# Patient Record
Sex: MALE | Race: WHITE | URBAN - METROPOLITAN AREA
[De-identification: names, ages, dates, MRNs, and addresses within clinical notes are randomized per-mention and may not be internally consistent; named-entity substitution may affect disease eponyms.]

---

## 2018-03-19 ENCOUNTER — DOCTOR'S OFFICE (OUTPATIENT)
Dept: URBAN - METROPOLITAN AREA CLINIC 136 | Facility: CLINIC | Age: 38
Setting detail: OPHTHALMOLOGY
End: 2018-03-19
Payer: MEDICAID

## 2018-03-19 DIAGNOSIS — H25.13: ICD-10-CM

## 2018-03-19 DIAGNOSIS — H53.19: ICD-10-CM

## 2018-03-19 DIAGNOSIS — H43.393: ICD-10-CM

## 2018-03-19 DIAGNOSIS — E11.9: ICD-10-CM

## 2018-03-19 PROCEDURE — 92004 COMPRE OPH EXAM NEW PT 1/>: CPT | Performed by: OPHTHALMOLOGY

## 2018-03-19 ASSESSMENT — CONFRONTATIONAL VISUAL FIELD TEST (CVF)
OS_FINDINGS: FULL
OD_FINDINGS: FULL

## 2018-03-21 ASSESSMENT — REFRACTION_CURRENTRX
OS_OVR_VA: 20/
OS_CYLINDER: SPHERE
OD_OVR_VA: 20/
OD_OVR_VA: 20/
OS_OVR_VA: 20/
OS_SPHERE: -2.75
OD_CYLINDER: SPHERE
OD_OVR_VA: 20/
OD_SPHERE: -2.75
OS_OVR_VA: 20/

## 2018-03-21 ASSESSMENT — REFRACTION_MANIFEST
OD_VA2: 20/
OD_VA1: 20/
OS_VA3: 20/
OD_VA2: 20/
OS_VA1: 20/
OD_VA1: 20/
OD_VA1: 20/
OD_VA2: 20/
OU_VA: 20/
OD_VA3: 20/
OD_VA3: 20/
OS_VA3: 20/
OU_VA: 20/
OS_VA1: 20/
OD_VA3: 20/
OS_VA2: 20/
OU_VA: 20/
OS_VA2: 20/
OS_VA2: 20/
OS_VA1: 20/
OS_VA3: 20/

## 2018-03-21 ASSESSMENT — VISUAL ACUITY
OD_BCVA: 20/20-1
OS_BCVA: 20/20-

## 2018-04-11 LAB
ABSOL LYMPHOCYTES (HISTORICAL): 2.2 K/UL (ref 0.5–4)
ALBUMIN SERPL BCP-MCNC: 4 G/DL (ref 3–5.2)
ALP SERPL-CCNC: 84 U/L (ref 43–122)
ALT SERPL W P-5'-P-CCNC: 29 U/L (ref 9–52)
ANION GAP SERPL CALCULATED.3IONS-SCNC: 10 MMOL/L (ref 5–14)
AST SERPL W P-5'-P-CCNC: 18 U/L (ref 17–59)
BASOPHILS # BLD AUTO: 0.1 K/UL (ref 0–0.1)
BASOPHILS # BLD AUTO: 1 % (ref 0–1)
BILIRUB SERPL-MCNC: 0.2 MG/DL
BUN SERPL-MCNC: 30 MG/DL (ref 5–25)
CALCIUM SERPL-MCNC: 9.5 MG/DL (ref 8.4–10.2)
CHLORIDE SERPL-SCNC: 96 MEQ/L (ref 97–108)
CHOLEST SERPL-MCNC: 195 MG/DL
CHOLEST/HDLC SERPL: 3.8 {RATIO}
CO2 SERPL-SCNC: 30 MMOL/L (ref 22–30)
CREATINE, SERUM (HISTORICAL): 1.02 MG/DL (ref 0.7–1.5)
DEPRECATED RDW RBC AUTO: 13.4 %
EGFR (HISTORICAL): >60 ML/MIN/1.73 M2
EOSINOPHIL # BLD AUTO: 0.5 K/UL (ref 0–0.4)
EOSINOPHIL NFR BLD AUTO: 5 % (ref 0–6)
EST. AVERAGE GLUCOSE BLD GHB EST-MCNC: 275 MG/DL
GLUCOSE FASTING (HISTORICAL): 142 MG/DL (ref 70–99)
HBA1C MFR BLD HPLC: 11.2 %
HCT VFR BLD AUTO: 40 % (ref 41–53)
HDLC SERPL-MCNC: 51 MG/DL
HGB BLD-MCNC: 13.5 G/DL (ref 13.5–17.5)
LDL/HDL RATIO (HISTORICAL): 2.3
LDLC SERPL CALC-MCNC: 115 MG/DL
LYMPHOCYTES NFR BLD AUTO: 24 % (ref 25–45)
MCH RBC QN AUTO: 28.8 PG (ref 26–34)
MCHC RBC AUTO-ENTMCNC: 33.7 % (ref 31–36)
MCV RBC AUTO: 85 FL (ref 80–100)
MONOCYTES # BLD AUTO: 0.7 K/UL (ref 0.2–0.9)
MONOCYTES NFR BLD AUTO: 8 % (ref 1–10)
NEUTROPHILS ABS COUNT (HISTORICAL): 5.5 K/UL (ref 1.8–7.8)
NEUTS SEG NFR BLD AUTO: 62 % (ref 45–65)
PLATELET # BLD AUTO: 280 K/MCL (ref 150–450)
POTASSIUM SERPL-SCNC: 4.8 MEQ/L (ref 3.6–5)
PROLACTIN (HISTORICAL): 24.9 NG/ML
RBC # BLD AUTO: 4.69 M/MCL (ref 4.5–5.9)
SODIUM SERPL-SCNC: 136 MEQ/L (ref 137–147)
TOTAL PROTEIN (HISTORICAL): 7 G/DL (ref 5.9–8.4)
TRIGL SERPL-MCNC: 145 MG/DL
TSH SERPL DL<=0.05 MIU/L-ACNC: 25.3 UIU/ML (ref 0.47–4.68)
VLDLC SERPL CALC-MCNC: 29 MG/DL (ref 0–40)
WBC # BLD AUTO: 8.9 K/MCL (ref 4.5–11)

## 2018-08-15 ENCOUNTER — DOCTOR'S OFFICE (OUTPATIENT)
Dept: URBAN - METROPOLITAN AREA CLINIC 136 | Facility: CLINIC | Age: 38
Setting detail: OPHTHALMOLOGY
End: 2018-08-15
Payer: MEDICAID

## 2018-08-15 DIAGNOSIS — H25.13: ICD-10-CM

## 2018-08-15 DIAGNOSIS — H43.393: ICD-10-CM

## 2018-08-15 DIAGNOSIS — H53.19: ICD-10-CM

## 2018-08-15 DIAGNOSIS — E11.9: ICD-10-CM

## 2018-08-15 PROCEDURE — 92012 INTRM OPH EXAM EST PATIENT: CPT | Performed by: OPHTHALMOLOGY

## 2018-08-15 ASSESSMENT — REFRACTION_CURRENTRX
OD_SPHERE: -2.75
OD_OVR_VA: 20/
OD_CYLINDER: SPHERE
OD_OVR_VA: 20/
OS_CYLINDER: SPHERE
OS_SPHERE: -2.75
OS_OVR_VA: 20/
OD_OVR_VA: 20/

## 2018-08-15 ASSESSMENT — REFRACTION_MANIFEST
OS_VA1: 20/
OU_VA: 20/
OS_VA2: 20/
OD_VA1: 20/
OS_VA3: 20/
OD_VA1: 20/
OS_VA3: 20/
OS_VA1: 20/
OD_VA3: 20/
OD_VA2: 20/
OS_VA2: 20/
OU_VA: 20/
OD_VA2: 20/
OS_VA1: 20/
OS_VA2: 20/
OU_VA: 20/
OD_VA2: 20/
OS_VA3: 20/
OD_VA3: 20/
OD_VA3: 20/
OD_VA1: 20/

## 2018-08-15 ASSESSMENT — VISUAL ACUITY
OD_BCVA: 20/20-1
OS_BCVA: 20/20-

## 2018-08-15 ASSESSMENT — CONFRONTATIONAL VISUAL FIELD TEST (CVF)
OS_FINDINGS: FULL
OD_FINDINGS: FULL

## 2018-08-15 ASSESSMENT — SUPERFICIAL PUNCTATE KERATITIS (SPK)
OS_SPK: 2+
OD_SPK: 2+

## 2018-08-31 ENCOUNTER — APPOINTMENT (EMERGENCY)
Dept: CT IMAGING | Facility: HOSPITAL | Age: 38
End: 2018-08-31
Payer: MEDICARE

## 2018-08-31 ENCOUNTER — HOSPITAL ENCOUNTER (EMERGENCY)
Facility: HOSPITAL | Age: 38
Discharge: HOME | End: 2018-08-31
Attending: INTERNAL MEDICINE | Admitting: INTERNAL MEDICINE
Payer: MEDICARE

## 2018-08-31 ENCOUNTER — APPOINTMENT (EMERGENCY)
Dept: RADIOLOGY | Facility: HOSPITAL | Age: 38
End: 2018-08-31
Payer: MEDICARE

## 2018-08-31 VITALS
DIASTOLIC BLOOD PRESSURE: 73 MMHG | SYSTOLIC BLOOD PRESSURE: 124 MMHG | WEIGHT: 300 LBS | TEMPERATURE: 99.2 F | RESPIRATION RATE: 16 BRPM | BODY MASS INDEX: 55.21 KG/M2 | OXYGEN SATURATION: 95 % | HEIGHT: 62 IN | HEART RATE: 80 BPM

## 2018-08-31 DIAGNOSIS — I10 HYPERTENSION: ICD-10-CM

## 2018-08-31 DIAGNOSIS — J20.9 ACUTE BRONCHITIS: Primary | ICD-10-CM

## 2018-08-31 LAB
ALBUMIN SERPL BCP-MCNC: 3.6 G/DL (ref 3.5–5.7)
ALP SERPL-CCNC: 67 U/L (ref 40–150)
ALT SERPL W P-5'-P-CCNC: 8 U/L (ref 7–52)
ANION GAP SERPL CALCULATED.3IONS-SCNC: 7 MMOL/L (ref 4–13)
AST SERPL W P-5'-P-CCNC: 9 U/L (ref 13–39)
BASOPHILS # BLD AUTO: 0.1 THOUSANDS/ΜL (ref 0–0.1)
BASOPHILS NFR BLD AUTO: 1 % (ref 0–2)
BETA-HYDROXYBUTYRATE: 0.31 MMOL/L (ref 0.02–0.27)
BILIRUB SERPL-MCNC: 0.4 MG/DL (ref 0.2–1)
BILIRUB UR QL STRIP: NEGATIVE
BNP SERPL-MCNC: 48 PG/ML (ref 1–100)
BUN SERPL-MCNC: 10 MG/DL (ref 7–25)
CALCIUM SERPL-MCNC: 9.3 MG/DL (ref 8.6–10.5)
CHLORIDE SERPL-SCNC: 99 MMOL/L (ref 98–107)
CLARITY UR: CLEAR
CO2 SERPL-SCNC: 29 MMOL/L (ref 21–31)
COLOR UR: YELLOW
CREAT SERPL-MCNC: 0.92 MG/DL (ref 0.7–1.3)
EOSINOPHIL # BLD AUTO: 0.3 THOUSAND/ΜL (ref 0–0.61)
EOSINOPHIL NFR BLD AUTO: 3 % (ref 0–5)
ERYTHROCYTE [DISTWIDTH] IN BLOOD BY AUTOMATED COUNT: 13.3 % (ref 11.5–14.5)
GFR SERPL CREATININE-BSD FRML MDRD: 104 ML/MIN/1.73SQ M
GLUCOSE SERPL-MCNC: 245 MG/DL (ref 65–99)
GLUCOSE UR STRIP-MCNC: ABNORMAL MG/DL
HCT VFR BLD AUTO: 39.3 % (ref 36.5–49.3)
HGB BLD-MCNC: 13.1 G/DL (ref 14–18)
HGB UR QL STRIP.AUTO: NEGATIVE
KETONES UR STRIP-MCNC: NEGATIVE MG/DL
LACTATE SERPL-SCNC: 0.6 MMOL/L (ref 0.5–2)
LEUKOCYTE ESTERASE UR QL STRIP: NEGATIVE
LYMPHOCYTES # BLD AUTO: 1.8 THOUSANDS/ΜL (ref 0.6–4.47)
LYMPHOCYTES NFR BLD AUTO: 20 % (ref 21–51)
MCH RBC QN AUTO: 28.2 PG (ref 26–34)
MCHC RBC AUTO-ENTMCNC: 33.3 G/DL (ref 31–37)
MCV RBC AUTO: 85 FL (ref 81–99)
MONOCYTES # BLD AUTO: 0.5 THOUSAND/ΜL (ref 0.17–1.22)
MONOCYTES NFR BLD AUTO: 5 % (ref 2–12)
NEUTROPHILS # BLD AUTO: 6.5 THOUSANDS/ΜL (ref 1.4–6.5)
NEUTS SEG NFR BLD AUTO: 71 % (ref 42–75)
NITRITE UR QL STRIP: NEGATIVE
NRBC BLD AUTO-RTO: 0 /100 WBCS
PH UR STRIP.AUTO: 6 [PH] (ref 5–8)
PLATELET # BLD AUTO: 305 THOUSANDS/UL (ref 149–390)
PMV BLD AUTO: 7.7 FL (ref 8.6–11.7)
POTASSIUM SERPL-SCNC: 3.7 MMOL/L (ref 3.5–5.5)
PROT SERPL-MCNC: 6.9 G/DL (ref 6.4–8.9)
PROT UR STRIP-MCNC: NEGATIVE MG/DL
RBC # BLD AUTO: 4.64 MILLION/UL (ref 4.3–5.9)
SODIUM SERPL-SCNC: 135 MMOL/L (ref 134–143)
SP GR UR STRIP.AUTO: <=1.005 (ref 1–1.03)
UROBILINOGEN UR QL STRIP.AUTO: 0.2 E.U./DL
WBC # BLD AUTO: 9.1 THOUSAND/UL (ref 4.8–10.8)

## 2018-08-31 PROCEDURE — 70450 CT HEAD/BRAIN W/O DYE: CPT

## 2018-08-31 PROCEDURE — 83880 ASSAY OF NATRIURETIC PEPTIDE: CPT | Performed by: INTERNAL MEDICINE

## 2018-08-31 PROCEDURE — 83605 ASSAY OF LACTIC ACID: CPT | Performed by: INTERNAL MEDICINE

## 2018-08-31 PROCEDURE — 80053 COMPREHEN METABOLIC PANEL: CPT | Performed by: INTERNAL MEDICINE

## 2018-08-31 PROCEDURE — 87040 BLOOD CULTURE FOR BACTERIA: CPT | Performed by: INTERNAL MEDICINE

## 2018-08-31 PROCEDURE — 82010 KETONE BODYS QUAN: CPT | Performed by: INTERNAL MEDICINE

## 2018-08-31 PROCEDURE — 93005 ELECTROCARDIOGRAM TRACING: CPT

## 2018-08-31 PROCEDURE — 71046 X-RAY EXAM CHEST 2 VIEWS: CPT

## 2018-08-31 PROCEDURE — 85025 COMPLETE CBC W/AUTO DIFF WBC: CPT | Performed by: INTERNAL MEDICINE

## 2018-08-31 PROCEDURE — 36415 COLL VENOUS BLD VENIPUNCTURE: CPT | Performed by: INTERNAL MEDICINE

## 2018-08-31 PROCEDURE — 96374 THER/PROPH/DIAG INJ IV PUSH: CPT

## 2018-08-31 PROCEDURE — 81003 URINALYSIS AUTO W/O SCOPE: CPT | Performed by: INTERNAL MEDICINE

## 2018-08-31 PROCEDURE — 99285 EMERGENCY DEPT VISIT HI MDM: CPT

## 2018-08-31 PROCEDURE — 96361 HYDRATE IV INFUSION ADD-ON: CPT

## 2018-08-31 RX ORDER — INSULIN GLARGINE 100 [IU]/ML
30 INJECTION, SOLUTION SUBCUTANEOUS
COMMUNITY
End: 2018-09-19 | Stop reason: SDUPTHER

## 2018-08-31 RX ORDER — ONDANSETRON 2 MG/ML
4 INJECTION INTRAMUSCULAR; INTRAVENOUS ONCE
Status: COMPLETED | OUTPATIENT
Start: 2018-08-31 | End: 2018-08-31

## 2018-08-31 RX ORDER — LEVOTHYROXINE SODIUM 0.05 MG/1
50 TABLET ORAL DAILY
COMMUNITY
End: 2018-09-19 | Stop reason: SDUPTHER

## 2018-08-31 RX ORDER — PANTOPRAZOLE SODIUM 40 MG/1
40 TABLET, DELAYED RELEASE ORAL DAILY
COMMUNITY
End: 2018-09-19 | Stop reason: SDUPTHER

## 2018-08-31 RX ORDER — AZITHROMYCIN 250 MG/1
TABLET, FILM COATED ORAL
Qty: 4 TABLET | Refills: 0 | Status: SHIPPED | OUTPATIENT
Start: 2018-08-31 | End: 2018-09-04

## 2018-08-31 RX ORDER — FLUVOXAMINE MALEATE 100 MG
200 TABLET ORAL
COMMUNITY
End: 2018-09-19 | Stop reason: SDUPTHER

## 2018-08-31 RX ORDER — ZIPRASIDONE HYDROCHLORIDE 40 MG/1
40 CAPSULE ORAL 2 TIMES DAILY WITH MEALS
COMMUNITY
End: 2018-09-19 | Stop reason: SDUPTHER

## 2018-08-31 RX ORDER — LISINOPRIL 10 MG/1
10 TABLET ORAL DAILY
COMMUNITY
End: 2018-09-07 | Stop reason: SDUPTHER

## 2018-08-31 RX ORDER — AZITHROMYCIN 250 MG/1
500 TABLET, FILM COATED ORAL ONCE
Status: COMPLETED | OUTPATIENT
Start: 2018-08-31 | End: 2018-08-31

## 2018-08-31 RX ORDER — LISINOPRIL 10 MG/1
10 TABLET ORAL DAILY
Qty: 20 TABLET | Refills: 0 | Status: SHIPPED | OUTPATIENT
Start: 2018-08-31 | End: 2018-09-19 | Stop reason: SDUPTHER

## 2018-08-31 RX ADMIN — AZITHROMYCIN MONOHYDRATE 500 MG: 250 TABLET ORAL at 22:22

## 2018-08-31 RX ADMIN — SODIUM CHLORIDE 1000 ML: 0.9 INJECTION, SOLUTION INTRAVENOUS at 20:38

## 2018-08-31 RX ADMIN — ONDANSETRON 4 MG: 2 INJECTION INTRAMUSCULAR; INTRAVENOUS at 20:38

## 2018-08-31 NOTE — ED PROVIDER NOTES
History  Chief Complaint   Patient presents with    Headache     x1 week  "pressure in the back of my head" "trouble concentrating"  "worse when i wake up"     Shortness of Breath     at rest and exertional     Weakness - Generalized     49-year-old white male with underlying diabetes presents not feeling well for the last several days  He has had a progressive occipital headache radiating to both temples, associated nausea and vomiting, some visual difficulties, and feeling confused at times  He has been DKA before and feels this may be a possibility  He also complains of shortness of breath  He says he developed a cold about a week ago and since then has had a cough and this progressive headache  Denies sore throat, abdominal pain, but has had some incontinence and nausea with vomiting  Does cough but nonproductive  Prior to Admission Medications   Prescriptions Last Dose Informant Patient Reported? Taking?    fluvoxaMINE (LUVOX) 100 mg tablet 8/31/2018 at Unknown time  Yes Yes   Sig: Take 200 mg by mouth daily at bedtime   insulin aspart (NovoLOG) 100 units/mL injection 8/31/2018 at Unknown time  Yes Yes   Sig: Inject 10 Units under the skin 3 (three) times a day before meals   insulin glargine (LANTUS) 100 units/mL subcutaneous injection 8/31/2018 at Unknown time  Yes Yes   Sig: Inject 30 Units under the skin daily at bedtime   levothyroxine 50 mcg tablet 8/31/2018 at Unknown time  Yes Yes   Sig: Take 50 mcg by mouth daily   lisinopril (ZESTRIL) 10 mg tablet 8/31/2018 at Unknown time  Yes Yes   Sig: Take 10 mg by mouth daily   pantoprazole (PROTONIX) 40 mg tablet 8/31/2018 at Unknown time  Yes Yes   Sig: Take 40 mg by mouth daily   ziprasidone (GEODON) 40 mg capsule 8/31/2018 at Unknown time  Yes Yes   Sig: Take 40 mg by mouth 2 (two) times a day with meals      Facility-Administered Medications: None       Past Medical History:   Diagnosis Date    Diabetes mellitus ([address] )     Disease of thyroid gland     GERD (gastroesophageal reflux disease)     Hypertension     Obsessive compulsive disorder     Schizoaffective [facility] ([facility] )        History reviewed  No pertinent surgical history  History reviewed  No pertinent family history  I have reviewed and agree with the history as documented  Social History   Substance Use Topics    Smoking status: Never Smoker    Smokeless tobacco: Never Used    Alcohol use No        Review of Systems   Constitutional: Positive for chills  Negative for fever  HENT: Positive for congestion, rhinorrhea and sinus pain  Negative for sore throat  Eyes: Positive for photophobia  Respiratory: Positive for cough and shortness of breath  Cardiovascular: Negative for chest pain and leg swelling  Gastrointestinal: Negative for abdominal pain, diarrhea, nausea and vomiting  Endocrine: Positive for polyuria  Genitourinary: Positive for frequency and urgency  Negative for dysuria  Some incontinence   Musculoskeletal: Negative for back pain and myalgias  Skin: Negative for rash  Neurological: Positive for dizziness  Negative for headaches  Psychiatric/Behavioral: Positive for confusion  Feels intermittently confused   All other systems reviewed and are negative  Physical Exam  Physical Exam   Constitutional: He is oriented to person, place, and time  HENT:   Nose: Nose normal    Mouth/Throat: Oropharynx is clear and moist  No oropharyngeal exudate  Eyes: Conjunctivae and EOM are normal  Pupils are equal, round, and reactive to light  No scleral icterus  Neck: Normal range of motion  Neck supple  No JVD present  No tracheal deviation present  Cardiovascular: Normal rate, regular rhythm and normal heart sounds  Gallop: rbidly obese  No murmur heard  Pulmonary/Chest: Effort normal  No respiratory distress  He has no wheezes  He has no rales  Few rhonchi bilaterally   Abdominal: Soft   Bowel sounds are normal  There is no tenderness  There is no guarding  Musculoskeletal: Normal range of motion  He exhibits no edema or tenderness  Neurological: He is alert and oriented to person, place, and time  No cranial nerve deficit or sensory deficit  He exhibits normal muscle tone  5/5 motor, nl sens   Skin: Skin is warm and dry  There is pallor  Psychiatric: He has a normal mood and affect  His behavior is normal    Nursing note and vitals reviewed        Vital Signs  ED Triage Vitals [08/31/18 1912]   Temperature Pulse Respirations Blood Pressure SpO2   99 2 °F (37 3 °C) 86 20 159/90 99 %      Temp Source Heart Rate Source Patient Position - Orthostatic VS BP Location FiO2 (%)   Temporal -- Sitting Left arm --      Pain Score       5           Vitals:    08/31/18 1912   BP: 159/90   Pulse: 86   Patient Position - Orthostatic VS: Sitting       Visual Acuity      ED Medications  Medications - No data to display    Diagnostic Studies  Results Reviewed     None                 No orders to display              Procedures  ECG 12 Lead Documentation  Date/Time: 8/31/2018 8:45 PM  Performed by: Miya Vasquez  Authorized by: Miya Vasquez     Indications / Diagnosis:  Confusion and cough in a diabetic  ECG reviewed by me, the ED Provider: yes    Patient location:  ED  Previous ECG:     Previous ECG:  Unavailable    Comparison to cardiac monitor: No    Interpretation:     Interpretation: normal    Rate:     ECG rate:  80    ECG rate assessment: normal    Rhythm:     Rhythm: sinus rhythm    Ectopy:     Ectopy: none    QRS:     QRS axis:  Left    QRS intervals:  Normal  Conduction:     Conduction: normal    ST segments:     ST segments:  Normal  T waves:     T waves: normal             Phone Contacts  ED Phone Contact    ED Course                               [facility]    Disposition  Final diagnoses:   None     ED Disposition     None      Follow-up Information    None         Patient's Medications   Discharge Prescriptions No medications on file     No discharge procedures on file      ED Provider  Electronically Signed by           Raul Young DO  08/31/18 9183

## 2018-09-01 LAB
ATRIAL RATE: 80 BPM
P AXIS: 51 DEGREES
PR INTERVAL: 178 MS
QRS AXIS: -8 DEGREES
QRSD INTERVAL: 102 MS
QT INTERVAL: 382 MS
QTC INTERVAL: 440 MS
T WAVE AXIS: 20 DEGREES
VENTRICULAR RATE: 80 BPM

## 2018-09-01 NOTE — DISCHARGE INSTRUCTIONS
Acute Bronchitis   WHAT YOU SHOULD KNOW:   Acute bronchitis is swelling and irritation in the air passages of your lungs  This irritation may cause you to cough or have other breathing problems  Acute bronchitis often starts because of another viral illness, such as a cold or the flu  The illness spreads from your nose and throat to your windpipe and airways  Bronchitis is often called a chest cold  Acute bronchitis lasts about 2 weeks and is usually not a serious illness  AFTER YOU LEAVE:   Medicines:   · Ibuprofen or acetaminophen:  These medicines help lower a fever  They are available without a doctor's order  Ask your healthcare provider which medicine is right for you  Ask how much to take and how often to take it  Follow directions  These medicines can cause stomach bleeding if not taken correctly  Ibuprofen can cause kidney damage  Do not take ibuprofen if you have kidney disease, an ulcer, or allergies to aspirin  Acetaminophen can cause liver damage  Do not drink alcohol if you take acetaminophen  · Cough medicine: This medicine helps loosen mucus in your lungs and make it easier to cough up  This can help you breathe easier  · Inhalers: You may need one or more inhalers to help you breathe easier and cough less  An inhaler gives your medicine in a mist form so that you can breathe it into your lungs  Ask your healthcare provider to show you how to use your inhaler correctly  · Steroid medicine:  Steroid medicine helps open your air passages so you can breathe easier  · Take your medicine as directed  Call your healthcare provider if you think your medicine is not helping or if you have side effects  Tell him if you are allergic to any medicine  Keep a list of the medicines, vitamins, and herbs you take  Include the amounts, and when and why you take them  Bring the list or the pill bottles to follow-up visits  Carry your medicine list with you in case of an emergency    How to use an inhaler:   · Shake the inhaler well to make sure you get the correct amount of medicine per puff  Remove the cover from your inhaler's mouthpiece  If you are using a spacer, connect your inhaler to the flat end of the spacer  · Exhale as much air from your lungs as you can  Put the mouthpiece in your mouth past your front teeth and rest it on the top of your tongue  Do not block the mouthpiece opening with your tongue  · Breathe in through your mouth at a slow and steady rate  As you do this, press the inhaler to release the puff of medicine  Finish breathing in slowly and deeply as you inhale the medicine  When your lungs are full, hold your breath for 10 seconds  Then breathe out slowly through puckered lips or through your nose  · If you need to take more puffs, wait at least 1 minute between each puff  · Rinse your mouth with water after you use the inhaler  This may keep you from getting a mouth infection or irritation  · Follow the instructions that come with your inhaler to clean it  You should clean your inhaler at least once a week  Ways to care for yourself:   · Avoid alcohol:  Alcohol dulls your urge to cough and sneeze  When you have bronchitis, you need to be able to cough and sneeze to clear your air passages  Alcohol also causes your body to lose fluid  This can make the mucus in your lungs thicker and harder to cough up  · Avoid irritants in the air:  Do not smoke or allow others to smoke around you  Avoid chemicals, fumes, and dust  Wear a face mask if you must work around dust or fumes  Stay inside on days when air pollution levels are high  If you have allergies, stay inside when pollen counts are high  Avoid aerosol products  This includes spray-on deodorant, bug spray, and hair spray  · Drink more liquids:  Most people should drink at least 8 eight-ounce cups of water a day  You may need to drink more liquids when you have acute bronchitis   Liquids help keep your air passages moist and help you cough up mucus  · Get more rest:  You may feel like resting more  Slowly start to do more each day  Rest when you feel it is needed  · Eat healthy foods:  Eat a variety healthy foods every day  Your diet should include fruits, vegetables, breads, and protein (such as chicken, fish, and beans)  Dairy products (such as milk, cheese, and ice cream) can sometimes increase the amount of mucus your body makes  Ask if you should decrease your intake of dairy products  · Use a humidifier:  Use a cool mist humidifier to increase air moisture in your home  This may make it easier for you to breathe and help decrease your cough  Decrease your risk of acute bronchitis:   · Get the vaccinations you need:  Ask your healthcare provider if you should get vaccinated against the flu or pneumonia  · Avoid things that may irritate your lungs:  Stay inside or cover your mouth and nose with a scarf when you are outside during cold weather  You should also stay inside on days when air pollution levels are high  If you have allergies, stay inside when pollen counts are high  Avoid using aerosol products in your home  This includes spray-on deodorant, bug spray, and hair spray  · Avoid the spread of germs:        [facility] your hands often with soap and water  Carry germ-killing gel with you  You can use the gel to clean your hands when there is no soap and water available  ¨ Do not touch your eyes, nose, or mouth unless you have washed your hands first     ¨ Always cover your mouth when you cough  Cough into a tissue or your shirtsleeve so you do not spread germs from your hands  ¨ Try to avoid people who have a cold or the flu  If you are sick, stay away from others as much as possible  Follow up with your healthcare provider as directed:  Write down questions you have so you will remember to ask them during your follow-up visits    Contact your healthcare provider if:   · You have a fever     · Your skin becomes itchy or you have a rash after you take your medicine  · Your breathing problems do not go away or get worse  · Your cough does not get better with treatment  · You cough up blood  · You have questions or concerns about your condition or care  Seek care immediately or call 911 if:   · You faint  · Your lips or fingernails turn blue  · You feel like you are not getting enough air when you breathe  · You have swelling of your lips, tongue, or throat that makes it hard to breathe or swallow  © 2014 [address] is for End User's use only and may not be sold, redistributed or otherwise used for commercial purposes  All illustrations and images included in CareNotes® are the copyrighted property of [employer]  or Ayden Simpson  The above information is an  only  It is not intended as medical advice for individual conditions or treatments  Talk to your doctor, nurse or pharmacist before following any medical regimen to see if it is safe and effective for you

## 2018-09-05 LAB — BACTERIA BLD CULT: NORMAL

## 2018-09-07 ENCOUNTER — HOSPITAL ENCOUNTER (EMERGENCY)
Facility: HOSPITAL | Age: 38
Discharge: HOME | End: 2018-09-07
Attending: EMERGENCY MEDICINE | Admitting: EMERGENCY MEDICINE
Payer: MEDICARE

## 2018-09-07 ENCOUNTER — HOSPITAL ENCOUNTER (EMERGENCY)
Facility: HOSPITAL | Age: 38
Discharge: HOME | End: 2018-09-08
Attending: FAMILY MEDICINE | Admitting: FAMILY MEDICINE
Payer: MEDICARE

## 2018-09-07 VITALS
TEMPERATURE: 98.6 F | DIASTOLIC BLOOD PRESSURE: 75 MMHG | SYSTOLIC BLOOD PRESSURE: 154 MMHG | BODY MASS INDEX: 55.21 KG/M2 | HEART RATE: 85 BPM | RESPIRATION RATE: 20 BRPM | WEIGHT: 300 LBS | OXYGEN SATURATION: 96 % | HEIGHT: 62 IN

## 2018-09-07 DIAGNOSIS — R51.9 HEADACHE: Primary | ICD-10-CM

## 2018-09-07 PROCEDURE — 99283 EMERGENCY DEPT VISIT LOW MDM: CPT

## 2018-09-07 PROCEDURE — 96372 THER/PROPH/DIAG INJ SC/IM: CPT

## 2018-09-07 PROCEDURE — 93005 ELECTROCARDIOGRAM TRACING: CPT

## 2018-09-07 RX ORDER — KETOROLAC TROMETHAMINE 30 MG/ML
60 INJECTION, SOLUTION INTRAMUSCULAR; INTRAVENOUS ONCE
Status: COMPLETED | OUTPATIENT
Start: 2018-09-07 | End: 2018-09-07

## 2018-09-07 RX ORDER — DIPHENHYDRAMINE HYDROCHLORIDE 50 MG/ML
25 INJECTION INTRAMUSCULAR; INTRAVENOUS ONCE
Status: COMPLETED | OUTPATIENT
Start: 2018-09-07 | End: 2018-09-07

## 2018-09-07 RX ORDER — AMLODIPINE BESYLATE 2.5 MG/1
5 TABLET ORAL DAILY
Qty: 30 TABLET | Refills: 1 | Status: SHIPPED | OUTPATIENT
Start: 2018-09-07 | End: 2018-09-19 | Stop reason: SDUPTHER

## 2018-09-07 RX ORDER — PROMETHAZINE HYDROCHLORIDE 25 MG/ML
12.5 INJECTION, SOLUTION INTRAMUSCULAR; INTRAVENOUS ONCE
Status: COMPLETED | OUTPATIENT
Start: 2018-09-07 | End: 2018-09-07

## 2018-09-07 RX ORDER — BUTALBITAL, ACETAMINOPHEN AND CAFFEINE 50; 325; 40 MG/1; MG/1; MG/1
2 TABLET ORAL ONCE
Status: COMPLETED | OUTPATIENT
Start: 2018-09-07 | End: 2018-09-07

## 2018-09-07 RX ORDER — IBUPROFEN 600 MG/1
600 TABLET ORAL EVERY 6 HOURS PRN
Qty: 30 TABLET | Refills: 0 | Status: SHIPPED | OUTPATIENT
Start: 2018-09-07 | End: 2018-09-19 | Stop reason: SDUPTHER

## 2018-09-07 RX ORDER — BUTALBITAL, ACETAMINOPHEN AND CAFFEINE 50; 325; 40 MG/1; MG/1; MG/1
2 TABLET ORAL EVERY 4 HOURS PRN
Qty: 20 TABLET | Refills: 0 | Status: SHIPPED | OUTPATIENT
Start: 2018-09-07 | End: 2018-11-09 | Stop reason: ALTCHOICE

## 2018-09-07 RX ADMIN — KETOROLAC TROMETHAMINE 60 MG: 30 INJECTION, SOLUTION INTRAMUSCULAR; INTRAVENOUS at 04:28

## 2018-09-07 RX ADMIN — BUTALBITAL, ACETAMINOPHEN, AND CAFFEINE 2 TABLET: 50; 325; 40 TABLET ORAL at 03:20

## 2018-09-07 RX ADMIN — DEXAMETHASONE SODIUM PHOSPHATE 10 MG: 10 INJECTION INTRAMUSCULAR; INTRAVENOUS at 22:49

## 2018-09-07 RX ADMIN — DIPHENHYDRAMINE HYDROCHLORIDE 25 MG: 50 INJECTION INTRAMUSCULAR; INTRAVENOUS at 22:52

## 2018-09-07 RX ADMIN — KETOROLAC TROMETHAMINE 60 MG: 30 INJECTION, SOLUTION INTRAMUSCULAR at 22:50

## 2018-09-07 RX ADMIN — PROMETHAZINE HYDROCHLORIDE 12.5 MG: 25 INJECTION INTRAMUSCULAR; INTRAVENOUS at 22:53

## 2018-09-07 NOTE — ED PROVIDER NOTES
History  Chief Complaint   Patient presents with    Headache     Had a headache today for hours and "won't go away"  Has had it "off and on for over a week when I was seen here and treated for pneumonia and discharged"     34YEAR-OLD MALE WITH A HISTORY OF DIABETES AND HYPERTENSION PRESENTS TO THE EMERGENCY DEPARTMENT WITH PREDOMINANTLY FRONTAL HEADACHE THAT HE DESCRIBES AS A PRESSURE-LIKE THAT HAS BEEN ON AND OFF FOR THE PAST WEEK BUT HAS OVER THE PAST SEVERAL HOURS WORSENED  PATIENT DESCRIBES THE PAIN AS PRESSURE SENSATION WITH RADIATION TO THE ACCEPTED THIS  THIS IS TYPICAL OF THE HEADACHES THAT THE PATIENT GETS IN [place] AS THE TYPE OF PAIN IN THE PATTERN OF PAIN  HOWEVER THE INTENSITY OF PAIN IN WITH TODAY'S HEADACHE IS GREATER THAN PRIOR HEADACHES  THERE IS NO ASSOCIATED NAUSEA VOMITING OR DIARRHEA  THERE IS NO OCULAR TEARING WHERE THERE IS PHOTOPHOBIA  THERE IS NO NASAL DISCHARGE  PATIENT TYPICALLY TAKES ADVIL FOR THE HEADACHES WHICH RESOLVE IT  HOWEVER WITH THE ADVIL DOSING THE PATIENT'S UNDERTAKEN IN THE PAST,  THE HEADACHE HAS BEEN PERSISTENT  Prior to Admission Medications   Prescriptions Last Dose Informant Patient Reported? Taking?    fluvoxaMINE (LUVOX) 100 mg tablet   Yes No   Sig: Take 200 mg by mouth daily at bedtime   insulin aspart (NovoLOG) 100 units/mL injection   Yes No   Sig: Inject 10 Units under the skin 3 (three) times a day before meals   insulin glargine (LANTUS) 100 units/mL subcutaneous injection   Yes No   Sig: Inject 30 Units under the skin daily at bedtime   levothyroxine 50 mcg tablet   Yes No   Sig: Take 50 mcg by mouth daily   lisinopril (ZESTRIL) 10 mg tablet   No No   Sig: Take 1 tablet (10 mg total) by mouth daily   pantoprazole (PROTONIX) 40 mg tablet   Yes No   Sig: Take 40 mg by mouth daily   ziprasidone (GEODON) 40 mg capsule   Yes No   Sig: Take 40 mg by mouth 2 (two) times a day with meals      Facility-Administered Medications: None       Past Medical History:   Diagnosis Date    Diabetes mellitus ([facility] )     Disease of thyroid gland     GERD (gastroesophageal reflux disease)     Hypertension     Obsessive compulsive disorder     Schizoaffective disorder ([facility] )        History reviewed  No pertinent surgical history  History reviewed  No pertinent family history  I have reviewed and agree with the history as documented  Social History   Substance Use Topics    Smoking status: Never Smoker    Smokeless tobacco: Never Used    Alcohol use No        Review of Systems   Constitutional: Negative for chills and fever  HENT: Negative for ear pain, rhinorrhea and sore throat  Eyes: Negative for pain, redness and visual disturbance  Respiratory: Negative for cough and shortness of breath  Cardiovascular: Negative for chest pain and leg swelling  Gastrointestinal: Negative for abdominal pain, diarrhea, nausea and vomiting  Genitourinary: Negative for dysuria, flank pain, frequency and urgency  Musculoskeletal: Negative for back pain, myalgias and neck pain  Skin: Negative for rash  Neurological: Positive for headaches  Negative for dizziness, weakness and light-headedness  Hematological: Negative  Psychiatric/Behavioral: Negative for agitation, confusion and suicidal ideas  The patient is not nervous/anxious  All other systems reviewed and are negative  Physical Exam  Physical Exam   Constitutional: He is oriented to person, place, and time  THIS IS AN OBESE WHITE MALE  HENT:   Nose: Nose normal    Mouth/Throat: Oropharynx is clear and moist  No oropharyngeal exudate  Eyes: Conjunctivae and EOM are normal  Pupils are equal, round, and reactive to light  No scleral icterus  Neck: Normal range of motion  Neck supple  No JVD present  No tracheal deviation present  Cardiovascular: Normal rate, regular rhythm and normal heart sounds  No murmur heard    Pulmonary/Chest: Effort normal and breath sounds normal  No respiratory distress  He has no wheezes  He has no rales  Abdominal: Soft  Bowel sounds are normal  There is no tenderness  There is no guarding  Musculoskeletal: Normal range of motion  He exhibits no edema or tenderness  Neurological: He is alert and oriented to person, place, and time  No cranial nerve deficit or sensory deficit  He exhibits normal muscle tone  5/5 motor, nl sens PATIENT HAS SYMMETRICAL MOTOR TONE MOTOR STRENGTH IN UPPER OR LOWER EXTREMITIES FINGER TO NOSE IS NEGATIVE THE PUPILS ARE EQUAL AND REACTIVE TO LIGHT ACCOMMODATION WITHOUT NYSTAGMUS THERE ARE NO GAIT DISTURBANCES  Skin: Skin is warm and dry  Psychiatric: He has a normal mood and affect  His behavior is normal    Nursing note and vitals reviewed  Vital Signs  ED Triage Vitals [09/07/18 0249]   Temperature Pulse Respirations Blood Pressure SpO2   98 6 °F (37 °C) 100 18 (!) 171/100 97 %      Temp Source Heart Rate Source Patient Position - Orthostatic VS BP Location FiO2 (%)   Temporal Monitor Sitting Left arm --      Pain Score       8           Vitals:    09/07/18 0249   BP: (!) 171/100   Pulse: 100   Patient Position - Orthostatic VS: Sitting       Visual Acuity      ED Medications  Medications - No data to display    Diagnostic Studies  Results Reviewed     None                 No orders to display              Procedures  Procedures       Phone Contacts  ED Phone Contact    ED Course                               MDM  Number of Diagnoses or Management Options  Headache:   Diagnosis management comments: AT 4:05AM, THE PATIENT REPORTED SOME INCREASING PAIN IN THE OCCIPITUS  PATIENT DESCRIBES THIS AS A PRESSURE SENSATION ALBEIT NOT SIGNIFICANTLY BEYOND WHERE THE PATIENT NORMALLY REPORTS BASELINE PAIN FOR HIS HEADACHES  THE PATIENT HAS BEEN PRONE TO HEADACHES AND TODAY'S HEADACHE SEEMS SOMEWHAT SIMILAR TO WHAT HE HAS BEEN EXPERIENCING OVER THE PAST    RE-EVALUATION THERE IS NO EVIDENCE OF ANY NEUROLOGIC COMPROMISE TO SUGGEST ANY FURTHER INTERVENTION OTHER THAN ADDITIONAL ANALGESIA THAT MAY BE HELPFUL     AT 5:00AM, THE PATIENT'S HEADACHE WAS ESSENTIALLY RESOLVED  [facility]Care Time    Disposition  Final diagnoses:   None     ED Disposition     None      Follow-up Information    None         Patient's Medications   Discharge Prescriptions    No medications on file     No discharge procedures on file      ED Provider  Electronically Signed by           Kim Altman MD  09/07/18 3506       Kim Altman MD  09/07/18 1659       Kim Altman MD  09/07/18 6708

## 2018-09-07 NOTE — DISCHARGE INSTRUCTIONS
Acute Headache, Ambulatory Care   GENERAL INFORMATION:   An acute headache  is pain or discomfort that starts suddenly and gets worse quickly  The cause of an acute headache may not be known  It may be triggered by stress, fatigue, hormones, food, or trauma  Common related symptoms include the following:   · Fever    · Sinus pressure    · Loss of memory    · Nausea or vomiting    · Problems with your vision, such as watery or red eyes, loss of vision, or pain in bright light    · Stiff neck    · Tenderness of the head and neck area    · Trouble staying awake, or being less alert than usual     · Weakness or less energy  Seek immediate care for the following symptoms:   · Severe pain    · A headache that occurs after a blow to the head, a fall, or other trauma     · Confusion or forgetfulness    · Numbness on one side of your face or body  Treatment for an acute headache  may include medicine to decrease pain  You may also need biofeedback or cognitive behavioral therapy  Ask your healthcare provider about these and other treatments for an acute headache  Manage my symptoms:   · Apply heat  on your head for 20 to 30 minutes every 2 hours for as many days as directed  Heat helps decrease pain and muscle spasms  You may alternate heat and ice  · Apply ice  on your head for 15 to 20 minutes every hour or as directed  Use an ice pack, or put crushed ice in a plastic bag  Cover it with a towel  Ice helps decrease pain  · Relax your muscles  Lie down in a comfortable position and close your eyes  Relax your muscles slowly  Start at your toes and work your way up your body  · Keep a record of your headaches  Write down when your headaches start and stop  Include your symptoms and what you were doing when the headache began  Record what you ate or drank for 24 hours before the headache started  Describe the pain and where it hurts  Keep track of what you did to treat your headache and whether it worked    Follow up with your healthcare provider as directed:  Bring your headache record with you when you see your healthcare provider  Write down your questions so you remember to ask them during your visits  CARE AGREEMENT:   You have the right to help plan your care  Learn about your health condition and how it may be treated  Discuss treatment options with your caregivers to decide what care you want to receive  You always have the right to refuse treatment  The above information is an  only  It is not intended as medical advice for individual conditions or treatments  Talk to your doctor, nurse or pharmacist before following any medical regimen to see if it is safe and effective for you  © 2014 [address] is for End User's use only and may not be sold, redistributed or otherwise used for commercial purposes  All illustrations and images included in CareNotes® are the copyrighted property of A [employer]  or Ayden Simpson

## 2018-09-08 VITALS
WEIGHT: 300 LBS | HEIGHT: 62 IN | DIASTOLIC BLOOD PRESSURE: 84 MMHG | BODY MASS INDEX: 55.21 KG/M2 | HEART RATE: 79 BPM | TEMPERATURE: 98.4 F | SYSTOLIC BLOOD PRESSURE: 138 MMHG | OXYGEN SATURATION: 99 % | RESPIRATION RATE: 18 BRPM

## 2018-09-08 PROCEDURE — 99284 EMERGENCY DEPT VISIT MOD MDM: CPT

## 2018-09-08 NOTE — ED PROVIDER NOTES
History  Chief Complaint   Patient presents with    Headache     patient states he was here yesterday for a headache, had a shot and sent home and felt better for about 4 hours then started with a headache again this am, "in my temples and down the back of my head"  States it is "worse when I change position"  History provided by:  Patient   used: No    Headache   Pain location:  Generalized  Quality:  Dull  Radiates to:  Does not radiate  Severity currently:  7/10  Severity at highest:  7/10  Onset quality:  Gradual  Duration:  3 hours  Timing:  Constant  Progression:  Waxing and waning  Chronicity:  Chronic  Similar to prior headaches: yes    Context: bright light    Context: not activity, not caffeine, not coughing, not defecating, not eating, not stress, not exposure to cold air, not intercourse, not loud noise and not straining    Relieved by:  None tried  Worsened by:  Light  Ineffective treatments:  None tried  Associated symptoms: nausea    Associated symptoms: no abdominal pain, no back pain, no blurred vision, no congestion, no cough, no diarrhea, no dizziness, no drainage, no fever, no myalgias, no neck pain, no neck stiffness, no numbness, no paresthesias, no sore throat, no swollen glands, no syncope, no tingling, no URI and no vomiting        Prior to Admission Medications   Prescriptions Last Dose Informant Patient Reported? Taking?    butalbital-acetaminophen-caffeine (FIORICET,ESGIC) -40 mg per tablet   No No   Sig: Take 2 tablets by mouth every 4 (four) hours as needed for headaches for up to 20 doses   fluvoxaMINE (LUVOX) 100 mg tablet   Yes No   Sig: Take 200 mg by mouth daily at bedtime   insulin aspart (NovoLOG) 100 units/mL injection   Yes No   Sig: Inject 10 Units under the skin 3 (three) times a day before meals   insulin glargine (LANTUS) 100 units/mL subcutaneous injection   Yes No   Sig: Inject 30 Units under the skin daily at bedtime   levothyroxine 50 mcg tablet   Yes No   Sig: Take 50 mcg by mouth daily   lisinopril (ZESTRIL) 10 mg tablet   No No   Sig: Take 1 tablet (10 mg total) by mouth daily   pantoprazole (PROTONIX) 40 mg tablet   Yes No   Sig: Take 40 mg by mouth daily   ziprasidone (GEODON) 40 mg capsule   Yes No   Sig: Take 40 mg by mouth 2 (two) times a day with meals      Facility-Administered Medications: None       Past Medical History:   Diagnosis Date    Diabetes mellitus ([place] [address] )     Disease of thyroid gland     GERD (gastroesophageal reflux disease)     Hypertension     Obsessive compulsive disorder     Schizoaffective disorder ([place] [address] )        History reviewed  No pertinent surgical history  History reviewed  No pertinent family history  I have reviewed and agree with the history as documented  Social History   Substance Use Topics    Smoking status: Never Smoker    Smokeless tobacco: Never Used    Alcohol use No        Review of Systems   Constitutional: Negative for chills and fever  HENT: Negative for congestion, postnasal drip, rhinorrhea and sore throat  Eyes: Negative for blurred vision and visual disturbance  Respiratory: Negative for cough and shortness of breath  Cardiovascular: Negative for chest pain, leg swelling and syncope  Gastrointestinal: Positive for nausea  Negative for abdominal pain, diarrhea and vomiting  Genitourinary: Negative for dysuria  Musculoskeletal: Negative for back pain, myalgias, neck pain and neck stiffness  Skin: Negative for rash  Neurological: Positive for headaches  Negative for dizziness, numbness and paresthesias  Psychiatric/Behavioral: Negative for confusion  All other systems reviewed and are negative  Physical Exam  Physical Exam   Constitutional: He is oriented to person, place, and time  He appears well-developed and well-nourished  HENT:   Nose: Nose normal    Mouth/Throat: Oropharynx is clear and moist  No oropharyngeal exudate     Eyes: Conjunctivae and EOM are normal  Pupils are equal, round, and reactive to light  No scleral icterus  Neck: Normal range of motion  Neck supple  No JVD present  No tracheal deviation present  Cardiovascular: Normal rate, regular rhythm and normal heart sounds  No murmur heard  Pulmonary/Chest: Effort normal and breath sounds normal  No respiratory distress  He has no wheezes  He has no rales  Abdominal: Soft  Bowel sounds are normal  There is no tenderness  There is no guarding  Musculoskeletal: Normal range of motion  He exhibits no edema or tenderness  Neurological: He is alert and oriented to person, place, and time  No cranial nerve deficit or sensory deficit  He exhibits normal muscle tone  5/5 motor, nl sens   Skin: Skin is warm and dry  Psychiatric: He has a normal mood and affect  His behavior is normal    Nursing note and vitals reviewed  Vital Signs  ED Triage Vitals [09/07/18 2222]   Temperature Pulse Respirations Blood Pressure SpO2   98 4 °F (36 9 °C) 79 18 (!) 185/100 99 %      Temp Source Heart Rate Source Patient Position - Orthostatic VS BP Location FiO2 (%)   Temporal Monitor Sitting Left arm --      Pain Score       7           Vitals:    09/07/18 2222   BP: (!) 185/100   Pulse: 79   Patient Position - Orthostatic VS: Sitting       Visual Acuity      ED Medications  Medications   ketorolac (TORADOL) injection 60 mg (60 mg Intramuscular Given 9/7/18 2250)   diphenhydrAMINE (BENADRYL) injection 25 mg (25 mg Intramuscular Given 9/7/18 2252)   dexamethasone (DECADRON) injection 10 mg (10 mg Intramuscular Given 9/7/18 2249)   promethazine (PHENERGAN) injection 12 5 mg (12 5 mg Intramuscular Given 9/7/18 2253)       Diagnostic Studies  Results Reviewed     None                 No orders to display              Procedures  Procedures       Phone Contacts  ED Phone Contact    ED Course     Pain has improved  1203:  Headache is resolved  Blood pressure has improved to 1 30/80  Will discharge home    EKG normal sinus rhythm  MDM  CritCare Time    Disposition  Final diagnoses:   Headache     Time reflects when diagnosis was documented in both MDM as applicable and the Disposition within this note     Time User Action Codes Description Comment    9/7/2018 10:24 PM Apolinar Rodriguez Headache       ED Disposition     ED Disposition Condition Comment    Discharge  Satinder Form discharge to home/self care  Condition at discharge: Stable        Follow-up Information     Follow up With Specialties Details Why Stephie Bingham MD Family Medicine In 2 days If symptoms worsen [address] [facility] [address]  [address] [place] [address]  [phone]            Patient's Medications   Discharge Prescriptions    AMLODIPINE (NORVASC) 2 5 MG TABLET    Take 2 tablets (5 mg total) by mouth daily       Start Date: 9/7/2018  End Date: --       Order Dose: 5 mg       Quantity: 30 tablet    Refills: 1    IBUPROFEN (MOTRIN) 600 MG TABLET    Take 1 tablet (600 mg total) by mouth every 6 (six) hours as needed for mild pain       Start Date: 9/7/2018  End Date: --       Order Dose: 600 mg       Quantity: 30 tablet    Refills: 0     No discharge procedures on file      ED Provider  Electronically Signed by           Leonard Gilbert MD  09/07/18 0348       Leonard Gilbert MD  09/08/18 9907

## 2018-09-08 NOTE — ED NOTES
Patient c/o chest pressure  MD aware of same  At bedside and examined patient  NSR on monitor Hr 60-70's  Will obtain EKG  Patient states headache completely subsided       Ariella Simmons RN  09/08/18 8206

## 2018-09-08 NOTE — ED NOTES
Family at bedside  EKG obtained and viewed by ER doctor  Aware patient reading 170/109 with automatic bp cuff of left arm, however I checked the bp manually on same arm and result was 130/84  Will continue to monitor       Ariella Simmons RN  09/08/18 1929

## 2018-09-08 NOTE — ED NOTES
Patient states chest pressure almost completely gone  Headache gone  Patient will be discharged per Md River Rodriguez, RN  09/08/18 5031

## 2018-09-11 LAB
ATRIAL RATE: 74 BPM
P AXIS: 34 DEGREES
PR INTERVAL: 184 MS
QRS AXIS: 0 DEGREES
QRSD INTERVAL: 98 MS
QT INTERVAL: 372 MS
QTC INTERVAL: 412 MS
T WAVE AXIS: 33 DEGREES
VENTRICULAR RATE: 74 BPM

## 2018-09-19 ENCOUNTER — OFFICE VISIT (OUTPATIENT)
Dept: FAMILY MEDICINE CLINIC | Facility: CLINIC | Age: 38
End: 2018-09-19

## 2018-09-19 VITALS
TEMPERATURE: 97.6 F | WEIGHT: 299.8 LBS | BODY MASS INDEX: 55.17 KG/M2 | DIASTOLIC BLOOD PRESSURE: 88 MMHG | HEART RATE: 105 BPM | SYSTOLIC BLOOD PRESSURE: 128 MMHG | OXYGEN SATURATION: 98 % | HEIGHT: 62 IN | RESPIRATION RATE: 16 BRPM

## 2018-09-19 DIAGNOSIS — R51.9 NONINTRACTABLE HEADACHE, UNSPECIFIED CHRONICITY PATTERN, UNSPECIFIED HEADACHE TYPE: ICD-10-CM

## 2018-09-19 DIAGNOSIS — E11.9 TYPE 2 DIABETES MELLITUS WITHOUT COMPLICATION, UNSPECIFIED WHETHER LONG TERM INSULIN USE (HCC): ICD-10-CM

## 2018-09-19 DIAGNOSIS — K21.9 GASTROESOPHAGEAL REFLUX DISEASE WITHOUT ESOPHAGITIS: ICD-10-CM

## 2018-09-19 DIAGNOSIS — Z91.14 NON COMPLIANCE W MEDICATION REGIMEN: ICD-10-CM

## 2018-09-19 DIAGNOSIS — E03.9 HYPOTHYROIDISM, UNSPECIFIED TYPE: ICD-10-CM

## 2018-09-19 DIAGNOSIS — I10 HYPERTENSION, UNSPECIFIED TYPE: Primary | ICD-10-CM

## 2018-09-19 DIAGNOSIS — F25.9 SCHIZO AFFECTIVE SCHIZOPHRENIA (HCC): ICD-10-CM

## 2018-09-19 PROCEDURE — 99215 OFFICE O/P EST HI 40 MIN: CPT | Performed by: FAMILY MEDICINE

## 2018-09-19 PROCEDURE — 3008F BODY MASS INDEX DOCD: CPT | Performed by: FAMILY MEDICINE

## 2018-09-19 RX ORDER — INSULIN GLARGINE 100 [IU]/ML
30 INJECTION, SOLUTION SUBCUTANEOUS
Qty: 10 ML | Refills: 0 | Status: SHIPPED | OUTPATIENT
Start: 2018-09-19 | End: 2018-10-26

## 2018-09-19 RX ORDER — AMLODIPINE BESYLATE 2.5 MG/1
5 TABLET ORAL DAILY
Qty: 30 TABLET | Refills: 0 | Status: SHIPPED | OUTPATIENT
Start: 2018-09-19 | End: 2018-09-21 | Stop reason: SDUPTHER

## 2018-09-19 RX ORDER — PANTOPRAZOLE SODIUM 40 MG/1
40 TABLET, DELAYED RELEASE ORAL DAILY
Qty: 90 TABLET | Refills: 1 | Status: ON HOLD | OUTPATIENT
Start: 2018-09-19 | End: 2019-01-15

## 2018-09-19 RX ORDER — IBUPROFEN 600 MG/1
600 TABLET ORAL EVERY 6 HOURS PRN
Qty: 30 TABLET | Refills: 0 | Status: SHIPPED | OUTPATIENT
Start: 2018-09-19 | End: 2018-11-09 | Stop reason: ALTCHOICE

## 2018-09-19 RX ORDER — LISINOPRIL 10 MG/1
10 TABLET ORAL DAILY
Qty: 90 TABLET | Refills: 1 | Status: ON HOLD | OUTPATIENT
Start: 2018-09-19 | End: 2018-11-01

## 2018-09-19 RX ORDER — ZIPRASIDONE HYDROCHLORIDE 40 MG/1
40 CAPSULE ORAL 2 TIMES DAILY WITH MEALS
Qty: 60 CAPSULE | Refills: 1 | Status: SHIPPED | OUTPATIENT
Start: 2018-09-19 | End: 2018-10-17 | Stop reason: SDUPTHER

## 2018-09-19 RX ORDER — BUTALBITAL, ACETAMINOPHEN AND CAFFEINE 50; 325; 40 MG/1; MG/1; MG/1
2 TABLET ORAL EVERY 4 HOURS PRN
Qty: 20 TABLET | Refills: 0 | Status: CANCELLED | OUTPATIENT
Start: 2018-09-19

## 2018-09-19 RX ORDER — FLUVOXAMINE MALEATE 100 MG
200 TABLET ORAL
Qty: 60 TABLET | Refills: 0 | Status: ON HOLD | OUTPATIENT
Start: 2018-09-19 | End: 2019-01-15

## 2018-09-19 RX ORDER — LEVOTHYROXINE SODIUM 0.05 MG/1
50 TABLET ORAL DAILY
Qty: 90 TABLET | Refills: 0 | Status: SHIPPED | OUTPATIENT
Start: 2018-09-19 | End: 2018-10-17

## 2018-09-19 NOTE — PROGRESS NOTES
Assessment/Plan:    No problem-specific Assessment & Plan notes found for this encounter  Diagnoses and all orders for this visit:    Hypertension, unspecified type  Comments:  Initially noncomplaint with his meds  High BP is causing the headaches  Discussed with the patient to check his BP daily, and take meds every day  Orders:  -     amLODIPine (NORVASC) 2 5 mg tablet; Take 2 tablets (5 mg total) by mouth daily  -     lisinopril (ZESTRIL) 10 mg tablet; Take 1 tablet (10 mg total) by mouth daily  -     CBC and differential; Future  -     Comprehensive metabolic panel; Future  -     CBC and differential  -     Comprehensive metabolic panel    Nonintractable headache, unspecified chronicity pattern, unspecified headache type  Comments:  Headache due to the high blood pressure  Ordered ibuprofen for pain  will not refill Fioriciet as its not migraine  Orders:  -     ibuprofen (MOTRIN) 600 mg tablet; Take 1 tablet (600 mg total) by mouth every 6 (six) hours as needed for mild pain    Type 2 diabetes mellitus without complication, unspecified whether long term insulin use (HCC)  Comments:  Uncontrolled, noncompliant with medications  Discussed consequences of high blood sugar  Continue on Novolog and Lantus  Orders:  -     insulin aspart (NovoLOG) 100 units/mL injection; Inject 10 Units under the skin 3 (three) times a day before meals  -     insulin glargine (LANTUS) 100 units/mL subcutaneous injection; Inject 30 Units under the skin daily at bedtime  -     Hemoglobin A1C; Future  -     Lipid Panel with Direct LDL reflex; Future  -     Microalbumin / creatinine urine ratio  -     Hemoglobin A1C  -     Lipid Panel with Direct LDL reflex    Hypothyroidism, unspecified type  Comments:  Noncompliant with meds  Ordered TSH and T4  Orders:  -     levothyroxine 50 mcg tablet; Take 1 tablet (50 mcg total) by mouth daily  -     TSH, 3rd generation; Future  -     T4, free;  Future  -     TSH, 3rd generation  -     T4, free    Gastroesophageal reflux disease without esophagitis  Comments:  Asymptomatic  Continue taking Pantoprazole  Orders:  -     pantoprazole (PROTONIX) 40 mg tablet; Take 1 tablet (40 mg total) by mouth daily    Schizo affective schizophrenia ([facility] [address] )  Comments:  Noncompliant with meds  Ordered refill for Geodon and Luvox  Orders:  -     ziprasidone (GEODON) 40 mg capsule; Take 1 capsule (40 mg total) by mouth 2 (two) times a day with meals  -     fluvoxaMINE (LUVOX) 100 mg tablet; Take 2 tablets (200 mg total) by mouth daily at bedtime    Non compliance w medication regimen  Comments:  Patient is not taking medication on the regular basis  Also is discharge from the psychiatrist   We will only give 30 day supply of antipsychotic     Other orders  -     Cancel: butalbital-acetaminophen-caffeine (FIORICET,ESGIC) -40 mg per tablet; Take 2 tablets by mouth every 4 (four) hours as needed for headaches for up to 20 doses          Subjective:      Patient ID: Satinder Mcgarry is a 34 y o  male  HPI  Kenn Arias is a 35 yo male with PMH significant for HTN, DM, hypothyroidism, and schizophrenia, presenting with headaches x 1 month that usually starts in the back of the head and spreads to the rest of the head  He had recently been to the ER for really high blood pressure  His headache today is not so bad  He has been taking Advil and Fioricet for the headache  He has also been recording high blood sugars when at home, going up to 300s at times  He reports that he hasn't been consistent with taking his medications at home  He stopped taking his psych meds when they he ran out  He denies blurry vision  But he does report feeling sluggish and foggy  He denies tremors and shakiness  The following portions of the patient's history were reviewed and updated as appropriate: current medications, past family history, past medical history, past social history and problem list       Review of Systems   Constitutional: Negative  HENT: Negative  Eyes: Negative  Respiratory: Negative  Cardiovascular: Negative  Gastrointestinal: Negative  Genitourinary: Negative  Musculoskeletal: Negative  Neurological: Positive for headaches  Objective:      /88 (BP Location: Left arm, Patient Position: Sitting, Cuff Size: Large)   Pulse 105   Temp 97 6 °F (36 4 °C) (Tympanic)   Resp 16   Ht 5' 2" (1 575 m)   Wt 136 kg (299 lb 12 8 oz)   SpO2 98%   BMI 54 83 kg/m²          Physical Exam   Constitutional: He appears well-developed and well-nourished  HENT:   Head: Normocephalic and atraumatic  Right Ear: External ear normal    Left Ear: External ear normal    Cardiovascular: Normal rate and regular rhythm  Pulmonary/Chest: Effort normal and breath sounds normal    Neurological: He is alert  Skin: Skin is warm and dry

## 2018-09-21 ENCOUNTER — TELEPHONE (OUTPATIENT)
Dept: FAMILY MEDICINE CLINIC | Facility: CLINIC | Age: 38
End: 2018-09-21

## 2018-09-21 DIAGNOSIS — I10 HYPERTENSION, UNSPECIFIED TYPE: ICD-10-CM

## 2018-09-21 RX ORDER — AMLODIPINE BESYLATE 5 MG/1
5 TABLET ORAL DAILY
Qty: 30 TABLET | Refills: 5 | Status: SHIPPED | OUTPATIENT
Start: 2018-09-21 | End: 2018-11-01 | Stop reason: HOSPADM

## 2018-09-21 NOTE — TELEPHONE ENCOUNTER
PC from pts father, states Brians  Generic Norvasc was written as 2 5 mg BID & insurance will not approve this  It needs to written as 5mg qd  Pt only has 1 pill left  Please call to [employer], Georgette  Also, advise Dad at [phone] when this is taken care of

## 2018-09-27 ENCOUNTER — TELEPHONE (OUTPATIENT)
Dept: FAMILY MEDICINE CLINIC | Facility: CLINIC | Age: 38
End: 2018-09-27

## 2018-09-27 DIAGNOSIS — E11.9 TYPE 2 DIABETES MELLITUS WITHOUT COMPLICATION, UNSPECIFIED WHETHER LONG TERM INSULIN USE (HCC): Primary | ICD-10-CM

## 2018-09-27 NOTE — TELEPHONE ENCOUNTER
PC from patients father, Zachary Pederson  He wants someone to call him asap re:Novolog & Lantus  He states Patricia is almost out of meds  He thought this was supposed to be taken care of already

## 2018-09-27 NOTE — TELEPHONE ENCOUNTER
Called [employer] they will cover toujero instead of lantus and admelog instead od novolog which was approved by Dr Nelia Garcia and sent to [facility] pharmacy

## 2018-10-01 NOTE — TELEPHONE ENCOUNTER
We received a letter from insurance that they did cover lantus but not novolog  I called [facility] and they did fill the lantus but the novolog was still coming back denied   Pt did get new insurance so we will run the nahum and donald

## 2018-10-02 RX ORDER — PEN NEEDLE, DIABETIC 30 GX5/16"
NEEDLE, DISPOSABLE MISCELLANEOUS 4 TIMES DAILY
Qty: 200 EACH | Refills: 3 | Status: SHIPPED | OUTPATIENT
Start: 2018-10-02 | End: 2021-01-12 | Stop reason: SDUPTHER

## 2018-10-11 ENCOUNTER — HOSPITAL ENCOUNTER (EMERGENCY)
Facility: HOSPITAL | Age: 38
Discharge: HOME | End: 2018-10-11
Attending: EMERGENCY MEDICINE
Payer: COMMERCIAL

## 2018-10-11 VITALS
HEART RATE: 107 BPM | WEIGHT: 300 LBS | RESPIRATION RATE: 16 BRPM | DIASTOLIC BLOOD PRESSURE: 75 MMHG | OXYGEN SATURATION: 96 % | TEMPERATURE: 98.8 F | BODY MASS INDEX: 55.21 KG/M2 | SYSTOLIC BLOOD PRESSURE: 140 MMHG | HEIGHT: 62 IN

## 2018-10-11 DIAGNOSIS — R51.9 HEADACHE: Primary | ICD-10-CM

## 2018-10-11 DIAGNOSIS — G43.909 MIGRAINE: ICD-10-CM

## 2018-10-11 LAB
ALBUMIN SERPL BCP-MCNC: 3.8 G/DL (ref 3.5–5.7)
ALP SERPL-CCNC: 59 U/L (ref 40–150)
ALT SERPL W P-5'-P-CCNC: 7 U/L (ref 7–52)
ANION GAP SERPL CALCULATED.3IONS-SCNC: 9 MMOL/L (ref 4–13)
AST SERPL W P-5'-P-CCNC: 11 U/L (ref 13–39)
BASOPHILS # BLD AUTO: 0 THOUSANDS/ΜL (ref 0–0.1)
BASOPHILS NFR BLD AUTO: 1 % (ref 0–2)
BILIRUB SERPL-MCNC: 0.3 MG/DL (ref 0.2–1)
BUN SERPL-MCNC: 19 MG/DL (ref 7–25)
CALCIUM SERPL-MCNC: 9.1 MG/DL (ref 8.6–10.5)
CHLORIDE SERPL-SCNC: 102 MMOL/L (ref 98–107)
CO2 SERPL-SCNC: 25 MMOL/L (ref 21–31)
CREAT SERPL-MCNC: 1.11 MG/DL (ref 0.7–1.3)
EOSINOPHIL # BLD AUTO: 0.2 THOUSAND/ΜL (ref 0–0.61)
EOSINOPHIL NFR BLD AUTO: 2 % (ref 0–5)
ERYTHROCYTE [DISTWIDTH] IN BLOOD BY AUTOMATED COUNT: 13.7 % (ref 11.5–14.5)
GFR SERPL CREATININE-BSD FRML MDRD: 83 ML/MIN/1.73SQ M
GLUCOSE SERPL-MCNC: 258 MG/DL (ref 65–99)
HCT VFR BLD AUTO: 38.8 % (ref 36.5–49.3)
HGB BLD-MCNC: 13.3 G/DL (ref 14–18)
LYMPHOCYTES # BLD AUTO: 1.3 THOUSANDS/ΜL (ref 0.6–4.47)
LYMPHOCYTES NFR BLD AUTO: 17 % (ref 21–51)
MCH RBC QN AUTO: 29.5 PG (ref 26–34)
MCHC RBC AUTO-ENTMCNC: 34.3 G/DL (ref 31–37)
MCV RBC AUTO: 86 FL (ref 81–99)
MONOCYTES # BLD AUTO: 0.6 THOUSAND/ΜL (ref 0.17–1.22)
MONOCYTES NFR BLD AUTO: 8 % (ref 2–12)
NEUTROPHILS # BLD AUTO: 5.7 THOUSANDS/ΜL (ref 1.4–6.5)
NEUTS SEG NFR BLD AUTO: 73 % (ref 42–75)
NRBC BLD AUTO-RTO: 0 /100 WBCS
PLATELET # BLD AUTO: 305 THOUSANDS/UL (ref 149–390)
PMV BLD AUTO: 7.9 FL (ref 8.6–11.7)
POTASSIUM SERPL-SCNC: 3.8 MMOL/L (ref 3.5–5.5)
PROT SERPL-MCNC: 7 G/DL (ref 6.4–8.9)
RBC # BLD AUTO: 4.51 MILLION/UL (ref 4.3–5.9)
SODIUM SERPL-SCNC: 136 MMOL/L (ref 134–143)
WBC # BLD AUTO: 7.8 THOUSAND/UL (ref 4.8–10.8)

## 2018-10-11 PROCEDURE — 99283 EMERGENCY DEPT VISIT LOW MDM: CPT

## 2018-10-11 PROCEDURE — 85025 COMPLETE CBC W/AUTO DIFF WBC: CPT | Performed by: EMERGENCY MEDICINE

## 2018-10-11 PROCEDURE — 36415 COLL VENOUS BLD VENIPUNCTURE: CPT | Performed by: EMERGENCY MEDICINE

## 2018-10-11 PROCEDURE — 96361 HYDRATE IV INFUSION ADD-ON: CPT

## 2018-10-11 PROCEDURE — 96375 TX/PRO/DX INJ NEW DRUG ADDON: CPT

## 2018-10-11 PROCEDURE — 96374 THER/PROPH/DIAG INJ IV PUSH: CPT

## 2018-10-11 PROCEDURE — 80053 COMPREHEN METABOLIC PANEL: CPT | Performed by: EMERGENCY MEDICINE

## 2018-10-11 RX ORDER — DIPHENHYDRAMINE HYDROCHLORIDE 50 MG/ML
50 INJECTION INTRAMUSCULAR; INTRAVENOUS ONCE
Status: DISCONTINUED | OUTPATIENT
Start: 2018-10-11 | End: 2018-10-11

## 2018-10-11 RX ORDER — METOCLOPRAMIDE HYDROCHLORIDE 5 MG/ML
10 INJECTION INTRAMUSCULAR; INTRAVENOUS ONCE
Status: DISCONTINUED | OUTPATIENT
Start: 2018-10-11 | End: 2018-10-11

## 2018-10-11 RX ORDER — KETOROLAC TROMETHAMINE 30 MG/ML
30 INJECTION, SOLUTION INTRAMUSCULAR; INTRAVENOUS ONCE
Status: DISCONTINUED | OUTPATIENT
Start: 2018-10-11 | End: 2018-10-11

## 2018-10-11 RX ORDER — DIPHENHYDRAMINE HYDROCHLORIDE 50 MG/ML
50 INJECTION INTRAMUSCULAR; INTRAVENOUS ONCE
Status: COMPLETED | OUTPATIENT
Start: 2018-10-11 | End: 2018-10-11

## 2018-10-11 RX ORDER — METOCLOPRAMIDE HYDROCHLORIDE 5 MG/ML
10 INJECTION INTRAMUSCULAR; INTRAVENOUS ONCE
Status: COMPLETED | OUTPATIENT
Start: 2018-10-11 | End: 2018-10-11

## 2018-10-11 RX ORDER — KETOROLAC TROMETHAMINE 30 MG/ML
60 INJECTION, SOLUTION INTRAMUSCULAR; INTRAVENOUS ONCE
Status: DISCONTINUED | OUTPATIENT
Start: 2018-10-11 | End: 2018-10-11

## 2018-10-11 RX ORDER — KETOROLAC TROMETHAMINE 30 MG/ML
30 INJECTION, SOLUTION INTRAMUSCULAR; INTRAVENOUS ONCE
Status: COMPLETED | OUTPATIENT
Start: 2018-10-11 | End: 2018-10-11

## 2018-10-11 RX ADMIN — KETOROLAC TROMETHAMINE 30 MG: 30 INJECTION, SOLUTION INTRAMUSCULAR at 21:16

## 2018-10-11 RX ADMIN — DIPHENHYDRAMINE HYDROCHLORIDE 50 MG: 50 INJECTION INTRAMUSCULAR; INTRAVENOUS at 21:17

## 2018-10-11 RX ADMIN — SODIUM CHLORIDE 1000 ML: 0.9 INJECTION, SOLUTION INTRAVENOUS at 21:17

## 2018-10-11 RX ADMIN — METOCLOPRAMIDE 10 MG: 5 INJECTION, SOLUTION INTRAMUSCULAR; INTRAVENOUS at 21:16

## 2018-10-12 ENCOUNTER — TELEPHONE (OUTPATIENT)
Dept: NEUROLOGY | Facility: CLINIC | Age: 38
End: 2018-10-12

## 2018-10-12 ENCOUNTER — TRANSCRIBE ORDERS (OUTPATIENT)
Dept: ADMINISTRATIVE | Facility: HOSPITAL | Age: 38
End: 2018-10-12

## 2018-10-12 DIAGNOSIS — F25.9 SCHIZO AFFECTIVE SCHIZOPHRENIA (HCC): ICD-10-CM

## 2018-10-12 NOTE — DISCHARGE INSTRUCTIONS
Acute Headache   WHAT YOU NEED TO KNOW:   An acute headache is pain or discomfort that starts suddenly and gets worse quickly  You may have an acute headache only when you feel stress or eat certain foods  Other acute headache pain can happen every day, and sometimes several times a day  DISCHARGE INSTRUCTIONS:   Return to the emergency department if:   · You have severe pain  · You have numbness or weakness on one side of your face or body  · You have a headache that occurs after a blow to the head, a fall, or other trauma  · You have a headache, are forgetful or confused, or have trouble speaking  · You have a headache, stiff neck, and a fever  Contact your healthcare provider if:   · You have a constant headache and are vomiting  · You have a headache each day that does not get better, even after treatment  · You have changes in your headaches, or new symptoms that occur when you have a headache  · You have questions or concerns about your condition or care  Medicines: You may need any of the following:  · Prescription pain medicine  may be given  The medicine your healthcare provider recommends will depend on the kind of headaches you have  You will need to take prescription headache medicines as directed to prevent a problem called rebound headache  These headaches happen with regular use of pain relievers for headache disorders  · NSAIDs , such as ibuprofen, help decrease swelling, pain, and fever  This medicine is available with or without a doctor's order  NSAIDs can cause stomach bleeding or kidney problems in certain people  If you take blood thinner medicine, always ask your healthcare provider if NSAIDs are safe for you  Always read the medicine label and follow directions  · Acetaminophen  decreases pain and fever  It is available without a doctor's order  Ask how much to take and how often to take it  Follow directions   Read the labels of all other medicines you are using to see if they also contain acetaminophen, or ask your doctor or pharmacist  Acetaminophen can cause liver damage if not taken correctly  Do not use more than 3 grams (3,000 milligrams) total of acetaminophen in one day  · Antidepressants  may be given for some kinds of headaches  · Take your medicine as directed  Contact your healthcare provider if you think your medicine is not helping or if you have side effects  Tell him or her if you are allergic to any medicine  Keep a list of the medicines, vitamins, and herbs you take  Include the amounts, and when and why you take them  Bring the list or the pill bottles to follow-up visits  Carry your medicine list with you in case of an emergency  Manage your symptoms:   · Apply heat or ice  on the headache area  Use a heat or ice pack  For an ice pack, you can also put crushed ice in a plastic bag  Cover the pack or bag with a towel before you apply it to your skin  Ice and heat both help decrease pain, and heat also helps decrease muscle spasms  Apply heat for 20 to 30 minutes every 2 hours  Apply ice for 15 to 20 minutes every hour  Apply heat or ice for as long and for as many days as directed  You may alternate heat and ice  · Relax your muscles  Lie down in a comfortable position and close your eyes  Relax your muscles slowly  Start at your toes and work your way up your body  · Keep a record of your headaches  Write down when your headaches start and stop  Include your symptoms and what you were doing when the headache began  Record what you ate or drank for 24 hours before the headache started  Describe the pain and where it hurts  Keep track of what you did to treat your headache and if it worked  Prevent an acute headache:   · Avoid anything that triggers an acute headache  Examples include exposure to chemicals, going to high altitude, or not getting enough sleep  Create a regular sleep routine   Go to sleep at the same time and wake up at the same time each day  Do not use electronic devices before bedtime  These may trigger a headache or prevent you from sleeping well  · Do not smoke  Nicotine and other chemicals in cigarettes and cigars can trigger an acute headache or make it worse  Ask your healthcare provider for information if you currently smoke and need help to quit  E-cigarettes or smokeless tobacco still contain nicotine  Talk to your healthcare provider before you use these products  · Limit alcohol as directed  Alcohol can trigger an acute headache or make it worse  If you have cluster headaches, do not drink alcohol during an episode  For other types of headaches, ask your healthcare provider if it is safe for you to drink alcohol  Ask how much is safe for you to drink, and how often  · Exercise as directed  Exercise can reduce tension and help with headache pain  Aim for 30 minutes of physical activity on most days of the week  Your healthcare provider can help you create an exercise plan  · Eat a variety of healthy foods  Healthy foods include fruits, vegetables, low-fat dairy products, lean meats, fish, whole grains, and cooked beans  Your healthcare provider or dietitian can help you create meals plans if you need to avoid foods that trigger headaches  Follow up with your healthcare provider as directed:  Bring your headache record with you when you see your healthcare provider  Write down your questions so you remember to ask them during your visits  © 2017 [employer] Information is for End User's use only and may not be sold, redistributed or otherwise used for commercial purposes  All illustrations and images included in CareNotes® are the copyrighted property of A D [employer]  or Ayden Simpson  The above information is an  only  It is not intended as medical advice for individual conditions or treatments   Talk to your doctor, nurse or pharmacist before following any medical regimen to see if it is safe and effective for you

## 2018-10-12 NOTE — ED PROVIDER NOTES
History  Chief Complaint   Patient presents with    Headache     Patient with a history of frequent headaches and schizoaffective disorder presents the emergency department complaining of headache which started earlier today as well as numbness and tingling and feeling like Jell-O in the left side of his body the patient has had prior similar migraines and reports having neurologic features occasionally with his migraines and was described as being due to vasospasms to him  Patient denies any difficulty with speech there is no focal motor or sensory deficits present on clinical examination  History provided by:  Patient  Headache   Pain location:  Generalized  Quality:  Sharp  Radiates to:  Does not radiate  Onset quality:  Sudden  Duration:  1 day  Timing:  Constant  Progression:  Worsening  Chronicity:  Recurrent  Similar to prior headaches: yes    Associated symptoms: dizziness, numbness and weakness    Associated symptoms: no abdominal pain, no congestion, no cough, no diarrhea, no ear pain, no fatigue, no fever, no myalgias, no nausea, no sore throat and no vomiting        Prior to Admission Medications   Prescriptions Last Dose Informant Patient Reported? Taking?    Insulin Glargine (TOUJEO SOLOSTAR) 300 units/mL CONCETRATED U-300 injection pen   No No   Sig: Inject 30 Units under the skin daily   Insulin Pen Needle (PEN NEEDLES 3/16") 31G X 5 MM MISC   No Yes   Sig: by Does not apply route 4 (four) times a day   amLODIPine (NORVASC) 5 mg tablet   No Yes   Sig: Take 1 tablet (5 mg total) by mouth daily   butalbital-acetaminophen-caffeine (FIORICET,ESGIC) -40 mg per tablet   No No   Sig: Take 2 tablets by mouth every 4 (four) hours as needed for headaches for up to 20 doses   fluvoxaMINE (LUVOX) 100 mg tablet   No Yes   Sig: Take 2 tablets (200 mg total) by mouth daily at bedtime   ibuprofen (MOTRIN) 600 mg tablet   No Yes   Sig: Take 1 tablet (600 mg total) by mouth every 6 (six) hours as needed for mild pain   insulin aspart (NovoLOG) 100 units/mL injection   No Yes   Sig: Inject 10 Units under the skin 3 (three) times a day before meals   insulin glargine (LANTUS) 100 units/mL subcutaneous injection   No Yes   Sig: Inject 30 Units under the skin daily at bedtime   insulin lispro (ADMELOG SOLOSTAR) 100 units/mL injection pen   No No   Sig: Inject 10 Units under the skin 3 (three) times a day with meals   levothyroxine 50 mcg tablet   No Yes   Sig: Take 1 tablet (50 mcg total) by mouth daily   lisinopril (ZESTRIL) 10 mg tablet   No Yes   Sig: Take 1 tablet (10 mg total) by mouth daily   pantoprazole (PROTONIX) 40 mg tablet   No Yes   Sig: Take 1 tablet (40 mg total) by mouth daily   ziprasidone (GEODON) 40 mg capsule   No Yes   Sig: Take 1 capsule (40 mg total) by mouth 2 (two) times a day with meals      Facility-Administered Medications: None       Past Medical History:   Diagnosis Date    Diabetes mellitus ([place] [address] )     Disease of thyroid gland     GERD (gastroesophageal reflux disease)     Hypertension     Obsessive compulsive disorder     Schizoaffective disorder ([place] [address] )        History reviewed  No pertinent surgical history  Family History   Problem Relation Age of Onset   Maria C Bermudez COPD Mother     Hypertension Mother     Rheum arthritis Family     Heart disease Family      I have reviewed and agree with the history as documented  Social History   Substance Use Topics    Smoking status: Never Smoker    Smokeless tobacco: Never Used    Alcohol use No        Review of Systems   Constitutional: Negative for activity change, appetite change, chills, fatigue and fever  HENT: Negative for congestion, ear pain, rhinorrhea and sore throat  Eyes: Negative for discharge, redness and visual disturbance  Respiratory: Negative for cough, chest tightness, shortness of breath and wheezing  Cardiovascular: Negative for chest pain and palpitations     Gastrointestinal: Negative for abdominal pain, constipation, diarrhea, nausea and vomiting  Endocrine: Negative for polydipsia and polyuria  Genitourinary: Negative for difficulty urinating, dysuria, frequency, hematuria and urgency  Musculoskeletal: Negative for arthralgias and myalgias  Skin: Negative for color change, pallor and rash  Neurological: Positive for dizziness, weakness, light-headedness, numbness and headaches  Hematological: Negative for adenopathy  Does not bruise/bleed easily  All other systems reviewed and are negative  Physical Exam  Physical Exam   Constitutional: He is oriented to person, place, and time  He appears well-developed and well-nourished  HENT:   Head: Normocephalic and atraumatic  Right Ear: External ear normal    Left Ear: External ear normal    Nose: Nose normal    Mouth/Throat: Oropharynx is clear and moist    Eyes: Pupils are equal, round, and reactive to light  Conjunctivae and EOM are normal    Neck: Normal range of motion  Neck supple  Cardiovascular: Normal rate, regular rhythm, normal heart sounds and intact distal pulses  Pulmonary/Chest: Effort normal and breath sounds normal  No respiratory distress  He has no wheezes  He has no rales  He exhibits no tenderness  Abdominal: Soft  Bowel sounds are normal  He exhibits no distension  There is no tenderness  There is no guarding  Musculoskeletal: Normal range of motion  Neurological: He is alert and oriented to person, place, and time  No cranial nerve deficit or sensory deficit  Skin: Skin is warm and dry  Psychiatric: He has a normal mood and affect  Nursing note and vitals reviewed        Vital Signs  ED Triage Vitals [10/11/18 2054]   Temperature Pulse Respirations Blood Pressure SpO2   98 8 °F (37 1 °C) 103 18 147/81 96 %      Temp src Heart Rate Source Patient Position - Orthostatic VS BP Location FiO2 (%)   -- -- -- -- --      Pain Score       3           Vitals:    10/11/18 2054   BP: 147/81   Pulse: 103       Visual Acuity      ED Medications  Medications   sodium chloride 0 9 % bolus 1,000 mL (1,000 mL Intravenous New Bag 10/11/18 2117)   ketorolac (TORADOL) injection 30 mg (30 mg Intravenous Given 10/11/18 2116)   diphenhydrAMINE (BENADRYL) injection 50 mg (50 mg Intravenous Given 10/11/18 2117)   metoclopramide (REGLAN) injection 10 mg (10 mg Intravenous Given 10/11/18 2116)       Diagnostic Studies  Results Reviewed     Procedure Component Value Units Date/Time    Comprehensive metabolic panel [[id number]]  (Abnormal) Collected:  10/11/18 2115    Lab Status:  Final result Specimen:  Blood from Hand, Right Updated:  10/11/18 2156     Sodium 136 mmol/L      Potassium 3 8 mmol/L      Chloride 102 mmol/L      CO2 25 mmol/L      ANION GAP 9 mmol/L      BUN 19 mg/dL      Creatinine 1 11 mg/dL      Glucose 258 (H) mg/dL      Calcium 9 1 mg/dL      AST 11 (L) U/L      ALT 7 U/L      Alkaline Phosphatase 59 U/L      Total Protein 7 0 g/dL      Albumin 3 8 g/dL      Total Bilirubin 0 30 mg/dL      eGFR 83 ml/min/1 73sq m     Narrative:         National Kidney Disease Education Program recommendations are as follows:  GFR calculation is accurate only with a steady state creatinine  Chronic Kidney disease less than 60 ml/min/1 73 sq  meters  Kidney failure less than 15 ml/min/1 73 sq  meters      CBC and differential [[id number]]  (Abnormal) Collected:  10/11/18 2115    Lab Status:  Final result Specimen:  Blood from Hand, Right Updated:  10/11/18 2125     WBC 7 80 Thousand/uL      RBC 4 51 Million/uL      Hemoglobin 13 3 (L) g/dL      Hematocrit 38 8 %      MCV 86 fL      MCH 29 5 pg      MCHC 34 3 g/dL      RDW 13 7 %      MPV 7 9 (L) fL      Platelets 556 Thousands/uL      nRBC 0 /100 WBCs      Neutrophils Relative 73 %      Lymphocytes Relative 17 (L) %      Monocytes Relative 8 %      Eosinophils Relative 2 %      Basophils Relative 1 %      Neutrophils Absolute 5 70 Thousands/µL      Lymphocytes Absolute 1 30 Thousands/µL Monocytes Absolute 0 60 Thousand/µL      Eosinophils Absolute 0 20 Thousand/µL      Basophils Absolute 0 00 Thousands/µL                  No orders to display              Procedures  Procedures       Phone Contacts  ED Phone Contact    ED Course                               MDM  Number of Diagnoses or Management Options  Headache: new and requires workup  Migraine: new and requires workup  Diagnosis management comments: Patient has had recent brain CT about a month ago which was unremarkable he has a history of similar prior headaches in the past following IV fluids meds and headache cocktail and rest in the ED his symptoms are entirely resolved he has any normal nonfocal neurologic examination in the ED and feels well and wishes to return home at this point advised rest fluid supportive care follow up with primary care physician and also provided referral to Neurology for further evaluation of suspected migraine headaches with neurologic features as well as ongoing and recurrent headaches patient and family understand recommendations and agree and will follow up as advised return precautions and anticipatory guidance discussed           Amount and/or Complexity of Data Reviewed  Clinical lab tests: ordered and reviewed  Decide to obtain previous medical records or to obtain history from someone other than the patient: yes  Review and summarize past medical records: yes    Risk of Complications, Morbidity, and/or Mortality  Presenting problems: moderate  Management options: moderate    Patient Progress  Patient progress: stable    CritCare Time    Disposition  Final diagnoses:   Headache   Migraine     Time reflects when diagnosis was documented in both MDM as applicable and the Disposition within this note     Time User Action Codes Description Comment    10/11/2018 10:06 PM Maia Cease Add [R51] Headache     10/11/2018 10:06 PM Maia Cease Add [G43 909] Migraine       ED Disposition     ED Disposition Condition Comment    Discharge  Kelly Cisneros discharge to home/self care  Condition at discharge: Stable        Follow-up Information     Follow up With Specialties Details Why Contact Info Additional Information    Bard Parviz MD Family Medicine Schedule an appointment as soon as possible for a visit in 3 days  IDALIA Cope [address] Neu[facility] Neurology Schedule an appointment as soon as possible for a visit in 2 days  [address] [facility]  [address] [facility] Neurology [facility], [address] [place], [place], [address]          Patient's Medications   Discharge Prescriptions    No medications on file     No discharge procedures on file      ED Provider  Electronically Signed by           Kassidy Asencio DO  10/11/18 3398

## 2018-10-13 ENCOUNTER — TRANSCRIBE ORDERS (OUTPATIENT)
Dept: ADMINISTRATIVE | Facility: HOSPITAL | Age: 38
End: 2018-10-13

## 2018-10-13 ENCOUNTER — APPOINTMENT (OUTPATIENT)
Dept: LAB | Facility: IMAGING CENTER | Age: 38
End: 2018-10-13
Payer: COMMERCIAL

## 2018-10-13 DIAGNOSIS — I51.9 MYXEDEMA HEART DISEASE: ICD-10-CM

## 2018-10-13 DIAGNOSIS — I51.9 MYXEDEMA HEART DISEASE: Primary | ICD-10-CM

## 2018-10-13 DIAGNOSIS — I10 ESSENTIAL HYPERTENSION, MALIGNANT: ICD-10-CM

## 2018-10-13 DIAGNOSIS — E03.9 MYXEDEMA HEART DISEASE: Primary | ICD-10-CM

## 2018-10-13 DIAGNOSIS — E03.9 MYXEDEMA HEART DISEASE: ICD-10-CM

## 2018-10-13 DIAGNOSIS — E11.9 TYPE 2 DIABETES MELLITUS WITHOUT COMPLICATION, UNSPECIFIED WHETHER LONG TERM INSULIN USE (HCC): ICD-10-CM

## 2018-10-13 LAB
BASOPHILS # BLD AUTO: 0.04 THOUSANDS/ΜL (ref 0–0.1)
BASOPHILS NFR BLD AUTO: 1 % (ref 0–1)
EOSINOPHIL # BLD AUTO: 0.31 THOUSAND/ΜL (ref 0–0.61)
EOSINOPHIL NFR BLD AUTO: 4 % (ref 0–6)
ERYTHROCYTE [DISTWIDTH] IN BLOOD BY AUTOMATED COUNT: 13.6 % (ref 11.6–15.1)
HCT VFR BLD AUTO: 39.3 % (ref 36.5–49.3)
HGB BLD-MCNC: 13.2 G/DL (ref 12–17)
IMM GRANULOCYTES # BLD AUTO: 0.04 THOUSAND/UL (ref 0–0.2)
IMM GRANULOCYTES NFR BLD AUTO: 1 % (ref 0–2)
LYMPHOCYTES # BLD AUTO: 1.58 THOUSANDS/ΜL (ref 0.6–4.47)
LYMPHOCYTES NFR BLD AUTO: 22 % (ref 14–44)
MCH RBC QN AUTO: 29.3 PG (ref 26.8–34.3)
MCHC RBC AUTO-ENTMCNC: 33.6 G/DL (ref 31.4–37.4)
MCV RBC AUTO: 87 FL (ref 82–98)
MONOCYTES # BLD AUTO: 0.59 THOUSAND/ΜL (ref 0.17–1.22)
MONOCYTES NFR BLD AUTO: 8 % (ref 4–12)
NEUTROPHILS # BLD AUTO: 4.76 THOUSANDS/ΜL (ref 1.85–7.62)
NEUTS SEG NFR BLD AUTO: 64 % (ref 43–75)
NRBC BLD AUTO-RTO: 0 /100 WBCS
PLATELET # BLD AUTO: 293 THOUSANDS/UL (ref 149–390)
PMV BLD AUTO: 10.3 FL (ref 8.9–12.7)
RBC # BLD AUTO: 4.51 MILLION/UL (ref 3.88–5.62)
WBC # BLD AUTO: 7.32 THOUSAND/UL (ref 4.31–10.16)

## 2018-10-13 PROCEDURE — 82570 ASSAY OF URINE CREATININE: CPT

## 2018-10-13 PROCEDURE — 80061 LIPID PANEL: CPT

## 2018-10-13 PROCEDURE — 82043 UR ALBUMIN QUANTITATIVE: CPT

## 2018-10-13 PROCEDURE — 83036 HEMOGLOBIN GLYCOSYLATED A1C: CPT

## 2018-10-13 PROCEDURE — 84443 ASSAY THYROID STIM HORMONE: CPT

## 2018-10-13 PROCEDURE — 84439 ASSAY OF FREE THYROXINE: CPT

## 2018-10-13 PROCEDURE — 80053 COMPREHEN METABOLIC PANEL: CPT

## 2018-10-13 PROCEDURE — 85025 COMPLETE CBC W/AUTO DIFF WBC: CPT

## 2018-10-13 PROCEDURE — 36415 COLL VENOUS BLD VENIPUNCTURE: CPT

## 2018-10-14 LAB
ALBUMIN SERPL BCP-MCNC: 3.5 G/DL (ref 3.5–5)
ALP SERPL-CCNC: 78 U/L (ref 46–116)
ALT SERPL W P-5'-P-CCNC: 17 U/L (ref 12–78)
ANION GAP SERPL CALCULATED.3IONS-SCNC: 8 MMOL/L (ref 4–13)
AST SERPL W P-5'-P-CCNC: 11 U/L (ref 5–45)
BILIRUB SERPL-MCNC: 0.34 MG/DL (ref 0.2–1)
BUN SERPL-MCNC: 22 MG/DL (ref 5–25)
CALCIUM SERPL-MCNC: 9 MG/DL (ref 8.3–10.1)
CHLORIDE SERPL-SCNC: 100 MMOL/L (ref 100–108)
CHOLEST SERPL-MCNC: 151 MG/DL (ref 50–200)
CO2 SERPL-SCNC: 26 MMOL/L (ref 21–32)
CREAT SERPL-MCNC: 1.05 MG/DL (ref 0.6–1.3)
CREAT UR-MCNC: 52.3 MG/DL
EST. AVERAGE GLUCOSE BLD GHB EST-MCNC: 203 MG/DL
GFR SERPL CREATININE-BSD FRML MDRD: 88 ML/MIN/1.73SQ M
GLUCOSE P FAST SERPL-MCNC: 229 MG/DL (ref 65–99)
HBA1C MFR BLD: 8.7 % (ref 4.2–6.3)
HDLC SERPL-MCNC: 43 MG/DL (ref 40–60)
LDLC SERPL CALC-MCNC: 86 MG/DL (ref 0–100)
MICROALBUMIN UR-MCNC: 46.3 MG/L (ref 0–20)
MICROALBUMIN/CREAT 24H UR: 89 MG/G CREATININE (ref 0–30)
POTASSIUM SERPL-SCNC: 4.3 MMOL/L (ref 3.5–5.3)
PROT SERPL-MCNC: 7.5 G/DL (ref 6.4–8.2)
SODIUM SERPL-SCNC: 134 MMOL/L (ref 136–145)
T4 FREE SERPL-MCNC: 0.96 NG/DL (ref 0.76–1.46)
TRIGL SERPL-MCNC: 111 MG/DL
TSH SERPL DL<=0.05 MIU/L-ACNC: 13.8 UIU/ML (ref 0.36–3.74)

## 2018-10-15 ENCOUNTER — TELEPHONE (OUTPATIENT)
Dept: FAMILY MEDICINE CLINIC | Facility: CLINIC | Age: 38
End: 2018-10-15

## 2018-10-15 RX ORDER — FLUVOXAMINE MALEATE 100 MG
TABLET ORAL
Qty: 60 TABLET | Refills: 0 | OUTPATIENT
Start: 2018-10-15

## 2018-10-15 NOTE — TELEPHONE ENCOUNTER
----- Message from Dasia Cassidy MD sent at 10/15/2018  8:56 AM EDT -----  TSH is high, increase the dose to 75mcg, recheck in 6 weeks  Cholesterol is fine    Also increase the lantus , I am not sure how much he was on

## 2018-10-15 NOTE — TELEPHONE ENCOUNTER
----- Message from Love Weber MD sent at 10/15/2018  8:56 AM EDT -----  TSH is high, increase the dose to 75mcg, recheck in 6 weeks  Cholesterol is fine    Also increase the lantus , I am not sure how much he was on

## 2018-10-15 NOTE — TELEPHONE ENCOUNTER
----- Message from Bard Parviz MD sent at 10/15/2018  8:56 AM EDT -----  TSH is high, increase the dose to 75mcg, recheck in 6 weeks  Cholesterol is fine    Also increase the lantus , I am not sure how much he was on

## 2018-10-17 ENCOUNTER — OFFICE VISIT (OUTPATIENT)
Dept: FAMILY MEDICINE CLINIC | Facility: CLINIC | Age: 38
End: 2018-10-17
Payer: COMMERCIAL

## 2018-10-17 VITALS
SYSTOLIC BLOOD PRESSURE: 140 MMHG | TEMPERATURE: 98.3 F | OXYGEN SATURATION: 97 % | BODY MASS INDEX: 55.76 KG/M2 | RESPIRATION RATE: 16 BRPM | DIASTOLIC BLOOD PRESSURE: 90 MMHG | HEART RATE: 92 BPM | HEIGHT: 62 IN | WEIGHT: 303 LBS

## 2018-10-17 DIAGNOSIS — G43.019 INTRACTABLE MIGRAINE WITHOUT AURA AND WITHOUT STATUS MIGRAINOSUS: ICD-10-CM

## 2018-10-17 DIAGNOSIS — F25.9 SCHIZO AFFECTIVE SCHIZOPHRENIA (HCC): ICD-10-CM

## 2018-10-17 DIAGNOSIS — E03.9 HYPOTHYROIDISM, UNSPECIFIED TYPE: ICD-10-CM

## 2018-10-17 DIAGNOSIS — E11.9 TYPE 2 DIABETES MELLITUS WITHOUT COMPLICATION, UNSPECIFIED WHETHER LONG TERM INSULIN USE (HCC): Primary | ICD-10-CM

## 2018-10-17 DIAGNOSIS — F41.9 ANXIETY: ICD-10-CM

## 2018-10-17 PROCEDURE — 99214 OFFICE O/P EST MOD 30 MIN: CPT | Performed by: FAMILY MEDICINE

## 2018-10-17 RX ORDER — TOPIRAMATE 25 MG/1
25 TABLET ORAL 2 TIMES DAILY
Qty: 60 TABLET | Refills: 0 | Status: SHIPPED | OUTPATIENT
Start: 2018-10-17 | End: 2018-11-22

## 2018-10-17 RX ORDER — LEVOTHYROXINE SODIUM 88 UG/1
88 TABLET ORAL DAILY
Qty: 30 TABLET | Refills: 1 | Status: SHIPPED | OUTPATIENT
Start: 2018-10-17 | End: 2018-11-01 | Stop reason: HOSPADM

## 2018-10-17 RX ORDER — LORAZEPAM 0.5 MG/1
0.5 TABLET ORAL 2 TIMES DAILY
Qty: 60 TABLET | Refills: 0 | Status: SHIPPED | OUTPATIENT
Start: 2018-10-17 | End: 2020-11-28 | Stop reason: HOSPADM

## 2018-10-17 NOTE — PROGRESS NOTES
Assessment/Plan:    Type 2 diabetes mellitus without complication (HCC)  Lab Results   Component Value Date    HGBA1C 8 7 (H) 10/13/2018     Increasing the base insulin  No results for input(s): POCGLU in the last 72 hours  Blood Sugar Average: Last 72 hrs:         Diagnoses and all orders for this visit:    Type 2 diabetes mellitus without complication, unspecified whether long term insulin use (HCC)  Comments:  Increasing the basalglar to 35 units, with sliding scale  Orders:  -     Hemoglobin A1C; Future  -     Comprehensive metabolic panel; Future    Hypothyroidism, unspecified type  Comments:  Increasing the dose to 88mcg daily  Orders:  -     TSH, 3rd generation; Future  -     T4, free; Future  -     levothyroxine 88 mcg tablet; Take 1 tablet (88 mcg total) by mouth daily    Anxiety  Comments:  refilling the lorazepam, till he sees the psychiatrsit  Orders:  -     LORazepam (ATIVAN) 0 5 mg tablet; Take 1 tablet (0 5 mg total) by mouth 2 (two) times a day    Intractable migraine without aura and without status migrainosus  Comments:  will start topamax 25mg BID for prophylaxis of headache and will go from there, [facility] in 1 month  Orders:  -     topiramate (TOPAMAX) 25 mg tablet; Take 1 tablet (25 mg total) by mouth 2 (two) times a day for 30 days          Subjective:      Patient ID: Bola Yuan is a 34 y o  male  HPI   Patient is here for the follow up, HbA1c is high  Patient has TSH is high, went to the ER for recurrent headache, already has the appt with the neurologist in mid of November, will start on Topamax and se how he does     The following portions of the patient's history were reviewed and updated as appropriate:   He  has a past medical history of Diabetes mellitus ([facility] [address] ); Disease of thyroid gland; GERD (gastroesophageal reflux disease); Hypertension; Obsessive compulsive disorder; and Schizoaffective disorder ([facility] [address] )    Current Outpatient Prescriptions   Medication Sig Dispense Refill    amLODIPine (NORVASC) 5 mg tablet Take 1 tablet (5 mg total) by mouth daily 30 tablet 5    butalbital-acetaminophen-caffeine (FIORICET,ESGIC) -40 mg per tablet Take 2 tablets by mouth every 4 (four) hours as needed for headaches for up to 20 doses 20 tablet 0    fluvoxaMINE (LUVOX) 100 mg tablet Take 2 tablets (200 mg total) by mouth daily at bedtime 60 tablet 0    ibuprofen (MOTRIN) 600 mg tablet Take 1 tablet (600 mg total) by mouth every 6 (six) hours as needed for mild pain 30 tablet 0    insulin aspart (NovoLOG) 100 units/mL injection Inject 10 Units under the skin 3 (three) times a day before meals 10 mL 2    insulin glargine (LANTUS) 100 units/mL subcutaneous injection Inject 30 Units under the skin daily at bedtime 10 mL 0    Insulin Glargine (TOUJEO SOLOSTAR) 300 units/mL CONCETRATED U-300 injection pen Inject 30 Units under the skin daily 3 pen 0    insulin lispro (ADMELOG SOLOSTAR) 100 units/mL injection pen Inject 10 Units under the skin 3 (three) times a day with meals 3 pen 2    Insulin Pen Needle (PEN NEEDLES 3/16") 31G X 5 MM MISC by Does not apply route 4 (four) times a day 200 each 3    lisinopril (ZESTRIL) 10 mg tablet Take 1 tablet (10 mg total) by mouth daily 90 tablet 1    pantoprazole (PROTONIX) 40 mg tablet Take 1 tablet (40 mg total) by mouth daily 90 tablet 1    ziprasidone (GEODON) 40 mg capsule Take 1 capsule (40 mg total) by mouth 2 (two) times a day with meals 60 capsule 1    levothyroxine 88 mcg tablet Take 1 tablet (88 mcg total) by mouth daily 30 tablet 1    LORazepam (ATIVAN) 0 5 mg tablet Take 1 tablet (0 5 mg total) by mouth 2 (two) times a day 60 tablet 0    topiramate (TOPAMAX) 25 mg tablet Take 1 tablet (25 mg total) by mouth 2 (two) times a day for 30 days 60 tablet 0     No current facility-administered medications for this visit  He has No Known Allergies  Review of Systems   Constitutional: Negative  HENT: Negative  Eyes: Negative  Respiratory: Negative  Cardiovascular: Negative  Gastrointestinal: Negative  Genitourinary: Negative  Psychiatric/Behavioral: Negative  Objective:      /90 (BP Location: Left arm, Patient Position: Sitting, Cuff Size: Large)   Pulse 92   Temp 98 3 °F (36 8 °C) (Tympanic)   Resp 16   Ht 5' 2" (1 575 m)   Wt (!) 137 kg (303 lb)   SpO2 97%   BMI 55 42 kg/m²          Physical Exam   Constitutional: He is oriented to person, place, and time  He appears well-developed  HENT:   Head: Normocephalic  Mouth/Throat: Oropharynx is clear and moist    Neck: Normal range of motion  Cardiovascular: Normal rate and regular rhythm  Pulmonary/Chest: Effort normal and breath sounds normal    Abdominal: Soft  Bowel sounds are normal    Neurological: He is alert and oriented to person, place, and time  Skin: Skin is warm  Psychiatric: He has a normal mood and affect

## 2018-10-17 NOTE — ASSESSMENT & PLAN NOTE
Lab Results   Component Value Date    HGBA1C 8 7 (H) 10/13/2018     Increasing the base insulin  No results for input(s): POCGLU in the last 72 hours      Blood Sugar Average: Last 72 hrs:

## 2018-10-18 RX ORDER — ZIPRASIDONE HYDROCHLORIDE 40 MG/1
CAPSULE ORAL
Qty: 60 CAPSULE | Refills: 1 | Status: ON HOLD | OUTPATIENT
Start: 2018-10-18 | End: 2019-01-13 | Stop reason: CLARIF

## 2018-10-26 ENCOUNTER — APPOINTMENT (EMERGENCY)
Dept: RADIOLOGY | Facility: HOSPITAL | Age: 38
End: 2018-10-26
Payer: COMMERCIAL

## 2018-10-26 ENCOUNTER — APPOINTMENT (EMERGENCY)
Dept: CT IMAGING | Facility: HOSPITAL | Age: 38
End: 2018-10-26
Payer: COMMERCIAL

## 2018-10-26 ENCOUNTER — HOSPITAL ENCOUNTER (EMERGENCY)
Facility: HOSPITAL | Age: 38
Discharge: HOME | End: 2018-10-26
Attending: EMERGENCY MEDICINE
Payer: COMMERCIAL

## 2018-10-26 ENCOUNTER — OFFICE VISIT (OUTPATIENT)
Dept: URGENT CARE | Age: 38
End: 2018-10-26
Payer: COMMERCIAL

## 2018-10-26 VITALS
HEIGHT: 62 IN | WEIGHT: 309.2 LBS | TEMPERATURE: 98.9 F | RESPIRATION RATE: 20 BRPM | SYSTOLIC BLOOD PRESSURE: 150 MMHG | HEART RATE: 89 BPM | DIASTOLIC BLOOD PRESSURE: 94 MMHG | BODY MASS INDEX: 56.9 KG/M2 | OXYGEN SATURATION: 100 %

## 2018-10-26 VITALS
BODY MASS INDEX: 55.21 KG/M2 | TEMPERATURE: 99 F | WEIGHT: 300 LBS | OXYGEN SATURATION: 97 % | DIASTOLIC BLOOD PRESSURE: 82 MMHG | HEIGHT: 62 IN | SYSTOLIC BLOOD PRESSURE: 136 MMHG | HEART RATE: 80 BPM | RESPIRATION RATE: 24 BRPM

## 2018-10-26 DIAGNOSIS — J40 BRONCHITIS: Primary | ICD-10-CM

## 2018-10-26 DIAGNOSIS — R07.9 CHEST PAIN, UNSPECIFIED TYPE: Primary | ICD-10-CM

## 2018-10-26 DIAGNOSIS — I10 ACCELERATED HYPERTENSION: ICD-10-CM

## 2018-10-26 DIAGNOSIS — R07.9 CHEST PAIN: ICD-10-CM

## 2018-10-26 LAB
ALBUMIN SERPL BCP-MCNC: 3.9 G/DL (ref 3.5–5.7)
ALP SERPL-CCNC: 75 U/L (ref 40–150)
ALT SERPL W P-5'-P-CCNC: 25 U/L (ref 7–52)
ANION GAP SERPL CALCULATED.3IONS-SCNC: 4 MMOL/L (ref 4–13)
APTT PPP: 30 SECONDS (ref 24–36)
AST SERPL W P-5'-P-CCNC: 21 U/L (ref 13–39)
BASOPHILS # BLD AUTO: 0 THOUSANDS/ΜL (ref 0–0.1)
BASOPHILS NFR BLD AUTO: 1 % (ref 0–2)
BILIRUB SERPL-MCNC: 0.5 MG/DL (ref 0.2–1)
BUN SERPL-MCNC: 21 MG/DL (ref 7–25)
CALCIUM SERPL-MCNC: 9.4 MG/DL (ref 8.6–10.5)
CHLORIDE SERPL-SCNC: 103 MMOL/L (ref 98–107)
CO2 SERPL-SCNC: 28 MMOL/L (ref 21–31)
CREAT SERPL-MCNC: 1.03 MG/DL (ref 0.7–1.3)
EOSINOPHIL # BLD AUTO: 0.2 THOUSAND/ΜL (ref 0–0.61)
EOSINOPHIL NFR BLD AUTO: 2 % (ref 0–5)
ERYTHROCYTE [DISTWIDTH] IN BLOOD BY AUTOMATED COUNT: 14.4 % (ref 11.5–14.5)
GFR SERPL CREATININE-BSD FRML MDRD: 90 ML/MIN/1.73SQ M
GLUCOSE SERPL-MCNC: 190 MG/DL (ref 65–99)
HCT VFR BLD AUTO: 38.8 % (ref 36.5–49.3)
HGB BLD-MCNC: 13 G/DL (ref 14–18)
INR PPP: 1.01 (ref 0.9–1.5)
LYMPHOCYTES # BLD AUTO: 3.9 THOUSANDS/ΜL (ref 0.6–4.47)
LYMPHOCYTES NFR BLD AUTO: 44 % (ref 21–51)
MCH RBC QN AUTO: 28.9 PG (ref 26–34)
MCHC RBC AUTO-ENTMCNC: 33.5 G/DL (ref 31–37)
MCV RBC AUTO: 86 FL (ref 81–99)
MONOCYTES # BLD AUTO: 0.9 THOUSAND/ΜL (ref 0.17–1.22)
MONOCYTES NFR BLD AUTO: 10 % (ref 2–12)
NEUTROPHILS # BLD AUTO: 3.9 THOUSANDS/ΜL (ref 1.4–6.5)
NEUTS SEG NFR BLD AUTO: 44 % (ref 42–75)
NRBC BLD AUTO-RTO: 0 /100 WBCS
PLATELET # BLD AUTO: 291 THOUSANDS/UL (ref 149–390)
PMV BLD AUTO: 7.2 FL (ref 8.6–11.7)
POTASSIUM SERPL-SCNC: 5.1 MMOL/L (ref 3.5–5.5)
PROT SERPL-MCNC: 7.3 G/DL (ref 6.4–8.9)
PROTHROMBIN TIME: 11.7 SECONDS (ref 10.1–12.9)
RBC # BLD AUTO: 4.5 MILLION/UL (ref 4.3–5.9)
SODIUM SERPL-SCNC: 135 MMOL/L (ref 134–143)
TROPONIN I SERPL-MCNC: <0.03 NG/ML
TROPONIN I SERPL-MCNC: <0.03 NG/ML
WBC # BLD AUTO: 8.9 THOUSAND/UL (ref 4.8–10.8)

## 2018-10-26 PROCEDURE — 36415 COLL VENOUS BLD VENIPUNCTURE: CPT | Performed by: EMERGENCY MEDICINE

## 2018-10-26 PROCEDURE — 85610 PROTHROMBIN TIME: CPT | Performed by: EMERGENCY MEDICINE

## 2018-10-26 PROCEDURE — G0382 LEV 3 HOSP TYPE B ED VISIT: HCPCS | Performed by: PHYSICIAN ASSISTANT

## 2018-10-26 PROCEDURE — 93005 ELECTROCARDIOGRAM TRACING: CPT | Performed by: PHYSICIAN ASSISTANT

## 2018-10-26 PROCEDURE — 71045 X-RAY EXAM CHEST 1 VIEW: CPT

## 2018-10-26 PROCEDURE — 84484 ASSAY OF TROPONIN QUANT: CPT | Performed by: FAMILY MEDICINE

## 2018-10-26 PROCEDURE — 99285 EMERGENCY DEPT VISIT HI MDM: CPT

## 2018-10-26 PROCEDURE — 93005 ELECTROCARDIOGRAM TRACING: CPT

## 2018-10-26 PROCEDURE — 85025 COMPLETE CBC W/AUTO DIFF WBC: CPT | Performed by: EMERGENCY MEDICINE

## 2018-10-26 PROCEDURE — 85730 THROMBOPLASTIN TIME PARTIAL: CPT | Performed by: EMERGENCY MEDICINE

## 2018-10-26 PROCEDURE — 84484 ASSAY OF TROPONIN QUANT: CPT | Performed by: EMERGENCY MEDICINE

## 2018-10-26 PROCEDURE — 71275 CT ANGIOGRAPHY CHEST: CPT

## 2018-10-26 PROCEDURE — 99283 EMERGENCY DEPT VISIT LOW MDM: CPT | Performed by: PHYSICIAN ASSISTANT

## 2018-10-26 PROCEDURE — 80053 COMPREHEN METABOLIC PANEL: CPT | Performed by: EMERGENCY MEDICINE

## 2018-10-26 RX ORDER — AMOXICILLIN AND CLAVULANATE POTASSIUM 875; 125 MG/1; MG/1
1 TABLET, FILM COATED ORAL EVERY 12 HOURS
Qty: 14 TABLET | Refills: 0 | Status: SHIPPED | OUTPATIENT
Start: 2018-10-26 | End: 2018-11-01 | Stop reason: HOSPADM

## 2018-10-26 RX ORDER — NITROGLYCERIN 0.4 MG/1
0.4 TABLET SUBLINGUAL ONCE
Status: COMPLETED | OUTPATIENT
Start: 2018-10-26 | End: 2018-10-26

## 2018-10-26 RX ORDER — NITROGLYCERIN 0.4 MG/1
0.4 TABLET SUBLINGUAL
Qty: 90 TABLET | Refills: 0 | Status: SHIPPED | OUTPATIENT
Start: 2018-10-26 | End: 2019-01-13

## 2018-10-26 RX ORDER — ASPIRIN 325 MG
325 TABLET ORAL ONCE
Status: COMPLETED | OUTPATIENT
Start: 2018-10-26 | End: 2018-10-26

## 2018-10-26 RX ORDER — AMOXICILLIN AND CLAVULANATE POTASSIUM 875; 125 MG/1; MG/1
1 TABLET, FILM COATED ORAL ONCE
Status: COMPLETED | OUTPATIENT
Start: 2018-10-26 | End: 2018-10-26

## 2018-10-26 RX ADMIN — AMOXICILLIN AND CLAVULANATE POTASSIUM 1 TABLET: 875; 125 TABLET, FILM COATED ORAL at 23:10

## 2018-10-26 RX ADMIN — NITROGLYCERIN 0.4 MG: 0.4 TABLET SUBLINGUAL at 22:11

## 2018-10-26 RX ADMIN — ASPIRIN 325 MG: 325 TABLET, FILM COATED ORAL at 19:37

## 2018-10-26 RX ADMIN — NITROGLYCERIN 0.4 MG: 0.4 TABLET SUBLINGUAL at 19:38

## 2018-10-26 RX ADMIN — IOHEXOL 85 ML: 350 INJECTION, SOLUTION INTRAVENOUS at 22:32

## 2018-10-26 NOTE — PROGRESS NOTES
[address] [facility]        NAME: Funmilayo Nelson is a 34 y o  male  : 1980    MRN: [id number]  DATE: 2018  TIME: 4:50 PM    Assessment and Plan   Chest pain, unspecified type [R07 9]  1  Chest pain, unspecified type       EKG normal  Patient states he is going to hospital in [place]  Patient was accompanied with father who was quite argumentative  Patient Instructions       Proceed to  ER     Chief Complaint     Chief Complaint   Patient presents with    Chest Pain         History of Present Illness       Chest Pain    This is a new problem  Episode onset: 2 days; worse today  The onset quality is sudden  The problem occurs intermittently (when walking up stairs or any exertional activity)  The problem has been unchanged  The pain is present in the substernal region  The pain is at a severity of 8/10  The quality of the pain is described as squeezing  The pain does not radiate  Associated symptoms include exertional chest pressure and shortness of breath (when chest pain occurs)  Pertinent negatives include no abdominal pain, back pain, claudication, cough, diaphoresis, dizziness, fever, headaches, hemoptysis, irregular heartbeat, leg pain, lower extremity edema, malaise/fatigue, nausea, near-syncope, numbness, orthopnea, palpitations, PND, sputum production, syncope, vomiting or weakness  The pain is aggravated by exertion  He has tried nothing for the symptoms  Risk factors include male gender and obesity  His past medical history is significant for thyroid problem  Pertinent negatives for past medical history include no arrhythmia, no CAD, no CHF, no diabetes, no DVT, no hyperlipidemia, no hypertension, no MI, no PE, no strokes and no TIA  His family medical history is significant for CAD, heart disease, hyperlipidemia and hypertension  Pertinent negatives for family medical history include: no early MI, no PE, no stroke, no sudden death and no TIA   Prior workup: prior normal EKGs  Review of Systems   Review of Systems   Constitutional: Negative for activity change, appetite change, diaphoresis, fever and malaise/fatigue  Respiratory: Positive for shortness of breath (when chest pain occurs)  Negative for cough, hemoptysis and sputum production  Cardiovascular: Positive for chest pain  Negative for palpitations, orthopnea, claudication, leg swelling, syncope, PND and near-syncope  Gastrointestinal: Negative for abdominal pain, nausea and vomiting  Musculoskeletal: Negative for back pain  Neurological: Negative for dizziness, weakness, light-headedness, numbness and headaches  Psychiatric/Behavioral: Negative for agitation and confusion           Current Medications       Current Outpatient Prescriptions:     amLODIPine (NORVASC) 5 mg tablet, Take 1 tablet (5 mg total) by mouth daily, Disp: 30 tablet, Rfl: 5    butalbital-acetaminophen-caffeine (FIORICET,ESGIC) -40 mg per tablet, Take 2 tablets by mouth every 4 (four) hours as needed for headaches for up to 20 doses, Disp: 20 tablet, Rfl: 0    fluvoxaMINE (LUVOX) 100 mg tablet, Take 2 tablets (200 mg total) by mouth daily at bedtime, Disp: 60 tablet, Rfl: 0    ibuprofen (MOTRIN) 600 mg tablet, Take 1 tablet (600 mg total) by mouth every 6 (six) hours as needed for mild pain, Disp: 30 tablet, Rfl: 0    insulin aspart (NovoLOG) 100 units/mL injection, Inject 10 Units under the skin 3 (three) times a day before meals, Disp: 10 mL, Rfl: 2    insulin glargine (LANTUS) 100 units/mL subcutaneous injection, Inject 30 Units under the skin daily at bedtime, Disp: 10 mL, Rfl: 0    insulin lispro (ADMELOG SOLOSTAR) 100 units/mL injection pen, Inject 10 Units under the skin 3 (three) times a day with meals, Disp: 3 pen, Rfl: 2    Insulin Pen Needle (PEN NEEDLES 3/16") 31G X 5 MM MISC, by Does not apply route 4 (four) times a day, Disp: 200 each, Rfl: 3    levothyroxine 88 mcg tablet, Take 1 tablet (88 mcg total) by mouth daily, Disp: 30 tablet, Rfl: 1    lisinopril (ZESTRIL) 10 mg tablet, Take 1 tablet (10 mg total) by mouth daily, Disp: 90 tablet, Rfl: 1    LORazepam (ATIVAN) 0 5 mg tablet, Take 1 tablet (0 5 mg total) by mouth 2 (two) times a day, Disp: 60 tablet, Rfl: 0    pantoprazole (PROTONIX) 40 mg tablet, Take 1 tablet (40 mg total) by mouth daily, Disp: 90 tablet, Rfl: 1    topiramate (TOPAMAX) 25 mg tablet, Take 1 tablet (25 mg total) by mouth 2 (two) times a day for 30 days, Disp: 60 tablet, Rfl: 0    ziprasidone (GEODON) 40 mg capsule, take 1 capsule by mouth twice a day with meals, Disp: 60 capsule, Rfl: 1    Insulin Glargine (TOUJEO SOLOSTAR) 300 units/mL CONCETRATED U-300 injection pen, Inject 30 Units under the skin daily (Patient not taking: Reported on 10/26/2018 ), Disp: 3 pen, Rfl: 0    Current Allergies     Allergies as of 10/26/2018    (No Known Allergies)            The following portions of the patient's history were reviewed and updated as appropriate: allergies, current medications, past family history, past medical history, past social history, past surgical history and problem list      Past Medical History:   Diagnosis Date    [facility]     Disease of thyroid gland     GERD (gastroesophageal reflux disease)     Hypertension     Obsessive compulsive disorder     Schizoaffective disorder ([facility] )        History reviewed  No pertinent surgical history  Family History   Problem Relation Age of Onset   Heather Mijares COPD Mother     Hypertension Mother     Rheum arthritis Family     Heart disease Family     Hypertension Father     Diabetes Father          Medications have been verified          Objective   /94 (Patient Position: Sitting)   Pulse 89   Temp 98 9 °F (37 2 °C) (Tympanic)   Resp 20   Ht 5' 2" (1 575 m)   Wt (!) 140 kg (309 lb 3 2 oz)   SpO2 100%   BMI 56 55 kg/m²        Physical Exam     Physical Exam   Constitutional: He is oriented to person, place, and time  He appears well-developed and well-nourished  No distress  Cardiovascular: Normal rate, regular rhythm, normal heart sounds and intact distal pulses  Exam reveals no gallop and no friction rub  No murmur heard  Pulmonary/Chest: Effort normal and breath sounds normal  No respiratory distress  He has no wheezes  He has no rales  He exhibits no tenderness  Neurological: He is alert and oriented to person, place, and time  Skin: He is not diaphoretic  Psychiatric: He has a normal mood and affect  His behavior is normal  Judgment and thought content normal    Vitals reviewed

## 2018-10-26 NOTE — PATIENT INSTRUCTIONS
Proceed to  ER     Angina   WHAT YOU NEED TO KNOW:   Angina is pain, pressure, or tightness that is usually felt in your chest  Chest pain may come on when you are stressed or do physical activities, such as walking or exercising  Angina is caused by decreased blood flow and oxygen to your heart  These are often caused by atherosclerosis (hardening of the arteries)  If left untreated, angina may get worse, increase your risk for a heart attack, or become life-threatening  DISCHARGE INSTRUCTIONS:   Call 911 if:   · You have any of the following signs of a heart attack:      ¨ Squeezing, pressure, or pain in your chest that lasts longer than 5 minutes or returns    ¨ Discomfort or pain in your back, neck, jaw, stomach, or arm     ¨ Trouble breathing    ¨ Nausea or vomiting    ¨ Lightheadedness or a sudden cold sweat, especially with chest pain or trouble breathing    · You have chest pain that does not go away after you take medicine as directed  · You lose feeling in your face, arms, or legs, or you suddenly feel weak  Return to the emergency department if:   · Your angina is happening more frequently, lasting longer, or causing worse pain  Contact your healthcare provider if:   · You are dizzy or nauseated after you take your medicine  · You have shortness of breath at rest     · You have new or worse swelling in your feet or ankles  · You have questions or concerns about your condition or care  Medicines: You may need any of the following:  · Antiplatelets , such as aspirin, help prevent blood clots  Take your antiplatelet medicine exactly as directed  These medicines make it more likely for you to bleed or bruise  If you are told to take aspirin, do not take acetaminophen or ibuprofen instead  · Blood thinners    help prevent blood clots  Examples of blood thinners include heparin and warfarin  Clots can cause strokes, heart attacks, and death   The following are general safety guidelines to follow while you are taking a blood thinner:    ¨ Watch for bleeding and bruising while you take blood thinners  Watch for bleeding from your gums or nose  Watch for blood in your urine and bowel movements  Use a soft washcloth on your skin, and a soft toothbrush to brush your teeth  This can keep your skin and gums from bleeding  If you shave, use an electric shaver  Do not play contact sports  ¨ Tell your dentist and other healthcare providers that you take anticoagulants  Wear a bracelet or necklace that says you take this medicine  ¨ Do not start or stop any medicines unless your healthcare provider tells you to  Many medicines cannot be used with blood thinners  ¨ Tell your healthcare provider right away if you forget to take the medicine, or if you take too much  ¨ Warfarin  is a blood thinner that you may need to take  The following are things you should be aware of if you take warfarin  § Foods and medicines can affect the amount of warfarin in your blood  Do not make major changes to your diet while you take warfarin  Warfarin works best when you eat about the same amount of vitamin K every day  Vitamin K is found in green leafy vegetables and certain other foods  Ask for more information about what to eat when you are taking warfarin  § You will need to see your healthcare provider for follow-up visits when you are on warfarin  You will need regular blood tests  These tests are used to decide how much medicine you need  · Other medicines  may be given to open the arteries to your heart, slow your heartbeat, or decrease your blood pressure or cholesterol  · Do not take certain medicines without asking your healthcare provider first   These include NSAIDs, herbal or vitamin supplements, or hormones (estrogen or progestin)  · Take your medicine as directed  Contact your healthcare provider if you think your medicine is not helping or if you have side effects   Tell him or her if you are allergic to any medicine  Keep a list of the medicines, vitamins, and herbs you take  Include the amounts, and when and why you take them  Bring the list or the pill bottles to follow-up visits  Carry your medicine list with you in case of an emergency  Follow up with your healthcare provider as directed:  Keep a record or a calendar with details about your chest pain  Every time you have pain or symptoms, record what the pain is like, how long it lasts, and how severe it is  Also record what you are doing when the pain starts, and what makes it go away  Bring this with you every time you see your healthcare provider  Write down your questions so you remember to ask them during your visits  Cardiac rehabilitation:  Your healthcare provider may recommend that you attend cardiac rehabilitation (rehab)  This is a program run by specialists who will help you safely strengthen your heart and prevent more heart disease  The plan includes exercise, relaxation, stress management, and heart-healthy nutrition  Healthcare providers will also check to make sure any medicines you are taking are working  The plan may also include instructions for when you can drive, return to work, and do other normal daily activities  Manage angina:   · Do not smoke  Nicotine and other chemicals in cigarettes and cigars can cause heart and lung damage  Ask your healthcare provider for information if you currently smoke and need help to quit  E-cigarettes or smokeless tobacco still contain nicotine  Talk to your healthcare provider before you use these products  · Maintain a healthy weight  When you weigh more than is healthy for you, your heart must work harder  You are at higher risk for serious health problems if you are overweight  Ask your healthcare provider how much you should weigh  Ask him to help you create a weight loss plan if you are overweight  · Ask about activity    Your healthcare provider will tell you which activities to limit or avoid  Ask about the best exercise plan for you  · Eat heart-healthy foods  Include fresh fruits and vegetables in your meal plan  Choose low-fat foods, such as skim or 1% fat milk, low-fat cheese and yogurt, fish, chicken (without skin), and lean meats  Eat two 4-ounce servings of fish high in omega-3 fats each week, such as salmon, fresh tuna, and herring  Do not eat foods that are high in sodium, such as canned foods, potato chips, salty snacks, and cold cuts  Put less table salt on your food  · Avoid activities that cause angina  Pay attention to your symptoms and find out what seems to make your angina worse  · Ask if you should have a flu vaccine  The flu vaccine will decrease your risk for an infection  An infection can put more stress on your heart and worsen your angina  © 2017 [employer] Information is for End User's use only and may not be sold, redistributed or otherwise used for commercial purposes  All illustrations and images included in CareNotes® are the copyrighted property of [employer]  or Ayden Simpson  The above information is an  only  It is not intended as medical advice for individual conditions or treatments  Talk to your doctor, nurse or pharmacist before following any medical regimen to see if it is safe and effective for you

## 2018-10-26 NOTE — ED PROVIDER NOTES
History  Chief Complaint   Patient presents with    Chest Pain     for past 2-3 days and pounding in chest    Cough    Shortness of Breath       History provided by:  Patient   used: No    Chest Pain   Pain location:  L chest  Pain quality: sharp    Pain radiates to:  Does not radiate  Pain radiates to the back: no    Pain severity:  Moderate  Onset quality:  Gradual  Duration:  3 days  Chronicity:  New  Context: breathing    Context: no drug use, not eating, not lifting, no movement, not raising an arm, not at rest and no stress    Associated symptoms: anxiety and shortness of breath    Associated symptoms: no abdominal pain, no back pain, no cough, no dizziness, no fever, no headache, no heartburn, no nausea, no numbness and not vomiting    Risk factors: hypertension        Prior to Admission Medications   Prescriptions Last Dose Informant Patient Reported? Taking?    Insulin Glargine (BASAGLAR KWIKPEN SC) 10/25/2018 at Unknown time  Yes Yes   Sig: Inject 30 Units under the skin daily at bedtime   Insulin Pen Needle (PEN NEEDLES 3/16") 31G X 5 MM MISC   No No   Sig: by Does not apply route 4 (four) times a day   LORazepam (ATIVAN) 0 5 mg tablet 10/25/2018 at Unknown time  No Yes   Sig: Take 1 tablet (0 5 mg total) by mouth 2 (two) times a day   amLODIPine (NORVASC) 5 mg tablet 10/26/2018 at Unknown time  No Yes   Sig: Take 1 tablet (5 mg total) by mouth daily   butalbital-acetaminophen-caffeine (FIORICET,ESGIC) -40 mg per tablet Unknown at Unknown time  No No   Sig: Take 2 tablets by mouth every 4 (four) hours as needed for headaches for up to 20 doses   fluvoxaMINE (LUVOX) 100 mg tablet 10/25/2018 at Unknown time  No Yes   Sig: Take 2 tablets (200 mg total) by mouth daily at bedtime   Patient taking differently: Take 100 mg by mouth 2 (two) times a day     ibuprofen (MOTRIN) 600 mg tablet Unknown at Unknown time  No No   Sig: Take 1 tablet (600 mg total) by mouth every 6 (six) hours as needed for mild pain   insulin aspart (NovoLOG) 100 units/mL injection Unknown at Unknown time  No No   Sig: Inject 10 Units under the skin 3 (three) times a day before meals   levothyroxine 88 mcg tablet 10/26/2018 at Unknown time  No Yes   Sig: Take 1 tablet (88 mcg total) by mouth daily   lisinopril (ZESTRIL) 10 mg tablet 10/26/2018 at Unknown time  No Yes   Sig: Take 1 tablet (10 mg total) by mouth daily   pantoprazole (PROTONIX) 40 mg tablet 10/26/2018 at Unknown time  No Yes   Sig: Take 1 tablet (40 mg total) by mouth daily   topiramate (TOPAMAX) 25 mg tablet 10/26/2018 at Unknown time  No Yes   Sig: Take 1 tablet (25 mg total) by mouth 2 (two) times a day for 30 days   ziprasidone (GEODON) 40 mg capsule 10/26/2018 at Unknown time  No Yes   Sig: take 1 capsule by mouth twice a day with meals      Facility-Administered Medications: None       Past Medical History:   Diagnosis Date    Diabetes mellitus ([place] [address] )     Disease of thyroid gland     GERD (gastroesophageal reflux disease)     Hypertension     Obsessive compulsive disorder     Schizoaffective disorder ([place] [address] )        History reviewed  No pertinent surgical history  Family History   Problem Relation Age of Onset   [facility] COPD Mother     Hypertension Mother     Rheum arthritis Family     Heart disease Family     Hypertension Father     Diabetes Father      I have reviewed and agree with the history as documented  Social History   Substance Use Topics    Smoking status: Never Smoker    Smokeless tobacco: Never Used    Alcohol use No        Review of Systems   Constitutional: Negative for chills and fever  HENT: Negative for rhinorrhea and sore throat  Eyes: Negative for visual disturbance  Respiratory: Positive for shortness of breath  Negative for cough  Cardiovascular: Positive for chest pain  Negative for leg swelling  Gastrointestinal: Negative for abdominal pain, diarrhea, heartburn, nausea and vomiting     Genitourinary: Negative for dysuria  Musculoskeletal: Negative for back pain and myalgias  Skin: Negative for rash  Neurological: Negative for dizziness, numbness and headaches  Psychiatric/Behavioral: Negative for confusion  All other systems reviewed and are negative  Physical Exam  Physical Exam   Constitutional: He is oriented to person, place, and time  He appears well-developed and well-nourished  HENT:   Nose: Nose normal    Mouth/Throat: Oropharynx is clear and moist  No oropharyngeal exudate  Eyes: Pupils are equal, round, and reactive to light  Conjunctivae and EOM are normal  No scleral icterus  Neck: Normal range of motion  Neck supple  No JVD present  No tracheal deviation present  Cardiovascular: Normal rate, regular rhythm and normal heart sounds  No murmur heard  Pulmonary/Chest: Effort normal and breath sounds normal  No respiratory distress  He has no wheezes  He has no rales  Abdominal: Soft  Bowel sounds are normal  There is no tenderness  There is no guarding  Musculoskeletal: Normal range of motion  He exhibits no edema or tenderness  Neurological: He is alert and oriented to person, place, and time  No cranial nerve deficit or sensory deficit  He exhibits normal muscle tone  5/5 motor, nl sens   Skin: Skin is warm and dry  Psychiatric: His mood appears anxious  Nursing note and vitals reviewed        Vital Signs  ED Triage Vitals [10/26/18 1756]   Temperature Pulse Respirations Blood Pressure SpO2   99 °F (37 2 °C) 90 20 (!) 215/126 98 %      Temp Source Heart Rate Source Patient Position - Orthostatic VS BP Location FiO2 (%)   Temporal Monitor Sitting Left arm --      Pain Score       4           Vitals:    10/26/18 2200 10/26/18 2245 10/26/18 2253 10/26/18 2322   BP: 161/95 135/87 136/82 136/82   Pulse: 83 80  80   Patient Position - Orthostatic VS:           Visual Acuity      ED Medications  Medications   aspirin tablet 325 mg (325 mg Oral Given 10/26/18 1937) nitroglycerin (NITROSTAT) SL tablet 0 4 mg (0 4 mg Sublingual Given 10/26/18 1938)   nitroglycerin (NITROSTAT) SL tablet 0 4 mg (0 4 mg Sublingual Given 10/26/18 2211)   iohexol (OMNIPAQUE) 350 MG/ML injection (SINGLE-DOSE) 85 mL (85 mL Intravenous Given 10/26/18 2232)   amoxicillin-clavulanate (AUGMENTIN) 875-125 mg per tablet 1 tablet (1 tablet Oral Given 10/26/18 2310)       Diagnostic Studies  Results Reviewed     Procedure Component Value Units Date/Time    Troponin I [[id number]]  (Normal) Collected:  10/26/18 2115    Lab Status:  Final result Specimen:  Blood from Arm, Right Updated:  10/26/18 2141     Troponin I <0 03 ng/mL     Troponin I [[id number]]  (Normal) Collected:  10/26/18 1815    Lab Status:  Final result Specimen:  Blood from Arm, Right Updated:  10/26/18 1849     Troponin I <0 03 ng/mL     Comprehensive metabolic panel [[id number]]  (Abnormal) Collected:  10/26/18 1815    Lab Status:  Final result Specimen:  Blood from Arm, Right Updated:  10/26/18 1849     Sodium 135 mmol/L      Potassium 5 1 mmol/L      Chloride 103 mmol/L      CO2 28 mmol/L      ANION GAP 4 mmol/L      BUN 21 mg/dL      Creatinine 1 03 mg/dL      Glucose 190 (H) mg/dL      Calcium 9 4 mg/dL      AST 21 U/L      ALT 25 U/L      Alkaline Phosphatase 75 U/L      Total Protein 7 3 g/dL      Albumin 3 9 g/dL      Total Bilirubin 0 50 mg/dL      eGFR 90 ml/min/1 73sq m     Narrative:         National Kidney Disease Education Program recommendations are as follows:  GFR calculation is accurate only with a steady state creatinine  Chronic Kidney disease less than 60 ml/min/1 73 sq  meters  Kidney failure less than 15 ml/min/1 73 sq  meters      Robel Elias [[id number]]  (Normal) Collected:  10/26/18 1815    Lab Status:  Final result Specimen:  Blood from Arm, Right Updated:  10/26/18 1841     Protime 11 7 seconds      INR 1 01    APTT [[id number]]  (Normal) Collected:  10/26/18 1815    Lab Status:  Final result Specimen:  Blood from Arm, Right Updated:  10/26/18 1841     PTT 30 seconds     CBC and differential [[id number]]  (Abnormal) Collected:  10/26/18 1816    Lab Status:  Final result Specimen:  Blood from Arm, Right Updated:  10/26/18 1824     WBC 8 90 Thousand/uL      RBC 4 50 Million/uL      Hemoglobin 13 0 (L) g/dL      Hematocrit 38 8 %      MCV 86 fL      MCH 28 9 pg      MCHC 33 5 g/dL      RDW 14 4 %      MPV 7 2 (L) fL      Platelets 883 Thousands/uL      nRBC 0 /100 WBCs      Neutrophils Relative 44 %      Lymphocytes Relative 44 %      Monocytes Relative 10 %      Eosinophils Relative 2 %      Basophils Relative 1 %      Neutrophils Absolute 3 90 Thousands/µL      Lymphocytes Absolute 3 90 Thousands/µL      Monocytes Absolute 0 90 Thousand/µL      Eosinophils Absolute 0 20 Thousand/µL      Basophils Absolute 0 00 Thousands/µL                  CTA ED chest PE study   Final Result by Rafia Castañeda (10/26 2253)   1  Limited assessment of the pulmonary arteries due to timing of the   contrast bolus resulting in a dilute contrast bolus within the pulmonary   arteries  No central or segmental pulmonary embolus is demonstrated  Subsegmental emboli not excluded  2   Suspected bronchitis  3   Hepatic steatosis with splenomegaly  Signed by Saravanan Wyman MD      X-ray chest 1 view portable   Final Result by Sada Buchanan (10/26 1902)   No acute cardiopulmonary disease  Signed by Meme Oquendo MD                 Procedures  Procedures       Phone Contacts  ED Phone Contact    ED Course  ED Course as of Oct 26 2331   Fri Oct 26, 2018   2156 Patient is feeling better chest pain has resolved  Two troponins were negative  Blood pressure has improved  I recommend the patient follow up with PCP as well as his cardiologist for further evaluation and treatment  Father is by bedside also agree with the plan and treatment  I also recommend that he should watch his diet and exercise regularly      2203 Patient states that his pain return , rating 3/10 at this time  2257 Chest pain has resolved patient is an comfortably in bed CTA of the chest shows bronchitis no PE is noted  Will start him on Augmentin and nitroglycerin as needed for his high blood pressure  I recommend that he should follow up with Dr Marge Ziegler sent information is provided to the patient  Two troponins are negative EKGs within normal limits  HEART Risk Score      Most Recent Value   History  1 Filed at: 10/26/2018 2205   ECG  0 Filed at: 10/26/2018 2205   Age  0 Filed at: 10/26/2018 2205   Risk Factors  1 Filed at: 10/26/2018 2205   Troponin  0 Filed at: 10/26/2018 2205   Heart Score Risk Calculator   History  1 Filed at: 10/26/2018 2205   ECG  0 Filed at: 10/26/2018 2205   Age  0 Filed at: 10/26/2018 2205   Risk Factors  1 Filed at: 10/26/2018 2205   Troponin  0 Filed at: 10/26/2018 2205   HEART Score  2 Filed at: 10/26/2018 2205   HEART Score  2 Filed at: 10/26/2018 2205                            MDM  CritCare Time    Disposition  Final diagnoses:   Bronchitis   Accelerated hypertension   Chest pain     Time reflects when diagnosis was documented in both MDM as applicable and the Disposition within this note     Time User Action Codes Description Comment    10/26/2018 10:56 PM Alissa Melinda Add [J40] Bronchitis     10/26/2018 10:56 PM Alissa Melinda Add [I10] Accelerated hypertension     10/26/2018 10:56 PM Alissa Melinda Add [R07 9] Chest pain       ED Disposition     ED Disposition Condition Comment    Discharge  Ivone Marsh discharge to home/self care  Condition at discharge: Stable        Follow-up Information     Follow up With Specialties Details Why Raúl Vargas MD Cardiology In 2 days If symptoms worsen [address]  [phone]            Discharge Medication List as of 10/26/2018 10:58 PM      START taking these medications    Details   amoxicillin-clavulanate (AUGMENTIN) 877-898 mg per tablet Take 1 tablet by mouth every 12 (twelve) hours for 7 days, Starting Fri 10/26/2018, Until Fri 11/2/2018, Print      nitroglycerin (NITROSTAT) 0 4 mg SL tablet Place 1 tablet (0 4 mg total) under the tongue every 5 (five) minutes as needed for chest pain, Starting Fri 10/26/2018, Print         CONTINUE these medications which have NOT CHANGED    Details   amLODIPine (NORVASC) 5 mg tablet Take 1 tablet (5 mg total) by mouth daily, Starting Fri 9/21/2018, Normal      fluvoxaMINE (LUVOX) 100 mg tablet Take 2 tablets (200 mg total) by mouth daily at bedtime, Starting Wed 9/19/2018, Normal      Insulin Glargine (BASAGLAR KWIKPEN SC) Inject 30 Units under the skin daily at bedtime, Historical Med      levothyroxine 88 mcg tablet Take 1 tablet (88 mcg total) by mouth daily, Starting Wed 10/17/2018, Normal      lisinopril (ZESTRIL) 10 mg tablet Take 1 tablet (10 mg total) by mouth daily, Starting Wed 9/19/2018, Normal      LORazepam (ATIVAN) 0 5 mg tablet Take 1 tablet (0 5 mg total) by mouth 2 (two) times a day, Starting Wed 10/17/2018, Normal      pantoprazole (PROTONIX) 40 mg tablet Take 1 tablet (40 mg total) by mouth daily, Starting Wed 9/19/2018, Normal      topiramate (TOPAMAX) 25 mg tablet Take 1 tablet (25 mg total) by mouth 2 (two) times a day for 30 days, Starting Wed 10/17/2018, Until Fri 11/16/2018, Normal      ziprasidone (GEODON) 40 mg capsule take 1 capsule by mouth twice a day with meals, Normal      butalbital-acetaminophen-caffeine (FIORICET,ESGIC) -40 mg per tablet Take 2 tablets by mouth every 4 (four) hours as needed for headaches for up to 20 doses, Starting Fri 9/7/2018, Print      ibuprofen (MOTRIN) 600 mg tablet Take 1 tablet (600 mg total) by mouth every 6 (six) hours as needed for mild pain, Starting Wed 9/19/2018, Print      insulin aspart (NovoLOG) 100 units/mL injection Inject 10 Units under the skin 3 (three) times a day before meals, Starting Wed 9/19/2018, Normal Insulin Pen Needle (PEN NEEDLES 3/16") 31G X 5 MM MISC by Does not apply route 4 (four) times a day, Starting Tue 10/2/2018, Normal           No discharge procedures on file      ED Provider  Electronically Signed by           Maria Elena Guallpa MD  10/26/18 5732

## 2018-10-27 NOTE — DISCHARGE INSTRUCTIONS
Acute Bronchitis   WHAT YOU NEED TO KNOW:   Acute bronchitis is swelling and irritation in the air passages of your lungs  This irritation may cause you to cough or have other breathing problems  Acute bronchitis often starts because of another illness, such as a cold or the flu  The illness spreads from your nose and throat to your windpipe and airways  Bronchitis is often called a chest cold  Acute bronchitis lasts about 3 to 6 weeks and is usually not a serious illness  Your cough can last for several weeks  DISCHARGE INSTRUCTIONS:   Return to the emergency department if:   · You cough up blood  · Your lips or fingernails turn blue  · You feel like you are not getting enough air when you breathe  Contact your healthcare provider if:   · You have a fever  · Your breathing problems do not go away or get worse  · Your cough does not get better within 4 weeks  · You have questions or concerns about your condition or care  Self-care:   · Get more rest   Rest helps your body to heal  Slowly start to do more each day  Rest when you feel it is needed  · Avoid irritants in the air  Avoid chemicals, fumes, and dust  Wear a face mask if you must work around dust or fumes  Stay inside on days when air pollution levels are high  If you have allergies, stay inside when pollen counts are high  Do not use aerosol products, such as spray-on deodorant, bug spray, and hair spray  · Do not smoke or be around others who smoke  Nicotine and other chemicals in cigarettes and cigars damages the cilia that move mucus out of your lungs  Ask your healthcare provider for information if you currently smoke and need help to quit  E-cigarettes or smokeless tobacco still contain nicotine  Talk to your healthcare provider before you use these products  · Drink liquids as directed  Liquids help keep your air passages moist and help you cough up mucus   You may need to drink more liquids when you have acute bronchitis  Ask how much liquid to drink each day and which liquids are best for you  · Use a humidifier or vaporizer  Use a cool mist humidifier or a vaporizer to increase air moisture in your home  This may make it easier for you to breathe and help decrease your cough  Decrease risk for acute bronchitis:   · Get the vaccinations you need  Ask your healthcare provider if you should get vaccinated against the flu or pneumonia  · Prevent the spread of germs  You can decrease your risk of acute bronchitis and other illnesses by doing the following:     [facility] your hands often with soap and water  Carry germ-killing hand lotion or gel with you  You can use the lotion or gel to clean your hands when soap and water are not available  ¨ Do not touch your eyes, nose, or mouth unless you have washed your hands first     ¨ Always cover your mouth when you cough to prevent the spread of germs  It is best to cough into a tissue or your shirt sleeve instead of into your hand  Ask those around you cover their mouths when they cough  ¨ Try to avoid people who have a cold or the flu  If you are sick, stay away from others as much as possible  Medicines: Your healthcare provider may  give you any of the following:  · Ibuprofen or acetaminophen  are medicines that help lower your fever  They are available without a doctor's order  Ask your healthcare provider which medicine is right for you  Ask how much to take and how often to take it  Follow directions  These medicines can cause stomach bleeding if not taken correctly  Ibuprofen can cause kidney damage  Do not take ibuprofen if you have kidney disease, an ulcer, or allergies to aspirin  Acetaminophen can cause liver damage  Do not take more than 4,000 milligrams in 24 hours  · Decongestants  help loosen mucus in your lungs and make it easier to cough up  This can help you breathe easier  · Cough suppressants  decrease your urge to cough   If your cough produces mucus, do not take a cough suppressant unless your healthcare provider tells you to  Your healthcare provider may suggest that you take a cough suppressant at night so you can rest     · Inhalers  may be given  Your healthcare provider may give you one or more inhalers to help you breathe easier and cough less  An inhaler gives your medicine to open your airways  Ask your healthcare provider to show you how to use your inhaler correctly  · Take your medicine as directed  Contact your healthcare provider if you think your medicine is not helping or if you have side effects  Tell him of her if you are allergic to any medicine  Keep a list of the medicines, vitamins, and herbs you take  Include the amounts, and when and why you take them  Bring the list or the pill bottles to follow-up visits  Carry your medicine list with you in case of an emergency  Follow up with your healthcare provider as directed:  Write down questions you have so you will remember to ask them during your follow-up visits  © 2017 [address] Information is for End User's use only and may not be sold, redistributed or otherwise used for commercial purposes  All illustrations and images included in CareNotes® are the copyrighted property of [employer]  or Ayden Simpson  The above information is an  only  It is not intended as medical advice for individual conditions or treatments  Talk to your doctor, nurse or pharmacist before following any medical regimen to see if it is safe and effective for you  Chest Pain, Ambulatory Care   GENERAL INFORMATION:   Chest pain  can be caused by a range of conditions, from not serious to life-threatening  It may be caused by a heart attack or a blood clot in your lungs  Sometimes chest pain or pressure is caused by poor blood flow to your heart (angina)   Infection, inflammation, or a fracture in the bones or cartilage in your chest can cause pain or discomfort  Chest pain can also be a symptom of a digestive problem, such as acid reflux or a stomach ulcer  Common symptoms include the following:   · Fever or sweating     · Nausea or vomiting     · Shortness of breath     · Discomfort or pressure that spreads from your chest to your back, jaw, or arm     · A racing or slow heartbeat     · Feeling weak, tired, or faint  Seek immediate care for the following symptoms:   · Any of the following signs of a heart attack:      ¨ Squeezing, pressure, or pain in your chest that lasts longer than 5 minutes or returns    ¨ Discomfort or pain in your back, neck, jaw, stomach, or arm     ¨ Trouble breathing     ¨ Nausea or vomiting    ¨ Lightheadedness or a sudden cold sweat, especially with trouble breathing         · Chest discomfort that gets worse, even with medicine    · Coughing or vomiting blood    · Black or bloody bowel movements     · Vomiting that does not stop, or pain when you swallow  Treatment for chest pain  may include medicine to treat your symptoms while he determines the cause of your chest pain  You may also need any of the following:  · Antiplatelets , such as aspirin, help prevent blood clots  Take your antiplatelet medicine exactly as directed  These medicines make it more likely for you to bleed or bruise  If you are told to take aspirin, do not take acetaminophen or ibuprofen instead  · Prescription pain medicine  may be given  Ask how to take this medicine safely  Do not smoke: If you smoke, it is never too late to quit  Smoking increases your risk for a heart attack and other heart and lung conditions  Ask your healthcare provider for information about how to stop smoking if you need help  Follow up with your healthcare provider as directed: You may need more tests  You may be referred to a specialist, such as a cardiologist or gastroenterologist  Write down your questions so you remember to ask them during your visits     CARE AGREEMENT:   You have the right to help plan your care  Learn about your health condition and how it may be treated  Discuss treatment options with your caregivers to decide what care you want to receive  You always have the right to refuse treatment  The above information is an  only  It is not intended as medical advice for individual conditions or treatments  Talk to your doctor, nurse or pharmacist before following any medical regimen to see if it is safe and effective for you  © 2014 [address] is for End User's use only and may not be sold, redistributed or otherwise used for commercial purposes  All illustrations and images included in CareNotes® are the copyrighted property of [employer]  or Ayden Simpson

## 2018-10-29 DIAGNOSIS — E11.9 TYPE 2 DIABETES MELLITUS WITHOUT COMPLICATION, UNSPECIFIED WHETHER LONG TERM INSULIN USE (HCC): Primary | ICD-10-CM

## 2018-10-29 LAB
ATRIAL RATE: 83 BPM
ATRIAL RATE: 86 BPM
ATRIAL RATE: 86 BPM
P AXIS: 39 DEGREES
P AXIS: 40 DEGREES
P AXIS: 56 DEGREES
PR INTERVAL: 168 MS
PR INTERVAL: 174 MS
PR INTERVAL: 178 MS
QRS AXIS: -11 DEGREES
QRS AXIS: -12 DEGREES
QRS AXIS: 4 DEGREES
QRSD INTERVAL: 100 MS
QRSD INTERVAL: 100 MS
QRSD INTERVAL: 102 MS
QT INTERVAL: 354 MS
QT INTERVAL: 358 MS
QT INTERVAL: 360 MS
QTC INTERVAL: 415 MS
QTC INTERVAL: 428 MS
QTC INTERVAL: 430 MS
T WAVE AXIS: 24 DEGREES
T WAVE AXIS: 28 DEGREES
T WAVE AXIS: 51 DEGREES
VENTRICULAR RATE: 83 BPM
VENTRICULAR RATE: 86 BPM
VENTRICULAR RATE: 86 BPM

## 2018-10-29 PROCEDURE — 93010 ELECTROCARDIOGRAM REPORT: CPT | Performed by: INTERNAL MEDICINE

## 2018-10-31 ENCOUNTER — HOSPITAL ENCOUNTER (OUTPATIENT)
Facility: HOSPITAL | Age: 38
Setting detail: OBSERVATION
Discharge: HOME | End: 2018-11-01
Attending: INTERNAL MEDICINE | Admitting: INTERNAL MEDICINE
Payer: COMMERCIAL

## 2018-10-31 ENCOUNTER — APPOINTMENT (OUTPATIENT)
Dept: NON INVASIVE DIAGNOSTICS | Facility: HOSPITAL | Age: 38
End: 2018-10-31
Payer: COMMERCIAL

## 2018-10-31 ENCOUNTER — APPOINTMENT (EMERGENCY)
Dept: RADIOLOGY | Facility: HOSPITAL | Age: 38
End: 2018-10-31
Payer: COMMERCIAL

## 2018-10-31 DIAGNOSIS — R60.0 LOWER EXTREMITY EDEMA: Chronic | ICD-10-CM

## 2018-10-31 DIAGNOSIS — E11.9 TYPE 2 DIABETES MELLITUS WITHOUT COMPLICATION, UNSPECIFIED WHETHER LONG TERM INSULIN USE (HCC): ICD-10-CM

## 2018-10-31 DIAGNOSIS — R07.9 CHEST PAIN: Primary | ICD-10-CM

## 2018-10-31 DIAGNOSIS — E10.9 TYPE 1 DIABETES MELLITUS WITHOUT COMPLICATION (HCC): Chronic | ICD-10-CM

## 2018-10-31 DIAGNOSIS — K21.9 GASTROESOPHAGEAL REFLUX DISEASE, ESOPHAGITIS PRESENCE NOT SPECIFIED: Chronic | ICD-10-CM

## 2018-10-31 DIAGNOSIS — E03.8 OTHER SPECIFIED HYPOTHYROIDISM: Chronic | ICD-10-CM

## 2018-10-31 DIAGNOSIS — I10 HYPERTENSION, UNSPECIFIED TYPE: ICD-10-CM

## 2018-10-31 PROBLEM — Z86.69 HX OF MIGRAINES: Chronic | Status: ACTIVE | Noted: 2018-10-31

## 2018-10-31 PROBLEM — J06.9 VIRAL UPPER RESPIRATORY TRACT INFECTION: Chronic | Status: ACTIVE | Noted: 2018-10-31

## 2018-10-31 PROBLEM — F42.9 OBSESSIVE COMPULSIVE DISORDER: Chronic | Status: ACTIVE | Noted: 2018-10-31

## 2018-10-31 PROBLEM — F25.9 SCHIZOAFFECTIVE DISORDER (HCC): Chronic | Status: ACTIVE | Noted: 2018-10-31

## 2018-10-31 PROBLEM — E66.01 MORBID OBESITY WITH BMI OF 50.0-59.9, ADULT (HCC): Chronic | Status: ACTIVE | Noted: 2018-10-31

## 2018-10-31 PROBLEM — F41.9 ANXIETY: Chronic | Status: ACTIVE | Noted: 2018-10-31

## 2018-10-31 PROBLEM — J06.9 VIRAL UPPER RESPIRATORY TRACT INFECTION: Status: ACTIVE | Noted: 2018-10-31

## 2018-10-31 LAB
ALBUMIN SERPL BCP-MCNC: 3.6 G/DL (ref 3.5–5.7)
ALBUMIN SERPL BCP-MCNC: 3.7 G/DL (ref 3.5–5.7)
ALP SERPL-CCNC: 65 U/L (ref 40–150)
ALP SERPL-CCNC: 65 U/L (ref 40–150)
ALT SERPL W P-5'-P-CCNC: 35 U/L (ref 7–52)
ALT SERPL W P-5'-P-CCNC: 37 U/L (ref 7–52)
ANION GAP SERPL CALCULATED.3IONS-SCNC: 7 MMOL/L (ref 4–13)
ANION GAP SERPL CALCULATED.3IONS-SCNC: 7 MMOL/L (ref 4–13)
AST SERPL W P-5'-P-CCNC: 27 U/L (ref 13–39)
AST SERPL W P-5'-P-CCNC: 29 U/L (ref 13–39)
ATRIAL RATE: 78 BPM
ATRIAL RATE: 80 BPM
BASOPHILS # BLD AUTO: 0 THOUSANDS/ΜL (ref 0–0.1)
BASOPHILS # BLD AUTO: 0 THOUSANDS/ΜL (ref 0–0.1)
BASOPHILS NFR BLD AUTO: 0 % (ref 0–2)
BASOPHILS NFR BLD AUTO: 1 % (ref 0–2)
BILIRUB SERPL-MCNC: 0.4 MG/DL (ref 0.2–1)
BILIRUB SERPL-MCNC: 0.5 MG/DL (ref 0.2–1)
BUN SERPL-MCNC: 19 MG/DL (ref 7–25)
BUN SERPL-MCNC: 19 MG/DL (ref 7–25)
CALCIUM SERPL-MCNC: 8.7 MG/DL (ref 8.6–10.5)
CALCIUM SERPL-MCNC: 8.8 MG/DL (ref 8.6–10.5)
CHLORIDE SERPL-SCNC: 100 MMOL/L (ref 98–107)
CHLORIDE SERPL-SCNC: 102 MMOL/L (ref 98–107)
CO2 SERPL-SCNC: 25 MMOL/L (ref 21–31)
CO2 SERPL-SCNC: 26 MMOL/L (ref 21–31)
CREAT SERPL-MCNC: 1.02 MG/DL (ref 0.7–1.3)
CREAT SERPL-MCNC: 1.12 MG/DL (ref 0.7–1.3)
EOSINOPHIL # BLD AUTO: 0.1 THOUSAND/ΜL (ref 0–0.61)
EOSINOPHIL # BLD AUTO: 0.1 THOUSAND/ΜL (ref 0–0.61)
EOSINOPHIL NFR BLD AUTO: 2 % (ref 0–5)
EOSINOPHIL NFR BLD AUTO: 2 % (ref 0–5)
ERYTHROCYTE [DISTWIDTH] IN BLOOD BY AUTOMATED COUNT: 14.3 % (ref 11.5–14.5)
ERYTHROCYTE [DISTWIDTH] IN BLOOD BY AUTOMATED COUNT: 14.3 % (ref 11.5–14.5)
EST. AVERAGE GLUCOSE BLD GHB EST-MCNC: 206 MG/DL
GFR SERPL CREATININE-BSD FRML MDRD: 82 ML/MIN/1.73SQ M
GFR SERPL CREATININE-BSD FRML MDRD: 92 ML/MIN/1.73SQ M
GLUCOSE SERPL-MCNC: 105 MG/DL (ref 65–140)
GLUCOSE SERPL-MCNC: 139 MG/DL (ref 65–140)
GLUCOSE SERPL-MCNC: 139 MG/DL (ref 65–99)
GLUCOSE SERPL-MCNC: 182 MG/DL (ref 65–140)
GLUCOSE SERPL-MCNC: 209 MG/DL (ref 65–140)
GLUCOSE SERPL-MCNC: 217 MG/DL (ref 65–99)
GLUCOSE SERPL-MCNC: 80 MG/DL (ref 65–140)
HBA1C MFR BLD: 8.8 % (ref 4.2–6.3)
HCT VFR BLD AUTO: 36.3 % (ref 36.5–49.3)
HCT VFR BLD AUTO: 37.9 % (ref 36.5–49.3)
HGB BLD-MCNC: 12.1 G/DL (ref 14–18)
HGB BLD-MCNC: 12.4 G/DL (ref 14–18)
LYMPHOCYTES # BLD AUTO: 3.4 THOUSANDS/ΜL (ref 0.6–4.47)
LYMPHOCYTES # BLD AUTO: 3.8 THOUSANDS/ΜL (ref 0.6–4.47)
LYMPHOCYTES NFR BLD AUTO: 48 % (ref 21–51)
LYMPHOCYTES NFR BLD AUTO: 48 % (ref 21–51)
MAGNESIUM SERPL-MCNC: 1.7 MG/DL (ref 1.9–2.7)
MCH RBC QN AUTO: 28.4 PG (ref 26–34)
MCH RBC QN AUTO: 28.4 PG (ref 26–34)
MCHC RBC AUTO-ENTMCNC: 32.8 G/DL (ref 31–37)
MCHC RBC AUTO-ENTMCNC: 33.4 G/DL (ref 31–37)
MCV RBC AUTO: 85 FL (ref 81–99)
MCV RBC AUTO: 87 FL (ref 81–99)
MONOCYTES # BLD AUTO: 0.6 THOUSAND/ΜL (ref 0.17–1.22)
MONOCYTES # BLD AUTO: 0.8 THOUSAND/ΜL (ref 0.17–1.22)
MONOCYTES NFR BLD AUTO: 11 % (ref 2–12)
MONOCYTES NFR BLD AUTO: 8 % (ref 2–12)
NEUTROPHILS # BLD AUTO: 2.8 THOUSANDS/ΜL (ref 1.4–6.5)
NEUTROPHILS # BLD AUTO: 3.3 THOUSANDS/ΜL (ref 1.4–6.5)
NEUTS SEG NFR BLD AUTO: 39 % (ref 42–75)
NEUTS SEG NFR BLD AUTO: 42 % (ref 42–75)
NRBC BLD AUTO-RTO: 0 /100 WBCS
NRBC BLD AUTO-RTO: 0 /100 WBCS
P AXIS: 42 DEGREES
P AXIS: 63 DEGREES
PLATELET # BLD AUTO: 277 THOUSANDS/UL (ref 149–390)
PLATELET # BLD AUTO: 290 THOUSANDS/UL (ref 149–390)
PMV BLD AUTO: 6.9 FL (ref 8.6–11.7)
PMV BLD AUTO: 7.6 FL (ref 8.6–11.7)
POTASSIUM SERPL-SCNC: 3.5 MMOL/L (ref 3.5–5.5)
POTASSIUM SERPL-SCNC: 3.8 MMOL/L (ref 3.5–5.5)
PR INTERVAL: 192 MS
PR INTERVAL: 192 MS
PROT SERPL-MCNC: 6.6 G/DL (ref 6.4–8.9)
PROT SERPL-MCNC: 6.8 G/DL (ref 6.4–8.9)
QRS AXIS: -13 DEGREES
QRS AXIS: -20 DEGREES
QRSD INTERVAL: 100 MS
QRSD INTERVAL: 104 MS
QT INTERVAL: 378 MS
QT INTERVAL: 390 MS
QTC INTERVAL: 435 MS
QTC INTERVAL: 444 MS
RBC # BLD AUTO: 4.27 MILLION/UL (ref 4.3–5.9)
RBC # BLD AUTO: 4.38 MILLION/UL (ref 4.3–5.9)
SODIUM SERPL-SCNC: 132 MMOL/L (ref 134–143)
SODIUM SERPL-SCNC: 135 MMOL/L (ref 134–143)
T WAVE AXIS: 3 DEGREES
T WAVE AXIS: 54 DEGREES
TROPONIN I SERPL-MCNC: <0.03 NG/ML
TSH SERPL DL<=0.05 MIU/L-ACNC: 13.76 UIU/ML (ref 0.45–5.33)
VENTRICULAR RATE: 78 BPM
VENTRICULAR RATE: 80 BPM
WBC # BLD AUTO: 7.2 THOUSAND/UL (ref 4.8–10.8)
WBC # BLD AUTO: 7.9 THOUSAND/UL (ref 4.8–10.8)

## 2018-10-31 PROCEDURE — 85025 COMPLETE CBC W/AUTO DIFF WBC: CPT | Performed by: NURSE PRACTITIONER

## 2018-10-31 PROCEDURE — 93306 TTE W/DOPPLER COMPLETE: CPT | Performed by: INTERNAL MEDICINE

## 2018-10-31 PROCEDURE — 80053 COMPREHEN METABOLIC PANEL: CPT | Performed by: NURSE PRACTITIONER

## 2018-10-31 PROCEDURE — 83036 HEMOGLOBIN GLYCOSYLATED A1C: CPT | Performed by: NURSE PRACTITIONER

## 2018-10-31 PROCEDURE — 94760 N-INVAS EAR/PLS OXIMETRY 1: CPT

## 2018-10-31 PROCEDURE — 85025 COMPLETE CBC W/AUTO DIFF WBC: CPT | Performed by: INTERNAL MEDICINE

## 2018-10-31 PROCEDURE — 99243 OFF/OP CNSLTJ NEW/EST LOW 30: CPT | Performed by: INTERNAL MEDICINE

## 2018-10-31 PROCEDURE — 84484 ASSAY OF TROPONIN QUANT: CPT | Performed by: INTERNAL MEDICINE

## 2018-10-31 PROCEDURE — 97163 PT EVAL HIGH COMPLEX 45 MIN: CPT

## 2018-10-31 PROCEDURE — 82948 REAGENT STRIP/BLOOD GLUCOSE: CPT

## 2018-10-31 PROCEDURE — G8979 MOBILITY GOAL STATUS: HCPCS

## 2018-10-31 PROCEDURE — 71046 X-RAY EXAM CHEST 2 VIEWS: CPT

## 2018-10-31 PROCEDURE — 93005 ELECTROCARDIOGRAM TRACING: CPT

## 2018-10-31 PROCEDURE — 80053 COMPREHEN METABOLIC PANEL: CPT | Performed by: INTERNAL MEDICINE

## 2018-10-31 PROCEDURE — G8978 MOBILITY CURRENT STATUS: HCPCS

## 2018-10-31 PROCEDURE — 84443 ASSAY THYROID STIM HORMONE: CPT | Performed by: NURSE PRACTITIONER

## 2018-10-31 PROCEDURE — 93306 TTE W/DOPPLER COMPLETE: CPT

## 2018-10-31 PROCEDURE — 99285 EMERGENCY DEPT VISIT HI MDM: CPT

## 2018-10-31 PROCEDURE — 83735 ASSAY OF MAGNESIUM: CPT | Performed by: NURSE PRACTITIONER

## 2018-10-31 PROCEDURE — 36415 COLL VENOUS BLD VENIPUNCTURE: CPT | Performed by: INTERNAL MEDICINE

## 2018-10-31 PROCEDURE — 84484 ASSAY OF TROPONIN QUANT: CPT | Performed by: NURSE PRACTITIONER

## 2018-10-31 PROCEDURE — 93010 ELECTROCARDIOGRAM REPORT: CPT | Performed by: INTERNAL MEDICINE

## 2018-10-31 PROCEDURE — 99220 PR INITIAL OBSERVATION CARE/DAY 70 MINUTES: CPT | Performed by: NURSE PRACTITIONER

## 2018-10-31 RX ORDER — ACETAMINOPHEN 325 MG/1
650 TABLET ORAL EVERY 4 HOURS PRN
Status: DISCONTINUED | OUTPATIENT
Start: 2018-10-31 | End: 2018-11-01 | Stop reason: HOSPADM

## 2018-10-31 RX ORDER — ACETAMINOPHEN 325 MG/1
650 TABLET ORAL ONCE
Status: COMPLETED | OUTPATIENT
Start: 2018-10-31 | End: 2018-10-31

## 2018-10-31 RX ORDER — BUTALBITAL, ACETAMINOPHEN AND CAFFEINE 50; 325; 40 MG/1; MG/1; MG/1
2 TABLET ORAL EVERY 4 HOURS PRN
Status: DISCONTINUED | OUTPATIENT
Start: 2018-10-31 | End: 2018-11-01 | Stop reason: HOSPADM

## 2018-10-31 RX ORDER — LISINOPRIL 10 MG/1
10 TABLET ORAL DAILY
Status: DISCONTINUED | OUTPATIENT
Start: 2018-10-31 | End: 2018-11-01 | Stop reason: HOSPADM

## 2018-10-31 RX ORDER — GUAIFENESIN 100 MG/5ML
200 SOLUTION ORAL EVERY 4 HOURS PRN
Status: DISCONTINUED | OUTPATIENT
Start: 2018-10-31 | End: 2018-11-01 | Stop reason: HOSPADM

## 2018-10-31 RX ORDER — TOPIRAMATE 25 MG/1
25 TABLET ORAL 2 TIMES DAILY
Status: DISCONTINUED | OUTPATIENT
Start: 2018-10-31 | End: 2018-11-01 | Stop reason: HOSPADM

## 2018-10-31 RX ORDER — LORAZEPAM 0.5 MG/1
0.5 TABLET ORAL 2 TIMES DAILY
Status: DISCONTINUED | OUTPATIENT
Start: 2018-10-31 | End: 2018-11-01 | Stop reason: HOSPADM

## 2018-10-31 RX ORDER — IBUPROFEN 600 MG/1
600 TABLET ORAL EVERY 6 HOURS PRN
Status: DISCONTINUED | OUTPATIENT
Start: 2018-10-31 | End: 2018-10-31

## 2018-10-31 RX ORDER — ONDANSETRON 2 MG/ML
4 INJECTION INTRAMUSCULAR; INTRAVENOUS EVERY 6 HOURS PRN
Status: DISCONTINUED | OUTPATIENT
Start: 2018-10-31 | End: 2018-11-01 | Stop reason: HOSPADM

## 2018-10-31 RX ORDER — INSULIN GLARGINE 100 [IU]/ML
35 INJECTION, SOLUTION SUBCUTANEOUS
Status: DISCONTINUED | OUTPATIENT
Start: 2018-10-31 | End: 2018-11-01 | Stop reason: HOSPADM

## 2018-10-31 RX ORDER — HEPARIN SODIUM 5000 [USP'U]/ML
5000 INJECTION, SOLUTION INTRAVENOUS; SUBCUTANEOUS EVERY 8 HOURS SCHEDULED
Status: DISCONTINUED | OUTPATIENT
Start: 2018-10-31 | End: 2018-11-01 | Stop reason: HOSPADM

## 2018-10-31 RX ORDER — ZIPRASIDONE HYDROCHLORIDE 40 MG/1
40 CAPSULE ORAL 2 TIMES DAILY WITH MEALS
Status: DISCONTINUED | OUTPATIENT
Start: 2018-10-31 | End: 2018-11-01 | Stop reason: HOSPADM

## 2018-10-31 RX ORDER — LEVOTHYROXINE SODIUM 88 UG/1
88 TABLET ORAL DAILY
Status: DISCONTINUED | OUTPATIENT
Start: 2018-10-31 | End: 2018-10-31

## 2018-10-31 RX ORDER — NITROGLYCERIN 0.4 MG/1
0.4 TABLET SUBLINGUAL
Status: DISCONTINUED | OUTPATIENT
Start: 2018-10-31 | End: 2018-11-01 | Stop reason: HOSPADM

## 2018-10-31 RX ORDER — MAGNESIUM HYDROXIDE/ALUMINUM HYDROXICE/SIMETHICONE 120; 1200; 1200 MG/30ML; MG/30ML; MG/30ML
30 SUSPENSION ORAL ONCE
Status: COMPLETED | OUTPATIENT
Start: 2018-10-31 | End: 2018-10-31

## 2018-10-31 RX ORDER — DIPHENHYDRAMINE HCL 25 MG
25 TABLET ORAL
Status: DISCONTINUED | OUTPATIENT
Start: 2018-10-31 | End: 2018-11-01 | Stop reason: HOSPADM

## 2018-10-31 RX ORDER — ASPIRIN 81 MG/1
81 TABLET, CHEWABLE ORAL DAILY
Status: DISCONTINUED | OUTPATIENT
Start: 2018-11-01 | End: 2018-11-01 | Stop reason: HOSPADM

## 2018-10-31 RX ORDER — FLUVOXAMINE MALEATE 50 MG/1
100 TABLET, COATED ORAL 2 TIMES DAILY
Status: DISCONTINUED | OUTPATIENT
Start: 2018-10-31 | End: 2018-11-01 | Stop reason: HOSPADM

## 2018-10-31 RX ORDER — KETOROLAC TROMETHAMINE 30 MG/ML
15 INJECTION, SOLUTION INTRAMUSCULAR; INTRAVENOUS EVERY 6 HOURS SCHEDULED
Status: DISCONTINUED | OUTPATIENT
Start: 2018-10-31 | End: 2018-11-01 | Stop reason: HOSPADM

## 2018-10-31 RX ORDER — LEVOTHYROXINE SODIUM 0.1 MG/1
100 TABLET ORAL DAILY
Status: DISCONTINUED | OUTPATIENT
Start: 2018-11-01 | End: 2018-11-01 | Stop reason: HOSPADM

## 2018-10-31 RX ORDER — AMOXICILLIN AND CLAVULANATE POTASSIUM 875; 125 MG/1; MG/1
1 TABLET, FILM COATED ORAL EVERY 12 HOURS
Status: DISCONTINUED | OUTPATIENT
Start: 2018-10-31 | End: 2018-10-31

## 2018-10-31 RX ORDER — ALBUTEROL SULFATE 90 UG/1
2 AEROSOL, METERED RESPIRATORY (INHALATION) EVERY 4 HOURS PRN
Status: DISCONTINUED | OUTPATIENT
Start: 2018-10-31 | End: 2018-11-01 | Stop reason: HOSPADM

## 2018-10-31 RX ORDER — CALCIUM CARBONATE 200(500)MG
1000 TABLET,CHEWABLE ORAL DAILY PRN
Status: DISCONTINUED | OUTPATIENT
Start: 2018-10-31 | End: 2018-11-01 | Stop reason: HOSPADM

## 2018-10-31 RX ORDER — PANTOPRAZOLE SODIUM 40 MG/1
40 TABLET, DELAYED RELEASE ORAL DAILY
Status: DISCONTINUED | OUTPATIENT
Start: 2018-10-31 | End: 2018-11-01 | Stop reason: HOSPADM

## 2018-10-31 RX ADMIN — ZIPRASIDONE HCL 40 MG: 40 CAPSULE ORAL at 09:29

## 2018-10-31 RX ADMIN — LORAZEPAM 0.5 MG: 0.5 TABLET ORAL at 17:35

## 2018-10-31 RX ADMIN — FLUVOXAMINE MALEATE 100 MG: 50 TABLET, FILM COATED ORAL at 17:35

## 2018-10-31 RX ADMIN — IBUPROFEN 600 MG: 600 TABLET ORAL at 14:49

## 2018-10-31 RX ADMIN — INSULIN LISPRO 4 UNITS: 100 INJECTION, SOLUTION INTRAVENOUS; SUBCUTANEOUS at 17:36

## 2018-10-31 RX ADMIN — MAGNESIUM OXIDE TAB 400 MG (241.3 MG ELEMENTAL MG) 400 MG: 400 (241.3 MG) TAB at 17:35

## 2018-10-31 RX ADMIN — DIPHENHYDRAMINE HCL 25 MG: 25 TABLET ORAL at 21:49

## 2018-10-31 RX ADMIN — FLUVOXAMINE MALEATE 100 MG: 50 TABLET, FILM COATED ORAL at 09:29

## 2018-10-31 RX ADMIN — LISINOPRIL 10 MG: 10 TABLET ORAL at 08:49

## 2018-10-31 RX ADMIN — MORPHINE SULFATE 2 MG: 2 INJECTION, SOLUTION INTRAMUSCULAR; INTRAVENOUS at 08:54

## 2018-10-31 RX ADMIN — LORAZEPAM 0.5 MG: 0.5 TABLET ORAL at 08:49

## 2018-10-31 RX ADMIN — INSULIN LISPRO 4 UNITS: 100 INJECTION, SOLUTION INTRAVENOUS; SUBCUTANEOUS at 11:57

## 2018-10-31 RX ADMIN — ALUMINUM HYDROXIDE, MAGNESIUM HYDROXIDE, AND SIMETHICONE 30 ML: 200; 200; 20 SUSPENSION ORAL at 08:23

## 2018-10-31 RX ADMIN — TOPIRAMATE 25 MG: 25 TABLET, FILM COATED ORAL at 08:50

## 2018-10-31 RX ADMIN — HEPARIN SODIUM 5000 UNITS: 5000 INJECTION INTRAVENOUS; SUBCUTANEOUS at 21:49

## 2018-10-31 RX ADMIN — PANTOPRAZOLE SODIUM 40 MG: 40 TABLET, DELAYED RELEASE ORAL at 08:50

## 2018-10-31 RX ADMIN — KETOROLAC TROMETHAMINE 15 MG: 30 INJECTION, SOLUTION INTRAMUSCULAR at 17:35

## 2018-10-31 RX ADMIN — INSULIN GLARGINE 35 UNITS: 100 INJECTION, SOLUTION SUBCUTANEOUS at 21:49

## 2018-10-31 RX ADMIN — ZIPRASIDONE HCL 40 MG: 40 CAPSULE ORAL at 17:35

## 2018-10-31 RX ADMIN — TOPIRAMATE 25 MG: 25 TABLET, FILM COATED ORAL at 17:35

## 2018-10-31 RX ADMIN — HEPARIN SODIUM 5000 UNITS: 5000 INJECTION INTRAVENOUS; SUBCUTANEOUS at 08:15

## 2018-10-31 RX ADMIN — HEPARIN SODIUM 5000 UNITS: 5000 INJECTION INTRAVENOUS; SUBCUTANEOUS at 14:07

## 2018-10-31 RX ADMIN — LIDOCAINE HYDROCHLORIDE 15 ML: 20 SOLUTION ORAL; TOPICAL at 09:50

## 2018-10-31 RX ADMIN — LEVOTHYROXINE SODIUM 88 MCG: 88 TABLET ORAL at 08:49

## 2018-10-31 RX ADMIN — ACETAMINOPHEN 650 MG: 325 TABLET ORAL at 04:56

## 2018-10-31 NOTE — ASSESSMENT & PLAN NOTE
2 negative troponin levels  No ischemic changes on EKG  Unlikely to be representative of ACS  Patient was seen on 10/26 in the ED for the same chest pain and had 2 negative troponins at that time

## 2018-10-31 NOTE — PLAN OF CARE
CARDIOVASCULAR - ADULT     Maintains optimal cardiac output and hemodynamic stability Progressing     Absence of cardiac dysrhythmias or at baseline rhythm Progressing        DISCHARGE PLANNING     Discharge to home or other facility with appropriate resources Progressing        GASTROINTESTINAL - ADULT     Minimal or absence of nausea and/or vomiting Progressing     Maintains or returns to baseline bowel function Progressing     Maintains adequate nutritional intake Progressing     Establish and maintain optimal ostomy function Progressing        Knowledge Deficit     Patient/family/caregiver demonstrates understanding of disease process, treatment plan, medications, and discharge instructions Progressing        Nutrition/Hydration-ADULT     Nutrient/Hydration intake appropriate for improving, restoring or maintaining nutritional needs Progressing        PAIN - ADULT     Verbalizes/displays adequate comfort level or baseline comfort level Progressing        SAFETY ADULT     Patient will remain free of falls Progressing     Maintain or return to baseline ADL function Progressing     Maintain or return mobility status to optimal level Progressing

## 2018-10-31 NOTE — ASSESSMENT & PLAN NOTE
Lab Results   Component Value Date    HGBA1C 8 7 (H) 10/13/2018       No results for input(s): POCGLU in the last 72 hours      Blood Sugar Average: Last 72 hrs:  ·  Accu-Cheks q a c  and hs  · Sliding scale insulin along with mealtime insulin, and Lantus  · Check hemoglobin A1c

## 2018-10-31 NOTE — ASSESSMENT & PLAN NOTE
Lab Results   Component Value Date    HGBA1C 8 7 (H) 10/13/2018       Recent Labs      10/31/18   0824   POCGLU  139       Blood Sugar Average: Last 72 hrs:  · (P) 139 Accu-Cheks q a c  and hs  · Sliding scale insulin along with mealtime insulin, and Lantus  · Monitor blood sugars

## 2018-10-31 NOTE — PLAN OF CARE
Problem: PHYSICAL THERAPY ADULT  Goal: Performs mobility at highest level of function for planned discharge setting  See evaluation for individualized goals  Treatment/Interventions: Functional transfer training, LE strengthening/ROM, Therapeutic exercise, Endurance training, Patient/family training, Bed mobility, Gait training, Compensatory technique education      Chel Fisher, PT      See flowsheet documentation for full assessment, interventions and recommendations  Prognosis: Fair  Problem List: Decreased strength, Decreased endurance, Impaired balance, Decreased mobility, Impaired judgement, Decreased safety awareness, Obesity, Pain  Assessment: Pt is 34 y o  male seen for PT evaluation s/p admit to [address] [place], [place] [address]Th [facility] on 10/31/2018 w/ Chest pain  PT consulted to assess pt's functional mobility and d/c needs  Order placed for PT eval and tx, w/ activity as tolerated order  Comorbidities affecting pt's physical performance at time of assessment include: obesity, chest pain, SOB, schizoaffective d/o, anxiety, migraines, LE edema  PTA, pt was independent w/ all functional mobility w/ o AD and lives w/ father in two level house  Personal factors affecting pt at time of IE include: stairs to enter home, communication issues, inability to ambulate household distances, inability to navigate community distances, unable to perform dynamic tasks in community, anxiety, decreased initiation and engagement and inability to perform ADLs  Please find objective findings from PT assessment regarding body systems outlined above with impairments and limitations including weakness, impaired balance, decreased endurance, impaired coordination, gait deviations, decreased activity tolerance, decreased functional mobility tolerance, decreased safety awareness, impaired judgement, fall risk and SOB upon exertion  The following objective measures performed on IE also reveal limitations: Barthel Index: 25/100   Pt's clinical presentation is currently unstable/unpredictable  Pt to benefit from continued PT tx to address deficits as defined above and maximize level of functional independent mobility and consistency  From PT/mobility standpoint, recommendation at time of d/c would be Home PT with family support pending progress in order to facilitate return to PLOF  Barriers to Discharge: Other (Comment) (patient's participation, compliance)     Recommendation: Home PT, Home with family support     PT - OK to Discharge: No    See flowsheet documentation for full assessment     Nael Hodges PT

## 2018-10-31 NOTE — SOCIAL WORK
Evaluated the pt at the bedside  Pt states he lives with his father in a 2 story home  Pt has 2-3 steps outside and 12 steps inside the home  Pt states he is independent with ADL's and AIDL's except for driving  Pt father provides transport  Pt states he [facility] his [facility]  Pt states his father will provide transport home upon discharge  Discussed the Observation level of care and provided booklet to same  Pt verbalized understanding of same  CM reviewed d/c planning process including the following: identifying help at home, patient preference for d/c planning needs, availability of treatment team to discuss questions or concerns patient and/or family may have regarding understanding medications and recognizing signs and symptoms once discharged  CM also encouraged patient to follow up with all recommended appointments after discharge  Patient advised of importance for patient and family to participate in managing patients medical well being

## 2018-10-31 NOTE — ASSESSMENT & PLAN NOTE
· Chronic  · Stable  · Continue lisinopril, nitropaste p r n , Topamax  · Will hold Norvasc secondary to lower extremity edema  · Cardiology on consult

## 2018-10-31 NOTE — PHYSICAL THERAPY NOTE
Physical Therapy Evaluation     Patient's Name: Gordon Serra    Admitting Diagnosis  Chest pain [R07 9]    Problem List  Patient Active Problem List   Diagnosis    Type 1 diabetes mellitus without complication ([place] [address] )    Chest pain    Hypertension    GERD (gastroesophageal reflux disease)    Obsessive compulsive disorder    [facility]    Other specified hypothyroidism    Morbid obesity with BMI of 50 0-59 9, adult ([place] [address] )    Anxiety    Hx of migraines    Viral upper respiratory tract infection    Lower extremity edema       Past Medical History  Past Medical History:   Diagnosis Date    Diabetes mellitus ([place] [address] )     Disease of thyroid gland     GERD (gastroesophageal reflux disease)     Hypertension     Obsessive compulsive disorder     Schizoaffective disorder ([place] [address] )        Past Surgical History  History reviewed  No pertinent surgical history  10/31/18 1033   Note Type   Note type Eval only   Pain Assessment   Pain Assessment 0-10   Pain Score 7   Pain Location Chest   Pain Orientation Anterior   Pain Descriptors Aching   Pain Frequency Constant/continuous   Pain Onset Ongoing   Clinical Progression Not changed   Hospital Pain Intervention(s) Medication (See MAR)   Home Living   Type of [address] [place] [address] Two level;Stairs to enter with rails  (2 KASANDRA without issue typically)   Bathroom Shower/Tub Tub/shower unit   Bathroom Toilet Standard   Bathroom Equipment Grab bars in shower   [address] (no AD prior to admission)   Prior Function   Level of [place] Independent with ADLs and functional mobility; Needs assistance with IADLs   Lives With Family   Receives Help From Family   ADL Assistance Independent   IADLs Needs assistance  (driving, father)   Falls in the last 6 months 0   Restrictions/Precautions   Weight Bearing Precautions Per Order No   Other Precautions Pain   General   Family/Caregiver Present No   Cognition   Overall Cognitive Status WFL   Arousal/Participation Responsive   Orientation Level Oriented X4   Memory Within functional limits   Following Commands Follows one step commands with increased time or repetition   RUE Assessment   RUE Assessment WFL   LUE Assessment   LUE Assessment WFL   RLE Assessment   RLE Assessment X   Strength RLE   R Hip Flexion 3+/5   R Hip Extension 3+/5   R Ankle Dorsiflexion 3+/5   R Ankle Plantar Flexion 3+/5   LLE Assessment   LLE Assessment X   Strength LLE   L Hip Flexion 3+/5   L Hip Extension 3+/5   L Ankle Dorsiflexion 3+/5   L Ankle Plantar Flexion 3+/5   Bed Mobility   Rolling R 6  Modified independent   Additional items Bedrails   Rolling L 6  Modified independent   Additional items Bedrails   Supine to Sit Unable to assess  (pt  declined,reports SOB /chest pain w/ progressed activity)   Transfers   Sit to Stand Unable to assess  (pt  declined at this time)   Balance   Static Sitting (DNT, as patient declined progression of functional tasks)   Endurance Deficit   Endurance Deficit Yes   Endurance Deficit Description patient reports increase chest pain, SOB with any exertional activities   Activity Tolerance   Activity Tolerance Patient limited by fatigue;Patient limited by pain;Treatment limited secondary to medical complications (Comment)   Assessment   Prognosis Fair   Problem List Decreased strength;Decreased endurance; Impaired balance;Decreased mobility; Impaired judgement;Decreased safety awareness; Obesity;Pain   Assessment Pt is 34 y o  male seen for PT evaluation s/p admit to [address] [place] [address] on 10/31/2018 w/ Chest pain  PT consulted to assess pt's functional mobility and d/c needs  Order placed for PT eval and tx, w/ activity as tolerated order  Comorbidities affecting pt's physical performance at time of assessment include: obesity, chest pain, SOB, schizoaffective d/o, anxiety, migraines, LE edema   PTA, pt was independent w/ all functional mobility w/ o AD and lives w/ father in two level house  Personal factors affecting pt at time of IE include: stairs to enter home, communication issues, inability to ambulate household distances, inability to navigate community distances, unable to perform dynamic tasks in community, anxiety, decreased initiation and engagement and inability to perform ADLs  Please find objective findings from PT assessment regarding body systems outlined above with impairments and limitations including weakness, impaired balance, decreased endurance, impaired coordination, gait deviations, decreased activity tolerance, decreased functional mobility tolerance, decreased safety awareness, impaired judgement, fall risk and SOB upon exertion  The following objective measures performed on IE also reveal limitations: Barthel Index: 25/100  Pt's clinical presentation is currently unstable/unpredictable  Pt to benefit from continued PT tx to address deficits as defined above and maximize level of functional independent mobility and consistency  From PT/mobility standpoint, recommendation at time of d/c would be Home PT with family support pending progress in order to facilitate return to PLOF  Barriers to Discharge Other (Comment)  (patient's participation, compliance)   Goals   Patient Goals go home soon   STG Expiration Date 11/05/18   Short Term Goal #1 1 )Patient will complete bed mobility with supervision of 1 for decrease need for caregiver assistance, decrease burden of care  2 ) Patient will complete transfers with supervision of 1 to decrease risk of falls, facilitate upright standing posture  3 ) BLE strength to greater than/equal to 4/5 gross musculature to increase ability to safely transfer, control descent to chair  4 ) Patient will exhibit increase static standing balance to Fair+ , for 30 seconds without LOB and supervision of 1 to improve activity tolerance   5 ) Patient will exhibit increase dynamic ambulatory balance to Fair+ for 150 feet, supervision of 1 without AD to improve ability to mobilize to toilet, chair and decrease risk for additional medical complications  6 ) Patient will exhibit good self monitoring and ability to follow 2 step commands to increase complexity of tasks and resume ADL's without LOB  LTG Expiration Date 11/10/18   Long Term Goal #1 1 )Patient will complete bed mobility independently for decrease need for caregiver assistance, decrease burden of care  2 ) Patient will complete transfers independently to decrease risk of falls, facilitate upright standing posture  3 ) BLE strength to greater than/equal to 4/5 gross musculature to increase ability to safely transfer, control descent to chair  4 ) Patient will exhibit increase static standing balance to Good , for 2-3 minutes without LOB and supervision of 1 to improve activity tolerance  5 ) Patient will exhibit increase dynamic ambulatory balance to Good for 350 feet, distant supervision of 1 without AD to improve ability to mobilize to toilet, chair and decrease risk for additional medical complications  6 ) Patient will exhibit good self monitoring and ability to follow 2 step commands to increase complexity of tasks and resume ADL's without LOB  Treatment Day 0   Plan   Treatment/Interventions Functional transfer training;LE strengthening/ROM; Therapeutic exercise; Endurance training;Patient/family training;Bed mobility;Gait training; Compensatory technique education   PT Frequency 2-3x/wk   Recommendation   Recommendation Home PT; Home with family support   PT - OK to Discharge No   Additional Comments Upon conclusion, patient remained in bed with SCD's placed and all needs within reach     Barthel Index   Feeding 10   Bathing 0   Grooming Score 0   Dressing Score 5   Bladder Score 5   Bowels Score 5   Toilet Use Score 0   Transfers (Bed/Chair) Score 0   Mobility (Level Surface) Score 0   Stairs Score 0   Barthel Index Score 25     Anna Cota, PT

## 2018-10-31 NOTE — UTILIZATION REVIEW
Initial Clinical Review    Admission: Date/Time/Statement: 10/31/18 @ 0434 OBSERVATION    Orders Placed This Encounter   Procedures    Place in Observation (expected length of stay for this patient is less than two midnights)     Standing Status:   Standing     Number of Occurrences:   1     Order Specific Question:   Admitting Physician     Answer:   Dipak Torre     Order Specific Question:   Level of Care     Answer:   Med Surg [16]     Order Specific Question:   Bed request comments     Answer:   Telemetry     ED: Date/Time/Mode of Arrival:   ED Arrival Information     Expected Arrival [address] [facility] of Arrival Escorted By Service Admission Type    - 10/31/2018 03:05 Emergent Walk-In Family Member General Medicine Emergency    Arrival Complaint    chest pain        Chief Complaint:   Chief Complaint   Patient presents with    Chest Pain     patient states having midsternal chest pain that is "constant", nonradiating, midsternal, no nausea or diaphoresis  States was here a week ago for the same thing  Pain never really goes away  States hes been taking nitroglycerin about every hour tonight, pain goes away but comes back within an hour  History of Illness: 42-year-old white male with recurrent substernal chest pain  Describes it as a pressure across the precordium  He was sent home Friday with nitroglycerin, and when he takes nitroglycerin relieves the pain for about an hour to  There requires another nitroglycerin at that time for increasing chest pain  At home his chest pain gets to about a 4-5 before he takes the nitro with complete relief  He has no shortness of breath associated no diaphoresis  There is no radiation of pain to the jaw or shoulder  He does admit increasing dyspnea on exertion over the last week more profound than usual   He has had no prior cardiac history  He is a type 1 diabetic  He does not smoke    His family is present, and concerned about this recurrent pain and feels he needs to be admitted to have it evaluated as was recommended by the physician in the ER on Friday  ED Vital Signs:   ED Triage Vitals [10/31/18 0312]   Temperature Pulse Respirations Blood Pressure SpO2   97 5 °F (36 4 °C) 82 18 (!) 171/102 97 %      Temp Source Heart Rate Source Patient Position - Orthostatic VS BP Location FiO2 (%)   Tympanic Monitor Lying Left arm --      Pain Score       7        Wt Readings from Last 1 Encounters:   10/31/18 (!) 139 kg (306 lb 6 4 oz)       Vital Signs: WNL    Abnormal Labs/Diagnostic Test Results:     Troponin = <0 03, <0 03, Glucose = 217, Sodium = 132    EKG:       Interpretation: normal    Rate:     ECG rate:  80    ECG rate assessment: normal    Ectopy:     Ectopy: none    ST segments:     ST segments:  Normal  T waves:     T waves: flattening    Other findings:     Other findings: poor R wave progression      Chest x-ray: WNL    ED Treatment:   Medication Administration from 10/31/2018 0305 to 10/31/2018 1457       Date/Time Order Dose Route Action Action by Comments     10/31/2018 0456 acetaminophen (TYLENOL) tablet 650 mg 650 mg Oral Given Ariella Simmons RN         Past Medical/Surgical History:    Active Ambulatory Problems     Diagnosis Date Noted    Type 1 diabetes mellitus without complication ([facility] ) 97/71/8380     Resolved Ambulatory Problems     Diagnosis Date Noted    No Resolved Ambulatory Problems     Past Medical History:   Diagnosis Date    Diabetes mellitus ([facility] [address] )     Disease of thyroid gland     GERD (gastroesophageal reflux disease)     Hypertension     Obsessive compulsive disorder     Schizoaffective disorder (HCC)        Admitting Diagnosis: Chest pain [R07 9]    Age/Sex: 34 y o  male    Assessment/Plan:     Krystina Stark is a 34 y o  male who presents with midsternal chest pain that is constant      The patient was here in the emergency department on Friday and was diagnosed with an upper respiratory infection given Augmentin and sent home  At that time he had also complained of some chest pain and was given nitroglycerin sublingual tablets as well      The patient states that since then he has had this midsternal consistent chest pain it does not radiate and there is no nausea vomiting or diaphoresis  He states that he has been using this nitroglycerin sublingual tablets like candy he waits until the pain gets to be a 4-5/10 and then will take a nitroglycerin      He states the pain goes away for approximately an hour but then comes back      He also states that he has had increased shortness of breath with any type of exertion      Patient also states that his bilateral lower extremity edema is more than normal      The patient's family is at the bedside and is requesting the patient to be admitted to figure out Britradha Perry is going on        Chest pain   Assessment & Plan     · Consult Cardiology  · Trend troponins  · Echocardiogram  · P r n  nitroglycerin sublingual, and nitropaste  · Continue aspirin  · P r n   Tums  · PT and OT  · Monitor on telemetry      Viral upper respiratory tract infection   Assessment & Plan     · Continue Augmentin  · Stable      Type 1 diabetes mellitus without complication (HCC)   Assessment & Plan             Lab Results   Component Value Date     HGBA1C 8 7 (H) 10/13/2018         No results for input(s): POCGLU in the last 72 hours      Blood Sugar Average: Last 72 hrs:  ·  Accu-Cheks q a c  and hs  · Sliding scale insulin along with mealtime insulin, and Lantus  · Check hemoglobin A1c      Obsessive compulsive disorder   Assessment & Plan     · Stable  · Chronic  · Continue Luvox      Other specified hypothyroidism   Assessment & Plan     · Chronic  · Stable  · Check a TSH  · Continue Synthroid      Hypertension   Assessment & Plan     · Chronic  · Stable  · Continue lisinopril, nitropaste p r n , Topamax  · Will hold Norvasc secondary to lower extremity edema  · Cardiology on consult      Anxiety Assessment & Plan     · Chronic  · Stable  · Continue Ativan      GERD (gastroesophageal reflux disease)   Assessment & Plan     · Chronic  · Stable  · Continue Protonix      Schizoaffective disorder (HCC)   Assessment & Plan     · Chronic  · Stable  · Continue Geodon      Morbid obesity with BMI of 50 0-59 9, adult (HCC)   Assessment & Plan     · Offer supportive care      Hx of migraines   Assessment & Plan     · Chronic  · Stable  · Continue Fioricet      Lower extremity edema   Assessment & Plan     · Will hold patient's Norvasc  · Appreciate Cardiology input        Admission Orders: Cardiology consult, ECHO, Troponin Q3H, continuous cardiac monitoring and pulse oximetry, OOB, sequential compression device       Scheduled Meds:   Current Facility-Administered Medications:  acetaminophen 650 mg Oral Q4H PRN   amoxicillin-clavulanate 1 tablet Oral Q12H   [START ON 11/1/2018] aspirin 81 mg Oral Daily   butalbital-acetaminophen-caffeine 2 tablet Oral Q4H PRN   calcium carbonate 1,000 mg Oral Daily PRN   fluvoxaMINE 100 mg Oral BID   heparin (porcine) 5,000 Units Subcutaneous Q8H [facility]   ibuprofen 600 mg Oral Q6H PRN   insulin glargine 35 Units Subcutaneous HS   insulin lispro 1-5 Units Subcutaneous HS   insulin lispro 10 Units Subcutaneous TID With Meals   insulin lispro 4-20 Units Subcutaneous TID AC   levothyroxine 88 mcg Oral Daily   lidocaine viscous 15 mL Oral Once   lisinopril 10 mg Oral Daily   LORazepam 0 5 mg Oral BID   morphine injection 2 mg Intravenous Q4H PRN   nitroglycerin 0 5 inch Topical Q6H PRN   nitroglycerin 0 4 mg Sublingual Q5 Min PRN   ondansetron 4 mg Intravenous Q6H PRN   pantoprazole 40 mg Oral Daily   topiramate 25 mg Oral BID   ziprasidone 40 mg Oral BID With Meals     Continuous Infusions:    PRN Meds:   acetaminophen    butalbital-acetaminophen-caffeine    calcium carbonate    ibuprofen    morphine injection    nitroglycerin    nitroglycerin   ondansetron  ===========================================================  Continued Stay Review    Date: 10/31/18 @ 1500    Vital Signs: /76 (BP Location: Right arm)   Pulse 81   Temp 99 1 °F (37 3 °C) (Tympanic)   Resp 18   Ht 5' 2" (1 575 m)   Wt (!) 139 kg (306 lb 6 4 oz)   SpO2 94%   BMI 56 04 kg/m²     Medications:   Scheduled Meds:   Current Facility-Administered Medications:  acetaminophen 650 mg Oral Q4H PRN   albuterol 2 puff Inhalation Q4H PRN   [START ON 11/1/2018] aspirin 81 mg Oral Daily   butalbital-acetaminophen-caffeine 2 tablet Oral Q4H PRN   calcium carbonate 1,000 mg Oral Daily PRN   fluvoxaMINE 100 mg Oral BID   guaiFENesin 200 mg Oral Q4H PRN   heparin (porcine) 5,000 Units Subcutaneous Q8H [address]   ibuprofen 600 mg Oral Q6H PRN   insulin glargine 35 Units Subcutaneous HS   insulin lispro 1-5 Units Subcutaneous HS   insulin lispro 10 Units Subcutaneous TID With Meals   insulin lispro 4-20 Units Subcutaneous TID AC   levothyroxine 88 mcg Oral Daily   lisinopril 10 mg Oral Daily   LORazepam 0 5 mg Oral BID   morphine injection 2 mg Intravenous Q4H PRN   nitroglycerin 0 5 inch Topical Q6H PRN   nitroglycerin 0 4 mg Sublingual Q5 Min PRN   ondansetron 4 mg Intravenous Q6H PRN   pantoprazole 40 mg Oral Daily   topiramate 25 mg Oral BID   ziprasidone 40 mg Oral BID With Meals       Abnormal Labs/Diagnostic Results:     Troponin = <0 03    ECHO: results pending  Age/Sex: 34 y o  male     Assessment/Plan: 10/31/18 @ 1503    ===================================================================  10/31 Cardiology Consult:    * Chest pain   Assessment & Plan     2 negative troponin levels  No ischemic changes on EKG  Unlikely to be representative of ACS       Patient was seen on 10/26 in the ED for the same chest pain and had 2 negative troponins at that time       Lower extremity edema   Assessment & Plan     Home amlodipine has been held       Hypertension   Assessment & Plan     Adequately controlled  Continue lisinopril  Discharge Plan: TBD                    Thank you,  [facility] Utilization Review Department  Phone: [phone]; Fax [phone]  ATTENTION: Please call with any questions or concerns to [phone]  and carefully follow the prompts so that you are directed to the right person  Send all requests for admission clinical reviews, approved or denied determinations and any other requests to fax [phone]   All voicemails are confidential

## 2018-10-31 NOTE — PROGRESS NOTES
Progress Note - Park Lopez 1980, 34 y o  male MRN: [id number]    Unit/Bed#: -01 Encounter: [id number]    Primary Care Provider: Arsalan Norwood MD   Date and time admitted to hospital: 10/31/2018  3:07 AM        * Chest pain   Assessment & Plan    · Recurrent visits for Chest pain, Consult Cardiology  · Troponins NL x3  · Echocardiogram pending  · P r n  Morphine, nitroglycerin sublingual, and nitropaste  · Continue aspirin  · P r n   Tums  · PT and OT  · Monitor on telemetry     Lower extremity edema   Assessment & Plan    · Will hold patient's Norvasc  · Echo pending     Viral upper respiratory tract infection   Assessment & Plan    · Continue Augmentin  · Stable     Hx of migraines   Assessment & Plan    · Chronic  · Continue Fioricet, topamax  · Follow up neuro as scheduled     Anxiety   Assessment & Plan    · Chronic  · Continue Ativan     Morbid obesity with BMI of 50 0-59 9, adult (HCC)   Assessment & Plan    · Offer supportive care  · BMI 56       Other specified hypothyroidism   Assessment & Plan    · Chronic  · Continue Synthroid     Schizoaffective disorder (HCC)   Assessment & Plan    · Chronic, Stable  · Continue Geodon     Obsessive compulsive disorder   Assessment & Plan    · Continue Luvox     GERD (gastroesophageal reflux disease)   Assessment & Plan    · Continue Protonix     Hypertension   Assessment & Plan    · Stable  · Continue lisinopril  · Will hold Norvasc secondary to lower extremity edema  · Cardiology on consult     Type 1 diabetes mellitus without complication [facility])   Assessment & Plan    Lab Results   Component Value Date    HGBA1C 8 7 (H) 10/13/2018       Recent Labs      10/31/18   0824   POCGLU  139       Blood Sugar Average: Last 72 hrs:  · (P) 139 Accu-Cheks q a c  and hs  · Sliding scale insulin along with mealtime insulin, and Lantus  · Monitor blood sugars         VTE Pharmacologic Prophylaxis: Pharmacologic: Heparin    Patient Centered Rounds: I have performed bedside rounds with nursing staff today  Discussions with Specialists or Other Care Team Provider: Case Management    Current Length of Stay: 0 day(s)    Current Patient Status: Observation   Certification Statement: work up for chest pain in progress, await echo, cards input    Discharge Plan: depending hospital course    Code Status: Level 1 - Full Code    Subjective:   35 y/o male w/ DM on insulin, hypertension, morbid obesity reports that he has had constant chest pain for approximately 5 to 7 days  He has been dealing with URI symptoms and was diagnosed with bronchitis in ER  He reports loose stools with augmentin and does not want to take it  He reported severe chest pain this AM was given morphine that has resolved his CP  He denies sitting forward helps his pain, he sat forward for me and then went back saying he was very SOB  Objective:     Vitals:   Temp (24hrs), Av °F (36 7 °C), Min:97 5 °F (36 4 °C), Max:98 5 °F (36 9 °C)    Temp:  [97 5 °F (36 4 °C)-98 5 °F (36 9 °C)] 98 5 °F (36 9 °C)  HR:  [76-82] 81  Resp:  [18-27] 21  BP: (136-171)/() 136/73  SpO2:  [95 %-97 %] 96 %  Body mass index is 56 04 kg/m²  Input and Output Summary (last 24 hours):     No intake or output data in the 24 hours ending 10/31/18 0910    Physical Exam:     Physical Exam   Constitutional: He is oriented to person, place, and time  He appears well-developed and well-nourished  Morbidly obese   HENT:   Head: Normocephalic and atraumatic  Eyes: Conjunctivae and EOM are normal  Right eye exhibits no discharge  Left eye exhibits no discharge  Glasses in place   Neck: Normal range of motion  No tracheal deviation present  Cardiovascular: Normal rate, regular rhythm and normal heart sounds  Exam reveals no gallop and no friction rub  No murmur heard  Pulmonary/Chest: Effort normal and breath sounds normal  No respiratory distress  He has no wheezes  He has no rales     Occasional rhonchi that clear Abdominal: Soft  Bowel sounds are normal  He exhibits no distension  There is no tenderness  There is no rebound  Morbidly obese   Musculoskeletal: Normal range of motion  He exhibits edema  He exhibits no tenderness or deformity  LE in venodynes   Neurological: He is alert and oriented to person, place, and time  Skin: Skin is warm and dry  No rash noted  No erythema  No pallor  Psychiatric: He has a normal mood and affect  His behavior is normal  Judgment and thought content normal    Nursing note and vitals reviewed  Additional Data:     Labs:      Results from last 7 days  Lab Units 10/31/18  0837   WBC Thousand/uL 7 20   HEMOGLOBIN g/dL 12 1*   HEMATOCRIT % 36 3*   PLATELETS Thousands/uL 277   NEUTROS PCT % 39*   LYMPHS PCT % 48   MONOS PCT % 11   EOS PCT % 2       Results from last 7 days  Lab Units 10/31/18  0331   SODIUM mmol/L 132*   POTASSIUM mmol/L 3 8   CHLORIDE mmol/L 100   CO2 mmol/L 25   BUN mg/dL 19   CREATININE mg/dL 1 12   CALCIUM mg/dL 8 8   ALK PHOS U/L 65   ALT U/L 37   AST U/L 29       Results from last 7 days  Lab Units 10/26/18  1815   INR  1 01       Results from last 7 days  Lab Units 10/31/18  0824   POC GLUCOSE mg/dl 139           * I Have Reviewed All Lab Data Listed Above  * Additional Pertinent Lab Tests Reviewed:  [facility] Admission  Reviewed    Imaging:  Imaging Reports Reviewed Today Include: CTA previous    Recent Cultures (last 7 days):           Last 24 Hours Medication List:     Current Facility-Administered Medications:  acetaminophen 650 mg Oral Q4H PRN Michelle A Chanel, RAFAELNP   amoxicillin-clavulanate 1 tablet Oral Q12H DOREEN Merino   [START ON 11/1/2018] aspirin 81 mg Oral Daily DOREEN Merino   butalbital-acetaminophen-caffeine 2 tablet Oral Q4H PRN Michelle A Chanel, DOREEN   calcium carbonate 1,000 mg Oral Daily PRN Michelle A DOREEN Buchanan   fluvoxaMINE 100 mg Oral BID DOREEN Merino   heparin (porcine) 5,000 Units Subcutaneous Q8H [facility] Michelle Buchanan, CRNP   ibuprofen 600 mg Oral Q6H PRN Michelle Buchanan, CRNP   insulin glargine 35 Units Subcutaneous HS Michelle Buchanan, CRNP   insulin lispro 1-5 Units Subcutaneous HS Michelle Buchanan, CRNP   insulin lispro 10 Units Subcutaneous TID With Meals Michelle Buchanan, CRNP   insulin lispro 4-20 Units Subcutaneous TID AC Michelle Buchanan, CRNP   levothyroxine 88 mcg Oral Daily Michelle Buchanan, CRNP   lidocaine viscous 15 mL Oral Once Kya Alosa, DO   lisinopril 10 mg Oral Daily Michelle Buchanan, CRNP   LORazepam 0 5 mg Oral BID Michelle Buchanan, CRNP   morphine injection 2 mg Intravenous Q4H PRN Kalina Fletcher PA-C   nitroglycerin 0 5 inch Topical Q6H PRN Michelle Buchanan, CRNP   nitroglycerin 0 4 mg Sublingual Q5 Min PRN Michelle Buchanan, CRNP   ondansetron 4 mg Intravenous Q6H PRN Michelle Buchanan, CRNP   pantoprazole 40 mg Oral Daily Michelle Buchanan, CRNP   topiramate 25 mg Oral BID Michelle Buchanan, CRNP   ziprasidone 40 mg Oral BID With Meals [facility], DOREEN        Today, Patient Was Seen By: Kalina Fletcher PA-C    ** Please Note: Dictation voice to text software may have been used in the creation of this document   **

## 2018-10-31 NOTE — ASSESSMENT & PLAN NOTE
· Consult Cardiology  · Trend troponins  · Echocardiogram  · P r n  nitroglycerin sublingual, and nitropaste  · Continue aspirin  · P r n   Tums  · PT and OT  · Monitor on telemetry

## 2018-10-31 NOTE — ASSESSMENT & PLAN NOTE
· Stable  · Continue lisinopril  · Will hold Norvasc secondary to lower extremity edema  · Cardiology on consult

## 2018-10-31 NOTE — NURSING NOTE
Pt has no  Further cp since early this am, cardio was in to see and assess the pt, echo done, pt has been oob and ambulating in the friend and tolerating well, no cp no sob, cont to be nsr on the monitor, callbell  In reach of the pt, encouraged to ring with any needs and the pt agrees

## 2018-10-31 NOTE — CONSULTS
Consult- Parrish Jordan 1980, 34 y o  male MRN: [id number]    Unit/Bed#: -01 Encounter: [id number]    Primary Care Provider: Terri Concepcion MD   Date and time admitted to hospital: 10/31/2018  3:07 AM      Inpatient consult to Cardiology  Consult performed by: Michelle Truong  Consult ordered by: Gulilermo Cover        * Chest pain   Assessment & Plan    2 negative troponin levels  No ischemic changes on EKG  Unlikely to be representative of ACS  Patient was seen on 10/26 in the ED for the same chest pain and had 2 negative troponins at that time  Lower extremity edema   Assessment & Plan    Home amlodipine has been held  Hypertension   Assessment & Plan    Adequately controlled  Continue lisinopril  Other summary comments:   Patient symptoms are unlikely to be representative of acute coronary syndrome  Assuming there are no wall motion abnormalities on his echo, I do not plan on proceeding further with coronary testing as the likelihood of coronary disease as the cause of his symptoms is too low here  HPI: Parrish Jordan is a 34y o  year old male with history of HTN who presented with chest pain and dyspnea for 1 week  He describes chest tightness and dyspnea had that initially only occurred when climbing stairs  However, by Friday 10/26, the chest tightness and dyspnea were constant, prompting him to come to the ED  CT of the chest was negative for pulmonary embolism and 2 troponin levels were negative  He was diagnosed with bronchitis and discharged home on Augmentin  Over the weekend, he has been taking nitro very frequently  His chest tightness is relieved by the nitro but then returns within about 1 hour  At this time, he notes that his cough related to the bronchitis has mildly improved but his chest pain/tightness and dyspnea are still present  He denies orthopnea, near-syncope and syncope  He denies tobacco and ETOH use     He can't quite distinguished chest pain from his shortness of breath  Again symptoms can last for hours at a time  It is notable that this morning he had a GI cocktail on for the 1st time symptoms seemed to have been palliated  There is history of diabetes and is well schizoaffective disorder  He lives with his father  EKG:   Sinus rhythm  No ischemic changes  Unchanged from EKG on 10/26/18  MOST  RECENT CARDIAC IMAGING:   Echo from this morning pending  Review of Systems: a 10 point review of systems was conducted and is negative except for as mentioned in the HPI or as below  Historical Information   Past Medical History:   Diagnosis Date    Diabetes mellitus ([facility] [address] )     Disease of thyroid gland     GERD (gastroesophageal reflux disease)     Hypertension     Obsessive compulsive disorder     Schizoaffective disorder ([facility] )      History reviewed  No pertinent surgical history  History   Alcohol Use No     History   Drug Use No     History   Smoking Status    Never Smoker   Smokeless Tobacco    Never Used       Family History:   He does not have known family history of coronary disease      Meds/Allergies   all current active meds have been reviewed  Prescriptions Prior to Admission   Medication    amLODIPine (NORVASC) 5 mg tablet    amoxicillin-clavulanate (AUGMENTIN) 875-125 mg per tablet    butalbital-acetaminophen-caffeine (FIORICET,ESGIC) -40 mg per tablet    fluvoxaMINE (LUVOX) 100 mg tablet    ibuprofen (MOTRIN) 600 mg tablet    insulin aspart (NovoLOG) 100 units/mL injection    insulin glargine (BASAGLAR KWIKPEN) 100 units/mL injection pen    Insulin Pen Needle (PEN NEEDLES 3/16") 31G X 5 MM MISC    levothyroxine 88 mcg tablet    lisinopril (ZESTRIL) 10 mg tablet    LORazepam (ATIVAN) 0 5 mg tablet    nitroglycerin (NITROSTAT) 0 4 mg SL tablet    pantoprazole (PROTONIX) 40 mg tablet    topiramate (TOPAMAX) 25 mg tablet    ziprasidone (GEODON) 40 mg capsule       No Known Allergies    Objective   Vitals: Blood pressure 136/73, pulse 90, temperature 98 5 °F (36 9 °C), temperature source Tympanic, resp  rate 16, height 5' 2" (1 575 m), weight (!) 139 kg (306 lb 6 4 oz), SpO2 94 %  , Body mass index is 56 04 kg/m² ,   Orthostatic Blood Pressures      Most Recent Value   Blood Pressure  136/73 filed at 10/31/2018 0826   Patient Position - Orthostatic VS  Lying filed at 10/31/2018 8648          Systolic (90SXX), PBI:083 , Min:136 , LOW:630     Diastolic (65AVW), XHJ:90, Min:73, Max:102      Physical Exam:    General:  Normal appearance in no distress  Morbidly obese  Eyes:  Anicteric  Oral mucosa:  Moist   Neck:  No JVD  Carotid upstrokes are brisk without bruits  No masses  Chest:  Clear to auscultation and percussion  Cardiac:  Normal PMI  Normal S1 and S2  No murmur gallop or rub  Abdomen:  Soft and nontender  No palpable organomegaly or aortic enlargement  Extremities:  1+ bilateral lower extremity edema  Musculoskeletal:  Symmetric  Vascular:  Femoral pulses are brisk without bruits  Popliteal  pulses are intact bilaterally  Pedal pulses are intact  Neuro:  Grossly symmetric  Psych:  Alert and oriented x3        Lab Results:     Troponins:     Results from last 7 days  Lab Units 10/31/18  0837 10/31/18  0331 10/26/18  2115   TROPONIN I ng/mL <0 03 <0 03 <0 03     BNP:     Results from last 6 Months  Lab Units 08/31/18  2009   BNP pg/mL 48     CBC :     Results from last 7 days  Lab Units 10/31/18  0837 10/31/18  0331   WBC Thousand/uL 7 20 7 90   HEMOGLOBIN g/dL 12 1* 12 4*   HEMATOCRIT % 36 3* 37 9   MCV fL 85 87   PLATELETS Thousands/uL 277 290     TSH:     CMP:     Results from last 7 days  Lab Units 10/31/18  0837 10/31/18  0331   SODIUM mmol/L 135 132*   POTASSIUM mmol/L 3 5 3 8   CHLORIDE mmol/L 102 100   CO2 mmol/L 26 25   BUN mg/dL 19 19   CREATININE mg/dL 1 02 1 12   AST U/L 27 29   ALT U/L 35 37   EGFR ml/min/1 73sq m 92 82     Lipid Profile:     Coags:     Results from last 7 days  Lab Units 10/26/18  1815   INR  1 01

## 2018-10-31 NOTE — ASSESSMENT & PLAN NOTE
· Cough syrup  · D/c augmentin at patient request secondary to loose stools  · Likely viral in nature

## 2018-10-31 NOTE — H&P
H&P- Jermaincy Whitney 1980, 34 y o  male MRN: [id number]    Unit/Bed#: -01 Encounter: [id number]    Primary Care Provider: Justin Whaley MD   Date and time admitted to hospital: 10/31/2018  3:07 AM        Chest pain   Assessment & Plan    · Consult Cardiology  · Trend troponins  · Echocardiogram  · P r n  nitroglycerin sublingual, and nitropaste  · Continue aspirin  · P r n  Tums  · PT and OT  · Monitor on telemetry     Viral upper respiratory tract infection   Assessment & Plan    · Continue Augmentin  · Stable     Type 1 diabetes mellitus without complication [facility])   Assessment & Plan    Lab Results   Component Value Date    HGBA1C 8 7 (H) 10/13/2018       No results for input(s): POCGLU in the last 72 hours      Blood Sugar Average: Last 72 hrs:  ·  Accu-Cheks q a c  and hs  · Sliding scale insulin along with mealtime insulin, and Lantus  · Check hemoglobin A1c     Obsessive compulsive disorder   Assessment & Plan    · Stable  · Chronic  · Continue Luvox     Other specified hypothyroidism   Assessment & Plan    · Chronic  · Stable  · Check a TSH  · Continue Synthroid     Hypertension   Assessment & Plan    · Chronic  · Stable  · Continue lisinopril, nitropaste p r n , Topamax  · Will hold Norvasc secondary to lower extremity edema  · Cardiology on consult     Anxiety   Assessment & Plan    · Chronic  · Stable  · Continue Ativan     GERD (gastroesophageal reflux disease)   Assessment & Plan    · Chronic  · Stable  · Continue Protonix     Schizoaffective disorder (HCC)   Assessment & Plan    · Chronic  · Stable  · Continue Geodon     Morbid obesity with BMI of 50 0-59 9, adult (HCC)   Assessment & Plan    · Offer supportive care     Hx of migraines   Assessment & Plan    · Chronic  · Stable  · Continue Fioricet     Lower extremity edema   Assessment & Plan    · Will hold patient's Norvasc  · Appreciate Cardiology input         VTE Prophylaxis: Heparin  Code Status:   Full  POLST: POLST is not applicable to this patient  Discussion with family:   Need for cardiology consultation    Anticipated Length of Stay:  Patient will be admitted on an Observation basis with an anticipated length of stay of  < 2 midnights  Justification for Hospital Stay:   Need for cardiology consultation    Total Time for Visit, including Counseling / Coordination of Care: 70   Greater than 50% of this total time spent on direct patient counseling and coordination of care  Chief Complaint:     Mid sternal chest pain that is constant    History of Present Illness:    Alhaji Caba is a 34 y o  male who presents with midsternal chest pain that is constant  The patient was here in the emergency department on Friday and was diagnosed with an upper respiratory infection given Augmentin and sent home  At that time he had also complained of some chest pain and was given nitroglycerin sublingual tablets as well  The patient states that since then he has had this midsternal consistent chest pain it does not radiate and there is no nausea vomiting or diaphoresis  He states that he has been using this nitroglycerin sublingual tablets like candy he waits until the pain gets to be a 4-5/10 and then will take a nitroglycerin  He states the pain goes away for approximately an hour but then comes back  He also states that he has had increased shortness of breath with any type of exertion  Patient also states that his bilateral lower extremity edema is more than normal     The patient's family is at the bedside and is requesting the patient to be admitted to figure out Suresh Donis is going on  Review of Systems:  Review of Systems   Constitutional: Negative  HENT: Positive for congestion  Eyes: Negative  Respiratory: Positive for shortness of breath  Cardiovascular: Positive for chest pain  Gastrointestinal: Negative  Endocrine: Negative  Genitourinary: Negative  Musculoskeletal: Negative  Skin: Negative  Allergic/Immunologic: Negative  Neurological: Negative  Hematological: Negative  Psychiatric/Behavioral: Negative  Past Medical and Surgical History:   Past Medical History:   Diagnosis Date    [facility] )     Disease of thyroid gland     GERD (gastroesophageal reflux disease)     Hypertension     Obsessive compulsive disorder     Schizoaffective disorder ([place] [address] )        History reviewed  No pertinent surgical history  Meds/Allergies:  Prior to Admission medications    Medication Sig Start Date End Date Taking?  Authorizing Provider   amLODIPine (NORVASC) 5 mg tablet Take 1 tablet (5 mg total) by mouth daily 9/21/18   Connie Blankenship MD   amoxicillin-clavulanate (AUGMENTIN) 875-125 mg per tablet Take 1 tablet by mouth every 12 (twelve) hours for 7 days 10/26/18 11/2/18  Herson Contreras MD   butalbital-acetaminophen-caffeine (FIORICET,ESGIC) -40 mg per tablet Take 2 tablets by mouth every 4 (four) hours as needed for headaches for up to 20 doses 9/7/18   Alexandra Bermudez MD   fluvoxaMINE (LUVOX) 100 mg tablet Take 2 tablets (200 mg total) by mouth daily at bedtime  Patient taking differently: Take 100 mg by mouth 2 (two) times a day   9/19/18   Joe Da Silva MD   ibuprofen (MOTRIN) 600 mg tablet Take 1 tablet (600 mg total) by mouth every 6 (six) hours as needed for mild pain 9/19/18   Joe Da Silva MD   insulin aspart (NovoLOG) 100 units/mL injection Inject 10 Units under the skin 3 (three) times a day before meals 9/19/18   Joe Da Silva MD   insulin glargine (BASAGLAR KWIKPEN) 100 units/mL injection pen Inject 35 Units under the skin daily at bedtime 10/29/18   Joe Da Silva MD   Insulin Pen Needle (PEN NEEDLES 3/16") 31G X 5 MM MISC by Does not apply route 4 (four) times a day 10/2/18   Joe Da Silva MD   levothyroxine 88 mcg tablet Take 1 tablet (88 mcg total) by mouth daily 10/17/18   Joe Da Silva MD   lisinopril (ZESTRIL) 10 mg tablet Take 1 tablet (10 mg total) by mouth daily 9/19/18   Erik Leblanc MD   LORazepam (ATIVAN) 0 5 mg tablet Take 1 tablet (0 5 mg total) by mouth 2 (two) times a day 10/17/18   Erik Leblanc MD   nitroglycerin (NITROSTAT) 0 4 mg SL tablet Place 1 tablet (0 4 mg total) under the tongue every 5 (five) minutes as needed for chest pain 10/26/18   Jorge Cooper MD   pantoprazole (PROTONIX) 40 mg tablet Take 1 tablet (40 mg total) by mouth daily 9/19/18   Erik Leblanc MD   topiramate (TOPAMAX) 25 mg tablet Take 1 tablet (25 mg total) by mouth 2 (two) times a day for 30 days 10/17/18 11/16/18  Erik Leblanc MD   ziprasidone (GEODON) 40 mg capsule take 1 capsule by mouth twice a day with meals 10/18/18   Erik Leblanc MD     I have reviewed home medications with patient personally  Allergies: No Known Allergies    Social History:  Marital Status: Single   Occupation: unemployed   Patient Pre-hospital Living Situation:   At home  Patient Pre-hospital Level of Mobility: full  Patient Pre-hospital Diet Restrictions:   Diabetic  Substance Use History:   History   Alcohol Use No     History   Smoking Status    Never Smoker   Smokeless Tobacco    Never Used     History   Drug Use No       Family History:  I have reviewed the patients family history    Physical Exam:   Vitals:   Blood Pressure: 144/87 (10/31/18 0430)  Pulse: 76 (10/31/18 0445)  Temperature: 97 5 °F (36 4 °C) (10/31/18 0312)  Temp Source: Tympanic (10/31/18 0312)  Respirations: (!) 27 (10/31/18 0445)  Height: 5' 2" (157 5 cm) (10/31/18 1508)  Weight - Scale: (!) 138 kg (305 lb) (10/31/18 0312)  SpO2: 95 % (10/31/18 0445)    Physical Exam   Constitutional: He is oriented to person, place, and time  Vital signs are normal  He appears well-developed and well-nourished  He is cooperative  HENT:   Head: Normocephalic and atraumatic  Nose: Nose normal    Mouth/Throat: Mucous membranes are normal    Eyes: Pupils are equal, round, and reactive to light   Conjunctivae and EOM are normal    Neck: Full passive range of motion without pain  Neck supple  Cardiovascular: Normal rate, regular rhythm, normal heart sounds and normal pulses  Bilateral lower extremity +3 pitting edema  Pulmonary/Chest: Effort normal  He has decreased breath sounds in the right middle field, the right lower field, the left middle field and the left lower field  Abdominal: Soft  Normal appearance and bowel sounds are normal    Musculoskeletal: Normal range of motion  Neurological: He is alert and oriented to person, place, and time  Skin: Skin is warm and dry  Psychiatric: He has a normal mood and affect  His speech is normal and behavior is normal        Additional Data:   Lab Results: I have personally reviewed pertinent reports  Results from last 7 days  Lab Units 10/31/18  0331   WBC Thousand/uL 7 90   HEMOGLOBIN g/dL 12 4*   HEMATOCRIT % 37 9   PLATELETS Thousands/uL 290   NEUTROS PCT % 42   LYMPHS PCT % 48   MONOS PCT % 8   EOS PCT % 2       Results from last 7 days  Lab Units 10/31/18  0331   SODIUM mmol/L 132*   POTASSIUM mmol/L 3 8   CHLORIDE mmol/L 100   CO2 mmol/L 25   BUN mg/dL 19   CREATININE mg/dL 1 12   CALCIUM mg/dL 8 8   ALK PHOS U/L 65   ALT U/L 37   AST U/L 29       Results from last 7 days  Lab Units 10/26/18  1815   INR  1 01               Imaging: I have personally reviewed pertinent reports  X-ray chest 2 views   Final Result by Norris Elliott (10/31 8509)   No acute pulmonary abnormality  Signed by Norris Elliott MD          EKG, Pathology, and Other Studies Reviewed on Admission:   · EKG:   Normal sinus rhythm    [facility] Yes     ** Please Note: This note has been constructed using a voice recognition system   **

## 2018-10-31 NOTE — ED PROVIDER NOTES
History  Chief Complaint   Patient presents with    Chest Pain     patient states having midsternal chest pain that is "constant", nonradiating, midsternal, no nausea or diaphoresis  States was here a week ago for the same thing  Pain never really goes away  States hes been taking nitroglycerin about every hour tonight, pain goes away but comes back within an hour  68-year-old white male with recurrent substernal chest pain  Describes it as a pressure across the precordium  He was sent home Friday with nitroglycerin, and when he takes nitroglycerin relieves the pain for about an hour to  There requires another nitroglycerin at that time for increasing chest pain  At home his chest pain gets to about a 4 5 before he takes the nitro with complete relief  He has no shortness of breath associated no diaphoresis  There is no radiation of pain to the jaw or shoulder  He does admit increasing dyspnea on exertion over the last week more profound than usual   He has had no prior cardiac history  He is a type 1 diabetic  He does not smoke  His family is present, and concerned about this recurrent pain and feels he needs to be admitted to have it evaluated as was recommended by the physician in the ER on Friday  Prior to Admission Medications   Prescriptions Last Dose Informant Patient Reported? Taking?    Insulin Pen Needle (PEN NEEDLES 3/16") 31G X 5 MM MISC   No No   Sig: by Does not apply route 4 (four) times a day   LORazepam (ATIVAN) 0 5 mg tablet   No No   Sig: Take 1 tablet (0 5 mg total) by mouth 2 (two) times a day   amLODIPine (NORVASC) 5 mg tablet   No No   Sig: Take 1 tablet (5 mg total) by mouth daily   amoxicillin-clavulanate (AUGMENTIN) 875-125 mg per tablet   No No   Sig: Take 1 tablet by mouth every 12 (twelve) hours for 7 days   butalbital-acetaminophen-caffeine (FIORICET,ESGIC) -40 mg per tablet   No No   Sig: Take 2 tablets by mouth every 4 (four) hours as needed for headaches for up to 20 doses   fluvoxaMINE (LUVOX) 100 mg tablet   No No   Sig: Take 2 tablets (200 mg total) by mouth daily at bedtime   Patient taking differently: Take 100 mg by mouth 2 (two) times a day     ibuprofen (MOTRIN) 600 mg tablet   No No   Sig: Take 1 tablet (600 mg total) by mouth every 6 (six) hours as needed for mild pain   insulin aspart (NovoLOG) 100 units/mL injection   No No   Sig: Inject 10 Units under the skin 3 (three) times a day before meals   insulin glargine (BASAGLAR KWIKPEN) 100 units/mL injection pen   No No   Sig: Inject 35 Units under the skin daily at bedtime   levothyroxine 88 mcg tablet   No No   Sig: Take 1 tablet (88 mcg total) by mouth daily   lisinopril (ZESTRIL) 10 mg tablet   No No   Sig: Take 1 tablet (10 mg total) by mouth daily   nitroglycerin (NITROSTAT) 0 4 mg SL tablet   No No   Sig: Place 1 tablet (0 4 mg total) under the tongue every 5 (five) minutes as needed for chest pain   pantoprazole (PROTONIX) 40 mg tablet   No No   Sig: Take 1 tablet (40 mg total) by mouth daily   topiramate (TOPAMAX) 25 mg tablet   No No   Sig: Take 1 tablet (25 mg total) by mouth 2 (two) times a day for 30 days   ziprasidone (GEODON) 40 mg capsule   No No   Sig: take 1 capsule by mouth twice a day with meals      Facility-Administered Medications: None       Past Medical History:   Diagnosis Date    Diabetes mellitus ([address] )     Disease of thyroid gland     GERD (gastroesophageal reflux disease)     Hypertension     Obsessive compulsive disorder     Schizoaffective disorder (HCC)        History reviewed  No pertinent surgical history  Family History   Problem Relation Age of Onset   [facility] COPD Mother     Hypertension Mother     Rheum arthritis Family     Heart disease Family     Hypertension Father     Diabetes Father      I have reviewed and agree with the history as documented      Social History   Substance Use Topics    Smoking status: Never Smoker    Smokeless tobacco: Never Used    Alcohol use No        Review of Systems   Constitutional: Negative for chills and fever  HENT: Negative for rhinorrhea and sore throat  Eyes: Negative for visual disturbance  Respiratory: Negative for cough and shortness of breath  Cardiovascular: Positive for chest pain  Negative for leg swelling  Gastrointestinal: Negative for abdominal pain, diarrhea, nausea and vomiting  Genitourinary: Negative for dysuria  Musculoskeletal: Negative for back pain and myalgias  Skin: Negative for rash  Neurological: Negative for dizziness and headaches  Psychiatric/Behavioral: Negative for confusion  All other systems reviewed and are negative  Physical Exam  Physical Exam   Constitutional: He is oriented to person, place, and time  Obese   HENT:   Nose: Nose normal    Mouth/Throat: Oropharynx is clear and moist  No oropharyngeal exudate  Eyes: Pupils are equal, round, and reactive to light  Conjunctivae and EOM are normal  No scleral icterus  Neck: Normal range of motion  Neck supple  No JVD present  No tracheal deviation present  Cardiovascular: Normal rate, regular rhythm and normal heart sounds  No murmur heard  Pain is not reproducible   Pulmonary/Chest: Effort normal and breath sounds normal  No respiratory distress  He has no wheezes  He has no rales  Abdominal: Soft  Bowel sounds are normal  There is no tenderness  There is no guarding  Musculoskeletal: Normal range of motion  He exhibits edema  He exhibits no tenderness  Degenerative joint disease  2+ lower extremity edema   Neurological: He is alert and oriented to person, place, and time  No cranial nerve deficit or sensory deficit  He exhibits normal muscle tone  5/5 motor, nl sens   Skin: Skin is warm and dry  Psychiatric: He has a normal mood and affect  His behavior is normal    Nursing note and vitals reviewed        Vital Signs  ED Triage Vitals [10/31/18 0312]   Temperature Pulse Respirations Blood Pressure SpO2   97 5 °F (36 4 °C) 82 18 (!) 171/102 97 %      Temp Source Heart Rate Source Patient Position - Orthostatic VS BP Location FiO2 (%)   Tympanic Monitor Lying Left arm --      Pain Score       7           Vitals:    10/31/18 0312   BP: (!) 171/102   Pulse: 82   Patient Position - Orthostatic VS: Lying       Visual Acuity      ED Medications  Medications - No data to display    Diagnostic Studies  Results Reviewed     Procedure Component Value Units Date/Time    Comprehensive metabolic panel [[id number]]     Lab Status:  No result Specimen:  Blood     CBC and differential [[id number]]     Lab Status:  No result Specimen:  Blood     Troponin I [[id number]]     Lab Status:  No result Specimen:  Blood                  X-ray chest 2 views    (Results Pending)              Procedures  ECG 12 Lead Documentation  Date/Time: 10/31/2018 3:06 AM  Performed by: Vazquez Monterroso  Authorized by: Vazquez Monterroso     Indications / Diagnosis:  Chest pain  ECG reviewed by me, the ED Provider: yes    Patient location:  ED  Previous ECG:     Previous ECG:  Unavailable    Comparison to cardiac monitor: Yes    Interpretation:     Interpretation: normal    Rate:     ECG rate:  80    ECG rate assessment: normal    Ectopy:     Ectopy: none    ST segments:     ST segments:  Normal  T waves:     T waves: flattening    Other findings:     Other findings: poor R wave progression             Phone Contacts  ED Phone Contact    ED Course                               [facility]  [facility] Time    Disposition  Final diagnoses:   None     ED Disposition     None      Follow-up Information    None         Patient's Medications   Discharge Prescriptions    No medications on file     No discharge procedures on file      ED Provider  Electronically Signed by           Nataly Jamison DO  10/31/18 1232

## 2018-10-31 NOTE — PLAN OF CARE
Problem: DISCHARGE PLANNING - CARE MANAGEMENT  Goal: Discharge to post-acute care or home with appropriate resources  INTERVENTIONS:  - Conduct assessment to determine patient/family and health care team treatment goals, and need for post-acute services based on payer coverage, community resources, and patient preferences, and barriers to discharge  - Address psychosocial, clinical, and financial barriers to discharge as identified in assessment in conjunction with the patient/family and health care team  - Arrange appropriate level of post-acute services according to patient's   needs and preference and payer coverage in collaboration with the physician and health care team  - Communicate with and update the patient/family, physician, and health care team regarding progress on the discharge plan  - Arrange appropriate transportation to post-acute venues  Home with father who will provide transport    Outcome: Progressing

## 2018-11-01 VITALS
HEART RATE: 110 BPM | BODY MASS INDEX: 56.31 KG/M2 | SYSTOLIC BLOOD PRESSURE: 152 MMHG | HEIGHT: 62 IN | DIASTOLIC BLOOD PRESSURE: 94 MMHG | RESPIRATION RATE: 18 BRPM | TEMPERATURE: 98.7 F | OXYGEN SATURATION: 95 % | WEIGHT: 306 LBS

## 2018-11-01 LAB
GLUCOSE SERPL-MCNC: 118 MG/DL (ref 65–140)
GLUCOSE SERPL-MCNC: 242 MG/DL (ref 65–140)
GLUCOSE SERPL-MCNC: 89 MG/DL (ref 65–140)
MAGNESIUM SERPL-MCNC: 2.1 MG/DL (ref 1.9–2.7)

## 2018-11-01 PROCEDURE — G8988 SELF CARE GOAL STATUS: HCPCS

## 2018-11-01 PROCEDURE — G8989 SELF CARE D/C STATUS: HCPCS

## 2018-11-01 PROCEDURE — 97166 OT EVAL MOD COMPLEX 45 MIN: CPT

## 2018-11-01 PROCEDURE — 83735 ASSAY OF MAGNESIUM: CPT | Performed by: PHYSICIAN ASSISTANT

## 2018-11-01 PROCEDURE — G8987 SELF CARE CURRENT STATUS: HCPCS

## 2018-11-01 PROCEDURE — 82948 REAGENT STRIP/BLOOD GLUCOSE: CPT

## 2018-11-01 PROCEDURE — 99217 PR OBSERVATION CARE DISCHARGE MANAGEMENT: CPT | Performed by: PHYSICIAN ASSISTANT

## 2018-11-01 PROCEDURE — 94760 N-INVAS EAR/PLS OXIMETRY 1: CPT

## 2018-11-01 RX ORDER — SUCRALFATE ORAL 1 G/10ML
1000 SUSPENSION ORAL EVERY 6 HOURS SCHEDULED
Status: DISCONTINUED | OUTPATIENT
Start: 2018-11-01 | End: 2018-11-01 | Stop reason: HOSPADM

## 2018-11-01 RX ORDER — HYDRALAZINE HYDROCHLORIDE 20 MG/ML
10 INJECTION INTRAMUSCULAR; INTRAVENOUS EVERY 6 HOURS PRN
Status: DISCONTINUED | OUTPATIENT
Start: 2018-11-01 | End: 2018-11-01 | Stop reason: HOSPADM

## 2018-11-01 RX ORDER — LEVOTHYROXINE SODIUM 0.1 MG/1
100 TABLET ORAL DAILY
Qty: 30 TABLET | Refills: 0 | Status: SHIPPED | OUTPATIENT
Start: 2018-11-01 | End: 2018-11-20 | Stop reason: SDUPTHER

## 2018-11-01 RX ORDER — SUCRALFATE ORAL 1 G/10ML
1000 SUSPENSION ORAL
Qty: 420 ML | Refills: 0 | Status: SHIPPED | OUTPATIENT
Start: 2018-11-01 | End: 2018-11-09 | Stop reason: SINTOL

## 2018-11-01 RX ORDER — FUROSEMIDE 40 MG/1
40 TABLET ORAL DAILY
Qty: 5 TABLET | Refills: 0 | Status: SHIPPED | OUTPATIENT
Start: 2018-11-01 | End: 2018-11-09 | Stop reason: ALTCHOICE

## 2018-11-01 RX ORDER — ASPIRIN 81 MG/1
81 TABLET, CHEWABLE ORAL DAILY
Refills: 0
Start: 2018-11-01 | End: 2019-08-29

## 2018-11-01 RX ORDER — LISINOPRIL 20 MG/1
20 TABLET ORAL DAILY
Qty: 30 TABLET | Refills: 1 | Status: SHIPPED | OUTPATIENT
Start: 2018-11-01 | End: 2019-06-07

## 2018-11-01 RX ADMIN — FLUVOXAMINE MALEATE 100 MG: 50 TABLET, FILM COATED ORAL at 08:39

## 2018-11-01 RX ADMIN — SUCRALFATE 1000 MG: 1 SUSPENSION ORAL at 10:42

## 2018-11-01 RX ADMIN — LEVOTHYROXINE SODIUM 100 MCG: 100 TABLET ORAL at 05:20

## 2018-11-01 RX ADMIN — TOPIRAMATE 25 MG: 25 TABLET, FILM COATED ORAL at 08:39

## 2018-11-01 RX ADMIN — MAGNESIUM OXIDE TAB 400 MG (241.3 MG ELEMENTAL MG) 400 MG: 400 (241.3 MG) TAB at 08:39

## 2018-11-01 RX ADMIN — KETOROLAC TROMETHAMINE 15 MG: 30 INJECTION, SOLUTION INTRAMUSCULAR at 05:20

## 2018-11-01 RX ADMIN — LISINOPRIL 10 MG: 10 TABLET ORAL at 08:39

## 2018-11-01 RX ADMIN — ALBUTEROL SULFATE 2 PUFF: 90 AEROSOL, METERED RESPIRATORY (INHALATION) at 08:35

## 2018-11-01 RX ADMIN — ZIPRASIDONE HCL 40 MG: 40 CAPSULE ORAL at 08:38

## 2018-11-01 RX ADMIN — PANTOPRAZOLE SODIUM 40 MG: 40 TABLET, DELAYED RELEASE ORAL at 08:39

## 2018-11-01 RX ADMIN — ASPIRIN 81 MG 81 MG: 81 TABLET ORAL at 08:39

## 2018-11-01 RX ADMIN — HEPARIN SODIUM 5000 UNITS: 5000 INJECTION INTRAVENOUS; SUBCUTANEOUS at 05:20

## 2018-11-01 RX ADMIN — LORAZEPAM 0.5 MG: 0.5 TABLET ORAL at 08:39

## 2018-11-01 RX ADMIN — KETOROLAC TROMETHAMINE 15 MG: 30 INJECTION, SOLUTION INTRAMUSCULAR at 11:43

## 2018-11-01 RX ADMIN — INSULIN LISPRO 8 UNITS: 100 INJECTION, SOLUTION INTRAVENOUS; SUBCUTANEOUS at 11:37

## 2018-11-01 NOTE — ASSESSMENT & PLAN NOTE
· Recurrent visits for to ER for URI and now constant Chest pain, Consult Cardiology  · Troponins NL x3, not cardiac related  · Possible GI related with relief after GI cocktail  · Echocardiogram WNL

## 2018-11-01 NOTE — DISCHARGE SUMMARY
Discharge- Alhaji Caba 1980, 34 y o  male MRN: [id number]    Unit/Bed#: -01 Encounter: [id number]    Primary Care Provider: Snow Mahmood MD   Date and time admitted to hospital: 10/31/2018  3:07 AM        * Chest pain   Assessment & Plan    · Recurrent visits for to ER for URI and now constant Chest pain, Consult Cardiology  · Troponins NL x3, not cardiac related  · Possible GI related with relief after GI cocktail  · Echocardiogram WNL     Lower extremity edema   Assessment & Plan    · Will hold patient's Norvasc  · Echo pending     Viral upper respiratory tract infection   Assessment & Plan    · Cough syrup  · D/c augmentin at patient request secondary to loose stools  · Likely viral in nature     Hx of migraines   Assessment & Plan    · Chronic  · Continue Fioricet, topamax  · Follow up neuro as scheduled     Anxiety   Assessment & Plan    · Chronic  · Continue Ativan     Morbid obesity with BMI of 50 0-59 9, adult ([facility] [address] )   Assessment & Plan    · Offer supportive care  · BMI 56  · Follow up dietician as OP     Other specified hypothyroidism   Assessment & Plan    · Chronic  · Continue Synthroid at 100mcg     Schizoaffective disorder ([facility] [address] )   Assessment & Plan    · Chronic, Stable  · Continue Geodon     Obsessive compulsive disorder   Assessment & Plan    · Continue Luvox     GERD (gastroesophageal reflux disease)   Assessment & Plan    · Continue Protonix  · Will add carafate and follow up with GI  · Stop Ibuprofen     Hypertension   Assessment & Plan    · BP elevated with ambulating in halls, will increase Lisinopril at d/c to 20mg and continue to hold norvasc secondary to lower extremity edema  · Weight loss recommended     Type 1 diabetes mellitus without complication [facility])   Assessment & Plan    Lab Results   Component Value Date    HGBA1C 8 8 (H) 10/31/2018       Recent Labs      10/31/18   1556  10/31/18   2104  11/01/18   0512  11/01/18   0622   POCGLU  209*  80  89  118       Blood Sugar Average: Last 72 hrs:  · (P) 131 [id number] Accu-Cheks q a c  and hs  · Sliding scale insulin along with mealtime insulin, and Lantus  · Monitor blood sugars           Discharging Physician / Practitioner: Kaylah Grady PA-C  PCP: Terri Concepcion MD  Admission Date:   Admission Orders     Ordered        10/31/18 0434  Place in Observation (expected length of stay for this patient is less than two midnights)  Once             Discharge Date: 11/01/18    Resolved Problems  Date Reviewed: 11/1/2018    None          Consultations During Hospital Stay:  · Cardiology, Dr Rachel Oneal for recurrent chest pain    Procedures Performed:   · Echocardiogram: EF 60%, no wall motion abnormalities    Significant Findings / Test Results:   · Abnormal TSH with dose adjusted    Incidental Findings:   · none     Test Results Pending at Discharge (will require follow up):   · none     Outpatient Tests Requested:  · none    Complications:  none    Reason for Admission: chest pain    Hospital Course:     Parrish Jordan is a 34 y o  male patient who originally presented to the hospital on 10/31/2018 due to chest pain  Patient had multiple visits in ER for URI and chest pain  He had multiple troponin levels that were negative, echo WNL, previous CTA chest with no PE  Patient did note some relief of symptoms with GI cocktail  May benefit with GI workup, stop ibuprofen and trial carafate  Patient to consider evaluation with bariatric surgeon for options for weightloss  Please see above list of diagnoses and related plan for additional information  Condition at Discharge: fair     Discharge Day Visit / Exam:     Subjective:  35 y/o morbidly obese male with DM2, hypertension reports that he is weak, we discussed cardiology testing and work up is negative  He was out ambulating in the halls, when we returned to room we were discussing d/c plan and he reported he felt funny,  and BP was 160/100    He was requesting a shower  Vitals: Blood Pressure: 160/100 (11/01/18 0904)  Pulse: 104 (11/01/18 0904)  Temperature: 98 °F (36 7 °C) (11/01/18 0656)  Temp Source: Tympanic (11/01/18 0656)  Respirations: 20 (11/01/18 0904)  Height: 5' 2" (157 5 cm) (10/31/18 0826)  Weight - Scale: (!) 139 kg (306 lb) (11/01/18 0557)  SpO2: 93 % (11/01/18 0904)     Exam:   Physical Exam   Constitutional: He is oriented to person, place, and time  He appears well-developed and well-nourished  HENT:   Head: Normocephalic and atraumatic  Eyes: Conjunctivae and EOM are normal  Right eye exhibits no discharge  Left eye exhibits no discharge  Neck: Normal range of motion  No tracheal deviation present  Cardiovascular: Normal rate, regular rhythm and normal heart sounds  Exam reveals no gallop and no friction rub  No murmur heard  Pulmonary/Chest: Effort normal and breath sounds normal  No respiratory distress  He has no wheezes  He has no rales  Abdominal: Soft  Bowel sounds are normal  He exhibits no distension  There is no tenderness  There is no rebound  Morbidly obese   Musculoskeletal: Normal range of motion  He exhibits no edema, tenderness or deformity  Neurological: He is alert and oriented to person, place, and time  Skin: Skin is warm and dry  No rash noted  No erythema  No pallor  Psychiatric: He has a normal mood and affect  His behavior is normal  Judgment and thought content normal    Nursing note and vitals reviewed  Discharge instructions/Information to patient and family:   See after visit summary for information provided to patient and family  Provisions for Follow-Up Care:  See after visit summary for information related to follow-up care and any pertinent home health orders  Disposition:     Home    Planned Readmission: none     Discharge Statement:  I spent 35 minutes discharging the patient  This time was spent on the day of discharge  I had direct contact with the patient on the day of discharge  Greater than 50% of the total time was spent examining patient, answering all patient questions, arranging and discussing plan of care with patient as well as directly providing post-discharge instructions  Additional time then spent on discharge activities  Discharge Medications:  See after visit summary for reconciled discharge medications provided to patient and family        ** Please Note: This note has been constructed using a voice recognition system **

## 2018-11-01 NOTE — PHYSICAL THERAPY NOTE
Physical Therapy Cancellation Note    Pt observed amb independently in [facility] & went to shower room to shower on his own when attempting PT session at 11:07  Pt is supposed to be d/cd home today      Sara Mazariegos, PTA

## 2018-11-01 NOTE — NURSING NOTE
Pt discharged to home  IV removed with catheter tip intact  Pressure and dressing applied until bleeding stopped  Dressing CDI  Discharge instructions discussed  Pt verbalized understanding of instructions  VSS  Belongings sent with patient

## 2018-11-01 NOTE — RESPIRATORY THERAPY NOTE
RT Protocol Note  Gordon Serra 34 y o  male MRN: [id number]  Unit/Bed#: -01 Encounter: [id number]    Assessment    Principal Problem:    Chest pain  Active Problems:    Type 1 diabetes mellitus without complication (HCC)    Hypertension    GERD (gastroesophageal reflux disease)    Obsessive compulsive disorder    Schizoaffective disorder (HCC)    Other specified hypothyroidism    Morbid obesity with BMI of 50 0-59 9, adult (HCC)    Anxiety    Hx of migraines    Viral upper respiratory tract infection    Lower extremity edema      Home Pulmonary Medications:  None    Home Devices/Therapy: (P) Other (Comment) (None)    Past Medical History:   Diagnosis Date    Diabetes mellitus ([facility] )     Disease of thyroid gland     GERD (gastroesophageal reflux disease)     Hypertension     Obsessive compulsive disorder     Schizoaffective disorder ([place] [address] )      Social History     Social History    Marital status: Single     Spouse name: N/A    Number of children: N/A    Years of education: N/A     Social History Main Topics    Smoking status: Never Smoker    Smokeless tobacco: Never Used    Alcohol use No    Drug use: No    Sexual activity: Not Asked     Other Topics Concern    None     Social History Narrative    None       Subjective         Objective    Physical Exam:   Assessment Type: Assess only  General Appearance: Alert, Awake  Respiratory Pattern: Normal  Chest Assessment: Chest expansion symmetrical  Bilateral Breath Sounds: Clear  Cough: Non-productive, Dry, Strong  O2 Device: Room Air    Vitals:  Blood pressure 160/100, pulse 104, temperature 98 °F (36 7 °C), temperature source Tympanic, resp  rate 20, height 5' 2" (1 575 m), weight (!) 139 kg (306 lb), SpO2 95 %  Imaging and other studies: I have personally reviewed pertinent reports        O2 Device: Room Air     Plan    Respiratory Plan: No distress/Pulmonary history  Airway Clearance Plan: (P) Incentive Spirometer     Resp Comments: (P) Instructed Pt  on the proper use of and reason for Incentive Spirometry  Pt  returned adequate demonstration of the IS  Pt  instructed to continue using the IS Q1 hour w/a x 10 breaths on his own

## 2018-11-01 NOTE — UTILIZATION REVIEW
Continued Stay Review    Date: 11/1/18    Vital Signs: /76 (BP Location: Right arm)   Pulse 104   Temp 98 °F (36 7 °C) (Tympanic)   Resp 20   Ht 5' 2" (1 575 m)   Wt (!) 139 kg (306 lb)   SpO2 93%   BMI 55 97 kg/m²     Medications:   Scheduled Meds:   Current Facility-Administered Medications:  acetaminophen 650 mg Oral Q4H PRN   albuterol 2 puff Inhalation Q4H PRN   aspirin 81 mg Oral Daily   butalbital-acetaminophen-caffeine 2 tablet Oral Q4H PRN   calcium carbonate 1,000 mg Oral Daily PRN   diphenhydrAMINE 25 mg Oral HS PRN   fluvoxaMINE 100 mg Oral BID   guaiFENesin 200 mg Oral Q4H PRN   heparin (porcine) 5,000 Units Subcutaneous Q8H [address]   insulin glargine 35 Units Subcutaneous HS   insulin lispro 1-5 Units Subcutaneous HS   insulin lispro 10 Units Subcutaneous TID With Meals   insulin lispro 4-20 Units Subcutaneous TID AC   ketorolac 15 mg Intravenous Q6H [address]   levothyroxine 100 mcg Oral Daily   lisinopril 10 mg Oral Daily   LORazepam 0 5 mg Oral BID   magnesium oxide 400 mg Oral BID   morphine injection 2 mg Intravenous Q4H PRN   nitroglycerin 0 5 inch Topical Q6H PRN   nitroglycerin 0 4 mg Sublingual Q5 Min PRN   ondansetron 4 mg Intravenous Q6H PRN   pantoprazole 40 mg Oral Daily   topiramate 25 mg Oral BID   ziprasidone 40 mg Oral BID With Meals       Abnormal Labs/Diagnostic Results:     Glucose = 118, Magnesium = 2 1    ECHO: results pending    Age/Sex: 34 y o  male     Assessment/Plan: 11/1/18 @ 1018    Discharge Plan: TBD          Thank you,  [address]  Utilization Review Department  Phone: [phone]; Fax [phone]  ATTENTION: Please call with any questions or concerns to [phone]  and carefully follow the prompts so that you are directed to the right person  Send all requests for admission clinical reviews, approved or denied determinations and any other requests to fax [phone]   All voicemails are confidential    '

## 2018-11-01 NOTE — OCCUPATIONAL THERAPY NOTE
Occupational Therapy Evaluation      Kelly Cisneros    11/1/2018    Patient Active Problem List   Diagnosis    Type 1 diabetes mellitus without complication ([place] [address] )    Chest pain    Hypertension    GERD (gastroesophageal reflux disease)    Obsessive compulsive disorder    Schizoaffective disorder (HCC)    Other specified hypothyroidism    Morbid obesity with BMI of 50 0-59 9, adult ([place] [address] )    Anxiety    Hx of migraines    Viral upper respiratory tract infection    Lower extremity edema       Past Medical History:   Diagnosis Date    Diabetes mellitus ([place] [address] )     Disease of thyroid gland     GERD (gastroesophageal reflux disease)     Hypertension     Obsessive compulsive disorder     Schizoaffective disorder ([place] [address] )         11/01/18 0825   Note Type   Note type Eval only   Restrictions/Precautions   Weight Bearing Precautions Per Order No   Pain Assessment   Pain Assessment No/denies pain   Home Living   Type of [address] [place] [address] Two level;Stairs to enter with rails   Bathroom Shower/Tub Tub/shower unit   Bathroom Toilet Standard   Bathroom Equipment Grab bars in shower   Bathroom Accessibility Accessible   Prior Function   Level of [place] Independent with ADLs and functional mobility  (without AD)   Lives With [employer] Help From Family   ADL Assistance Independent   IADLs Needs assistance   Falls in the last 6 months 0   Comments reports he lives with father who drives him to appointments and does meal prep   Subjective   Subjective pt c/o SOB however nursing present and reports O2 sat 94%    Patient does not appear SOB or wtih labored breathing despite c/o   ADL   Eating Assistance 7  Independent   Grooming Assistance 7  Independent   Toileting Assistance  7  Independent   Additional Comments patient refused out of bed this morning and states " I can do all that"   Bed Mobility   Rolling R 7  Independent   Rolling L 7  Independent   Supine to Sit 7  Independent   Transfers   Sit to Stand Unable to assess  (patietn refused OOB this morning  )   Balance   Static Sitting Normal   Dynamic Sitting Normal   Activity Tolerance   Activity Tolerance Patient limited by fatigue;Treatment limited secondary to medical complications (Comment)   RUE Assessment   RUE Assessment WFL   LUE Assessment   LUE Assessment WFL   Cognition   Overall Cognitive Status WFL   Arousal/Participation Uncooperative  (refused complete evaluation; nursing reports amb ad david )   Orientation Level Oriented X4   Memory Within functional limits   Following Commands Follows one step commands with increased time or repetition   Assessment   Assessment Pt is a 34 y o  male seen for OT evaluation s/p admit to [facility] on 10/31/2018 w/ Chest pain  Comorbidities affecting pt's functional performance at time of assessment include: DM and decreased cooperation with eval wtih denial of therapy needs  Personal factors affecting pt at time of IE include:behavioral pattern  Prior to admission, pt was independent with ADL's and functional mobility without AD  [place] Organ Pt would not benefit from  skilled OT tx while in the hospital    From OT standpoint, recommendation at time of d/c would be to home [facility] with [facility] [address]       Goals   Patient Goals feel better   Barthel Index   Feeding 10   Bathing 0   Grooming Score 5   Dressing Score 5   Bladder Score 10   Bowels Score 10   Toilet Use Score 10   Transfers (Bed/Chair) Score 10   Mobility (Level Surface) Score 10   Stairs Score 0   Barthel Index Score 70     Leighann Sepulveda OT

## 2018-11-01 NOTE — PLAN OF CARE
CARDIOVASCULAR - ADULT     Maintains optimal cardiac output and hemodynamic stability Progressing     Absence of cardiac dysrhythmias or at baseline rhythm Progressing        DISCHARGE PLANNING     Discharge to home or other facility with appropriate resources Progressing        DISCHARGE PLANNING - CARE MANAGEMENT     Discharge to post-acute care or home with appropriate resources Progressing        GASTROINTESTINAL - ADULT     Minimal or absence of nausea and/or vomiting Progressing     Maintains or returns to baseline bowel function Progressing     Maintains adequate nutritional intake Progressing     Establish and maintain optimal ostomy function Progressing        Knowledge Deficit     Patient/family/caregiver demonstrates understanding of disease process, treatment plan, medications, and discharge instructions Progressing        Nutrition/Hydration-ADULT     Nutrient/Hydration intake appropriate for improving, restoring or maintaining nutritional needs Progressing        PAIN - ADULT     Verbalizes/displays adequate comfort level or baseline comfort level Progressing        SAFETY ADULT     Patient will remain free of falls Progressing     Maintain or return to baseline ADL function Progressing     Maintain or return mobility status to optimal level Progressing

## 2018-11-01 NOTE — ASSESSMENT & PLAN NOTE
Lab Results   Component Value Date    HGBA1C 8 8 (H) 10/31/2018       Recent Labs      10/31/18   1556  10/31/18   2104  11/01/18   0512  11/01/18   0622   POCGLU  209*  80  89  118       Blood Sugar Average: Last 72 hrs:  · (P) 131 [id number] Accu-Cheks q a c  and hs  · Sliding scale insulin along with mealtime insulin, and Lantus  · Monitor blood sugars

## 2018-11-09 ENCOUNTER — TELEPHONE (OUTPATIENT)
Dept: NEUROLOGY | Facility: CLINIC | Age: 38
End: 2018-11-09

## 2018-11-09 ENCOUNTER — OFFICE VISIT (OUTPATIENT)
Dept: NEUROLOGY | Facility: CLINIC | Age: 38
End: 2018-11-09
Payer: COMMERCIAL

## 2018-11-09 VITALS
SYSTOLIC BLOOD PRESSURE: 140 MMHG | WEIGHT: 293.8 LBS | HEIGHT: 62 IN | BODY MASS INDEX: 54.07 KG/M2 | DIASTOLIC BLOOD PRESSURE: 84 MMHG | HEART RATE: 98 BPM

## 2018-11-09 DIAGNOSIS — R41.0 EPISODIC CONFUSION: ICD-10-CM

## 2018-11-09 DIAGNOSIS — G43.119 INTRACTABLE MIGRAINE WITH AURA WITHOUT STATUS MIGRAINOSUS: Primary | ICD-10-CM

## 2018-11-09 PROCEDURE — 99244 OFF/OP CNSLTJ NEW/EST MOD 40: CPT | Performed by: PSYCHIATRY & NEUROLOGY

## 2018-11-09 RX ORDER — TOPIRAMATE 100 MG/1
TABLET, FILM COATED ORAL
Qty: 30 TABLET | Refills: 5 | Status: SHIPPED | OUTPATIENT
Start: 2018-11-09 | End: 2019-02-15

## 2018-11-09 RX ORDER — SUMATRIPTAN 100 MG/1
100 TABLET, FILM COATED ORAL ONCE AS NEEDED
Qty: 9 TABLET | Refills: 5 | Status: SHIPPED | OUTPATIENT
Start: 2018-11-09 | End: 2019-07-12 | Stop reason: SDUPTHER

## 2018-11-09 NOTE — PROGRESS NOTES
Allen Miller is a 34 y o  male presents with complaints of headache    Assessment:  1  Intractable migraine with aura without status migrainosus    2  Episodic confusion        Plan:  Increase Topamax to 75 mg for 1 week then 100 mg at bedtime  Eliminate caffeine from treatment plan  Blood work to rule out secondary etiologies  Imitrex at headache onset  EEG  Follow-up 6 weeks    Discussion:  Suspect Patricia has migraine headaches in the face of medication overuse headache  Have recommended eliminating caffeine from his diet and gradually titrating the Topamax to 100 mg from 50 mg daily  He may initiate Imitrex as needed for headache and discussed various triggers for migraine as well as potential adverse effects from Imitrex  Is intermittent symptoms of confusion may be secondary to migraine  Recent CT scan was unremarkable  Have recommended EEG to rule out partial seizure  I will see him back in follow-up in 6 weeks      Subjective:    HPI  Patricia presents today with the above complaints  He states that for the last year he has been getting headaches daily  He states prior to that he was getting headaches about once a week for about 1 or 2 years  His father has a history of headaches  Kennedine Nicole reports that his headaches began in the posterior head region as a pressure sensation and then radiate to the temples with a throbbing type pain associated with photophobia, sonophobia and nausea  He states when he gets a headache he typically takes the equipment of Excedrin migraine and has been taking about 6 of these pills a day in divided doses  He states the medicine usually gets rid of the headache about 80% of the time  About 6 weeks ago he was started on Topamax and is presently taking 25 mg twice daily  He states that he feels that the intensity of the headache may be a little bit better with this treatment plan but have not reduced the frequency    He denies any vision disturbance associated with the headaches but sometimes he notes confusion when he gets a headache and sometimes finds he has difficulty reading when he has a headache  He finds that sometimes stress precipitates his headache     He recently had a CT scan of his head done and this was unremarkable  He was recently hospitalized for chest pain and cardiac source was ruled out  Past Medical History:   Diagnosis Date    Diabetes mellitus ([address] )     Disease of thyroid gland     Gastroparesis     GERD (gastroesophageal reflux disease)     Hypertension     Obsessive compulsive disorder     Schizoaffective disorder (HCC)        Family History:  Family History   Problem Relation Age of Onset    COPD Mother     Hypertension Mother     Heart failure Mother     Rheum arthritis Family     Heart disease Family     Hypertension Father     Diabetes Father     Hyperlipidemia Father        Past Surgical History:  History reviewed  No pertinent surgical history  Social History:   reports that he has never smoked  He has never used smokeless tobacco  He reports that he does not drink alcohol or use drugs  Allergies:  Patient has no known allergies        Current Outpatient Prescriptions:     aspirin 81 mg chewable tablet, Chew 1 tablet (81 mg total) daily Over the counter, Disp: , Rfl: 0    Aspirin-Acetaminophen-Caffeine (EXCEDRIN PO), Take 2 capsules by mouth every 6 (six) hours as needed, Disp: , Rfl:     fluvoxaMINE (LUVOX) 100 mg tablet, Take 2 tablets (200 mg total) by mouth daily at bedtime (Patient taking differently: Take 100 mg by mouth 2 (two) times a day  ), Disp: 60 tablet, Rfl: 0    insulin aspart (NovoLOG) 100 units/mL injection, Inject 10 Units under the skin 3 (three) times a day before meals, Disp: 10 mL, Rfl: 2    insulin glargine (BASAGLAR KWIKPEN) 100 units/mL injection pen, Inject 40 Units under the skin daily at bedtime, Disp: 5 pen, Rfl: 0    Insulin Pen Needle (PEN NEEDLES 3/16") 31G X 5 MM MISC, by Does not apply route 4 (four) times a day, Disp: 200 each, Rfl: 3    levothyroxine 100 mcg tablet, Take 1 tablet (100 mcg total) by mouth daily, Disp: 30 tablet, Rfl: 0    lisinopril (ZESTRIL) 20 mg tablet, Take 1 tablet (20 mg total) by mouth daily, Disp: 30 tablet, Rfl: 1    LORazepam (ATIVAN) 0 5 mg tablet, Take 1 tablet (0 5 mg total) by mouth 2 (two) times a day (Patient taking differently: Take 0 5 mg by mouth 2 (two) times a day as needed  ), Disp: 60 tablet, Rfl: 0    nitroglycerin (NITROSTAT) 0 4 mg SL tablet, Place 1 tablet (0 4 mg total) under the tongue every 5 (five) minutes as needed for chest pain, Disp: 90 tablet, Rfl: 0    pantoprazole (PROTONIX) 40 mg tablet, Take 1 tablet (40 mg total) by mouth daily, Disp: 90 tablet, Rfl: 1    topiramate (TOPAMAX) 25 mg tablet, Take 1 tablet (25 mg total) by mouth 2 (two) times a day for 30 days, Disp: 60 tablet, Rfl: 0    SUMAtriptan (IMITREX) 100 mg tablet, Take 1 tablet (100 mg total) by mouth once as needed for migraine for up to 1 dose, Disp: 9 tablet, Rfl: 5    topiramate (TOPAMAX) 100 mg tablet, One p o  q h s  after titration, Disp: 30 tablet, Rfl: 5    ziprasidone (GEODON) 40 mg capsule, take 1 capsule by mouth twice a day with meals (Patient taking differently: 60mg take 1 capsule by mouth twice a day with meals), Disp: 60 capsule, Rfl: 1    I have reviewed the past medical, social and family history, current medications, allergies, vitals, review of systems and updated this information as appropriate today     Objective:    Vitals:  Blood pressure 140/84, pulse 98, height 5' 2" (1 575 m), weight 133 kg (293 lb 12 8 oz)  Physical Exam    Neurological Exam    GENERAL:  Cooperative in no acute distress  Well-developed and well-nourished    HEAD and NECK   Head is atraumatic normocephalic with no lesions or masses  Neck is supple with full range of motion    CARDIOVASCULAR  Carotid Arteries-no carotid bruits      NEUROLOGIC:  Mental Status-the patient is awake alert and oriented without aphasia or apraxia  Cranial Nerves: Visual fields are full to confrontation  Discs are flat  Extraocular movements are full without nystagmus  Pupils are 2-1/2 mm and reactive  Face is symmetrical to light touch  Movements of facial expression move symmetrically  Hearing is normal to finger rub bilaterally  Soft palate lifts symmetrically  Shoulder shrug is symmetrical  Tongue is midline without atrophy  Motor: No drift is noted on arm extension  Strength is full in the upper and lower extremities with normal bulk and tone  Sensory: Intact to temperature and vibratory sensation in the upper and lower extremities bilaterally  Cortical function is intact  Coordination: Finger to nose testing is performed accurately  Romberg is negative  Gait reveals a normal base with symmetrical arm swing  Tandem walk is normal   Reflexes:   1/4 and symmetrical in the biceps, triceps, brachioradialis, knee jerk and ankle jerk regions  Toes are downgoing            ROS:    Review of Systems   Constitutional: Negative for appetite change and fever  HENT: Negative  Negative for hearing loss, tinnitus, trouble swallowing and voice change  Eyes: Negative  Negative for photophobia and pain  Respiratory: Positive for shortness of breath  Cardiovascular: Negative  Negative for palpitations  Gastrointestinal: Positive for vomiting  Negative for nausea  Endocrine: Negative  Negative for cold intolerance and heat intolerance  Genitourinary: Negative  Negative for dysuria, frequency and urgency  Musculoskeletal: Negative  Negative for myalgias and neck pain  Skin: Negative  Negative for rash  Neurological: Positive for weakness (left leg), light-headedness (intermittent) and headaches (pressure)  Negative for dizziness, tremors, seizures, syncope, facial asymmetry, speech difficulty and numbness  Facial tingling   Hematological: Negative    Does not bruise/bleed easily  Psychiatric/Behavioral: Positive for confusion (at times)  Negative for hallucinations and sleep disturbance  All other systems reviewed and are negative

## 2018-11-09 NOTE — TELEPHONE ENCOUNTER
Patient requested to be seen by Neurologist closer to home  I have scheduled an appt on 12/20 in [place]  Dr Jessica Arellano made aware

## 2018-11-09 NOTE — TELEPHONE ENCOUNTER
Dr Jere Lund, I have scheduled patient  appt with Dr Melany Denney in [place] as per your conversation with patient that location is closer to where they reside  The appt is schedule on 12/20/18 at 2:00    Thank you!!

## 2018-11-12 DIAGNOSIS — F25.9 SCHIZO AFFECTIVE SCHIZOPHRENIA (HCC): ICD-10-CM

## 2018-11-12 RX ORDER — FLUVOXAMINE MALEATE 100 MG
TABLET ORAL
Qty: 60 TABLET | Refills: 0 | OUTPATIENT
Start: 2018-11-12

## 2018-11-13 ENCOUNTER — HOSPITAL ENCOUNTER (EMERGENCY)
Facility: HOSPITAL | Age: 38
Discharge: HOME | End: 2018-11-14
Attending: EMERGENCY MEDICINE | Admitting: EMERGENCY MEDICINE
Payer: COMMERCIAL

## 2018-11-13 DIAGNOSIS — G43.919 MIGRAINE, INTRACTABLE: Primary | ICD-10-CM

## 2018-11-13 DIAGNOSIS — R51.9 HEADACHE: ICD-10-CM

## 2018-11-13 PROCEDURE — 93005 ELECTROCARDIOGRAM TRACING: CPT

## 2018-11-13 PROCEDURE — 99285 EMERGENCY DEPT VISIT HI MDM: CPT

## 2018-11-14 ENCOUNTER — APPOINTMENT (EMERGENCY)
Dept: RADIOLOGY | Facility: HOSPITAL | Age: 38
End: 2018-11-14
Payer: COMMERCIAL

## 2018-11-14 VITALS
OXYGEN SATURATION: 100 % | BODY MASS INDEX: 55.6 KG/M2 | WEIGHT: 304.01 LBS | DIASTOLIC BLOOD PRESSURE: 72 MMHG | HEART RATE: 71 BPM | SYSTOLIC BLOOD PRESSURE: 127 MMHG | RESPIRATION RATE: 18 BRPM | TEMPERATURE: 97.5 F

## 2018-11-14 LAB
ALBUMIN SERPL BCP-MCNC: 3.5 G/DL (ref 3.5–5)
ALP SERPL-CCNC: 77 U/L (ref 46–116)
ALT SERPL W P-5'-P-CCNC: 29 U/L (ref 12–78)
ANION GAP SERPL CALCULATED.3IONS-SCNC: 7 MMOL/L (ref 4–13)
AST SERPL W P-5'-P-CCNC: 12 U/L (ref 5–45)
ATRIAL RATE: 90 BPM
BASOPHILS # BLD AUTO: 0.06 THOUSANDS/ΜL (ref 0–0.1)
BASOPHILS NFR BLD AUTO: 1 % (ref 0–1)
BILIRUB SERPL-MCNC: 0.1 MG/DL (ref 0.2–1)
BUN SERPL-MCNC: 19 MG/DL (ref 5–25)
CALCIUM SERPL-MCNC: 8.8 MG/DL (ref 8.3–10.1)
CHLORIDE SERPL-SCNC: 101 MMOL/L (ref 100–108)
CK SERPL-CCNC: 118 U/L (ref 39–308)
CO2 SERPL-SCNC: 28 MMOL/L (ref 21–32)
CREAT SERPL-MCNC: 1.39 MG/DL (ref 0.6–1.3)
EOSINOPHIL # BLD AUTO: 0.27 THOUSAND/ΜL (ref 0–0.61)
EOSINOPHIL NFR BLD AUTO: 3 % (ref 0–6)
ERYTHROCYTE [DISTWIDTH] IN BLOOD BY AUTOMATED COUNT: 13.7 % (ref 11.6–15.1)
GFR SERPL CREATININE-BSD FRML MDRD: 63 ML/MIN/1.73SQ M
GLUCOSE SERPL-MCNC: 275 MG/DL (ref 65–140)
HCT VFR BLD AUTO: 34.4 % (ref 36.5–49.3)
HGB BLD-MCNC: 11.7 G/DL (ref 12–17)
IMM GRANULOCYTES # BLD AUTO: 0.04 THOUSAND/UL (ref 0–0.2)
IMM GRANULOCYTES NFR BLD AUTO: 1 % (ref 0–2)
LYMPHOCYTES # BLD AUTO: 2.75 THOUSANDS/ΜL (ref 0.6–4.47)
LYMPHOCYTES NFR BLD AUTO: 33 % (ref 14–44)
MAGNESIUM SERPL-MCNC: 2.1 MG/DL (ref 1.6–2.6)
MCH RBC QN AUTO: 29 PG (ref 26.8–34.3)
MCHC RBC AUTO-ENTMCNC: 34 G/DL (ref 31.4–37.4)
MCV RBC AUTO: 85 FL (ref 82–98)
MONOCYTES # BLD AUTO: 0.47 THOUSAND/ΜL (ref 0.17–1.22)
MONOCYTES NFR BLD AUTO: 6 % (ref 4–12)
NEUTROPHILS # BLD AUTO: 4.8 THOUSANDS/ΜL (ref 1.85–7.62)
NEUTS SEG NFR BLD AUTO: 56 % (ref 43–75)
NRBC BLD AUTO-RTO: 0 /100 WBCS
P AXIS: 41 DEGREES
PHOSPHATE SERPL-MCNC: 3.6 MG/DL (ref 2.7–4.5)
PLATELET # BLD AUTO: 291 THOUSANDS/UL (ref 149–390)
PMV BLD AUTO: 9.2 FL (ref 8.9–12.7)
POTASSIUM SERPL-SCNC: 5.3 MMOL/L (ref 3.5–5.3)
PR INTERVAL: 170 MS
PROT SERPL-MCNC: 7.2 G/DL (ref 6.4–8.2)
QRS AXIS: -8 DEGREES
QRSD INTERVAL: 104 MS
QT INTERVAL: 338 MS
QTC INTERVAL: 413 MS
RBC # BLD AUTO: 4.03 MILLION/UL (ref 3.88–5.62)
SODIUM SERPL-SCNC: 136 MMOL/L (ref 136–145)
T WAVE AXIS: 39 DEGREES
TSH SERPL DL<=0.05 MIU/L-ACNC: 3.9 UIU/ML (ref 0.36–3.74)
VENTRICULAR RATE: 90 BPM
WBC # BLD AUTO: 8.39 THOUSAND/UL (ref 4.31–10.16)

## 2018-11-14 PROCEDURE — 96361 HYDRATE IV INFUSION ADD-ON: CPT

## 2018-11-14 PROCEDURE — 84100 ASSAY OF PHOSPHORUS: CPT | Performed by: PHYSICIAN ASSISTANT

## 2018-11-14 PROCEDURE — 36415 COLL VENOUS BLD VENIPUNCTURE: CPT | Performed by: PHYSICIAN ASSISTANT

## 2018-11-14 PROCEDURE — 80053 COMPREHEN METABOLIC PANEL: CPT | Performed by: PHYSICIAN ASSISTANT

## 2018-11-14 PROCEDURE — 96375 TX/PRO/DX INJ NEW DRUG ADDON: CPT

## 2018-11-14 PROCEDURE — 96365 THER/PROPH/DIAG IV INF INIT: CPT

## 2018-11-14 PROCEDURE — 71046 X-RAY EXAM CHEST 2 VIEWS: CPT

## 2018-11-14 PROCEDURE — 93010 ELECTROCARDIOGRAM REPORT: CPT | Performed by: INTERNAL MEDICINE

## 2018-11-14 PROCEDURE — 83735 ASSAY OF MAGNESIUM: CPT | Performed by: PHYSICIAN ASSISTANT

## 2018-11-14 PROCEDURE — 82550 ASSAY OF CK (CPK): CPT | Performed by: PHYSICIAN ASSISTANT

## 2018-11-14 PROCEDURE — 84443 ASSAY THYROID STIM HORMONE: CPT | Performed by: PHYSICIAN ASSISTANT

## 2018-11-14 PROCEDURE — 85025 COMPLETE CBC W/AUTO DIFF WBC: CPT | Performed by: PHYSICIAN ASSISTANT

## 2018-11-14 RX ORDER — CYCLOBENZAPRINE HCL 10 MG
10 TABLET ORAL ONCE
Status: COMPLETED | OUTPATIENT
Start: 2018-11-14 | End: 2018-11-14

## 2018-11-14 RX ORDER — METOCLOPRAMIDE HYDROCHLORIDE 5 MG/ML
10 INJECTION INTRAMUSCULAR; INTRAVENOUS ONCE
Status: COMPLETED | OUTPATIENT
Start: 2018-11-14 | End: 2018-11-14

## 2018-11-14 RX ORDER — DIPHENHYDRAMINE HYDROCHLORIDE 50 MG/ML
50 INJECTION INTRAMUSCULAR; INTRAVENOUS ONCE
Status: COMPLETED | OUTPATIENT
Start: 2018-11-14 | End: 2018-11-14

## 2018-11-14 RX ORDER — KETOROLAC TROMETHAMINE 30 MG/ML
30 INJECTION, SOLUTION INTRAMUSCULAR; INTRAVENOUS ONCE
Status: COMPLETED | OUTPATIENT
Start: 2018-11-14 | End: 2018-11-14

## 2018-11-14 RX ORDER — MAGNESIUM SULFATE 1 G/100ML
1 INJECTION INTRAVENOUS ONCE
Status: COMPLETED | OUTPATIENT
Start: 2018-11-14 | End: 2018-11-14

## 2018-11-14 RX ADMIN — KETOROLAC TROMETHAMINE 30 MG: 30 INJECTION, SOLUTION INTRAMUSCULAR at 02:12

## 2018-11-14 RX ADMIN — SODIUM CHLORIDE 1000 ML: 0.9 INJECTION, SOLUTION INTRAVENOUS at 00:49

## 2018-11-14 RX ADMIN — CYCLOBENZAPRINE HYDROCHLORIDE 10 MG: 10 TABLET, FILM COATED ORAL at 02:11

## 2018-11-14 RX ADMIN — METOCLOPRAMIDE 10 MG: 5 INJECTION, SOLUTION INTRAMUSCULAR; INTRAVENOUS at 02:19

## 2018-11-14 RX ADMIN — DIPHENHYDRAMINE HYDROCHLORIDE 50 MG: 50 INJECTION, SOLUTION INTRAMUSCULAR; INTRAVENOUS at 02:16

## 2018-11-14 RX ADMIN — MAGNESIUM SULFATE HEPTAHYDRATE 1 G: 1 INJECTION, SOLUTION INTRAVENOUS at 02:32

## 2018-11-14 NOTE — ED PROVIDER NOTES
History  Chief Complaint   Patient presents with    Headache     per pt  "he has been having some headaches, with some nause, pt states he has been having these symptoms for about a year now "    Altered Mental Status     The patient presents the emergency department, seen with his father, who assisted with providing both acute and chronic medical history  The patient states that he has a notable history including chronic migraine headaches, which he states has been present for the past 1 year  The patient has been seen by Neurology, as well as his primary care provider, and, as such, has been treated on an outpatient basis  In addition, the patient has been seen several times in the emergency department, which have included abortive therapy for migraine headaches  The patient presents today with a chief complaint of altered mental status, which the patient describes as primarily lethargy  The patient states he has not been feeling himself, particularly the past several days, and is concerned that his symptoms may be associated with an atypical migraine headache  The patient states he has had several different forms of migraine headaches in the past, and was recently told by Neurology that his constellation of symptoms do not likely require abortive therapy  The patient states that he was not satisfied with this statement, as he feels that his headaches are regular enough to require long-term treatment  Specifically, the patient states that his current constellation of symptoms include a mild aura with light, sound sensitivity, and a headache, primarily localized to his right temporal region  The patient states that, at that time his headache started today, this occurred 3 hours ago, while the patient was watching television    In addition, the patient states that he did not take his hypertensive medication at the appropriate time, as he normally does, which resulted in his headache being notably worse than previous  At several points during the interview, the patient, as well as his father, inquired as to the utilization of controlled substances, including opiates, naming several opiate medications by name  Prior to Admission Medications   Prescriptions Last Dose Informant Patient Reported? Taking?    Insulin Pen Needle (PEN NEEDLES 3/16") 31G X 5 MM MISC   No Yes   Sig: by Does not apply route 4 (four) times a day   LORazepam (ATIVAN) 0 5 mg tablet  Self No Yes   Sig: Take 1 tablet (0 5 mg total) by mouth 2 (two) times a day   Patient taking differently: Take 0 5 mg by mouth 2 (two) times a day as needed     SUMAtriptan (IMITREX) 100 mg tablet   No Yes   Sig: Take 1 tablet (100 mg total) by mouth once as needed for migraine for up to 1 dose   aspirin 81 mg chewable tablet   No Yes   Sig: Chew 1 tablet (81 mg total) daily Over the counter   fluvoxaMINE (LUVOX) 100 mg tablet   No Yes   Sig: Take 2 tablets (200 mg total) by mouth daily at bedtime   Patient taking differently: Take 100 mg by mouth 2 (two) times a day     insulin aspart (NovoLOG) 100 units/mL injection   No Yes   Sig: Inject 10 Units under the skin 3 (three) times a day before meals   insulin glargine (BASAGLAR KWIKPEN) 100 units/mL injection pen   No Yes   Sig: Inject 40 Units under the skin daily at bedtime   levothyroxine 100 mcg tablet   No Yes   Sig: Take 1 tablet (100 mcg total) by mouth daily   lisinopril (ZESTRIL) 20 mg tablet   No Yes   Sig: Take 1 tablet (20 mg total) by mouth daily   nitroglycerin (NITROSTAT) 0 4 mg SL tablet   No No   Sig: Place 1 tablet (0 4 mg total) under the tongue every 5 (five) minutes as needed for chest pain   pantoprazole (PROTONIX) 40 mg tablet   No No   Sig: Take 1 tablet (40 mg total) by mouth daily   topiramate (TOPAMAX) 100 mg tablet   No Yes   Sig: One p o  q h s  after titration   topiramate (TOPAMAX) 25 mg tablet   No Yes   Sig: Take 1 tablet (25 mg total) by mouth 2 (two) times a day for 30 days   ziprasidone (GEODON) 40 mg capsule  Self No Yes   Sig: take 1 capsule by mouth twice a day with meals   Patient taking differently: 60mg take 1 capsule by mouth twice a day with meals      Facility-Administered Medications: None       Past Medical History:   Diagnosis Date    Diabetes mellitus ([place] [address] )     Disease of thyroid gland     Gastroparesis     GERD (gastroesophageal reflux disease)     Hypertension     Obsessive compulsive disorder     Schizoaffective disorder ([place] [address] )        History reviewed  No pertinent surgical history  Family History   Problem Relation Age of Onset    COPD Mother     Hypertension Mother     Heart failure Mother     Rheum arthritis Family     Heart disease Family     Hypertension Father     Diabetes Father     Hyperlipidemia Father      I have reviewed and agree with the history as documented  Social History   Substance Use Topics    Smoking status: Never Smoker    Smokeless tobacco: Never Used    Alcohol use No        Review of Systems  Review of Systems: The Patient/Parent Denies the following: Negative, Except as noted in HPI  Physical Exam  Physical Exam  General: 34 y o  male patient, who appears their stated age, in mild distress  Skin: No rashes, masses, or lesions noted  HEENT: Atraumatic & Normocephalic  External ears normal, with no noted abnormalities or deformities  Bilateral canals examined, without noted edema or discomfort  No pain while pulling the tragus  TM well visualized bilaterally, with no noted obstruction, effusion, erythema, or air fluid levels  No noted enlargement of the mastoid processes bilaterally  EOMI, PERRL, Conjunctiva without injection bilaterally  No conjunctival drainage noted bilaterally  Nares patent bilaterally, with no noted obstructions, erythema, or drainage  No noted rhinorrhea  Pharynx well visualized, with no exudate noted in the posterior pharynx  Tonsils are not enlarged   Gingival surfaces are within normal limits  Neck: Soft, supple, and non-tender  No enlargement of the anterior cervical, posterior cervical, or occipital lymph notes  Cardiac: Regular rate and rhythm, with no noted murmurs, rubs, or gallops  Pulmonary: Normal Appearance  Clear to auscultation, with no noted rales, rhonchi, or wheezes  Abdomen: Normal appearance  Dull to palpation, except over the gastric bubble, which was mildly tympanic  Bowel sounds were within normal limits, with no noted high pitch sounds heard  Negative Karimi sign  No pain with palpation at [facility]  MSK: Joint ROM grossly normal, actively and passively, to all extremities  No noted joint swelling  Normal Gait  Neuro:  Cranial Nerve Examination: CN2 (Optic Nerve): Visual Acuity Within Normal Limits, CN2 & 3: (Optic & Oculomotor Nerves): Within Normal Limits (Normal size and shape of pupils, as well as appropriate direct and consensual pupil reaction to light), CN 3, 4, & 6 (Oculomotor, Trochlear, and Abducens Nerves): Within Normal Limits (Extra-Ocular movements intact, bilaterally  Normal pupillary convergence  Negative ptosis, nystagmus, or lid lag, bilaterally ), CN5 (Trigeminal Nerve): Within Normal Limits (Normal temporal and masseter muscle contraction  Normal sensation to all areas of the face), CN7 (Facial Nerve): Within Normal Limits (Patient able to raise eyebrows, frown, resist attempts to open eyes, smile, and puff out cheeks), CN8 (Acoustic Nerve): Within Normal Limits (Normal auditory acuity, tested with the whisper test), CN9 (Glossopharyngeal): Within Normal Limits (Patient able to raise soft palate with "Ah" ), CN11 (Spinal Accessory Nerve): Within Normal Limits (Normal contraction of the trapezius  Patient able to shrug shoulders against resistance  Patient able to rotate head against resistance), CN12 (Hypoglossal Nerve): Within Normal Limits (Patient able to protrude tongue, without noted atrophy or fasciculation   No tongue deviation from midline ), Muscle Strength Examination: Normal Shoulder Abduction (C5 & Axillary Nerves), Bilaterally, Graded +5/5, Normal Elbow Flexion (C5, C6, & Musculocutaneous Nerves, Bilaterally, Graded +5/5, Normal Elbow Extension (C7 & Radial Nerve), Bilaterally, Graded +5/5, Normal Wrist Extension, Bilaterally, Graded +5/5, Normal Hand  Strength, Bilaterally, Graded +5/5, Normal Finger Abduction, Bilaterally, Graded +5/5, Normal Thumb Opposition, Bilaterally, Graded +5/5, Normal Hip Flexion, Bilaterally, Graded +5/5, Normal Hip Extension, Bilaterally, Graded +5/5, Normal Adduction of Hip, Bilaterally, Graded +5/5, Normal Abduction of hip, Bilaterally, Graded +5/5, Normal Knee Flexion, Bilaterally, Graded +5/5, Normal Knee Extension, Bilaterally, Graded +5/5, Normal Ankle Plantar Flexion, Bilaterally, Graded +5/5, Normal Ankle Dorsiflexion, Bilaterally, Graded +5/5, Normal Dorsiflexion of Great Toe, Bilaterally, Graded +5/5, Sensation Examination: Normal Sensation to the Shoulders Bilaterally (C4), Normal Sensation to the Inner and Outer Forearm, Bilaterally (C6 & T1), Normal Sensation to the Thumbs and 5th Finger, Bilaterally (C6 & C8), Normal Sensation to the Anterior portion of thighs, Bilaterally (L3), Normal Sensation to the Medial Calf & Lateral Calf, Bilaterally (L5), Reflex Examination: Normal Biceps, Bilaterally (C5), Graded +4/4, Normal Brachioradialis, Bilaterally (C6), Graded +4/4, Normal Triceps, Bilaterally (C7), Graded +4/4, Normal Patellar, Bilaterally (L2-L4), Graded +4/4, Normal Achilles, Bilaterally (S1), Graded +4/4, Coordination: Normal Finger to Nose, Bilaterally, Meningeal Signs: Negative Kernig's Test  Psych: Normal affect and responsiveness       Vital Signs  ED Triage Vitals [11/13/18 2316]   Temperature Pulse Respirations Blood Pressure SpO2   97 5 °F (36 4 °C) 98 20 140/85 99 %      Temp Source Heart Rate Source Patient Position - Orthostatic VS BP Location FiO2 (%)   Oral Monitor Lying Right arm --      Pain Score       7           Vitals:    11/14/18 0215 11/14/18 0230 11/14/18 0245 11/14/18 0315   BP: 132/77  145/89 127/72   Pulse:  86 82 71   Patient Position - Orthostatic VS:    Lying       Visual Acuity  Visual Acuity      Most Recent Value   L Pupil Size (mm)  3   R Pupil Size (mm)  3   L Pupil Shape  Round   R Pupil Shape  Round          ED Medications  Medications   sodium chloride 0 9 % bolus 1,000 mL (0 mL Intravenous Stopped 11/14/18 0355)   magnesium sulfate IVPB (premix) SOLN 1 g (0 g Intravenous Stopped 11/14/18 0345)   diphenhydrAMINE (BENADRYL) injection 50 mg (50 mg Intravenous Given 11/14/18 0216)   metoclopramide (REGLAN) injection 10 mg (10 mg Intravenous Given 11/14/18 0219)   ketorolac (TORADOL) injection 30 mg (30 mg Intravenous Given 11/14/18 0212)   cyclobenzaprine (FLEXERIL) tablet 10 mg (10 mg Oral Given 11/14/18 0211)       Diagnostic Studies  Results Reviewed     Procedure Component Value Units Date/Time    TSH, 3rd generation [[id number]]  (Abnormal) Collected:  11/14/18 0048    Lab Status:  Final result Specimen:  Blood from Arm, Right Updated:  11/14/18 0122     TSH 3RD GENERATON 3 898 (H) uIU/mL     Narrative:         Patients undergoing fluorescein dye angiography may retain small amounts of fluorescein in the body for 48-72 hours post procedure  Samples containing fluorescein can produce falsely depressed TSH values  If the patient had this procedure,a specimen should be resubmitted post fluorescein clearance      Phosphorus [[id number]]  (Normal) Collected:  11/14/18 0048    Lab Status:  Final result Specimen:  Blood from Arm, Right Updated:  11/14/18 0114     Phosphorus 3 6 mg/dL     Magnesium [[id number]]  (Normal) Collected:  11/14/18 0048    Lab Status:  Final result Specimen:  Blood from Arm, Right Updated:  11/14/18 0114     Magnesium 2 1 mg/dL     CK [[id number]]  (Normal) Collected:  11/14/18 0048    Lab Status:  Final result Specimen:  Blood from Arm, Right Updated:  11/14/18 0114     Total  U/L     Comprehensive metabolic panel [[id number]]  (Abnormal) Collected:  11/14/18 0048    Lab Status:  Final result Specimen:  Blood from Arm, Right Updated:  11/14/18 0113     Sodium 136 mmol/L      Potassium 5 3 mmol/L      Chloride 101 mmol/L      CO2 28 mmol/L      ANION GAP 7 mmol/L      BUN 19 mg/dL      Creatinine 1 39 (H) mg/dL      Glucose 275 (H) mg/dL      Calcium 8 8 mg/dL      AST 12 U/L      ALT 29 U/L      Alkaline Phosphatase 77 U/L      Total Protein 7 2 g/dL      Albumin 3 5 g/dL      Total Bilirubin 0 10 (L) mg/dL      eGFR 63 ml/min/1 73sq m     Narrative:         National Kidney Disease Education Program recommendations are as follows:  GFR calculation is accurate only with a steady state creatinine  Chronic Kidney disease less than 60 ml/min/1 73 sq  meters  Kidney failure less than 15 ml/min/1 73 sq  meters      CBC and differential [[id number]]  (Abnormal) Collected:  11/14/18 0048    Lab Status:  Final result Specimen:  Blood from Arm, Right Updated:  11/14/18 0056     WBC 8 39 Thousand/uL      RBC 4 03 Million/uL      Hemoglobin 11 7 (L) g/dL      Hematocrit 34 4 (L) %      MCV 85 fL      MCH 29 0 pg      MCHC 34 0 g/dL      RDW 13 7 %      MPV 9 2 fL      Platelets 630 Thousands/uL      nRBC 0 /100 WBCs      Neutrophils Relative 56 %      Immat GRANS % 1 %      Lymphocytes Relative 33 %      Monocytes Relative 6 %      Eosinophils Relative 3 %      Basophils Relative 1 %      Neutrophils Absolute 4 80 Thousands/µL      Immature Grans Absolute 0 04 Thousand/uL      Lymphocytes Absolute 2 75 Thousands/µL      Monocytes Absolute 0 47 Thousand/µL      Eosinophils Absolute 0 27 Thousand/µL      Basophils Absolute 0 06 Thousands/µL                  XR chest pa & lateral   ED Interpretation by Bridger Nelson PA-C (11/14 0128)   Chest X-Ray Interpretation:   The patient's chest x-ray was reviewed by myself and found to be without acute intrapulmonary infiltrates, osseous abnormalities, or noted pneumothoraces  No acute intrapulmonary processes were seen  The results of this study were related to the patient  Final Result by Breanna Oneal DO (11/14 0812)      No acute cardiopulmonary disease  Workstation performed: [id number]                    Procedures  ECG 12 Lead Documentation  Date/Time: 11/13/2018 11:55 PM  Performed by: Al Johns  Authorized by: Al Johns     Indications / Diagnosis:  Shortness of breath  ECG reviewed by me, the ED Provider: yes    Patient location:  ED  Previous ECG:     Previous ECG:  Compared to current    Comparison ECG info:  31 October 2018, 9:04 a m :  Rate 78, normal sinus rhythm, normal GA interval, normal QRS duration, normal ST segment duration, no ST segment elevation or depression, left axis deviation, no noted ectopic beats  Similarity:  No change    Comparison to cardiac monitor: Yes    Interpretation:     Interpretation: non-specific    Rate:     ECG rate:  90    ECG rate assessment: normal    Rhythm:     Rhythm: sinus rhythm    Ectopy:     Ectopy: none    QRS:     QRS axis:  Left    QRS intervals:  Normal  Conduction:     Conduction: normal    ST segments:     ST segments:  Normal  T waves:     T waves: normal             Phone Contacts  ED Phone Contact    ED Course                               MDM  Number of Diagnoses or Management Options  Headache: established and worsening  Migraine, intractable: new and does not require workup  Diagnosis management comments: Medical Decision Making:  Most likely diagnosis is acute migraine  Primary considerations in diagnosis include the patients history of migraine headaches, the presence of light sensitivity, as well as sound sensitivity on physical exam  In addition, the patients constellation of symptoms, including the timing, duration, and speed of onset is consistent with migraine headache   Finally, the presence of an essentially neurological exam, at this time, with no evidence or acute history of head injury also was considered  DDx including but not limited to:  CVA, TIA, ICH, hypertensive emergency/urgency, metabolic abnormality, cardiac etiology, atypical migraine, seizure, tumor  The patient was provided with acute abortive therapy, as per the patients MAR  The patient was instructed to follow-up with their primary care provider or neurologist for continued evaluation, and reconsideration of their scheduled and abortive therapy  Amount and/or Complexity of Data Reviewed  Clinical lab tests: reviewed and ordered  Tests in the radiology section of CPT®: reviewed and ordered  Tests in the medicine section of CPT®: reviewed and ordered  Discussion of test results with the performing providers: yes  Decide to obtain previous medical records or to obtain history from someone other than the patient: yes  Obtain history from someone other than the patient: yes  Review and summarize past medical records: yes  Discuss the patient with other providers: yes  Independent visualization of images, tracings, or specimens: yes    Risk of Complications, Morbidity, and/or Mortality  Presenting problems: high  Diagnostic procedures: high  Management options: high    Patient Progress  Patient progress: stable    CritCare Time    Disposition  Final diagnoses:   Migraine, intractable   Headache     Time reflects when diagnosis was documented in both MDM as applicable and the Disposition within this note     Time User Action Codes Description Comment    11/14/2018  3:44 AM Emily Denise [G43 [id number]] Migraine, intractable     11/14/2018  3:44 AM Emily Denise [[id number]] Headache       ED Disposition     ED Disposition Condition Comment    Discharge  Gordon Serra discharge to home/self care      Condition at discharge: Good        Follow-up Information     Follow up With Specialties Details Why Contact Info Additional Information Rudene Schaumann, MD Family Medicine In 3 days If symptoms worsen, As needed IDALIA Cope [address] Neurology [facility] Neurology In 2 days  Antonio Cool [address] [facility] Neurology [facility], [address], [facility], [address] [facility], [id number]          Discharge Medication List as of 11/14/2018  3:48 AM      CONTINUE these medications which have NOT CHANGED    Details   aspirin 81 mg chewable tablet Chew 1 tablet (81 mg total) daily Over the counter, Starting Thu 11/1/2018, No Print      fluvoxaMINE (LUVOX) 100 mg tablet Take 2 tablets (200 mg total) by mouth daily at bedtime, Starting Wed 9/19/2018, Normal      insulin aspart (NovoLOG) 100 units/mL injection Inject 10 Units under the skin 3 (three) times a day before meals, Starting Wed 9/19/2018, Normal      insulin glargine (BASAGLAR KWIKPEN) 100 units/mL injection pen Inject 40 Units under the skin daily at bedtime, Starting Thu 11/1/2018, No Print      Insulin Pen Needle (PEN NEEDLES 3/16") 31G X 5 MM MISC by Does not apply route 4 (four) times a day, Starting Tue 10/2/2018, Normal      levothyroxine 100 mcg tablet Take 1 tablet (100 mcg total) by mouth daily, Starting Thu 11/1/2018, Normal      lisinopril (ZESTRIL) 20 mg tablet Take 1 tablet (20 mg total) by mouth daily, Starting Thu 11/1/2018, Normal      LORazepam (ATIVAN) 0 5 mg tablet Take 1 tablet (0 5 mg total) by mouth 2 (two) times a day, Starting Wed 10/17/2018, Normal      SUMAtriptan (IMITREX) 100 mg tablet Take 1 tablet (100 mg total) by mouth once as needed for migraine for up to 1 dose, Starting Fri 11/9/2018, Normal      !! topiramate (TOPAMAX) 100 mg tablet One p o  q h s  after titration, Normal      !! topiramate (TOPAMAX) 25 mg tablet Take 1 tablet (25 mg total) by mouth 2 (two) times a day for 30 days, Starting Wed 10/17/2018, Until Fri 11/16/2018, Normal      ziprasidone (GEODON) 40 mg capsule take 1 capsule by mouth twice a day with meals, Normal      nitroglycerin (NITROSTAT) 0 4 mg SL tablet Place 1 tablet (0 4 mg total) under the tongue every 5 (five) minutes as needed for chest pain, Starting Fri 10/26/2018, Print      pantoprazole (PROTONIX) 40 mg tablet Take 1 tablet (40 mg total) by mouth daily, Starting Wed 9/19/2018, Normal       !! - Potential duplicate medications found  Please discuss with provider  No discharge procedures on file      ED Provider  Electronically Signed by           Kelsy Shelton PA-C  11/16/18 5778

## 2018-11-14 NOTE — ED NOTES
Pt in negative distress at this time  No other questions upon discharge       Brigido MARJ Evans  11/14/18 7744

## 2018-11-14 NOTE — ED NOTES
Pt requesting water  As per provider pt may take po fluids    Water given     Brittany Norwood RN  11/14/18 2671

## 2018-11-14 NOTE — DISCHARGE INSTRUCTIONS
Acute Headache, Ambulatory Care   GENERAL INFORMATION:   An acute headache  is pain or discomfort that starts suddenly and gets worse quickly  The cause of an acute headache may not be known  It may be triggered by stress, fatigue, hormones, food, or trauma  Common related symptoms include the following:   · Fever    · Sinus pressure    · Loss of memory    · Nausea or vomiting    · Problems with your vision, such as watery or red eyes, loss of vision, or pain in bright light    · Stiff neck    · Tenderness of the head and neck area    · Trouble staying awake, or being less alert than usual     · Weakness or less energy  Seek immediate care for the following symptoms:   · Severe pain    · A headache that occurs after a blow to the head, a fall, or other trauma     · Confusion or forgetfulness    · Numbness on one side of your face or body  Treatment for an acute headache  may include medicine to decrease pain  You may also need biofeedback or cognitive behavioral therapy  Ask your healthcare provider about these and other treatments for an acute headache  Manage my symptoms:   · Apply heat  on your head for 20 to 30 minutes every 2 hours for as many days as directed  Heat helps decrease pain and muscle spasms  You may alternate heat and ice  · Apply ice  on your head for 15 to 20 minutes every hour or as directed  Use an ice pack, or put crushed ice in a plastic bag  Cover it with a towel  Ice helps decrease pain  · Relax your muscles  Lie down in a comfortable position and close your eyes  Relax your muscles slowly  Start at your toes and work your way up your body  · Keep a record of your headaches  Write down when your headaches start and stop  Include your symptoms and what you were doing when the headache began  Record what you ate or drank for 24 hours before the headache started  Describe the pain and where it hurts  Keep track of what you did to treat your headache and whether it worked    Follow up with your healthcare provider as directed:  Bring your headache record with you when you see your healthcare provider  Write down your questions so you remember to ask them during your visits  CARE AGREEMENT:   You have the right to help plan your care  Learn about your health condition and how it may be treated  Discuss treatment options with your caregivers to decide what care you want to receive  You always have the right to refuse treatment  The above information is an  only  It is not intended as medical advice for individual conditions or treatments  Talk to your doctor, nurse or pharmacist before following any medical regimen to see if it is safe and effective for you  © 2014 [address] is for End User's use only and may not be sold, redistributed or otherwise used for commercial purposes  All illustrations and images included in CareNotes® are the copyrighted property of [employer]  or Ayden Simpson  Acute Headache   WHAT YOU NEED TO KNOW:   An acute headache is pain or discomfort that starts suddenly and gets worse quickly  You may have an acute headache only when you feel stress or eat certain foods  Other acute headache pain can happen every day, and sometimes several times a day  DISCHARGE INSTRUCTIONS:   Return to the emergency department if:   You have severe pain  You have numbness or weakness on one side of your face or body  You have a headache that occurs after a blow to the head, a fall, or other trauma  You have a headache, are forgetful or confused, or have trouble speaking  You have a headache, stiff neck, and a fever  Contact your healthcare provider if:   You have a constant headache and are vomiting  You have a headache each day that does not get better, even after treatment  You have changes in your headaches, or new symptoms that occur when you have a headache      You have questions or concerns about your condition or care  Medicines: You may need any of the following:  Prescription pain medicine  may be given  The medicine your healthcare provider recommends will depend on the kind of headaches you have  You will need to take prescription headache medicines as directed to prevent a problem called rebound headache  These headaches happen with regular use of pain relievers for headache disorders  NSAIDs , such as ibuprofen, help decrease swelling, pain, and fever  This medicine is available with or without a doctor's order  NSAIDs can cause stomach bleeding or kidney problems in certain people  If you take blood thinner medicine, always ask your healthcare provider if NSAIDs are safe for you  Always read the medicine label and follow directions  Acetaminophen  decreases pain and fever  It is available without a doctor's order  Ask how much to take and how often to take it  Follow directions  Read the labels of all other medicines you are using to see if they also contain acetaminophen, or ask your doctor or pharmacist  Acetaminophen can cause liver damage if not taken correctly  Do not use more than 3 grams (3,000 milligrams) total of acetaminophen in one day  Antidepressants  may be given for some kinds of headaches  Take your medicine as directed  Contact your healthcare provider if you think your medicine is not helping or if you have side effects  Tell him or her if you are allergic to any medicine  Keep a list of the medicines, vitamins, and herbs you take  Include the amounts, and when and why you take them  Bring the list or the pill bottles to follow-up visits  Carry your medicine list with you in case of an emergency  Manage your symptoms:   Apply heat or ice  on the headache area  Use a heat or ice pack  For an ice pack, you can also put crushed ice in a plastic bag  Cover the pack or bag with a towel before you apply it to your skin   Ice and heat both help decrease pain, and heat also helps decrease muscle spasms  Apply heat for 20 to 30 minutes every 2 hours  Apply ice for 15 to 20 minutes every hour  Apply heat or ice for as long and for as many days as directed  You may alternate heat and ice  Relax your muscles  Lie down in a comfortable position and close your eyes  Relax your muscles slowly  Start at your toes and work your way up your body  Keep a record of your headaches  Write down when your headaches start and stop  Include your symptoms and what you were doing when the headache began  Record what you ate or drank for 24 hours before the headache started  Describe the pain and where it hurts  Keep track of what you did to treat your headache and if it worked  Prevent an acute headache:   Avoid anything that triggers an acute headache  Examples include exposure to chemicals, going to high altitude, or not getting enough sleep  Create a regular sleep routine  Go to sleep at the same time and wake up at the same time each day  Do not use electronic devices before bedtime  These may trigger a headache or prevent you from sleeping well  Do not smoke  Nicotine and other chemicals in cigarettes and cigars can trigger an acute headache or make it worse  Ask your healthcare provider for information if you currently smoke and need help to quit  E-cigarettes or smokeless tobacco still contain nicotine  Talk to your healthcare provider before you use these products  Limit alcohol as directed  Alcohol can trigger an acute headache or make it worse  If you have cluster headaches, do not drink alcohol during an episode  For other types of headaches, ask your healthcare provider if it is safe for you to drink alcohol  Ask how much is safe for you to drink, and how often  Exercise as directed  Exercise can reduce tension and help with headache pain  Aim for 30 minutes of physical activity on most days of the week   Your healthcare provider can help you create an exercise plan     Eat a variety of healthy foods  Healthy foods include fruits, vegetables, low-fat dairy products, lean meats, fish, whole grains, and cooked beans  Your healthcare provider or dietitian can help you create meals plans if you need to avoid foods that trigger headaches  Follow up with your healthcare provider as directed:  Bring your headache record with you when you see your healthcare provider  Write down your questions so you remember to ask them during your visits  © 2017 [address] [facility] Information is for End User's use only and may not be sold, redistributed or otherwise used for commercial purposes  All illustrations and images included in CareNotes® are the copyrighted property of A D A M , Inc  or Ayden Simpson  The above information is an  only  It is not intended as medical advice for individual conditions or treatments  Talk to your doctor, nurse or pharmacist before following any medical regimen to see if it is safe and effective for you

## 2018-11-14 NOTE — ED NOTES
PT ambulated to bathroom with steady gait in negative distress at this time  Pt requests water when back to room       Loren Lake RN  11/14/18 2854

## 2018-11-20 DIAGNOSIS — E03.8 OTHER SPECIFIED HYPOTHYROIDISM: Chronic | ICD-10-CM

## 2018-11-21 RX ORDER — LEVOTHYROXINE SODIUM 0.1 MG/1
100 TABLET ORAL DAILY
Qty: 30 TABLET | Refills: 2 | Status: SHIPPED | OUTPATIENT
Start: 2018-11-21 | End: 2018-12-11

## 2018-11-22 ENCOUNTER — HOSPITAL ENCOUNTER (EMERGENCY)
Facility: HOSPITAL | Age: 38
Discharge: HOME | End: 2018-11-22
Attending: EMERGENCY MEDICINE
Payer: COMMERCIAL

## 2018-11-22 VITALS
RESPIRATION RATE: 18 BRPM | BODY MASS INDEX: 57.41 KG/M2 | HEIGHT: 62 IN | TEMPERATURE: 97.8 F | SYSTOLIC BLOOD PRESSURE: 178 MMHG | DIASTOLIC BLOOD PRESSURE: 87 MMHG | HEART RATE: 78 BPM | OXYGEN SATURATION: 95 % | WEIGHT: 311.95 LBS

## 2018-11-22 DIAGNOSIS — G43.909 MIGRAINE HEADACHE: Primary | ICD-10-CM

## 2018-11-22 DIAGNOSIS — S91.209A TOENAIL AVULSION: ICD-10-CM

## 2018-11-22 DIAGNOSIS — R11.2 NAUSEA & VOMITING: ICD-10-CM

## 2018-11-22 LAB
ANION GAP SERPL CALCULATED.3IONS-SCNC: 10 MMOL/L (ref 4–13)
BASOPHILS # BLD AUTO: 0.04 THOUSANDS/ΜL (ref 0–0.1)
BASOPHILS NFR BLD AUTO: 1 % (ref 0–1)
BUN SERPL-MCNC: 21 MG/DL (ref 5–25)
CALCIUM SERPL-MCNC: 9.1 MG/DL (ref 8.3–10.1)
CHLORIDE SERPL-SCNC: 105 MMOL/L (ref 100–108)
CO2 SERPL-SCNC: 26 MMOL/L (ref 21–32)
CREAT SERPL-MCNC: 1.11 MG/DL (ref 0.6–1.3)
EOSINOPHIL # BLD AUTO: 0.29 THOUSAND/ΜL (ref 0–0.61)
EOSINOPHIL NFR BLD AUTO: 4 % (ref 0–6)
ERYTHROCYTE [DISTWIDTH] IN BLOOD BY AUTOMATED COUNT: 14.1 % (ref 11.6–15.1)
GFR SERPL CREATININE-BSD FRML MDRD: 83 ML/MIN/1.73SQ M
GLUCOSE SERPL-MCNC: 156 MG/DL (ref 65–140)
HCT VFR BLD AUTO: 33.7 % (ref 36.5–49.3)
HGB BLD-MCNC: 11.3 G/DL (ref 12–17)
IMM GRANULOCYTES # BLD AUTO: 0.02 THOUSAND/UL (ref 0–0.2)
IMM GRANULOCYTES NFR BLD AUTO: 0 % (ref 0–2)
LYMPHOCYTES # BLD AUTO: 1.74 THOUSANDS/ΜL (ref 0.6–4.47)
LYMPHOCYTES NFR BLD AUTO: 26 % (ref 14–44)
MCH RBC QN AUTO: 29.1 PG (ref 26.8–34.3)
MCHC RBC AUTO-ENTMCNC: 33.5 G/DL (ref 31.4–37.4)
MCV RBC AUTO: 87 FL (ref 82–98)
MONOCYTES # BLD AUTO: 0.43 THOUSAND/ΜL (ref 0.17–1.22)
MONOCYTES NFR BLD AUTO: 6 % (ref 4–12)
NEUTROPHILS # BLD AUTO: 4.21 THOUSANDS/ΜL (ref 1.85–7.62)
NEUTS SEG NFR BLD AUTO: 63 % (ref 43–75)
NRBC BLD AUTO-RTO: 0 /100 WBCS
PLATELET # BLD AUTO: 247 THOUSANDS/UL (ref 149–390)
PMV BLD AUTO: 8.9 FL (ref 8.9–12.7)
POTASSIUM SERPL-SCNC: 4.1 MMOL/L (ref 3.5–5.3)
RBC # BLD AUTO: 3.88 MILLION/UL (ref 3.88–5.62)
SODIUM SERPL-SCNC: 141 MMOL/L (ref 136–145)
WBC # BLD AUTO: 6.73 THOUSAND/UL (ref 4.31–10.16)

## 2018-11-22 PROCEDURE — 80048 BASIC METABOLIC PNL TOTAL CA: CPT | Performed by: EMERGENCY MEDICINE

## 2018-11-22 PROCEDURE — 96375 TX/PRO/DX INJ NEW DRUG ADDON: CPT

## 2018-11-22 PROCEDURE — 96365 THER/PROPH/DIAG IV INF INIT: CPT

## 2018-11-22 PROCEDURE — 85025 COMPLETE CBC W/AUTO DIFF WBC: CPT | Performed by: EMERGENCY MEDICINE

## 2018-11-22 PROCEDURE — 36415 COLL VENOUS BLD VENIPUNCTURE: CPT | Performed by: EMERGENCY MEDICINE

## 2018-11-22 PROCEDURE — 99284 EMERGENCY DEPT VISIT MOD MDM: CPT

## 2018-11-22 PROCEDURE — 96366 THER/PROPH/DIAG IV INF ADDON: CPT

## 2018-11-22 RX ORDER — GINSENG 100 MG
1 CAPSULE ORAL ONCE
Status: COMPLETED | OUTPATIENT
Start: 2018-11-22 | End: 2018-11-22

## 2018-11-22 RX ORDER — MAGNESIUM SULFATE HEPTAHYDRATE 40 MG/ML
2 INJECTION, SOLUTION INTRAVENOUS ONCE
Status: COMPLETED | OUTPATIENT
Start: 2018-11-22 | End: 2018-11-22

## 2018-11-22 RX ORDER — DIPHENHYDRAMINE HYDROCHLORIDE 50 MG/ML
25 INJECTION INTRAMUSCULAR; INTRAVENOUS ONCE
Status: COMPLETED | OUTPATIENT
Start: 2018-11-22 | End: 2018-11-22

## 2018-11-22 RX ORDER — KETOROLAC TROMETHAMINE 30 MG/ML
30 INJECTION, SOLUTION INTRAMUSCULAR; INTRAVENOUS ONCE
Status: COMPLETED | OUTPATIENT
Start: 2018-11-22 | End: 2018-11-22

## 2018-11-22 RX ORDER — METOCLOPRAMIDE HYDROCHLORIDE 5 MG/ML
10 INJECTION INTRAMUSCULAR; INTRAVENOUS ONCE
Status: COMPLETED | OUTPATIENT
Start: 2018-11-22 | End: 2018-11-22

## 2018-11-22 RX ADMIN — DIPHENHYDRAMINE HYDROCHLORIDE 25 MG: 50 INJECTION, SOLUTION INTRAMUSCULAR; INTRAVENOUS at 06:51

## 2018-11-22 RX ADMIN — BACITRACIN ZINC 1 SMALL APPLICATION: 500 OINTMENT TOPICAL at 10:33

## 2018-11-22 RX ADMIN — SODIUM CHLORIDE 1000 ML: 0.9 INJECTION, SOLUTION INTRAVENOUS at 06:51

## 2018-11-22 RX ADMIN — METOCLOPRAMIDE 10 MG: 5 INJECTION, SOLUTION INTRAMUSCULAR; INTRAVENOUS at 06:51

## 2018-11-22 RX ADMIN — KETOROLAC TROMETHAMINE 30 MG: 30 INJECTION, SOLUTION INTRAMUSCULAR at 06:51

## 2018-11-22 RX ADMIN — MAGNESIUM SULFATE IN WATER 2 G: 40 INJECTION, SOLUTION INTRAVENOUS at 06:49

## 2018-11-22 NOTE — ED NOTES
Pt tolerated fluids  Resting comfortably       Fred Kawasaki, RN  11/22/18  Rue Saint-Antoine, RN  11/22/18 1047

## 2018-11-22 NOTE — ED PROVIDER NOTES
History  Chief Complaint   Patient presents with    Nausea     Presents from home via EMS for evaluation of nausea, emesis x2 PTA, severe headache   Headache     53-year-old male comes in complaining of headache  Patient has a history of chronic migraines and is followed by Neurology  Patient states that headache got worse at about 430 this morning and woke him up from sleep and that he has been vomiting  Has taken his medication at home but it has not helped  Patient is also concerned about dehydration saying that he has not been eating or drinking much because the stomach has been bothering him and he has had some loose stools over the last several days  History provided by:  Patient   used: No    Headache - Recurrent or Known Dx Migraines   Pain location:  Generalized  Quality:  Dull  Radiates to:  Does not radiate  Severity currently:  10/10  Severity at highest:  10/10  Onset quality:  Sudden  Duration:  2 hours  Timing:  Constant  Progression:  Worsening  Chronicity:  New  Context: activity and bright light    Ineffective treatments:  Prescription medications, resting in a darkened room and NSAIDs  Associated symptoms: nausea    Associated symptoms: no abdominal pain, no blurred vision, no congestion, no cough, no dizziness, no fever, no neck pain, no seizures and no visual change    Risk factors: sedentary lifestyle    Risk factors: no anger        Prior to Admission Medications   Prescriptions Last Dose Informant Patient Reported? Taking?    Insulin Pen Needle (PEN NEEDLES 3/16") 31G X 5 MM MISC   No Yes   Sig: by Does not apply route 4 (four) times a day   LORazepam (ATIVAN) 0 5 mg tablet  Self No Yes   Sig: Take 1 tablet (0 5 mg total) by mouth 2 (two) times a day   Patient taking differently: Take 0 5 mg by mouth 2 (two) times a day as needed     SUMAtriptan (IMITREX) 100 mg tablet   No Yes   Sig: Take 1 tablet (100 mg total) by mouth once as needed for migraine for up to 1 dose   aspirin 81 mg chewable tablet   No Yes   Sig: Chew 1 tablet (81 mg total) daily Over the counter   fluvoxaMINE (LUVOX) 100 mg tablet   No Yes   Sig: Take 2 tablets (200 mg total) by mouth daily at bedtime   Patient taking differently: Take 100 mg by mouth 2 (two) times a day     insulin aspart (NovoLOG) 100 units/mL injection   No Yes   Sig: Inject 10 Units under the skin 3 (three) times a day before meals   insulin glargine (BASAGLAR KWIKPEN) 100 units/mL injection pen   No Yes   Sig: Inject 40 Units under the skin daily at bedtime   levothyroxine 100 mcg tablet   No Yes   Sig: Take 1 tablet (100 mcg total) by mouth daily   lisinopril (ZESTRIL) 20 mg tablet   No Yes   Sig: Take 1 tablet (20 mg total) by mouth daily   nitroglycerin (NITROSTAT) 0 4 mg SL tablet   No Yes   Sig: Place 1 tablet (0 4 mg total) under the tongue every 5 (five) minutes as needed for chest pain   pantoprazole (PROTONIX) 40 mg tablet   No Yes   Sig: Take 1 tablet (40 mg total) by mouth daily   topiramate (TOPAMAX) 100 mg tablet Past Week at Unknown time  No Yes   Sig: One p o  q h s  after titration   ziprasidone (GEODON) 40 mg capsule  Self No Yes   Sig: take 1 capsule by mouth twice a day with meals   Patient taking differently: 60mg take 1 capsule by mouth twice a day with meals      Facility-Administered Medications: None       Past Medical History:   Diagnosis Date    Diabetes mellitus ([place] [address] )     Disease of thyroid gland     Gastroparesis     GERD (gastroesophageal reflux disease)     Hypertension     Obsessive compulsive disorder     [facility]ffective disorder ([facility] )        History reviewed  No pertinent surgical history      Family History   Problem Relation Age of Onset    COPD Mother     Hypertension Mother     Heart failure Mother     Rheum arthritis Family     Heart disease Family     Hypertension Father     Diabetes Father     Hyperlipidemia Father      I have reviewed and agree with the history as documented  Social History   Substance Use Topics    Smoking status: Never Smoker    Smokeless tobacco: Never Used    Alcohol use No        Review of Systems   Constitutional: Negative for activity change, appetite change and fever  HENT: Negative for congestion and facial swelling  Eyes: Negative for blurred vision, discharge and redness  Respiratory: Negative for cough and wheezing  Cardiovascular: Negative for chest pain and leg swelling  Gastrointestinal: Positive for nausea  Negative for abdominal distention, abdominal pain and blood in stool  Endocrine: Negative for cold intolerance and polydipsia  Genitourinary: Negative for difficulty urinating and hematuria  Musculoskeletal: Negative for arthralgias, gait problem and neck pain  Skin: Negative for color change and rash  Allergic/Immunologic: Negative for food allergies and immunocompromised state  Neurological: Negative for dizziness, seizures and headaches  Hematological: Negative for adenopathy  Does not bruise/bleed easily  Psychiatric/Behavioral: Negative for agitation, confusion and decreased concentration  All other systems reviewed and are negative  Physical Exam  Physical Exam   Constitutional: He is oriented to person, place, and time  He appears well-developed and well-nourished  Non-toxic appearance  HENT:   Head: Normocephalic and atraumatic  Right Ear: Tympanic membrane normal    Left Ear: Tympanic membrane normal    Nose: Nose normal    Mouth/Throat: Oropharynx is clear and moist    Eyes: Pupils are equal, round, and reactive to light  Conjunctivae, EOM and lids are normal    Neck: Trachea normal and normal range of motion  Neck supple  No JVD present  Carotid bruit is not present  Cardiovascular: Normal rate, regular rhythm, normal heart sounds and intact distal pulses  No extrasystoles are present  Pulmonary/Chest: Effort normal  He has no decreased breath sounds  He has no wheezes   He has no rhonchi  He has no rales  He exhibits no tenderness and no deformity  Abdominal: Soft  Bowel sounds are normal  There is no tenderness  There is no rebound, no guarding and no CVA tenderness  Musculoskeletal:        Right shoulder: He exhibits normal range of motion, no tenderness, no swelling and no deformity  Cervical back: Normal  He exhibits normal range of motion, no tenderness, no bony tenderness and no deformity  Lymphadenopathy:     He has no cervical adenopathy  He has no axillary adenopathy  Neurological: He is alert and oriented to person, place, and time  He has normal strength and normal reflexes  No cranial nerve deficit or sensory deficit  He displays a negative Romberg sign  Skin: Skin is warm and dry  Psychiatric: He has a normal mood and affect  His speech is normal and behavior is normal  Judgment and thought content normal  Cognition and memory are normal    Nursing note and vitals reviewed        Vital Signs  ED Triage Vitals [11/22/18 0634]   Temperature Pulse Respirations Blood Pressure SpO2   97 8 °F (36 6 °C) 80 20 169/85 98 %      Temp Source Heart Rate Source Patient Position - Orthostatic VS BP Location FiO2 (%)   Oral Monitor Sitting Right arm --      Pain Score       8           Vitals:    11/22/18 0634 11/22/18 0830   BP: 169/85 (!) 178/87   Pulse: 80 78   Patient Position - Orthostatic VS: Sitting Lying       Visual Acuity  Visual Acuity      Most Recent Value   L Pupil Size (mm)  3   R Pupil Size (mm)  3          ED Medications  Medications   sodium chloride 0 9 % bolus 1,000 mL (0 mL Intravenous Stopped 11/22/18 0907)   diphenhydrAMINE (BENADRYL) injection 25 mg (25 mg Intravenous Given 11/22/18 0651)   metoclopramide (REGLAN) injection 10 mg (10 mg Intravenous Given 11/22/18 0651)   ketorolac (TORADOL) injection 30 mg (30 mg Intravenous Given 11/22/18 0651)   magnesium sulfate 2 g/50 mL IVPB (premix) 2 g (0 g Intravenous Stopped 11/22/18 0907)   bacitracin topical ointment 1 small application (1 small application Topical Given 11/22/18 1033)       Diagnostic Studies  Results Reviewed     Procedure Component Value Units Date/Time    Basic metabolic panel [[id number]]  (Abnormal) Collected:  11/22/18 0648    Lab Status:  Final result Specimen:  Blood from Arm, Left Updated:  11/22/18 0703     Sodium 141 mmol/L      Potassium 4 1 mmol/L      Chloride 105 mmol/L      CO2 26 mmol/L      ANION GAP 10 mmol/L      BUN 21 mg/dL      Creatinine 1 11 mg/dL      Glucose 156 (H) mg/dL      Calcium 9 1 mg/dL      eGFR 83 ml/min/1 73sq m     Narrative:         National Kidney Disease Education Program recommendations are as follows:  GFR calculation is accurate only with a steady state creatinine  Chronic Kidney disease less than 60 ml/min/1 73 sq  meters  Kidney failure less than 15 ml/min/1 73 sq  meters      CBC and differential [[id number]]  (Abnormal) Collected:  11/22/18 0648    Lab Status:  Final result Specimen:  Blood from Arm, Left Updated:  11/22/18 0652     WBC 6 73 Thousand/uL      RBC 3 88 Million/uL      Hemoglobin 11 3 (L) g/dL      Hematocrit 33 7 (L) %      MCV 87 fL      MCH 29 1 pg      MCHC 33 5 g/dL      RDW 14 1 %      MPV 8 9 fL      Platelets 426 Thousands/uL      nRBC 0 /100 WBCs      Neutrophils Relative 63 %      Immat GRANS % 0 %      Lymphocytes Relative 26 %      Monocytes Relative 6 %      Eosinophils Relative 4 %      Basophils Relative 1 %      Neutrophils Absolute 4 21 Thousands/µL      Immature Grans Absolute 0 02 Thousand/uL      Lymphocytes Absolute 1 74 Thousands/µL      Monocytes Absolute 0 43 Thousand/µL      Eosinophils Absolute 0 29 Thousand/µL      Basophils Absolute 0 04 Thousands/µL                  No orders to display              Procedures  Procedures       Phone Contacts  ED Phone Contact    ED Course                               MDM  Number of Diagnoses or Management Options  Migraine headache: new and requires workup  Nausea & vomiting: new and requires workup  Toenail avulsion: new and requires workup     Amount and/or Complexity of Data Reviewed  Clinical lab tests: ordered and reviewed  Tests in the radiology section of CPT®: ordered and reviewed  Tests in the medicine section of CPT®: ordered and reviewed  Review and summarize past medical records: yes    Patient Progress  Patient progress: improved    CritCare Time    Disposition  Final diagnoses:   Migraine headache   Nausea & vomiting   Toenail avulsion     Time reflects when diagnosis was documented in both MDM as applicable and the Disposition within this note     Time User Action Codes Description Comment    11/22/2018  9:40 AM Mary Chavez Add [G43 909] Migraine headache     11/22/2018  9:41 AM Mary Chavez Add [R11 2] Nausea & vomiting     11/22/2018 10:11 AM Kya Gallegos Add [G96 261M] Toenail avulsion       ED Disposition     ED Disposition Condition Comment    Discharge  Umang Benedict discharge to home/self care      Condition at discharge: Stable        Follow-up Information     Follow up With Specialties Details Why Contact Info    Love Weber MD Family Medicine Schedule an appointment as soon as possible for a visit in 2 days For recheck of current symptoms [address] [place] [address] an appointment as soon as possible for a visit in 1 day For recheck of current symptoms [address] [place] [address] Scott Cook  [phone]            Discharge Medication List as of 11/22/2018 10:11 AM      CONTINUE these medications which have NOT CHANGED    Details   aspirin 81 mg chewable tablet Chew 1 tablet (81 mg total) daily Over the counter, Starting Thu 11/1/2018, No Print      fluvoxaMINE (LUVOX) 100 mg tablet Take 2 tablets (200 mg total) by mouth daily at bedtime, Starting Wed 9/19/2018, Normal      insulin aspart (NovoLOG) 100 units/mL injection Inject 10 Units under the skin 3 (three) times a day before meals, Starting Wed 9/19/2018, Normal      insulin glargine (BASAGLAR KWIKPEN) 100 units/mL injection pen Inject 40 Units under the skin daily at bedtime, Starting Thu 11/1/2018, No Print      Insulin Pen Needle (PEN NEEDLES 3/16") 31G X 5 MM MISC by Does not apply route 4 (four) times a day, Starting Tue 10/2/2018, Normal      levothyroxine 100 mcg tablet Take 1 tablet (100 mcg total) by mouth daily, Starting Wed 11/21/2018, Normal      lisinopril (ZESTRIL) 20 mg tablet Take 1 tablet (20 mg total) by mouth daily, Starting Thu 11/1/2018, Normal      LORazepam (ATIVAN) 0 5 mg tablet Take 1 tablet (0 5 mg total) by mouth 2 (two) times a day, Starting Wed 10/17/2018, Normal      nitroglycerin (NITROSTAT) 0 4 mg SL tablet Place 1 tablet (0 4 mg total) under the tongue every 5 (five) minutes as needed for chest pain, Starting Fri 10/26/2018, Print      pantoprazole (PROTONIX) 40 mg tablet Take 1 tablet (40 mg total) by mouth daily, Starting Wed 9/19/2018, Normal      SUMAtriptan (IMITREX) 100 mg tablet Take 1 tablet (100 mg total) by mouth once as needed for migraine for up to 1 dose, Starting Fri 11/9/2018, Normal      topiramate (TOPAMAX) 100 mg tablet One p o  q h s  after titration, Normal      ziprasidone (GEODON) 40 mg capsule take 1 capsule by mouth twice a day with meals, Normal           No discharge procedures on file      ED Provider  Electronically Signed by           Jaiden Cervantes DO  11/25/18 0254

## 2018-11-22 NOTE — DISCHARGE INSTRUCTIONS
Nail Avulsion   WHAT YOU NEED TO KNOW:   Nail avulsion is when part or all of a nail is torn away or removed from the nail bed  Avulsion may happen on your finger or toe  Common causes include ingrown nail, injury, or infection  The nail bed will form a hard layer and then a new nail may grow  The nail bed will be sensitive until the hard layer forms  You will need to keep it covered to prevent infection or more injury  You may need to care for your nail area for several months as the new nail grows  DISCHARGE INSTRUCTIONS:   Return to the emergency department if:   · Blood soaks through your bandage  · You have a red streak running up your leg or arm  Contact your healthcare provider if:   · You have a fever or chills  · Your injured area is red, swollen, or draining pus  · You have new or worsening pain, or pain that does not get better with medicine  · You have questions or concerns about your condition or care  Medicines:   · Antibiotics  may help treat or prevent a bacterial infection  · Acetaminophen  decreases pain and fever  It is available without a doctor's order  Ask how much to take and how often to take it  Follow directions  Acetaminophen can cause liver damage if not taken correctly  · NSAIDs , such as ibuprofen, help decrease swelling, pain, and fever  This medicine is available with or without a doctor's order  NSAIDs can cause stomach bleeding or kidney problems in certain people  If you take blood thinner medicine, always ask your healthcare provider if NSAIDs are safe for you  Always read the medicine label and follow directions  · Take your medicine as directed  Contact your healthcare provider if you think your medicine is not helping or if you have side effects  Tell him or her if you are allergic to any medicine  Keep a list of the medicines, vitamins, and herbs you take  Include the amounts, and when and why you take them   Bring the list or the pill bottles to follow-up visits  Carry your medicine list with you in case of an emergency  Self-care:   · Keep your nail area clean, dry, and covered  When you are allowed to bathe, carefully wash the area with soap and water  Put on a clean, new bandage  Do not use an adhesive bandage  It may stick to the wound and cause pain when you remove it  Ask your healthcare provider what kind of bandage to use  Change your bandage when it gets wet or dirty  Your healthcare provider may suggest that you change the bandage every 24 hours for the first few days  · Elevate  your hand or foot above the level of your heart as often as you can for 24 hours  This will help decrease swelling and pain  Prop your hand or foot on pillows or blankets to keep it elevated comfortably  · Apply ice  on your wound area for 15 to 20 minutes every hour or as directed  Use an ice pack, or put crushed ice in a plastic bag  Cover it with a towel  Ice helps prevent tissue damage and decreases swelling and pain  · Do not wear tight shoes  or shoes that do not fit well  Do not wear tights or pantyhose  Wear cotton socks  · Ask  when you can return to work, school, or your usual sports and activities  Follow up with your healthcare provider as directed: You may be referred to a hand or foot specialist  Write down your questions so you remember to ask them during your visits  © 2017 [facility] Information is for End User's use only and may not be sold, redistributed or otherwise used for commercial purposes  All illustrations and images included in CareNotes® are the copyrighted property of [employer]  or Ayden Simpson  The above information is an  only  It is not intended as medical advice for individual conditions or treatments  Talk to your doctor, nurse or pharmacist before following any medical regimen to see if it is safe and effective for you    Acute Nausea and Vomiting   WHAT YOU NEED TO KNOW: Acute nausea and vomiting start suddenly, worsen quickly, and last a short time  DISCHARGE INSTRUCTIONS:   Return to the emergency department if:   · You see blood in your vomit or your bowel movements  · You have sudden, severe pain in your chest and upper abdomen after hard vomiting or retching  · You have swelling in your neck and chest      · You are dizzy, cold, and thirsty and your eyes and mouth are dry  · You are urinating very little or not at all  · You have muscle weakness, leg cramps, and trouble breathing  · Your heart is beating much faster than normal      · You continue to vomit for more than 48 hours  Contact your healthcare provider if:   · You have frequent dry heaves (vomiting but nothing comes out)  · Your nausea and vomiting does not get better or go away after you use medicine  · You have questions or concerns about your condition or treatment  Medicines: You may need any of the following:  · Medicines  may be given to calm your stomach and stop your vomiting  You may also need medicines to help you feel more relaxed or to stop nausea and vomiting caused by motion sickness  · Gastrointestinal stimulants  are used to help empty your stomach and bowels  This may help decrease nausea and vomiting  · Take your medicine as directed  Contact your healthcare provider if you think your medicine is not helping or if you have side effects  Tell him or her if you are allergic to any medicine  Keep a list of the medicines, vitamins, and herbs you take  Include the amounts, and when and why you take them  Bring the list or the pill bottles to follow-up visits  Carry your medicine list with you in case of an emergency  Prevent or manage acute nausea and vomiting:   · Do not drink alcohol  Alcohol may upset or irritate your stomach  Too much alcohol can also cause acute nausea and vomiting  · Control stress  Headaches due to stress may cause nausea and vomiting   Find ways to relax and manage your stress  Get more rest and sleep  · Drink more liquids as directed  Vomiting can lead to dehydration  It is important to drink more liquids to help replace lost body fluids  Ask your healthcare provider how much liquid to drink each day and which liquids are best for you  Your provider may recommend that you drink an oral rehydration solution (ORS)  ORS contains water, salts, and sugar that are needed to replace the lost body fluids  Ask what kind of ORS to use, how much to drink, and where to get it  · Eat smaller meals, more often  Eat small amounts of food every 2 to 3 hours, even if you are not hungry  Food in your stomach may decrease your nausea  · Talk to your healthcare provider before you take over-the-counter (OTC) medicines  These medicines can cause serious problems if you use certain other medicines, or you have a medical condition  You may have problems if you use too much or use them for longer than the label says  Follow directions on the label carefully  Follow up with your healthcare provider as directed:  Write down your questions so you remember to ask them during your follow-up visits  © 2017 [address]  Information is for End User's use only and may not be sold, redistributed or otherwise used for commercial purposes  All illustrations and images included in CareNotes® are the copyrighted property of A [employer] M [employer]  or Ayden Simpson  The above information is an  only  It is not intended as medical advice for individual conditions or treatments  Talk to your doctor, nurse or pharmacist before following any medical regimen to see if it is safe and effective for you  Migraine Headache   WHAT YOU NEED TO KNOW:   A migraine is a severe headache  The pain can be so severe that it interferes with your daily activities  A migraine can last a few hours up to several days  The exact cause of migraines is not known  DISCHARGE INSTRUCTIONS:   Return to the emergency department if:   · You have a headache that seems different or much worse than your usual migraine headache  · You have a severe headache with a fever or a stiff neck  · You have new problems with speech, vision, balance, or movement  · You feel like you are going to faint, you become confused, or you have a seizure  Contact your healthcare provider or neurologist if:   · Your migraines interfere with your daily activities  · Your medicines or treatments stop working  · You have questions or concerns about your condition or care  Medicines: You may need any of the following  Take medicine as soon as you feel a migraine begin  · Prescription pain medicine  may be given  Do not wait until the pain is severe before you take your medicine  · Migraine medicines  are used to help prevent a migraine or stop it once it starts  · Antinausea medicine  may be given to calm your stomach and to help prevent vomiting  This medicine can also help relieve pain  · Take your medicine as directed  Contact your healthcare provider if you think your medicine is not helping or if you have side effects  Tell him or her if you are allergic to any medicine  Keep a list of the medicines, vitamins, and herbs you take  Include the amounts, and when and why you take them  Bring the list or the pill bottles to follow-up visits  Carry your medicine list with you in case of an emergency  Manage your symptoms:   · Rest in a dark, quiet room  This will help decrease your pain  Sleep may also help relieve the pain  · Apply ice to decrease pain  Use an ice pack, or put crushed ice in a plastic bag  Cover the ice pack with a towel and place it on your head  Apply ice for 15 to 20 minutes every hour  · Apply heat to decrease pain and muscle spasms  Use a small towel dampened with warm water or a heating pad, or sit in a warm bath   Apply heat on the area for 20 to 30 minutes every 2 hours  You may alternate heat and ice  · Keep a migraine record  Write down when your migraines start and stop  Include your symptoms and what you were doing when a migraine began  Record what you ate or drank for 24 hours before the migraine started  Keep track of what you did to treat your migraine and if it worked  Bring the migraine record with you to visits with your healthcare provider  Follow up with your healthcare provider or neurologist as directed:  Bring your migraine record with you  Write down your questions so you remember to ask them during your visits  Prevent another migraine:   · Do not smoke  Nicotine and other chemicals in cigarettes and cigars can trigger a migraine or make it worse  Ask your healthcare provider for information if you currently smoke and need help to quit  E-cigarettes or smokeless tobacco still contain nicotine  Talk to your healthcare provider before you use these products  · Do not drink alcohol  Alcohol can trigger a migraine  It can also keep medicines used to treat your migraines from working  · Get regular exercise  Exercise may help prevent migraines  Talk to your healthcare provider about the best exercise plan for you  Try to get at least 30 minutes of exercise on most days  · Manage stress  Stress may trigger a migraine  Learn new ways to relax, such as deep breathing  · Create a sleep schedule  Go to bed and get up at the same times each day  Do not watch television before bed  · Eat regular meals  Include healthy foods such as include fruit, vegetables, whole-grain breads, low-fat dairy products, beans, lean meat, and fish  Do not have food or drinks that trigger your migraines  © 2017 [address] Information is for End User's use only and may not be sold, redistributed or otherwise used for commercial purposes   All illustrations and images included in CareNotes® are the copyrighted property of [employer]  or Ayden Simpson  The above information is an  only  It is not intended as medical advice for individual conditions or treatments  Talk to your doctor, nurse or pharmacist before following any medical regimen to see if it is safe and effective for you

## 2018-11-28 ENCOUNTER — HOSPITAL ENCOUNTER (OUTPATIENT)
Dept: NEUROLOGY | Facility: AMBULATORY SURGERY CENTER | Age: 38
Discharge: HOME | End: 2018-11-28
Payer: COMMERCIAL

## 2018-11-28 DIAGNOSIS — R41.0 EPISODIC CONFUSION: ICD-10-CM

## 2018-11-28 PROCEDURE — 95819 EEG AWAKE AND ASLEEP: CPT

## 2018-11-28 PROCEDURE — 95816 EEG AWAKE AND DROWSY: CPT | Performed by: PSYCHIATRY & NEUROLOGY

## 2018-12-02 ENCOUNTER — HOSPITAL ENCOUNTER (EMERGENCY)
Facility: HOSPITAL | Age: 38
Discharge: HOME | End: 2018-12-02
Attending: EMERGENCY MEDICINE | Admitting: EMERGENCY MEDICINE
Payer: COMMERCIAL

## 2018-12-02 VITALS
SYSTOLIC BLOOD PRESSURE: 105 MMHG | TEMPERATURE: 97.4 F | HEIGHT: 62 IN | BODY MASS INDEX: 55.21 KG/M2 | WEIGHT: 300 LBS | HEART RATE: 94 BPM | OXYGEN SATURATION: 98 % | DIASTOLIC BLOOD PRESSURE: 59 MMHG | RESPIRATION RATE: 17 BRPM

## 2018-12-02 DIAGNOSIS — G43.909 MIGRAINE: Primary | ICD-10-CM

## 2018-12-02 DIAGNOSIS — R11.0 NAUSEA: ICD-10-CM

## 2018-12-02 LAB
ALBUMIN SERPL BCP-MCNC: 3.6 G/DL (ref 3.5–5)
ALP SERPL-CCNC: 67 U/L (ref 46–116)
ALT SERPL W P-5'-P-CCNC: 21 U/L (ref 12–78)
ANION GAP SERPL CALCULATED.3IONS-SCNC: 6 MMOL/L (ref 4–13)
AST SERPL W P-5'-P-CCNC: 8 U/L (ref 5–45)
BILIRUB SERPL-MCNC: 0.33 MG/DL (ref 0.2–1)
BUN SERPL-MCNC: 23 MG/DL (ref 5–25)
CALCIUM SERPL-MCNC: 8.7 MG/DL (ref 8.3–10.1)
CHLORIDE SERPL-SCNC: 105 MMOL/L (ref 100–108)
CO2 SERPL-SCNC: 27 MMOL/L (ref 21–32)
CREAT SERPL-MCNC: 1.37 MG/DL (ref 0.6–1.3)
GFR SERPL CREATININE-BSD FRML MDRD: 64 ML/MIN/1.73SQ M
GLUCOSE SERPL-MCNC: 271 MG/DL (ref 65–140)
POTASSIUM SERPL-SCNC: 3.9 MMOL/L (ref 3.5–5.3)
PROT SERPL-MCNC: 7.5 G/DL (ref 6.4–8.2)
SODIUM SERPL-SCNC: 138 MMOL/L (ref 136–145)

## 2018-12-02 PROCEDURE — 96365 THER/PROPH/DIAG IV INF INIT: CPT

## 2018-12-02 PROCEDURE — 36415 COLL VENOUS BLD VENIPUNCTURE: CPT | Performed by: EMERGENCY MEDICINE

## 2018-12-02 PROCEDURE — 99283 EMERGENCY DEPT VISIT LOW MDM: CPT

## 2018-12-02 PROCEDURE — 96375 TX/PRO/DX INJ NEW DRUG ADDON: CPT

## 2018-12-02 PROCEDURE — 80053 COMPREHEN METABOLIC PANEL: CPT | Performed by: EMERGENCY MEDICINE

## 2018-12-02 RX ORDER — METOCLOPRAMIDE HYDROCHLORIDE 5 MG/ML
10 INJECTION INTRAMUSCULAR; INTRAVENOUS ONCE
Status: DISCONTINUED | OUTPATIENT
Start: 2018-12-02 | End: 2018-12-02 | Stop reason: HOSPADM

## 2018-12-02 RX ORDER — MAGNESIUM SULFATE HEPTAHYDRATE 40 MG/ML
2 INJECTION, SOLUTION INTRAVENOUS ONCE
Status: COMPLETED | OUTPATIENT
Start: 2018-12-02 | End: 2018-12-02

## 2018-12-02 RX ORDER — DIPHENHYDRAMINE HYDROCHLORIDE 50 MG/ML
25 INJECTION INTRAMUSCULAR; INTRAVENOUS ONCE
Status: COMPLETED | OUTPATIENT
Start: 2018-12-02 | End: 2018-12-02

## 2018-12-02 RX ORDER — KETOROLAC TROMETHAMINE 30 MG/ML
30 INJECTION, SOLUTION INTRAMUSCULAR; INTRAVENOUS ONCE
Status: COMPLETED | OUTPATIENT
Start: 2018-12-02 | End: 2018-12-02

## 2018-12-02 RX ORDER — ONDANSETRON 2 MG/ML
4 INJECTION INTRAMUSCULAR; INTRAVENOUS ONCE
Status: COMPLETED | OUTPATIENT
Start: 2018-12-02 | End: 2018-12-02

## 2018-12-02 RX ADMIN — DIPHENHYDRAMINE HYDROCHLORIDE 25 MG: 50 INJECTION, SOLUTION INTRAMUSCULAR; INTRAVENOUS at 15:52

## 2018-12-02 RX ADMIN — MAGNESIUM SULFATE IN WATER 2 G: 40 INJECTION, SOLUTION INTRAVENOUS at 15:57

## 2018-12-02 RX ADMIN — KETOROLAC TROMETHAMINE 30 MG: 30 INJECTION, SOLUTION INTRAMUSCULAR at 15:52

## 2018-12-02 RX ADMIN — ONDANSETRON 4 MG: 2 INJECTION INTRAMUSCULAR; INTRAVENOUS at 15:51

## 2018-12-02 NOTE — DISCHARGE INSTRUCTIONS
Acute Nausea and Vomiting, Ambulatory Care   GENERAL INFORMATION:   Acute nausea and vomiting  starts suddenly, gets worse quickly, and lasts a short time  Nausea and vomiting may be caused by pregnancy, alcohol, infection, or medicines  Common related symptoms include the following:   · Fever    · Abdominal swelling    · Pain, tenderness, or a lump in the abdomen    · Splashing sounds heard in your stomach when you move  Seek immediate care for the following symptoms:   · Blood in your vomit or bowel movements    · Sudden, severe pain in your chest and upper abdomen after hard vomiting    · Dizziness, dry mouth, and thirst    · Urinating very little or not at all    · Muscle weakness, leg cramps, and trouble breathing    · A heart beat that is faster than normal    · Vomiting for more than 48 hours  Treatment for acute nausea and vomiting  may include medicines to calm your stomach and stop the vomiting  You may need IV fluids if you are dehydrated  Manage your nausea and vomiting:   · Drink liquids as directed to prevent dehydration  Ask how much liquid to drink each day and which liquids are best for you  You may need to drink an oral rehydration solution (ORS)  ORS contains water, salts, and sugar that are needed to replace the lost body fluids  Ask what kind of ORS to use, how much to drink, and where to get it  · Eat smaller meals, more often  Eat small amounts of food every 2 to 3 hours, even if you are not hungry  Food in your stomach may help decrease your nausea  · Avoid stress  Find ways to relax and manage your stress  Headaches due to stress may cause nausea and vomiting  Get more rest and sleep  Follow up with your healthcare provider as directed:  Write down your questions so you remember to ask them during your visits  CARE AGREEMENT:   You have the right to help plan your care  Learn about your health condition and how it may be treated   Discuss treatment options with your caregivers to decide what care you want to receive  You always have the right to refuse treatment  The above information is an  only  It is not intended as medical advice for individual conditions or treatments  Talk to your doctor, nurse or pharmacist before following any medical regimen to see if it is safe and effective for you  © 2014 [address] is for End User's use only and may not be sold, redistributed or otherwise used for commercial purposes  All illustrations and images included in CareNotes® are the copyrighted property of [employer]  or Ayden Simpson  Migraine Headache   WHAT YOU NEED TO KNOW:   A migraine is a severe headache  The pain can be so severe that it interferes with your daily activities  A migraine can last a few hours up to several days  The exact cause of migraines is not known  DISCHARGE INSTRUCTIONS:   Return to the emergency department if:   · You have a headache that seems different or much worse than your usual migraine headache  · You have a severe headache with a fever or a stiff neck  · You have new problems with speech, vision, balance, or movement  · You feel like you are going to faint, you become confused, or you have a seizure  Contact your healthcare provider or neurologist if:   · Your migraines interfere with your daily activities  · Your medicines or treatments stop working  · You have questions or concerns about your condition or care  Medicines: You may need any of the following  Take medicine as soon as you feel a migraine begin  · Prescription pain medicine  may be given  Do not wait until the pain is severe before you take your medicine  · Migraine medicines  are used to help prevent a migraine or stop it once it starts  · Antinausea medicine  may be given to calm your stomach and to help prevent vomiting  This medicine can also help relieve pain  · Take your medicine as directed  Contact your healthcare provider if you think your medicine is not helping or if you have side effects  Tell him or her if you are allergic to any medicine  Keep a list of the medicines, vitamins, and herbs you take  Include the amounts, and when and why you take them  Bring the list or the pill bottles to follow-up visits  Carry your medicine list with you in case of an emergency  Manage your symptoms:   · Rest in a dark, quiet room  This will help decrease your pain  Sleep may also help relieve the pain  · Apply ice to decrease pain  Use an ice pack, or put crushed ice in a plastic bag  Cover the ice pack with a towel and place it on your head  Apply ice for 15 to 20 minutes every hour  · Apply heat to decrease pain and muscle spasms  Use a small towel dampened with warm water or a heating pad, or sit in a warm bath  Apply heat on the area for 20 to 30 minutes every 2 hours  You may alternate heat and ice  · Keep a migraine record  Write down when your migraines start and stop  Include your symptoms and what you were doing when a migraine began  Record what you ate or drank for 24 hours before the migraine started  Keep track of what you did to treat your migraine and if it worked  Bring the migraine record with you to visits with your healthcare provider  Follow up with your healthcare provider or neurologist as directed:  Bring your migraine record with you  Write down your questions so you remember to ask them during your visits  Prevent another migraine:   · Do not smoke  Nicotine and other chemicals in cigarettes and cigars can trigger a migraine or make it worse  Ask your healthcare provider for information if you currently smoke and need help to quit  E-cigarettes or smokeless tobacco still contain nicotine  Talk to your healthcare provider before you use these products  · Do not drink alcohol  Alcohol can trigger a migraine   It can also keep medicines used to treat your migraines from working  · Get regular exercise  Exercise may help prevent migraines  Talk to your healthcare provider about the best exercise plan for you  Try to get at least 30 minutes of exercise on most days  · Manage stress  Stress may trigger a migraine  Learn new ways to relax, such as deep breathing  · Create a sleep schedule  Go to bed and get up at the same times each day  Do not watch television before bed  · Eat regular meals  Include healthy foods such as include fruit, vegetables, whole-grain breads, low-fat dairy products, beans, lean meat, and fish  Do not have food or drinks that trigger your migraines  © 2017 [address] Information is for End User's use only and may not be sold, redistributed or otherwise used for commercial purposes  All illustrations and images included in CareNotes® are the copyrighted property of [employer]  or Ayden Simpson  The above information is an  only  It is not intended as medical advice for individual conditions or treatments  Talk to your doctor, nurse or pharmacist before following any medical regimen to see if it is safe and effective for you

## 2018-12-02 NOTE — ED PROVIDER NOTES
History  Chief Complaint   Patient presents with    Migraine     pt reports migraine, chills, confusion, and lightheadedness increasing over 2 days     HPI    This is a 42-year-old male who presents to the emergency department for evaluation of a headache  Patient has a history of migraines, he was recently negative past 1 year  He follows with Neurology Dr Quintin Ledezma, takes Topamax daily as well as Excedrin to break his headache  He states his current headache is like his previous headaches, bitemporal, endorses photophobia  Did not remit with Excedrin yesterday  Headache was gradual in onset, did not wake patient up  No neck stiffness  No focal neurological symptoms  No temporal artery pain/tenderness  No vision changes  Headaches are not increasing in severity or frequency  Not worse in the AM  No head trauma  No difficulty with speech  No family history of subarachnoid hemorrhage or brain tumor or aneurysm  On ROS, Does endorse mild nausea earlier, as patient has a history of gastroparesis  No emesis, no changes to BM in past few days, able to keep food down without difficutlty  Otherwise denying any chest pain, abdominal pain, SOB  Denies fevers, endorses some chills yesterday  Remainder ROS negative  Of note, Patient seen in ED multiple times in past month with similar symptoms, asking for opioids  Prior to Admission Medications   Prescriptions Last Dose Informant Patient Reported? Taking?    Insulin Pen Needle (PEN NEEDLES 3/16") 31G X 5 MM MISC   No No   Sig: by Does not apply route 4 (four) times a day   LORazepam (ATIVAN) 0 5 mg tablet  Self No No   Sig: Take 1 tablet (0 5 mg total) by mouth 2 (two) times a day   Patient taking differently: Take 0 5 mg by mouth 2 (two) times a day as needed     SUMAtriptan (IMITREX) 100 mg tablet   No No   Sig: Take 1 tablet (100 mg total) by mouth once as needed for migraine for up to 1 dose   aspirin 81 mg chewable tablet   No No   Sig: Chew 1 tablet (81 mg total) daily Over the counter   fluvoxaMINE (LUVOX) 100 mg tablet   No No   Sig: Take 2 tablets (200 mg total) by mouth daily at bedtime   Patient taking differently: Take 100 mg by mouth 2 (two) times a day     insulin aspart (NovoLOG) 100 units/mL injection   No No   Sig: Inject 10 Units under the skin 3 (three) times a day before meals   insulin glargine (BASAGLAR KWIKPEN) 100 units/mL injection pen   No No   Sig: Inject 40 Units under the skin daily at bedtime   levothyroxine 100 mcg tablet   No No   Sig: Take 1 tablet (100 mcg total) by mouth daily   lisinopril (ZESTRIL) 20 mg tablet   No No   Sig: Take 1 tablet (20 mg total) by mouth daily   nitroglycerin (NITROSTAT) 0 4 mg SL tablet   No No   Sig: Place 1 tablet (0 4 mg total) under the tongue every 5 (five) minutes as needed for chest pain   pantoprazole (PROTONIX) 40 mg tablet   No No   Sig: Take 1 tablet (40 mg total) by mouth daily   topiramate (TOPAMAX) 100 mg tablet   No No   Sig: One p o  q h s  after titration   ziprasidone (GEODON) 40 mg capsule  Self No No   Sig: take 1 capsule by mouth twice a day with meals   Patient taking differently: 60mg take 1 capsule by mouth twice a day with meals      Facility-Administered Medications: None       Past Medical History:   Diagnosis Date    Diabetes mellitus ([place] [address] )     Disease of thyroid gland     Gastroparesis     GERD (gastroesophageal reflux disease)     Hypertension     Obsessive compulsive disorder     Schizoaffective disorder ([place] [address] )        History reviewed  No pertinent surgical history  Family History   Problem Relation Age of Onset    COPD Mother     Hypertension Mother     Heart failure Mother     Rheum arthritis Family     Heart disease Family     Hypertension Father     Diabetes Father     Hyperlipidemia Father      I have reviewed and agree with the history as documented      Social History   Substance Use Topics    Smoking status: Never Smoker    Smokeless tobacco: Never Used  Alcohol use No        Review of Systems   Constitutional: Negative for chills, fatigue and fever  HENT: Negative for nosebleeds and sore throat  Eyes: Negative for redness and visual disturbance  Respiratory: Negative for shortness of breath and wheezing  Cardiovascular: Negative for chest pain and palpitations  Gastrointestinal: Negative for abdominal pain and diarrhea  Endocrine: Negative for polyuria  Genitourinary: Negative for difficulty urinating and testicular pain  Musculoskeletal: Negative for back pain and neck stiffness  Skin: Negative for rash and wound  Neurological: Positive for headaches  Negative for dizziness, tremors, seizures, syncope, facial asymmetry, speech difficulty, weakness, light-headedness and numbness  Psychiatric/Behavioral: Negative for dysphoric mood and hallucinations  All other systems reviewed and are negative  Physical Exam  ED Triage Vitals   Temperature Pulse Respirations Blood Pressure SpO2   12/02/18 1504 12/02/18 1504 12/02/18 1504 12/02/18 1504 12/02/18 1504   (!) 97 4 °F (36 3 °C) 85 18 (!) 187/110 97 %      Temp Source Heart Rate Source Patient Position - Orthostatic VS BP Location FiO2 (%)   12/02/18 1504 12/02/18 1640 12/02/18 1640 12/02/18 1640 --   Oral Monitor Lying Right arm       Pain Score       12/02/18 1504       8           Orthostatic Vital Signs  Vitals:    12/02/18 1504 12/02/18 1532 12/02/18 1640 12/02/18 1715   BP: (!) 187/110 133/87 100/50 105/59   Pulse: 85  93 94   Patient Position - Orthostatic VS:   Lying Sitting       Physical Exam   Constitutional: He is oriented to person, place, and time  He appears well-developed  No distress  Obese male   HENT:   Head: Normocephalic and atraumatic  Right Ear: External ear normal    Left Ear: External ear normal    Mouth/Throat: Oropharynx is clear and moist    Eyes: Pupils are equal, round, and reactive to light   Conjunctivae and EOM are normal    Neck: Normal range of motion  Cardiovascular: Normal rate, regular rhythm, normal heart sounds and intact distal pulses  Pulmonary/Chest: Effort normal and breath sounds normal  He has no wheezes  He exhibits no tenderness  Abdominal: Soft  Bowel sounds are normal  There is no tenderness  There is no guarding  Musculoskeletal: Normal range of motion  He exhibits no edema or deformity  Neurological: He is alert and oriented to person, place, and time  No cranial nerve deficit or sensory deficit  He exhibits normal muscle tone  Coordination normal    Mental Status: Alert and oriented to person place time and situation, language fluent with good comprehension and repetition  CN: PERRLA, no papilledema, visual fields full to finger counting bilaterally, extraocular muscles intact without nystagmus  Face symmetrical with full sensation  Hearing in tact to bilateral finger rub  Tongue protrudes midline and palate elevates symmetrically  Sternocleidomastoid and trapezius muscle have full strength bilaterally  Motor: Normal muscle bulk and tone throughout 5/5 strength in upper and lower extremities throughout  No clonus present  Sensory: Sensation intact to light touch  Coordination: Able to perform finger to nose to finger, heel to chin to knee without dysmetria, rapid alternating movements in tact  Gait at baseline     Skin: Skin is warm and dry  No rash noted  He is not diaphoretic  Psychiatric: He has a normal mood and affect  Nursing note and vitals reviewed        ED Medications  Medications   metoclopramide (REGLAN) injection 10 mg (10 mg Intravenous Not Given 12/2/18 1556)   ondansetron (ZOFRAN) injection 4 mg (4 mg Intravenous Given 12/2/18 1551)   diphenhydrAMINE (BENADRYL) injection 25 mg (25 mg Intravenous Given 12/2/18 1552)   ketorolac (TORADOL) injection 30 mg (30 mg Intravenous Given 12/2/18 1552)   magnesium sulfate 2 g/50 mL IVPB (premix) 2 g (0 g Intravenous Stopped 12/2/18 1657)       Diagnostic Studies  Results Reviewed     Procedure Component Value Units Date/Time    Comprehensive metabolic panel [[id number]]  (Abnormal) Collected:  12/02/18 1606    Lab Status:  Final result Specimen:  Blood from Arm, Right Updated:  12/02/18 1632     Sodium 138 mmol/L      Potassium 3 9 mmol/L      Chloride 105 mmol/L      CO2 27 mmol/L      ANION GAP 6 mmol/L      BUN 23 mg/dL      Creatinine 1 37 (H) mg/dL      Glucose 271 (H) mg/dL      Calcium 8 7 mg/dL      AST 8 U/L      ALT 21 U/L      Alkaline Phosphatase 67 U/L      Total Protein 7 5 g/dL      Albumin 3 6 g/dL      Total Bilirubin 0 33 mg/dL      eGFR 64 ml/min/1 73sq m     Narrative:         National Kidney Disease Education Program recommendations are as follows:  GFR calculation is accurate only with a steady state creatinine  Chronic Kidney disease less than 60 ml/min/1 73 sq  meters  Kidney failure less than 15 ml/min/1 73 sq  meters  No orders to display         Procedures  Procedures      Phone Consults  ED Phone Contact    ED Course         MDM  CritCare Time    34year old male presenting to ED for evaluation of headache  He has no alarm symptoms, will treat with migraine cocktail and re-evaluate  Patient feels significantly improved after migraine cocktail  Would like to go home  Patient told to follow up with neurology for migraine med adjustment  The patient was instructed to follow up as documented  Strict return precautions were discussed with the patient and the patient was instructed to return to the emergency department immediately if symptoms worsen  The patient/patient family member acknowledged and were in agreement with plan         Disposition  Final diagnoses:   Migraine   Nausea     Time reflects when diagnosis was documented in both MDM as applicable and the Disposition within this note     Time User Action Codes Description Comment    12/2/2018  4:54 PM Mirta Denise [G43 909] Migraine     12/2/2018  4:54 PM Rach Khan Add [R11 0] Nausea       ED Disposition     ED Disposition Condition Comment    Discharge  Jeannine Muñiz discharge to home/self care      Condition at discharge: Good        Follow-up Information     Follow up With Specialties Details Why Contact Info    Serge Osborne MD Neurology Schedule an appointment as soon as possible for a visit For follow up regarding your migraines [address] [facility] [address] [place] Rudene Schaumann, MD Family Medicine Schedule an appointment as soon as possible for a visit today For follow up regarding your symptoms [address] [facility] [address] [facility] [facility] [address]            Discharge Medication List as of 12/2/2018  5:00 PM      START taking these medications    Details   magnesium oxide (MAG-OX) 400 mg Take 1 tablet (400 mg total) by mouth daily, Starting Sun 12/2/2018, Print      Riboflavin 400 MG CAPS Take 1 capsule (400 mg total) by mouth daily, Starting Sun 12/2/2018, Print         CONTINUE these medications which have NOT CHANGED    Details   aspirin 81 mg chewable tablet Chew 1 tablet (81 mg total) daily Over the counter, Starting Thu 11/1/2018, No Print      fluvoxaMINE (LUVOX) 100 mg tablet Take 2 tablets (200 mg total) by mouth daily at bedtime, Starting Wed 9/19/2018, Normal      insulin aspart (NovoLOG) 100 units/mL injection Inject 10 Units under the skin 3 (three) times a day before meals, Starting Wed 9/19/2018, Normal      insulin glargine (BASAGLAR KWIKPEN) 100 units/mL injection pen Inject 40 Units under the skin daily at bedtime, Starting Thu 11/1/2018, No Print      Insulin Pen Needle (PEN NEEDLES 3/16") 31G X 5 MM MISC by Does not apply route 4 (four) times a day, Starting Tue 10/2/2018, Normal      levothyroxine 100 mcg tablet Take 1 tablet (100 mcg total) by mouth daily, Starting Wed 11/21/2018, Normal      lisinopril (ZESTRIL) 20 mg tablet Take 1 tablet (20 mg total) by mouth daily, Starting Thu 11/1/2018, Normal      LORazepam (ATIVAN) 0 5 mg tablet Take 1 tablet (0 5 mg total) by mouth 2 (two) times a day, Starting Wed 10/17/2018, Normal      nitroglycerin (NITROSTAT) 0 4 mg SL tablet Place 1 tablet (0 4 mg total) under the tongue every 5 (five) minutes as needed for chest pain, Starting Fri 10/26/2018, Print      pantoprazole (PROTONIX) 40 mg tablet Take 1 tablet (40 mg total) by mouth daily, Starting Wed 9/19/2018, Normal      SUMAtriptan (IMITREX) 100 mg tablet Take 1 tablet (100 mg total) by mouth once as needed for migraine for up to 1 dose, Starting Fri 11/9/2018, Normal      topiramate (TOPAMAX) 100 mg tablet One p o  q h s  after titration, Normal      ziprasidone (GEODON) 40 mg capsule take 1 capsule by mouth twice a day with meals, Normal           No discharge procedures on file  ED Provider  Attending physically available and evaluated Lolis Dotson I managed the patient along with the ED Attending      Electronically Signed by         Treva Carter MD  12/02/18 4649

## 2018-12-02 NOTE — ED ATTENDING ATTESTATION
Julita Gamboa DO, saw and evaluated the patient  I have discussed the patient with the resident/non-physician practitioner and agree with the resident's/non-physician practitioner's findings, Plan of Care, and MDM as documented in the resident's/non-physician practitioner's note, except where noted  All available labs and Radiology studies were reviewed  At this point I agree with the current assessment done in the Emergency Department  I have conducted an independent evaluation of this patient including a focused history and a physical exam     ED Note - Coreen Apodaca 34 y o  male MRN: [id number]  Unit/Bed#: ED 19 Encounter: [id number]    History of Present Illness   HPI  Coreen Apodaca is a 34 y o  male who presents for evaluation of generalized/ bitemporal acing headache that was gradual in onset over the past 2 days, not maximal at onset and very similar to prior "migraine" headaches  Patient also describes mild nausea without vomiting which he states is also very typical   No neck pain  No chest pain, shortness of breath, diaphoresis, dizziness  No fever or chills  No focal neurological deficits  No visual deficits  No other complaints  REVIEW OF SYSTEMS  See HPI for further details  12 systems reviewed and otherwise negative except as noted     Historical Information     PAST MEDICAL HISTORY  Past Medical History:   Diagnosis Date    Diabetes mellitus ([facility] [address] )     Disease of thyroid gland     Gastroparesis     GERD (gastroesophageal reflux disease)     Hypertension     Obsessive compulsive disorder     Schizoaffective disorder ([facility] [address] )        FAMILY HISTORY  Family History   Problem Relation Age of Onset    COPD Mother     Hypertension Mother     Heart failure Mother     Rheum arthritis Family     Heart disease Family     Hypertension Father     Diabetes Father     Hyperlipidemia Father        SOCIAL HISTORY  Social History     Social History    Marital status: Single     Spouse name: N/A    Number of children: N/A    Years of education: N/A     Social History Main Topics    Smoking status: Never Smoker    Smokeless tobacco: Never Used    Alcohol use No    Drug use: No    Sexual activity: Not Asked     Other Topics Concern    None     Social History Narrative    None       SURGICAL HISTORY  History reviewed  No pertinent surgical history    Meds/Allergies     CURRENT MEDICATIONS    Current Facility-Administered Medications:     magnesium sulfate 2 g/50 mL IVPB (premix) 2 g, 2 g, Intravenous, Once, Cherelle Arora MD, 2 g at 12/02/18 1557    metoclopramide (REGLAN) injection 10 mg, 10 mg, Intravenous, Once, Cherelle Arora MD    Current Outpatient Prescriptions:     aspirin 81 mg chewable tablet, Chew 1 tablet (81 mg total) daily Over the counter, Disp: , Rfl: 0    fluvoxaMINE (LUVOX) 100 mg tablet, Take 2 tablets (200 mg total) by mouth daily at bedtime (Patient taking differently: Take 100 mg by mouth 2 (two) times a day  ), Disp: 60 tablet, Rfl: 0    insulin aspart (NovoLOG) 100 units/mL injection, Inject 10 Units under the skin 3 (three) times a day before meals, Disp: 10 mL, Rfl: 2    insulin glargine (BASAGLAR KWIKPEN) 100 units/mL injection pen, Inject 40 Units under the skin daily at bedtime, Disp: 5 pen, Rfl: 0    Insulin Pen Needle (PEN NEEDLES 3/16") 31G X 5 MM MISC, by Does not apply route 4 (four) times a day, Disp: 200 each, Rfl: 3    levothyroxine 100 mcg tablet, Take 1 tablet (100 mcg total) by mouth daily, Disp: 30 tablet, Rfl: 2    lisinopril (ZESTRIL) 20 mg tablet, Take 1 tablet (20 mg total) by mouth daily, Disp: 30 tablet, Rfl: 1    LORazepam (ATIVAN) 0 5 mg tablet, Take 1 tablet (0 5 mg total) by mouth 2 (two) times a day (Patient taking differently: Take 0 5 mg by mouth 2 (two) times a day as needed  ), Disp: 60 tablet, Rfl: 0    nitroglycerin (NITROSTAT) 0 4 mg SL tablet, Place 1 tablet (0 4 mg total) under the tongue every 5 (five) minutes as needed for chest pain, Disp: 90 tablet, Rfl: 0    pantoprazole (PROTONIX) 40 mg tablet, Take 1 tablet (40 mg total) by mouth daily, Disp: 90 tablet, Rfl: 1    SUMAtriptan (IMITREX) 100 mg tablet, Take 1 tablet (100 mg total) by mouth once as needed for migraine for up to 1 dose, Disp: 9 tablet, Rfl: 5    topiramate (TOPAMAX) 100 mg tablet, One p o  q h s  after titration, Disp: 30 tablet, Rfl: 5    ziprasidone (GEODON) 40 mg capsule, take 1 capsule by mouth twice a day with meals (Patient taking differently: 60mg take 1 capsule by mouth twice a day with meals), Disp: 60 capsule, Rfl: 1    (Not in a hospital admission)    ALLERGIES  No Known Allergies  Objective     PHYSICAL EXAM    VITAL SIGNS: Blood pressure 100/50, pulse 93, temperature (!) 97 4 °F (36 3 °C), temperature source Oral, resp  rate 17, height 5' 2" (1 575 m), weight 136 kg (300 lb), SpO2 98 %  Constitutional:  Appears well developed and well nourished, no acute distress, non-toxic appearance   Eyes:  PERRL, EOMI, conjunctivae pink, sclerae non-icteric, no nystagmus, optic discs sharp/ no papilledema    HENT:  Normocephalic/Atraumatic, no rhinorrhea, mucous membranes moist, Tympanic Membranes clear, no pharyngeal/tonsillar exudates or erythema, no oropharyngeal or tonsillar asymmetry      Neck: normal range of motion, no tenderness, supple   Respiratory:  No respiratory distress, normal breath sounds, no accessory muscle use, no intercostal retractions, no crackles, no rhonchi, no wheezing, no stridor   Cardiovascular:  Normal rate, normal rhythm  GI:  Soft, non-tender, non-distended  :  No CVAT, no flank ecchymosis  Musculoskeletal:  No swelling or edema, no tenderness, no deformities  Integument:  Pink, warm, dry, Well hydrated, no rash, no erythema, no bullae   Lymphatic:  No cervical/ tonsillar/ submandibular lymphadenopathy noted   Neurologic:  Awake, Alert & oriented x 3, CN 2-12 intact, no focal neurological deficits, motor function intact, strength 5/5 all extremities, normal sensory function, reflexes within normal limits, intact finger to nose, intact heal to shin, negative dysdiadochokinesia  Able to ambulate  Psychiatric:  Speech and behavior appropriate       ED COURSE and MDM:    Assessment/Plan   Assessment:  Liya Swann is a 34 y o  male presents for evaluation of headache  Plan:  Symptom management, imaging as needed, disposition as appropriate  CRITICAL CARE TIME: 0 minutes      Portions of the record may have been created with voice recognition software  Occasional wrong word or "sound a like" substitutions may have occurred due to the inherent limitations of voice recognition software       ED Provider  Electronically Signed by

## 2018-12-03 ENCOUNTER — TELEPHONE (OUTPATIENT)
Dept: NEUROLOGY | Facility: CLINIC | Age: 38
End: 2018-12-03

## 2018-12-03 NOTE — TELEPHONE ENCOUNTER
Patient and his father called in reporting that he has been to the ED frequently relating to his migraines and episodic confusion  He states he was previously seen by Dr Diane Rose but is now scheduled to see Dr Brittany Reagan 12/20/18  Patient asking for sooner appointment with Dr Brittany Reagan if possible and states that he was told to call in ED that he should call our office for possible medication changes  When did migraine start? Reports experiencing migraines frequently and has been in ED multiple times  Just went to ED yesterday for migraine x2 days  Does not have migraine currently    Location/Description: jabbing pain starts in the back of head and comes around toward the temples, recently sharper and more nauseating    Pain scale: between 7 and 10, positional, when goes up to 10 he has gone into hospital r/t nausea and lightheadedness  Associated symptoms:nausea, sonophobia, photophobia, mental status changes: reports confusion when reading and in sometimes when he is shopping in stores (confusion over the past year per patient)    Precipitating factors: stress at times, sometimes unrelated    Alleviating factors: prescription medication migraine cocktail in ED, sometimes the Imitrex helps and sometimes improves when he takes Benadryl 50mg every 4-6  Hours  States the ED physicians recommended ibuprofen and benadryl together  Migraine cocktail in ED helps but only for a couple of hours per patient    What medications have you tried for this migraine headache? ibuprofen : 600mg q6h prn, Benadryl 50mg every 4-6  Hours, Imitrex 100mg tablet once as needed for migraine    Current migraine medications are confirmed as:  Imitrex 100mg tablet once as needed for migraine  Topamax 100mg at bedtime    Medications tried in the past? Pt does not believe he has tried:decadron (steroid) or depakote    Did try olanzapine previously for schizoaffective d/o- caused uncontrolled weight gain and diabetes    Please advise of any immediate recommended intervention    I was able to schedule the patient tomorrow 12/4 in CV with Dr Melany Arnett and Francisco delcid, [address], made aware verbally

## 2018-12-04 ENCOUNTER — OFFICE VISIT (OUTPATIENT)
Dept: NEUROLOGY | Facility: CLINIC | Age: 38
End: 2018-12-04
Payer: COMMERCIAL

## 2018-12-04 VITALS
DIASTOLIC BLOOD PRESSURE: 100 MMHG | BODY MASS INDEX: 55.21 KG/M2 | HEIGHT: 62 IN | WEIGHT: 300 LBS | SYSTOLIC BLOOD PRESSURE: 138 MMHG | HEART RATE: 81 BPM

## 2018-12-04 DIAGNOSIS — F25.9 SCHIZOAFFECTIVE DISORDER, UNSPECIFIED TYPE (HCC): Chronic | ICD-10-CM

## 2018-12-04 DIAGNOSIS — E66.01 MORBID OBESITY WITH BMI OF 50.0-59.9, ADULT (HCC): Chronic | ICD-10-CM

## 2018-12-04 DIAGNOSIS — K21.9 GASTROESOPHAGEAL REFLUX DISEASE, ESOPHAGITIS PRESENCE NOT SPECIFIED: Chronic | ICD-10-CM

## 2018-12-04 DIAGNOSIS — G43.119 INTRACTABLE MIGRAINE WITH AURA WITHOUT STATUS MIGRAINOSUS: Primary | ICD-10-CM

## 2018-12-04 DIAGNOSIS — R51.9 CHRONIC DAILY HEADACHE: ICD-10-CM

## 2018-12-04 DIAGNOSIS — G44.40 MEDICATION OVERUSE HEADACHE: ICD-10-CM

## 2018-12-04 DIAGNOSIS — E55.9 VITAMIN D DEFICIENCY: ICD-10-CM

## 2018-12-04 DIAGNOSIS — G47.00 INSOMNIA, UNSPECIFIED TYPE: ICD-10-CM

## 2018-12-04 DIAGNOSIS — G47.10 HYPERSOMNIA: ICD-10-CM

## 2018-12-04 DIAGNOSIS — R60.0 LOWER EXTREMITY EDEMA: Chronic | ICD-10-CM

## 2018-12-04 DIAGNOSIS — G43.719 INTRACTABLE CHRONIC MIGRAINE WITHOUT AURA AND WITHOUT STATUS MIGRAINOSUS: ICD-10-CM

## 2018-12-04 DIAGNOSIS — I10 ESSENTIAL HYPERTENSION: Chronic | ICD-10-CM

## 2018-12-04 DIAGNOSIS — R06.83 SNORING: ICD-10-CM

## 2018-12-04 PROCEDURE — 99215 OFFICE O/P EST HI 40 MIN: CPT | Performed by: PSYCHIATRY & NEUROLOGY

## 2018-12-04 PROCEDURE — 99354 PR PROLONGED SVC OUTPATIENT SETTING 1ST HOUR: CPT | Performed by: PSYCHIATRY & NEUROLOGY

## 2018-12-04 RX ORDER — DEXAMETHASONE 1 MG
1 TABLET ORAL
Qty: 3 TABLET | Refills: 0 | Status: SHIPPED | OUTPATIENT
Start: 2018-12-04 | End: 2018-12-07

## 2018-12-04 NOTE — PATIENT INSTRUCTIONS
Diagnoses and all orders for this visit:    Intractable migraine with aura without status migrainosus  Essential hypertension  Chronic daily headache  Snoring  Insomnia, unspecified type  Hypersomnia  Vitamin D deficiency  Medication overuse headache    -     Ambulatory referral to Sleep Medicine; Future      Additional Testing:   Neurodiagnostic workup:   -     MRI brain w wo contrast; Future  -     Basic metabolic panel; Future  -     Vitamin B12; Future  -     Vitamin D 25 hydroxy; Future    Over the counter preventive supplements for headaches/migraines   (to take every day to help prevent headaches - not to take at the time of headache):  [x] Magnesium 400mg daily   [x] Riboflavin (Vitamin B2) 400mg daily (adults)   (FYI B2 may make your urine bright/neon yellow)    Headache/migraine treatment:   Abortive medications (for immediate treatment of a headache): It is ok to take ibuprofen, acetaminophen or naproxen (Advil, Tylenol,  Aleve, Excedrin) if they help your headaches you should limit these to No more than 3 times a week to avoid medication overuse/rebound headaches  Your going to take the Decadron 1 mg with breakfast for the next 3 days and while your on this be more careful with watching her sugars as it can increase her sugars  Also do not take any over-the-counter pain medications were on this and try to limit them to 3 days a week maximum following  For your more moderate to severe migraines take this medication early   This continue Imitrex 100 mg as needed for the most severe migraines  He should not take more than 2 of these in a day, or 3 days a week or 9 a month  Because the more often you take these, the less effective they are         Prescription preventive medications for headaches/migraines   (to take every day to help prevent headaches - not to take at the time of headache):  [x] topiramate/Topamax:  Takes 100 mg pills daily and when you run out any refills I have sent in 50 mg pill to take once in the morning and once in the evening   *Typically these types of medications take time untill you see the benefit, although some may see improvement in days, often it may take weeks, especially if the medication is being titrated up to a beneficial level  Please contact us if there are any concerns or questions regarding the medication  When you talk to her psychiatrist if you are considering changing or adding psychiatric medications I would recommend Depakote as this is also a good headache preventative medication  Sleep/headache prevention:   [x] Melatonin - take 3 mg nightly for sleep  You should take this 1 hour prior to bedtime consistently every night for it to work  It works by gradually helping to adjust your sleep time over days to weeks, rather than immediately making you feel sleepy  Self-Monitoring:  [x] Headache calendar  Each day alicia a number from 0-10 indicating if there was a headache and how bad it was  This can be used to monitor gradual improvement and is helpful to make medication adjustments  You can do this on paper or there is an FOREIGN for a smart phone called "Migraine e Diary"  Lifestyle Recommendations:  [x] SLEEP - Maintain a regular sleep schedule: Adults need at least 7-8 hours of uninterrupted a night  Maintain good sleep hygiene:  Going to bed and waking up at consistent times, avoiding excessive daytime naps, avoiding caffeinated beverages in the evening, avoid excessive stimulation in the evening and generally using bed primarily for sleeping  One hour before bedtime would recommend turning lights down lower, decreasing your activity (may read quietly, listen to music at a low volume)  When you get into bed, should eliminate all technology (no texting, emailing, playing with your phone, iPad or tablet in bed)  [x] HYDRATION - Maintain good hydration  Drink  2L of fluid a day (4 typical small water bottles)  [x] DIET - Maintain good nutrition  In particular don't skip meals and try and eat healthy balanced meals regularly  [x] TRIGGERS - Look for other triggers and avoid them: Limit caffeine to 1-2 cups a day or less  Avoid dietary triggers that you have noticed bring on your headaches (this could include aged cheese, peanuts, MSG, aspartame and nitrates)  [x] EXERCISE - physical exercise as we all know is good for you in many ways, and not only is good for your heart, but also is beneficial for your mental health, cognitive health and  chronic pain/headaches  I would encourage at the least 5 days of physical exercise weekly for at least 30 minutes  Education and Follow-up  [x] Please call with any questions or concerns  [x] Follow up with Dr Pratima Ambrose in 2 months, and with Dr Uma Del Cid in March     When you talk to her psychiatrist if you are considering changing or adding psychiatric medications I would recommend Depakote as this is also a good headache preventative medication    If her primary doctors over considering adding a blood pressure medication, I would recommend a beta blocker or a calcium channel blocker such as verapamil

## 2018-12-04 NOTE — LETTER
2018     Felicity Vasques, [facility]    Patient: Allen Miller   YOB: 1980   Date of Visit: 2018       Dear Dr Andreina Ramesh:    Thank you for referring Jennifer Quezada to me for evaluation  Below are my notes for this consultation  If you have questions, please do not hesitate to call me  I look forward to following your patient along with you  Sincerely,        Sam Jimenez MD        CC: No Recipients  Sam Jimenez MD  2018  6:06 PM  Sign at close encounter  [facility] Consult  PATIENT:  Allen Miller  MRN:  [id number]  :  1980  DATE OF SERVICE:  2018  REFERRED BY: Linh Calle MD  PMD: Felicity Vasques MD    Assessment/Plan:     Allen Miller is a 34 y o  male referred here for evaluation of headache  He reports headache started approximately 2 years ago, the past year and the past few months specifically he has had increased in frequency and intensity of his headaches  He denies aura  They are currently all day every day, he wakes up with them and describes a pressure that is bioccipital and can go to the bilateral temples as well  Associated symptoms include nausea, photophobia, phonophobia  He has been taking ibuprofen 600 mg every 6 hs for many months despite it not helping very much  Migraine cocktail emergency room did help for couple of hours before headache return  He has been on topiramate 50 mg, that was increased to 100 mg 2018, does not think it is helping yet  He also reports insomnia with trouble falling asleep and hypersomnia and has never had a sleep study  He also reports cognitive dysfunction  He has a history of ADHD at baseline as well as schizoaffective disorder, OCD, ADHD and paranoia  He notes issues with focusing, comprehend seen and processing things as he reads or watches TV as well as some word-finding difficulty   MOCA exam today was 20/30 with majority of deficits in visualspatial and delayed recall  He reports that he does not drive, that his Father drives him places  Neurologic assessment reveals unremarkable neurological exam, as well as morbid obesity, mild lower extremity edema and mildly unstable tandem gait  We discussed that his headaches are likely multifactorial in nature including medication overuse headache, tension headache, migraine and likely influenced by multiple contributing factors  We have discussed headache hygiene lifestyle factors that could improve his headaches including increases in his water intake, physical activity and continuing care of his mental health  He has an appointment with his psychiatrist next week to discussed changing from Geodon to an alternative medication  He meets with this therapist weekly and I discussed the importance of continuing this and the interplay between chronic pain and mood  Due to his headaches with new features as well as increasing in severity and frequency in the setting of new onset cognitive dysfunction as well as multiple comorbidities, MRI brain with and without contrast is indicated for further evaluation as well as routine labs  Instructed him to follow up with his primary care doctor regarding his STEFANIA noted 2 days ago, as well as his elevated TSH last month  Also recommend follow-up with GI for his gastritis symptoms which are likely related to his frequent daily NSAID use  Abortive:  - Decadron 2 mg for the next 3 days  Discussed possible side effects and risks  (instructed him to not use any over-the-counter med pain medications for these 3 days and limit use to 3 days a week in the future)  - continue Imitrex 100 mg for severe migraines  Discussed proper use    - has not tried Depakote  - could consider indomethacin    Preventative:  - continue topiramate for now as he would need a few more months to say this is ineffective, we will change to 50 mg b i d   - Has not tried Depakote, he may to the psychiatrist next week and I recommended discussing considering this as a mood treatment as well as a headache treatment, but I would prefer his psychiatrist started due to his complicated history of schizoaffective disorder, OCD, ADHD and paranoia  - referral to sleep medicine for sleep study and further evaluation for possible sleep apnea or other sleep disorder which very well could be contributing to his daily headaches upon awakening   - prescribed supplements including magnesium, riboflavin and recommended melatonin as well  - future alternatives could include -  beta blocker or a calcium channel blocker such as verapamil        Diagnoses and all orders for this visit:    Intractable migraine with aura without status migrainosus  Essential hypertension  Chronic daily headache  Snoring  Insomnia, unspecified type  Hypersomnia  Vitamin D deficiency  Medication overuse headache    -     Ambulatory referral to Sleep Medicine; Future      Additional Testing:   Neurodiagnostic workup:   -     MRI brain w wo contrast; Future  -     Basic metabolic panel; Future  -     Vitamin B12; Future  -     Vitamin D 25 hydroxy; Future    Over the counter preventive supplements for headaches/migraines   (to take every day to help prevent headaches - not to take at the time of headache):  [x] Magnesium 400mg daily   [x] Riboflavin (Vitamin B2) 400mg daily (adults)   (FYI B2 may make your urine bright/neon yellow)    Headache/migraine treatment:   Abortive medications (for immediate treatment of a headache): It is ok to take ibuprofen, acetaminophen or naproxen (Advil, Tylenol,  Aleve, Excedrin) if they help your headaches you should limit these to No more than 3 times a week to avoid medication overuse/rebound headaches  Your going to take the Decadron 1 mg with breakfast for the next 3 days and while your on this be more careful with watching her sugars as it can increase her sugars  Also do not take any over-the-counter pain medications were on this and try to limit them to 3 days a week maximum following  For your more moderate to severe migraines take this medication early   This continue Imitrex 100 mg as needed for the most severe migraines  He should not take more than 2 of these in a day, or 3 days a week or 9 a month  Because the more often you take these, the less effective they are  Prescription preventive medications for headaches/migraines   (to take every day to help prevent headaches - not to take at the time of headache):  [x] topiramate/Topamax:  Takes 100 mg pills daily and when you run out any refills I have sent in 50 mg pill to take once in the morning and once in the evening   *Typically these types of medications take time untill you see the benefit, although some may see improvement in days, often it may take weeks, especially if the medication is being titrated up to a beneficial level  Please contact us if there are any concerns or questions regarding the medication  When you talk to her psychiatrist if you are considering changing or adding psychiatric medications I would recommend Depakote as this is also a good headache preventative medication  Sleep/headache prevention:   [x] Melatonin - take 3 mg nightly for sleep  You should take this 1 hour prior to bedtime consistently every night for it to work  It works by gradually helping to adjust your sleep time over days to weeks, rather than immediately making you feel sleepy  Self-Monitoring:  [x] Headache calendar  Each day alicia a number from 0-10 indicating if there was a headache and how bad it was  This can be used to monitor gradual improvement and is helpful to make medication adjustments  You can do this on paper or there is an FOREIGN for a smart phone called "Migraine e Diary"       Lifestyle Recommendations:  [x] SLEEP - Maintain a regular sleep schedule: Adults need at least 7-8 hours of uninterrupted a night  Maintain good sleep hygiene:  Going to bed and waking up at consistent times, avoiding excessive daytime naps, avoiding caffeinated beverages in the evening, avoid excessive stimulation in the evening and generally using bed primarily for sleeping  One hour before bedtime would recommend turning lights down lower, decreasing your activity (may read quietly, listen to music at a low volume)  When you get into bed, should eliminate all technology (no texting, emailing, playing with your phone, iPad or tablet in bed)  [x] HYDRATION - Maintain good hydration  Drink  2L of fluid a day (4 typical small water bottles)  [x] DIET - Maintain good nutrition  In particular don't skip meals and try and eat healthy balanced meals regularly  [x] TRIGGERS - Look for other triggers and avoid them: Limit caffeine to 1-2 cups a day or less  Avoid dietary triggers that you have noticed bring on your headaches (this could include aged cheese, peanuts, MSG, aspartame and nitrates)  [x] EXERCISE - physical exercise as we all know is good for you in many ways, and not only is good for your heart, but also is beneficial for your mental health, cognitive health and  chronic pain/headaches  I would encourage at the least 5 days of physical exercise weekly for at least 30 minutes  Education and Follow-up  [x] Please call with any questions or concerns  [x] Follow up with Dr Eligio Blue in 2 months, and with Dr Dominick Ochoa in March     When you talk to her psychiatrist if you are considering changing or adding psychiatric medications I would recommend Depakote as this is also a good headache preventative medication  If your primary doctors is ever considering adding a blood pressure medication, I would recommend a beta blocker or a calcium channel blocker such as verapamil        CC: We had the pleasure of evaluating Parrish Jordan in neurological consultation today   Parrish Jordan is a 34 y o    right handed male who presents today for evaluation of headaches  History of Present Illness:       What is your current pain level - 8       Headaches started at what age? Headaches started 2 years ago only, did not have headaches going up, the past year and the past few months specifically have increased in frequency and intensity of his headaches  How often do the headaches occur? All day every day  What time of the day do the headaches start? Wakes up with them   How long do the headaches last? All day every day, but can be more severe for hours at time   Are you ever headache free? no    Aura? without aura     Describe your usual headache pain quality? Pressure/tight knot bioccipital and goes into bilateral temples   jabbing pain starts in the back of head and comes around toward the temples, the last two weeks recently sharper and more nauseating  Does the pain Radiate? no  What is the intensity of pain? 7-10  Associated symptoms:   [x] Nausea       [] Vomiting        [] Diarrhea         [x] Insomnia           [] Stiff or sore neck         [x] Problems with concentration, processing TV  [x] Photophobia     [x]Phonophobia      [] Osmophobia  [] Blurred vision   [x] Prefer quiet, dark room        [x] Light-headed or dizzy    [] Tinnitus     [] Red ear      [] Ptosis      [] Facial droop  [] Lacrimation  [] Nasal congestion/rhinorrhea   [] Flushing of face         [] Change in pupil size  [x] Hands or feet tingle or feel numb/paresthesias    Throwing up due to GI issues will relief the headaches      Things that make the headache worse? Climbing steps, movement, changing positions sometimes when standing up or when sitting will get lightheadedness, bending over      Headache triggers:  stress    Have you seen someone else for headaches or pain? Yes, Dr Anna Chamberlain   Have you had trigger point injection performed and how often? No  Have you had Botox injection performed and how often?  No   Have you had epidural injections or transforaminal injections performed? No  Have you used CBD or THC for your headaches and how often? No  Have you ever had any Brain imaging? Last MRI in 2010    What medications do you take or have you taken for your headaches? ABORTIVE:      Ibuprofen 600mg - takes every 6 hours for months - do not seem to help   Benadryl 50 mg - does not help   Imitrex 100 mg - sometimes but not always     Migraine cocktail in ED helps but only for a couple of hours per patient    PREVENTIVE:     Topiramate 100 mg in am Increased on 11/09/2018 - does not feel it is helping     Has not tried:  decadron (steroid)  - in ED helped for a few days   depakote    Alternative therapies used in the past for headaches?      Neck pain?:     LIFESTYLE  Sleep - averages 10 hours   Naps during the day   Never had a sleep study  Problems falling asleep and hypersomnia   Sometimes up all night and sleep during the day   Do you snore while asleep?unknown         Water: 2 bottles water, not much, poweraide zero    Mood: Schizoaffective, OCD, Paranoia symptoms  - therapist - 1 x week  - psychiatrist - 1 month   - Geodon 60 mg BID - but he thinks is not working and plans to discussed changing with a psychiatrist    Did try olanzapine previously for schizoaffective d/o- caused uncontrolled weight gain and diabetes      #Cognitive dysfunction  History of ADHD  Recent issues with Problems focusing, comprehending and processing things like things he reads and watches on TV  Remembering the right word    The following portions of the patient's history were reviewed and updated as appropriate: allergies, current medications, past family history, past medical history, past social history, past surgical history and problem list     Past Medical History:   ADHD      Past Medical History:   Diagnosis Date    Diabetes mellitus ([address] )     Disease of thyroid gland     Gastroparesis     GERD (gastroesophageal reflux disease)     Hypertension     Obsessive compulsive disorder     Schizoaffective disorder [facility])        Patient Active Problem List   Diagnosis    Type 1 diabetes mellitus without complication ([facility] [address] )    Chest pain    Hypertension    GERD (gastroesophageal reflux disease)    Obsessive compulsive disorder    Schizoaffective disorder ([facility])    Other specified hypothyroidism    Morbid obesity with BMI of 50 0-59 9, adult ([facility])    Anxiety    Hx of migraines    Viral upper respiratory tract infection    Lower extremity edema    Intractable migraine with aura without status migrainosus    Episodic confusion       Medications:      Current Outpatient Prescriptions   Medication Sig Dispense Refill    aspirin 81 mg chewable tablet Chew 1 tablet (81 mg total) daily Over the counter  0    fluvoxaMINE (LUVOX) 100 mg tablet Take 2 tablets (200 mg total) by mouth daily at bedtime (Patient taking differently: Take 100 mg by mouth 2 (two) times a day  ) 60 tablet 0    insulin aspart (NovoLOG) 100 units/mL injection Inject 10 Units under the skin 3 (three) times a day before meals 10 mL 2    insulin glargine (BASAGLAR KWIKPEN) 100 units/mL injection pen Inject 40 Units under the skin daily at bedtime 5 pen 0    Insulin Pen Needle (PEN NEEDLES 3/16") 31G X 5 MM MISC by Does not apply route 4 (four) times a day 200 each 3    levothyroxine 100 mcg tablet Take 1 tablet (100 mcg total) by mouth daily 30 tablet 2    lisinopril (ZESTRIL) 20 mg tablet Take 1 tablet (20 mg total) by mouth daily 30 tablet 1    LORazepam (ATIVAN) 0 5 mg tablet Take 1 tablet (0 5 mg total) by mouth 2 (two) times a day (Patient taking differently: Take 0 5 mg by mouth 2 (two) times a day as needed  ) 60 tablet 0    nitroglycerin (NITROSTAT) 0 4 mg SL tablet Place 1 tablet (0 4 mg total) under the tongue every 5 (five) minutes as needed for chest pain 90 tablet 0    pantoprazole (PROTONIX) 40 mg tablet Take 1 tablet (40 mg total) by mouth daily 90 tablet 1    SUMAtriptan (IMITREX) 100 mg tablet Take 1 tablet (100 mg total) by mouth once as needed for migraine for up to 1 dose 9 tablet 5    topiramate (TOPAMAX) 100 mg tablet One p o  q h s  after titration 30 tablet 5    ziprasidone (GEODON) 40 mg capsule take 1 capsule by mouth twice a day with meals (Patient taking differently: 60mg take 1 capsule by mouth twice a day with meals) 60 capsule 1    magnesium oxide (MAG-OX) 400 mg Take 1 tablet (400 mg total) by mouth daily (Patient not taking: Reported on 12/4/2018 ) 10 tablet 0    Riboflavin 400 MG CAPS Take 1 capsule (400 mg total) by mouth daily (Patient not taking: Reported on 12/4/2018 ) 10 capsule 0     No current facility-administered medications for this visit  Allergies:    No Known Allergies    Family History:   Family history of headaches:  no known family members with significant headaches  Any family history of aneurysms  No  Family History   Problem Relation Age of Onset    COPD Mother     Hypertension Mother     Heart failure Mother     Rheum arthritis Family     Heart disease Family     Hypertension Father     Diabetes Father     Hyperlipidemia Father        Social History:     Social History     Social History    Marital status: Single     Spouse name: N/A    Number of children: N/A    Years of education: N/A     Occupational History    Not on file       Social History Main Topics    Smoking status: Never Smoker    Smokeless tobacco: Never Used    Alcohol use No    Drug use: No    Sexual activity: Not on file     Other Topics Concern    Not on file     Social History Narrative    No narrative on file         Objective:   Physical Exam:                                                                 Vitals:            Constitutional:    /100 (BP Location: Right arm, Patient Position: Sitting, Cuff Size: Large)   Pulse 81   Ht 5' 2" (1 575 m)   Wt 136 kg (300 lb)   BMI 54 87 kg/m²    BP Readings from Last 3 Encounters:   12/04/18 138/100   12/02/18 105/59   11/22/18 (!) 178/87     Pulse Readings from Last 3 Encounters:   12/04/18 81   12/02/18 94   11/22/18 78         Well developed, morbidly Obese, well groomed  No dysmorphic features  HEENT:  Normocephalic atraumatic  No meningismus  Oropharynx is clear and moist  No oral mucosal lesions  Chest:  Respirations regular and unlabored  Cardiovascular:  Regular rate, intact distal pulses  Distal extremities warm with 1+ palpable edema and mild tenderness   Musculoskeletal:  Full range of motion  (see below under neurologic exam for evaluation of motor function and gait)   Skin:  warm and dry, not diaphoretic  No apparent birthmarks or stigmata of neurocutaneous disease     Psychiatric:  Slightly anxious, tangential behavior, depressed affect       Neurological Examination:     Mental Status:      MOCA total = 20/30 -4 cube, clock, -5 delayed recall, -1 abstraction  Visual spatial/executive:    Trails (1)  [x]   copy cube (1)  []   draw clock - "ten past 11" (3)   Contour  []  Numbers  []  Hands  []     Naming:   Drawings (3)  Lion/Camel  [x] Rhino/Bear  [x] Camel/Rhino  [x]     Memory/Delayed Recall:  (5)   Word recall Immediate  Memory  (no points) Delayed  (5 pts) Category Clue Multiple choice   Face  [x]   []  Part of the Body    []  Nose, Face, hand             []    Velvet  [x]   []  Type of Fabric       [x]  Addison, Winda Maldonado, Velvet      []    W  R  Cecilia  [x]   []  Type of Building    []  [address]  []    Mirian  [x]   []  Type of Flower      []  Ava Carolina, Tulip            []    Red  [x]   []  A Color                  [x]  Red, Blue, Green              []      Attention:   Digits (2)  Forwards: [address]  [x]   Backwards: 742  [x]     Llist of letters, tap for A (1) [x]   F B A C M N A A J K L B A F A K D E A A A J A M O F A A B     Serial 7s (3 pts 4-5 correct, 2pts 2-3 correct, 1 pt for 1 correct)   [x] 93,  [x] 86,  [x] 79,  [x] 72,  [x] 65    Language:   Repeat sentences (2)  I only know that Devaughn Liliremigio is the one to help today  [x]   The cat always hid under the couch when dogs were in the room  [x]     Number of words beginning with letter F  (>11 in 1 min) (1)  [x]     Abstraction:  (2)  train-bicycle (means of transportation, means of travelling, you take trips in both) []   watch-ruler (measuring instruments, used to measure)  [x]     Orientation:  (6)  Year [x]  Date [x]  Day [x]  Month [x]  Place [x]  City [x]       Cranial Nerves:  II-visual fields full  Fundi appear normal bilaterally  III, IV, VI-Pupils were equal, round, and reactive to light and accomodation  Extraocular movements were full and conjugate without nystagmus  conjugate gaze, normal smooth pursuits, normal saccades   V-facial sensation symmetric  VII-facial expression symmetric, intact forehead wrinkle, strong eye closure, symmetric smile    VIII-hearing grossly intact bilaterally   IX, X-palate elevation symmetric, no dysarthria  XI-shoulder shrug strength intact    XII-tongue protrusion midline  Motor Exam: symmetric bulk and tone throughout, no pronator drift  Power/strength 5/5 bilateral upper and lower extremities, no atrophy, fasciculations or abnormal movements noted  Sensory: grossly intact light touch in all extremities  Reflexes: brachioradialis 1+, biceps 1+, knee 2+, ankle 2+ bilaterally  No ankle clonus  Coordination: Finger nose finger intact bilaterally, no apparent dysmetria, ataxia or tremor noted  Gait: steady casual and mildly unstable tandem gait  Pertinent lab results:   12/02/2018  CMP significant for creatinine 1 3, glucose 271    11/22/2018  CBC was significant for hemoglobin 11 3    11/14/2018  TSH 3 8 high     Imaging:   Noncontrast head CT 08/31/2018  No acute intracranial findings  Right maxillary sinus opacified    MRI/MRA Brain 10 years ago was normal        Review of Systems:   CARMELLA Personally reviewed       Review of Systems   Constitutional: Positive for appetite change (due to heasd aches)  HENT: Negative  Eyes: Positive for visual disturbance (light )  Gastrointestinal: Positive for nausea  Musculoskeletal: Positive for neck stiffness (in lower area of neck  temple pain)  Skin: Negative  Allergic/Immunologic: Negative  Neurological: Positive for dizziness, speech difficulty (can't get words out ), light-headedness and headaches (daily last all day)  Psychiatric/Behavioral: Positive for confusion, decreased concentration and sleep disturbance  The patient is nervous/anxious  I have spent 80 minutes with Patient and family today in which greater than 50% of this time was spent in counseling/coordination of care regarding Diagnostic results, Prognosis, Risks and benefits of tx options, Intructions for management, Patient and family education, Importance of tx compliance, Risk factor reductions and Impressions        Author:  Wade Walls MD 12/4/2018 12:41 PM

## 2018-12-04 NOTE — PROGRESS NOTES
[address] [facility] Consult  PATIENT:  Hemal Musa  MRN:  [id number]  :  1980  DATE OF SERVICE:  2018  REFERRED BY: Nancie Mcadams MD  PMD: Koko Beltran MD    Assessment/Plan:     Hemal Musa is a 34 y o  male referred here for evaluation of headache  He reports headache started approximately 2 years ago, the past year and the past few months specifically he has had increased in frequency and intensity of his headaches  He denies aura  They are currently all day every day, he wakes up with them and describes a pressure that is bioccipital and can go to the bilateral temples as well  Associated symptoms include nausea, photophobia, phonophobia  He has been taking ibuprofen 600 mg every 6 hs for many months despite it not helping very much  Migraine cocktail emergency room did help for couple of hours before headache return  He has been on topiramate 50 mg, that was increased to 100 mg 2018, does not think it is helping yet  He also reports insomnia with trouble falling asleep and hypersomnia and has never had a sleep study  He also reports cognitive dysfunction  He has a history of ADHD at baseline as well as schizoaffective disorder, OCD, ADHD and paranoia  He notes issues with focusing, comprehend seen and processing things as he reads or watches TV as well as some word-finding difficulty  MOCA exam today was 20/30 with majority of deficits in visualspatial and delayed recall  He reports that he does not drive, that his Father drives him places  Neurologic assessment reveals unremarkable neurological exam, as well as morbid obesity, mild lower extremity edema and mildly unstable tandem gait  We discussed that his headaches are likely multifactorial in nature including medication overuse headache, tension headache, migraine and likely influenced by multiple contributing factors    We have discussed headache hygiene lifestyle factors that could improve his headaches including increases in his water intake, physical activity and continuing care of his mental health  He has an appointment with his psychiatrist next week to discussed changing from Geodon to an alternative medication  He meets with this therapist weekly and I discussed the importance of continuing this and the interplay between chronic pain and mood  Due to his headaches with new features as well as increasing in severity and frequency in the setting of new onset cognitive dysfunction as well as multiple comorbidities, MRI brain with and without contrast is indicated for further evaluation as well as routine labs  Instructed him to follow up with his primary care doctor regarding his STEFANIA noted 2 days ago, as well as his elevated TSH last month  Also recommend follow-up with GI for his gastritis symptoms which are likely related to his frequent daily NSAID use  Abortive:  - Decadron 2 mg for the next 3 days  Discussed possible side effects and risks  (instructed him to not use any over-the-counter med pain medications for these 3 days and limit use to 3 days a week in the future)  - continue Imitrex 100 mg for severe migraines  Discussed proper use  - has not tried Depakote  - could consider indomethacin    Preventative:  - continue topiramate for now as he would need a few more months to say this is ineffective, we will change to 50 mg b i d   - Has not tried Depakote, he may to the psychiatrist next week and I recommended discussing considering this as a mood treatment as well as a headache treatment, but I would prefer his psychiatrist started due to his complicated history of schizoaffective disorder, OCD, ADHD and paranoia    - referral to sleep medicine for sleep study and further evaluation for possible sleep apnea or other sleep disorder which very well could be contributing to his daily headaches upon awakening   - prescribed supplements including magnesium, riboflavin and recommended melatonin as well  - future alternatives could include -  beta blocker or a calcium channel blocker such as verapamil        Diagnoses and all orders for this visit:    Intractable migraine with aura without status migrainosus  Essential hypertension  Chronic daily headache  Snoring  Insomnia, unspecified type  Hypersomnia  Vitamin D deficiency  Medication overuse headache    -     Ambulatory referral to Sleep Medicine; Future      Additional Testing:   Neurodiagnostic workup:   -     MRI brain w wo contrast; Future  -     Basic metabolic panel; Future  -     Vitamin B12; Future  -     Vitamin D 25 hydroxy; Future    Over the counter preventive supplements for headaches/migraines   (to take every day to help prevent headaches - not to take at the time of headache):  [x] Magnesium 400mg daily   [x] Riboflavin (Vitamin B2) 400mg daily (adults)   (FYI B2 may make your urine bright/neon yellow)    Headache/migraine treatment:   Abortive medications (for immediate treatment of a headache): It is ok to take ibuprofen, acetaminophen or naproxen (Advil, Tylenol,  Aleve, Excedrin) if they help your headaches you should limit these to No more than 3 times a week to avoid medication overuse/rebound headaches  Your going to take the Decadron 1 mg with breakfast for the next 3 days and while your on this be more careful with watching her sugars as it can increase her sugars  Also do not take any over-the-counter pain medications were on this and try to limit them to 3 days a week maximum following  For your more moderate to severe migraines take this medication early   This continue Imitrex 100 mg as needed for the most severe migraines  He should not take more than 2 of these in a day, or 3 days a week or 9 a month  Because the more often you take these, the less effective they are         Prescription preventive medications for headaches/migraines   (to take every day to help prevent headaches - not to take at the time of headache):  [x] topiramate/Topamax:  Takes 100 mg pills daily and when you run out any refills I have sent in 50 mg pill to take once in the morning and once in the evening   *Typically these types of medications take time untill you see the benefit, although some may see improvement in days, often it may take weeks, especially if the medication is being titrated up to a beneficial level  Please contact us if there are any concerns or questions regarding the medication  When you talk to her psychiatrist if you are considering changing or adding psychiatric medications I would recommend Depakote as this is also a good headache preventative medication  Sleep/headache prevention:   [x] Melatonin - take 3 mg nightly for sleep  You should take this 1 hour prior to bedtime consistently every night for it to work  It works by gradually helping to adjust your sleep time over days to weeks, rather than immediately making you feel sleepy  Self-Monitoring:  [x] Headache calendar  Each day alicia a number from 0-10 indicating if there was a headache and how bad it was  This can be used to monitor gradual improvement and is helpful to make medication adjustments  You can do this on paper or there is an FOREIGN for a smart phone called "Migraine e Diary"  Lifestyle Recommendations:  [x] SLEEP - Maintain a regular sleep schedule: Adults need at least 7-8 hours of uninterrupted a night  Maintain good sleep hygiene:  Going to bed and waking up at consistent times, avoiding excessive daytime naps, avoiding caffeinated beverages in the evening, avoid excessive stimulation in the evening and generally using bed primarily for sleeping  One hour before bedtime would recommend turning lights down lower, decreasing your activity (may read quietly, listen to music at a low volume)   When you get into bed, should eliminate all technology (no texting, emailing, playing with your phone, iPad or tablet in bed)   [x] HYDRATION - Maintain good hydration  Drink  2L of fluid a day (4 typical small water bottles)  [x] DIET - Maintain good nutrition  In particular don't skip meals and try and eat healthy balanced meals regularly  [x] TRIGGERS - Look for other triggers and avoid them: Limit caffeine to 1-2 cups a day or less  Avoid dietary triggers that you have noticed bring on your headaches (this could include aged cheese, peanuts, MSG, aspartame and nitrates)  [x] EXERCISE - physical exercise as we all know is good for you in many ways, and not only is good for your heart, but also is beneficial for your mental health, cognitive health and  chronic pain/headaches  I would encourage at the least 5 days of physical exercise weekly for at least 30 minutes  Education and Follow-up  [x] Please call with any questions or concerns  [x] Follow up with Dr Annette Jenkins in 2 months, and with Dr Joanne Mejia in March     When you talk to her psychiatrist if you are considering changing or adding psychiatric medications I would recommend Depakote as this is also a good headache preventative medication  If your primary doctors is ever considering adding a blood pressure medication, I would recommend a beta blocker or a calcium channel blocker such as verapamil        CC: We had the pleasure of evaluating Park Lopez in neurological consultation today  Park Lopez is a 34 y o    right handed male who presents today for evaluation of headaches  History of Present Illness:       What is your current pain level - 8       Headaches started at what age? Headaches started 2 years ago only, did not have headaches going up, the past year and the past few months specifically have increased in frequency and intensity of his headaches  How often do the headaches occur? All day every day  What time of the day do the headaches start?   Wakes up with them   How long do the headaches last? All day every day, but can be more severe for hours at time   Are you ever headache free? no    Aura? without aura     Describe your usual headache pain quality? Pressure/tight knot bioccipital and goes into bilateral temples   jabbing pain starts in the back of head and comes around toward the temples, the last two weeks recently sharper and more nauseating  Does the pain Radiate? no  What is the intensity of pain? 7-10  Associated symptoms:   [x] Nausea       [] Vomiting        [] Diarrhea         [x] Insomnia           [] Stiff or sore neck         [x] Problems with concentration, processing TV  [x] Photophobia     [x]Phonophobia      [] Osmophobia  [] Blurred vision   [x] Prefer quiet, dark room        [x] Light-headed or dizzy    [] Tinnitus     [] Red ear      [] Ptosis      [] Facial droop  [] Lacrimation  [] Nasal congestion/rhinorrhea   [] Flushing of face         [] Change in pupil size  [x] Hands or feet tingle or feel numb/paresthesias    Throwing up due to GI issues will relief the headaches      Things that make the headache worse? Climbing steps, movement, changing positions sometimes when standing up or when sitting will get lightheadedness, bending over      Headache triggers:  stress    Have you seen someone else for headaches or pain? Yes, Dr Gleda Ahumada   Have you had trigger point injection performed and how often? No  Have you had Botox injection performed and how often? No   Have you had epidural injections or transforaminal injections performed? No  Have you used CBD or THC for your headaches and how often? No  Have you ever had any Brain imaging? Last MRI in 2010    What medications do you take or have you taken for your headaches?    ABORTIVE:      Ibuprofen 600mg - takes every 6 hours for months - do not seem to help   Benadryl 50 mg - does not help   Imitrex 100 mg - sometimes but not always     Migraine cocktail in ED helps but only for a couple of hours per patient    PREVENTIVE:     Topiramate 100 mg in am Increased on 11/09/2018 - does not feel it is helping     Has not tried:  decadron (steroid) - in ED helped for a few days   depakote    Alternative therapies used in the past for headaches?      Neck pain?:     LIFESTYLE  Sleep - averages 10 hours   Naps during the day   Never had a sleep study  Problems falling asleep and hypersomnia   Sometimes up all night and sleep during the day   Do you snore while asleep?unknown         Water: 2 bottles water, not much, poweraide zero    Mood: Schizoaffective, OCD, Paranoia symptoms  - therapist - 1 x week  - psychiatrist - 1 month   - Geodon 60 mg BID - but he thinks is not working and plans to discussed changing with a psychiatrist    Did try olanzapine previously for schizoaffective d/o- caused uncontrolled weight gain and diabetes      #Cognitive dysfunction  History of ADHD  Recent issues with Problems focusing, comprehending and processing things like things he reads and watches on TV  Remembering the right word    The following portions of the patient's history were reviewed and updated as appropriate: allergies, current medications, past family history, past medical history, past social history, past surgical history and problem list     Past Medical History:   ADHD      Past Medical History:   Diagnosis Date    [facility] mellitus [facility]     Disease of thyroid gland     Gastroparesis     GERD (gastroesophageal reflux disease)     Hypertension     Obsessive compulsive disorder     Schizoaffective disorder ([facility] )        Patient Active Problem List   Diagnosis    Type 1 diabetes mellitus without complication ([facility]    Chest pain    Hypertension    GERD (gastroesophageal reflux disease)    Obsessive compulsive disorder    Schizoaffective disorder ([facility]    Other specified hypothyroidism    Morbid obesity with BMI of 50 0-59 9, adult ([facility] )    Anxiety    Hx of migraines    Viral upper respiratory tract infection    Lower extremity edema    Intractable migraine with aura without status migrainosus    Episodic confusion       Medications:      Current Outpatient Prescriptions   Medication Sig Dispense Refill    aspirin 81 mg chewable tablet Chew 1 tablet (81 mg total) daily Over the counter  0    fluvoxaMINE (LUVOX) 100 mg tablet Take 2 tablets (200 mg total) by mouth daily at bedtime (Patient taking differently: Take 100 mg by mouth 2 (two) times a day  ) 60 tablet 0    insulin aspart (NovoLOG) 100 units/mL injection Inject 10 Units under the skin 3 (three) times a day before meals 10 mL 2    insulin glargine (BASAGLAR KWIKPEN) 100 units/mL injection pen Inject 40 Units under the skin daily at bedtime 5 pen 0    Insulin Pen Needle (PEN NEEDLES 3/16") 31G X 5 MM MISC by Does not apply route 4 (four) times a day 200 each 3    levothyroxine 100 mcg tablet Take 1 tablet (100 mcg total) by mouth daily 30 tablet 2    lisinopril (ZESTRIL) 20 mg tablet Take 1 tablet (20 mg total) by mouth daily 30 tablet 1    LORazepam (ATIVAN) 0 5 mg tablet Take 1 tablet (0 5 mg total) by mouth 2 (two) times a day (Patient taking differently: Take 0 5 mg by mouth 2 (two) times a day as needed  ) 60 tablet 0    nitroglycerin (NITROSTAT) 0 4 mg SL tablet Place 1 tablet (0 4 mg total) under the tongue every 5 (five) minutes as needed for chest pain 90 tablet 0    pantoprazole (PROTONIX) 40 mg tablet Take 1 tablet (40 mg total) by mouth daily 90 tablet 1    SUMAtriptan (IMITREX) 100 mg tablet Take 1 tablet (100 mg total) by mouth once as needed for migraine for up to 1 dose 9 tablet 5    topiramate (TOPAMAX) 100 mg tablet One p o  q h s  after titration 30 tablet 5    ziprasidone (GEODON) 40 mg capsule take 1 capsule by mouth twice a day with meals (Patient taking differently: 60mg take 1 capsule by mouth twice a day with meals) 60 capsule 1    magnesium oxide (MAG-OX) 400 mg Take 1 tablet (400 mg total) by mouth daily (Patient not taking: Reported on 12/4/2018 ) 10 tablet 0    Riboflavin 400 MG CAPS Take 1 capsule (400 mg total) by mouth daily (Patient not taking: Reported on 12/4/2018 ) 10 capsule 0     No current facility-administered medications for this visit  Allergies:    No Known Allergies    Family History:   Family history of headaches:  no known family members with significant headaches  Any family history of aneurysms - No  Family History   Problem Relation Age of Onset    COPD Mother     Hypertension Mother     Heart failure Mother     Rheum arthritis Family     Heart disease Family     Hypertension Father     Diabetes Father     Hyperlipidemia Father        Social History:     Social History     Social History    Marital status: Single     Spouse name: N/A    Number of children: N/A    Years of education: N/A     Occupational History    Not on file  Social History Main Topics    Smoking status: Never Smoker    Smokeless tobacco: Never Used    Alcohol use No    Drug use: No    Sexual activity: Not on file     Other Topics Concern    Not on file     Social History Narrative    No narrative on file         Objective:   Physical Exam:                                                                 Vitals:            Constitutional:    /100 (BP Location: Right arm, Patient Position: Sitting, Cuff Size: Large)   Pulse 81   Ht 5' 2" (1 575 m)   Wt 136 kg (300 lb)   BMI 54 87 kg/m²   BP Readings from Last 3 Encounters:   12/04/18 138/100   12/02/18 105/59   11/22/18 (!) 178/87     Pulse Readings from Last 3 Encounters:   12/04/18 81   12/02/18 94   11/22/18 78         Well developed, morbidly Obese, well groomed  No dysmorphic features  HEENT:  Normocephalic atraumatic  No meningismus  Oropharynx is clear and moist  No oral mucosal lesions  Chest:  Respirations regular and unlabored  Cardiovascular:  Regular rate, intact distal pulses  Distal extremities warm with 1+ palpable edema and mild tenderness   Musculoskeletal:  Full range of motion  (see below under neurologic exam for evaluation of motor function and gait)   Skin:  warm and dry, not diaphoretic  No apparent birthmarks or stigmata of neurocutaneous disease  Psychiatric:  Slightly anxious, tangential behavior, depressed affect       Neurological Examination:     Mental Status:      MOCA total = 20/30 -4 cube, clock, -5 delayed recall, -1 abstraction  Visual spatial/executive:    Trails (1)  [x]   copy cube (1)  []   draw clock - "ten past 11" (3)   Contour  []  Numbers  []  Hands  []     Naming:   Drawings (3)  Lion/Camel  [x] Rhino/Bear  [x] Camel/Rhino  [x]     Memory/Delayed Recall:  (5)   Word recall Immediate  Memory  (no points) Delayed  (5 pts) Category Clue Multiple choice   Face  [x]   []  Part of the Body    []  Nose, Face, hand             []    Velvet  [x]   []  Type of Fabric       [x]  [place], [address], [place]      []    James  [x]   []  Type of Building    []  [address] [facility], [place]  []    Mirian  [x]   []  Type of Flower      []  Merrilyn Meo, [place]            []    Red  [x]   []  A Color                  [x]  Red, Blue, Green              []      Attention:   Digits (2)  Forwards: 99827  [x]   Backwards: 742  [x]     Llist of letters, tap for A (1) [x]   F B A C M N A A J K L B A F A K D E A A A J A M O F A A B     Serial 7s (3 pts 4-5 correct, 2pts 2-3 correct, 1 pt for 1 correct)   [x] 93,  [x] 86,  [x] 79,  [x] 72,  [x] 65    Language:   Repeat sentences (2)  I only know that Kulwinder Moreno is the one to help today  [x]   The cat always hid under the couch when dogs were in the room  [x]     Number of words beginning with letter F  (>11 in 1 min) (1)  [x]     Abstraction:  (2)  train-bicycle (means of transportation, means of travelling, you take trips in both) []   watch-ruler (measuring instruments, used to measure)  [x]     Orientation:  (6)  Year [x]  Date [x]  Day [x]  Month [x]  Place [x]  City [x]       Cranial Nerves:  II-visual fields full     Fundi appear normal bilaterally  III, IV, VI-Pupils were equal, round, and reactive to light and accomodation  Extraocular movements were full and conjugate without nystagmus  conjugate gaze, normal smooth pursuits, normal saccades   V-facial sensation symmetric  VII-facial expression symmetric, intact forehead wrinkle, strong eye closure, symmetric smile    VIII-hearing grossly intact bilaterally   IX, X-palate elevation symmetric, no dysarthria  XI-shoulder shrug strength intact    XII-tongue protrusion midline  Motor Exam: symmetric bulk and tone throughout, no pronator drift  Power/strength 5/5 bilateral upper and lower extremities, no atrophy, fasciculations or abnormal movements noted  Sensory: grossly intact light touch in all extremities  Reflexes: brachioradialis 1+, biceps 1+, knee 2+, ankle 2+ bilaterally  No ankle clonus  Coordination: Finger nose finger intact bilaterally, no apparent dysmetria, ataxia or tremor noted  Gait: steady casual and mildly unstable tandem gait  Pertinent lab results:   12/02/2018  CMP significant for creatinine 1 3, glucose 271    11/22/2018  CBC was significant for hemoglobin 11 3    11/14/2018  TSH 3 8 high     Imaging:   Noncontrast head CT 08/31/2018  No acute intracranial findings  Right maxillary sinus opacified    MRI/MRA Brain 10 years ago was unremarkable per patient  Review of Systems:   CARMELLA Personally reviewed  Review of Systems   Constitutional: Positive for appetite change (due to heasd aches)  HENT: Negative  Eyes: Positive for visual disturbance (light )  Gastrointestinal: Positive for nausea  Musculoskeletal: Positive for neck stiffness (in lower area of neck  temple pain)  Skin: Negative  Allergic/Immunologic: Negative  Neurological: Positive for dizziness, speech difficulty (can't get words out ), light-headedness and headaches (daily last all day)     Psychiatric/Behavioral: Positive for confusion, decreased concentration and sleep disturbance  The patient is nervous/anxious  I have spent 80 minutes with Patient and family today in which greater than 50% of this time was spent in counseling/coordination of care regarding Diagnostic results, Prognosis, Risks and benefits of tx options, Intructions for management, Patient and family education, Importance of tx compliance, Risk factor reductions and Impressions        Author:  Kj Syed MD 12/4/2018 12:41 PM

## 2018-12-04 NOTE — PROGRESS NOTES
Patient ID: London Chow is a 34 y o  male  Assessment/Plan:    No problem-specific Assessment & Plan notes found for this encounter  {Assess/PlanSmartLinks:23910}       Subjective:    HPI    {[facility] HPI texts:36674}    {Common ambulatory SmartLinks:04319}         Objective:    Blood pressure 138/100, pulse 81, height 5' 2" (1 575 m), weight 136 kg (300 lb)  Physical Exam    Neurological Exam      ROS:    Review of Systems   Constitutional: Positive for appetite change (due to heasd aches)  HENT: Negative  Eyes: Positive for visual disturbance (light )  Gastrointestinal: Positive for nausea  Musculoskeletal: Positive for neck stiffness (in lower area of neck  temple pain)  Skin: Negative  Allergic/Immunologic: Negative  Neurological: Positive for dizziness, speech difficulty (can't get words out ), light-headedness and headaches (daily last all day)  Psychiatric/Behavioral: Positive for confusion, decreased concentration and sleep disturbance  The patient is nervous/anxious

## 2018-12-11 DIAGNOSIS — E03.9 HYPOTHYROIDISM, UNSPECIFIED TYPE: Primary | ICD-10-CM

## 2018-12-11 RX ORDER — LEVOTHYROXINE SODIUM 0.05 MG/1
TABLET ORAL
Refills: 0 | COMMUNITY
Start: 2018-11-13 | End: 2018-12-11 | Stop reason: SDUPTHER

## 2018-12-11 RX ORDER — LEVOTHYROXINE SODIUM 0.05 MG/1
50 TABLET ORAL DAILY
Qty: 90 TABLET | Refills: 0 | Status: ON HOLD | OUTPATIENT
Start: 2018-12-11 | End: 2019-01-15

## 2019-01-08 ENCOUNTER — DOCTOR'S OFFICE (OUTPATIENT)
Dept: URBAN - METROPOLITAN AREA CLINIC 136 | Facility: CLINIC | Age: 39
Setting detail: OPHTHALMOLOGY
End: 2019-01-08
Payer: COMMERCIAL

## 2019-01-08 DIAGNOSIS — H53.123: ICD-10-CM

## 2019-01-08 DIAGNOSIS — H53.19: ICD-10-CM

## 2019-01-08 DIAGNOSIS — H25.13: ICD-10-CM

## 2019-01-08 DIAGNOSIS — E11.9: ICD-10-CM

## 2019-01-08 DIAGNOSIS — H43.393: ICD-10-CM

## 2019-01-08 LAB
LEFT EYE DIABETIC RETINOPATHY: NORMAL
RIGHT EYE DIABETIC RETINOPATHY: NORMAL
SEVERITY (EYE EXAM): NORMAL

## 2019-01-08 PROCEDURE — 92014 COMPRE OPH EXAM EST PT 1/>: CPT | Performed by: OPHTHALMOLOGY

## 2019-01-08 ASSESSMENT — REFRACTION_MANIFEST
OU_VA: 20/
OD_VA1: 20/
OD_VA2: 20/
OD_VA1: 20/
OS_VA3: 20/
OD_VA2: 20/
OU_VA: 20/
OD_VA3: 20/
OS_VA2: 20/
OS_VA3: 20/
OS_VA1: 20/
OD_VA3: 20/
OS_VA2: 20/
OS_VA1: 20/

## 2019-01-08 ASSESSMENT — REFRACTION_CURRENTRX
OS_OVR_VA: 20/
OD_OVR_VA: 20/
OS_SPHERE: -2.75
OD_SPHERE: -2.75
OS_CYLINDER: SPHERE
OS_OVR_VA: 20/
OD_OVR_VA: 20/
OD_OVR_VA: 20/
OD_CYLINDER: SPHERE
OS_OVR_VA: 20/

## 2019-01-08 ASSESSMENT — CONFRONTATIONAL VISUAL FIELD TEST (CVF)
OS_FINDINGS: FULL
OD_FINDINGS: FULL

## 2019-01-08 ASSESSMENT — SUPERFICIAL PUNCTATE KERATITIS (SPK)
OS_SPK: 2+
OD_SPK: 2+

## 2019-01-08 ASSESSMENT — VISUAL ACUITY
OS_BCVA: 20/20-
OD_BCVA: 20/20-1

## 2019-01-13 ENCOUNTER — APPOINTMENT (OUTPATIENT)
Dept: MRI IMAGING | Facility: HOSPITAL | Age: 39
End: 2019-01-13
Payer: COMMERCIAL

## 2019-01-13 ENCOUNTER — HOSPITAL ENCOUNTER (OUTPATIENT)
Facility: HOSPITAL | Age: 39
Setting detail: OBSERVATION
Discharge: HOME | End: 2019-01-15
Attending: EMERGENCY MEDICINE | Admitting: INTERNAL MEDICINE
Payer: COMMERCIAL

## 2019-01-13 DIAGNOSIS — G43.019 INTRACTABLE MIGRAINE WITHOUT AURA AND WITHOUT STATUS MIGRAINOSUS: ICD-10-CM

## 2019-01-13 DIAGNOSIS — F25.9 SCHIZO AFFECTIVE SCHIZOPHRENIA (HCC): ICD-10-CM

## 2019-01-13 DIAGNOSIS — R11.2 NAUSEA AND VOMITING, INTRACTABILITY OF VOMITING NOT SPECIFIED, UNSPECIFIED VOMITING TYPE: ICD-10-CM

## 2019-01-13 DIAGNOSIS — K21.00 GASTROESOPHAGEAL REFLUX DISEASE WITH ESOPHAGITIS: ICD-10-CM

## 2019-01-13 DIAGNOSIS — E10.9 TYPE 1 DIABETES MELLITUS WITHOUT COMPLICATION (HCC): Chronic | ICD-10-CM

## 2019-01-13 DIAGNOSIS — E11.9 TYPE 2 DIABETES MELLITUS WITHOUT COMPLICATION, UNSPECIFIED WHETHER LONG TERM INSULIN USE (HCC): ICD-10-CM

## 2019-01-13 DIAGNOSIS — E03.9 HYPOTHYROIDISM, UNSPECIFIED TYPE: ICD-10-CM

## 2019-01-13 DIAGNOSIS — G43.719 INTRACTABLE CHRONIC MIGRAINE WITHOUT AURA AND WITHOUT STATUS MIGRAINOSUS: ICD-10-CM

## 2019-01-13 DIAGNOSIS — F25.9 SCHIZOAFFECTIVE DISORDER, UNSPECIFIED TYPE (HCC): Chronic | ICD-10-CM

## 2019-01-13 DIAGNOSIS — G43.909 MIGRAINE HEADACHE: Primary | ICD-10-CM

## 2019-01-13 DIAGNOSIS — K21.9 GASTROESOPHAGEAL REFLUX DISEASE, ESOPHAGITIS PRESENCE NOT SPECIFIED: ICD-10-CM

## 2019-01-13 DIAGNOSIS — K21.9 GASTROESOPHAGEAL REFLUX DISEASE WITHOUT ESOPHAGITIS: ICD-10-CM

## 2019-01-13 DIAGNOSIS — R41.0 CONFUSION: ICD-10-CM

## 2019-01-13 PROBLEM — R60.0 LOWER EXTREMITY EDEMA: Chronic | Status: RESOLVED | Noted: 2018-10-31 | Resolved: 2019-01-13

## 2019-01-13 PROBLEM — Z79.4 TYPE 2 DIABETES MELLITUS, WITH LONG-TERM CURRENT USE OF INSULIN (HCC): Status: ACTIVE | Noted: 2018-10-17

## 2019-01-13 PROBLEM — J06.9 VIRAL UPPER RESPIRATORY TRACT INFECTION: Chronic | Status: RESOLVED | Noted: 2018-10-31 | Resolved: 2019-01-13

## 2019-01-13 PROBLEM — R07.9 CHEST PAIN: Chronic | Status: RESOLVED | Noted: 2018-10-31 | Resolved: 2019-01-13

## 2019-01-13 LAB
ANION GAP SERPL CALCULATED.3IONS-SCNC: 7 MMOL/L (ref 4–13)
BASOPHILS # BLD AUTO: 0.02 THOUSANDS/ΜL (ref 0–0.1)
BASOPHILS NFR BLD AUTO: 0 % (ref 0–1)
BUN SERPL-MCNC: 17 MG/DL (ref 5–25)
CALCIUM SERPL-MCNC: 8.7 MG/DL (ref 8.3–10.1)
CHLORIDE SERPL-SCNC: 104 MMOL/L (ref 100–108)
CO2 SERPL-SCNC: 27 MMOL/L (ref 21–32)
CREAT SERPL-MCNC: 0.86 MG/DL (ref 0.6–1.3)
EOSINOPHIL # BLD AUTO: 0.28 THOUSAND/ΜL (ref 0–0.61)
EOSINOPHIL NFR BLD AUTO: 4 % (ref 0–6)
ERYTHROCYTE [DISTWIDTH] IN BLOOD BY AUTOMATED COUNT: 12.8 % (ref 11.6–15.1)
GFR SERPL CREATININE-BSD FRML MDRD: 108 ML/MIN/1.73SQ M
GLUCOSE P FAST SERPL-MCNC: 160 MG/DL (ref 65–99)
GLUCOSE SERPL-MCNC: 158 MG/DL (ref 65–140)
GLUCOSE SERPL-MCNC: 160 MG/DL (ref 65–140)
GLUCOSE SERPL-MCNC: 205 MG/DL (ref 65–140)
HCT VFR BLD AUTO: 35.7 % (ref 36.5–49.3)
HGB BLD-MCNC: 12.2 G/DL (ref 12–17)
HOLD SPECIMEN: NORMAL
HOLD SPECIMEN: NORMAL
IMM GRANULOCYTES # BLD AUTO: 0.02 THOUSAND/UL (ref 0–0.2)
IMM GRANULOCYTES NFR BLD AUTO: 0 % (ref 0–2)
LYMPHOCYTES # BLD AUTO: 2.15 THOUSANDS/ΜL (ref 0.6–4.47)
LYMPHOCYTES NFR BLD AUTO: 29 % (ref 14–44)
MAGNESIUM SERPL-MCNC: 1.8 MG/DL (ref 1.6–2.6)
MCH RBC QN AUTO: 29.1 PG (ref 26.8–34.3)
MCHC RBC AUTO-ENTMCNC: 34.2 G/DL (ref 31.4–37.4)
MCV RBC AUTO: 85 FL (ref 82–98)
MONOCYTES # BLD AUTO: 0.53 THOUSAND/ΜL (ref 0.17–1.22)
MONOCYTES NFR BLD AUTO: 7 % (ref 4–12)
NEUTROPHILS # BLD AUTO: 4.34 THOUSANDS/ΜL (ref 1.85–7.62)
NEUTS SEG NFR BLD AUTO: 60 % (ref 43–75)
NRBC BLD AUTO-RTO: 0 /100 WBCS
PLATELET # BLD AUTO: 295 THOUSANDS/UL (ref 149–390)
PMV BLD AUTO: 9.2 FL (ref 8.9–12.7)
POTASSIUM SERPL-SCNC: 3.7 MMOL/L (ref 3.5–5.3)
RBC # BLD AUTO: 4.19 MILLION/UL (ref 3.88–5.62)
SODIUM SERPL-SCNC: 138 MMOL/L (ref 136–145)
TSH SERPL DL<=0.05 MIU/L-ACNC: 16.04 UIU/ML (ref 0.36–3.74)
WBC # BLD AUTO: 7.34 THOUSAND/UL (ref 4.31–10.16)

## 2019-01-13 PROCEDURE — 99284 EMERGENCY DEPT VISIT MOD MDM: CPT

## 2019-01-13 PROCEDURE — 99244 OFF/OP CNSLTJ NEW/EST MOD 40: CPT | Performed by: PSYCHIATRY & NEUROLOGY

## 2019-01-13 PROCEDURE — 76377 3D RENDER W/INTRP POSTPROCES: CPT

## 2019-01-13 PROCEDURE — 83735 ASSAY OF MAGNESIUM: CPT | Performed by: NURSE PRACTITIONER

## 2019-01-13 PROCEDURE — 85025 COMPLETE CBC W/AUTO DIFF WBC: CPT | Performed by: NURSE PRACTITIONER

## 2019-01-13 PROCEDURE — 99220 PR INITIAL OBSERVATION CARE/DAY 70 MINUTES: CPT | Performed by: INTERNAL MEDICINE

## 2019-01-13 PROCEDURE — 80048 BASIC METABOLIC PNL TOTAL CA: CPT | Performed by: NURSE PRACTITIONER

## 2019-01-13 PROCEDURE — 82948 REAGENT STRIP/BLOOD GLUCOSE: CPT

## 2019-01-13 PROCEDURE — 36415 COLL VENOUS BLD VENIPUNCTURE: CPT | Performed by: EMERGENCY MEDICINE

## 2019-01-13 PROCEDURE — 70551 MRI BRAIN STEM W/O DYE: CPT

## 2019-01-13 PROCEDURE — 84443 ASSAY THYROID STIM HORMONE: CPT | Performed by: NURSE PRACTITIONER

## 2019-01-13 PROCEDURE — 70544 MR ANGIOGRAPHY HEAD W/O DYE: CPT

## 2019-01-13 RX ORDER — DIPHENHYDRAMINE HYDROCHLORIDE 50 MG/ML
25 INJECTION INTRAMUSCULAR; INTRAVENOUS ONCE
Status: COMPLETED | OUTPATIENT
Start: 2019-01-13 | End: 2019-01-13

## 2019-01-13 RX ORDER — LISINOPRIL 20 MG/1
20 TABLET ORAL DAILY
Status: DISCONTINUED | OUTPATIENT
Start: 2019-01-13 | End: 2019-01-15 | Stop reason: HOSPADM

## 2019-01-13 RX ORDER — PANTOPRAZOLE SODIUM 40 MG/1
40 TABLET, DELAYED RELEASE ORAL
Status: DISCONTINUED | OUTPATIENT
Start: 2019-01-13 | End: 2019-01-15 | Stop reason: HOSPADM

## 2019-01-13 RX ORDER — TOPIRAMATE 100 MG/1
100 TABLET, FILM COATED ORAL DAILY
Status: DISCONTINUED | OUTPATIENT
Start: 2019-01-13 | End: 2019-01-15 | Stop reason: HOSPADM

## 2019-01-13 RX ORDER — INSULIN GLARGINE 100 [IU]/ML
40 INJECTION, SOLUTION SUBCUTANEOUS
Status: DISCONTINUED | OUTPATIENT
Start: 2019-01-13 | End: 2019-01-15 | Stop reason: HOSPADM

## 2019-01-13 RX ORDER — ASENAPINE 10 MG/1
5 TABLET SUBLINGUAL ONCE
Status: COMPLETED | OUTPATIENT
Start: 2019-01-13 | End: 2019-01-13

## 2019-01-13 RX ORDER — FLUVOXAMINE MALEATE 100 MG
100 TABLET ORAL 2 TIMES DAILY
Status: DISCONTINUED | OUTPATIENT
Start: 2019-01-13 | End: 2019-01-15 | Stop reason: HOSPADM

## 2019-01-13 RX ORDER — ASPIRIN 81 MG/1
81 TABLET, CHEWABLE ORAL DAILY
Status: DISCONTINUED | OUTPATIENT
Start: 2019-01-13 | End: 2019-01-15 | Stop reason: HOSPADM

## 2019-01-13 RX ORDER — KETOROLAC TROMETHAMINE 30 MG/ML
30 INJECTION, SOLUTION INTRAMUSCULAR; INTRAVENOUS ONCE
Status: COMPLETED | OUTPATIENT
Start: 2019-01-13 | End: 2019-01-13

## 2019-01-13 RX ORDER — KETOROLAC TROMETHAMINE 30 MG/ML
15 INJECTION, SOLUTION INTRAMUSCULAR; INTRAVENOUS EVERY 6 HOURS PRN
Status: DISCONTINUED | OUTPATIENT
Start: 2019-01-13 | End: 2019-01-15 | Stop reason: HOSPADM

## 2019-01-13 RX ORDER — METOCLOPRAMIDE HYDROCHLORIDE 5 MG/ML
10 INJECTION INTRAMUSCULAR; INTRAVENOUS ONCE
Status: COMPLETED | OUTPATIENT
Start: 2019-01-13 | End: 2019-01-13

## 2019-01-13 RX ORDER — ASENAPINE 5 MG/1
5 TABLET SUBLINGUAL DAILY
Status: ON HOLD | COMMUNITY
End: 2019-01-15

## 2019-01-13 RX ORDER — LEVOTHYROXINE SODIUM 0.05 MG/1
50 TABLET ORAL
Status: DISCONTINUED | OUTPATIENT
Start: 2019-01-14 | End: 2019-01-14

## 2019-01-13 RX ORDER — MAGNESIUM HYDROXIDE/ALUMINUM HYDROXICE/SIMETHICONE 120; 1200; 1200 MG/30ML; MG/30ML; MG/30ML
30 SUSPENSION ORAL ONCE
Status: COMPLETED | OUTPATIENT
Start: 2019-01-13 | End: 2019-01-13

## 2019-01-13 RX ORDER — LORAZEPAM 0.5 MG/1
0.5 TABLET ORAL 2 TIMES DAILY
Status: DISCONTINUED | OUTPATIENT
Start: 2019-01-13 | End: 2019-01-15 | Stop reason: HOSPADM

## 2019-01-13 RX ORDER — ACETAMINOPHEN 325 MG/1
650 TABLET ORAL EVERY 6 HOURS PRN
Status: DISCONTINUED | OUTPATIENT
Start: 2019-01-13 | End: 2019-01-15 | Stop reason: HOSPADM

## 2019-01-13 RX ORDER — DIAZEPAM 5 MG/ML
5 INJECTION, SOLUTION INTRAMUSCULAR; INTRAVENOUS ONCE
Status: DISCONTINUED | OUTPATIENT
Start: 2019-01-13 | End: 2019-01-14

## 2019-01-13 RX ORDER — ONDANSETRON 2 MG/ML
4 INJECTION INTRAMUSCULAR; INTRAVENOUS EVERY 6 HOURS PRN
Status: DISCONTINUED | OUTPATIENT
Start: 2019-01-13 | End: 2019-01-15 | Stop reason: HOSPADM

## 2019-01-13 RX ADMIN — LISINOPRIL 20 MG: 20 TABLET ORAL at 16:40

## 2019-01-13 RX ADMIN — FLUVOXAMINE MALEATE 100 MG: 100 TABLET, FILM COATED ORAL at 17:23

## 2019-01-13 RX ADMIN — SODIUM CHLORIDE 1000 ML: 0.9 INJECTION, SOLUTION INTRAVENOUS at 13:25

## 2019-01-13 RX ADMIN — ALUMINUM HYDROXIDE, MAGNESIUM HYDROXIDE, AND SIMETHICONE 30 ML: 200; 200; 20 SUSPENSION ORAL at 13:40

## 2019-01-13 RX ADMIN — DIPHENHYDRAMINE HYDROCHLORIDE 25 MG: 50 INJECTION, SOLUTION INTRAMUSCULAR; INTRAVENOUS at 13:29

## 2019-01-13 RX ADMIN — ASENAPINE MALEATE 5 MG: 10 TABLET SUBLINGUAL at 16:44

## 2019-01-13 RX ADMIN — INSULIN GLARGINE 40 UNITS: 100 INJECTION, SOLUTION SUBCUTANEOUS at 21:33

## 2019-01-13 RX ADMIN — INSULIN LISPRO 1 UNITS: 100 INJECTION, SOLUTION INTRAVENOUS; SUBCUTANEOUS at 21:33

## 2019-01-13 RX ADMIN — LIDOCAINE HYDROCHLORIDE 15 ML: 20 SOLUTION ORAL; TOPICAL at 13:41

## 2019-01-13 RX ADMIN — METOCLOPRAMIDE 10 MG: 5 INJECTION, SOLUTION INTRAMUSCULAR; INTRAVENOUS at 13:32

## 2019-01-13 RX ADMIN — ASPIRIN 81 MG 81 MG: 81 TABLET ORAL at 16:40

## 2019-01-13 RX ADMIN — ENOXAPARIN SODIUM 40 MG: 40 INJECTION SUBCUTANEOUS at 19:19

## 2019-01-13 RX ADMIN — LORAZEPAM 0.5 MG: 0.5 TABLET ORAL at 17:21

## 2019-01-13 RX ADMIN — PANTOPRAZOLE SODIUM 40 MG: 40 TABLET, DELAYED RELEASE ORAL at 16:40

## 2019-01-13 RX ADMIN — Medication 400 MG: at 16:40

## 2019-01-13 RX ADMIN — KETOROLAC TROMETHAMINE 15 MG: 30 INJECTION, SOLUTION INTRAMUSCULAR at 19:22

## 2019-01-13 RX ADMIN — TOPIRAMATE 100 MG: 100 TABLET, FILM COATED ORAL at 16:40

## 2019-01-13 RX ADMIN — INSULIN LISPRO 1 UNITS: 100 INJECTION, SOLUTION INTRAVENOUS; SUBCUTANEOUS at 16:42

## 2019-01-13 RX ADMIN — KETOROLAC TROMETHAMINE 30 MG: 30 INJECTION, SOLUTION INTRAMUSCULAR at 13:32

## 2019-01-13 NOTE — ASSESSMENT & PLAN NOTE
Lab Results   Component Value Date    HGBA1C 8 8 (H) 10/31/2018       Recent Labs      01/13/19   1614   POCGLU  158*       Blood Sugar Average: Last 72 hrs:  (P) 158   · Uncontrolled as evidenced by A1c of 8 8% and hyperglycemia  · Will implement Accu-Cheks a c  HS with SSI  · Continue Lantus 40 units HS as this is patient's dose at home  · He relates he uses a sliding scale for his mealtime and will often just "guess" at how much she needs  Will hold mealtime for now and assess how much insulin he is requiring at meals and make adjustments as necessary  · Monitor closely if patient ends up going on steroids for headache control will need to make adjustments

## 2019-01-13 NOTE — CONSULTS
Consultation - Neurology   Ivone Marsh 34 y o  male MRN: [id number]  Unit/Bed#: -01 Encounter: [id number]      Assessment/Plan   Migraine headaches, possible venous sinus thrombosis considering the chronicity  We will get MR brain, and MRV to check for venous sinus thrombosis  Stick with current home regimen of medications  And with headache getting better with the cocktail in ED, (Toradol, benadryl and reglan), will stick with it, to be given every 8 hours as needed  If nothing worrisome on MR brain/MRV, then f/u with Neurology as scheduled, as an outpatient  History of Present Illness     Reason for Consult / Principal Problem: Headaches    HPI: Ivone Marsh is a 34 y o  with history of headaches, schizoaffective disorder, OCD, diabetes mellitus and hypertension, who is admitted for headaches  He reports of a headache involving the back of head, feels like a knot, radiates to the front, some nausea with it, the cocktail he got in ED helped  He got Toradol, Benadryl and Reglan  He takes Topamax 100 mg nightly for headache prevention  In the past he has had MR brain, cervical spine in 2010, reports that was normal  He also reports that he has intermittent episodes of confusion, no altered awareness or consciousness, describes an episode where he says he was not able to understand what he was reading, says simple stuff like fliers at stores  He had EEG done Nov'2019 for the same, was normal he says  He currently sees Neurology as an outpatient for headaches, MR Brain pending  Consults    Review of Systems   Constitutional: Negative for fever  HENT: Negative for trouble swallowing and voice change  Eyes: Negative for photophobia and visual disturbance  Respiratory: Negative for shortness of breath and wheezing  Gastrointestinal: Positive for diarrhea and nausea  Negative for constipation and vomiting  Genitourinary: Negative for dysuria  Musculoskeletal: Positive for neck pain  Skin: Negative for rash  Neurological: Positive for headaches  Negative for dizziness, tremors, seizures, syncope, facial asymmetry, weakness, light-headedness and numbness  Psychiatric/Behavioral: Negative for agitation  Historical Information   Past Medical History:   Diagnosis Date    Chronic headaches     Diabetes mellitus ([facility] )     Disease of thyroid gland     Gastroparesis     GERD (gastroesophageal reflux disease)     Hypertension     Obesity     Obsessive compulsive disorder     Psychiatric disorder     Schizoaffective disorder ([facility] )      History reviewed  No pertinent surgical history    Social History   History   Alcohol Use No     History   Drug Use No     History   Smoking Status    Never Smoker   Smokeless Tobacco    Never Used     Family History:   Family History   Problem Relation Age of Onset    COPD Mother     Hypertension Mother     Heart failure Mother     Rheum arthritis Family     Heart disease Family     Hypertension Father     Diabetes Father     Hyperlipidemia Father        Meds/Allergies   current meds:   Current Facility-Administered Medications   Medication Dose Route Frequency    acetaminophen (TYLENOL) tablet 650 mg  650 mg Oral Q6H PRN    aspirin chewable tablet 81 mg  81 mg Oral Daily    diazepam (VALIUM) injection 5 mg  5 mg Intravenous Once    enoxaparin (LOVENOX) subcutaneous injection 40 mg  40 mg Subcutaneous Daily    fluvoxaMINE (LUVOX) tablet 100 mg  100 mg Oral BID    insulin glargine (LANTUS) subcutaneous injection 40 Units 0 4 mL  40 Units Subcutaneous HS    insulin lispro (HumaLOG) 100 units/mL subcutaneous injection 1-5 Units  1-5 Units Subcutaneous HS    insulin lispro (HumaLOG) 100 units/mL subcutaneous injection 1-6 Units  1-6 Units Subcutaneous TID AC    ketorolac (TORADOL) injection 15 mg  15 mg Intravenous Q6H PRN    [START ON 1/14/2019] levothyroxine tablet 50 mcg  50 mcg Oral Early Morning    lisinopril (ZESTRIL) tablet 20 mg  20 mg Oral Daily    LORazepam (ATIVAN) tablet 0 5 mg  0 5 mg Oral BID    magnesium oxide (MAG-OX) tablet 400 mg  400 mg Oral Daily    ondansetron (ZOFRAN) injection 4 mg  4 mg Intravenous Q6H PRN    pantoprazole (PROTONIX) EC tablet 40 mg  40 mg Oral BID AC    Riboflavin CAPS 400 mg  400 mg Oral Daily    topiramate (TOPAMAX) tablet 100 mg  100 mg Oral Daily       No Known Allergies    Objective   Vitals:Blood pressure 119/61, pulse 83, temperature 97 7 °F (36 5 °C), temperature source Oral, resp  rate 18, height 5' 2" (1 575 m), weight 135 kg (296 lb 9 6 oz), SpO2 95 %  ,Body mass index is 54 25 kg/m²  No intake or output data in the 24 hours ending 01/13/19 5654    Invasive Devices: Invasive Devices     Peripheral Intravenous Line            Peripheral IV 01/13/19 Left Antecubital less than 1 day                Physical Exam   Constitutional: He is oriented to person, place, and time  HENT:   Head: Normocephalic and atraumatic  Pulmonary/Chest: Effort normal    Musculoskeletal: He exhibits no edema  Neurological: He is oriented to person, place, and time  He has normal strength  He has a normal Finger-Nose-Finger Test    Reflex Scores:       Tricep reflexes are 2+ on the right side and 2+ on the left side  Bicep reflexes are 2+ on the right side and 2+ on the left side  Brachioradialis reflexes are 2+ on the right side and 2+ on the left side  Patellar reflexes are 2+ on the right side and 2+ on the left side  Neurologic Exam     Mental Status   Oriented to person, place, and time  Cranial Nerves   Cranial nerves II through XII intact  Motor Exam     Strength   Strength 5/5 throughout       Sensory Exam   Light touch normal      Gait, Coordination, and Reflexes     Coordination   Finger to nose coordination: normal    Reflexes   Right brachioradialis: 2+  Left brachioradialis: 2+  Right biceps: 2+  Left biceps: 2+  Right triceps: 2+  Left triceps: 2+  Right patellar: 2+  Left patellar: 2+Gait not tested       Lab Results: I have personally reviewed pertinent reports  Imaging Studies: I have personally reviewed pertinent reports          Code Status: Level 1 - Full Code

## 2019-01-13 NOTE — ASSESSMENT & PLAN NOTE
· Patient admitted with intractable headache and episodic confusion, has had symptoms for over a year but they have been increasing in frequency and duration  Suspect his headache may be exacerbated by medication overuse  · Follows up with Neurology as an outpatient will consult for evaluation recommendations  · Received migraine cocktail in the ED of which he relates his pain has improved  · Was scheduled for an MRI as an outpatient at the end of the month will defer to Neurology to see if they would like this test completed as an inpatient  · Toradol 15 mg IV q 6 hours p r n  Severe pain  · Tylenol p r n   Mild pain  · Await Neurology evaluation for recommendations regarding any further medications, diagnostic studies  · Continue home meds  · Check CBC, BMP, magnesium

## 2019-01-13 NOTE — ASSESSMENT & PLAN NOTE
· TSH elevated at 3 898 November 14, 2018  · Repeat TSH  · Continue current levothyroxine dose for now

## 2019-01-13 NOTE — ASSESSMENT & PLAN NOTE
Lab Results   Component Value Date    HGBA1C 8 8 (H) 10/31/2018       No results for input(s): POCGLU in the last 72 hours  Blood Sugar Average: Last 72 hrs:     · Uncontrolled as evidenced by A1c of 8 8% and hyperglycemia  · Will implement Accu-Cheks a c  HS with SSI  · Continue Lantus 40 units HS as this is patient's dose at home  · He relates he uses a sliding scale for his mealtime and will often just "guess" at how much she needs  Will hold mealtime for now and assess how much insulin he is requiring at meals and make adjustments as necessary  · Monitor closely if patient ends up going on steroids for headache control will need to make adjustments

## 2019-01-13 NOTE — ASSESSMENT & PLAN NOTE
· Patient reports increased GERD symptoms especially when lying flat, symptoms will cause him to cough and also he will get a sensation that he is short of breath  · Increase Protonix to b i d    · Reflux precautions

## 2019-01-13 NOTE — H&P
H&P- Park Lopez 1980, 34 y o  male MRN: [id number]    Unit/Bed#: -01 Encounter: [id number]    Primary Care Provider: Arsalan Norwood MD   Date and time admitted to hospital: 1/13/2019 12:06 PM        No new Assessment & Plan notes have been filed under this hospital service since the last note was generated  Service: Hospitalist      VTE Prophylaxis: Enoxaparin (Lovenox)  / sequential compression device   Code Status:  Full code  POLST: POLST form is not discussed and not completed at this time  Discussion with family:      Anticipated Length of Stay:  Patient will be admitted on an Observation basis with an anticipated length of stay of  < 2 midnights  Justification for Hospital Stay:  Intractable headache failing outpatient therapy    Total Time for Visit, including Counseling / Coordination of Care: 45 minutes  Greater than 50% of this total time spent on direct patient counseling and coordination of care  Chief Complaint:   Headache and episodic confusion    History of Present Illness:    Park Lopez is a 34 y o  male with a past medical history of hypertension, GERD, gastroparesis, diabetes type 2, hypothyroidism, schizoaffective disorder, anxiety, and OCD who presents with reports of headache and episodic confusion  Patient has been having issues with headaches over the past year of which he has sought out neurology consultation as an outpatient  He relates he is scheduled as an outpatient for an MRI in a few weeks  States over the past week or so his headaches have been getting worse in regards to frequency and duration  His headaches are accompanied by episodic confusion  He relates he will occasionally have visual disturbances in the form of difficulty with reading, nausea, had 1 episode of emesis today, loose bowels and lightheadedness  He describes the pain as stabbing sensation in the back of his head that radiates his temples    Has been trying Excedrin headache relief 2-3 doses per day of which he states relieves about 70% of his pain but reoccurs when medication wears off  Reports his confusion is described as difficulty with him getting his words out, issues with short-term memory, and difficulties with understanding/comprehension  Had an eye exam about 1 week ago of which he states was normal but he has been experiencing difficulties with reading  Started taking a magnesium supplement approximately 1 week ago of which he states he got off the Internet that is the only new medication of recent  He does state he has some GERD symptoms that caused him to feel short of breath and also will induce a cough  Review of systems otherwise noted below  Will admit to medical-surgical unit for further workup and evaluation by Neurology evaluation for recommendations  Review of Systems:    Review of Systems   Constitutional: Positive for appetite change and fatigue  Negative for chills and fever  HENT: Negative  Eyes: Positive for visual disturbance  Reports having an eye exam about 1 week ago of which he was told was normal but he states he has noticed difficulty with reading   Respiratory: Positive for cough  Negative for shortness of breath, wheezing and stridor  Patient states he has cough when he has GERD symptoms   Cardiovascular: Negative for chest pain, palpitations and leg swelling  Gastrointestinal: Positive for diarrhea, nausea and vomiting  Negative for abdominal pain and constipation  Reports an episode of vomiting today and also intermittent nausea  Reports loose bowels   Genitourinary: Negative for difficulty urinating and dysuria  Skin: Negative for rash  Neurological: Positive for weakness, light-headedness and headaches  Negative for dizziness  Headache described as stabbing in the back of his head that radiates to the temporal area   Psychiatric/Behavioral: Positive for confusion and decreased concentration          States he has had episodic confusion which she describes as difficulty getting words out, difficulty with short-term memory, and also problems with comprehension  Past Medical and Surgical History:     Past Medical History:   Diagnosis Date    Chronic headaches     Diabetes mellitus ([facility] )     Disease of thyroid gland     Gastroparesis     GERD (gastroesophageal reflux disease)     Hypertension     Obesity     Obsessive compulsive disorder     Psychiatric disorder     Schizoaffective disorder ([place] [address] )        History reviewed  No pertinent surgical history  Meds/Allergies:    Prior to Admission medications    Medication Sig Start Date End Date Taking?  Authorizing Provider   asenapine (SAPHRIS) SL tablet Place 5 mg under the tongue once   Yes Historical Provider, MD   fluvoxaMINE (LUVOX) 100 mg tablet Take 2 tablets (200 mg total) by mouth daily at bedtime  Patient taking differently: Take 100 mg by mouth 2 (two) times a day   9/19/18  Yes Bard Parviz MD   insulin aspart (NovoLOG) 100 units/mL injection Inject 10 Units under the skin 3 (three) times a day before meals 9/19/18  Yes Bard Parviz MD   insulin glargine (BASAGLAR KWIKPEN) 100 units/mL injection pen Inject 40 Units under the skin daily at bedtime 11/1/18  Yes Mortimer Lorenzo, PA-C   Insulin Pen Needle (PEN NEEDLES 3/16") 31G X 5 MM MISC by Does not apply route 4 (four) times a day 10/2/18  Yes Bard Parviz MD   levothyroxine 50 mcg tablet Take 1 tablet (50 mcg total) by mouth daily  Patient taking differently: Take 100 mcg by mouth daily   12/11/18  Yes Bard Parviz MD   lisinopril (ZESTRIL) 20 mg tablet Take 1 tablet (20 mg total) by mouth daily 11/1/18  Yes Mortimer Lorenzo, PA-C   magnesium oxide (MAG-OX) 400 mg Take 1 tablet (400 mg total) by mouth daily 12/4/18  Yes Shaka Miller MD   pantoprazole (PROTONIX) 40 mg tablet Take 1 tablet (40 mg total) by mouth daily 9/19/18  Yes Bard Parviz MD   Riboflavin 400 MG CAPS Take 1 capsule (400 mg total) by mouth daily 12/4/18  Yes Shaka Miller MD   SUMAtriptan (IMITREX) 100 mg tablet Take 1 tablet (100 mg total) by mouth once as needed for migraine for up to 1 dose 11/9/18  Yes Grady Purcell MD   topiramate (TOPAMAX) 100 mg tablet One p o  q h s  after titration  Patient taking differently: daily One p o  q h s  after titration  11/9/18  Yes Grady Purcell MD   aspirin 81 mg chewable tablet Chew 1 tablet (81 mg total) daily Over the counter  Patient not taking: Reported on 1/13/2019 11/1/18   Mortimer Lorenzo, PA-C   LORazepam (ATIVAN) 0 5 mg tablet Take 1 tablet (0 5 mg total) by mouth 2 (two) times a day  Patient not taking: Reported on 1/13/2019  10/17/18   Bard Parviz MD   nitroglycerin (NITROSTAT) 0 4 mg SL tablet Place 1 tablet (0 4 mg total) under the tongue every 5 (five) minutes as needed for chest pain 10/26/18 1/13/19  Ami Casanova MD   ziprasidone (GEODON) 40 mg capsule take 1 capsule by mouth twice a day with meals  Patient not taking: Reported on 1/13/2019 10/18/18 1/13/19  Bard Parviz MD     I have reviewed home medications with patient personally      Allergies: No Known Allergies    Social History:     Marital Status: Single   Occupation:   Patient Pre-hospital Living Situation:  Lives with his father  Patient Pre-hospital Level of Mobility:  Independent  Patient Pre-hospital Diet Restrictions:  Diabetic  Substance Use History:   History   Alcohol Use No     History   Smoking Status    Never Smoker   Smokeless Tobacco    Never Used     History   Drug Use No       Family History:    Family History   Problem Relation Age of Onset    COPD Mother     Hypertension Mother     Heart failure Mother     Rheum arthritis Family     Heart disease Family     Hypertension Father     Diabetes Father     Hyperlipidemia Father        Physical Exam:     Vitals:   Blood Pressure: 119/61 (01/13/19 1500)  Pulse: 83 (01/13/19 1500)  Temperature: 97 7 °F (36 5 °C) (01/13/19 1500)  Temp Source: Oral (01/13/19 1500)  Respirations: 18 (01/13/19 1500)  Height: 5' 2" (157 5 cm) (01/13/19 1500)  Weight - Scale: 135 kg (296 lb 9 6 oz) (01/13/19 1500)  SpO2: 95 % (01/13/19 1500)    Physical Exam   Constitutional: He is oriented to person, place, and time  He appears well-developed and well-nourished  No distress  HENT:   Head: Normocephalic and atraumatic  Mouth/Throat: Oropharynx is clear and moist    Lips are dry cracked   Neck: Neck supple  Cardiovascular: Normal rate, regular rhythm and intact distal pulses  No murmur heard  Pulmonary/Chest: Effort normal and breath sounds normal  No respiratory distress  He has no wheezes  He has no rales  Abdominal: Soft  Bowel sounds are normal  He exhibits no distension  There is no tenderness  There is no rebound and no guarding  Obese   Musculoskeletal: Normal range of motion  He exhibits no edema, tenderness or deformity  Neurological: He is oriented to person, place, and time  No cranial nerve deficit  Skin: Skin is warm and dry  Psychiatric: He has a normal mood and affect  His behavior is normal    Vitals reviewed  Additional Data:     Lab Results: Labs pending at time of note      Results from last 7 days  Lab Units 01/13/19  1624   WBC Thousand/uL 7 34   HEMOGLOBIN g/dL 12 2   HEMATOCRIT % 35 7*   PLATELETS Thousands/uL 295   NEUTROS PCT % 60   LYMPHS PCT % 29   MONOS PCT % 7   EOS PCT % 4               Results from last 7 days  Lab Units 01/13/19  1614   POC GLUCOSE mg/dl 158*               Imaging: I have personally reviewed pertinent reports  No orders to display       EKG, Pathology, and Other Studies Reviewed on Admission:   · EKG:    Allscripts / Epic Records Reviewed: Yes     ** Please Note: This note has been constructed using a voice recognition system   **

## 2019-01-13 NOTE — ASSESSMENT & PLAN NOTE
· Schizoaffective disorder with history of OCD and anxiety  ·  Continue home medications  · Luvox  · Saphris  · Lorazepam

## 2019-01-13 NOTE — ASSESSMENT & PLAN NOTE
· History of hypertension, blood pressure was elevated on admission but patient admits to not taking his medications today    · Continue lisinopril  · Will check BMP most recent creatinine on December 2nd was 1 3 7 May need to hold ACEi

## 2019-01-13 NOTE — H&P
H&P- Park Lopez 1980, 34 y o  male MRN: [id number]    Unit/Bed#: -01 Encounter: [id number]    Primary Care Provider: Arsalan Norwood MD   Date and time admitted to hospital: 1/13/2019 12:06 PM        * Intractable migraine without aura and without status migrainosus   Assessment & Plan    · Patient admitted with intractable headache and episodic confusion, has had symptoms for over a year but they have been increasing in frequency and duration  Suspect his headache may be exacerbated by medication overuse  · Follows up with Neurology as an outpatient will consult for evaluation recommendations  · Received migraine cocktail in the ED of which he relates his pain has improved  · Was scheduled for an MRI as an outpatient at the end of the month will place order for MRI to be completed given current complaints  · Toradol 15 mg IV q 6 hours p r n  Severe pain  · Tylenol p r n  Mild pain  · Await Neurology evaluation for recommendations regarding any further medications, diagnostic studies  · Continue home meds  · Check CBC, BMP, magnesium     Morbid obesity with BMI of 50 0-59 9, adult ([facility] )   Assessment & Plan    · Will order diabetic low-fat diet  · Outpatient monitoring, therapeutic lifestyle change     Other specified hypothyroidism   Assessment & Plan    · TSH elevated at 3 898 November 14, 2018  · Repeat TSH  · Continue current levothyroxine dose for now     Schizoaffective disorder ([facility] )   Assessment & Plan    · Schizoaffective disorder with history of OCD and anxiety  ·  Continue home medications  · Luvox  · Saphris  · Lorazepam     GERD (gastroesophageal reflux disease)   Assessment & Plan    · Patient reports increased GERD symptoms especially when lying flat, symptoms will cause him to cough and also he will get a sensation that he is short of breath  · Increase Protonix to b i d    · Reflux precautions     Hypertension   Assessment & Plan    · History of hypertension, blood pressure was elevated on admission but patient admits to not taking his medications today  · Continue lisinopril  · Will check BMP most recent creatinine on December 2nd was 1 3 7 May need to hold ACEi     Type 2 diabetes mellitus, with long-term current use of insulin ([facility])   Assessment & Plan    Lab Results   Component Value Date    HGBA1C 8 8 (H) 10/31/2018       Recent Labs      01/13/19   1614   POCGLU  158*       Blood Sugar Average: Last 72 hrs:  (P) 158   · Uncontrolled as evidenced by A1c of 8 8% and hyperglycemia  · Will implement Accu-Cheks a c  HS with SSI  · Continue Lantus 40 units HS as this is patient's dose at home  · He relates he uses a sliding scale for his mealtime and will often just "guess" at how much she needs  Will hold mealtime for now and assess how much insulin he is requiring at meals and make adjustments as necessary  · Monitor closely if patient ends up going on steroids for headache control will need to make adjustments  VTE Prophylaxis: Enoxaparin (Lovenox)  / sequential compression device   Code Status:  Full code  POLST: POLST form is not discussed and not completed at this time  Discussion with family:       Anticipated Length of Stay:  Patient will be admitted on an Observation basis with an anticipated length of stay of  < 2 midnights  Justification for Hospital Stay:  Intractable headache failing outpatient therapy     Total Time for Visit, including Counseling / Coordination of Care: 45 minutes  Greater than 50% of this total time spent on direct patient counseling and coordination of care      Chief Complaint:   Headache and episodic confusion     History of Present Illness:     Liya Swann is a 34 y o  male with a past medical history of hypertension, GERD, gastroparesis, diabetes type 2, hypothyroidism, schizoaffective disorder, anxiety, and OCD who presents with reports of headache and episodic confusion    Patient has been having issues with headaches over the past year of which he has sought out neurology consultation as an outpatient  He relates he is scheduled as an outpatient for an MRI in a few weeks  States over the past week or so his headaches have been getting worse in regards to frequency and duration  His headaches are accompanied by episodic confusion  He relates he will occasionally have visual disturbances in the form of difficulty with reading, nausea, had 1 episode of emesis today, loose bowels and lightheadedness  He describes the pain as stabbing sensation in the back of his head that radiates his temples  Has been trying Excedrin headache relief 2-3 doses per day of which he states relieves about 70% of his pain but reoccurs when medication wears off  Reports his confusion is described as difficulty with him getting his words out, issues with short-term memory, and difficulties with understanding/comprehension  Had an eye exam about 1 week ago of which he states was normal but he has been experiencing difficulties with reading  Started taking a magnesium supplement approximately 1 week ago of which he states he got off the Internet that is the only new medication of recent  He does state he has some GERD symptoms that caused him to feel short of breath and also will induce a cough  Review of systems otherwise noted below      Will admit to medical-surgical unit for further workup and evaluation by Neurology evaluation for recommendations      Review of Systems:     Review of Systems   Constitutional: Positive for appetite change and fatigue  Negative for chills and fever  HENT: Negative  Eyes: Positive for visual disturbance  Reports having an eye exam about 1 week ago of which he was told was normal but he states he has noticed difficulty with reading   Respiratory: Positive for cough  Negative for shortness of breath, wheezing and stridor           Patient states he has cough when he has GERD symptoms   Cardiovascular: Negative for chest pain, palpitations and leg swelling  Gastrointestinal: Positive for diarrhea, nausea and vomiting  Negative for abdominal pain and constipation  Reports an episode of vomiting today and also intermittent nausea  Reports loose bowels   Genitourinary: Negative for difficulty urinating and dysuria  Skin: Negative for rash  Neurological: Positive for weakness, light-headedness and headaches  Negative for dizziness  Headache described as stabbing in the back of his head that radiates to the temporal area   Psychiatric/Behavioral: Positive for confusion and decreased concentration  States he has had episodic confusion which she describes as difficulty getting words out, difficulty with short-term memory, and also problems with comprehension          Past Medical and Surgical History:      Medical History        Past Medical History:   Diagnosis Date    Chronic headaches      Diabetes mellitus ([facility] )      Disease of thyroid gland      Gastroparesis      GERD (gastroesophageal reflux disease)      Hypertension      Obesity      Obsessive compulsive disorder      Psychiatric disorder      Schizoaffective disorder [facility])              Surgical History   History reviewed  No pertinent surgical history         Meds/Allergies:             Prior to Admission medications    Medication Sig Start Date End Date Taking?  Authorizing Provider   asenapine (SAPHRIS) SL tablet Place 5 mg under the tongue once     Yes Historical Provider, MD   fluvoxaMINE (LUVOX) 100 mg tablet Take 2 tablets (200 mg total) by mouth daily at bedtime  Patient taking differently: Take 100 mg by mouth 2 (two) times a day   9/19/18   Yes Owen Bauman MD   insulin aspart (NovoLOG) 100 units/mL injection Inject 10 Units under the skin 3 (three) times a day before meals 9/19/18   Yes Owen Bauman MD   insulin glargine (BASAGLAR KWIKPEN) 100 units/mL injection pen Inject 40 Units under the skin daily at bedtime 11/1/18   Yes Pretty Worley PA-C   Insulin Pen Needle (PEN NEEDLES 3/16") 31G X 5 MM MISC by Does not apply route 4 (four) times a day 10/2/18   Yes Denita Blackwell MD   levothyroxine 50 mcg tablet Take 1 tablet (50 mcg total) by mouth daily  Patient taking differently: Take 100 mcg by mouth daily   12/11/18   Yes Denita Blackwell MD   lisinopril (ZESTRIL) 20 mg tablet Take 1 tablet (20 mg total) by mouth daily 11/1/18   Yes Pretty Worley PA-C   magnesium oxide (MAG-OX) 400 mg Take 1 tablet (400 mg total) by mouth daily 12/4/18   Yes Nisha Hood MD   pantoprazole (PROTONIX) 40 mg tablet Take 1 tablet (40 mg total) by mouth daily 9/19/18   Yes Denita Blackwell MD   Riboflavin 400 MG CAPS Take 1 capsule (400 mg total) by mouth daily 12/4/18   Yes Nisha Hood MD   SUMAtriptan (IMITREX) 100 mg tablet Take 1 tablet (100 mg total) by mouth once as needed for migraine for up to 1 dose 11/9/18   Yes Gabe Shepard MD   topiramate (TOPAMAX) 100 mg tablet One p o  q h s  after titration  Patient taking differently: daily One p o  q h s  after titration  11/9/18   Yes Gabe Shepard MD   aspirin 81 mg chewable tablet Chew 1 tablet (81 mg total) daily Over the counter  Patient not taking: Reported on 1/13/2019 11/1/18     Pretty Worley PA-C   LORazepam (ATIVAN) 0 5 mg tablet Take 1 tablet (0 5 mg total) by mouth 2 (two) times a day  Patient not taking: Reported on 1/13/2019  10/17/18     Denita Blackwell MD   nitroglycerin (NITROSTAT) 0 4 mg SL tablet Place 1 tablet (0 4 mg total) under the tongue every 5 (five) minutes as needed for chest pain 10/26/18 1/13/19   Jordyn Coleman MD   ziprasidone (GEODON) 40 mg capsule take 1 capsule by mouth twice a day with meals  Patient not taking: Reported on 1/13/2019 10/18/18 1/13/19   Denita Blackwell MD      I have reviewed home medications with patient personally      Allergies: No Known Allergies     Social History:     Marital Status: Single   Occupation:   Patient Pre-hospital Living Situation: Lives with his father  Patient Pre-hospital Level of Mobility:  Independent  Patient Pre-hospital Diet Restrictions:  Diabetic  Substance Use History:       History   Alcohol Use No          History   Smoking Status    Never Smoker   Smokeless Tobacco    Never Used          History   Drug Use No         Family History:           Family History   Problem Relation Age of Onset    COPD Mother      Hypertension Mother      Heart failure Mother      Rheum arthritis Family      Heart disease Family      Hypertension Father      Diabetes Father      Hyperlipidemia Father           Physical Exam:      Vitals:   Blood Pressure: 119/61 (01/13/19 1500)  Pulse: 83 (01/13/19 1500)  Temperature: 97 7 °F (36 5 °C) (01/13/19 1500)  Temp Source: Oral (01/13/19 1500)  Respirations: 18 (01/13/19 1500)  Height: 5' 2" (157 5 cm) (01/13/19 1500)  Weight - Scale: 135 kg (296 lb 9 6 oz) (01/13/19 1500)  SpO2: 95 % (01/13/19 1500)     Physical Exam   Constitutional: He is oriented to person, place, and time  He appears well-developed and well-nourished  No distress  HENT:   Head: Normocephalic and atraumatic  Mouth/Throat: Oropharynx is clear and moist    Lips are dry cracked   Neck: Neck supple  Cardiovascular: Normal rate, regular rhythm and intact distal pulses  No murmur heard  Pulmonary/Chest: Effort normal and breath sounds normal  No respiratory distress  He has no wheezes  He has no rales  Abdominal: Soft  Bowel sounds are normal  He exhibits no distension  There is no tenderness  There is no rebound and no guarding  Obese   Musculoskeletal: Normal range of motion  He exhibits no edema, tenderness or deformity  Neurological: He is oriented to person, place, and time  No cranial nerve deficit  Skin: Skin is warm and dry  Psychiatric: He has a normal mood and affect   His behavior is normal    Vitals reviewed               Additional Data:      Lab Results: Labs pending at time of note        Results from last 7 days  Lab Units 01/13/19  1624   WBC Thousand/uL 7 34   HEMOGLOBIN g/dL 12 2   HEMATOCRIT % 35 7*   PLATELETS Thousands/uL 295   NEUTROS PCT % 60   LYMPHS PCT % 29   MONOS PCT % 7   EOS PCT % 4                 Results from last 7 days  Lab Units 01/13/19  1614   POC GLUCOSE mg/dl 158*                 Imaging: I have personally reviewed pertinent reports        No orders to display         EKG, Pathology, and Other Studies Reviewed on Admission:   · EKG:     Allscripts / Epic Records Reviewed: Yes      ** Please Note: This note has been constructed using a voice recognition system   **

## 2019-01-14 ENCOUNTER — TELEPHONE (OUTPATIENT)
Dept: GASTROENTEROLOGY | Facility: CLINIC | Age: 39
End: 2019-01-14

## 2019-01-14 LAB
ANION GAP SERPL CALCULATED.3IONS-SCNC: 9 MMOL/L (ref 4–13)
BASOPHILS # BLD AUTO: 0.02 THOUSANDS/ΜL (ref 0–0.1)
BASOPHILS NFR BLD AUTO: 0 % (ref 0–1)
BUN SERPL-MCNC: 17 MG/DL (ref 5–25)
CALCIUM SERPL-MCNC: 8.9 MG/DL (ref 8.3–10.1)
CHLORIDE SERPL-SCNC: 104 MMOL/L (ref 100–108)
CO2 SERPL-SCNC: 26 MMOL/L (ref 21–32)
CREAT SERPL-MCNC: 1.03 MG/DL (ref 0.6–1.3)
EOSINOPHIL # BLD AUTO: 0.22 THOUSAND/ΜL (ref 0–0.61)
EOSINOPHIL NFR BLD AUTO: 4 % (ref 0–6)
ERYTHROCYTE [DISTWIDTH] IN BLOOD BY AUTOMATED COUNT: 13 % (ref 11.6–15.1)
GFR SERPL CREATININE-BSD FRML MDRD: 90 ML/MIN/1.73SQ M
GLUCOSE P FAST SERPL-MCNC: 130 MG/DL (ref 65–99)
GLUCOSE SERPL-MCNC: 121 MG/DL (ref 65–140)
GLUCOSE SERPL-MCNC: 130 MG/DL (ref 65–140)
GLUCOSE SERPL-MCNC: 157 MG/DL (ref 65–140)
GLUCOSE SERPL-MCNC: 201 MG/DL (ref 65–140)
GLUCOSE SERPL-MCNC: 322 MG/DL (ref 65–140)
HCT VFR BLD AUTO: 36.8 % (ref 36.5–49.3)
HGB BLD-MCNC: 12.6 G/DL (ref 12–17)
IMM GRANULOCYTES # BLD AUTO: 0.02 THOUSAND/UL (ref 0–0.2)
IMM GRANULOCYTES NFR BLD AUTO: 0 % (ref 0–2)
LYMPHOCYTES # BLD AUTO: 1.72 THOUSANDS/ΜL (ref 0.6–4.47)
LYMPHOCYTES NFR BLD AUTO: 27 % (ref 14–44)
MCH RBC QN AUTO: 29.2 PG (ref 26.8–34.3)
MCHC RBC AUTO-ENTMCNC: 34.2 G/DL (ref 31.4–37.4)
MCV RBC AUTO: 85 FL (ref 82–98)
MONOCYTES # BLD AUTO: 0.43 THOUSAND/ΜL (ref 0.17–1.22)
MONOCYTES NFR BLD AUTO: 7 % (ref 4–12)
NEUTROPHILS # BLD AUTO: 3.96 THOUSANDS/ΜL (ref 1.85–7.62)
NEUTS SEG NFR BLD AUTO: 62 % (ref 43–75)
NRBC BLD AUTO-RTO: 0 /100 WBCS
PLATELET # BLD AUTO: 298 THOUSANDS/UL (ref 149–390)
PMV BLD AUTO: 9 FL (ref 8.9–12.7)
POTASSIUM SERPL-SCNC: 4.2 MMOL/L (ref 3.5–5.3)
RBC # BLD AUTO: 4.31 MILLION/UL (ref 3.88–5.62)
SODIUM SERPL-SCNC: 139 MMOL/L (ref 136–145)
WBC # BLD AUTO: 6.37 THOUSAND/UL (ref 4.31–10.16)

## 2019-01-14 PROCEDURE — 99244 OFF/OP CNSLTJ NEW/EST MOD 40: CPT | Performed by: INTERNAL MEDICINE

## 2019-01-14 PROCEDURE — 82948 REAGENT STRIP/BLOOD GLUCOSE: CPT

## 2019-01-14 PROCEDURE — 85025 COMPLETE CBC W/AUTO DIFF WBC: CPT | Performed by: NURSE PRACTITIONER

## 2019-01-14 PROCEDURE — 80048 BASIC METABOLIC PNL TOTAL CA: CPT | Performed by: NURSE PRACTITIONER

## 2019-01-14 PROCEDURE — 99225 PR SBSQ OBSERVATION CARE/DAY 25 MINUTES: CPT | Performed by: NURSE PRACTITIONER

## 2019-01-14 PROCEDURE — 94760 N-INVAS EAR/PLS OXIMETRY 1: CPT

## 2019-01-14 PROCEDURE — 99225 PR SBSQ OBSERVATION CARE/DAY 25 MINUTES: CPT | Performed by: PSYCHIATRY & NEUROLOGY

## 2019-01-14 RX ORDER — LEVOTHYROXINE SODIUM 0.1 MG/1
100 TABLET ORAL
Status: DISCONTINUED | OUTPATIENT
Start: 2019-01-15 | End: 2019-01-15

## 2019-01-14 RX ORDER — ASENAPINE 10 MG/1
5 TABLET SUBLINGUAL DAILY
Status: DISCONTINUED | OUTPATIENT
Start: 2019-01-15 | End: 2019-01-15

## 2019-01-14 RX ORDER — SUCRALFATE ORAL 1 G/10ML
1000 SUSPENSION ORAL EVERY 6 HOURS SCHEDULED
Status: DISCONTINUED | OUTPATIENT
Start: 2019-01-14 | End: 2019-01-15 | Stop reason: HOSPADM

## 2019-01-14 RX ORDER — MAGNESIUM HYDROXIDE/ALUMINUM HYDROXICE/SIMETHICONE 120; 1200; 1200 MG/30ML; MG/30ML; MG/30ML
30 SUSPENSION ORAL EVERY 4 HOURS PRN
Status: DISCONTINUED | OUTPATIENT
Start: 2019-01-14 | End: 2019-01-15 | Stop reason: HOSPADM

## 2019-01-14 RX ADMIN — LEVOTHYROXINE SODIUM 50 MCG: 50 TABLET ORAL at 05:41

## 2019-01-14 RX ADMIN — LORAZEPAM 0.5 MG: 0.5 TABLET ORAL at 07:37

## 2019-01-14 RX ADMIN — INSULIN LISPRO 1 UNITS: 100 INJECTION, SOLUTION INTRAVENOUS; SUBCUTANEOUS at 17:29

## 2019-01-14 RX ADMIN — INSULIN GLARGINE 40 UNITS: 100 INJECTION, SOLUTION SUBCUTANEOUS at 21:55

## 2019-01-14 RX ADMIN — ONDANSETRON 4 MG: 2 INJECTION INTRAMUSCULAR; INTRAVENOUS at 05:41

## 2019-01-14 RX ADMIN — PANTOPRAZOLE SODIUM 40 MG: 40 TABLET, DELAYED RELEASE ORAL at 06:16

## 2019-01-14 RX ADMIN — ENOXAPARIN SODIUM 40 MG: 40 INJECTION SUBCUTANEOUS at 07:38

## 2019-01-14 RX ADMIN — PANTOPRAZOLE SODIUM 40 MG: 40 TABLET, DELAYED RELEASE ORAL at 15:48

## 2019-01-14 RX ADMIN — Medication 400 MG: at 07:37

## 2019-01-14 RX ADMIN — INSULIN LISPRO 2 UNITS: 100 INJECTION, SOLUTION INTRAVENOUS; SUBCUTANEOUS at 12:01

## 2019-01-14 RX ADMIN — TOPIRAMATE 100 MG: 100 TABLET, FILM COATED ORAL at 07:37

## 2019-01-14 RX ADMIN — ASPIRIN 81 MG 81 MG: 81 TABLET ORAL at 07:37

## 2019-01-14 RX ADMIN — INSULIN LISPRO 3 UNITS: 100 INJECTION, SOLUTION INTRAVENOUS; SUBCUTANEOUS at 21:54

## 2019-01-14 RX ADMIN — FLUVOXAMINE MALEATE 100 MG: 100 TABLET, FILM COATED ORAL at 17:30

## 2019-01-14 RX ADMIN — LISINOPRIL 20 MG: 20 TABLET ORAL at 07:37

## 2019-01-14 RX ADMIN — FLUVOXAMINE MALEATE 100 MG: 100 TABLET, FILM COATED ORAL at 07:38

## 2019-01-14 RX ADMIN — KETOROLAC TROMETHAMINE 15 MG: 30 INJECTION, SOLUTION INTRAMUSCULAR at 05:42

## 2019-01-14 RX ADMIN — LORAZEPAM 0.5 MG: 0.5 TABLET ORAL at 17:29

## 2019-01-14 RX ADMIN — SUCRALFATE 1000 MG: 1 SUSPENSION ORAL at 15:48

## 2019-01-14 RX ADMIN — KETOROLAC TROMETHAMINE 15 MG: 30 INJECTION, SOLUTION INTRAMUSCULAR at 12:49

## 2019-01-14 NOTE — ASSESSMENT & PLAN NOTE
· Patient admitted with intractable headache and episodic confusion, has had symptoms for over a year but they have been increasing in frequency and duration  Suspect his headache may be exacerbated by medication overuse  · Follows up with Neurology as well as a migraine Clinic as an outpatient   · [address] Neurology, appreciate input  · MRI brain negative for acute abnormality  Does have several tiny white matter T2 and FLAIR hyperintense foci noted suspicious for mild chronic microangiopathic changes possibly associated with migraine headaches  A 9 5 mm left parotid gland nodule also noted    · MRV negative for dural venous sinus thrombus  · Prior EEG within normal limits  · Received migraine cocktail, Benadryl, Reglan, Toradol IV x1  · Continue Toradol and Zofran IV as needed  · Continue home medications, magnesium, riboflavin, Topamax, and Imitrex  · Consulted Psychiatry as outpatient neurologist previously recommended initiating Depakote in the setting of mood disorder and headaches but recommended Psychiatry over this  · Order overnight pulse oximetry and will need an outpatient referral to Sleep Medicine  · Continue outpatient neurology appointment with Dr Nikkie Muñoz on 2/15/2019

## 2019-01-14 NOTE — ASSESSMENT & PLAN NOTE
· Schizoaffective disorder with history of OCD and anxiety  · Consult Psychiatry as outpatient neurologist previously recommending consideration of Depakote in the setting of mood disorder and headaches, but recommended psychiatry order Depakote  ·  Continue home medications  · Luvox  · Saphris  · Lorazepam

## 2019-01-14 NOTE — PROGRESS NOTES
Progress Note - Bella Dimas 1980, 34 y o  male MRN: [id number]    Unit/Bed#: -01 Encounter: [id number]    Primary Care Provider: Papo Garcia MD   Date and time admitted to hospital: 1/13/2019 12:06 PM        * Intractable migraine without aura and without status migrainosus   Assessment & Plan    · Patient admitted with intractable headache and episodic confusion, has had symptoms for over a year but they have been increasing in frequency and duration  Suspect his headache may be exacerbated by medication overuse  · Follows up with Neurology as well as a migraine Clinic as an outpatient   · [address] Neurology, appreciate input  · MRI brain negative for acute abnormality  Does have several tiny white matter T2 and FLAIR hyperintense foci noted suspicious for mild chronic microangiopathic changes possibly associated with migraine headaches  A 9 5 mm left parotid gland nodule also noted    · MRV negative for dural venous sinus thrombus  · Prior EEG within normal limits  · Received migraine cocktail, Benadryl, Reglan, Toradol IV x1  · Continue Toradol and Zofran IV as needed  · Continue home medications, magnesium, riboflavin, Topamax, and Imitrex  · Consulted Psychiatry as outpatient neurologist previously recommended initiating Depakote in the setting of mood disorder and headaches but recommended Psychiatry over this  · Order overnight pulse oximetry and will need an outpatient referral to Sleep Medicine  · Continue outpatient neurology appointment with Dr Anna Chamberlain on 2/15/2019     [facility] ([facility]   Assessment & Plan    · Schizoaffective disorder with history of OCD and anxiety  · Consult Psychiatry as outpatient neurologist previously recommending consideration of Depakote in the setting of mood disorder and headaches, but recommended psychiatry order Depakote  ·  Continue home medications  · Luvox  · Saphris  · Lorazepam     GERD (gastroesophageal reflux disease)   Assessment & Plan    · Patient reports increased GERD symptoms especially when lying flat, symptoms will cause him to cough and also he will get a sensation that he is short of breath  · Increased Protonix to BID  · Continue GI cocktail  · Reflux precautions  · Per patient request, consult GI for possible EGD     Morbid obesity with BMI of 50 0-59 9, adult ([facility] )   Assessment & Plan    · Will order diabetic low-fat diet  · Outpatient monitoring, therapeutic lifestyle change  Would benefit from weight loss  Other specified hypothyroidism   Assessment & Plan    · TSH elevated at 3 898 November 14, 2018  · Presently, TSH elevated at 16  · Upon review of outpatient records patient is to be on Synthroid 100 mcg daily  Presently on 50 mcg  · Increased Synthroid back to recommended dose of 100 mcg daily  Repeat TSH as outpatient in 6 weeks  Hypertension   Assessment & Plan    · Blood pressure acceptable  · Continue lisinopril  · Cr and K+ acceptable     Type 2 diabetes mellitus, with long-term current use of insulin [facility])   Assessment & Plan    Lab Results   Component Value Date    HGBA1C 8 8 (H) 10/31/2018       Recent Labs      01/13/19   1614  01/13/19   2112  01/14/19   0734  01/14/19   1126   POCGLU  158*  205*  121  201*       Blood Sugar Average: Last 72 hrs:  (P) 171 25   · Uncontrolled as evidenced by A1c of 8 8 and hyperglycemia  · Continue Lantus 40 units HS as this is patient's dose at home  · He relates he uses a sliding scale for his mealtime and will often just "guess" at how much she needs  Will hold mealtime for now and assess how much insulin he is requiring at meals and make adjustments as necessary  · Monitor closely if patient ends up going on steroids for headache control will need to make adjustments    · Monitor Accu-Cheks AC + HS, lispro insulin sliding scale coverage  · Recommend outpatient endocrinology follow-up in light of type 2 diabetes mellitus and hypothyroidism         VTE Pharmacologic Prophylaxis:   Pharmacologic: Enoxaparin (Lovenox)  Mechanical: Mechanical VTE prophylaxis in place  Patient Centered Rounds: I have performed bedside rounds with nursing staff today  Discussions with Specialists or Other Care Team Provider:  Nursing, case management, Neurology  Education and Discussions with Family / Patient:  I have answered all questions to the best of my ability  Father at bedside  Time Spent for Care: 20 minutes  More than 50% of total time spent on counseling and coordination of care as described above  Current Length of Stay: 0 day(s)  Current Patient Status: Observation   Certification Statement: The patient will continue to require additional inpatient hospital stay due to Intractable headaches, intractable epigastric pain, needs a psychiatry evaluation for possible initiation of Depakote for mood disorder and migraine prophylaxis    Discharge Plan:  Patient is not medically stable for discharge today, likely tomorrow pending progress  Code Status: Level 1 - Full Code    Subjective:   Patient resting out of bed in chair  Complains of bilateral occipital lobe headache that wraps around to his bilateral temples  Appetite is fair  Continues to complain of epigastric discomfort which this morning he felt like he was having an acute episode of chest pain  Would like to have a GI evaluation  Dad is requesting an endocrinology evaluation as well as a GI evaluation  Patient noted to be ambulating the hallways frequently  Denies shortness of breath, lower abdominal pain, vomiting, or diarrhea  Objective:   Vitals:   Temp (24hrs), Av 3 °F (36 8 °C), Min:98 1 °F (36 7 °C), Max:98 4 °F (36 9 °C)    Temp:  [98 1 °F (36 7 °C)-98 4 °F (36 9 °C)] 98 4 °F (36 9 °C)  HR:  [66-86] 66  Resp:  [18] 18  BP: (128-167)/(60-90) 149/60  SpO2:  [97 %] 97 %  Body mass index is 54 25 kg/m²  Input and Output Summary (last 24 hours):        Intake/Output Summary (Last 24 hours) at 19 2088 Andrew Alliance filed at 01/13/19 2300   Gross per 24 hour   Intake                0 ml   Output                0 ml   Net                0 ml       Physical Exam:     Physical Exam   Constitutional: He is oriented to person, place, and time  He appears well-developed  No distress  HENT:   Head: Normocephalic  Neck: Normal range of motion  Cardiovascular: Normal rate and regular rhythm  Pulmonary/Chest: Effort normal  No accessory muscle usage  No tachypnea  No respiratory distress  He has decreased breath sounds in the right lower field and the left lower field  He has no wheezes  He has no rhonchi  He has no rales  Abdominal: Soft  Bowel sounds are normal  He exhibits no distension  There is no tenderness  Morbidly obese   Musculoskeletal: Normal range of motion  He exhibits no edema or tenderness  Neurological: He is alert and oriented to person, place, and time  Skin: Skin is warm and dry  No rash noted  He is not diaphoretic  Psychiatric: He has a normal mood and affect  His speech is normal    Nursing note and vitals reviewed  Additional Data:   Labs:    Results from last 7 days  Lab Units 01/14/19  0409   WBC Thousand/uL 6 37   HEMOGLOBIN g/dL 12 6   HEMATOCRIT % 36 8   PLATELETS Thousands/uL 298   NEUTROS PCT % 62   LYMPHS PCT % 27   MONOS PCT % 7   EOS PCT % 4       Results from last 7 days  Lab Units 01/14/19  0409   POTASSIUM mmol/L 4 2   CHLORIDE mmol/L 104   CO2 mmol/L 26   BUN mg/dL 17   CREATININE mg/dL 1 03   CALCIUM mg/dL 8 9           * I Have Reviewed All Lab Data Listed Above  * Additional Pertinent Lab Tests Reviewed: All Labs Within Last 24 Hours Reviewed    Imaging:    Imaging Reports Reviewed Today Include:  MRV, MRI brain    Cultures:   Blood Culture:   Lab Results   Component Value Date    BLOODCX No Growth After 5 Days   08/31/2018     Urine Culture: No results found for: URINECX  Sputum Culture: No components found for: SPUTUMCX  Wound Culture: No results found for: WOUNDCULT    Last 24 Hours Medication List:     Current Facility-Administered Medications:  acetaminophen 650 mg Oral Q6H PRN DOREEN Resendez   aluminum-magnesium hydroxide-simethicone 30 mL Oral Q4H PRN DOREEN Corey   aspirin 81 mg Oral Daily DOREEN Baird   enoxaparin 40 mg Subcutaneous Daily DOREEN Baird   fluvoxaMINE 100 mg Oral BID DOREEN Resendez   insulin glargine 40 Units Subcutaneous HS DOREEN Baird   insulin lispro 1-5 Units Subcutaneous HS DOREEN Baird   insulin lispro 1-6 Units Subcutaneous TID AC DOREEN Baird   ketorolac 15 mg Intravenous Q6H PRN DOREEN Resendez   [START ON 1/15/2019] levothyroxine 100 mcg Oral Early Morning DOREEN Corey   lidocaine viscous 15 mL Swish & Spit 4x Daily PRN DOREEN Corey   lisinopril 20 mg Oral Daily DOREEN Baird   LORazepam 0 5 mg Oral BID DOREEN Baird   magnesium oxide 400 mg Oral Daily DOREEN Baird   ondansetron 4 mg Intravenous Q6H PRN DOREEN Resendez   pantoprazole 40 mg Oral BID AC DOREEN Baird   Riboflavin 400 mg Oral Daily DOREEN Baird   sucralfate 1,000 mg Oral Q6H [address], [place]-   topiramate 100 mg Oral Daily DOREEN Resendez        Today, Patient Was Seen By: DOREEN Corey    ** Please Note: Dragon 360 Dictation voice to text software may have been used in the creation of this document   **

## 2019-01-14 NOTE — PROGRESS NOTES
Progress Note - Neurology   Maya Welsh 34 y o  male [id number]  Unit/Bed#: /-01    Assessment/Plan:  Maya Welsh is a 34 y o  male with a history of chronic headaches, hypertension, hypothyroidism, obesity, schizoaffective disorder, OCD who was admitted on 01/13 secondary to intractable headache  Headache now resolved  Patient with history of chronic headaches though to be with a multifactorial, in the setting of migrainous symptoms, tension headaches, uncontrolled HTN and concern for sleep apnea  - MRI brain negative for acute abnormality  Several tiny he white matter T2 and FLAIR hyperintense foci noted suspicious for mild chronic microangiopathic changes possibly associated with migraine headaches  A 9 5 mm left parotid gland nodule also noted on imaging  - MRV negative for dural venous sinus thrombosis  - Prior EEG WNL  - Elevated TSH at 16 - primary team to manage  Patient currently on Levothyroxine 100mcg  - Current migraine regimen    - Benadryl 25 x 1   - Reglan 10 mg IV x1   - ketorolac 30 mg x1   - ketorolac 15 mg x2   - Zofran 4 mg x1  - Continue chronic migraine regimen   - magnesium oxide 400 mg daily   - riboflavin 400 mg daily   - Topamax 100 mg daily   - Imitrex 100mg prn  - Outpatient neurologist previous recommended consideration of Depakote in the setting of mood disorder and headaches, but recommended psychiatry ordering this  I agree with this recommendation  Psychiatry consult pending inpatient  If they are on board with this would recommend proceeding with initiation    - Outpatient referral to sleep medicine previously ordered  - Patient should keep his outpatient neurology appointment with Dr Poncho Clay on 2/15/19  - No additional inpatient neurologic recommendations  Subjective: Today on exam, the patient states that he is doing well  He states that currently he does not have a headache, but that it comes and goes   His father is present at bedside and has numerous questions and concerns with regards to the etiology  Past Medical History:   Diagnosis Date    Chronic headaches     Diabetes mellitus ([facility] )     Disease of thyroid gland     Gastroparesis     GERD (gastroesophageal reflux disease)     Hypertension     Obesity     Obsessive compulsive disorder     Psychiatric disorder     Schizoaffective disorder ([facility] )      History reviewed  No pertinent surgical history    Family history:  Family History   Problem Relation Age of Onset    COPD Mother     Hypertension Mother     Heart failure Mother     Rheum arthritis Family     Heart disease Family     Hypertension Father     Diabetes Father     Hyperlipidemia Father        Social History     Social History    Marital status: Single     Spouse name: N/A    Number of children: N/A    Years of education: N/A     Social History Main Topics    Smoking status: Never Smoker    Smokeless tobacco: Never Used    Alcohol use No    Drug use: No    Sexual activity: Not Asked     Other Topics Concern    None     Social History Narrative    None       Scheduled Meds:  Current Facility-Administered Medications:  acetaminophen 650 mg Oral Q6H PRN DOREEN Drummond   aspirin 81 mg Oral Daily DOREEN Drummond   diazepam 5 mg Intravenous Once Jen Cox MD   enoxaparin 40 mg Subcutaneous Daily DOREEN Baird   fluvoxaMINE 100 mg Oral BID DOREEN Baird   insulin glargine 40 Units Subcutaneous HS DOREEN Baird   insulin lispro 1-5 Units Subcutaneous HS DOREEN Baird   insulin lispro 1-6 Units Subcutaneous TID AC DOREEN Baird   ketorolac 15 mg Intravenous Q6H PRN DOREEN Drummond   [START ON 1/15/2019] levothyroxine 100 mcg Oral Early Morning DOREEN Sloan   lisinopril 20 mg Oral Daily DOREEN Baird   LORazepam 0 5 mg Oral BID DOREEN Barid   magnesium oxide 400 mg Oral Daily DOREEN Baird   ondansetron 4 mg Intravenous Q6H PRN DOREEN Kamara   pantoprazole 40 mg Oral BID AC DOREEN Baird   Riboflavin 400 mg Oral Daily DOREEN Baird   topiramate 100 mg Oral Daily DOREEN Baird     Continuous Infusions:   PRN Meds:   acetaminophen    ketorolac    ondansetron    ROS: A 12 point ROS was completed and other than the above mentioned complaints in the HPI and those listed below, all remaining systems were negative  -occasional confusion, occasional episodes of "tongue tied," intermittent headaches, uncontrolled HTN    Vitals: /90 (BP Location: Right arm)   Pulse 86   Temp 98 4 °F (36 9 °C) (Oral)   Resp 18   Ht 5' 2" (1 575 m)   Wt 135 kg (296 lb 9 6 oz)   SpO2 97%   BMI 54 25 kg/m²     Physical Exam:   Physical Exam   Constitutional: He is oriented to person, place, and time  He appears well-developed and well-nourished  No distress  Obese male seated in chair   HENT:   Head: Normocephalic and atraumatic  Eyes: Pupils are equal, round, and reactive to light  Conjunctivae and EOM are normal  Right eye exhibits no discharge  Left eye exhibits no discharge  No scleral icterus  Neck: Normal range of motion  Neck supple  Cardiovascular: Normal rate, regular rhythm and normal heart sounds  Exam reveals no gallop and no friction rub  No murmur heard  Pulmonary/Chest: Effort normal and breath sounds normal  No stridor  No respiratory distress  He has no wheezes  He has no rales  He exhibits no tenderness  Musculoskeletal: Normal range of motion  He exhibits no edema, tenderness or deformity  Neurological: He is alert and oriented to person, place, and time  He has normal strength  He has a normal Finger-Nose-Finger Test    Skin: Skin is warm and dry  No rash noted  No erythema  Psychiatric: His speech is normal    anxious     Neurologic Exam     Mental Status   Oriented to person, place, and time  Follows 2 step commands     Attention: normal  Concentration: normal    Speech: speech is normal   Level of consciousness: alert  Normal comprehension  Cranial Nerves     CN II   Visual acuity: normal    CN III, IV, VI   Pupils are equal, round, and reactive to light  Extraocular motions are normal    Nystagmus: none   Ophthalmoparesis: none  Conjugate gaze: present    CN V   Facial sensation intact  CN VII   Facial expression full, symmetric  CN VIII   Hearing: intact    CN XI   CN XI normal      CN XII   CN XII normal      Motor Exam   Muscle bulk: normal  Overall muscle tone: normal  Right arm pronator drift: absent  Left arm pronator drift: absent    Strength   Strength 5/5 throughout  Sensory Exam   Light touch normal      Gait, Coordination, and Reflexes     Coordination   Finger to nose coordination: normal      Labs:  Recent Results (from the past 24 hour(s))   Green / Yellow tube on hold    Collection Time: 01/13/19  1:34 PM   Result Value Ref Range    Extra Tube Hold for add-ons  Lavender Top 3 ml on hold    Collection Time: 01/13/19  1:34 PM   Result Value Ref Range    Extra Tube Hold for add-ons      Fingerstick Glucose (POCT)    Collection Time: 01/13/19  4:14 PM   Result Value Ref Range    POC Glucose 158 (H) 65 - 140 mg/dl   TSH, 3rd generation    Collection Time: 01/13/19  4:24 PM   Result Value Ref Range    TSH 3RD GENERATON 16 042 (H) 0 358 - 3 740 uIU/mL   Basic metabolic panel    Collection Time: 01/13/19  4:24 PM   Result Value Ref Range    Sodium 138 136 - 145 mmol/L    Potassium 3 7 3 5 - 5 3 mmol/L    Chloride 104 100 - 108 mmol/L    CO2 27 21 - 32 mmol/L    ANION GAP 7 4 - 13 mmol/L    BUN 17 5 - 25 mg/dL    Creatinine 0 86 0 60 - 1 30 mg/dL    Glucose 160 (H) 65 - 140 mg/dL    Glucose, Fasting 160 (H) 65 - 99 mg/dL    Calcium 8 7 8 3 - 10 1 mg/dL    eGFR 108 ml/min/1 73sq m   CBC and differential    Collection Time: 01/13/19  4:24 PM   Result Value Ref Range    WBC 7 34 4 31 - 10 16 Thousand/uL    RBC 4 19 3 88 - 5 62 Million/uL    Hemoglobin 12 2 12 0 - 17 0 g/dL    Hematocrit 35 7 (L) 36 5 - 49 3 %    MCV 85 82 - 98 fL    MCH 29 1 26 8 - 34 3 pg    MCHC 34 2 31 4 - 37 4 g/dL    RDW 12 8 11 6 - 15 1 %    MPV 9 2 8 9 - 12 7 fL    Platelets 070 582 - 940 Thousands/uL    nRBC 0 /100 WBCs    Neutrophils Relative 60 43 - 75 %    Immat GRANS % 0 0 - 2 %    Lymphocytes Relative 29 14 - 44 %    Monocytes Relative 7 4 - 12 %    Eosinophils Relative 4 0 - 6 %    Basophils Relative 0 0 - 1 %    Neutrophils Absolute 4 34 1 85 - 7 62 Thousands/µL    Immature Grans Absolute 0 02 0 00 - 0 20 Thousand/uL    Lymphocytes Absolute 2 15 0 60 - 4 47 Thousands/µL    Monocytes Absolute 0 53 0 17 - 1 22 Thousand/µL    Eosinophils Absolute 0 28 0 00 - 0 61 Thousand/µL    Basophils Absolute 0 02 0 00 - 0 10 Thousands/µL   Magnesium    Collection Time: 01/13/19  4:24 PM   Result Value Ref Range    Magnesium 1 8 1 6 - 2 6 mg/dL   Fingerstick Glucose (POCT)    Collection Time: 01/13/19  9:12 PM   Result Value Ref Range    POC Glucose 205 (H) 65 - 140 mg/dl   Basic metabolic panel    Collection Time: 01/14/19  4:09 AM   Result Value Ref Range    Sodium 139 136 - 145 mmol/L    Potassium 4 2 3 5 - 5 3 mmol/L    Chloride 104 100 - 108 mmol/L    CO2 26 21 - 32 mmol/L    ANION GAP 9 4 - 13 mmol/L    BUN 17 5 - 25 mg/dL    Creatinine 1 03 0 60 - 1 30 mg/dL    Glucose 130 65 - 140 mg/dL    Glucose, Fasting 130 (H) 65 - 99 mg/dL    Calcium 8 9 8 3 - 10 1 mg/dL    eGFR 90 ml/min/1 73sq m   CBC and differential    Collection Time: 01/14/19  4:09 AM   Result Value Ref Range    WBC 6 37 4 31 - 10 16 Thousand/uL    RBC 4 31 3 88 - 5 62 Million/uL    Hemoglobin 12 6 12 0 - 17 0 g/dL    Hematocrit 36 8 36 5 - 49 3 %    MCV 85 82 - 98 fL    MCH 29 2 26 8 - 34 3 pg    MCHC 34 2 31 4 - 37 4 g/dL    RDW 13 0 11 6 - 15 1 %    MPV 9 0 8 9 - 12 7 fL    Platelets 777 748 - 662 Thousands/uL    nRBC 0 /100 WBCs    Neutrophils Relative 62 43 - 75 %    Immat GRANS % 0 0 - 2 %    Lymphocytes Relative 27 14 - 44 % Monocytes Relative 7 4 - 12 %    Eosinophils Relative 4 0 - 6 %    Basophils Relative 0 0 - 1 %    Neutrophils Absolute 3 96 1 85 - 7 62 Thousands/µL    Immature Grans Absolute 0 02 0 00 - 0 20 Thousand/uL    Lymphocytes Absolute 1 72 0 60 - 4 47 Thousands/µL    Monocytes Absolute 0 43 0 17 - 1 22 Thousand/µL    Eosinophils Absolute 0 22 0 00 - 0 61 Thousand/µL    Basophils Absolute 0 02 0 00 - 0 10 Thousands/µL   Fingerstick Glucose (POCT)    Collection Time: 01/14/19  7:34 AM   Result Value Ref Range    POC Glucose 121 65 - 140 mg/dl   Fingerstick Glucose (POCT)    Collection Time: 01/14/19 11:26 AM   Result Value Ref Range    POC Glucose 201 (H) 65 - 140 mg/dl     Imaging: I have personally reviewed pertinent imaging and PACS reports       VTE Prophylaxis: Enoxaparin (Lovenox)

## 2019-01-14 NOTE — ASSESSMENT & PLAN NOTE
· Patient reports increased GERD symptoms especially when lying flat, symptoms will cause him to cough and also he will get a sensation that he is short of breath    · Increased Protonix to BID  · Continue GI cocktail  · Reflux precautions  · Per patient request, consult GI for possible EGD

## 2019-01-14 NOTE — ASSESSMENT & PLAN NOTE
Lab Results   Component Value Date    HGBA1C 8 8 (H) 10/31/2018       Recent Labs      01/13/19   1614  01/13/19   2112  01/14/19   0734  01/14/19   1126   POCGLU  158*  205*  121  201*       Blood Sugar Average: Last 72 hrs:  (P) 171 25   · Uncontrolled as evidenced by A1c of 8 8 and hyperglycemia  · Continue Lantus 40 units HS as this is patient's dose at home  · He relates he uses a sliding scale for his mealtime and will often just "guess" at how much she needs  Will hold mealtime for now and assess how much insulin he is requiring at meals and make adjustments as necessary  · Monitor closely if patient ends up going on steroids for headache control will need to make adjustments    · Monitor Accu-Cheks AC + HS, lispro insulin sliding scale coverage  · Recommend outpatient endocrinology follow-up in light of type 2 diabetes mellitus and hypothyroidism

## 2019-01-14 NOTE — CONSULTS
Consultation - 126 [facility]  Bola Yuan 34 y o  male MRN: [id number]  Unit/Bed#: -01 Encounter: [id number]        Consults    Reason for Consult / Principal Problem:  GERD, nausea/vomiting    HPI: Bola Yuan is a 34y o  year old male with history of schizoaffective disorder, type 2 diabetes, hypothyroidism, anxiety and OCD who presented to the emergency room yesterday with complaints of headache and episodes of confusion over the last year, with worsening of headaches over the last week  He had been using Excedrin headache relief 2-3 doses per day  He was seen by Neurology; GI consultation was requested because the patient was also reporting very frequent symptoms of GERD, he has also for the last 5-6 months he has been having nearly daily episodes of vomiting  He denies any hematemesis, but he says he does have a persistent dull pain and often times burning sensation in the epigastric region now  He says that he was seen in the emergency room at [facility] and was diagnosed with gastroparesis, but did not have any EGD or gastric emptying study done  He says he has never had the studies done in general   He does admit a longstanding irregular bowel habits also, sometimes characterized by diarrhea and other times by constipation  Denies any rectal bleeding or melena  He says sometimes it feels like certain foods get stuck on the way down, particularly meats, pointing to the epigastric region as the area where feels like it gets stuck  REVIEW OF SYSTEMS:    CONSTITUTIONAL: Denies any fever, chills, or rigors  Good appetite, and no recent weight loss  HEENT: No earache or tinnitus  Denies hearing loss or visual disturbances  CARDIOVASCULAR: No chest pain or palpitations  RESPIRATORY: Denies any cough, hemoptysis, shortness of breath or dyspnea on exertion  GASTROINTESTINAL: As noted in the History of Present Illness  GENITOURINARY: No problems with urination  Denies any hematuria or dysuria  NEUROLOGIC: No dizziness or vertigo, denies headaches  MUSCULOSKELETAL: Denies any muscle or joint pain  SKIN: Denies skin rashes or itching  ENDOCRINE: Denies excessive thirst  Denies intolerance to heat or cold  PSYCHOSOCIAL: Denies depression or anxiety  Denies any recent memory loss  Historical Information   Past Medical History:   Diagnosis Date    Chronic headaches     Diabetes mellitus ([place] [address] )     Disease of thyroid gland     Gastroparesis     GERD (gastroesophageal reflux disease)     Hypertension     Obesity     Obsessive compulsive disorder     Psychiatric disorder     Schizoaffective disorder ([place] [address] )      History reviewed  No pertinent surgical history    Social History   History   Alcohol Use No     History   Drug Use No     History   Smoking Status    Never Smoker   Smokeless Tobacco    Never Used     Family History   Problem Relation Age of Onset    COPD Mother     Hypertension Mother     Heart failure Mother     Rheum arthritis Family     Heart disease Family     Hypertension Father     Diabetes Father     Hyperlipidemia Father        Meds/Allergies     Prescriptions Prior to Admission   Medication    asenapine (SAPHRIS) SL tablet    fluvoxaMINE (LUVOX) 100 mg tablet    insulin aspart (NovoLOG) 100 units/mL injection    insulin glargine (BASAGLAR KWIKPEN) 100 units/mL injection pen    Insulin Pen Needle (PEN NEEDLES 3/16") 31G X 5 MM MISC    levothyroxine 50 mcg tablet    lisinopril (ZESTRIL) 20 mg tablet    magnesium oxide (MAG-OX) 400 mg    pantoprazole (PROTONIX) 40 mg tablet    Riboflavin 400 MG CAPS    SUMAtriptan (IMITREX) 100 mg tablet    topiramate (TOPAMAX) 100 mg tablet    aspirin 81 mg chewable tablet    LORazepam (ATIVAN) 0 5 mg tablet     Current Facility-Administered Medications   Medication Dose Route Frequency    acetaminophen (TYLENOL) tablet 650 mg  650 mg Oral Q6H PRN    aluminum-magnesium hydroxide-simethicone (MYLANTA) 200-200-20 mg/5 mL oral suspension 30 mL  30 mL Oral Q4H PRN    aspirin chewable tablet 81 mg  81 mg Oral Daily    enoxaparin (LOVENOX) subcutaneous injection 40 mg  40 mg Subcutaneous Daily    fluvoxaMINE (LUVOX) tablet 100 mg  100 mg Oral BID    insulin glargine (LANTUS) subcutaneous injection 40 Units 0 4 mL  40 Units Subcutaneous HS    insulin lispro (HumaLOG) 100 units/mL subcutaneous injection 1-5 Units  1-5 Units Subcutaneous HS    insulin lispro (HumaLOG) 100 units/mL subcutaneous injection 1-6 Units  1-6 Units Subcutaneous TID AC    ketorolac (TORADOL) injection 15 mg  15 mg Intravenous Q6H PRN    [START ON 1/15/2019] levothyroxine tablet 100 mcg  100 mcg Oral Early Morning    lidocaine viscous (XYLOCAINE) 2 % mucosal solution 15 mL  15 mL Swish & Spit 4x Daily PRN    lisinopril (ZESTRIL) tablet 20 mg  20 mg Oral Daily    LORazepam (ATIVAN) tablet 0 5 mg  0 5 mg Oral BID    magnesium oxide (MAG-OX) tablet 400 mg  400 mg Oral Daily    ondansetron (ZOFRAN) injection 4 mg  4 mg Intravenous Q6H PRN    pantoprazole (PROTONIX) EC tablet 40 mg  40 mg Oral BID AC    Riboflavin CAPS 400 mg  400 mg Oral Daily    sucralfate (CARAFATE) oral suspension 1,000 mg  1,000 mg Oral Q6H SOFI    topiramate (TOPAMAX) tablet 100 mg  100 mg Oral Daily       No Known Allergies        Objective     Blood pressure 149/60, pulse 66, temperature 98 4 °F (36 9 °C), temperature source Oral, resp  rate 18, height 5' 2" (1 575 m), weight 135 kg (296 lb 9 6 oz), SpO2 97 %        Intake/Output Summary (Last 24 hours) at 01/14/19 1538  Last data filed at 01/13/19 2300   Gross per 24 hour   Intake                0 ml   Output                0 ml   Net                0 ml         PHYSICAL EXAM     General Appearance:   Alert, cooperative, no distress, appears stated age    HEENT:   Normocephalic, atraumatic, anicteric      Neck:  Supple, symmetrical, trachea midline, no adenopathy;    thyroid: no enlargement/tenderness/nodules; no carotid  bruit or JVD    Lungs:   Clear to auscultation bilaterally; no rales, rhonchi or wheezing; respirations unlabored    Heart[de-identified]   S1 and S2 normal; regular rate and rhythm; no murmur, rub, or gallop  Abdomen:   Soft, non-tender, non-distended; normal bowel sounds; no masses, no organomegaly    Genitalia:   Deferred    Rectal:   Deferred    Extremities:  No cyanosis, clubbing or edema    Pulses:  2+ and symmetric all extremities    Skin:  Skin color, texture, turgor normal, no rashes or lesions    Lymph nodes:  No palpable cervical, axillary or inguinal lymphadenopathy        Lab Results:   Admission on 01/13/2019   Component Date Value    Extra Tube 01/13/2019 Hold for add-ons   Extra Tube 01/13/2019 Hold for add-ons       TSH 3RD GENERATON 01/13/2019 16 042*    Sodium 01/13/2019 138     Potassium 01/13/2019 3 7     Chloride 01/13/2019 104     CO2 01/13/2019 27     ANION GAP 01/13/2019 7     BUN 01/13/2019 17     Creatinine 01/13/2019 0 86     Glucose 01/13/2019 160*    Glucose, Fasting 01/13/2019 160*    Calcium 01/13/2019 8 7     eGFR 01/13/2019 108     WBC 01/13/2019 7 34     RBC 01/13/2019 4 19     Hemoglobin 01/13/2019 12 2     Hematocrit 01/13/2019 35 7*    MCV 01/13/2019 85     MCH 01/13/2019 29 1     MCHC 01/13/2019 34 2     RDW 01/13/2019 12 8     MPV 01/13/2019 9 2     Platelets 77/47/0359 295     nRBC 01/13/2019 0     Neutrophils Relative 01/13/2019 60     Immat GRANS % 01/13/2019 0     Lymphocytes Relative 01/13/2019 29     Monocytes Relative 01/13/2019 7     Eosinophils Relative 01/13/2019 4     Basophils Relative 01/13/2019 0     Neutrophils Absolute 01/13/2019 4 34     Immature Grans Absolute 01/13/2019 0 02     Lymphocytes Absolute 01/13/2019 2 15     Monocytes Absolute 01/13/2019 0 53     Eosinophils Absolute 01/13/2019 0 28     Basophils Absolute 01/13/2019 0 02     Magnesium 01/13/2019 1 8     POC Glucose 01/13/2019 158*    POC Glucose 01/13/2019 205*    Sodium 01/14/2019 139     Potassium 01/14/2019 4 2     Chloride 01/14/2019 104     CO2 01/14/2019 26     ANION GAP 01/14/2019 9     BUN 01/14/2019 17     Creatinine 01/14/2019 1 03     Glucose 01/14/2019 130     Glucose, Fasting 01/14/2019 130*    Calcium 01/14/2019 8 9     eGFR 01/14/2019 90     WBC 01/14/2019 6 37     RBC 01/14/2019 4 31     Hemoglobin 01/14/2019 12 6     Hematocrit 01/14/2019 36 8     MCV 01/14/2019 85     MCH 01/14/2019 29 2     MCHC 01/14/2019 34 2     RDW 01/14/2019 13 0     MPV 01/14/2019 9 0     Platelets 70/23/9398 298     nRBC 01/14/2019 0     Neutrophils Relative 01/14/2019 62     Immat GRANS % 01/14/2019 0     Lymphocytes Relative 01/14/2019 27     Monocytes Relative 01/14/2019 7     Eosinophils Relative 01/14/2019 4     Basophils Relative 01/14/2019 0     Neutrophils Absolute 01/14/2019 3 96     Immature Grans Absolute 01/14/2019 0 02     Lymphocytes Absolute 01/14/2019 1 72     Monocytes Absolute 01/14/2019 0 43     Eosinophils Absolute 01/14/2019 0 22     Basophils Absolute 01/14/2019 0 02     POC Glucose 01/14/2019 121     POC Glucose 01/14/2019 201*     Results for Dennis Rios (MRN [id number]) as of 1/14/2019 15:39   Ref   Range 1/13/2019 16:14 1/13/2019 16:24 1/13/2019 21:12 1/14/2019 04:09 1/14/2019 07:34 1/14/2019 11:26   POC Glucose Latest Ref Range: 65 - 140 mg/dl 158 (H)  205 (H)  121 201 (H)   eGFR Latest Units: ml/min/1 73sq m  108  90     Sodium Latest Ref Range: 136 - 145 mmol/L  138  139     Potassium Latest Ref Range: 3 5 - 5 3 mmol/L  3 7  4 2     Chloride Latest Ref Range: 100 - 108 mmol/L  104  104     CO2 Latest Ref Range: 21 - 32 mmol/L  27  26     Anion Gap Latest Ref Range: 4 - 13 mmol/L  7  9     BUN Latest Ref Range: 5 - 25 mg/dL  17  17     Creatinine Latest Ref Range: 0 60 - 1 30 mg/dL  0 86  1 03     Glucose, Random Latest Ref Range: 65 - 140 mg/dL  160 (H)  130 GLUCOSE FASTING Latest Ref Range: 65 - 99 mg/dL  160 (H)  130 (H)     Calcium Latest Ref Range: 8 3 - 10 1 mg/dL  8 7  8 9     Magnesium Latest Ref Range: 1 6 - 2 6 mg/dL  1 8       WBC Latest Ref Range: 4 31 - 10 16 Thousand/uL  7 34  6 37     Red Blood Cell Count Latest Ref Range: 3 88 - 5 62 Million/uL  4 19  4 31     Hemoglobin Latest Ref Range: 12 0 - 17 0 g/dL  12 2  12 6     HCT Latest Ref Range: 36 5 - 49 3 %  35 7 (L)  36 8     MCV Latest Ref Range: 82 - 98 fL  85  85     MCH Latest Ref Range: 26 8 - 34 3 pg  29 1  29 2     MCHC Latest Ref Range: 31 4 - 37 4 g/dL  34 2  34 2     RDW Latest Ref Range: 11 6 - 15 1 %  12 8  13 0     Platelet Count Latest Ref Range: 149 - 390 Thousands/uL  295  298     MPV Latest Ref Range: 8 9 - 12 7 fL  9 2  9 0     nRBC Latest Units: /100 WBCs  0  0     Neutrophils % Latest Ref Range: 43 - 75 %  60  62     Immat GRANS % Latest Ref Range: 0 - 2 %  0  0     Lymphocytes Relative Latest Ref Range: 14 - 44 %  29  27     Monocytes Relative Latest Ref Range: 4 - 12 %  7  7     Eosinophils Latest Ref Range: 0 - 6 %  4  4     Basophils Relative Latest Ref Range: 0 - 1 %  0  0     Immature Grans Absolute Latest Ref Range: 0 00 - 0 20 Thousand/uL  0 02  0 02     Absolute Neutrophils Latest Ref Range: 1 85 - 7 62 Thousands/µL  4 34  3 96     Lymphocytes Absolute Latest Ref Range: 0 60 - 4 47 Thousands/µL  2 15  1 72     Absolute Monocytes Latest Ref Range: 0 17 - 1 22 Thousand/µL  0 53  0 43     Absolute Eosinophils Latest Ref Range: 0 00 - 0 61 Thousand/µL  0 28  0 22     Basophils Absolute Latest Ref Range: 0 00 - 0 10 Thousands/µL  0 02  0 02     TSH 3RD GENERATON Latest Ref Range: 0 358 - 3 740 uIU/mL  16 042 (H)           Imaging Studies: I have personally reviewed pertinent reports          MAGNETIC RESONANCE VENOGRAPHY (MRV) OF THE BRAIN     INDICATION:   78-year-old male, chronic severe headaches      COMPARISON:  None     IMAGE QUALITY:  Diagnostic     TECHNIQUE:   2-D coronal time-of-flight MR venography of the brain was performed      FINDINGS:      There is a normal flow related enhancement in the dural venous sinuses and major veins in the brain      IMPRESSION:     No evidence of dural venous sinus thrombosis  ASSESSMENT/PLAN:     1  GERD symptoms, also with frequent vomiting episodes over the last several months, and epigastric pain  Suspect the patient indeed has gastroparesis although this was not confirmed with gastric emptying study; also should rule out peptic ulcer disease, peptic stricture, Atkins's esophagus or malignancy    - Plan for EGD for further evaluation  - check H pylori testing  - also check a celiac disease antibody profile as patient gives long history of irregular bowel movements and is inquiring about dietary intolerances  - EGD procedure was explained in detail to the patient at this time including risks and benefits  - increase Protonix to twice daily for now  - also start Carafate suspension 4 times daily      2  Intractable migraines    3  Morbid obesity    4  Type 2 diabetes mellitus    5  Hypothyroidism     6  Schizoaffective disorder with history of OCD and anxiety      The patient was seen and examined by Dr Kiesha Beckwith, all correa medical decisions were made with Dr Kiesha Beckwith  Thank you for allowing us to participate in the care of this pleasant patient  We will follow up with you closely

## 2019-01-14 NOTE — ASSESSMENT & PLAN NOTE
· TSH elevated at 3 898 November 14, 2018  · Presently, TSH elevated at 16  · Upon review of outpatient records patient is to be on Synthroid 100 mcg daily  Presently on 50 mcg  · Increased Synthroid back to recommended dose of 100 mcg daily  Repeat TSH as outpatient in 6 weeks

## 2019-01-14 NOTE — UTILIZATION REVIEW
[address]  Utilization Review Department  Phone: [phone]; Fax [phone]  [email or web address]  ATTENTION: Please call with any questions or concerns to [phone]  and carefully listen to the prompts so that you are directed to the right person  Send all requests for admission clinical reviews, approved or denied determinations and any other requests to fax [phone]  All voicemails are confidential   Initial Clinical Review    Admission: Date/Time/Statement: observation 01/13/19 1322  Orders Placed This Encounter   Procedures    Place in Observation (expected length of stay for this patient is less than two midnights)     Standing Status:   Standing     Number of Occurrences:   1     Order Specific Question:   Admitting Physician     Answer:   Guido Mendes [[id number]]     Order Specific Question:   Level of Care     Answer:   Med Surg [16]     ED: Date/Time/Mode of Arrival:   ED Arrival Information     Expected Arrival Acuity Means of Arrival Escorted By Service Admission Type    - 1/13/2019 11:57 Urgent Walk-In Self General Medicine Urgent    Arrival Complaint    confusion, dizziness, head pain, vomiting, chills        Chief Complaint:   Chief Complaint   Patient presents with    Headache - Recurrent or Known Dx Migraines     Headaches, dizziness and confusion for months and getting worse  History of Illness: 34 y o  male presents with headache & episodic confusion  He describes the pain as stabbing sensation in the back of his head that radiates to the temples  The headaches have been getting worse in frequency and duration accompanied by episodic confusion  Occasionally he will have difficulty reading with nausea & today 1 episode of emesis, loose bowels and lightheadedness  He has had minimal success with Excedrin requiring repeated dosing;  He reports his confusion as difficulty getting words out, issues with short-term memory difficulty with comprehension  He has been cared for in outpatient status by Neurology, with an MRI scheduled in a few weeks  ED Vital Signs:   ED Triage Vitals   Temperature Pulse Respirations Blood Pressure SpO2   01/13/19 1212 01/13/19 1212 01/13/19 1212 01/13/19 1215 01/13/19 1215   97 9 °F (36 6 °C) 82 18 165/88 99 %      Temp Source Heart Rate Source Patient Position - Orthostatic VS BP Location FiO2 (%)   01/13/19 1212 01/13/19 1212 01/13/19 1215 01/13/19 1215 --   Oral Monitor Lying Right arm       Pain Score       01/13/19 1215       6        Wt Readings from Last 1 Encounters:   01/13/19 135 kg (296 lb 9 6 oz)     Vital Signs (abnormal): none    Pertinent Labs/Diagnostic Test Results: 1/13/2019 glucose 160, hct 35 7, TSH 16 042,     ED Treatment:   Medication Administration from 01/13/2019 1157 to 01/13/2019 1442       Date/Time Order Dose Route Action     01/13/2019 1332 metoclopramide (REGLAN) injection 10 mg 10 mg Intravenous Given     01/13/2019 1332 ketorolac (TORADOL) injection 30 mg 30 mg Intravenous Given     01/13/2019 1329 diphenhydrAMINE (BENADRYL) injection 25 mg 25 mg Intravenous Given     01/13/2019 1325 sodium chloride 0 9 % bolus 1,000 mL 1,000 mL Intravenous New Bag     01/13/2019 1340 aluminum-magnesium hydroxide-simethicone (MYLANTA) 200-200-20 mg/5 mL oral suspension 30 mL 30 mL Oral Given     01/13/2019 1341 lidocaine viscous (XYLOCAINE) 2 % mucosal solution 15 mL 15 mL Swish & Spit Given        Past Medical/Surgical History:    Active Ambulatory Problems     Diagnosis Date Noted    Type 2 diabetes mellitus, with long-term current use of insulin ([facility] [address] ) 10/17/2018    Hypertension 10/31/2018    GERD (gastroesophageal reflux disease) 10/31/2018    Obsessive compulsive disorder 10/31/2018    Schizoaffective disorder ([facility] [address] ) 10/31/2018    Other specified hypothyroidism 10/31/2018    Morbid obesity with BMI of 50 0-59 9, adult ([facility] [address] ) 10/31/2018    Anxiety 10/31/2018    Hx of migraines 10/31/2018  Intractable migraine without aura and without status migrainosus 11/09/2018    Episodic confusion     Chronic daily headache 12/04/2018    Snoring 12/04/2018    Insomnia 12/04/2018    Hypersomnia 12/04/2018    Vitamin D deficiency 12/04/2018    Medication overuse headache 12/04/2018    Intractable chronic migraine without aura and without status migrainosus 12/04/2018     Resolved Ambulatory Problems     Diagnosis Date Noted    Chest pain 10/31/2018    Viral upper respiratory tract infection 10/31/2018    Lower extremity edema 10/31/2018     Past Medical History:   Diagnosis Date    Chronic headaches     Diabetes mellitus ([facility] [address] )     Disease of thyroid gland     Gastroparesis     GERD (gastroesophageal reflux disease)     Hypertension     Obesity     Obsessive compulsive disorder     Psychiatric disorder     Schizoaffective disorder ([facility] )      Admitting Diagnosis: Chills [R68 83]  Dizziness [R42]  Confusion [R41 0]  Head pain [R51]  Migraine headache [G43 909]  Vomiting [R11 10]     Age/Sex: 34 y o  male     Assessment/Plan: 34 y o  Male with history of repeated migraine headaches; occurring more frequently and lasting linger; Observation admission for intractable migraine without aura and without status migrainosus-given migraine cocktail in ED  Appreciate Neurology input  Check cbc, bmp, mag; place order for MRI  TSH elevated repeat cont levothyroxine dose for now  Cont home meds for Schizoaffective disorder  Cont meds for GERD, HTN, sliding scale for insulin      Admission Orders:  Peripheral IV  MRA & or MRV head without contrast  Vitals routine  Diet alisia/CHO controlled    Scheduled Meds:   Current Facility-Administered Medications:  acetaminophen 650 mg Oral Q6H PRN   aspirin 81 mg Oral Daily   diazepam 5 mg Intravenous Once   enoxaparin 40 mg Subcutaneous Daily   fluvoxaMINE 100 mg Oral BID   insulin glargine 40 Units Subcutaneous HS   insulin lispro 1-5 Units Subcutaneous HS   insulin lispro 1-6 Units Subcutaneous TID AC   ketorolac 15 mg Intravenous Q6H PRN   levothyroxine 50 mcg Oral Early Morning   lisinopril 20 mg Oral Daily   LORazepam 0 5 mg Oral BID   magnesium oxide 400 mg Oral Daily   ondansetron 4 mg Intravenous Q6H PRN   pantoprazole 40 mg Oral BID AC   Riboflavin 400 mg Oral Daily   topiramate 100 mg Oral Daily

## 2019-01-14 NOTE — PLAN OF CARE
NEUROSENSORY - ADULT     Achieves stable or improved neurological status Progressing        Nutrition/Hydration-ADULT     Nutrient/Hydration intake appropriate for improving, restoring or maintaining nutritional needs Progressing        PAIN - ADULT     Verbalizes/displays adequate comfort level or baseline comfort level Progressing

## 2019-01-14 NOTE — ASSESSMENT & PLAN NOTE
· Will order diabetic low-fat diet  · Outpatient monitoring, therapeutic lifestyle change  Would benefit from weight loss

## 2019-01-15 ENCOUNTER — ANESTHESIA (OUTPATIENT)
Dept: GASTROENTEROLOGY | Facility: HOSPITAL | Age: 39
End: 2019-01-15
Payer: COMMERCIAL

## 2019-01-15 ENCOUNTER — ANESTHESIA EVENT (OUTPATIENT)
Dept: GASTROENTEROLOGY | Facility: HOSPITAL | Age: 39
End: 2019-01-15
Payer: COMMERCIAL

## 2019-01-15 ENCOUNTER — TELEPHONE (OUTPATIENT)
Dept: NEUROLOGY | Facility: CLINIC | Age: 39
End: 2019-01-15

## 2019-01-15 VITALS
DIASTOLIC BLOOD PRESSURE: 68 MMHG | OXYGEN SATURATION: 97 % | SYSTOLIC BLOOD PRESSURE: 123 MMHG | RESPIRATION RATE: 20 BRPM | TEMPERATURE: 97.6 F | WEIGHT: 296.6 LBS | BODY MASS INDEX: 54.58 KG/M2 | HEIGHT: 62 IN | HEART RATE: 84 BPM

## 2019-01-15 PROBLEM — K21.00 GASTROESOPHAGEAL REFLUX DISEASE WITH ESOPHAGITIS: Status: ACTIVE | Noted: 2019-01-13

## 2019-01-15 PROBLEM — R11.2 NAUSEA AND VOMITING: Status: ACTIVE | Noted: 2019-01-13

## 2019-01-15 LAB
ANION GAP SERPL CALCULATED.3IONS-SCNC: 10 MMOL/L (ref 4–13)
BUN SERPL-MCNC: 20 MG/DL (ref 5–25)
CALCIUM SERPL-MCNC: 8.8 MG/DL (ref 8.3–10.1)
CHLORIDE SERPL-SCNC: 105 MMOL/L (ref 100–108)
CO2 SERPL-SCNC: 26 MMOL/L (ref 21–32)
CREAT SERPL-MCNC: 1.06 MG/DL (ref 0.6–1.3)
ERYTHROCYTE [DISTWIDTH] IN BLOOD BY AUTOMATED COUNT: 12.8 % (ref 11.6–15.1)
GFR SERPL CREATININE-BSD FRML MDRD: 87 ML/MIN/1.73SQ M
GLUCOSE P FAST SERPL-MCNC: 134 MG/DL (ref 65–99)
GLUCOSE SERPL-MCNC: 100 MG/DL (ref 65–140)
GLUCOSE SERPL-MCNC: 134 MG/DL (ref 65–140)
GLUCOSE SERPL-MCNC: 88 MG/DL (ref 65–140)
GLUCOSE SERPL-MCNC: 95 MG/DL (ref 65–140)
HCT VFR BLD AUTO: 37.5 % (ref 36.5–49.3)
HGB BLD-MCNC: 12.6 G/DL (ref 12–17)
MAGNESIUM SERPL-MCNC: 1.9 MG/DL (ref 1.6–2.6)
MCH RBC QN AUTO: 28.8 PG (ref 26.8–34.3)
MCHC RBC AUTO-ENTMCNC: 33.6 G/DL (ref 31.4–37.4)
MCV RBC AUTO: 86 FL (ref 82–98)
PHOSPHATE SERPL-MCNC: 3.6 MG/DL (ref 2.7–4.5)
PLATELET # BLD AUTO: 308 THOUSANDS/UL (ref 149–390)
PMV BLD AUTO: 9.3 FL (ref 8.9–12.7)
POTASSIUM SERPL-SCNC: 4.3 MMOL/L (ref 3.5–5.3)
RBC # BLD AUTO: 4.38 MILLION/UL (ref 3.88–5.62)
SODIUM SERPL-SCNC: 141 MMOL/L (ref 136–145)
WBC # BLD AUTO: 7.59 THOUSAND/UL (ref 4.31–10.16)

## 2019-01-15 PROCEDURE — 86255 FLUORESCENT ANTIBODY SCREEN: CPT | Performed by: PHYSICIAN ASSISTANT

## 2019-01-15 PROCEDURE — 82784 ASSAY IGA/IGD/IGG/IGM EACH: CPT | Performed by: PHYSICIAN ASSISTANT

## 2019-01-15 PROCEDURE — 43239 EGD BIOPSY SINGLE/MULTIPLE: CPT | Performed by: INTERNAL MEDICINE

## 2019-01-15 PROCEDURE — 82948 REAGENT STRIP/BLOOD GLUCOSE: CPT

## 2019-01-15 PROCEDURE — 80048 BASIC METABOLIC PNL TOTAL CA: CPT | Performed by: NURSE PRACTITIONER

## 2019-01-15 PROCEDURE — 88342 IMHCHEM/IMCYTCHM 1ST ANTB: CPT | Performed by: PATHOLOGY

## 2019-01-15 PROCEDURE — 99217 PR OBSERVATION CARE DISCHARGE MANAGEMENT: CPT | Performed by: NURSE PRACTITIONER

## 2019-01-15 PROCEDURE — 94762 N-INVAS EAR/PLS OXIMTRY CONT: CPT

## 2019-01-15 PROCEDURE — 99243 OFF/OP CNSLTJ NEW/EST LOW 30: CPT | Performed by: PSYCHIATRY & NEUROLOGY

## 2019-01-15 PROCEDURE — 88305 TISSUE EXAM BY PATHOLOGIST: CPT | Performed by: PATHOLOGY

## 2019-01-15 PROCEDURE — 83735 ASSAY OF MAGNESIUM: CPT | Performed by: NURSE PRACTITIONER

## 2019-01-15 PROCEDURE — 83516 IMMUNOASSAY NONANTIBODY: CPT | Performed by: PHYSICIAN ASSISTANT

## 2019-01-15 PROCEDURE — 84100 ASSAY OF PHOSPHORUS: CPT | Performed by: NURSE PRACTITIONER

## 2019-01-15 PROCEDURE — 85027 COMPLETE CBC AUTOMATED: CPT | Performed by: NURSE PRACTITIONER

## 2019-01-15 RX ORDER — SUCRALFATE ORAL 1 G/10ML
1000 SUSPENSION ORAL
Qty: 420 ML | Refills: 0 | Status: SHIPPED | OUTPATIENT
Start: 2019-01-15 | End: 2019-08-29

## 2019-01-15 RX ORDER — PANTOPRAZOLE SODIUM 40 MG/1
40 TABLET, DELAYED RELEASE ORAL 2 TIMES DAILY
Qty: 60 TABLET | Refills: 0 | Status: SHIPPED | OUTPATIENT
Start: 2019-01-15 | End: 2019-06-07 | Stop reason: SDUPTHER

## 2019-01-15 RX ORDER — DIVALPROEX SODIUM 250 MG/1
250 TABLET, DELAYED RELEASE ORAL EVERY 12 HOURS SCHEDULED
Status: DISCONTINUED | OUTPATIENT
Start: 2019-01-15 | End: 2019-01-15 | Stop reason: HOSPADM

## 2019-01-15 RX ORDER — FLUVOXAMINE MALEATE 100 MG
100 TABLET ORAL 2 TIMES DAILY
Qty: 60 TABLET | Refills: 1 | Status: SHIPPED | OUTPATIENT
Start: 2019-01-15 | End: 2020-10-23 | Stop reason: SDUPTHER

## 2019-01-15 RX ORDER — DIVALPROEX SODIUM 250 MG/1
250 TABLET, DELAYED RELEASE ORAL EVERY 12 HOURS SCHEDULED
Qty: 60 TABLET | Refills: 1 | Status: SHIPPED | OUTPATIENT
Start: 2019-01-16 | End: 2019-02-15 | Stop reason: SDUPTHER

## 2019-01-15 RX ORDER — DIVALPROEX SODIUM 250 MG/1
250 TABLET, DELAYED RELEASE ORAL EVERY 12 HOURS SCHEDULED
Qty: 60 TABLET | Refills: 1 | Status: CANCELLED | OUTPATIENT
Start: 2019-01-15

## 2019-01-15 RX ORDER — LEVOTHYROXINE SODIUM 0.1 MG/1
100 TABLET ORAL DAILY
Qty: 30 TABLET | Refills: 0 | Status: SHIPPED | OUTPATIENT
Start: 2019-01-15 | End: 2019-06-07 | Stop reason: SDUPTHER

## 2019-01-15 RX ORDER — ASPIRIN 81 MG/1
81 TABLET, CHEWABLE ORAL EVERY OTHER DAY
Refills: 0 | Status: CANCELLED
Start: 2019-01-15

## 2019-01-15 RX ORDER — ASENAPINE 5 MG/1
2.5 TABLET SUBLINGUAL ONCE
Status: DISCONTINUED | OUTPATIENT
Start: 2019-01-15 | End: 2019-01-15

## 2019-01-15 RX ORDER — ASENAPINE 5 MG/1
7.5 TABLET SUBLINGUAL DAILY
Status: DISCONTINUED | OUTPATIENT
Start: 2019-01-16 | End: 2019-01-15

## 2019-01-15 RX ORDER — PROPOFOL 10 MG/ML
INJECTION, EMULSION INTRAVENOUS AS NEEDED
Status: DISCONTINUED | OUTPATIENT
Start: 2019-01-15 | End: 2019-01-15 | Stop reason: SURG

## 2019-01-15 RX ORDER — LEVOTHYROXINE SODIUM 0.1 MG/1
100 TABLET ORAL
Status: DISCONTINUED | OUTPATIENT
Start: 2019-01-15 | End: 2019-01-15 | Stop reason: HOSPADM

## 2019-01-15 RX ORDER — SODIUM CHLORIDE 9 MG/ML
INJECTION, SOLUTION INTRAVENOUS CONTINUOUS PRN
Status: DISCONTINUED | OUTPATIENT
Start: 2019-01-15 | End: 2019-01-15 | Stop reason: SURG

## 2019-01-15 RX ORDER — SENNOSIDES 8.6 MG
1 TABLET ORAL DAILY
Status: DISCONTINUED | OUTPATIENT
Start: 2019-01-15 | End: 2019-01-15 | Stop reason: HOSPADM

## 2019-01-15 RX ORDER — LORAZEPAM 0.5 MG/1
0.5 TABLET ORAL ONCE
Status: COMPLETED | OUTPATIENT
Start: 2019-01-15 | End: 2019-01-15

## 2019-01-15 RX ORDER — SODIUM CHLORIDE 9 MG/ML
100 INJECTION, SOLUTION INTRAVENOUS CONTINUOUS
Status: DISCONTINUED | OUTPATIENT
Start: 2019-01-15 | End: 2019-01-15 | Stop reason: HOSPADM

## 2019-01-15 RX ORDER — ASENAPINE 10 MG/1
5 TABLET SUBLINGUAL 2 TIMES DAILY
Status: DISCONTINUED | OUTPATIENT
Start: 2019-01-15 | End: 2019-01-15 | Stop reason: HOSPADM

## 2019-01-15 RX ORDER — POLYETHYLENE GLYCOL 3350 17 G/17G
17 POWDER, FOR SOLUTION ORAL ONCE
Status: COMPLETED | OUTPATIENT
Start: 2019-01-15 | End: 2019-01-15

## 2019-01-15 RX ORDER — ASENAPINE 5 MG/1
5 TABLET SUBLINGUAL 2 TIMES DAILY
Qty: 60 TABLET | Refills: 1 | Status: SHIPPED | OUTPATIENT
Start: 2019-01-15 | End: 2019-06-25

## 2019-01-15 RX ORDER — SENNOSIDES 8.6 MG
1 TABLET ORAL
Qty: 30 EACH | Refills: 0 | Status: SHIPPED | OUTPATIENT
Start: 2019-01-15 | End: 2020-11-28 | Stop reason: HOSPADM

## 2019-01-15 RX ADMIN — PROPOFOL 30 MG: 10 INJECTION, EMULSION INTRAVENOUS at 14:07

## 2019-01-15 RX ADMIN — PANTOPRAZOLE SODIUM 40 MG: 40 TABLET, DELAYED RELEASE ORAL at 15:37

## 2019-01-15 RX ADMIN — DIVALPROEX SODIUM 250 MG: 250 TABLET, DELAYED RELEASE ORAL at 15:37

## 2019-01-15 RX ADMIN — PROPOFOL 130 MG: 10 INJECTION, EMULSION INTRAVENOUS at 14:01

## 2019-01-15 RX ADMIN — ASPIRIN 81 MG 81 MG: 81 TABLET ORAL at 08:01

## 2019-01-15 RX ADMIN — STANDARDIZED SENNA CONCENTRATE 8.6 MG: 8.6 TABLET ORAL at 11:04

## 2019-01-15 RX ADMIN — POLYETHYLENE GLYCOL 3350 17 G: 17 POWDER, FOR SOLUTION ORAL at 15:37

## 2019-01-15 RX ADMIN — SODIUM CHLORIDE 100 ML/HR: 0.9 INJECTION, SOLUTION INTRAVENOUS at 15:38

## 2019-01-15 RX ADMIN — LORAZEPAM 0.5 MG: 0.5 TABLET ORAL at 02:00

## 2019-01-15 RX ADMIN — FLUVOXAMINE MALEATE 100 MG: 100 TABLET, FILM COATED ORAL at 08:01

## 2019-01-15 RX ADMIN — LEVOTHYROXINE SODIUM 100 MCG: 100 TABLET ORAL at 15:37

## 2019-01-15 RX ADMIN — SODIUM CHLORIDE: 0.9 INJECTION, SOLUTION INTRAVENOUS at 11:50

## 2019-01-15 RX ADMIN — LORAZEPAM 0.5 MG: 0.5 TABLET ORAL at 17:31

## 2019-01-15 RX ADMIN — SUCRALFATE 1000 MG: 1 SUSPENSION ORAL at 17:31

## 2019-01-15 RX ADMIN — ENOXAPARIN SODIUM 40 MG: 40 INJECTION SUBCUTANEOUS at 08:01

## 2019-01-15 RX ADMIN — Medication 400 MG: at 08:01

## 2019-01-15 RX ADMIN — ASENAPINE MALEATE 5 MG: 10 TABLET SUBLINGUAL at 08:01

## 2019-01-15 RX ADMIN — TOPIRAMATE 100 MG: 100 TABLET, FILM COATED ORAL at 08:01

## 2019-01-15 RX ADMIN — PROPOFOL 30 MG: 10 INJECTION, EMULSION INTRAVENOUS at 14:04

## 2019-01-15 RX ADMIN — LORAZEPAM 0.5 MG: 0.5 TABLET ORAL at 08:01

## 2019-01-15 RX ADMIN — ASENAPINE MALEATE 5 MG: 10 TABLET SUBLINGUAL at 17:31

## 2019-01-15 RX ADMIN — LISINOPRIL 20 MG: 20 TABLET ORAL at 08:01

## 2019-01-15 NOTE — ASSESSMENT & PLAN NOTE
TSH elevated at 3 898 November 14, 2018  Presently, TSH elevated at 16  · Upon review of outpatient records patient is to be on Synthroid 100 mcg daily  Presently on 50 mcg  · Increased Synthroid back to recommended dose of 100 mcg daily  Reinforce the patient is to take this 30 minutes before eating, drinking, or taking other medications every morning  · Repeat TSH as outpatient in 6 weeks

## 2019-01-15 NOTE — CONSULTS
[address]   [address]  Initial Evaluation      Patient Name: Bull Pope  MRN: [id number]  DOS:  01/15/19    Consult requested by: Eze Ramirez  Reason for consult: Outpatient neurology recommending depakote initiation and clearance with psychiatry requested      Assessment:   35 yo male, on SSI, living with father, h/o psychiatric treatment since teens, now diagnosed with schizoaffective d/o but psychosis onset only 2 years ago, OCD, learning disability, loosely following with outpatient psychiatry, admitted for observation due to worsening migraines and associated cognitive deficits, confusion  Psychiatry involved due to neurology recommendation to start depakote and preferred psychiatry initiate/manage when outpatient  Dx:    OCD - poorly controlled  Schizoaffective d/o per outpatient provider - needs diagnostic clarification; past dx of bipolar d/o, depressed type; no duong elicited in history today; there is no psychiatric indication for depakote at this time    Recommendations:  1  OCD: resume luvox 100mg po bid  2  Schizoaffecitve d/o:   - increase saphris to 5mg po bid for ongoing psychosis - would give second 5mg dose any time today  - no concerns about depakote addition but as there is no psychiatric indication for this medication at this point, I highly doubt it would be managed by his outpatient psychiatrist, with whom patient already has a limited rapport with given recent establishment and subsequent noncompliance  - writer spoke with nurse at migraine clinic and informed her of the recommendations  3   Outpatient follow up:   - Will request crisis worker to explore options for establishing with psychiatry clinic somewhere else as patient prefers to switch providers if possible   - please do not discharge prior to patient receiving outpatient referral information    Discussed case with requesting provider MARIBELL Carrera [id number] of information: Patient and his father, both largely reliable    HPI: Amy Blair is a 35 yo male with a prior dx of schizoaffective d/o vs bipolar d/o, unspecified developmental learning difficulties, OCD per chart, admitted to the internal medicine service due to worsening migraines with increasing cognitive deficits associated with episodes, now on observation status  Neurology consulted and ruled out sinus venous thrombosis  Per chart, neurologist Dr Rhae Fothergill at patient's migraine clinic considered adding depakote for migraines when last seen 12/4/18 but preferred psychiatrist initiate it  Primary team requesting consultation to ensure there are no conflicts with current psych treatment  Patient overall states he has been feeling down due to worsening symptoms associated with migraine/headaches but states should his somatic symptoms improve, so would his mood  Father reports worsening delusions, thinking his father has taken his competency away or when something goes awry, he attributes it to someone deliberately doing something, ex  When the signal goes out with the tv/cable he thinks someone messed with it on purpose  Father added that sometimes patient is in denial about his mother's death 2 years ago and believes his father has her hidden away somewhere  Notably, patient current agrees that these are symptoms of delusions  No current paranoia or other psychosis elicited  He has ongoing anxiety in the form of anxious ruminations and reports OCD symptoms in the form of obsessing showering/handwashing though recently has evolved more to just avoiding showering all together  No duong in past or recent history elicited - patient and father report he has mostly dealt with depression over the years  Denies hallucinations, hopelessness, suicidal thinking  No drug use  Mild cognitive deficits elicited as he deferred some remote recall to his father   Reports compliance with both psychiatric medications and those for medical comorbidities, including his synthroid  Recently started on     PPHx:    1  Onset/Prior Diagnoses:   - started treatment as early as age 11 but mostly for depression, OCD   - onset of paranoia started only 1-2 years ago  2  Current and past outpatient psych treatment:                - established at [place] [id number] about 11/2018 but managed to get to intake and initial psychiatry appointment only; missed follow up due to worsening somatic symptoms   - needs to redo intake at the clinic to see psychiatrist again  3  Inpatient hospitalizations:                - multiple, last in 2017       4  Medication trials in the past (including Family Hx):                -geodon (ineffective)  5  Suicide attempts:                - none  6   Substance use:   - denies    PMHx/PSHx: hypothyroidism, morbid obesity, HTN, migraine headaches, DMII    Allergies: reviewed    Social History:  -Domicile status: stable  -Support system: Lives with father  -Education/Employment: on SSI, used to work in teens; father denies learning disability caused difficulty in employment, it was mostly his psychiatric symptoms    -Trauma: not discussed   -Legal: not discussed    Family history: paternal cousin in treatment but unsure what dx    Examination:  Vitals:    01/14/19 1457 01/14/19 2211 01/14/19 2321 01/15/19 0708   BP: 149/60 128/73  136/68   BP Location: Right arm Right arm  Right arm   Pulse: 66 76  78   Resp: 18 18  16   Temp: 98 4 °F (36 9 °C) 97 9 °F (36 6 °C)  98 °F (36 7 °C)   TempSrc: Oral Oral  Oral   SpO2: 97% 95% 97% 94%   Weight:       Height:           Mental Status Exam [Per above +]  Appearance: unkempt, morbidly obese  Behavior: intermittently unengaged, stating he feels dizzy, the light bothers him; does try to answer questions when he can; no psychomotor agitation/retardation  Speech: wnl  Mood: down  Affect: congruent with mood, stable, constricted  Thought process: largely linear, logical  Thought content: denies SI, HI  Perceptual disturbances: denies AH/VH, no internal preoccupation noted  Cognition: oriented to all domains   Some limited recall of remote events  Insight: adequate  Judgement: fair    Lab/work up:  CBC wnl  BMP unremmarkable  TFT: TSH 16 (compared to 3 9 11/14/18)  UA unremarkable  UDS not done  BAL not done

## 2019-01-15 NOTE — PROGRESS NOTES
Psychiatry consult update:    Patient needs to call current outpatient psychiatry office at [place] [id number] to redo intake paperwork and then he'll be scheduled back for follow up  Writer called to advocate for sooner than normal follow up but they report patient has cancelled numerous appointments so this is not possible at this time  Case management has provided information for [address] [facility] [address] as an alternative if patient wishes to change providers  Patient is aware there is a 3-6 month wait for an intake  Both follow up options have been updated in discharge navigator  No further interventions expected from psychiatry consultation service at this time

## 2019-01-15 NOTE — ANESTHESIA PREPROCEDURE EVALUATION
Review of Systems/Medical History  Patient summary reviewed  Chart reviewed  No history of anesthetic complications (no prior anesthesia)     Cardiovascular  Hypertension ,    Pulmonary  Negative pulmonary ROS        GI/Hepatic    GERD ,   Comment: Admitted with nausea/vomiting, report of clinical dx of gastroparesis but no emptying study  Some nausea yesterday, none today  No vomting     Negative  ROS        Endo/Other  Diabetes poorly controlled Insulin, History of thyroid disease , hypothyroidism,   Obesity (BMI 54)  super morbid obesity   GYN       Hematology  Negative hematology ROS      Musculoskeletal  Negative musculoskeletal ROS        Neurology    Headaches,    Psychology   Psychiatric history (schizoaffective d/o), Anxiety,            Physical Exam    Airway  Comment: mccormack  Mallampati score: I  TM Distance: >3 FB  Neck ROM: full     Dental   No notable dental hx     Cardiovascular      Pulmonary      Other Findings  Super morbid obesity - truncal and in lower extremities - upper body/airway not significantly affected     Lab Results   Component Value Date    WBC 7 59 01/15/2019    HGB 12 6 01/15/2019     01/15/2019     Lab Results   Component Value Date     01/15/2019    K 4 3 01/15/2019    BUN 20 01/15/2019    CREATININE 1 06 01/15/2019   BG 95    Lab Results   Component Value Date    PTT 30 10/26/2018      Lab Results   Component Value Date    INR 1 01 10/26/2018     Lab Results   Component Value Date    HGBA1C 8 8 (H) 10/31/2018     Anesthesia Plan  ASA Score- 3     Anesthesia Type- IV sedation with anesthesia with ASA Monitors  Additional Monitors:   Airway Plan:         Plan Factors-    Induction- intravenous  Postoperative Plan-     Informed Consent- Anesthetic plan and risks discussed with patient  I personally reviewed this patient with the CRNA  Discussed and agreed on the Anesthesia Plan with the CRNA  Amadou Cardenas

## 2019-01-15 NOTE — PLAN OF CARE
NEUROSENSORY - ADULT     Achieves stable or improved neurological status Progressing        Nutrition/Hydration-ADULT     Nutrient/Hydration intake appropriate for improving, restoring or maintaining nutritional needs Progressing        PAIN - ADULT     Verbalizes/displays adequate comfort level or baseline comfort level Progressing        Potential for Falls     Patient will remain free of falls Progressing

## 2019-01-15 NOTE — SOCIAL WORK
LOS 0  Patient is under OBS status  CM met with patient's father at bedside  OBS notification discussed and copy provided  Father states that patient lives with him and is independent with his ADLs, father drives to appointments; denies discharge needs at this time  CM to follow through discharge and assist as needed

## 2019-01-15 NOTE — DISCHARGE SUMMARY
Discharge- Darcie Parks 1980, 34 y o  male MRN: [id number]    Unit/Bed#: -01 Encounter: [id number]    Primary Care Provider: Piero Hansen MD   Date and time admitted to hospital: 1/13/2019 12:06 PM        * Intractable migraine without aura and without status migrainosus   Assessment & Plan    · Patient admitted with intractable headache and episodic confusion, has had symptoms for over a year but they have been increasing in frequency and duration  Suspect his headache may be exacerbated by medication overuse  · Follows up with Neurology as well as a migraine Clinic as an outpatient   · MRI brain negative for acute abnormality  Does have several tiny white matter T2 and FLAIR hyperintense foci noted suspicious for mild chronic microangiopathic changes possibly associated with migraine headaches  A 9 5 mm left parotid gland nodule also noted  · MRV negative for dural venous sinus thrombus  · Prior EEG within normal limits  · Appreciate Neurology and Psychiatry input  · Per Neurology, initiate Depakote for migraine prophylaxis if okay by Psychiatry  · Per Psychiatry, and is okay to initiate Depakote for migraine prophylaxis, however, Depakote will not help his psychiatric disorders  · Started Depakote 250 mg twice daily  · Continue home medications, magnesium, riboflavin, Topamax, and Imitrex  · Continue outpatient neurology appointment with Dr Gilles Hardin on 2/15/2019     Schiz[facility]ffective disorder ([facility] )   Assessment & Plan    · Schizoaffective disorder with history of OCD and anxiety  · Appreciate Psychiatry recommendations  ·  Continue home medications  · Luvox 100 mg twice daily  · Increase Saphris from 5 mg daily to 5 mg twice daily  · Lorazepam 0 5 mg twice daily  · Follow up with psych as outpatient  Patient met with psych case management to help him establish outpatient follow-up       Nausea and vomiting   Assessment & Plan    Improved with low residue diet, Protonix, and Carafate  Will follow up with GI to review celiac panel and schedule an outpatient gastric emptying study     Gastroesophageal reflux disease with esophagitis   Assessment & Plan    EGD 1/15/2019:  Gastritis  Continue Protonix twice daily and Carafate before meals  Low residue diet  Follow up with GI to review biopsy results and schedule outpatient emptying study     Morbid obesity with BMI of 50 0-59 9, adult ([facility] [address] )   Assessment & Plan    · Discussed therapeutic lifestyle change  Would benefit from weight loss  Increase exercise  Other specified hypothyroidism   Assessment & Plan    TSH elevated at 3 898 November 14, 2018  Presently, TSH elevated at 16  · Upon review of outpatient records patient is to be on Synthroid 100 mcg daily  Presently on 50 mcg  · Increased Synthroid back to recommended dose of 100 mcg daily  Reinforce the patient is to take this 30 minutes before eating, drinking, or taking other medications every morning  · Repeat TSH as outpatient in 6 weeks       Obsessive compulsive disorder   Assessment & Plan    Appreciate psych recommendations  Increase Saphris from 5 mg daily to 5 mg twice daily  Outpatient follow-up     Hypertension   Assessment & Plan    · Blood pressure acceptable  · Continue lisinopril  · Cr and K+ acceptable     Type 2 diabetes mellitus, with long-term current use of insulin [facility])   Assessment & Plan    Lab Results   Component Value Date    HGBA1C 8 8 (H) 10/31/2018       Recent Labs      01/14/19   1548  01/14/19   2121  01/15/19   0747  01/15/19   1115   POCGLU  157*  322*  100  95       Blood Sugar Average: Last 72 hrs:  (P) 169 875   · Uncontrolled as evidenced by A1c of 8 8 and hyperglycemia  · Continue Lantus 40 units HS and lispro 10 units TID With meals  · Recommend outpatient endocrinology follow-up in light of type 2 diabetes mellitus and hypothyroidism           Discharging Physician / Practitioner: DOREEN Loya  PCP: Hatite Molina MD  Admission Date:   Admission Orders     Ordered        01/13/19 1322  Place in Observation (expected length of stay for this patient is less than two midnights)  Once             Discharge Date: 01/15/19    Resolved Problems  Date Reviewed: 1/15/2019    None          Consultations During Hospital Stay:  · Neurology  · Psychiatry    Procedures Performed:   MRI brain:Several tiny supratentorial white matter T2 and FLAIR hyperintense foci suspicious for mild chronic microangiopathic changes including the type which may accompany complicated migraine headaches  Medical correlation recommended  These findings not visible by CT  No acute ischemic disease identified by diffusion imaging  Apparent enlargement of 9 5 mm anterior superficial left parotid gland nodule  Biopsy or resection recommended for definitive histologic confirmation when clinically appropriate  Chronic opacification right maxillary sinus  Right ethmoid sinus disease has developed  MRA/MRV head:  No evidence of dural venous sinus thrombosis  Significant Findings / Test Results:   · A1c 8 8  · Morbid obesity  · Gastritis    Incidental Findings:   · None     Test Results Pending at Discharge (will require follow up):   · Celiac panel, gastric biopsies     Outpatient Tests Requested:  · Outpatient GI follow-up to schedule a gastric emptying study    Complications:  None    Reason for Admission:  Intractable headaches    Hospital Course:     Coreen Apodaca is a 34 y o  male patient with a past medical history of OCD, schizoaffective disorder, migraines hypothyroidism, T2DM, GERD who originally presented to the hospital on 1/13/2019 due to intractable migraines  Patient was evaluated by Neurology  He was ruled out for dural venous sinus thrombosis  His outpatient the headache clinic note was reviewed and recommendation to initiate Depakote if cleared by Psychiatry was noted  Neurology agreed with initiation of Depakote if okay by Psychiatry    Psychiatry consultation obtained  Psychiatry cleared patient to begin Depakote for migraine prophylaxis  Per Psychiatry, Depakote will likely not help his schizoaffective disorder  Psychiatry recommends increasing Saphris from 5 mg daily to 5 mg twice daily  Patient is to follow up with Neurology as scheduled and make an appointment with outpatient psychiatry  Patient had complaints of intractable nausea vomiting, decreased appetite  And acid reflux with chronic retrosternal discomfort  A gastroenterology consultation was obtained  Patient underwent an EGD which revealed mild gastritis  Patient is to follow up with GI for an outpatient gastric emptying study, follow-up gastric biopsies, and follow-up celiac panel  Patient will start Protonix twice daily and Carafate with meals  Patient is device to follow a low residue, carb restricted diet  Patient was encouraged to lose weight and increase his exercise for overall health benefits  He is to continue aspirin daily for stroke prophylaxis  His cholesterol panel is within normal limits  Additionally, his TSH was noted to be 16  Upon review, the patient is not taking his Synthroid 1st thing in the morning  Patient was recently increased to 100 mcg daily  I recommended the patient continue 100 mcg daily with compliance and follow up with a TSH with his outpatient primary provider  Father is at bedside and updated on the entire plan  Please see above list of diagnoses and related plan for additional information  Condition at Discharge: stable     Discharge Day Visit / Exam:     Subjective:  Reports improvement in headaches  Tolerated low residue dinner without nausea, vomiting, diarrhea  Denies lightheadedness, dizziness, shortness of breath, chest pain  Ambulating hallways frequently and is ready to go home    Vitals: Blood Pressure: 123/68 (01/15/19 1641)  Pulse: 84 (01/15/19 1641)  Temperature: 97 6 °F (36 4 °C) (01/15/19 1641)  Temp Source: Oral (01/15/19 1641)  Respirations: 20 (01/15/19 1641)  Height: 5' 2" (157 5 cm) (01/13/19 1500)  Weight - Scale: 135 kg (296 lb 9 6 oz) (01/13/19 1500)  SpO2: 97 % (01/15/19 1641)  Exam:   Physical Exam   Constitutional: He is oriented to person, place, and time  He appears well-developed  No distress  Morbidly obese   HENT:   Head: Normocephalic  Neck: Normal range of motion  Cardiovascular: Normal rate and regular rhythm  Pulmonary/Chest: Effort normal and breath sounds normal  No respiratory distress  He has no wheezes  He has no rhonchi  He has no rales  Abdominal: Soft  Bowel sounds are normal  He exhibits no distension  There is no tenderness  Musculoskeletal: Normal range of motion  He exhibits no edema or tenderness  Neurological: He is alert and oriented to person, place, and time  Skin: Skin is warm and dry  He is not diaphoretic  Psychiatric: He has a normal mood and affect  His speech is normal and behavior is normal  Cognition and memory are impaired  He expresses impulsivity  Nursing note and vitals reviewed  Discussion with Family:  Father at bedside    Discharge instructions/Information to patient and family:   See after visit summary for information provided to patient and family  Provisions for Follow-Up Care:  See after visit summary for information related to follow-up care and any pertinent home health orders  Disposition:     Home    For Discharges to [facility]:   · Not Applicable to this Patient - Not Applicable to this Patient    Planned Readmission:  None     Discharge Statement:  I spent > 30 minutes discharging the patient  This time was spent on the day of discharge  I had direct contact with the patient on the day of discharge  Greater than 50% of the total time was spent examining patient, answering all patient questions, arranging and discussing plan of care with patient as well as directly providing post-discharge instructions    Additional time then spent on discharge activities  Coordinated with Neurology and Psychiatry    Discharge Medications:  See after visit summary for reconciled discharge medications provided to patient and family        ** Please Note: This note has been constructed using a voice recognition system **

## 2019-01-15 NOTE — ASSESSMENT & PLAN NOTE
· Patient admitted with intractable headache and episodic confusion, has had symptoms for over a year but they have been increasing in frequency and duration  Suspect his headache may be exacerbated by medication overuse  · Follows up with Neurology as well as a migraine Clinic as an outpatient   · MRI brain negative for acute abnormality  Does have several tiny white matter T2 and FLAIR hyperintense foci noted suspicious for mild chronic microangiopathic changes possibly associated with migraine headaches  A 9 5 mm left parotid gland nodule also noted    · MRV negative for dural venous sinus thrombus  · Prior EEG within normal limits  · Appreciate Neurology and Psychiatry input  · Per Neurology, initiate Depakote for migraine prophylaxis if okay by Psychiatry  · Per Psychiatry, and is okay to initiate Depakote for migraine prophylaxis, however, Depakote will not help his psychiatric disorders  · Started Depakote 250 mg twice daily  · Continue home medications, magnesium, riboflavin, Topamax, and Imitrex  · Continue outpatient neurology appointment with Dr Nikkie Muñoz on 2/15/2019

## 2019-01-15 NOTE — ASSESSMENT & PLAN NOTE
Improved with low residue diet, Protonix, and Carafate  Will follow up with GI to review celiac panel and schedule an outpatient gastric emptying study

## 2019-01-15 NOTE — PLAN OF CARE
Problem: DISCHARGE PLANNING - CARE MANAGEMENT  Goal: Discharge to post-acute care or home with appropriate resources  INTERVENTIONS:  - Conduct assessment to determine patient/family and health care team treatment goals, and need for post-acute services based on payer coverage, community resources, and patient preferences, and barriers to discharge  - Address psychosocial, clinical, and financial barriers to discharge as identified in assessment in conjunction with the patient/family and health care team  - Arrange appropriate level of post-acute services according to patients   needs and preference and payer coverage in collaboration with the physician and health care team  - Communicate with and update the patient/family, physician, and health care team regarding progress on the discharge plan  - Arrange appropriate transportation to post-acute venues  Outcome: Progressing  LOS 0  Patient is under OBS status  CM met with patient's father at bedside  OBS notification discussed and copy provided  Father states that patient lives with him and is independent with his ADLs, father drives to appointments; denies discharge needs at this time  CM to follow through discharge and assist as needed

## 2019-01-15 NOTE — ASSESSMENT & PLAN NOTE
EGD 1/15/2019:  Gastritis  Continue Protonix twice daily and Carafate before meals  Low residue diet  Follow up with GI to review biopsy results and schedule outpatient emptying study

## 2019-01-15 NOTE — UTILIZATION REVIEW
Continued Stay Review    Date: 1/15/2019    PROCEDURE: EGD   SEDATION: Monitored anesthesia care  FINDINGS:  #1  Esophagus- mild esophagitis, no significant stricture, stenosis identified  #2  Stomach- mild to moderate gastritis biopsies taken for H pylori  Normal retroflexion  #3  Duodenum- normal duodenum         IMPRESSIONS:    Normal duodenum  Gastritis biopsied  Minimal esophagitis     RECOMMENDATIONS:   Return to floor  Continue twice daily PPI therapy, Carafate  Outpatient gastric emptying scan  Follow up biopsy results  Advance to low residue diet as tolerated    Vital Signs: /73 (BP Location: Right arm)   Pulse 76   Temp 97 9 °F (36 6 °C) (Oral)   Resp 18   Ht 5' 2" (1 575 m)   Wt 135 kg (296 lb 9 6 oz)   SpO2 97%   BMI 54 25 kg/m²      Assessment/Plan: 1/14/2019 Gastroenterology note: 34year-old gentle with a history of schizoaffective disorder, poorly-controlled diabetes, hypothyroidism, chronic headaches, reports that he has had several months of persistent nausea vomiting  Denies any tobacco, alcohol, marijuana use  Patient had been seen at outside hospital diagnosed clinically with gastroparesis  Has not had official gastric emptying scan  Was admitted for persistent headaches and family requests inpatient evaluation for persistent nausea vomiting  Patient has been able to tolerate some clear liquid but no solid foods  Reports persistent retrosternal burning      1  Recurrent nausea/vomiting/low substernal burning:  Differential is broad including peptic ulcer disease, gastric outlet obstruction, gastroparesis, reflux esophagitis  -empiric therapy with b i d   PPI therapy and Carafate  -we will proceed with an inpatient upper endoscopy given his persistent severe symptoms  -would recommend outpatient gastric emptying scan  -further recommendations after endoscopic evaluation     1/15/2019 Behavioral Health MD:  Assessment:   33 yo male, on SSI, living with father, h/o psychiatric treatment since teens, now diagnosed with schizoaffective d/o but psychosis onset only 2 years ago, OCD, learning disability, loosely following with outpatient psychiatry, admitted for observation due to worsening migraines and associated cognitive deficits, confusion  Psychiatry involved due to neurology recommendation to start depakote and preferred psychiatry initiate/manage when outpatient  Dx:  OCD - poorly controlled  Schizoaffective d/o per outpatient provider - needs diagnostic clarification; past dx of bipolar d/o, depressed type; no duong elicited in history today; there is no psychiatric indication for depakote at this time  Recommendations:  1  OCD: resume luvox 100mg po bid  2  Schizoaffecitve d/o:   - increase saphris to 5mg po bid for ongoing psychosis - would give second 5mg dose any time today  - no concerns about depakote addition but as there is no psychiatric indication for this medication at this point, I highly doubt it would be managed by his outpatient psychiatrist, with whom patient already has a limited rapport with given recent establishment and subsequent noncompliance  - writer spoke with nurse at migraine clinic and informed her of the recommendations  3   Outpatient follow up:   - Will request crisis worker to explore options for establishing with psychiatry clinic somewhere else as patient prefers to switch providers if possible   - please do not discharge prior to patient receiving outpatient referral information       Medications:   Scheduled Meds:   Current Facility-Administered Medications:  acetaminophen 650 mg Oral Q6H PRN   aluminum-magnesium hydroxide-simethicone 30 mL Oral Q4H PRN   asenapine 5 mg Sublingual Daily   aspirin 81 mg Oral Daily   enoxaparin 40 mg Subcutaneous Daily   fluvoxaMINE 100 mg Oral BID   insulin glargine 40 Units Subcutaneous HS   insulin lispro 1-5 Units Subcutaneous HS   insulin lispro 1-6 Units Subcutaneous TID AC   ketorolac 15 mg Intravenous Q6H PRN   levothyroxine 100 mcg Oral Early Morning   lidocaine viscous 15 mL Swish & Spit 4x Daily PRN   lisinopril 20 mg Oral Daily   LORazepam 0 5 mg Oral BID   magnesium oxide 400 mg Oral Daily   ondansetron 4 mg Intravenous Q6H PRN   pantoprazole 40 mg Oral BID AC   Riboflavin 400 mg Oral Daily   sucralfate 1,000 mg Oral Q6H SOFI   topiramate 100 mg Oral Daily     Continuous Infusions:    PRN Meds:   Pertinent Labs/Diagnostic Results: none    Age/Sex: 34 y o  male     Discharge Plan: tbd

## 2019-01-15 NOTE — DISCHARGE INSTRUCTIONS
Gastritis:  Continue twice daily PPI therapy, Carafate  Follow a low residue diet as tolerated  Follow-up with GI, follow up biopsy results, follow up celiac panel result, and schedule an outpatient gastric emptying scan  · A gastric-emptying scan measures how quickly food moves out of your stomach  A slightly radioactive substance is placed in food  The amount of radiation is small and safe  You eat the food and then lie under a machine that takes pictures of the food inside your stomach  Pictures will be taken every 15 minutes, up to 4 hours after you eat, or as directed  OCD:  Continue Luvox 100 mg twice daily    Schizoaffective disorder:    Increase Saphris to 5 mg twice daily    Migraines:  Okay to start Depakote 250 mg twice daily     Psychiatry consult update:   · Patient needs to call current outpatient psychiatry office at [place] [phone] to redo intake paperwork and then he'll be scheduled back for follow up  · Case management has provided information for [address] [address] as an alternative if patient wishes to change providers  Patient is aware there is a 3-6 month wait for an intake  Gastritis   WHAT YOU NEED TO KNOW:   Gastritis is inflammation or irritation of the lining of your stomach  DISCHARGE INSTRUCTIONS:   Call 911 for any of the following:   · You develop chest pain or shortness of breath  Seek care immediately if:   · You vomit blood  · You have black or bloody bowel movements  · You have severe stomach or back pain  Contact your healthcare provider if:   · You have a fever  · You have new or worsening symptoms, even after treatment  · You have questions or concerns about your condition or care  Medicines:   · Medicines  may be given to help treat a bacterial infection or decrease stomach acid  · Take your medicine as directed    Contact your healthcare provider if you think your medicine is not helping or if you have side effects  Tell him or her if you are allergic to any medicine  Keep a list of the medicines, vitamins, and herbs you take  Include the amounts, and when and why you take them  Bring the list or the pill bottles to follow-up visits  Carry your medicine list with you in case of an emergency  Manage or prevent gastritis:   · Do not smoke  Nicotine and other chemicals in cigarettes and cigars can make your symptoms worse and cause lung damage  Ask your healthcare provider for information if you currently smoke and need help to quit  E-cigarettes or smokeless tobacco still contain nicotine  Talk to your healthcare provider before you use these products  · Do not drink alcohol  Alcohol can prevent healing and make your gastritis worse  Talk to your healthcare provider if you need help to stop drinking  · Do not take NSAIDs or aspirin unless directed  These and similar medicines can cause irritation  If your healthcare provider says it is okay to take NSAIDs, take them with food  · Do not eat foods that cause irritation  Foods such as oranges and salsa can cause burning or pain  Eat a variety of healthy foods  Examples include fruits (not citrus), vegetables, low-fat dairy products, beans, whole-grain breads, and lean meats and fish  Try to eat small meals, and drink water with your meals  Do not eat for at least 3 hours before you go to bed  · Find ways to relax and decrease stress  Stress can increase stomach acid and make gastritis worse  Activities such as yoga, meditation, or listening to music can help you relax  Spend time with friends, or do things you enjoy  Follow up with your healthcare provider as directed: You may need ongoing tests or treatment, or referral to a gastroenterologist  Write down your questions so you remember to ask them during your visits    © 2017 [employer] Information is for End User's use only and may not be sold, redistributed or otherwise used for commercial purposes  All illustrations and images included in CareNotes® are the copyrighted property of [employer] M [employer]  or Ayden Simpson  The above information is an  only  It is not intended as medical advice for individual conditions or treatments  Talk to your doctor, nurse or pharmacist before following any medical regimen to see if it is safe and effective for you  Diabetic Gastroparesis   WHAT YOU NEED TO KNOW:   What is diabetic gastroparesis? Diabetic gastroparesis is a type of nerve damage that slows digestion  High blood sugar levels from diabetes can damage nerves and tissues in your stomach  The damage prevents your stomach from emptying normally  Gastroparesis is also called delayed gastric emptying  What increases my risk for diabetic gastroparesis? · History of type 1 or type 2 diabetes for at least 10 years    · Eye, nerve, or kidney problems due to diabetes  What are the signs and symptoms of diabetic gastroparesis? Your symptoms may be worse if you drink alcohol or smoke  You may have any of the following:  · Constipation that may be replaced, at times, by diarrhea    · High or low blood sugar levels that you cannot control    · Nausea, vomiting, or loss of appetite    · Bloated or early full feeling while you eat    · Sudden cramps, swelling, or pain in your abdomen    · Weight loss without trying     · Heartburn  How is diabetic gastroparesis diagnosed? Your healthcare provider will feel your abdomen and ask about your diabetes  You may need any of the following tests:  · A gastric emptying breath test (GEBT)  will show how fast food moves from your stomach to your small intestine  The test measures the amount of carbon in your breath after you eat a meal prepared by healthcare providers  · An x-ray or ultrasound  may show how your stomach is working  You may be given a chalky liquid to drink before the pictures are taken   This liquid helps your stomach and intestines show up clearly on the monitor  · An endoscopy  may show what is causing your digestive problems  A scope is used to show images of the inside of your stomach  A scope is a thin, bendable tube with a light and camera on the end  Samples may be taken from your digestive tract and sent to a lab for tests  · A scintigraphy , or gastric-emptying scan, measures how quickly food moves out of your stomach  A slightly radioactive substance is placed in food  The amount of radiation is small and safe  You eat the food and then lie under a machine that takes pictures of the food inside your stomach  Pictures will be taken every 15 minutes, up to 4 hours after you eat, or as directed  How is diabetic gastroparesis treated? · Medicines:      ¨ Motility medicines  help your stomach muscles move food and liquids out of your stomach faster  These medicines also may help you digest food better  ¨ Nausea medicine  helps calm your stomach and prevent vomiting  · A feeding tube  may be needed if your stomach cannot process food  You may need the feeding tube for a short time, until your stomach starts working properly  You may instead need a long-term feeding tube if your gastroparesis is severe  Ask for more information about feeding tubes  How can I manage my symptoms? · Walk after you eat  This may help speed digestion  · Follow the meal plan  that your healthcare or dietitian gave you  This meal plan can help decrease your symptoms  The following may also help you manage your symptoms:     ¨ Eat less fat and fiber  High-fat and high-fiber foods may be hard for your stomach to digest  You may need to avoid fruits and vegetables such as oranges and broccoli  ¨ Eat 4 to 6 small meals a day  Smaller, more frequent meals are easier for your stomach to handle  ¨ Drink more liquids with your meals  Your healthcare provider may recommend liquid meals, such as soup   Liquid is easier to digest than solid food  ¨ Ask if you should prepare your food in a   Blended foods are easier to digest  Ask for directions on which foods to use and how to blend the food correctly  · Do not smoke  Nicotine can damage blood vessels, slow your digestion, and make it more difficult to manage your diabetes  Do not use e-cigarettes or smokeless tobacco in place of cigarettes or to help you quit  They still contain nicotine  Ask your healthcare provider for information if you currently smoke and need help quitting  · Do not drink alcohol  Alcohol may slow your digestion more  · Follow your diabetes treatment plan  You may need to check your blood sugar more often  High blood sugar levels slow digestion and can make your symptoms worse  When should I seek immediate care? · You are vomiting more severely or for a longer period than usual      · You urinate less than usual, and your mouth is dry  · You feel dizzy and weak, or you have fainted  · You have severe pain in your stomach or abdomen  When should I contact my healthcare provider? · Your blood sugar level is higher or lower than healthcare providers have told you it should be  · You continue to have pain and bloating in your abdomen  · You continue to have nausea and vomiting, or you are not able to eat  · You have questions or concerns about your condition or care  CARE AGREEMENT:   You have the right to help plan your care  Learn about your health condition and how it may be treated  Discuss treatment options with your caregivers to decide what care you want to receive  You always have the right to refuse treatment  The above information is an  only  It is not intended as medical advice for individual conditions or treatments  Talk to your doctor, nurse or pharmacist before following any medical regimen to see if it is safe and effective for you    © 2017 [employer] Information is for End User's use only and may not be sold, redistributed or otherwise used for commercial purposes  All illustrations and images included in CareNotes® are the copyrighted property of A D A M , Inc  or Ayden Simpson

## 2019-01-15 NOTE — ANESTHESIA POSTPROCEDURE EVALUATION
Post-Op Assessment Note      CV Status:  Stable    Mental Status:  Alert and awake    Hydration Status:  Euvolemic    PONV Controlled:  Controlled    Airway Patency:  Patent and adequate    Post Op Vitals Reviewed: Yes          Staff: CRNA           /54 (01/15/19 1417)    Temp (!) 96 9 °F (36 1 °C) (01/15/19 1417)    Pulse 72 (01/15/19 1417)   Resp 16 (01/15/19 1417)    SpO2 92 % (01/15/19 1417)

## 2019-01-15 NOTE — ASSESSMENT & PLAN NOTE
· Schizoaffective disorder with history of OCD and anxiety  · Appreciate Psychiatry recommendations  ·  Continue home medications  · Luvox 100 mg twice daily  · Increase Saphris from 5 mg daily to 5 mg twice daily  · Lorazepam 0 5 mg twice daily  · Follow up with psych as outpatient  Patient met with psych case management to help him establish outpatient follow-up

## 2019-01-15 NOTE — ASSESSMENT & PLAN NOTE
Appreciate psych recommendations  Increase Saphris from 5 mg daily to 5 mg twice daily  Outpatient follow-up

## 2019-01-15 NOTE — OP NOTE
ESOPHAGOGASTRODUODENOSCOPY    PROCEDURE: EGD    SEDATION: Monitored anesthesia care, check anesthesia records    ASA Class: 3    INDICATIONS:  Persistent nausea vomiting    CONSENT:  Informed consent was obtained for the procedure, including sedation after explaining the risks and benefits of the procedure  Risks including but not limited to bleeding, perforation, infection, and missed lesion  PREPARATION:   Telemetry, pulse oximetry, blood pressure were monitored throughout the procedure  Patient was identified by myself both verbally and by visual inspection of ID band  DESCRIPTION:   Patient was placed in the left lateral decubitus position and was sedated with the above medication  The gastroscope was introduced in to the oropharynx and the esophagus was intubated under direct visualization  Scope was passed down the esophagus up to 2nd part of the duodenum  A careful inspection was made as the gastroscope was withdrawn, including a retroflexed view of the stomach; findings and interventions are described below  FINDINGS:    #1  Esophagus- mild esophagitis, no significant stricture, stenosis identified    #2  Stomach- mild to moderate gastritis biopsies taken for H pylori  Normal retroflexion    #3  Duodenum- normal duodenum         IMPRESSIONS:      Normal duodenum  Gastritis biopsied  Minimal esophagitis    RECOMMENDATIONS:     Return to floor  Continue twice daily PPI therapy, Carafate  Outpatient gastric emptying scan  Follow up biopsy results  Advance to low residue diet as tolerated    COMPLICATIONS:  None; patient tolerated the procedure well      SPECIMENS:    ID Type Source Tests Collected by Time Destination   1 : Bx gastric body Tissue Stomach TISSUE EXAM Jamaal Flores MD 1/15/2019 1410        ESTIMATED BLOOD LOSS:  Minimal

## 2019-01-15 NOTE — ASSESSMENT & PLAN NOTE
Lab Results   Component Value Date    HGBA1C 8 8 (H) 10/31/2018       Recent Labs      01/14/19   1548  01/14/19   2121  01/15/19   0747  01/15/19   1115   POCGLU  157*  322*  100  95       Blood Sugar Average: Last 72 hrs:  (P) 169 875   · Uncontrolled as evidenced by A1c of 8 8 and hyperglycemia  · Continue Lantus 40 units HS and lispro 10 units TID With meals  · Recommend outpatient endocrinology follow-up in light of type 2 diabetes mellitus and hypothyroidism

## 2019-01-16 LAB
ENDOMYSIUM IGA SER QL: NEGATIVE
GLIADIN PEPTIDE IGA SER-ACNC: 4 UNITS (ref 0–19)
GLIADIN PEPTIDE IGG SER-ACNC: 7 UNITS (ref 0–19)
IGA SERPL-MCNC: 425 MG/DL (ref 90–386)
TTG IGA SER-ACNC: <2 U/ML (ref 0–3)
TTG IGG SER-ACNC: <2 U/ML (ref 0–5)

## 2019-01-17 ENCOUNTER — TELEPHONE (OUTPATIENT)
Dept: FAMILY MEDICINE CLINIC | Facility: CLINIC | Age: 39
End: 2019-01-17

## 2019-01-17 NOTE — TELEPHONE ENCOUNTER
We [facility] from [facility] his insurance prefers admelog instead of novolog which is close to same thing  Can we switch it?  Pharmacy said he had admelog in past with other insurance

## 2019-01-18 ENCOUNTER — TELEPHONE (OUTPATIENT)
Dept: GASTROENTEROLOGY | Facility: CLINIC | Age: 39
End: 2019-01-18

## 2019-01-18 DIAGNOSIS — R11.2 NAUSEA AND VOMITING, INTRACTABILITY OF VOMITING NOT SPECIFIED, UNSPECIFIED VOMITING TYPE: Primary | ICD-10-CM

## 2019-01-18 NOTE — TELEPHONE ENCOUNTER
Dr Oneil Paul pt    Pt needs an order for a gastric emptying  Please advise once the order has been placed    [phone]

## 2019-01-23 ENCOUNTER — HOSPITAL ENCOUNTER (OUTPATIENT)
Dept: RADIOLOGY | Facility: HOSPITAL | Age: 39
Discharge: HOME | End: 2019-01-23
Attending: PSYCHIATRY & NEUROLOGY

## 2019-01-25 ENCOUNTER — TELEPHONE (OUTPATIENT)
Dept: GASTROENTEROLOGY | Facility: CLINIC | Age: 39
End: 2019-01-25

## 2019-01-25 NOTE — TELEPHONE ENCOUNTER
----- Message from El Rodriges MD sent at 1/24/2019  1:55 PM EST -----  Please inform the patient biopsies from stomach were negative for H pylori  Patient should have an office follow-up with myself or PA in the next 4 weeks to follow-up on his symptoms, please have the patient call with any questions or concerns

## 2019-01-25 NOTE — TELEPHONE ENCOUNTER
----- Message from Mortimer Motts, PA-C sent at 1/21/2019 12:42 PM EST -----  Please advise patient that his celiac disease antibody panel is negative, it shows no evidence of celiac disease  He should have gastric emptying study done for further workup as previously discussed  Please call if any questions    Thank you

## 2019-01-25 NOTE — LETTER
January 25, 2019     [facility]  [place]      Dear Mr Caba English: We have attempted to reach you regarding your results  We ask that you please contact our office upon receipt of this letter to receive your results  Thank you in advance for your cooperation and assistance          Sincerely,   [facility] Staff  [phone]

## 2019-02-14 NOTE — PROGRESS NOTES
[facility] Consult - Follow up   PATIENT:  Mark Holguin  MRN:  [id number]  :  1980  DATE OF SERVICE:  2/15/2019  REFERRED BY: Alisha Haq MD  PMD: James Nelson MD    Assessment/Plan:     Mark Holguin is a 34 y o  male referred here for follow up of multiple problems including headache  Last visit 18  Chronic migraine without aura and without status migrainosus  Possible Occipital neuralgia of left side  Cervicalgia  Tension headache  He reports headache started approximately 2 years ago, worsening over the past year  He denies aura  He wakes up with them and describes a pressure that is bioccipital and can go to the bilateral temples as well  Also has jabbing pain bioccipital that is sometimes sensitive to the touch left occipital region with burning pain and warm sensation suspicious for possible left occipital neuralgia  We discussed that his headaches are likely multifactorial in nature including medication overuse headache, tension headache, migraine and likely influenced by multiple contributing factors  As of 2018:  they were all day every day   As of 02/15/2019:  Since adding Depakote in January headaches have improved in severity by 30-40% now has 3 hr less of headaches daily     Workup:  - MRI brain 2019 negative for acute abnormality or cause for headache    - MRV head 2019 negative for venous sinus thrombosis  - referral to physical therapy for further evaluation and possible cervicogenic component     Abortive:  - discussed limiting over-the-counter pain medication use including ibuprofen to no more than 3 days per week  - trial of gabapentin 100-200 mg once to twice a day as headache abortive, may also help with anxiety  - continue Imitrex 100 mg for severe migraines  Discussed proper use    -  referral to physiatry Dr Yuli Bee to see if occipital nerve injections could be of benefit  - in the past Decadron 2 mg for 3 days improved headache for short period, could consider indomethacin with carafate     Preventative:  -  will wean off topiramate as may be affecting cognition and is not helping headache  -  increase Depakote from 250 mg b i d  To 250 mg in a m  And 500 mg p m  Since he reports this seems to have been helping him the most  - referral to sleep medicine for sleep study and further evaluation for possible sleep apnea or other sleep disorder which very well could be contributing to his daily headaches upon awakening   - prescribed supplements including magnesium, riboflavin and recommended melatonin as well  - future alternatives could include -  could consider adding memantine as this could also help with memory, beta blocker or a calcium channel blocker such as verapamil  - We have discussed headache hygiene lifestyle factors that could improve his headaches including increases in his water intake, physical activity and continuing care of his mental health  - He meets with this therapist weekly and I discussed the importance of continuing this and the interplay between chronic pain and mood  - past:  Has tried olanzapine for mood and caused weight gain     Cognitive dysfunction  Episodic confusion   He also reports cognitive dysfunction  He has a history of ADHD at baseline as well as schizoaffective disorder, OCD, ADHD and paranoia  He notes issues with focusing, comprehending and processing things as he reads or watches TV as well as some word-finding difficulty  [address] exam 12/4/18 was 20/30 with majority of deficits in visualspatial and delayed recall  He reports that he does not drive, his father drives him places and his father confirms this  Unknown etiology of cognitive dysfunction, likely multifactorial   Could be related to medications, thyroid, mood/psychiatric history, possible sleep apnea    - EEG 11/28/2018 normal, MRI brain as discussed above  - during hospitalization January 2019, thyroid function was off which can influence cognition, levothyroxine was increased from  and patient was post to follow up with primary care provider  - Discussed that there could be a component of confusion caused by topiramate and will wean him off of this  - will repeat MOCA next visit as time allows  * could consider adding memantine as this could also help with headaches      Snoring  Insomnia, unspecified type  Hypersomnia  He also reports insomnia with trouble falling asleep and hypersomnia and has never had a sleep study  -     Ambulatory referral to Sleep Medicine    White matter abnormality on MRI brain  - can occur with migraines, however discussed with patient and his father that in his situation I suspect more likely related to mild chronic microangiopathic changes and recommended improved control of risk factors including obesity, blood pressure, cholesterol, as well as following up with sleep apnea evaluation    Nodule of parotid gland  - incidentally noted 9 5 mm anterior superficial left parotid gland nodule on MRI brain from 01/13/2019  - per radiology read biopsy or resection recommended for definitive histologic confirmation when clinically appropriate  - mention this to patient today and he did not recall hearing this, recommended primary care follow-up  He can also follow up with them regarding Incidentally noted sinus disease on MRI brain  He also should follow up with them regarding thyroid function and his other chronic medical issues     But he also needs to follow up with Psychiatry for OCD, schizoaffective disorder, anxiety    Patient instructions      Referral to Physical Therapy   Referral to Dr Humera Figueroa to see if occipital nerve injection could be helpful     Sleep   -     Ambulatory referral to Sleep Medicine;  Future *THE MOST IMPORTANT      *follow up with primary care doctor regarding MRI findings on Parotid gland        Over the counter preventive supplements for headaches/migraines   (to take every day to help prevent headaches - not to take at the time of headache):  [x] Magnesium 400mg daily   [x] Riboflavin (Vitamin B2) 400mg daily   (FYI B2 may make your urine bright/neon yellow)     Headache/migraine treatment:   Abortive medications (for immediate treatment of a headache): It is ok to take ibuprofen, acetaminophen or naproxen (Advil, Tylenol,  Aleve, Excedrin) if they help your headaches you should limit these to No more than 3 times a week to avoid medication overuse/rebound headaches  Gabapentin 100-200 mg as needed once a day for headaches     For your more moderate to severe migraines take this medication early   This continue Imitrex 100 mg as needed for the most severe migraines  He should not take more than 2 of these in a day, or 3 days a week or 9 a month  Because the more often you take these, the less effective they are         Prescription preventive medications for headaches/migraines   (to take every day to help prevent headaches - not to take at the time of headache):  [x] topiramate/Topamax: decrease to 75 mg daily for 1 week, then 50 mg for 1 week, then 25 mg for 1 week and stop   [x]  Increase Depakote to 250 mg in am and 500 mg nightly   *Typically these types of medications take time untill you see the benefit, although some may see improvement in days, often it may take weeks, especially if the medication is being titrated up to a beneficial level  Please contact us if there are any concerns or questions regarding the medication       Sleep/headache prevention:   [x] Melatonin - take 3 mg nightly for sleep  You should take this 1 hour prior to bedtime consistently every night for it to work  It works by gradually helping to adjust your sleep time over days to weeks, rather than immediately making you feel sleepy        Lifestyle Recommendations:  [x] SLEEP - Maintain a regular sleep schedule: Adults need at least 7-8 hours of uninterrupted a night   Maintain good sleep hygiene:  Going to bed and waking up at consistent times, avoiding excessive daytime naps, avoiding caffeinated beverages in the evening, avoid excessive stimulation in the evening and generally using bed primarily for sleeping  One hour before bedtime would recommend turning lights down lower, decreasing your activity (may read quietly, listen to music at a low volume)  When you get into bed, should eliminate all technology (no texting, emailing, playing with your phone, iPad or tablet in bed)  [x] HYDRATION - Maintain good hydration  Drink  2L of fluid a day (4 typical small water bottles)  [x] DIET - Maintain good nutrition  In particular don't skip meals and try and eat healthy balanced meals regularly  [x] TRIGGERS - Look for other triggers and avoid them: Limit caffeine to 1-2 cups a day or less  Avoid dietary triggers that you have noticed bring on your headaches (this could include aged cheese, peanuts, MSG, aspartame and nitrates)  [x] EXERCISE - physical exercise as we all know is good for you in many ways, and not only is good for your heart, but also is beneficial for your mental health, cognitive health and  chronic pain/headaches  I would encourage at the least 5 days of physical exercise weekly for at least 30 minutes       Education and Follow-up  [x] Please call with any questions or concerns  [x] Follow up with Dr Maria Elena Ramirez as already scheduled 3/26/19 and then with either of us afterwards      If your primary doctors is ever considering adding a blood pressure medication, I would recommend a beta blocker or a calcium channel blocker such as verapamil          CC: We had the pleasure of evaluating Lolis Dotson in neurological consultation today   Lolis Dotson is a 34 y o    right handed male who presents today for evaluation of headaches       History of Present Illness:   Interval history as of 2/15/19:  - hospitalized 1/13/19-1/15/19  - added depakote 250 mg BID - has helped  - has had progressive improvement up to 30-40% and 3 hours a day less      What is your current pain level - 5       Headaches started at what age? Headaches started 2 years ago only, did not have headaches growing up, the past year and the past few months specifically have increased in frequency and intensity of his headaches  How often do the headaches occur? Since depakote has improved in severity by 30-40%, 3 hours a day less  What time of the day do the headaches start? Wakes up with them   How long do the headaches last? All day every day, but can be more severe for hours at time   Are you ever headache free? no  Aura? without aura     Describe your usual headache pain quality? Pressure/tight knot bioccipital and goes into bilateral temples   jabbing pain starts in the back of head and comes around toward the temples, the last two weeks recently sharper and more nauseating  - sensitive to touch left occipital region, burning pain and warm sensation    Does the pain Radiate? no  What is the intensity of pain? 7-10  Associated symptoms:   [x] Nausea       [] Vomiting        [] Diarrhea         [x] Insomnia           [] Stiff or sore neck         [x] Problems with concentration, processing TV - confusion with headaches   [x] Photophobia     [x]Phonophobia      [] Osmophobia  [] Blurred vision   [x] Prefer quiet, dark room        [x] Light-headed or dizzy    [] Tinnitus      [] Red ear      [] Ptosis      [] Facial droop  [] Lacrimation  [] Nasal congestion/rhinorrhea   [] Flushing of face         [] Change in pupil size  [x] Hands or feet tingle or feel numb/paresthesias     Throwing up due to GI issues will relief the headaches       Things that make the headache worse?  Climbing steps, movement, changing positions sometimes when standing up or when sitting will get lightheadedness, bending over    - lean back to drink in the car   - worse with laying flat      Headache triggers:  stress  Have you seen someone else for headaches or pain? Yes, Dr Krystal Castillo   Have you had trigger point injection performed and how often? No  Have you had Botox injection performed and how often? No   Have you had epidural injections or transforaminal injections performed? No  Have you used CBD or THC for your headaches and how often? No     What medications do you take or have you taken for your headaches?    ABORTIVE:    Ibuprofen 600mg - every other day   Benadryl 50 mg - does not help   Imitrex 100 mg - sometimes but not always - about every 3rd day, helps      Migraine cocktail in ED helps but only for a couple of hours per patient     PREVENTIVE:    Depakote 250 mg BID   Topiramate 100 mg Increased on 11/09/2018 - does not feel it is helping      Has not tried:  decadron (steroid) - in ED helped for a few days and helped after last visit      Alternative therapies used in the past for headaches?      Neck pain?:      LIFESTYLE  Sleep - averages 10 hours   Naps during the day   Never had a sleep study  Problems falling asleep and hypersomnia   Sometimes up all night and sleep during the day   Do you snore while asleep?unknown      Water: 2 bottles water, not much, poweraide zero     Mood: Schizoaffective, OCD, Paranoia symptoms  - therapist - 1 x week  - psychiatrist - 1 month   - Geodon 60 mg BID - but he thinks is not working and plans to discussed changing with a psychiatrist     Did try olanzapine previously for schizoaffective d/o- caused uncontrolled weight gain and diabetes     #Cognitive dysfunction  History of ADHD  Recent issues with Problems focusing, comprehending and processing things like things he reads and watches on TV  Remembering the right word     The following portions of the patient's history were reviewed and updated as appropriate: allergies, current medications, past family history, past medical history, past social history, past surgical history and problem list       Past Medical History:     Past Medical History: Diagnosis Date    Chronic headaches     Diabetes mellitus ([facility] )     Disease of thyroid gland     Gastroparesis     GERD (gastroesophageal reflux disease)     Hypertension     Obesity     Obsessive compulsive disorder     Psychiatric disorder     Schizoaffective disorder [facility])        Patient Active Problem List   Diagnosis    Type 2 diabetes mellitus, with long-term current use of insulin ([facility] )    Hypertension    Obsessive compulsive disorder    Schizoaffective disorder (HCC)    Other specified hypothyroidism    Morbid obesity with BMI of 50 0-59 9, adult ([facility])    Anxiety    Hx of migraines    Intractable migraine without aura and without status migrainosus    Episodic confusion    Chronic daily headache    Snoring    Insomnia    Hypersomnia    Vitamin D deficiency    Medication overuse headache    Intractable chronic migraine without aura and without status migrainosus    Gastroesophageal reflux disease with esophagitis    Nausea and vomiting       Medications:      Current Outpatient Medications   Medication Sig Dispense Refill    asenapine (SAPHRIS) SL tablet Place 1 tablet (5 mg total) under the tongue 2 (two) times a day 60 tablet 1    aspirin 81 mg chewable tablet Chew 1 tablet (81 mg total) daily Over the counter  0    divalproex sodium (DEPAKOTE) 250 mg EC tablet Take 1 tablet (250 mg total) by mouth every 12 (twelve) hours 60 tablet 1    fluvoxaMINE (LUVOX) 100 mg tablet Take 1 tablet (100 mg total) by mouth 2 (two) times a day 60 tablet 1    insulin aspart (NovoLOG) 100 units/mL injection Inject 10 Units under the skin 3 (three) times a day before meals 10 mL 0    insulin glargine (BASAGLAR KWIKPEN) 100 units/mL injection pen Inject 40 Units under the skin daily at bedtime 5 pen 0    Insulin Pen Needle (PEN NEEDLES 3/16") 31G X 5 MM MISC by Does not apply route 4 (four) times a day 200 each 3    levothyroxine 100 mcg tablet Take 1 tablet (100 mcg total) by mouth daily 30 tablet 0    lisinopril (ZESTRIL) 20 mg tablet Take 1 tablet (20 mg total) by mouth daily 30 tablet 1    LORazepam (ATIVAN) 0 5 mg tablet Take 1 tablet (0 5 mg total) by mouth 2 (two) times a day (Patient taking differently: Take 0 5 mg by mouth every 6 (six) hours as needed ) 60 tablet 0    magnesium oxide (MAG-OX) 400 mg Take 1 tablet (400 mg total) by mouth daily 30 tablet 3    pantoprazole (PROTONIX) 40 mg tablet Take 1 tablet (40 mg total) by mouth 2 (two) times a day 60 tablet 0    Riboflavin 400 MG CAPS Take 1 capsule (400 mg total) by mouth daily 30 capsule 3    sucralfate (CARAFATE) 1 g/10 mL suspension Take 10 mL (1,000 mg total) by mouth 4 (four) times a day (with meals and at bedtime) 420 mL 0    SUMAtriptan (IMITREX) 100 mg tablet Take 1 tablet (100 mg total) by mouth once as needed for migraine for up to 1 dose 9 tablet 5    senna (SENOKOT) 8 6 mg Take 1 tablet (8 6 mg total) by mouth daily at bedtime as needed for constipation (Patient not taking: Reported on 2/15/2019) 30 each 0     No current facility-administered medications for this visit           Allergies:    No Known Allergies    Family History:     Family History   Problem Relation Age of Onset   [place] COPD Mother    [place] Hypertension Mother     Heart failure Mother     Rheum arthritis Family     Heart disease Family     Hypertension Father     Diabetes Father     Hyperlipidemia Father        Social History:     Social History     Socioeconomic History    Marital status: Single     Spouse name: Not on file    Number of children: Not on file    Years of education: Not on file    Highest education level: Not on file   Occupational History    Not on file   Social Needs    Financial resource strain: Not on file    Food insecurity:     Worry: Not on file     Inability: Not on file    Transportation needs:     Medical: Not on file     Non-medical: Not on file   Tobacco Use    Smoking status: Never Smoker    Smokeless tobacco: Never Used   Substance and Sexual Activity    Alcohol use: No    Drug use: No    Sexual activity: Not on file   Lifestyle    Physical activity:     Days per week: Not on file     Minutes per session: Not on file    Stress: Not on file   Relationships    Social connections:     Talks on phone: Not on file     Gets together: Not on file     Attends [employer] service: Not on file     Active member of club or organization: Not on file     Attends meetings of clubs or organizations: Not on file     Relationship status: Not on file    Intimate partner violence:     Fear of current or ex partner: Not on file     Emotionally abused: Not on file     Physically abused: Not on file     Forced sexual activity: Not on file   Other Topics Concern    Not on file   Social History Narrative    Not on file         Objective:     Physical Exam:                                                                 Vitals:            Constitutional:    /70 (BP Location: Left arm, Patient Position: Sitting, Cuff Size: Large)   Pulse 102   Resp 16   Ht 5' 2" (1 575 m)   Wt 134 kg (296 lb)   BMI 54 14 kg/m²   BP Readings from Last 3 Encounters:   02/15/19 110/70   01/15/19 123/68   12/04/18 138/100     Pulse Readings from Last 3 Encounters:   02/15/19 102   01/15/19 84   12/04/18 81         Well developed, well nourished, well groomed  No dysmorphic features  HEENT:  Normocephalic atraumatic  No meningismus  Oropharynx is clear and moist  No oral mucosal lesions  Chest:  Respirations regular and unlabored  Cardiovascular:  Regular rate, intact distal pulses  Distal extremities warm with 1+ palpable edema and mild tenderness   Musculoskeletal:  Full range of motion  (see below under neurologic exam for evaluation of motor function and gait)   Skin:  warm and dry, not diaphoretic  No apparent birthmarks or stigmata of neurocutaneous disease     Psychiatric:  Slightly anxious, tangential behavior, depressed affect Neurological Examination:   MOCA 12/4/18: 20/30 -4 cube, clock, -5 delayed recall, -1 abstraction    Mental status/cognitive function:   Orientated to time, place and person  Recent and remote memory intact  Normal language and spontaneous speech  Cranial Nerves:  III, IV, VI-Pupils were equal, round, and reactive to light  Extraocular movements were full and conjugate without nystagmus  conjugate gaze, normal smooth pursuits, normal saccades   VII-facial expression symmetric, intact forehead wrinkle, strong eye closure, symmetric smile    VIII-hearing grossly intact bilaterally   Motor Exam: symmetric bulk throughout  no atrophy, fasciculations or abnormal movements noted  Coordination:  no apparent dysmetria, ataxia or tremor noted  Gait: steady casual gait     Pertinent lab results:   12/02/2018  CMP significant for creatinine 1 3, glucose 271     11/22/2018  CBC was significant for hemoglobin 11 3     11/14/2018  TSH 3 8 high     Imaging:   Noncontrast head CT 08/31/2018  No acute intracranial findings  Right maxillary sinus opacified     MRI/MRA Brain 10 years ago was unremarkable per patient  MRV Head 1/13/19: negative   MRI Brain 1/13/19:   Several tiny supratentorial white matter T2 and FLAIR hyperintense foci suspicious for mild chronic microangiopathic changes including the type which may accompany complicated migraine headaches  Medical correlation recommended  These findings not   visible by CT      No acute ischemic disease identified by diffusion imaging     Apparent enlargement of 9 5 mm anterior superficial left parotid gland nodule  Biopsy or resection recommended for definitive histologic confirmation when clinically appropriate     Chronic opacification right maxillary sinus  Right ethmoid sinus disease has developed        EEG 11/28/18: This Routine EEG recorded during wakefulness is normal  A normal EEG does not exclude the possibility of epilepsy   If clinically warranted, a repeat EEG following sleep deprivation could be considered  Review of Systems:   CARMELLA Personally reviewed  Review of Systems   Constitutional: Negative  HENT: Negative  Eyes: Negative  Respiratory: Negative  Cardiovascular: Negative  Gastrointestinal: Negative  Endocrine: Negative  Genitourinary: Negative  Musculoskeletal: Negative  Skin: Negative  Allergic/Immunologic: Negative  Neurological: Positive for speech difficulty and headaches (8 out of 10 pain today)  Hematological: Negative  Psychiatric/Behavioral: Positive for confusion  Patient states he gets confuse more than normal            I have spent 40 minutes with Patient and family today in which greater than 50% of this time was spent in counseling/coordination of care regarding Diagnostic results, Prognosis, Risks and benefits of tx options, Intructions for management, Patient and family education, Importance of tx compliance, Risk factor reductions and Impressions        Author:  Anisa Mehta MD 2/15/2019 3:41 PM

## 2019-02-15 ENCOUNTER — OFFICE VISIT (OUTPATIENT)
Dept: NEUROLOGY | Facility: CLINIC | Age: 39
End: 2019-02-15
Payer: COMMERCIAL

## 2019-02-15 VITALS
HEART RATE: 102 BPM | HEIGHT: 62 IN | WEIGHT: 296 LBS | BODY MASS INDEX: 54.47 KG/M2 | SYSTOLIC BLOOD PRESSURE: 110 MMHG | DIASTOLIC BLOOD PRESSURE: 70 MMHG | RESPIRATION RATE: 16 BRPM

## 2019-02-15 DIAGNOSIS — R90.82 WHITE MATTER ABNORMALITY ON MRI OF BRAIN: ICD-10-CM

## 2019-02-15 DIAGNOSIS — M54.81 OCCIPITAL NEURALGIA OF LEFT SIDE: ICD-10-CM

## 2019-02-15 DIAGNOSIS — G44.209 TENSION HEADACHE: ICD-10-CM

## 2019-02-15 DIAGNOSIS — R06.83 SNORING: ICD-10-CM

## 2019-02-15 DIAGNOSIS — R41.0 EPISODIC CONFUSION: ICD-10-CM

## 2019-02-15 DIAGNOSIS — G43.719 INTRACTABLE CHRONIC MIGRAINE WITHOUT AURA AND WITHOUT STATUS MIGRAINOSUS: Primary | ICD-10-CM

## 2019-02-15 DIAGNOSIS — G47.10 HYPERSOMNIA: ICD-10-CM

## 2019-02-15 DIAGNOSIS — G47.00 INSOMNIA, UNSPECIFIED TYPE: ICD-10-CM

## 2019-02-15 DIAGNOSIS — M54.2 CERVICALGIA: ICD-10-CM

## 2019-02-15 DIAGNOSIS — K11.8 NODULE OF PAROTID GLAND: ICD-10-CM

## 2019-02-15 PROBLEM — G44.40 MEDICATION OVERUSE HEADACHE: Status: RESOLVED | Noted: 2018-12-04 | Resolved: 2019-02-15

## 2019-02-15 PROBLEM — Z86.69 HX OF MIGRAINES: Chronic | Status: RESOLVED | Noted: 2018-10-31 | Resolved: 2019-02-15

## 2019-02-15 PROBLEM — G43.019 INTRACTABLE MIGRAINE WITHOUT AURA AND WITHOUT STATUS MIGRAINOSUS: Status: RESOLVED | Noted: 2018-11-09 | Resolved: 2019-02-15

## 2019-02-15 PROCEDURE — 99215 OFFICE O/P EST HI 40 MIN: CPT | Performed by: PSYCHIATRY & NEUROLOGY

## 2019-02-15 RX ORDER — DIVALPROEX SODIUM 250 MG/1
TABLET, DELAYED RELEASE ORAL
Qty: 90 TABLET | Refills: 3 | Status: SHIPPED | OUTPATIENT
Start: 2019-02-15 | End: 2019-06-25

## 2019-02-15 RX ORDER — GABAPENTIN 100 MG/1
CAPSULE ORAL
Qty: 60 CAPSULE | Refills: 0 | Status: SHIPPED | OUTPATIENT
Start: 2019-02-15 | End: 2019-06-18 | Stop reason: ALTCHOICE

## 2019-02-15 RX ORDER — TOPIRAMATE 25 MG/1
TABLET ORAL
Qty: 120 TABLET | Refills: 1 | Status: SHIPPED | OUTPATIENT
Start: 2019-02-15 | End: 2019-02-15

## 2019-02-15 RX ORDER — TOPIRAMATE 25 MG/1
TABLET ORAL
Qty: 50 TABLET | Refills: 1 | Status: SHIPPED | OUTPATIENT
Start: 2019-02-15 | End: 2019-06-18 | Stop reason: ALTCHOICE

## 2019-02-15 NOTE — PATIENT INSTRUCTIONS
Referral to Physical Therapy   Referral to Dr Kristine Coelho to see if occipital nerve injection could be helpful     Sleep   -     Ambulatory referral to Sleep Medicine; Future *THE MOST IMPORTANT      *follow up with primary care doctor regarding MRI findings on Parotid gland        Over the counter preventive supplements for headaches/migraines   (to take every day to help prevent headaches - not to take at the time of headache):  [x] Magnesium 400mg daily   [x] Riboflavin (Vitamin B2) 400mg daily   (FYI B2 may make your urine bright/neon yellow)     Headache/migraine treatment:   Abortive medications (for immediate treatment of a headache): It is ok to take ibuprofen, acetaminophen or naproxen (Advil, Tylenol,  Aleve, Excedrin) if they help your headaches you should limit these to No more than 3 times a week to avoid medication overuse/rebound headaches  Trial of Gabapentin 100-200 mg as needed twice a day for headaches     For your more moderate to severe migraines take this medication early   This continue Imitrex 100 mg as needed for the most severe migraines  He should not take more than 2 of these in a day, or 3 days a week or 9 a month  Because the more often you take these, the less effective they are         Prescription preventive medications for headaches/migraines   (to take every day to help prevent headaches - not to take at the time of headache):  [x] topiramate/Topamax: decrease to 75 mg daily for 1 week, then 50 mg for 1 week, then 25 mg for 1 week and stop   [x]  Increase Depakote to 250 mg in am and 500 mg nightly   *Typically these types of medications take time untill you see the benefit, although some may see improvement in days, often it may take weeks, especially if the medication is being titrated up to a beneficial level   Please contact us if there are any concerns or questions regarding the medication       Sleep/headache prevention:   [x] Melatonin - take 3 mg nightly for sleep  You should take this 1 hour prior to bedtime consistently every night for it to work  It works by gradually helping to adjust your sleep time over days to weeks, rather than immediately making you feel sleepy        Lifestyle Recommendations:  [x] SLEEP - Maintain a regular sleep schedule: Adults need at least 7-8 hours of uninterrupted a night  Maintain good sleep hygiene:  Going to bed and waking up at consistent times, avoiding excessive daytime naps, avoiding caffeinated beverages in the evening, avoid excessive stimulation in the evening and generally using bed primarily for sleeping  One hour before bedtime would recommend turning lights down lower, decreasing your activity (may read quietly, listen to music at a low volume)  When you get into bed, should eliminate all technology (no texting, emailing, playing with your phone, iPad or tablet in bed)  [x] HYDRATION - Maintain good hydration  Drink  2L of fluid a day (4 typical small water bottles)  [x] DIET - Maintain good nutrition  In particular don't skip meals and try and eat healthy balanced meals regularly  [x] TRIGGERS - Look for other triggers and avoid them: Limit caffeine to 1-2 cups a day or less  Avoid dietary triggers that you have noticed bring on your headaches (this could include aged cheese, peanuts, MSG, aspartame and nitrates)  [x] EXERCISE - physical exercise as we all know is good for you in many ways, and not only is good for your heart, but also is beneficial for your mental health, cognitive health and  chronic pain/headaches  I would encourage at the least 5 days of physical exercise weekly for at least 30 minutes       Education and Follow-up  [x] Please call with any questions or concerns     [x] Follow up with Dr Emily Ahuja as already scheduled 3/26/19 and then with either of us afterwards      If your primary doctors is ever considering adding a blood pressure medication, I would recommend a beta blocker or a calcium channel blocker such as verapamil

## 2019-02-15 NOTE — PROGRESS NOTES
Patient ID: Bella Dimas is a 34 y o  male  Assessment/Plan:    No problem-specific Assessment & Plan notes found for this encounter  {Assess/PlanSmartLinks:73278}       Subjective:    HPI    {[facility] HPI texts:22252}    {Common ambulatory SmartLinks:74306}         Objective:    Blood pressure 110/70, pulse 102, resp  rate 16, height 5' 2" (1 575 m), weight 134 kg (296 lb)  Physical Exam    Neurological Exam      ROS:    Review of Systems   Constitutional: Negative  HENT: Negative  Eyes: Negative  Respiratory: Negative  Cardiovascular: Negative  Gastrointestinal: Negative  Endocrine: Negative  Genitourinary: Negative  Musculoskeletal: Negative  Skin: Negative  Allergic/Immunologic: Negative  Neurological: Positive for speech difficulty and headaches (8 out of 10 pain today)  Hematological: Negative  Psychiatric/Behavioral: Positive for confusion          Patient states he gets confuse more than normal

## 2019-03-08 ENCOUNTER — TELEPHONE (OUTPATIENT)
Dept: FAMILY MEDICINE CLINIC | Facility: CLINIC | Age: 39
End: 2019-03-08

## 2019-03-08 DIAGNOSIS — R06.00 DYSPNEA, UNSPECIFIED TYPE: ICD-10-CM

## 2019-03-08 DIAGNOSIS — G44.209 TENSION HEADACHE: ICD-10-CM

## 2019-03-08 DIAGNOSIS — F51.01 PRIMARY INSOMNIA: Primary | ICD-10-CM

## 2019-03-08 NOTE — TELEPHONE ENCOUNTER
[facility] called and stated they needed a Physician Referral for Sleep medicine  There is one in Epic but it is not completed because it does not have a referring provider  The DX codes are G47 00  P41 16,B31 [id number]  Any question please call [phone]

## 2019-03-13 ENCOUNTER — TELEPHONE (OUTPATIENT)
Dept: FAMILY MEDICINE CLINIC | Facility: CLINIC | Age: 39
End: 2019-03-13

## 2019-03-13 DIAGNOSIS — IMO0002 CHRONIC MIGRAINE: Primary | ICD-10-CM

## 2019-03-13 NOTE — TELEPHONE ENCOUNTER
Received [facility] refrerral request for Sancho murphy chronic migraine  physicain order signed and faxed back to [facility]

## 2019-03-14 ENCOUNTER — TRANSCRIBE ORDERS (OUTPATIENT)
Dept: NEUROLOGY | Facility: CLINIC | Age: 39
End: 2019-03-14

## 2019-03-14 DIAGNOSIS — IMO0002 CHRONIC MIGRAINE: Primary | ICD-10-CM

## 2019-03-31 ENCOUNTER — APPOINTMENT (EMERGENCY)
Dept: CT IMAGING | Facility: HOSPITAL | Age: 39
End: 2019-03-31
Payer: COMMERCIAL

## 2019-03-31 ENCOUNTER — HOSPITAL ENCOUNTER (EMERGENCY)
Facility: HOSPITAL | Age: 39
Discharge: HOME | End: 2019-03-31
Attending: EMERGENCY MEDICINE | Admitting: EMERGENCY MEDICINE
Payer: COMMERCIAL

## 2019-03-31 VITALS
WEIGHT: 311 LBS | RESPIRATION RATE: 16 BRPM | HEART RATE: 79 BPM | OXYGEN SATURATION: 99 % | DIASTOLIC BLOOD PRESSURE: 104 MMHG | SYSTOLIC BLOOD PRESSURE: 145 MMHG | BODY MASS INDEX: 56.88 KG/M2 | TEMPERATURE: 98.4 F

## 2019-03-31 DIAGNOSIS — G43.109 MIGRAINE EQUIVALENT: Primary | ICD-10-CM

## 2019-03-31 LAB
ALBUMIN SERPL BCP-MCNC: 3.4 G/DL (ref 3.5–5)
ALP SERPL-CCNC: 68 U/L (ref 46–116)
ALT SERPL W P-5'-P-CCNC: 33 U/L (ref 12–78)
ANION GAP SERPL CALCULATED.3IONS-SCNC: 5 MMOL/L (ref 4–13)
AST SERPL W P-5'-P-CCNC: 16 U/L (ref 5–45)
BASOPHILS # BLD AUTO: 0.05 THOUSANDS/ΜL (ref 0–0.1)
BASOPHILS NFR BLD AUTO: 1 % (ref 0–1)
BILIRUB SERPL-MCNC: 0.3 MG/DL (ref 0.2–1)
BUN SERPL-MCNC: 25 MG/DL (ref 5–25)
CALCIUM SERPL-MCNC: 9.4 MG/DL (ref 8.3–10.1)
CHLORIDE SERPL-SCNC: 101 MMOL/L (ref 100–108)
CO2 SERPL-SCNC: 31 MMOL/L (ref 21–32)
CREAT SERPL-MCNC: 0.89 MG/DL (ref 0.6–1.3)
EOSINOPHIL # BLD AUTO: 0.25 THOUSAND/ΜL (ref 0–0.61)
EOSINOPHIL NFR BLD AUTO: 3 % (ref 0–6)
ERYTHROCYTE [DISTWIDTH] IN BLOOD BY AUTOMATED COUNT: 12.7 % (ref 11.6–15.1)
GFR SERPL CREATININE-BSD FRML MDRD: 107 ML/MIN/1.73SQ M
GLUCOSE SERPL-MCNC: 152 MG/DL (ref 65–140)
HCT VFR BLD AUTO: 38 % (ref 36.5–49.3)
HGB BLD-MCNC: 12.9 G/DL (ref 12–17)
IMM GRANULOCYTES # BLD AUTO: 0.05 THOUSAND/UL (ref 0–0.2)
IMM GRANULOCYTES NFR BLD AUTO: 1 % (ref 0–2)
LYMPHOCYTES # BLD AUTO: 2.1 THOUSANDS/ΜL (ref 0.6–4.47)
LYMPHOCYTES NFR BLD AUTO: 23 % (ref 14–44)
MCH RBC QN AUTO: 28.5 PG (ref 26.8–34.3)
MCHC RBC AUTO-ENTMCNC: 33.9 G/DL (ref 31.4–37.4)
MCV RBC AUTO: 84 FL (ref 82–98)
MONOCYTES # BLD AUTO: 0.49 THOUSAND/ΜL (ref 0.17–1.22)
MONOCYTES NFR BLD AUTO: 5 % (ref 4–12)
NEUTROPHILS # BLD AUTO: 6.33 THOUSANDS/ΜL (ref 1.85–7.62)
NEUTS SEG NFR BLD AUTO: 67 % (ref 43–75)
NRBC BLD AUTO-RTO: 0 /100 WBCS
PLATELET # BLD AUTO: 302 THOUSANDS/UL (ref 149–390)
PMV BLD AUTO: 9.7 FL (ref 8.9–12.7)
POTASSIUM SERPL-SCNC: 4.4 MMOL/L (ref 3.5–5.3)
PROT SERPL-MCNC: 7.4 G/DL (ref 6.4–8.2)
RBC # BLD AUTO: 4.52 MILLION/UL (ref 3.88–5.62)
SODIUM SERPL-SCNC: 137 MMOL/L (ref 136–145)
WBC # BLD AUTO: 9.27 THOUSAND/UL (ref 4.31–10.16)

## 2019-03-31 PROCEDURE — 99284 EMERGENCY DEPT VISIT MOD MDM: CPT

## 2019-03-31 PROCEDURE — 80053 COMPREHEN METABOLIC PANEL: CPT | Performed by: EMERGENCY MEDICINE

## 2019-03-31 PROCEDURE — 70450 CT HEAD/BRAIN W/O DYE: CPT

## 2019-03-31 PROCEDURE — 36415 COLL VENOUS BLD VENIPUNCTURE: CPT | Performed by: EMERGENCY MEDICINE

## 2019-03-31 PROCEDURE — 96365 THER/PROPH/DIAG IV INF INIT: CPT

## 2019-03-31 PROCEDURE — 96366 THER/PROPH/DIAG IV INF ADDON: CPT

## 2019-03-31 PROCEDURE — 96375 TX/PRO/DX INJ NEW DRUG ADDON: CPT

## 2019-03-31 PROCEDURE — 85025 COMPLETE CBC W/AUTO DIFF WBC: CPT | Performed by: EMERGENCY MEDICINE

## 2019-03-31 RX ORDER — METOCLOPRAMIDE HYDROCHLORIDE 5 MG/ML
10 INJECTION INTRAMUSCULAR; INTRAVENOUS ONCE
Status: COMPLETED | OUTPATIENT
Start: 2019-03-31 | End: 2019-03-31

## 2019-03-31 RX ORDER — DIPHENHYDRAMINE HYDROCHLORIDE 50 MG/ML
25 INJECTION INTRAMUSCULAR; INTRAVENOUS ONCE
Status: COMPLETED | OUTPATIENT
Start: 2019-03-31 | End: 2019-03-31

## 2019-03-31 RX ORDER — MAGNESIUM SULFATE HEPTAHYDRATE 40 MG/ML
2 INJECTION, SOLUTION INTRAVENOUS ONCE
Status: COMPLETED | OUTPATIENT
Start: 2019-03-31 | End: 2019-03-31

## 2019-03-31 RX ORDER — DEXAMETHASONE SODIUM PHOSPHATE 4 MG/ML
10 INJECTION, SOLUTION INTRA-ARTICULAR; INTRALESIONAL; INTRAMUSCULAR; INTRAVENOUS; SOFT TISSUE ONCE
Status: COMPLETED | OUTPATIENT
Start: 2019-03-31 | End: 2019-03-31

## 2019-03-31 RX ADMIN — MAGNESIUM SULFATE HEPTAHYDRATE 2 G: 40 INJECTION, SOLUTION INTRAVENOUS at 20:05

## 2019-03-31 RX ADMIN — DEXAMETHASONE SODIUM PHOSPHATE 10 MG: 4 INJECTION, SOLUTION INTRAMUSCULAR; INTRAVENOUS at 20:02

## 2019-03-31 RX ADMIN — METOCLOPRAMIDE 10 MG: 5 INJECTION, SOLUTION INTRAMUSCULAR; INTRAVENOUS at 20:00

## 2019-03-31 RX ADMIN — SODIUM CHLORIDE 1000 ML: 0.9 INJECTION, SOLUTION INTRAVENOUS at 19:58

## 2019-03-31 RX ADMIN — DIPHENHYDRAMINE HYDROCHLORIDE 25 MG: 50 INJECTION INTRAMUSCULAR; INTRAVENOUS at 19:56

## 2019-04-01 ENCOUNTER — DOCTOR'S OFFICE (OUTPATIENT)
Dept: URBAN - METROPOLITAN AREA CLINIC 136 | Facility: CLINIC | Age: 39
Setting detail: OPHTHALMOLOGY
End: 2019-04-01
Payer: COMMERCIAL

## 2019-04-01 ENCOUNTER — RX ONLY (RX ONLY)
Age: 39
End: 2019-04-01

## 2019-04-01 DIAGNOSIS — H43.393: ICD-10-CM

## 2019-04-01 DIAGNOSIS — E11.9: ICD-10-CM

## 2019-04-01 DIAGNOSIS — H40.013: ICD-10-CM

## 2019-04-01 DIAGNOSIS — H25.13: ICD-10-CM

## 2019-04-01 DIAGNOSIS — H53.123: ICD-10-CM

## 2019-04-01 DIAGNOSIS — H53.19: ICD-10-CM

## 2019-04-01 PROCEDURE — 92134 CPTRZ OPH DX IMG PST SGM RTA: CPT | Performed by: OPHTHALMOLOGY

## 2019-04-01 PROCEDURE — 92014 COMPRE OPH EXAM EST PT 1/>: CPT | Performed by: OPHTHALMOLOGY

## 2019-04-01 ASSESSMENT — REFRACTION_MANIFEST
OS_VA2: 20/
OS_VA3: 20/
OS_VA3: 20/
OD_VA1: 20/
OS_VA1: 20/
OS_VA1: 20/
OD_VA3: 20/
OU_VA: 20/
OD_VA2: 20/
OS_VA2: 20/
OD_VA2: 20/
OU_VA: 20/
OD_VA3: 20/
OD_VA1: 20/

## 2019-04-01 ASSESSMENT — SUPERFICIAL PUNCTATE KERATITIS (SPK)
OD_SPK: 2+
OS_SPK: 2+

## 2019-04-01 ASSESSMENT — CONFRONTATIONAL VISUAL FIELD TEST (CVF)
OS_FINDINGS: FULL
OD_FINDINGS: FULL

## 2019-04-01 ASSESSMENT — VISUAL ACUITY
OD_BCVA: 20/25-
OS_BCVA: 20/20-2

## 2019-04-01 ASSESSMENT — REFRACTION_CURRENTRX
OD_OVR_VA: 20/
OD_SPHERE: -2.75
OS_OVR_VA: 20/
OD_CYLINDER: SPHERE
OD_OVR_VA: 20/
OS_SPHERE: -2.75
OS_CYLINDER: SPHERE
OD_OVR_VA: 20/
OS_OVR_VA: 20/
OS_OVR_VA: 20/

## 2019-04-09 ENCOUNTER — HOSPITAL ENCOUNTER (EMERGENCY)
Facility: HOSPITAL | Age: 39
Discharge: HOME | End: 2019-04-09
Attending: EMERGENCY MEDICINE
Payer: COMMERCIAL

## 2019-04-09 VITALS
RESPIRATION RATE: 18 BRPM | TEMPERATURE: 97.7 F | HEIGHT: 62 IN | BODY MASS INDEX: 57.2 KG/M2 | OXYGEN SATURATION: 100 % | WEIGHT: 310.85 LBS | HEART RATE: 81 BPM | DIASTOLIC BLOOD PRESSURE: 87 MMHG | SYSTOLIC BLOOD PRESSURE: 137 MMHG

## 2019-04-09 DIAGNOSIS — H10.212 CHEMICAL CONJUNCTIVITIS OF LEFT EYE: Primary | ICD-10-CM

## 2019-04-09 PROCEDURE — 99283 EMERGENCY DEPT VISIT LOW MDM: CPT

## 2019-04-09 RX ADMIN — FLUORESCEIN SODIUM 1 STRIP: 1 STRIP OPHTHALMIC at 23:25

## 2019-05-02 ENCOUNTER — TELEPHONE (OUTPATIENT)
Dept: FAMILY MEDICINE CLINIC | Facility: CLINIC | Age: 39
End: 2019-05-02

## 2019-05-06 DIAGNOSIS — I10 HYPERTENSION, UNSPECIFIED TYPE: Primary | ICD-10-CM

## 2019-05-06 PROCEDURE — 4010F ACE/ARB THERAPY RXD/TAKEN: CPT | Performed by: FAMILY MEDICINE

## 2019-05-06 RX ORDER — LISINOPRIL 10 MG/1
10 TABLET ORAL DAILY
Qty: 30 TABLET | Refills: 0 | Status: SHIPPED | OUTPATIENT
Start: 2019-05-06 | End: 2019-06-07

## 2019-05-09 ENCOUNTER — TRANSCRIBE ORDERS (OUTPATIENT)
Dept: ADMINISTRATIVE | Facility: HOSPITAL | Age: 39
End: 2019-05-09

## 2019-05-09 ENCOUNTER — APPOINTMENT (OUTPATIENT)
Dept: LAB | Facility: IMAGING CENTER | Age: 39
End: 2019-05-09
Payer: COMMERCIAL

## 2019-05-09 DIAGNOSIS — R80.9 TYPE 1 DIABETES MELLITUS WITH PERSISTENT MICROALBUMINURIA (HCC): ICD-10-CM

## 2019-05-09 DIAGNOSIS — E10.29 TYPE 1 DIABETES MELLITUS WITH PERSISTENT MICROALBUMINURIA (HCC): ICD-10-CM

## 2019-05-09 DIAGNOSIS — IMO0002 TYPE I DIABETES MELLITUS WITH RENAL MANIFESTATIONS, UNCONTROLLED: Primary | ICD-10-CM

## 2019-05-09 DIAGNOSIS — E55.9 VITAMIN D DEFICIENCY: ICD-10-CM

## 2019-05-09 DIAGNOSIS — G43.119 INTRACTABLE MIGRAINE WITH AURA WITHOUT STATUS MIGRAINOSUS: ICD-10-CM

## 2019-05-09 DIAGNOSIS — E03.9 HYPOTHYROIDISM, UNSPECIFIED TYPE: ICD-10-CM

## 2019-05-09 DIAGNOSIS — E11.9 TYPE 2 DIABETES MELLITUS WITHOUT COMPLICATION, UNSPECIFIED WHETHER LONG TERM INSULIN USE (HCC): ICD-10-CM

## 2019-05-09 DIAGNOSIS — IMO0002 TYPE I DIABETES MELLITUS WITH RENAL MANIFESTATIONS, UNCONTROLLED: ICD-10-CM

## 2019-05-09 LAB
25(OH)D3 SERPL-MCNC: 26.8 NG/ML (ref 30–100)
ALBUMIN SERPL BCP-MCNC: 3.4 G/DL (ref 3.5–5)
ALP SERPL-CCNC: 76 U/L (ref 46–116)
ALT SERPL W P-5'-P-CCNC: 16 U/L (ref 12–78)
ANION GAP SERPL CALCULATED.3IONS-SCNC: 5 MMOL/L (ref 4–13)
AST SERPL W P-5'-P-CCNC: 11 U/L (ref 5–45)
BILIRUB SERPL-MCNC: 0.47 MG/DL (ref 0.2–1)
BUN SERPL-MCNC: 20 MG/DL (ref 5–25)
CALCIUM SERPL-MCNC: 8.4 MG/DL (ref 8.3–10.1)
CHLORIDE SERPL-SCNC: 101 MMOL/L (ref 100–108)
CHOLEST SERPL-MCNC: 153 MG/DL (ref 50–200)
CO2 SERPL-SCNC: 29 MMOL/L (ref 21–32)
CREAT SERPL-MCNC: 1.06 MG/DL (ref 0.6–1.3)
CREAT UR-MCNC: 13.2 MG/DL
ERYTHROCYTE [SEDIMENTATION RATE] IN BLOOD: 22 MM/HOUR (ref 0–10)
EST. AVERAGE GLUCOSE BLD GHB EST-MCNC: 243 MG/DL
GFR SERPL CREATININE-BSD FRML MDRD: 87 ML/MIN/1.73SQ M
GLUCOSE P FAST SERPL-MCNC: 114 MG/DL (ref 65–99)
HBA1C MFR BLD: 10.1 % (ref 4.2–6.3)
HDLC SERPL-MCNC: 45 MG/DL (ref 40–60)
LDLC SERPL CALC-MCNC: 83 MG/DL (ref 0–100)
MICROALBUMIN UR-MCNC: 9.2 MG/L (ref 0–20)
MICROALBUMIN/CREAT 24H UR: 70 MG/G CREATININE (ref 0–30)
NONHDLC SERPL-MCNC: 108 MG/DL
POTASSIUM SERPL-SCNC: 3.8 MMOL/L (ref 3.5–5.3)
PROT SERPL-MCNC: 7.9 G/DL (ref 6.4–8.2)
SODIUM SERPL-SCNC: 135 MMOL/L (ref 136–145)
T4 FREE SERPL-MCNC: 1.01 NG/DL (ref 0.76–1.46)
TRIGL SERPL-MCNC: 124 MG/DL
TSH SERPL DL<=0.05 MIU/L-ACNC: 15.7 UIU/ML (ref 0.36–3.74)
VIT B12 SERPL-MCNC: 586 PG/ML (ref 100–900)

## 2019-05-09 PROCEDURE — 82043 UR ALBUMIN QUANTITATIVE: CPT | Performed by: NURSE PRACTITIONER

## 2019-05-09 PROCEDURE — 86038 ANTINUCLEAR ANTIBODIES: CPT

## 2019-05-09 PROCEDURE — 84443 ASSAY THYROID STIM HORMONE: CPT

## 2019-05-09 PROCEDURE — 84681 ASSAY OF C-PEPTIDE: CPT

## 2019-05-09 PROCEDURE — 85652 RBC SED RATE AUTOMATED: CPT

## 2019-05-09 PROCEDURE — 84439 ASSAY OF FREE THYROXINE: CPT

## 2019-05-09 PROCEDURE — 83036 HEMOGLOBIN GLYCOSYLATED A1C: CPT

## 2019-05-09 PROCEDURE — 86341 ISLET CELL ANTIBODY: CPT

## 2019-05-09 PROCEDURE — 80053 COMPREHEN METABOLIC PANEL: CPT

## 2019-05-09 PROCEDURE — 82607 VITAMIN B-12: CPT

## 2019-05-09 PROCEDURE — 3060F POS MICROALBUMINURIA REV: CPT | Performed by: FAMILY MEDICINE

## 2019-05-09 PROCEDURE — 86618 LYME DISEASE ANTIBODY: CPT

## 2019-05-09 PROCEDURE — 82306 VITAMIN D 25 HYDROXY: CPT

## 2019-05-09 PROCEDURE — 36415 COLL VENOUS BLD VENIPUNCTURE: CPT

## 2019-05-09 PROCEDURE — 80061 LIPID PANEL: CPT

## 2019-05-09 PROCEDURE — 83519 RIA NONANTIBODY: CPT

## 2019-05-09 PROCEDURE — 82570 ASSAY OF URINE CREATININE: CPT | Performed by: NURSE PRACTITIONER

## 2019-05-10 LAB
C PEPTIDE SERPL-MCNC: 0.7 NG/ML (ref 1.1–4.4)
RYE IGE QN: NEGATIVE

## 2019-05-11 LAB
B BURGDOR IGG SER IA-ACNC: 0.19
B BURGDOR IGM SER IA-ACNC: 0.3
PANC ISLET CELL AB TITR SER: NEGATIVE {TITER}

## 2019-05-14 ENCOUNTER — TELEPHONE (OUTPATIENT)
Dept: NEUROLOGY | Facility: CLINIC | Age: 39
End: 2019-05-14

## 2019-05-14 LAB — GAD65 AB SER-ACNC: <5 U/ML (ref 0–5)

## 2019-05-15 LAB — MISCELLANEOUS LAB TEST RESULT: NORMAL

## 2019-05-16 NOTE — TELEPHONE ENCOUNTER
Pt father aware this needs to comefrom psychiatrist  He has appt but not sure if he will have enough medication  I advised him to call to get a sooner appt that we will not give him this medication   You need to refuse this as I was unable [phone]

## 2019-05-29 ENCOUNTER — TRANSCRIBE ORDERS (OUTPATIENT)
Dept: ADMINISTRATIVE | Facility: HOSPITAL | Age: 39
End: 2019-05-29

## 2019-05-29 ENCOUNTER — APPOINTMENT (OUTPATIENT)
Dept: LAB | Facility: IMAGING CENTER | Age: 39
End: 2019-05-29
Payer: COMMERCIAL

## 2019-05-29 DIAGNOSIS — Z79.899 ENCOUNTER FOR LONG-TERM (CURRENT) USE OF OTHER MEDICATIONS: ICD-10-CM

## 2019-05-29 DIAGNOSIS — F25.9 SCHIZOAFFECTIVE DISORDER, UNSPECIFIED TYPE (HCC): ICD-10-CM

## 2019-05-29 DIAGNOSIS — F25.9 SCHIZOAFFECTIVE DISORDER, UNSPECIFIED TYPE (HCC): Primary | ICD-10-CM

## 2019-05-29 LAB
ANION GAP SERPL CALCULATED.3IONS-SCNC: 7 MMOL/L (ref 4–13)
BASOPHILS # BLD AUTO: 0.04 THOUSANDS/ΜL (ref 0–0.1)
BASOPHILS NFR BLD AUTO: 1 % (ref 0–1)
BUN SERPL-MCNC: 23 MG/DL (ref 5–25)
CALCIUM SERPL-MCNC: 9.3 MG/DL (ref 8.3–10.1)
CHLORIDE SERPL-SCNC: 102 MMOL/L (ref 100–108)
CHOLEST SERPL-MCNC: 182 MG/DL (ref 50–200)
CO2 SERPL-SCNC: 27 MMOL/L (ref 21–32)
CREAT SERPL-MCNC: 0.94 MG/DL (ref 0.6–1.3)
CREAT UR-MCNC: 38.8 MG/DL
EOSINOPHIL # BLD AUTO: 0.28 THOUSAND/ΜL (ref 0–0.61)
EOSINOPHIL NFR BLD AUTO: 4 % (ref 0–6)
ERYTHROCYTE [DISTWIDTH] IN BLOOD BY AUTOMATED COUNT: 13.1 % (ref 11.6–15.1)
EST. AVERAGE GLUCOSE BLD GHB EST-MCNC: 226 MG/DL
GFR SERPL CREATININE-BSD FRML MDRD: 100 ML/MIN/1.73SQ M
GLUCOSE P FAST SERPL-MCNC: 295 MG/DL (ref 65–99)
HBA1C MFR BLD: 9.5 % (ref 4.2–6.3)
HCT VFR BLD AUTO: 39.9 % (ref 36.5–49.3)
HDLC SERPL-MCNC: 42 MG/DL (ref 40–60)
HGB BLD-MCNC: 12.9 G/DL (ref 12–17)
IMM GRANULOCYTES # BLD AUTO: 0.04 THOUSAND/UL (ref 0–0.2)
IMM GRANULOCYTES NFR BLD AUTO: 1 % (ref 0–2)
LDLC SERPL CALC-MCNC: 116 MG/DL (ref 0–100)
LYMPHOCYTES # BLD AUTO: 1.6 THOUSANDS/ΜL (ref 0.6–4.47)
LYMPHOCYTES NFR BLD AUTO: 22 % (ref 14–44)
MCH RBC QN AUTO: 28.7 PG (ref 26.8–34.3)
MCHC RBC AUTO-ENTMCNC: 32.3 G/DL (ref 31.4–37.4)
MCV RBC AUTO: 89 FL (ref 82–98)
MONOCYTES # BLD AUTO: 0.5 THOUSAND/ΜL (ref 0.17–1.22)
MONOCYTES NFR BLD AUTO: 7 % (ref 4–12)
NEUTROPHILS # BLD AUTO: 4.82 THOUSANDS/ΜL (ref 1.85–7.62)
NEUTS SEG NFR BLD AUTO: 65 % (ref 43–75)
NONHDLC SERPL-MCNC: 140 MG/DL
NRBC BLD AUTO-RTO: 0 /100 WBCS
PLATELET # BLD AUTO: 319 THOUSANDS/UL (ref 149–390)
PMV BLD AUTO: 10.3 FL (ref 8.9–12.7)
POTASSIUM SERPL-SCNC: 4.9 MMOL/L (ref 3.5–5.3)
PROT UR-MCNC: 8 MG/DL
PROT/CREAT UR: 0.21 MG/G{CREAT} (ref 0–0.1)
RBC # BLD AUTO: 4.49 MILLION/UL (ref 3.88–5.62)
SODIUM SERPL-SCNC: 136 MMOL/L (ref 136–145)
TRIGL SERPL-MCNC: 120 MG/DL
TSH SERPL DL<=0.05 MIU/L-ACNC: 14.2 UIU/ML (ref 0.36–3.74)
WBC # BLD AUTO: 7.28 THOUSAND/UL (ref 4.31–10.16)

## 2019-05-29 PROCEDURE — 80048 BASIC METABOLIC PNL TOTAL CA: CPT

## 2019-05-29 PROCEDURE — 80061 LIPID PANEL: CPT

## 2019-05-29 PROCEDURE — 36415 COLL VENOUS BLD VENIPUNCTURE: CPT

## 2019-05-29 PROCEDURE — 84156 ASSAY OF PROTEIN URINE: CPT | Performed by: NURSE PRACTITIONER

## 2019-05-29 PROCEDURE — 83036 HEMOGLOBIN GLYCOSYLATED A1C: CPT

## 2019-05-29 PROCEDURE — 84443 ASSAY THYROID STIM HORMONE: CPT

## 2019-05-29 PROCEDURE — 82570 ASSAY OF URINE CREATININE: CPT | Performed by: NURSE PRACTITIONER

## 2019-05-29 PROCEDURE — 85025 COMPLETE CBC W/AUTO DIFF WBC: CPT

## 2019-05-30 ENCOUNTER — HOSPITAL ENCOUNTER (EMERGENCY)
Facility: HOSPITAL | Age: 39
Discharge: HOME | End: 2019-05-30
Attending: EMERGENCY MEDICINE | Admitting: EMERGENCY MEDICINE
Payer: COMMERCIAL

## 2019-05-30 ENCOUNTER — APPOINTMENT (OUTPATIENT)
Dept: RADIOLOGY | Age: 39
End: 2019-05-30
Payer: COMMERCIAL

## 2019-05-30 ENCOUNTER — OFFICE VISIT (OUTPATIENT)
Dept: URGENT CARE | Age: 39
End: 2019-05-30
Payer: COMMERCIAL

## 2019-05-30 VITALS
RESPIRATION RATE: 20 BRPM | OXYGEN SATURATION: 96 % | SYSTOLIC BLOOD PRESSURE: 155 MMHG | DIASTOLIC BLOOD PRESSURE: 99 MMHG | HEIGHT: 62 IN | TEMPERATURE: 98.2 F | HEART RATE: 93 BPM | WEIGHT: 315 LBS | BODY MASS INDEX: 57.97 KG/M2

## 2019-05-30 VITALS
SYSTOLIC BLOOD PRESSURE: 141 MMHG | OXYGEN SATURATION: 95 % | WEIGHT: 315 LBS | DIASTOLIC BLOOD PRESSURE: 75 MMHG | HEIGHT: 62 IN | HEART RATE: 99 BPM | BODY MASS INDEX: 57.97 KG/M2 | TEMPERATURE: 98.4 F | RESPIRATION RATE: 18 BRPM

## 2019-05-30 DIAGNOSIS — R06.02 SOB (SHORTNESS OF BREATH): ICD-10-CM

## 2019-05-30 DIAGNOSIS — R05.9 COUGH: ICD-10-CM

## 2019-05-30 DIAGNOSIS — E66.9 OBESITY: Primary | ICD-10-CM

## 2019-05-30 DIAGNOSIS — R05.9 COUGH: Primary | ICD-10-CM

## 2019-05-30 PROBLEM — R11.10 VOMITING: Status: ACTIVE | Noted: 2018-05-12

## 2019-05-30 PROBLEM — E10.9 TYPE 1 DIABETES (HCC): Status: ACTIVE | Noted: 2019-05-30

## 2019-05-30 PROBLEM — E03.9 HYPOTHYROIDISM: Status: ACTIVE | Noted: 2018-10-31

## 2019-05-30 PROBLEM — F31.9 BIPOLAR 1 DISORDER (HCC): Status: ACTIVE | Noted: 2019-05-30

## 2019-05-30 PROBLEM — R10.9 ABDOMINAL PAIN: Status: ACTIVE | Noted: 2018-05-12

## 2019-05-30 PROBLEM — R33.9 URINARY RETENTION: Status: ACTIVE | Noted: 2019-05-30

## 2019-05-30 PROBLEM — F32.A DEPRESSION: Status: ACTIVE | Noted: 2019-05-30

## 2019-05-30 PROBLEM — K21.9 ACID REFLUX: Status: ACTIVE | Noted: 2019-05-30

## 2019-05-30 PROBLEM — I10 HYPERTENSION: Status: ACTIVE | Noted: 2018-10-31

## 2019-05-30 LAB
ATRIAL RATE: 96 BPM
DEPRECATED D DIMER PPP: <150 NG/ML (FEU)
P AXIS: 49 DEGREES
PR INTERVAL: 168 MS
QRS AXIS: -19 DEGREES
QRSD INTERVAL: 96 MS
QT INTERVAL: 350 MS
QTC INTERVAL: 442 MS
T WAVE AXIS: 47 DEGREES
VENTRICULAR RATE: 96 BPM

## 2019-05-30 PROCEDURE — 36415 COLL VENOUS BLD VENIPUNCTURE: CPT | Performed by: EMERGENCY MEDICINE

## 2019-05-30 PROCEDURE — 99285 EMERGENCY DEPT VISIT HI MDM: CPT

## 2019-05-30 PROCEDURE — 85379 FIBRIN DEGRADATION QUANT: CPT | Performed by: EMERGENCY MEDICINE

## 2019-05-30 PROCEDURE — 99213 OFFICE O/P EST LOW 20 MIN: CPT | Performed by: NURSE PRACTITIONER

## 2019-05-30 PROCEDURE — 71046 X-RAY EXAM CHEST 2 VIEWS: CPT

## 2019-05-30 PROCEDURE — 93005 ELECTROCARDIOGRAM TRACING: CPT

## 2019-05-30 PROCEDURE — G0382 LEV 3 HOSP TYPE B ED VISIT: HCPCS | Performed by: NURSE PRACTITIONER

## 2019-05-30 PROCEDURE — 93010 ELECTROCARDIOGRAM REPORT: CPT | Performed by: INTERNAL MEDICINE

## 2019-05-30 PROCEDURE — 99283 EMERGENCY DEPT VISIT LOW MDM: CPT | Performed by: NURSE PRACTITIONER

## 2019-06-03 RX ORDER — BLOOD-GLUCOSE METER
EACH MISCELLANEOUS
Refills: 0 | COMMUNITY
Start: 2019-05-09

## 2019-06-03 RX ORDER — LANCETS
EACH MISCELLANEOUS 4 TIMES DAILY
Refills: 1 | COMMUNITY
Start: 2019-05-08

## 2019-06-03 RX ORDER — BLOOD SUGAR DIAGNOSTIC
STRIP MISCELLANEOUS 4 TIMES DAILY
Refills: 1 | COMMUNITY
Start: 2019-05-08 | End: 2022-06-23

## 2019-06-07 ENCOUNTER — HOSPITAL ENCOUNTER (OUTPATIENT)
Dept: RADIOLOGY | Facility: IMAGING CENTER | Age: 39
Discharge: HOME | End: 2019-06-07
Payer: COMMERCIAL

## 2019-06-07 ENCOUNTER — OFFICE VISIT (OUTPATIENT)
Dept: FAMILY MEDICINE CLINIC | Facility: CLINIC | Age: 39
End: 2019-06-07
Payer: COMMERCIAL

## 2019-06-07 VITALS
OXYGEN SATURATION: 96 % | WEIGHT: 315 LBS | DIASTOLIC BLOOD PRESSURE: 80 MMHG | HEART RATE: 90 BPM | TEMPERATURE: 98.4 F | HEIGHT: 62 IN | SYSTOLIC BLOOD PRESSURE: 130 MMHG | BODY MASS INDEX: 57.97 KG/M2 | RESPIRATION RATE: 16 BRPM

## 2019-06-07 DIAGNOSIS — R06.02 SOB (SHORTNESS OF BREATH): ICD-10-CM

## 2019-06-07 DIAGNOSIS — R60.9 PERIPHERAL EDEMA: ICD-10-CM

## 2019-06-07 DIAGNOSIS — I10 ESSENTIAL HYPERTENSION: ICD-10-CM

## 2019-06-07 DIAGNOSIS — K21.9 GASTROESOPHAGEAL REFLUX DISEASE WITHOUT ESOPHAGITIS: ICD-10-CM

## 2019-06-07 DIAGNOSIS — Z79.4 TYPE 2 DIABETES MELLITUS WITH OTHER SPECIFIED COMPLICATION, WITH LONG-TERM CURRENT USE OF INSULIN (HCC): Primary | ICD-10-CM

## 2019-06-07 DIAGNOSIS — E66.01 MORBID OBESITY WITH BMI OF 50.0-59.9, ADULT (HCC): ICD-10-CM

## 2019-06-07 DIAGNOSIS — E03.8 OTHER SPECIFIED HYPOTHYROIDISM: ICD-10-CM

## 2019-06-07 DIAGNOSIS — E78.49 OTHER HYPERLIPIDEMIA: ICD-10-CM

## 2019-06-07 DIAGNOSIS — E03.9 HYPOTHYROIDISM, UNSPECIFIED TYPE: ICD-10-CM

## 2019-06-07 DIAGNOSIS — E11.69 TYPE 2 DIABETES MELLITUS WITH OTHER SPECIFIED COMPLICATION, WITH LONG-TERM CURRENT USE OF INSULIN (HCC): Primary | ICD-10-CM

## 2019-06-07 PROCEDURE — 71046 X-RAY EXAM CHEST 2 VIEWS: CPT

## 2019-06-07 PROCEDURE — 99215 OFFICE O/P EST HI 40 MIN: CPT | Performed by: FAMILY MEDICINE

## 2019-06-07 RX ORDER — FUROSEMIDE 20 MG/1
20 TABLET ORAL 2 TIMES DAILY
Qty: 30 TABLET | Refills: 0 | Status: SHIPPED | OUTPATIENT
Start: 2019-06-07 | End: 2019-06-25 | Stop reason: SDUPTHER

## 2019-06-07 RX ORDER — LEVOTHYROXINE SODIUM 0.1 MG/1
100 TABLET ORAL DAILY
Qty: 30 TABLET | Refills: 3 | Status: SHIPPED | OUTPATIENT
Start: 2019-06-07 | End: 2021-01-12 | Stop reason: SDUPTHER

## 2019-06-07 RX ORDER — ATORVASTATIN CALCIUM 20 MG/1
20 TABLET, FILM COATED ORAL DAILY
Qty: 30 TABLET | Refills: 3 | Status: SHIPPED | OUTPATIENT
Start: 2019-06-07 | End: 2020-01-23

## 2019-06-07 RX ORDER — LOSARTAN POTASSIUM 50 MG/1
25 TABLET ORAL DAILY
Qty: 30 TABLET | Refills: 0 | Status: SHIPPED | OUTPATIENT
Start: 2019-06-07 | End: 2019-06-25 | Stop reason: SDUPTHER

## 2019-06-07 RX ORDER — PANTOPRAZOLE SODIUM 40 MG/1
40 TABLET, DELAYED RELEASE ORAL 2 TIMES DAILY
Qty: 60 TABLET | Refills: 2 | Status: SHIPPED | OUTPATIENT
Start: 2019-06-07 | End: 2020-01-23

## 2019-06-07 RX ORDER — ALBUTEROL SULFATE 90 UG/1
2 AEROSOL, METERED RESPIRATORY (INHALATION) EVERY 6 HOURS PRN
Qty: 8.5 G | Refills: 1 | Status: SHIPPED | OUTPATIENT
Start: 2019-06-07 | End: 2020-06-21 | Stop reason: HOSPADM

## 2019-06-08 ENCOUNTER — TRANSCRIBE ORDERS (OUTPATIENT)
Dept: ADMINISTRATIVE | Facility: HOSPITAL | Age: 39
End: 2019-06-08

## 2019-06-10 ENCOUNTER — TELEPHONE (OUTPATIENT)
Dept: FAMILY MEDICINE CLINIC | Facility: CLINIC | Age: 39
End: 2019-06-10

## 2019-06-10 DIAGNOSIS — E03.8 OTHER SPECIFIED HYPOTHYROIDISM: Primary | ICD-10-CM

## 2019-06-10 RX ORDER — LEVOTHYROXINE SODIUM 0.03 MG/1
TABLET ORAL
Qty: 30 TABLET | Refills: 3 | Status: CANCELLED | OUTPATIENT
Start: 2019-06-10

## 2019-06-14 ENCOUNTER — TELEPHONE (OUTPATIENT)
Dept: FAMILY MEDICINE CLINIC | Facility: CLINIC | Age: 39
End: 2019-06-14

## 2019-06-18 ENCOUNTER — OFFICE VISIT (OUTPATIENT)
Dept: NEUROLOGY | Facility: CLINIC | Age: 39
End: 2019-06-18
Payer: COMMERCIAL

## 2019-06-18 VITALS
BODY MASS INDEX: 57.97 KG/M2 | WEIGHT: 315 LBS | DIASTOLIC BLOOD PRESSURE: 88 MMHG | HEART RATE: 86 BPM | SYSTOLIC BLOOD PRESSURE: 171 MMHG | HEIGHT: 62 IN

## 2019-06-18 DIAGNOSIS — E66.01 MORBID OBESITY WITH BMI OF 50.0-59.9, ADULT (HCC): Primary | Chronic | ICD-10-CM

## 2019-06-18 DIAGNOSIS — G43.009 MIGRAINE WITHOUT AURA AND WITHOUT STATUS MIGRAINOSUS, NOT INTRACTABLE: ICD-10-CM

## 2019-06-18 DIAGNOSIS — M54.2 CERVICALGIA: ICD-10-CM

## 2019-06-18 DIAGNOSIS — M54.81 OCCIPITAL NEURALGIA OF LEFT SIDE: ICD-10-CM

## 2019-06-18 DIAGNOSIS — K11.8 NODULE OF PAROTID GLAND: ICD-10-CM

## 2019-06-18 PROBLEM — R51.9 CHRONIC DAILY HEADACHE: Status: RESOLVED | Noted: 2018-12-04 | Resolved: 2019-06-18

## 2019-06-18 PROBLEM — G44.209 TENSION HEADACHE: Status: RESOLVED | Noted: 2019-02-15 | Resolved: 2019-06-18

## 2019-06-18 PROBLEM — G43.719 INTRACTABLE CHRONIC MIGRAINE WITHOUT AURA AND WITHOUT STATUS MIGRAINOSUS: Status: RESOLVED | Noted: 2018-12-04 | Resolved: 2019-06-18

## 2019-06-18 PROCEDURE — 99215 OFFICE O/P EST HI 40 MIN: CPT | Performed by: PSYCHIATRY & NEUROLOGY

## 2019-06-18 RX ORDER — DIVALPROEX SODIUM 500 MG/1
TABLET, EXTENDED RELEASE ORAL
Qty: 60 TABLET | Refills: 3 | Status: SHIPPED | OUTPATIENT
Start: 2019-06-18 | End: 2020-06-21 | Stop reason: HOSPADM

## 2019-06-18 RX ORDER — ZONISAMIDE 50 MG/1
CAPSULE ORAL
Qty: 60 CAPSULE | Refills: 3 | Status: SHIPPED | OUTPATIENT
Start: 2019-06-18 | End: 2019-08-29

## 2019-06-18 RX ORDER — DEXAMETHASONE 2 MG/1
TABLET ORAL
Qty: 5 TABLET | Refills: 0 | Status: SHIPPED | OUTPATIENT
Start: 2019-06-18 | End: 2020-11-28 | Stop reason: HOSPADM

## 2019-06-19 ENCOUNTER — DOCUMENTATION (OUTPATIENT)
Dept: NEUROLOGY | Facility: CLINIC | Age: 39
End: 2019-06-19

## 2019-06-19 ENCOUNTER — OFFICE VISIT (OUTPATIENT)
Dept: NEUROLOGY | Facility: CLINIC | Age: 39
End: 2019-06-19
Payer: COMMERCIAL

## 2019-06-19 VITALS
RESPIRATION RATE: 14 BRPM | BODY MASS INDEX: 57.97 KG/M2 | WEIGHT: 315 LBS | HEART RATE: 84 BPM | SYSTOLIC BLOOD PRESSURE: 140 MMHG | OXYGEN SATURATION: 95 % | HEIGHT: 62 IN | DIASTOLIC BLOOD PRESSURE: 88 MMHG

## 2019-06-19 DIAGNOSIS — M54.2 CERVICALGIA: Primary | ICD-10-CM

## 2019-06-19 DIAGNOSIS — R51.9 ACUTE INTRACTABLE HEADACHE, UNSPECIFIED HEADACHE TYPE: ICD-10-CM

## 2019-06-19 PROCEDURE — 99243 OFF/OP CNSLTJ NEW/EST LOW 30: CPT | Performed by: PHYSICAL MEDICINE & REHABILITATION

## 2019-06-25 ENCOUNTER — OFFICE VISIT (OUTPATIENT)
Dept: FAMILY MEDICINE CLINIC | Facility: CLINIC | Age: 39
End: 2019-06-25
Payer: COMMERCIAL

## 2019-06-25 VITALS
BODY MASS INDEX: 57.97 KG/M2 | HEIGHT: 62 IN | OXYGEN SATURATION: 97 % | SYSTOLIC BLOOD PRESSURE: 126 MMHG | DIASTOLIC BLOOD PRESSURE: 80 MMHG | TEMPERATURE: 97.8 F | WEIGHT: 315 LBS | RESPIRATION RATE: 16 BRPM | HEART RATE: 84 BPM

## 2019-06-25 DIAGNOSIS — E03.8 OTHER SPECIFIED HYPOTHYROIDISM: ICD-10-CM

## 2019-06-25 DIAGNOSIS — R60.9 PERIPHERAL EDEMA: ICD-10-CM

## 2019-06-25 DIAGNOSIS — I10 ESSENTIAL HYPERTENSION: ICD-10-CM

## 2019-06-25 DIAGNOSIS — E11.69 TYPE 2 DIABETES MELLITUS WITH OTHER SPECIFIED COMPLICATION, WITH LONG-TERM CURRENT USE OF INSULIN (HCC): Primary | ICD-10-CM

## 2019-06-25 DIAGNOSIS — Z79.4 TYPE 2 DIABETES MELLITUS WITH OTHER SPECIFIED COMPLICATION, WITH LONG-TERM CURRENT USE OF INSULIN (HCC): Primary | ICD-10-CM

## 2019-06-25 DIAGNOSIS — E66.01 CLASS 3 SEVERE OBESITY DUE TO EXCESS CALORIES WITH SERIOUS COMORBIDITY AND BODY MASS INDEX (BMI) OF 60.0 TO 69.9 IN ADULT (HCC): ICD-10-CM

## 2019-06-25 PROCEDURE — 3074F SYST BP LT 130 MM HG: CPT | Performed by: FAMILY MEDICINE

## 2019-06-25 PROCEDURE — 99214 OFFICE O/P EST MOD 30 MIN: CPT | Performed by: FAMILY MEDICINE

## 2019-06-25 PROCEDURE — 3079F DIAST BP 80-89 MM HG: CPT | Performed by: FAMILY MEDICINE

## 2019-06-25 RX ORDER — CARIPRAZINE 3 MG/1
6 CAPSULE, GELATIN COATED ORAL DAILY
COMMUNITY
End: 2021-01-05

## 2019-06-25 RX ORDER — FUROSEMIDE 20 MG/1
20 TABLET ORAL DAILY
Qty: 30 TABLET | Refills: 2 | Status: SHIPPED | OUTPATIENT
Start: 2019-06-25 | End: 2019-09-30 | Stop reason: SDUPTHER

## 2019-06-25 RX ORDER — LOSARTAN POTASSIUM 50 MG/1
25 TABLET ORAL DAILY
Qty: 30 TABLET | Refills: 3 | Status: SHIPPED | OUTPATIENT
Start: 2019-06-25 | End: 2020-06-21 | Stop reason: HOSPADM

## 2019-06-26 PROBLEM — E66.01 CLASS 3 SEVERE OBESITY DUE TO EXCESS CALORIES WITH SERIOUS COMORBIDITY AND BODY MASS INDEX (BMI) OF 60.0 TO 69.9 IN ADULT (HCC): Status: ACTIVE | Noted: 2019-06-26

## 2019-07-05 ENCOUNTER — OFFICE VISIT (OUTPATIENT)
Dept: FAMILY MEDICINE CLINIC | Facility: CLINIC | Age: 39
End: 2019-07-05
Payer: COMMERCIAL

## 2019-07-05 VITALS
OXYGEN SATURATION: 96 % | BODY MASS INDEX: 57.97 KG/M2 | DIASTOLIC BLOOD PRESSURE: 86 MMHG | HEART RATE: 80 BPM | SYSTOLIC BLOOD PRESSURE: 138 MMHG | RESPIRATION RATE: 16 BRPM | HEIGHT: 62 IN | TEMPERATURE: 98.6 F | WEIGHT: 315 LBS

## 2019-07-05 DIAGNOSIS — Z79.4 TYPE 2 DIABETES MELLITUS WITH OTHER SPECIFIED COMPLICATION, WITH LONG-TERM CURRENT USE OF INSULIN (HCC): ICD-10-CM

## 2019-07-05 DIAGNOSIS — J20.8 ACUTE BRONCHITIS DUE TO OTHER SPECIFIED ORGANISMS: Primary | ICD-10-CM

## 2019-07-05 DIAGNOSIS — E11.69 TYPE 2 DIABETES MELLITUS WITH OTHER SPECIFIED COMPLICATION, WITH LONG-TERM CURRENT USE OF INSULIN (HCC): ICD-10-CM

## 2019-07-05 PROCEDURE — 3008F BODY MASS INDEX DOCD: CPT | Performed by: FAMILY MEDICINE

## 2019-07-05 PROCEDURE — 1036F TOBACCO NON-USER: CPT | Performed by: FAMILY MEDICINE

## 2019-07-05 PROCEDURE — 99214 OFFICE O/P EST MOD 30 MIN: CPT | Performed by: FAMILY MEDICINE

## 2019-07-05 RX ORDER — AZITHROMYCIN 250 MG/1
TABLET, FILM COATED ORAL
Qty: 6 TABLET | Refills: 0 | Status: SHIPPED | OUTPATIENT
Start: 2019-07-05 | End: 2019-07-09

## 2019-07-05 RX ORDER — PREDNISONE 50 MG/1
50 TABLET ORAL DAILY
Qty: 5 TABLET | Refills: 0 | Status: SHIPPED | OUTPATIENT
Start: 2019-07-05 | End: 2019-07-10

## 2019-07-05 NOTE — PROGRESS NOTES
Assessment/Plan:     Diagnoses and all orders for this visit:    Acute bronchitis due to other specified organisms  Comments:  He was given prescriptions for Z-Immanuel and prednisone 50 mg 1 daily for 5 days  It was discussed about side effects  Orders:  -     azithromycin (ZITHROMAX) 250 mg tablet; Take 2 tablets today then 1 tablet daily x 4 days  -     predniSONE 50 mg tablet; Take 1 tablet (50 mg total) by mouth daily for 5 days    Type 2 diabetes mellitus with other specified complication, with long-term current use of insulin ([address] )  Comments:  He was told to increase his Basaglar to 45 units for the next few days  Continue to monitor his fasting sugar  There are no Patient Instructions on file for this visit  Return if symptoms worsen or fail to improve  Subjective:      Patient ID: Nasim Jones is a 37 y o  male  Chief Complaint   Patient presents with    Cold Like Symptoms     chest congestion and cough       He is here today with complaint of upper respiratory symptoms including nasal congestion, postnasal drip and sinus pressure  He also complained of cough and wheezing  He denies any fever or chills  He has history of diabetes mellitus type 2  He follow-up with endocrinologist   He is on Basaglar 40 units q h s   His fasting glucose continue to be elevated between 150-200  The following portions of the patient's history were reviewed and updated as appropriate: allergies, current medications, past family history, past medical history, past social history, past surgical history and problem list     Review of Systems   Constitutional: Negative for chills and fever  HENT: Positive for congestion, sinus pressure and sinus pain  Negative for trouble swallowing  Eyes: Negative for visual disturbance  Respiratory: Positive for cough  Negative for shortness of breath  Cardiovascular: Negative for chest pain and palpitations     Gastrointestinal: Negative for abdominal pain, blood in stool and vomiting  Endocrine: Negative for cold intolerance and heat intolerance  Genitourinary: Negative for difficulty urinating and dysuria  Musculoskeletal: Negative for gait problem  Skin: Negative for rash  Neurological: Negative for dizziness, syncope and headaches  Hematological: Negative for adenopathy  Psychiatric/Behavioral: Negative for behavioral problems           Current Outpatient Medications   Medication Sig Dispense Refill    ACCU-CHEK FASTCLIX LANCETS MISC 4 (four) times a day Test  1    ACCU-CHEK GUIDE test strip 4 (four) times a day Test  1    ADMELOG SOLOSTAR 100 units/mL injection pen Inject 10 Units under the skin 3 (three) times a day with meals   0    albuterol (PROAIR HFA) 90 mcg/act inhaler Inhale 2 puffs every 6 (six) hours as needed for wheezing 8 5 g 1    aspirin 81 mg chewable tablet Chew 1 tablet (81 mg total) daily Over the counter  0    atorvastatin (LIPITOR) 20 mg tablet Take 1 tablet (20 mg total) by mouth daily 30 tablet 3    cariprazine (VRAYLAR) 3 MG capsule Take 3 mg by mouth daily       divalproex sodium (DEPAKOTE ER) 500 mg 24 hr tablet Twice a day 60 tablet 3    fluvoxaMINE (LUVOX) 100 mg tablet Take 1 tablet (100 mg total) by mouth 2 (two) times a day 60 tablet 1    furosemide (LASIX) 20 mg tablet Take 1 tablet (20 mg total) by mouth daily 30 tablet 2    insulin glargine (BASAGLAR KWIKPEN) 100 units/mL injection pen Inject 40 Units under the skin daily at bedtime 5 pen 0    Insulin Pen Needle (PEN NEEDLES 3/16") 31G X 5 MM MISC by Does not apply route 4 (four) times a day 200 each 3    levothyroxine 100 mcg tablet Take 1 tablet (100 mcg total) by mouth daily (Patient taking differently: Take 125 mcg by mouth daily ) 30 tablet 3    LORazepam (ATIVAN) 0 5 mg tablet Take 1 tablet (0 5 mg total) by mouth 2 (two) times a day (Patient taking differently: Take 0 5 mg by mouth every 6 (six) hours as needed ) 60 tablet 0    losartan (COZAAR) 50 mg tablet Take 0 5 tablets (25 mg total) by mouth daily 30 tablet 3    pantoprazole (PROTONIX) 40 mg tablet Take 1 tablet (40 mg total) by mouth 2 (two) times a day 60 tablet 2    senna (SENOKOT) 8 6 mg Take 1 tablet (8 6 mg total) by mouth daily at bedtime as needed for constipation 30 each 0    sucralfate (CARAFATE) 1 g/10 mL suspension Take 10 mL (1,000 mg total) by mouth 4 (four) times a day (with meals and at bedtime) 420 mL 0    SUMAtriptan (IMITREX) 100 mg tablet Take 1 tablet (100 mg total) by mouth once as needed for migraine for up to 1 dose 9 tablet 5    zonisamide (ZONEGRAN) 50 MG capsule 1 tab at bedtime for 7 days then 1 tab twice a day after that 60 capsule 3    azithromycin (ZITHROMAX) 250 mg tablet Take 2 tablets today then 1 tablet daily x 4 days 6 tablet 0    Blood Glucose Monitoring Suppl (ACCU-CHEK GUIDE) w/Device KIT   0    dexamethasone (DECADRON) 2 mg tablet 1 at the onset of a severe headache with food (Patient taking differently: as needed 1 at the onset of a severe headache with food) 5 tablet 0    predniSONE 50 mg tablet Take 1 tablet (50 mg total) by mouth daily for 5 days 5 tablet 0     No current facility-administered medications for this visit  Objective:    /86 (BP Location: Left arm, Patient Position: Sitting, Cuff Size: Large)   Pulse 80   Temp 98 6 °F (37 °C) (Tympanic)   Resp 16   Ht 5' 2" (1 575 m)   Wt (!) 152 kg (336 lb)   SpO2 96%   BMI 61 46 kg/m²        Physical Exam   Constitutional: He is oriented to person, place, and time  He appears well-developed and well-nourished  HENT:   Head: Normocephalic and atraumatic  Right Ear: A middle ear effusion is present  Left Ear: A middle ear effusion is present  Mouth/Throat: Posterior oropharyngeal erythema present  Eyes: Pupils are equal, round, and reactive to light  EOM are normal    Neck: Normal range of motion  Neck supple     Cardiovascular: Normal rate, regular rhythm and normal heart sounds  Pulmonary/Chest: Effort normal  He has wheezes  Abdominal: Soft  Bowel sounds are normal    Musculoskeletal: Normal range of motion  He exhibits no edema  Lymphadenopathy:     He has no cervical adenopathy  Neurological: He is alert and oriented to person, place, and time  No cranial nerve deficit  Skin: Skin is warm  No rash noted  Psychiatric: He has a normal mood and affect  Nursing note and vitals reviewed               Ashley Galvez MD

## 2019-07-12 DIAGNOSIS — G43.119 INTRACTABLE MIGRAINE WITH AURA WITHOUT STATUS MIGRAINOSUS: ICD-10-CM

## 2019-07-12 RX ORDER — SUMATRIPTAN 100 MG/1
TABLET, FILM COATED ORAL
Qty: 9 TABLET | Refills: 5 | OUTPATIENT
Start: 2019-07-12

## 2019-07-12 RX ORDER — SUMATRIPTAN 100 MG/1
100 TABLET, FILM COATED ORAL ONCE AS NEEDED
Qty: 9 TABLET | Refills: 1 | Status: SHIPPED | OUTPATIENT
Start: 2019-07-12 | End: 2019-10-04 | Stop reason: SDUPTHER

## 2019-08-04 ENCOUNTER — APPOINTMENT (EMERGENCY)
Dept: RADIOLOGY | Facility: HOSPITAL | Age: 39
End: 2019-08-04
Payer: COMMERCIAL

## 2019-08-04 ENCOUNTER — APPOINTMENT (EMERGENCY)
Dept: NON INVASIVE DIAGNOSTICS | Facility: HOSPITAL | Age: 39
End: 2019-08-04
Payer: COMMERCIAL

## 2019-08-04 ENCOUNTER — HOSPITAL ENCOUNTER (EMERGENCY)
Facility: HOSPITAL | Age: 39
Discharge: HOME | End: 2019-08-04
Payer: COMMERCIAL

## 2019-08-04 VITALS
DIASTOLIC BLOOD PRESSURE: 98 MMHG | TEMPERATURE: 98.2 F | SYSTOLIC BLOOD PRESSURE: 180 MMHG | RESPIRATION RATE: 16 BRPM | OXYGEN SATURATION: 96 % | HEART RATE: 86 BPM

## 2019-08-04 DIAGNOSIS — S80.11XA HEMATOMA OF LEG, RIGHT, INITIAL ENCOUNTER: ICD-10-CM

## 2019-08-04 DIAGNOSIS — R60.9 PERIPHERAL EDEMA: ICD-10-CM

## 2019-08-04 DIAGNOSIS — S80.10XA CONTUSION OF LOWER LEG: Primary | ICD-10-CM

## 2019-08-04 PROCEDURE — 73590 X-RAY EXAM OF LOWER LEG: CPT

## 2019-08-04 PROCEDURE — 93971 EXTREMITY STUDY: CPT

## 2019-08-04 PROCEDURE — 93971 EXTREMITY STUDY: CPT | Performed by: SURGERY

## 2019-08-04 PROCEDURE — 99284 EMERGENCY DEPT VISIT MOD MDM: CPT

## 2019-08-04 RX ORDER — FUROSEMIDE 20 MG/1
20 TABLET ORAL DAILY
Qty: 10 TABLET | Refills: 0 | Status: SHIPPED | OUTPATIENT
Start: 2019-08-04 | End: 2019-08-28 | Stop reason: ALTCHOICE

## 2019-08-04 NOTE — ED PROVIDER NOTES
History  Chief Complaint   Patient presents with    Leg Injury     patient reports right leg injury several days ago now right lower leg is red and warm to touch     Patient states he tripped and fell approximately 1 week ago of a flight of stairs landing on his rt lower leg,swelling and contusions in various stages of healing present right lower leg with areas  history of peripheral edema which he takes Lasix for, denies fever chills cough sob chest pain           Prior to Admission Medications   Prescriptions Last Dose Informant Patient Reported? Taking?    ACCU-CHEK FASTCLIX LANCETS MISC  Self Yes No   Si (four) times a day Test   ACCU-CHEK GUIDE test strip  Self Yes No   Si (four) times a day Test   ADMELOG SOLOSTAR 100 units/mL injection pen  Self Yes No   Sig: Inject 10 Units under the skin 3 (three) times a day with meals    Blood Glucose Monitoring Suppl (ACCU-CHEK GUIDE) w/Device KIT  Self Yes No   Insulin Pen Needle (PEN NEEDLES 3/16") 31G X 5 MM MISC  Self No No   Sig: by Does not apply route 4 (four) times a day   LORazepam (ATIVAN) 0 5 mg tablet  Self No No   Sig: Take 1 tablet (0 5 mg total) by mouth 2 (two) times a day   Patient taking differently: Take 0 5 mg by mouth every 6 (six) hours as needed    SUMAtriptan (IMITREX) 100 mg tablet   No No   Sig: Take 1 tablet (100 mg total) by mouth once as needed for migraine   albuterol (PROAIR HFA) 90 mcg/act inhaler  Self No No   Sig: Inhale 2 puffs every 6 (six) hours as needed for wheezing   aspirin 81 mg chewable tablet  Self No No   Sig: Chew 1 tablet (81 mg total) daily Over the counter   atorvastatin (LIPITOR) 20 mg tablet  Self No No   Sig: Take 1 tablet (20 mg total) by mouth daily   cariprazine (VRAYLAR) 3 MG capsule   Yes No   Sig: Take 3 mg by mouth daily    dexamethasone (DECADRON) 2 mg tablet   No No   Si at the onset of a severe headache with food   Patient taking differently: as needed 1 at the onset of a severe headache with food divalproex sodium (DEPAKOTE ER) 500 mg 24 hr tablet   No No   Sig: Twice a day   fluvoxaMINE (LUVOX) 100 mg tablet  Self No No   Sig: Take 1 tablet (100 mg total) by mouth 2 (two) times a day   furosemide (LASIX) 20 mg tablet   No No   Sig: Take 1 tablet (20 mg total) by mouth daily   insulin glargine (BASAGLAR KWIKPEN) 100 units/mL injection pen  Self No No   Sig: Inject 40 Units under the skin daily at bedtime   levothyroxine 100 mcg tablet  Self No No   Sig: Take 1 tablet (100 mcg total) by mouth daily   Patient taking differently: Take 125 mcg by mouth daily    losartan (COZAAR) 50 mg tablet   No No   Sig: Take 0 5 tablets (25 mg total) by mouth daily   pantoprazole (PROTONIX) 40 mg tablet  Self No No   Sig: Take 1 tablet (40 mg total) by mouth 2 (two) times a day   senna (SENOKOT) 8 6 mg  Self No No   Sig: Take 1 tablet (8 6 mg total) by mouth daily at bedtime as needed for constipation   sucralfate (CARAFATE) 1 g/10 mL suspension  Self No No   Sig: Take 10 mL (1,000 mg total) by mouth 4 (four) times a day (with meals and at bedtime)   zonisamide (ZONEGRAN) 50 MG capsule   No No   Si tab at bedtime for 7 days then 1 tab twice a day after that      Facility-Administered Medications: None       Past Medical History:   Diagnosis Date    Chronic headaches     Diabetes mellitus (HCC)     Disease of thyroid gland     Esophagitis     Gastritis     Gastroparesis     GERD (gastroesophageal reflux disease)     Hypertension     Migraine     Obesity     Obsessive compulsive disorder     Psychiatric disorder     Schizoaffective disorder [facility])        Past Surgical History:   Procedure Laterality Date    ESOPHAGOGASTRODUODENOSCOPY N/A 1/15/2019    Procedure: ESOPHAGOGASTRODUODENOSCOPY (EGD); Surgeon: Aicha Lynne MD;  Location: [facility];   Service: Gastroenterology       Family History   Problem Relation Age of Onset    COPD Mother     Hypertension Mother     Heart failure Mother     Rheum arthritis Family     Heart disease Family     Hypertension Father     Diabetes Father     Hyperlipidemia Father      I have reviewed and agree with the history as documented  Social History     Tobacco Use    Smoking status: Never Smoker    Smokeless tobacco: Never Used   Substance Use Topics    Alcohol use: No    Drug use: No        Review of Systems   Musculoskeletal:        RLE pain swelling contusions       Physical Exam  Physical Exam   Constitutional: He is oriented to person, place, and time  He appears well-developed and well-nourished  Neurological: He is alert and oriented to person, place, and time  Skin: Skin is warm and dry  Capillary refill takes less than 2 seconds  swelling and contusions various stages of healing present right lower leg + pulses sensation intact brisk cap refill tenderness on palpation to raised area approx 6 inx 2 inch diagonally mid rt shin       Nursing note and vitals reviewed        Vital Signs  ED Triage Vitals [08/04/19 1732]   Temperature Pulse Respirations Blood Pressure SpO2   98 2 °F (36 8 °C) 100 16 (!) 183/100 95 %      Temp Source Heart Rate Source Patient Position - Orthostatic VS BP Location FiO2 (%)   Tympanic Monitor Sitting Left arm --      Pain Score       4           Vitals:    08/04/19 1732   BP: (!) 183/100   Pulse: 100   Patient Position - Orthostatic VS: Sitting         Visual Acuity      ED Medications  Medications - No data to display    Diagnostic Studies  Results Reviewed     None                 XR tibia fibula 2 views RIGHT    (Results Pending)   VAS lower limb venous duplex study, unilateral/limited    (Results Pending)              Procedures  Procedures       ED Course                               MDM    Disposition  Final diagnoses:   Contusion of lower leg   Hematoma of leg, right, initial encounter   Peripheral edema     Time reflects when diagnosis was documented in both MDM as applicable and the Disposition within this note     Time User Action Codes Description Comment    8/4/2019  8:09 PM Lashaun Valle Add [S80 10XA] Contusion of lower leg     8/4/2019  8:09 PM Lashaun Valle Add [S80 11XA] Hematoma of leg, right, initial encounter     8/4/2019  8:12 PM Lashaun Valle Add [R60 9] Peripheral edema       ED Disposition     ED Disposition Condition Date/Time Comment    Discharge Stable Sun Aug 4, 2019  8:09 PM Aretta  discharge to home/self care  Follow-up Information    None         Patient's Medications   Discharge Prescriptions    FUROSEMIDE (LASIX) 20 MG TABLET    Take 1 tablet (20 mg total) by mouth daily for 10 doses       Start Date: 8/4/2019  End Date: 8/14/2019       Order Dose: 20 mg       Quantity: 10 tablet    Refills: 0     No discharge procedures on file      ED Provider  Electronically Signed by           DOREEN Anderson  08/04/19 2016

## 2019-08-09 ENCOUNTER — HOSPITAL ENCOUNTER (OUTPATIENT)
Dept: RADIOLOGY | Facility: HOSPITAL | Age: 39
Discharge: HOME | End: 2019-08-09
Attending: PSYCHIATRY & NEUROLOGY
Payer: COMMERCIAL

## 2019-08-09 DIAGNOSIS — M54.2 CERVICALGIA: ICD-10-CM

## 2019-08-09 DIAGNOSIS — G43.009 MIGRAINE WITHOUT AURA AND WITHOUT STATUS MIGRAINOSUS, NOT INTRACTABLE: ICD-10-CM

## 2019-08-09 DIAGNOSIS — M54.81 OCCIPITAL NEURALGIA OF LEFT SIDE: ICD-10-CM

## 2019-08-09 PROCEDURE — 72141 MRI NECK SPINE W/O DYE: CPT

## 2019-08-19 ENCOUNTER — TELEPHONE (OUTPATIENT)
Dept: NEUROLOGY | Facility: CLINIC | Age: 39
End: 2019-08-19

## 2019-08-19 DIAGNOSIS — M54.81 OCCIPITAL NEURALGIA OF LEFT SIDE: Primary | ICD-10-CM

## 2019-08-19 NOTE — TELEPHONE ENCOUNTER
----- Message from Angelito Almanza MD sent at 8/19/2019 10:55 AM EDT -----  Please call the patient regarding his abnormal result  Will refer patient to Pain Management for evaluation of his neuralgia as well

## 2019-08-19 NOTE — TELEPHONE ENCOUNTER
Unable to leave voicemail, mail box is full  (Re:MRI CERVICAL SPINE WITHOUT CONTRAST)    Will attempt at a later time

## 2019-08-21 DIAGNOSIS — M54.81 OCCIPITAL NEURALGIA OF LEFT SIDE: Primary | ICD-10-CM

## 2019-08-27 NOTE — PROGRESS NOTES
[address] [facility]  PATIENT:  Sunil Coburn  MRN:  [id number]  :  1980  DATE OF SERVICE:  2019      Assessment/Plan:          Problem List Items Addressed This Visit        Other    Spinal stenosis in cervical region - Primary    Relevant Orders    Ambulatory referral to Neurosurgery         Schizoaffective disorder currently on Luvox 100 mg twice a day for his schizoaffective disorder    Peripheral edema: Lasix 20 mg twice a day     Preventive therapy for headaches:  will not be make any changes today in regards to his headaches  I told patient that he needs to keep a headache diary  Headache diary given to the patient today  Patient wrote on his note today that he was doing better  But upon questioning it seemed not much had changed  - Depakote will increase to 500 mg twice a day (with next lab work need to check serum ammonia level)  -  zonisamide 50 mg  twice a day  - patient is on losartan 50 mg once a day for his blood pressure-which should help with headaches as well  Abortive therapy for headaches:   - at the onset of a migraine headache patient is taking Excedrin migraine which tends to abort the headache 60% the time if this fails he is taking Imitrex 100 mg which aborts the headache 80% the time  If this fails I will have him take Decadron 2 mg with food      Weight management:  Will monitor patient's weight given patient is on Depakote now  patient states he needs to take care of his mental health first before they do anything with his weight        Parotid gland nodule:  Need to see ENT     Cervicalgia with right arm pain:  During the office today patient noted that he was having cramps in his left arm/hand  Patient states that occurred when he bent his head to the left  Pain is mostly located in the right upper extremity  Involving all 5 digits  Has not noticed any weakness  Has been waking up with right arm numbness and it may feel weak at this time    - also noted balance issues, feels like he will fall but has not  - will see pain management and surgery     Headache management instructions  - When patient has a moderate to severe headache, they should seek rest, initiate relaxation and apply cold compresses to the head  - Maintain regular sleep schedule  Adults need at least 7-8 hours of uninterrupted a night  - Limit over the counter medications such as Tylenol, Ibuprofen, Aleve, Excedrin  (No more than 2- 3 times a week or max 10 a month)  - Maintain headache diary   Free FOREIGN for a smart phone, which can be used is "Migraine ellen"  - Limit caffeine to 1-2 cups 8 to 16 oz a day or less  - Avoid dietary trigger  (aged cheese, peanuts, MSG, aspartame and nitrates)  - Patient is to have regular frequent meals to prevent headache onset     - Please drink at least 64 ounces of water a day to help remain hydrated      Please call with any questions or concerns  Office number is [phone]         History of Present Illness: We had the pleasure of evaluating Nikkie Bee in neurological follow up today for headaches  As you know,  he is a 37 y o  right handed  male  Patient was seen by Dr Leon Castanon and is here today for evaluation of headaches  He is on disability for OCD and Schizoaffective and is less paranoid now then before  OCD is still a problem       He lives at home with his father  who is also his POA and his father's partner (she is here today with him)        Past medical history:  QTC:  06/01/2019- 442  DM type 2 on insulin  Hypertension/Hyperlipidemia  Hypothyroidism  Obesity  GERD/gastritis  Schizoaffective, OCD, Paranoia symptoms  - psychiatrist - once monthly         Occipital neuralgia/cervicalgia/migraine headache without aura: What medications do you take or have you taken for your headaches?    Preventive therapy:   - magnesium 2 g infusion, Magnesium, Vitamin b2,   - Topiramate 100 mg  (mental fogginess and confusion), gabapentin (did not help), Depakote (significant improvement in headaches but patient is gaining weight), zonisamide  - amlodipine 5 mg, 2 5 mg, lisinopril 10 mg  - Depakote  - Flexeril 10 mg x1  - amantadine 100 mg b i d   - olanzapine 10 mg (weight gain), Geodon 60 mg, Asenapine,  Abortive Therapy:   - Tylenol 650 mg  - Decadron 10 mg, Decadron 1 mg  - Benadryl 25 mg once  - Ibuprofen 600mg - (takes every 6 hours for months - do not seem to help ), aspirin 81 mg, aspirin 325 mg, ibuprofen 600 mg ketorolac 30/60 mg mg, Excedrin migraine  - Imitrex 100 mg - (sometimes but not always )  - Fioricet 2 tabs,  - Reglan 10 mg, Phenergan 12 5 mg, Zofran 4 mg  - lorazepam 0 5 mg            What is your current pain level -  0/10      How often do the headaches occur?   Occipital headache: there all the time but still gets worse with head movement  Mod to severe headaches:  now at least 10 a month with the use of Depakote     What time of the day do the headaches start?    Occipital headache: any time with movement of head and neck  Mod to severe headache: typically wakes up with them or can occur anytime of the day or when he looks up or looks to the left or right  He states he can not lay down flat as when he does he has neck pain and that triggers off a headaches for him     How long do the headaches last?   Occipital headache: daily and when they are intense it seconds to minutes on both sides  Mod to severe headaches: 3 hours now and will take Excedrin first  Which works 80% of the time and if that fails then Imitrex which works 90% of the time  He is taking Excedrin daily now       Are you ever headache free? yes     Aura? without aura     Describe your usual headache pain quality? Occipital headache: dull and pressure, jabbing pain which is dull at time sharp for a minute  Mod to severe headaches: pressure      What is the intensity of pain?  Both the intensity is better  Occipital headache: 5/10  Mod to severe headaches: 5/10    Associated symptoms:   Nausea     vomiting   Insomnia         Problems with concentration, processing TV  Photophobia     Phonophobia      Prefer quiet, dark room       Light-headed or dizzy     Hands or feet tingle or feel numb/paresthesias        What make the headache worse? positional change     Headache triggers:  stress, neck Movement     Have you seen someone else for headaches or pain? Yes, Dr Indigo De Dios  Have you had trigger point injection performed and how often? No  Have you had Botox injection performed and how often? No   Have you had epidural injections or transforaminal injections performed? No  Have you used CBD or THC for your headaches and how often? No  Alternative therapies used in the past for headaches?          How much coffee do you drink: none  Water: 2 bottles water, not much, poweraide zero        Have you ever had any Brain imaging? yes     I personally reviewed these images  Recent laboratory data was reviewed    Medications and allergies were reviewed      01/13/2019:  MRA head:  Impression:  No evidence of dural venous thrombosis     01/13/2019:  MRI brain  IMPRESSION:Several tiny supratentorial white matter T2 and FLAIR hyperintense foci suspicious for mild chronic microangiopathic changes including the type which may accompany complicated migraine headaches   Medical correlation recommended  Estella Or findings not visible by CT    No acute ischemic disease identified by diffusion imaging   Apparent enlargement of 9 5 mm anterior superficial left parotid gland nodule   Biopsy or resection recommended for definitive histologic confirmation when clinically appropriate   Chronic opacification right maxillary sinus   Right ethmoid sinus disease has developed      08/09/2019- Mri c spine  IMPRESSION:   Cervical spondylitic degenerative change most pronounced at C5-6 where there is a central and right paracentral disc protrusion as well as left greater than right uncinate joint hypertrophic degenerative change resulting in moderate canal stenosis and   bilateral foraminal narrowing    There is left foraminal narrowing at the C3-4 and C4-5 level secondary to asymmetric uncinate joint hypertrophic degenerative change        Past Medical History:   Diagnosis Date    Chronic headaches     Diabetes mellitus ([facility] [address] )     Disease of thyroid gland     Esophagitis     Gastritis     Gastroparesis     GERD (gastroesophageal reflux disease)     Hypertension     Migraine     Obesity     Obsessive compulsive disorder     Psychiatric disorder     Schizoaffective disorder [facility])        Patient Active Problem List   Diagnosis    Type 2 diabetes mellitus, with long-term current use of insulin ([facility] )    Abdominal pain    Essential hypertension    Obsessive compulsive disorder    Schizoaffective disorder ([facility] )    Hypothyroidism    Morbid obesity with BMI of 50 0-59 9, adult ([facility])    Anxiety    Episodic confusion    Snoring    Insomnia    Hypersomnia    Vitamin D deficiency    Gastroesophageal reflux disease with esophagitis    Vomiting    Occipital neuralgia of left side    Cervicalgia    Nodule of parotid gland    Bipolar 1 disorder ([facility])    Depression    Acid reflux    Type 1 diabetes ([facility])    Urinary retention    Peripheral edema    Other hyperlipidemia    Migraine without aura and without status migrainosus, not intractable    Class 3 severe obesity due to excess calories with serious comorbidity and body mass index (BMI) of 60 0 to 69 9 in adult [facility])    Acute bronchitis due to other specified organisms    Spinal stenosis in cervical region       Medications:      Current Outpatient Medications   Medication Sig Dispense Refill    ACCU-CHEK FASTCLIX LANCETS MISC 4 (four) times a day Test  1    ACCU-CHEK GUIDE test strip 4 (four) times a day Test  1    ADMELOG SOLOSTAR 100 units/mL injection pen Inject 10 Units under the skin 3 (three) times a day with meals   0    albuterol (PROAIR HFA) 90 mcg/act inhaler Inhale 2 puffs every 6 (six) hours as needed for wheezing 8 5 g 1    aspirin-acetaminophen-caffeine (EXCEDRIN MIGRAINE) 250-250-65 MG per tablet Take 2 tablets by mouth as needed for headaches      atorvastatin (LIPITOR) 20 mg tablet Take 1 tablet (20 mg total) by mouth daily 30 tablet 3    Blood Glucose Monitoring Suppl (ACCU-CHEK GUIDE) w/Device KIT   0    cariprazine (VRAYLAR) 3 MG capsule Take 3 mg by mouth daily       dexamethasone (DECADRON) 2 mg tablet 1 at the onset of a severe headache with food (Patient taking differently: as needed 1 at the onset of a severe headache with food) 5 tablet 0    divalproex sodium (DEPAKOTE ER) 500 mg 24 hr tablet Twice a day (Patient taking differently: Take 500 mg by mouth 2 (two) times a day Twice a day) 60 tablet 3    fluvoxaMINE (LUVOX) 100 mg tablet Take 1 tablet (100 mg total) by mouth 2 (two) times a day 60 tablet 1    furosemide (LASIX) 20 mg tablet Take 1 tablet (20 mg total) by mouth daily 30 tablet 2    ibuprofen (MOTRIN) 200 mg tablet Take 400-600 mg by mouth as needed for mild pain      insulin glargine (BASAGLAR KWIKPEN) 100 units/mL injection pen Inject 40 Units under the skin daily at bedtime 5 pen 0    Insulin Pen Needle (PEN NEEDLES 3/16") 31G X 5 MM MISC by Does not apply route 4 (four) times a day 200 each 3    levothyroxine 100 mcg tablet Take 1 tablet (100 mcg total) by mouth daily (Patient taking differently: Take 125 mcg by mouth daily ) 30 tablet 3    LORazepam (ATIVAN) 0 5 mg tablet Take 1 tablet (0 5 mg total) by mouth 2 (two) times a day (Patient taking differently: Take 0 5 mg by mouth every 6 (six) hours as needed ) 60 tablet 0    losartan (COZAAR) 50 mg tablet Take 0 5 tablets (25 mg total) by mouth daily 30 tablet 3    pantoprazole (PROTONIX) 40 mg tablet Take 1 tablet (40 mg total) by mouth 2 (two) times a day 60 tablet 2    senna (SENOKOT) 8 6 mg Take 1 tablet (8 6 mg total) by mouth daily at bedtime as needed for constipation 30 each 0    SUMAtriptan (IMITREX) 100 mg tablet Take 1 tablet (100 mg total) by mouth once as needed for migraine 9 tablet 1    aspirin 81 mg chewable tablet Chew 1 tablet (81 mg total) daily Over the counter (Patient not taking: Reported on 8/28/2019)  0    sucralfate (CARAFATE) 1 g/10 mL suspension Take 10 mL (1,000 mg total) by mouth 4 (four) times a day (with meals and at bedtime) (Patient not taking: Reported on 8/28/2019) 420 mL 0    zonisamide (ZONEGRAN) 50 MG capsule 1 tab at bedtime for 7 days then 1 tab twice a day after that (Patient not taking: Reported on 8/28/2019) 60 capsule 3     No current facility-administered medications for this visit  Allergies:       Allergies   Allergen Reactions    Amoxicillin     Clavulanic Acid     Erythromycin        Family History:     Family History   Problem Relation Age of Onset   Andrade COPD Mother     Hypertension Mother     Heart failure Mother     Rheum arthritis Family     Heart disease Family     Hypertension Father     Diabetes Father     Hyperlipidemia Father        Social History:     Social History     Socioeconomic History    Marital status: Single     Spouse name: Not on file    Number of children: Not on file    Years of education: Not on file    Highest education level: Not on file   Occupational History    Not on file   Social Needs    Financial resource strain: Not on file    Food insecurity:     Worry: Not on file     Inability: Not on file    Transportation needs:     Medical: Not on file     Non-medical: Not on file   Tobacco Use    Smoking status: Never Smoker    Smokeless tobacco: Never Used   Substance and Sexual Activity    Alcohol use: No    Drug use: No    Sexual activity: Not Currently   Lifestyle    Physical activity:     Days per week: Not on file     Minutes per session: Not on file    Stress: Not on file   Relationships    Social connections:     Talks on phone: Not on file     Gets together: Not on file     Attends [employer] service: Not on file     Active member of club or organization: Not on file     Attends meetings of clubs or organizations: Not on file     Relationship status: Not on file    Intimate partner violence:     Fear of current or ex partner: Not on file     Emotionally abused: Not on file     Physically abused: Not on file     Forced sexual activity: Not on file   Other Topics Concern    Not on file   Social History Narrative    Not on file         Objective:   Physical Exam:                                                                   Vitals:            /77 (BP Location: Left arm, Patient Position: Sitting, Cuff Size: Large)   Pulse 95   Ht 5' 2" (1 575 m)   Wt (!) 152 kg (334 lb 12 8 oz)   BMI 61 24 kg/m²   BP Readings from Last 3 Encounters:   08/28/19 133/77   08/04/19 (!) 180/98   07/05/19 138/86     Pulse Readings from Last 3 Encounters:   08/28/19 95   08/04/19 86   07/05/19 80              CONSTITUTIONAL: Morbidly obese     Eyes:  PERRLA, EOM normal      Neck:  Normal ROM, neck supple  HEENT:  Normocephalic atraumatic  No meningismus  Oropharynx is clear and moist  No oral mucosal lesions  Chest:  Respirations regular and unlabored  Cardiovascular:  Distal extremities warm without palpable edema or tenderness, no observed significant swelling  Musculoskeletal:  Full range of motion  Skin:  warm and dry   Psychiatric:  Normal behavior and appropriate affect        Neurological Examination:   Mental status/cognitive function: Orientated to time, place and person  Cranial Nerves: 2 to 12 intact  Motor Exam:    5/5 upper extremity  5/5 lower extremity  Sensory: grossly intact light touch in all extremities     Reflexes:  Difficulty eliciting due to body mass  Coordination: Finger to nose intact bilaterally, no tremor noted  Gait:  Wide-based due to body mass, difficult tandem gait due to body mass, falling towards the right  Review of Systems:   Review of Systems   Constitutional: Positive for fatigue  HENT:        Phonophobia    Eyes: Positive for photophobia and pain  Dry Eye  Blurred Vision    Respiratory: Negative  Cardiovascular: Negative  Gastrointestinal: Positive for nausea  Endocrine: Negative  Genitourinary: Negative  Musculoskeletal: Positive for back pain and neck pain  Skin: Negative  Allergic/Immunologic: Negative  Neurological: Positive for light-headedness (Occasionally ) and headaches  Hematological: Negative  Psychiatric/Behavioral: Positive for confusion  The patient is nervous/anxious  I personally reviewed and updated the ROS that was entered by the medical assistant       I have spent 30 minutes with Patient  today in which greater than 50% of this time was spent in counseling/coordination of care regarding Diagnostic results, Prognosis, Risks and benefits of tx options, Intructions for management, Patient and family education, Importance of tx compliance, Risk factor reductions, Impressions and plan of care as above          Jason Holbrook MD 8/28/2019 12:27 PM

## 2019-08-28 ENCOUNTER — OFFICE VISIT (OUTPATIENT)
Dept: NEUROLOGY | Facility: CLINIC | Age: 39
End: 2019-08-28
Payer: COMMERCIAL

## 2019-08-28 VITALS
HEART RATE: 95 BPM | WEIGHT: 315 LBS | HEIGHT: 62 IN | SYSTOLIC BLOOD PRESSURE: 133 MMHG | DIASTOLIC BLOOD PRESSURE: 77 MMHG | BODY MASS INDEX: 57.97 KG/M2

## 2019-08-28 DIAGNOSIS — M48.02 SPINAL STENOSIS IN CERVICAL REGION: Primary | ICD-10-CM

## 2019-08-28 PROCEDURE — 99214 OFFICE O/P EST MOD 30 MIN: CPT | Performed by: PSYCHIATRY & NEUROLOGY

## 2019-08-28 RX ORDER — IBUPROFEN 200 MG
400-600 TABLET ORAL AS NEEDED
COMMUNITY
End: 2020-06-21 | Stop reason: HOSPADM

## 2019-08-28 RX ORDER — ACETAMINOPHEN, ASPIRIN AND CAFFEINE 250; 250; 65 MG/1; MG/1; MG/1
2 TABLET, FILM COATED ORAL DAILY
COMMUNITY
End: 2021-02-13 | Stop reason: HOSPADM

## 2019-08-29 ENCOUNTER — OFFICE VISIT (OUTPATIENT)
Dept: FAMILY MEDICINE CLINIC | Facility: CLINIC | Age: 39
End: 2019-08-29
Payer: COMMERCIAL

## 2019-08-29 VITALS
HEART RATE: 92 BPM | WEIGHT: 315 LBS | RESPIRATION RATE: 18 BRPM | BODY MASS INDEX: 57.97 KG/M2 | DIASTOLIC BLOOD PRESSURE: 92 MMHG | TEMPERATURE: 98.5 F | SYSTOLIC BLOOD PRESSURE: 152 MMHG | HEIGHT: 62 IN | OXYGEN SATURATION: 97 %

## 2019-08-29 DIAGNOSIS — R39.198 DIFFICULTY URINATING: Primary | ICD-10-CM

## 2019-08-29 DIAGNOSIS — Z79.4 TYPE 2 DIABETES MELLITUS WITH HYPERGLYCEMIA, WITH LONG-TERM CURRENT USE OF INSULIN (HCC): ICD-10-CM

## 2019-08-29 DIAGNOSIS — Z23 ENCOUNTER FOR IMMUNIZATION: ICD-10-CM

## 2019-08-29 DIAGNOSIS — N39.0 URINARY TRACT INFECTION WITHOUT HEMATURIA, SITE UNSPECIFIED: ICD-10-CM

## 2019-08-29 DIAGNOSIS — E11.65 TYPE 2 DIABETES MELLITUS WITH HYPERGLYCEMIA, WITH LONG-TERM CURRENT USE OF INSULIN (HCC): ICD-10-CM

## 2019-08-29 DIAGNOSIS — N48.89 PENILE PAIN: ICD-10-CM

## 2019-08-29 PROBLEM — E10.9 TYPE 1 DIABETES (HCC): Status: RESOLVED | Noted: 2019-05-30 | Resolved: 2019-08-29

## 2019-08-29 LAB
SL AMB  POCT GLUCOSE, UA: ABNORMAL
SL AMB LEUKOCYTE ESTERASE,UA: ABNORMAL
SL AMB POCT BILIRUBIN,UA: NEGATIVE
SL AMB POCT BLOOD,UA: NEGATIVE
SL AMB POCT CLARITY,UA: ABNORMAL
SL AMB POCT COLOR,UA: ABNORMAL
SL AMB POCT KETONES,UA: NEGATIVE
SL AMB POCT NITRITE,UA: NEGATIVE
SL AMB POCT PH,UA: 5
SL AMB POCT SPECIFIC GRAVITY,UA: 1.01
SL AMB POCT URINE PROTEIN: NEGATIVE
SL AMB POCT UROBILINOGEN: NORMAL

## 2019-08-29 PROCEDURE — 81001 URINALYSIS AUTO W/SCOPE: CPT | Performed by: FAMILY MEDICINE

## 2019-08-29 PROCEDURE — 87086 URINE CULTURE/COLONY COUNT: CPT | Performed by: FAMILY MEDICINE

## 2019-08-29 PROCEDURE — 81002 URINALYSIS NONAUTO W/O SCOPE: CPT | Performed by: FAMILY MEDICINE

## 2019-08-29 PROCEDURE — 99214 OFFICE O/P EST MOD 30 MIN: CPT | Performed by: FAMILY MEDICINE

## 2019-08-29 PROCEDURE — 87147 CULTURE TYPE IMMUNOLOGIC: CPT | Performed by: FAMILY MEDICINE

## 2019-08-29 RX ORDER — SULFAMETHOXAZOLE AND TRIMETHOPRIM 800; 160 MG/1; MG/1
1 TABLET ORAL EVERY 12 HOURS SCHEDULED
Qty: 14 TABLET | Refills: 0 | Status: SHIPPED | OUTPATIENT
Start: 2019-08-29 | End: 2019-09-05

## 2019-08-29 RX ORDER — CLOTRIMAZOLE AND BETAMETHASONE DIPROPIONATE 10; .64 MG/G; MG/G
CREAM TOPICAL 2 TIMES DAILY
Qty: 30 G | Refills: 0 | Status: SHIPPED | OUTPATIENT
Start: 2019-08-29 | End: 2019-11-20 | Stop reason: ALTCHOICE

## 2019-08-29 NOTE — ASSESSMENT & PLAN NOTE
Lab Results   Component Value Date    HGBA1C 9 5 (H) 05/29/2019       No results for input(s): POCGLU in the last 72 hours  Blood Sugar Average: Last 72 hrs:  Not well controlled  He has been following with endocrinologist   He was told he needs to increase his basal insulin 2 units every 2 days if his fasting glucose continued to be more than 120    He was told to contact his endocrinologist

## 2019-08-29 NOTE — PROGRESS NOTES
Assessment/Plan:  Difficulty urinating  Was given prescription for Bactrim and Lotrisone cream   I will send urine for UA and C&S  Referral to see urologist     Penile pain  Was given prescription for Lotrisone cream   Referral to see urologist     Type 2 diabetes mellitus, with long-term current use of insulin ([facility] [address] )  Lab Results   Component Value Date    HGBA1C 9 5 (H) 05/29/2019       No results for input(s): POCGLU in the last 72 hours  Blood Sugar Average: Last 72 hrs:  Not well controlled  He has been following with endocrinologist   He was told he needs to increase his basal insulin 2 units every 2 days if his fasting glucose continued to be more than 120  He was told to contact his endocrinologist          Diagnoses and all orders for this visit:    Difficulty urinating  -     POCT urine dip  -     sulfamethoxazole-trimethoprim (BACTRIM DS) 800-160 mg per tablet; Take 1 tablet by mouth every 12 (twelve) hours for 7 days  -     clotrimazole-betamethasone (LOTRISONE) 1-0 05 % cream; Apply topically 2 (two) times a day  -     Ambulatory referral to Urology; Future  -     UA (URINE) with reflex to Microscopic  -     Urine culture; Future  -     Urine culture    Penile pain    Urinary tract infection without hematuria, site unspecified  -     POCT urine dip  -     UA (URINE) with reflex to Microscopic  -     Urine culture; Future  -     Urine culture    Encounter for immunization  -     TDAP VACCINE GREATER THAN OR EQUAL TO 8YO IM    Type 2 diabetes mellitus with hyperglycemia, with long-term current use of insulin ([facility] )          There are no Patient Instructions on file for this visit  Return if symptoms worsen or fail to improve  Subjective:      Patient ID: Kaylen Vargas is a 37 y o  male  Chief Complaint   Patient presents with    Difficulty Urinating    Penis Pain     edema, erythema       He is here today with complaint of pain in his penis with difficulty urination    He denies any blood in the urine  He denies any fever or chills  He is not sexually active  The following portions of the patient's history were reviewed and updated as appropriate: allergies, current medications, past family history, past medical history, past social history, past surgical history and problem list     Review of Systems   Constitutional: Negative for chills and fever  HENT: Negative for trouble swallowing  Eyes: Negative for visual disturbance  Respiratory: Negative for cough and shortness of breath  Cardiovascular: Negative for chest pain and palpitations  Gastrointestinal: Negative for abdominal pain, blood in stool and vomiting  Endocrine: Negative for cold intolerance and heat intolerance  Genitourinary: Positive for discharge, dysuria, penile pain and penile swelling  Negative for difficulty urinating  Musculoskeletal: Negative for gait problem  Skin: Negative for rash  Neurological: Negative for dizziness, syncope and headaches  Hematological: Negative for adenopathy  Psychiatric/Behavioral: Negative for behavioral problems           Current Outpatient Medications   Medication Sig Dispense Refill    ACCU-CHEK FASTCLIX LANCETS MISC 4 (four) times a day Test  1    ACCU-CHEK GUIDE test strip 4 (four) times a day Test  1    ADMELOG SOLOSTAR 100 units/mL injection pen Inject 10 Units under the skin 3 (three) times a day with meals   0    albuterol (PROAIR HFA) 90 mcg/act inhaler Inhale 2 puffs every 6 (six) hours as needed for wheezing 8 5 g 1    aspirin-acetaminophen-caffeine (EXCEDRIN MIGRAINE) 250-250-65 MG per tablet Take 2 tablets by mouth as needed for headaches      atorvastatin (LIPITOR) 20 mg tablet Take 1 tablet (20 mg total) by mouth daily 30 tablet 3    Blood Glucose Monitoring Suppl (ACCU-CHEK GUIDE) w/Device KIT   0    cariprazine (VRAYLAR) 3 MG capsule Take 3 mg by mouth daily       dexamethasone (DECADRON) 2 mg tablet 1 at the onset of a severe headache with food (Patient taking differently: as needed 1 at the onset of a severe headache with food) 5 tablet 0    divalproex sodium (DEPAKOTE ER) 500 mg 24 hr tablet Twice a day (Patient taking differently: Take 500 mg by mouth 2 (two) times a day Twice a day) 60 tablet 3    fluvoxaMINE (LUVOX) 100 mg tablet Take 1 tablet (100 mg total) by mouth 2 (two) times a day 60 tablet 1    furosemide (LASIX) 20 mg tablet Take 1 tablet (20 mg total) by mouth daily 30 tablet 2    ibuprofen (MOTRIN) 200 mg tablet Take 400-600 mg by mouth as needed for mild pain      insulin glargine (BASAGLAR KWIKPEN) 100 units/mL injection pen Inject 40 Units under the skin daily at bedtime 5 pen 0    Insulin Pen Needle (PEN NEEDLES 3/16") 31G X 5 MM MISC by Does not apply route 4 (four) times a day 200 each 3    levothyroxine 100 mcg tablet Take 1 tablet (100 mcg total) by mouth daily (Patient taking differently: Take 125 mcg by mouth daily ) 30 tablet 3    LORazepam (ATIVAN) 0 5 mg tablet Take 1 tablet (0 5 mg total) by mouth 2 (two) times a day (Patient taking differently: Take 0 5 mg by mouth every 6 (six) hours as needed ) 60 tablet 0    losartan (COZAAR) 50 mg tablet Take 0 5 tablets (25 mg total) by mouth daily 30 tablet 3    pantoprazole (PROTONIX) 40 mg tablet Take 1 tablet (40 mg total) by mouth 2 (two) times a day 60 tablet 2    senna (SENOKOT) 8 6 mg Take 1 tablet (8 6 mg total) by mouth daily at bedtime as needed for constipation 30 each 0    SUMAtriptan (IMITREX) 100 mg tablet Take 1 tablet (100 mg total) by mouth once as needed for migraine 9 tablet 1    clotrimazole-betamethasone (LOTRISONE) 1-0 05 % cream Apply topically 2 (two) times a day 30 g 0    sulfamethoxazole-trimethoprim (BACTRIM DS) 800-160 mg per tablet Take 1 tablet by mouth every 12 (twelve) hours for 7 days 14 tablet 0     No current facility-administered medications for this visit          Objective:    /92 (BP Location: Left arm, Patient Position: Sitting, Cuff Size: Large)   Pulse 92   Temp 98 5 °F (36 9 °C) (Tympanic)   Resp 18   Ht 5' 2" (1 575 m)   Wt (!) 154 kg (339 lb 8 oz)   SpO2 97%   BMI 62 10 kg/m²        Physical Exam   Constitutional: He is oriented to person, place, and time  He appears well-developed and well-nourished  HENT:   Head: Normocephalic and atraumatic  Eyes: Pupils are equal, round, and reactive to light  EOM are normal    Neck: Normal range of motion  Neck supple  Cardiovascular: Normal rate, regular rhythm and normal heart sounds  Pulmonary/Chest: Effort normal and breath sounds normal    Abdominal: Soft  Bowel sounds are normal    Genitourinary: Testes normal  Penile erythema and penile tenderness present  Discharge found  Musculoskeletal: Normal range of motion  He exhibits no edema  Lymphadenopathy:     He has no cervical adenopathy  Neurological: He is alert and oriented to person, place, and time  No cranial nerve deficit  Skin: Skin is warm  No rash noted  Psychiatric: He has a normal mood and affect  Nursing note and vitals reviewed               Hawa Ibrahim MD

## 2019-08-29 NOTE — ASSESSMENT & PLAN NOTE
Was given prescription for Bactrim and Lotrisone cream   I will send urine for UA and C&S    Referral to see urologist

## 2019-08-30 LAB
BACTERIA UR QL AUTO: NORMAL /HPF
BILIRUB UR QL STRIP: NEGATIVE
CLARITY UR: CLEAR
COLOR UR: YELLOW
GLUCOSE UR STRIP-MCNC: ABNORMAL MG/DL
HGB UR QL STRIP.AUTO: NEGATIVE
HYALINE CASTS #/AREA URNS LPF: NORMAL /LPF
KETONES UR STRIP-MCNC: NEGATIVE MG/DL
LEUKOCYTE ESTERASE UR QL STRIP: ABNORMAL
NITRITE UR QL STRIP: NEGATIVE
NON-SQ EPI CELLS URNS QL MICRO: NORMAL /HPF
PH UR STRIP.AUTO: 6 [PH]
PROT UR STRIP-MCNC: NEGATIVE MG/DL
RBC #/AREA URNS AUTO: NORMAL /HPF
SP GR UR STRIP.AUTO: 1.01 (ref 1–1.03)
UROBILINOGEN UR QL STRIP.AUTO: 0.2 E.U./DL
WBC #/AREA URNS AUTO: NORMAL /HPF

## 2019-08-31 LAB — BACTERIA UR CULT: ABNORMAL

## 2019-09-30 ENCOUNTER — APPOINTMENT (OUTPATIENT)
Dept: LAB | Facility: IMAGING CENTER | Age: 39
End: 2019-09-30
Payer: COMMERCIAL

## 2019-09-30 ENCOUNTER — TRANSCRIBE ORDERS (OUTPATIENT)
Dept: ADMINISTRATIVE | Facility: HOSPITAL | Age: 39
End: 2019-09-30

## 2019-09-30 DIAGNOSIS — F25.0 SCHIZOAFFECTIVE DISORDER, BIPOLAR TYPE (HCC): Primary | ICD-10-CM

## 2019-09-30 DIAGNOSIS — F25.0 SCHIZOAFFECTIVE DISORDER, BIPOLAR TYPE (HCC): ICD-10-CM

## 2019-09-30 DIAGNOSIS — R60.9 PERIPHERAL EDEMA: ICD-10-CM

## 2019-09-30 LAB
ALBUMIN SERPL BCP-MCNC: 3.5 G/DL (ref 3.5–5)
ALP SERPL-CCNC: 81 U/L (ref 46–116)
ALT SERPL W P-5'-P-CCNC: 25 U/L (ref 12–78)
ANION GAP SERPL CALCULATED.3IONS-SCNC: 6 MMOL/L (ref 4–13)
AST SERPL W P-5'-P-CCNC: 13 U/L (ref 5–45)
BASOPHILS # BLD AUTO: 0.06 THOUSANDS/ΜL (ref 0–0.1)
BASOPHILS NFR BLD AUTO: 1 % (ref 0–1)
BILIRUB SERPL-MCNC: 0.4 MG/DL (ref 0.2–1)
BUN SERPL-MCNC: 26 MG/DL (ref 5–25)
CALCIUM SERPL-MCNC: 9.4 MG/DL (ref 8.3–10.1)
CHLORIDE SERPL-SCNC: 101 MMOL/L (ref 100–108)
CHOLEST SERPL-MCNC: 173 MG/DL (ref 50–200)
CO2 SERPL-SCNC: 28 MMOL/L (ref 21–32)
CREAT SERPL-MCNC: 1.08 MG/DL (ref 0.6–1.3)
EOSINOPHIL # BLD AUTO: 0.33 THOUSAND/ΜL (ref 0–0.61)
EOSINOPHIL NFR BLD AUTO: 3 % (ref 0–6)
ERYTHROCYTE [DISTWIDTH] IN BLOOD BY AUTOMATED COUNT: 13.3 % (ref 11.6–15.1)
EST. AVERAGE GLUCOSE BLD GHB EST-MCNC: 258 MG/DL
GFR SERPL CREATININE-BSD FRML MDRD: 85 ML/MIN/1.73SQ M
GLUCOSE P FAST SERPL-MCNC: 126 MG/DL (ref 65–99)
HBA1C MFR BLD: 10.6 % (ref 4.2–6.3)
HCT VFR BLD AUTO: 38.5 % (ref 36.5–49.3)
HDLC SERPL-MCNC: 51 MG/DL (ref 40–60)
HGB BLD-MCNC: 12.8 G/DL (ref 12–17)
IMM GRANULOCYTES # BLD AUTO: 0.07 THOUSAND/UL (ref 0–0.2)
IMM GRANULOCYTES NFR BLD AUTO: 1 % (ref 0–2)
LDLC SERPL CALC-MCNC: 99 MG/DL (ref 0–100)
LYMPHOCYTES # BLD AUTO: 2.39 THOUSANDS/ΜL (ref 0.6–4.47)
LYMPHOCYTES NFR BLD AUTO: 19 % (ref 14–44)
MCH RBC QN AUTO: 28.3 PG (ref 26.8–34.3)
MCHC RBC AUTO-ENTMCNC: 33.2 G/DL (ref 31.4–37.4)
MCV RBC AUTO: 85 FL (ref 82–98)
MONOCYTES # BLD AUTO: 0.78 THOUSAND/ΜL (ref 0.17–1.22)
MONOCYTES NFR BLD AUTO: 6 % (ref 4–12)
NEUTROPHILS # BLD AUTO: 9.01 THOUSANDS/ΜL (ref 1.85–7.62)
NEUTS SEG NFR BLD AUTO: 70 % (ref 43–75)
NONHDLC SERPL-MCNC: 122 MG/DL
NRBC BLD AUTO-RTO: 0 /100 WBCS
PLATELET # BLD AUTO: 332 THOUSANDS/UL (ref 149–390)
PMV BLD AUTO: 10.1 FL (ref 8.9–12.7)
POTASSIUM SERPL-SCNC: 3.9 MMOL/L (ref 3.5–5.3)
PROT SERPL-MCNC: 7.8 G/DL (ref 6.4–8.2)
RBC # BLD AUTO: 4.53 MILLION/UL (ref 3.88–5.62)
SODIUM SERPL-SCNC: 135 MMOL/L (ref 136–145)
TRIGL SERPL-MCNC: 117 MG/DL
WBC # BLD AUTO: 12.64 THOUSAND/UL (ref 4.31–10.16)

## 2019-09-30 PROCEDURE — 80053 COMPREHEN METABOLIC PANEL: CPT

## 2019-09-30 PROCEDURE — 83036 HEMOGLOBIN GLYCOSYLATED A1C: CPT

## 2019-09-30 PROCEDURE — 80061 LIPID PANEL: CPT

## 2019-09-30 PROCEDURE — 36415 COLL VENOUS BLD VENIPUNCTURE: CPT

## 2019-09-30 PROCEDURE — 85025 COMPLETE CBC W/AUTO DIFF WBC: CPT

## 2019-10-01 RX ORDER — FUROSEMIDE 20 MG/1
20 TABLET ORAL DAILY
Qty: 30 TABLET | Refills: 2 | Status: SHIPPED | OUTPATIENT
Start: 2019-10-01 | End: 2020-11-28 | Stop reason: HOSPADM

## 2019-10-02 ENCOUNTER — DOCTOR'S OFFICE (OUTPATIENT)
Dept: URBAN - METROPOLITAN AREA CLINIC 136 | Facility: CLINIC | Age: 39
Setting detail: OPHTHALMOLOGY
End: 2019-10-02
Payer: COMMERCIAL

## 2019-10-02 DIAGNOSIS — E11.3293: ICD-10-CM

## 2019-10-02 DIAGNOSIS — H43.393: ICD-10-CM

## 2019-10-02 DIAGNOSIS — H53.19: ICD-10-CM

## 2019-10-02 DIAGNOSIS — H25.13: ICD-10-CM

## 2019-10-02 DIAGNOSIS — H53.40: ICD-10-CM

## 2019-10-02 DIAGNOSIS — H40.013: ICD-10-CM

## 2019-10-02 PROCEDURE — 92134 CPTRZ OPH DX IMG PST SGM RTA: CPT | Performed by: OPHTHALMOLOGY

## 2019-10-02 PROCEDURE — 92014 COMPRE OPH EXAM EST PT 1/>: CPT | Performed by: OPHTHALMOLOGY

## 2019-10-02 ASSESSMENT — REFRACTION_CURRENTRX
OS_CYLINDER: SPHERE
OS_OVR_VA: 20/
OD_OVR_VA: 20/
OS_OVR_VA: 20/
OS_SPHERE: -2.75
OD_OVR_VA: 20/
OD_OVR_VA: 20/
OD_CYLINDER: SPHERE
OS_OVR_VA: 20/
OD_SPHERE: -2.75

## 2019-10-02 ASSESSMENT — CONFRONTATIONAL VISUAL FIELD TEST (CVF)
OD_FINDINGS: FULL
OS_FINDINGS: FULL

## 2019-10-02 ASSESSMENT — REFRACTION_MANIFEST
OS_VA3: 20/
OS_VA2: 20/
OS_VA1: 20/
OS_VA1: 20/
OS_VA2: 20/
OD_VA1: 20/
OD_VA2: 20/
OS_VA3: 20/
OD_VA3: 20/
OD_VA2: 20/
OD_VA1: 20/
OD_VA3: 20/
OU_VA: 20/
OU_VA: 20/

## 2019-10-02 ASSESSMENT — VISUAL ACUITY
OD_BCVA: 20/20
OS_BCVA: 20/20

## 2019-10-02 ASSESSMENT — SUPERFICIAL PUNCTATE KERATITIS (SPK)
OD_SPK: 2+
OS_SPK: 2+

## 2019-10-04 DIAGNOSIS — G43.119 INTRACTABLE MIGRAINE WITH AURA WITHOUT STATUS MIGRAINOSUS: ICD-10-CM

## 2019-10-04 RX ORDER — SUMATRIPTAN 100 MG/1
100 TABLET, FILM COATED ORAL ONCE AS NEEDED
Qty: 9 TABLET | Refills: 1 | Status: SHIPPED | OUTPATIENT
Start: 2019-10-04 | End: 2020-01-22

## 2019-10-04 NOTE — TELEPHONE ENCOUNTER
Pt called Rx line for med refill for sumatriptan 100 mg to be sent to [facility]   Pt last ov 8/28/19 next f/u 1/21/2020

## 2019-11-15 ENCOUNTER — HOSPITAL ENCOUNTER (EMERGENCY)
Facility: HOSPITAL | Age: 39
Discharge: HOME | End: 2019-11-15
Attending: EMERGENCY MEDICINE | Admitting: EMERGENCY MEDICINE
Payer: COMMERCIAL

## 2019-11-15 ENCOUNTER — TELEPHONE (OUTPATIENT)
Dept: NEUROLOGY | Facility: CLINIC | Age: 39
End: 2019-11-15

## 2019-11-15 VITALS
OXYGEN SATURATION: 93 % | DIASTOLIC BLOOD PRESSURE: 97 MMHG | TEMPERATURE: 97.9 F | WEIGHT: 315 LBS | BODY MASS INDEX: 62.1 KG/M2 | RESPIRATION RATE: 18 BRPM | SYSTOLIC BLOOD PRESSURE: 159 MMHG | HEART RATE: 89 BPM

## 2019-11-15 DIAGNOSIS — R51.9 RECURRENT HEADACHE: Primary | ICD-10-CM

## 2019-11-15 LAB — GLUCOSE SERPL-MCNC: 263 MG/DL (ref 65–140)

## 2019-11-15 PROCEDURE — 99284 EMERGENCY DEPT VISIT MOD MDM: CPT | Performed by: EMERGENCY MEDICINE

## 2019-11-15 PROCEDURE — 82948 REAGENT STRIP/BLOOD GLUCOSE: CPT

## 2019-11-15 PROCEDURE — 96365 THER/PROPH/DIAG IV INF INIT: CPT

## 2019-11-15 PROCEDURE — 99283 EMERGENCY DEPT VISIT LOW MDM: CPT

## 2019-11-15 PROCEDURE — 96375 TX/PRO/DX INJ NEW DRUG ADDON: CPT

## 2019-11-15 RX ORDER — MAGNESIUM SULFATE HEPTAHYDRATE 40 MG/ML
2 INJECTION, SOLUTION INTRAVENOUS ONCE
Status: COMPLETED | OUTPATIENT
Start: 2019-11-15 | End: 2019-11-15

## 2019-11-15 RX ORDER — METOCLOPRAMIDE HYDROCHLORIDE 5 MG/ML
10 INJECTION INTRAMUSCULAR; INTRAVENOUS ONCE
Status: COMPLETED | OUTPATIENT
Start: 2019-11-15 | End: 2019-11-15

## 2019-11-15 RX ORDER — DIPHENHYDRAMINE HYDROCHLORIDE 50 MG/ML
25 INJECTION INTRAMUSCULAR; INTRAVENOUS ONCE
Status: COMPLETED | OUTPATIENT
Start: 2019-11-15 | End: 2019-11-15

## 2019-11-15 RX ADMIN — METOCLOPRAMIDE 10 MG: 5 INJECTION, SOLUTION INTRAMUSCULAR; INTRAVENOUS at 15:35

## 2019-11-15 RX ADMIN — DIPHENHYDRAMINE HYDROCHLORIDE 25 MG: 50 INJECTION INTRAMUSCULAR; INTRAVENOUS at 15:35

## 2019-11-15 RX ADMIN — MAGNESIUM SULFATE HEPTAHYDRATE 2 G: 40 INJECTION, SOLUTION INTRAVENOUS at 16:09

## 2019-11-15 NOTE — DISCHARGE INSTRUCTIONS
DIAGNOSIS; RECURRENT HEADACHE    =- ACTIVITY AS TOLERATED     - PLEASE RETURN TO  THE ER FOR ANY RETURN FO HEADACHE OR ANY NEW/ WORSENING/CONCERNING SYMPTOMS TO YOU

## 2019-11-15 NOTE — TELEPHONE ENCOUNTER
Pt reports bad confusion, and a contastant headache in the back and sides of his head for the last week  Pt states the confusion continues to get worse, states he is having problem getting words out, he does not understand words on the TV- it takes him a while to process a sentance, and his thinking is very slow  Reports that he has had this in the past with his headaches that have "pressure and pushing"  Reports that he has tried imatrex which helps for 4 hours and then the pain returns  Pt reports that decaron does not really help  Pt # [phone] Ok to leave a detailed message

## 2019-11-15 NOTE — TELEPHONE ENCOUNTER
Called and advised pt of the below  He verbalized clear understanding of all instructions  Pt states that he is waiting for his dad  He plans to go to [facility] ed

## 2019-11-15 NOTE — ED PROVIDER NOTES
History  Chief Complaint   Patient presents with    Headache - Recurrent or Known Dx Migraines     pt c/o headache does have history of headaches  pt informed has confusion as well  called neuroligist and was told to come in for evaluation     39 YR MALE WITH RECURRENT HEADACHE DISORDER-- WITH  2 WEEKS OF POSTERIRO NECK/HEADACHE PRESSURE WITH PAIN RADIATING UP OVER SCALP BILATERALLY - WITH PHOToPHOBIA- N/V X 2-- - STATES WHEN HEADACHE IS BAd pt has difficulty concentrating and looses train of thought- al of these are not new-- no fevers- no head trauma-- pt states all of his medications are not helping--      History provided by:  Patient   used: No    Headache - Recurrent or Known Dx Migraines   Pain location:  Occipital  Associated symptoms: nausea, photophobia and vomiting    Associated symptoms: no abdominal pain, no diarrhea, no dizziness, no eye pain, no numbness, no seizures and no weakness        Prior to Admission Medications   Prescriptions Last Dose Informant Patient Reported? Taking?    ACCU-CHEK FASTCLIX LANCETS MISC  Self Yes No   Si (four) times a day Test   ACCU-CHEK GUIDE test strip  Self Yes No   Si (four) times a day Test   ADMELOG SOLOSTAR 100 units/mL injection pen  Self Yes No   Sig: Inject 10 Units under the skin 3 (three) times a day with meals    Blood Glucose Monitoring Suppl (ACCU-CHEK GUIDE) w/Device KIT  Self Yes No   Insulin Pen Needle (PEN NEEDLES 3/16") 31G X 5 MM MISC  Self No No   Sig: by Does not apply route 4 (four) times a day   LORazepam (ATIVAN) 0 5 mg tablet  Self No No   Sig: Take 1 tablet (0 5 mg total) by mouth 2 (two) times a day   Patient taking differently: Take 0 5 mg by mouth every 6 (six) hours as needed    SUMAtriptan (IMITREX) 100 mg tablet   No No   Sig: Take 1 tablet (100 mg total) by mouth once as needed for migraine   albuterol (PROAIR HFA) 90 mcg/act inhaler  Self No No   Sig: Inhale 2 puffs every 6 (six) hours as needed for wheezing aspirin-acetaminophen-caffeine ([facility]) 250-250-65 MG per tablet  Self Yes No   Sig: Take 2 tablets by mouth as needed for headaches   atorvastatin (LIPITOR) 20 mg tablet  Self No No   Sig: Take 1 tablet (20 mg total) by mouth daily   cariprazine (VRAYLAR) 3 MG capsule  Self Yes No   Sig: Take 3 mg by mouth daily    clotrimazole-betamethasone (LOTRISONE) 1-0 05 % cream   No No   Sig: Apply topically 2 (two) times a day   dexamethasone (DECADRON) 2 mg tablet  Self No No   Si at the onset of a severe headache with food   Patient taking differently: as needed 1 at the onset of a severe headache with food   divalproex sodium (DEPAKOTE ER) 500 mg 24 hr tablet  Self No No   Sig: Twice a day   Patient taking differently: Take 500 mg by mouth 2 (two) times a day Twice a day   fluvoxaMINE (LUVOX) 100 mg tablet  Self No No   Sig: Take 1 tablet (100 mg total) by mouth 2 (two) times a day   furosemide (LASIX) 20 mg tablet   No No   Sig: Take 1 tablet (20 mg total) by mouth daily   ibuprofen (MOTRIN) 200 mg tablet  Self Yes No   Sig: Take 400-600 mg by mouth as needed for mild pain   insulin glargine (BASAGLAR KWIKPEN) 100 units/mL injection pen  Self No No   Sig: Inject 40 Units under the skin daily at bedtime   levothyroxine 100 mcg tablet  Self No No   Sig: Take 1 tablet (100 mcg total) by mouth daily   Patient taking differently: Take 125 mcg by mouth daily    losartan (COZAAR) 50 mg tablet  Self No No   Sig: Take 0 5 tablets (25 mg total) by mouth daily   pantoprazole (PROTONIX) 40 mg tablet  Self No No   Sig: Take 1 tablet (40 mg total) by mouth 2 (two) times a day   senna (SENOKOT) 8 6 mg  Self No No   Sig: Take 1 tablet (8 6 mg total) by mouth daily at bedtime as needed for constipation      Facility-Administered Medications: None       Past Medical History:   Diagnosis Date    Chronic headaches     Diabetes mellitus (HCC)     Disease of thyroid gland     Esophagitis     Gastritis     Gastroparesis  GERD (gastroesophageal reflux disease)     Hypertension     Migraine     Obesity     Obsessive compulsive disorder     Psychiatric disorder     Schizoaffective disorder [facility])        Past Surgical History:   Procedure Laterality Date    ESOPHAGOGASTRODUODENOSCOPY N/A 1/15/2019    Procedure: ESOPHAGOGASTRODUODENOSCOPY (EGD); Surgeon: Barb Horton MD;  Location: [facility]; Service: Gastroenterology       Family History   Problem Relation Age of Onset    COPD Mother     Hypertension Mother     Heart failure Mother     Rheum arthritis Family     Heart disease Family     Hypertension Father     Diabetes Father     Hyperlipidemia Father      I have reviewed and agree with the history as documented  Social History     Tobacco Use    Smoking status: Never Smoker    Smokeless tobacco: Never Used   Substance Use Topics    Alcohol use: No    Drug use: No        Review of Systems   Constitutional: Negative  HENT: Negative  Eyes: Positive for photophobia  Negative for pain, discharge, redness, itching and visual disturbance  Respiratory: Negative  Cardiovascular: Negative  Gastrointestinal: Positive for nausea and vomiting  Negative for abdominal distention, abdominal pain, anal bleeding, blood in stool, constipation, diarrhea and rectal pain  Endocrine: Negative  Genitourinary: Negative  Musculoskeletal: Negative  Skin: Negative  Allergic/Immunologic: Negative  Neurological: Positive for headaches  Negative for dizziness, tremors, seizures, syncope, facial asymmetry, speech difficulty, weakness, light-headedness and numbness  Hematological: Negative  Psychiatric/Behavioral: Positive for confusion  Negative for agitation, behavioral problems, decreased concentration, dysphoric mood, hallucinations, self-injury, sleep disturbance and suicidal ideas  The patient is not nervous/anxious and is not hyperactive           NOTE- CONFUSION IS MORE  LOSING TRAIN OF THOUGHT AND DIFFICULTY CONCENTRATING WHEN HEADACHES ARE BD       Physical Exam  Physical Exam   Constitutional: He is oriented to person, place, and time  He appears well-developed and well-nourished  No distress  htnsive- avss- pulse ox  98 % on ra- interpretation is normal- no intervention - in nad   HENT:   Head: Normocephalic and atraumatic  Eyes: Pupils are equal, round, and reactive to light  Conjunctivae and EOM are normal  Right eye exhibits no discharge  Left eye exhibits no discharge  No scleral icterus  Neck: Normal range of motion  Neck supple  No JVD present  No tracheal deviation present  No thyromegaly present  Cardiovascular: Normal rate, regular rhythm, normal heart sounds and intact distal pulses  Exam reveals no gallop and no friction rub  No murmur heard  Pulmonary/Chest: Effort normal and breath sounds normal  No stridor  No respiratory distress  He has no wheezes  He has no rales  He exhibits no tenderness  Abdominal: Soft  Bowel sounds are normal  He exhibits no distension and no mass  There is no tenderness  There is no rebound and no guarding  No hernia  Musculoskeletal: Normal range of motion  He exhibits no edema, tenderness or deformity  Lymphadenopathy:     He has no cervical adenopathy  Neurological: He is alert and oriented to person, place, and time  No cranial nerve deficit or sensory deficit  He exhibits normal muscle tone  Coordination normal    Skin: Skin is warm  Capillary refill takes less than 2 seconds  No rash noted  He is not diaphoretic  No erythema  No pallor  Psychiatric: He has a normal mood and affect  His behavior is normal    Nursing note and vitals reviewed        Vital Signs  ED Triage Vitals   Temperature Pulse Respirations Blood Pressure SpO2   11/15/19 1445 11/15/19 1445 11/15/19 1445 11/15/19 1449 11/15/19 1445   97 9 °F (36 6 °C) 89 18 (!) 194/105 98 %      Temp Source Heart Rate Source Patient Position - Orthostatic VS BP Location FiO2 (%)   11/15/19 [address] [place]  11/15/19 1445 11/15/19 1445 11/15/19 1445 --   Oral Monitor Sitting Right arm       Pain Score       11/15/19 1445       5           Vitals:    11/15/19 1445 11/15/19 1449   BP:  (!) 194/105   Pulse: 89    Patient Position - Orthostatic VS: Sitting          Visual Acuity      ED Medications  Medications   metoclopramide (REGLAN) injection 10 mg (has no administration in time range)   diphenhydrAMINE (BENADRYL) injection 25 mg (has no administration in time range)   magnesium sulfate 2 g/50 mL IVPB (premix) 2 g (has no administration in time range)       Diagnostic Studies  Results Reviewed     Procedure Component Value Units Date/Time    Fingerstick Glucose (POCT) [[id number]]  (Abnormal) Collected:  11/15/19 1513    Lab Status:  Final result Updated:  11/15/19 1515     POC Glucose 263 mg/dl                  No orders to display              Procedures  Procedures       ED Course  ED Course as of Nov 15 1931   Fri Nov 15, 2019   1621 - ER MD NOTE- PT- RE-EVALUATED- FEELS IMPROVED- WILL AWAIT [facility]       49 - ER MD NOTE- PT - RE-EVALUATED- FEELS MUCH IMPROVED- WANTS TO GO HOME- WILL D /C                                  MDM    Disposition  Final diagnoses:   None     ED Disposition     None      Follow-up Information    None         Patient's Medications   Discharge Prescriptions    No medications on file     No discharge procedures on file      ED Provider  Electronically Signed by           Ever Balderas MD  11/15/19 6071

## 2019-11-19 NOTE — PROGRESS NOTES
[facility]  PATIENT:  Jalen Lui  MRN:  [id number]  :  1980  DATE OF SERVICE:  2019      Assessment/Plan:          Problem List Items Addressed This Visit        Cardiovascular and Mediastinum    Chronic migraine without aura without status migrainosus, not intractable - Primary       Other    Occipital neuralgia of left side    Cervicalgia    Spinal stenosis in cervical region          Schizoaffective disorder currently on Luvox 100 mg twice a day for his schizoaffective disorder     Peripheral edema: Lasix 20 mg twice a day      Chronic migraine headaches with and without aura  Medication overuse headaches  Bilateral occipital neuralgia  Preventive therapy for headaches:  will not be make any changes today in regards to his headaches  I told patient that he needs to keep a headache diary  Headache diary given to the patient today  Patient wrote on his note today that he was doing better  But upon questioning it seemed not much had changed   - Depakote will decrease to 500 mg at bedtime for 1 week then stop  -   will apply for Botox  - patient is on losartan 50 mg once a day for his blood pressure-which should help with headaches as well  Abortive therapy for headaches:   - Excedrin 2 tabs twice a day daily for 6 weeks  Prior to this patient was taking Excedrin 2 tab once a day daily for 6 months or more  - at the onset of a migraine headache patient is taking Excedrin migraine which tends to abort the headache 60% the time if this fails he is taking Imitrex 100 mg which aborts the headache 80% the time   If this fails I will have him take Decadron 2 mg with food      Parotid gland nodule:  Need to see ENT     Cervicalgia with right arm pain:  - will see pain management and surgery      Headache management instructions  - When patient has a moderate to severe headache, they should seek rest, initiate relaxation and apply cold compresses to the head     - Maintain regular sleep schedule  Adults need at least 7-8 hours of uninterrupted a night  - Limit over the counter medications such as Tylenol, Ibuprofen, Aleve, Excedrin  (No more than 2- 3 times a week or max 10 a month)  - Maintain headache diary   Free FOREIGN for a smart phone, which can be used is "Migraine ellen"  - Limit caffeine to 1-2 cups 8 to 16 oz a day or less  - Avoid dietary trigger  (aged cheese, peanuts, MSG, aspartame and nitrates)  - Patient is to have regular frequent meals to prevent headache onset     - Please drink at least 64 ounces of water a day to help remain hydrated      Please call with any questions or concerns  Office number is [phone]        History of Present Illness: We had the pleasure of evaluating Igor Kaur in neurological follow up today for headaches   As you know,  he is a 39 y o  right handed  male   Patient was seen by Dr Lauryn Urbina and Gustabo Maxwell today for evaluation of headaches  He is on disability for OCD and Schizoaffective and is less paranoid now then before  OCD is still a problem       He lives at home with his father  who is also his POA and his father's partner (she is here today with him)      Current medical illnesses:  QTC:  06/01/2019- 442  DM type 2 on insulin  Hypertension/Hyperlipidemia  Hypothyroidism  Obesity  GERD/gastritis  Schizoaffective, OCD, Paranoia symptoms  - psychiatrist - once monthly      Occipital neuralgia/cervicalgia/migraine headache without aura: What medications do you take or have you taken for your headaches?    Preventive therapy:   - magnesium 2 g infusion, Magnesium, Vitamin b2,   - Topiramate 100 mg  (mental fogginess and confusion), gabapentin (did not help), Depakote (significant improvement in headaches but patient is gaining weight), zonisamide  - amlodipine 5 mg, 2 5 mg, lisinopril 10 mg, unable to take propanolol due to asthma  - Flexeril 10 mg x1  - amantadine 100 mg b i d   - olanzapine 10 mg (weight gain), Abilify, Wellbutrin, Geodon 60 mg, Asenapine,   - amitriptyline, Zoloft, Paxil  Abortive Therapy:   - Tylenol 650 mg  - Decadron 10 mg, Decadron 1 mg  - Benadryl 25 mg once  - Ibuprofen 600mg - (takes every 6 hours for months - do not seem to help ), aspirin 81 mg, aspirin 325 mg, ibuprofen 600 mg ketorolac 30/60 mg mg, Excedrin migraine  - Imitrex 100 mg - (sometimes but not always )  - Fioricet 2 tabs,  - Reglan 10 mg, Phenergan 12 5 mg, Zofran 4 mg  - lorazepam 0 5 mg         What is your current pain level -  6/10 patient was given Compazine 10 mg which brought the pain down to 2 to 3/10     How often do the headaches occur?  More than 15 headaches per month  Occipital headache: there all the time but still gets worse with head movement  Mod to severe headaches:  Over the last 6 weeks that have been daily     What time of the day do the headaches start?    Occipital headache: any time with movement of head and neck  Mod to severe headache: typically wakes up with them or can occur anytime of the day or when he looks up or looks to the left or right  He states he can not lay down flat as when he does he has neck pain and that triggers off a headaches for him     How long do the headaches last?  More than 4 hours per day  Occipital headache: daily and when they are intense it seconds to minutes on both sides  Mod to severe headaches: 3 hours now and will take Excedrin first  Which works 80% of the time and if that fails then [address] works 90% of the time  He is taking Excedrin daily now       Are you ever headache free? yes     Aura? without aura     Describe your usual headache pain quality?   Occipital headache: dull and pressure, jabbing pain which is dull at time sharp for a minute  Mod to severe headaches: pressure      What is the intensity of pain? Both the intensity is better  Occipital headache: 5/10  Mod to severe headaches: 5/10      Associated symptoms:   Nausea     vomiting   Insomnia         Problems with concentration, processing TV  Photophobia     Phonophobia      Prefer quiet, dark room       Light-headed or dizzy     Hands or feet tingle or feel numb/paresthesias        What make the headache worse? positional change     Headache triggers:  stress, neck Movement     Have you seen someone else for headaches or pain? Yes, Dr Kristen Tao  Have you had trigger point injection performed and how often? No  Have you had Botox injection performed and how often? No   Have you had epidural injections or transforaminal injections performed? No  Have you used CBD or THC for your headaches and how often? No  Alternative therapies used in the past for headaches?          How much coffee do you drink: none  Water: 2 bottles water, not much, poweraide zero        Have you ever had any Brain imaging?   yes     I personally reviewed these images  Recent laboratory data was reviewed    Medications and allergies were reviewed      01/13/2019:  MRA head:  Impression:  No evidence of dural venous thrombosis     01/13/2019:  MRI brain  IMPRESSION:Several tiny supratentorial white matter T2 and FLAIR hyperintense foci suspicious for mild chronic microangiopathic changes including the type which may accompany complicated migraine headaches   Medical correlation recommended  Nimco Demetris findings not visible by CT    No acute ischemic disease identified by diffusion imaging   Apparent enlargement of 9 5 mm anterior superficial left parotid gland nodule   Biopsy or resection recommended for definitive histologic confirmation when clinically appropriate   Chronic opacification right maxillary sinus   Right ethmoid sinus disease has developed      08/09/2019- Mri c spine  IMPRESSION:   Cervical spondylitic degenerative change most pronounced at C5-6 where there is a central and right paracentral disc protrusion as well as left greater than right uncinate joint hypertrophic degenerative change resulting in moderate canal stenosis and   bilateral foraminal narrowing    There is left foraminal narrowing at the C3-4 and C4-5 level secondary to asymmetric uncinate joint hypertrophic degenerative change        Past Medical History:   Diagnosis Date    Chronic headaches     Diabetes mellitus ([place] [address] )     Disease of thyroid gland     Esophagitis     Gastritis     Gastroparesis     GERD (gastroesophageal reflux disease)     Hypertension     Migraine     Obesity     Obsessive compulsive disorder     Psychiatric disorder     Schizoaffective disorder [facility])        Patient Active Problem List   Diagnosis    Type 2 diabetes mellitus, with long-term current use of insulin ([place] [address] )    Abdominal pain    Essential hypertension    Obsessive compulsive disorder    Schizoaffective disorder ([place] [address] )    Hypothyroidism    Morbid obesity with BMI of 50 0-59 9, adult ([place] [address] )    Anxiety    Episodic confusion    Snoring    Insomnia    Hypersomnia    Vitamin D deficiency    Gastroesophageal reflux disease with esophagitis    Vomiting    Occipital neuralgia of left side    Cervicalgia    Nodule of parotid gland    Bipolar 1 disorder (HCC)    Depression    Acid reflux    Urinary retention    Peripheral edema    Other hyperlipidemia    Migraine without aura and without status migrainosus, not intractable    Class 3 severe obesity due to excess calories with serious comorbidity and body mass index (BMI) of 60 0 to 69 9 in adult [facility])    Acute bronchitis due to other specified organisms    Spinal stenosis in cervical region    Difficulty urinating    Urinary tract infection without hematuria    Penile pain    Chronic migraine without aura without status migrainosus, not intractable       Medications:      Current Outpatient Medications   Medication Sig Dispense Refill    ACCU-CHEK FASTCLIX LANCETS MISC 4 (four) times a day Test  1    ACCU-CHEK GUIDE test strip 4 (four) times a day Test  1    ADMELOG SOLOSTAR 100 units/mL injection pen Inject 10 Units under the skin 3 (three) times a day with meals   0    albuterol (PROAIR HFA) 90 mcg/act inhaler Inhale 2 puffs every 6 (six) hours as needed for wheezing 8 5 g 1    aspirin-acetaminophen-caffeine (EXCEDRIN MIGRAINE) 250-250-65 MG per tablet Take 2 tablets by mouth as needed for headaches      Blood Glucose Monitoring Suppl (ACCU-CHEK GUIDE) w/Device KIT   0    dexamethasone (DECADRON) 2 mg tablet 1 at the onset of a severe headache with food (Patient taking differently: as needed 1 at the onset of a severe headache with food) 5 tablet 0    divalproex sodium (DEPAKOTE ER) 500 mg 24 hr tablet Twice a day (Patient taking differently: Take 500 mg by mouth 2 (two) times a day Twice a day) 60 tablet 3    furosemide (LASIX) 20 mg tablet Take 1 tablet (20 mg total) by mouth daily 30 tablet 2    ibuprofen (MOTRIN) 200 mg tablet Take 400-600 mg by mouth as needed for mild pain      insulin glargine (BASAGLAR KWIKPEN) 100 units/mL injection pen Inject 40 Units under the skin daily at bedtime 5 pen 0    Insulin Pen Needle (PEN NEEDLES 3/16") 31G X 5 MM MISC by Does not apply route 4 (four) times a day 200 each 3    levothyroxine 100 mcg tablet Take 1 tablet (100 mcg total) by mouth daily (Patient taking differently: Take 100 mcg by mouth daily ) 30 tablet 3    levothyroxine 25 mcg tablet Take 25 mcg by mouth daily      LORazepam (ATIVAN) 0 5 mg tablet Take 1 tablet (0 5 mg total) by mouth 2 (two) times a day (Patient taking differently: Take 0 5 mg by mouth every 6 (six) hours as needed ) 60 tablet 0    losartan (COZAAR) 50 mg tablet Take 0 5 tablets (25 mg total) by mouth daily 30 tablet 3    pantoprazole (PROTONIX) 40 mg tablet Take 1 tablet (40 mg total) by mouth 2 (two) times a day 60 tablet 2    SUMAtriptan (IMITREX) 100 mg tablet Take 1 tablet (100 mg total) by mouth once as needed for migraine 9 tablet 1    atorvastatin (LIPITOR) 20 mg tablet Take 1 tablet (20 mg total) by mouth daily (Patient not taking: Reported on 11/20/2019) 30 tablet 3    cariprazine (VRAYLAR) 3 MG capsule Take 3 mg by mouth daily       fluvoxaMINE (LUVOX) 100 mg tablet Take 1 tablet (100 mg total) by mouth 2 (two) times a day (Patient not taking: Reported on 11/20/2019) 60 tablet 1    senna (SENOKOT) 8 6 mg Take 1 tablet (8 6 mg total) by mouth daily at bedtime as needed for constipation (Patient not taking: Reported on 11/20/2019) 30 each 0     No current facility-administered medications for this visit  Allergies:       Allergies   Allergen Reactions    Amoxicillin Diarrhea    Clavulanic Acid Diarrhea    Erythromycin Diarrhea       Family History:     Family History   Problem Relation Age of Onset   Fortunato Rosita COPD Mother     Hypertension Mother     Heart failure Mother     Rheum arthritis Family     Heart disease Family     Hypertension Father     Diabetes Father     Hyperlipidemia Father        Social History:     Social History     Socioeconomic History    Marital status: Single     Spouse name: Not on file    Number of children: Not on file    Years of education: Not on file    Highest education level: Not on file   Occupational History    Not on file   Social Needs    Financial resource strain: Not on file    Food insecurity:     Worry: Not on file     Inability: Not on file    Transportation needs:     Medical: Not on file     Non-medical: Not on file   Tobacco Use    Smoking status: Never Smoker    Smokeless tobacco: Never Used   Substance and Sexual Activity    Alcohol use: No    Drug use: No    Sexual activity: Not Currently   Lifestyle    Physical activity:     Days per week: Not on file     Minutes per session: Not on file    Stress: Not on file   Relationships    Social connections:     Talks on phone: Not on file     Gets together: Not on file     Attends Zoroastrianism service: Not on file     Active member of club or organization: Not on file     Attends meetings of clubs or organizations: Not on file     Relationship status: Not on file    Intimate partner violence:     Fear of current or ex partner: Not on file     Emotionally abused: Not on file     Physically abused: Not on file     Forced sexual activity: Not on file   Other Topics Concern    Not on file   Social History Narrative    Not on file         Objective:   Physical Exam:                                                                   Vitals:            /84 (BP Location: Left arm, Patient Position: Sitting, Cuff Size: Standard)   Pulse 88   Ht 5' 2" (1 575 m)   Wt (!) 155 kg (342 lb 4 8 oz)   BMI 62 61 kg/m²   BP Readings from Last 3 Encounters:   11/20/19 142/84   11/15/19 159/97   08/29/19 152/92     Pulse Readings from Last 3 Encounters:   11/20/19 88   11/15/19 89   08/29/19 92              CONSTITUTIONAL: Morbidly obese     Eyes:  PERRLA, EOM normal      Neck:  Normal ROM, neck supple  HEENT:  Normocephalic atraumatic  No meningismus  Oropharynx is clear and moist  No oral mucosal lesions  Chest:  Respirations regular and unlabored  Cardiovascular:  Distal extremities warm without palpable edema or tenderness, no observed significant swelling  Musculoskeletal:  Full range of motion  Skin:  warm and dry   Psychiatric:  Normal behavior and appropriate affect      Neurological Examination:   Mental status/cognitive function: Orientated to time, place and person  Cranial Nerves: 2 to 12 intact  Motor Exam:    5/5 upper extremity  5/5 lower extremity  Sensory: grossly intact light touch in all extremities  Reflexes:  Difficulty eliciting due to body mass  Coordination: Finger to nose intact bilaterally, no tremor noted  Gait:  Wide-based due to body mass, difficult tandem gait due to body mass, falling towards the right  Review of Systems:   Review of Systems   Constitutional: Positive for fatigue  HENT: Negative  Eyes: Positive for visual disturbance  Respiratory: Negative  Cardiovascular: Positive for chest pain  Gastrointestinal: Positive for nausea  Endocrine: Negative  Genitourinary: Negative  Musculoskeletal: Positive for neck pain  Skin: Negative  Allergic/Immunologic: Negative  Neurological: Positive for light-headedness and headaches  Hematological: Negative  Psychiatric/Behavioral: Positive for confusion, decreased concentration and sleep disturbance (Trouble staying asleep )  The patient is nervous/anxious  I personally reviewed and updated the ROS that was entered by the medical assistant       I have spent 30 minutes with Patient  today in which greater than 50% of this time was spent in counseling/coordination of care regarding Diagnostic results, Prognosis, Risks and benefits of tx options, Intructions for management, Patient and family education, Importance of tx compliance, Risk factor reductions, Impressions and Plan of care as above        Author:  Lesley Madden MD 11/20/2019 1:39 PM

## 2019-11-20 ENCOUNTER — OFFICE VISIT (OUTPATIENT)
Dept: NEUROLOGY | Facility: CLINIC | Age: 39
End: 2019-11-20
Payer: COMMERCIAL

## 2019-11-20 VITALS
DIASTOLIC BLOOD PRESSURE: 84 MMHG | HEIGHT: 62 IN | BODY MASS INDEX: 57.97 KG/M2 | WEIGHT: 315 LBS | SYSTOLIC BLOOD PRESSURE: 142 MMHG | HEART RATE: 88 BPM

## 2019-11-20 DIAGNOSIS — G43.709 CHRONIC MIGRAINE WITHOUT AURA WITHOUT STATUS MIGRAINOSUS, NOT INTRACTABLE: Primary | ICD-10-CM

## 2019-11-20 DIAGNOSIS — M54.81 OCCIPITAL NEURALGIA OF LEFT SIDE: ICD-10-CM

## 2019-11-20 DIAGNOSIS — M54.2 CERVICALGIA: ICD-10-CM

## 2019-11-20 DIAGNOSIS — M48.02 SPINAL STENOSIS IN CERVICAL REGION: ICD-10-CM

## 2019-11-20 PROCEDURE — 99214 OFFICE O/P EST MOD 30 MIN: CPT | Performed by: PSYCHIATRY & NEUROLOGY

## 2019-11-20 RX ORDER — LEVOTHYROXINE SODIUM 0.03 MG/1
25 TABLET ORAL DAILY
COMMUNITY
End: 2020-01-22 | Stop reason: SDUPTHER

## 2019-11-20 RX ORDER — PROCHLORPERAZINE EDISYLATE 5 MG/ML
10 INJECTION INTRAMUSCULAR; INTRAVENOUS EVERY 4 HOURS PRN
Status: DISCONTINUED | OUTPATIENT
Start: 2019-11-20 | End: 2020-06-21 | Stop reason: HOSPADM

## 2019-11-20 RX ORDER — PROCHLORPERAZINE EDISYLATE 5 MG/ML
10 INJECTION INTRAMUSCULAR; INTRAVENOUS ONCE
Status: CANCELLED | OUTPATIENT
Start: 2019-11-20

## 2019-12-02 ENCOUNTER — TELEPHONE (OUTPATIENT)
Dept: NEUROLOGY | Facility: CLINIC | Age: 39
End: 2019-12-02

## 2019-12-02 NOTE — TELEPHONE ENCOUNTER
----- Message from Jayna Gonzalez MD sent at 11/20/2019  1:37 PM EST -----  Need Botox 200 units for patient's chronic migraine headaches    Thank you

## 2019-12-03 NOTE — TELEPHONE ENCOUNTER
Lawanda,    Please schedule a new start Botox appointment with Dr Willie Chen  It will be our stock  I spoke with [facility] yesterday in regards to getting this patient in this week  She said Dr Willie Chen had an opening on Thursday   If the patient accepts this appointment please let me know so I can attach the referral     Thanks    Keerthi Babin

## 2019-12-03 NOTE — TELEPHONE ENCOUNTER
Approval letter received from [employer]  Letter scanned into the patient's chart under "media" for future reference       Authorization information:    Authorization #: [id number]  Valid dates: 12/4/2018 until 12/4/2020- valid for 4 visits (up to 800 units)  Please use our stock

## 2019-12-26 ENCOUNTER — TELEPHONE (OUTPATIENT)
Dept: NEUROSURGERY | Facility: CLINIC | Age: 39
End: 2019-12-26

## 2019-12-26 NOTE — TELEPHONE ENCOUNTER
12/26/2019-CALLED PT TO RESCHEDULED TODAY'S "NO SHOW" APT, NO ANSWER, AND MAIL BOX IS FULL (COULD NOT LEAVE MESSAGE)

## 2020-01-11 ENCOUNTER — OFFICE VISIT (OUTPATIENT)
Dept: URGENT CARE | Facility: HOSPITAL | Age: 40
End: 2020-01-11
Payer: COMMERCIAL

## 2020-01-11 VITALS
RESPIRATION RATE: 18 BRPM | HEART RATE: 107 BPM | HEIGHT: 62 IN | TEMPERATURE: 97.6 F | DIASTOLIC BLOOD PRESSURE: 82 MMHG | BODY MASS INDEX: 57.97 KG/M2 | WEIGHT: 315 LBS | OXYGEN SATURATION: 99 % | SYSTOLIC BLOOD PRESSURE: 156 MMHG

## 2020-01-11 DIAGNOSIS — R06.2 WHEEZING: Primary | ICD-10-CM

## 2020-01-11 DIAGNOSIS — J20.9 ACUTE BRONCHITIS, UNSPECIFIED ORGANISM: ICD-10-CM

## 2020-01-11 PROCEDURE — 99203 OFFICE O/P NEW LOW 30 MIN: CPT | Performed by: PHYSICIAN ASSISTANT

## 2020-01-11 PROCEDURE — G0382 LEV 3 HOSP TYPE B ED VISIT: HCPCS | Performed by: PHYSICIAN ASSISTANT

## 2020-01-11 PROCEDURE — 99283 EMERGENCY DEPT VISIT LOW MDM: CPT | Performed by: PHYSICIAN ASSISTANT

## 2020-01-11 PROCEDURE — 94640 AIRWAY INHALATION TREATMENT: CPT | Performed by: PHYSICIAN ASSISTANT

## 2020-01-11 RX ORDER — BENZONATATE 100 MG/1
100 CAPSULE ORAL 3 TIMES DAILY PRN
Qty: 30 CAPSULE | Refills: 0 | Status: SHIPPED | OUTPATIENT
Start: 2020-01-11 | End: 2020-11-28 | Stop reason: HOSPADM

## 2020-01-11 RX ORDER — ALBUTEROL SULFATE 90 UG/1
2 AEROSOL, METERED RESPIRATORY (INHALATION) 4 TIMES DAILY
Qty: 8 G | Refills: 0 | Status: SHIPPED | OUTPATIENT
Start: 2020-01-11 | End: 2021-01-12 | Stop reason: SDUPTHER

## 2020-01-11 RX ORDER — IPRATROPIUM BROMIDE AND ALBUTEROL SULFATE 2.5; .5 MG/3ML; MG/3ML
3 SOLUTION RESPIRATORY (INHALATION) ONCE
Status: COMPLETED | OUTPATIENT
Start: 2020-01-11 | End: 2020-01-11

## 2020-01-11 RX ADMIN — IPRATROPIUM BROMIDE AND ALBUTEROL SULFATE 3 ML: 2.5; .5 SOLUTION RESPIRATORY (INHALATION) at 14:28

## 2020-01-11 NOTE — PROGRESS NOTES
[facility]        NAME: Cha Villalobos is a 37 y o  male  : 1980    MRN: [id number]  DATE: 2020  TIME: 2:41 PM    Assessment and Plan   Wheezing [R06 2]  1  Wheezing  ipratropium-albuterol (DUO-NEB) 0 5-2 5 mg/3 mL inhalation solution 3 mL    albuterol (PROVENTIL HFA,VENTOLIN HFA) 90 mcg/act inhaler    benzonatate (TESSALON PERLES) 100 mg capsule   2  Acute bronchitis, unspecified organism  albuterol (PROVENTIL HFA,VENTOLIN HFA) 90 mcg/act inhaler    benzonatate (TESSALON PERLES) 100 mg capsule         Patient Instructions     Patient Instructions     Improved after nebulizer  No fever  Pulse ox adequate  Lungs are dry  Viral bronchitis  Follow up with PCP in 3-5 days  Proceed to  ER if symptoms worsen  Chief Complaint     Chief Complaint   Patient presents with    Cough     pt states he has been feeling sick for 1 week   Shortness of Breath         History of Present Illness         37year-old male complains of 5 days of cough and congestion now shortness of breath and chest tightness  History of inhaler use but no diagnosis of asthma  He is out of the inhaler  He has been taking cold meds over-the-counter  No nausea vomiting  No fever  No sinus pain or pressure  Sore throat when he coughs  Review of Systems   Review of Systems   Constitutional: Negative for chills and fever  HENT: Positive for congestion and sore throat  Negative for ear pain, postnasal drip, rhinorrhea, sinus pressure and sinus pain  Eyes: Negative for pain, redness and visual disturbance  Respiratory: Positive for cough, shortness of breath and wheezing  Cardiovascular: Negative for chest pain and palpitations  Gastrointestinal: Negative for abdominal pain, diarrhea, nausea and vomiting  Neurological: Negative for dizziness and headaches           Current Medications       Current Outpatient Medications:     ACCU-CHEK FASTCLIX LANCETS MISC, 4 (four) times a day Test, Disp: , Rfl: 1    ACCU-CHEK GUIDE test strip, 4 (four) times a day Test, Disp: , Rfl: 1    ADMELOG SOLOSTAR 100 units/mL injection pen, Inject 10 Units under the skin 3 (three) times a day with meals , Disp: , Rfl: 0    aspirin-acetaminophen-caffeine (EXCEDRIN MIGRAINE) 250-250-65 MG per tablet, Take 2 tablets by mouth as needed for headaches, Disp: , Rfl:     Blood Glucose Monitoring Suppl (ACCU-CHEK GUIDE) w/Device KIT, , Disp: , Rfl: 0    cariprazine (VRAYLAR) 3 MG capsule, Take 3 mg by mouth daily , Disp: , Rfl:     dexamethasone (DECADRON) 2 mg tablet, 1 at the onset of a severe headache with food, Disp: 5 tablet, Rfl: 0    fluvoxaMINE (LUVOX) 100 mg tablet, Take 1 tablet (100 mg total) by mouth 2 (two) times a day, Disp: 60 tablet, Rfl: 1    furosemide (LASIX) 20 mg tablet, Take 1 tablet (20 mg total) by mouth daily, Disp: 30 tablet, Rfl: 2    ibuprofen (MOTRIN) 200 mg tablet, Take 400-600 mg by mouth as needed for mild pain, Disp: , Rfl:     insulin glargine (BASAGLAR KWIKPEN) 100 units/mL injection pen, Inject 40 Units under the skin daily at bedtime, Disp: 5 pen, Rfl: 0    Insulin Pen Needle (PEN NEEDLES 3/16") 31G X 5 MM MISC, by Does not apply route 4 (four) times a day, Disp: 200 each, Rfl: 3    levothyroxine 25 mcg tablet, Take 25 mcg by mouth daily, Disp: , Rfl:     LORazepam (ATIVAN) 0 5 mg tablet, Take 1 tablet (0 5 mg total) by mouth 2 (two) times a day (Patient taking differently: Take 0 5 mg by mouth every 6 (six) hours as needed ), Disp: 60 tablet, Rfl: 0    losartan (COZAAR) 50 mg tablet, Take 0 5 tablets (25 mg total) by mouth daily, Disp: 30 tablet, Rfl: 3    pantoprazole (PROTONIX) 40 mg tablet, Take 1 tablet (40 mg total) by mouth 2 (two) times a day, Disp: 60 tablet, Rfl: 2    senna (SENOKOT) 8 6 mg, Take 1 tablet (8 6 mg total) by mouth daily at bedtime as needed for constipation, Disp: 30 each, Rfl: 0    SUMAtriptan (IMITREX) 100 mg tablet, Take 1 tablet (100 mg total) by mouth once as needed for migraine, Disp: 9 tablet, Rfl: 1    albuterol (PROAIR HFA) 90 mcg/act inhaler, Inhale 2 puffs every 6 (six) hours as needed for wheezing (Patient not taking: Reported on 1/11/2020), Disp: 8 5 g, Rfl: 1    albuterol (PROVENTIL HFA,VENTOLIN HFA) 90 mcg/act inhaler, Inhale 2 puffs 4 (four) times a day, Disp: 8 g, Rfl: 0    atorvastatin (LIPITOR) 20 mg tablet, Take 1 tablet (20 mg total) by mouth daily (Patient not taking: Reported on 11/20/2019), Disp: 30 tablet, Rfl: 3    benzonatate (TESSALON PERLES) 100 mg capsule, Take 1 capsule (100 mg total) by mouth 3 (three) times a day as needed for cough, Disp: 30 capsule, Rfl: 0    divalproex sodium (DEPAKOTE ER) 500 mg 24 hr tablet, Twice a day (Patient not taking: Reported on 1/11/2020), Disp: 60 tablet, Rfl: 3    levothyroxine 100 mcg tablet, Take 1 tablet (100 mcg total) by mouth daily (Patient not taking: Reported on 1/11/2020), Disp: 30 tablet, Rfl: 3    Current Facility-Administered Medications:     prochlorperazine (COMPAZINE) injection 10 mg, 10 mg, Intramuscular, Q4H PRN, Cherry Naranjo MD    Current Allergies     Allergies as of 01/11/2020 - Reviewed 01/11/2020   Allergen Reaction Noted    Augmentin [amoxicillin-pot clavulanate]  01/11/2020    Amoxicillin Diarrhea 04/13/2018    Clavulanic acid Diarrhea 04/13/2018    Erythromycin Diarrhea 04/13/2018            The following portions of the patient's history were reviewed and updated as appropriate: allergies, current medications, past family history, past medical history, past social history, past surgical history and problem list      Past Medical History:   Diagnosis Date    Chronic headaches     Diabetes mellitus ([address] )     Disease of thyroid gland     Esophagitis     Gastritis     Gastroparesis     GERD (gastroesophageal reflux disease)     Hypertension     Migraine     Obesity     Obsessive compulsive disorder     Psychiatric disorder     Schizoaffective disorder [facility])        Past Surgical History:   Procedure Laterality Date    ESOPHAGOGASTRODUODENOSCOPY N/A 1/15/2019    Procedure: ESOPHAGOGASTRODUODENOSCOPY (EGD); Surgeon: Ru Garvin MD;  [facility]; Service: Gastroenterology       Family History   Problem Relation Age of Onset    COPD Mother     Hypertension Mother     Heart failure Mother     Rheum arthritis Family     Heart disease Family     Hypertension Father     Diabetes Father     Hyperlipidemia Father          Medications have been verified  Objective   /82 Comment: manual pressure  Pulse (!) 107   Temp 97 6 °F (36 4 °C) (Tympanic)   Resp 18   Ht 5' 2" (1 575 m)   Wt (!) 158 kg (348 lb 9 6 oz)   SpO2 99%   BMI 63 76 kg/m²        Physical Exam     Physical Exam   Constitutional: He is oriented to person, place, and time  He appears well-developed and well-nourished  No distress  HENT:   Head: Normocephalic and atraumatic  Right Ear: Tympanic membrane, external ear and ear canal normal  Tympanic membrane is not erythematous, not retracted and not bulging  No middle ear effusion  Left Ear: Tympanic membrane, external ear and ear canal normal  Tympanic membrane is not erythematous, not retracted and not bulging  No middle ear effusion  Nose: Nose normal  No mucosal edema or rhinorrhea  Right sinus exhibits no maxillary sinus tenderness and no frontal sinus tenderness  Left sinus exhibits no maxillary sinus tenderness and no frontal sinus tenderness  Mouth/Throat: Oropharynx is clear and moist and mucous membranes are normal  No posterior oropharyngeal erythema  Eyes: Pupils are equal, round, and reactive to light  Conjunctivae and EOM are normal  Right eye exhibits no chemosis and no discharge  Left eye exhibits no chemosis and no discharge  Right conjunctiva is not injected  Left conjunctiva is not injected  Neck: Normal range of motion  Neck supple     Cardiovascular: Normal rate, regular rhythm and normal heart sounds  Pulmonary/Chest: Effort normal  No respiratory distress  He has wheezes in the right middle field, the right lower field, the left upper field and the left middle field  He has rhonchi in the right middle field and the left middle field  He has no rales  Lymphadenopathy:     He has no cervical adenopathy  Right cervical: No superficial cervical adenopathy present  Left cervical: No superficial cervical adenopathy present  Neurological: He is alert and oriented to person, place, and time  No cranial nerve deficit  Skin: Skin is warm and dry  No rash noted  Mini neb  Performed by: Melvina Villegas PA-C  Authorized by: Melvina Villegas PA-C     Number of treatments:  1  Treatment 1:   Pre-Procedure     Symptoms:  Wheezing and cough    Medication Administered:  Duoneb - Albuterol 2 5 mg/Atrovent 0 5 mg  Post-Procedure     Symptoms:  Wheezing    Lung sounds:    Improved  He still has wheezing on the right side  No wheezing on the left side      HR:  107    RR:  14    SP02:  99

## 2020-01-20 ENCOUNTER — CONSULT (OUTPATIENT)
Dept: NEUROSURGERY | Facility: CLINIC | Age: 40
End: 2020-01-20
Payer: COMMERCIAL

## 2020-01-20 VITALS
WEIGHT: 315 LBS | TEMPERATURE: 97.7 F | DIASTOLIC BLOOD PRESSURE: 102 MMHG | RESPIRATION RATE: 16 BRPM | HEIGHT: 62 IN | SYSTOLIC BLOOD PRESSURE: 176 MMHG | BODY MASS INDEX: 57.97 KG/M2 | HEART RATE: 110 BPM

## 2020-01-20 DIAGNOSIS — M48.02 SPINAL STENOSIS IN CERVICAL REGION: ICD-10-CM

## 2020-01-20 PROCEDURE — 99203 OFFICE O/P NEW LOW 30 MIN: CPT | Performed by: NURSE PRACTITIONER

## 2020-01-20 PROCEDURE — 3008F BODY MASS INDEX DOCD: CPT | Performed by: NURSE PRACTITIONER

## 2020-01-20 NOTE — ASSESSMENT & PLAN NOTE
· Patient presents as referral from Dr Major Wright (neurology) for cervical spinal stenosis  He is currently under her care for migraine headaches and occipital neuralgia  · Patient endorses occasional, intermittent weakness of non-dominant left hand with "tingling" to area  Denies any difficulty with fine motor tasks  Also endorses urinary "dribbling" and hesitancy  Denies neck pain except occasionally at OC junction  · MRI C-spine 8/9/2019 reviewed:  Cervical spondylitic degenerative change most pronounced at C5-6 where there is a central and right paracentral disc protrusion as well as left greater than right uncinate joint hypertrophic degenerative change resulting in moderate canal stenosis and bilateral foraminal narrowing  There is left foraminal narrowing at the C3-C4 and C4-5 level secondary to asymmetric uncinate joint hypertrophic degenerative changes  · Discussed importance of follow up with Dr Major Wright for headache and occipital symptoms  · Referred to physical therapy for conservative management of cervical stenosis  · Discussed that should any symptoms of arm/hand weakness deteriorate, or pain worsen, or urinary incontinence worsen that he should return for re-evaluation PRN  · Patient and father verbalize understanding and are in agreement with plan

## 2020-01-20 NOTE — PROGRESS NOTES
Neurosurgery Office Note  Jaya Morrissey 37 y o  male MRN: [id number]      Assessment/Plan     Cervicalgia  · Patient presents as referral from Dr Mary Solis (neurology) for cervical spinal stenosis  He is currently under her care for migraine headaches and occipital neuralgia  · Patient endorses occasional, intermittent weakness of non-dominant left hand with "tingling" to area  Denies any difficulty with fine motor tasks  Also endorses urinary "dribbling" and hesitancy  Denies neck pain except occasionally at OC junction  · MRI C-spine 8/9/2019 reviewed:  Cervical spondylitic degenerative change most pronounced at C5-6 where there is a central and right paracentral disc protrusion as well as left greater than right uncinate joint hypertrophic degenerative change resulting in moderate canal stenosis and bilateral foraminal narrowing  There is left foraminal narrowing at the C3-C4 and C4-5 level secondary to asymmetric uncinate joint hypertrophic degenerative changes  · Discussed importance of follow up with Dr Mary Solis for headache and occipital symptoms  · Referred to physical therapy for conservative management of cervical stenosis  · Discussed that should any symptoms of arm/hand weakness deteriorate, or pain worsen, or urinary incontinence worsen that he should return for re-evaluation PRN  · Patient and father verbalize understanding and are in agreement with plan  Diagnoses and all orders for this visit:    Spinal stenosis in cervical region  -     Ambulatory referral to Neurosurgery  -     Ambulatory referral to Physical Therapy; Future            CHIEF COMPLAINT    Chief Complaint   Patient presents with    Consult       HISTORY    History of Present Illness     37y o  year old male with a history of asthma, morbid obesity, insulin-dependent diabetes, migraines, schizophrenia, who presents for evaluation of cervical spinal stenosis discovered on MRI during workup for migraines with Dr Sun Gallagher  He states he experiences daily headaches  He endorses that he typically experiences pressure at the occiput with these headaches approximately half of the time  He denies nausea or vomiting  He is right-hand dominant  He states that occasionally he has noticed intermittent left arm weakness and tingling  He states he will sometimes experience difficulty lifting for example a 2 L bottle of soda  He denies any difficulty opening the cap of the bottle or with any fine motor tasks  He states occasionally his left hand will feel slower and more stiff  And he feels that cramps up " The symptoms do not really correspond with his headaches  He denies any neck pain  He denies any pain radiation down his bilateral arms  He also states he has undergone workup was urologist for urinary dribbling and hesitancy  He states he had a cystogram and was on Flomax for this for some time but ultimately they did not discover any etiology for this  HPI    See Discussion    REVIEW OF SYSTEMS    Review of Systems   Respiratory:        Asthma  Getting over bronchitis   Cardiovascular: Negative  Genitourinary:        Hesitancy, incontinence   Musculoskeletal: Positive for neck stiffness (cervical muscles feel tight )  Neurological: Positive for weakness (at times in bilateral arms), light-headedness (about twice a week for about 30 minutes  ) and headaches (migraines, starts of as pressure)           Meds/Allergies     Current Outpatient Medications   Medication Sig Dispense Refill    ACCU-CHEK FASTCLIX LANCETS MISC 4 (four) times a day Test  1    ACCU-CHEK GUIDE test strip 4 (four) times a day Test  1    ADMELOG SOLOSTAR 100 units/mL injection pen Inject 10 Units under the skin 3 (three) times a day with meals   0    albuterol (PROVENTIL HFA,VENTOLIN HFA) 90 mcg/act inhaler Inhale 2 puffs 4 (four) times a day 8 g 0    aspirin-acetaminophen-caffeine (EXCEDRIN MIGRAINE) 250-250-65 MG per tablet Take 2 tablets by mouth as needed for headaches      benzonatate (TESSALON PERLES) 100 mg capsule Take 1 capsule (100 mg total) by mouth 3 (three) times a day as needed for cough 30 capsule 0    Blood Glucose Monitoring Suppl (ACCU-CHEK GUIDE) w/Device KIT   0    cariprazine (VRAYLAR) 3 MG capsule Take 6 mg by mouth daily       dexamethasone (DECADRON) 2 mg tablet 1 at the onset of a severe headache with food 5 tablet 0    fluvoxaMINE (LUVOX) 100 mg tablet Take 1 tablet (100 mg total) by mouth 2 (two) times a day 60 tablet 1    furosemide (LASIX) 20 mg tablet Take 1 tablet (20 mg total) by mouth daily 30 tablet 2    ibuprofen (MOTRIN) 200 mg tablet Take 400-600 mg by mouth as needed for mild pain      insulin glargine (BASAGLAR KWIKPEN) 100 units/mL injection pen Inject 40 Units under the skin daily at bedtime 5 pen 0    Insulin Pen Needle (PEN NEEDLES 3/16") 31G X 5 MM MISC by Does not apply route 4 (four) times a day 200 each 3    levothyroxine 100 mcg tablet Take 1 tablet (100 mcg total) by mouth daily 30 tablet 3    levothyroxine 25 mcg tablet Take 25 mcg by mouth daily      LORazepam (ATIVAN) 0 5 mg tablet Take 1 tablet (0 5 mg total) by mouth 2 (two) times a day (Patient taking differently: Take 0 5 mg by mouth every 6 (six) hours as needed ) 60 tablet 0    losartan (COZAAR) 50 mg tablet Take 0 5 tablets (25 mg total) by mouth daily 30 tablet 3    pantoprazole (PROTONIX) 40 mg tablet Take 1 tablet (40 mg total) by mouth 2 (two) times a day 60 tablet 2    senna (SENOKOT) 8 6 mg Take 1 tablet (8 6 mg total) by mouth daily at bedtime as needed for constipation 30 each 0    SUMAtriptan (IMITREX) 100 mg tablet Take 1 tablet (100 mg total) by mouth once as needed for migraine 9 tablet 1    albuterol (PROAIR HFA) 90 mcg/act inhaler Inhale 2 puffs every 6 (six) hours as needed for wheezing (Patient not taking: Reported on 1/11/2020) 8 5 g 1    atorvastatin (LIPITOR) 20 mg tablet Take 1 tablet (20 mg total) by mouth daily (Patient not taking: Reported on 11/20/2019) 30 tablet 3    divalproex sodium (DEPAKOTE ER) 500 mg 24 hr tablet Twice a day (Patient not taking: Reported on 1/11/2020) 60 tablet 3     Current Facility-Administered Medications   Medication Dose Route Frequency Provider Last Rate Last Dose    prochlorperazine (COMPAZINE) injection 10 mg  10 mg Intramuscular Q4H PRN Corby Gavin MD           Allergies   Allergen Reactions    Augmentin [Amoxicillin-Pot Clavulanate]     Amoxicillin Diarrhea    Clavulanic Acid Diarrhea    Erythromycin Diarrhea       PAST HISTORY    Past Medical History:   Diagnosis Date    Chronic headaches     Diabetes mellitus ([facility] [address] )     Disease of thyroid gland     Esophagitis     Gastritis     Gastroparesis     GERD (gastroesophageal reflux disease)     Hypertension     Migraine     Obesity     Obsessive compulsive disorder     Psychiatric disorder     Schizoaffective disorder [facility])        Past Surgical History:   Procedure Laterality Date    ESOPHAGOGASTRODUODENOSCOPY N/A 1/15/2019    Procedure: ESOPHAGOGASTRODUODENOSCOPY (EGD); Surgeon: Leo Chisholm MD;  Location: [facility]; Service: Gastroenterology       Social History     Tobacco Use    Smoking status: Never Smoker    Smokeless tobacco: Never Used   Substance Use Topics    Alcohol use: No    Drug use: No       Family History   Problem Relation Age of Onset    COPD Mother     Hypertension Mother     Heart failure Mother     Rheum arthritis Family     Heart disease Family     Hypertension Father     Diabetes Father     Hyperlipidemia Father          Above history personally reviewed  EXAM    Vitals:Blood pressure (!) 176/102, pulse (!) 110, temperature 97 7 °F (36 5 °C), temperature source Tympanic, resp  rate 16, height 5' 2" (1 575 m), weight (!) 154 kg (340 lb)  ,Body mass index is 62 19 kg/m²  Physical Exam   Constitutional: He is oriented to person, place, and time   He appears well-developed and well-nourished  No distress  Eyes: Pupils are equal, round, and reactive to light  Conjunctivae and EOM are normal  Right eye exhibits no discharge  Left eye exhibits no discharge  Neck: Normal range of motion  Neck supple  Cardiovascular: Normal rate and regular rhythm  Pulmonary/Chest: Effort normal and breath sounds normal  No respiratory distress  Abdominal: Soft  Bowel sounds are normal  He exhibits no distension  There is no tenderness  Musculoskeletal: Normal range of motion  Neurological: He is alert and oriented to person, place, and time  He displays normal reflexes  No cranial nerve deficit or sensory deficit  He exhibits normal muscle tone  He has a normal Finger-Nose-Finger Test  Gait normal  Coordination normal    Reflex Scores:       Tricep reflexes are 1+ on the right side and 1+ on the left side  Bicep reflexes are 1+ on the right side and 1+ on the left side  Brachioradialis reflexes are 1+ on the right side and 1+ on the left side  Patellar reflexes are 1+ on the right side and 1+ on the left side  Achilles reflexes are 1+ on the right side and 1+ on the left side  Skin: Skin is warm and dry  He is not diaphoretic  Psychiatric: He has a normal mood and affect  His speech is normal and behavior is normal  Judgment and thought content normal        Neurologic Exam     Mental Status   Oriented to person, place, and time  Oriented to person  Oriented to place  Oriented to time  Oriented to year, month and date  Registration: recalls 3 of 3 objects  Attention: normal  Concentration: normal    Speech: speech is normal   Level of consciousness: alert  Knowledge: good and consistent with education  Able to name object  Cranial Nerves   Cranial nerves II through XII intact  CN III, IV, VI   Pupils are equal, round, and reactive to light  Extraocular motions are normal    Right pupil: Size: 3 mm  Shape: regular  Reactivity: brisk   Consensual response: intact  Accommodation: intact  Left pupil: Size: 3 mm  Shape: regular  Reactivity: brisk  Consensual response: intact  Accommodation: intact  Nystagmus: none   Diplopia: none  Conjugate gaze: present    CN V   Right facial sensation deficit: none  Left facial sensation deficit: none    CN VII   Facial expression full, symmetric       CN VIII   Hearing: intact    CN IX, X   Palate: symmetric    CN XI   Right sternocleidomastoid strength: normal  Left sternocleidomastoid strength: normal  Right trapezius strength: normal  Left trapezius strength: normal    CN XII   Tongue: not atrophic  Fasciculations: absent  Tongue deviation: none    Motor Exam   Muscle bulk: normal  Overall muscle tone: normal  Right arm pronator drift: absent  Left arm pronator drift: absent    Strength   Right deltoid: 5/5  Left deltoid: 5/5  Right biceps: 5/5  Left biceps: 5/5  Right triceps: 5/5  Left triceps: 5/5  Right wrist flexion: 5/5  Left wrist flexion: 5/5  Right wrist extension: 5/5  Left wrist extension: 5/5  Right interossei: 5/5  Left interossei: 5/5  Right quadriceps: 5/5  Left quadriceps: 5/5  Right hamstrin/5  Left hamstrin/5  Right anterior tibial: 5/5  Left anterior tibial: 5/5  Right posterior tibial: 5/5  Left posterior tibial: 5/5  Right peroneal: 5/5  Left peroneal: 5/5  Right gastroc: 5/5  Left gastroc: 5/5    Sensory Exam   Light touch normal    Proprioception normal    Right arm pinprick: normal  Left arm pinprick: decreased from elbow  Right leg pinprick: normal  Left leg pinprick: normal    Gait, Coordination, and Reflexes     Gait  Gait: normal    Coordination   Finger to nose coordination: normal    Tremor   Resting tremor: absent  Intention tremor: absent  Action tremor: absent    Reflexes   Right brachioradialis: 1+  Left brachioradialis: 1+  Right biceps: 1+  Left biceps: 1+  Right triceps: 1+  Left triceps: 1+  Right patellar: 1+  Left patellar: 1+  Right achilles: 1+  Left achilles: 1+  Right : 1+  Left : 1+  Right Varela: absent  Left Varela: absent  Right ankle clonus: absent  Left ankle clonus: absent        MEDICAL DECISION MAKING    Imaging Studies:     No results found  I have personally reviewed pertinent reports     and I have personally reviewed pertinent films in PACS

## 2020-01-21 DIAGNOSIS — G43.119 INTRACTABLE MIGRAINE WITH AURA WITHOUT STATUS MIGRAINOSUS: ICD-10-CM

## 2020-01-21 DIAGNOSIS — K21.9 GASTROESOPHAGEAL REFLUX DISEASE WITHOUT ESOPHAGITIS: ICD-10-CM

## 2020-01-21 DIAGNOSIS — E78.49 OTHER HYPERLIPIDEMIA: ICD-10-CM

## 2020-01-22 ENCOUNTER — DOCTOR'S OFFICE (OUTPATIENT)
Dept: URBAN - METROPOLITAN AREA CLINIC 136 | Facility: CLINIC | Age: 40
Setting detail: OPHTHALMOLOGY
End: 2020-01-22
Payer: COMMERCIAL

## 2020-01-22 DIAGNOSIS — E11.3293: ICD-10-CM

## 2020-01-22 DIAGNOSIS — E03.8 OTHER SPECIFIED HYPOTHYROIDISM: Primary | ICD-10-CM

## 2020-01-22 DIAGNOSIS — H25.13: ICD-10-CM

## 2020-01-22 DIAGNOSIS — H40.013: ICD-10-CM

## 2020-01-22 DIAGNOSIS — H53.40: ICD-10-CM

## 2020-01-22 PROBLEM — H53.19: Status: ACTIVE | Noted: 2018-03-19

## 2020-01-22 PROBLEM — H43.393 VITREOUS OPACITIES, OTHER; BOTH EYES: Status: ACTIVE | Noted: 2018-03-19

## 2020-01-22 PROCEDURE — 92014 COMPRE OPH EXAM EST PT 1/>: CPT | Performed by: OPHTHALMOLOGY

## 2020-01-22 PROCEDURE — 92133 CPTRZD OPH DX IMG PST SGM ON: CPT | Performed by: OPHTHALMOLOGY

## 2020-01-22 PROCEDURE — 76514 ECHO EXAM OF EYE THICKNESS: CPT | Performed by: OPHTHALMOLOGY

## 2020-01-22 RX ORDER — SUMATRIPTAN 100 MG/1
TABLET, FILM COATED ORAL
Qty: 9 TABLET | Refills: 0 | Status: SHIPPED | OUTPATIENT
Start: 2020-01-22 | End: 2020-10-26

## 2020-01-22 RX ORDER — LEVOTHYROXINE SODIUM 0.03 MG/1
25 TABLET ORAL DAILY
Qty: 30 TABLET | Refills: 0 | Status: SHIPPED | OUTPATIENT
Start: 2020-01-22 | End: 2020-11-28 | Stop reason: HOSPADM

## 2020-01-22 ASSESSMENT — REFRACTION_AUTOREFRACTION
OD_SPHERE: -2.75
OS_AXIS: 096
OS_CYLINDER: -0.25
OD_CYLINDER: -0.50
OS_SPHERE: -2.50
OD_AXIS: 067

## 2020-01-22 ASSESSMENT — PACHYMETRY
OD_CT_CORRECTION: -2
OS_CT_CORRECTION: -1
OS_CT_UM: 568
OD_CT_UM: 574

## 2020-01-22 ASSESSMENT — SUPERFICIAL PUNCTATE KERATITIS (SPK)
OD_SPK: 1+
OS_SPK: T

## 2020-01-22 ASSESSMENT — VISUAL ACUITY
OD_BCVA: 20/20
OS_BCVA: 20/20

## 2020-01-22 ASSESSMENT — CONFRONTATIONAL VISUAL FIELD TEST (CVF)
OD_FINDINGS: FULL
OS_FINDINGS: FULL

## 2020-01-22 ASSESSMENT — SPHEQUIV_DERIVED
OS_SPHEQUIV: -2.625
OD_SPHEQUIV: -3

## 2020-01-22 ASSESSMENT — REFRACTION_CURRENTRX
OS_OVR_VA: 20/
OD_OVR_VA: 20/
OS_SPHERE: -2.75
OS_CYLINDER: SPHERE
OD_SPHERE: -2.75
OD_CYLINDER: SPHERE

## 2020-01-22 NOTE — TELEPHONE ENCOUNTER
LM for pt to call office to make appt    Last seen 8/29/19  I added #[id number] and DX code hypothyroidism please review

## 2020-01-23 RX ORDER — ATORVASTATIN CALCIUM 20 MG/1
TABLET, FILM COATED ORAL
Qty: 30 TABLET | Refills: 0 | Status: SHIPPED | OUTPATIENT
Start: 2020-01-23 | End: 2020-10-23 | Stop reason: SDUPTHER

## 2020-01-23 RX ORDER — PANTOPRAZOLE SODIUM 40 MG/1
TABLET, DELAYED RELEASE ORAL
Qty: 60 TABLET | Refills: 0 | Status: SHIPPED | OUTPATIENT
Start: 2020-01-23 | End: 2021-01-12 | Stop reason: SDUPTHER

## 2020-01-29 NOTE — TELEPHONE ENCOUNTER
Noted- referral attached to the patient's appointment  Patient is scheduled with Dr Jessica iLn on 2/11/2020 in the [facility] location  Type Date User Summary Attachment   General 01/29/2020 11:59 AM Afia Manzo care coordination  -   Note    Botox- authorization #: [id number]- valid for 4 visits- 12/4/2019 until 12/4/2020   Please use our stock      Thank you,     Georgiana Gilford

## 2020-01-29 NOTE — TELEPHONE ENCOUNTER
Called patient and scheduled him for 02/11/20 at 02:30pm in the [facility] Location with Dr Al German

## 2020-02-14 ENCOUNTER — TELEPHONE (OUTPATIENT)
Dept: NEUROLOGY | Facility: CLINIC | Age: 40
End: 2020-02-14

## 2020-02-14 NOTE — TELEPHONE ENCOUNTER
Patient left voicemail early this morning to cancel his appointment due to infected tooth that needs to be pulled  Please call him to reschedule his botox appointment  Thank you

## 2020-02-14 NOTE — TELEPHONE ENCOUNTER
Called patient and I scheduled him for 02/21/20 at 01:00pm in the [facility] Location with Dr Alistair Retana

## 2020-03-16 NOTE — TELEPHONE ENCOUNTER
Looks like patient called back and reschedule Botox to 4/29/2020  While chart review noticed patient's 6 week follow up with [facility] was still scheduled for 3/23/2020  Appt not needed as patient has not received injections yet  Called patient but no answer and no voicemail  Called patient's father Stefani Loren to discuss  Stefani Pimentel confirmed no need for appt on 3/23/2020 and confirmed Botox appt on 4/29/2020  Mailing appt card to them as well

## 2020-03-26 ENCOUNTER — DOCUMENTATION (OUTPATIENT)
Dept: NEUROLOGY | Facility: CLINIC | Age: 40
End: 2020-03-26

## 2020-03-26 NOTE — PROGRESS NOTES
Patient is scheduled with Dr Latrice Lopez on 4/29/2020 in [facility]

## 2020-03-26 NOTE — PROGRESS NOTES
Type Date User Summary Attachment   General 01/29/2020 11:59 AM Kev Diana care coordination  -   Note    Botox- authorization #: [id number]- valid for 4 visits- 12/4/2019 until 12/4/2020   Please use our stock      Thank you,     Duarte Javier

## 2020-04-22 ENCOUNTER — TELEPHONE (OUTPATIENT)
Dept: FAMILY MEDICINE CLINIC | Facility: CLINIC | Age: 40
End: 2020-04-22

## 2020-04-22 DIAGNOSIS — M54.2 CERVICALGIA: ICD-10-CM

## 2020-04-22 DIAGNOSIS — G43.709 CHRONIC MIGRAINE WITHOUT AURA WITHOUT STATUS MIGRAINOSUS, NOT INTRACTABLE: Primary | ICD-10-CM

## 2020-05-04 DIAGNOSIS — K21.9 GASTROESOPHAGEAL REFLUX DISEASE WITHOUT ESOPHAGITIS: ICD-10-CM

## 2020-05-06 RX ORDER — PANTOPRAZOLE SODIUM 40 MG/1
40 TABLET, DELAYED RELEASE ORAL 2 TIMES DAILY
Qty: 60 TABLET | Refills: 0 | OUTPATIENT
Start: 2020-05-06

## 2020-05-28 ENCOUNTER — TELEPHONE (OUTPATIENT)
Dept: NEUROLOGY | Facility: CLINIC | Age: 40
End: 2020-05-28

## 2020-06-12 ENCOUNTER — TELEPHONE (OUTPATIENT)
Dept: NEUROLOGY | Facility: CLINIC | Age: 40
End: 2020-06-12

## 2020-06-20 ENCOUNTER — APPOINTMENT (EMERGENCY)
Dept: CT IMAGING | Facility: HOSPITAL | Age: 40
DRG: 052 | End: 2020-06-20
Payer: COMMERCIAL

## 2020-06-20 ENCOUNTER — HOSPITAL ENCOUNTER (INPATIENT)
Facility: HOSPITAL | Age: 40
LOS: 1 days | Discharge: HOME | DRG: 052 | End: 2020-06-21
Attending: EMERGENCY MEDICINE | Admitting: INTERNAL MEDICINE
Payer: COMMERCIAL

## 2020-06-20 ENCOUNTER — APPOINTMENT (INPATIENT)
Dept: RADIOLOGY | Facility: HOSPITAL | Age: 40
DRG: 052 | End: 2020-06-20
Payer: COMMERCIAL

## 2020-06-20 DIAGNOSIS — F42.9 OBSESSIVE-COMPULSIVE DISORDER, UNSPECIFIED TYPE: Chronic | ICD-10-CM

## 2020-06-20 DIAGNOSIS — F25.9 SCHIZOAFFECTIVE DISORDER, UNSPECIFIED TYPE (HCC): Chronic | ICD-10-CM

## 2020-06-20 DIAGNOSIS — F31.9 BIPOLAR 1 DISORDER (HCC): ICD-10-CM

## 2020-06-20 DIAGNOSIS — I16.1 HYPERTENSIVE EMERGENCY: Primary | ICD-10-CM

## 2020-06-20 DIAGNOSIS — R06.02 SOB (SHORTNESS OF BREATH): ICD-10-CM

## 2020-06-20 PROBLEM — G93.40 ENCEPHALOPATHY: Status: ACTIVE | Noted: 2020-06-20

## 2020-06-20 PROBLEM — D72.829 LEUKOCYTOSIS: Status: ACTIVE | Noted: 2020-06-20

## 2020-06-20 LAB
ALBUMIN SERPL BCP-MCNC: 3.9 G/DL (ref 3.5–5.7)
ALP SERPL-CCNC: 68 U/L (ref 40–150)
ALT SERPL W P-5'-P-CCNC: 14 U/L (ref 7–52)
ANION GAP SERPL CALCULATED.3IONS-SCNC: 12 MMOL/L (ref 4–13)
APTT PPP: 27 SECONDS (ref 23–37)
AST SERPL W P-5'-P-CCNC: 12 U/L (ref 13–39)
BACTERIA UR QL AUTO: ABNORMAL /HPF
BILIRUB SERPL-MCNC: 0.6 MG/DL (ref 0.2–1)
BILIRUB UR QL STRIP: NEGATIVE
BNP SERPL-MCNC: 46 PG/ML (ref 1–100)
BUN SERPL-MCNC: 23 MG/DL (ref 7–25)
CALCIUM SERPL-MCNC: 9.4 MG/DL (ref 8.6–10.5)
CHLORIDE SERPL-SCNC: 94 MMOL/L (ref 98–107)
CLARITY UR: CLEAR
CO2 SERPL-SCNC: 26 MMOL/L (ref 21–31)
COLOR UR: ABNORMAL
CREAT SERPL-MCNC: 1.19 MG/DL (ref 0.7–1.3)
ERYTHROCYTE [DISTWIDTH] IN BLOOD BY AUTOMATED COUNT: 14.1 % (ref 11.5–14.5)
GFR SERPL CREATININE-BSD FRML MDRD: 75 ML/MIN/1.73SQ M
GLUCOSE SERPL-MCNC: 267 MG/DL (ref 65–140)
GLUCOSE SERPL-MCNC: 325 MG/DL (ref 65–140)
GLUCOSE SERPL-MCNC: 381 MG/DL (ref 65–99)
GLUCOSE UR STRIP-MCNC: ABNORMAL MG/DL
HCT VFR BLD AUTO: 41.3 % (ref 42–47)
HGB BLD-MCNC: 13.9 G/DL (ref 14–18)
HGB UR QL STRIP.AUTO: ABNORMAL
INR PPP: 1.04 (ref 0.84–1.19)
KETONES UR STRIP-MCNC: ABNORMAL MG/DL
LACTATE SERPL-SCNC: 0.9 MMOL/L (ref 0.5–2)
LEUKOCYTE ESTERASE UR QL STRIP: NEGATIVE
LIPASE SERPL-CCNC: <10 U/L (ref 11–82)
LYMPHOCYTES # BLD AUTO: 0.91 THOUSAND/UL (ref 0.6–4.47)
LYMPHOCYTES # BLD AUTO: 7 % (ref 20–51)
MCH RBC QN AUTO: 29.1 PG (ref 26–34)
MCHC RBC AUTO-ENTMCNC: 33.6 G/DL (ref 31–37)
MCV RBC AUTO: 86 FL (ref 81–99)
MONOCYTES # BLD AUTO: 0.13 THOUSAND/UL (ref 0–1.22)
MONOCYTES NFR BLD AUTO: 1 % (ref 4–12)
MUCOUS THREADS UR QL AUTO: ABNORMAL
NEUTS SEG # BLD: 11.96 THOUSAND/UL (ref 1.81–6.82)
NEUTS SEG NFR BLD AUTO: 92 % (ref 42–75)
NITRITE UR QL STRIP: NEGATIVE
NON-SQ EPI CELLS URNS QL MICRO: ABNORMAL /HPF
PH UR STRIP.AUTO: 6 [PH]
PLATELET # BLD AUTO: 334 THOUSANDS/UL (ref 149–390)
PLATELET BLD QL SMEAR: ADEQUATE
PMV BLD AUTO: 8 FL (ref 8.6–11.7)
POTASSIUM SERPL-SCNC: 4.9 MMOL/L (ref 3.5–5.5)
PROT SERPL-MCNC: 7.4 G/DL (ref 6.4–8.9)
PROT UR STRIP-MCNC: ABNORMAL MG/DL
PROTHROMBIN TIME: 13.1 SECONDS (ref 11.6–14.5)
RBC # BLD AUTO: 4.78 MILLION/UL (ref 4.3–5.9)
RBC #/AREA URNS AUTO: ABNORMAL /HPF
RBC MORPH BLD: NORMAL
SODIUM SERPL-SCNC: 132 MMOL/L (ref 134–143)
SP GR UR STRIP.AUTO: 1.01 (ref 1–1.03)
TOTAL CELLS COUNTED SPEC: 100
TROPONIN I SERPL-MCNC: <0.03 NG/ML
TSH SERPL DL<=0.05 MIU/L-ACNC: 12.27 UIU/ML (ref 0.45–5.33)
UROBILINOGEN UR QL STRIP.AUTO: 0.2 E.U./DL
WBC # BLD AUTO: 13 THOUSAND/UL (ref 4.8–10.8)
WBC #/AREA URNS AUTO: ABNORMAL /HPF

## 2020-06-20 PROCEDURE — 93005 ELECTROCARDIOGRAM TRACING: CPT

## 2020-06-20 PROCEDURE — 85730 THROMBOPLASTIN TIME PARTIAL: CPT | Performed by: EMERGENCY MEDICINE

## 2020-06-20 PROCEDURE — 85007 BL SMEAR W/DIFF WBC COUNT: CPT | Performed by: EMERGENCY MEDICINE

## 2020-06-20 PROCEDURE — 83880 ASSAY OF NATRIURETIC PEPTIDE: CPT | Performed by: EMERGENCY MEDICINE

## 2020-06-20 PROCEDURE — 96365 THER/PROPH/DIAG IV INF INIT: CPT

## 2020-06-20 PROCEDURE — 84443 ASSAY THYROID STIM HORMONE: CPT | Performed by: NURSE PRACTITIONER

## 2020-06-20 PROCEDURE — 96375 TX/PRO/DX INJ NEW DRUG ADDON: CPT

## 2020-06-20 PROCEDURE — 99223 1ST HOSP IP/OBS HIGH 75: CPT | Performed by: NURSE PRACTITIONER

## 2020-06-20 PROCEDURE — 81001 URINALYSIS AUTO W/SCOPE: CPT | Performed by: EMERGENCY MEDICINE

## 2020-06-20 PROCEDURE — 82948 REAGENT STRIP/BLOOD GLUCOSE: CPT

## 2020-06-20 PROCEDURE — 83690 ASSAY OF LIPASE: CPT | Performed by: EMERGENCY MEDICINE

## 2020-06-20 PROCEDURE — 85610 PROTHROMBIN TIME: CPT | Performed by: EMERGENCY MEDICINE

## 2020-06-20 PROCEDURE — 36415 COLL VENOUS BLD VENIPUNCTURE: CPT | Performed by: EMERGENCY MEDICINE

## 2020-06-20 PROCEDURE — 84484 ASSAY OF TROPONIN QUANT: CPT | Performed by: EMERGENCY MEDICINE

## 2020-06-20 PROCEDURE — 99285 EMERGENCY DEPT VISIT HI MDM: CPT

## 2020-06-20 PROCEDURE — 85027 COMPLETE CBC AUTOMATED: CPT | Performed by: EMERGENCY MEDICINE

## 2020-06-20 PROCEDURE — 80053 COMPREHEN METABOLIC PANEL: CPT | Performed by: EMERGENCY MEDICINE

## 2020-06-20 PROCEDURE — 70450 CT HEAD/BRAIN W/O DYE: CPT

## 2020-06-20 PROCEDURE — 81003 URINALYSIS AUTO W/O SCOPE: CPT | Performed by: EMERGENCY MEDICINE

## 2020-06-20 PROCEDURE — 71045 X-RAY EXAM CHEST 1 VIEW: CPT

## 2020-06-20 PROCEDURE — 83605 ASSAY OF LACTIC ACID: CPT | Performed by: EMERGENCY MEDICINE

## 2020-06-20 PROCEDURE — 74177 CT ABD & PELVIS W/CONTRAST: CPT

## 2020-06-20 PROCEDURE — 96366 THER/PROPH/DIAG IV INF ADDON: CPT

## 2020-06-20 PROCEDURE — 99284 EMERGENCY DEPT VISIT MOD MDM: CPT | Performed by: EMERGENCY MEDICINE

## 2020-06-20 RX ORDER — LOSARTAN POTASSIUM 25 MG/1
25 TABLET ORAL DAILY
Status: DISCONTINUED | OUTPATIENT
Start: 2020-06-21 | End: 2020-06-20

## 2020-06-20 RX ORDER — ACETAMINOPHEN 325 MG/1
650 TABLET ORAL EVERY 6 HOURS PRN
Status: DISCONTINUED | OUTPATIENT
Start: 2020-06-20 | End: 2020-06-21 | Stop reason: HOSPADM

## 2020-06-20 RX ORDER — ATORVASTATIN CALCIUM 20 MG/1
20 TABLET, FILM COATED ORAL DAILY
Status: DISCONTINUED | OUTPATIENT
Start: 2020-06-21 | End: 2020-06-21 | Stop reason: HOSPADM

## 2020-06-20 RX ORDER — PANTOPRAZOLE SODIUM 40 MG/1
40 TABLET, DELAYED RELEASE ORAL 2 TIMES DAILY
Status: DISCONTINUED | OUTPATIENT
Start: 2020-06-20 | End: 2020-06-21 | Stop reason: HOSPADM

## 2020-06-20 RX ORDER — INSULIN GLARGINE 100 [IU]/ML
40 INJECTION, SOLUTION SUBCUTANEOUS
Status: DISCONTINUED | OUTPATIENT
Start: 2020-06-20 | End: 2020-06-21 | Stop reason: HOSPADM

## 2020-06-20 RX ORDER — HYDRALAZINE HYDROCHLORIDE 20 MG/ML
5 INJECTION INTRAMUSCULAR; INTRAVENOUS ONCE
Status: COMPLETED | OUTPATIENT
Start: 2020-06-20 | End: 2020-06-20

## 2020-06-20 RX ORDER — LOSARTAN POTASSIUM 25 MG/1
25 TABLET ORAL ONCE
Status: COMPLETED | OUTPATIENT
Start: 2020-06-20 | End: 2020-06-20

## 2020-06-20 RX ORDER — ALBUTEROL SULFATE 90 UG/1
2 AEROSOL, METERED RESPIRATORY (INHALATION) 4 TIMES DAILY
Status: DISCONTINUED | OUTPATIENT
Start: 2020-06-20 | End: 2020-06-20

## 2020-06-20 RX ORDER — LEVOTHYROXINE SODIUM 0.1 MG/1
100 TABLET ORAL DAILY
Status: DISCONTINUED | OUTPATIENT
Start: 2020-06-21 | End: 2020-06-21 | Stop reason: HOSPADM

## 2020-06-20 RX ORDER — FLUVOXAMINE MALEATE 50 MG/1
100 TABLET, COATED ORAL 2 TIMES DAILY
Status: DISCONTINUED | OUTPATIENT
Start: 2020-06-20 | End: 2020-06-21 | Stop reason: HOSPADM

## 2020-06-20 RX ORDER — LORAZEPAM 0.5 MG/1
0.5 TABLET ORAL EVERY 6 HOURS PRN
Status: DISCONTINUED | OUTPATIENT
Start: 2020-06-20 | End: 2020-06-21 | Stop reason: HOSPADM

## 2020-06-20 RX ORDER — LEVOTHYROXINE SODIUM 0.03 MG/1
25 TABLET ORAL DAILY
Status: DISCONTINUED | OUTPATIENT
Start: 2020-06-21 | End: 2020-06-21 | Stop reason: HOSPADM

## 2020-06-20 RX ORDER — HYDRALAZINE HYDROCHLORIDE 20 MG/ML
10 INJECTION INTRAMUSCULAR; INTRAVENOUS EVERY 6 HOURS PRN
Status: DISCONTINUED | OUTPATIENT
Start: 2020-06-20 | End: 2020-06-21 | Stop reason: HOSPADM

## 2020-06-20 RX ORDER — SODIUM CHLORIDE 9 MG/ML
75 INJECTION, SOLUTION INTRAVENOUS CONTINUOUS
Status: DISPENSED | OUTPATIENT
Start: 2020-06-20 | End: 2020-06-21

## 2020-06-20 RX ORDER — FUROSEMIDE 20 MG/1
20 TABLET ORAL DAILY
Status: DISCONTINUED | OUTPATIENT
Start: 2020-06-21 | End: 2020-06-21 | Stop reason: HOSPADM

## 2020-06-20 RX ORDER — LOSARTAN POTASSIUM 50 MG/1
50 TABLET ORAL DAILY
Status: DISCONTINUED | OUTPATIENT
Start: 2020-06-21 | End: 2020-06-21 | Stop reason: HOSPADM

## 2020-06-20 RX ORDER — ALBUTEROL SULFATE 90 UG/1
2 AEROSOL, METERED RESPIRATORY (INHALATION) EVERY 4 HOURS PRN
Status: DISCONTINUED | OUTPATIENT
Start: 2020-06-20 | End: 2020-06-21 | Stop reason: HOSPADM

## 2020-06-20 RX ORDER — ONDANSETRON 2 MG/ML
4 INJECTION INTRAMUSCULAR; INTRAVENOUS EVERY 6 HOURS PRN
Status: DISCONTINUED | OUTPATIENT
Start: 2020-06-20 | End: 2020-06-21 | Stop reason: HOSPADM

## 2020-06-20 RX ADMIN — SODIUM CHLORIDE 5 MG/HR: 0.9 INJECTION, SOLUTION INTRAVENOUS at 14:07

## 2020-06-20 RX ADMIN — ACETAMINOPHEN 650 MG: 325 TABLET ORAL at 17:48

## 2020-06-20 RX ADMIN — INSULIN GLARGINE 40 UNITS: 100 INJECTION, SOLUTION SUBCUTANEOUS at 22:11

## 2020-06-20 RX ADMIN — SODIUM CHLORIDE 7.5 MG/HR: 0.9 INJECTION, SOLUTION INTRAVENOUS at 17:38

## 2020-06-20 RX ADMIN — IOHEXOL 100 ML: 350 INJECTION, SOLUTION INTRAVENOUS at 15:02

## 2020-06-20 RX ADMIN — FLUVOXAMINE MALEATE 100 MG: 50 TABLET ORAL at 17:41

## 2020-06-20 RX ADMIN — SODIUM CHLORIDE 75 ML/HR: 0.9 INJECTION, SOLUTION INTRAVENOUS at 18:26

## 2020-06-20 RX ADMIN — PANTOPRAZOLE SODIUM 40 MG: 40 TABLET, DELAYED RELEASE ORAL at 17:41

## 2020-06-20 RX ADMIN — METOPROLOL TARTRATE 25 MG: 25 TABLET ORAL at 18:26

## 2020-06-20 RX ADMIN — LOSARTAN POTASSIUM 25 MG: 25 TABLET, FILM COATED ORAL at 18:26

## 2020-06-20 RX ADMIN — HYDRALAZINE HYDROCHLORIDE 5 MG: 20 INJECTION INTRAMUSCULAR; INTRAVENOUS at 13:53

## 2020-06-20 RX ADMIN — INSULIN LISPRO 10 UNITS: 100 INJECTION, SOLUTION INTRAVENOUS; SUBCUTANEOUS at 17:43

## 2020-06-21 VITALS
RESPIRATION RATE: 22 BRPM | TEMPERATURE: 98.2 F | DIASTOLIC BLOOD PRESSURE: 87 MMHG | WEIGHT: 301.81 LBS | HEART RATE: 92 BPM | BODY MASS INDEX: 55.54 KG/M2 | HEIGHT: 62 IN | OXYGEN SATURATION: 98 % | SYSTOLIC BLOOD PRESSURE: 150 MMHG

## 2020-06-21 LAB
ALBUMIN SERPL BCP-MCNC: 3.1 G/DL (ref 3.5–5.7)
ALP SERPL-CCNC: 52 U/L (ref 40–150)
ALT SERPL W P-5'-P-CCNC: 10 U/L (ref 7–52)
ANION GAP SERPL CALCULATED.3IONS-SCNC: 8 MMOL/L (ref 4–13)
AST SERPL W P-5'-P-CCNC: 7 U/L (ref 13–39)
ATRIAL RATE: 111 BPM
ATRIAL RATE: 88 BPM
BILIRUB SERPL-MCNC: 0.7 MG/DL (ref 0.2–1)
BUN SERPL-MCNC: 23 MG/DL (ref 7–25)
CALCIUM SERPL-MCNC: 8.5 MG/DL (ref 8.6–10.5)
CHLORIDE SERPL-SCNC: 103 MMOL/L (ref 98–107)
CO2 SERPL-SCNC: 27 MMOL/L (ref 21–31)
CREAT SERPL-MCNC: 1.22 MG/DL (ref 0.7–1.3)
ERYTHROCYTE [DISTWIDTH] IN BLOOD BY AUTOMATED COUNT: 14.3 % (ref 11.5–14.5)
GFR SERPL CREATININE-BSD FRML MDRD: 73 ML/MIN/1.73SQ M
GLUCOSE SERPL-MCNC: 240 MG/DL (ref 65–99)
GLUCOSE SERPL-MCNC: 264 MG/DL (ref 65–140)
HCT VFR BLD AUTO: 36.1 % (ref 42–47)
HGB BLD-MCNC: 12 G/DL (ref 14–18)
MAGNESIUM SERPL-MCNC: 1.9 MG/DL (ref 1.9–2.7)
MCH RBC QN AUTO: 29 PG (ref 26–34)
MCHC RBC AUTO-ENTMCNC: 33.2 G/DL (ref 31–37)
MCV RBC AUTO: 87 FL (ref 81–99)
P AXIS: 53 DEGREES
P AXIS: 61 DEGREES
PHOSPHATE SERPL-MCNC: 3.4 MG/DL (ref 3–5.5)
PLATELET # BLD AUTO: 332 THOUSANDS/UL (ref 149–390)
PMV BLD AUTO: 8.3 FL (ref 8.6–11.7)
POTASSIUM SERPL-SCNC: 4 MMOL/L (ref 3.5–5.5)
PR INTERVAL: 160 MS
PR INTERVAL: 170 MS
PROCALCITONIN SERPL-MCNC: <0.05 NG/ML
PROT SERPL-MCNC: 6 G/DL (ref 6.4–8.9)
QRS AXIS: -17 DEGREES
QRS AXIS: 6 DEGREES
QRSD INTERVAL: 100 MS
QRSD INTERVAL: 94 MS
QT INTERVAL: 334 MS
QT INTERVAL: 366 MS
QTC INTERVAL: 442 MS
QTC INTERVAL: 454 MS
RBC # BLD AUTO: 4.13 MILLION/UL (ref 4.3–5.9)
SODIUM SERPL-SCNC: 138 MMOL/L (ref 134–143)
T WAVE AXIS: 45 DEGREES
T WAVE AXIS: 60 DEGREES
TSH SERPL DL<=0.05 MIU/L-ACNC: 14.7 UIU/ML (ref 0.45–5.33)
VENTRICULAR RATE: 111 BPM
VENTRICULAR RATE: 88 BPM
WBC # BLD AUTO: 8.5 THOUSAND/UL (ref 4.8–10.8)

## 2020-06-21 PROCEDURE — 84443 ASSAY THYROID STIM HORMONE: CPT | Performed by: NURSE PRACTITIONER

## 2020-06-21 PROCEDURE — 99239 HOSP IP/OBS DSCHRG MGMT >30: CPT | Performed by: INTERNAL MEDICINE

## 2020-06-21 PROCEDURE — 85027 COMPLETE CBC AUTOMATED: CPT | Performed by: NURSE PRACTITIONER

## 2020-06-21 PROCEDURE — 83735 ASSAY OF MAGNESIUM: CPT | Performed by: NURSE PRACTITIONER

## 2020-06-21 PROCEDURE — 84100 ASSAY OF PHOSPHORUS: CPT | Performed by: NURSE PRACTITIONER

## 2020-06-21 PROCEDURE — 93010 ELECTROCARDIOGRAM REPORT: CPT | Performed by: INTERNAL MEDICINE

## 2020-06-21 PROCEDURE — 97165 OT EVAL LOW COMPLEX 30 MIN: CPT

## 2020-06-21 PROCEDURE — 82948 REAGENT STRIP/BLOOD GLUCOSE: CPT

## 2020-06-21 PROCEDURE — 97163 PT EVAL HIGH COMPLEX 45 MIN: CPT

## 2020-06-21 PROCEDURE — 99255 IP/OBS CONSLTJ NEW/EST HI 80: CPT | Performed by: INTERNAL MEDICINE

## 2020-06-21 PROCEDURE — 93005 ELECTROCARDIOGRAM TRACING: CPT

## 2020-06-21 PROCEDURE — 84145 PROCALCITONIN (PCT): CPT | Performed by: NURSE PRACTITIONER

## 2020-06-21 PROCEDURE — 80053 COMPREHEN METABOLIC PANEL: CPT | Performed by: NURSE PRACTITIONER

## 2020-06-21 PROCEDURE — 99221 1ST HOSP IP/OBS SF/LOW 40: CPT | Performed by: NURSE PRACTITIONER

## 2020-06-21 RX ORDER — METOPROLOL SUCCINATE 25 MG/1
25 TABLET, EXTENDED RELEASE ORAL DAILY
Qty: 30 TABLET | Refills: 0 | Status: SHIPPED | OUTPATIENT
Start: 2020-06-21 | End: 2020-10-23 | Stop reason: SDUPTHER

## 2020-06-21 RX ORDER — LOSARTAN POTASSIUM 50 MG/1
50 TABLET ORAL DAILY
Qty: 30 TABLET | Refills: 0 | Status: SHIPPED | OUTPATIENT
Start: 2020-06-22 | End: 2020-10-23 | Stop reason: SDUPTHER

## 2020-06-21 RX ADMIN — FUROSEMIDE 20 MG: 20 TABLET ORAL at 09:45

## 2020-06-21 RX ADMIN — PANTOPRAZOLE SODIUM 40 MG: 40 TABLET, DELAYED RELEASE ORAL at 17:18

## 2020-06-21 RX ADMIN — LOSARTAN POTASSIUM 50 MG: 50 TABLET, FILM COATED ORAL at 09:45

## 2020-06-21 RX ADMIN — ACETAMINOPHEN 650 MG: 325 TABLET ORAL at 11:49

## 2020-06-21 RX ADMIN — INSULIN LISPRO 10 UNITS: 100 INJECTION, SOLUTION INTRAVENOUS; SUBCUTANEOUS at 09:46

## 2020-06-21 RX ADMIN — ATORVASTATIN CALCIUM 20 MG: 20 TABLET, FILM COATED ORAL at 09:45

## 2020-06-21 RX ADMIN — INSULIN LISPRO 10 UNITS: 100 INJECTION, SOLUTION INTRAVENOUS; SUBCUTANEOUS at 11:54

## 2020-06-21 RX ADMIN — ENOXAPARIN SODIUM 40 MG: 40 INJECTION SUBCUTANEOUS at 09:46

## 2020-06-21 RX ADMIN — LEVOTHYROXINE SODIUM 25 MCG: 25 TABLET ORAL at 08:29

## 2020-06-21 RX ADMIN — ONDANSETRON 4 MG: 2 INJECTION INTRAMUSCULAR; INTRAVENOUS at 04:34

## 2020-06-21 RX ADMIN — LORAZEPAM 0.5 MG: 0.5 TABLET ORAL at 11:49

## 2020-06-21 RX ADMIN — FLUVOXAMINE MALEATE 100 MG: 50 TABLET ORAL at 09:44

## 2020-06-21 RX ADMIN — FLUVOXAMINE MALEATE 100 MG: 50 TABLET ORAL at 17:19

## 2020-06-21 RX ADMIN — INSULIN LISPRO 10 UNITS: 100 INJECTION, SOLUTION INTRAVENOUS; SUBCUTANEOUS at 16:37

## 2020-06-21 RX ADMIN — PANTOPRAZOLE SODIUM 40 MG: 40 TABLET, DELAYED RELEASE ORAL at 09:44

## 2020-06-21 RX ADMIN — LEVOTHYROXINE SODIUM 100 MCG: 100 TABLET ORAL at 08:29

## 2020-06-21 RX ADMIN — ACETAMINOPHEN 650 MG: 325 TABLET ORAL at 04:07

## 2020-06-21 RX ADMIN — METOPROLOL TARTRATE 25 MG: 25 TABLET ORAL at 09:46

## 2020-06-22 PROCEDURE — 4010F ACE/ARB THERAPY RXD/TAKEN: CPT | Performed by: NURSE PRACTITIONER

## 2020-06-23 ENCOUNTER — TRANSITIONAL CARE MANAGEMENT (OUTPATIENT)
Dept: FAMILY MEDICINE CLINIC | Facility: CLINIC | Age: 40
End: 2020-06-23

## 2020-07-01 ENCOUNTER — TELEPHONE (OUTPATIENT)
Dept: FAMILY MEDICINE CLINIC | Facility: CLINIC | Age: 40
End: 2020-07-01

## 2020-07-01 NOTE — TELEPHONE ENCOUNTER
Received fax from [facility]  Requesting refill on Omeprazole  Pt has pantoprazole listed on med list , not omeprazole  Called pt ot verify which med he is on but no voice mail set up , unable to l/m Pt also due for TCM as well

## 2020-07-02 NOTE — TELEPHONE ENCOUNTER
Called pt, vm not set up  Unable to fill med Pt needs appt for med check as well as TCM before refill

## 2020-10-19 ENCOUNTER — TELEPHONE (OUTPATIENT)
Dept: NEUROLOGY | Facility: CLINIC | Age: 40
End: 2020-10-19

## 2020-10-22 ENCOUNTER — OFFICE VISIT (OUTPATIENT)
Dept: NEUROLOGY | Facility: CLINIC | Age: 40
End: 2020-10-22
Payer: COMMERCIAL

## 2020-10-22 ENCOUNTER — TELEPHONE (OUTPATIENT)
Dept: FAMILY MEDICINE CLINIC | Facility: CLINIC | Age: 40
End: 2020-10-22

## 2020-10-22 ENCOUNTER — APPOINTMENT (OUTPATIENT)
Dept: LAB | Facility: CLINIC | Age: 40
End: 2020-10-22
Payer: COMMERCIAL

## 2020-10-22 VITALS
BODY MASS INDEX: 46.74 KG/M2 | SYSTOLIC BLOOD PRESSURE: 150 MMHG | DIASTOLIC BLOOD PRESSURE: 98 MMHG | HEART RATE: 88 BPM | TEMPERATURE: 97 F | WEIGHT: 254 LBS | HEIGHT: 62 IN

## 2020-10-22 DIAGNOSIS — R41.0 CONFUSION: ICD-10-CM

## 2020-10-22 DIAGNOSIS — R29.2 HYPERREFLEXIA: ICD-10-CM

## 2020-10-22 DIAGNOSIS — M54.12 CERVICAL RADICULOPATHY: ICD-10-CM

## 2020-10-22 DIAGNOSIS — R33.9 URINARY RETENTION: ICD-10-CM

## 2020-10-22 DIAGNOSIS — E03.9 ACQUIRED HYPOTHYROIDISM: ICD-10-CM

## 2020-10-22 DIAGNOSIS — E11.65 TYPE 2 DIABETES MELLITUS WITH HYPERGLYCEMIA, WITH LONG-TERM CURRENT USE OF INSULIN (HCC): ICD-10-CM

## 2020-10-22 DIAGNOSIS — G43.709 CHRONIC MIGRAINE WITHOUT AURA WITHOUT STATUS MIGRAINOSUS, NOT INTRACTABLE: ICD-10-CM

## 2020-10-22 DIAGNOSIS — R41.0 EPISODIC CONFUSION: Primary | ICD-10-CM

## 2020-10-22 DIAGNOSIS — I10 ESSENTIAL HYPERTENSION: ICD-10-CM

## 2020-10-22 DIAGNOSIS — M48.02 SPINAL STENOSIS IN CERVICAL REGION: ICD-10-CM

## 2020-10-22 DIAGNOSIS — E66.01 CLASS 3 SEVERE OBESITY DUE TO EXCESS CALORIES WITH SERIOUS COMORBIDITY AND BODY MASS INDEX (BMI) OF 60.0 TO 69.9 IN ADULT (HCC): ICD-10-CM

## 2020-10-22 DIAGNOSIS — F42.9 OBSESSIVE-COMPULSIVE DISORDER, UNSPECIFIED TYPE: Chronic | ICD-10-CM

## 2020-10-22 DIAGNOSIS — Z79.4 TYPE 2 DIABETES MELLITUS WITH HYPERGLYCEMIA, WITH LONG-TERM CURRENT USE OF INSULIN (HCC): ICD-10-CM

## 2020-10-22 DIAGNOSIS — M54.2 CERVICALGIA: ICD-10-CM

## 2020-10-22 DIAGNOSIS — G93.40 ENCEPHALOPATHY: ICD-10-CM

## 2020-10-22 DIAGNOSIS — F31.9 BIPOLAR 1 DISORDER (HCC): ICD-10-CM

## 2020-10-22 DIAGNOSIS — G43.009 MIGRAINE WITHOUT AURA AND WITHOUT STATUS MIGRAINOSUS, NOT INTRACTABLE: ICD-10-CM

## 2020-10-22 DIAGNOSIS — R41.0 EPISODIC CONFUSION: ICD-10-CM

## 2020-10-22 DIAGNOSIS — F25.9 SCHIZOAFFECTIVE DISORDER, UNSPECIFIED TYPE (HCC): Chronic | ICD-10-CM

## 2020-10-22 LAB
25(OH)D3 SERPL-MCNC: 13.1 NG/ML (ref 30–100)
BUN SERPL-MCNC: 24 MG/DL (ref 5–25)
CREAT SERPL-MCNC: 1.67 MG/DL (ref 0.6–1.3)
GFR SERPL CREATININE-BSD FRML MDRD: 50 ML/MIN/1.73SQ M
VIT B12 SERPL-MCNC: 797 PG/ML (ref 100–900)

## 2020-10-22 PROCEDURE — 99358 PROLONG SERVICE W/O CONTACT: CPT | Performed by: PHYSICIAN ASSISTANT

## 2020-10-22 PROCEDURE — 99215 OFFICE O/P EST HI 40 MIN: CPT | Performed by: PHYSICIAN ASSISTANT

## 2020-10-22 PROCEDURE — 36415 COLL VENOUS BLD VENIPUNCTURE: CPT

## 2020-10-22 PROCEDURE — 84520 ASSAY OF UREA NITROGEN: CPT

## 2020-10-22 PROCEDURE — 82565 ASSAY OF CREATININE: CPT

## 2020-10-22 PROCEDURE — 1036F TOBACCO NON-USER: CPT | Performed by: PHYSICIAN ASSISTANT

## 2020-10-22 PROCEDURE — 82306 VITAMIN D 25 HYDROXY: CPT

## 2020-10-22 PROCEDURE — 82607 VITAMIN B-12: CPT

## 2020-10-22 RX ORDER — NAPROXEN SODIUM 220 MG
220 TABLET ORAL DAILY
COMMUNITY
End: 2020-10-26

## 2020-10-22 RX ORDER — PROCHLORPERAZINE MALEATE 10 MG
10 TABLET ORAL EVERY 6 HOURS PRN
Qty: 10 TABLET | Refills: 0 | Status: SHIPPED | OUTPATIENT
Start: 2020-10-22 | End: 2021-01-12 | Stop reason: SDUPTHER

## 2020-10-23 ENCOUNTER — TELEPHONE (OUTPATIENT)
Dept: NEUROLOGY | Facility: CLINIC | Age: 40
End: 2020-10-23

## 2020-10-23 ENCOUNTER — OFFICE VISIT (OUTPATIENT)
Dept: FAMILY MEDICINE CLINIC | Facility: CLINIC | Age: 40
End: 2020-10-23
Payer: COMMERCIAL

## 2020-10-23 VITALS
WEIGHT: 262 LBS | DIASTOLIC BLOOD PRESSURE: 110 MMHG | OXYGEN SATURATION: 98 % | RESPIRATION RATE: 16 BRPM | HEART RATE: 89 BPM | BODY MASS INDEX: 48.21 KG/M2 | HEIGHT: 62 IN | SYSTOLIC BLOOD PRESSURE: 162 MMHG | TEMPERATURE: 98 F

## 2020-10-23 DIAGNOSIS — E55.9 VITAMIN D DEFICIENCY: Primary | ICD-10-CM

## 2020-10-23 DIAGNOSIS — I10 ESSENTIAL HYPERTENSION: ICD-10-CM

## 2020-10-23 DIAGNOSIS — K21.00 GASTROESOPHAGEAL REFLUX DISEASE WITH ESOPHAGITIS WITHOUT HEMORRHAGE: ICD-10-CM

## 2020-10-23 DIAGNOSIS — F25.9 SCHIZO AFFECTIVE SCHIZOPHRENIA (HCC): ICD-10-CM

## 2020-10-23 DIAGNOSIS — Z79.4 TYPE 2 DIABETES MELLITUS WITH HYPERGLYCEMIA, WITH LONG-TERM CURRENT USE OF INSULIN (HCC): Primary | ICD-10-CM

## 2020-10-23 DIAGNOSIS — I16.1 HYPERTENSIVE EMERGENCY: ICD-10-CM

## 2020-10-23 DIAGNOSIS — E11.65 TYPE 2 DIABETES MELLITUS WITH HYPERGLYCEMIA, WITH LONG-TERM CURRENT USE OF INSULIN (HCC): Primary | ICD-10-CM

## 2020-10-23 DIAGNOSIS — G43.009 MIGRAINE WITHOUT AURA AND WITHOUT STATUS MIGRAINOSUS, NOT INTRACTABLE: ICD-10-CM

## 2020-10-23 DIAGNOSIS — E66.01 MORBID OBESITY WITH BMI OF 45.0-49.9, ADULT (HCC): ICD-10-CM

## 2020-10-23 DIAGNOSIS — E78.49 OTHER HYPERLIPIDEMIA: ICD-10-CM

## 2020-10-23 PROBLEM — K21.9 ACID REFLUX: Status: RESOLVED | Noted: 2019-05-30 | Resolved: 2020-10-23

## 2020-10-23 PROBLEM — J20.8 ACUTE BRONCHITIS DUE TO OTHER SPECIFIED ORGANISMS: Status: RESOLVED | Noted: 2019-07-05 | Resolved: 2020-10-23

## 2020-10-23 PROCEDURE — 99214 OFFICE O/P EST MOD 30 MIN: CPT | Performed by: FAMILY MEDICINE

## 2020-10-23 PROCEDURE — 4010F ACE/ARB THERAPY RXD/TAKEN: CPT | Performed by: PHYSICIAN ASSISTANT

## 2020-10-23 RX ORDER — LOSARTAN POTASSIUM 50 MG/1
50 TABLET ORAL DAILY
Qty: 30 TABLET | Refills: 0 | Status: ON HOLD | OUTPATIENT
Start: 2020-10-23 | End: 2020-11-28 | Stop reason: SDUPTHER

## 2020-10-23 RX ORDER — ATORVASTATIN CALCIUM 20 MG/1
20 TABLET, FILM COATED ORAL DAILY
Qty: 30 TABLET | Refills: 5 | Status: SHIPPED | OUTPATIENT
Start: 2020-10-23 | End: 2021-01-12 | Stop reason: SDUPTHER

## 2020-10-23 RX ORDER — METOPROLOL SUCCINATE 25 MG/1
25 TABLET, EXTENDED RELEASE ORAL DAILY
Qty: 30 TABLET | Refills: 0 | Status: SHIPPED | OUTPATIENT
Start: 2020-10-23 | End: 2021-01-12 | Stop reason: SDUPTHER

## 2020-10-23 RX ORDER — LOSARTAN POTASSIUM 50 MG/1
50 TABLET ORAL DAILY
Qty: 30 TABLET | Refills: 0 | Status: SHIPPED | OUTPATIENT
Start: 2020-10-23 | End: 2020-10-23 | Stop reason: SDUPTHER

## 2020-10-23 RX ORDER — ERGOCALCIFEROL 1.25 MG/1
50000 CAPSULE ORAL WEEKLY
Qty: 8 CAPSULE | Refills: 0 | Status: SHIPPED | OUTPATIENT
Start: 2020-10-23 | End: 2021-01-12 | Stop reason: SDUPTHER

## 2020-10-23 RX ORDER — FLUVOXAMINE MALEATE 100 MG
100 TABLET ORAL 2 TIMES DAILY
Qty: 60 TABLET | Refills: 1 | Status: SHIPPED | OUTPATIENT
Start: 2020-10-23 | End: 2021-02-13 | Stop reason: HOSPADM

## 2020-10-25 PROBLEM — F25.9 SCHIZO AFFECTIVE SCHIZOPHRENIA (HCC): Status: ACTIVE | Noted: 2020-10-25

## 2020-10-26 ENCOUNTER — HOSPITAL ENCOUNTER (EMERGENCY)
Facility: HOSPITAL | Age: 40
Discharge: HOME | End: 2020-10-26
Attending: FAMILY MEDICINE
Payer: COMMERCIAL

## 2020-10-26 VITALS
WEIGHT: 260 LBS | BODY MASS INDEX: 47.84 KG/M2 | OXYGEN SATURATION: 97 % | DIASTOLIC BLOOD PRESSURE: 96 MMHG | TEMPERATURE: 97.8 F | HEART RATE: 80 BPM | HEIGHT: 62 IN | RESPIRATION RATE: 20 BRPM | SYSTOLIC BLOOD PRESSURE: 184 MMHG

## 2020-10-26 DIAGNOSIS — G43.709 CHRONIC MIGRAINE WITHOUT AURA WITHOUT STATUS MIGRAINOSUS, NOT INTRACTABLE: Primary | ICD-10-CM

## 2020-10-26 DIAGNOSIS — R73.9 HYPERGLYCEMIA: Primary | ICD-10-CM

## 2020-10-26 LAB
ANION GAP SERPL CALCULATED.3IONS-SCNC: 6 MMOL/L (ref 4–13)
BACTERIA UR QL AUTO: NORMAL /HPF
BASOPHILS # BLD AUTO: 0 THOUSANDS/ΜL (ref 0–0.1)
BASOPHILS NFR BLD AUTO: 1 % (ref 0–2)
BETA-HYDROXYBUTYRATE: 1.4 MMOL/L
BILIRUB UR QL STRIP: NEGATIVE
BUN SERPL-MCNC: 32 MG/DL (ref 7–25)
CALCIUM SERPL-MCNC: 9.2 MG/DL (ref 8.6–10.5)
CHLORIDE SERPL-SCNC: 95 MMOL/L (ref 98–107)
CLARITY UR: CLEAR
CO2 SERPL-SCNC: 30 MMOL/L (ref 21–31)
COLOR UR: YELLOW
CREAT SERPL-MCNC: 1.49 MG/DL (ref 0.7–1.3)
EOSINOPHIL # BLD AUTO: 0.2 THOUSAND/ΜL (ref 0–0.61)
EOSINOPHIL NFR BLD AUTO: 3 % (ref 0–5)
ERYTHROCYTE [DISTWIDTH] IN BLOOD BY AUTOMATED COUNT: 13.4 % (ref 11.5–14.5)
GFR SERPL CREATININE-BSD FRML MDRD: 57 ML/MIN/1.73SQ M
GLUCOSE SERPL-MCNC: 310 MG/DL (ref 65–140)
GLUCOSE SERPL-MCNC: 338 MG/DL (ref 65–140)
GLUCOSE SERPL-MCNC: 373 MG/DL (ref 65–140)
GLUCOSE SERPL-MCNC: 414 MG/DL (ref 65–140)
GLUCOSE SERPL-MCNC: 469 MG/DL (ref 65–99)
GLUCOSE UR STRIP-MCNC: ABNORMAL MG/DL
HCT VFR BLD AUTO: 38.6 % (ref 42–47)
HGB BLD-MCNC: 13 G/DL (ref 14–18)
HGB UR QL STRIP.AUTO: NEGATIVE
KETONES UR STRIP-MCNC: ABNORMAL MG/DL
LEUKOCYTE ESTERASE UR QL STRIP: NEGATIVE
LYMPHOCYTES # BLD AUTO: 1.3 THOUSANDS/ΜL (ref 0.6–4.47)
LYMPHOCYTES NFR BLD AUTO: 16 % (ref 21–51)
MCH RBC QN AUTO: 29.4 PG (ref 26–34)
MCHC RBC AUTO-ENTMCNC: 33.6 G/DL (ref 31–37)
MCV RBC AUTO: 88 FL (ref 81–99)
MONOCYTES # BLD AUTO: 0.5 THOUSAND/ΜL (ref 0.17–1.22)
MONOCYTES NFR BLD AUTO: 6 % (ref 2–12)
NEUTROPHILS # BLD AUTO: 6 THOUSANDS/ΜL (ref 1.4–6.5)
NEUTS SEG NFR BLD AUTO: 74 % (ref 42–75)
NITRITE UR QL STRIP: NEGATIVE
NON-SQ EPI CELLS URNS QL MICRO: NORMAL /HPF
PH UR STRIP.AUTO: 7 [PH]
PLATELET # BLD AUTO: 310 THOUSANDS/UL (ref 149–390)
PMV BLD AUTO: 8 FL (ref 8.6–11.7)
POTASSIUM SERPL-SCNC: 4.9 MMOL/L (ref 3.5–5.5)
PROT UR STRIP-MCNC: ABNORMAL MG/DL
RBC # BLD AUTO: 4.4 MILLION/UL (ref 4.3–5.9)
RBC #/AREA URNS AUTO: NORMAL /HPF
SODIUM SERPL-SCNC: 131 MMOL/L (ref 134–143)
SP GR UR STRIP.AUTO: 1.01 (ref 1–1.03)
UROBILINOGEN UR QL STRIP.AUTO: 0.2 E.U./DL
WBC # BLD AUTO: 8.1 THOUSAND/UL (ref 4.8–10.8)
WBC #/AREA URNS AUTO: NORMAL /HPF

## 2020-10-26 PROCEDURE — 99285 EMERGENCY DEPT VISIT HI MDM: CPT

## 2020-10-26 PROCEDURE — 80048 BASIC METABOLIC PNL TOTAL CA: CPT | Performed by: FAMILY MEDICINE

## 2020-10-26 PROCEDURE — 82010 KETONE BODYS QUAN: CPT | Performed by: FAMILY MEDICINE

## 2020-10-26 PROCEDURE — 96376 TX/PRO/DX INJ SAME DRUG ADON: CPT

## 2020-10-26 PROCEDURE — 81003 URINALYSIS AUTO W/O SCOPE: CPT | Performed by: FAMILY MEDICINE

## 2020-10-26 PROCEDURE — 82948 REAGENT STRIP/BLOOD GLUCOSE: CPT

## 2020-10-26 PROCEDURE — 99284 EMERGENCY DEPT VISIT MOD MDM: CPT | Performed by: FAMILY MEDICINE

## 2020-10-26 PROCEDURE — 96374 THER/PROPH/DIAG INJ IV PUSH: CPT

## 2020-10-26 PROCEDURE — 36415 COLL VENOUS BLD VENIPUNCTURE: CPT | Performed by: FAMILY MEDICINE

## 2020-10-26 PROCEDURE — 96361 HYDRATE IV INFUSION ADD-ON: CPT

## 2020-10-26 PROCEDURE — 81001 URINALYSIS AUTO W/SCOPE: CPT | Performed by: FAMILY MEDICINE

## 2020-10-26 PROCEDURE — 85025 COMPLETE CBC W/AUTO DIFF WBC: CPT | Performed by: FAMILY MEDICINE

## 2020-10-26 RX ADMIN — INSULIN HUMAN 10 UNITS: 100 INJECTION, SOLUTION PARENTERAL at 09:50

## 2020-10-26 RX ADMIN — INSULIN HUMAN 10 UNITS: 100 INJECTION, SOLUTION PARENTERAL at 11:19

## 2020-10-26 RX ADMIN — SODIUM CHLORIDE 1000 ML: 0.9 INJECTION, SOLUTION INTRAVENOUS at 11:20

## 2020-10-26 RX ADMIN — SODIUM CHLORIDE 1000 ML: 0.9 INJECTION, SOLUTION INTRAVENOUS at 09:41

## 2020-11-01 ENCOUNTER — TELEPHONE (OUTPATIENT)
Dept: OTHER | Facility: OTHER | Age: 40
End: 2020-11-01

## 2020-11-01 ENCOUNTER — DOCUMENTATION (OUTPATIENT)
Dept: NEUROLOGY | Facility: CLINIC | Age: 40
End: 2020-11-01

## 2020-11-01 DIAGNOSIS — R51.9 INTRACTABLE EPISODIC HEADACHE, UNSPECIFIED HEADACHE TYPE: Primary | ICD-10-CM

## 2020-11-01 RX ORDER — CYCLOBENZAPRINE HCL 5 MG
5 TABLET ORAL 3 TIMES DAILY PRN
Qty: 10 TABLET | Refills: 0 | Status: SHIPPED | OUTPATIENT
Start: 2020-11-01 | End: 2020-11-13 | Stop reason: SDUPTHER

## 2020-11-04 DIAGNOSIS — G43.709 CHRONIC MIGRAINE WITHOUT AURA WITHOUT STATUS MIGRAINOSUS, NOT INTRACTABLE: Primary | ICD-10-CM

## 2020-11-13 ENCOUNTER — TELEPHONE (OUTPATIENT)
Dept: NEUROLOGY | Facility: CLINIC | Age: 40
End: 2020-11-13

## 2020-11-13 DIAGNOSIS — R51.9 INTRACTABLE EPISODIC HEADACHE, UNSPECIFIED HEADACHE TYPE: ICD-10-CM

## 2020-11-13 DIAGNOSIS — G43.009 MIGRAINE WITHOUT AURA AND WITHOUT STATUS MIGRAINOSUS, NOT INTRACTABLE: Primary | ICD-10-CM

## 2020-11-13 RX ORDER — CYCLOBENZAPRINE HCL 5 MG
5 TABLET ORAL
Qty: 30 TABLET | Refills: 0 | Status: SHIPPED | OUTPATIENT
Start: 2020-11-13 | End: 2021-01-12 | Stop reason: SDUPTHER

## 2020-11-21 ENCOUNTER — HOSPITAL ENCOUNTER (INPATIENT)
Facility: HOSPITAL | Age: 40
LOS: 7 days | Discharge: HOME | DRG: 720 | End: 2020-11-28
Attending: EMERGENCY MEDICINE | Admitting: INTERNAL MEDICINE
Payer: COMMERCIAL

## 2020-11-21 ENCOUNTER — APPOINTMENT (EMERGENCY)
Dept: RADIOLOGY | Facility: HOSPITAL | Age: 40
DRG: 720 | End: 2020-11-21
Payer: COMMERCIAL

## 2020-11-21 DIAGNOSIS — R91.8 GROUND GLASS OPACITY PRESENT ON IMAGING OF LUNG: ICD-10-CM

## 2020-11-21 DIAGNOSIS — J18.9 PNEUMONIA: ICD-10-CM

## 2020-11-21 DIAGNOSIS — J96.01 ACUTE RESPIRATORY FAILURE WITH HYPOXIA (HCC): ICD-10-CM

## 2020-11-21 DIAGNOSIS — R06.03 RESPIRATORY DISTRESS: Primary | ICD-10-CM

## 2020-11-21 DIAGNOSIS — I16.1 HYPERTENSIVE EMERGENCY: ICD-10-CM

## 2020-11-21 DIAGNOSIS — E11.9 TYPE 2 DIABETES MELLITUS WITHOUT COMPLICATION, UNSPECIFIED WHETHER LONG TERM INSULIN USE (HCC): ICD-10-CM

## 2020-11-21 LAB
ABO GROUP BLD: NORMAL
ALBUMIN SERPL BCP-MCNC: 2.9 G/DL (ref 3.5–5)
ALP SERPL-CCNC: 112 U/L (ref 46–116)
ALT SERPL W P-5'-P-CCNC: 35 U/L (ref 12–78)
ANION GAP SERPL CALCULATED.3IONS-SCNC: 3 MMOL/L (ref 4–13)
AST SERPL W P-5'-P-CCNC: 49 U/L (ref 5–45)
BASOPHILS # BLD AUTO: 0.03 THOUSANDS/ΜL (ref 0–0.1)
BASOPHILS NFR BLD AUTO: 0 % (ref 0–1)
BILIRUB SERPL-MCNC: 0.95 MG/DL (ref 0.2–1)
BUN SERPL-MCNC: 32 MG/DL (ref 5–25)
CALCIUM ALBUM COR SERPL-MCNC: 10 MG/DL (ref 8.3–10.1)
CALCIUM SERPL-MCNC: 9.1 MG/DL (ref 8.3–10.1)
CHLORIDE SERPL-SCNC: 105 MMOL/L (ref 100–108)
CK MB SERPL-MCNC: 2.9 NG/ML (ref 0–5)
CK MB SERPL-MCNC: <1 % (ref 0–2.5)
CK SERPL-CCNC: 1055 U/L (ref 39–308)
CO2 SERPL-SCNC: 28 MMOL/L (ref 21–32)
CREAT SERPL-MCNC: 1.5 MG/DL (ref 0.6–1.3)
CRP SERPL QL: 232 MG/L
D DIMER PPP FEU-MCNC: 0.78 UG/ML FEU
EOSINOPHIL # BLD AUTO: 0.12 THOUSAND/ΜL (ref 0–0.61)
EOSINOPHIL NFR BLD AUTO: 1 % (ref 0–6)
ERYTHROCYTE [DISTWIDTH] IN BLOOD BY AUTOMATED COUNT: 13 % (ref 11.6–15.1)
FERRITIN SERPL-MCNC: 138 NG/ML (ref 8–388)
FLUAV RNA RESP QL NAA+PROBE: NEGATIVE
FLUBV RNA RESP QL NAA+PROBE: NEGATIVE
GFR SERPL CREATININE-BSD FRML MDRD: 57 ML/MIN/1.73SQ M
GLUCOSE SERPL-MCNC: 160 MG/DL (ref 65–140)
HCT VFR BLD AUTO: 34.8 % (ref 36.5–49.3)
HGB BLD-MCNC: 11.2 G/DL (ref 12–17)
HIV 1+2 AB+HIV1 P24 AG SERPL QL IA: NORMAL
HIV1 P24 AG SER QL: NORMAL
IMM GRANULOCYTES # BLD AUTO: 0.11 THOUSAND/UL (ref 0–0.2)
IMM GRANULOCYTES NFR BLD AUTO: 1 % (ref 0–2)
LACTATE SERPL-SCNC: 0.9 MMOL/L (ref 0.5–2)
LYMPHOCYTES # BLD AUTO: 0.74 THOUSANDS/ΜL (ref 0.6–4.47)
LYMPHOCYTES NFR BLD AUTO: 5 % (ref 14–44)
MCH RBC QN AUTO: 29.5 PG (ref 26.8–34.3)
MCHC RBC AUTO-ENTMCNC: 32.2 G/DL (ref 31.4–37.4)
MCV RBC AUTO: 92 FL (ref 82–98)
MONOCYTES # BLD AUTO: 1.05 THOUSAND/ΜL (ref 0.17–1.22)
MONOCYTES NFR BLD AUTO: 7 % (ref 4–12)
NEUTROPHILS # BLD AUTO: 14.13 THOUSANDS/ΜL (ref 1.85–7.62)
NEUTS SEG NFR BLD AUTO: 86 % (ref 43–75)
NRBC BLD AUTO-RTO: 0 /100 WBCS
NT-PROBNP SERPL-MCNC: 1366 PG/ML
PLATELET # BLD AUTO: 280 THOUSANDS/UL (ref 149–390)
PMV BLD AUTO: 9.9 FL (ref 8.9–12.7)
POTASSIUM SERPL-SCNC: 4.8 MMOL/L (ref 3.5–5.3)
PROCALCITONIN SERPL-MCNC: 0.71 NG/ML
PROT SERPL-MCNC: 7.6 G/DL (ref 6.4–8.2)
RBC # BLD AUTO: 3.8 MILLION/UL (ref 3.88–5.62)
RH BLD: POSITIVE
RSV RNA RESP QL NAA+PROBE: NEGATIVE
SARS-COV-2 RNA RESP QL NAA+PROBE: NEGATIVE
SODIUM SERPL-SCNC: 136 MMOL/L (ref 136–145)
TROPONIN I SERPL-MCNC: <0.02 NG/ML
WBC # BLD AUTO: 16.18 THOUSAND/UL (ref 4.31–10.16)

## 2020-11-21 PROCEDURE — 83880 ASSAY OF NATRIURETIC PEPTIDE: CPT | Performed by: EMERGENCY MEDICINE

## 2020-11-21 PROCEDURE — 71045 X-RAY EXAM CHEST 1 VIEW: CPT

## 2020-11-21 PROCEDURE — 86803 HEPATITIS C AB TEST: CPT | Performed by: EMERGENCY MEDICINE

## 2020-11-21 PROCEDURE — 82728 ASSAY OF FERRITIN: CPT | Performed by: EMERGENCY MEDICINE

## 2020-11-21 PROCEDURE — 83520 IMMUNOASSAY QUANT NOS NONAB: CPT | Performed by: EMERGENCY MEDICINE

## 2020-11-21 PROCEDURE — 85379 FIBRIN DEGRADATION QUANT: CPT | Performed by: EMERGENCY MEDICINE

## 2020-11-21 PROCEDURE — 84480 ASSAY TRIIODOTHYRONINE (T3): CPT | Performed by: STUDENT IN AN ORGANIZED HEALTH CARE EDUCATION/TRAINING PROGRAM

## 2020-11-21 PROCEDURE — 82553 CREATINE MB FRACTION: CPT | Performed by: EMERGENCY MEDICINE

## 2020-11-21 PROCEDURE — 87806 HIV AG W/HIV1&2 ANTB W/OPTIC: CPT | Performed by: EMERGENCY MEDICINE

## 2020-11-21 PROCEDURE — 84145 PROCALCITONIN (PCT): CPT | Performed by: EMERGENCY MEDICINE

## 2020-11-21 PROCEDURE — 87040 BLOOD CULTURE FOR BACTERIA: CPT | Performed by: EMERGENCY MEDICINE

## 2020-11-21 PROCEDURE — 86900 BLOOD TYPING SEROLOGIC ABO: CPT | Performed by: EMERGENCY MEDICINE

## 2020-11-21 PROCEDURE — 83605 ASSAY OF LACTIC ACID: CPT | Performed by: EMERGENCY MEDICINE

## 2020-11-21 PROCEDURE — 99285 EMERGENCY DEPT VISIT HI MDM: CPT

## 2020-11-21 PROCEDURE — 96374 THER/PROPH/DIAG INJ IV PUSH: CPT

## 2020-11-21 PROCEDURE — 96365 THER/PROPH/DIAG IV INF INIT: CPT

## 2020-11-21 PROCEDURE — 36415 COLL VENOUS BLD VENIPUNCTURE: CPT | Performed by: EMERGENCY MEDICINE

## 2020-11-21 PROCEDURE — 93005 ELECTROCARDIOGRAM TRACING: CPT

## 2020-11-21 PROCEDURE — 80053 COMPREHEN METABOLIC PANEL: CPT | Performed by: EMERGENCY MEDICINE

## 2020-11-21 PROCEDURE — 84484 ASSAY OF TROPONIN QUANT: CPT | Performed by: EMERGENCY MEDICINE

## 2020-11-21 PROCEDURE — 82550 ASSAY OF CK (CPK): CPT | Performed by: EMERGENCY MEDICINE

## 2020-11-21 PROCEDURE — 87340 HEPATITIS B SURFACE AG IA: CPT | Performed by: EMERGENCY MEDICINE

## 2020-11-21 PROCEDURE — 99291 CRITICAL CARE FIRST HOUR: CPT | Performed by: EMERGENCY MEDICINE

## 2020-11-21 PROCEDURE — 86704 HEP B CORE ANTIBODY TOTAL: CPT | Performed by: EMERGENCY MEDICINE

## 2020-11-21 PROCEDURE — 86901 BLOOD TYPING SEROLOGIC RH(D): CPT | Performed by: EMERGENCY MEDICINE

## 2020-11-21 PROCEDURE — 85025 COMPLETE CBC W/AUTO DIFF WBC: CPT | Performed by: EMERGENCY MEDICINE

## 2020-11-21 PROCEDURE — 0241U HB NFCT DS VIR RESP RNA 4 TRGT: CPT | Performed by: EMERGENCY MEDICINE

## 2020-11-21 PROCEDURE — 86140 C-REACTIVE PROTEIN: CPT | Performed by: EMERGENCY MEDICINE

## 2020-11-21 PROCEDURE — 86705 HEP B CORE ANTIBODY IGM: CPT | Performed by: EMERGENCY MEDICINE

## 2020-11-21 RX ORDER — MELATONIN
2000 DAILY
Status: DISCONTINUED | OUTPATIENT
Start: 2020-11-22 | End: 2020-11-23

## 2020-11-21 RX ORDER — MULTIVITAMIN/IRON/FOLIC ACID 18MG-0.4MG
1 TABLET ORAL DAILY
Status: DISCONTINUED | OUTPATIENT
Start: 2020-11-29 | End: 2020-11-23

## 2020-11-21 RX ORDER — TERBUTALINE SULFATE 1 MG/ML
0.25 INJECTION, SOLUTION SUBCUTANEOUS ONCE
Status: COMPLETED | OUTPATIENT
Start: 2020-11-21 | End: 2020-11-22

## 2020-11-21 RX ORDER — ATORVASTATIN CALCIUM 40 MG/1
40 TABLET, FILM COATED ORAL
Status: DISCONTINUED | OUTPATIENT
Start: 2020-11-21 | End: 2020-11-22

## 2020-11-21 RX ORDER — ZINC SULFATE 50(220)MG
220 CAPSULE ORAL DAILY
Status: DISCONTINUED | OUTPATIENT
Start: 2020-11-22 | End: 2020-11-23

## 2020-11-21 RX ORDER — ASCORBIC ACID 500 MG
1000 TABLET ORAL EVERY 12 HOURS SCHEDULED
Status: DISCONTINUED | OUTPATIENT
Start: 2020-11-21 | End: 2020-11-23

## 2020-11-21 RX ORDER — DOXYCYCLINE HYCLATE 100 MG/1
100 CAPSULE ORAL EVERY 12 HOURS
Status: DISCONTINUED | OUTPATIENT
Start: 2020-11-21 | End: 2020-11-22

## 2020-11-21 RX ORDER — DEXAMETHASONE SODIUM PHOSPHATE 4 MG/ML
6 INJECTION, SOLUTION INTRA-ARTICULAR; INTRALESIONAL; INTRAMUSCULAR; INTRAVENOUS; SOFT TISSUE EVERY 24 HOURS
Status: DISCONTINUED | OUTPATIENT
Start: 2020-11-21 | End: 2020-11-23

## 2020-11-21 RX ADMIN — DEXAMETHASONE SODIUM PHOSPHATE 6 MG: 4 INJECTION INTRA-ARTICULAR; INTRALESIONAL; INTRAMUSCULAR; INTRAVENOUS; SOFT TISSUE at 22:20

## 2020-11-21 RX ADMIN — CEFTRIAXONE SODIUM 1000 MG: 10 INJECTION, POWDER, FOR SOLUTION INTRAVENOUS at 22:19

## 2020-11-21 RX ADMIN — DOXYCYCLINE 100 MG: 100 CAPSULE ORAL at 22:19

## 2020-11-22 ENCOUNTER — APPOINTMENT (INPATIENT)
Dept: NON INVASIVE DIAGNOSTICS | Facility: HOSPITAL | Age: 40
DRG: 720 | End: 2020-11-22
Payer: COMMERCIAL

## 2020-11-22 PROBLEM — J96.00 ACUTE RESPIRATORY FAILURE (HCC): Status: ACTIVE | Noted: 2020-11-22

## 2020-11-22 PROBLEM — N17.9 AKI (ACUTE KIDNEY INJURY) (HCC): Status: ACTIVE | Noted: 2020-11-22

## 2020-11-22 PROBLEM — R06.03 RESPIRATORY DISTRESS: Status: ACTIVE | Noted: 2020-11-22

## 2020-11-22 PROBLEM — E66.01 MORBID OBESITY WITH BMI OF 45.0-49.9, ADULT (HCC): Status: ACTIVE | Noted: 2018-10-31

## 2020-11-22 PROBLEM — U07.1 ACUTE RESPIRATORY FAILURE DUE TO COVID-19 (HCC): Status: ACTIVE | Noted: 2020-11-22

## 2020-11-22 LAB
ALBUMIN SERPL BCP-MCNC: 2.5 G/DL (ref 3.5–5)
ALP SERPL-CCNC: 96 U/L (ref 46–116)
ALT SERPL W P-5'-P-CCNC: 29 U/L (ref 12–78)
ANION GAP SERPL CALCULATED.3IONS-SCNC: 6 MMOL/L (ref 4–13)
AST SERPL W P-5'-P-CCNC: 34 U/L (ref 5–45)
ATRIAL RATE: 103 BPM
BILIRUB SERPL-MCNC: 0.66 MG/DL (ref 0.2–1)
BUN SERPL-MCNC: 31 MG/DL (ref 5–25)
CALCIUM ALBUM COR SERPL-MCNC: 10 MG/DL (ref 8.3–10.1)
CALCIUM SERPL-MCNC: 8.8 MG/DL (ref 8.3–10.1)
CHLORIDE SERPL-SCNC: 105 MMOL/L (ref 100–108)
CO2 SERPL-SCNC: 27 MMOL/L (ref 21–32)
CREAT SERPL-MCNC: 1.44 MG/DL (ref 0.6–1.3)
EST. AVERAGE GLUCOSE BLD GHB EST-MCNC: 255 MG/DL
FLUAV RNA RESP QL NAA+PROBE: NEGATIVE
FLUBV RNA RESP QL NAA+PROBE: NEGATIVE
GFR SERPL CREATININE-BSD FRML MDRD: 59 ML/MIN/1.73SQ M
GLUCOSE SERPL-MCNC: 198 MG/DL (ref 65–140)
GLUCOSE SERPL-MCNC: 247 MG/DL (ref 65–140)
GLUCOSE SERPL-MCNC: 249 MG/DL (ref 65–140)
GLUCOSE SERPL-MCNC: 331 MG/DL (ref 65–140)
GLUCOSE SERPL-MCNC: 372 MG/DL (ref 65–140)
HBA1C MFR BLD: 10.5 %
HBV CORE AB SER QL: NORMAL
HBV CORE IGM SER QL: NORMAL
HBV SURFACE AG SER QL: NORMAL
HCV AB SER QL: NORMAL
L PNEUMO1 AG UR QL IA.RAPID: NEGATIVE
P AXIS: 73 DEGREES
POTASSIUM SERPL-SCNC: 4.5 MMOL/L (ref 3.5–5.3)
PR INTERVAL: 160 MS
PROCALCITONIN SERPL-MCNC: 0.98 NG/ML
PROT SERPL-MCNC: 6.7 G/DL (ref 6.4–8.2)
QRS AXIS: -21 DEGREES
QRSD INTERVAL: 94 MS
QT INTERVAL: 326 MS
QTC INTERVAL: 427 MS
RSV RNA RESP QL NAA+PROBE: NEGATIVE
S PNEUM AG UR QL: NEGATIVE
SARS-COV-2 RNA RESP QL NAA+PROBE: NEGATIVE
SODIUM SERPL-SCNC: 138 MMOL/L (ref 136–145)
T WAVE AXIS: 62 DEGREES
T3 SERPL-MCNC: 0.4 NG/ML (ref 0.6–1.8)
T4 FREE SERPL-MCNC: 0.81 NG/DL (ref 0.76–1.46)
TSH SERPL DL<=0.05 MIU/L-ACNC: 9.98 UIU/ML (ref 0.36–3.74)
VENTRICULAR RATE: 103 BPM

## 2020-11-22 PROCEDURE — 84145 PROCALCITONIN (PCT): CPT | Performed by: STUDENT IN AN ORGANIZED HEALTH CARE EDUCATION/TRAINING PROGRAM

## 2020-11-22 PROCEDURE — 82948 REAGENT STRIP/BLOOD GLUCOSE: CPT

## 2020-11-22 PROCEDURE — 84443 ASSAY THYROID STIM HORMONE: CPT | Performed by: STUDENT IN AN ORGANIZED HEALTH CARE EDUCATION/TRAINING PROGRAM

## 2020-11-22 PROCEDURE — 90686 IIV4 VACC NO PRSV 0.5 ML IM: CPT | Performed by: INTERNAL MEDICINE

## 2020-11-22 PROCEDURE — 3046F HEMOGLOBIN A1C LEVEL >9.0%: CPT | Performed by: FAMILY MEDICINE

## 2020-11-22 PROCEDURE — 93010 ELECTROCARDIOGRAM REPORT: CPT | Performed by: INTERNAL MEDICINE

## 2020-11-22 PROCEDURE — 36415 COLL VENOUS BLD VENIPUNCTURE: CPT | Performed by: STUDENT IN AN ORGANIZED HEALTH CARE EDUCATION/TRAINING PROGRAM

## 2020-11-22 PROCEDURE — 93306 TTE W/DOPPLER COMPLETE: CPT | Performed by: INTERNAL MEDICINE

## 2020-11-22 PROCEDURE — 87449 NOS EACH ORGANISM AG IA: CPT | Performed by: STUDENT IN AN ORGANIZED HEALTH CARE EDUCATION/TRAINING PROGRAM

## 2020-11-22 PROCEDURE — 87081 CULTURE SCREEN ONLY: CPT | Performed by: STUDENT IN AN ORGANIZED HEALTH CARE EDUCATION/TRAINING PROGRAM

## 2020-11-22 PROCEDURE — 84439 ASSAY OF FREE THYROXINE: CPT | Performed by: STUDENT IN AN ORGANIZED HEALTH CARE EDUCATION/TRAINING PROGRAM

## 2020-11-22 PROCEDURE — 83036 HEMOGLOBIN GLYCOSYLATED A1C: CPT | Performed by: STUDENT IN AN ORGANIZED HEALTH CARE EDUCATION/TRAINING PROGRAM

## 2020-11-22 PROCEDURE — 93306 TTE W/DOPPLER COMPLETE: CPT

## 2020-11-22 PROCEDURE — 80053 COMPREHEN METABOLIC PANEL: CPT | Performed by: STUDENT IN AN ORGANIZED HEALTH CARE EDUCATION/TRAINING PROGRAM

## 2020-11-22 PROCEDURE — 90471 IMMUNIZATION ADMIN: CPT | Performed by: INTERNAL MEDICINE

## 2020-11-22 PROCEDURE — 0241U HB NFCT DS VIR RESP RNA 4 TRGT: CPT | Performed by: STUDENT IN AN ORGANIZED HEALTH CARE EDUCATION/TRAINING PROGRAM

## 2020-11-22 RX ORDER — LANOLIN ALCOHOL/MO/W.PET/CERES
3 CREAM (GRAM) TOPICAL
Status: DISCONTINUED | OUTPATIENT
Start: 2020-11-22 | End: 2020-11-28 | Stop reason: HOSPADM

## 2020-11-22 RX ORDER — PANTOPRAZOLE SODIUM 40 MG/1
40 TABLET, DELAYED RELEASE ORAL 2 TIMES DAILY
Status: DISCONTINUED | OUTPATIENT
Start: 2020-11-22 | End: 2020-11-28 | Stop reason: HOSPADM

## 2020-11-22 RX ORDER — HYDRALAZINE HYDROCHLORIDE 20 MG/ML
5 INJECTION INTRAMUSCULAR; INTRAVENOUS EVERY 6 HOURS PRN
Status: DISCONTINUED | OUTPATIENT
Start: 2020-11-22 | End: 2020-11-28 | Stop reason: HOSPADM

## 2020-11-22 RX ORDER — ONDANSETRON 2 MG/ML
4 INJECTION INTRAMUSCULAR; INTRAVENOUS EVERY 6 HOURS PRN
Status: DISCONTINUED | OUTPATIENT
Start: 2020-11-22 | End: 2020-11-28 | Stop reason: HOSPADM

## 2020-11-22 RX ORDER — FLUVOXAMINE MALEATE 50 MG/1
100 TABLET, COATED ORAL 2 TIMES DAILY
Status: DISCONTINUED | OUTPATIENT
Start: 2020-11-22 | End: 2020-11-28 | Stop reason: HOSPADM

## 2020-11-22 RX ORDER — METOPROLOL TARTRATE 5 MG/5ML
5 INJECTION INTRAVENOUS ONCE
Status: DISCONTINUED | OUTPATIENT
Start: 2020-11-22 | End: 2020-11-22

## 2020-11-22 RX ORDER — HYDRALAZINE HYDROCHLORIDE 20 MG/ML
5 INJECTION INTRAMUSCULAR; INTRAVENOUS EVERY 6 HOURS PRN
Status: DISCONTINUED | OUTPATIENT
Start: 2020-11-22 | End: 2020-11-22

## 2020-11-22 RX ORDER — FUROSEMIDE 10 MG/ML
40 INJECTION INTRAMUSCULAR; INTRAVENOUS
Status: DISCONTINUED | OUTPATIENT
Start: 2020-11-22 | End: 2020-11-23

## 2020-11-22 RX ORDER — ACETAMINOPHEN 325 MG/1
650 TABLET ORAL EVERY 6 HOURS PRN
Status: DISCONTINUED | OUTPATIENT
Start: 2020-11-22 | End: 2020-11-27

## 2020-11-22 RX ORDER — INSULIN GLARGINE 100 [IU]/ML
30 INJECTION, SOLUTION SUBCUTANEOUS
Status: DISCONTINUED | OUTPATIENT
Start: 2020-11-22 | End: 2020-11-23

## 2020-11-22 RX ORDER — HYDROXYZINE HYDROCHLORIDE 25 MG/1
25 TABLET, FILM COATED ORAL EVERY 6 HOURS PRN
Status: DISCONTINUED | OUTPATIENT
Start: 2020-11-22 | End: 2020-11-28 | Stop reason: HOSPADM

## 2020-11-22 RX ORDER — LEVOTHYROXINE SODIUM 0.1 MG/1
100 TABLET ORAL
Status: DISCONTINUED | OUTPATIENT
Start: 2020-11-22 | End: 2020-11-28 | Stop reason: HOSPADM

## 2020-11-22 RX ORDER — METOPROLOL SUCCINATE 25 MG/1
25 TABLET, EXTENDED RELEASE ORAL DAILY
Status: DISCONTINUED | OUTPATIENT
Start: 2020-11-22 | End: 2020-11-28 | Stop reason: HOSPADM

## 2020-11-22 RX ORDER — HEPARIN SODIUM 5000 [USP'U]/ML
7500 INJECTION, SOLUTION INTRAVENOUS; SUBCUTANEOUS EVERY 8 HOURS SCHEDULED
Status: DISCONTINUED | OUTPATIENT
Start: 2020-11-22 | End: 2020-11-28 | Stop reason: HOSPADM

## 2020-11-22 RX ORDER — SODIUM CHLORIDE, SODIUM GLUCONATE, SODIUM ACETATE, POTASSIUM CHLORIDE, MAGNESIUM CHLORIDE, SODIUM PHOSPHATE, DIBASIC, AND POTASSIUM PHOSPHATE .53; .5; .37; .037; .03; .012; .00082 G/100ML; G/100ML; G/100ML; G/100ML; G/100ML; G/100ML; G/100ML
75 INJECTION, SOLUTION INTRAVENOUS CONTINUOUS
Status: DISCONTINUED | OUTPATIENT
Start: 2020-11-22 | End: 2020-11-22

## 2020-11-22 RX ORDER — LOSARTAN POTASSIUM 50 MG/1
50 TABLET ORAL DAILY
Status: DISCONTINUED | OUTPATIENT
Start: 2020-11-22 | End: 2020-11-22

## 2020-11-22 RX ORDER — GUAIFENESIN 600 MG
600 TABLET, EXTENDED RELEASE 12 HR ORAL EVERY 12 HOURS SCHEDULED
Status: DISCONTINUED | OUTPATIENT
Start: 2020-11-22 | End: 2020-11-28 | Stop reason: HOSPADM

## 2020-11-22 RX ORDER — POLYETHYLENE GLYCOL 3350 17 G/17G
17 POWDER, FOR SOLUTION ORAL DAILY
Status: DISCONTINUED | OUTPATIENT
Start: 2020-11-22 | End: 2020-11-28 | Stop reason: HOSPADM

## 2020-11-22 RX ORDER — CYCLOBENZAPRINE HCL 10 MG
5 TABLET ORAL
Status: DISCONTINUED | OUTPATIENT
Start: 2020-11-22 | End: 2020-11-28 | Stop reason: HOSPADM

## 2020-11-22 RX ORDER — ATORVASTATIN CALCIUM 20 MG/1
20 TABLET, FILM COATED ORAL
Status: DISCONTINUED | OUTPATIENT
Start: 2020-11-22 | End: 2020-11-28 | Stop reason: HOSPADM

## 2020-11-22 RX ADMIN — METOPROLOL SUCCINATE 25 MG: 25 TABLET, EXTENDED RELEASE ORAL at 08:59

## 2020-11-22 RX ADMIN — HYDROXYZINE HYDROCHLORIDE 25 MG: 25 TABLET, FILM COATED ORAL at 10:59

## 2020-11-22 RX ADMIN — INSULIN LISPRO 5 UNITS: 100 INJECTION, SOLUTION INTRAVENOUS; SUBCUTANEOUS at 15:56

## 2020-11-22 RX ADMIN — INSULIN LISPRO 6 UNITS: 100 INJECTION, SOLUTION INTRAVENOUS; SUBCUTANEOUS at 11:01

## 2020-11-22 RX ADMIN — OXYCODONE HYDROCHLORIDE AND ACETAMINOPHEN 1000 MG: 500 TABLET ORAL at 08:59

## 2020-11-22 RX ADMIN — OXYCODONE HYDROCHLORIDE AND ACETAMINOPHEN 1000 MG: 500 TABLET ORAL at 01:18

## 2020-11-22 RX ADMIN — ZINC SULFATE 220 MG (50 MG) CAPSULE 220 MG: CAPSULE at 09:56

## 2020-11-22 RX ADMIN — INSULIN LISPRO 2 UNITS: 100 INJECTION, SOLUTION INTRAVENOUS; SUBCUTANEOUS at 15:55

## 2020-11-22 RX ADMIN — REMDESIVIR 200 MG: 100 INJECTION, POWDER, LYOPHILIZED, FOR SOLUTION INTRAVENOUS at 09:56

## 2020-11-22 RX ADMIN — GUAIFENESIN 600 MG: 600 TABLET, EXTENDED RELEASE ORAL at 21:53

## 2020-11-22 RX ADMIN — HEPARIN SODIUM 7500 UNITS: 5000 INJECTION INTRAVENOUS; SUBCUTANEOUS at 01:18

## 2020-11-22 RX ADMIN — INSULIN GLARGINE 30 UNITS: 100 INJECTION, SOLUTION SUBCUTANEOUS at 02:01

## 2020-11-22 RX ADMIN — VANCOMYCIN HYDROCHLORIDE 1250 MG: 10 INJECTION, POWDER, LYOPHILIZED, FOR SOLUTION INTRAVENOUS at 13:00

## 2020-11-22 RX ADMIN — PANTOPRAZOLE SODIUM 40 MG: 40 TABLET, DELAYED RELEASE ORAL at 01:19

## 2020-11-22 RX ADMIN — HYDROXYZINE HYDROCHLORIDE 25 MG: 25 TABLET, FILM COATED ORAL at 21:53

## 2020-11-22 RX ADMIN — FLUVOXAMINE MALEATE 100 MG: 50 TABLET ORAL at 08:59

## 2020-11-22 RX ADMIN — INFLUENZA VIRUS VACCINE 0.5 ML: 15; 15; 15; 15 SUSPENSION INTRAMUSCULAR at 17:16

## 2020-11-22 RX ADMIN — GUAIFENESIN 600 MG: 600 TABLET, EXTENDED RELEASE ORAL at 11:45

## 2020-11-22 RX ADMIN — CYCLOBENZAPRINE HYDROCHLORIDE 5 MG: 10 TABLET, FILM COATED ORAL at 21:52

## 2020-11-22 RX ADMIN — VANCOMYCIN HYDROCHLORIDE 1750 MG: 1 INJECTION, POWDER, LYOPHILIZED, FOR SOLUTION INTRAVENOUS at 01:11

## 2020-11-22 RX ADMIN — INSULIN GLARGINE 30 UNITS: 100 INJECTION, SOLUTION SUBCUTANEOUS at 21:52

## 2020-11-22 RX ADMIN — HEPARIN SODIUM 7500 UNITS: 5000 INJECTION INTRAVENOUS; SUBCUTANEOUS at 15:52

## 2020-11-22 RX ADMIN — INSULIN LISPRO 5 UNITS: 100 INJECTION, SOLUTION INTRAVENOUS; SUBCUTANEOUS at 08:18

## 2020-11-22 RX ADMIN — TERBUTALINE SULFATE 0.25 MG: 1 INJECTION SUBCUTANEOUS at 00:07

## 2020-11-22 RX ADMIN — CEFEPIME HYDROCHLORIDE 2000 MG: 2 INJECTION, POWDER, FOR SOLUTION INTRAVENOUS at 23:22

## 2020-11-22 RX ADMIN — FLUVOXAMINE MALEATE 100 MG: 50 TABLET ORAL at 22:35

## 2020-11-22 RX ADMIN — INSULIN LISPRO 3 UNITS: 100 INJECTION, SOLUTION INTRAVENOUS; SUBCUTANEOUS at 08:18

## 2020-11-22 RX ADMIN — LEVOTHYROXINE SODIUM 100 MCG: 100 TABLET ORAL at 07:03

## 2020-11-22 RX ADMIN — HEPARIN SODIUM 7500 UNITS: 5000 INJECTION INTRAVENOUS; SUBCUTANEOUS at 07:05

## 2020-11-22 RX ADMIN — DEXAMETHASONE SODIUM PHOSPHATE 6 MG: 4 INJECTION INTRA-ARTICULAR; INTRALESIONAL; INTRAMUSCULAR; INTRAVENOUS; SOFT TISSUE at 21:52

## 2020-11-22 RX ADMIN — INSULIN LISPRO 5 UNITS: 100 INJECTION, SOLUTION INTRAVENOUS; SUBCUTANEOUS at 11:01

## 2020-11-22 RX ADMIN — FUROSEMIDE 40 MG: 10 INJECTION, SOLUTION INTRAMUSCULAR; INTRAVENOUS at 08:19

## 2020-11-22 RX ADMIN — FUROSEMIDE 40 MG: 10 INJECTION, SOLUTION INTRAMUSCULAR; INTRAVENOUS at 02:01

## 2020-11-22 RX ADMIN — OXYCODONE HYDROCHLORIDE AND ACETAMINOPHEN 1000 MG: 500 TABLET ORAL at 21:55

## 2020-11-22 RX ADMIN — CEFEPIME HYDROCHLORIDE 2000 MG: 2 INJECTION, POWDER, FOR SOLUTION INTRAVENOUS at 08:19

## 2020-11-22 RX ADMIN — ATORVASTATIN CALCIUM 20 MG: 20 TABLET, FILM COATED ORAL at 17:16

## 2020-11-22 RX ADMIN — PANTOPRAZOLE SODIUM 40 MG: 40 TABLET, DELAYED RELEASE ORAL at 07:03

## 2020-11-22 RX ADMIN — Medication 2000 UNITS: at 08:59

## 2020-11-22 RX ADMIN — CEFEPIME HYDROCHLORIDE 2000 MG: 2 INJECTION, POWDER, FOR SOLUTION INTRAVENOUS at 00:07

## 2020-11-22 RX ADMIN — FUROSEMIDE 40 MG: 10 INJECTION, SOLUTION INTRAMUSCULAR; INTRAVENOUS at 15:54

## 2020-11-22 RX ADMIN — PANTOPRAZOLE SODIUM 40 MG: 40 TABLET, DELAYED RELEASE ORAL at 17:16

## 2020-11-22 RX ADMIN — ACETAMINOPHEN 650 MG: 325 TABLET, FILM COATED ORAL at 15:52

## 2020-11-22 RX ADMIN — CEFEPIME HYDROCHLORIDE 2000 MG: 2 INJECTION, POWDER, FOR SOLUTION INTRAVENOUS at 17:16

## 2020-11-22 RX ADMIN — HEPARIN SODIUM 7500 UNITS: 5000 INJECTION INTRAVENOUS; SUBCUTANEOUS at 21:52

## 2020-11-22 RX ADMIN — SODIUM CHLORIDE, SODIUM GLUCONATE, SODIUM ACETATE, POTASSIUM CHLORIDE, MAGNESIUM CHLORIDE, SODIUM PHOSPHATE, DIBASIC, AND POTASSIUM PHOSPHATE 100 ML/HR: .53; .5; .37; .037; .03; .012; .00082 INJECTION, SOLUTION INTRAVENOUS at 01:13

## 2020-11-22 RX ADMIN — HYDRALAZINE HYDROCHLORIDE 5 MG: 20 INJECTION, SOLUTION INTRAMUSCULAR; INTRAVENOUS at 05:11

## 2020-11-23 LAB
ANION GAP SERPL CALCULATED.3IONS-SCNC: 5 MMOL/L (ref 4–13)
ANION GAP SERPL CALCULATED.3IONS-SCNC: 7 MMOL/L (ref 4–13)
B PARAP IS1001 DNA NPH QL NAA+NON-PROBE: NOT DETECTED
B PERT.PT PRMT NPH QL NAA+NON-PROBE: NOT DETECTED
BUN SERPL-MCNC: 42 MG/DL (ref 5–25)
BUN SERPL-MCNC: 45 MG/DL (ref 5–25)
C PNEUM DNA NPH QL NAA+NON-PROBE: NOT DETECTED
CALCIUM SERPL-MCNC: 8.6 MG/DL (ref 8.3–10.1)
CALCIUM SERPL-MCNC: 8.6 MG/DL (ref 8.3–10.1)
CHLORIDE SERPL-SCNC: 102 MMOL/L (ref 100–108)
CHLORIDE SERPL-SCNC: 104 MMOL/L (ref 100–108)
CO2 SERPL-SCNC: 26 MMOL/L (ref 21–32)
CO2 SERPL-SCNC: 28 MMOL/L (ref 21–32)
CREAT SERPL-MCNC: 1.58 MG/DL (ref 0.6–1.3)
CREAT SERPL-MCNC: 1.77 MG/DL (ref 0.6–1.3)
ERYTHROCYTE [DISTWIDTH] IN BLOOD BY AUTOMATED COUNT: 13 % (ref 11.6–15.1)
FLUAV RNA NPH QL NAA+NON-PROBE: NOT DETECTED
FLUAV RNA RESP QL NAA+PROBE: NEGATIVE
FLUBV RNA NPH QL NAA+NON-PROBE: NOT DETECTED
FLUBV RNA RESP QL NAA+PROBE: NEGATIVE
GFR SERPL CREATININE-BSD FRML MDRD: 46 ML/MIN/1.73SQ M
GFR SERPL CREATININE-BSD FRML MDRD: 53 ML/MIN/1.73SQ M
GLUCOSE SERPL-MCNC: 286 MG/DL (ref 65–140)
GLUCOSE SERPL-MCNC: 318 MG/DL (ref 65–140)
GLUCOSE SERPL-MCNC: 337 MG/DL (ref 65–140)
GLUCOSE SERPL-MCNC: 394 MG/DL (ref 65–140)
GLUCOSE SERPL-MCNC: >500 MG/DL (ref 65–140)
HADV DNA NPH QL NAA+NON-PROBE: NOT DETECTED
HCT VFR BLD AUTO: 31.2 % (ref 36.5–49.3)
HGB BLD-MCNC: 10.1 G/DL (ref 12–17)
HMPV RNA NPH QL NAA+NON-PROBE: NOT DETECTED
HPIV 1+2+3+4 RNA NPH QL NAA+NON-PROBE: NOT DETECTED
HPIV 1+2+3+4 RNA NPH QL NAA+NON-PROBE: NOT DETECTED
HPIV4 RNA NPH QL NAA+NON-PROBE: NOT DETECTED
INTERNAL QC RESULT: NORMAL
M PNEUMO DNA NPH QL NAA+NON-PROBE: NOT DETECTED
MCH RBC QN AUTO: 28.9 PG (ref 26.8–34.3)
MCHC RBC AUTO-ENTMCNC: 32.4 G/DL (ref 31.4–37.4)
MCV RBC AUTO: 89 FL (ref 82–98)
MRSA NOSE QL CULT: NORMAL
PLATELET # BLD AUTO: 288 THOUSANDS/UL (ref 149–390)
PMV BLD AUTO: 9.9 FL (ref 8.9–12.7)
POTASSIUM SERPL-SCNC: 4.7 MMOL/L (ref 3.5–5.3)
POTASSIUM SERPL-SCNC: 4.7 MMOL/L (ref 3.5–5.3)
RBC # BLD AUTO: 3.49 MILLION/UL (ref 3.88–5.62)
RSV RNA NPH QL NAA+NON-PROBE: NOT DETECTED
RSV RNA RESP QL NAA+PROBE: NEGATIVE
RV+EV RNA NPH QL NAA+NON-PROBE: NOT DETECTED
SARS-COV-2 RNA RESP QL NAA+PROBE: NEGATIVE
SODIUM SERPL-SCNC: 135 MMOL/L (ref 136–145)
SODIUM SERPL-SCNC: 137 MMOL/L (ref 136–145)
WBC # BLD AUTO: 14.87 THOUSAND/UL (ref 4.31–10.16)

## 2020-11-23 PROCEDURE — 80048 BASIC METABOLIC PNL TOTAL CA: CPT | Performed by: STUDENT IN AN ORGANIZED HEALTH CARE EDUCATION/TRAINING PROGRAM

## 2020-11-23 PROCEDURE — 87486 CHLMYD PNEUM DNA AMP PROBE: CPT | Performed by: INTERNAL MEDICINE

## 2020-11-23 PROCEDURE — 94003 VENT MGMT INPAT SUBQ DAY: CPT

## 2020-11-23 PROCEDURE — 87798 DETECT AGENT NOS DNA AMP: CPT | Performed by: INTERNAL MEDICINE

## 2020-11-23 PROCEDURE — 86769 SARS-COV-2 COVID-19 ANTIBODY: CPT | Performed by: INTERNAL MEDICINE

## 2020-11-23 PROCEDURE — 94760 N-INVAS EAR/PLS OXIMETRY 1: CPT

## 2020-11-23 PROCEDURE — 87633 RESP VIRUS 12-25 TARGETS: CPT | Performed by: INTERNAL MEDICINE

## 2020-11-23 PROCEDURE — 99223 1ST HOSP IP/OBS HIGH 75: CPT | Performed by: INTERNAL MEDICINE

## 2020-11-23 PROCEDURE — 82948 REAGENT STRIP/BLOOD GLUCOSE: CPT

## 2020-11-23 PROCEDURE — 87581 M.PNEUMON DNA AMP PROBE: CPT | Performed by: INTERNAL MEDICINE

## 2020-11-23 PROCEDURE — 0241U HB NFCT DS VIR RESP RNA 4 TRGT: CPT | Performed by: INTERNAL MEDICINE

## 2020-11-23 PROCEDURE — 85027 COMPLETE CBC AUTOMATED: CPT | Performed by: STUDENT IN AN ORGANIZED HEALTH CARE EDUCATION/TRAINING PROGRAM

## 2020-11-23 PROCEDURE — 80048 BASIC METABOLIC PNL TOTAL CA: CPT | Performed by: INTERNAL MEDICINE

## 2020-11-23 RX ORDER — BENZONATATE 100 MG/1
100 CAPSULE ORAL 3 TIMES DAILY PRN
Status: DISCONTINUED | OUTPATIENT
Start: 2020-11-23 | End: 2020-11-28 | Stop reason: HOSPADM

## 2020-11-23 RX ORDER — INSULIN GLARGINE 100 [IU]/ML
10 INJECTION, SOLUTION SUBCUTANEOUS ONCE
Status: COMPLETED | OUTPATIENT
Start: 2020-11-23 | End: 2020-11-23

## 2020-11-23 RX ORDER — HYDROCODONE POLISTIREX AND CHLORPHENIRAMINE POLISTIREX 10; 8 MG/5ML; MG/5ML
5 SUSPENSION, EXTENDED RELEASE ORAL EVERY 12 HOURS
Status: DISCONTINUED | OUTPATIENT
Start: 2020-11-23 | End: 2020-11-24

## 2020-11-23 RX ORDER — ALBUTEROL SULFATE 90 UG/1
2 AEROSOL, METERED RESPIRATORY (INHALATION)
Status: DISCONTINUED | OUTPATIENT
Start: 2020-11-23 | End: 2020-11-24

## 2020-11-23 RX ORDER — OLANZAPINE 5 MG/1
2.5 TABLET ORAL ONCE
Status: COMPLETED | OUTPATIENT
Start: 2020-11-23 | End: 2020-11-23

## 2020-11-23 RX ORDER — INSULIN GLARGINE 100 [IU]/ML
40 INJECTION, SOLUTION SUBCUTANEOUS
Status: DISCONTINUED | OUTPATIENT
Start: 2020-11-23 | End: 2020-11-24

## 2020-11-23 RX ORDER — METHYLPREDNISOLONE SODIUM SUCCINATE 40 MG/ML
40 INJECTION, POWDER, LYOPHILIZED, FOR SOLUTION INTRAMUSCULAR; INTRAVENOUS EVERY 8 HOURS SCHEDULED
Status: DISCONTINUED | OUTPATIENT
Start: 2020-11-23 | End: 2020-11-24

## 2020-11-23 RX ORDER — ECHINACEA PURPUREA EXTRACT 125 MG
1 TABLET ORAL
Status: DISCONTINUED | OUTPATIENT
Start: 2020-11-23 | End: 2020-11-28 | Stop reason: HOSPADM

## 2020-11-23 RX ADMIN — MELATONIN 3 MG: at 21:44

## 2020-11-23 RX ADMIN — HEPARIN SODIUM 7500 UNITS: 5000 INJECTION INTRAVENOUS; SUBCUTANEOUS at 14:02

## 2020-11-23 RX ADMIN — VANCOMYCIN HYDROCHLORIDE 1250 MG: 10 INJECTION, POWDER, LYOPHILIZED, FOR SOLUTION INTRAVENOUS at 00:07

## 2020-11-23 RX ADMIN — HYDRALAZINE HYDROCHLORIDE 5 MG: 20 INJECTION, SOLUTION INTRAMUSCULAR; INTRAVENOUS at 23:05

## 2020-11-23 RX ADMIN — PANTOPRAZOLE SODIUM 40 MG: 40 TABLET, DELAYED RELEASE ORAL at 05:34

## 2020-11-23 RX ADMIN — OXYCODONE HYDROCHLORIDE AND ACETAMINOPHEN 1000 MG: 500 TABLET ORAL at 08:53

## 2020-11-23 RX ADMIN — METHYLPREDNISOLONE SODIUM SUCCINATE 40 MG: 40 INJECTION, POWDER, FOR SOLUTION INTRAMUSCULAR; INTRAVENOUS at 21:44

## 2020-11-23 RX ADMIN — INSULIN GLARGINE 10 UNITS: 100 INJECTION, SOLUTION SUBCUTANEOUS at 10:32

## 2020-11-23 RX ADMIN — OLANZAPINE 2.5 MG: 5 TABLET, FILM COATED ORAL at 15:16

## 2020-11-23 RX ADMIN — HEPARIN SODIUM 7500 UNITS: 5000 INJECTION INTRAVENOUS; SUBCUTANEOUS at 21:49

## 2020-11-23 RX ADMIN — INSULIN LISPRO 5 UNITS: 100 INJECTION, SOLUTION INTRAVENOUS; SUBCUTANEOUS at 11:04

## 2020-11-23 RX ADMIN — CEFTRIAXONE SODIUM 2000 MG: 10 INJECTION, POWDER, FOR SOLUTION INTRAVENOUS at 15:55

## 2020-11-23 RX ADMIN — INSULIN LISPRO 5 UNITS: 100 INJECTION, SOLUTION INTRAVENOUS; SUBCUTANEOUS at 08:44

## 2020-11-23 RX ADMIN — REMDESIVIR 100 MG: 100 INJECTION, POWDER, LYOPHILIZED, FOR SOLUTION INTRAVENOUS at 11:13

## 2020-11-23 RX ADMIN — ACETAMINOPHEN 650 MG: 325 TABLET, FILM COATED ORAL at 04:00

## 2020-11-23 RX ADMIN — CEFEPIME HYDROCHLORIDE 2000 MG: 2 INJECTION, POWDER, FOR SOLUTION INTRAVENOUS at 07:22

## 2020-11-23 RX ADMIN — GUAIFENESIN 600 MG: 600 TABLET, EXTENDED RELEASE ORAL at 21:43

## 2020-11-23 RX ADMIN — ATORVASTATIN CALCIUM 20 MG: 20 TABLET, FILM COATED ORAL at 17:28

## 2020-11-23 RX ADMIN — ALBUTEROL SULFATE 2 PUFF: 90 AEROSOL, METERED RESPIRATORY (INHALATION) at 14:01

## 2020-11-23 RX ADMIN — GUAIFENESIN 600 MG: 600 TABLET, EXTENDED RELEASE ORAL at 08:53

## 2020-11-23 RX ADMIN — LEVOTHYROXINE SODIUM 100 MCG: 100 TABLET ORAL at 05:34

## 2020-11-23 RX ADMIN — BENZONATATE 100 MG: 100 CAPSULE ORAL at 02:39

## 2020-11-23 RX ADMIN — MELATONIN 3 MG: at 00:08

## 2020-11-23 RX ADMIN — FUROSEMIDE 40 MG: 10 INJECTION, SOLUTION INTRAMUSCULAR; INTRAVENOUS at 07:22

## 2020-11-23 RX ADMIN — HYDROCODONE POLISTIREX AND CHLORPHENIRAMINE POLISTIREX 5 ML: 10; 8 SUSPENSION, EXTENDED RELEASE ORAL at 10:32

## 2020-11-23 RX ADMIN — ACETAMINOPHEN 650 MG: 325 TABLET, FILM COATED ORAL at 11:22

## 2020-11-23 RX ADMIN — POLYETHYLENE GLYCOL 3350 17 G: 17 POWDER, FOR SOLUTION ORAL at 08:53

## 2020-11-23 RX ADMIN — HYDROCODONE POLISTIREX AND CHLORPHENIRAMINE POLISTIREX 5 ML: 10; 8 SUSPENSION, EXTENDED RELEASE ORAL at 21:50

## 2020-11-23 RX ADMIN — HYDROXYZINE HYDROCHLORIDE 25 MG: 25 TABLET, FILM COATED ORAL at 04:01

## 2020-11-23 RX ADMIN — HEPARIN SODIUM 7500 UNITS: 5000 INJECTION INTRAVENOUS; SUBCUTANEOUS at 05:33

## 2020-11-23 RX ADMIN — INSULIN LISPRO 5 UNITS: 100 INJECTION, SOLUTION INTRAVENOUS; SUBCUTANEOUS at 15:55

## 2020-11-23 RX ADMIN — HYDROXYZINE HYDROCHLORIDE 25 MG: 25 TABLET, FILM COATED ORAL at 10:28

## 2020-11-23 RX ADMIN — INSULIN LISPRO 2 UNITS: 100 INJECTION, SOLUTION INTRAVENOUS; SUBCUTANEOUS at 15:54

## 2020-11-23 RX ADMIN — METOPROLOL SUCCINATE 25 MG: 25 TABLET, EXTENDED RELEASE ORAL at 08:53

## 2020-11-23 RX ADMIN — PANTOPRAZOLE SODIUM 40 MG: 40 TABLET, DELAYED RELEASE ORAL at 17:28

## 2020-11-23 RX ADMIN — Medication 2000 UNITS: at 08:53

## 2020-11-23 RX ADMIN — ZINC SULFATE 220 MG (50 MG) CAPSULE 220 MG: CAPSULE at 10:28

## 2020-11-23 RX ADMIN — ACETAMINOPHEN 650 MG: 325 TABLET, FILM COATED ORAL at 20:14

## 2020-11-23 RX ADMIN — FLUVOXAMINE MALEATE 100 MG: 50 TABLET ORAL at 21:44

## 2020-11-23 RX ADMIN — INSULIN LISPRO 6 UNITS: 100 INJECTION, SOLUTION INTRAVENOUS; SUBCUTANEOUS at 11:04

## 2020-11-23 RX ADMIN — CYCLOBENZAPRINE HYDROCHLORIDE 5 MG: 10 TABLET, FILM COATED ORAL at 21:44

## 2020-11-23 RX ADMIN — FLUVOXAMINE MALEATE 100 MG: 50 TABLET ORAL at 08:53

## 2020-11-23 RX ADMIN — BENZONATATE 100 MG: 100 CAPSULE ORAL at 09:42

## 2020-11-23 RX ADMIN — INSULIN GLARGINE 40 UNITS: 100 INJECTION, SOLUTION SUBCUTANEOUS at 21:43

## 2020-11-23 RX ADMIN — ALBUTEROL SULFATE 2 PUFF: 90 AEROSOL, METERED RESPIRATORY (INHALATION) at 11:14

## 2020-11-23 RX ADMIN — METHYLPREDNISOLONE SODIUM SUCCINATE 40 MG: 40 INJECTION, POWDER, FOR SOLUTION INTRAMUSCULAR; INTRAVENOUS at 14:02

## 2020-11-23 RX ADMIN — INSULIN LISPRO 5 UNITS: 100 INJECTION, SOLUTION INTRAVENOUS; SUBCUTANEOUS at 08:45

## 2020-11-24 ENCOUNTER — APPOINTMENT (INPATIENT)
Dept: RADIOLOGY | Facility: HOSPITAL | Age: 40
DRG: 720 | End: 2020-11-24
Payer: COMMERCIAL

## 2020-11-24 LAB
ALBUMIN SERPL BCP-MCNC: 2.2 G/DL (ref 3.5–5)
ALP SERPL-CCNC: 84 U/L (ref 46–116)
ALT SERPL W P-5'-P-CCNC: 19 U/L (ref 12–78)
ANION GAP SERPL CALCULATED.3IONS-SCNC: 8 MMOL/L (ref 4–13)
AST SERPL W P-5'-P-CCNC: 15 U/L (ref 5–45)
BACTERIA UR QL AUTO: NORMAL /HPF
BILIRUB SERPL-MCNC: 0.26 MG/DL (ref 0.2–1)
BILIRUB UR QL STRIP: NEGATIVE
BUN SERPL-MCNC: 48 MG/DL (ref 5–25)
CALCIUM ALBUM COR SERPL-MCNC: 10 MG/DL (ref 8.3–10.1)
CALCIUM SERPL-MCNC: 8.6 MG/DL (ref 8.3–10.1)
CHLORIDE SERPL-SCNC: 101 MMOL/L (ref 100–108)
CLARITY UR: CLEAR
CO2 SERPL-SCNC: 25 MMOL/L (ref 21–32)
COLOR UR: YELLOW
CREAT SERPL-MCNC: 1.61 MG/DL (ref 0.6–1.3)
ERYTHROCYTE [DISTWIDTH] IN BLOOD BY AUTOMATED COUNT: 12.5 % (ref 11.6–15.1)
GFR SERPL CREATININE-BSD FRML MDRD: 52 ML/MIN/1.73SQ M
GLUCOSE SERPL-MCNC: 260 MG/DL (ref 65–140)
GLUCOSE SERPL-MCNC: 349 MG/DL (ref 65–140)
GLUCOSE SERPL-MCNC: 361 MG/DL (ref 65–140)
GLUCOSE SERPL-MCNC: 371 MG/DL (ref 65–140)
GLUCOSE SERPL-MCNC: 416 MG/DL (ref 65–140)
GLUCOSE UR STRIP-MCNC: ABNORMAL MG/DL
HCT VFR BLD AUTO: 31.8 % (ref 36.5–49.3)
HGB BLD-MCNC: 10.4 G/DL (ref 12–17)
HGB UR QL STRIP.AUTO: NEGATIVE
HYALINE CASTS #/AREA URNS LPF: NORMAL /LPF
IL6 SERPL-MCNC: 96.3 PG/ML (ref 0–13)
KETONES UR STRIP-MCNC: NEGATIVE MG/DL
LEUKOCYTE ESTERASE UR QL STRIP: ABNORMAL
MCH RBC QN AUTO: 28.8 PG (ref 26.8–34.3)
MCHC RBC AUTO-ENTMCNC: 32.7 G/DL (ref 31.4–37.4)
MCV RBC AUTO: 88 FL (ref 82–98)
NITRITE UR QL STRIP: NEGATIVE
NON-SQ EPI CELLS URNS QL MICRO: NORMAL /HPF
PH UR STRIP.AUTO: 6 [PH]
PLATELET # BLD AUTO: 348 THOUSANDS/UL (ref 149–390)
PMV BLD AUTO: 9.9 FL (ref 8.9–12.7)
POTASSIUM SERPL-SCNC: 4.4 MMOL/L (ref 3.5–5.3)
PROT SERPL-MCNC: 6.6 G/DL (ref 6.4–8.2)
PROT UR STRIP-MCNC: ABNORMAL MG/DL
RBC # BLD AUTO: 3.61 MILLION/UL (ref 3.88–5.62)
RBC #/AREA URNS AUTO: NORMAL /HPF
RHEUMATOID FACT SER QL LA: NEGATIVE
SARS-COV-2 IGG+IGM SERPL QL IA: NORMAL
SODIUM SERPL-SCNC: 134 MMOL/L (ref 136–145)
SP GR UR STRIP.AUTO: 1.02 (ref 1–1.03)
UROBILINOGEN UR QL STRIP.AUTO: 0.2 E.U./DL
WBC # BLD AUTO: 12.97 THOUSAND/UL (ref 4.31–10.16)
WBC #/AREA URNS AUTO: NORMAL /HPF

## 2020-11-24 PROCEDURE — 86331 IMMUNODIFFUSION OUCHTERLONY: CPT | Performed by: INTERNAL MEDICINE

## 2020-11-24 PROCEDURE — 81001 URINALYSIS AUTO W/SCOPE: CPT | Performed by: INTERNAL MEDICINE

## 2020-11-24 PROCEDURE — 86200 CCP ANTIBODY: CPT | Performed by: INTERNAL MEDICINE

## 2020-11-24 PROCEDURE — 86602 ANTINOMYCES ANTIBODY: CPT | Performed by: INTERNAL MEDICINE

## 2020-11-24 PROCEDURE — 86671 FUNGUS NES ANTIBODY: CPT | Performed by: INTERNAL MEDICINE

## 2020-11-24 PROCEDURE — 82948 REAGENT STRIP/BLOOD GLUCOSE: CPT

## 2020-11-24 PROCEDURE — 94640 AIRWAY INHALATION TREATMENT: CPT

## 2020-11-24 PROCEDURE — 94760 N-INVAS EAR/PLS OXIMETRY 1: CPT

## 2020-11-24 PROCEDURE — 86255 FLUORESCENT ANTIBODY SCREEN: CPT | Performed by: INTERNAL MEDICINE

## 2020-11-24 PROCEDURE — 83520 IMMUNOASSAY QUANT NOS NONAB: CPT | Performed by: INTERNAL MEDICINE

## 2020-11-24 PROCEDURE — 86606 ASPERGILLUS ANTIBODY: CPT | Performed by: INTERNAL MEDICINE

## 2020-11-24 PROCEDURE — 86430 RHEUMATOID FACTOR TEST QUAL: CPT | Performed by: INTERNAL MEDICINE

## 2020-11-24 PROCEDURE — 71250 CT THORAX DX C-: CPT

## 2020-11-24 PROCEDURE — 99233 SBSQ HOSP IP/OBS HIGH 50: CPT | Performed by: INTERNAL MEDICINE

## 2020-11-24 PROCEDURE — 86038 ANTINUCLEAR ANTIBODIES: CPT | Performed by: INTERNAL MEDICINE

## 2020-11-24 PROCEDURE — 85027 COMPLETE CBC AUTOMATED: CPT | Performed by: STUDENT IN AN ORGANIZED HEALTH CARE EDUCATION/TRAINING PROGRAM

## 2020-11-24 PROCEDURE — 87389 HIV-1 AG W/HIV-1&-2 AB AG IA: CPT | Performed by: INTERNAL MEDICINE

## 2020-11-24 PROCEDURE — 94003 VENT MGMT INPAT SUBQ DAY: CPT

## 2020-11-24 PROCEDURE — 80053 COMPREHEN METABOLIC PANEL: CPT | Performed by: INTERNAL MEDICINE

## 2020-11-24 RX ORDER — METHYLPREDNISOLONE SODIUM SUCCINATE 40 MG/ML
40 INJECTION, POWDER, LYOPHILIZED, FOR SOLUTION INTRAMUSCULAR; INTRAVENOUS EVERY 12 HOURS SCHEDULED
Status: DISCONTINUED | OUTPATIENT
Start: 2020-11-24 | End: 2020-11-24

## 2020-11-24 RX ORDER — LEVALBUTEROL 1.25 MG/.5ML
1.25 SOLUTION, CONCENTRATE RESPIRATORY (INHALATION) EVERY 6 HOURS PRN
Status: DISCONTINUED | OUTPATIENT
Start: 2020-11-24 | End: 2020-11-27

## 2020-11-24 RX ORDER — AMLODIPINE BESYLATE 5 MG/1
5 TABLET ORAL DAILY
Status: DISCONTINUED | OUTPATIENT
Start: 2020-11-24 | End: 2020-11-25

## 2020-11-24 RX ORDER — LEVALBUTEROL 1.25 MG/.5ML
1.25 SOLUTION, CONCENTRATE RESPIRATORY (INHALATION)
Status: DISCONTINUED | OUTPATIENT
Start: 2020-11-24 | End: 2020-11-28 | Stop reason: HOSPADM

## 2020-11-24 RX ORDER — METHYLPREDNISOLONE SODIUM SUCCINATE 40 MG/ML
40 INJECTION, POWDER, LYOPHILIZED, FOR SOLUTION INTRAMUSCULAR; INTRAVENOUS EVERY 8 HOURS SCHEDULED
Status: DISCONTINUED | OUTPATIENT
Start: 2020-11-24 | End: 2020-11-26

## 2020-11-24 RX ORDER — OLANZAPINE 2.5 MG/1
2.5 TABLET ORAL
Status: DISCONTINUED | OUTPATIENT
Start: 2020-11-24 | End: 2020-11-28 | Stop reason: HOSPADM

## 2020-11-24 RX ORDER — HYDROCODONE POLISTIREX AND CHLORPHENIRAMINE POLISTIREX 10; 8 MG/5ML; MG/5ML
5 SUSPENSION, EXTENDED RELEASE ORAL EVERY 12 HOURS PRN
Status: DISCONTINUED | OUTPATIENT
Start: 2020-11-24 | End: 2020-11-25

## 2020-11-24 RX ORDER — INSULIN GLARGINE 100 [IU]/ML
50 INJECTION, SOLUTION SUBCUTANEOUS
Status: DISCONTINUED | OUTPATIENT
Start: 2020-11-24 | End: 2020-11-25

## 2020-11-24 RX ADMIN — ACETAMINOPHEN 650 MG: 325 TABLET, FILM COATED ORAL at 08:28

## 2020-11-24 RX ADMIN — METHYLPREDNISOLONE SODIUM SUCCINATE 40 MG: 40 INJECTION, POWDER, FOR SOLUTION INTRAMUSCULAR; INTRAVENOUS at 21:22

## 2020-11-24 RX ADMIN — INSULIN LISPRO 5 UNITS: 100 INJECTION, SOLUTION INTRAVENOUS; SUBCUTANEOUS at 08:00

## 2020-11-24 RX ADMIN — PANTOPRAZOLE SODIUM 40 MG: 40 TABLET, DELAYED RELEASE ORAL at 17:00

## 2020-11-24 RX ADMIN — ONDANSETRON 4 MG: 2 INJECTION INTRAMUSCULAR; INTRAVENOUS at 17:04

## 2020-11-24 RX ADMIN — METHYLPREDNISOLONE SODIUM SUCCINATE 40 MG: 40 INJECTION, POWDER, FOR SOLUTION INTRAMUSCULAR; INTRAVENOUS at 05:17

## 2020-11-24 RX ADMIN — HYDROXYZINE HYDROCHLORIDE 25 MG: 25 TABLET, FILM COATED ORAL at 00:33

## 2020-11-24 RX ADMIN — CEFTRIAXONE SODIUM 2000 MG: 10 INJECTION, POWDER, FOR SOLUTION INTRAVENOUS at 16:55

## 2020-11-24 RX ADMIN — POLYETHYLENE GLYCOL 3350 17 G: 17 POWDER, FOR SOLUTION ORAL at 08:01

## 2020-11-24 RX ADMIN — INSULIN GLARGINE 50 UNITS: 100 INJECTION, SOLUTION SUBCUTANEOUS at 21:21

## 2020-11-24 RX ADMIN — FLUVOXAMINE MALEATE 100 MG: 50 TABLET ORAL at 08:01

## 2020-11-24 RX ADMIN — METHYLPREDNISOLONE SODIUM SUCCINATE 40 MG: 40 INJECTION, POWDER, FOR SOLUTION INTRAMUSCULAR; INTRAVENOUS at 13:18

## 2020-11-24 RX ADMIN — Medication 1 SPRAY: at 03:13

## 2020-11-24 RX ADMIN — HEPARIN SODIUM 7500 UNITS: 5000 INJECTION INTRAVENOUS; SUBCUTANEOUS at 21:22

## 2020-11-24 RX ADMIN — INSULIN LISPRO 4 UNITS: 100 INJECTION, SOLUTION INTRAVENOUS; SUBCUTANEOUS at 16:59

## 2020-11-24 RX ADMIN — GUAIFENESIN 600 MG: 600 TABLET, EXTENDED RELEASE ORAL at 20:14

## 2020-11-24 RX ADMIN — HEPARIN SODIUM 7500 UNITS: 5000 INJECTION INTRAVENOUS; SUBCUTANEOUS at 13:18

## 2020-11-24 RX ADMIN — OLANZAPINE 2.5 MG: 2.5 TABLET, FILM COATED ORAL at 21:25

## 2020-11-24 RX ADMIN — MELATONIN 3 MG: at 21:22

## 2020-11-24 RX ADMIN — GUAIFENESIN 600 MG: 600 TABLET, EXTENDED RELEASE ORAL at 08:01

## 2020-11-24 RX ADMIN — IPRATROPIUM BROMIDE 0.5 MG: 0.5 SOLUTION RESPIRATORY (INHALATION) at 19:24

## 2020-11-24 RX ADMIN — LEVALBUTEROL HYDROCHLORIDE 1.25 MG: 1.25 SOLUTION, CONCENTRATE RESPIRATORY (INHALATION) at 19:24

## 2020-11-24 RX ADMIN — HEPARIN SODIUM 7500 UNITS: 5000 INJECTION INTRAVENOUS; SUBCUTANEOUS at 05:17

## 2020-11-24 RX ADMIN — ACETAMINOPHEN 650 MG: 325 TABLET, FILM COATED ORAL at 21:22

## 2020-11-24 RX ADMIN — HYDROCODONE POLISTIREX AND CHLORPHENIRAMINE POLISTIREX 5 ML: 10; 8 SUSPENSION, EXTENDED RELEASE ORAL at 09:36

## 2020-11-24 RX ADMIN — PANTOPRAZOLE SODIUM 40 MG: 40 TABLET, DELAYED RELEASE ORAL at 05:17

## 2020-11-24 RX ADMIN — ATORVASTATIN CALCIUM 20 MG: 20 TABLET, FILM COATED ORAL at 17:00

## 2020-11-24 RX ADMIN — AMLODIPINE BESYLATE 5 MG: 5 TABLET ORAL at 09:36

## 2020-11-24 RX ADMIN — LEVOTHYROXINE SODIUM 100 MCG: 100 TABLET ORAL at 05:17

## 2020-11-24 RX ADMIN — METOPROLOL SUCCINATE 25 MG: 25 TABLET, EXTENDED RELEASE ORAL at 08:01

## 2020-11-24 RX ADMIN — INSULIN LISPRO 6 UNITS: 100 INJECTION, SOLUTION INTRAVENOUS; SUBCUTANEOUS at 08:00

## 2020-11-24 RX ADMIN — INSULIN LISPRO 8 UNITS: 100 INJECTION, SOLUTION INTRAVENOUS; SUBCUTANEOUS at 22:41

## 2020-11-24 RX ADMIN — CYCLOBENZAPRINE HYDROCHLORIDE 5 MG: 10 TABLET, FILM COATED ORAL at 21:24

## 2020-11-24 RX ADMIN — FLUVOXAMINE MALEATE 100 MG: 50 TABLET ORAL at 20:14

## 2020-11-24 RX ADMIN — Medication 1 SPRAY: at 21:25

## 2020-11-24 RX ADMIN — BENZONATATE 100 MG: 100 CAPSULE ORAL at 03:13

## 2020-11-24 RX ADMIN — INSULIN LISPRO 6 UNITS: 100 INJECTION, SOLUTION INTRAVENOUS; SUBCUTANEOUS at 11:47

## 2020-11-25 ENCOUNTER — APPOINTMENT (INPATIENT)
Dept: GASTROENTEROLOGY | Facility: HOSPITAL | Age: 40
DRG: 720 | End: 2020-11-25
Payer: COMMERCIAL

## 2020-11-25 ENCOUNTER — ANESTHESIA EVENT (INPATIENT)
Dept: GASTROENTEROLOGY | Facility: HOSPITAL | Age: 40
DRG: 720 | End: 2020-11-25
Payer: COMMERCIAL

## 2020-11-25 ENCOUNTER — ANESTHESIA (INPATIENT)
Dept: GASTROENTEROLOGY | Facility: HOSPITAL | Age: 40
DRG: 720 | End: 2020-11-25
Payer: COMMERCIAL

## 2020-11-25 VITALS — HEART RATE: 88 BPM

## 2020-11-25 LAB
ANION GAP SERPL CALCULATED.3IONS-SCNC: 4 MMOL/L (ref 4–13)
BUN SERPL-MCNC: 42 MG/DL (ref 5–25)
CALCIUM SERPL-MCNC: 9.1 MG/DL (ref 8.3–10.1)
CHLORIDE SERPL-SCNC: 102 MMOL/L (ref 100–108)
CO2 SERPL-SCNC: 29 MMOL/L (ref 21–32)
CREAT SERPL-MCNC: 1.34 MG/DL (ref 0.6–1.3)
ERYTHROCYTE [DISTWIDTH] IN BLOOD BY AUTOMATED COUNT: 12.6 % (ref 11.6–15.1)
GFR SERPL CREATININE-BSD FRML MDRD: 65 ML/MIN/1.73SQ M
GLUCOSE SERPL-MCNC: 235 MG/DL (ref 65–140)
GLUCOSE SERPL-MCNC: 248 MG/DL (ref 65–140)
GLUCOSE SERPL-MCNC: 249 MG/DL (ref 65–140)
GLUCOSE SERPL-MCNC: 255 MG/DL (ref 65–140)
GLUCOSE SERPL-MCNC: 256 MG/DL (ref 65–140)
HCT VFR BLD AUTO: 32.2 % (ref 36.5–49.3)
HGB BLD-MCNC: 10.4 G/DL (ref 12–17)
HIV 1+2 AB+HIV1 P24 AG SERPL QL IA: NORMAL
INR PPP: 1.14 (ref 0.84–1.19)
MCH RBC QN AUTO: 28.9 PG (ref 26.8–34.3)
MCHC RBC AUTO-ENTMCNC: 32.3 G/DL (ref 31.4–37.4)
MCV RBC AUTO: 89 FL (ref 82–98)
PLATELET # BLD AUTO: 393 THOUSANDS/UL (ref 149–390)
PMV BLD AUTO: 9.9 FL (ref 8.9–12.7)
POTASSIUM SERPL-SCNC: 4.4 MMOL/L (ref 3.5–5.3)
PROTHROMBIN TIME: 14.6 SECONDS (ref 11.6–14.5)
RBC # BLD AUTO: 3.6 MILLION/UL (ref 3.88–5.62)
RYE IGE QN: NEGATIVE
SODIUM SERPL-SCNC: 135 MMOL/L (ref 136–145)
WBC # BLD AUTO: 14.6 THOUSAND/UL (ref 4.31–10.16)

## 2020-11-25 PROCEDURE — 86235 NUCLEAR ANTIGEN ANTIBODY: CPT | Performed by: INTERNAL MEDICINE

## 2020-11-25 PROCEDURE — 87281 PNEUMOCYSTIS CARINII AG IF: CPT | Performed by: INTERNAL MEDICINE

## 2020-11-25 PROCEDURE — 85610 PROTHROMBIN TIME: CPT | Performed by: INTERNAL MEDICINE

## 2020-11-25 PROCEDURE — 88342 IMHCHEM/IMCYTCHM 1ST ANTB: CPT | Performed by: PATHOLOGY

## 2020-11-25 PROCEDURE — 87116 MYCOBACTERIA CULTURE: CPT | Performed by: INTERNAL MEDICINE

## 2020-11-25 PROCEDURE — 88341 IMHCHEM/IMCYTCHM EA ADD ANTB: CPT | Performed by: PATHOLOGY

## 2020-11-25 PROCEDURE — 99233 SBSQ HOSP IP/OBS HIGH 50: CPT | Performed by: INTERNAL MEDICINE

## 2020-11-25 PROCEDURE — 87206 SMEAR FLUORESCENT/ACID STAI: CPT | Performed by: INTERNAL MEDICINE

## 2020-11-25 PROCEDURE — 87015 SPECIMEN INFECT AGNT CONCNTJ: CPT | Performed by: INTERNAL MEDICINE

## 2020-11-25 PROCEDURE — 80048 BASIC METABOLIC PNL TOTAL CA: CPT | Performed by: STUDENT IN AN ORGANIZED HEALTH CARE EDUCATION/TRAINING PROGRAM

## 2020-11-25 PROCEDURE — 85027 COMPLETE CBC AUTOMATED: CPT | Performed by: STUDENT IN AN ORGANIZED HEALTH CARE EDUCATION/TRAINING PROGRAM

## 2020-11-25 PROCEDURE — 87102 FUNGUS ISOLATION CULTURE: CPT | Performed by: INTERNAL MEDICINE

## 2020-11-25 PROCEDURE — 94640 AIRWAY INHALATION TREATMENT: CPT

## 2020-11-25 PROCEDURE — 89051 BODY FLUID CELL COUNT: CPT | Performed by: PATHOLOGY

## 2020-11-25 PROCEDURE — 88112 CYTOPATH CELL ENHANCE TECH: CPT | Performed by: PATHOLOGY

## 2020-11-25 PROCEDURE — 87070 CULTURE OTHR SPECIMN AEROBIC: CPT | Performed by: INTERNAL MEDICINE

## 2020-11-25 PROCEDURE — 87252 VIRUS INOCULATION TISSUE: CPT | Performed by: INTERNAL MEDICINE

## 2020-11-25 PROCEDURE — 94760 N-INVAS EAR/PLS OXIMETRY 1: CPT

## 2020-11-25 PROCEDURE — 31624 DX BRONCHOSCOPE/LAVAGE: CPT | Performed by: INTERNAL MEDICINE

## 2020-11-25 PROCEDURE — 82948 REAGENT STRIP/BLOOD GLUCOSE: CPT

## 2020-11-25 PROCEDURE — 88305 TISSUE EXAM BY PATHOLOGIST: CPT | Performed by: PATHOLOGY

## 2020-11-25 RX ORDER — PROPOFOL 10 MG/ML
INJECTION, EMULSION INTRAVENOUS AS NEEDED
Status: DISCONTINUED | OUTPATIENT
Start: 2020-11-25 | End: 2020-11-25

## 2020-11-25 RX ORDER — GLYCOPYRROLATE 0.2 MG/ML
INJECTION INTRAMUSCULAR; INTRAVENOUS AS NEEDED
Status: DISCONTINUED | OUTPATIENT
Start: 2020-11-25 | End: 2020-11-25

## 2020-11-25 RX ORDER — AMLODIPINE BESYLATE 10 MG/1
10 TABLET ORAL DAILY
Status: DISCONTINUED | OUTPATIENT
Start: 2020-11-26 | End: 2020-11-28 | Stop reason: HOSPADM

## 2020-11-25 RX ORDER — HYDROCODONE POLISTIREX AND CHLORPHENIRAMINE POLISTIREX 10; 8 MG/5ML; MG/5ML
5 SUSPENSION, EXTENDED RELEASE ORAL EVERY 12 HOURS SCHEDULED
Status: DISCONTINUED | OUTPATIENT
Start: 2020-11-25 | End: 2020-11-26

## 2020-11-25 RX ORDER — FAMOTIDINE 20 MG/1
20 TABLET, FILM COATED ORAL DAILY
Status: DISCONTINUED | OUTPATIENT
Start: 2020-11-26 | End: 2020-11-25

## 2020-11-25 RX ORDER — LIDOCAINE HYDROCHLORIDE 10 MG/ML
INJECTION, SOLUTION EPIDURAL; INFILTRATION; INTRACAUDAL; PERINEURAL AS NEEDED
Status: DISCONTINUED | OUTPATIENT
Start: 2020-11-25 | End: 2020-11-25

## 2020-11-25 RX ORDER — KETAMINE HYDROCHLORIDE 50 MG/ML
INJECTION, SOLUTION, CONCENTRATE INTRAMUSCULAR; INTRAVENOUS AS NEEDED
Status: DISCONTINUED | OUTPATIENT
Start: 2020-11-25 | End: 2020-11-25

## 2020-11-25 RX ORDER — INSULIN GLARGINE 100 [IU]/ML
60 INJECTION, SOLUTION SUBCUTANEOUS
Status: DISCONTINUED | OUTPATIENT
Start: 2020-11-25 | End: 2020-11-26

## 2020-11-25 RX ORDER — SODIUM CHLORIDE 9 MG/ML
INJECTION, SOLUTION INTRAVENOUS CONTINUOUS PRN
Status: DISCONTINUED | OUTPATIENT
Start: 2020-11-25 | End: 2020-11-25

## 2020-11-25 RX ORDER — CALCIUM CARBONATE 200(500)MG
500 TABLET,CHEWABLE ORAL DAILY PRN
Status: DISCONTINUED | OUTPATIENT
Start: 2020-11-25 | End: 2020-11-28 | Stop reason: HOSPADM

## 2020-11-25 RX ADMIN — CALCIUM CARBONATE (ANTACID) CHEW TAB 500 MG 500 MG: 500 CHEW TAB at 23:28

## 2020-11-25 RX ADMIN — LEVOTHYROXINE SODIUM 100 MCG: 100 TABLET ORAL at 05:07

## 2020-11-25 RX ADMIN — HEPARIN SODIUM 7500 UNITS: 5000 INJECTION INTRAVENOUS; SUBCUTANEOUS at 14:59

## 2020-11-25 RX ADMIN — KETAMINE HYDROCHLORIDE 20 MG: 50 INJECTION, SOLUTION INTRAMUSCULAR; INTRAVENOUS at 13:27

## 2020-11-25 RX ADMIN — MELATONIN 3 MG: at 21:49

## 2020-11-25 RX ADMIN — LEVALBUTEROL HYDROCHLORIDE 1.25 MG: 1.25 SOLUTION, CONCENTRATE RESPIRATORY (INHALATION) at 07:28

## 2020-11-25 RX ADMIN — HYDRALAZINE HYDROCHLORIDE 5 MG: 20 INJECTION, SOLUTION INTRAMUSCULAR; INTRAVENOUS at 17:10

## 2020-11-25 RX ADMIN — PROPOFOL 50 MG: 10 INJECTION, EMULSION INTRAVENOUS at 13:31

## 2020-11-25 RX ADMIN — OLANZAPINE 2.5 MG: 2.5 TABLET, FILM COATED ORAL at 21:51

## 2020-11-25 RX ADMIN — INSULIN LISPRO 4 UNITS: 100 INJECTION, SOLUTION INTRAVENOUS; SUBCUTANEOUS at 21:51

## 2020-11-25 RX ADMIN — PROPOFOL 50 MG: 10 INJECTION, EMULSION INTRAVENOUS at 13:28

## 2020-11-25 RX ADMIN — INSULIN GLARGINE 60 UNITS: 100 INJECTION, SOLUTION SUBCUTANEOUS at 21:49

## 2020-11-25 RX ADMIN — KETAMINE HYDROCHLORIDE 10 MG: 50 INJECTION, SOLUTION INTRAMUSCULAR; INTRAVENOUS at 13:31

## 2020-11-25 RX ADMIN — PANTOPRAZOLE SODIUM 40 MG: 40 TABLET, DELAYED RELEASE ORAL at 17:08

## 2020-11-25 RX ADMIN — INSULIN LISPRO 6 UNITS: 100 INJECTION, SOLUTION INTRAVENOUS; SUBCUTANEOUS at 17:06

## 2020-11-25 RX ADMIN — METHYLPREDNISOLONE SODIUM SUCCINATE 40 MG: 40 INJECTION, POWDER, FOR SOLUTION INTRAMUSCULAR; INTRAVENOUS at 21:49

## 2020-11-25 RX ADMIN — LIDOCAINE HYDROCHLORIDE 80 MG: 10 INJECTION, SOLUTION EPIDURAL; INFILTRATION; INTRACAUDAL; PERINEURAL at 13:22

## 2020-11-25 RX ADMIN — LEVALBUTEROL HYDROCHLORIDE 1.25 MG: 1.25 SOLUTION, CONCENTRATE RESPIRATORY (INHALATION) at 19:55

## 2020-11-25 RX ADMIN — GLYCOPYRROLATE 0.1 MG: 0.2 INJECTION, SOLUTION INTRAMUSCULAR; INTRAVENOUS at 13:22

## 2020-11-25 RX ADMIN — SODIUM CHLORIDE: 9 INJECTION, SOLUTION INTRAVENOUS at 13:14

## 2020-11-25 RX ADMIN — ATORVASTATIN CALCIUM 20 MG: 20 TABLET, FILM COATED ORAL at 17:08

## 2020-11-25 RX ADMIN — PANTOPRAZOLE SODIUM 40 MG: 40 TABLET, DELAYED RELEASE ORAL at 05:07

## 2020-11-25 RX ADMIN — FLUVOXAMINE MALEATE 100 MG: 50 TABLET ORAL at 21:47

## 2020-11-25 RX ADMIN — CEFTRIAXONE SODIUM 2000 MG: 10 INJECTION, POWDER, FOR SOLUTION INTRAVENOUS at 15:12

## 2020-11-25 RX ADMIN — HEPARIN SODIUM 7500 UNITS: 5000 INJECTION INTRAVENOUS; SUBCUTANEOUS at 05:07

## 2020-11-25 RX ADMIN — HYDRALAZINE HYDROCHLORIDE 5 MG: 20 INJECTION, SOLUTION INTRAMUSCULAR; INTRAVENOUS at 23:28

## 2020-11-25 RX ADMIN — METHYLPREDNISOLONE SODIUM SUCCINATE 40 MG: 40 INJECTION, POWDER, FOR SOLUTION INTRAMUSCULAR; INTRAVENOUS at 15:03

## 2020-11-25 RX ADMIN — GUAIFENESIN 600 MG: 600 TABLET, EXTENDED RELEASE ORAL at 21:49

## 2020-11-25 RX ADMIN — HYDRALAZINE HYDROCHLORIDE 5 MG: 20 INJECTION, SOLUTION INTRAMUSCULAR; INTRAVENOUS at 05:07

## 2020-11-25 RX ADMIN — HYDROCODONE POLISTIREX AND CHLORPHENIRAMINE POLISTIREX 5 ML: 10; 8 SUSPENSION, EXTENDED RELEASE ORAL at 15:05

## 2020-11-25 RX ADMIN — IPRATROPIUM BROMIDE 0.5 MG: 0.5 SOLUTION RESPIRATORY (INHALATION) at 07:28

## 2020-11-25 RX ADMIN — METOPROLOL SUCCINATE 25 MG: 25 TABLET, EXTENDED RELEASE ORAL at 14:59

## 2020-11-25 RX ADMIN — CYCLOBENZAPRINE HYDROCHLORIDE 5 MG: 10 TABLET, FILM COATED ORAL at 21:48

## 2020-11-25 RX ADMIN — HYDROCODONE POLISTIREX AND CHLORPHENIRAMINE POLISTIREX 5 ML: 10; 8 SUSPENSION, EXTENDED RELEASE ORAL at 21:49

## 2020-11-25 RX ADMIN — METHYLPREDNISOLONE SODIUM SUCCINATE 40 MG: 40 INJECTION, POWDER, FOR SOLUTION INTRAMUSCULAR; INTRAVENOUS at 05:07

## 2020-11-25 RX ADMIN — ACETAMINOPHEN 650 MG: 325 TABLET, FILM COATED ORAL at 21:50

## 2020-11-25 RX ADMIN — IPRATROPIUM BROMIDE 0.5 MG: 0.5 SOLUTION RESPIRATORY (INHALATION) at 19:55

## 2020-11-25 RX ADMIN — HEPARIN SODIUM 7500 UNITS: 5000 INJECTION INTRAVENOUS; SUBCUTANEOUS at 21:50

## 2020-11-25 RX ADMIN — BENZONATATE 100 MG: 100 CAPSULE ORAL at 21:49

## 2020-11-26 LAB
ANION GAP SERPL CALCULATED.3IONS-SCNC: 6 MMOL/L (ref 4–13)
BUN SERPL-MCNC: 36 MG/DL (ref 5–25)
C-ANCA TITR SER IF: NORMAL TITER
CALCIUM SERPL-MCNC: 9.1 MG/DL (ref 8.3–10.1)
CCP IGA+IGG SERPL IA-ACNC: 9 UNITS (ref 0–19)
CHLORIDE SERPL-SCNC: 103 MMOL/L (ref 100–108)
CO2 SERPL-SCNC: 28 MMOL/L (ref 21–32)
CREAT SERPL-MCNC: 1.24 MG/DL (ref 0.6–1.3)
ERYTHROCYTE [DISTWIDTH] IN BLOOD BY AUTOMATED COUNT: 12.9 % (ref 11.6–15.1)
GBM AB SER IA-ACNC: 3 UNITS (ref 0–20)
GFR SERPL CREATININE-BSD FRML MDRD: 71 ML/MIN/1.73SQ M
GLUCOSE SERPL-MCNC: 224 MG/DL (ref 65–140)
GLUCOSE SERPL-MCNC: 228 MG/DL (ref 65–140)
GLUCOSE SERPL-MCNC: 233 MG/DL (ref 65–140)
GLUCOSE SERPL-MCNC: 236 MG/DL (ref 65–140)
GLUCOSE SERPL-MCNC: 284 MG/DL (ref 65–140)
HCT VFR BLD AUTO: 32.4 % (ref 36.5–49.3)
HGB BLD-MCNC: 10.6 G/DL (ref 12–17)
MCH RBC QN AUTO: 29 PG (ref 26.8–34.3)
MCHC RBC AUTO-ENTMCNC: 32.7 G/DL (ref 31.4–37.4)
MCV RBC AUTO: 89 FL (ref 82–98)
MYELOPEROXIDASE AB SER IA-ACNC: <9 U/ML (ref 0–9)
P-ANCA ATYPICAL TITR SER IF: NORMAL TITER
P-ANCA TITR SER IF: NORMAL TITER
PLATELET # BLD AUTO: 418 THOUSANDS/UL (ref 149–390)
PMV BLD AUTO: 9.9 FL (ref 8.9–12.7)
POTASSIUM SERPL-SCNC: 4.4 MMOL/L (ref 3.5–5.3)
PROTEINASE3 AB SER IA-ACNC: <3.5 U/ML (ref 0–3.5)
RBC # BLD AUTO: 3.65 MILLION/UL (ref 3.88–5.62)
SODIUM SERPL-SCNC: 137 MMOL/L (ref 136–145)
WBC # BLD AUTO: 13.94 THOUSAND/UL (ref 4.31–10.16)

## 2020-11-26 PROCEDURE — 85027 COMPLETE CBC AUTOMATED: CPT | Performed by: STUDENT IN AN ORGANIZED HEALTH CARE EDUCATION/TRAINING PROGRAM

## 2020-11-26 PROCEDURE — 99233 SBSQ HOSP IP/OBS HIGH 50: CPT | Performed by: INTERNAL MEDICINE

## 2020-11-26 PROCEDURE — 80048 BASIC METABOLIC PNL TOTAL CA: CPT | Performed by: STUDENT IN AN ORGANIZED HEALTH CARE EDUCATION/TRAINING PROGRAM

## 2020-11-26 PROCEDURE — 82948 REAGENT STRIP/BLOOD GLUCOSE: CPT

## 2020-11-26 RX ORDER — BUTALBITAL, ACETAMINOPHEN AND CAFFEINE 50; 325; 40 MG/1; MG/1; MG/1
1 TABLET ORAL ONCE
Status: COMPLETED | OUTPATIENT
Start: 2020-11-26 | End: 2020-11-26

## 2020-11-26 RX ORDER — INSULIN GLARGINE 100 [IU]/ML
35 INJECTION, SOLUTION SUBCUTANEOUS EVERY 12 HOURS SCHEDULED
Status: DISCONTINUED | OUTPATIENT
Start: 2020-11-26 | End: 2020-11-28 | Stop reason: HOSPADM

## 2020-11-26 RX ORDER — METHYLPREDNISOLONE SODIUM SUCCINATE 40 MG/ML
40 INJECTION, POWDER, LYOPHILIZED, FOR SOLUTION INTRAMUSCULAR; INTRAVENOUS EVERY 12 HOURS SCHEDULED
Status: DISCONTINUED | OUTPATIENT
Start: 2020-11-26 | End: 2020-11-27

## 2020-11-26 RX ORDER — FLUTICASONE FUROATE AND VILANTEROL 200; 25 UG/1; UG/1
1 POWDER RESPIRATORY (INHALATION) DAILY
Status: DISCONTINUED | OUTPATIENT
Start: 2020-11-26 | End: 2020-11-28 | Stop reason: HOSPADM

## 2020-11-26 RX ORDER — HYDROCODONE POLISTIREX AND CHLORPHENIRAMINE POLISTIREX 10; 8 MG/5ML; MG/5ML
5 SUSPENSION, EXTENDED RELEASE ORAL EVERY 12 HOURS PRN
Status: DISCONTINUED | OUTPATIENT
Start: 2020-11-26 | End: 2020-11-28 | Stop reason: HOSPADM

## 2020-11-26 RX ADMIN — INSULIN LISPRO 4 UNITS: 100 INJECTION, SOLUTION INTRAVENOUS; SUBCUTANEOUS at 08:11

## 2020-11-26 RX ADMIN — ACETAMINOPHEN 650 MG: 325 TABLET, FILM COATED ORAL at 20:11

## 2020-11-26 RX ADMIN — CYCLOBENZAPRINE HYDROCHLORIDE 5 MG: 10 TABLET, FILM COATED ORAL at 21:19

## 2020-11-26 RX ADMIN — METHYLPREDNISOLONE SODIUM SUCCINATE 40 MG: 40 INJECTION, POWDER, FOR SOLUTION INTRAMUSCULAR; INTRAVENOUS at 20:15

## 2020-11-26 RX ADMIN — INSULIN GLARGINE 35 UNITS: 100 INJECTION, SOLUTION SUBCUTANEOUS at 21:22

## 2020-11-26 RX ADMIN — BUTALBITAL, ACETAMINOPHEN, AND CAFFEINE 1 TABLET: 50; 325; 40 TABLET ORAL at 21:20

## 2020-11-26 RX ADMIN — METOPROLOL SUCCINATE 25 MG: 25 TABLET, EXTENDED RELEASE ORAL at 08:11

## 2020-11-26 RX ADMIN — METHYLPREDNISOLONE SODIUM SUCCINATE 40 MG: 40 INJECTION, POWDER, FOR SOLUTION INTRAMUSCULAR; INTRAVENOUS at 06:37

## 2020-11-26 RX ADMIN — BENZONATATE 100 MG: 100 CAPSULE ORAL at 08:11

## 2020-11-26 RX ADMIN — MELATONIN 3 MG: at 21:20

## 2020-11-26 RX ADMIN — PANTOPRAZOLE SODIUM 40 MG: 40 TABLET, DELAYED RELEASE ORAL at 06:37

## 2020-11-26 RX ADMIN — OLANZAPINE 2.5 MG: 2.5 TABLET, FILM COATED ORAL at 21:51

## 2020-11-26 RX ADMIN — INSULIN LISPRO 6 UNITS: 100 INJECTION, SOLUTION INTRAVENOUS; SUBCUTANEOUS at 21:23

## 2020-11-26 RX ADMIN — LEVOTHYROXINE SODIUM 100 MCG: 100 TABLET ORAL at 06:37

## 2020-11-26 RX ADMIN — AMLODIPINE BESYLATE 10 MG: 10 TABLET ORAL at 08:11

## 2020-11-26 RX ADMIN — HEPARIN SODIUM 7500 UNITS: 5000 INJECTION INTRAVENOUS; SUBCUTANEOUS at 21:21

## 2020-11-26 RX ADMIN — HEPARIN SODIUM 7500 UNITS: 5000 INJECTION INTRAVENOUS; SUBCUTANEOUS at 06:37

## 2020-11-26 RX ADMIN — HEPARIN SODIUM 7500 UNITS: 5000 INJECTION INTRAVENOUS; SUBCUTANEOUS at 14:19

## 2020-11-26 RX ADMIN — POLYETHYLENE GLYCOL 3350 17 G: 17 POWDER, FOR SOLUTION ORAL at 08:15

## 2020-11-26 RX ADMIN — GUAIFENESIN 600 MG: 600 TABLET, EXTENDED RELEASE ORAL at 20:14

## 2020-11-26 RX ADMIN — FLUVOXAMINE MALEATE 100 MG: 50 TABLET ORAL at 08:11

## 2020-11-26 RX ADMIN — ACETAMINOPHEN 650 MG: 325 TABLET, FILM COATED ORAL at 08:11

## 2020-11-26 RX ADMIN — FLUVOXAMINE MALEATE 100 MG: 50 TABLET ORAL at 20:15

## 2020-11-26 RX ADMIN — HYDROCODONE POLISTIREX AND CHLORPHENIRAMINE POLISTIREX 5 ML: 10; 8 SUSPENSION, EXTENDED RELEASE ORAL at 08:18

## 2020-11-26 RX ADMIN — CEFTRIAXONE SODIUM 2000 MG: 10 INJECTION, POWDER, FOR SOLUTION INTRAVENOUS at 16:58

## 2020-11-26 RX ADMIN — GUAIFENESIN 600 MG: 600 TABLET, EXTENDED RELEASE ORAL at 08:11

## 2020-11-26 RX ADMIN — ATORVASTATIN CALCIUM 20 MG: 20 TABLET, FILM COATED ORAL at 17:01

## 2020-11-26 RX ADMIN — INSULIN LISPRO 4 UNITS: 100 INJECTION, SOLUTION INTRAVENOUS; SUBCUTANEOUS at 12:01

## 2020-11-26 RX ADMIN — BENZONATATE 100 MG: 100 CAPSULE ORAL at 19:15

## 2020-11-26 RX ADMIN — METHYLPREDNISOLONE SODIUM SUCCINATE 40 MG: 40 INJECTION, POWDER, FOR SOLUTION INTRAMUSCULAR; INTRAVENOUS at 14:19

## 2020-11-26 RX ADMIN — FLUTICASONE FUROATE AND VILANTEROL TRIFENATATE 1 PUFF: 200; 25 POWDER RESPIRATORY (INHALATION) at 17:02

## 2020-11-26 RX ADMIN — INSULIN LISPRO 4 UNITS: 100 INJECTION, SOLUTION INTRAVENOUS; SUBCUTANEOUS at 16:59

## 2020-11-26 RX ADMIN — PANTOPRAZOLE SODIUM 40 MG: 40 TABLET, DELAYED RELEASE ORAL at 17:01

## 2020-11-27 LAB
ANION GAP SERPL CALCULATED.3IONS-SCNC: 6 MMOL/L (ref 4–13)
BACTERIA BLD CULT: NORMAL
BACTERIA BLD CULT: NORMAL
BACTERIA BRONCH AEROBE CULT: NO GROWTH
BUN SERPL-MCNC: 38 MG/DL (ref 5–25)
CALCIUM SERPL-MCNC: 9.1 MG/DL (ref 8.3–10.1)
CHLORIDE SERPL-SCNC: 105 MMOL/L (ref 100–108)
CO2 SERPL-SCNC: 29 MMOL/L (ref 21–32)
CREAT SERPL-MCNC: 1.35 MG/DL (ref 0.6–1.3)
ENA SS-A AB SER-ACNC: <0.2 AI (ref 0–0.9)
ENA SS-B AB SER-ACNC: <0.2 AI (ref 0–0.9)
ERYTHROCYTE [DISTWIDTH] IN BLOOD BY AUTOMATED COUNT: 12.8 % (ref 11.6–15.1)
GFR SERPL CREATININE-BSD FRML MDRD: 64 ML/MIN/1.73SQ M
GLUCOSE SERPL-MCNC: 171 MG/DL (ref 65–140)
GLUCOSE SERPL-MCNC: 180 MG/DL (ref 65–140)
GLUCOSE SERPL-MCNC: 193 MG/DL (ref 65–140)
GLUCOSE SERPL-MCNC: 203 MG/DL (ref 65–140)
GLUCOSE SERPL-MCNC: 230 MG/DL (ref 65–140)
GRAM STN SPEC: NORMAL
HCT VFR BLD AUTO: 34.8 % (ref 36.5–49.3)
HGB BLD-MCNC: 11.3 G/DL (ref 12–17)
MCH RBC QN AUTO: 29.1 PG (ref 26.8–34.3)
MCHC RBC AUTO-ENTMCNC: 32.5 G/DL (ref 31.4–37.4)
MCV RBC AUTO: 90 FL (ref 82–98)
PLATELET # BLD AUTO: 442 THOUSANDS/UL (ref 149–390)
PMV BLD AUTO: 9.9 FL (ref 8.9–12.7)
POTASSIUM SERPL-SCNC: 4.7 MMOL/L (ref 3.5–5.3)
RBC # BLD AUTO: 3.88 MILLION/UL (ref 3.88–5.62)
SODIUM SERPL-SCNC: 140 MMOL/L (ref 136–145)
WBC # BLD AUTO: 17.65 THOUSAND/UL (ref 4.31–10.16)

## 2020-11-27 PROCEDURE — 85027 COMPLETE CBC AUTOMATED: CPT | Performed by: STUDENT IN AN ORGANIZED HEALTH CARE EDUCATION/TRAINING PROGRAM

## 2020-11-27 PROCEDURE — 82948 REAGENT STRIP/BLOOD GLUCOSE: CPT

## 2020-11-27 PROCEDURE — 94760 N-INVAS EAR/PLS OXIMETRY 1: CPT

## 2020-11-27 PROCEDURE — 94640 AIRWAY INHALATION TREATMENT: CPT

## 2020-11-27 PROCEDURE — 80048 BASIC METABOLIC PNL TOTAL CA: CPT | Performed by: STUDENT IN AN ORGANIZED HEALTH CARE EDUCATION/TRAINING PROGRAM

## 2020-11-27 PROCEDURE — 97162 PT EVAL MOD COMPLEX 30 MIN: CPT

## 2020-11-27 PROCEDURE — 97167 OT EVAL HIGH COMPLEX 60 MIN: CPT

## 2020-11-27 PROCEDURE — 99233 SBSQ HOSP IP/OBS HIGH 50: CPT | Performed by: INTERNAL MEDICINE

## 2020-11-27 RX ORDER — PREDNISONE 10 MG/1
10 TABLET ORAL DAILY
Status: DISCONTINUED | OUTPATIENT
Start: 2020-12-07 | End: 2020-11-28 | Stop reason: HOSPADM

## 2020-11-27 RX ORDER — LOSARTAN POTASSIUM 50 MG/1
100 TABLET ORAL DAILY
Status: DISCONTINUED | OUTPATIENT
Start: 2020-11-27 | End: 2020-11-28 | Stop reason: HOSPADM

## 2020-11-27 RX ORDER — ACETAMINOPHEN 325 MG/1
650 TABLET ORAL EVERY 4 HOURS PRN
Status: DISCONTINUED | OUTPATIENT
Start: 2020-11-27 | End: 2020-11-28 | Stop reason: HOSPADM

## 2020-11-27 RX ORDER — PREDNISONE 20 MG/1
40 TABLET ORAL DAILY
Status: DISCONTINUED | OUTPATIENT
Start: 2020-11-28 | End: 2020-11-28 | Stop reason: HOSPADM

## 2020-11-27 RX ORDER — PREDNISONE 20 MG/1
20 TABLET ORAL DAILY
Status: DISCONTINUED | OUTPATIENT
Start: 2020-12-04 | End: 2020-11-28 | Stop reason: HOSPADM

## 2020-11-27 RX ADMIN — IPRATROPIUM BROMIDE 0.5 MG: 0.5 SOLUTION RESPIRATORY (INHALATION) at 20:08

## 2020-11-27 RX ADMIN — HYDRALAZINE HYDROCHLORIDE 5 MG: 20 INJECTION, SOLUTION INTRAMUSCULAR; INTRAVENOUS at 06:26

## 2020-11-27 RX ADMIN — POLYETHYLENE GLYCOL 3350 17 G: 17 POWDER, FOR SOLUTION ORAL at 08:53

## 2020-11-27 RX ADMIN — CEFTRIAXONE SODIUM 2000 MG: 10 INJECTION, POWDER, FOR SOLUTION INTRAVENOUS at 15:54

## 2020-11-27 RX ADMIN — LEVOTHYROXINE SODIUM 100 MCG: 100 TABLET ORAL at 05:24

## 2020-11-27 RX ADMIN — IPRATROPIUM BROMIDE 0.5 MG: 0.5 SOLUTION RESPIRATORY (INHALATION) at 08:26

## 2020-11-27 RX ADMIN — MELATONIN 3 MG: at 21:24

## 2020-11-27 RX ADMIN — FLUVOXAMINE MALEATE 100 MG: 50 TABLET ORAL at 08:49

## 2020-11-27 RX ADMIN — INSULIN LISPRO 2 UNITS: 100 INJECTION, SOLUTION INTRAVENOUS; SUBCUTANEOUS at 12:15

## 2020-11-27 RX ADMIN — OLANZAPINE 2.5 MG: 2.5 TABLET, FILM COATED ORAL at 21:23

## 2020-11-27 RX ADMIN — METOPROLOL SUCCINATE 25 MG: 25 TABLET, EXTENDED RELEASE ORAL at 08:50

## 2020-11-27 RX ADMIN — LEVALBUTEROL HYDROCHLORIDE 1.25 MG: 1.25 SOLUTION, CONCENTRATE RESPIRATORY (INHALATION) at 13:45

## 2020-11-27 RX ADMIN — GUAIFENESIN 600 MG: 600 TABLET, EXTENDED RELEASE ORAL at 21:21

## 2020-11-27 RX ADMIN — INSULIN LISPRO 2 UNITS: 100 INJECTION, SOLUTION INTRAVENOUS; SUBCUTANEOUS at 21:22

## 2020-11-27 RX ADMIN — LEVALBUTEROL HYDROCHLORIDE 1.25 MG: 1.25 SOLUTION, CONCENTRATE RESPIRATORY (INHALATION) at 20:07

## 2020-11-27 RX ADMIN — ACETAMINOPHEN 650 MG: 325 TABLET ORAL at 13:39

## 2020-11-27 RX ADMIN — HEPARIN SODIUM 7500 UNITS: 5000 INJECTION INTRAVENOUS; SUBCUTANEOUS at 13:40

## 2020-11-27 RX ADMIN — FLUTICASONE FUROATE AND VILANTEROL TRIFENATATE 1 PUFF: 200; 25 POWDER RESPIRATORY (INHALATION) at 08:53

## 2020-11-27 RX ADMIN — METHYLPREDNISOLONE SODIUM SUCCINATE 40 MG: 40 INJECTION, POWDER, FOR SOLUTION INTRAMUSCULAR; INTRAVENOUS at 08:50

## 2020-11-27 RX ADMIN — GUAIFENESIN 600 MG: 600 TABLET, EXTENDED RELEASE ORAL at 08:50

## 2020-11-27 RX ADMIN — IPRATROPIUM BROMIDE 0.5 MG: 0.5 SOLUTION RESPIRATORY (INHALATION) at 13:45

## 2020-11-27 RX ADMIN — LEVALBUTEROL HYDROCHLORIDE 1.25 MG: 1.25 SOLUTION, CONCENTRATE RESPIRATORY (INHALATION) at 08:26

## 2020-11-27 RX ADMIN — INSULIN LISPRO 2 UNITS: 100 INJECTION, SOLUTION INTRAVENOUS; SUBCUTANEOUS at 08:50

## 2020-11-27 RX ADMIN — LOSARTAN POTASSIUM 100 MG: 50 TABLET, FILM COATED ORAL at 13:40

## 2020-11-27 RX ADMIN — INSULIN GLARGINE 35 UNITS: 100 INJECTION, SOLUTION SUBCUTANEOUS at 21:21

## 2020-11-27 RX ADMIN — HEPARIN SODIUM 7500 UNITS: 5000 INJECTION INTRAVENOUS; SUBCUTANEOUS at 21:21

## 2020-11-27 RX ADMIN — AMLODIPINE BESYLATE 10 MG: 10 TABLET ORAL at 08:50

## 2020-11-27 RX ADMIN — FLUVOXAMINE MALEATE 100 MG: 50 TABLET ORAL at 21:24

## 2020-11-27 RX ADMIN — ONDANSETRON 4 MG: 2 INJECTION INTRAMUSCULAR; INTRAVENOUS at 06:30

## 2020-11-27 RX ADMIN — INSULIN LISPRO 4 UNITS: 100 INJECTION, SOLUTION INTRAVENOUS; SUBCUTANEOUS at 17:07

## 2020-11-27 RX ADMIN — CYCLOBENZAPRINE HYDROCHLORIDE 5 MG: 10 TABLET, FILM COATED ORAL at 21:24

## 2020-11-27 RX ADMIN — INSULIN GLARGINE 35 UNITS: 100 INJECTION, SOLUTION SUBCUTANEOUS at 08:52

## 2020-11-27 RX ADMIN — HEPARIN SODIUM 7500 UNITS: 5000 INJECTION INTRAVENOUS; SUBCUTANEOUS at 05:25

## 2020-11-27 RX ADMIN — ACETAMINOPHEN 650 MG: 325 TABLET, FILM COATED ORAL at 06:26

## 2020-11-27 RX ADMIN — ATORVASTATIN CALCIUM 20 MG: 20 TABLET, FILM COATED ORAL at 17:06

## 2020-11-27 RX ADMIN — PANTOPRAZOLE SODIUM 40 MG: 40 TABLET, DELAYED RELEASE ORAL at 05:24

## 2020-11-27 RX ADMIN — PANTOPRAZOLE SODIUM 40 MG: 40 TABLET, DELAYED RELEASE ORAL at 17:05

## 2020-11-27 RX ADMIN — HYDROXYZINE HYDROCHLORIDE 25 MG: 25 TABLET, FILM COATED ORAL at 15:54

## 2020-11-28 PROBLEM — J96.00 ACUTE RESPIRATORY FAILURE (HCC): Status: RESOLVED | Noted: 2020-11-22 | Resolved: 2020-11-28

## 2020-11-28 PROBLEM — N17.9 AKI (ACUTE KIDNEY INJURY) (HCC): Status: RESOLVED | Noted: 2020-11-22 | Resolved: 2020-11-28

## 2020-11-28 LAB
ANION GAP SERPL CALCULATED.3IONS-SCNC: 6 MMOL/L (ref 4–13)
BUN SERPL-MCNC: 41 MG/DL (ref 5–25)
CALCIUM SERPL-MCNC: 8.8 MG/DL (ref 8.3–10.1)
CHLORIDE SERPL-SCNC: 107 MMOL/L (ref 100–108)
CO2 SERPL-SCNC: 28 MMOL/L (ref 21–32)
CREAT SERPL-MCNC: 1.51 MG/DL (ref 0.6–1.3)
ERYTHROCYTE [DISTWIDTH] IN BLOOD BY AUTOMATED COUNT: 12.9 % (ref 11.6–15.1)
GFR SERPL CREATININE-BSD FRML MDRD: 56 ML/MIN/1.73SQ M
GLUCOSE SERPL-MCNC: 102 MG/DL (ref 65–140)
GLUCOSE SERPL-MCNC: 114 MG/DL (ref 65–140)
GLUCOSE SERPL-MCNC: 115 MG/DL (ref 65–140)
GLUCOSE SERPL-MCNC: 52 MG/DL (ref 65–140)
HCT VFR BLD AUTO: 35.6 % (ref 36.5–49.3)
HGB BLD-MCNC: 11.4 G/DL (ref 12–17)
MCH RBC QN AUTO: 28.6 PG (ref 26.8–34.3)
MCHC RBC AUTO-ENTMCNC: 32 G/DL (ref 31.4–37.4)
MCV RBC AUTO: 89 FL (ref 82–98)
P JIROVECII AG SPEC QL IF: NORMAL
PLATELET # BLD AUTO: 410 THOUSANDS/UL (ref 149–390)
PMV BLD AUTO: 9.7 FL (ref 8.9–12.7)
POTASSIUM SERPL-SCNC: 4.2 MMOL/L (ref 3.5–5.3)
RBC # BLD AUTO: 3.98 MILLION/UL (ref 3.88–5.62)
SODIUM SERPL-SCNC: 141 MMOL/L (ref 136–145)
WBC # BLD AUTO: 16.47 THOUSAND/UL (ref 4.31–10.16)

## 2020-11-28 PROCEDURE — 94640 AIRWAY INHALATION TREATMENT: CPT

## 2020-11-28 PROCEDURE — 85027 COMPLETE CBC AUTOMATED: CPT | Performed by: STUDENT IN AN ORGANIZED HEALTH CARE EDUCATION/TRAINING PROGRAM

## 2020-11-28 PROCEDURE — 0B948ZZ DRAINAGE OF RIGHT UPPER LOBE BRONCHUS, VIA NATURAL OR ARTIFICIAL OPENING ENDOSCOPIC: ICD-10-PCS | Performed by: INTERNAL MEDICINE

## 2020-11-28 PROCEDURE — 99238 HOSP IP/OBS DSCHRG MGMT 30/<: CPT | Performed by: INTERNAL MEDICINE

## 2020-11-28 PROCEDURE — NC001 PR NO CHARGE: Performed by: INTERNAL MEDICINE

## 2020-11-28 PROCEDURE — 94760 N-INVAS EAR/PLS OXIMETRY 1: CPT

## 2020-11-28 PROCEDURE — 82948 REAGENT STRIP/BLOOD GLUCOSE: CPT

## 2020-11-28 PROCEDURE — 0B998ZZ DRAINAGE OF LINGULA BRONCHUS, VIA NATURAL OR ARTIFICIAL OPENING ENDOSCOPIC: ICD-10-PCS | Performed by: INTERNAL MEDICINE

## 2020-11-28 PROCEDURE — 80048 BASIC METABOLIC PNL TOTAL CA: CPT | Performed by: STUDENT IN AN ORGANIZED HEALTH CARE EDUCATION/TRAINING PROGRAM

## 2020-11-28 PROCEDURE — 0B988ZZ DRAINAGE OF LEFT UPPER LOBE BRONCHUS, VIA NATURAL OR ARTIFICIAL OPENING ENDOSCOPIC: ICD-10-PCS | Performed by: INTERNAL MEDICINE

## 2020-11-28 PROCEDURE — 4010F ACE/ARB THERAPY RXD/TAKEN: CPT | Performed by: FAMILY MEDICINE

## 2020-11-28 RX ORDER — OLANZAPINE 2.5 MG/1
2.5 TABLET ORAL
Qty: 30 TABLET | Refills: 0 | Status: SHIPPED | OUTPATIENT
Start: 2020-11-28 | End: 2021-01-12 | Stop reason: SDUPTHER

## 2020-11-28 RX ORDER — PREDNISONE 10 MG/1
30 TABLET ORAL DAILY
Qty: 9 TABLET | Refills: 0 | Status: SHIPPED | OUTPATIENT
Start: 2020-12-01 | End: 2020-12-04

## 2020-11-28 RX ORDER — PREDNISONE 10 MG/1
10 TABLET ORAL DAILY
Qty: 3 TABLET | Refills: 0 | Status: SHIPPED | OUTPATIENT
Start: 2020-12-07 | End: 2020-12-10

## 2020-11-28 RX ORDER — PREDNISONE 20 MG/1
20 TABLET ORAL DAILY
Qty: 3 TABLET | Refills: 0 | Status: SHIPPED | OUTPATIENT
Start: 2020-12-04 | End: 2020-12-07

## 2020-11-28 RX ORDER — LOSARTAN POTASSIUM 50 MG/1
100 TABLET ORAL DAILY
Qty: 60 TABLET | Refills: 0 | Status: SHIPPED | OUTPATIENT
Start: 2020-11-28 | End: 2021-01-12 | Stop reason: SDUPTHER

## 2020-11-28 RX ORDER — PREDNISONE 20 MG/1
40 TABLET ORAL DAILY
Qty: 4 TABLET | Refills: 0 | Status: SHIPPED | OUTPATIENT
Start: 2020-11-29 | End: 2020-12-01

## 2020-11-28 RX ORDER — INSULIN GLARGINE 100 [IU]/ML
50 INJECTION, SOLUTION SUBCUTANEOUS
Qty: 5 PEN | Refills: 0
Start: 2020-11-28 | End: 2021-01-12 | Stop reason: SDUPTHER

## 2020-11-28 RX ADMIN — LEVOTHYROXINE SODIUM 100 MCG: 100 TABLET ORAL at 04:53

## 2020-11-28 RX ADMIN — METOPROLOL SUCCINATE 25 MG: 25 TABLET, EXTENDED RELEASE ORAL at 08:20

## 2020-11-28 RX ADMIN — AMLODIPINE BESYLATE 10 MG: 10 TABLET ORAL at 08:20

## 2020-11-28 RX ADMIN — FLUTICASONE FUROATE AND VILANTEROL TRIFENATATE 1 PUFF: 200; 25 POWDER RESPIRATORY (INHALATION) at 08:20

## 2020-11-28 RX ADMIN — PANTOPRAZOLE SODIUM 40 MG: 40 TABLET, DELAYED RELEASE ORAL at 04:54

## 2020-11-28 RX ADMIN — LEVALBUTEROL HYDROCHLORIDE 1.25 MG: 1.25 SOLUTION, CONCENTRATE RESPIRATORY (INHALATION) at 08:26

## 2020-11-28 RX ADMIN — INSULIN GLARGINE 35 UNITS: 100 INJECTION, SOLUTION SUBCUTANEOUS at 08:19

## 2020-11-28 RX ADMIN — GUAIFENESIN 600 MG: 600 TABLET, EXTENDED RELEASE ORAL at 08:20

## 2020-11-28 RX ADMIN — FLUVOXAMINE MALEATE 100 MG: 50 TABLET ORAL at 08:20

## 2020-11-28 RX ADMIN — IPRATROPIUM BROMIDE 0.5 MG: 0.5 SOLUTION RESPIRATORY (INHALATION) at 08:26

## 2020-11-28 RX ADMIN — POLYETHYLENE GLYCOL 3350 17 G: 17 POWDER, FOR SOLUTION ORAL at 08:20

## 2020-11-28 RX ADMIN — PREDNISONE 40 MG: 20 TABLET ORAL at 08:20

## 2020-11-28 RX ADMIN — LOSARTAN POTASSIUM 100 MG: 50 TABLET, FILM COATED ORAL at 08:20

## 2020-11-28 RX ADMIN — HEPARIN SODIUM 7500 UNITS: 5000 INJECTION INTRAVENOUS; SUBCUTANEOUS at 04:54

## 2020-11-29 VITALS
WEIGHT: 280.2 LBS | OXYGEN SATURATION: 97 % | TEMPERATURE: 98 F | SYSTOLIC BLOOD PRESSURE: 132 MMHG | RESPIRATION RATE: 17 BRPM | BODY MASS INDEX: 51.56 KG/M2 | HEIGHT: 62 IN | DIASTOLIC BLOOD PRESSURE: 80 MMHG | HEART RATE: 86 BPM

## 2020-11-30 LAB
A FUMIGATUS1 AB SER QL ID: NEGATIVE
A PULLULANS AB SER QL: NEGATIVE
LACEYELLA SACCHARI AB SER QL: NEGATIVE
PIGEON SERUM AB QL ID: NEGATIVE
S RECTIVIRGULA AB SER QL ID: NEGATIVE
T VULGARIS AB SER QL ID: NEGATIVE

## 2020-12-01 ENCOUNTER — TELEPHONE (OUTPATIENT)
Dept: FAMILY MEDICINE CLINIC | Facility: CLINIC | Age: 40
End: 2020-12-01

## 2020-12-01 ENCOUNTER — TRANSITIONAL CARE MANAGEMENT (OUTPATIENT)
Dept: FAMILY MEDICINE CLINIC | Facility: CLINIC | Age: 40
End: 2020-12-01

## 2020-12-28 LAB — FUNGUS SPEC CULT: NORMAL

## 2021-01-05 ENCOUNTER — APPOINTMENT (EMERGENCY)
Dept: CT IMAGING | Facility: HOSPITAL | Age: 41
End: 2021-01-05
Payer: COMMERCIAL

## 2021-01-05 ENCOUNTER — HOSPITAL ENCOUNTER (EMERGENCY)
Facility: HOSPITAL | Age: 41
Discharge: HOME | End: 2021-01-05
Attending: EMERGENCY MEDICINE | Admitting: EMERGENCY MEDICINE
Payer: COMMERCIAL

## 2021-01-05 VITALS
BODY MASS INDEX: 51.53 KG/M2 | OXYGEN SATURATION: 90 % | DIASTOLIC BLOOD PRESSURE: 102 MMHG | HEART RATE: 89 BPM | TEMPERATURE: 97.8 F | SYSTOLIC BLOOD PRESSURE: 181 MMHG | HEIGHT: 62 IN | RESPIRATION RATE: 16 BRPM | WEIGHT: 280 LBS

## 2021-01-05 DIAGNOSIS — U07.1 COVID-19: ICD-10-CM

## 2021-01-05 DIAGNOSIS — R11.2 NAUSEA AND VOMITING: Primary | ICD-10-CM

## 2021-01-05 LAB
ALBUMIN SERPL BCP-MCNC: 3.4 G/DL (ref 3.5–5.7)
ALP SERPL-CCNC: 67 U/L (ref 40–150)
ALT SERPL W P-5'-P-CCNC: 10 U/L (ref 7–52)
ANION GAP SERPL CALCULATED.3IONS-SCNC: 10 MMOL/L (ref 4–13)
AST SERPL W P-5'-P-CCNC: 15 U/L (ref 13–39)
BASOPHILS # BLD AUTO: 0 THOUSANDS/ΜL (ref 0–0.1)
BASOPHILS NFR BLD AUTO: 1 % (ref 0–2)
BILIRUB SERPL-MCNC: 0.4 MG/DL (ref 0.2–1)
BUN SERPL-MCNC: 24 MG/DL (ref 7–25)
CALCIUM ALBUM COR SERPL-MCNC: 9.1 MG/DL (ref 8.3–10.1)
CALCIUM SERPL-MCNC: 8.6 MG/DL (ref 8.6–10.5)
CHLORIDE SERPL-SCNC: 95 MMOL/L (ref 98–107)
CO2 SERPL-SCNC: 29 MMOL/L (ref 21–31)
CREAT SERPL-MCNC: 1.53 MG/DL (ref 0.7–1.3)
EOSINOPHIL # BLD AUTO: 0 THOUSAND/ΜL (ref 0–0.61)
EOSINOPHIL NFR BLD AUTO: 0 % (ref 0–5)
ERYTHROCYTE [DISTWIDTH] IN BLOOD BY AUTOMATED COUNT: 13.4 % (ref 11.5–14.5)
FLUAV RNA RESP QL NAA+PROBE: NEGATIVE
FLUBV RNA RESP QL NAA+PROBE: NEGATIVE
GFR SERPL CREATININE-BSD FRML MDRD: 55 ML/MIN/1.73SQ M
GLUCOSE SERPL-MCNC: 244 MG/DL (ref 65–99)
HCT VFR BLD AUTO: 37.7 % (ref 42–47)
HGB BLD-MCNC: 13 G/DL (ref 14–18)
LIPASE SERPL-CCNC: <10 U/L (ref 11–82)
LYMPHOCYTES # BLD AUTO: 1.2 THOUSANDS/ΜL (ref 0.6–4.47)
LYMPHOCYTES NFR BLD AUTO: 18 % (ref 21–51)
MCH RBC QN AUTO: 29.3 PG (ref 26–34)
MCHC RBC AUTO-ENTMCNC: 34.3 G/DL (ref 31–37)
MCV RBC AUTO: 85 FL (ref 81–99)
MONOCYTES # BLD AUTO: 0.6 THOUSAND/ΜL (ref 0.17–1.22)
MONOCYTES NFR BLD AUTO: 10 % (ref 2–12)
NEUTROPHILS # BLD AUTO: 4.7 THOUSANDS/ΜL (ref 1.4–6.5)
NEUTS SEG NFR BLD AUTO: 72 % (ref 42–75)
PLATELET # BLD AUTO: 278 THOUSANDS/UL (ref 149–390)
PMV BLD AUTO: 7.4 FL (ref 8.6–11.7)
POTASSIUM SERPL-SCNC: 4.6 MMOL/L (ref 3.5–5.5)
PROT SERPL-MCNC: 6.4 G/DL (ref 6.4–8.9)
RBC # BLD AUTO: 4.42 MILLION/UL (ref 4.3–5.9)
RSV RNA RESP QL NAA+PROBE: NEGATIVE
SARS-COV-2 RNA RESP QL NAA+PROBE: POSITIVE
SODIUM SERPL-SCNC: 134 MMOL/L (ref 134–143)
TROPONIN I SERPL-MCNC: <0.03 NG/ML
WBC # BLD AUTO: 6.6 THOUSAND/UL (ref 4.8–10.8)

## 2021-01-05 PROCEDURE — 0241U HB NFCT DS VIR RESP RNA 4 TRGT: CPT | Performed by: PHYSICIAN ASSISTANT

## 2021-01-05 PROCEDURE — 84484 ASSAY OF TROPONIN QUANT: CPT | Performed by: PHYSICIAN ASSISTANT

## 2021-01-05 PROCEDURE — 99284 EMERGENCY DEPT VISIT MOD MDM: CPT

## 2021-01-05 PROCEDURE — 83690 ASSAY OF LIPASE: CPT

## 2021-01-05 PROCEDURE — 36415 COLL VENOUS BLD VENIPUNCTURE: CPT

## 2021-01-05 PROCEDURE — 74177 CT ABD & PELVIS W/CONTRAST: CPT

## 2021-01-05 PROCEDURE — 96361 HYDRATE IV INFUSION ADD-ON: CPT

## 2021-01-05 PROCEDURE — 85025 COMPLETE CBC W/AUTO DIFF WBC: CPT

## 2021-01-05 PROCEDURE — 80053 COMPREHEN METABOLIC PANEL: CPT

## 2021-01-05 PROCEDURE — 99285 EMERGENCY DEPT VISIT HI MDM: CPT | Performed by: PHYSICIAN ASSISTANT

## 2021-01-05 PROCEDURE — 96374 THER/PROPH/DIAG INJ IV PUSH: CPT

## 2021-01-05 PROCEDURE — G1004 CDSM NDSC: HCPCS

## 2021-01-05 PROCEDURE — 93005 ELECTROCARDIOGRAM TRACING: CPT

## 2021-01-05 RX ORDER — LIDOCAINE HYDROCHLORIDE 20 MG/ML
15 SOLUTION OROPHARYNGEAL ONCE
Status: COMPLETED | OUTPATIENT
Start: 2021-01-05 | End: 2021-01-05

## 2021-01-05 RX ORDER — SUCRALFATE 1 G/1
1 TABLET ORAL 4 TIMES DAILY
Qty: 20 TABLET | Refills: 0 | Status: SHIPPED | OUTPATIENT
Start: 2021-01-05 | End: 2021-03-11 | Stop reason: HOSPADM

## 2021-01-05 RX ORDER — ONDANSETRON 4 MG/1
4 TABLET, FILM COATED ORAL EVERY 8 HOURS PRN
Qty: 12 TABLET | Refills: 0 | Status: SHIPPED | OUTPATIENT
Start: 2021-01-05 | End: 2021-01-12 | Stop reason: SDUPTHER

## 2021-01-05 RX ORDER — ONDANSETRON 2 MG/ML
4 INJECTION INTRAMUSCULAR; INTRAVENOUS ONCE
Status: COMPLETED | OUTPATIENT
Start: 2021-01-05 | End: 2021-01-05

## 2021-01-05 RX ORDER — MAGNESIUM HYDROXIDE/ALUMINUM HYDROXICE/SIMETHICONE 120; 1200; 1200 MG/30ML; MG/30ML; MG/30ML
30 SUSPENSION ORAL ONCE
Status: COMPLETED | OUTPATIENT
Start: 2021-01-05 | End: 2021-01-05

## 2021-01-05 RX ORDER — SUCRALFATE 1 G/1
1 TABLET ORAL ONCE
Status: COMPLETED | OUTPATIENT
Start: 2021-01-05 | End: 2021-01-05

## 2021-01-05 RX ORDER — LIDOCAINE HYDROCHLORIDE 20 MG/ML
15 SOLUTION OROPHARYNGEAL 3 TIMES DAILY PRN
Qty: 100 ML | Refills: 0 | Status: SHIPPED | OUTPATIENT
Start: 2021-01-05 | End: 2021-02-05 | Stop reason: SDUPTHER

## 2021-01-05 RX ADMIN — ONDANSETRON 4 MG: 2 INJECTION INTRAMUSCULAR; INTRAVENOUS at 17:49

## 2021-01-05 RX ADMIN — LIDOCAINE HYDROCHLORIDE 15 ML: 20 SOLUTION ORAL; TOPICAL at 19:19

## 2021-01-05 RX ADMIN — IOHEXOL 100 ML: 350 INJECTION, SOLUTION INTRAVENOUS at 18:38

## 2021-01-05 RX ADMIN — SUCRALFATE 1 G: 1 TABLET ORAL at 17:49

## 2021-01-05 RX ADMIN — SODIUM CHLORIDE 1000 ML: 0.9 INJECTION, SOLUTION INTRAVENOUS at 17:52

## 2021-01-05 RX ADMIN — METOPROLOL TARTRATE 25 MG: 25 TABLET, FILM COATED ORAL at 20:16

## 2021-01-05 RX ADMIN — ALUMINUM HYDROXIDE, MAGNESIUM HYDROXIDE, AND SIMETHICONE 30 ML: 200; 200; 20 SUSPENSION ORAL at 17:49

## 2021-01-06 ENCOUNTER — APPOINTMENT (EMERGENCY)
Dept: RADIOLOGY | Facility: HOSPITAL | Age: 41
End: 2021-01-06
Payer: COMMERCIAL

## 2021-01-06 ENCOUNTER — HOSPITAL ENCOUNTER (EMERGENCY)
Facility: HOSPITAL | Age: 41
Discharge: HOME | End: 2021-01-07
Attending: EMERGENCY MEDICINE | Admitting: EMERGENCY MEDICINE
Payer: COMMERCIAL

## 2021-01-06 DIAGNOSIS — R11.2 NAUSEA AND VOMITING: ICD-10-CM

## 2021-01-06 DIAGNOSIS — R10.13 EPIGASTRIC ABDOMINAL PAIN: Primary | ICD-10-CM

## 2021-01-06 LAB
ALBUMIN SERPL BCP-MCNC: 2.7 G/DL (ref 3.5–5)
ALP SERPL-CCNC: 86 U/L (ref 46–116)
ALT SERPL W P-5'-P-CCNC: 14 U/L (ref 12–78)
ANION GAP SERPL CALCULATED.3IONS-SCNC: 4 MMOL/L (ref 4–13)
AST SERPL W P-5'-P-CCNC: 19 U/L (ref 5–45)
ATRIAL RATE: 83 BPM
BILIRUB SERPL-MCNC: 0.55 MG/DL (ref 0.2–1)
BUN SERPL-MCNC: 25 MG/DL (ref 5–25)
CALCIUM ALBUM COR SERPL-MCNC: 9.9 MG/DL (ref 8.3–10.1)
CALCIUM SERPL-MCNC: 8.9 MG/DL (ref 8.3–10.1)
CHLORIDE SERPL-SCNC: 98 MMOL/L (ref 100–108)
CO2 SERPL-SCNC: 29 MMOL/L (ref 21–32)
CREAT SERPL-MCNC: 1.68 MG/DL (ref 0.6–1.3)
GFR SERPL CREATININE-BSD FRML MDRD: 49 ML/MIN/1.73SQ M
GLUCOSE SERPL-MCNC: 342 MG/DL (ref 65–140)
LIPASE SERPL-CCNC: 68 U/L (ref 73–393)
P AXIS: 65 DEGREES
POTASSIUM SERPL-SCNC: 4.8 MMOL/L (ref 3.5–5.3)
PR INTERVAL: 178 MS
PROT SERPL-MCNC: 6.6 G/DL (ref 6.4–8.2)
QRS AXIS: -28 DEGREES
QRSD INTERVAL: 98 MS
QT INTERVAL: 378 MS
QTC INTERVAL: 444 MS
SODIUM SERPL-SCNC: 131 MMOL/L (ref 136–145)
T WAVE AXIS: 79 DEGREES
TROPONIN I SERPL-MCNC: <0.02 NG/ML
VENTRICULAR RATE: 83 BPM

## 2021-01-06 PROCEDURE — 71045 X-RAY EXAM CHEST 1 VIEW: CPT

## 2021-01-06 PROCEDURE — 84484 ASSAY OF TROPONIN QUANT: CPT | Performed by: EMERGENCY MEDICINE

## 2021-01-06 PROCEDURE — 96374 THER/PROPH/DIAG INJ IV PUSH: CPT

## 2021-01-06 PROCEDURE — 93010 ELECTROCARDIOGRAM REPORT: CPT | Performed by: INTERNAL MEDICINE

## 2021-01-06 PROCEDURE — 36415 COLL VENOUS BLD VENIPUNCTURE: CPT | Performed by: EMERGENCY MEDICINE

## 2021-01-06 PROCEDURE — 99285 EMERGENCY DEPT VISIT HI MDM: CPT | Performed by: EMERGENCY MEDICINE

## 2021-01-06 PROCEDURE — 99284 EMERGENCY DEPT VISIT MOD MDM: CPT

## 2021-01-06 PROCEDURE — 83690 ASSAY OF LIPASE: CPT | Performed by: EMERGENCY MEDICINE

## 2021-01-06 PROCEDURE — 96361 HYDRATE IV INFUSION ADD-ON: CPT

## 2021-01-06 PROCEDURE — 93005 ELECTROCARDIOGRAM TRACING: CPT

## 2021-01-06 PROCEDURE — 80053 COMPREHEN METABOLIC PANEL: CPT | Performed by: EMERGENCY MEDICINE

## 2021-01-06 RX ORDER — MAGNESIUM HYDROXIDE/ALUMINUM HYDROXICE/SIMETHICONE 120; 1200; 1200 MG/30ML; MG/30ML; MG/30ML
30 SUSPENSION ORAL ONCE
Status: COMPLETED | OUTPATIENT
Start: 2021-01-06 | End: 2021-01-06

## 2021-01-06 RX ORDER — ACETAMINOPHEN 325 MG/1
975 TABLET ORAL ONCE
Status: COMPLETED | OUTPATIENT
Start: 2021-01-06 | End: 2021-01-06

## 2021-01-06 RX ORDER — LIDOCAINE HYDROCHLORIDE 20 MG/ML
15 SOLUTION OROPHARYNGEAL ONCE
Status: COMPLETED | OUTPATIENT
Start: 2021-01-06 | End: 2021-01-06

## 2021-01-06 RX ORDER — METOCLOPRAMIDE HYDROCHLORIDE 5 MG/ML
10 INJECTION INTRAMUSCULAR; INTRAVENOUS ONCE
Status: COMPLETED | OUTPATIENT
Start: 2021-01-06 | End: 2021-01-06

## 2021-01-06 RX ADMIN — METOCLOPRAMIDE 10 MG: 5 INJECTION, SOLUTION INTRAMUSCULAR; INTRAVENOUS at 21:50

## 2021-01-06 RX ADMIN — ALUMINUM HYDROXIDE, MAGNESIUM HYDROXIDE, AND SIMETHICONE 30 ML: 200; 200; 20 SUSPENSION ORAL at 21:50

## 2021-01-06 RX ADMIN — LIDOCAINE HYDROCHLORIDE 15 ML: 20 SOLUTION ORAL; TOPICAL at 21:50

## 2021-01-06 RX ADMIN — ACETAMINOPHEN 975 MG: 325 TABLET, FILM COATED ORAL at 21:50

## 2021-01-06 RX ADMIN — SODIUM CHLORIDE 1000 ML: 0.9 INJECTION, SOLUTION INTRAVENOUS at 21:51

## 2021-01-06 NOTE — ED PROVIDER NOTES
History  Chief Complaint   Patient presents with    Abdominal Pain     states that he has been having pain in his abdomen and epigastric area, can't eat or drink anything due to nausea and vomitting     Nausea       49-year-old male history of schizoaffective disorder and insulin-dependent diabetes presents accompanied by his very agitated towards staff father complaining of nausea and vomiting  The patient states that for the past 10 days he has not been feeling well and has had intermittent nausea and vomiting  He states that everything tastes better  Reports a burning sensation from his stomach, through his chest into his throat after vomiting that seems to last for hours  States seen a gastroenterologist in the past and was diagnosed with gastritis  His taking Mylanta for the past 4 days without relief  Has not tried anything for his nausea or CTs primary care doctor  No known COVID-19 contacts  Denies any other complaints at this time  Prior to Admission Medications   Prescriptions Last Dose Informant Patient Reported? Taking? ACCU-CHEK FASTCLIX LANCETS MISC  Self Yes No   Si (four) times a day Not checking 4 times a day   Patient stated maybe 2 times a day   ACCU-CHEK GUIDE test strip  Self Yes No   Si (four) times a day Test   ADMELOG SOLOSTAR 100 units/mL injection pen  Self Yes No   Sig: Inject 10 Units under the skin 3 (three) times a day with meals    Blood Glucose Monitoring Suppl (ACCU-CHEK GUIDE) w/Device KIT  Self Yes No   Sig: Checking 2 times a day   Galcanezumab-gnlm 120 MG/ML SOAJ   No No   Sig: Inject 120 mg under the skin every 30 (thirty) days   Insulin Pen Needle (PEN NEEDLES 3/16") 31G X 5 MM MISC  Self No No   Sig: by Does not apply route 4 (four) times a day   Patient not taking: Reported on 10/22/2020   OLANZapine (ZyPREXA) 2 5 mg tablet   No No   Sig: Take 1 tablet (2 5 mg total) by mouth daily at bedtime   albuterol (PROVENTIL HFA,VENTOLIN HFA) 90 mcg/act inhaler  Self No No   Sig: Inhale 2 puffs 4 (four) times a day   aspirin-acetaminophen-caffeine (EXCEDRIN MIGRAINE) 250-250-65 MG per tablet  Self Yes No   Sig: Take 2 tablets by mouth daily    atorvastatin (LIPITOR) 20 mg tablet   No No   Sig: Take 1 tablet (20 mg total) by mouth daily   cyclobenzaprine (FLEXERIL) 5 mg tablet   No No   Sig: Take 1 tablet (5 mg total) by mouth daily at bedtime   ergocalciferol (VITAMIN D2) 50,000 units   No No   Sig: Take 1 capsule (50,000 Units total) by mouth once a week   fluvoxaMINE (LUVOX) 100 mg tablet   No No   Sig: Take 1 tablet (100 mg total) by mouth 2 (two) times a day   insulin glargine (Basaglar KwikPen) 100 units/mL injection pen   No No   Sig: Inject 50 Units under the skin daily at bedtime   levothyroxine 100 mcg tablet  Self No No   Sig: Take 1 tablet (100 mcg total) by mouth daily   Patient not taking: Reported on 10/22/2020   losartan (COZAAR) 50 mg tablet 1/4/2021 at Unknown time  No Yes   Sig: Take 2 tablets (100 mg total) by mouth daily   metoprolol succinate (TOPROL-XL) 25 mg 24 hr tablet   No No   Sig: Take 1 tablet (25 mg total) by mouth daily   pantoprazole (PROTONIX) 40 mg tablet  Self No No   Sig: take 1 tablet by mouth twice a day   prochlorperazine (COMPAZINE) 10 mg tablet   No No   Sig: Take 1 tablet (10 mg total) by mouth every 6 (six) hours as needed (migraine)      Facility-Administered Medications: None       Past Medical History:   Diagnosis Date    Acute bronchitis due to other specified organisms 7/5/2019    Chronic headaches     Diabetes mellitus (HCC)     Disease of thyroid gland     Esophagitis     Gastritis     Gastroparesis     GERD (gastroesophageal reflux disease)     Hypertension     Migraine     Obesity     Obsessive compulsive disorder     Psychiatric disorder     Schizoaffective disorder [facility])        Past Surgical History:   Procedure Laterality Date    ESOPHAGOGASTRODUODENOSCOPY N/A 1/15/2019    Procedure: ESOPHAGOGASTRODUODENOSCOPY (EGD); Surgeon: Nereida Cogan, MD;  Location: [facility]; Service: Gastroenterology       Family History   Problem Relation Age of Onset    COPD Mother     Hypertension Mother     Heart failure Mother     Rheum arthritis Family     Heart disease Family     Hypertension Father     Diabetes Father     Hyperlipidemia Father      I have reviewed and agree with the history as documented  E-Cigarette/Vaping    E-Cigarette Use Never User      E-Cigarette/Vaping Substances    Nicotine No     THC No     CBD No     Flavoring No      Social History     Tobacco Use    Smoking status: Never Smoker    Smokeless tobacco: Never Used   Substance Use Topics    Alcohol use: No    Drug use: No       Review of Systems   Constitutional: Negative for chills, fatigue and fever  HENT: Negative for congestion and sore throat  Eyes: Negative for pain  Respiratory: Negative for cough, chest tightness, shortness of breath and wheezing  Cardiovascular: Negative for chest pain, palpitations and leg swelling  Gastrointestinal: Positive for nausea and vomiting  Negative for abdominal pain, constipation and diarrhea  Endocrine: Negative for polyuria  Genitourinary: Negative for dysuria  Musculoskeletal: Negative for arthralgias, back pain, myalgias and neck pain  Skin: Negative for rash  Neurological: Negative for dizziness, syncope, light-headedness and headaches  All other systems reviewed and are negative  Physical Exam  Physical Exam  Vitals signs reviewed  Constitutional:       Appearance: He is well-developed  HENT:      Head: Normocephalic and atraumatic  Neck:      Musculoskeletal: Normal range of motion  Cardiovascular:      Rate and Rhythm: Normal rate and regular rhythm  Heart sounds: Normal heart sounds  Pulmonary:      Effort: Pulmonary effort is normal       Breath sounds: Normal breath sounds     Abdominal:      General: Bowel sounds are normal  Palpations: Abdomen is soft  Tenderness: There is generalized abdominal tenderness  Musculoskeletal: Normal range of motion  Skin:     General: Skin is warm and dry  Capillary Refill: Capillary refill takes less than 2 seconds  Neurological:      Mental Status: He is alert and oriented to person, place, and time           Vital Signs  ED Triage Vitals [01/05/21 1709]   Temperature Pulse Respirations Blood Pressure SpO2   97 8 °F (36 6 °C) 80 16 (!) 200/98 100 %      Temp Source Heart Rate Source Patient Position - Orthostatic VS BP Location FiO2 (%)   Temporal -- Sitting Left arm --      Pain Score       7           Vitals:    01/05/21 1709 01/05/21 2016   BP: (!) 200/98 (!) 179/98   Pulse: 80 88   Patient Position - Orthostatic VS: Sitting          Visual Acuity      ED Medications  Medications   aluminum-magnesium hydroxide-simethicone (MYLANTA) oral suspension 30 mL (30 mL Oral Given 1/5/21 1749)   sucralfate (CARAFATE) tablet 1 g (1 g Oral Given 1/5/21 1749)   ondansetron (ZOFRAN) injection 4 mg (4 mg Intravenous Given 1/5/21 1749)   sodium chloride 0 9 % bolus 1,000 mL (1,000 mL Intravenous New Bag 1/5/21 1752)   iohexol (OMNIPAQUE) 350 MG/ML injection (SINGLE-DOSE) 100 mL (100 mL Intravenous Given 1/5/21 1838)   Lidocaine Viscous HCl (XYLOCAINE) 2 % mucosal solution 15 mL (15 mL Swish & Swallow Given 1/5/21 1919)   metoprolol tartrate (LOPRESSOR) tablet 25 mg (25 mg Oral Given 1/5/21 2016)       Diagnostic Studies  Results Reviewed     Procedure Component Value Units Date/Time    Troponin I [[id number]]  (Normal) Collected: 01/05/21 1732    Lab Status: Final result Specimen: Blood Updated: 01/05/21 2005     Troponin I <0 03 ng/mL     COVID19, Influenza A/B, RSV PCR, [facility] [[id number]]  (Abnormal) Collected: 01/05/21 1748    Lab Status: Final result Specimen: Nasopharyngeal Swab Updated: 01/05/21 1836     SARS-CoV-2 Positive     INFLUENZA A PCR Negative     INFLUENZA B PCR Negative     RSV PCR Negative    Narrative: This test has been authorized by FDA under an EUA (Emergency Use Assay) for use by authorized laboratories  Clinical caution and judgement should be used with the interpretation of these results with consideration of the clinical impression and other laboratory testing  Testing reported as "Positive" or "Negative" has been proven to be accurate according to standard laboratory validation requirements  All testing is performed with control materials showing appropriate reactivity at standard intervals      Lipase [[id number]]  (Abnormal) Collected: 01/05/21 1732    Lab Status: Final result Specimen: Blood from Arm, Left Updated: 01/05/21 1752     Lipase <10 u/L     Comprehensive metabolic panel [[id number]]  (Abnormal) Collected: 01/05/21 1732    Lab Status: Final result Specimen: Blood from Arm, Left Updated: 01/05/21 1752     Sodium 134 mmol/L      Potassium 4 6 mmol/L      Chloride 95 mmol/L      CO2 29 mmol/L      ANION GAP 10 mmol/L      BUN 24 mg/dL      Creatinine 1 53 mg/dL      Glucose 244 mg/dL      Calcium 8 6 mg/dL      Corrected Calcium 9 1 mg/dL      AST 15 U/L      ALT 10 U/L      Alkaline Phosphatase 67 U/L      Total Protein 6 4 g/dL      Albumin 3 4 g/dL      Total Bilirubin 0 40 mg/dL      eGFR 55 ml/min/1 73sq m     Narrative:      [place] guidelines for Chronic Kidney Disease (CKD):     Stage 1 with normal or high GFR (GFR > 90 mL/min/1 73 square meters)    Stage 2 Mild CKD (GFR = 60-89 mL/min/1 73 square meters)    Stage 3A Moderate CKD (GFR = 45-59 mL/min/1 73 square meters)    Stage 3B Moderate CKD (GFR = 30-44 mL/min/1 73 square meters)    Stage 4 Severe CKD (GFR = 15-29 mL/min/1 73 square meters)    Stage 5 End Stage CKD (GFR <15 mL/min/1 73 square meters)  Note: GFR calculation is accurate only with a steady state creatinine    CBC and differential [[id number]]  (Abnormal) Collected: 01/05/21 1732    Lab Status: Final result Specimen: Blood from Arm, Left Updated: 01/05/21 1737     WBC 6 60 Thousand/uL      RBC 4 42 Million/uL      Hemoglobin 13 0 g/dL      Hematocrit 37 7 %      MCV 85 fL      MCH 29 3 pg      MCHC 34 3 g/dL      RDW 13 4 %      MPV 7 4 fL      Platelets 934 Thousands/uL      Neutrophils Relative 72 %      Lymphocytes Relative 18 %      Monocytes Relative 10 %      Eosinophils Relative 0 %      Basophils Relative 1 %      Neutrophils Absolute 4 70 Thousands/µL      Lymphocytes Absolute 1 20 Thousands/µL      Monocytes Absolute 0 60 Thousand/µL      Eosinophils Absolute 0 00 Thousand/µL      Basophils Absolute 0 00 Thousands/µL                  CT abdomen pelvis with contrast   Final Result by Michelle Ferrer MD (01/05 1905)      Patchy groundglass densities at the lung base in keeping with Covid pneumonia in this confirmed Covid positive patient  Workstation performed: [id number]                    Procedures  ECG 12 Lead Documentation Only    Date/Time: 1/5/2021 8:50 PM  Performed by: Airam Hidalgo PA-C  Authorized by: Airam Hidalgo PA-C     ECG reviewed by me, the ED Provider: yes    Patient location:  ED  Previous ECG:     Previous ECG:  Compared to current    Similarity:  No change    Comparison to cardiac monitor: Yes    Interpretation:     Interpretation: normal    Rate:     ECG rate:  87    ECG rate assessment: normal    Rhythm:     Rhythm: sinus rhythm    Ectopy:     Ectopy: none    QRS:     QRS axis:  Normal  Conduction:     Conduction: normal    ST segments:     ST segments:  Normal  T waves:     T waves: normal    Comments:      No evidence of acute cardiac ischemia             ED Course                                           MDM  Number of Diagnoses or Management Options  COVID-19:   Nausea and vomiting:   Diagnosis management comments: Nausea relieved with Zofran  Esophagitis symptoms relieved with lidocaine  Will start Carafate, Zofran as needed and lidocaine as needed  Ambulatory referral to Gastroenterology provided  COVID discharge instructions provided in regards to vitamin regimen  Monoclonal antibody therapy not indicated as symptoms have been going on for 10 days  Recommended everyone else in the house quarantine  Patient's father accompanied the patient and was very agitated towards staff in regards to being asked to wear a mask and being told that those that have been around the patient are likely positive for COVID as well  Return precautions advised  Patient expresses understanding, was very pleasant and thankful for care  Will disposition patient per current [facility] Guidelines:  Updated 12/29/20    Low risk patient SpO2 ? 90% (rest/ambulation)  For High Risk patient and SpO2 >/= 92% (rest/ambulation)   Dispo: DC and call PCP within 24 hours    DC Meds:  Vit D3 2000 IU PO Daily  Vit C 1g PO Q12  Multivitamin Daily    Home Pulse Ox:  Recommend returning if O2 drops below above levels     Low risk patient SpO2 ? 90% (at rest) but < 90% (w/ ambulation)  High Risk patient* SpO2 ? 92% (at rest) but <92% (w/ ambulation)   Dispo: Consider admission - if severe sx or to f/u closely with pcp   Consider DC - if mild sx    DC Meds:  Vit D3 2000 IU PO Daily  Vit C 1g PO Q12  Multivitamin Daily    Home Pulse Ox:  Recommend returning if O2 drops below above levels     Low risk patient SpO2 < 90%  High Risk patient* SpO2 < 92%   Dispo:  Admit       **always call PCP in 24 hours if D/C  **High Risk Definition: age >71, BMI>35, immunocompromised, CKD or chronic heart/lung disease    Disposition  Final diagnoses:   Nausea and vomiting   COVID-19     Time reflects when diagnosis was documented in both MDM as applicable and the Disposition within this note     Time User Action Codes Description Comment    1/5/2021  8:37 PM Glendell Bernheim Add [R11 2] Nausea and vomiting     1/5/2021  8:37 PM 1648 Christina Jaffe, [address] [place] [address] [U07 1] COVID-19       ED Disposition     ED Disposition Condition Date/Time Comment    Discharge Stable Tucy Jan 5, 2021  8:37 PM Jr Reilly discharge to home/self care              Follow-up Information     Follow up With Specialties Details Why Contact Info Additional Information    [facility] Schedule an appointment as soon as possible for a visit   [facility] [address] [place] [id number] [facility] [place], [address] [place] [address], [phone], [phone]    Sherrie Laws MD Family Medicine  As needed [address] [place] [address]  [phone]             Patient's Medications   Discharge Prescriptions    LIDOCAINE VISCOUS HCL (XYLOCAINE) 2 % MUCOSAL SOLUTION    Swish and swallow 15 mL 3 (three) times a day as needed for mouth pain or discomfort       Start Date: 1/5/2021  End Date: --       Order Dose: 15 mL       Quantity: 100 mL    Refills: 0    ONDANSETRON (ZOFRAN) 4 MG TABLET    Take 1 tablet (4 mg total) by mouth every 8 (eight) hours as needed for nausea or vomiting       Start Date: 1/5/2021  End Date: --       Order Dose: 4 mg       Quantity: 12 tablet    Refills: 0    SUCRALFATE (CARAFATE) 1 G TABLET    Take 1 tablet (1 g total) by mouth 4 (four) times a day for 5 days       Start Date: 1/5/2021  End Date: 1/10/2021       Order Dose: 1 g       Quantity: 20 tablet    Refills: 0         PDMP Review     None          ED Provider  Electronically Signed by           Mo Batista PA-C  01/05/21 2102

## 2021-01-06 NOTE — DISCHARGE INSTRUCTIONS
Take Vitamin D 2000 IU, Vitamin C 1000mg twice daily and a multivitamin with zinc in it as it has been shown to help COVID symptoms  Start taking Zofran as needed for nausea  Take lidocaine as needed for pain in your esophagus  Use Carafate 4 times daily for the next 5 days as it may help settle your stomach acid as well  Follow-up with gastroenterology  Return to the ER if your condition significantly changes or worsens  Call your family doctor to either schedule a visit the via phone or in approximately 2 weeks

## 2021-01-07 VITALS
BODY MASS INDEX: 51.21 KG/M2 | SYSTOLIC BLOOD PRESSURE: 194 MMHG | OXYGEN SATURATION: 94 % | RESPIRATION RATE: 20 BRPM | WEIGHT: 280 LBS | HEART RATE: 82 BPM | DIASTOLIC BLOOD PRESSURE: 90 MMHG | TEMPERATURE: 98.2 F

## 2021-01-07 LAB
ATRIAL RATE: 81 BPM
P AXIS: 52 DEGREES
PR INTERVAL: 170 MS
QRS AXIS: -31 DEGREES
QRSD INTERVAL: 94 MS
QT INTERVAL: 358 MS
QTC INTERVAL: 415 MS
T WAVE AXIS: 65 DEGREES
VENTRICULAR RATE: 81 BPM

## 2021-01-07 PROCEDURE — 93010 ELECTROCARDIOGRAM REPORT: CPT | Performed by: INTERNAL MEDICINE

## 2021-01-07 RX ORDER — METOCLOPRAMIDE 10 MG/1
10 TABLET ORAL EVERY 6 HOURS
Qty: 30 TABLET | Refills: 0 | Status: SHIPPED | OUTPATIENT
Start: 2021-01-07 | End: 2021-01-12 | Stop reason: SDUPTHER

## 2021-01-07 RX ORDER — LIDOCAINE HYDROCHLORIDE 20 MG/ML
15 SOLUTION OROPHARYNGEAL 4 TIMES DAILY PRN
Qty: 200 ML | Refills: 0 | Status: SHIPPED | OUTPATIENT
Start: 2021-01-07 | End: 2021-01-10

## 2021-01-07 NOTE — ED ATTENDING ATTESTATION
1/6/2021  IWanda MD, saw and evaluated the patient  I have discussed the patient with the resident/non-physician practitioner and agree with the resident's/non-physician practitioner's findings, Plan of Care, and MDM as documented in the resident's/non-physician practitioner's note, except where noted  All available labs and Radiology studies were reviewed  I was present for key portions of any procedure(s) performed by the resident/non-physician practitioner and I was immediately available to provide assistance  At this point I agree with the current assessment done in the Emergency Department  I have conducted an independent evaluation of this patient a history and physical is as follows:    ED Course     29-year-old male, presenting to the emergency department for evaluation midsternal chest discomfort  Patient states that he has been ill for the past 10 days with intermittent nausea and vomiting  The patient has been having intermittent dry nonproductive cough  Patient reports a sticking type pain located in the midsternal area  He is concerned because he has been unable to tolerate anything to eat or drink  Patient was seen at [facility] last night and tested positive for COVID  Patient was prescribed Carafate, Zofran, takes Protonix, and has had no relief  No blood in the vomitus or blood in stool  Ten systems reviewed and negative except as noted in the history of present illness  Patient appears relatively comfortable sitting upright in the stretcher  Head is normocephalic and atraumatic  Neck is supple with no meningismus  Lungs with expiratory wheezing bilaterally  Heart is regular rate rhythm with no murmurs rubs or gallops  Abdomen is nondistended, soft, with mild tenderness to palpation in the epigastrium and left upper quadrant without any rebound or guarding  Extremities are unremarkable  COVID positive patient    Chest discomfort could be due to esophageal irritation or primary affective COVID  Plan is repeat lab work, symptomatic control, IV fluids  Labs Reviewed   COMPREHENSIVE METABOLIC PANEL - Abnormal       Result Value Ref Range Status    Sodium 131 (*) 136 - 145 mmol/L Final    Potassium 4 8  3 5 - 5 3 mmol/L Final    Comment: Slightly Hemolyzed; Results May be Affected    Chloride 98 (*) 100 - 108 mmol/L Final    CO2 29  21 - 32 mmol/L Final    ANION GAP 4  4 - 13 mmol/L Final    BUN 25  5 - 25 mg/dL Final    Creatinine 1 68 (*) 0 60 - 1 30 mg/dL Final    Comment: Standardized to IDMS reference method    Glucose 342 (*) 65 - 140 mg/dL Final    Comment: If the patient is fasting, the ADA then defines impaired fasting glucose as > 100 mg/dL and diabetes as > or equal to 123 mg/dL  Specimen collection should occur prior to Sulfasalazine administration due to the potential for falsely depressed results  Specimen collection should occur prior to Sulfapyridine administration due to the potential for falsely elevated results  Calcium 8 9  8 3 - 10 1 mg/dL Final    Corrected Calcium 9 9  8 3 - 10 1 mg/dL Final    AST 19  5 - 45 U/L Final    Comment: Slightly Hemolyzed; Results May be Affected  Specimen collection should occur prior to Sulfasalazine administration due to the potential for falsely depressed results  ALT 14  12 - 78 U/L Final    Comment: Specimen collection should occur prior to Sulfasalazine and/or Sulfapyridine administration due to the potential for falsely depressed results  Alkaline Phosphatase 86  46 - 116 U/L Final    Total Protein 6 6  6 4 - 8 2 g/dL Final    Albumin 2 7 (*) 3 5 - 5 0 g/dL Final    Total Bilirubin 0 55  0 20 - 1 00 mg/dL Final    Comment: Use of this assay is not recommended for patients undergoing treatment with eltrombopag due to the potential for falsely elevated results      eGFR 49  ml/min/1 73sq m Final    Narrative:     [place] guidelines for Chronic Kidney Disease (CKD):   Stage 1 with normal or high GFR (GFR > 90 mL/min/1 73 square meters)    Stage 2 Mild CKD (GFR = 60-89 mL/min/1 73 square meters)    Stage 3A Moderate CKD (GFR = 45-59 mL/min/1 73 square meters)    Stage 3B Moderate CKD (GFR = 30-44 mL/min/1 73 square meters)    Stage 4 Severe CKD (GFR = 15-29 mL/min/1 73 square meters)    Stage 5 End Stage CKD (GFR <15 mL/min/1 73 square meters)  Note: GFR calculation is accurate only with a steady state creatinine   LIPASE - Abnormal    Lipase 68 (*) 73 - 393 u/L Final   TROPONIN I - Normal    Troponin I <0 02  <=0 04 ng/mL Final    Comment: Siemens Chemistry analyzer 99% cutoff is > 0 04 ng/mL in network labs     o cTnI 99% cutoff is useful only when applied to patients in the clinical setting of myocardial ischemia   o cTnI 99% cutoff should be interpreted in the context of clinical history, ECG findings and possibly cardiac imaging to establish correct diagnosis  o cTnI 99% cutoff may be suggestive but clearly not indicative of a coronary event without the clinical setting of myocardial ischemia  XR chest 1 view portable   Final Result      Bilateral multi lobar pneumonia  In the setting of clinically suspected/proven COVID-19, this plain film appearance while nonspecific, can be seen in cases of viral pneumonia such as COVID-19                             Workstation performed: [id number]                 Critical Care Time  Procedures

## 2021-01-07 NOTE — ED PROVIDER NOTES
History  Chief Complaint   Patient presents with    Epigastric Pain     Pt c/o epigastric pain and n/v  Pt was seen last night at [facility] ED and tetsed postive for covid  Pt states he took his prx med w/o relief     42 y/o male with PMHx of HTN, hypothyroidism, insulin dependent DM, and schizoaffective disorder presents to the ED via EMS c/o epigastric pain and intermittent N/V x 1 week  He also reports a "bitter taste" in his mouth over the same timeframe  He was seen at [address] [place] [address] last night for these symptoms, for which he had a normal CBC, lipase, and troponin, CMP with mild creatinine elevation, positive COVID-19 test, and CT abdomen/pelvis showing "patchy groundglass densities at the lung base in keeping with Covid pneumonia in this confirmed Covid positive patient" and no acute intraabdominal pathology  He had previously been admitted in 2020 (2 months ago) for acute hypoxic respiratory failure secondary to pneumonia; he reports a persistent cough and SOB since his admission 2 months ago for pneumonia (he tested negative for COVID-19 at that time but was felt to be consistent with COVID-19 and was treated accordingly)  Prior to Admission Medications   Prescriptions Last Dose Informant Patient Reported? Taking? ACCU-CHEK FASTCLIX LANCETS MISC  Self Yes No   Si (four) times a day Not checking 4 times a day   Patient stated maybe 2 times a day   ACCU-CHEK GUIDE test strip  Self Yes No   Si (four) times a day Test   ADMELOG SOLOSTAR 100 units/mL injection pen  Self Yes No   Sig: Inject 10 Units under the skin 3 (three) times a day with meals    Blood Glucose Monitoring Suppl (ACCU-CHEK GUIDE) w/Device KIT  Self Yes No   Sig: Checking 2 times a day   Galcanezumab-gnlm 120 MG/ML SOAJ   No No   Sig: Inject 120 mg under the skin every 30 (thirty) days   Insulin Pen Needle (PEN NEEDLES 3/16") 31G X 5 MM MISC  Self No No   Sig: by Does not apply route 4 (four) times a day   Patient not taking: Reported on 10/22/2020   Lidocaine Viscous HCl (XYLOCAINE) 2 % mucosal solution   No No   Sig: Swish and swallow 15 mL 3 (three) times a day as needed for mouth pain or discomfort   OLANZapine (ZyPREXA) 2 5 mg tablet   No No   Sig: Take 1 tablet (2 5 mg total) by mouth daily at bedtime   albuterol (PROVENTIL HFA,VENTOLIN HFA) 90 mcg/act inhaler  Self No No   Sig: Inhale 2 puffs 4 (four) times a day   aspirin-acetaminophen-caffeine (EXCEDRIN MIGRAINE) 250-250-65 MG per tablet  Self Yes No   Sig: Take 2 tablets by mouth daily    atorvastatin (LIPITOR) 20 mg tablet   No No   Sig: Take 1 tablet (20 mg total) by mouth daily   cyclobenzaprine (FLEXERIL) 5 mg tablet   No No   Sig: Take 1 tablet (5 mg total) by mouth daily at bedtime   ergocalciferol (VITAMIN D2) 50,000 units   No No   Sig: Take 1 capsule (50,000 Units total) by mouth once a week   fluvoxaMINE (LUVOX) 100 mg tablet   No No   Sig: Take 1 tablet (100 mg total) by mouth 2 (two) times a day   insulin glargine (Basaglar KwikPen) 100 units/mL injection pen   No No   Sig: Inject 50 Units under the skin daily at bedtime   levothyroxine 100 mcg tablet  Self No No   Sig: Take 1 tablet (100 mcg total) by mouth daily   Patient not taking: Reported on 10/22/2020   losartan (COZAAR) 50 mg tablet   No No   Sig: Take 2 tablets (100 mg total) by mouth daily   metoprolol succinate (TOPROL-XL) 25 mg 24 hr tablet   No No   Sig: Take 1 tablet (25 mg total) by mouth daily   ondansetron (ZOFRAN) 4 mg tablet   No No   Sig: Take 1 tablet (4 mg total) by mouth every 8 (eight) hours as needed for nausea or vomiting   pantoprazole (PROTONIX) 40 mg tablet  Self No No   Sig: take 1 tablet by mouth twice a day   prochlorperazine (COMPAZINE) 10 mg tablet   No No   Sig: Take 1 tablet (10 mg total) by mouth every 6 (six) hours as needed (migraine)   sucralfate (CARAFATE) 1 g tablet   No No   Sig: Take 1 tablet (1 g total) by mouth 4 (four) times a day for 5 days Facility-Administered Medications: None       Past Medical History:   Diagnosis Date    Acute bronchitis due to other specified organisms 7/5/2019    Chronic headaches     Diabetes mellitus ([address] )     Disease of thyroid gland     Esophagitis     Gastritis     Gastroparesis     GERD (gastroesophageal reflux disease)     Hypertension     Migraine     Obesity     Obsessive compulsive disorder     Psychiatric disorder     Schizoaffective disorder [facility])        Past Surgical History:   Procedure Laterality Date    ESOPHAGOGASTRODUODENOSCOPY N/A 1/15/2019    Procedure: ESOPHAGOGASTRODUODENOSCOPY (EGD); Surgeon: Carol Guy MD;  Location: [facility]; Service: Gastroenterology       Family History   Problem Relation Age of Onset    COPD Mother     Hypertension Mother     Heart failure Mother     Rheum arthritis Family     Heart disease Family     Hypertension Father     Diabetes Father     Hyperlipidemia Father      I have reviewed and agree with the history as documented  E-Cigarette/Vaping    E-Cigarette Use Never User      E-Cigarette/Vaping Substances    Nicotine No     THC No     CBD No     Flavoring No      Social History     Tobacco Use    Smoking status: Never Smoker    Smokeless tobacco: Never Used   Substance Use Topics    Alcohol use: No    Drug use: No        Review of Systems   Constitutional: Negative for chills and fever  HENT: Negative for congestion and sore throat  "Bitter taste in mouth"   Respiratory:        Persistent cough and SOB since admission in November 2020 (2 months ago) for pneumonia  Cardiovascular: Negative for chest pain and palpitations  Gastrointestinal: Positive for abdominal pain, nausea and vomiting  Negative for diarrhea  Genitourinary: Negative for dysuria and hematuria  Musculoskeletal: Negative for back pain and neck pain  Neurological: Negative for dizziness, light-headedness and headaches     All other systems reviewed and are negative  Physical Exam  ED Triage Vitals [01/06/21 2038]   Temperature Pulse Respirations Blood Pressure SpO2   98 2 °F (36 8 °C) 84 22 (!) 194/90 92 %      Temp Source Heart Rate Source Patient Position - Orthostatic VS BP Location FiO2 (%)   Oral Monitor Lying Right arm --      Pain Score       Worst Possible Pain             Orthostatic Vital Signs  Vitals:    01/06/21 2038 01/07/21 0015   BP: (!) 194/90    Pulse: 84 82   Patient Position - Orthostatic VS: Lying Lying       Physical Exam  Vitals signs and nursing note reviewed  Constitutional:       Appearance: Normal appearance  He is obese  Comments: Obese male lying in bed, awake, appearing in mild distress secondary to epigastric pain and nausea  HENT:      Head: Normocephalic and atraumatic  Right Ear: External ear normal       Left Ear: External ear normal       Nose: Nose normal       Mouth/Throat:      Mouth: Mucous membranes are dry  Pharynx: Oropharynx is clear  Eyes:      Extraocular Movements: Extraocular movements intact  Conjunctiva/sclera: Conjunctivae normal       Pupils: Pupils are equal, round, and reactive to light  Neck:      Musculoskeletal: Normal range of motion and neck supple  Cardiovascular:      Rate and Rhythm: Normal rate and regular rhythm  Pulses: Normal pulses  Heart sounds: Normal heart sounds  Pulmonary:      Effort: Pulmonary effort is normal       Breath sounds: Normal breath sounds  No wheezing or rales  Chest:      Chest wall: No tenderness  Abdominal:      Comments: Obese abdomen, soft, mild tenderness to palpation in the epigastrium and LUQ without guarding or rebound  No lower quadrant or suprapubic tenderness  Active bowel sounds  Musculoskeletal: Normal range of motion  General: No swelling or tenderness  Skin:     General: Skin is warm and dry  Neurological:      General: No focal deficit present        Mental Status: He is alert and oriented to person, place, and time  Sensory: No sensory deficit  Motor: No weakness           ED Medications  Medications   metoclopramide (REGLAN) injection 10 mg (10 mg Intravenous Given 1/6/21 2150)   aluminum-magnesium hydroxide-simethicone (MYLANTA) oral suspension 30 mL (30 mL Oral Given 1/6/21 2150)   Lidocaine Viscous HCl (XYLOCAINE) 2 % mucosal solution 15 mL (15 mL Swish & Swallow Given 1/6/21 2150)   sodium chloride 0 9 % bolus 1,000 mL (0 mL Intravenous Stopped 1/7/21 0029)   acetaminophen (TYLENOL) tablet 975 mg (975 mg Oral Given 1/6/21 2150)       Diagnostic Studies  Results Reviewed     Procedure Component Value Units Date/Time    Troponin I [[id number]]  (Normal) Collected: 01/06/21 2147    Lab Status: Final result Specimen: Blood from Arm, Right Updated: 01/06/21 2238     Troponin I <0 02 ng/mL     Comprehensive metabolic panel [[id number]]  (Abnormal) Collected: 01/06/21 2147    Lab Status: Final result Specimen: Blood from Arm, Right Updated: 01/06/21 2236     Sodium 131 mmol/L      Potassium 4 8 mmol/L      Chloride 98 mmol/L      CO2 29 mmol/L      ANION GAP 4 mmol/L      BUN 25 mg/dL      Creatinine 1 68 mg/dL      Glucose 342 mg/dL      Calcium 8 9 mg/dL      Corrected Calcium 9 9 mg/dL      AST 19 U/L      ALT 14 U/L      Alkaline Phosphatase 86 U/L      Total Protein 6 6 g/dL      Albumin 2 7 g/dL      Total Bilirubin 0 55 mg/dL      eGFR 49 ml/min/1 73sq m     Narrative:      [place] guidelines for Chronic Kidney Disease (CKD):     Stage 1 with normal or high GFR (GFR > 90 mL/min/1 73 square meters)    Stage 2 Mild CKD (GFR = 60-89 mL/min/1 73 square meters)    Stage 3A Moderate CKD (GFR = 45-59 mL/min/1 73 square meters)    Stage 3B Moderate CKD (GFR = 30-44 mL/min/1 73 square meters)    Stage 4 Severe CKD (GFR = 15-29 mL/min/1 73 square meters)    Stage 5 End Stage CKD (GFR <15 mL/min/1 73 square meters)  Note: GFR calculation is accurate only with a steady state creatinine    Lipase [[id number]]  (Abnormal) Collected: 01/06/21 2147    Lab Status: Final result Specimen: Blood from Arm, Right Updated: 01/06/21 2236     Lipase 68 u/L                  XR chest 1 view portable   Final Result by Vikki So MD (01/07 3748)      Bilateral multi lobar pneumonia  In the setting of clinically suspected/proven COVID-19, this plain film appearance while nonspecific, can be seen in cases of viral pneumonia such as COVID-19  Workstation performed: [id number]               Procedures  Procedures      ED Course  ED Course as of Jan 08 1547   Wed Jan 06, 2021 2251 Procedure Note: EKG  Date/Time: 01/06/21 9:48 PM   Interpreted by: Fidelina Cano MD  Indications / Diagnosis: epigastric pain  ECG reviewed by me, the ED Physician: yes   The EKG demonstrates:  Rhythm: normal sinus 81 bpm  Intervals: normal intervals  Axis: normal axis  QRS/Blocks: normal QRS  ST Changes: No acute ST Changes, no STD/KASANDRA  No significant change compared to EKG from 1/5/2021      2258 Pt currently receiving IV hydration while awaiting completion of ED treatment and workup  Creatinine(!): 1 68   Thu Jan 07, 2021   0018 PO challenge with ice water  SBIRT 22yo+      Most Recent Value   SBIRT (24 yo +)   In order to provide better care to our patients, we are screening all of our patients for alcohol and drug use  Would it be okay to ask you these screening questions? Unable to answer at this time Filed at: 01/06/2021 2057                MDM  Number of Diagnoses or Management Options  Epigastric abdominal pain:   Nausea and vomiting:   Diagnosis management comments: 42 y/o male with PMHx of HTN, hypothyroidism, insulin dependent DM, and schizoaffective disorder presents to the ED via EMS c/o epigastric pain and intermittent N/V x 1 week  He also reports a "bitter taste" in his mouth over the same timeframe   He was seen at [address] [place] [address] last night for these symptoms, for which he had a normal CBC, lipase, and troponin, CMP with mild creatinine elevation, positive COVID-19 test, and CT abdomen/pelvis showing "patchy groundglass densities at the lung base in keeping with Covid pneumonia in this confirmed Covid positive patient" and no acute intraabdominal pathology  He had previously been admitted in November 2020 (2 months ago) for acute hypoxic respiratory failure secondary to pneumonia; he reports a persistent cough and SOB since his admission 2 months ago for pneumonia (he tested negative for COVID-19 at that time but was felt to be consistent with COVID-19 and was treated accordingly)  On exam he is afebrile, hypertensive but otherwise VSS, with mild epigastric and LUQ tenderness without guarding or rebound  Remainder of exam is non-contributory  As he was evaluated last night, only re-checked EKG and re-pepe labs for CMP, lipase, and troponin  Creatinine mildly elevated to 1 6 from baseline 1 2 and 1 5 day prior, treated with IVF bolus  EKG with no acute changes  CXR shows improvement of previously diagnosed COVID-19 pneumonia  Given Tylenol, Reglan, Mylanta, and viscous lidocaine for symptoms, with moderate improvement noted  No indications for repeat imaging at this time  Symptoms likely secondary to gastritis or GERD  I discussed the findings and need for outpatient GI follow-up with the patient and he understands and agrees        Disposition  Final diagnoses:   Epigastric abdominal pain   Nausea and vomiting     Time reflects when diagnosis was documented in both MDM as applicable and the Disposition within this note     Time User Action Codes Description Comment    1/7/2021 12:23 AM Judeth Manuelito Add [R10 13] Epigastric abdominal pain     1/7/2021 12:24 AM Judeth Manuelito Add [R11 2] Nausea and vomiting       ED Disposition     ED Disposition Condition Date/Time Comment    Discharge Stable u Jan 7, 2021 12:18 AM Anh Bourne discharge to home/self care  Follow-up Information     Follow up With Specialties Details Why Contact Info Additional Information    Sohan Williamson MD Family Medicine Call  As needed [address] [facility] Gastroenterology Call in 1 day For follow-up [address] [facility] [address] [facility] [id number]  [facility], [address] [place] [address],  [address], [place], [place], [address]          Discharge Medication List as of 1/7/2021 12:25 AM      START taking these medications    Details   !! Lidocaine Viscous HCl (XYLOCAINE) 2 % mucosal solution Swish and spit 15 mL 4 (four) times a day as needed for mouth pain or discomfort for up to 3 days, Starting Thu 1/7/2021, Until Sun 1/10/2021, Normal      metoclopramide (REGLAN) 10 mg tablet Take 1 tablet (10 mg total) by mouth every 6 (six) hours, Starting Thu 1/7/2021, Normal       !! - Potential duplicate medications found  Please discuss with provider  CONTINUE these medications which have NOT CHANGED    Details   ACCU-CHEK FASTCLIX LANCETS MISC 4 (four) times a day Not checking 4 times a day   Patient stated maybe 2 times a day, Starting Wed 5/8/2019, Historical Med      ACCU-CHEK GUIDE test strip 4 (four) times a day Test, Starting Wed 5/8/2019, Historical Med      ADMELOG SOLOSTAR 100 units/mL injection pen Inject 10 Units under the skin 3 (three) times a day with meals , Starting Sat 1/12/2019, Historical Med      albuterol (PROVENTIL HFA,VENTOLIN HFA) 90 mcg/act inhaler Inhale 2 puffs 4 (four) times a day, Starting Sat 1/11/2020, Normal      aspirin-acetaminophen-caffeine (EXCEDRIN MIGRAINE) 250-250-65 MG per tablet Take 2 tablets by mouth daily , Historical Med      atorvastatin (LIPITOR) 20 mg tablet Take 1 tablet (20 mg total) by mouth daily, Starting Fri 10/23/2020, Normal      Blood Glucose Monitoring Suppl (ACCU-CHEK GUIDE) w/Device KIT Checking 2 times a day, Starting Thu 5/9/2019, Historical Med      cyclobenzaprine (FLEXERIL) 5 mg tablet Take 1 tablet (5 mg total) by mouth daily at bedtime, Starting Fri 11/13/2020, Normal      ergocalciferol (VITAMIN D2) 50,000 units Take 1 capsule (50,000 Units total) by mouth once a week, Starting Fri 10/23/2020, Normal      fluvoxaMINE (LUVOX) 100 mg tablet Take 1 tablet (100 mg total) by mouth 2 (two) times a day, Starting Fri 10/23/2020, Normal      Galcanezumab-gnlm 120 MG/ML SOAJ Inject 120 mg under the skin every 30 (thirty) days, Starting Wed 11/4/2020, Normal      insulin glargine (Basaglar KwikPen) 100 units/mL injection pen Inject 50 Units under the skin daily at bedtime, Starting Sat 11/28/2020, No Print      Insulin Pen Needle (PEN NEEDLES 3/16") 31G X 5 MM MISC by Does not apply route 4 (four) times a day, Starting Tue 10/2/2018, Normal      levothyroxine 100 mcg tablet Take 1 tablet (100 mcg total) by mouth daily, Starting Fri 6/7/2019, Normal      !!  Lidocaine Viscous HCl (XYLOCAINE) 2 % mucosal solution Swish and swallow 15 mL 3 (three) times a day as needed for mouth pain or discomfort, Starting Tue 1/5/2021, Normal      losartan (COZAAR) 50 mg tablet Take 2 tablets (100 mg total) by mouth daily, Starting Sat 11/28/2020, Normal      metoprolol succinate (TOPROL-XL) 25 mg 24 hr tablet Take 1 tablet (25 mg total) by mouth daily, Starting Fri 10/23/2020, Normal      OLANZapine (ZyPREXA) 2 5 mg tablet Take 1 tablet (2 5 mg total) by mouth daily at bedtime, Starting Sat 11/28/2020, Normal      ondansetron (ZOFRAN) 4 mg tablet Take 1 tablet (4 mg total) by mouth every 8 (eight) hours as needed for nausea or vomiting, Starting Tue 1/5/2021, Normal      pantoprazole (PROTONIX) 40 mg tablet take 1 tablet by mouth twice a day, Normal      prochlorperazine (COMPAZINE) 10 mg tablet Take 1 tablet (10 mg total) by mouth every 6 (six) hours as needed (migraine), Starting Thu 10/22/2020, Normal      sucralfate (CARAFATE) 1 g tablet Take 1 tablet (1 g total) by mouth 4 (four) times a day for 5 days, Starting Tue 1/5/2021, Until Sun 1/10/2021, Normal       !! - Potential duplicate medications found  Please discuss with provider  No discharge procedures on file  PDMP Review     None           ED Provider  Attending physically available and evaluated Kamryn Bowie I managed the patient along with the ED Attending      Electronically Signed by         Letha Alford MD  01/08/21 5998

## 2021-01-07 NOTE — ED NOTES
Pt reporting increased pain    [facility] aware, will order medications        Tyler Chowdhury RN  01/06/21 1156

## 2021-01-08 ENCOUNTER — TELEPHONE (OUTPATIENT)
Dept: FAMILY MEDICINE CLINIC | Facility: CLINIC | Age: 41
End: 2021-01-08

## 2021-01-08 NOTE — TELEPHONE ENCOUNTER
Phone call from Antelmo Cardoso, he is requesting samples of Basaglar slow acting insulin pens & Admelog 3ml refill pens ([employer])  fast acting pens  Please advise pt at [phone]

## 2021-01-11 ENCOUNTER — HOSPITAL ENCOUNTER (EMERGENCY)
Facility: HOSPITAL | Age: 41
Discharge: HOME | End: 2021-01-11
Attending: EMERGENCY MEDICINE | Admitting: EMERGENCY MEDICINE
Payer: COMMERCIAL

## 2021-01-11 ENCOUNTER — APPOINTMENT (EMERGENCY)
Dept: CT IMAGING | Facility: HOSPITAL | Age: 41
End: 2021-01-11
Payer: COMMERCIAL

## 2021-01-11 VITALS
RESPIRATION RATE: 21 BRPM | DIASTOLIC BLOOD PRESSURE: 97 MMHG | TEMPERATURE: 98.2 F | HEART RATE: 100 BPM | SYSTOLIC BLOOD PRESSURE: 183 MMHG | OXYGEN SATURATION: 95 %

## 2021-01-11 DIAGNOSIS — R10.9 ABDOMINAL PAIN: Primary | ICD-10-CM

## 2021-01-11 DIAGNOSIS — U07.1 COVID-19: ICD-10-CM

## 2021-01-11 LAB
ALBUMIN SERPL BCP-MCNC: 3.4 G/DL (ref 3.5–5.7)
ALP SERPL-CCNC: 61 U/L (ref 40–150)
ALT SERPL W P-5'-P-CCNC: 13 U/L (ref 7–52)
ANION GAP SERPL CALCULATED.3IONS-SCNC: 7 MMOL/L (ref 4–13)
APTT PPP: 23 SECONDS (ref 23–37)
AST SERPL W P-5'-P-CCNC: 19 U/L (ref 13–39)
BACTERIA UR QL AUTO: NORMAL /HPF
BILIRUB SERPL-MCNC: 0.3 MG/DL (ref 0.2–1)
BILIRUB UR QL STRIP: NEGATIVE
BUN SERPL-MCNC: 29 MG/DL (ref 7–25)
CALCIUM ALBUM COR SERPL-MCNC: 9.5 MG/DL (ref 8.3–10.1)
CALCIUM SERPL-MCNC: 9 MG/DL (ref 8.6–10.5)
CHLORIDE SERPL-SCNC: 95 MMOL/L (ref 98–107)
CLARITY UR: CLEAR
CO2 SERPL-SCNC: 29 MMOL/L (ref 21–31)
COLOR UR: YELLOW
CREAT SERPL-MCNC: 1.59 MG/DL (ref 0.7–1.3)
EOSINOPHIL # BLD AUTO: 0.17 THOUSAND/UL (ref 0–0.61)
EOSINOPHIL NFR BLD MANUAL: 2 % (ref 0–6)
ERYTHROCYTE [DISTWIDTH] IN BLOOD BY AUTOMATED COUNT: 13.3 % (ref 11.5–14.5)
GFR SERPL CREATININE-BSD FRML MDRD: 53 ML/MIN/1.73SQ M
GLUCOSE SERPL-MCNC: 199 MG/DL (ref 65–140)
GLUCOSE SERPL-MCNC: 340 MG/DL (ref 65–99)
GLUCOSE UR STRIP-MCNC: ABNORMAL MG/DL
HCT VFR BLD AUTO: 36 % (ref 42–47)
HGB BLD-MCNC: 11.9 G/DL (ref 14–18)
HGB UR QL STRIP.AUTO: NEGATIVE
INR PPP: 1.06 (ref 0.84–1.19)
KETONES UR STRIP-MCNC: NEGATIVE MG/DL
LEUKOCYTE ESTERASE UR QL STRIP: NEGATIVE
LIPASE SERPL-CCNC: 13 U/L (ref 11–82)
LYMPHOCYTES # BLD AUTO: 1.68 THOUSAND/UL (ref 0.6–4.47)
LYMPHOCYTES # BLD AUTO: 20 % (ref 20–51)
MCH RBC QN AUTO: 28.2 PG (ref 26–34)
MCHC RBC AUTO-ENTMCNC: 33.1 G/DL (ref 31–37)
MCV RBC AUTO: 85 FL (ref 81–99)
MONOCYTES # BLD AUTO: 0.67 THOUSAND/UL (ref 0–1.22)
MONOCYTES NFR BLD AUTO: 8 % (ref 4–12)
NEUTS SEG # BLD: 5.88 THOUSAND/UL (ref 1.81–6.82)
NEUTS SEG NFR BLD AUTO: 70 % (ref 42–75)
NITRITE UR QL STRIP: NEGATIVE
NON-SQ EPI CELLS URNS QL MICRO: NORMAL /HPF
PH UR STRIP.AUTO: 6.5 [PH]
PLATELET # BLD AUTO: 392 THOUSANDS/UL (ref 149–390)
PLATELET BLD QL SMEAR: ADEQUATE
PMV BLD AUTO: 7.5 FL (ref 8.6–11.7)
POTASSIUM SERPL-SCNC: 4.8 MMOL/L (ref 3.5–5.5)
PROT SERPL-MCNC: 6.6 G/DL (ref 6.4–8.9)
PROT UR STRIP-MCNC: ABNORMAL MG/DL
PROTHROMBIN TIME: 13.7 SECONDS (ref 11.6–14.5)
RBC # BLD AUTO: 4.22 MILLION/UL (ref 4.3–5.9)
RBC #/AREA URNS AUTO: NORMAL /HPF
RBC MORPH BLD: NORMAL
SODIUM SERPL-SCNC: 131 MMOL/L (ref 134–143)
SP GR UR STRIP.AUTO: 1.01 (ref 1–1.03)
TOTAL CELLS COUNTED SPEC: 100
TROPONIN I SERPL-MCNC: <0.03 NG/ML
UROBILINOGEN UR QL STRIP.AUTO: 0.2 E.U./DL
WBC # BLD AUTO: 8.4 THOUSAND/UL (ref 4.8–10.8)
WBC #/AREA URNS AUTO: NORMAL /HPF

## 2021-01-11 PROCEDURE — 74177 CT ABD & PELVIS W/CONTRAST: CPT

## 2021-01-11 PROCEDURE — 99284 EMERGENCY DEPT VISIT MOD MDM: CPT | Performed by: EMERGENCY MEDICINE

## 2021-01-11 PROCEDURE — 96375 TX/PRO/DX INJ NEW DRUG ADDON: CPT

## 2021-01-11 PROCEDURE — 85610 PROTHROMBIN TIME: CPT | Performed by: EMERGENCY MEDICINE

## 2021-01-11 PROCEDURE — 96361 HYDRATE IV INFUSION ADD-ON: CPT

## 2021-01-11 PROCEDURE — 82948 REAGENT STRIP/BLOOD GLUCOSE: CPT

## 2021-01-11 PROCEDURE — 93005 ELECTROCARDIOGRAM TRACING: CPT

## 2021-01-11 PROCEDURE — 83690 ASSAY OF LIPASE: CPT | Performed by: EMERGENCY MEDICINE

## 2021-01-11 PROCEDURE — 71275 CT ANGIOGRAPHY CHEST: CPT

## 2021-01-11 PROCEDURE — 96374 THER/PROPH/DIAG INJ IV PUSH: CPT

## 2021-01-11 PROCEDURE — G1004 CDSM NDSC: HCPCS

## 2021-01-11 PROCEDURE — 81001 URINALYSIS AUTO W/SCOPE: CPT | Performed by: EMERGENCY MEDICINE

## 2021-01-11 PROCEDURE — 85730 THROMBOPLASTIN TIME PARTIAL: CPT | Performed by: EMERGENCY MEDICINE

## 2021-01-11 PROCEDURE — 84484 ASSAY OF TROPONIN QUANT: CPT | Performed by: EMERGENCY MEDICINE

## 2021-01-11 PROCEDURE — 85007 BL SMEAR W/DIFF WBC COUNT: CPT | Performed by: EMERGENCY MEDICINE

## 2021-01-11 PROCEDURE — 99284 EMERGENCY DEPT VISIT MOD MDM: CPT

## 2021-01-11 PROCEDURE — 36415 COLL VENOUS BLD VENIPUNCTURE: CPT | Performed by: EMERGENCY MEDICINE

## 2021-01-11 PROCEDURE — 85027 COMPLETE CBC AUTOMATED: CPT | Performed by: EMERGENCY MEDICINE

## 2021-01-11 PROCEDURE — 81003 URINALYSIS AUTO W/O SCOPE: CPT | Performed by: EMERGENCY MEDICINE

## 2021-01-11 PROCEDURE — 80053 COMPREHEN METABOLIC PANEL: CPT | Performed by: EMERGENCY MEDICINE

## 2021-01-11 RX ORDER — ONDANSETRON 2 MG/ML
4 INJECTION INTRAMUSCULAR; INTRAVENOUS ONCE
Status: COMPLETED | OUTPATIENT
Start: 2021-01-11 | End: 2021-01-11

## 2021-01-11 RX ORDER — SUCRALFATE 1 G/1
1 TABLET ORAL ONCE
Status: COMPLETED | OUTPATIENT
Start: 2021-01-11 | End: 2021-01-11

## 2021-01-11 RX ORDER — SUCRALFATE ORAL 1 G/10ML
1 SUSPENSION ORAL 4 TIMES DAILY
Qty: 420 ML | Refills: 0 | Status: SHIPPED | OUTPATIENT
Start: 2021-01-11 | End: 2021-01-12 | Stop reason: SDUPTHER

## 2021-01-11 RX ADMIN — INSULIN HUMAN 10 UNITS: 100 INJECTION, SOLUTION PARENTERAL at 22:45

## 2021-01-11 RX ADMIN — SODIUM CHLORIDE 1000 ML: 0.9 INJECTION, SOLUTION INTRAVENOUS at 22:13

## 2021-01-11 RX ADMIN — SUCRALFATE 1 G: 1 TABLET ORAL at 21:09

## 2021-01-11 RX ADMIN — IOHEXOL 100 ML: 350 INJECTION, SOLUTION INTRAVENOUS at 20:43

## 2021-01-11 RX ADMIN — ONDANSETRON 4 MG: 2 INJECTION INTRAMUSCULAR; INTRAVENOUS at 21:08

## 2021-01-12 ENCOUNTER — TELEMEDICINE (OUTPATIENT)
Dept: FAMILY MEDICINE CLINIC | Facility: CLINIC | Age: 41
End: 2021-01-12

## 2021-01-12 ENCOUNTER — TELEMEDICINE (OUTPATIENT)
Dept: FAMILY MEDICINE CLINIC | Facility: CLINIC | Age: 41
End: 2021-01-12
Payer: COMMERCIAL

## 2021-01-12 VITALS — WEIGHT: 280 LBS | HEIGHT: 62 IN | BODY MASS INDEX: 51.53 KG/M2

## 2021-01-12 DIAGNOSIS — U07.1 COVID-19: ICD-10-CM

## 2021-01-12 DIAGNOSIS — J06.9 ACUTE RESPIRATORY DISEASE DUE TO COVID-19 VIRUS: Primary | ICD-10-CM

## 2021-01-12 DIAGNOSIS — E11.9 TYPE 2 DIABETES MELLITUS WITHOUT COMPLICATION, UNSPECIFIED WHETHER LONG TERM INSULIN USE (HCC): ICD-10-CM

## 2021-01-12 DIAGNOSIS — I16.1 HYPERTENSIVE EMERGENCY: ICD-10-CM

## 2021-01-12 DIAGNOSIS — R51.9 INTRACTABLE EPISODIC HEADACHE, UNSPECIFIED HEADACHE TYPE: ICD-10-CM

## 2021-01-12 DIAGNOSIS — R06.03 RESPIRATORY DISTRESS: ICD-10-CM

## 2021-01-12 DIAGNOSIS — R10.13 EPIGASTRIC ABDOMINAL PAIN: ICD-10-CM

## 2021-01-12 DIAGNOSIS — E11.9 TYPE 2 DIABETES MELLITUS WITHOUT COMPLICATION, UNSPECIFIED WHETHER LONG TERM INSULIN USE (HCC): Primary | ICD-10-CM

## 2021-01-12 DIAGNOSIS — R11.2 NAUSEA AND VOMITING: ICD-10-CM

## 2021-01-12 DIAGNOSIS — E78.49 OTHER HYPERLIPIDEMIA: ICD-10-CM

## 2021-01-12 DIAGNOSIS — R06.2 WHEEZING: ICD-10-CM

## 2021-01-12 DIAGNOSIS — K21.9 GASTROESOPHAGEAL REFLUX DISEASE WITHOUT ESOPHAGITIS: ICD-10-CM

## 2021-01-12 DIAGNOSIS — E55.9 VITAMIN D DEFICIENCY: ICD-10-CM

## 2021-01-12 DIAGNOSIS — R10.9 ABDOMINAL PAIN, UNSPECIFIED ABDOMINAL LOCATION: ICD-10-CM

## 2021-01-12 DIAGNOSIS — E03.9 HYPOTHYROIDISM, UNSPECIFIED TYPE: ICD-10-CM

## 2021-01-12 DIAGNOSIS — U07.1 ACUTE RESPIRATORY DISEASE DUE TO COVID-19 VIRUS: Primary | ICD-10-CM

## 2021-01-12 DIAGNOSIS — J20.9 ACUTE BRONCHITIS, UNSPECIFIED ORGANISM: ICD-10-CM

## 2021-01-12 DIAGNOSIS — G43.709 CHRONIC MIGRAINE WITHOUT AURA WITHOUT STATUS MIGRAINOSUS, NOT INTRACTABLE: ICD-10-CM

## 2021-01-12 LAB
ATRIAL RATE: 89 BPM
MYCOBACTERIUM SPEC CULT: NORMAL
P AXIS: 0 DEGREES
PR INTERVAL: 176 MS
QRS AXIS: 89 DEGREES
QRSD INTERVAL: 98 MS
QT INTERVAL: 346 MS
QTC INTERVAL: 420 MS
RHODAMINE-AURAMINE STN SPEC: NORMAL
T WAVE AXIS: -6 DEGREES
VENTRICULAR RATE: 89 BPM

## 2021-01-12 PROCEDURE — G2012 BRIEF CHECK IN BY MD/QHP: HCPCS | Performed by: NURSE PRACTITIONER

## 2021-01-12 PROCEDURE — 93010 ELECTROCARDIOGRAM REPORT: CPT | Performed by: INTERNAL MEDICINE

## 2021-01-12 RX ORDER — INSULIN GLARGINE 100 [IU]/ML
50 INJECTION, SOLUTION SUBCUTANEOUS
Qty: 5 PEN | Refills: 0
Start: 2021-01-12 | End: 2021-06-03 | Stop reason: HOSPADM

## 2021-01-12 RX ORDER — INSULIN LISPRO 100 U/ML
10 INJECTION, SOLUTION SUBCUTANEOUS
Qty: 5 PEN | Refills: 0 | Status: SHIPPED | OUTPATIENT
Start: 2021-01-12 | End: 2021-06-03 | Stop reason: HOSPADM

## 2021-01-12 RX ORDER — SUCRALFATE ORAL 1 G/10ML
1 SUSPENSION ORAL 4 TIMES DAILY
Qty: 420 ML | Refills: 0 | Status: SHIPPED | OUTPATIENT
Start: 2021-01-12 | End: 2021-02-13 | Stop reason: HOSPADM

## 2021-01-12 RX ORDER — ERGOCALCIFEROL 1.25 MG/1
50000 CAPSULE ORAL WEEKLY
Qty: 8 CAPSULE | Refills: 0 | Status: SHIPPED | OUTPATIENT
Start: 2021-01-12 | End: 2021-06-03 | Stop reason: HOSPADM

## 2021-01-12 RX ORDER — LOSARTAN POTASSIUM 50 MG/1
100 TABLET ORAL DAILY
Qty: 60 TABLET | Refills: 0 | Status: SHIPPED | OUTPATIENT
Start: 2021-01-12 | End: 2021-06-03 | Stop reason: HOSPADM

## 2021-01-12 RX ORDER — PEN NEEDLE, DIABETIC 30 GX5/16"
NEEDLE, DISPOSABLE MISCELLANEOUS 4 TIMES DAILY
Qty: 200 EACH | Refills: 3 | Status: SHIPPED | OUTPATIENT
Start: 2021-01-12

## 2021-01-12 RX ORDER — CYCLOBENZAPRINE HCL 5 MG
5 TABLET ORAL
Qty: 30 TABLET | Refills: 0 | Status: SHIPPED | OUTPATIENT
Start: 2021-01-12 | End: 2021-06-03 | Stop reason: HOSPADM

## 2021-01-12 RX ORDER — METOPROLOL SUCCINATE 25 MG/1
25 TABLET, EXTENDED RELEASE ORAL DAILY
Qty: 30 TABLET | Refills: 0 | Status: SHIPPED | OUTPATIENT
Start: 2021-01-12 | End: 2021-02-13 | Stop reason: HOSPADM

## 2021-01-12 RX ORDER — ALBUTEROL SULFATE 90 UG/1
2 AEROSOL, METERED RESPIRATORY (INHALATION) 4 TIMES DAILY
Qty: 8 G | Refills: 0 | Status: ON HOLD | OUTPATIENT
Start: 2021-01-12 | End: 2022-06-23 | Stop reason: SDUPTHER

## 2021-01-12 RX ORDER — PANTOPRAZOLE SODIUM 40 MG/1
40 TABLET, DELAYED RELEASE ORAL 2 TIMES DAILY
Qty: 60 TABLET | Refills: 0 | Status: SHIPPED | OUTPATIENT
Start: 2021-01-12 | End: 2021-06-03 | Stop reason: HOSPADM

## 2021-01-12 RX ORDER — LEVOTHYROXINE SODIUM 0.1 MG/1
100 TABLET ORAL DAILY
Qty: 30 TABLET | Refills: 3 | Status: SHIPPED | OUTPATIENT
Start: 2021-01-12 | End: 2021-06-03 | Stop reason: HOSPADM

## 2021-01-12 RX ORDER — OLANZAPINE 2.5 MG/1
2.5 TABLET ORAL
Qty: 30 TABLET | Refills: 0 | Status: ON HOLD | OUTPATIENT
Start: 2021-01-12 | End: 2021-05-28 | Stop reason: CLARIF

## 2021-01-12 RX ORDER — ATORVASTATIN CALCIUM 20 MG/1
20 TABLET, FILM COATED ORAL DAILY
Qty: 30 TABLET | Refills: 5 | Status: SHIPPED | OUTPATIENT
Start: 2021-01-12 | End: 2021-06-03 | Stop reason: HOSPADM

## 2021-01-12 RX ORDER — PROCHLORPERAZINE MALEATE 10 MG
10 TABLET ORAL EVERY 6 HOURS PRN
Qty: 10 TABLET | Refills: 0 | Status: SHIPPED | OUTPATIENT
Start: 2021-01-12 | End: 2021-08-18 | Stop reason: SDUPTHER

## 2021-01-12 RX ORDER — METOCLOPRAMIDE 10 MG/1
10 TABLET ORAL EVERY 6 HOURS
Qty: 30 TABLET | Refills: 0 | Status: SHIPPED | OUTPATIENT
Start: 2021-01-12 | End: 2021-06-03 | Stop reason: HOSPADM

## 2021-01-12 RX ORDER — ONDANSETRON 4 MG/1
4 TABLET, FILM COATED ORAL EVERY 8 HOURS PRN
Qty: 12 TABLET | Refills: 0 | Status: SHIPPED | OUTPATIENT
Start: 2021-01-12 | End: 2021-06-03 | Stop reason: HOSPADM

## 2021-01-12 NOTE — PROGRESS NOTES
COVID-19 Virtual Visit     Assessment/Plan:    Problem List Items Addressed This Visit        Respiratory    Acute respiratory disease due to COVID-19 virus - Primary     - Patient seen in the ER with abdominal pain  Found to be COVID positive  This is day 7 since positive test   - Discussed quarantine guidelines with patient and notified him he is cleared to resume normal activities  - will continue to monitor  Cardiovascular and Mediastinum    Chronic migraine without aura without status migrainosus, not intractable    Relevant Medications    prochlorperazine (COMPAZINE) 10 mg tablet    metoprolol succinate (TOPROL-XL) 25 mg 24 hr tablet    cyclobenzaprine (FLEXERIL) 5 mg tablet    Hypertensive emergency    Relevant Medications    metoprolol succinate (TOPROL-XL) 25 mg 24 hr tablet    losartan (COZAAR) 50 mg tablet       Other    Abdominal pain     - Patient seen multiple times in the ER recently for abdominal pain  He has a history of gastritis and was given prescription for carafate   - He was given referral for GI   - Advised patient to call and set up an appointment  - Will continue to follow up  Relevant Medications    sucralfate (CARAFATE) 1 g/10 mL suspension    Vitamin D deficiency    Relevant Medications    ergocalciferol (VITAMIN D2) 50,000 units    Other hyperlipidemia    Relevant Medications    atorvastatin (LIPITOR) 20 mg tablet    Other Relevant Orders    Lipid Panel with Direct LDL reflex      Other Visit Diagnoses     COVID-19        Relevant Medications    sucralfate (CARAFATE) 1 g/10 mL suspension    Gastroesophageal reflux disease without esophagitis        Asymptomatic   Continue taking Pantoprazole    Relevant Medications    sucralfate (CARAFATE) 1 g/10 mL suspension    pantoprazole (PROTONIX) 40 mg tablet    metoclopramide (REGLAN) 10 mg tablet    Nausea and vomiting        Relevant Medications    ondansetron (ZOFRAN) 4 mg tablet    metoclopramide (REGLAN) 10 mg tablet Respiratory distress        Relevant Medications    OLANZapine (ZyPREXA) 2 5 mg tablet    Epigastric abdominal pain        Relevant Medications    metoclopramide (REGLAN) 10 mg tablet    Other Relevant Orders    CBC and differential    Comprehensive metabolic panel    Hypothyroidism, unspecified type        Relevant Medications    metoprolol succinate (TOPROL-XL) 25 mg 24 hr tablet    levothyroxine 100 mcg tablet    Other Relevant Orders    TSH, 3rd generation with Free T4 reflex    Type 2 diabetes mellitus without complication, unspecified whether long term insulin use (HCC)        Relevant Medications    Insulin Pen Needle (Pen Needles 3/16") 31G X 5 MM MISC    insulin glargine (Basaglar KwikPen) 100 units/mL injection pen    Admelog SoloStar 100 units/mL injection pen    Other Relevant Orders    Hemoglobin A1C    Intractable episodic headache, unspecified headache type        Relevant Medications    cyclobenzaprine (FLEXERIL) 5 mg tablet    Wheezing        Relevant Medications    albuterol (PROVENTIL HFA,VENTOLIN HFA) 90 mcg/act inhaler    Acute bronchitis, unspecified organism        Relevant Medications    albuterol (PROVENTIL HFA,VENTOLIN HFA) 90 mcg/act inhaler         Disposition:     I have spent 15 minutes directly with the patient  Greater than 50% of this time was spent in counseling/coordination of care regarding: diagnostic results, prognosis, risks and benefits of treatment options, instructions for management, patient and family education and importance of treatment compliance  Patient is also asking to have all of his prescriptions refilled  Will sent refills  Will order blood work and schedule an appointment for 1 month to follow up          Encounter provider DOREEN Schmitt    Provider located at [address] [place] [address],[address]   [address] [address]    Recent Visits  Date Type Provider Dept   01/08/21 Telephone Sandra Yakelin [facility]   Showing recent visits within past 7 days and meeting all other requirements     Today's Visits  Date Type Provider Dept   01/12/21 Telemedicine DOREEN Ramos [facility]   Showing today's visits and meeting all other requirements     Future Appointments  No visits were found meeting these conditions  Showing future appointments within next 150 days and meeting all other requirements      This virtual check-in was done via Patient does not have smart phone to complete video visit  and patient was informed that this is not a secure, HIPAA-compliant platform  He agrees to proceed  Patient agrees to participate in a virtual check in via telephone or video visit instead of presenting to the office to address urgent/immediate medical needs  Patient is aware this is a billable service  After connecting through Telephone, the patient was identified by name and date of birth  Jaya Morrissey was informed that this was a telemedicine visit and that the exam was being conducted confidentially over secure lines  My office door was closed  No one else was in the room  Jaya Morrissey acknowledged consent and understanding of privacy and security of the telemedicine visit  I informed the patient that I have reviewed his record in Epic and presented the opportunity for him to ask any questions regarding the visit today  The patient agreed to participate  Subjective:   Jaya Morrissey is a 41 y o  male who has been screened for COVID-19  Symptom change since last report: improving  Patient's symptoms include nasal congestion, cough, shortness of breath and abdominal pain  Patient denies fever, fatigue, diarrhea and headaches  Esequiel Masters has been staying home and has isolated themselves in his home   He is taking care to not share personal items and is cleaning all surfaces that are touched often, like counters, tabletops, and doorknobs using household cleaning sprays or wipes  He is wearing a mask when he leaves his room  Date of symptom onset: 1/5/2021  Date of positive COVID-19 PCR: 1/5/2021    Lab Results   Component Value Date    SARSCOV2 Positive (A) 01/05/2021     Past Medical History:   Diagnosis Date    Acute bronchitis due to other specified organisms 7/5/2019    Chronic headaches     Diabetes mellitus ([address] )     Disease of thyroid gland     Esophagitis     Gastritis     Gastroparesis     GERD (gastroesophageal reflux disease)     Hypertension     Migraine     Obesity     Obsessive compulsive disorder     Psychiatric disorder     Schizoaffective disorder [facility])      Past Surgical History:   Procedure Laterality Date    ESOPHAGOGASTRODUODENOSCOPY N/A 1/15/2019    Procedure: ESOPHAGOGASTRODUODENOSCOPY (EGD); Surgeon: Johanna Victor MD;  Location: [facility];   Service: Gastroenterology     Current Outpatient Medications   Medication Sig Dispense Refill    Admelog SoloStar 100 units/mL injection pen Inject 10 Units under the skin 3 (three) times a day with meals 5 pen 0    albuterol (PROVENTIL HFA,VENTOLIN HFA) 90 mcg/act inhaler Inhale 2 puffs 4 (four) times a day 8 g 0    aspirin-acetaminophen-caffeine (EXCEDRIN MIGRAINE) 250-250-65 MG per tablet Take 2 tablets by mouth daily       atorvastatin (LIPITOR) 20 mg tablet Take 1 tablet (20 mg total) by mouth daily 30 tablet 5    cyclobenzaprine (FLEXERIL) 5 mg tablet Take 1 tablet (5 mg total) by mouth daily at bedtime 30 tablet 0    ergocalciferol (VITAMIN D2) 50,000 units Take 1 capsule (50,000 Units total) by mouth once a week 8 capsule 0    insulin glargine (Basaglar KwikPen) 100 units/mL injection pen Inject 50 Units under the skin daily at bedtime 5 pen 0    Lidocaine Viscous HCl (XYLOCAINE) 2 % mucosal solution Swish and swallow 15 mL 3 (three) times a day as needed for mouth pain or discomfort 100 mL 0    losartan (COZAAR) 50 mg tablet Take 2 tablets (100 mg total) by mouth daily 60 tablet 0    metoclopramide (REGLAN) 10 mg tablet Take 1 tablet (10 mg total) by mouth every 6 (six) hours 30 tablet 0    metoprolol succinate (TOPROL-XL) 25 mg 24 hr tablet Take 1 tablet (25 mg total) by mouth daily 30 tablet 0    OLANZapine (ZyPREXA) 2 5 mg tablet Take 1 tablet (2 5 mg total) by mouth daily at bedtime 30 tablet 0    ondansetron (ZOFRAN) 4 mg tablet Take 1 tablet (4 mg total) by mouth every 8 (eight) hours as needed for nausea or vomiting 12 tablet 0    pantoprazole (PROTONIX) 40 mg tablet Take 1 tablet (40 mg total) by mouth 2 (two) times a day 60 tablet 0    prochlorperazine (COMPAZINE) 10 mg tablet Take 1 tablet (10 mg total) by mouth every 6 (six) hours as needed (migraine) 10 tablet 0    sucralfate (CARAFATE) 1 g/10 mL suspension Take 10 mL (1 g total) by mouth 4 (four) times a day 420 mL 0    ACCU-CHEK FASTCLIX LANCETS MISC 4 (four) times a day Not checking 4 times a day  Patient stated maybe 2 times a day  1    ACCU-CHEK GUIDE test strip 4 (four) times a day Test  1    Blood Glucose Monitoring Suppl (ACCU-CHEK GUIDE) w/Device KIT Checking 2 times a day  0    fluvoxaMINE (LUVOX) 100 mg tablet Take 1 tablet (100 mg total) by mouth 2 (two) times a day (Patient not taking: Reported on 1/12/2021) 60 tablet 1    Galcanezumab-gnlm 120 MG/ML SOAJ Inject 120 mg under the skin every 30 (thirty) days 1 pen 11    Insulin Pen Needle (Pen Needles 3/16") 31G X 5 MM MISC Use 4 (four) times a day 200 each 3    levothyroxine 100 mcg tablet Take 1 tablet (100 mcg total) by mouth daily 30 tablet 3    sucralfate (CARAFATE) 1 g tablet Take 1 tablet (1 g total) by mouth 4 (four) times a day for 5 days 20 tablet 0     No current facility-administered medications for this visit        Allergies   Allergen Reactions    Augmentin [Amoxicillin-Pot Clavulanate]     Amoxicillin Diarrhea    Clavulanic Acid Diarrhea    Erythromycin Diarrhea       Review of Systems Constitutional: Negative for fatigue and fever  HENT: Positive for congestion  Negative for sinus pressure and sinus pain  Eyes: Negative for pain  Respiratory: Positive for cough and shortness of breath  Cardiovascular: Negative for chest pain and palpitations  Gastrointestinal: Positive for abdominal pain  Negative for diarrhea  Genitourinary: Negative for dysuria  Musculoskeletal: Negative for gait problem  Neurological: Negative for dizziness and headaches  Hematological: Negative for adenopathy  Psychiatric/Behavioral: Negative for dysphoric mood  Objective:    Vitals:    01/12/21 1259   Weight: 127 kg (280 lb)   Height: 5' 2" (1 575 m)       Physical Exam  Vitals signs and nursing note reviewed  Pulmonary:      Comments: Patient able to speak in full sentences without becoming short of breath  Neurological:      Mental Status: He is alert and oriented to person, place, and time  Psychiatric:         Mood and Affect: Mood normal          Behavior: Behavior normal        VIRTUAL VISIT DISCLAIMER    Rianna Zabala acknowledges that he has consented to an online visit or consultation  He understands that the online visit is based solely on information provided by him, and that, in the absence of a face-to-face physical evaluation by the physician, the diagnosis he receives is both limited and provisional in terms of accuracy and completeness  This is not intended to replace a full medical face-to-face evaluation by the physician  Rianna Zabala understands and accepts these terms

## 2021-01-12 NOTE — ASSESSMENT & PLAN NOTE
- Patient seen multiple times in the ER recently for abdominal pain  He has a history of gastritis and was given prescription for carafate   - He was given referral for GI   - Advised patient to call and set up an appointment  - Will continue to follow up

## 2021-01-12 NOTE — ASSESSMENT & PLAN NOTE
- Patient seen in the ER with abdominal pain  Found to be COVID positive  This is day 7 since positive test   - Discussed quarantine guidelines with patient and notified him he is cleared to resume normal activities  - will continue to monitor

## 2021-01-12 NOTE — ED PROVIDER NOTES
History  Chief Complaint   Patient presents with    Abdominal Pain     diagnosed with covid , arrives via EMS c/o abd pain, vomiting, diarrhea, and headache     HPI     The patient is a 39-year-old male with no COVID disease who reports to the emergency department with nausea, vomiting, diarrhea, achy mid abdominal pain  No dysuria, hematuria  He notes mild shortness of breath  Patient with known history of diabetes  No focal neurological defects  Mild abdominal tenderness but no rebound or guarding  Has no rashes  No wounds  He notes the symptoms have worsened over the past several days  Medical decision makin-year-old male, some hypoxia, tachycardia, obese, Coronavirus, will workup for pulmonary embolism given shortness of breath, mild, will CT abdomen given abdominal pain  Of note, CT scan without any concerning findings that would warrant an admission to the hospital   Nonetheless, the patient is symptomatic from Coronavirus disease and as such, I did do a ambulatory pulse ox testing  He is cleared to be discharged home per [facility] guidelines  Of note, patient with very strict return precautions and follow-up instructions given his risk factors for worsening Coronavirus  Of note, treated patient's sugar, improved after fluids, encouraged him to keep checking his sugars at home    Tachycardia improved at time of discharge, heart rate of 87    Will disposition patient per current [facility] Guidelines:  Updated 20    Low risk patient SpO2 ? 90% (rest/ambulation)  For High Risk patient and SpO2 >/= 92% (rest/ambulation)   Dispo: DC and call PCP within 24 hours    DC Meds:  Vit D3 2000 IU PO Daily  Vit C 1g PO Q12  Multivitamin Daily    Home Pulse Ox:  Recommend returning if O2 drops below above levels     Low risk patient SpO2 ? 90% (at rest) but < 90% (w/ ambulation)  High Risk patient* SpO2 ? 92% (at rest) but <92% (w/ ambulation)   Dispo: Consider admission - if severe sx or to f/u closely with pcp   Consider DC - if mild sx    DC Meds:  Vit D3 2000 IU PO Daily  Vit C 1g PO Q12  Multivitamin Daily    Home Pulse Ox:  Recommend returning if O2 drops below above levels     Low risk patient SpO2 < 90%  High Risk patient* SpO2 < 92%   Dispo: Admit       **always call PCP in 24 hours if D/C  **High Risk Definition: age >71, BMI>35, immunocompromised, CKD or chronic heart/lung disease      Prior to Admission Medications   Prescriptions Last Dose Informant Patient Reported? Taking? ACCU-CHEK FASTCLIX LANCETS MISC  Self Yes No   Si (four) times a day Not checking 4 times a day   Patient stated maybe 2 times a day   ACCU-CHEK GUIDE test strip  Self Yes No   Si (four) times a day Test   Blood Glucose Monitoring Suppl (ACCU-CHEK GUIDE) w/Device KIT  Self Yes No   Sig: Checking 2 times a day   Galcanezumab-gnlm 120 MG/ML SOAJ   No No   Sig: Inject 120 mg under the skin every 30 (thirty) days   Insulin Pen Needle (PEN NEEDLES 3") 31G X 5 MM MISC  Self No No   Sig: by Does not apply route 4 (four) times a day   Patient not taking: Reported on 10/22/2020   Lidocaine Viscous HCl (XYLOCAINE) 2 % mucosal solution   No No   Sig: Swish and swallow 15 mL 3 (three) times a day as needed for mouth pain or discomfort   Lidocaine Viscous HCl (XYLOCAINE) 2 % mucosal solution   No No   Sig: Swish and spit 15 mL 4 (four) times a day as needed for mouth pain or discomfort for up to 3 days   aspirin-acetaminophen-caffeine (EXCEDRIN MIGRAINE) 250-250-65 MG per tablet  Self Yes No   Sig: Take 2 tablets by mouth daily    fluvoxaMINE (LUVOX) 100 mg tablet   No No   Sig: Take 1 tablet (100 mg total) by mouth 2 (two) times a day   Patient not taking: Reported on 2021   sucralfate (CARAFATE) 1 g tablet   No No   Sig: Take 1 tablet (1 g total) by mouth 4 (four) times a day for 5 days      Facility-Administered Medications: None       Past Medical History:   Diagnosis Date    Acute bronchitis due to other specified organisms 7/5/2019    Chronic headaches     Diabetes mellitus ([address] )     Disease of thyroid gland     Esophagitis     Gastritis     Gastroparesis     GERD (gastroesophageal reflux disease)     Hypertension     Migraine     Obesity     Obsessive compulsive disorder     Psychiatric disorder     Schizoaffective disorder [facility])        Past Surgical History:   Procedure Laterality Date    ESOPHAGOGASTRODUODENOSCOPY N/A 1/15/2019    Procedure: ESOPHAGOGASTRODUODENOSCOPY (EGD); Surgeon: Jv Gandara MD;  Location: [facility]; Service: Gastroenterology       Family History   Problem Relation Age of Onset    COPD Mother     Hypertension Mother     Heart failure Mother     Rheum arthritis Family     Heart disease Family     Hypertension Father     Diabetes Father     Hyperlipidemia Father      I have reviewed and agree with the history as documented  E-Cigarette/Vaping    E-Cigarette Use Never User      E-Cigarette/Vaping Substances    Nicotine No     THC No     CBD No     Flavoring No      Social History     Tobacco Use    Smoking status: Never Smoker    Smokeless tobacco: Never Used   Substance Use Topics    Alcohol use: No    Drug use: No       Review of Systems   Constitutional: Positive for fatigue and fever  Respiratory: Positive for cough and shortness of breath  Gastrointestinal: Positive for abdominal pain, diarrhea, nausea and vomiting  All other systems reviewed and are negative  Physical Exam  Physical Exam  Vitals signs and nursing note reviewed  Constitutional:       Appearance: He is well-developed  He is not diaphoretic  Comments: Obese   HENT:      Head: Normocephalic and atraumatic  Right Ear: External ear normal       Left Ear: External ear normal       Nose: No congestion  Eyes:      General:         Right eye: No discharge  Left eye: No discharge  Extraocular Movements: Extraocular movements intact     Neck: Musculoskeletal: Normal range of motion and neck supple  Cardiovascular:      Rate and Rhythm: Regular rhythm  Tachycardia present  Heart sounds: Normal heart sounds  No murmur  Pulmonary:      Effort: Pulmonary effort is normal  No respiratory distress  Breath sounds: Normal breath sounds  No wheezing  Abdominal:      General: There is no distension  Palpations: Abdomen is soft  Tenderness: There is abdominal tenderness  Musculoskeletal:         General: No tenderness or signs of injury  Skin:     General: Skin is warm and dry  Findings: No erythema  Neurological:      General: No focal deficit present  Mental Status: He is alert and oriented to person, place, and time  Motor: No weakness     Psychiatric:         Mood and Affect: Mood normal          Behavior: Behavior normal          Vital Signs  ED Triage Vitals   Temperature Pulse Respirations Blood Pressure SpO2   01/11/21 1904 01/11/21 1904 01/11/21 1904 01/11/21 1904 01/11/21 1904   98 2 °F (36 8 °C) 88 18 (!) 205/119 95 %      Temp Source Heart Rate Source Patient Position - Orthostatic VS BP Location FiO2 (%)   01/11/21 1904 01/11/21 2000 01/11/21 1904 01/11/21 1904 --   Temporal Monitor Sitting Left arm       Pain Score       01/11/21 1904       5           Vitals:    01/11/21 2113 01/11/21 2130 01/11/21 2146 01/11/21 2330   BP: (!) 176/105 (!) 183/97     Pulse: 92 90 103 100   Patient Position - Orthostatic VS: Lying            Visual Acuity      ED Medications  Medications   ondansetron (ZOFRAN) injection 4 mg (4 mg Intravenous Given 1/11/21 2108)   sucralfate (CARAFATE) tablet 1 g (1 g Oral Given 1/11/21 2109)   iohexol (OMNIPAQUE) 350 MG/ML injection (SINGLE-DOSE) 100 mL (100 mL Intravenous Given 1/11/21 2043)   sodium chloride 0 9 % bolus 1,000 mL (0 mL Intravenous Stopped 1/11/21 2330)   insulin regular (HumuLIN R,NovoLIN R) injection 10 Units (10 Units Intravenous Given 1/11/21 2245)       Diagnostic Studies  Results Reviewed     Procedure Component Value Units Date/Time    Fingerstick Glucose (POCT) [[id number]]  (Abnormal) Collected: 01/11/21 2325    Lab Status: Final result Updated: 01/11/21 2326     POC Glucose 199 mg/dl     Comprehensive metabolic panel [[id number]]  (Abnormal) Collected: 01/11/21 1926    Lab Status: Final result Specimen: Blood from Arm, Right Updated: 01/11/21 1958     Sodium 131 mmol/L      Potassium 4 8 mmol/L      Chloride 95 mmol/L      CO2 29 mmol/L      ANION GAP 7 mmol/L      BUN 29 mg/dL      Creatinine 1 59 mg/dL      Glucose 340 mg/dL      Calcium 9 0 mg/dL      Corrected Calcium 9 5 mg/dL      AST 19 U/L      ALT 13 U/L      Alkaline Phosphatase 61 U/L      Total Protein 6 6 g/dL      Albumin 3 4 g/dL      Total Bilirubin 0 30 mg/dL      eGFR 53 ml/min/1 73sq m     Narrative:      [place] guidelines for Chronic Kidney Disease (CKD):     Stage 1 with normal or high GFR (GFR > 90 mL/min/1 73 square meters)    Stage 2 Mild CKD (GFR = 60-89 mL/min/1 73 square meters)    Stage 3A Moderate CKD (GFR = 45-59 mL/min/1 73 square meters)    Stage 3B Moderate CKD (GFR = 30-44 mL/min/1 73 square meters)    Stage 4 Severe CKD (GFR = 15-29 mL/min/1 73 square meters)    Stage 5 End Stage CKD (GFR <15 mL/min/1 73 square meters)  Note: GFR calculation is accurate only with a steady state creatinine    Lipase [[id number]]  (Normal) Collected: 01/11/21 1926    Lab Status: Final result Specimen: Blood from Arm, Right Updated: 01/11/21 1958     Lipase 13 u/L     Manual Differential (Non Wam) [[id number]] Collected: 01/11/21 1926    Lab Status: Final result Specimen: Blood from Arm, Right Updated: 01/11/21 1957     Segmented % 70 %      Lymphocytes % 20 %      Monocytes % 8 %      Eosinophils, % 2 %      Neutrophils Absolute 5 88 Thousand/uL      Lymphocytes Absolute 1 68 Thousand/uL      Monocytes Absolute 0 67 Thousand/uL      Eosinophils Absolute 0 17 Thousand/uL Total Counted 100     RBC Morphology Normal     Platelet Estimate Adequate    Troponin I [[id number]]  (Normal) Collected: 01/11/21 1926    Lab Status: Final result Specimen: Blood from Arm, Right Updated: 01/11/21 1952     Troponin I <0 03 ng/mL     Urine Microscopic [[id number]]  (Normal) Collected: 01/11/21 1926    Lab Status: Final result Specimen: Urine, Clean Catch Updated: 01/11/21 1951     RBC, UA 0-1 /hpf      WBC, UA 1-2 /hpf      Epithelial Cells Occasional /hpf      Bacteria, UA None Seen /hpf     UA w Reflex to Microscopic w Reflex to Culture [[id number]]  (Abnormal) Collected: 01/11/21 1926    Lab Status: Final result Specimen: Urine, Clean Catch Updated: 01/11/21 1950     Color, UA Yellow     Clarity, UA Clear     Specific Gravity, UA 1 015     pH, UA 6 5     Leukocytes, UA Negative     Nitrite, UA Negative     Protein, UA 2+ mg/dl      Glucose, UA 3+ mg/dl      Ketones, UA Negative mg/dl      Urobilinogen, UA 0 2 E U /dl      Bilirubin, UA Negative     Blood, UA Negative    Protime-INR [[id number]]  (Normal) Collected: 01/11/21 1926    Lab Status: Final result Specimen: Blood from Arm, Right Updated: 01/11/21 1946     Protime 13 7 seconds      INR 1 06    APTT [[id number]]  (Normal) Collected: 01/11/21 1926    Lab Status: Final result Specimen: Blood from Arm, Right Updated: 01/11/21 1946     PTT 23 seconds     CBC and differential [[id number]]  (Abnormal) Collected: 01/11/21 1926    Lab Status: Final result Specimen: Blood from Arm, Right Updated: 01/11/21 1936     WBC 8 40 Thousand/uL      RBC 4 22 Million/uL      Hemoglobin 11 9 g/dL      Hematocrit 36 0 %      MCV 85 fL      MCH 28 2 pg      MCHC 33 1 g/dL      RDW 13 3 %      MPV 7 5 fL      Platelets 305 Thousands/uL                  PE Study with CT abdomen & pelvis with contrast   Final Result by Funmilayo Acuna DO (01/11 2109)   No central pulmonary emboli  Covid 19 viral pneumonia reidentified  Mild hepatosplenomegaly    Dilated main portal vein suggesting portal hypertension  No acute intra-abdominal pathology  Workstation performed: [id number]                    Procedures  Procedures         ED Course  ED Course as of Jan 12 1618 Mon Jan 11, 2021 1956 Ekg rate 89, sinus, no stemi, narrow qrs, non specific st changes, no current chest pain, trop negative      2139       IMPRESSION:  No central pulmonary emboli      Covid 19 viral pneumonia reidentified      Mild hepatosplenomegaly  Dilated main portal vein suggesting portal hypertension      No acute intra-abdominal pathology  MDM    Disposition  Final diagnoses:   Abdominal pain   COVID-19     Time reflects when diagnosis was documented in both MDM as applicable and the Disposition within this note     Time User Action Codes Description Comment    1/11/2021  9:48 PM Marget Abt, [address] [R10 9] Abdominal pain     1/11/2021  9:48 PM Marget Abt, [address] [U07 1] COVID-19       ED Disposition     ED Disposition Condition Date/Time Comment    Discharge Stable Mon Jan 11, 2021  9:48 PM Annette Marrufo discharge to home/self care  Follow-up Information     Follow up With Specialties Details Why [address], MD Family Medicine In 1 day  [address] [facility]  [phone]            Discharge Medication List as of 1/11/2021  9:48 PM      CONTINUE these medications which have NOT CHANGED    Details   ACCU-CHEK FASTCLIX LANCETS MISC 4 (four) times a day Not checking 4 times a day   Patient stated maybe 2 times a day, Starting Wed 5/8/2019, Historical Med      ACCU-CHEK GUIDE test strip 4 (four) times a day Test, Starting Wed 5/8/2019, Historical Med      aspirin-acetaminophen-caffeine (EXCEDRIN MIGRAINE) 250-250-65 MG per tablet Take 2 tablets by mouth daily , Historical Med      Blood Glucose Monitoring Suppl (ACCU-CHEK GUIDE) w/Device KIT Checking 2 times a day, Starting Thu 5/9/2019, Historical Med      fluvoxaMINE (LUVOX) 100 mg tablet Take 1 tablet (100 mg total) by mouth 2 (two) times a day, Starting Fri 10/23/2020, Normal      Galcanezumab-gnlm 120 MG/ML SOAJ Inject 120 mg under the skin every 30 (thirty) days, Starting Wed 11/4/2020, Normal      Lidocaine Viscous HCl (XYLOCAINE) 2 % mucosal solution Swish and swallow 15 mL 3 (three) times a day as needed for mouth pain or discomfort, Starting Tue 1/5/2021, Normal      sucralfate (CARAFATE) 1 g tablet Take 1 tablet (1 g total) by mouth 4 (four) times a day for 5 days, Starting Tue 1/5/2021, Until Sun 1/10/2021, Normal      ADMELOG SOLOSTAR 100 units/mL injection pen Inject 10 Units under the skin 3 (three) times a day with meals , Starting Sat 1/12/2019, Historical Med      albuterol (PROVENTIL HFA,VENTOLIN HFA) 90 mcg/act inhaler Inhale 2 puffs 4 (four) times a day, Starting Sat 1/11/2020, Normal      atorvastatin (LIPITOR) 20 mg tablet Take 1 tablet (20 mg total) by mouth daily, Starting Fri 10/23/2020, Normal      cyclobenzaprine (FLEXERIL) 5 mg tablet Take 1 tablet (5 mg total) by mouth daily at bedtime, Starting Fri 11/13/2020, Normal      ergocalciferol (VITAMIN D2) 50,000 units Take 1 capsule (50,000 Units total) by mouth once a week, Starting Fri 10/23/2020, Normal      insulin glargine (Basaglar KwikPen) 100 units/mL injection pen Inject 50 Units under the skin daily at bedtime, Starting Sat 11/28/2020, No Print      Insulin Pen Needle (PEN NEEDLES 3/16") 31G X 5 MM MISC by Does not apply route 4 (four) times a day, Starting Tue 10/2/2018, Normal      levothyroxine 100 mcg tablet Take 1 tablet (100 mcg total) by mouth daily, Starting Fri 6/7/2019, Normal      losartan (COZAAR) 50 mg tablet Take 2 tablets (100 mg total) by mouth daily, Starting Sat 11/28/2020, Normal      metoclopramide (REGLAN) 10 mg tablet Take 1 tablet (10 mg total) by mouth every 6 (six) hours, Starting u 1/7/2021, Normal metoprolol succinate (TOPROL-XL) 25 mg 24 hr tablet Take 1 tablet (25 mg total) by mouth daily, Starting Fri 10/23/2020, Normal      OLANZapine (ZyPREXA) 2 5 mg tablet Take 1 tablet (2 5 mg total) by mouth daily at bedtime, Starting Sat 11/28/2020, Normal      ondansetron (ZOFRAN) 4 mg tablet Take 1 tablet (4 mg total) by mouth every 8 (eight) hours as needed for nausea or vomiting, Starting Tue 1/5/2021, Normal      pantoprazole (PROTONIX) 40 mg tablet take 1 tablet by mouth twice a day, Normal      prochlorperazine (COMPAZINE) 10 mg tablet Take 1 tablet (10 mg total) by mouth every 6 (six) hours as needed (migraine), Starting Thu 10/22/2020, Normal           No discharge procedures on file      PDMP Review     None          ED Provider  Electronically Signed by           Perlita Bryant MD  01/12/21 9940

## 2021-01-13 ENCOUNTER — TELEPHONE (OUTPATIENT)
Dept: ADMINISTRATIVE | Facility: OTHER | Age: 41
End: 2021-01-13

## 2021-01-13 NOTE — TELEPHONE ENCOUNTER
----- Message from [facility] sent at 1/12/2021  2:27 PM EST -----  Regarding: DM eye exam  01/12/21 2:28 PM    Hello, our patient Radha Sagastume has had Diabetic Eye Exam completed/performed  Please assist in updating the patient chart by making an External outreach to [address] located in [address]  The date of service is 4/5 months ago    Phone number [phone]      Thank you,  [facility]   [address]

## 2021-01-13 NOTE — LETTER
Diabetic Eye Exam Form    Date Requested: 21  Patient: Jalen Lui  Patient : 1980   Referring Provider: Paresh Quiroz MD    Dilated Retinal Exam, Optomap-Iris Exam, or Fundus Photography Done         If  Yes ([place] which type above)         No     Date of Diabetic Eye Exam ______________________________  Left Eye      Exam did show retinopathy    Exam did not show retinopathy         Mild       Moderate       None       Proliferative       Severe     Right Eye     Exam did show retinopathy    Exam did not show retinopathy         Mild       Moderate       None       Proliferative       Severe     Comments _appx 4-5 mo ago _________________________________________________________    Practice Providing Exam ______________________________________________    Exam Performed By (print name) _______________________________________      Provider Signature ___________________________________________________      These reports are needed for  compliance    Please fax this completed form and a copy of the Diabetic Eye Exam report to our office located at [place] [address] as soon as possible to [phone] attention Josselin: Phone [phone]    We thank you for your assistance in treating our mutual patient

## 2021-01-13 NOTE — TELEPHONE ENCOUNTER
Upon review of the In Basket request and the patient's chart, initial outreach has been made via fax, please see Contacts section for details        Att x1 Eye     Thank you  Tamra Mauro

## 2021-01-13 NOTE — LETTER
Diabetic Eye Exam Form    Date Requested: 21  Patient: Radha Sagastume  Patient : 1980   Referring Provider: Lawrence Rodriguez MD    Dilated Retinal Exam, Optomap-Iris Exam, or Fundus Photography Done         If   Yes ([place] which one  above)         No     Date of Diabetic Eye Exam ______________________________  Left Eye      Exam did show retinopathy    Exam did not show retinopathy         Mild       Moderate       None       Proliferative       Severe     Right Eye     Exam did show retinopathy    Exam did not show retinopathy         Mild       Moderate       None       Proliferative       Severe     Comments __Please fill out all areas - [place] which type of DM exam performed ________________________________________________________    Practice Providing Exam ______________________________________________    Exam Performed By (print name) _______________________________________      Provider Signature ___________________________________________________      These reports are needed for  compliance    Please fax this completed form and a copy of the Diabetic Eye Exam report to our office located at [place] [address] as soon as possible to [phone] mely Schwab: Phone [phone]    We thank you for your assistance in treating our mutual patient

## 2021-01-18 DIAGNOSIS — E11.9 TYPE 2 DIABETES MELLITUS WITHOUT COMPLICATION, UNSPECIFIED WHETHER LONG TERM INSULIN USE (HCC): ICD-10-CM

## 2021-01-18 RX ORDER — INSULIN LISPRO 100 U/ML
10 INJECTION, SOLUTION SUBCUTANEOUS
Qty: 5 PEN | Refills: 0 | Status: CANCELLED | OUTPATIENT
Start: 2021-01-18

## 2021-01-18 NOTE — TELEPHONE ENCOUNTER
PT called and stated pharmacy called because they are trying to refill his Admelog solostar insulin pens and keeps kicking back as needing a prior auth  Can we please work on this

## 2021-01-19 NOTE — TELEPHONE ENCOUNTER
I called pharmacy to see what might be covered in place of admelog  The pharmacist said the generic admelog which is Insulin lispro would be covered  I see by the script it was sent for BÁRB[place]   I spoke with Hema Reyes and she was ok prescribing generic which I did call pharmacy and approved the generic as originally prescibed

## 2021-01-20 NOTE — TELEPHONE ENCOUNTER
As a follow-up, a second attempt has been made for outreach via fax, please see Contacts section for details       Att x2  Eye    Thank you  Tj Wills

## 2021-02-04 ENCOUNTER — APPOINTMENT (EMERGENCY)
Dept: CT IMAGING | Facility: HOSPITAL | Age: 41
End: 2021-02-04
Payer: COMMERCIAL

## 2021-02-04 ENCOUNTER — HOSPITAL ENCOUNTER (EMERGENCY)
Facility: HOSPITAL | Age: 41
Discharge: HOME | End: 2021-02-04
Attending: EMERGENCY MEDICINE | Admitting: EMERGENCY MEDICINE
Payer: COMMERCIAL

## 2021-02-04 VITALS
HEIGHT: 62 IN | TEMPERATURE: 97.9 F | SYSTOLIC BLOOD PRESSURE: 154 MMHG | WEIGHT: 280 LBS | RESPIRATION RATE: 18 BRPM | DIASTOLIC BLOOD PRESSURE: 82 MMHG | HEART RATE: 81 BPM | BODY MASS INDEX: 51.53 KG/M2 | OXYGEN SATURATION: 98 %

## 2021-02-04 DIAGNOSIS — I10 HYPERTENSION: ICD-10-CM

## 2021-02-04 DIAGNOSIS — G43.909 MIGRAINE: Primary | ICD-10-CM

## 2021-02-04 PROCEDURE — 99284 EMERGENCY DEPT VISIT MOD MDM: CPT

## 2021-02-04 PROCEDURE — 70450 CT HEAD/BRAIN W/O DYE: CPT

## 2021-02-04 PROCEDURE — 96374 THER/PROPH/DIAG INJ IV PUSH: CPT

## 2021-02-04 PROCEDURE — 96375 TX/PRO/DX INJ NEW DRUG ADDON: CPT

## 2021-02-04 PROCEDURE — G1004 CDSM NDSC: HCPCS

## 2021-02-04 PROCEDURE — 99284 EMERGENCY DEPT VISIT MOD MDM: CPT | Performed by: EMERGENCY MEDICINE

## 2021-02-04 RX ORDER — KETOROLAC TROMETHAMINE 30 MG/ML
15 INJECTION, SOLUTION INTRAMUSCULAR; INTRAVENOUS ONCE
Status: COMPLETED | OUTPATIENT
Start: 2021-02-04 | End: 2021-02-04

## 2021-02-04 RX ORDER — CLONIDINE HYDROCHLORIDE 0.1 MG/1
0.2 TABLET ORAL ONCE
Status: COMPLETED | OUTPATIENT
Start: 2021-02-04 | End: 2021-02-04

## 2021-02-04 RX ORDER — METOCLOPRAMIDE HYDROCHLORIDE 5 MG/ML
10 INJECTION INTRAMUSCULAR; INTRAVENOUS ONCE
Status: COMPLETED | OUTPATIENT
Start: 2021-02-04 | End: 2021-02-04

## 2021-02-04 RX ORDER — DIPHENHYDRAMINE HYDROCHLORIDE 50 MG/ML
25 INJECTION INTRAMUSCULAR; INTRAVENOUS ONCE
Status: COMPLETED | OUTPATIENT
Start: 2021-02-04 | End: 2021-02-04

## 2021-02-04 RX ADMIN — KETOROLAC TROMETHAMINE 15 MG: 30 INJECTION, SOLUTION INTRAMUSCULAR at 16:54

## 2021-02-04 RX ADMIN — DIPHENHYDRAMINE HYDROCHLORIDE 25 MG: 50 INJECTION, SOLUTION INTRAMUSCULAR; INTRAVENOUS at 16:10

## 2021-02-04 RX ADMIN — CLONIDINE HYDROCHLORIDE 0.2 MG: 0.1 TABLET ORAL at 16:56

## 2021-02-04 RX ADMIN — METOCLOPRAMIDE 10 MG: 5 INJECTION, SOLUTION INTRAMUSCULAR; INTRAVENOUS at 16:08

## 2021-02-04 NOTE — DISCHARGE INSTRUCTIONS
Return to ER with any new, concerning, worsening issues  Your blood pressure today was high and needs to be re-evaluated by your doctor for ongoing treatment within a week  Please make an appointment with your doctor for follow-up

## 2021-02-04 NOTE — ED NOTES
Pt's father to nurses desk multiple times  Requesting pt's bed be adjusted, requesting pillows    Same provided      Dez Coyne RN  02/04/21 4256

## 2021-02-04 NOTE — ED PROVIDER NOTES
History  Chief Complaint   Patient presents with    Headache     states seeing flashes of lights, nausea, woke up with migraine symptoms      39-year-old male presents emergency department complaining of headache  Patient notes that he woke up today around noon and was having a significant headache in the occipital region of his head as well as  Flashes of light that were interfering with his vision  Patient notes he has a history of chronic migraines, and sees Dr Renae Light, who is his neurologist   Patient notes he usually takes Excedrin or Advil, but because of vomiting today he was unable to take his medication  Patient notes he no longer has any vision changes but notes just a headache and nausea  Patient has a history of coming to the emergency department for headaches in the past and has been given migraine cocktails for this  Denies any fever, rash, or other neurologic complaints  Prior to Admission Medications   Prescriptions Last Dose Informant Patient Reported? Taking? ACCU-CHEK FASTCLIX LANCETS MISC  Self Yes No   Si (four) times a day Not checking 4 times a day   Patient stated maybe 2 times a day   ACCU-CHEK GUIDE test strip  Self Yes No   Si (four) times a day Test   Admelog SoloStar 100 units/mL injection pen   No No   Sig: Inject 10 Units under the skin 3 (three) times a day with meals   Blood Glucose Monitoring Suppl (ACCU-CHEK GUIDE) w/Device KIT  Self Yes No   Sig: Checking 2 times a day   Galcanezumab-gnlm 120 MG/ML SOAJ   No No   Sig: Inject 120 mg under the skin every 30 (thirty) days   Insulin Pen Needle (Pen Needles 3/16") 31G X 5 MM MISC   No No   Sig: Use 4 (four) times a day   Lidocaine Viscous HCl (XYLOCAINE) 2 % mucosal solution   No No   Sig: Swish and swallow 15 mL 3 (three) times a day as needed for mouth pain or discomfort   OLANZapine (ZyPREXA) 2 5 mg tablet   No No   Sig: Take 1 tablet (2 5 mg total) by mouth daily at bedtime   albuterol (PROVENTIL HFA,VENTOLIN HFA) 90 mcg/act inhaler   No No   Sig: Inhale 2 puffs 4 (four) times a day   aspirin-acetaminophen-caffeine (EXCEDRIN MIGRAINE) 250-250-65 MG per tablet  Self Yes No   Sig: Take 2 tablets by mouth daily    atorvastatin (LIPITOR) 20 mg tablet   No No   Sig: Take 1 tablet (20 mg total) by mouth daily   cyclobenzaprine (FLEXERIL) 5 mg tablet   No No   Sig: Take 1 tablet (5 mg total) by mouth daily at bedtime   ergocalciferol (VITAMIN D2) 50,000 units   No No   Sig: Take 1 capsule (50,000 Units total) by mouth once a week   fluvoxaMINE (LUVOX) 100 mg tablet   No No   Sig: Take 1 tablet (100 mg total) by mouth 2 (two) times a day   Patient not taking: Reported on 1/12/2021   insulin glargine (Basaglar KwikPen) 100 units/mL injection pen   No No   Sig: Inject 50 Units under the skin daily at bedtime   levothyroxine 100 mcg tablet   No No   Sig: Take 1 tablet (100 mcg total) by mouth daily   losartan (COZAAR) 50 mg tablet   No No   Sig: Take 2 tablets (100 mg total) by mouth daily   metoclopramide (REGLAN) 10 mg tablet   No No   Sig: Take 1 tablet (10 mg total) by mouth every 6 (six) hours   metoprolol succinate (TOPROL-XL) 25 mg 24 hr tablet   No No   Sig: Take 1 tablet (25 mg total) by mouth daily   ondansetron (ZOFRAN) 4 mg tablet   No No   Sig: Take 1 tablet (4 mg total) by mouth every 8 (eight) hours as needed for nausea or vomiting   pantoprazole (PROTONIX) 40 mg tablet   No No   Sig: Take 1 tablet (40 mg total) by mouth 2 (two) times a day   prochlorperazine (COMPAZINE) 10 mg tablet   No No   Sig: Take 1 tablet (10 mg total) by mouth every 6 (six) hours as needed (migraine)   sucralfate (CARAFATE) 1 g tablet   No No   Sig: Take 1 tablet (1 g total) by mouth 4 (four) times a day for 5 days   sucralfate (CARAFATE) 1 g/10 mL suspension   No No   Sig: Take 10 mL (1 g total) by mouth 4 (four) times a day      Facility-Administered Medications: None       Past Medical History:   Diagnosis Date    Acute bronchitis due to other specified organisms 7/5/2019    Chronic headaches     Diabetes mellitus ([address] )     Disease of thyroid gland     Esophagitis     Gastritis     Gastroparesis     GERD (gastroesophageal reflux disease)     Hypertension     Migraine     Obesity     Obsessive compulsive disorder     Psychiatric disorder     Schizoaffective disorder [facility])        Past Surgical History:   Procedure Laterality Date    ESOPHAGOGASTRODUODENOSCOPY N/A 1/15/2019    Procedure: ESOPHAGOGASTRODUODENOSCOPY (EGD); Surgeon: Guido Covarrubias MD;  Location[facility]; Service: Gastroenterology       Family History   Problem Relation Age of Onset    COPD Mother     Hypertension Mother     Heart failure Mother     Rheum arthritis Family     Heart disease Family     Hypertension Father     Diabetes Father     Hyperlipidemia Father      I have reviewed and agree with the history as documented  E-Cigarette/Vaping    E-Cigarette Use Never User      E-Cigarette/Vaping Substances    Nicotine No     THC No     CBD No     Flavoring No      Social History     Tobacco Use    Smoking status: Never Smoker    Smokeless tobacco: Never Used   Substance Use Topics    Alcohol use: No    Drug use: No       Review of Systems   Constitutional: Positive for activity change and fever  Negative for fatigue  HENT: Negative for ear pain and facial swelling  Respiratory: Negative for chest tightness and shortness of breath  Cardiovascular: Negative for chest pain  Gastrointestinal: Negative for abdominal pain  Genitourinary: Negative for dysuria  Musculoskeletal: Negative for arthralgias  Neurological: Positive for headaches  Negative for dizziness, tremors, syncope, speech difficulty and weakness  Psychiatric/Behavioral: Negative for confusion and self-injury  Physical Exam  Physical Exam  Constitutional:       General: He is not in acute distress  Appearance: Normal appearance   He is not ill-appearing  HENT:      Head: Normocephalic and atraumatic  Right Ear: External ear normal       Left Ear: External ear normal       Nose: Nose normal    Eyes:      Extraocular Movements: Extraocular movements intact  Conjunctiva/sclera: Conjunctivae normal    Neck:      Musculoskeletal: Normal range of motion and neck supple  Cardiovascular:      Rate and Rhythm: Normal rate and regular rhythm  Heart sounds: No murmur  Pulmonary:      Effort: Pulmonary effort is normal       Breath sounds: Normal breath sounds  Abdominal:      General: Abdomen is flat  Palpations: Abdomen is soft  Skin:     General: Skin is warm and dry  Capillary Refill: Capillary refill takes less than 2 seconds  Neurological:      General: No focal deficit present  Mental Status: He is alert  Mental status is at baseline  He is disoriented  Cranial Nerves: No cranial nerve deficit  Sensory: No sensory deficit  Motor: No weakness        Coordination: Coordination abnormal       Gait: Gait normal    Psychiatric:         Mood and Affect: Mood normal          Vital Signs  ED Triage Vitals   Temperature Pulse Respirations Blood Pressure SpO2   02/04/21 1538 02/04/21 1538 02/04/21 1538 02/04/21 1538 02/04/21 1538   97 9 °F (36 6 °C) 87 18 (!) 201/121 98 %      Temp src Heart Rate Source Patient Position - Orthostatic VS BP Location FiO2 (%)   -- 02/04/21 1538 02/04/21 1640 02/04/21 1640 --    Monitor Lying Right arm       Pain Score       02/04/21 1538       5           Vitals:    02/04/21 1539 02/04/21 1640 02/04/21 1732 02/04/21 1814   BP: (!) 201/117 (!) 199/118 (!) 189/110 154/82   Pulse:  85 79 81   Patient Position - Orthostatic VS:  Lying  Lying         Visual Acuity      ED Medications  Medications   diphenhydrAMINE (BENADRYL) injection 25 mg (25 mg Intravenous Given 2/4/21 1610)   metoclopramide (REGLAN) injection 10 mg (10 mg Intravenous Given 2/4/21 1608)   cloNIDine (CATAPRES) tablet 0 2 mg (0 2 mg Oral Given 2/4/21 1656)   ketorolac (TORADOL) injection 15 mg (15 mg Intravenous Given 2/4/21 1654)       Diagnostic Studies  Results Reviewed     None                 CT head without contrast   Final Result by Chrissy Luz MD (02/04 1639)      No acute intracranial abnormality  Workstation performed: [id number]                    Procedures  Procedures         ED Course  ED Course as of Feb 04 2112   Thu Feb 04, 2021   1734 Patient notes he is feels improved, headache is only a 1 to 1 5/10  No more nausea      1812 Blood pressure has normalized over the time in the emergency department with medication  Patient will be discharged in urged to follow-up with his family doctor within a week for blood pressure recheck and further management  MDM    Disposition  Final diagnoses:   Migraine   Hypertension     Time reflects when diagnosis was documented in both MDM as applicable and the Disposition within this note     Time User Action Codes Description Comment    2/4/2021  6:12 PM Angely Joyce [G26 [id number]] Migraine     2/4/2021  6:13 PM Adry Squires [I10] Hypertension       ED Disposition     ED Disposition Condition Date/Time Comment    Discharge Stable Thu Feb 4, 2021  6:12 PM Madalyn Castle discharge to home/self care  Follow-up Information     Follow up With Specialties Details Why Άγιος Γεώργιος MD [facility] In 1 week  [address] [facility]  [phone]            Discharge Medication List as of 2/4/2021  6:14 PM      CONTINUE these medications which have NOT CHANGED    Details   ACCU-CHEK FASTCLIX LANCETS MISC 4 (four) times a day Not checking 4 times a day   Patient stated maybe 2 times a day, Starting Wed 5/8/2019, Historical Med      ACCU-CHEK GUIDE test strip 4 (four) times a day Test, Starting Wed 5/8/2019, Historical Med      Admelog SoloStar 100 units/mL injection pen Inject 10 Units under the skin 3 (three) times a day with meals, Starting Tue 1/12/2021, Normal      albuterol (PROVENTIL HFA,VENTOLIN HFA) 90 mcg/act inhaler Inhale 2 puffs 4 (four) times a day, Starting Tue 1/12/2021, Normal      aspirin-acetaminophen-caffeine (EXCEDRIN MIGRAINE) 250-250-65 MG per tablet Take 2 tablets by mouth daily , Historical Med      atorvastatin (LIPITOR) 20 mg tablet Take 1 tablet (20 mg total) by mouth daily, Starting Tue 1/12/2021, Normal      Blood Glucose Monitoring Suppl (ACCU-CHEK GUIDE) w/Device KIT Checking 2 times a day, Starting Thu 5/9/2019, Historical Med      cyclobenzaprine (FLEXERIL) 5 mg tablet Take 1 tablet (5 mg total) by mouth daily at bedtime, Starting Tue 1/12/2021, Normal      ergocalciferol (VITAMIN D2) 50,000 units Take 1 capsule (50,000 Units total) by mouth once a week, Starting Tue 1/12/2021, Normal      fluvoxaMINE (LUVOX) 100 mg tablet Take 1 tablet (100 mg total) by mouth 2 (two) times a day, Starting Fri 10/23/2020, Normal      Galcanezumab-gnlm 120 MG/ML SOAJ Inject 120 mg under the skin every 30 (thirty) days, Starting Wed 11/4/2020, Normal      insulin glargine (Basaglar KwikPen) 100 units/mL injection pen Inject 50 Units under the skin daily at bedtime, Starting Tue 1/12/2021, No Print      Insulin Pen Needle (Pen Needles 3/16") 31G X 5 MM MISC Use 4 (four) times a day, Starting Tue 1/12/2021, Normal      levothyroxine 100 mcg tablet Take 1 tablet (100 mcg total) by mouth daily, Starting Tue 1/12/2021, Normal      Lidocaine Viscous HCl (XYLOCAINE) 2 % mucosal solution Swish and swallow 15 mL 3 (three) times a day as needed for mouth pain or discomfort, Starting Tue 1/5/2021, Normal      losartan (COZAAR) 50 mg tablet Take 2 tablets (100 mg total) by mouth daily, Starting Tue 1/12/2021, Normal      metoclopramide (REGLAN) 10 mg tablet Take 1 tablet (10 mg total) by mouth every 6 (six) hours, Starting Tue 1/12/2021, Normal metoprolol succinate (TOPROL-XL) 25 mg 24 hr tablet Take 1 tablet (25 mg total) by mouth daily, Starting Tue 1/12/2021, Normal      OLANZapine (ZyPREXA) 2 5 mg tablet Take 1 tablet (2 5 mg total) by mouth daily at bedtime, Starting Tue 1/12/2021, Normal      ondansetron (ZOFRAN) 4 mg tablet Take 1 tablet (4 mg total) by mouth every 8 (eight) hours as needed for nausea or vomiting, Starting Tue 1/12/2021, Normal      pantoprazole (PROTONIX) 40 mg tablet Take 1 tablet (40 mg total) by mouth 2 (two) times a day, Starting Tue 1/12/2021, Normal      prochlorperazine (COMPAZINE) 10 mg tablet Take 1 tablet (10 mg total) by mouth every 6 (six) hours as needed (migraine), Starting Tue 1/12/2021, Normal      sucralfate (CARAFATE) 1 g tablet Take 1 tablet (1 g total) by mouth 4 (four) times a day for 5 days, Starting Tue 1/5/2021, Until Sun 1/10/2021, Normal      sucralfate (CARAFATE) 1 g/10 mL suspension Take 10 mL (1 g total) by mouth 4 (four) times a day, Starting Tue 1/12/2021, Normal           No discharge procedures on file      PDMP Review     None          ED Provider  Electronically Signed by           Sofiya Watson DO  02/04/21 [id number]

## 2021-02-05 ENCOUNTER — OFFICE VISIT (OUTPATIENT)
Dept: GASTROENTEROLOGY | Facility: AMBULARY SURGERY CENTER | Age: 41
End: 2021-02-05
Payer: COMMERCIAL

## 2021-02-05 VITALS — RESPIRATION RATE: 18 BRPM | HEIGHT: 62 IN | BODY MASS INDEX: 50.24 KG/M2 | WEIGHT: 273 LBS

## 2021-02-05 DIAGNOSIS — R11.2 NAUSEA AND VOMITING, INTRACTABILITY OF VOMITING NOT SPECIFIED, UNSPECIFIED VOMITING TYPE: Primary | ICD-10-CM

## 2021-02-05 DIAGNOSIS — R10.84 GENERALIZED ABDOMINAL PAIN: ICD-10-CM

## 2021-02-05 DIAGNOSIS — R11.2 NAUSEA AND VOMITING: ICD-10-CM

## 2021-02-05 PROCEDURE — 99214 OFFICE O/P EST MOD 30 MIN: CPT | Performed by: PHYSICIAN ASSISTANT

## 2021-02-05 RX ORDER — LIDOCAINE HYDROCHLORIDE 20 MG/ML
15 SOLUTION OROPHARYNGEAL 4 TIMES DAILY PRN
Qty: 100 ML | Refills: 0 | Status: SHIPPED | OUTPATIENT
Start: 2021-02-05 | End: 2021-06-03 | Stop reason: HOSPADM

## 2021-02-05 RX ORDER — FAMOTIDINE 20 MG/1
40 TABLET, FILM COATED ORAL EVERY EVENING
Qty: 60 TABLET | Refills: 5 | Status: SHIPPED | OUTPATIENT
Start: 2021-02-05 | End: 2021-06-03 | Stop reason: HOSPADM

## 2021-02-05 NOTE — ASSESSMENT & PLAN NOTE
Worsening recurrent nausea and vomiting, and acid reflux symptoms, despite use of Protonix twice daily  Patient did not have gastric emptying study after last EGD over 2 years ago, also endorses regular NSAID use recently for migraines    Suspect gastroparesis but cannot exclude peptic ulcer disease at this time     - will schedule patient for EGD     - patient was advised regarding risks and benefits of the procedure, risks including but not limited to infection, perforation and bleeding     - will add Pepcid 20 mg to take 30 minutes before bedtime, continue PPI twice daily     -can use viscous lidocaine as needed, patient reports this helped with his symptoms when given in the emergency room    -advised patient about minimizing or avoiding NSAID use, discussing with his neurologist with regards to alternatives that can be used for his migraines    - if EGD is unremarkable and patient has persistent symptoms, then plan for gastric emptying study

## 2021-02-05 NOTE — PROGRESS NOTES
Follow-up Note -  [facility]  Maricarmen Session 1980 41 y o  male         Reason:    Follow-up; recurrent nausea and vomiting, acid reflux    HPI:  52-year-old male with history of schizoaffective disorder and insulin-dependent diabetes mellitus, morbid obesity who presents for follow-up; he was last seen by our service for evaluation of reflux and recurrent nausea and vomiting, underwent EGD in January 2019 showing some gastritis negative for H pylori, duodenal biopsies negative for celiac disease  He was recommended for gastric emptying study at that time but did not follow up for that  At this time, patient reports he has been having increased GI issues for the last several weeks, he was seen in the emergency room 4 times in the last month including once yesterday for migraines  He takes Excedrin/Advil regularly, he says he has been taking Protonix twice daily for a long time, was also prescribed Carafate from the ER, as well as Reglan, which he reports to be taking once a day at this time  He says he has relatively poor appetite, sometimes notes food getting stuck with swallowing, notes a bitter taste with his foods  Occasionally feels nauseous, notes that Zofran and Compazine had not been effective for controlling nausea  Occasional abdominal pain associated with the nausea  Denies any rectal bleeding or melena, he has bowel movements about once every 3-4 days, which is more constipated than usually is, this has been the case for a couple of months  Patient denies any alcohol or marijuana use  REVIEW OF SYSTEMS:      CONSTITUTIONAL: Denies any fever, chills, or rigors  Good appetite, and no recent weight loss  HEENT: No earache or tinnitus  Denies hearing loss or visual disturbances  CARDIOVASCULAR: No chest pain or palpitations  RESPIRATORY: Denies any cough, hemoptysis, shortness of breath or dyspnea on exertion    GASTROINTESTINAL: As noted in the History of Present Illness  GENITOURINARY: No problems with urination  Denies any hematuria or dysuria  NEUROLOGIC: No dizziness or vertigo, denies headaches  MUSCULOSKELETAL: Denies any muscle or joint pain  SKIN: Denies skin rashes or itching  ENDOCRINE: Denies excessive thirst  Denies intolerance to heat or cold  PSYCHOSOCIAL: Denies depression or anxiety  Denies any recent memory loss  Past Medical History:   Diagnosis Date    Acute bronchitis due to other specified organisms 7/5/2019    Chronic headaches     Diabetes mellitus ([address] )     Disease of thyroid gland     Esophagitis     Gastritis     Gastroparesis     GERD (gastroesophageal reflux disease)     Hypertension     Migraine     Obesity     Obsessive compulsive disorder     Psychiatric disorder     Schizoaffective disorder [facility])       Past Surgical History:   Procedure Laterality Date    ESOPHAGOGASTRODUODENOSCOPY N/A 1/15/2019    Procedure: ESOPHAGOGASTRODUODENOSCOPY (EGD); Surgeon: Barb Horton MD;  Location[facility];   Service: Gastroenterology     Social History     Socioeconomic History    Marital status: Single     Spouse name: Not on file    Number of children: Not on file    Years of education: Not on file    Highest education level: Not on file   Occupational History    Not on file   Social Needs    Financial resource strain: Not on file    Food insecurity     Worry: Not on file     Inability: Not on file    Transportation needs     Medical: Not on file     Non-medical: Not on file   Tobacco Use    Smoking status: Never Smoker    Smokeless tobacco: Never Used   Substance and Sexual Activity    Alcohol use: No    Drug use: No    Sexual activity: Not Currently   Lifestyle    Physical activity     Days per week: Not on file     Minutes per session: Not on file    Stress: Not on file   Relationships    Social connections     Talks on phone: Not on file     Gets together: Not on file     Attends Worship service: Not on file Active member of club or organization: Not on file     Attends meetings of clubs or organizations: Not on file     Relationship status: Not on file    Intimate partner violence     Fear of current or ex partner: Not on file     Emotionally abused: Not on file     Physically abused: Not on file     Forced sexual activity: Not on file   Other Topics Concern    Not on file   Social History Narrative    Not on file     Family History   Problem Relation Age of Onset    COPD Mother     Hypertension Mother     Heart failure Mother     Rheum arthritis Family     Heart disease Family     Hypertension Father     Diabetes Father     Hyperlipidemia Father      Augmentin [amoxicillin-pot clavulanate], Amoxicillin, Clavulanic acid, and Erythromycin  Current Outpatient Medications   Medication Sig Dispense Refill    ACCU-CHEK FASTCLIX LANCETS MISC 4 (four) times a day Not checking 4 times a day   Patient stated maybe 2 times a day  1    ACCU-CHEK GUIDE test strip 4 (four) times a day Test  1    Admelog SoloStar 100 units/mL injection pen Inject 10 Units under the skin 3 (three) times a day with meals 5 pen 0    albuterol (PROVENTIL HFA,VENTOLIN HFA) 90 mcg/act inhaler Inhale 2 puffs 4 (four) times a day 8 g 0    aspirin-acetaminophen-caffeine (EXCEDRIN MIGRAINE) 250-250-65 MG per tablet Take 2 tablets by mouth daily       atorvastatin (LIPITOR) 20 mg tablet Take 1 tablet (20 mg total) by mouth daily 30 tablet 5    Blood Glucose Monitoring Suppl (ACCU-CHEK GUIDE) w/Device KIT Checking 2 times a day  0    cyclobenzaprine (FLEXERIL) 5 mg tablet Take 1 tablet (5 mg total) by mouth daily at bedtime 30 tablet 0    ergocalciferol (VITAMIN D2) 50,000 units Take 1 capsule (50,000 Units total) by mouth once a week 8 capsule 0    Galcanezumab-gnlm 120 MG/ML SOAJ Inject 120 mg under the skin every 30 (thirty) days 1 pen 11    insulin glargine (Basaglar KwikPen) 100 units/mL injection pen Inject 50 Units under the skin daily at bedtime 5 pen 0    Insulin Pen Needle (Pen Needles 3/16") 31G X 5 MM MISC Use 4 (four) times a day 200 each 3    levothyroxine 100 mcg tablet Take 1 tablet (100 mcg total) by mouth daily 30 tablet 3    Lidocaine Viscous HCl (XYLOCAINE) 2 % mucosal solution Swish and swallow 15 mL 3 (three) times a day as needed for mouth pain or discomfort 100 mL 0    losartan (COZAAR) 50 mg tablet Take 2 tablets (100 mg total) by mouth daily 60 tablet 0    metoclopramide (REGLAN) 10 mg tablet Take 1 tablet (10 mg total) by mouth every 6 (six) hours 30 tablet 0    metoprolol succinate (TOPROL-XL) 25 mg 24 hr tablet Take 1 tablet (25 mg total) by mouth daily 30 tablet 0    OLANZapine (ZyPREXA) 2 5 mg tablet Take 1 tablet (2 5 mg total) by mouth daily at bedtime 30 tablet 0    ondansetron (ZOFRAN) 4 mg tablet Take 1 tablet (4 mg total) by mouth every 8 (eight) hours as needed for nausea or vomiting 12 tablet 0    pantoprazole (PROTONIX) 40 mg tablet Take 1 tablet (40 mg total) by mouth 2 (two) times a day 60 tablet 0    prochlorperazine (COMPAZINE) 10 mg tablet Take 1 tablet (10 mg total) by mouth every 6 (six) hours as needed (migraine) 10 tablet 0    sucralfate (CARAFATE) 1 g tablet Take 1 tablet (1 g total) by mouth 4 (four) times a day for 5 days 20 tablet 0    fluvoxaMINE (LUVOX) 100 mg tablet Take 1 tablet (100 mg total) by mouth 2 (two) times a day (Patient not taking: Reported on 1/12/2021) 60 tablet 1    sucralfate (CARAFATE) 1 g/10 mL suspension Take 10 mL (1 g total) by mouth 4 (four) times a day (Patient not taking: Reported on 2/5/2021) 420 mL 0     No current facility-administered medications for this visit  Resp  rate 18, height 5' 2" (1 575 m), weight 124 kg (273 lb)      PHYSICAL EXAM:      General Appearance:   Alert, cooperative, no distress, appears stated age    HEENT:   Normocephalic, atraumatic, anicteric      Neck:  Supple, symmetrical, trachea midline, no adenopathy;    thyroid: no enlargement/tenderness/nodules; no carotid  bruit or JVD    Lungs:   Clear to auscultation bilaterally; no rales, rhonchi or wheezing; respirations unlabored    Heart[de-identified]   S1 and S2 normal; regular rate and rhythm; no murmur, rub, or gallop  Abdomen:   Soft, non-tender, non-distended; normal bowel sounds; no masses, no organomegaly    Extremities: No edema, erythema, wounds, rashes   Rectal:   Deferred                      Lab Results   Component Value Date    WBC 8 40 01/11/2021    HGB 11 9 (L) 01/11/2021    HCT 36 0 (L) 01/11/2021    MCV 85 01/11/2021     (H) 01/11/2021     Lab Results   Component Value Date    CALCIUM 9 0 01/11/2021     (L) 04/11/2018    K 4 8 01/11/2021    CO2 29 01/11/2021    CL 95 (L) 01/11/2021    BUN 29 (H) 01/11/2021    CREATININE 1 59 (H) 01/11/2021     Lab Results   Component Value Date    ALT 13 01/11/2021    AST 19 01/11/2021    ALKPHOS 61 01/11/2021    BILITOT 0 2 04/11/2018     Lab Results   Component Value Date    INR 1 06 01/11/2021    INR 1 14 11/25/2020    INR 1 04 06/20/2020    PROTIME 13 7 01/11/2021    PROTIME 14 6 (H) 11/25/2020    PROTIME 13 1 06/20/2020       Ct Head Without Contrast    Result Date: 2/4/2021  Impression: No acute intracranial abnormality  Workstation performed: [id number]       ASSESSMENT & PLAN:    No problem-specific Assessment & Plan notes found for this encounter

## 2021-02-10 ENCOUNTER — TELEPHONE (OUTPATIENT)
Dept: GASTROENTEROLOGY | Facility: CLINIC | Age: 41
End: 2021-02-10

## 2021-02-10 NOTE — TELEPHONE ENCOUNTER
Itzel -     Does pt need to be scoped in hospital? He was given 3/11 which was a hospital day for Dr Juliana Manning   but no longer isn't   Can he be given the next available date as Dr Juliana Manning next hospital day is 4/5/21

## 2021-02-12 ENCOUNTER — HOSPITAL ENCOUNTER (OUTPATIENT)
Facility: HOSPITAL | Age: 41
Setting detail: OBSERVATION
Discharge: HOME | End: 2021-02-13
Attending: FAMILY MEDICINE | Admitting: FAMILY MEDICINE
Payer: COMMERCIAL

## 2021-02-12 ENCOUNTER — APPOINTMENT (EMERGENCY)
Dept: RADIOLOGY | Facility: HOSPITAL | Age: 41
End: 2021-02-12
Payer: COMMERCIAL

## 2021-02-12 DIAGNOSIS — R51.9 HEADACHE: ICD-10-CM

## 2021-02-12 DIAGNOSIS — I16.0 HYPERTENSIVE URGENCY: Primary | ICD-10-CM

## 2021-02-12 DIAGNOSIS — N17.9 ACUTE RENAL FAILURE (ARF) (HCC): ICD-10-CM

## 2021-02-12 DIAGNOSIS — I16.1 HYPERTENSIVE EMERGENCY: ICD-10-CM

## 2021-02-12 DIAGNOSIS — N18.31 STAGE 3A CHRONIC KIDNEY DISEASE (HCC): ICD-10-CM

## 2021-02-12 DIAGNOSIS — R77.8 ELEVATED TROPONIN: ICD-10-CM

## 2021-02-12 LAB
ANION GAP SERPL CALCULATED.3IONS-SCNC: 13 MMOL/L (ref 4–13)
ATRIAL RATE: 90 BPM
BASOPHILS # BLD AUTO: 0.1 THOUSANDS/ΜL (ref 0–0.1)
BASOPHILS NFR BLD AUTO: 1 % (ref 0–2)
BNP SERPL-MCNC: 208 PG/ML (ref 1–100)
BUN SERPL-MCNC: 24 MG/DL (ref 7–25)
CALCIUM SERPL-MCNC: 9.1 MG/DL (ref 8.6–10.5)
CHLORIDE SERPL-SCNC: 98 MMOL/L (ref 98–107)
CO2 SERPL-SCNC: 27 MMOL/L (ref 21–31)
CREAT SERPL-MCNC: 1.62 MG/DL (ref 0.7–1.3)
EOSINOPHIL # BLD AUTO: 0.4 THOUSAND/ΜL (ref 0–0.61)
EOSINOPHIL NFR BLD AUTO: 4 % (ref 0–5)
ERYTHROCYTE [DISTWIDTH] IN BLOOD BY AUTOMATED COUNT: 14.1 % (ref 11.5–14.5)
GFR SERPL CREATININE-BSD FRML MDRD: 52 ML/MIN/1.73SQ M
GLUCOSE SERPL-MCNC: 167 MG/DL (ref 65–99)
GLUCOSE SERPL-MCNC: 226 MG/DL (ref 65–140)
GLUCOSE SERPL-MCNC: 294 MG/DL (ref 65–140)
HCT VFR BLD AUTO: 37.5 % (ref 42–47)
HGB BLD-MCNC: 12.8 G/DL (ref 14–18)
LYMPHOCYTES # BLD AUTO: 1.6 THOUSANDS/ΜL (ref 0.6–4.47)
LYMPHOCYTES NFR BLD AUTO: 16 % (ref 21–51)
MCH RBC QN AUTO: 28.6 PG (ref 26–34)
MCHC RBC AUTO-ENTMCNC: 34.1 G/DL (ref 31–37)
MCV RBC AUTO: 84 FL (ref 81–99)
MONOCYTES # BLD AUTO: 0.5 THOUSAND/ΜL (ref 0.17–1.22)
MONOCYTES NFR BLD AUTO: 5 % (ref 2–12)
NEUTROPHILS # BLD AUTO: 7.4 THOUSANDS/ΜL (ref 1.4–6.5)
NEUTS SEG NFR BLD AUTO: 74 % (ref 42–75)
P AXIS: 53 DEGREES
PLATELET # BLD AUTO: 317 THOUSANDS/UL (ref 149–390)
PLATELET # BLD AUTO: 356 THOUSANDS/UL (ref 149–390)
PMV BLD AUTO: 7.2 FL (ref 8.6–11.7)
PMV BLD AUTO: 7.4 FL (ref 8.6–11.7)
POTASSIUM SERPL-SCNC: 3.7 MMOL/L (ref 3.5–5.5)
PR INTERVAL: 174 MS
QRS AXIS: -19 DEGREES
QRSD INTERVAL: 98 MS
QT INTERVAL: 378 MS
QTC INTERVAL: 462 MS
RBC # BLD AUTO: 4.46 MILLION/UL (ref 4.3–5.9)
SODIUM SERPL-SCNC: 138 MMOL/L (ref 134–143)
T WAVE AXIS: 73 DEGREES
TROPONIN I SERPL-MCNC: 0.03 NG/ML
TROPONIN I SERPL-MCNC: 0.04 NG/ML
TROPONIN I SERPL-MCNC: <0.03 NG/ML
VENTRICULAR RATE: 90 BPM
WBC # BLD AUTO: 9.9 THOUSAND/UL (ref 4.8–10.8)

## 2021-02-12 PROCEDURE — 36415 COLL VENOUS BLD VENIPUNCTURE: CPT | Performed by: FAMILY MEDICINE

## 2021-02-12 PROCEDURE — 99220 PR INITIAL OBSERVATION CARE/DAY 70 MINUTES: CPT | Performed by: FAMILY MEDICINE

## 2021-02-12 PROCEDURE — 84484 ASSAY OF TROPONIN QUANT: CPT | Performed by: FAMILY MEDICINE

## 2021-02-12 PROCEDURE — 99245 OFF/OP CONSLTJ NEW/EST HI 55: CPT | Performed by: NURSE PRACTITIONER

## 2021-02-12 PROCEDURE — 93005 ELECTROCARDIOGRAM TRACING: CPT

## 2021-02-12 PROCEDURE — 85025 COMPLETE CBC W/AUTO DIFF WBC: CPT | Performed by: FAMILY MEDICINE

## 2021-02-12 PROCEDURE — 82948 REAGENT STRIP/BLOOD GLUCOSE: CPT

## 2021-02-12 PROCEDURE — 99285 EMERGENCY DEPT VISIT HI MDM: CPT

## 2021-02-12 PROCEDURE — 82570 ASSAY OF URINE CREATININE: CPT | Performed by: NURSE PRACTITIONER

## 2021-02-12 PROCEDURE — 85049 AUTOMATED PLATELET COUNT: CPT | Performed by: FAMILY MEDICINE

## 2021-02-12 PROCEDURE — 83880 ASSAY OF NATRIURETIC PEPTIDE: CPT | Performed by: FAMILY MEDICINE

## 2021-02-12 PROCEDURE — 82043 UR ALBUMIN QUANTITATIVE: CPT | Performed by: NURSE PRACTITIONER

## 2021-02-12 PROCEDURE — 84156 ASSAY OF PROTEIN URINE: CPT | Performed by: NURSE PRACTITIONER

## 2021-02-12 PROCEDURE — 84300 ASSAY OF URINE SODIUM: CPT | Performed by: NURSE PRACTITIONER

## 2021-02-12 PROCEDURE — 80048 BASIC METABOLIC PNL TOTAL CA: CPT | Performed by: FAMILY MEDICINE

## 2021-02-12 PROCEDURE — 93010 ELECTROCARDIOGRAM REPORT: CPT | Performed by: INTERNAL MEDICINE

## 2021-02-12 PROCEDURE — 71045 X-RAY EXAM CHEST 1 VIEW: CPT

## 2021-02-12 PROCEDURE — 99284 EMERGENCY DEPT VISIT MOD MDM: CPT | Performed by: FAMILY MEDICINE

## 2021-02-12 RX ORDER — CLONIDINE HYDROCHLORIDE 0.1 MG/1
0.2 TABLET ORAL ONCE
Status: COMPLETED | OUTPATIENT
Start: 2021-02-12 | End: 2021-02-12

## 2021-02-12 RX ORDER — ERGOCALCIFEROL 1.25 MG/1
50000 CAPSULE ORAL WEEKLY
Status: DISCONTINUED | OUTPATIENT
Start: 2021-02-13 | End: 2021-02-13 | Stop reason: HOSPADM

## 2021-02-12 RX ORDER — AMLODIPINE BESYLATE 5 MG/1
5 TABLET ORAL DAILY
Status: DISCONTINUED | OUTPATIENT
Start: 2021-02-12 | End: 2021-02-13 | Stop reason: HOSPADM

## 2021-02-12 RX ORDER — ATORVASTATIN CALCIUM 20 MG/1
20 TABLET, FILM COATED ORAL DAILY
Status: DISCONTINUED | OUTPATIENT
Start: 2021-02-12 | End: 2021-02-13 | Stop reason: HOSPADM

## 2021-02-12 RX ORDER — METOCLOPRAMIDE 5 MG/1
10 TABLET ORAL EVERY 6 HOURS SCHEDULED
Status: DISCONTINUED | OUTPATIENT
Start: 2021-02-12 | End: 2021-02-13 | Stop reason: HOSPADM

## 2021-02-12 RX ORDER — LABETALOL 20 MG/4 ML (5 MG/ML) INTRAVENOUS SYRINGE
20 ONCE
Status: COMPLETED | OUTPATIENT
Start: 2021-02-12 | End: 2021-02-12

## 2021-02-12 RX ORDER — ALBUTEROL SULFATE 90 UG/1
2 AEROSOL, METERED RESPIRATORY (INHALATION) 4 TIMES DAILY
Status: DISCONTINUED | OUTPATIENT
Start: 2021-02-12 | End: 2021-02-13 | Stop reason: HOSPADM

## 2021-02-12 RX ORDER — METOPROLOL SUCCINATE 50 MG/1
50 TABLET, EXTENDED RELEASE ORAL DAILY
Status: DISCONTINUED | OUTPATIENT
Start: 2021-02-12 | End: 2021-02-13 | Stop reason: HOSPADM

## 2021-02-12 RX ORDER — CYCLOBENZAPRINE HCL 10 MG
5 TABLET ORAL
Status: DISCONTINUED | OUTPATIENT
Start: 2021-02-12 | End: 2021-02-13 | Stop reason: HOSPADM

## 2021-02-12 RX ORDER — INSULIN GLARGINE 100 [IU]/ML
50 INJECTION, SOLUTION SUBCUTANEOUS
Status: DISCONTINUED | OUTPATIENT
Start: 2021-02-12 | End: 2021-02-13 | Stop reason: HOSPADM

## 2021-02-12 RX ORDER — FAMOTIDINE 20 MG/1
40 TABLET, FILM COATED ORAL EVERY EVENING
Status: DISCONTINUED | OUTPATIENT
Start: 2021-02-12 | End: 2021-02-13 | Stop reason: HOSPADM

## 2021-02-12 RX ORDER — ACETAMINOPHEN 325 MG/1
650 TABLET ORAL EVERY 6 HOURS PRN
Status: DISCONTINUED | OUTPATIENT
Start: 2021-02-12 | End: 2021-02-13 | Stop reason: HOSPADM

## 2021-02-12 RX ORDER — HYDRALAZINE HYDROCHLORIDE 20 MG/ML
10 INJECTION INTRAMUSCULAR; INTRAVENOUS EVERY 6 HOURS PRN
Status: DISCONTINUED | OUTPATIENT
Start: 2021-02-12 | End: 2021-02-13 | Stop reason: HOSPADM

## 2021-02-12 RX ORDER — LEVOTHYROXINE SODIUM 0.1 MG/1
100 TABLET ORAL
Status: DISCONTINUED | OUTPATIENT
Start: 2021-02-13 | End: 2021-02-13 | Stop reason: HOSPADM

## 2021-02-12 RX ORDER — PANTOPRAZOLE SODIUM 40 MG/1
40 TABLET, DELAYED RELEASE ORAL 2 TIMES DAILY
Status: DISCONTINUED | OUTPATIENT
Start: 2021-02-12 | End: 2021-02-13 | Stop reason: HOSPADM

## 2021-02-12 RX ORDER — MAGNESIUM HYDROXIDE/ALUMINUM HYDROXICE/SIMETHICONE 120; 1200; 1200 MG/30ML; MG/30ML; MG/30ML
30 SUSPENSION ORAL ONCE
Status: COMPLETED | OUTPATIENT
Start: 2021-02-12 | End: 2021-02-12

## 2021-02-12 RX ORDER — PROCHLORPERAZINE MALEATE 5 MG/1
10 TABLET ORAL EVERY 6 HOURS PRN
Status: DISCONTINUED | OUTPATIENT
Start: 2021-02-12 | End: 2021-02-13 | Stop reason: HOSPADM

## 2021-02-12 RX ORDER — OLANZAPINE 2.5 MG/1
2.5 TABLET ORAL
Status: DISCONTINUED | OUTPATIENT
Start: 2021-02-12 | End: 2021-02-13 | Stop reason: HOSPADM

## 2021-02-12 RX ORDER — MAGNESIUM HYDROXIDE/ALUMINUM HYDROXICE/SIMETHICONE 120; 1200; 1200 MG/30ML; MG/30ML; MG/30ML
30 SUSPENSION ORAL EVERY 4 HOURS PRN
Status: DISCONTINUED | OUTPATIENT
Start: 2021-02-12 | End: 2021-02-13 | Stop reason: HOSPADM

## 2021-02-12 RX ORDER — LOSARTAN POTASSIUM 50 MG/1
100 TABLET ORAL DAILY
Status: DISCONTINUED | OUTPATIENT
Start: 2021-02-12 | End: 2021-02-13 | Stop reason: HOSPADM

## 2021-02-12 RX ADMIN — FAMOTIDINE 40 MG: 20 TABLET ORAL at 17:02

## 2021-02-12 RX ADMIN — CYCLOBENZAPRINE HYDROCHLORIDE 5 MG: 10 TABLET, FILM COATED ORAL at 21:14

## 2021-02-12 RX ADMIN — INSULIN LISPRO 10 UNITS: 100 INJECTION, SOLUTION INTRAVENOUS; SUBCUTANEOUS at 16:59

## 2021-02-12 RX ADMIN — ALUMINUM HYDROXIDE, MAGNESIUM HYDROXIDE, AND SIMETHICONE 30 ML: 200; 200; 20 SUSPENSION ORAL at 19:47

## 2021-02-12 RX ADMIN — PROCHLORPERAZINE MALEATE 10 MG: 5 TABLET ORAL at 17:02

## 2021-02-12 RX ADMIN — PROCHLORPERAZINE MALEATE 10 MG: 5 TABLET ORAL at 16:59

## 2021-02-12 RX ADMIN — METOCLOPRAMIDE 10 MG: 5 TABLET ORAL at 17:50

## 2021-02-12 RX ADMIN — CLONIDINE HYDROCHLORIDE 0.2 MG: 0.1 TABLET ORAL at 11:12

## 2021-02-12 RX ADMIN — INSULIN GLARGINE 50 UNITS: 100 INJECTION, SOLUTION SUBCUTANEOUS at 21:14

## 2021-02-12 RX ADMIN — LABETALOL 20 MG/4 ML (5 MG/ML) INTRAVENOUS SYRINGE 20 MG: at 12:30

## 2021-02-12 RX ADMIN — ALBUTEROL SULFATE 2 PUFF: 90 AEROSOL, METERED RESPIRATORY (INHALATION) at 17:50

## 2021-02-12 RX ADMIN — HYDRALAZINE HYDROCHLORIDE 10 MG: 20 INJECTION INTRAMUSCULAR; INTRAVENOUS at 14:03

## 2021-02-12 RX ADMIN — PANTOPRAZOLE SODIUM 40 MG: 40 TABLET, DELAYED RELEASE ORAL at 17:02

## 2021-02-12 RX ADMIN — OLANZAPINE 2.5 MG: 2.5 TABLET, FILM COATED ORAL at 21:14

## 2021-02-12 RX ADMIN — ALBUTEROL SULFATE 2 PUFF: 90 AEROSOL, METERED RESPIRATORY (INHALATION) at 21:14

## 2021-02-12 RX ADMIN — INSULIN LISPRO 4 UNITS: 100 INJECTION, SOLUTION INTRAVENOUS; SUBCUTANEOUS at 21:15

## 2021-02-12 RX ADMIN — AMLODIPINE BESYLATE 5 MG: 5 TABLET ORAL at 14:03

## 2021-02-12 RX ADMIN — ALUMINUM HYDROXIDE, MAGNESIUM HYDROXIDE, AND SIMETHICONE 30 ML: 200; 200; 20 SUSPENSION ORAL at 12:29

## 2021-02-12 RX ADMIN — NICARDIPINE HYDROCHLORIDE 5 MG/HR: 2.5 INJECTION, SOLUTION INTRAVENOUS at 15:56

## 2021-02-12 RX ADMIN — ACETAMINOPHEN 650 MG: 325 TABLET ORAL at 17:01

## 2021-02-12 NOTE — ASSESSMENT & PLAN NOTE
Lab Results   Component Value Date    EGFR 52 02/12/2021    EGFR 53 01/11/2021    EGFR 49 01/06/2021    CREATININE 1 62 (H) 02/12/2021    CREATININE 1 59 (H) 01/11/2021    CREATININE 1 68 (H) 01/06/2021   Creatinine appears to be at baseline  Continue to monitor  Patient does not seem to follow with Nephrology  Will consult nephrology

## 2021-02-12 NOTE — ASSESSMENT & PLAN NOTE
Troponin 0 04  Will trend  Likely secondary to hypertensive emergency  No chest pain  EKG sinus rhythm; nonspecific ST abnormalities

## 2021-02-12 NOTE — ED PROVIDER NOTES
History  Chief Complaint   Patient presents with    Hypertension     for 3-4 days  Patient has a history of HTN and states that he has been taking his medications    Headache       History provided by:  Patient   used: No    Headache  Associated symptoms: no abdominal pain, no diarrhea, no nausea and no vomiting    This is a 69-year-old morbidly obese male with history of hypertension and diabetes presented to ED with complain of elevated blood pressure x3 days  Patient also complaining of intermittent headache for past 3 days  Patient states he has been taking his medication but having difficult time controlling his blood pressure  Denies any blurry vision or double vision at this time  Patient states that he has epigastric pain which he normally gets with his occurred  Sitting comfortably in bed answer questions appropriately  He denies any exposure to COVID-19  Denies any fever chills nausea vomiting at this time  He denies any abdominal pain at this time  Prior to Admission Medications   Prescriptions Last Dose Informant Patient Reported? Taking? ACCU-CHEK FASTCLIX LANCETS MISC  Self Yes No   Si (four) times a day Not checking 4 times a day   Patient stated maybe 2 times a day   ACCU-CHEK GUIDE test strip  Self Yes No   Si (four) times a day Test   Admelog SoloStar 100 units/mL injection pen   No No   Sig: Inject 10 Units under the skin 3 (three) times a day with meals   Blood Glucose Monitoring Suppl (ACCU-CHEK GUIDE) w/Device KIT  Self Yes No   Sig: Checking 2 times a day   Galcanezumab-gnlm 120 MG/ML SOAJ   No No   Sig: Inject 120 mg under the skin every 30 (thirty) days   Insulin Pen Needle (Pen Needles 3/16") 31G X 5 MM MISC   No No   Sig: Use 4 (four) times a day   Lidocaine Viscous HCl (XYLOCAINE) 2 % mucosal solution   No No   Sig: Swish and swallow 15 mL 4 (four) times a day as needed for mouth pain or discomfort   OLANZapine (ZyPREXA) 2 5 mg tablet   No No Sig: Take 1 tablet (2 5 mg total) by mouth daily at bedtime   albuterol (PROVENTIL HFA,VENTOLIN HFA) 90 mcg/act inhaler   No No   Sig: Inhale 2 puffs 4 (four) times a day   aspirin-acetaminophen-caffeine (EXCEDRIN MIGRAINE) 250-250-65 MG per tablet  Self Yes No   Sig: Take 2 tablets by mouth daily    atorvastatin (LIPITOR) 20 mg tablet   No No   Sig: Take 1 tablet (20 mg total) by mouth daily   cyclobenzaprine (FLEXERIL) 5 mg tablet   No No   Sig: Take 1 tablet (5 mg total) by mouth daily at bedtime   ergocalciferol (VITAMIN D2) 50,000 units   No No   Sig: Take 1 capsule (50,000 Units total) by mouth once a week   famotidine (PEPCID) 20 mg tablet   No No   Sig: Take 2 tablets (40 mg total) by mouth every evening   fluvoxaMINE (LUVOX) 100 mg tablet   No No   Sig: Take 1 tablet (100 mg total) by mouth 2 (two) times a day   Patient not taking: Reported on 1/12/2021   insulin glargine (Basaglar KwikPen) 100 units/mL injection pen   No No   Sig: Inject 50 Units under the skin daily at bedtime   levothyroxine 100 mcg tablet   No No   Sig: Take 1 tablet (100 mcg total) by mouth daily   losartan (COZAAR) 50 mg tablet   No No   Sig: Take 2 tablets (100 mg total) by mouth daily   metoclopramide (REGLAN) 10 mg tablet   No No   Sig: Take 1 tablet (10 mg total) by mouth every 6 (six) hours   metoprolol succinate (TOPROL-XL) 25 mg 24 hr tablet   No No   Sig: Take 1 tablet (25 mg total) by mouth daily   ondansetron (ZOFRAN) 4 mg tablet   No No   Sig: Take 1 tablet (4 mg total) by mouth every 8 (eight) hours as needed for nausea or vomiting   pantoprazole (PROTONIX) 40 mg tablet   No No   Sig: Take 1 tablet (40 mg total) by mouth 2 (two) times a day   prochlorperazine (COMPAZINE) 10 mg tablet   No No   Sig: Take 1 tablet (10 mg total) by mouth every 6 (six) hours as needed (migraine)   sucralfate (CARAFATE) 1 g tablet   No No   Sig: Take 1 tablet (1 g total) by mouth 4 (four) times a day for 5 days   sucralfate (CARAFATE) 1 g/10 mL suspension   No No   Sig: Take 10 mL (1 g total) by mouth 4 (four) times a day   Patient not taking: Reported on 2/5/2021      Facility-Administered Medications: None       Past Medical History:   Diagnosis Date    Acute bronchitis due to other specified organisms 7/5/2019    Chronic headaches     Diabetes mellitus ([address] )     Disease of thyroid gland     Esophagitis     Gastritis     Gastroparesis     GERD (gastroesophageal reflux disease)     Hypertension     Migraine     Obesity     Obsessive compulsive disorder     Psychiatric disorder     Schizoaffective disorder [facility])        Past Surgical History:   Procedure Laterality Date    ESOPHAGOGASTRODUODENOSCOPY N/A 1/15/2019    Procedure: ESOPHAGOGASTRODUODENOSCOPY (EGD); Surgeon: Brandon Blank MD;  Location: [facility]; Service: Gastroenterology       Family History   Problem Relation Age of Onset    COPD Mother     Hypertension Mother     Heart failure Mother     Rheum arthritis Family     Heart disease Family     Hypertension Father     Diabetes Father     Hyperlipidemia Father      I have reviewed and agree with the history as documented  E-Cigarette/Vaping    E-Cigarette Use Never User      E-Cigarette/Vaping Substances    Nicotine No     THC No     CBD No     Flavoring No      Social History     Tobacco Use    Smoking status: Never Smoker    Smokeless tobacco: Never Used   Substance Use Topics    Alcohol use: No    Drug use: No       Review of Systems   Constitutional: Negative  HENT: Negative  Eyes: Negative  Respiratory: Negative  Cardiovascular: Positive for chest pain  Gastrointestinal: Negative for abdominal pain, diarrhea, nausea, rectal pain and vomiting  Endocrine: Negative  Genitourinary: Negative  Musculoskeletal: Negative  Allergic/Immunologic: Negative  Neurological: Positive for headaches  Psychiatric/Behavioral: Negative          Physical Exam  Physical Exam  Vitals signs and nursing note reviewed  Constitutional:       General: He is not in acute distress  Appearance: He is well-developed  He is obese  He is not diaphoretic  HENT:      Head: Normocephalic and atraumatic  Right Ear: External ear normal       Left Ear: External ear normal       Nose: Nose normal    Eyes:      Conjunctiva/sclera: Conjunctivae normal       Pupils: Pupils are equal, round, and reactive to light  Neck:      Musculoskeletal: Normal range of motion and neck supple  Cardiovascular:      Rate and Rhythm: Normal rate and regular rhythm  Pulmonary:      Effort: Pulmonary effort is normal  No respiratory distress  Breath sounds: Normal breath sounds  No wheezing  Abdominal:      General: Bowel sounds are normal  There is no distension  Palpations: Abdomen is soft  Tenderness: There is no abdominal tenderness  Musculoskeletal: Normal range of motion  Lymphadenopathy:      Cervical: No cervical adenopathy  Skin:     General: Skin is warm and dry  Capillary Refill: Capillary refill takes less than 2 seconds  Neurological:      Mental Status: He is alert and oriented to person, place, and time     Psychiatric:         Behavior: Behavior normal          Vital Signs  ED Triage Vitals [02/12/21 1054]   Temperature Pulse Respirations Blood Pressure SpO2   97 5 °F (36 4 °C) 92 18 (!) 219/122 97 %      Temp Source Heart Rate Source Patient Position - Orthostatic VS BP Location FiO2 (%)   Oral Monitor Sitting Left arm --      Pain Score       7           Vitals:    02/13/21 0330 02/13/21 0400 02/13/21 0500 02/13/21 0600   BP: 152/77 155/84 159/90 165/86   Pulse: 78 79 79 79   Patient Position - Orthostatic VS:             Visual Acuity      ED Medications  Medications   insulin lispro (HumaLOG) 100 units/mL subcutaneous injection 10 Units (10 Units Subcutaneous Given 2/12/21 1659)   albuterol (PROVENTIL HFA,VENTOLIN HFA) inhaler 2 puff (2 puffs Inhalation Given 2/12/21 2114) atorvastatin (LIPITOR) tablet 20 mg (20 mg Oral Not Given 2/12/21 1439)   cyclobenzaprine (FLEXERIL) tablet 5 mg (5 mg Oral Given 2/12/21 2114)   ergocalciferol (VITAMIN D2) capsule 50,000 Units (has no administration in time range)   famotidine (PEPCID) tablet 40 mg (40 mg Oral Given 2/12/21 1702)   insulin glargine (LANTUS) subcutaneous injection 50 Units 0 5 mL (50 Units Subcutaneous Given 2/12/21 2114)   levothyroxine tablet 100 mcg (100 mcg Oral Given 2/13/21 0502)   losartan (COZAAR) tablet 100 mg (100 mg Oral Not Given 2/12/21 1440)   metoclopramide (REGLAN) tablet 10 mg (10 mg Oral Given 2/13/21 0502)   metoprolol succinate (TOPROL-XL) 24 hr tablet 50 mg (50 mg Oral Not Given 2/12/21 1440)   OLANZapine (ZyPREXA) tablet 2 5 mg (2 5 mg Oral Given 2/12/21 2114)   pantoprazole (PROTONIX) EC tablet 40 mg (40 mg Oral Given 2/12/21 1702)   prochlorperazine (COMPAZINE) tablet 10 mg (10 mg Oral Given 2/13/21 0354)   insulin lispro (HumaLOG) 100 units/mL subcutaneous injection 2-12 Units (2 Units Subcutaneous Not Given 2/13/21 0633)   insulin lispro (HumaLOG) 100 units/mL subcutaneous injection 2-12 Units (4 Units Subcutaneous Given 2/12/21 2115)   amLODIPine (NORVASC) tablet 5 mg (5 mg Oral Given 2/12/21 1403)   hydrALAZINE (APRESOLINE) injection 10 mg ( Intravenous MAR Unhold 2/12/21 1935)   niCARdipine (CARDENE) 25 mg (STANDARD CONCENTRATION) in sodium chloride 0 9% 250 mL (0 mg/hr Intravenous Stopped 2/12/21 2118)   acetaminophen (TYLENOL) tablet 650 mg (650 mg Oral Given 2/12/21 1701)   aluminum-magnesium hydroxide-simethicone (MYLANTA) oral suspension 30 mL (30 mL Oral Given 2/13/21 0317)   cloNIDine (CATAPRES) tablet 0 2 mg (0 2 mg Oral Given 2/12/21 1112)   Labetalol HCl (NORMODYNE) injection 20 mg (20 mg Intravenous Given 2/12/21 1230)   aluminum-magnesium hydroxide-simethicone (MYLANTA) oral suspension 30 mL (30 mL Oral Given 2/12/21 1229)       Diagnostic Studies  Results Reviewed     Procedure Component Value Units Date/Time    B-Type Natriuretic Peptide [facility] ONLY) [[id number]]  (Abnormal) Collected: 02/12/21 1109    Lab Status: Final result Specimen: Blood from Arm, Right Updated: 02/12/21 1143      pg/mL     Troponin I [[id number]]  (Abnormal) Collected: 02/12/21 1109    Lab Status: Final result Specimen: Blood from Arm, Right Updated: 02/12/21 1142     Troponin I 0 04 ng/mL     Basic metabolic panel [[id number]]  (Abnormal) Collected: 02/12/21 1109    Lab Status: Final result Specimen: Blood from Arm, Right Updated: 02/12/21 1138     Sodium 138 mmol/L      Potassium 3 7 mmol/L      Chloride 98 mmol/L      CO2 27 mmol/L      ANION GAP 13 mmol/L      BUN 24 mg/dL      Creatinine 1 62 mg/dL      Glucose 167 mg/dL      Calcium 9 1 mg/dL      eGFR 52 ml/min/1 73sq m     Narrative:      [place] guidelines for Chronic Kidney Disease (CKD):     Stage 1 with normal or high GFR (GFR > 90 mL/min/1 73 square meters)    Stage 2 Mild CKD (GFR = 60-89 mL/min/1 73 square meters)    Stage 3A Moderate CKD (GFR = 45-59 mL/min/1 73 square meters)    Stage 3B Moderate CKD (GFR = 30-44 mL/min/1 73 square meters)    Stage 4 Severe CKD (GFR = 15-29 mL/min/1 73 square meters)    Stage 5 End Stage CKD (GFR <15 mL/min/1 73 square meters)  Note: GFR calculation is accurate only with a steady state creatinine    CBC and differential [[id number]]  (Abnormal) Collected: 02/12/21 1109    Lab Status: Final result Specimen: Blood from Arm, Right Updated: 02/12/21 1119     WBC 9 90 Thousand/uL      RBC 4 46 Million/uL      Hemoglobin 12 8 g/dL      Hematocrit 37 5 %      MCV 84 fL      MCH 28 6 pg      MCHC 34 1 g/dL      RDW 14 1 %      MPV 7 2 fL      Platelets 892 Thousands/uL      Neutrophils Relative 74 %      Lymphocytes Relative 16 %      Monocytes Relative 5 %      Eosinophils Relative 4 %      Basophils Relative 1 %      Neutrophils Absolute 7 40 Thousands/µL      Lymphocytes Absolute 1 60 Thousands/µL      Monocytes Absolute 0 50 Thousand/µL      Eosinophils Absolute 0 40 Thousand/µL      Basophils Absolute 0 10 Thousands/µL                  XR chest 1 view portable   Final Result by Stephanie Miranda MD (02/12 1240)   Recurrent right basal infiltrate suspicious for Covid pneumonia      Workstation performed: [id number]         CT head wo contrast    (Results Pending)   VAS renal artery complete    (Results Pending)              Procedures  Procedures         ED Course  ED Course as of Feb 13 0742   Fri Feb 12, 2021   1223 Discuss with hospitalist requesting CT head  Will cont to observe if blood pressure does not improve will place him on a nitroglycerin drip  1224 Mild elevation in creatinine  Will continue to observe  Creatinine(!): 1 62   1224 Elevated troponin could be secondary to elevated blood pressure  Will continue to observe and admit     Troponin I(!): 0 04             HEART Risk Score      Most Recent Value   Heart Score Risk Calculator   History  1 Filed at: 02/12/2021 1150   ECG  1 Filed at: 02/12/2021 1150   Age  0 Filed at: 02/12/2021 1150   Risk Factors  2 Filed at: 02/12/2021 1150   Troponin  1 Filed at: 02/12/2021 1150   HEART Score  5 Filed at: 02/12/2021 1150                                    MDM    Disposition  Final diagnoses:   Hypertensive urgency   Elevated troponin   Headache   Acute renal failure (ARF) ([facility] [address] )     Time reflects when diagnosis was documented in both MDM as applicable and the Disposition within this note     Time User Action Codes Description Comment    2/12/2021 12:31 PM Jacelyn Ka Add [I16 0] Hypertensive urgency     2/12/2021 12:32 PM Jacelyn Ka Add [R77 8] Elevated troponin     2/12/2021 12:32 PM Jacelyn Ka Add [R51 9] Headache     2/12/2021 12:32 PM Jacelyn Ka Add [N17 9] Acute renal failure (ARF) ([facility] [address] )     2/12/2021 12:44 PM Zembrzuski Perry Modify [R77 8] Elevated troponin     2/12/2021 12:44 PM Zembrzuski, Perry Add [N18 31] Stage 3a chronic kidney disease     2/12/2021 12:44 PM Kev Chamberlain Modify [R77 8] Elevated troponin     2/12/2021 12:44 PM Kev Chamberlain Modify [R77 8] Elevated troponin       ED Disposition     ED Disposition Condition Date/Time Comment    Admit Stable Fri Feb 12, 2021 12:31 PM Case was discussed with Perry Schaeffer and the patient's admission status was agreed to be Admission Status: inpatient status to the service of Dr Kev Chamberlain   Follow-up Information    None         Current Discharge Medication List      CONTINUE these medications which have NOT CHANGED    Details   ACCU-CHEK FASTCLIX LANCETS MISC 4 (four) times a day Not checking 4 times a day  Patient stated maybe 2 times a day  Refills: 1      ACCU-CHEK GUIDE test strip 4 (four) times a day Test  Refills: 1      Admelog SoloStar 100 units/mL injection pen Inject 10 Units under the skin 3 (three) times a day with meals  Qty: 5 pen, Refills: 0    Associated Diagnoses: Type 2 diabetes mellitus without complication, unspecified whether long term insulin use (HCC)      albuterol (PROVENTIL HFA,VENTOLIN HFA) 90 mcg/act inhaler Inhale 2 puffs 4 (four) times a day  Qty: 8 g, Refills: 0    Comments: Substitution to a formulary equivalent within the same pharmaceutical class is authorized    Associated Diagnoses: Wheezing; Acute bronchitis, unspecified organism      aspirin-acetaminophen-caffeine (EXCEDRIN MIGRAINE) 250-250-65 MG per tablet Take 2 tablets by mouth daily       atorvastatin (LIPITOR) 20 mg tablet Take 1 tablet (20 mg total) by mouth daily  Qty: 30 tablet, Refills: 5    Associated Diagnoses: Other hyperlipidemia      Blood Glucose Monitoring Suppl (ACCU-CHEK GUIDE) w/Device KIT Checking 2 times a day  Refills: 0      cyclobenzaprine (FLEXERIL) 5 mg tablet Take 1 tablet (5 mg total) by mouth daily at bedtime  Qty: 30 tablet, Refills: 0    Associated Diagnoses: Intractable episodic headache, unspecified headache type      ergocalciferol (VITAMIN D2) 50,000 units Take 1 capsule (50,000 Units total) by mouth once a week  Qty: 8 capsule, Refills: 0    Associated Diagnoses: Vitamin D deficiency      famotidine (PEPCID) 20 mg tablet Take 2 tablets (40 mg total) by mouth every evening  Qty: 60 tablet, Refills: 5    Associated Diagnoses: Nausea and vomiting, intractability of vomiting not specified, unspecified vomiting type; Generalized abdominal pain      fluvoxaMINE (LUVOX) 100 mg tablet Take 1 tablet (100 mg total) by mouth 2 (two) times a day  Qty: 60 tablet, Refills: 1    Associated Diagnoses: Schizo affective schizophrenia (HCC)      Galcanezumab-gnlm 120 MG/ML SOAJ Inject 120 mg under the skin every 30 (thirty) days  Qty: 1 pen, Refills: 11    Associated Diagnoses: Chronic migraine without aura without status migrainosus, not intractable      insulin glargine (Basaglar KwikPen) 100 units/mL injection pen Inject 50 Units under the skin daily at bedtime  Qty: 5 pen, Refills: 0    Associated Diagnoses: Type 2 diabetes mellitus without complication, unspecified whether long term insulin use ([facility])      Insulin Pen Needle (Pen Needles 3/16") 31G X 5 MM MISC Use 4 (four) times a day  Qty: 200 each, Refills: 3    Associated Diagnoses: Type 2 diabetes mellitus without complication, unspecified whether long term insulin use ([facility])      levothyroxine 100 mcg tablet Take 1 tablet (100 mcg total) by mouth daily  Qty: 30 tablet, Refills: 3    Associated Diagnoses: Hypothyroidism, unspecified type      Lidocaine Viscous HCl (XYLOCAINE) 2 % mucosal solution Swish and swallow 15 mL 4 (four) times a day as needed for mouth pain or discomfort  Qty: 100 mL, Refills: 0    Associated Diagnoses: Nausea and vomiting, intractability of vomiting not specified, unspecified vomiting type; Generalized abdominal pain      losartan (COZAAR) 50 mg tablet Take 2 tablets (100 mg total) by mouth daily  Qty: 60 tablet, Refills: 0    Associated Diagnoses: Hypertensive emergency metoclopramide (REGLAN) 10 mg tablet Take 1 tablet (10 mg total) by mouth every 6 (six) hours  Qty: 30 tablet, Refills: 0    Associated Diagnoses: Epigastric abdominal pain; Nausea and vomiting      metoprolol succinate (TOPROL-XL) 25 mg 24 hr tablet Take 1 tablet (25 mg total) by mouth daily  Qty: 30 tablet, Refills: 0    Associated Diagnoses: Hypertensive emergency      OLANZapine (ZyPREXA) 2 5 mg tablet Take 1 tablet (2 5 mg total) by mouth daily at bedtime  Qty: 30 tablet, Refills: 0    Associated Diagnoses: Respiratory distress      ondansetron (ZOFRAN) 4 mg tablet Take 1 tablet (4 mg total) by mouth every 8 (eight) hours as needed for nausea or vomiting  Qty: 12 tablet, Refills: 0    Associated Diagnoses: Nausea and vomiting      pantoprazole (PROTONIX) 40 mg tablet Take 1 tablet (40 mg total) by mouth 2 (two) times a day  Qty: 60 tablet, Refills: 0    Associated Diagnoses: Gastroesophageal reflux disease without esophagitis      prochlorperazine (COMPAZINE) 10 mg tablet Take 1 tablet (10 mg total) by mouth every 6 (six) hours as needed (migraine)  Qty: 10 tablet, Refills: 0    Associated Diagnoses: Chronic migraine without aura without status migrainosus, not intractable      sucralfate (CARAFATE) 1 g tablet Take 1 tablet (1 g total) by mouth 4 (four) times a day for 5 days  Qty: 20 tablet, Refills: 0    Associated Diagnoses: Nausea and vomiting      sucralfate (CARAFATE) 1 g/10 mL suspension Take 10 mL (1 g total) by mouth 4 (four) times a day  Qty: 420 mL, Refills: 0    Associated Diagnoses: COVID-19; Abdominal pain, unspecified abdominal location           No discharge procedures on file      PDMP Review     None          ED Provider  Electronically Signed by           Tina Ferrer MD  02/13/21 5706

## 2021-02-12 NOTE — ASSESSMENT & PLAN NOTE
Follows with outpatient neurology   Will continue with blood pressure control  Pain management and supportive care

## 2021-02-12 NOTE — CONSULTS
CONSULTATION-NEPHROLOGY   Kiki Jang 41 y o  male MRN: [id number]  Unit/Bed#: ICU 05 Encounter: [id number]        Assessment and Plan:    1  Hypertensive emergency  · Was transferred to critical care unit for nicardipine infusion  Blood pressure upon arrival was 219/122 mm mmHg  Care should be given to slowly lower blood pressure with a goal SBP around 170-180 mmHg in the short term  · Will work patient up for secondary causes of hypertension (renal artery duplex which can be done as an inpatient or outpatient, renin/aldosterone ratio, plasma metanephrines)  Of note, adrenal glands normal on CTA done in January  2  Stage 3 chronic kidney disease with baseline creatinine around 1 3-1 5 mg/dL  · His previous baseline was around 0 9 mg/dL in 2019  Risk factors for chronicity include uncontrolled hypertension, uncontrolled type 1 diabetes mellitus, obesity, high-dose long-term NSAID use  He will need to follow-up with Nephrology as an outpatient  He should avoid NSAIDs  3  Hypertensive nephrosclerosis, likely  · Again, will pursue secondary hypertension workup  He is currently being  controlled with a nicardipine infusion  4  Type 1 diabetes mellitus  · Uncontrolled  Last hemoglobin A1c greater than 10%  Follows with Endocrinology in the outpatient setting  He is typically on an angiotensin receptor blocker for renal protection  He needs better glycemic control to prevent the progression of renal disease  He needs to have proteinuria quantified on a regular basis  5  Chronic tubular interstitial disease, likely  · Due to high-dose long-term NSAID use  This has also lead to gastritis and GERD  He should discontinue NSAIDs permanently  6  Schizoaffective disorder  7  Morbid obesity  8   COVID-19 positive 01/05/2021      HPI:    Kiki Jang is a 41 y o  male with an active problem list significant for CKD 3A, likely chronic tubular interstitial nephritis from high dose long-term NSAID use, hypertension, uncontrolled type 1 diabetes mellitus diagnosed in 2006, schizoaffective disorder, morbid obesity, GERD, gastritis negative for H pylori, celiac disease, chronic migraine, recent COVID positive 1/5 who presented to [address] [place] [address] emergency department elevated blood pressure with systolics greater than 944 mmHg and headache  Blood pressure upon arrival to 219/122 mmHg  Chest x-ray done on admission significant for recurrent right basilar infiltrate suspicious for COVID  For his hypertension he received labetalol and clonidine unfortunately not well controlled so he will be transferred to critical care unit for Nocardia Pean infusion  Renal consulted for chronic kidney disease, hypertension  Upon discussion with the patient, he relays HPI as above  His physical complaints including headache  He has no nausea, vomiting, diarrhea, dysuria, urgency, frequency, dizziness  States he had chest pain earlier today which has now resolved  From a renal standpoint, appears to have CKD 3A with baseline creatinine around 1 3-1 5 mg/dL most recently however in 2019 his creatinine ran around 0 9 mg/dL  Risk factors for chronicity include uncontrolled hypertension, uncontrolled type 1 diabetes mellitus, heavy and long-term NSAID use, morbid obesity  He does not follow with a nephrologist      From a blood pressure standpoint, patient claims to have longstanding uncontrolled high blood pressure for which he gets headaches  He has never undergone a workup for secondary hypertension  The medical record, including Care Everywhere and media tabs were reviewed  Reason for Consult:  Chronic kidney disease, hypertensive urgency    Review of Systems:  A complete 10-point review of systems was performed  Aside from what was mentioned in the HPI, it is otherwise negative        Historical Information   Past Medical History:   Diagnosis Date    Acute bronchitis due to other specified organisms 7/5/2019    Chronic headaches     Diabetes mellitus ([facility] [address] )     Disease of thyroid gland     Esophagitis     Gastritis     Gastroparesis     GERD (gastroesophageal reflux disease)     Hypertension     Migraine     Obesity     Obsessive compulsive disorder     Psychiatric disorder     Schizoaffective disorder [facility])      Past Surgical History:   Procedure Laterality Date    ESOPHAGOGASTRODUODENOSCOPY N/A 1/15/2019    Procedure: ESOPHAGOGASTRODUODENOSCOPY (EGD); Surgeon: Brandon Blank MD;  Location: [facility];   Service: Gastroenterology     Social History   Social History     Substance and Sexual Activity   Alcohol Use No     Social History     Substance and Sexual Activity   Drug Use No     Social History     Tobacco Use   Smoking Status Never Smoker   Smokeless Tobacco Never Used       Family History:   Family History   Problem Relation Age of Onset    COPD Mother     Hypertension Mother     Heart failure Mother     Rheum arthritis Family     Heart disease Family     Hypertension Father     Diabetes Father     Hyperlipidemia Father        Medications:  Pertinent medications were reviewed  Current Facility-Administered Medications   Medication Dose Route Frequency Provider Last Rate    acetaminophen  650 mg Oral Q6H PRN Perry Rivera MD      albuterol  2 puff Inhalation 4x Daily Perry Rivera MD      amLODIPine  5 mg Oral Daily Perry Rivera MD      atorvastatin  20 mg Oral Daily Perry Rivera MD      cyclobenzaprine  5 mg Oral HS Tamar Ortega MD      [facility]] enoxaparin  40 mg Subcutaneous Daily Tamar Ortega MD      ergocalciferol  50,000 Units Oral Weekly Tamar Ortega MD      famotidine  40 mg Oral QPM Tamar Ortega MD      [facility]] hydrALAZINE  10 mg Intravenous Q6H PRN Tamar Ortega MD      insulin glargine  50 Units Subcutaneous HS Perry Rivera MD      insulin lispro  10 Units Subcutaneous TID With Meals MD Velasquez Hamlin [MAR Hold] insulin lispro  2-12 Units Subcutaneous TID AC Vipul Massey MD      [facility]] insulin lispro  2-12 Units Subcutaneous HS Vipul Massey MD     [facility] ON 2/13/2021] levothyroxine  100 mcg Oral Early Morning Vipul Massey MD      losartan  100 mg Oral Daily Perry Garcia MD      metoclopramide  10 mg Oral Q6H [address] Vipul Massey MD      metoprolol succinate  50 mg Oral Daily Perry Garcia MD      niCARdipine  1-15 mg/hr Intravenous Titrated Vipul Massey MD 5 mg/hr (02/12/21 1556)    OLANZapine  2 5 mg Oral HS Perry Garcia MD      pantoprazole  40 mg Oral BID Vipul Massey MD      prochlorperazine  10 mg Oral Q6H PRN Vipul Massey MD           Allergies   Allergen Reactions    Augmentin [Amoxicillin-Pot Clavulanate]     Amoxicillin Diarrhea    Clavulanic Acid Diarrhea    Erythromycin Diarrhea         Vitals:   /81 (BP Location: Left arm)   Pulse 85   Temp 98 °F (36 7 °C) (Temporal)   Resp 18   Ht 5' 2" (1 575 m)   Wt 119 kg (261 lb 11 oz)   SpO2 95%   BMI 47 86 kg/m²   Body mass index is 47 86 kg/m²    SpO2: 95 %,   SpO2 Activity: At Rest,   O2 Device: None (Room air)    No intake or output data in the 24 hours ending 02/12/21 1626  Invasive Devices     Peripheral Intravenous Line            Peripheral IV 02/12/21 Right Antecubital less than 1 day                Physical Exam:  General: conscious, cooperative, in no acute distress, chronically ill-appearing  Eyes: conjunctivae pink, anicteric sclerae  ENT: lips and mucous membranes moist  Neck: supple, no JVD, no masses  Chest: clear breath sounds bilateral, no crackles, ronchus or wheezings  CVS: S1 & S2, normal rate, regular rhythm  Abdomen: soft, non-tender, non-distended, normoactive bowel sounds, obese  Extremities:  Mild-to-moderate edema of both legs  Skin: no rash  Neuro: awake, alert, oriented      Diagnostic Data:  Lab: I have personally reviewed pertinent lab results  ,   CBC:  Results from last 7 days   Lab Units 02/12/21  1109   WBC Thousand/uL 9 90   HEMOGLOBIN g/dL 12 8*   HEMATOCRIT % 37 5*   PLATELETS Thousands/uL 356      CMP:   Lab Results   Component Value Date    SODIUM 138 02/12/2021    K 3 7 02/12/2021    CL 98 02/12/2021    CO2 27 02/12/2021    BUN 24 02/12/2021    CREATININE 1 62 (H) 02/12/2021    CALCIUM 9 1 02/12/2021    EGFR 52 02/12/2021   ,   PT/INR: No results found for: PT, INR,   Magnesium: No components found for: MAG,  Phosphorous: No results found for: PHOS    Microbiology:  @LABRCNTIP,(urinecx:7)@        Elver Gary, DOREEN    Portions of the record may have been created with voice recognition software  Occasional wrong word or "sound a like" substitutions may have occurred due to the inherent limitations of voice recognition software  Read the chart carefully and recognize, using context, where substitutions have occurred

## 2021-02-12 NOTE — ASSESSMENT & PLAN NOTE
Blood pressure to 244/119 on admission  Status post labetalol 20 mg and clonidine 0 2  Is currently 170/84  Continue home losartan 100 mg   Will increase metoprolol to 50 mg daily  Will start amlodipine 5 mg daily  Give hydralazine 10 q 6 p r n  For SBP above 170  Patient states he has been taking his medication as directed  He states his blood pressure is usually 150s over 90s at home  Patient presented with headache in back of his head that resolved with blood pressure improvement  This headache is different his regular migraine    Pending CT scan of the head  Consult cardiology

## 2021-02-12 NOTE — H&P
H&P- Crissy Bowie 1980, 41 y o  male MRN: [id number]    Unit/Bed#: ED 03 Encounter: [id number]    Primary Care Provider: Bashir Pantoja MD   Date and time admitted to hospital: 2/12/2021 10:56 AM        * Hypertensive emergency  Assessment & Plan  Blood pressure to 244/119 on admission  Status post labetalol 20 mg and clonidine 0 2  Is currently 170/84  Continue home losartan 100 mg   Will increase metoprolol to 50 mg daily  Will start amlodipine 5 mg daily  Give hydralazine 10 q 6 p r n  For SBP above 170  Patient states he has been taking his medication as directed  He states his blood pressure is usually 150s over 90s at home  Patient presented with headache in back of his head that resolved with blood pressure improvement  This headache is different his regular migraine    Pending CT scan of the head  Consult cardiology    Stage 3a chronic kidney disease  Assessment & Plan  Lab Results   Component Value Date    EGFR 52 02/12/2021    EGFR 53 01/11/2021    EGFR 49 01/06/2021    CREATININE 1 62 (H) 02/12/2021    CREATININE 1 59 (H) 01/11/2021    CREATININE 1 68 (H) 01/06/2021   Creatinine appears to be at baseline  Continue to monitor  Patient does not seem to follow with Nephrology  Will consult nephrology    Elevated troponin  Assessment & Plan  Troponin 0 04  Will trend  Likely secondary to hypertensive emergency  No chest pain  EKG sinus rhythm; nonspecific ST abnormalities    Migraine without aura and without status migrainosus, not intractable  Assessment & Plan  Follows with outpatient neurology   Will continue with blood pressure control  Pain management and supportive care     Morbid obesity with BMI of 50 0-59 9, adult ([facility] [address] )  Assessment & Plan  As evident by BMI of 51 21  Diet and exercise Education prior discharge    Schizoaffective disorder [facility])  Assessment & Plan  Follows up with Psychiatry as outpatient  Continue Zyprexa    Type 2 diabetes mellitus, with long-term current use of insulin [facility])  Assessment & Plan  Lab Results   Component Value Date    HGBA1C 10 5 (H) 11/22/2020       No results for input(s): POCGLU in the last 72 hours  Blood Sugar Average: Last 72 hrs:   continue Lantus 50 mg HS  Continue Humalog 10 mg t i d  With meals  Will cover with sliding scale  Will check blood glucose 2 hours before meals and bedtime          VTE Prophylaxis: Enoxaparin (Lovenox)  / sequential compression device   Code Status:  A full code  POLST: POLST form is not discussed and not completed at this time  Discussion with family:  With patient    Anticipated Length of Stay:  Patient will be admitted on an Observation basis with an anticipated length of stay of  less than 2 midnights  Justification for Hospital Stay:  Hypertensive emergency    Total Time for Visit, including Counseling / Coordination of Care: 45 minutes  Greater than 50% of this total time spent on direct patient counseling and coordination of care  Chief Complaint:   Elevated blood pressure and headache    History of Present Illness:    Radha Zuñiga is a 41 y o  male with past medical history of morbid obesity, hypertension, schizoaffective disorder, diabetes mellitus who presents with 4 day history of elevated blood pressure and posterior headache  He states his blood pressure runs in 525C 551C systolic over 95J  On admission his blood pressure 244/119  He receive clonidine and labetalol IV with improvement of his blood pressure  Lino Bruch His headache resolved  He denies any chest pain, abdominal pain, nausea, vomiting       Review of Systems:    Review of Systems   Constitutional: Negative for chills and fever  HENT: Negative for hearing loss and sore throat  Respiratory: Negative for cough and shortness of breath  Cardiovascular: Negative for chest pain and palpitations  Gastrointestinal: Negative for abdominal pain and nausea  Neurological: Positive for headaches  Negative for dizziness and numbness         Past Medical and Surgical History:     Past Medical History:   Diagnosis Date    Acute bronchitis due to other specified organisms 7/5/2019    Chronic headaches     Diabetes mellitus ([address] )     Disease of thyroid gland     Esophagitis     Gastritis     Gastroparesis     GERD (gastroesophageal reflux disease)     Hypertension     Migraine     Obesity     Obsessive compulsive disorder     Psychiatric disorder     Schizoaffective disorder [facility])        Past Surgical History:   Procedure Laterality Date    ESOPHAGOGASTRODUODENOSCOPY N/A 1/15/2019    Procedure: ESOPHAGOGASTRODUODENOSCOPY (EGD); Surgeon: Loly Ornelas MD;  Location: [facility]; Service: Gastroenterology       Meds/Allergies:    Prior to Admission medications    Medication Sig Start Date End Date Taking? Authorizing Provider   ACCU-CHEK FASTCLIX LANCETS MISC 4 (four) times a day Not checking 4 times a day   Patient stated maybe 2 times a day 5/8/19   Historical Provider, MD   ACCU-CHEK GUIDE test strip 4 (four) times a day Test 5/8/19   Historical Provider, MD   Admelog SoloStar 100 units/mL injection pen Inject 10 Units under the skin 3 (three) times a day with meals 1/12/21   DOREEN Steel   albuterol (PROVENTIL HFA,VENTOLIN HFA) 90 mcg/act inhaler Inhale 2 puffs 4 (four) times a day 1/12/21   DOREEN Steel   aspirin-acetaminophen-caffeine (EXCEDRIN MIGRAINE) 919-505-61 MG per tablet Take 2 tablets by mouth daily     Historical Provider, MD   atorvastatin (LIPITOR) 20 mg tablet Take 1 tablet (20 mg total) by mouth daily 1/12/21   DOREEN Steel   Blood Glucose Monitoring Suppl (ACCU-CHEK GUIDE) w/Device KIT Checking 2 times a day 5/9/19   Historical Provider, MD   cyclobenzaprine (FLEXERIL) 5 mg tablet Take 1 tablet (5 mg total) by mouth daily at bedtime 1/12/21   DOREEN Steel   ergocalciferol (VITAMIN D2) 50,000 units Take 1 capsule (50,000 Units total) by mouth once a week 1/12/21   DOREEN Steel famotidine (PEPCID) 20 mg tablet Take 2 tablets (40 mg total) by mouth every evening 2/5/21   Elaine Mckeon PA-C   fluvoxaMINE (LUVOX) 100 mg tablet Take 1 tablet (100 mg total) by mouth 2 (two) times a day  Patient not taking: Reported on 1/12/2021 10/23/20   Nathaniel Kohler MD   Galcanezumab-gnlm 120 MG/ML SOAJ Inject 120 mg under the skin every 30 (thirty) days 11/4/20   Meche Rucker PA-C   insulin glargine (Basaglar KwikPen) 100 units/mL injection pen Inject 50 Units under the skin daily at bedtime 1/12/21   DOREEN Etienne   Insulin Pen Needle (Pen Needles 3/16") 31G X 5 MM MISC Use 4 (four) times a day 1/12/21   DOREEN Etienne   levothyroxine 100 mcg tablet Take 1 tablet (100 mcg total) by mouth daily 1/12/21   DOREEN Etienne   Lidocaine Viscous HCl (XYLOCAINE) 2 % mucosal solution Swish and swallow 15 mL 4 (four) times a day as needed for mouth pain or discomfort 2/5/21   Sofia Caceres PA-C   losartan (COZAAR) 50 mg tablet Take 2 tablets (100 mg total) by mouth daily 1/12/21   DOREEN Etienne   metoclopramide (REGLAN) 10 mg tablet Take 1 tablet (10 mg total) by mouth every 6 (six) hours 1/12/21   DOREEN Etienne   metoprolol succinate (TOPROL-XL) 25 mg 24 hr tablet Take 1 tablet (25 mg total) by mouth daily 1/12/21   DOREEN Etienne   OLANZapine (ZyPREXA) 2 5 mg tablet Take 1 tablet (2 5 mg total) by mouth daily at bedtime 1/12/21   DOREEN Villareal   ondansetron (ZOFRAN) 4 mg tablet Take 1 tablet (4 mg total) by mouth every 8 (eight) hours as needed for nausea or vomiting 1/12/21   DOREEN Etienne   pantoprazole (PROTONIX) 40 mg tablet Take 1 tablet (40 mg total) by mouth 2 (two) times a day 1/12/21   DOREEN Villareal   prochlorperazine (COMPAZINE) 10 mg tablet Take 1 tablet (10 mg total) by mouth every 6 (six) hours as needed (migraine) 1/12/21   Eriberto BjornstDOREEN bui   sucralfate (CARAFATE) 1 g tablet Take 1 tablet (1 g total) by mouth 4 (four) times a day for 5 days 1/5/21 2/5/21  Analisa Lobo PA-C   sucralfate (CARAFATE) 1 g/10 mL suspension Take 10 mL (1 g total) by mouth 4 (four) times a day  Patient not taking: Reported on 2/5/2021 1/12/21   DOREEN Gonzalez     I have reviewed home medications with patient personally  Allergies: Allergies   Allergen Reactions    Augmentin [Amoxicillin-Pot Clavulanate]     Amoxicillin Diarrhea    Clavulanic Acid Diarrhea    Erythromycin Diarrhea       Social History:     Marital Status: Single   Occupation:  Unemployed  Patient Pre-hospital Living Situation:  Living with mother  Patient Pre-hospital Level of Mobility:  Normal  Patient Pre-hospital Diet Restrictions:  None  Substance Use History:   Social History     Substance and Sexual Activity   Alcohol Use No     Social History     Tobacco Use   Smoking Status Never Smoker   Smokeless Tobacco Never Used     Social History     Substance and Sexual Activity   Drug Use No       Family History:    non-contributory    Physical Exam:     Vitals:   Blood Pressure: (!) 244/119 (02/12/21 1157)  Pulse: 92 (02/12/21 1054)  Temperature: 97 5 °F (36 4 °C) (02/12/21 1054)  Temp Source: Oral (02/12/21 1054)  Respirations: 18 (02/12/21 1054)  Height: 5' 2" (157 5 cm) (02/12/21 1054)  Weight - Scale: 127 kg (280 lb) (02/12/21 1054)  SpO2: 97 % (02/12/21 1054)    Physical Exam  Vitals signs and nursing note reviewed  Constitutional:       General: He is not in acute distress  Appearance: Normal appearance  He is obese  HENT:      Head: Normocephalic  Nose: Nose normal       Mouth/Throat:      Mouth: Mucous membranes are moist    Eyes:      Conjunctiva/sclera: Conjunctivae normal    Cardiovascular:      Rate and Rhythm: Normal rate  Heart sounds: Normal heart sounds  No murmur  Pulmonary:      Effort: Pulmonary effort is normal  No respiratory distress  Breath sounds: Normal breath sounds  No wheezing  Abdominal:      General: There is no distension  Tenderness: There is no abdominal tenderness  There is no guarding  Musculoskeletal:         General: No swelling  Right lower leg: No edema  Skin:     General: Skin is warm  Neurological:      General: No focal deficit present  Mental Status: He is alert and oriented to person, place, and time  Psychiatric:         Mood and Affect: Mood normal              Additional Data:     Lab Results: I have personally reviewed pertinent reports  Results from last 7 days   Lab Units 02/12/21  1109   WBC Thousand/uL 9 90   HEMOGLOBIN g/dL 12 8*   HEMATOCRIT % 37 5*   PLATELETS Thousands/uL 356   NEUTROS PCT % 74   LYMPHS PCT % 16*   MONOS PCT % 5   EOS PCT % 4     Results from last 7 days   Lab Units 02/12/21  1109   SODIUM mmol/L 138   POTASSIUM mmol/L 3 7   CHLORIDE mmol/L 98   CO2 mmol/L 27   BUN mg/dL 24   CREATININE mg/dL 1 62*   ANION GAP mmol/L 13   CALCIUM mg/dL 9 1   GLUCOSE RANDOM mg/dL 167*                       Imaging: I have personally reviewed pertinent reports  XR chest 1 view portable   Final Result by Jenni Rios MD (02/12 1240)   Recurrent right basal infiltrate suspicious for Covid pneumonia      Workstation performed: [id number]         CT head wo contrast    (Results Pending)       EKG, Pathology, and Other Studies Reviewed on Admission:   · EKG:  Normal sinus rhythm, nonspecific ST changes    Allscripts / Epic Records Reviewed: Yes     ** Please Note: This note has been constructed using a voice recognition system   **

## 2021-02-12 NOTE — PLAN OF CARE
Problem: Potential for Falls  Goal: Patient will remain free of falls  Description: INTERVENTIONS:  - Assess patient frequently for physical needs  -  Identify cognitive and physical deficits and behaviors that affect risk of falls    -  [place] fall precautions as indicated by assessment   - Educate patient/family on patient safety including physical limitations  - Instruct patient to call for assistance with activity based on assessment  - Modify environment to reduce risk of injury  - Consider OT/PT consult to assist with strengthening/mobility  Outcome: Progressing     Problem: PAIN - ADULT  Goal: Verbalizes/displays adequate comfort level or baseline comfort level  Description: Interventions:  - Encourage patient to monitor pain and request assistance  - Assess pain using appropriate pain scale  - Administer analgesics based on type and severity of pain and evaluate response  - Implement non-pharmacological measures as appropriate and evaluate response  - Consider cultural and social influences on pain and pain management  - Notify physician/advanced practitioner if interventions unsuccessful or patient reports new pain  Outcome: Progressing     Problem: CARDIOVASCULAR - ADULT  Goal: Maintains optimal cardiac output and hemodynamic stability  Description: INTERVENTIONS:  - Monitor I/O, vital signs and rhythm  - Monitor for S/S and trends of decreased cardiac output  - Administer and titrate ordered vasoactive medications to optimize hemodynamic stability  - Assess quality of pulses, skin color and temperature  - Assess for signs of decreased coronary artery perfusion  - Instruct patient to report change in severity of symptoms  Outcome: Progressing  Goal: Absence of cardiac dysrhythmias or at baseline rhythm  Description: INTERVENTIONS:  - Continuous cardiac monitoring, vital signs, obtain 12 lead EKG if ordered  - Administer antiarrhythmic and heart rate control medications as ordered  - Monitor electrolytes and administer replacement therapy as ordered  Outcome: Progressing

## 2021-02-12 NOTE — ASSESSMENT & PLAN NOTE
Lab Results   Component Value Date    HGBA1C 10 5 (H) 11/22/2020       No results for input(s): POCGLU in the last 72 hours  Blood Sugar Average: Last 72 hrs:   continue Lantus 50 mg HS  Continue Humalog 10 mg t i d   With meals  Will cover with sliding scale  Will check blood glucose 2 hours before meals and bedtime

## 2021-02-13 VITALS
HEART RATE: 79 BPM | DIASTOLIC BLOOD PRESSURE: 77 MMHG | RESPIRATION RATE: 21 BRPM | SYSTOLIC BLOOD PRESSURE: 144 MMHG | BODY MASS INDEX: 48.4 KG/M2 | HEIGHT: 62 IN | OXYGEN SATURATION: 96 % | TEMPERATURE: 98 F | WEIGHT: 263.01 LBS

## 2021-02-13 PROBLEM — Z82.49 FAMILY HISTORY OF HEART DISEASE: Status: ACTIVE | Noted: 2021-02-13

## 2021-02-13 PROBLEM — R07.89 CHEST PRESSURE: Status: ACTIVE | Noted: 2021-02-13

## 2021-02-13 PROBLEM — E87.6 HYPOKALEMIA: Status: ACTIVE | Noted: 2021-02-13

## 2021-02-13 LAB
ALBUMIN SERPL BCP-MCNC: 3.2 G/DL (ref 3.5–5.7)
ALP SERPL-CCNC: 44 U/L (ref 40–150)
ALT SERPL W P-5'-P-CCNC: 8 U/L (ref 7–52)
ANION GAP SERPL CALCULATED.3IONS-SCNC: 10 MMOL/L (ref 4–13)
AST SERPL W P-5'-P-CCNC: 12 U/L (ref 13–39)
ATRIAL RATE: 79 BPM
ATRIAL RATE: 87 BPM
ATRIAL RATE: 87 BPM
BILIRUB SERPL-MCNC: 0.6 MG/DL (ref 0.2–1)
BUN SERPL-MCNC: 22 MG/DL (ref 7–25)
CALCIUM ALBUM COR SERPL-MCNC: 9.3 MG/DL (ref 8.3–10.1)
CALCIUM SERPL-MCNC: 8.7 MG/DL (ref 8.6–10.5)
CHLORIDE SERPL-SCNC: 101 MMOL/L (ref 98–107)
CO2 SERPL-SCNC: 29 MMOL/L (ref 21–31)
CORTIS AM PEAK SERPL-MCNC: 9.9 UG/DL (ref 4.2–22.4)
CREAT SERPL-MCNC: 1.59 MG/DL (ref 0.7–1.3)
CREAT UR-MCNC: 40.5 MG/DL
GFR SERPL CREATININE-BSD FRML MDRD: 53 ML/MIN/1.73SQ M
GLUCOSE P FAST SERPL-MCNC: 115 MG/DL (ref 65–99)
GLUCOSE SERPL-MCNC: 104 MG/DL (ref 65–140)
GLUCOSE SERPL-MCNC: 115 MG/DL (ref 65–140)
GLUCOSE SERPL-MCNC: 115 MG/DL (ref 65–99)
GLUCOSE SERPL-MCNC: 87 MG/DL (ref 65–140)
MAGNESIUM SERPL-MCNC: 1.8 MG/DL (ref 1.9–2.7)
MICROALBUMIN UR-MCNC: 1310 MG/L (ref 0–20)
MICROALBUMIN/CREAT 24H UR: 3235 MG/G CREATININE (ref 0–30)
P AXIS: 53 DEGREES
P AXIS: 59 DEGREES
P AXIS: 62 DEGREES
PHOSPHATE SERPL-MCNC: 3.5 MG/DL (ref 3–5.5)
POTASSIUM SERPL-SCNC: 3.3 MMOL/L (ref 3.5–5.5)
PR INTERVAL: 172 MS
PR INTERVAL: 178 MS
PR INTERVAL: 180 MS
PROT SERPL-MCNC: 6.1 G/DL (ref 6.4–8.9)
PROT UR-MCNC: 174 MG/DL
PROT/CREAT UR: 4.3 MG/G{CREAT} (ref 0–0.1)
QRS AXIS: -24 DEGREES
QRS AXIS: 10 DEGREES
QRS AXIS: 19 DEGREES
QRSD INTERVAL: 102 MS
QRSD INTERVAL: 104 MS
QRSD INTERVAL: 98 MS
QT INTERVAL: 362 MS
QT INTERVAL: 378 MS
QT INTERVAL: 394 MS
QTC INTERVAL: 415 MS
QTC INTERVAL: 454 MS
QTC INTERVAL: 474 MS
SODIUM 24H UR-SCNC: 54 MOL/L
SODIUM SERPL-SCNC: 140 MMOL/L (ref 134–143)
T WAVE AXIS: 73 DEGREES
T WAVE AXIS: 73 DEGREES
T WAVE AXIS: 83 DEGREES
TROPONIN I SERPL-MCNC: <0.03 NG/ML
VENTRICULAR RATE: 79 BPM
VENTRICULAR RATE: 87 BPM
VENTRICULAR RATE: 87 BPM

## 2021-02-13 PROCEDURE — 93005 ELECTROCARDIOGRAM TRACING: CPT

## 2021-02-13 PROCEDURE — 99217 PR OBSERVATION CARE DISCHARGE MANAGEMENT: CPT | Performed by: INTERNAL MEDICINE

## 2021-02-13 PROCEDURE — 93010 ELECTROCARDIOGRAM REPORT: CPT | Performed by: INTERNAL MEDICINE

## 2021-02-13 PROCEDURE — 82948 REAGENT STRIP/BLOOD GLUCOSE: CPT

## 2021-02-13 PROCEDURE — 82533 TOTAL CORTISOL: CPT | Performed by: FAMILY MEDICINE

## 2021-02-13 PROCEDURE — 84484 ASSAY OF TROPONIN QUANT: CPT | Performed by: FAMILY MEDICINE

## 2021-02-13 PROCEDURE — 83835 ASSAY OF METANEPHRINES: CPT | Performed by: FAMILY MEDICINE

## 2021-02-13 PROCEDURE — 80053 COMPREHEN METABOLIC PANEL: CPT | Performed by: FAMILY MEDICINE

## 2021-02-13 PROCEDURE — 84100 ASSAY OF PHOSPHORUS: CPT | Performed by: FAMILY MEDICINE

## 2021-02-13 PROCEDURE — 99214 OFFICE O/P EST MOD 30 MIN: CPT | Performed by: INTERNAL MEDICINE

## 2021-02-13 PROCEDURE — 83735 ASSAY OF MAGNESIUM: CPT | Performed by: FAMILY MEDICINE

## 2021-02-13 PROCEDURE — 82088 ASSAY OF ALDOSTERONE: CPT | Performed by: FAMILY MEDICINE

## 2021-02-13 PROCEDURE — 99245 OFF/OP CONSLTJ NEW/EST HI 55: CPT | Performed by: PHYSICIAN ASSISTANT

## 2021-02-13 PROCEDURE — 84244 ASSAY OF RENIN: CPT | Performed by: FAMILY MEDICINE

## 2021-02-13 RX ORDER — AMLODIPINE BESYLATE 5 MG/1
5 TABLET ORAL DAILY
Qty: 30 TABLET | Refills: 0 | Status: SHIPPED | OUTPATIENT
Start: 2021-02-14 | End: 2021-06-03 | Stop reason: HOSPADM

## 2021-02-13 RX ORDER — POTASSIUM CHLORIDE 20 MEQ/1
40 TABLET, EXTENDED RELEASE ORAL ONCE
Status: COMPLETED | OUTPATIENT
Start: 2021-02-13 | End: 2021-02-13

## 2021-02-13 RX ORDER — MAGNESIUM SULFATE HEPTAHYDRATE 40 MG/ML
2 INJECTION, SOLUTION INTRAVENOUS ONCE
Status: COMPLETED | OUTPATIENT
Start: 2021-02-13 | End: 2021-02-13

## 2021-02-13 RX ORDER — METOPROLOL SUCCINATE 50 MG/1
50 TABLET, EXTENDED RELEASE ORAL DAILY
Qty: 30 TABLET | Refills: 0 | Status: SHIPPED | OUTPATIENT
Start: 2021-02-14 | End: 2021-06-03 | Stop reason: HOSPADM

## 2021-02-13 RX ADMIN — INSULIN LISPRO 10 UNITS: 100 INJECTION, SOLUTION INTRAVENOUS; SUBCUTANEOUS at 08:01

## 2021-02-13 RX ADMIN — METOCLOPRAMIDE 10 MG: 5 TABLET ORAL at 05:02

## 2021-02-13 RX ADMIN — POTASSIUM CHLORIDE 40 MEQ: 1500 TABLET, EXTENDED RELEASE ORAL at 08:58

## 2021-02-13 RX ADMIN — INSULIN LISPRO 10 UNITS: 100 INJECTION, SOLUTION INTRAVENOUS; SUBCUTANEOUS at 11:39

## 2021-02-13 RX ADMIN — ATORVASTATIN CALCIUM 20 MG: 20 TABLET, FILM COATED ORAL at 08:07

## 2021-02-13 RX ADMIN — ALBUTEROL SULFATE 2 PUFF: 90 AEROSOL, METERED RESPIRATORY (INHALATION) at 08:05

## 2021-02-13 RX ADMIN — METOPROLOL SUCCINATE 50 MG: 50 TABLET, EXTENDED RELEASE ORAL at 08:08

## 2021-02-13 RX ADMIN — ALUMINUM HYDROXIDE, MAGNESIUM HYDROXIDE, AND SIMETHICONE 30 ML: 200; 200; 20 SUSPENSION ORAL at 03:17

## 2021-02-13 RX ADMIN — ERGOCALCIFEROL 50000 UNITS: 1.25 CAPSULE ORAL at 10:34

## 2021-02-13 RX ADMIN — ALBUTEROL SULFATE 2 PUFF: 90 AEROSOL, METERED RESPIRATORY (INHALATION) at 11:39

## 2021-02-13 RX ADMIN — ACETAMINOPHEN 650 MG: 325 TABLET ORAL at 08:04

## 2021-02-13 RX ADMIN — METOCLOPRAMIDE 10 MG: 5 TABLET ORAL at 11:39

## 2021-02-13 RX ADMIN — PANTOPRAZOLE SODIUM 40 MG: 40 TABLET, DELAYED RELEASE ORAL at 08:09

## 2021-02-13 RX ADMIN — AMLODIPINE BESYLATE 5 MG: 5 TABLET ORAL at 08:06

## 2021-02-13 RX ADMIN — MAGNESIUM SULFATE IN WATER 2 G: 40 INJECTION, SOLUTION INTRAVENOUS at 08:47

## 2021-02-13 RX ADMIN — PROCHLORPERAZINE MALEATE 10 MG: 5 TABLET ORAL at 03:54

## 2021-02-13 RX ADMIN — LEVOTHYROXINE SODIUM 100 MCG: 100 TABLET ORAL at 05:02

## 2021-02-13 RX ADMIN — LOSARTAN POTASSIUM 100 MG: 50 TABLET, FILM COATED ORAL at 08:08

## 2021-02-13 NOTE — ASSESSMENT & PLAN NOTE
Only a slight elevation in the first set of enzymes at 0 04   Subsequent troponin levels were negative  In the setting of HTN, Obesity, HLD DMII and family history of HD, we will schedule an OP stress test to be sure there is no developing ischemia     EKG shows T-wave inversions that may indicate ischemia    Will repeat EKG

## 2021-02-13 NOTE — ASSESSMENT & PLAN NOTE
· Patient was initially on Cardene drip which has been titrated off  · Toprol dose increased to 50 mg daily  · Norvasc 5 mg daily added  · Continue home losartan  · Follow-up with PCP for further management  · Nephrology and cardiology input appreciated

## 2021-02-13 NOTE — ASSESSMENT & PLAN NOTE
Potassium level 3 3   May explain t-wave inversions on EKG  Non-the less given risk factors for CAD he should undergo a stress test to be sure there is no ischemia developing

## 2021-02-13 NOTE — ASSESSMENT & PLAN NOTE
Creatinine appears to be at baseline  Nephrology input appreciated    Continue outpatient routine lab monitoring with PCP

## 2021-02-13 NOTE — PROGRESS NOTES
Progress Note - Nephrology   Trenton Duffy 41 y o  male MRN: [id number]  Unit/Bed#: ICU 05 Encounter: [id number]    A/P:  1  Accelerated hypertension/hypertensive urgency  Was transferred to ICU for nicardipine infusion  Blood pressure on arrival was 219/122 dolores Peter's mercury  This morning his blood pressure is 136/70 40 feels fine  Drip had been stopped  Secondary hypertension workup is underway  Paddy Corona now does level is ordered  He is hypokalemic  Will check a cortisol level  He will follow-up as an outpatient  Current medication list is amlodipine 5 mg daily, metoprolol succinate 50 mg daily, losartan 100 mg daily  2  Chronic kidney disease stage IIIA  Baseline creatinine 1 3-1 5 mg/dL  Microalbumin to creatinine ratio is 3225 milligrams/gram  He has nephrotic range proteinuria in view of longstanding diabetes since 2006  Protein creatinine ratio is elevated due to use of NSAIDs and he very well may have tubular interstitial nephritis  He should avoid NSAID therapy  3  History of hyponatremia  He has a history of lithium use  Sodium is normal now  4  Hypertensive nephrosclerosis  A renal artery Doppler has been ordered    He should have a kidney ultrasound  Hypokalemia  Potassium 3 3-supplement ordered        Follow up reason for today's visit:  Admitted with accelerated hypertension requiring a nicardipine drip in the ICU  History of hypertension, type 1 diabetes mellitus since 2006, morbid obesity, chronic kidney disease stage IIIA with a baseline creatinine of 1 3-1 5 mg/dL    Hypertensive emergency    Patient Active Problem List   Diagnosis    Type 2 diabetes mellitus, with long-term current use of insulin ([facility] [address] )    Abdominal pain    Essential hypertension    Obsessive compulsive disorder    Schizoaffective disorder ([facility] [address] )    Acquired hypothyroidism    Morbid obesity with BMI of 50 0-59 9, adult (HCC)    Anxiety    Episodic confusion    Snoring    Insomnia    Hypersomnia    Vitamin D deficiency    Gastroesophageal reflux disease with esophagitis    Vomiting    Occipital neuralgia of left side    Cervicalgia    Nodule of parotid gland    Bipolar 1 disorder (HCC)    Depression    Urinary retention    Peripheral edema    Other hyperlipidemia    Migraine without aura and without status migrainosus, not intractable    Class 3 severe obesity due to excess calories with serious comorbidity and body mass index (BMI) of 60 0 to 69 9 in adult [facility])    Spinal stenosis in cervical region    Difficulty urinating    Urinary tract infection without hematuria    Penile pain    Chronic migraine without aura without status migrainosus, not intractable    Hypertensive emergency    Encephalopathy    Leukocytosis    Schizo affective schizophrenia ([address] )    Acute respiratory disease due to COVID-19 virus    Elevated troponin    Stage 3a chronic kidney disease    Family history of heart disease    Hypokalemia    Chest pressure         Subjective:   He says he feels much better  He says he did not  miss any of his scheduled medications as an outpatient but his blood pressure spiked up  He denies any chest pain, shortness of breath, dizziness, abdominal pain, nausea vomiting diarrhea or difficulty urinating    Objective:     Vitals: Blood pressure 139/84, pulse 75, temperature 98 1 °F (36 7 °C), temperature source Temporal, resp  rate 18, height 5' 2" (1 575 m), weight 119 kg (263 lb 0 1 oz), SpO2 95 %  ,Body mass index is 48 1 kg/m²  Weight (last 2 days)     Date/Time   Weight    02/13/21 0600   119 (263 01)    02/12/21 1554   119 (261 69)    02/12/21 1403   118 (259 7)    02/12/21 1054   127 (280)                Intake/Output Summary (Last 24 hours) at 2/13/2021 1101  Last data filed at 2/13/2021 0201  Gross per 24 hour   Intake 360 ml   Output 1300 ml   Net -940 ml     I/O last 3 completed shifts:   In: 360 [P O :360]  Out: 1300 [Urine:1300]         Physical Exam: /84 (BP Location: Right arm)   Pulse 75   Temp 98 1 °F (36 7 °C) (Temporal)   Resp 18   Ht 5' 2" (1 575 m)   Wt 119 kg (263 lb 0 1 oz)   SpO2 95%   BMI 48 10 kg/m²     General Appearance:    Alert, obese  cooperative, no distress, appears stated age   Head:    Normocephalic, without obvious abnormality, atraumatic   Eyes:    Conjunctiva/corneas clear   Ears:    Normal external ears   Nose:   Nares normal, septum midline, mucosa normal, no drainage    or sinus tenderness   Throat:   Lips, mucosa, and tongue normal; teeth and gums normal   Neck:   Supple, symmetrical, trachea midline, no adenopathy;        thyroid:  No enlargement/tenderness/nodules; no carotid    bruit or JVD   Back:     Symmetric, no curvature, ROM normal, no CVA tenderness   Lungs:     Clear to auscultation bilaterally, respirations unlabored   Chest wall:    No tenderness or deformity   Heart:    Regular rate and rhythm, S1 and S2 normal, no murmur, rub   or gallop   Abdomen:     Soft, non-tender, bowel sounds active   Extremities:   Extremities normal, atraumatic, no cyanosis or edema   Skin:   Skin color, texture, turgor normal, no rashes or lesions   Lymph nodes:   Cervical normal   Neurologic:   CNII-XII intact            Lab, Imaging and other studies: I have personally reviewed pertinent labs  CBC:   Lab Results   Component Value Date    WBC 9 90 02/12/2021    HGB 12 8 (L) 02/12/2021    HCT 37 5 (L) 02/12/2021    MCV 84 02/12/2021     02/12/2021    MCH 28 6 02/12/2021    MCHC 34 1 02/12/2021    RDW 14 1 02/12/2021    MPV 7 4 (L) 02/12/2021     CMP:   Lab Results   Component Value Date    K 3 3 (L) 02/13/2021     02/13/2021    CO2 29 02/13/2021    BUN 22 02/13/2021    CREATININE 1 59 (H) 02/13/2021    CALCIUM 8 7 02/13/2021    AST 12 (L) 02/13/2021    ALT 8 02/13/2021    ALKPHOS 44 02/13/2021    EGFR 53 02/13/2021           Results from last 7 days   Lab Units 02/13/21  0448 02/12/21  1109   POTASSIUM mmol/L 3 3* 3 7   CHLORIDE mmol/L 101 98   CO2 mmol/L 29 27   BUN mg/dL 22 24   CREATININE mg/dL 1 59* 1 62*   CALCIUM mg/dL 8 7 9 1   ALK PHOS U/L 44  --    ALT U/L 8  --    AST U/L 12*  --          Phosphorus:   Lab Results   Component Value Date    PHOS 3 5 02/13/2021     Magnesium:   Lab Results   Component Value Date    MG 1 8 (L) 02/13/2021     Urinalysis: No results found for: Christina Jose Daniel, SPECGRAV, PHUR, LEUKOCYTESUR, NITRITE, PROTEINUA, GLUCOSEU, KETONESU, BILIRUBINUR, BLOODU  Ionized Calcium: No results found for: CAION  Coagulation: No results found for: PT, INR, APTT  Troponin:   Lab Results   Component Value Date    TROPONINI <0 03 02/13/2021     ABG: No results found for: PHART, NYY2DHM, PO2ART, REA5BTI, F4XMNXTY, BEART, SOURCE  Radiology review:     IMAGING  Procedure: Xr Chest 1 View Portable    Result Date: 2/12/2021  Narrative: CHEST INDICATION:   chest pain  COMPARISON:  1/6/2021 chest x-ray EXAM PERFORMED/VIEWS:  XR CHEST PORTABLE Single view FINDINGS: Recurrent right basal opacity suspicious for Covid related infiltrate Cardiomediastinal silhouette appears unremarkable  No pneumothorax or pleural effusion  Osseous structures appear within normal limits for patient age       Impression: Recurrent right basal infiltrate suspicious for Covid pneumonia Workstation performed: [id number]       Current Facility-Administered Medications   Medication Dose Route Frequency    acetaminophen (TYLENOL) tablet 650 mg  650 mg Oral Q6H PRN    albuterol (PROVENTIL HFA,VENTOLIN HFA) inhaler 2 puff  2 puff Inhalation 4x Daily    aluminum-magnesium hydroxide-simethicone (MYLANTA) oral suspension 30 mL  30 mL Oral Q4H PRN    amLODIPine (NORVASC) tablet 5 mg  5 mg Oral Daily    atorvastatin (LIPITOR) tablet 20 mg  20 mg Oral Daily    cyclobenzaprine (FLEXERIL) tablet 5 mg  5 mg Oral HS    ergocalciferol (VITAMIN D2) capsule 50,000 Units  50,000 Units Oral Weekly    famotidine (PEPCID) tablet 40 mg  40 mg Oral QPM    hydrALAZINE (APRESOLINE) injection 10 mg  10 mg Intravenous Q6H PRN    insulin glargine (LANTUS) subcutaneous injection 50 Units 0 5 mL  50 Units Subcutaneous HS    insulin lispro (HumaLOG) 100 units/mL subcutaneous injection 10 Units  10 Units Subcutaneous TID With Meals    insulin lispro (HumaLOG) 100 units/mL subcutaneous injection 2-12 Units  2-12 Units Subcutaneous TID AC    insulin lispro (HumaLOG) 100 units/mL subcutaneous injection 2-12 Units  2-12 Units Subcutaneous HS    levothyroxine tablet 100 mcg  100 mcg Oral Early Morning    losartan (COZAAR) tablet 100 mg  100 mg Oral Daily    metoclopramide (REGLAN) tablet 10 mg  10 mg Oral Q6H [facility]    metoprolol succinate (TOPROL-XL) 24 hr tablet 50 mg  50 mg Oral Daily    niCARdipine (CARDENE) 25 mg (STANDARD CONCENTRATION) in sodium chloride 0 9% 250 mL  1-15 mg/hr Intravenous Titrated    OLANZapine (ZyPREXA) tablet 2 5 mg  2 5 mg Oral HS    pantoprazole (PROTONIX) EC tablet 40 mg  40 mg Oral BID    prochlorperazine (COMPAZINE) tablet 10 mg  10 mg Oral Q6H PRN     Medications Discontinued During This Encounter   Medication Reason    enoxaparin (LOVENOX) subcutaneous injection 40 mg        Ellen Bustillo MD      This progress note was produced in part using a dictation device which may document imprecise wording from author's original intent

## 2021-02-13 NOTE — ASSESSMENT & PLAN NOTE
Continue outpatient follow-up with neurology    Blood pressure has improved which should help with headaches

## 2021-02-13 NOTE — NURSING NOTE
Patient c/o midsternal chest discomfort "burning sensation "  EKG obtained  States feels short of breath, SaO2 is 94% on room air  Applied 2L O2 via nasal cannula, states "feels better with oxygen"  Dr Flaco Choudhary aware, ordered Mylanta PRN and troponin

## 2021-02-13 NOTE — ASSESSMENT & PLAN NOTE
Likely secondary to hypertensive emergency  Troponin improved  Blood pressure improved  No significant rise or fall  Cardiology input appreciated    Will need outpatient stress testing

## 2021-02-13 NOTE — ASSESSMENT & PLAN NOTE
Lab Results   Component Value Date    HGBA1C 10 5 (H) 11/22/2020       Recent Labs     02/12/21  1620 02/12/21  2112 02/13/21  0321 02/13/21  0615   POCGLU 294* 226* 104 115       Blood Sugar Average: Last 72 hrs:  (P) 184 75     uncontrolled  Continues to risk for CAD

## 2021-02-13 NOTE — ASSESSMENT & PLAN NOTE
Presented with complaints of headache and chest pain   BP was 244/119 on admission    Improved with labetalol and clonidine  Chest pain and headache resolved with BP control and Maalox   Now on metoprolol, norvasc and Losartan    Agree with current medical regimen  Will arrange OP follow up

## 2021-02-13 NOTE — DISCHARGE SUMMARY
Discharge- Lela Kodi 1980, 41 y o  male MRN: [id number]    Unit/Bed#: ICU 05 Encounter: [id number]    Primary Care Provider: Terry Contreras MD   Date and time admitted to hospital: 2/12/2021 10:56 AM      * Hypertensive emergency  Assessment & Plan  · Patient was initially on Cardene drip which has been titrated off  · Toprol dose increased to 50 mg daily  · Norvasc 5 mg daily added  · Continue home losartan  · Follow-up with PCP for further management  · Nephrology and cardiology input appreciated    Stage 3a chronic kidney disease  Assessment & Plan  Creatinine appears to be at baseline  Nephrology input appreciated  Continue outpatient routine lab monitoring with PCP    Elevated troponin  Assessment & Plan  Likely secondary to hypertensive emergency  Troponin improved  Blood pressure improved  No significant rise or fall  Cardiology input appreciated  Will need outpatient stress testing    Migraine without aura and without status migrainosus, not intractable  Assessment & Plan  Continue outpatient follow-up with neurology  Blood pressure has improved which should help with headaches    Schizoaffective disorder [facility])  Assessment & Plan  Follows up with Psychiatry as outpatient  Continue Zyprexa    Type 2 diabetes mellitus, with long-term current use of insulin ([facility] [address] )  Assessment & Plan  Continue home diabetic regimen    Further management per PCP as outpatient      Discharging Physician / Practitioner: Mason Oh MD  PCP: Terry Contreras MD  Admission Date:   Admission Orders (From admission, onward)     Ordered        02/12/21 1229  Place in Observation  Once                   Discharge Date: 02/13/21    Resolved Problems  Date Reviewed: 2/13/2021    None          Consultations During Hospital Stay:  · Cardiology, Nephrology    Procedures Performed:   · None    Significant Findings / Test Results:   · Hypertensive emergency    Incidental Findings:   · None     Test Results Pending at Discharge (will require follow up): · None     Outpatient Tests Requested:  · Routine labs with PCP in 1-2 weeks    Complications:     None    Reason for Admission:  Hypertensive emergency    Hospital Course:     Jaya Morrissey is a 41 y o  male patient who originally presented to the hospital on 2/12/2021 due to headache and elevated blood pressure  Patient was admitted to the ICU and started on nicardipine drip  Blood pressure gradually improved and patient's home regimen was adjusted as above  Cardiology and Nephrology were both consulted  Secondary hypertension workup has been initiated  Blood pressure much improved  Patient off nicardipine drip  Patient feels back to baseline  Discharge with outpatient follow-up  Please see above list of diagnoses and related plan for additional information  Condition at Discharge: stable     Discharge Day Visit / Exam:     Subjective:  No complaints at th  Vitals: Blood Pressure: 139/84 (02/13/21 1000)  Pulse: 75 (02/13/21 1000)  Temperature: 98 1 °F (36 7 °C) (02/13/21 0740)  Temp Source: Temporal (02/13/21 0740)  Respirations: 18 (02/13/21 1000)  Height: 5' 2" (157 5 cm) (02/12/21 1054)  Weight - Scale: 119 kg (263 lb 0 1 oz) (02/13/21 0600)  SpO2: 95 % (02/13/21 1000)     Exam:   Physical Exam  Constitutional:       General: He is not in acute distress  Appearance: He is obese  HENT:      Head: Normocephalic and atraumatic  Nose: Nose normal       Mouth/Throat:      Mouth: Mucous membranes are moist    Eyes:      Extraocular Movements: Extraocular movements intact  Conjunctiva/sclera: Conjunctivae normal    Neck:      Musculoskeletal: Normal range of motion and neck supple  Cardiovascular:      Rate and Rhythm: Normal rate and regular rhythm  Pulmonary:      Effort: Pulmonary effort is normal  No respiratory distress  Abdominal:      Palpations: Abdomen is soft  Tenderness: There is no abdominal tenderness     Musculoskeletal: Normal range of motion  General: No swelling  Skin:     General: Skin is warm and dry  Neurological:      General: No focal deficit present  Mental Status: He is alert  Cranial Nerves: No cranial nerve deficit  Psychiatric:         Mood and Affect: Mood normal          Behavior: Behavior normal          Discharge instructions/Information to patient and family:   See after visit summary for information provided to patient and family  Provisions for Follow-Up Care:  See after visit summary for information related to follow-up care and any pertinent home health orders  Disposition:     Home      Planned Readmission:    no     Discharge Statement:  I spent 35 minutes discharging the patient  This time was spent on the day of discharge  I had direct contact with the patient on the day of discharge  Greater than 50% of the total time was spent examining patient, answering all patient questions, arranging and discussing plan of care with patient as well as directly providing post-discharge instructions  Additional time then spent on discharge activities  Discharge Medications:  See after visit summary for reconciled discharge medications provided to patient and family        ** Please Note: This note has been constructed using a voice recognition system **

## 2021-02-13 NOTE — DISCHARGE INSTR - AVS FIRST PAGE
Dear Rianna Zabala,     It was our pleasure to care for you here at [facility]  It is our hope that we were always able to exceed the expected standards for your care during your stay  You were hospitalized due to hypertensive urgency  You were cared for on the medical floor by Kassie Raines MD with the [facility] who covers for your primary care physician (PCP), Madhavi Guerrero MD, while you were hospitalized  If you have any questions or concerns related to this hospitalization, you may contact us at [phone]  For follow up as well as any medication refills, we recommend that you follow up with your primary care physician  A registered nurse will reach out to you by phone within a few days after your discharge to answer any additional questions that you may have after going home  However, at this time we provide for you here, the most important instructions / recommendations at discharge:     · Notable Medication Adjustments -   · Toprol dose increased to 50 mg daily  Norvasc 5 mg daily added  · Testing Required after Discharge -   · Routine labs with PCP in 2 weeks  · Important follow up information -   · Follow-up with primary care doctor for further blood pressure management within 7-10 days of discharge  · Other Instructions -   · Please see discharge instructions  · Please review this entire after visit summary as additional general instructions including medication list, appointments, activity, diet, any pertinent wound care, and other additional recommendations from your care team that may be provided for you        Sincerely,     Kassie Raines MD

## 2021-02-13 NOTE — NURSING NOTE
Patient given discharge instructions at length  Expressed understanding of same  Clarified that patient is to call Dr Nestor Zamorano office for script for ultrasound of kidneys and bladder to be done as out patient as per Dr Tessa Parra  Aware he has prescriptions to  at pharmacy  Patient called his father for a ride home at this time,

## 2021-02-13 NOTE — CONSULTS
Consult- Annette Marrufo 1980, 41 y o  male MRN: [id number]    Unit/Bed#: ICU 05 Encounter: [id number]    Primary Care Provider: Samia Downs MD   Date and time admitted to hospital: 2/12/2021 10:56 AM      Inpatient consult to Cardiology  Consult performed by: Binh Almazan PA-C  Consult ordered by: Salma Buchanan MD          Chest pressure  Assessment & Plan  Complaints of chest pressure on admission that lasted over an hour in duration    This was alleviated with Maalox  It is unclear weather this is GI or cardiac especially in the setting of an abnormal EKG with t-wave inversions that may indicate ischemia  These changes were seen on prior EKGs     LEANNA will be arranged as an OP  Hypokalemia  Assessment & Plan  Potassium level 3 3   May explain t-wave inversions on EKG  Non-the less given risk factors for CAD he should undergo a stress test to be sure there is no ischemia developing  Family history of heart disease  Assessment & Plan  Maternal grandmother passed away with CHF  Paternal grandparents both passed from MI at an older age      Elevated troponin  Assessment & Plan  Only a slight elevation in the first set of enzymes at 0 04   Subsequent troponin levels were negative  In the setting of HTN, Obesity, HLD DMII and family history of HD, we will schedule an OP stress test to be sure there is no developing ischemia     EKG shows T-wave inversions that may indicate ischemia    Will repeat EKG    Morbid obesity with BMI of 50 0-59 9, adult [facility])  Assessment & Plan  Contributes to risk for CAD     Type 2 diabetes mellitus, with long-term current use of insulin [facility])  Assessment & Plan  Lab Results   Component Value Date    HGBA1C 10 5 (H) 11/22/2020       Recent Labs     02/12/21  1620 02/12/21  2112 02/13/21  0321 02/13/21  0615   POCGLU 294* 226* 104 115       Blood Sugar Average: Last 72 hrs:  (P) 184 75     uncontrolled  Continues to risk for CAD     * Hypertensive emergency  Assessment & Plan  Presented with complaints of headache and chest pain   BP was 244/119 on admission    Improved with labetalol and clonidine  Chest pain and headache resolved with BP control and Maalox   Now on metoprolol, norvasc and Losartan    Agree with current medical regimen  Will arrange OP follow up  Thank you for allowing us to see this pleasant patient in consult  We will follow up with Outpatient plans outlined above and make all arrangements  Chief Complaint:  Chief Complaint   Patient presents with    Hypertension     for 3-4 days  Patient has a history of HTN and states that he has been taking his medications    Headache        History of Present Illness: The patient is a 41year old with a known HTN, HLD , DMII and family history of HD  He comes to the ED with intractable H/A and complaints of chest pressure that lasted over an hour in duration  The Patient was found to be in hypertensive urgency on admission with a BP of 244/119  Patient states that he has been compliant with outpatient medications, but has not followed with PCP as he should  He was given Maalox which alleviated his chest pressure and labetalol and clonidine which in turn controlled his BP  Once his BP was controlled symptoms did not recur  From a cardiac perspective, he is without complaints of exertional dyspnea  He has no palpitations syncope or near syncope, and denies edema orthopnea or PND  He does not complain of TIA or CVA symptoms  Initial troponin was slightly elevated although subsequent levels were normal  And EKG shows T-Wave in versions possibly indicating ischemia  These changes were seen on prior EKGs   BNP is in the 200 range  Review of Systems: a 10 point review of systems was conducted and is negative except for as mentioned in the HPI or as below  Review of Systems   Constitution: Negative  HENT: Negative  Eyes: Negative  Cardiovascular: Positive for chest pain  Negative for claudication, cyanosis, dyspnea on exertion, irregular heartbeat, leg swelling, near-syncope, orthopnea, palpitations, paroxysmal nocturnal dyspnea and syncope  Respiratory: Negative  Negative for cough, hemoptysis, shortness of breath, sleep disturbances due to breathing, snoring, sputum production and wheezing  Endocrine: Negative  Hematologic/Lymphatic: Negative  Skin: Negative  Musculoskeletal: Negative  Gastrointestinal: Negative  Genitourinary: Negative  Neurological: Positive for headaches  Psychiatric/Behavioral: Negative  Allergic/Immunologic: Negative  Past Medical and Surgical History:  Past Medical History:   Diagnosis Date    Acute bronchitis due to other specified organisms 7/5/2019    Chronic headaches     Diabetes mellitus ([facility] [address] )     Disease of thyroid gland     Esophagitis     Gastritis     Gastroparesis     GERD (gastroesophageal reflux disease)     Hypertension     Migraine     Obesity     Obsessive compulsive disorder     Psychiatric disorder     Schizoaffective disorder [facility])      Past Surgical History:   Procedure Laterality Date    ESOPHAGOGASTRODUODENOSCOPY N/A 1/15/2019    Procedure: ESOPHAGOGASTRODUODENOSCOPY (EGD); Surgeon: Brandon Blank MD;  Location: [facility];   Service: Gastroenterology     Social History     Substance and Sexual Activity   Alcohol Use No     Social History     Substance and Sexual Activity   Drug Use No     Social History     Tobacco Use   Smoking Status Never Smoker   Smokeless Tobacco Never Used       Family History:  Family History   Problem Relation Age of Onset    COPD Mother     Hypertension Mother     Heart failure Mother     Rheum arthritis Family     Heart disease Family     Hypertension Father     Diabetes Father     Hyperlipidemia Father        Medication:  Medications Prior to Admission   Medication    ACCU-CHEK FASTCLIX LANCETS [facility]    ACCU-CHEK GUIDE test strip    Admelog SoloStar 100 units/mL injection pen    albuterol (PROVENTIL HFA,VENTOLIN HFA) 90 mcg/act inhaler    aspirin-acetaminophen-caffeine (EXCEDRIN MIGRAINE) 250-250-65 MG per tablet    atorvastatin (LIPITOR) 20 mg tablet    Blood Glucose Monitoring Suppl (ACCU-CHEK GUIDE) w/Device KIT    cyclobenzaprine (FLEXERIL) 5 mg tablet    ergocalciferol (VITAMIN D2) 50,000 units    famotidine (PEPCID) 20 mg tablet    fluvoxaMINE (LUVOX) 100 mg tablet    Galcanezumab-gnlm 120 MG/ML SOAJ    insulin glargine (Basaglar KwikPen) 100 units/mL injection pen    Insulin Pen Needle (Pen Needles 3/16") 31G X 5 MM MISC    levothyroxine 100 mcg tablet    Lidocaine Viscous HCl (XYLOCAINE) 2 % mucosal solution    losartan (COZAAR) 50 mg tablet    metoclopramide (REGLAN) 10 mg tablet    metoprolol succinate (TOPROL-XL) 25 mg 24 hr tablet    OLANZapine (ZyPREXA) 2 5 mg tablet    ondansetron (ZOFRAN) 4 mg tablet    pantoprazole (PROTONIX) 40 mg tablet    prochlorperazine (COMPAZINE) 10 mg tablet    sucralfate (CARAFATE) 1 g tablet    sucralfate (CARAFATE) 1 g/10 mL suspension        Allergies: Allergies   Allergen Reactions    Augmentin [Amoxicillin-Pot Clavulanate]     Amoxicillin Diarrhea    Clavulanic Acid Diarrhea    Erythromycin Diarrhea       Physical Exam:  Vitals: Blood pressure 142/88, pulse 82, temperature 98 1 °F (36 7 °C), temperature source Temporal, resp  rate 20, height 5' 2" (1 575 m), weight 119 kg (263 lb 0 1 oz), SpO2 96 %  , Body mass index is 48 1 kg/m² ,   Orthostatic Blood Pressures      Most Recent Value   Blood Pressure  142/88 filed at 02/13/2021 0900   Patient Position - Orthostatic VS  Lying filed at 02/13/2021 4135      , Systolic (56ILG), YMN:266 , Min:122 , AGR:450   , Diastolic (16FTB), DDN:56, Min:59, Max:199    Physical Exam   Constitutional: He is oriented to person, place, and time  He appears well-developed and well-nourished  Obesity    HENT:   Head: Normocephalic and atraumatic  Mouth/Throat: Oropharynx is clear and moist    Eyes: Conjunctivae are normal  No scleral icterus  Neck: Normal range of motion  Neck supple  No JVD present  No thyromegaly present  Cardiovascular: Normal rate, regular rhythm, S1 normal, S2 normal and normal heart sounds  Exam reveals no gallop and no friction rub  No murmur heard  Pulmonary/Chest: No respiratory distress  He has no wheezes  He has no rales  Abdominal: Soft  Bowel sounds are normal  He exhibits no distension  There is abdominal tenderness (radiating into the substernal region) in the epigastric area  Aorta not palpable   Musculoskeletal: Normal range of motion  General: No tenderness, deformity or edema  Neurological: He is alert and oriented to person, place, and time  Skin: Skin is warm and dry  Psychiatric: He has a normal mood and affect  His behavior is normal    Nursing note and vitals reviewed          Most Recent Cardiac Imaging:   Echo 11-: Normal LVF, EF 60%, no WMA, mild LVH, mild MR/TR    EKG: Normal sinus rhythm  T wave inversions for which ischemia should be considered if clinically relevant  Abnormal ECG  When compared with ECG of 12-FEB-2021 18:41, (unconfirmed)  No significant change was found    Lab Results:   Troponins:   Results from last 7 days   Lab Units 02/13/21  0448 02/12/21  2141 02/12/21  1903   TROPONIN I ng/mL <0 03 0 03 <0 03     BNP:  Results from last 6 Months   Lab Units 02/12/21  1109   BNP pg/mL 208*     CBC with diff:   Results from last 7 days   Lab Units 02/12/21  2141 02/12/21  1109   WBC Thousand/uL  --  9 90   HEMOGLOBIN g/dL  --  12 8*   HEMATOCRIT %  --  37 5*   PLATELETS Thousands/uL 317 356   RBC Million/uL  --  4 46     CMP:  Results from last 7 days   Lab Units 02/13/21  0448 02/12/21  1109   POTASSIUM mmol/L 3 3* 3 7   CHLORIDE mmol/L 101 98   BUN mg/dL 22 24   CREATININE mg/dL 1 59* 1 62*   CALCIUM mg/dL 8 7 9 1   AST U/L 12*  --    ALT U/L 8  --    ALK PHOS U/L 44  -- EGFR ml/min/1 73sq m 53 52     Lipid Profile:

## 2021-02-13 NOTE — ASSESSMENT & PLAN NOTE
Maternal grandmother passed away with CHF  Paternal grandparents both passed from MI at an older age

## 2021-02-13 NOTE — CASE MANAGEMENT
Pt was made aware of cm role and assessment was completed, pt lives with his step mother in a 1 story home with 12 basement steps, pt has hx of schizoaffective disorder, pt does wear glasses and he is able to read the medication lables, DME: glucometer, bp cuff, no hx of hhc, pt does not smoke or drinks alcohol, RX plan [facility], pt was made aware connor the is here as an obs status, pt's family will transport, d/c order written, I met again with the pt and he denied any additional d/c needs, pt and family in agreement with the d/c an d/cplan home with outpt follow up CM reviewed d/c planning process including the following: identifying help at home, patient preference for d/c planning needs, availability of treatment team to discuss questions or concerns patient and/or family may have regarding understanding medications and recognizing signs and symptoms once discharged  CM also encouraged patient to follow up with all recommended appointments after discharge  Patient advised of importance for patient and family to participate in managing patients medical well being  Patient/caregiver received discharge checklist   Content reviewed  Patient/caregiver encouraged to participate in discharge plan of care prior to discharge home

## 2021-02-14 LAB
ATRIAL RATE: 75 BPM
P AXIS: 52 DEGREES
PR INTERVAL: 180 MS
QRS AXIS: 0 DEGREES
QRSD INTERVAL: 96 MS
QT INTERVAL: 412 MS
QTC INTERVAL: 460 MS
T WAVE AXIS: 36 DEGREES
VENTRICULAR RATE: 75 BPM

## 2021-02-14 PROCEDURE — 93010 ELECTROCARDIOGRAM REPORT: CPT | Performed by: INTERNAL MEDICINE

## 2021-02-15 DIAGNOSIS — R07.89 CHEST PRESSURE: Primary | ICD-10-CM

## 2021-02-17 ENCOUNTER — TELEPHONE (OUTPATIENT)
Dept: NEUROLOGY | Facility: CLINIC | Age: 41
End: 2021-02-17

## 2021-02-19 LAB
ALDOST SERPL-MCNC: <1 NG/DL (ref 0–30)
RENIN PLAS-CCNC: 0.41 NG/ML/HR (ref 0.17–5.38)

## 2021-02-21 LAB
METANEPH FREE SERPL-MCNC: 19.7 PG/ML (ref 0–88)
NORMETANEPHRINE SERPL-MCNC: 64 PG/ML (ref 0–125.8)

## 2021-02-22 ENCOUNTER — TELEPHONE (OUTPATIENT)
Dept: CARDIOLOGY CLINIC | Facility: CLINIC | Age: 41
End: 2021-02-22

## 2021-02-22 NOTE — TELEPHONE ENCOUNTER
----- Message from Sylvia Riley sent at 2/15/2021  1:20 PM EST -----      ----- Message -----  From: Rianna Alvarez PA-C  Sent: 2/13/2021   9:25 AM EST  To:   [facility]    Please schedule patient for LE[facility]NA and for follow up two weeks after   Thank you

## 2021-02-23 ENCOUNTER — APPOINTMENT (EMERGENCY)
Dept: CT IMAGING | Facility: HOSPITAL | Age: 41
End: 2021-02-23
Payer: COMMERCIAL

## 2021-02-23 ENCOUNTER — HOSPITAL ENCOUNTER (EMERGENCY)
Facility: HOSPITAL | Age: 41
Discharge: HOME | End: 2021-02-23
Attending: EMERGENCY MEDICINE
Payer: COMMERCIAL

## 2021-02-23 VITALS
TEMPERATURE: 97.3 F | WEIGHT: 263 LBS | OXYGEN SATURATION: 96 % | DIASTOLIC BLOOD PRESSURE: 107 MMHG | BODY MASS INDEX: 48.4 KG/M2 | HEART RATE: 89 BPM | HEIGHT: 62 IN | RESPIRATION RATE: 18 BRPM | SYSTOLIC BLOOD PRESSURE: 191 MMHG

## 2021-02-23 DIAGNOSIS — E86.0 DEHYDRATION: Primary | ICD-10-CM

## 2021-02-23 LAB
ANION GAP SERPL CALCULATED.3IONS-SCNC: 11 MMOL/L (ref 4–13)
BACTERIA UR QL AUTO: ABNORMAL /HPF
BASOPHILS # BLD AUTO: 0.1 THOUSANDS/ΜL (ref 0–0.1)
BASOPHILS NFR BLD AUTO: 1 % (ref 0–2)
BILIRUB UR QL STRIP: NEGATIVE
BUN SERPL-MCNC: 21 MG/DL (ref 7–25)
CALCIUM SERPL-MCNC: 8.5 MG/DL (ref 8.6–10.5)
CHLORIDE SERPL-SCNC: 93 MMOL/L (ref 98–107)
CLARITY UR: CLEAR
CO2 SERPL-SCNC: 29 MMOL/L (ref 21–31)
COLOR UR: YELLOW
CREAT SERPL-MCNC: 1.78 MG/DL (ref 0.7–1.3)
EOSINOPHIL # BLD AUTO: 0.1 THOUSAND/ΜL (ref 0–0.61)
EOSINOPHIL NFR BLD AUTO: 2 % (ref 0–5)
ERYTHROCYTE [DISTWIDTH] IN BLOOD BY AUTOMATED COUNT: 14.4 % (ref 11.5–14.5)
GFR SERPL CREATININE-BSD FRML MDRD: 46 ML/MIN/1.73SQ M
GLUCOSE SERPL-MCNC: 128 MG/DL (ref 65–99)
GLUCOSE UR STRIP-MCNC: ABNORMAL MG/DL
HCT VFR BLD AUTO: 36.8 % (ref 42–47)
HGB BLD-MCNC: 12.3 G/DL (ref 14–18)
HGB UR QL STRIP.AUTO: NEGATIVE
KETONES UR STRIP-MCNC: NEGATIVE MG/DL
LEUKOCYTE ESTERASE UR QL STRIP: NEGATIVE
LYMPHOCYTES # BLD AUTO: 1.2 THOUSANDS/ΜL (ref 0.6–4.47)
LYMPHOCYTES NFR BLD AUTO: 18 % (ref 21–51)
MCH RBC QN AUTO: 27.7 PG (ref 26–34)
MCHC RBC AUTO-ENTMCNC: 33.4 G/DL (ref 31–37)
MCV RBC AUTO: 83 FL (ref 81–99)
MONOCYTES # BLD AUTO: 0.5 THOUSAND/ΜL (ref 0.17–1.22)
MONOCYTES NFR BLD AUTO: 8 % (ref 2–12)
MUCOUS THREADS UR QL AUTO: ABNORMAL
NEUTROPHILS # BLD AUTO: 4.6 THOUSANDS/ΜL (ref 1.4–6.5)
NEUTS SEG NFR BLD AUTO: 71 % (ref 42–75)
NITRITE UR QL STRIP: NEGATIVE
NON-SQ EPI CELLS URNS QL MICRO: ABNORMAL /HPF
PH UR STRIP.AUTO: 6.5 [PH]
PLATELET # BLD AUTO: 293 THOUSANDS/UL (ref 149–390)
PMV BLD AUTO: 7.2 FL (ref 8.6–11.7)
POTASSIUM SERPL-SCNC: 3.5 MMOL/L (ref 3.5–5.5)
PROT UR STRIP-MCNC: ABNORMAL MG/DL
RBC # BLD AUTO: 4.44 MILLION/UL (ref 4.3–5.9)
RBC #/AREA URNS AUTO: ABNORMAL /HPF
SODIUM SERPL-SCNC: 133 MMOL/L (ref 134–143)
SP GR UR STRIP.AUTO: 1.02 (ref 1–1.03)
UROBILINOGEN UR QL STRIP.AUTO: 0.2 E.U./DL
WBC # BLD AUTO: 6.6 THOUSAND/UL (ref 4.8–10.8)
WBC #/AREA URNS AUTO: ABNORMAL /HPF

## 2021-02-23 PROCEDURE — 96361 HYDRATE IV INFUSION ADD-ON: CPT

## 2021-02-23 PROCEDURE — 99284 EMERGENCY DEPT VISIT MOD MDM: CPT | Performed by: EMERGENCY MEDICINE

## 2021-02-23 PROCEDURE — 74176 CT ABD & PELVIS W/O CONTRAST: CPT

## 2021-02-23 PROCEDURE — 36415 COLL VENOUS BLD VENIPUNCTURE: CPT | Performed by: EMERGENCY MEDICINE

## 2021-02-23 PROCEDURE — 96360 HYDRATION IV INFUSION INIT: CPT

## 2021-02-23 PROCEDURE — 99284 EMERGENCY DEPT VISIT MOD MDM: CPT

## 2021-02-23 PROCEDURE — 85025 COMPLETE CBC W/AUTO DIFF WBC: CPT | Performed by: EMERGENCY MEDICINE

## 2021-02-23 PROCEDURE — 81003 URINALYSIS AUTO W/O SCOPE: CPT | Performed by: EMERGENCY MEDICINE

## 2021-02-23 PROCEDURE — 81001 URINALYSIS AUTO W/SCOPE: CPT | Performed by: EMERGENCY MEDICINE

## 2021-02-23 PROCEDURE — G1004 CDSM NDSC: HCPCS

## 2021-02-23 PROCEDURE — 80048 BASIC METABOLIC PNL TOTAL CA: CPT | Performed by: EMERGENCY MEDICINE

## 2021-02-23 PROCEDURE — 51798 US URINE CAPACITY MEASURE: CPT

## 2021-02-23 RX ADMIN — SODIUM CHLORIDE 1000 ML: 0.9 INJECTION, SOLUTION INTRAVENOUS at 14:36

## 2021-02-24 NOTE — ED PROVIDER NOTES
History  Chief Complaint   Patient presents with    Urinary Retention     According to the patient he has had difficulty urinating and has not urinated in 6 hrs  This is a 41year old man who complains of decreased urination for the past 2 days, as well as some left flank pain, and some dysuria  Patient states he has never had this before  Notes that he has chronic severe gastritis so he does not take adequate PO at baseline  No penile discharge  States he otherwise feels at his basleine  Denies fevers or chills  Reports BMs have been slightly loose  Prior to Admission Medications   Prescriptions Last Dose Informant Patient Reported? Taking? ACCU-CHEK FASTCLIX LANCETS MISC  Self Yes No   Si (four) times a day Not checking 4 times a day   Patient stated maybe 2 times a day   ACCU-CHEK GUIDE test strip  Self Yes No   Si (four) times a day Test   Admelog SoloStar 100 units/mL injection pen   No No   Sig: Inject 10 Units under the skin 3 (three) times a day with meals   Blood Glucose Monitoring Suppl (ACCU-CHEK GUIDE) w/Device KIT  Self Yes No   Sig: Checking 2 times a day   Galcanezumab-gnlm 120 MG/ML SOAJ   No No   Sig: Inject 120 mg under the skin every 30 (thirty) days   Insulin Pen Needle (Pen Needles 3/16") 31G X 5 MM MISC   No No   Sig: Use 4 (four) times a day   Lidocaine Viscous HCl (XYLOCAINE) 2 % mucosal solution   No No   Sig: Swish and swallow 15 mL 4 (four) times a day as needed for mouth pain or discomfort   OLANZapine (ZyPREXA) 2 5 mg tablet   No No   Sig: Take 1 tablet (2 5 mg total) by mouth daily at bedtime   albuterol (PROVENTIL HFA,VENTOLIN HFA) 90 mcg/act inhaler   No No   Sig: Inhale 2 puffs 4 (four) times a day   amLODIPine (NORVASC) 5 mg tablet   No No   Sig: Take 1 tablet (5 mg total) by mouth daily   atorvastatin (LIPITOR) 20 mg tablet   No No   Sig: Take 1 tablet (20 mg total) by mouth daily   cyclobenzaprine (FLEXERIL) 5 mg tablet   No No   Sig: Take 1 tablet (5 mg total) by mouth daily at bedtime   ergocalciferol (VITAMIN D2) 50,000 units   No No   Sig: Take 1 capsule (50,000 Units total) by mouth once a week   famotidine (PEPCID) 20 mg tablet   No No   Sig: Take 2 tablets (40 mg total) by mouth every evening   insulin glargine (Basaglar KwikPen) 100 units/mL injection pen   No No   Sig: Inject 50 Units under the skin daily at bedtime   levothyroxine 100 mcg tablet   No No   Sig: Take 1 tablet (100 mcg total) by mouth daily   losartan (COZAAR) 50 mg tablet   No No   Sig: Take 2 tablets (100 mg total) by mouth daily   metoclopramide (REGLAN) 10 mg tablet   No No   Sig: Take 1 tablet (10 mg total) by mouth every 6 (six) hours   metoprolol succinate (TOPROL-XL) 50 mg 24 hr tablet   No No   Sig: Take 1 tablet (50 mg total) by mouth daily   ondansetron (ZOFRAN) 4 mg tablet   No No   Sig: Take 1 tablet (4 mg total) by mouth every 8 (eight) hours as needed for nausea or vomiting   pantoprazole (PROTONIX) 40 mg tablet   No No   Sig: Take 1 tablet (40 mg total) by mouth 2 (two) times a day   prochlorperazine (COMPAZINE) 10 mg tablet   No No   Sig: Take 1 tablet (10 mg total) by mouth every 6 (six) hours as needed (migraine)   sucralfate (CARAFATE) 1 g tablet   No No   Sig: Take 1 tablet (1 g total) by mouth 4 (four) times a day for 5 days      Facility-Administered Medications: None       Past Medical History:   Diagnosis Date    Acute bronchitis due to other specified organisms 7/5/2019    Chronic headaches     Diabetes mellitus (HCC)     Disease of thyroid gland     Esophagitis     Gastritis     Gastroparesis     GERD (gastroesophageal reflux disease)     Hypertension     Migraine     Obesity     Obsessive compulsive disorder     Psychiatric disorder     Schizoaffective disorder [facility])        Past Surgical History:   Procedure Laterality Date    ESOPHAGOGASTRODUODENOSCOPY N/A 1/15/2019    Procedure: ESOPHAGOGASTRODUODENOSCOPY (EGD);   Surgeon: Roe Lanier MD; [facility]; Service: Gastroenterology       Family History   Problem Relation Age of Onset    COPD Mother     Hypertension Mother     Heart failure Mother     Rheum arthritis Family     Heart disease Family     Hypertension Father     Diabetes Father     Hyperlipidemia Father      I have reviewed and agree with the history as documented  E-Cigarette/Vaping    E-Cigarette Use Never User      E-Cigarette/Vaping Substances    Nicotine No     THC No     CBD No     Flavoring No      Social History     Tobacco Use    Smoking status: Never Smoker    Smokeless tobacco: Never Used   Substance Use Topics    Alcohol use: No    Drug use: No       Review of Systems   Constitutional: Negative for chills and fever  Eyes: Negative for visual disturbance  Respiratory: Negative for shortness of breath  Cardiovascular: Negative for chest pain  Gastrointestinal: Positive for abdominal pain (chronic and unchanged), diarrhea and nausea (chronic and unchanged)  Negative for abdominal distention  Endocrine: Negative for polyuria  Genitourinary: Positive for decreased urine volume, difficulty urinating and dysuria  Skin: Negative for rash  Neurological: Negative for dizziness, syncope and weakness  Physical Exam  Physical Exam  Constitutional:       General: He is not in acute distress  Appearance: He is well-developed  He is not ill-appearing, toxic-appearing or diaphoretic  HENT:      Head: Normocephalic and atraumatic  Eyes:      Conjunctiva/sclera: Conjunctivae normal       Pupils: Pupils are equal, round, and reactive to light  Neck:      Musculoskeletal: Normal range of motion and neck supple  Cardiovascular:      Rate and Rhythm: Normal rate and regular rhythm  Heart sounds: Normal heart sounds  Pulmonary:      Effort: Pulmonary effort is normal  No respiratory distress  Breath sounds: Normal breath sounds     Abdominal:      General: Bowel sounds are normal  There is no distension  Palpations: Abdomen is soft  Tenderness: There is no abdominal tenderness  There is left CVA tenderness (mild)  There is no right CVA tenderness, guarding or rebound  Musculoskeletal: Normal range of motion  Skin:     General: Skin is warm and dry  Neurological:      Mental Status: He is alert and oriented to person, place, and time     Psychiatric:         Behavior: Behavior normal          Vital Signs  ED Triage Vitals [02/23/21 1359]   Temperature Pulse Respirations Blood Pressure SpO2   (!) 97 3 °F (36 3 °C) 89 18 (!) 191/107 96 %      Temp Source Heart Rate Source Patient Position - Orthostatic VS BP Location FiO2 (%)   Temporal Monitor Sitting Left arm --      Pain Score       5           Vitals:    02/23/21 1359   BP: (!) 191/107   Pulse: 89   Patient Position - Orthostatic VS: Sitting         Visual Acuity      ED Medications  Medications   sodium chloride 0 9 % bolus 1,000 mL (0 mL Intravenous Stopped 2/23/21 1724)       Diagnostic Studies  Results Reviewed     Procedure Component Value Units Date/Time    Basic metabolic panel [[id number]]  (Abnormal) Collected: 02/23/21 1430    Lab Status: Final result Specimen: Blood from Arm, Left Updated: 02/23/21 1501     Sodium 133 mmol/L      Potassium 3 5 mmol/L      Chloride 93 mmol/L      CO2 29 mmol/L      ANION GAP 11 mmol/L      BUN 21 mg/dL      Creatinine 1 78 mg/dL      Glucose 128 mg/dL      Calcium 8 5 mg/dL      eGFR 46 ml/min/1 73sq m     Narrative:      [place] guidelines for Chronic Kidney Disease (CKD):     Stage 1 with normal or high GFR (GFR > 90 mL/min/1 73 square meters)    Stage 2 Mild CKD (GFR = 60-89 mL/min/1 73 square meters)    Stage 3A Moderate CKD (GFR = 45-59 mL/min/1 73 square meters)    Stage 3B Moderate CKD (GFR = 30-44 mL/min/1 73 square meters)    Stage 4 Severe CKD (GFR = 15-29 mL/min/1 73 square meters)    Stage 5 End Stage CKD (GFR <15 mL/min/1 73 square meters)  Note: GFR calculation is accurate only with a steady state creatinine    Urine Microscopic [[id number]]  (Abnormal) Collected: 02/23/21 1430    Lab Status: Final result Specimen: Urine, Clean Catch Updated: 02/23/21 1446     RBC, UA None Seen /hpf      WBC, UA 0-1 /hpf      Epithelial Cells Occasional /hpf      Bacteria, UA None Seen /hpf      MUCUS THREADS Occasional    UA w Reflex to Microscopic w Reflex to Culture [[id number]]  (Abnormal) Collected: 02/23/21 1430    Lab Status: Final result Specimen: Urine, Clean Catch Updated: 02/23/21 1440     Color, UA Yellow     Clarity, UA Clear     Specific [place], UA 1 020     pH, UA 6 5     Leukocytes, UA Negative     Nitrite, UA Negative     Protein, UA 3+ mg/dl      Glucose, UA Trace mg/dl      Ketones, UA Negative mg/dl      Urobilinogen, UA 0 2 E U /dl      Bilirubin, UA Negative     Blood, UA Negative    CBC and differential [[id number]]  (Abnormal) Collected: 02/23/21 1430    Lab Status: Final result Specimen: Blood from Arm, Left Updated: 02/23/21 1439     WBC 6 60 Thousand/uL      RBC 4 44 Million/uL      Hemoglobin 12 3 g/dL      Hematocrit 36 8 %      MCV 83 fL      MCH 27 7 pg      MCHC 33 4 g/dL      RDW 14 4 %      MPV 7 2 fL      Platelets 293 Thousands/uL      Neutrophils Relative 71 %      Lymphocytes Relative 18 %      Monocytes Relative 8 %      Eosinophils Relative 2 %      Basophils Relative 1 %      Neutrophils Absolute 4 60 Thousands/µL      Lymphocytes Absolute 1 20 Thousands/µL      Monocytes Absolute 0 50 Thousand/µL      Eosinophils Absolute 0 10 Thousand/µL      Basophils Absolute 0 10 Thousands/µL                  CT renal stone study abdomen pelvis wo contrast   Final Result by Jessica Pace DO (02/23 1604)      No hydronephrosis or discernible urinary tract calculi  Otherwise, no acute intra-abdominal abnormality         Bibasilar lung infiltrates seen previously have almost completely resolved with some residual micronodularity suspected  Limited evaluation of the pelvis due to patient body habitus and of the exam in general due to absence of IV contrast       Workstation performed: [id number]                    Procedures  Procedures         ED Course                                           MDM  Number of Diagnoses or Management Options  Dehydration:   Diagnosis management comments: This is a 41year old man with dehydration  States he feels significantly better after receiving 1l of NS  Able to urinate with minimal retained urine on bladders scan  No sign of infection  CT demonstrates no stones  Appears clinically well  Lily Williamson to return home  Discussed warning signs and symptoms with the patient as well as when to return to the emergency department versus follow up with [facility]  Patient states understanding and agreement with the plan  This note was completed using dictation software  Amount and/or Complexity of Data Reviewed  Clinical lab tests: ordered and reviewed  Tests in the radiology section of CPT®: ordered and reviewed        Disposition  Final diagnoses:   Dehydration     Time reflects when diagnosis was documented in both MDM as applicable and the Disposition within this note     Time User Action Codes Description Comment    2/23/2021  4:39 PM Josefina Peters Add [E86 0] Dehydration       ED Disposition     ED Disposition Condition Date/Time Comment    Discharge Stable Tue Feb 23, 2021  4:39 PM Maricarmen Session discharge to home/self care  Follow-up Information     Follow up With Specialties Details Why [place]γιος Γε[place]ργιος MD [facility] In 1 day  [address] [facility]  [phone]            Discharge Medication List as of 2/23/2021  4:39 PM      CONTINUE these medications which have NOT CHANGED    Details   ACCU-CHEK FASTCLIX LANCETS MISC 4 (four) times a day Not checking 4 times a day   Patient stated maybe 2 times a day, Starting Wed 5/8/2019, Historical Med      ACCU-CHEK GUIDE test strip 4 (four) times a day Test, Starting Wed 5/8/2019, Historical Med      Admelog SoloStar 100 units/mL injection pen Inject 10 Units under the skin 3 (three) times a day with meals, Starting Tue 1/12/2021, Normal      albuterol (PROVENTIL HFA,VENTOLIN HFA) 90 mcg/act inhaler Inhale 2 puffs 4 (four) times a day, Starting Tue 1/12/2021, Normal      atorvastatin (LIPITOR) 20 mg tablet Take 1 tablet (20 mg total) by mouth daily, Starting Tue 1/12/2021, Normal      Blood Glucose Monitoring Suppl (ACCU-CHEK GUIDE) w/Device KIT Checking 2 times a day, Starting Thu 5/9/2019, Historical Med      cyclobenzaprine (FLEXERIL) 5 mg tablet Take 1 tablet (5 mg total) by mouth daily at bedtime, Starting Tue 1/12/2021, Normal      ergocalciferol (VITAMIN D2) 50,000 units Take 1 capsule (50,000 Units total) by mouth once a week, Starting Tue 1/12/2021, Normal      Galcanezumab-gnlm 120 MG/ML SOAJ Inject 120 mg under the skin every 30 (thirty) days, Starting Wed 11/4/2020, Normal      insulin glargine (Basaglar KwikPen) 100 units/mL injection pen Inject 50 Units under the skin daily at bedtime, Starting Tue 1/12/2021, No Print      Insulin Pen Needle (Pen Needles 3/16") 31G X 5 MM MISC Use 4 (four) times a day, Starting Tue 1/12/2021, Normal      levothyroxine 100 mcg tablet Take 1 tablet (100 mcg total) by mouth daily, Starting Tue 1/12/2021, Normal      losartan (COZAAR) 50 mg tablet Take 2 tablets (100 mg total) by mouth daily, Starting Tue 1/12/2021, Normal      metoclopramide (REGLAN) 10 mg tablet Take 1 tablet (10 mg total) by mouth every 6 (six) hours, Starting Tue 1/12/2021, Normal      OLANZapine (ZyPREXA) 2 5 mg tablet Take 1 tablet (2 5 mg total) by mouth daily at bedtime, Starting Tue 1/12/2021, Normal      ondansetron (ZOFRAN) 4 mg tablet Take 1 tablet (4 mg total) by mouth every 8 (eight) hours as needed for nausea or vomiting, Starting Tue 1/12/2021, Normal      pantoprazole (PROTONIX) 40 mg tablet Take 1 tablet (40 mg total) by mouth 2 (two) times a day, Starting Tue 1/12/2021, Normal      prochlorperazine (COMPAZINE) 10 mg tablet Take 1 tablet (10 mg total) by mouth every 6 (six) hours as needed (migraine), Starting Tue 1/12/2021, Normal      sucralfate (CARAFATE) 1 g tablet Take 1 tablet (1 g total) by mouth 4 (four) times a day for 5 days, Starting Tue 1/5/2021, Until Fri 2/5/2021, Normal      amLODIPine (NORVASC) 5 mg tablet Take 1 tablet (5 mg total) by mouth daily, Starting Sun 2/14/2021, Normal      famotidine (PEPCID) 20 mg tablet Take 2 tablets (40 mg total) by mouth every evening, Starting Fri 2/5/2021, Normal      Lidocaine Viscous HCl (XYLOCAINE) 2 % mucosal solution Swish and swallow 15 mL 4 (four) times a day as needed for mouth pain or discomfort, Starting Fri 2/5/2021, Normal      metoprolol succinate (TOPROL-XL) 50 mg 24 hr tablet Take 1 tablet (50 mg total) by mouth daily, Starting Sun 2/14/2021, Normal           No discharge procedures on file      PDMP Review     None          ED Provider  Electronically Signed by           Aguilar Leal MD  02/25/21 1696

## 2021-03-01 ENCOUNTER — TELEPHONE (OUTPATIENT)
Dept: NEUROLOGY | Facility: CLINIC | Age: 41
End: 2021-03-01

## 2021-03-03 NOTE — TELEPHONE ENCOUNTER
Patient's father called to cancel appointment for today with Dr Harry Sanabria  States that he is ill and he would be bringing the patient  Virtual appointment was offered and declined  States that he will call back at a later date to reschedule

## 2021-03-10 ENCOUNTER — ANESTHESIA EVENT (OUTPATIENT)
Dept: GASTROENTEROLOGY | Facility: AMBULARY SURGERY CENTER | Age: 41
End: 2021-03-10

## 2021-03-10 NOTE — TELEPHONE ENCOUNTER
Patient's mom called stating that they never received an arrival time for his procedure that was supposed to be scheduled for tomorrow 03/11 with Dr Gideon Morillo  I informed her that there was a change to Dr Ivan Little schedule and we tried to get in touch with them to inform them of this but were unsuccessful  She stated that these messages were never received and did not know what to do  I scheduled his EGD for tomorrow 03/11/21 with Dr Leesa Swain  They are aware that it is going to be with Dr Vinicius Sosa and not Dr Martinez  Spoke with [facility] and they stated that it is ok to add on but they will have to run it by anesthesia  If he is not approved for [facility] they may be able to switch him over to the hospital to be done by either Dr Marco Antonio Neil in the morning or Dr Grover Florez during inpatient time  I did call patient's mom back and left a message with a tentative arrival time of 1015 per [facility]

## 2021-03-10 NOTE — TELEPHONE ENCOUNTER
Received a call back from Jimmy over at [facility]  Patient is not cleared per anesthesia to be done @[facility] so they moved him over to the hospital with Dr Susanne Ibrahim with an arrival time of 145pm  Spoke with a physician here in the office who stated that patient will be fine but if need be up to 2 hours prior patient can drink some apple juice or clear liquid of their choice if they feel their sugar levels are not adequate  lmom for pt's mom informing her of location and time change and the instructions listed above

## 2021-03-11 ENCOUNTER — TELEPHONE (OUTPATIENT)
Dept: GASTROENTEROLOGY | Facility: CLINIC | Age: 41
End: 2021-03-11

## 2021-03-11 ENCOUNTER — ANESTHESIA (OUTPATIENT)
Dept: GASTROENTEROLOGY | Facility: AMBULARY SURGERY CENTER | Age: 41
End: 2021-03-11

## 2021-03-11 ENCOUNTER — HOSPITAL ENCOUNTER (OUTPATIENT)
Dept: GASTROENTEROLOGY | Facility: HOSPITAL | Age: 41
Setting detail: OUTPATIENT SURGERY
Discharge: HOME | End: 2021-03-11
Attending: INTERNAL MEDICINE | Admitting: INTERNAL MEDICINE
Payer: COMMERCIAL

## 2021-03-11 VITALS
RESPIRATION RATE: 91 BRPM | HEART RATE: 92 BPM | DIASTOLIC BLOOD PRESSURE: 87 MMHG | OXYGEN SATURATION: 97 % | TEMPERATURE: 96.9 F | WEIGHT: 263 LBS | BODY MASS INDEX: 48.4 KG/M2 | SYSTOLIC BLOOD PRESSURE: 152 MMHG | HEIGHT: 62 IN

## 2021-03-11 DIAGNOSIS — K21.00 GASTROESOPHAGEAL REFLUX DISEASE WITH ESOPHAGITIS WITHOUT HEMORRHAGE: ICD-10-CM

## 2021-03-11 LAB — GLUCOSE SERPL-MCNC: 275 MG/DL (ref 65–140)

## 2021-03-11 PROCEDURE — 88305 TISSUE EXAM BY PATHOLOGIST: CPT | Performed by: PATHOLOGY

## 2021-03-11 PROCEDURE — 82948 REAGENT STRIP/BLOOD GLUCOSE: CPT

## 2021-03-11 PROCEDURE — 43239 EGD BIOPSY SINGLE/MULTIPLE: CPT | Performed by: INTERNAL MEDICINE

## 2021-03-11 RX ORDER — SODIUM CHLORIDE, SODIUM LACTATE, POTASSIUM CHLORIDE, CALCIUM CHLORIDE 600; 310; 30; 20 MG/100ML; MG/100ML; MG/100ML; MG/100ML
INJECTION, SOLUTION INTRAVENOUS CONTINUOUS PRN
Status: DISCONTINUED | OUTPATIENT
Start: 2021-03-11 | End: 2021-03-11

## 2021-03-11 RX ORDER — LIDOCAINE HYDROCHLORIDE 10 MG/ML
INJECTION, SOLUTION EPIDURAL; INFILTRATION; INTRACAUDAL; PERINEURAL AS NEEDED
Status: DISCONTINUED | OUTPATIENT
Start: 2021-03-11 | End: 2021-03-11

## 2021-03-11 RX ORDER — SODIUM CHLORIDE, SODIUM LACTATE, POTASSIUM CHLORIDE, CALCIUM CHLORIDE 600; 310; 30; 20 MG/100ML; MG/100ML; MG/100ML; MG/100ML
125 INJECTION, SOLUTION INTRAVENOUS CONTINUOUS
Status: DISCONTINUED | OUTPATIENT
Start: 2021-03-11 | End: 2021-03-15 | Stop reason: HOSPADM

## 2021-03-11 RX ORDER — PROPOFOL 10 MG/ML
INJECTION, EMULSION INTRAVENOUS AS NEEDED
Status: DISCONTINUED | OUTPATIENT
Start: 2021-03-11 | End: 2021-03-11

## 2021-03-11 RX ORDER — LIDOCAINE HYDROCHLORIDE 10 MG/ML
0.5 INJECTION, SOLUTION EPIDURAL; INFILTRATION; INTRACAUDAL; PERINEURAL ONCE AS NEEDED
Status: DISCONTINUED | OUTPATIENT
Start: 2021-03-11 | End: 2021-03-15 | Stop reason: HOSPADM

## 2021-03-11 RX ORDER — SUCRALFATE ORAL 1 G/10ML
1 SUSPENSION ORAL 4 TIMES DAILY
Qty: 30 ML | Refills: 1 | Status: SHIPPED | OUTPATIENT
Start: 2021-03-11 | End: 2021-06-03 | Stop reason: HOSPADM

## 2021-03-11 RX ADMIN — PROPOFOL 50 MG: 10 INJECTION, EMULSION INTRAVENOUS at 15:23

## 2021-03-11 RX ADMIN — LIDOCAINE HYDROCHLORIDE 100 MG: 10 INJECTION, SOLUTION EPIDURAL; INFILTRATION; INTRACAUDAL at 15:19

## 2021-03-11 RX ADMIN — PROPOFOL 300 MG: 10 INJECTION, EMULSION INTRAVENOUS at 15:19

## 2021-03-11 RX ADMIN — SODIUM CHLORIDE, SODIUM LACTATE, POTASSIUM CHLORIDE, AND CALCIUM CHLORIDE: .6; .31; .03; .02 INJECTION, SOLUTION INTRAVENOUS at 15:13

## 2021-03-11 NOTE — H&P
History and Physical - [facility]  Radha Zuñiga 41 y o  male MRN: [id number]    HPI: Radha Zuñiga is a 41y o  year old male who presents with persistent n/v    Review of Systems    Historical Information   Past Medical History:   Diagnosis Date    Acute bronchitis due to other specified organisms 7/5/2019    Chronic headaches     Diabetes mellitus ([address] )     Disease of thyroid gland     Esophagitis     Gastritis     Gastroparesis     GERD (gastroesophageal reflux disease)     Hypertension     Migraine     Migraines 03/11/2021    Obesity     Obsessive compulsive disorder     Psychiatric disorder     Schizoaffective disorder [facility])      Past Surgical History:   Procedure Laterality Date    ESOPHAGOGASTRODUODENOSCOPY N/A 1/15/2019    Procedure: ESOPHAGOGASTRODUODENOSCOPY (EGD); Surgeon: Emiliano Reagan MD;  Location[facility];   Service: Gastroenterology     Social History   Social History     Substance and Sexual Activity   Alcohol Use No     Social History     Substance and Sexual Activity   Drug Use No     Social History     Tobacco Use   Smoking Status Never Smoker   Smokeless Tobacco Never Used     Family History   Problem Relation Age of Onset    COPD Mother    Fortunato Rosita Hypertension Mother     Heart failure Mother     Rheum arthritis Family     Heart disease Family     Hypertension Father     Diabetes Father     Hyperlipidemia Father        Meds/Allergies     (Not in a hospital admission)      Allergies   Allergen Reactions    Augmentin [Amoxicillin-Pot Clavulanate]     Amoxicillin Diarrhea    Clavulanic Acid Diarrhea    Erythromycin Diarrhea       Objective     BP (!) 179/93   Pulse 87   Temp 97 8 °F (36 6 °C) (Temporal)   Resp 20   Ht 5' 2" (1 575 m)   Wt 119 kg (263 lb)   SpO2 97%   BMI 48 10 kg/m²       PHYSICAL EXAM    Gen: NAD  CV: RRR  CHEST: Clear  ABD: soft, NT/ND  EXT: no edema  Neuro: AAO      ASSESSMENT/PLAN:  This is a 41y o  year old male here for persistent n/v    PLAN:   Procedure: egd

## 2021-03-11 NOTE — TELEPHONE ENCOUNTER
Received phone call from [facility] questioning quantity of 30 mL ordered on carafate prescription  I informed them it should be 300 mL   No further questions

## 2021-03-11 NOTE — ANESTHESIA PREPROCEDURE EVALUATION
Procedure:  EGD    Relevant Problems   CARDIO   (+) Essential hypertension   (+) Hypertensive emergency   (+) Other hyperlipidemia      ENDO   (+) Acquired hypothyroidism   (+) Type 2 diabetes mellitus, with long-term current use of insulin (HCC)      GI/HEPATIC   (+) Gastroesophageal reflux disease with esophagitis      /RENAL   (+) Stage 3a chronic kidney disease      NEURO/PSYCH   (+) Anxiety   (+) Depression   (+) Obsessive compulsive disorder        Physical Exam    Airway    Mallampati score: II  TM Distance: >3 FB  Neck ROM: full     Dental   No notable dental hx     Cardiovascular  Rhythm: regular, Rate: normal, Cardiovascular exam normal    Pulmonary  Pulmonary exam normal Breath sounds clear to auscultation,     Other Findings  Multiple chipped and cracked teeth not all documented  Anesthesia Plan  ASA Score- 3     Anesthesia Type- IV sedation with anesthesia with ASA Monitors  Additional Monitors:   Airway Plan:     Comment: Discussed risks/benefits, including medication reactions, awareness, aspiration, and serious/life threatening complications  Plan to maintain native airway with IVGA, monitored with EtCO2  Plan Factors-Exercise tolerance (METS): >4 METS  Patient summary reviewed  Patient instructed to abstain from smoking on day of procedure  Patient did not smoke on day of surgery  Induction- intravenous  Postoperative Plan-     Informed Consent- Anesthetic plan and risks discussed with patient  I personally reviewed this patient with the CRNA  Discussed and agreed on the Anesthesia Plan with the CRNA  Dc Velasco

## 2021-03-11 NOTE — DISCHARGE INSTRUCTIONS
Gastritis   WHAT YOU NEED TO KNOW:   Gastritis is inflammation or irritation of the lining of your stomach  DISCHARGE INSTRUCTIONS:   Call 911 for any of the following:   · You develop chest pain or shortness of breath  Seek care immediately if:   · You vomit blood  · You have black or bloody bowel movements  · You have severe stomach or back pain  Contact your healthcare provider if:   · You have a fever  · You have new or worsening symptoms, even after treatment  · You have questions or concerns about your condition or care  Medicines:   · Medicines  may be given to help treat a bacterial infection or decrease stomach acid  · Take your medicine as directed  Contact your healthcare provider if you think your medicine is not helping or if you have side effects  Tell him or her if you are allergic to any medicine  Keep a list of the medicines, vitamins, and herbs you take  Include the amounts, and when and why you take them  Bring the list or the pill bottles to follow-up visits  Carry your medicine list with you in case of an emergency  Manage or prevent gastritis:   · Do not smoke  Nicotine and other chemicals in cigarettes and cigars can make your symptoms worse and cause lung damage  Ask your healthcare provider for information if you currently smoke and need help to quit  E-cigarettes or smokeless tobacco still contain nicotine  Talk to your healthcare provider before you use these products  · Do not drink alcohol  Alcohol can prevent healing and make your gastritis worse  Talk to your healthcare provider if you need help to stop drinking  · Do not take NSAIDs or aspirin unless directed  These and similar medicines can cause irritation  If your healthcare provider says it is okay to take NSAIDs, take them with food  · Do not eat foods that cause irritation  Foods such as oranges and salsa can cause burning or pain  Eat a variety of healthy foods   Examples include fruits (not citrus), vegetables, low-fat dairy products, beans, whole-grain breads, and lean meats and fish  Try to eat small meals, and drink water with your meals  Do not eat for at least 3 hours before you go to bed  · Find ways to relax and decrease stress  Stress can increase stomach acid and make gastritis worse  Activities such as yoga, meditation, or listening to music can help you relax  Spend time with friends, or do things you enjoy  Follow up with your healthcare provider as directed: You may need ongoing tests or treatment, or referral to a gastroenterologist  Write down your questions so you remember to ask them during your visits  © Copyright [address] Information is for End User's use only and may not be sold, redistributed or otherwise used for commercial purposes  All illustrations and images included in CareNotes® are the copyrighted property of [employer]  or [facility] Chetna Mclean   The above information is an  only  It is not intended as medical advice for individual conditions or treatments  Talk to your doctor, nurse or pharmacist before following any medical regimen to see if it is safe and effective for you

## 2021-03-11 NOTE — ANESTHESIA POSTPROCEDURE EVALUATION
Post-Op Assessment Note    CV Status:  Stable  Pain Score: 0    Pain management: adequate     Mental Status:  Alert and awake   Hydration Status:  Euvolemic   PONV Controlled:  Controlled   Airway Patency:  Patent      Post Op Vitals Reviewed: Yes      Staff: CRNA         No complications documented      BP  169/84   Temp      Pulse  93   Resp   16   SpO2   96%

## 2021-03-23 ENCOUNTER — TELEPHONE (OUTPATIENT)
Dept: GASTROENTEROLOGY | Facility: AMBULARY SURGERY CENTER | Age: 41
End: 2021-03-23

## 2021-03-23 NOTE — TELEPHONE ENCOUNTER
----- Message from Rodrick Jimenez MD sent at 3/23/2021  1:28 PM EDT -----  Please inform patient biopsies from recent upper endoscopy were negative for H pylori  Please have her call with any questions or concerns  Please have a follow-up of a continues to have symptoms  Make sure he is taking Carafate as prescribed

## 2021-03-24 NOTE — TELEPHONE ENCOUNTER
Spoke with Pt's father, consent will only allow results given to pt  Lm with father for pt to call office back  Letter mailed

## 2021-03-29 ENCOUNTER — TELEPHONE (OUTPATIENT)
Dept: NEUROLOGY | Facility: CLINIC | Age: 41
End: 2021-03-29

## 2021-03-29 NOTE — TELEPHONE ENCOUNTER
pt called and states that he is having headaches and confusion  confusion has been worse for about the last 1-2 weeks  does not feel that emgality is helping  emgality-next due around 4/11  Flexeril 5mg 1 tab daily prn-states that they are not helping at all, does not have any medication left   losartan 100mg dialy  metoprolol succinate 50mg daily  norvasc 5mg daily  excedrin-sometimes works somewhat but other times does not help at all  if he goes ot he ER, the cocktail only works for about 5 hours and then comes back  has not seen psychiatry since last seeing you  Does not have an appt scheduled, states that he needs to find a new psychiatrist    taking zyrexa 2 5mg 1 tab daily   Currently having a headache, pressure to the back of his head goes into his tempols  Burning sensation and jabbing sensation to herniated disc  headache is constant  Head pain-6/10, +light sensitivity  He states that dr Laura Orozco recommended botox but he is concerned about potential side effects and his anxiety      decadon has been effective int he past   depakote ineffective  Please advise  [phone]  set up

## 2021-03-29 NOTE — TELEPHONE ENCOUNTER
Pt made aware of below  He states that the ER will not prescribe any medication    He will call his insurance and see who he can see in regards to psychiatry and schedule an appt asap

## 2021-04-08 ENCOUNTER — TELEPHONE (OUTPATIENT)
Dept: GASTROENTEROLOGY | Facility: AMBULARY SURGERY CENTER | Age: 41
End: 2021-04-08

## 2021-04-08 NOTE — TELEPHONE ENCOUNTER
Pt aware of results, verbalized understanding  Pt is taking his carafate  Pt states he has been vomiting and having abdominal pain the last 3 days  Please advise  Thank you!

## 2021-04-08 NOTE — TELEPHONE ENCOUNTER
Please  Be sure patient is avoiding NSAID therapy  It looks like patient has been on pantoprazole 40 mg twice a day for quite some time  Maybe we should consider changing PPI therapy? I would also be sure that he is tolerating liquids and  Stick to a brat diet for the next several days    Thank you

## 2021-04-12 NOTE — TELEPHONE ENCOUNTER
Made multiple attempts to contract pt, voicemail is not set up  Mailed out letter for pt to call back office

## 2021-05-20 ENCOUNTER — TELEPHONE (OUTPATIENT)
Dept: NEUROLOGY | Facility: CLINIC | Age: 41
End: 2021-05-20

## 2021-05-21 NOTE — TELEPHONE ENCOUNTER
denied as they need to know that pt has been re-evaluated and have had a reduction in the number of headache days or a reduction in severity of migraines       Attempted to call pt, vm not set up yet

## 2021-05-21 NOTE — TELEPHONE ENCOUNTER
pt's dad called regarding emgality  he states that he needs a refill and PA  I made him aware that there are plenty of refills available  And that we submitted PA but need below info  He will ask pt and will have him call our office back

## 2021-05-24 NOTE — TELEPHONE ENCOUNTER
Called patient, went to , called patient's father, reached , left message for patient to call back  Letter also mailed

## 2021-05-27 ENCOUNTER — HOSPITAL ENCOUNTER (INPATIENT)
Facility: HOSPITAL | Age: 41
LOS: 5 days | Discharge: HOME | DRG: 199 | End: 2021-06-03
Attending: EMERGENCY MEDICINE | Admitting: INTERNAL MEDICINE
Payer: COMMERCIAL

## 2021-05-27 ENCOUNTER — APPOINTMENT (EMERGENCY)
Dept: RADIOLOGY | Facility: HOSPITAL | Age: 41
DRG: 199 | End: 2021-05-27
Payer: COMMERCIAL

## 2021-05-27 DIAGNOSIS — R10.13 EPIGASTRIC PAIN: ICD-10-CM

## 2021-05-27 DIAGNOSIS — E03.9 ACQUIRED HYPOTHYROIDISM: ICD-10-CM

## 2021-05-27 DIAGNOSIS — F25.9 SCHIZO AFFECTIVE SCHIZOPHRENIA (HCC): ICD-10-CM

## 2021-05-27 DIAGNOSIS — I10 UNCONTROLLED HYPERTENSION: ICD-10-CM

## 2021-05-27 DIAGNOSIS — K29.70 GASTRITIS: ICD-10-CM

## 2021-05-27 DIAGNOSIS — N17.9 AKI (ACUTE KIDNEY INJURY) (HCC): ICD-10-CM

## 2021-05-27 DIAGNOSIS — Z91.14 DIFFICULTY OBTAINING MEDICATION: ICD-10-CM

## 2021-05-27 DIAGNOSIS — F25.1 SCHIZOAFFECTIVE DISORDER, DEPRESSIVE TYPE (HCC): Primary | Chronic | ICD-10-CM

## 2021-05-27 DIAGNOSIS — G43.709 CHRONIC MIGRAINE WITHOUT AURA WITHOUT STATUS MIGRAINOSUS, NOT INTRACTABLE: ICD-10-CM

## 2021-05-27 DIAGNOSIS — E11.65 TYPE 2 DIABETES MELLITUS WITH HYPERGLYCEMIA, WITH LONG-TERM CURRENT USE OF INSULIN (HCC): ICD-10-CM

## 2021-05-27 DIAGNOSIS — Z79.4 TYPE 2 DIABETES MELLITUS WITH HYPERGLYCEMIA, WITH LONG-TERM CURRENT USE OF INSULIN (HCC): ICD-10-CM

## 2021-05-27 DIAGNOSIS — I16.0 HYPERTENSIVE URGENCY: ICD-10-CM

## 2021-05-27 PROBLEM — N18.9 ACUTE ON CHRONIC KIDNEY FAILURE (HCC): Status: ACTIVE | Noted: 2021-02-12

## 2021-05-27 LAB
ALBUMIN SERPL BCP-MCNC: 3 G/DL (ref 3.5–5)
ALP SERPL-CCNC: 59 U/L (ref 46–116)
ALT SERPL W P-5'-P-CCNC: 18 U/L (ref 12–78)
ANION GAP SERPL CALCULATED.3IONS-SCNC: 6 MMOL/L (ref 4–13)
AST SERPL W P-5'-P-CCNC: 14 U/L (ref 5–45)
BASOPHILS # BLD AUTO: 0.04 THOUSANDS/ΜL (ref 0–0.1)
BASOPHILS NFR BLD AUTO: 0 % (ref 0–1)
BILIRUB SERPL-MCNC: 0.35 MG/DL (ref 0.2–1)
BUN SERPL-MCNC: 37 MG/DL (ref 5–25)
CALCIUM ALBUM COR SERPL-MCNC: 10.2 MG/DL (ref 8.3–10.1)
CALCIUM SERPL-MCNC: 9.4 MG/DL (ref 8.3–10.1)
CHLORIDE SERPL-SCNC: 103 MMOL/L (ref 100–108)
CO2 SERPL-SCNC: 26 MMOL/L (ref 21–32)
CREAT SERPL-MCNC: 1.99 MG/DL (ref 0.6–1.3)
EOSINOPHIL # BLD AUTO: 0.23 THOUSAND/ΜL (ref 0–0.61)
EOSINOPHIL NFR BLD AUTO: 3 % (ref 0–6)
ERYTHROCYTE [DISTWIDTH] IN BLOOD BY AUTOMATED COUNT: 13.1 % (ref 11.6–15.1)
GFR SERPL CREATININE-BSD FRML MDRD: 40 ML/MIN/1.73SQ M
GLUCOSE SERPL-MCNC: 228 MG/DL (ref 65–140)
GLUCOSE SERPL-MCNC: 317 MG/DL (ref 65–140)
HCT VFR BLD AUTO: 37.5 % (ref 36.5–49.3)
HGB BLD-MCNC: 12.6 G/DL (ref 12–17)
IMM GRANULOCYTES # BLD AUTO: 0.04 THOUSAND/UL (ref 0–0.2)
IMM GRANULOCYTES NFR BLD AUTO: 0 % (ref 0–2)
LIPASE SERPL-CCNC: 65 U/L (ref 73–393)
LYMPHOCYTES # BLD AUTO: 1.58 THOUSANDS/ΜL (ref 0.6–4.47)
LYMPHOCYTES NFR BLD AUTO: 17 % (ref 14–44)
MCH RBC QN AUTO: 28.4 PG (ref 26.8–34.3)
MCHC RBC AUTO-ENTMCNC: 33.6 G/DL (ref 31.4–37.4)
MCV RBC AUTO: 85 FL (ref 82–98)
MONOCYTES # BLD AUTO: 0.51 THOUSAND/ΜL (ref 0.17–1.22)
MONOCYTES NFR BLD AUTO: 6 % (ref 4–12)
NEUTROPHILS # BLD AUTO: 6.7 THOUSANDS/ΜL (ref 1.85–7.62)
NEUTS SEG NFR BLD AUTO: 74 % (ref 43–75)
NRBC BLD AUTO-RTO: 0 /100 WBCS
PLATELET # BLD AUTO: 359 THOUSANDS/UL (ref 149–390)
PMV BLD AUTO: 9.4 FL (ref 8.9–12.7)
POTASSIUM SERPL-SCNC: 4.6 MMOL/L (ref 3.5–5.3)
PROT SERPL-MCNC: 6.9 G/DL (ref 6.4–8.2)
RBC # BLD AUTO: 4.44 MILLION/UL (ref 3.88–5.62)
SODIUM SERPL-SCNC: 135 MMOL/L (ref 136–145)
T4 FREE SERPL-MCNC: 0.72 NG/DL (ref 0.76–1.46)
TROPONIN I SERPL-MCNC: <0.02 NG/ML
TSH SERPL DL<=0.05 MIU/L-ACNC: 13.4 UIU/ML (ref 0.36–3.74)
WBC # BLD AUTO: 9.1 THOUSAND/UL (ref 4.31–10.16)

## 2021-05-27 PROCEDURE — 99285 EMERGENCY DEPT VISIT HI MDM: CPT

## 2021-05-27 PROCEDURE — 80053 COMPREHEN METABOLIC PANEL: CPT | Performed by: EMERGENCY MEDICINE

## 2021-05-27 PROCEDURE — 85025 COMPLETE CBC W/AUTO DIFF WBC: CPT | Performed by: EMERGENCY MEDICINE

## 2021-05-27 PROCEDURE — 83036 HEMOGLOBIN GLYCOSYLATED A1C: CPT | Performed by: STUDENT IN AN ORGANIZED HEALTH CARE EDUCATION/TRAINING PROGRAM

## 2021-05-27 PROCEDURE — 84439 ASSAY OF FREE THYROXINE: CPT | Performed by: STUDENT IN AN ORGANIZED HEALTH CARE EDUCATION/TRAINING PROGRAM

## 2021-05-27 PROCEDURE — 82948 REAGENT STRIP/BLOOD GLUCOSE: CPT

## 2021-05-27 PROCEDURE — 71046 X-RAY EXAM CHEST 2 VIEWS: CPT

## 2021-05-27 PROCEDURE — 93005 ELECTROCARDIOGRAM TRACING: CPT

## 2021-05-27 PROCEDURE — 83690 ASSAY OF LIPASE: CPT | Performed by: EMERGENCY MEDICINE

## 2021-05-27 PROCEDURE — 84443 ASSAY THYROID STIM HORMONE: CPT | Performed by: STUDENT IN AN ORGANIZED HEALTH CARE EDUCATION/TRAINING PROGRAM

## 2021-05-27 PROCEDURE — NC001 PR NO CHARGE: Performed by: INTERNAL MEDICINE

## 2021-05-27 PROCEDURE — 84484 ASSAY OF TROPONIN QUANT: CPT | Performed by: EMERGENCY MEDICINE

## 2021-05-27 PROCEDURE — 36415 COLL VENOUS BLD VENIPUNCTURE: CPT | Performed by: EMERGENCY MEDICINE

## 2021-05-27 PROCEDURE — 99285 EMERGENCY DEPT VISIT HI MDM: CPT | Performed by: EMERGENCY MEDICINE

## 2021-05-27 RX ORDER — SUCRALFATE 1 G/1
1 TABLET ORAL
Status: DISCONTINUED | OUTPATIENT
Start: 2021-05-27 | End: 2021-05-28

## 2021-05-27 RX ORDER — AMLODIPINE BESYLATE 5 MG/1
5 TABLET ORAL DAILY
Status: DISCONTINUED | OUTPATIENT
Start: 2021-05-28 | End: 2021-05-27

## 2021-05-27 RX ORDER — AMLODIPINE BESYLATE 5 MG/1
5 TABLET ORAL ONCE
Status: COMPLETED | OUTPATIENT
Start: 2021-05-27 | End: 2021-05-27

## 2021-05-27 RX ORDER — METOPROLOL SUCCINATE 50 MG/1
50 TABLET, EXTENDED RELEASE ORAL DAILY
Status: DISCONTINUED | OUTPATIENT
Start: 2021-05-28 | End: 2021-05-27

## 2021-05-27 RX ORDER — ATORVASTATIN CALCIUM 20 MG/1
20 TABLET, FILM COATED ORAL DAILY
Status: DISCONTINUED | OUTPATIENT
Start: 2021-05-28 | End: 2021-06-03 | Stop reason: HOSPADM

## 2021-05-27 RX ORDER — PROCHLORPERAZINE MALEATE 10 MG
10 TABLET ORAL EVERY 6 HOURS PRN
Status: DISCONTINUED | OUTPATIENT
Start: 2021-05-27 | End: 2021-06-03 | Stop reason: HOSPADM

## 2021-05-27 RX ORDER — LIDOCAINE HYDROCHLORIDE 20 MG/ML
15 SOLUTION OROPHARYNGEAL ONCE
Status: COMPLETED | OUTPATIENT
Start: 2021-05-27 | End: 2021-05-27

## 2021-05-27 RX ORDER — HEPARIN SODIUM 5000 [USP'U]/ML
5000 INJECTION, SOLUTION INTRAVENOUS; SUBCUTANEOUS EVERY 8 HOURS SCHEDULED
Status: DISCONTINUED | OUTPATIENT
Start: 2021-05-27 | End: 2021-05-28

## 2021-05-27 RX ORDER — FLUVOXAMINE MALEATE 50 MG/1
100 TABLET, COATED ORAL 2 TIMES DAILY
Status: DISCONTINUED | OUTPATIENT
Start: 2021-05-27 | End: 2021-06-03 | Stop reason: HOSPADM

## 2021-05-27 RX ORDER — LEVOTHYROXINE SODIUM 0.1 MG/1
100 TABLET ORAL
Status: DISCONTINUED | OUTPATIENT
Start: 2021-05-28 | End: 2021-06-03 | Stop reason: HOSPADM

## 2021-05-27 RX ORDER — ACETAMINOPHEN 325 MG/1
650 TABLET ORAL EVERY 6 HOURS PRN
Status: DISCONTINUED | OUTPATIENT
Start: 2021-05-27 | End: 2021-06-03 | Stop reason: HOSPADM

## 2021-05-27 RX ORDER — PANTOPRAZOLE SODIUM 40 MG/1
40 TABLET, DELAYED RELEASE ORAL
Status: DISCONTINUED | OUTPATIENT
Start: 2021-05-28 | End: 2021-06-03 | Stop reason: HOSPADM

## 2021-05-27 RX ORDER — SUCRALFATE 1 G/1
1 TABLET ORAL ONCE
Status: COMPLETED | OUTPATIENT
Start: 2021-05-27 | End: 2021-05-27

## 2021-05-27 RX ORDER — ALBUTEROL SULFATE 90 UG/1
2 AEROSOL, METERED RESPIRATORY (INHALATION) 4 TIMES DAILY
Status: DISCONTINUED | OUTPATIENT
Start: 2021-05-27 | End: 2021-06-03 | Stop reason: HOSPADM

## 2021-05-27 RX ORDER — ONDANSETRON 4 MG/1
4 TABLET, ORALLY DISINTEGRATING ORAL EVERY 6 HOURS PRN
Status: DISCONTINUED | OUTPATIENT
Start: 2021-05-27 | End: 2021-06-03 | Stop reason: HOSPADM

## 2021-05-27 RX ORDER — METOPROLOL SUCCINATE 50 MG/1
50 TABLET, EXTENDED RELEASE ORAL DAILY
Status: DISCONTINUED | OUTPATIENT
Start: 2021-05-27 | End: 2021-05-29

## 2021-05-27 RX ORDER — INSULIN GLARGINE 100 [IU]/ML
40 INJECTION, SOLUTION SUBCUTANEOUS
Status: DISCONTINUED | OUTPATIENT
Start: 2021-05-27 | End: 2021-05-30

## 2021-05-27 RX ORDER — LABETALOL 20 MG/4 ML (5 MG/ML) INTRAVENOUS SYRINGE
10 EVERY 6 HOURS PRN
Status: DISCONTINUED | OUTPATIENT
Start: 2021-05-27 | End: 2021-05-28

## 2021-05-27 RX ORDER — SODIUM CHLORIDE, SODIUM GLUCONATE, SODIUM ACETATE, POTASSIUM CHLORIDE, MAGNESIUM CHLORIDE, SODIUM PHOSPHATE, DIBASIC, AND POTASSIUM PHOSPHATE .53; .5; .37; .037; .03; .012; .00082 G/100ML; G/100ML; G/100ML; G/100ML; G/100ML; G/100ML; G/100ML
100 INJECTION, SOLUTION INTRAVENOUS CONTINUOUS
Status: DISPENSED | OUTPATIENT
Start: 2021-05-27 | End: 2021-05-28

## 2021-05-27 RX ORDER — AMLODIPINE BESYLATE 10 MG/1
10 TABLET ORAL DAILY
Status: DISCONTINUED | OUTPATIENT
Start: 2021-05-28 | End: 2021-05-28

## 2021-05-27 RX ORDER — HYDRALAZINE HYDROCHLORIDE 20 MG/ML
5 INJECTION INTRAMUSCULAR; INTRAVENOUS EVERY 6 HOURS PRN
Status: DISCONTINUED | OUTPATIENT
Start: 2021-05-27 | End: 2021-05-27

## 2021-05-27 RX ORDER — MAGNESIUM HYDROXIDE/ALUMINUM HYDROXICE/SIMETHICONE 120; 1200; 1200 MG/30ML; MG/30ML; MG/30ML
30 SUSPENSION ORAL ONCE
Status: COMPLETED | OUTPATIENT
Start: 2021-05-27 | End: 2021-05-27

## 2021-05-27 RX ORDER — LIDOCAINE HYDROCHLORIDE 20 MG/ML
15 SOLUTION OROPHARYNGEAL 4 TIMES DAILY PRN
Status: DISCONTINUED | OUTPATIENT
Start: 2021-05-27 | End: 2021-06-03 | Stop reason: HOSPADM

## 2021-05-27 RX ORDER — FLUVOXAMINE MALEATE 100 MG
200 TABLET ORAL
COMMUNITY
End: 2021-06-03 | Stop reason: HOSPADM

## 2021-05-27 RX ORDER — SUCRALFATE ORAL 1 G/10ML
500 SUSPENSION ORAL 4 TIMES DAILY
Status: DISCONTINUED | OUTPATIENT
Start: 2021-05-27 | End: 2021-05-27 | Stop reason: CLARIF

## 2021-05-27 RX ADMIN — FLUVOXAMINE MALEATE 100 MG: 50 TABLET ORAL at 23:25

## 2021-05-27 RX ADMIN — ALUMINUM HYDROXIDE, MAGNESIUM HYDROXIDE, AND SIMETHICONE 30 ML: 200; 200; 20 SUSPENSION ORAL at 17:46

## 2021-05-27 RX ADMIN — AMLODIPINE BESYLATE 5 MG: 5 TABLET ORAL at 22:28

## 2021-05-27 RX ADMIN — LIDOCAINE HYDROCHLORIDE 15 ML: 20 SOLUTION ORAL; TOPICAL at 17:46

## 2021-05-27 RX ADMIN — METOPROLOL SUCCINATE 50 MG: 50 TABLET, EXTENDED RELEASE ORAL at 23:27

## 2021-05-27 RX ADMIN — ACETAMINOPHEN 650 MG: 325 TABLET, FILM COATED ORAL at 22:02

## 2021-05-27 RX ADMIN — INSULIN LISPRO 4 UNITS: 100 INJECTION, SOLUTION INTRAVENOUS; SUBCUTANEOUS at 23:45

## 2021-05-27 RX ADMIN — AMLODIPINE BESYLATE 5 MG: 5 TABLET ORAL at 19:52

## 2021-05-27 RX ADMIN — SUCRALFATE 1 G: 1 TABLET ORAL at 19:05

## 2021-05-27 RX ADMIN — HEPARIN SODIUM 5000 UNITS: 5000 INJECTION INTRAVENOUS; SUBCUTANEOUS at 22:02

## 2021-05-27 RX ADMIN — ALBUTEROL SULFATE 2 PUFF: 90 AEROSOL, METERED RESPIRATORY (INHALATION) at 23:24

## 2021-05-27 RX ADMIN — SODIUM CHLORIDE, SODIUM GLUCONATE, SODIUM ACETATE, POTASSIUM CHLORIDE, MAGNESIUM CHLORIDE, SODIUM PHOSPHATE, DIBASIC, AND POTASSIUM PHOSPHATE 100 ML/HR: .53; .5; .37; .037; .03; .012; .00082 INJECTION, SOLUTION INTRAVENOUS at 21:49

## 2021-05-27 RX ADMIN — INSULIN GLARGINE 40 UNITS: 100 INJECTION, SOLUTION SUBCUTANEOUS at 22:02

## 2021-05-27 RX ADMIN — SUCRALFATE 1 G: 1 TABLET ORAL at 23:27

## 2021-05-27 RX ADMIN — LABETALOL 20 MG/4 ML (5 MG/ML) INTRAVENOUS SYRINGE 10 MG: at 22:28

## 2021-05-27 NOTE — ED PROVIDER NOTES
History  Chief Complaint   Patient presents with    Epigastric Pain     Epigastric pain relieved with burping   Shortness of Breath     Relieved with nasal cannula in nose, even with oxygen turned off  HPI  Patient is 35year old male with psych history, GERD,   Epigastric pain and shortness of breath for the past 3 days  Upon questioning further he states that he doesn't really have shortness of breath but does have epigastric pain that is relieved with pepcid and burping and is similar to his GERD  He endorsed his desire for signing 12 so that he could refill his psych medications because he states that he failed to show up to all his outpatient psychiatry appointment and is now unable to refill his prescriptions  He denies any SI/HI or auditory/visual hallucinations  He also states that he's out of all his blood pressure medications as well and wants them refilled  Patient denies any other complaints  Prior to Admission Medications   Prescriptions Last Dose Informant Patient Reported? Taking? ACCU-CHEK FASTCLIX LANCETS [facility] 2021 at Unknown time Self Yes Yes   Si (four) times a day Not checking 4 times a day   Patient stated maybe 2 times a day   ACCU-CHEK GUIDE test strip 2021 at Unknown time Self Yes Yes   Si (four) times a day Test   Admelog SoloStar 100 units/mL injection pen 2021 at Unknown time  No Yes   Sig: Inject 10 Units under the skin 3 (three) times a day with meals   Blood Glucose Monitoring Suppl (ACCU-CHEK GUIDE) w/Device KIT Not Taking at Unknown time Self Yes No   Sig: Checking 2 times a day   Galcanezumab-gnlm 120 MG/ML SOAJ Past Month at Unknown time  No Yes   Sig: Inject 120 mg under the skin every 30 (thirty) days   Insulin Pen Needle (Pen Needles 3/16") 31G X 5 MM MISC 2021 at Unknown time  No Yes   Sig: Use 4 (four) times a day   Lidocaine Viscous HCl (XYLOCAINE) 2 % mucosal solution Not Taking at Unknown time  No No   Sig: Swish and swallow 15 mL 4 (four) times a day as needed for mouth pain or discomfort   Patient not taking: Reported on 5/27/2021   albuterol (PROVENTIL HFA,VENTOLIN HFA) 90 mcg/act inhaler More than a month at Unknown time  No No   Sig: Inhale 2 puffs 4 (four) times a day   amLODIPine (NORVASC) 5 mg tablet 5/26/2021 at Unknown time  No Yes   Sig: Take 1 tablet (5 mg total) by mouth daily   atorvastatin (LIPITOR) 20 mg tablet Unknown at Unknown time  No No   Sig: Take 1 tablet (20 mg total) by mouth daily   Patient not taking: Reported on 5/27/2021   cyclobenzaprine (FLEXERIL) 5 mg tablet Not Taking at Unknown time  No No   Sig: Take 1 tablet (5 mg total) by mouth daily at bedtime   Patient not taking: Reported on 5/27/2021   ergocalciferol (VITAMIN D2) 50,000 units Not Taking at Unknown time  No No   Sig: Take 1 capsule (50,000 Units total) by mouth once a week   Patient not taking: Reported on 5/27/2021   famotidine (PEPCID) 20 mg tablet 5/26/2021 at Unknown time  No Yes   Sig: Take 2 tablets (40 mg total) by mouth every evening   fluvoxaMINE (LUVOX) 100 mg tablet   Yes Yes   Sig: Take 200 mg by mouth daily at bedtime   insulin glargine (Basaglar KwikPen) 100 units/mL injection pen 5/26/2021 at Unknown time  No Yes   Sig: Inject 50 Units under the skin daily at bedtime   Patient taking differently: Inject 40 Units under the skin daily at bedtime    levothyroxine 100 mcg tablet Past Month at Unknown time  No Yes   Sig: Take 1 tablet (100 mcg total) by mouth daily   losartan (COZAAR) 50 mg tablet Past Month at Unknown time  No Yes   Sig: Take 2 tablets (100 mg total) by mouth daily   metoclopramide (REGLAN) 10 mg tablet Not Taking at Unknown time  No No   Sig: Take 1 tablet (10 mg total) by mouth every 6 (six) hours   Patient not taking: Reported on 5/27/2021   metoprolol succinate (TOPROL-XL) 50 mg 24 hr tablet Past Week at Unknown time  No Yes   Sig: Take 1 tablet (50 mg total) by mouth daily   ondansetron (ZOFRAN) 4 mg tablet Not Taking at Unknown time  No No   Sig: Take 1 tablet (4 mg total) by mouth every 8 (eight) hours as needed for nausea or vomiting   Patient not taking: Reported on 5/27/2021   pantoprazole (PROTONIX) 40 mg tablet 5/27/2021 at Unknown time  No Yes   Sig: Take 1 tablet (40 mg total) by mouth 2 (two) times a day   prochlorperazine (COMPAZINE) 10 mg tablet Past Month at Unknown time  No Yes   Sig: Take 1 tablet (10 mg total) by mouth every 6 (six) hours as needed (migraine)   sucralfate (CARAFATE) 1 g/10 mL suspension 5/27/2021 at Unknown time  No Yes   Sig: Take 10 mL (1 g total) by mouth 4 (four) times a day      Facility-Administered Medications: None       Past Medical History:   Diagnosis Date    Acute bronchitis due to other specified organisms 7/5/2019    Chronic headaches     Diabetes mellitus ([address] )     Disease of thyroid gland     Esophagitis     Gastritis     Gastroparesis     GERD (gastroesophageal reflux disease)     Hypertension     Migraine     Migraines 03/11/2021    Obesity     Obsessive compulsive disorder     Psychiatric disorder     Schizoaffective disorder [facility])        Past Surgical History:   Procedure Laterality Date    ESOPHAGOGASTRODUODENOSCOPY N/A 1/15/2019    Procedure: ESOPHAGOGASTRODUODENOSCOPY (EGD); Surgeon: Jamaal Flores MD;  Location: [facility]; Service: Gastroenterology       Family History   Problem Relation Age of Onset    COPD Mother     Hypertension Mother     Heart failure Mother     Rheum arthritis Family     Heart disease Family     Hypertension Father     Diabetes Father     Hyperlipidemia Father      I have reviewed and agree with the history as documented      E-Cigarette/Vaping    E-Cigarette Use Never User      E-Cigarette/Vaping Substances    Nicotine No     THC No     CBD No     Flavoring No      Social History     Tobacco Use    Smoking status: Never Smoker    Smokeless tobacco: Never Used   Substance Use Topics    Alcohol use: Not Currently    Drug use: Not Currently        Review of Systems   Constitutional: Negative for chills, diaphoresis, fever and unexpected weight change  HENT: Negative for ear pain and sore throat  Eyes: Negative for visual disturbance  Respiratory: Negative for cough, chest tightness and shortness of breath  Cardiovascular: Negative for chest pain and leg swelling  Gastrointestinal: Positive for abdominal pain (Epigastric)  Negative for abdominal distention, constipation, diarrhea, nausea and vomiting  Endocrine: Negative  Genitourinary: Negative for difficulty urinating and dysuria  Musculoskeletal: Negative  Skin: Negative  Allergic/Immunologic: Negative  Neurological: Negative  Hematological: Negative  Psychiatric/Behavioral: Negative  All other systems reviewed and are negative  Physical Exam  ED Triage Vitals   Temperature Pulse Respirations Blood Pressure SpO2   05/27/21 1615 05/27/21 1615 05/27/21 1615 05/27/21 1907 05/27/21 1615   97 6 °F (36 4 °C) 86 18 (!) 199/91 96 %      Temp Source Heart Rate Source Patient Position - Orthostatic VS BP Location FiO2 (%)   05/27/21 1615 05/27/21 1615 05/27/21 1907 05/27/21 1907 --   Oral Monitor Lying Left arm       Pain Score       05/27/21 1615       6             Orthostatic Vital Signs  Vitals:    05/28/21 1821 05/28/21 2204 05/29/21 0255 05/29/21 0707   BP: 146/93 149/93 157/86 166/100   Pulse: 92 98  89   Patient Position - Orthostatic VS:           Physical Exam  Vitals signs and nursing note reviewed  Constitutional:       General: He is not in acute distress  Appearance: Normal appearance  He is not ill-appearing  HENT:      Head: Normocephalic and atraumatic  Right Ear: External ear normal       Left Ear: External ear normal       Nose: Nose normal       Mouth/Throat:      Mouth: Mucous membranes are moist       Pharynx: Oropharynx is clear  Eyes:      General: No scleral icterus          Right eye: No discharge  Left eye: No discharge  Extraocular Movements: Extraocular movements intact  Conjunctiva/sclera: Conjunctivae normal       Pupils: Pupils are equal, round, and reactive to light  Neck:      Musculoskeletal: Normal range of motion and neck supple  Cardiovascular:      Rate and Rhythm: Normal rate and regular rhythm  Pulses: Normal pulses  Heart sounds: Normal heart sounds  Pulmonary:      Effort: Pulmonary effort is normal       Breath sounds: Normal breath sounds  Abdominal:      General: Abdomen is flat  Bowel sounds are normal  There is no distension  Palpations: Abdomen is soft  Tenderness: There is abdominal tenderness (Epigastric)  There is no guarding or rebound  Musculoskeletal: Normal range of motion  Skin:     General: Skin is warm and dry  Capillary Refill: Capillary refill takes less than 2 seconds  Neurological:      General: No focal deficit present  Mental Status: He is alert and oriented to person, place, and time  Mental status is at baseline  Psychiatric:         Mood and Affect: Mood normal          Behavior: Behavior normal          Thought Content:  Thought content normal          Judgment: Judgment normal          ED Medications  Medications   multi-electrolyte (PLASMALYTE-A/ISOLYTE-S PH 7 4) IV solution (0 mL/hr Intravenous Stopped 5/28/21 1018)   insulin glargine (LANTUS) subcutaneous injection 40 Units 0 4 mL (40 Units Subcutaneous Given 5/28/21 2137)   insulin lispro (HumaLOG) 100 units/mL subcutaneous injection 10 Units (10 Units Subcutaneous Given 5/29/21 0938)   albuterol (PROVENTIL HFA,VENTOLIN HFA) inhaler 2 puff (2 puffs Inhalation Given 5/29/21 0939)   atorvastatin (LIPITOR) tablet 20 mg (20 mg Oral Given 5/29/21 0939)   fluvoxaMINE (LUVOX) tablet 100 mg (100 mg Oral Given 5/28/21 1707)   levothyroxine tablet 100 mcg (100 mcg Oral Given 5/29/21 0650)   Lidocaine Viscous HCl (XYLOCAINE) 2 % mucosal solution 15 mL (has no administration in time range)   ondansetron (ZOFRAN-ODT) dispersible tablet 4 mg (4 mg Oral Given 5/29/21 0008)   prochlorperazine (COMPAZINE) tablet 10 mg (10 mg Oral Given 5/28/21 1008)   pantoprazole (PROTONIX) EC tablet 40 mg (40 mg Oral Given 5/29/21 0650)   acetaminophen (TYLENOL) tablet 650 mg (650 mg Oral Given 5/28/21 2025)   metoprolol succinate (TOPROL-XL) 24 hr tablet 50 mg (50 mg Oral Given 5/29/21 0939)   insulin lispro (HumaLOG) 100 units/mL subcutaneous injection 2-12 Units (2 Units Subcutaneous Not Given 5/29/21 0937)   heparin (porcine) subcutaneous injection 7,500 Units (7,500 Units Subcutaneous Given 5/29/21 0651)   hydrALAZINE (APRESOLINE) injection 20 mg (has no administration in time range)   Labetalol HCl (NORMODYNE) injection 20 mg (20 mg Intravenous Given 5/28/21 1633)   verapamil (CALAN-SR) CR tablet 120 mg (has no administration in time range)   sucralfate (CARAFATE) tablet 1 g (1 g Oral Given 5/29/21 0718)   lurasidone (LATUDA) tablet 20 mg (20 mg Oral Given 5/28/21 1707)   LORazepam (ATIVAN) tablet 1 mg (1 mg Oral Given 5/29/21 0017)   lisinopril (ZESTRIL) tablet 20 mg (has no administration in time range)   Lidocaine Viscous HCl (XYLOCAINE) 2 % mucosal solution 15 mL (15 mL Swish & Spit Given 5/27/21 1746)   aluminum-magnesium hydroxide-simethicone (MYLANTA) oral suspension 30 mL (30 mL Oral Given 5/27/21 1746)   sucralfate (CARAFATE) tablet 1 g (1 g Oral Given 5/27/21 1905)   amLODIPine (NORVASC) tablet 5 mg (5 mg Oral Given 5/27/21 1952)   amLODIPine (NORVASC) tablet 5 mg (5 mg Oral Given 5/27/21 2228)   potassium chloride (K-DUR,KLOR-CON) CR tablet 20 mEq (20 mEq Oral Given 5/28/21 1008)   famotidine (PEPCID) tablet 20 mg (20 mg Oral Given 5/28/21 1008)       Diagnostic Studies  Results Reviewed     Procedure Component Value Units Date/Time    Basic metabolic panel [[id number]]  (Abnormal) Collected: 05/28/21 0533    Lab Status: Final result Specimen: Blood from Arm, Right Updated: 05/28/21 1260     Sodium 139 mmol/L      Potassium 3 8 mmol/L      Chloride 107 mmol/L      CO2 27 mmol/L      ANION GAP 5 mmol/L      BUN 28 mg/dL      Creatinine 1 66 mg/dL      Glucose 230 mg/dL      Glucose, Fasting 230 mg/dL      Calcium 8 6 mg/dL      eGFR 50 ml/min/1 73sq m     Narrative:      [place] guidelines for Chronic Kidney Disease (CKD):     Stage 1 with normal or high GFR (GFR > 90 mL/min/1 73 square meters)    Stage 2 Mild CKD (GFR = 60-89 mL/min/1 73 square meters)    Stage 3A Moderate CKD (GFR = 45-59 mL/min/1 73 square meters)    Stage 3B Moderate CKD (GFR = 30-44 mL/min/1 73 square meters)    Stage 4 Severe CKD (GFR = 15-29 mL/min/1 73 square meters)    Stage 5 End Stage CKD (GFR <15 mL/min/1 73 square meters)  Note: GFR calculation is accurate only with a steady state creatinine    CBC and differential [[id number]]  (Abnormal) Collected: 05/28/21 0533    Lab Status: Final result Specimen: Blood from Arm, Right Updated: 05/28/21 0550     WBC 8 22 Thousand/uL      RBC 4 17 Million/uL      Hemoglobin 11 7 g/dL      Hematocrit 35 0 %      MCV 84 fL      MCH 28 1 pg      MCHC 33 4 g/dL      RDW 13 2 %      MPV 9 5 fL      Platelets 607 Thousands/uL      nRBC 0 /100 WBCs      Neutrophils Relative 67 %      Immat GRANS % 0 %      Lymphocytes Relative 22 %      Monocytes Relative 7 %      Eosinophils Relative 3 %      Basophils Relative 1 %      Neutrophils Absolute 5 57 Thousands/µL      Immature Grans Absolute 0 03 Thousand/uL      Lymphocytes Absolute 1 78 Thousands/µL      Monocytes Absolute 0 57 Thousand/µL      Eosinophils Absolute 0 21 Thousand/µL      Basophils Absolute 0 06 Thousands/µL     Platelet count [[id number]]  (Normal) Collected: 05/28/21 0533    Lab Status: Final result Specimen: Blood from Arm, Right Updated: 05/28/21 0549     Platelets 571 Thousands/uL      MPV 9 7 fL     Hemoglobin A1C [[id number]]  (Abnormal) Collected: 05/27/21 1746    Lab Status: Final result Specimen: Blood from Arm, Left Updated: 05/28/21 0248     Hemoglobin A1C 9 8 %       mg/dl     Fingerstick Glucose (POCT) [[id number]]  (Abnormal) Collected: 05/27/21 2335    Lab Status: Final result Updated: 05/28/21 0047     POC Glucose 217 mg/dl     T4, free [[id number]]  (Abnormal) Collected: 05/27/21 1746    Lab Status: Final result Specimen: Blood from Arm, Left Updated: 05/27/21 2258     Free T4 0 72 ng/dL     TSH, 3rd generation with Free T4 reflex [[id number]]  (Abnormal) Collected: 05/27/21 1746    Lab Status: Final result Specimen: Blood from Arm, Left Updated: 05/27/21 2241     TSH 3RD GENERATON 13 400 uIU/mL     Narrative:      Patients undergoing fluorescein dye angiography may retain small amounts of fluorescein in the body for 48-72 hours post procedure  Samples containing fluorescein can produce falsely depressed TSH values  If the patient had this procedure,a specimen should be resubmitted post fluorescein clearance        Fingerstick Glucose (POCT) [[id number]]  (Abnormal) Collected: 05/27/21 2205    Lab Status: Final result Updated: 05/27/21 2209     POC Glucose 228 mg/dl     Troponin I [[id number]]  (Normal) Collected: 05/27/21 1746    Lab Status: Final result Specimen: Blood from Arm, Left Updated: 05/27/21 1817     Troponin I <0 02 ng/mL     Comprehensive metabolic panel [[id number]]  (Abnormal) Collected: 05/27/21 1746    Lab Status: Final result Specimen: Blood from Arm, Left Updated: 05/27/21 1816     Sodium 135 mmol/L      Potassium 4 6 mmol/L      Chloride 103 mmol/L      CO2 26 mmol/L      ANION GAP 6 mmol/L      BUN 37 mg/dL      Creatinine 1 99 mg/dL      Glucose 317 mg/dL      Calcium 9 4 mg/dL      Corrected Calcium 10 2 mg/dL      AST 14 U/L      ALT 18 U/L      Alkaline Phosphatase 59 U/L      Total Protein 6 9 g/dL      Albumin 3 0 g/dL      Total Bilirubin 0 35 mg/dL      eGFR 40 ml/min/1 73sq m     Narrative:      [place] guidelines for Chronic Kidney Disease (CKD):     Stage 1 with normal or high GFR (GFR > 90 mL/min/1 73 square meters)    Stage 2 Mild CKD (GFR = 60-89 mL/min/1 73 square meters)    Stage 3A Moderate CKD (GFR = 45-59 mL/min/1 73 square meters)    Stage 3B Moderate CKD (GFR = 30-44 mL/min/1 73 square meters)    Stage 4 Severe CKD (GFR = 15-29 mL/min/1 73 square meters)    Stage 5 End Stage CKD (GFR <15 mL/min/1 73 square meters)  Note: GFR calculation is accurate only with a steady state creatinine    Lipase [[id number]]  (Abnormal) Collected: 05/27/21 1746    Lab Status: Final result Specimen: Blood from Arm, Left Updated: 05/27/21 1816     Lipase 65 u/L     CBC and differential [[id number]] Collected: 05/27/21 1746    Lab Status: Final result Specimen: Blood from Arm, Left Updated: 05/27/21 1803     WBC 9 10 Thousand/uL      RBC 4 44 Million/uL      Hemoglobin 12 6 g/dL      Hematocrit 37 5 %      MCV 85 fL      MCH 28 4 pg      MCHC 33 6 g/dL      RDW 13 1 %      MPV 9 4 fL      Platelets 142 Thousands/uL      nRBC 0 /100 WBCs      Neutrophils Relative 74 %      Immat GRANS % 0 %      Lymphocytes Relative 17 %      Monocytes Relative 6 %      Eosinophils Relative 3 %      Basophils Relative 0 %      Neutrophils Absolute 6 70 Thousands/µL      Immature Grans Absolute 0 04 Thousand/uL      Lymphocytes Absolute 1 58 Thousands/µL      Monocytes Absolute 0 51 Thousand/µL      Eosinophils Absolute 0 23 Thousand/µL      Basophils Absolute 0 04 Thousands/µL                  VAS renal artery complete   Final Result by Miguelito Munroe MD (05/28 1501)      XR chest 2 views   Final Result by Arthur Odom MD (05/27 1934)      No acute cardiopulmonary disease                    Workstation performed: [id number]               Procedures  Procedures      ED Course                             SBIRT 22yo+      Most Recent Value   SBIRT (22 yo +)   In order to provide better care to our patients, we are screening all of our patients for alcohol and drug use  Would it be okay to ask you these screening questions? No Filed at: 05/27/2021 1608                MDM  Number of Diagnoses or Management Options  STEFANIA (acute kidney injury) ([place] [address] ):   Difficulty obtaining medication:   Gastritis:   Uncontrolled hypertension:   Diagnosis management comments:    Patient given GI cocktail along with carafate with symptom relief   Abdominal labs ordered  Patient with uncontrolled HTN along with STEFANIA found in BMP   Patient given home dose amlodipine for BP control    Patient to be admitted to [facility] with further management and prescription for his home medications until his outpatient appointment       Disposition  Final diagnoses:   Gastritis   STEFANIA (acute kidney injury) ([place] [address] )   Uncontrolled hypertension   Difficulty obtaining medication     Time reflects when diagnosis was documented in both MDM as applicable and the Disposition within this note     Time User Action Codes Description Comment    5/27/2021  8:55 PM Vermell Reagin Add [K29 70] Gastritis     5/27/2021  8:55 PM Vermell Reagin Add [N17 9] STEFANIA (acute kidney injury) ([place] [address] )     5/27/2021  8:55 PM Silva Mckinley 14 Uncontrolled hypertension     5/27/2021  8:56 PM Vermell Reagin Add [Z91 14] Difficulty obtaining medication     5/28/2021 11:53 AM Pooja Ast, Caprice Add [F25 9] Schizo affective schizophrenia ([place] [address] )     5/28/2021  1:56 PM Pooja Ast, Caprice Modify [F25 9] Schizo affective schizophrenia ([place] [address] )     5/28/2021  2:54 PM Tempie Nordmann III Modify [K29 70] Gastritis     5/28/2021  2:54 PM Bridger Elke A III Add [F25 1] Schizoaffective disorder, depressive type [facility])       ED Disposition     ED Disposition Condition Date/Time Comment    Admit Stable Thu May 27, 2021  8:56 PM Case was discussed with Dr Nemo Middleton and the patient's admission status was agreed to be Admission Status: observation status to the service of Dr Marcella Graham           Follow-up Information     Follow up With Specialties Details Why Contact Info    [employer]  Follow up call to find new primary care physician [phone]            Current Discharge Medication List      START taking these medications    Details   lurasidone (LATUDA) 20 mg tablet Take 1 tablet (20 mg total) by mouth daily after dinner  Qty: 30 tablet, Refills: 0    Associated Diagnoses: Schizoaffective disorder, depressive type ([facility] [address] )         CONTINUE these medications which have NOT CHANGED    Details   ACCU-CHEK FASTCLIX LANCETS MISC 4 (four) times a day Not checking 4 times a day   Patient stated maybe 2 times a day  Refills: 1      ACCU-CHEK GUIDE test strip 4 (four) times a day Test  Refills: 1      Admelog SoloStar 100 units/mL injection pen Inject 10 Units under the skin 3 (three) times a day with meals  Qty: 5 pen, Refills: 0    Associated Diagnoses: Type 2 diabetes mellitus without complication, unspecified whether long term insulin use (HCC)      amLODIPine (NORVASC) 5 mg tablet Take 1 tablet (5 mg total) by mouth daily  Qty: 30 tablet, Refills: 0    Associated Diagnoses: Hypertensive emergency      famotidine (PEPCID) 20 mg tablet Take 2 tablets (40 mg total) by mouth every evening  Qty: 60 tablet, Refills: 5    Associated Diagnoses: Nausea and vomiting, intractability of vomiting not specified, unspecified vomiting type; Generalized abdominal pain      fluvoxaMINE (LUVOX) 100 mg tablet Take 200 mg by mouth daily at bedtime      Galcanezumab-gnlm 120 MG/ML SOAJ Inject 120 mg under the skin every 30 (thirty) days  Qty: 1 pen, Refills: 11    Associated Diagnoses: Chronic migraine without aura without status migrainosus, not intractable      insulin glargine (Basaglar KwikPen) 100 units/mL injection pen Inject 50 Units under the skin daily at bedtime  Qty: 5 pen, Refills: 0    Associated Diagnoses: Type 2 diabetes mellitus without complication, unspecified whether long term insulin use (HCC)      Insulin Pen Needle (Pen Needles 3/16") 31G X 5 MM MISC Use 4 (four) times a day  Qty: 200 each, Refills: 3    Associated Diagnoses: Type 2 diabetes mellitus without complication, unspecified whether long term insulin use (HCC)      levothyroxine 100 mcg tablet Take 1 tablet (100 mcg total) by mouth daily  Qty: 30 tablet, Refills: 3    Associated Diagnoses: Hypothyroidism, unspecified type      losartan (COZAAR) 50 mg tablet Take 2 tablets (100 mg total) by mouth daily  Qty: 60 tablet, Refills: 0    Associated Diagnoses: Hypertensive emergency      metoprolol succinate (TOPROL-XL) 50 mg 24 hr tablet Take 1 tablet (50 mg total) by mouth daily  Qty: 30 tablet, Refills: 0    Associated Diagnoses: Hypertensive emergency      pantoprazole (PROTONIX) 40 mg tablet Take 1 tablet (40 mg total) by mouth 2 (two) times a day  Qty: 60 tablet, Refills: 0    Associated Diagnoses: Gastroesophageal reflux disease without esophagitis      prochlorperazine (COMPAZINE) 10 mg tablet Take 1 tablet (10 mg total) by mouth every 6 (six) hours as needed (migraine)  Qty: 10 tablet, Refills: 0    Associated Diagnoses: Chronic migraine without aura without status migrainosus, not intractable      sucralfate (CARAFATE) 1 g/10 mL suspension Take 10 mL (1 g total) by mouth 4 (four) times a day  Qty: 30 mL, Refills: 1    Associated Diagnoses: Gastroesophageal reflux disease with esophagitis without hemorrhage      albuterol (PROVENTIL HFA,VENTOLIN HFA) 90 mcg/act inhaler Inhale 2 puffs 4 (four) times a day  Qty: 8 g, Refills: 0    Comments: Substitution to a formulary equivalent within the same pharmaceutical class is authorized    Associated Diagnoses: Wheezing; Acute bronchitis, unspecified organism      atorvastatin (LIPITOR) 20 mg tablet Take 1 tablet (20 mg total) by mouth daily  Qty: 30 tablet, Refills: 5    Associated Diagnoses: Other hyperlipidemia      Blood Glucose Monitoring Suppl (ACCU-CHEK GUIDE) w/Device KIT Checking 2 times a day  Refills: 0      cyclobenzaprine (FLEXERIL) 5 mg tablet Take 1 tablet (5 mg total) by mouth daily at bedtime  Qty: 30 tablet, Refills: 0    Associated Diagnoses: Intractable episodic headache, unspecified headache type      ergocalciferol (VITAMIN D2) 50,000 units Take 1 capsule (50,000 Units total) by mouth once a week  Qty: 8 capsule, Refills: 0    Associated Diagnoses: Vitamin D deficiency      Lidocaine Viscous HCl (XYLOCAINE) 2 % mucosal solution Swish and swallow 15 mL 4 (four) times a day as needed for mouth pain or discomfort  Qty: 100 mL, Refills: 0    Associated Diagnoses: Nausea and vomiting, intractability of vomiting not specified, unspecified vomiting type; Generalized abdominal pain      metoclopramide (REGLAN) 10 mg tablet Take 1 tablet (10 mg total) by mouth every 6 (six) hours  Qty: 30 tablet, Refills: 0    Associated Diagnoses: Epigastric abdominal pain; Nausea and vomiting      ondansetron (ZOFRAN) 4 mg tablet Take 1 tablet (4 mg total) by mouth every 8 (eight) hours as needed for nausea or vomiting  Qty: 12 tablet, Refills: 0    Associated Diagnoses: Nausea and vomiting           No discharge procedures on file  PDMP Review     None           ED Provider  Attending physically available and evaluated Bola Yuan  SUNNY managed the patient along with the ED Attending      Electronically Signed by         Tony Lyons MD  05/29/21 6023

## 2021-05-27 NOTE — ED ATTENDING ATTESTATION
5/27/2021  IIrina DO, saw and evaluated the patient  I have discussed the patient with the resident/non-physician practitioner and agree with the resident's/non-physician practitioner's findings, Plan of Care, and MDM as documented in the resident's/non-physician practitioner's note, except where noted  All available labs and Radiology studies were reviewed  I was present for key portions of any procedure(s) performed by the resident/non-physician practitioner and I was immediately available to provide assistance  At this point I agree with the current assessment done in the Emergency Department  I have conducted an independent evaluation of this patient a history and physical is as follows:    44yo male presents with epigastric pain and sob  Also paranoia and states he is out of medications  Pt denies SI and HI, no visual or auditory hallucinations  Pt states he is out of his psychiatric medications and has only been taking his other medications occasionally because he is almost out and missed his appointment  On exam - nad, appears anxious, heart reg, no resp distress    Plan - cardiac labs, GI cocktail    ED Course         Critical Care Time  Procedures

## 2021-05-28 ENCOUNTER — APPOINTMENT (OUTPATIENT)
Dept: NON INVASIVE DIAGNOSTICS | Facility: HOSPITAL | Age: 41
DRG: 199 | End: 2021-05-28
Payer: COMMERCIAL

## 2021-05-28 PROBLEM — R10.13 EPIGASTRIC PAIN: Status: ACTIVE | Noted: 2019-01-13

## 2021-05-28 PROBLEM — K21.9 GERD (GASTROESOPHAGEAL REFLUX DISEASE): Status: ACTIVE | Noted: 2021-05-28

## 2021-05-28 PROBLEM — F42.9 OCD (OBSESSIVE COMPULSIVE DISORDER): Status: ACTIVE | Noted: 2018-10-31

## 2021-05-28 LAB
25(OH)D3 SERPL-MCNC: 24.4 NG/ML (ref 30–100)
ANION GAP SERPL CALCULATED.3IONS-SCNC: 5 MMOL/L (ref 4–13)
ATRIAL RATE: 88 BPM
ATRIAL RATE: 94 BPM
BASOPHILS # BLD AUTO: 0.06 THOUSANDS/ΜL (ref 0–0.1)
BASOPHILS NFR BLD AUTO: 1 % (ref 0–1)
BUN SERPL-MCNC: 28 MG/DL (ref 5–25)
CALCIUM SERPL-MCNC: 8.6 MG/DL (ref 8.3–10.1)
CHLORIDE SERPL-SCNC: 107 MMOL/L (ref 100–108)
CO2 SERPL-SCNC: 27 MMOL/L (ref 21–32)
CREAT SERPL-MCNC: 1.66 MG/DL (ref 0.6–1.3)
EOSINOPHIL # BLD AUTO: 0.21 THOUSAND/ΜL (ref 0–0.61)
EOSINOPHIL NFR BLD AUTO: 3 % (ref 0–6)
ERYTHROCYTE [DISTWIDTH] IN BLOOD BY AUTOMATED COUNT: 13.2 % (ref 11.6–15.1)
EST. AVERAGE GLUCOSE BLD GHB EST-MCNC: 235 MG/DL
GFR SERPL CREATININE-BSD FRML MDRD: 50 ML/MIN/1.73SQ M
GLUCOSE P FAST SERPL-MCNC: 230 MG/DL (ref 65–99)
GLUCOSE SERPL-MCNC: 121 MG/DL (ref 65–140)
GLUCOSE SERPL-MCNC: 146 MG/DL (ref 65–140)
GLUCOSE SERPL-MCNC: 149 MG/DL (ref 65–140)
GLUCOSE SERPL-MCNC: 194 MG/DL (ref 65–140)
GLUCOSE SERPL-MCNC: 217 MG/DL (ref 65–140)
GLUCOSE SERPL-MCNC: 230 MG/DL (ref 65–140)
HBA1C MFR BLD: 9.8 %
HCT VFR BLD AUTO: 35 % (ref 36.5–49.3)
HGB BLD-MCNC: 11.7 G/DL (ref 12–17)
IMM GRANULOCYTES # BLD AUTO: 0.03 THOUSAND/UL (ref 0–0.2)
IMM GRANULOCYTES NFR BLD AUTO: 0 % (ref 0–2)
LYMPHOCYTES # BLD AUTO: 1.78 THOUSANDS/ΜL (ref 0.6–4.47)
LYMPHOCYTES NFR BLD AUTO: 22 % (ref 14–44)
MAGNESIUM SERPL-MCNC: 2.3 MG/DL (ref 1.6–2.6)
MCH RBC QN AUTO: 28.1 PG (ref 26.8–34.3)
MCHC RBC AUTO-ENTMCNC: 33.4 G/DL (ref 31.4–37.4)
MCV RBC AUTO: 84 FL (ref 82–98)
MONOCYTES # BLD AUTO: 0.57 THOUSAND/ΜL (ref 0.17–1.22)
MONOCYTES NFR BLD AUTO: 7 % (ref 4–12)
NEUTROPHILS # BLD AUTO: 5.57 THOUSANDS/ΜL (ref 1.85–7.62)
NEUTS SEG NFR BLD AUTO: 67 % (ref 43–75)
NRBC BLD AUTO-RTO: 0 /100 WBCS
P AXIS: 29 DEGREES
P AXIS: 60 DEGREES
PLATELET # BLD AUTO: 325 THOUSANDS/UL (ref 149–390)
PLATELET # BLD AUTO: 330 THOUSANDS/UL (ref 149–390)
PMV BLD AUTO: 9.5 FL (ref 8.9–12.7)
PMV BLD AUTO: 9.7 FL (ref 8.9–12.7)
POTASSIUM SERPL-SCNC: 3.8 MMOL/L (ref 3.5–5.3)
PR INTERVAL: 152 MS
PR INTERVAL: 166 MS
QRS AXIS: -21 DEGREES
QRS AXIS: -38 DEGREES
QRSD INTERVAL: 104 MS
QRSD INTERVAL: 92 MS
QT INTERVAL: 336 MS
QT INTERVAL: 358 MS
QTC INTERVAL: 406 MS
QTC INTERVAL: 447 MS
RBC # BLD AUTO: 4.17 MILLION/UL (ref 3.88–5.62)
SODIUM SERPL-SCNC: 139 MMOL/L (ref 136–145)
T WAVE AXIS: 73 DEGREES
T WAVE AXIS: 79 DEGREES
VENTRICULAR RATE: 88 BPM
VENTRICULAR RATE: 94 BPM
WBC # BLD AUTO: 8.22 THOUSAND/UL (ref 4.31–10.16)

## 2021-05-28 PROCEDURE — 82948 REAGENT STRIP/BLOOD GLUCOSE: CPT

## 2021-05-28 PROCEDURE — 83735 ASSAY OF MAGNESIUM: CPT | Performed by: STUDENT IN AN ORGANIZED HEALTH CARE EDUCATION/TRAINING PROGRAM

## 2021-05-28 PROCEDURE — 93975 VASCULAR STUDY: CPT | Performed by: SURGERY

## 2021-05-28 PROCEDURE — 93005 ELECTROCARDIOGRAM TRACING: CPT

## 2021-05-28 PROCEDURE — NC001 PR NO CHARGE: Performed by: INTERNAL MEDICINE

## 2021-05-28 PROCEDURE — 99220 PR INITIAL OBSERVATION CARE/DAY 70 MINUTES: CPT | Performed by: INTERNAL MEDICINE

## 2021-05-28 PROCEDURE — 85049 AUTOMATED PLATELET COUNT: CPT | Performed by: STUDENT IN AN ORGANIZED HEALTH CARE EDUCATION/TRAINING PROGRAM

## 2021-05-28 PROCEDURE — 85025 COMPLETE CBC W/AUTO DIFF WBC: CPT | Performed by: STUDENT IN AN ORGANIZED HEALTH CARE EDUCATION/TRAINING PROGRAM

## 2021-05-28 PROCEDURE — 80048 BASIC METABOLIC PNL TOTAL CA: CPT | Performed by: STUDENT IN AN ORGANIZED HEALTH CARE EDUCATION/TRAINING PROGRAM

## 2021-05-28 PROCEDURE — 93010 ELECTROCARDIOGRAM REPORT: CPT | Performed by: INTERNAL MEDICINE

## 2021-05-28 PROCEDURE — 82306 VITAMIN D 25 HYDROXY: CPT | Performed by: STUDENT IN AN ORGANIZED HEALTH CARE EDUCATION/TRAINING PROGRAM

## 2021-05-28 PROCEDURE — 93975 VASCULAR STUDY: CPT

## 2021-05-28 PROCEDURE — 99244 OFF/OP CNSLTJ NEW/EST MOD 40: CPT | Performed by: PSYCHIATRY & NEUROLOGY

## 2021-05-28 RX ORDER — HEPARIN SODIUM 5000 [USP'U]/ML
7500 INJECTION, SOLUTION INTRAVENOUS; SUBCUTANEOUS EVERY 8 HOURS SCHEDULED
Status: DISCONTINUED | OUTPATIENT
Start: 2021-05-28 | End: 2021-06-03 | Stop reason: HOSPADM

## 2021-05-28 RX ORDER — FAMOTIDINE 20 MG/1
20 TABLET, FILM COATED ORAL ONCE
Status: COMPLETED | OUTPATIENT
Start: 2021-05-28 | End: 2021-05-28

## 2021-05-28 RX ORDER — SUCRALFATE 1 G/1
1 TABLET ORAL 2 TIMES DAILY PRN
Status: DISCONTINUED | OUTPATIENT
Start: 2021-05-28 | End: 2021-05-31

## 2021-05-28 RX ORDER — LABETALOL 20 MG/4 ML (5 MG/ML) INTRAVENOUS SYRINGE
20 EVERY 6 HOURS PRN
Status: DISCONTINUED | OUTPATIENT
Start: 2021-05-28 | End: 2021-05-28

## 2021-05-28 RX ORDER — LABETALOL 20 MG/4 ML (5 MG/ML) INTRAVENOUS SYRINGE
20 EVERY 6 HOURS PRN
Status: DISCONTINUED | OUTPATIENT
Start: 2021-05-28 | End: 2021-06-03 | Stop reason: HOSPADM

## 2021-05-28 RX ORDER — LISINOPRIL 10 MG/1
10 TABLET ORAL DAILY
Status: DISCONTINUED | OUTPATIENT
Start: 2021-05-28 | End: 2021-05-29

## 2021-05-28 RX ORDER — HYDRALAZINE HYDROCHLORIDE 20 MG/ML
20 INJECTION INTRAMUSCULAR; INTRAVENOUS EVERY 6 HOURS PRN
Status: DISCONTINUED | OUTPATIENT
Start: 2021-05-28 | End: 2021-05-28

## 2021-05-28 RX ORDER — POTASSIUM CHLORIDE 20 MEQ/1
20 TABLET, EXTENDED RELEASE ORAL ONCE
Status: COMPLETED | OUTPATIENT
Start: 2021-05-28 | End: 2021-05-28

## 2021-05-28 RX ORDER — HYDRALAZINE HYDROCHLORIDE 20 MG/ML
10 INJECTION INTRAMUSCULAR; INTRAVENOUS EVERY 6 HOURS PRN
Status: DISCONTINUED | OUTPATIENT
Start: 2021-05-28 | End: 2021-05-28

## 2021-05-28 RX ORDER — HYDRALAZINE HYDROCHLORIDE 20 MG/ML
20 INJECTION INTRAMUSCULAR; INTRAVENOUS EVERY 6 HOURS PRN
Status: DISCONTINUED | OUTPATIENT
Start: 2021-05-28 | End: 2021-06-03 | Stop reason: HOSPADM

## 2021-05-28 RX ORDER — LORAZEPAM 1 MG/1
1 TABLET ORAL EVERY 8 HOURS PRN
Status: DISCONTINUED | OUTPATIENT
Start: 2021-05-28 | End: 2021-06-03 | Stop reason: HOSPADM

## 2021-05-28 RX ADMIN — HYDRALAZINE HYDROCHLORIDE 10 MG: 20 INJECTION, SOLUTION INTRAMUSCULAR; INTRAVENOUS at 10:08

## 2021-05-28 RX ADMIN — LURASIDONE HYDROCHLORIDE 20 MG: 20 TABLET, FILM COATED ORAL at 17:07

## 2021-05-28 RX ADMIN — HEPARIN SODIUM 7500 UNITS: 5000 INJECTION INTRAVENOUS; SUBCUTANEOUS at 21:36

## 2021-05-28 RX ADMIN — PANTOPRAZOLE SODIUM 40 MG: 40 TABLET, DELAYED RELEASE ORAL at 15:53

## 2021-05-28 RX ADMIN — HEPARIN SODIUM 7500 UNITS: 5000 INJECTION INTRAVENOUS; SUBCUTANEOUS at 13:42

## 2021-05-28 RX ADMIN — INSULIN LISPRO 2 UNITS: 100 INJECTION, SOLUTION INTRAVENOUS; SUBCUTANEOUS at 08:15

## 2021-05-28 RX ADMIN — AMLODIPINE BESYLATE 10 MG: 10 TABLET ORAL at 09:04

## 2021-05-28 RX ADMIN — ALBUTEROL SULFATE 2 PUFF: 90 AEROSOL, METERED RESPIRATORY (INHALATION) at 21:36

## 2021-05-28 RX ADMIN — ACETAMINOPHEN 650 MG: 325 TABLET, FILM COATED ORAL at 20:25

## 2021-05-28 RX ADMIN — SUCRALFATE 1 G: 1 TABLET ORAL at 11:50

## 2021-05-28 RX ADMIN — METOPROLOL SUCCINATE 50 MG: 50 TABLET, EXTENDED RELEASE ORAL at 09:03

## 2021-05-28 RX ADMIN — LORAZEPAM 1 MG: 1 TABLET ORAL at 15:53

## 2021-05-28 RX ADMIN — ATORVASTATIN CALCIUM 20 MG: 20 TABLET, FILM COATED ORAL at 09:03

## 2021-05-28 RX ADMIN — PANTOPRAZOLE SODIUM 40 MG: 40 TABLET, DELAYED RELEASE ORAL at 06:02

## 2021-05-28 RX ADMIN — HEPARIN SODIUM 5000 UNITS: 5000 INJECTION INTRAVENOUS; SUBCUTANEOUS at 06:02

## 2021-05-28 RX ADMIN — SODIUM CHLORIDE, SODIUM GLUCONATE, SODIUM ACETATE, POTASSIUM CHLORIDE, MAGNESIUM CHLORIDE, SODIUM PHOSPHATE, DIBASIC, AND POTASSIUM PHOSPHATE 100 ML/HR: .53; .5; .37; .037; .03; .012; .00082 INJECTION, SOLUTION INTRAVENOUS at 00:46

## 2021-05-28 RX ADMIN — INSULIN GLARGINE 40 UNITS: 100 INJECTION, SOLUTION SUBCUTANEOUS at 21:37

## 2021-05-28 RX ADMIN — ONDANSETRON 4 MG: 4 TABLET, ORALLY DISINTEGRATING ORAL at 02:50

## 2021-05-28 RX ADMIN — PROCHLORPERAZINE MALEATE 10 MG: 10 TABLET ORAL at 02:50

## 2021-05-28 RX ADMIN — LEVOTHYROXINE SODIUM 100 MCG: 100 TABLET ORAL at 06:02

## 2021-05-28 RX ADMIN — SUCRALFATE 1 G: 1 TABLET ORAL at 06:02

## 2021-05-28 RX ADMIN — FLUVOXAMINE MALEATE 100 MG: 50 TABLET ORAL at 09:03

## 2021-05-28 RX ADMIN — POTASSIUM CHLORIDE 20 MEQ: 1500 TABLET, EXTENDED RELEASE ORAL at 10:08

## 2021-05-28 RX ADMIN — ACETAMINOPHEN 650 MG: 325 TABLET, FILM COATED ORAL at 07:30

## 2021-05-28 RX ADMIN — HYDRALAZINE HYDROCHLORIDE 10 MG: 20 INJECTION, SOLUTION INTRAMUSCULAR; INTRAVENOUS at 03:02

## 2021-05-28 RX ADMIN — FAMOTIDINE 20 MG: 20 TABLET ORAL at 10:08

## 2021-05-28 RX ADMIN — ALBUTEROL SULFATE 2 PUFF: 90 AEROSOL, METERED RESPIRATORY (INHALATION) at 11:50

## 2021-05-28 RX ADMIN — ALBUTEROL SULFATE 2 PUFF: 90 AEROSOL, METERED RESPIRATORY (INHALATION) at 17:08

## 2021-05-28 RX ADMIN — PROCHLORPERAZINE MALEATE 10 MG: 10 TABLET ORAL at 10:08

## 2021-05-28 RX ADMIN — LISINOPRIL 10 MG: 10 TABLET ORAL at 10:42

## 2021-05-28 RX ADMIN — INSULIN LISPRO 10 UNITS: 100 INJECTION, SOLUTION INTRAVENOUS; SUBCUTANEOUS at 11:50

## 2021-05-28 RX ADMIN — FLUVOXAMINE MALEATE 100 MG: 50 TABLET ORAL at 17:07

## 2021-05-28 RX ADMIN — LABETALOL 20 MG/4 ML (5 MG/ML) INTRAVENOUS SYRINGE 20 MG: at 16:33

## 2021-05-28 NOTE — PLAN OF CARE
Problem: PAIN - ADULT  Goal: Verbalizes/displays adequate comfort level or baseline comfort level  Description: Interventions:  - Encourage patient to monitor pain and request assistance  - Assess pain using appropriate pain scale  - Administer analgesics based on type and severity of pain and evaluate response  - Implement non-pharmacological measures as appropriate and evaluate response  - Consider cultural and social influences on pain and pain management  - Notify physician/advanced practitioner if interventions unsuccessful or patient reports new pain  Outcome: Progressing     Problem: INFECTION - ADULT  Goal: Absence or prevention of progression during hospitalization  Description: INTERVENTIONS:  - Assess and monitor for signs and symptoms of infection  - Monitor lab/diagnostic results  - Monitor all insertion sites, i e  indwelling lines, tubes, and drains  - Monitor endotracheal if appropriate and nasal secretions for changes in amount and color  - Fabius appropriate cooling/warming therapies per order  - Administer medications as ordered  - Instruct and encourage patient and family to use good hand hygiene technique  - Identify and instruct in appropriate isolation precautions for identified infection/condition  Outcome: Progressing  Goal: Absence of fever/infection during neutropenic period  Description: INTERVENTIONS:  - Monitor WBC    Outcome: Progressing     Problem: SAFETY ADULT  Goal: Patient will remain free of falls  Description: INTERVENTIONS:  - Assess patient frequently for physical needs  -  Identify cognitive and physical deficits and behaviors that affect risk of falls    -  Fabius fall precautions as indicated by assessment   - Educate patient/family on patient safety including physical limitations  - Instruct patient to call for assistance with activity based on assessment  - Modify environment to reduce risk of injury  - Consider OT/PT consult to assist with strengthening/mobility  Outcome: Progressing  Goal: Maintain or return to baseline ADL function  Description: INTERVENTIONS:  -  Assess patient's ability to carry out ADLs; assess patient's baseline for ADL function and identify physical deficits which impact ability to perform ADLs (bathing, care of mouth/teeth, toileting, grooming, dressing, etc )  - Assess/evaluate cause of self-care deficits   - Assess range of motion  - Assess patient's mobility; develop plan if impaired  - Assess patient's need for assistive devices and provide as appropriate  - Encourage maximum independence but intervene and supervise when necessary  - Involve family in performance of ADLs  - Assess for home care needs following discharge   - Consider OT consult to assist with ADL evaluation and planning for discharge  - Provide patient education as appropriate  Outcome: Progressing  Goal: Maintain or return mobility status to optimal level  Description: INTERVENTIONS:  - Assess patient's baseline mobility status (ambulation, transfers, stairs, etc )    - Identify cognitive and physical deficits and behaviors that affect mobility  - Identify mobility aids required to assist with transfers and/or ambulation (gait belt, sit-to-stand, lift, walker, cane, etc )  - [place] fall precautions as indicated by assessment  - Record patient progress and toleration of activity level on Mobility SBAR; progress patient to next Phase/Stage  - Instruct patient to call for assistance with activity based on assessment  - Consider rehabilitation consult to assist with strengthening/weightbearing, etc   Outcome: Progressing     Problem: DISCHARGE PLANNING  Goal: Discharge to home or other facility with appropriate resources  Description: INTERVENTIONS:  - Identify barriers to discharge w/patient and caregiver  - Arrange for needed discharge resources and transportation as appropriate  - Identify discharge learning needs (meds, wound care, etc )  - Arrange for interpretive services to assist at discharge as needed  - Refer to Case Management Department for coordinating discharge planning if the patient needs post-hospital services based on physician/advanced practitioner order or complex needs related to functional status, cognitive ability, or social support system  Outcome: Progressing     Problem: Knowledge Deficit  Goal: Patient/family/caregiver demonstrates understanding of disease process, treatment plan, medications, and discharge instructions  Description: Complete learning assessment and assess knowledge base    Interventions:  - Provide teaching at level of understanding  - Provide teaching via preferred learning methods  Outcome: Progressing

## 2021-05-28 NOTE — ASSESSMENT & PLAN NOTE
Lab Results   Component Value Date    EGFR 39 06/02/2021    EGFR 39 06/01/2021    EGFR 42 05/31/2021    CREATININE 2 05 (H) 06/02/2021    CREATININE 2 01 (H) 06/01/2021    CREATININE 1 92 (H) 05/31/2021     Patient presenting with a creatinine of 1 99  Baseline appears to be 1 5-1 6  Likely prerenal due to poor PO intake 2/2 nausea as well as hypertension       Plan:  · Cr stable at 2 today  · Nephrology consulted - appreciate recommendations  · Likely 2/2 hypertension and uncontrolled diabetes

## 2021-05-28 NOTE — UTILIZATION REVIEW
Initial Clinical Review    Admission: Date/Time/Statement:   Admission Orders (From admission, onward)     Ordered        05/27/21 2057  Place in Observation  Once                   Orders Placed This Encounter   Procedures    Place in Observation     Standing Status:   Standing     Number of Occurrences:   1     Order Specific Question:   Level of Care     Answer:   Med Surg [16]     ED Arrival Information     Expected Arrival Acuity Means of Arrival Escorted By Service Admission Type    - 5/27/2021 16:05 Urgent Ambulance [facility] [place], operated by [facility] General Medicine Urgent    Arrival Complaint    Shortness of breath        Chief Complaint   Patient presents with    Epigastric Pain     Epigastric pain relieved with burping   Shortness of Breath     Relieved with nasal cannula in nose, even with oxygen turned off  Initial Presentation:      35year old male presents to ed via ems for evaluation and treatment of abdominal pain with nausea and shortness of breath  PMHX:  MIGRAINE, HTN, OCD, DM, SCHIZOAFFECTIVE DISORDER Clinical assessment significant for hypertension  Admit to observation for hypertensive urgency, migraine   Treated in ed with viscous lidocaine, mylanta, carafate, norvasc, iv multi-electrolyte 100/hr  Sq lantus, tylenol, metorprolol,, iv labetalol x1  Admit to observation for hypertensive urgency, abdominal pain,antihypertensives, renal artery dopplers,           Date: 5-28  Day 2: observation  Initiate lisinopril and verapamil  Multiple symptoms reported by patient appear to be related to anxiety vs psychosomatic disorder  Psychiatry consult requested  Obtain EKG as baseline for psych medication initiation      ED Triage Vitals   05/27/21 1615 05/27/21 1615 05/27/21 1615 05/27/21 1907 05/27/21 1615   97 6 °F (36 4 °C) 86 18 (!) 199/91 96 %      Oral Monitor         Pain Score       6          03/11/21 119 kg (263 lb)     Additional Vital Signs:     Date/Time  Temp  Pulse  Resp  BP MAP (mmHg)  SpO2  O2 Device   05/28/21 12:42:17  --  92  --  128/67  87  94 %  --   05/28/21 11:14:01  --  83  --  136/74  95  90 %  --   05/28/21 10:42:45  --  89  --  144/78  100  95 %  --   05/28/21 1042  --  --  --  144/78  --  --  --   05/28/21 1008  --  --  --  171/103Abnormal   --  --  --   05/28/21 09:05:31  --  86  --  173/102Abnormal   126  96 %  --   05/28/21 0903  --  86  --  173/102Abnormal   --  --  --   05/28/21 07:57:29  98 1 °F (36 7 °C)  82  20  147/90  109  91 %  None (Room air)   05/28/21 07:33:17  --  83  --  153/106Abnormal   122  95 %  --   05/28/21 05:34:37  --  88  --  144/81  102  94 %  --   05/28/21 04:29:31  --  90  --  149/78  102  92 %  --   05/28/21 0352  --  --  --  171/102Abnormal   --  --  --   05/28/21 02:53:06  --  83  --  170/106Abnormal   127  94 %  --   05/28/21 02:52:46  --  84  --  170/106Abnormal   127  92 %  --   05/28/21 00:28:43  98 1 °F (36 7 °C)  81  --  169/105Abnormal   126  96 %  --   05/27/21 2329  --  84  18  189/99Abnormal   135  96 %  --   05/27/21 2327  --  82  --  189/99Abnormal   --  --  --   05/27/21 2228  --  --  --  204/98Abnormal   --  --  --   05/27/21 2149  --  92  20  214/104Abnormal   147  98 %  None (Room air)   05/27/21 1953  --  94  18  199/106Abnormal   144  99 %  None (Room air)   05/27/21 1952  --  --  --  199/106Abnormal   --  --  --   05/27/21 1907  --  89  18  199/91Abnormal   --  95 %  None (Room air)                 Pertinent Labs/Diagnostic Test Results:     XR chest 2 views   Final  (05/27 1934)      No acute cardiopulmonary disease  VAS renal aCONCLUSION:  5-28 -21 0739    The abdominal aorta is widely patent and normal caliber  RIGHT RENAL:  No evidence of significant arterial occlusive disease in the main renal artery  Patent renal vein  Renovascular resistive index of 0 63 Renal/Aorta Ratio: 1 63    The Kidney  measures 11 07cm,     LEFT RENAL:  No evidence of significant arterial occlusive disease in the main renal artery  Patent renal vein  Renovascular resistive index of 0 60 Renal/Aorta Ratio: 1 24  The Kidney  measures 12 29cm,     MESENTERIC:  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  The celiac artery is patent and normal in caliber  rtery complete             Results from last 7 days   Lab Units 05/28/21  0533 05/27/21  1746   WBC Thousand/uL 8 22 9 10   HEMOGLOBIN g/dL 11 7* 12 6   HEMATOCRIT % 35 0* 37 5   PLATELETS Thousands/uL 330  325 359   NEUTROS ABS Thousands/µL 5 57 6 70         Results from last 7 days   Lab Units 05/28/21  0533 05/27/21  1746   SODIUM mmol/L 139 135*   POTASSIUM mmol/L 3 8 4 6   CHLORIDE mmol/L 107 103   CO2 mmol/L 27 26   ANION GAP mmol/L 5 6   BUN mg/dL 28* 37*   CREATININE mg/dL 1 66* 1 99*   EGFR ml/min/1 73sq m 50 40   CALCIUM mg/dL 8 6 9 4   MAGNESIUM mg/dL 2 3  --      Results from last 7 days   Lab Units 05/27/21  1746   AST U/L 14   ALT U/L 18   ALK PHOS U/L 59   TOTAL PROTEIN g/dL 6 9   ALBUMIN g/dL 3 0*   TOTAL BILIRUBIN mg/dL 0 35     Results from last 7 days   Lab Units 05/28/21  1103 05/28/21  0612 05/27/21  2335 05/27/21  2205   POC GLUCOSE mg/dl 146* 194* 217* 228*     Results from last 7 days   Lab Units 05/28/21  0533 05/27/21  1746   GLUCOSE RANDOM mg/dL 230* 317*         Results from last 7 days   Lab Units 05/27/21  1746   HEMOGLOBIN A1C % 9 8*   EAG mg/dl 235     Results from last 7 days   Lab Units 05/27/21  1746   TROPONIN I ng/mL <0 02       Results from last 7 days   Lab Units 05/27/21  1746   TSH 3RD GENERATON uIU/mL 13 400*       Results from last 7 days   Lab Units 05/27/21  1746   LIPASE u/L 65*       ED Treatment:   Medication Administration from 05/27/2021 1605 to 05/28/2021 0026       Date/Time Order Dose Route Action     05/27/2021 1746 Lidocaine Viscous HCl (XYLOCAINE) 2 % mucosal solution 15 mL 15 mL Swish & Spit Given     05/27/2021 1746 aluminum-magnesium hydroxide-simethicone (MYLANTA) oral suspension 30 mL 30 mL Oral Given 05/27/2021 1905 sucralfate (CARAFATE) tablet 1 g 1 g Oral Given     05/27/2021 1952 amLODIPine (NORVASC) tablet 5 mg 5 mg Oral Given     05/27/2021 2202 heparin (porcine) subcutaneous injection 5,000 Units 5,000 Units Subcutaneous Given     05/27/2021 2149 multi-electrolyte (PLASMALYTE-A/ISOLYTE-S PH 7 4) IV solution 100 mL/hr Intravenous New Bag     05/27/2021 2202 insulin glargine (LANTUS) subcutaneous injection 40 Units 0 4 mL 40 Units Subcutaneous Given     05/27/2021 2324 albuterol (PROVENTIL HFA,VENTOLIN HFA) inhaler 2 puff 2 puff Inhalation Given     05/27/2021 2325 fluvoxaMINE (LUVOX) tablet 100 mg 100 mg Oral Given     05/27/2021 2202 acetaminophen (TYLENOL) tablet 650 mg 650 mg Oral Given     05/27/2021 2327 sucralfate (CARAFATE) tablet 1 g 1 g Oral Given     05/27/2021 2327 metoprolol succinate (TOPROL-XL) 24 hr tablet 50 mg 50 mg Oral Given     05/27/2021 2228 amLODIPine (NORVASC) tablet 5 mg 5 mg Oral Given     05/27/2021 2228 Labetalol HCl (NORMODYNE) injection 10 mg 10 mg Intravenous Given     05/27/2021 2345 insulin lispro (HumaLOG) 100 units/mL subcutaneous injection 2-12 Units 4 Units Subcutaneous Given        Past Medical History:   Diagnosis Date    Acute bronchitis due to other specified organisms 7/5/2019    Chronic headaches     Diabetes mellitus ([facility] )     Disease of thyroid gland     Esophagitis     Gastritis     Gastroparesis     GERD (gastroesophageal reflux disease)     Hypertension     Migraine     Migraines 03/11/2021    Obesity     Obsessive compulsive disorder     Psychiatric disorder     Schizoaffective disorder ([facility] )      Present on Admission:   [facility]   Acquired hypothyroidism   Epigastric pain   Acute on chronic kidney failure ([facility] )   Hypertensive urgency   Migraine without aura and without status migrainosus, not intractable      Admitting Diagnosis:     Shortness of breath [R06 02]  Gastritis [K29 70]  STEFANIA (acute kidney injury) ([facility] ) [N17 9]  Uncontrolled hypertension [I10]  Difficulty obtaining medication [Z91 14]      Age/Sex: 35 y o  male    Scheduled Medications:  albuterol, 2 puff, Inhalation, 4x Daily  atorvastatin, 20 mg, Oral, Daily  fluvoxaMINE, 100 mg, Oral, BID  heparin (porcine), 7,500 Units, Subcutaneous, Q8H [address]  insulin glargine, 40 Units, Subcutaneous, HS  insulin lispro, 10 Units, Subcutaneous, TID With Meals  insulin lispro, 2-12 Units, Subcutaneous, TID AC  levothyroxine, 100 mcg, Oral, Early Morning  lisinopril, 10 mg, Oral, Daily  metoprolol succinate, 50 mg, Oral, Daily  pantoprazole, 40 mg, Oral, BID AC  [START ON 5/29/2021] verapamil, 120 mg, Oral, Daily      Continuous IV Infusions:     PRN Meds:  acetaminophen, 650 mg, Oral, Q6H PRN  hydrALAZINE, 20 mg, Intravenous, Q6H PRN  Labetalol HCl, 20 mg, Intravenous, Q6H PRN  Lidocaine Viscous HCl, 15 mL, Swish & Swallow, 4x Daily PRN  ondansetron, 4 mg, Oral, Q6H PRN  prochlorperazine, 10 mg, Oral, Q6H PRN  sucralfate, 1 g, Oral, BID PRN        IP CONSULT TO CASE MANAGEMENT  IP CONSULT TO PSYCHIATRY    Network Utilization Review Department  ATTENTION: Please call with any questions or concerns to [phone] and carefully listen to the prompts so that you are directed to the right person  All voicemails are confidential   Sheree Mcdonald all requests for admission clinical reviews, approved or denied determinations and any other requests to dedicated fax number below belonging to the campus where the patient is receiving treatment   List of dedicated fax numbers for the Facilities:  [address] DENIALS (Administrative/Medical Necessity) [phone]   [address] [place] [address] (Maternity/NICU/Pediatrics) 261 [facility] [address] Eric [place]wood [address] [place] [address] [address] [facility] [address] [phone]

## 2021-05-28 NOTE — ASSESSMENT & PLAN NOTE
History of GERD  On protonix 40 mg BID as an outpatient       Plan:  · Continue protonix, pepcid, and carafate

## 2021-05-28 NOTE — PROGRESS NOTES
INTERNAL MEDICINE RESIDENCY PROGRESS NOTE     Name: Coreen Apodaca   Age & Sex: 35 y o  male   MRN: [id number]  Unit/Bed#: [facility] 721-23   Encounter: [id number]  Team: SOD Team C     PATIENT INFORMATION     Name: Coreen Apodaca   Age & Sex: 35 y o  male   MRN: [id number]  Hospital Stay Days: 0    ASSESSMENT/PLAN     Principal Problem:    Hypertensive urgency  Active Problems:    Schizoaffective disorder ([address] )    Migraine without aura and without status migrainosus, not intractable    Type 2 diabetes mellitus, with long-term current use of insulin (HCC)    Acquired hypothyroidism    Acute on chronic kidney failure (HCC)    GERD (gastroesophageal reflux disease)    Epigastric pain      * Hypertensive urgency  Assessment & Plan  BP on admission 199/91  Patient complaining of abdominal pain with nausea and SOB on admission but otherwise denying headache or visual changes  Patient has had repeated admissions with elevated BP and does admit to compliance with home blood pressure regimen of amlodipine 10 mg daily, metoprolol succinate 50 mg daily, and losartan 100 mg daily  Patient has underwent secondary HTN workup which has been unremarkable thus far; however, patient has not had renal artery dopplers completed to rule out renal artery stenosis  Plan:  · Medications:  · Initiating verapamil  mg daily for combo of BP and migraine control  · Initiating lisinopril 10 mg daily for renal-protective and BP purposes  Cr trended down to near baseline today at 1 66  · Metoprolol succinate 50 mg daily  · D/c losartan 100 mg daily   · Labetalol 10 mg q6 hours PRN for SBP >160 (first line)  · Hydralazine 10 mg q6 hours PRN for SBP >160 (second line)  ·  renal artery dopplers without pathology   CT abd/pelvis without adrenal masses    Migraine without aura and without status migrainosus, not intractable  Assessment & Plan  Patient with history of migraines and currently follows with neurology as an outpatient; last evaluated 10/2020  Currently managed as an outpatient on Aimovig 140 mg injection every 30 days  Plan:  -long history of migraines, seen by neurology outpatient    -starting Verapamil ER today combo of migraine prophylaxis and blood pressure control  -magnesium replenished inpatient  -on discharge recommend starting B2 riboflavin 400 mg daily, in addition to 200 mg magnesium daily  -will discharge with abortive medication Imitrex  · Tylenol 650 q6 hours PRN  · Outpatient neurology follow up     Schizoaffective disorder [facility])  Assessment & Plan  History of schizoaffective disorder  Mood currently stable  Managed on Fluroxamine 100 mg BID and Cariprazine 6 mg daily (non-formulary) as an outpatient  Follows with outpatient psychiatry but has not been evaluated by them in the last 6 months  Plan:  -Patient's varying symptom complaints seem unrelated to an organic issue and more so illness anxiety vs psychosomatic disorder  Psychiatry was consulted and will be seeing the patient this afternoon for further recommendations on medications  Patient will need to follow-up with psych outpatient as well  For now is on Fluvoxamine 100 mg BID  No active SI/HI, depression, or hallucinations  Is a bit anxious  -will obtain baseline EKG in anticipation of starting many new medications, in addition to psych meds      Type 2 diabetes mellitus, with long-term current use of insulin [facility])  Assessment & Plan  Lab Results   Component Value Date    HGBA1C 10 5 (H) 11/22/2020     Patient with PMHx of insulin dependant DM  Most recent hemoglobin A1c 10 5 on 11/2020  Currently managed as an outpatient on Basaglar 50U qHS and lispro 10U TID  An an outpatient, patient states that he checks his BG 2x daily and obtains readings of 200-300       Plan:  · Continue POC glucose checks; BG goal of 140-180  · Insulin Regimen:  · Lantus 40U qHS  · Humalog 10U TID with meals  · SSI Algorithm 4 - TID with meals  · Follow up repeat hemoglobin A1c  · Diabetic, carbohydrate restricted diet      Acute on chronic kidney failure [facility])  Assessment & Plan  Lab Results   Component Value Date    EGFR 40 05/27/2021    EGFR 46 02/23/2021    EGFR 53 02/13/2021    CREATININE 1 99 (H) 05/27/2021    CREATININE 1 78 (H) 02/23/2021    CREATININE 1 59 (H) 02/13/2021     Patient presenting with a creatinine of 1 99  Baseline appears to be 1 3-1 5  Likely prerenal due to poor PO intake 2/2 nausea as well as hypertension  Will hold ARB in setting of STEFANIA and treated with IVF hydration  Plan:  · Isolyte 100 cc/hr  · Encourage increased PO intake  · Losartan 100 mg daily on HOLD  · Avoid nephrotoxic agents including NSAIDs and contrast dyes  · Avoid episodes of hypotension    Acquired hypothyroidism  Assessment & Plan  History of hypothyroidism; currently on levothyroxine 100 mcg as an outpatient  Most recent TSH 9 980 11/2020  Plan:  · TSH elevated, free T4 low  Patient has not taken his home dose levo 100 mcg in at least a month, and his labs reflect this  Restarted patient on home dose levo 100 mcg  · Outpatient f/u with repeat TSH in 4 weeks      GERD (gastroesophageal reflux disease)  Assessment & Plan  History of GERD  On protonix 40 mg BID as an outpatient  Plan:  · Protonix 40 mg BID  · pepcid PRN    Epigastric pain  Assessment & Plan  Patient with a history of GERD and gastritis  Most recent EGD completed on 3/11/2021 demonstrating mild gastritis  Currently managed as an outpatient on protonix 40 mg BID and carafate 1g daily  Plan:  -question psychosomatic as etiology  No need for GI consult at this time  · Continue Protonix 40 mg BID  · Added Pepcid- transition to PRN once nausea controlled  · Carafate and zofran PRN  · Carb-controlled diet and tighter glucose control  · S/p IVF      Disposition: plan to d/c tomorrow  Psych seeing patient today  Patient Cr resolving and being medically optimized for other chronic conditions today       SUBJECTIVE Patient seen and examined  Overnight his BP was elevated in spite of all PRN and scheduled medications  He was complaining of upset stomach this morning, which resolved with Pepcid and resuming his diet after his renal artery US  Per nursing he has been having multiple rotating complaints that vary with each provider  Seems primarily psychiatric related  Denying headache on present exam  If anything patient seems anxious/illness vs somatic anxiety disorder  Psychiatry has been consulted and will see the patient today  Goal is to medically optimize him and discharge him tomorrow  Denying: SOB, cough, CP, HA, n/v/d, fever/chills at time of my exam    OBJECTIVE     Vitals:    21 1042 21 1042 21 1114 21 1242   BP: 144/78 144/78 136/74 128/67   BP Location:       Pulse:  89 83 92   Resp:       Temp:       TempSrc:       SpO2:  95% 90% 94%      Temperature:   Temp (24hrs), Av 9 °F (36 6 °C), Min:97 6 °F (36 4 °C), Max:98 1 °F (36 7 °C)    Temperature: 98 1 °F (36 7 °C)  Intake & Output:  I/O        07 -  0700  07 -  0700  07 -  0700    Urine   1    Total Output   1    Net   -1               Weights: There is no height or weight on file to calculate BMI  Weight (last 2 days)     None        Physical Exam  Constitutional:       General: He is not in acute distress  Appearance: He is obese  He is not ill-appearing, toxic-appearing or diaphoretic  HENT:      Mouth/Throat:      Mouth: Mucous membranes are moist    Eyes:      Extraocular Movements: Extraocular movements intact  Pupils: Pupils are equal, round, and reactive to light  Cardiovascular:      Rate and Rhythm: Normal rate and regular rhythm  Pulses: Normal pulses  Heart sounds: No murmur  Pulmonary:      Effort: Pulmonary effort is normal  No respiratory distress  Breath sounds: Normal breath sounds  No wheezing or rales     Abdominal:      Palpations: Abdomen is soft  Tenderness: There is no abdominal tenderness  There is no guarding or rebound  Comments: Abdomen distended 2/2 obesity but no rebound, rigidity, or guarding   Musculoskeletal:      Right lower leg: No edema  Left lower leg: No edema  Skin:     General: Skin is warm and dry  Neurological:      Mental Status: He is alert and oriented to person, place, and time  Cranial Nerves: No cranial nerve deficit  Motor: No weakness  Psychiatric:      Comments: Patient answers questions appropriately  Does appear a bit anxious about his health in general  Psychosomatic vs illness anxiety     e see  LABORATORY DATA     Labs: I have personally reviewed pertinent reports  Results from last 7 days   Lab Units 05/28/21  0533 05/27/21  1746   WBC Thousand/uL 8 22 9 10   HEMOGLOBIN g/dL 11 7* 12 6   HEMATOCRIT % 35 0* 37 5   PLATELETS Thousands/uL 330  325 359   NEUTROS PCT % 67 74   MONOS PCT % 7 6      Results from last 7 days   Lab Units 05/28/21  0533 05/27/21  1746   POTASSIUM mmol/L 3 8 4 6   CHLORIDE mmol/L 107 103   CO2 mmol/L 27 26   BUN mg/dL 28* 37*   CREATININE mg/dL 1 66* 1 99*   CALCIUM mg/dL 8 6 9 4   ALK PHOS U/L  --  59   ALT U/L  --  18   AST U/L  --  14     Results from last 7 days   Lab Units 05/28/21  0533   MAGNESIUM mg/dL 2 3                  Results from last 7 days   Lab Units 05/27/21  1746   TROPONIN I ng/mL <0 02       IMAGING & DIAGNOSTIC TESTING     Radiology Results: I have personally reviewed pertinent reports  Xr Chest 2 Views    Result Date: 5/27/2021  Impression: No acute cardiopulmonary disease  Workstation performed: [id number]     Other Diagnostic Testing: I have personally reviewed pertinent reports      ACTIVE MEDICATIONS     Current Facility-Administered Medications   Medication Dose Route Frequency    acetaminophen (TYLENOL) tablet 650 mg  650 mg Oral Q6H PRN    albuterol (PROVENTIL HFA,VENTOLIN HFA) inhaler 2 puff  2 puff Inhalation 4x Daily    atorvastatin (LIPITOR) tablet 20 mg  20 mg Oral Daily    fluvoxaMINE (LUVOX) tablet 100 mg  100 mg Oral BID    heparin (porcine) subcutaneous injection 7,500 Units  7,500 Units Subcutaneous Q8H [facility]    hydrALAZINE (APRESOLINE) injection 20 mg  20 mg Intravenous Q6H PRN    insulin glargine (LANTUS) subcutaneous injection 40 Units 0 4 mL  40 Units Subcutaneous HS    insulin lispro (HumaLOG) 100 units/mL subcutaneous injection 10 Units  10 Units Subcutaneous TID With Meals    insulin lispro (HumaLOG) 100 units/mL subcutaneous injection 2-12 Units  2-12 Units Subcutaneous TID AC    Labetalol HCl (NORMODYNE) injection 20 mg  20 mg Intravenous Q6H PRN    levothyroxine tablet 100 mcg  100 mcg Oral Early Morning    Lidocaine Viscous HCl (XYLOCAINE) 2 % mucosal solution 15 mL  15 mL Swish & Swallow 4x Daily PRN    lisinopril (ZESTRIL) tablet 10 mg  10 mg Oral Daily    metoprolol succinate (TOPROL-XL) 24 hr tablet 50 mg  50 mg Oral Daily    ondansetron (ZOFRAN-ODT) dispersible tablet 4 mg  4 mg Oral Q6H PRN    pantoprazole (PROTONIX) EC tablet 40 mg  40 mg Oral BID AC    prochlorperazine (COMPAZINE) tablet 10 mg  10 mg Oral Q6H PRN    sucralfate (CARAFATE) tablet 1 g  1 g Oral BID PRN    [START ON 5/29/2021] verapamil (CALAN-SR) CR tablet 120 mg  120 mg Oral Daily       VTE Pharmacologic Prophylaxis: Heparin  VTE Mechanical Prophylaxis: sequential compression device    Portions of the record may have been created with voice recognition software  Occasional wrong word or "sound a like" substitutions may have occurred due to the inherent limitations of voice recognition software    Read the chart carefully and recognize, using context, where substitutions have occurred   ==  Ernestine Alcantara, [address] [facility]  Internal Medicine Residency PGY-1

## 2021-05-28 NOTE — PLAN OF CARE
Problem: PAIN - ADULT  Goal: Verbalizes/displays adequate comfort level or baseline comfort level  Description: Interventions:  - Encourage patient to monitor pain and request assistance  - Assess pain using appropriate pain scale  - Administer analgesics based on type and severity of pain and evaluate response  - Implement non-pharmacological measures as appropriate and evaluate response  - Consider cultural and social influences on pain and pain management  - Notify physician/advanced practitioner if interventions unsuccessful or patient reports new pain  Outcome: Progressing     Problem: INFECTION - ADULT  Goal: Absence or prevention of progression during hospitalization  Description: INTERVENTIONS:  - Assess and monitor for signs and symptoms of infection  - Monitor lab/diagnostic results  - Monitor all insertion sites, i e  indwelling lines, tubes, and drains  - Monitor endotracheal if appropriate and nasal secretions for changes in amount and color  - [place] appropriate cooling/warming therapies per order  - Administer medications as ordered  - Instruct and encourage patient and family to use good hand hygiene technique  - Identify and instruct in appropriate isolation precautions for identified infection/condition  Outcome: Progressing     Problem: SAFETY ADULT  Goal: Patient will remain free of falls  Description: INTERVENTIONS:  - Assess patient frequently for physical needs  -  Identify cognitive and physical deficits and behaviors that affect risk of falls    -  [place] fall precautions as indicated by assessment   - Educate patient/family on patient safety including physical limitations  - Instruct patient to call for assistance with activity based on assessment  - Modify environment to reduce risk of injury  - Consider OT/PT consult to assist with strengthening/mobility  Outcome: Progressing  Goal: Maintain or return to baseline ADL function  Description: INTERVENTIONS:  -  Assess patient's ability to carry out ADLs; assess patient's baseline for ADL function and identify physical deficits which impact ability to perform ADLs (bathing, care of mouth/teeth, toileting, grooming, dressing, etc )  - Assess/evaluate cause of self-care deficits   - Assess range of motion  - Assess patient's mobility; develop plan if impaired  - Assess patient's need for assistive devices and provide as appropriate  - Encourage maximum independence but intervene and supervise when necessary  - Involve family in performance of ADLs  - Assess for home care needs following discharge   - Consider OT consult to assist with ADL evaluation and planning for discharge  - Provide patient education as appropriate  Outcome: Progressing  Goal: Maintain or return mobility status to optimal level  Description: INTERVENTIONS:  - Assess patient's baseline mobility status (ambulation, transfers, stairs, etc )    - Identify cognitive and physical deficits and behaviors that affect mobility  - Identify mobility aids required to assist with transfers and/or ambulation (gait belt, sit-to-stand, lift, walker, cane, etc )  - [place] fall precautions as indicated by assessment  - Record patient progress and toleration of activity level on Mobility SBAR; progress patient to next Phase/Stage  - Instruct patient to call for assistance with activity based on assessment  - Consider rehabilitation consult to assist with strengthening/weightbearing, etc   Outcome: Progressing     Problem: DISCHARGE PLANNING  Goal: Discharge to home or other facility with appropriate resources  Description: INTERVENTIONS:  - Identify barriers to discharge w/patient and caregiver  - Arrange for needed discharge resources and transportation as appropriate  - Identify discharge learning needs (meds, wound care, etc )  - Arrange for interpretive services to assist at discharge as needed  - Refer to Case Management Department for coordinating discharge planning if the patient needs post-hospital services based on physician/advanced practitioner order or complex needs related to functional status, cognitive ability, or social support system  Outcome: Progressing     Problem: CARDIOVASCULAR - ADULT  Goal: Maintains optimal cardiac output and hemodynamic stability  Description: INTERVENTIONS:  - Monitor I/O, vital signs and rhythm  - Monitor for S/S and trends of decreased cardiac output  - Administer and titrate ordered vasoactive medications to optimize hemodynamic stability  - Assess quality of pulses, skin color and temperature  - Assess for signs of decreased coronary artery perfusion  - Instruct patient to report change in severity of symptoms  Outcome: Progressing     Problem: Knowledge Deficit  Goal: Patient/family/caregiver demonstrates understanding of disease process, treatment plan, medications, and discharge instructions  Description: Complete learning assessment and assess knowledge base    Interventions:  - Provide teaching at level of understanding  - Provide teaching via preferred learning methods  Outcome: Progressing

## 2021-05-28 NOTE — ASSESSMENT & PLAN NOTE
History of schizoaffective disorder  Mood currently stable  Managed on Fluroxamine 100 mg BID and Cariprazine 6 mg daily (non-formulary) as an outpatient  Follows with outpatient psychiatry but has not been evaluated by them in the last 6 months  Plan:  5/28: Patient's varying symptom complaints seem unrelated to an organic issue and more so illness anxiety vs psychosomatic disorder  Psychiatry was consulted and will be seeing the patient this afternoon for further recommendations on medications  Patient will need to follow-up with psych outpatient as well  For now is on Fluvoxamine 100 mg BID  No active SI/HI, depression, or hallucinations  Is a bit anxious  -will obtain baseline EKG in anticipation of starting many new medications, in addition to psych meds    5/29: continue with psych recommendations of Fluvoxamine 100 mg bid and Latuda 20 mg HS- he should be discharged with a 30 day supply of each  Dr Dejah Prater psychiatrist was filling out the prior authorization for the [facility]  Will need to f/u with psychiatrist outpatient  CM gave patient list of psychiatrists to contact    -while inpatient can receive ativan PRN for anxiety  Psych recommends not d/c with this      Case management following for prior auth for Latuda - appreciate assistance  · Medication approved - [facility] and patient's [facility] ordering more medication to supply patient, will discuss with patient where he would like Rx filled based on availability

## 2021-05-28 NOTE — ASSESSMENT & PLAN NOTE
BP on admission 199/91  Patient complaining of abdominal pain with nausea and SOB on admission but otherwise denying headache or visual changes  Patient has had repeated admissions with elevated BP and does admit to compliance with home blood pressure regimen of amlodipine 10 mg daily, metoprolol succinate 50 mg daily, and losartan 100 mg daily  Patient has underwent secondary HTN workup which has been unremarkable thus far; however, patient has not had renal artery dopplers completed to rule out renal artery stenosis  Plan:  · Medications:  · Continue verapamil  mg daily for combo of BP and migraine control  · Continue lisinopril 20 mg daily for renal-protective and BP purposes  · Continue Coreg 12 5 mg b i d   · Continue Cardura 2mg BID  · Labetalol 20 mg q6 hours PRN for SBP >160 (first line)  · Hydralazine 20 mg q6 hours PRN for SBP >160 (second line)  ·  renal artery dopplers without pathology   CT abd/pelvis without adrenal masses  · Nephrology consulted - appreciate recommendations

## 2021-05-28 NOTE — ASSESSMENT & PLAN NOTE
Patient with history of migraines and currently follows with neurology as an outpatient; last evaluated 10/2020  Currently managed as an outpatient on Aimovig 140 mg injection every 30 days       Plan:  -long history of migraines, seen by neurology outpatient    -continue Verapamil  mg for combo of migraine prophylaxis and blood pressure control  -on discharge recommend starting B2 riboflavin 400 mg daily, in addition to 200 mg magnesium daily  -consider discharge with abortive medication Imitrex  · Tylenol 650 q6 hours PRN  · Outpatient neurology follow up

## 2021-05-28 NOTE — H&P
INTERNAL MEDICINE RESIDENCY ADMISSION H&P     Name: Lainey Knight   Age & Sex: 35 y o  male   MRN: [id number]  Unit/Bed#: PPHP 0-36   Encounter: [id number]  Primary Care Provider: Krysta Mccann MD    Code Status: Level 1 - Full Code  Admission Status: OBSERVATION  Disposition: Patient requires Med/Surg    Admit to team: SOD Team C     ASSESSMENT/PLAN     Principal Problem:    Hypertensive urgency  Active Problems:    Type 2 diabetes mellitus, with long-term current use of insulin ([facility])    Schizoaffective disorder ([facility] [address] )    Acquired hypothyroidism    Epigastric pain    Migraine without aura and without status migrainosus, not intractable    Acute on chronic kidney failure ([facility])    GERD (gastroesophageal reflux disease)      * Hypertensive urgency  Assessment & Plan  BP on admission 199/91  Patient complaining of abdominal pain with nausea and SOB on admission but otherwise denying headache or visual changes  Patient has had repeated admissions with elevated BP and does admit to compliance with home blood pressure regimen of amlodipine 10 mg daily, metoprolol succinate 50 mg daily, and losartan 100 mg daily  Patient has underwent secondary HTN workup which has been unremarkable thus far; however, patient has not had renal artery dopplers completed to rule out renal artery stenosis  Plan:  · Medications:  · Amlodipine 10 mg daily  · Metoprolol succinate 50 mg daily  · Losartan 100 mg daily HELD 2/2 STEFANIA  · Labetalol 10 mg q6 hours PRN for SBP >160 (first line)  · Hydralazine 10 mg q6 hours PRN for SBP >160 (second line)  · Follow up renal artery dopplers (NPO in the morning per vascular lab)    GERD (gastroesophageal reflux disease)  Assessment & Plan  History of GERD  On protonix 40 mg BID as an outpatient       Plan:  · Protonix 40 mg BID    Acute on chronic kidney failure [facility])  Assessment & Plan  Lab Results   Component Value Date    EGFR 40 05/27/2021    EGFR 46 02/23/2021    EGFR 53 02/13/2021 CREATININE 1 99 (H) 05/27/2021    CREATININE 1 78 (H) 02/23/2021    CREATININE 1 59 (H) 02/13/2021     Patient presenting with a creatinine of 1 99  Baseline appears to be 1 3-1 5  Likely prerenal due to poor PO intake 2/2 nausea as well as hypertension  Will hold ARB in setting of STEFANIA and treated with IVF hydration  Plan:  · Isolyte 100 cc/hr  · Encourage increased PO intake  · Losartan 100 mg daily on HOLD  · Avoid nephrotoxic agents including NSAIDs and contrast dyes  · Avoid episodes of hypotension    Migraine without aura and without status migrainosus, not intractable  Assessment & Plan  Patient with history of migraines and currently follows with neurology as an outpatient; last evaluated 10/2020  Currently managed as an outpatient on Aimovig 140 mg injection every 30 days  Plan:  · Tylenol 650 q6 hours PRN  · Compazine 10 mg q6 hours PRN]  · Outpatient neurology follow up     Epigastric pain  Assessment & Plan  Patient with a history of GERD and gastritis  Most recent EGD completed on 3/11/2021 demonstrating mild gastritis  Currently managed as an outpatient on protonix 40 mg BID and carafate 1g daily  Plan:  · Protonix 40 mg BID  · Caradate 1g daily    Acquired hypothyroidism  Assessment & Plan  History of hypothyroidism; currently on levothyroxine 100 mcg as an outpatient  Most recent TSH 9 980 11/2020  Plan:  · Follow up repeat TSH  · Levothyroxine 100 mcg    Schizoaffective disorder (HCC)  Assessment & Plan  History of schizoaffective disorder  Mood currently stable  Managed on Fluroxamine 100 mg BID and Cariprazine 6 mg daily (non-formulary) as an outpatient  Follows with outpatient psychiatry but has not been evaluated by them in the last 6 months       Plan:  · Fluvoxamine 100 mg BID  · Can consider psychiatry consultation in the morning  · Continue outpatient psych follow up at time of discharge    Type 2 diabetes mellitus, with long-term current use of insulin ([facility] [address] )  Assessment & Plan  Lab Results   Component Value Date    HGBA1C 10 5 (H) 11/22/2020     Patient with PMHx of insulin dependant DM  Most recent hemoglobin A1c 10 5 on 11/2020  Currently managed as an outpatient on Basaglar 50U qHS and lispro 10U TID  An an outpatient, patient states that he checks his BG 2x daily and obtains readings of 200-300  Plan:  · Continue POC glucose checks; BG goal of 140-180  · Insulin Regimen:  · Lantus 40U qHS  · Humalog 10U TID with meals  · SSI Algorithm 4 - TID with meals  · Follow up repeat hemoglobin A1c  · Diabetic, carbohydrate restricted diet        VTE Pharmacologic Prophylaxis: Heparin  VTE Mechanical Prophylaxis: sequential compression device    CHIEF COMPLAINT     Chief Complaint   Patient presents with    Epigastric Pain     Epigastric pain relieved with burping   Shortness of Breath     Relieved with nasal cannula in nose, even with oxygen turned off  HISTORY OF PRESENT ILLNESS     Mr Javed Stanton is a 35year old male with PMHx of HTN, schizoaffective disorder, OCD, diabetes mellitus type II, migraines, and gastritis who presents to [facility] ED on 05/27/2021 with a complaint of epigastric pain and shortness of breath  Patient presents with the complaint of epigastric pain that has been ongoing for the last 3-4 days  Patient describes the pain as an intermittent dull, burning pain located primarily in the epigastrium that worsens with eating and with anxiety  Pain is relieved with Carafate  Associated symptoms includes nausea with episodes of NBNB vomiting and SOB  He denies hematemesis, melena, blood per rectum, CP, or palpitations  He also complains of inability to obtain his psych medications and explains that he is "in between psychiatrists" and wishes to "sign a 201 so he can get his psych medications " At this time, the patient denies any suicidal or homicidal ideations  On arrival to the ED, the patient was afebrile and hemodynamically stable    Initial vital signs were as follows:  HR 86, /91 RR 18, and SpO2 96% on room air  Labs significant for a creatinine of 1 99 (baseline 1 3-1 5) and blood glucose of 317  LFTs within normal limits and troponin negative  Chest x-ray was completed and demonstrated no acute cardiopulmonary disease  EKG with NSR and normal QTC  Patient will be admitted to the general medicine service for management of hypertensive urgency and psychiatry consultation  Per discussions with the patient, patient will remain a LEVEL 1 - FULL CODE  REVIEW OF SYSTEMS     Review of Systems   Constitutional: Negative for activity change, appetite change, chills, diaphoresis, fatigue and fever  HENT: Negative for sore throat and trouble swallowing  Eyes: Negative for visual disturbance  Respiratory: Positive for shortness of breath  Negative for cough and wheezing  Cardiovascular: Negative for chest pain, palpitations and leg swelling  Gastrointestinal: Positive for abdominal pain, nausea and vomiting  Negative for abdominal distention, constipation and diarrhea  Endocrine: Negative for polyuria  Genitourinary: Negative for difficulty urinating and flank pain  Musculoskeletal: Negative for back pain and gait problem  Skin: Negative for color change and pallor  Neurological: Positive for headaches  Negative for dizziness, weakness and light-headedness  Psychiatric/Behavioral: Negative for agitation and confusion  OBJECTIVE     Vitals:    21 0252 21 0253 21 0352 21 0429   BP: (!) 170/106 (!) 170/106 (!) 171/102 149/78   Pulse: 84 83  90   Resp:       Temp:       TempSrc:       SpO2: 92% 94%  92%      Temperature:   Temp (24hrs), Av 9 °F (36 6 °C), Min:97 6 °F (36 4 °C), Max:98 1 °F (36 7 °C)    Temperature: 98 1 °F (36 7 °C)  Intake & Output:  I/O     None        Weights: There is no height or weight on file to calculate BMI    Weight (last 2 days)     None        Physical Exam  Constitutional:       Appearance: Normal appearance  He is not ill-appearing  HENT:      Head: Normocephalic and atraumatic  Nose: Nose normal  No congestion  Mouth/Throat:      Mouth: Mucous membranes are moist       Pharynx: Oropharynx is clear  No oropharyngeal exudate  Eyes:      General: No scleral icterus  Conjunctiva/sclera: Conjunctivae normal    Neck:      Musculoskeletal: Normal range of motion  No neck rigidity  Cardiovascular:      Rate and Rhythm: Normal rate and regular rhythm  Pulses: Normal pulses  Heart sounds: Normal heart sounds  No murmur  Pulmonary:      Effort: Pulmonary effort is normal       Breath sounds: No wheezing  Abdominal:      General: Abdomen is flat  Bowel sounds are normal  There is no distension  Tenderness: There is abdominal tenderness  There is no guarding or rebound  Comments: Soft and nondistended abdomen with normoactive bowel sounds  Tenderness to palpation of the epigastric area that is easily distractible  Negative neil's sign with no rebound or guarding  Musculoskeletal:      Right lower leg: No edema  Left lower leg: No edema  Skin:     General: Skin is dry  Capillary Refill: Capillary refill takes less than 2 seconds  Neurological:      Mental Status: He is alert and oriented to person, place, and time  Motor: No weakness     Psychiatric:         Mood and Affect: Mood normal          Behavior: Behavior normal        PAST MEDICAL HISTORY     Past Medical History:   Diagnosis Date    Acute bronchitis due to other specified organisms 7/5/2019    Chronic headaches     Diabetes mellitus ([facility] [address] )     Disease of thyroid gland     Esophagitis     Gastritis     Gastroparesis     GERD (gastroesophageal reflux disease)     Hypertension     Migraine     Migraines 03/11/2021    Obesity     Obsessive compulsive disorder     Psychiatric disorder     Schizoaffective disorder ([facility] [address] )      PAST SURGICAL HISTORY     Past Surgical History:   Procedure Laterality Date    ESOPHAGOGASTRODUODENOSCOPY N/A 1/15/2019    Procedure: ESOPHAGOGASTRODUODENOSCOPY (EGD); Surgeon: Aviva Domingo MD;  Location: [facility]; Service: Gastroenterology     SOCIAL & FAMILY HISTORY     Social History     Substance and Sexual Activity   Alcohol Use Not Currently     Substance and Sexual Activity   Alcohol Use Not Currently        Substance and Sexual Activity   Drug Use Not Currently     Social History     Tobacco Use   Smoking Status Never Smoker   Smokeless Tobacco Never Used     Family History   Problem Relation Age of Onset    COPD Mother     Hypertension Mother     Heart failure Mother     Rheum arthritis Family     Heart disease Family     Hypertension Father     Diabetes Father     Hyperlipidemia Father      LABORATORY DATA     Labs: I have personally reviewed pertinent reports  Results from last 7 days   Lab Units 05/27/21  1746   WBC Thousand/uL 9 10   HEMOGLOBIN g/dL 12 6   HEMATOCRIT % 37 5   PLATELETS Thousands/uL 359   NEUTROS PCT % 74   MONOS PCT % 6      Results from last 7 days   Lab Units 05/27/21  1746   POTASSIUM mmol/L 4 6   CHLORIDE mmol/L 103   CO2 mmol/L 26   BUN mg/dL 37*   CREATININE mg/dL 1 99*   CALCIUM mg/dL 9 4   ALK PHOS U/L 59   ALT U/L 18   AST U/L 14                      Results from last 7 days   Lab Units 05/27/21  1746   TROPONIN I ng/mL <0 02     Micro:  Lab Results   Component Value Date    BLOODCX No Growth After 5 Days  11/21/2020    BLOODCX No Growth After 5 Days  11/21/2020    BLOODCX No Growth After 5 Days  08/31/2018    URINECX (A) 08/29/2019     30,000-39,000 cfu/ml Beta Hemolytic Streptococcus Group B     IMAGING & DIAGNOSTIC TESTS     Imaging: I have personally reviewed pertinent reports  Xr Chest 2 Views    Result Date: 5/27/2021  Impression: No acute cardiopulmonary disease   Workstation performed: [id number]     EKG, Pathology, and Other Studies: I have personally reviewed pertinent reports  ALLERGIES     Allergies   Allergen Reactions    Augmentin [Amoxicillin-Pot Clavulanate]     Amoxicillin Diarrhea    Clavulanic Acid Diarrhea    Erythromycin Diarrhea     MEDICATIONS PRIOR TO ARRIVAL     Prior to Admission medications    Medication Sig Start Date End Date Taking? Authorizing Provider   ACCU-CHEK FASTCLIX LANCETS MISC 4 (four) times a day Not checking 4 times a day   Patient stated maybe 2 times a day 5/8/19  Yes Historical Provider, MD   ACCU-CHEGARRY GUIDE test strip 4 (four) times a day Test 5/8/19  Yes Historical Provider, MD Tatum SoloStar 100 units/mL injection pen Inject 10 Units under the skin 3 (three) times a day with meals 1/12/21  Yes DOREEN Yates   amLODIPine (NORVASC) 5 mg tablet Take 1 tablet (5 mg total) by mouth daily 2/14/21  Yes Anam Neal MD   famotidine (PEPCID) 20 mg tablet Take 2 tablets (40 mg total) by mouth every evening 2/5/21  Yes Jarrod Stewart PA-C   fluvoxaMINE (LUVOX) 100 mg tablet Take 200 mg by mouth daily at bedtime   Yes Historical Provider, MD   Galcanezumab-gnlm 120 MG/ML SOAJ Inject 120 mg under the skin every 30 (thirty) days 11/4/20  Yes Sriram Worthington PA-C   insulin glargine (Basaglar KwikPen) 100 units/mL injection pen Inject 50 Units under the skin daily at bedtime  Patient taking differently: Inject 40 Units under the skin daily at bedtime  1/12/21  Yes DOREEN Yates   Insulin Pen Needle (Pen Needles 3/16") 31G X 5 MM MISC Use 4 (four) times a day 1/12/21  Yes DOREEN Yates   levothyroxine 100 mcg tablet Take 1 tablet (100 mcg total) by mouth daily 1/12/21  Yes DOREEN Yates   losartan (COZAAR) 50 mg tablet Take 2 tablets (100 mg total) by mouth daily 1/12/21  Yes DOREEN Yates   metoprolol succinate (TOPROL-XL) 50 mg 24 hr tablet Take 1 tablet (50 mg total) by mouth daily 2/14/21  Yes Anam Neal MD   pantoprazole (PROTONIX) 40 mg tablet Take 1 tablet (40 mg total) by mouth 2 (two) times a day 1/12/21  Yes DOREEN Yates   prochlorperazine (COMPAZINE) 10 mg tablet Take 1 tablet (10 mg total) by mouth every 6 (six) hours as needed (migraine) 1/12/21  Yes DOREEN Medel   sucralfate (CARAFATE) 1 g/10 mL suspension Take 10 mL (1 g total) by mouth 4 (four) times a day 3/11/21  Yes Beth Edouard MD   albuterol (PROVENTIL HFA,VENTOLIN HFA) 90 mcg/act inhaler Inhale 2 puffs 4 (four) times a day 1/12/21   DOREEN Flores   atorvastatin (LIPITOR) 20 mg tablet Take 1 tablet (20 mg total) by mouth daily  Patient not taking: Reported on 5/27/2021 1/12/21   DOREEN Flores   Blood Glucose Monitoring Suppl (ACCU-CHEK GUIDE) w/Device KIT Checking 2 times a day 5/9/19   Historical Provider, MD   cyclobenzaprine (FLEXERIL) 5 mg tablet Take 1 tablet (5 mg total) by mouth daily at bedtime  Patient not taking: Reported on 5/27/2021 1/12/21   DOREEN Medel   ergocalciferol (VITAMIN D2) 50,000 units Take 1 capsule (50,000 Units total) by mouth once a week  Patient not taking: Reported on 5/27/2021 1/12/21   DOREEN Medel   Lidocaine Viscous HCl (XYLOCAINE) 2 % mucosal solution Swish and swallow 15 mL 4 (four) times a day as needed for mouth pain or discomfort  Patient not taking: Reported on 5/27/2021 2/5/21   Choco Moreno PA-C   metoclopramide (REGLAN) 10 mg tablet Take 1 tablet (10 mg total) by mouth every 6 (six) hours  Patient not taking: Reported on 5/27/2021 1/12/21   DOREEN Medel   OLANZapine (ZyPREXA) 2 5 mg tablet Take 1 tablet (2 5 mg total) by mouth daily at bedtime  Patient not taking: Reported on 5/27/2021 1/12/21   DOREEN Medel   ondansetron (ZOFRAN) 4 mg tablet Take 1 tablet (4 mg total) by mouth every 8 (eight) hours as needed for nausea or vomiting  Patient not taking: Reported on 5/27/2021 1/12/21   DOREEN Flores     MEDICATIONS ADMINISTERED IN LAST 24 HOURS     Medication Administration - last 24 hours from 05/27/2021 0510 to 05/28/2021 0510       Date/Time Order Dose Route Action Action by     05/27/2021 1746 Lidocaine Viscous HCl (XYLOCAINE) 2 % mucosal solution 15 mL 15 mL Swish & Spit Given Uzma Teixeira RN     05/27/2021 1746 aluminum-magnesium hydroxide-simethicone (MYLANTA) oral suspension 30 mL 30 mL Oral Given Uzma Teixeira RN     05/27/2021 1905 sucralfate (CARAFATE) tablet 1 g 1 g Oral Given Willy Gamboa RN     05/27/2021 1952 amLODIPine (NORVASC) tablet 5 mg 5 mg Oral Given Willy Gamboa RN     05/27/2021 2202 heparin (porcine) subcutaneous injection 5,000 Units 5,000 Units Subcutaneous Given Thania Villalobos RN     05/28/2021 0046 multi-electrolyte (PLASMALYTE-A/ISOLYTE-S PH 7 4) IV solution 100 mL/hr Intravenous New Bag [place]     05/27/2021 2149 multi-electrolyte (PLASMALYTE-A/ISOLYTE-S PH 7 4) IV solution 100 mL/hr Intravenous [facility]     05/27/2021 2202 insulin glargine (LANTUS) subcutaneous injection 40 Units 0 4 mL 40 Units Subcutaneous Given Thania Villalobos RN     05/27/2021 2324 albuterol (PROVENTIL HFA,VENTOLIN HFA) inhaler 2 puff 2 puff Inhalation Given Thania Villalobos RN     05/27/2021 2325 fluvoxaMINE (LUVOX) tablet 100 mg 100 mg Oral Given Thania Villalobos RN     05/28/2021 0250 ondansetron (ZOFRAN-ODT) dispersible tablet 4 mg 4 mg Oral Given [place]     05/28/2021 0250 prochlorperazine (COMPAZINE) tablet 10 mg 10 mg Oral Given [place]     05/27/2021 2202 acetaminophen (TYLENOL) tablet 650 mg 650 mg Oral Given Thania Villalobos RN     05/27/2021 2327 sucralfate (CARAFATE) tablet 1 g 1 g Oral Given Thania Villalobos RN     05/27/2021 2327 metoprolol succinate (TOPROL-XL) 24 hr tablet 50 mg 50 mg Oral Given Thania Villalobos RN     05/27/2021 2228 amLODIPine (NORVASC) tablet 5 mg 5 mg Oral Given Willy Gamboa RN     05/28/2021 3391 Labetalol HCl (NORMODYNE) injection 10 mg 10 mg Intravenous Not Given Juanis Reynoso     05/27/2021 2228 Labetalol HCl (NORMODYNE) injection 10 mg 10 mg Intravenous Given Justine Gao RN     05/27/2021 2345 insulin lispro (HumaLOG) 100 units/mL subcutaneous injection 2-12 Units 4 Units Subcutaneous Given Casandra Barlow RN     05/28/2021 0302 hydrALAZINE (APRESOLINE) injection 10 mg 10 mg Intravenous Given Juanis Reynoso        CURRENT MEDICATIONS     Current Facility-Administered Medications   Medication Dose Route Frequency Provider Last Rate    acetaminophen  650 mg Oral Q6H PRN Marjorie Marilyn, DO      albuterol  2 puff Inhalation 4x Daily Jerri Shupp, DO      amLODIPine  10 mg Oral Daily Jerri Shupp, DO      atorvastatin  20 mg Oral Daily Jerri Shupp, DO      fluvoxaMINE  100 mg Oral BID Jerri Shupp, DO      heparin (porcine)  5,000 Units Subcutaneous Q8H [address] Jerri Shupp, DO      hydrALAZINE  10 mg Intravenous Q6H PRN Jerri Shupp, DO      insulin glargine  40 Units Subcutaneous HS Jerri Shupp, DO      insulin lispro  10 Units Subcutaneous TID With Meals Jerri Shupp, DO      insulin lispro  2-12 Units Subcutaneous TID AC Marjorie Sanders, DO      Labetalol HCl  10 mg Intravenous Q6H PRN Jerri Shupp, DO      levothyroxine  100 mcg Oral Early Morning Jerri Shupp, DO      Lidocaine Viscous HCl  15 mL Swish & Swallow 4x Daily PRN Jerri Shupp, DO      metoprolol succinate  50 mg Oral Daily Jerri Shupp, DO      multi-electrolyte  100 mL/hr Intravenous Continuous Marjorie Marilyn,  mL/hr (05/28/21 0046)    ondansetron  4 mg Oral Q6H PRN Jerri Shupp, DO      pantoprazole  40 mg Oral BID AC Jerri Shupp, DO      prochlorperazine  10 mg Oral Q6H PRN Jerri Shupp, DO      sucralfate  1 g Oral 4x Daily (AC & HS) Jerri Shupp, DO       multi-electrolyte, 100 mL/hr, Last Rate: 100 mL/hr (05/28/21 0046)      acetaminophen, 650 mg, Q6H PRN  hydrALAZINE, 10 mg, Q6H PRN  Labetalol HCl, 10 mg, Q6H PRN  Lidocaine Viscous HCl, 15 mL, 4x Daily PRN  ondansetron, 4 mg, Q6H PRN  prochlorperazine, 10 mg, Q6H PRN        Admission Time  I spent 30 minutes admitting the patient  This involved direct patient contact where I performed a full history and physical, reviewing previous records, and reviewing laboratory and other diagnostic studies  Portions of the record may have been created with voice recognition software  Occasional wrong word or "sound a like" substitutions may have occurred due to the inherent limitations of voice recognition software    Read the chart carefully and recognize, using context, where substitutions have occurred     ==  Jerri Abdalla, [facility]  Internal Medicine Residency PGY-1

## 2021-05-28 NOTE — CASE MANAGEMENT
LOS 1  OBS    Met with patient, father and future Mother in law, Shaneka Ortiz to discuss CM role and DCP  Pt lives w/ parents in 2 story home with 2 KASANDRA and 12 steps to bedroom  Bathroom on both floors  Independent ADLs pta  No DME needed for ambulation  Disabled secondary to Mental health illness  Does not drive  Family drives to appointments  No DME  Pharmacy is [facility] in [facility]  No prior homecare or inpatient rehab in past  Denies h/o substance abuse treatment  Pt has h/o schizoaffective d/o and paranoia  Pt does not have psychiatrist presently and current PCP, Sana Aguilar will  Not write prescriptions for psyche medications  Sandra Bosworth, patient's future step mother is the contact on behalf of patient  This CM provided infolink card to f/u to obtain new PCP  Number also documented in f/u providers on AVS  Also provided with list of psychiatrists who accept [facility] (printed from their website base on patient's zip code) as well as oupatient mental health providers in [facility] list  She is aware that physician must be an [employer] provider  Dr Oscar Moreno psychiatrist saw patent today on consult and started him on Latuda  He sent prescription electronically to patient's pharmacy  This CM called for pricing and was informed that medication requires a prior authorization  [phone]  This CM called for prior auth to insurance and they faxed form that was completed by Dr Jackie Vo and faxed back to insurance  Prior authorization takes atleast 24 hours and insurance company will fax back approval    CM covering over w/e will f/u and when prior auth is received and then call pharmacy for pricing    Pt and parents are aware that prior authorization for new medication is required  CM reviewed d/c planning process including the following: identifying help at home, patient preference for d/c planning needs, Discharge Lounge, [facility] Meds to Bed program, availability of treatment team to discuss questions or concerns patient and/or family may have regarding understanding medications and recognizing signs and symptoms once discharged  CM also encouraged patient to follow up with all recommended appointments after discharge  Patient advised of importance for patient and family to participate in managing patients medical well being

## 2021-05-28 NOTE — PROGRESS NOTES
INTERNAL MEDICINE RESIDENCY SENIOR ADMISSION NOTE     Name: Umang Benedict   Age & Sex: 35 y o  male   MRN: [id number]  Unit/Bed#: ED 03   Encounter: [id number]  Primary Care Provider: Henrene Libman, MD    Admit to team: SOD Team C     Patient seen and examined  Reviewed H&P per Dr Sierra Moraes   Agree with the assessment and plan with any exception/addition as noted below:    Principal Problem:    Hypertensive urgency  Active Problems:    Type 2 diabetes mellitus, with long-term current use of insulin (HCC)    Abdominal pain    Obsessive compulsive disorder    Schizoaffective disorder (HCC)    Acquired hypothyroidism    Gastroesophageal reflux disease with esophagitis    Stage 3a chronic kidney disease ([facility] [address] )     39-year-old male with past medical history type 2 diabetes hemoglobin A1c 10%, OCD, schizoaffective disorder, anxiety, GERD, CKD stage 3, history of uncontrolled hypertension who presents to the ED today with 3 day history of epigastric pain and requesting refill for antipsychotics  Unfortunately patient is a poor historian, inconsistent with subjective history  Initially stated he has not taken his antipsychotic for six-month secondary to intractable migraine  Also stop taking his blood pressure medication 3 days ago  But then later reportedly stated he has been taking his antipsychotics  Denies SI/HI  Patient lives with his father and stepmother, states he makes his own medical decisions but per chart review does appear that patient has a legal guardian who is his stepmother Harry Kumar  Will need to confirm the morning  Labs relatively unremarkable except for creatinine elevated at 1 99, baseline appears to be 1 3-1 5  Initial vitals included hypertensive urgency with blood pressure 199/100  Denies chest pain or palpitations  Appears Very anxious  EKG normal sinus rhythm, QTC within normal limits  Chest x-ray revealed no acute cardiopulmonary disease      Patient was admitted for hypertensive urgency as well as STEFANIA  STEFANIA likely prerenal in setting of poor p o  Intake with baseline CKD in setting of uncontrolled type 2 diabetes and hypertension  · Restart home antihypertensive regimen  Hold Arb  Will increase amlodipine to 10 mg q d   P r n  Labetalol for SBP greater than 160  Consider initiation of Cardene drip if blood pressure remains persistently elevated  · Secondary hypertension workup previously initiated  Did not complete inpatient renal artery Dopplers last admission, will order  Follow-up on TSH  · Continue with gentle IV fluids  · Monitor creatinine with a m  BMP  · Will need very close outpatient follow-up with Psychiatry to adjust antipsychotic/refills    · Further management as highlighted in H&P      Code Status: Level 1 - Full Code  Admission Status: OBSERVATION  Disposition: Patient requires Med/Surg  Expected Length of Stay: < 2days       Martín Matos DO  Internal Medicine- PGY2

## 2021-05-28 NOTE — ASSESSMENT & PLAN NOTE
Patient with a history of GERD and gastritis  Most recent EGD completed on 3/11/2021 demonstrating mild gastritis  Currently managed as an outpatient on protonix 40 mg BID and carafate 1g daily      Plan:  · Continue Protonix, pepcid, and carafate  · zofran PRN  · Carb-controlled diet and tighter glucose control  · Right upper quadrant ultrasound showed hepatomegaly

## 2021-05-28 NOTE — ASSESSMENT & PLAN NOTE
History of hypothyroidism; currently on levothyroxine 100 mcg as an outpatient  Most recent TSH 9 980 11/2020  Plan:  · 5/28: TSH elevated, free T4 low  Patient has not taken his home dose levo 100 mcg in at least a month, and his labs reflect this  Restarted patient on home dose levo 100 mcg     · Recommend Outpatient repeat TSH in 4 weeks outpatient
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
## 2021-05-28 NOTE — ASSESSMENT & PLAN NOTE
Lab Results   Component Value Date    HGBA1C 9 8 (H) 05/29/2021     (P) 057 [id number]     Patient with PMHx of insulin dependent DM  Most recent hemoglobin A1c 10 5 on 11/2020  Currently managed as an outpatient on Basaglar 40U qHS and lispro 10U TID  An an outpatient, patient states that he checks his BG 2x daily and obtains readings of 200-300       Plan:  · Continue POC glucose checks; BG goal of 140-180  · Insulin Regimen:  · Lantus 30U qHS  · Lispro 5 units TID With meals  · SSI with meals  · Diabetic, carbohydrate restricted diet

## 2021-05-28 NOTE — MALNUTRITION/BMI
This medical record reflects one or more clinical indicators suggestive of  morbid obesity  BMI Findings:  Adult BMI Classifications: Morbid Obesity 45-49 9     Body mass index is 46 01 kg/m²  See Nutrition note dated 05/28/2021   for additional details  Completed nutrition assessment is viewable in the nutrition documentation

## 2021-05-29 LAB
ANION GAP SERPL CALCULATED.3IONS-SCNC: 6 MMOL/L (ref 4–13)
BUN SERPL-MCNC: 21 MG/DL (ref 5–25)
CALCIUM SERPL-MCNC: 8.8 MG/DL (ref 8.3–10.1)
CHLORIDE SERPL-SCNC: 111 MMOL/L (ref 100–108)
CHOLEST SERPL-MCNC: 203 MG/DL (ref 50–200)
CO2 SERPL-SCNC: 26 MMOL/L (ref 21–32)
CREAT SERPL-MCNC: 1.77 MG/DL (ref 0.6–1.3)
ERYTHROCYTE [DISTWIDTH] IN BLOOD BY AUTOMATED COUNT: 13.3 % (ref 11.6–15.1)
EST. AVERAGE GLUCOSE BLD GHB EST-MCNC: 235 MG/DL
GFR SERPL CREATININE-BSD FRML MDRD: 46 ML/MIN/1.73SQ M
GLUCOSE SERPL-MCNC: 109 MG/DL (ref 65–140)
GLUCOSE SERPL-MCNC: 118 MG/DL (ref 65–140)
GLUCOSE SERPL-MCNC: 119 MG/DL (ref 65–140)
GLUCOSE SERPL-MCNC: 129 MG/DL (ref 65–140)
GLUCOSE SERPL-MCNC: 134 MG/DL (ref 65–140)
GLUCOSE SERPL-MCNC: 170 MG/DL (ref 65–140)
GLUCOSE SERPL-MCNC: 250 MG/DL (ref 65–140)
GLUCOSE SERPL-MCNC: 63 MG/DL (ref 65–140)
GLUCOSE SERPL-MCNC: 67 MG/DL (ref 65–140)
HBA1C MFR BLD: 9.8 %
HCT VFR BLD AUTO: 35.9 % (ref 36.5–49.3)
HDLC SERPL-MCNC: 48 MG/DL
HGB BLD-MCNC: 11.7 G/DL (ref 12–17)
LDLC SERPL CALC-MCNC: 127 MG/DL (ref 0–100)
MCH RBC QN AUTO: 27.8 PG (ref 26.8–34.3)
MCHC RBC AUTO-ENTMCNC: 32.6 G/DL (ref 31.4–37.4)
MCV RBC AUTO: 85 FL (ref 82–98)
NONHDLC SERPL-MCNC: 155 MG/DL
PLATELET # BLD AUTO: 333 THOUSANDS/UL (ref 149–390)
PMV BLD AUTO: 9.2 FL (ref 8.9–12.7)
POTASSIUM SERPL-SCNC: 4 MMOL/L (ref 3.5–5.3)
RBC # BLD AUTO: 4.21 MILLION/UL (ref 3.88–5.62)
SODIUM SERPL-SCNC: 143 MMOL/L (ref 136–145)
TRIGL SERPL-MCNC: 139 MG/DL
WBC # BLD AUTO: 8.55 THOUSAND/UL (ref 4.31–10.16)

## 2021-05-29 PROCEDURE — 82948 REAGENT STRIP/BLOOD GLUCOSE: CPT

## 2021-05-29 PROCEDURE — 80061 LIPID PANEL: CPT | Performed by: PSYCHIATRY & NEUROLOGY

## 2021-05-29 PROCEDURE — 83036 HEMOGLOBIN GLYCOSYLATED A1C: CPT | Performed by: PSYCHIATRY & NEUROLOGY

## 2021-05-29 PROCEDURE — 85027 COMPLETE CBC AUTOMATED: CPT | Performed by: STUDENT IN AN ORGANIZED HEALTH CARE EDUCATION/TRAINING PROGRAM

## 2021-05-29 PROCEDURE — 80048 BASIC METABOLIC PNL TOTAL CA: CPT | Performed by: STUDENT IN AN ORGANIZED HEALTH CARE EDUCATION/TRAINING PROGRAM

## 2021-05-29 PROCEDURE — 99233 SBSQ HOSP IP/OBS HIGH 50: CPT | Performed by: INTERNAL MEDICINE

## 2021-05-29 RX ORDER — LORAZEPAM 1 MG/1
1 TABLET ORAL ONCE AS NEEDED
Status: DISCONTINUED | OUTPATIENT
Start: 2021-05-29 | End: 2021-06-02

## 2021-05-29 RX ORDER — CARVEDILOL 6.25 MG/1
6.25 TABLET ORAL 2 TIMES DAILY WITH MEALS
Status: DISCONTINUED | OUTPATIENT
Start: 2021-05-29 | End: 2021-05-30

## 2021-05-29 RX ORDER — SODIUM CHLORIDE, SODIUM GLUCONATE, SODIUM ACETATE, POTASSIUM CHLORIDE, MAGNESIUM CHLORIDE, SODIUM PHOSPHATE, DIBASIC, AND POTASSIUM PHOSPHATE .53; .5; .37; .037; .03; .012; .00082 G/100ML; G/100ML; G/100ML; G/100ML; G/100ML; G/100ML; G/100ML
100 INJECTION, SOLUTION INTRAVENOUS CONTINUOUS
Status: DISPENSED | OUTPATIENT
Start: 2021-05-29 | End: 2021-05-30

## 2021-05-29 RX ORDER — LISINOPRIL 20 MG/1
20 TABLET ORAL DAILY
Status: DISCONTINUED | OUTPATIENT
Start: 2021-05-30 | End: 2021-06-02

## 2021-05-29 RX ORDER — MELATONIN
1000 DAILY
Status: DISCONTINUED | OUTPATIENT
Start: 2021-05-29 | End: 2021-06-03 | Stop reason: HOSPADM

## 2021-05-29 RX ORDER — DEXTROSE MONOHYDRATE 25 G/50ML
INJECTION, SOLUTION INTRAVENOUS
Status: DISPENSED
Start: 2021-05-29 | End: 2021-05-30

## 2021-05-29 RX ADMIN — INSULIN LISPRO 10 UNITS: 100 INJECTION, SOLUTION INTRAVENOUS; SUBCUTANEOUS at 18:11

## 2021-05-29 RX ADMIN — SUCRALFATE 1 G: 1 TABLET ORAL at 12:29

## 2021-05-29 RX ADMIN — ALBUTEROL SULFATE 2 PUFF: 90 AEROSOL, METERED RESPIRATORY (INHALATION) at 12:33

## 2021-05-29 RX ADMIN — METOPROLOL SUCCINATE 50 MG: 50 TABLET, EXTENDED RELEASE ORAL at 09:39

## 2021-05-29 RX ADMIN — VERAPAMIL HYDROCHLORIDE 120 MG: 120 TABLET, FILM COATED, EXTENDED RELEASE ORAL at 13:46

## 2021-05-29 RX ADMIN — LURASIDONE HYDROCHLORIDE 20 MG: 20 TABLET, FILM COATED ORAL at 17:31

## 2021-05-29 RX ADMIN — ATORVASTATIN CALCIUM 20 MG: 20 TABLET, FILM COATED ORAL at 09:39

## 2021-05-29 RX ADMIN — INSULIN LISPRO 10 UNITS: 100 INJECTION, SOLUTION INTRAVENOUS; SUBCUTANEOUS at 09:38

## 2021-05-29 RX ADMIN — LORAZEPAM 1 MG: 1 TABLET ORAL at 00:17

## 2021-05-29 RX ADMIN — ACETAMINOPHEN 650 MG: 325 TABLET, FILM COATED ORAL at 13:30

## 2021-05-29 RX ADMIN — SODIUM CHLORIDE, SODIUM GLUCONATE, SODIUM ACETATE, POTASSIUM CHLORIDE, MAGNESIUM CHLORIDE, SODIUM PHOSPHATE, DIBASIC, AND POTASSIUM PHOSPHATE 100 ML/HR: .53; .5; .37; .037; .03; .012; .00082 INJECTION, SOLUTION INTRAVENOUS at 13:20

## 2021-05-29 RX ADMIN — PANTOPRAZOLE SODIUM 40 MG: 40 TABLET, DELAYED RELEASE ORAL at 17:32

## 2021-05-29 RX ADMIN — LORAZEPAM 1 MG: 1 TABLET ORAL at 19:41

## 2021-05-29 RX ADMIN — INSULIN GLARGINE 40 UNITS: 100 INJECTION, SOLUTION SUBCUTANEOUS at 21:54

## 2021-05-29 RX ADMIN — HEPARIN SODIUM 7500 UNITS: 5000 INJECTION INTRAVENOUS; SUBCUTANEOUS at 06:51

## 2021-05-29 RX ADMIN — FLUVOXAMINE MALEATE 100 MG: 50 TABLET ORAL at 13:46

## 2021-05-29 RX ADMIN — ONDANSETRON 4 MG: 4 TABLET, ORALLY DISINTEGRATING ORAL at 00:08

## 2021-05-29 RX ADMIN — Medication 4 G: at 20:21

## 2021-05-29 RX ADMIN — PANTOPRAZOLE SODIUM 40 MG: 40 TABLET, DELAYED RELEASE ORAL at 06:50

## 2021-05-29 RX ADMIN — LORAZEPAM 1 MG: 1 TABLET ORAL at 12:32

## 2021-05-29 RX ADMIN — LEVOTHYROXINE SODIUM 100 MCG: 100 TABLET ORAL at 06:50

## 2021-05-29 RX ADMIN — ACETAMINOPHEN 650 MG: 325 TABLET, FILM COATED ORAL at 19:13

## 2021-05-29 RX ADMIN — ALBUTEROL SULFATE 2 PUFF: 90 AEROSOL, METERED RESPIRATORY (INHALATION) at 17:32

## 2021-05-29 RX ADMIN — ALBUTEROL SULFATE 2 PUFF: 90 AEROSOL, METERED RESPIRATORY (INHALATION) at 09:39

## 2021-05-29 RX ADMIN — HEPARIN SODIUM 7500 UNITS: 5000 INJECTION INTRAVENOUS; SUBCUTANEOUS at 13:24

## 2021-05-29 RX ADMIN — HEPARIN SODIUM 7500 UNITS: 5000 INJECTION INTRAVENOUS; SUBCUTANEOUS at 21:55

## 2021-05-29 RX ADMIN — CARVEDILOL 6.25 MG: 6.25 TABLET, FILM COATED ORAL at 17:31

## 2021-05-29 RX ADMIN — SUCRALFATE 1 G: 1 TABLET ORAL at 07:18

## 2021-05-29 RX ADMIN — INSULIN LISPRO 2 UNITS: 100 INJECTION, SOLUTION INTRAVENOUS; SUBCUTANEOUS at 18:11

## 2021-05-29 RX ADMIN — Medication 1000 UNITS: at 13:24

## 2021-05-29 RX ADMIN — FLUVOXAMINE MALEATE 100 MG: 50 TABLET ORAL at 17:32

## 2021-05-29 NOTE — PROGRESS NOTES
INTERNAL MEDICINE RESIDENCY PROGRESS NOTE     Name: Ernesto Lion   Age & Sex: 35 y o  male   MRN: [id number]  Unit/Bed#: -01   Encounter: [id number]  Team: SOD Team C     PATIENT INFORMATION     Name: Ernesto Lion   Age & Sex: 35 y o  male   MRN: [id number]  Hospital Stay Days: 0    ASSESSMENT/PLAN     Principal Problem:    Hypertensive urgency  Active Problems:    Schizoaffective disorder ([address] )    Migraine without aura and without status migrainosus, not intractable    Type 2 diabetes mellitus, with long-term current use of insulin (HCC)    Acquired hypothyroidism    Acute on chronic kidney failure (HCC)    GERD (gastroesophageal reflux disease)    OCD (obsessive compulsive disorder)    Epigastric pain      * Hypertensive urgency  Assessment & Plan  BP on admission 199/91  Patient complaining of abdominal pain with nausea and SOB on admission but otherwise denying headache or visual changes  Patient has had repeated admissions with elevated BP and does admit to compliance with home blood pressure regimen of amlodipine 10 mg daily, metoprolol succinate 50 mg daily, and losartan 100 mg daily  Patient has underwent secondary HTN workup which has been unremarkable thus far; however, patient has not had renal artery dopplers completed to rule out renal artery stenosis  Plan:  · Medications:  · Continue verapamil  mg daily for combo of BP and migraine control  · Continue lisinopril 10 mg daily for renal-protective and BP purposes  Cr with expected increase after initiation of lisinopril  Will give gentle IVF today and then increase lisinopril to 20 mg tomorrow  · Metoprolol succinate 50 mg discontinued and changed to Coreg 6 25 mg bid  · Labetalol 10 mg q6 hours PRN for SBP >160 (first line)  · Hydralazine 10 mg q6 hours PRN for SBP >160 (second line)  ·  renal artery dopplers without pathology   CT abd/pelvis without adrenal masses    Migraine without aura and without status migrainosus, not intractable  Assessment & Plan  Patient with history of migraines and currently follows with neurology as an outpatient; last evaluated 10/2020  Currently managed as an outpatient on Aimovig 140 mg injection every 30 days  Plan:  -long history of migraines, seen by neurology outpatient    -continue Verapamil  mg for combo of migraine prophylaxis and blood pressure control  -magnesium replenished inpatient  -on discharge recommend starting B2 riboflavin 400 mg daily, in addition to 200 mg magnesium daily  -consider discharge with abortive medication Imitrex  · Tylenol 650 q6 hours PRN  · Outpatient neurology follow up     Schizoaffective disorder [facility])  Assessment & Plan  History of schizoaffective disorder  Mood currently stable  Managed on Fluroxamine 100 mg BID and Cariprazine 6 mg daily (non-formulary) as an outpatient  Follows with outpatient psychiatry but has not been evaluated by them in the last 6 months  Plan:  5/28: Patient's varying symptom complaints seem unrelated to an organic issue and more so illness anxiety vs psychosomatic disorder  Psychiatry was consulted and will be seeing the patient this afternoon for further recommendations on medications  Patient will need to follow-up with psych outpatient as well  For now is on Fluvoxamine 100 mg BID  No active SI/HI, depression, or hallucinations  Is a bit anxious  -will obtain baseline EKG in anticipation of starting many new medications, in addition to psych meds    5/29: continue with psych recommendations of Fluvoxamine 100 mg bid and Latuda 20 mg HS- he should be discharged with a 30 day supply of each  Dr Yumiko Diallo psychiatrist was filling out the prior authorization for the [facility]  Will need to f/u with psychiatrist outpatient  CM gave patient list of psychiatrists to contact    -while inpatient can receive ativan PRN for anxiety  Psych recommends not d/c with this          Type 2 diabetes mellitus, with long-term current use of insulin [facility])  Assessment & Plan  Lab Results   Component Value Date    HGBA1C 10 5 (H) 11/22/2020     Patient with PMHx of insulin dependant DM  Most recent hemoglobin A1c 10 5 on 11/2020  Currently managed as an outpatient on Basaglar 50U qHS and lispro 10U TID  An an outpatient, patient states that he checks his BG 2x daily and obtains readings of 200-300  A1c 9 1    Plan:  · Continue POC glucose checks; BG goal of 140-180  · Insulin Regimen:  · Lantus 40U qHS  · Humalog 10U TID with meals  · SSI with meals  · Diabetic, carbohydrate restricted diet      Acute on chronic kidney failure [facility])  Assessment & Plan  Lab Results   Component Value Date    EGFR 40 05/27/2021    EGFR 46 02/23/2021    EGFR 53 02/13/2021    CREATININE 1 99 (H) 05/27/2021    CREATININE 1 78 (H) 02/23/2021    CREATININE 1 59 (H) 02/13/2021     Patient presenting with a creatinine of 1 99  Baseline appears to be 1 3-1 5  Likely prerenal due to poor PO intake 2/2 nausea as well as hypertension  Will hold ARB in setting of STEFANIA and treated with IVF hydration  Plan:  · STEFANIA improved back to baseline after IVF hydration on admission with isolyte, but then Creatinine elevated <30% as expected with initiation of lisinopril for hypertension/DM  Will give gentle IVF again today and then anticipated increasing lisinopril from 10 to 20 mg tomorrow for better BP control  Strong family history of essential HTN as well  · Encourage increased PO intake      Acquired hypothyroidism  Assessment & Plan  History of hypothyroidism; currently on levothyroxine 100 mcg as an outpatient  Most recent TSH 9 980 11/2020  Plan:  · 5/28: TSH elevated, free T4 low  Patient has not taken his home dose levo 100 mcg in at least a month, and his labs reflect this  Restarted patient on home dose levo 100 mcg  · 5/29: cont levo 100 mcg qd  Recommend Outpatient repeat TSH in 4 weeks outpatient      GERD (gastroesophageal reflux disease)  Assessment & Plan  History of GERD   On protonix 40 mg BID as an outpatient  Plan:  · Protonix 40 mg BID  · pepcid PRN    Epigastric pain  Assessment & Plan  Patient with a history of GERD and gastritis  Most recent EGD completed on 3/11/2021 demonstrating mild gastritis  Currently managed as an outpatient on protonix 40 mg BID and carafate 1g daily  Plan:  -question psychosomatic as etiology  No need for GI consult at this time  · Continue Protonix 40 mg BID  · Added Pepcid- transition to PRN once nausea controlled  · Carafate and zofran PRN  · Carb-controlled diet and tighter glucose control  · S/p IVF      Disposition: anticipate d/c tomorrow  Adjusted BP meds today     SUBJECTIVE     Patient seen and examined  No acute events overnight  Patient responds well to PRN ativan for agitation  Not endorsing HA, SOB, CP, cough, n/v/d, fever/chills, hallucinations, SI/HI, depression    Patient endorsing abdominal pain, which is chronic and unchanged  Was worked up with EGD which showed mild gastritis  Continue with current scheduled PPI, famotidine and carafate PRN  OBJECTIVE     Vitals:    21 1901 21 2204 21 0255 21 0707   BP:  149/93 157/86 166/100   BP Location:       Pulse:  98  89   Resp:  17  18   Temp:  98 5 °F (36 9 °C)  98 2 °F (36 8 °C)   TempSrc:    Oral   SpO2:  92%  94%   Weight:       Height: 5' 2" (1 575 m)         Temperature:   Temp (24hrs), Av 4 °F (36 9 °C), Min:98 2 °F (36 8 °C), Max:98 6 °F (37 °C)    Temperature: 98 2 °F (36 8 °C)  Intake & Output:  I/O        07 -  0700  07 -  07 -  07    P  O   0     Total Intake(mL/kg)  0 (0)     Urine (mL/kg/hr)  1 (0)     Total Output  1     Net  -1                Weights:   IBW (Ideal Body Weight): 54 6 kg    Body mass index is 46 01 kg/m²  Weight (last 2 days)     Date/Time   Weight    21 1500   114 (251 55)            Physical Exam  Constitutional:       General: He is not in acute distress       Appearance: He is obese  He is not ill-appearing, toxic-appearing or diaphoretic  HENT:      Mouth/Throat:      Mouth: Mucous membranes are moist    Cardiovascular:      Rate and Rhythm: Normal rate and regular rhythm  Pulses: Normal pulses  Pulmonary:      Effort: Pulmonary effort is normal  No respiratory distress  Breath sounds: Normal breath sounds  No wheezing or rales  Abdominal:      General: Bowel sounds are normal  There is no distension  Palpations: Abdomen is soft  Tenderness: There is no abdominal tenderness  There is no guarding or rebound  Musculoskeletal:      Right lower leg: No edema  Left lower leg: No edema  Skin:     General: Skin is warm and dry  Neurological:      Mental Status: He is alert and oriented to person, place, and time  LABORATORY DATA     Labs: I have personally reviewed pertinent reports  Results from last 7 days   Lab Units 05/29/21  0531 05/28/21  0533 05/27/21  1746   WBC Thousand/uL 8 55 8 22 9 10   HEMOGLOBIN g/dL 11 7* 11 7* 12 6   HEMATOCRIT % 35 9* 35 0* 37 5   PLATELETS Thousands/uL 333 330  325 359   NEUTROS PCT %  --  67 74   MONOS PCT %  --  7 6      Results from last 7 days   Lab Units 05/29/21  0531 05/28/21  0533 05/27/21  1746   POTASSIUM mmol/L 4 0 3 8 4 6   CHLORIDE mmol/L 111* 107 103   CO2 mmol/L 26 27 26   BUN mg/dL 21 28* 37*   CREATININE mg/dL 1 77* 1 66* 1 99*   CALCIUM mg/dL 8 8 8 6 9 4   ALK PHOS U/L  --   --  59   ALT U/L  --   --  18   AST U/L  --   --  14     Results from last 7 days   Lab Units 05/28/21  0533   MAGNESIUM mg/dL 2 3                  Results from last 7 days   Lab Units 05/27/21  1746   TROPONIN I ng/mL <0 02       IMAGING & DIAGNOSTIC TESTING     Radiology Results: I have personally reviewed pertinent reports  Xr Chest 2 Views    Result Date: 5/27/2021  Impression: No acute cardiopulmonary disease   Workstation performed: [id number]     Other Diagnostic Testing: I have personally reviewed pertinent reports  ACTIVE MEDICATIONS     Current Facility-Administered Medications   Medication Dose Route Frequency    acetaminophen (TYLENOL) tablet 650 mg  650 mg Oral Q6H PRN    albuterol (PROVENTIL HFA,VENTOLIN HFA) inhaler 2 puff  2 puff Inhalation 4x Daily    atorvastatin (LIPITOR) tablet 20 mg  20 mg Oral Daily    carvedilol (COREG) tablet 6 25 mg  6 25 mg Oral BID With Meals    fluvoxaMINE (LUVOX) tablet 100 mg  100 mg Oral BID    heparin (porcine) subcutaneous injection 7,500 Units  7,500 Units Subcutaneous Q8H [address]    hydrALAZINE (APRESOLINE) injection 20 mg  20 mg Intravenous Q6H PRN    insulin glargine (LANTUS) subcutaneous injection 40 Units 0 4 mL  40 Units Subcutaneous HS    insulin lispro (HumaLOG) 100 units/mL subcutaneous injection 10 Units  10 Units Subcutaneous TID With Meals    insulin lispro (HumaLOG) 100 units/mL subcutaneous injection 2-12 Units  2-12 Units Subcutaneous TID AC    Labetalol HCl (NORMODYNE) injection 20 mg  20 mg Intravenous Q6H PRN    levothyroxine tablet 100 mcg  100 mcg Oral Early Morning    Lidocaine Viscous HCl (XYLOCAINE) 2 % mucosal solution 15 mL  15 mL Swish & Swallow 4x Daily PRN    [START ON 5/30/2021] lisinopril (ZESTRIL) tablet 20 mg  20 mg Oral Daily    LORazepam (ATIVAN) tablet 1 mg  1 mg Oral Q8H PRN    lurasidone (LATUDA) tablet 20 mg  20 mg Oral After Dinner    multi-electrolyte (PLASMALYTE-A/ISOLYTE-S PH 7 4) IV solution  100 mL/hr Intravenous Continuous    ondansetron (ZOFRAN-ODT) dispersible tablet 4 mg  4 mg Oral Q6H PRN    pantoprazole (PROTONIX) EC tablet 40 mg  40 mg Oral BID AC    prochlorperazine (COMPAZINE) tablet 10 mg  10 mg Oral Q6H PRN    sucralfate (CARAFATE) tablet 1 g  1 g Oral BID PRN    verapamil (CALAN-SR) CR tablet 120 mg  120 mg Oral Daily       VTE Pharmacologic Prophylaxis: heparin  VTE Mechanical Prophylaxis: sequential compression device    Portions of the record may have been created with voice recognition software  Occasional wrong word or "sound a like" substitutions may have occurred due to the inherent limitations of voice recognition software    Read the chart carefully and recognize, using context, where substitutions have occurred   ==  Elba Villaseñor, [address] [facility]  Internal Medicine Residency PGY-1

## 2021-05-30 LAB
ANION GAP SERPL CALCULATED.3IONS-SCNC: 6 MMOL/L (ref 4–13)
BUN SERPL-MCNC: 24 MG/DL (ref 5–25)
CALCIUM SERPL-MCNC: 8.8 MG/DL (ref 8.3–10.1)
CHLORIDE SERPL-SCNC: 107 MMOL/L (ref 100–108)
CO2 SERPL-SCNC: 28 MMOL/L (ref 21–32)
CREAT SERPL-MCNC: 1.86 MG/DL (ref 0.6–1.3)
GFR SERPL CREATININE-BSD FRML MDRD: 43 ML/MIN/1.73SQ M
GLUCOSE SERPL-MCNC: 137 MG/DL (ref 65–140)
GLUCOSE SERPL-MCNC: 186 MG/DL (ref 65–140)
GLUCOSE SERPL-MCNC: 207 MG/DL (ref 65–140)
GLUCOSE SERPL-MCNC: 248 MG/DL (ref 65–140)
GLUCOSE SERPL-MCNC: 49 MG/DL (ref 65–140)
GLUCOSE SERPL-MCNC: 56 MG/DL (ref 65–140)
GLUCOSE SERPL-MCNC: 68 MG/DL (ref 65–140)
GLUCOSE SERPL-MCNC: 84 MG/DL (ref 65–140)
POTASSIUM SERPL-SCNC: 3.9 MMOL/L (ref 3.5–5.3)
SODIUM SERPL-SCNC: 141 MMOL/L (ref 136–145)

## 2021-05-30 PROCEDURE — 82948 REAGENT STRIP/BLOOD GLUCOSE: CPT

## 2021-05-30 PROCEDURE — 99233 SBSQ HOSP IP/OBS HIGH 50: CPT | Performed by: INTERNAL MEDICINE

## 2021-05-30 PROCEDURE — 80048 BASIC METABOLIC PNL TOTAL CA: CPT | Performed by: STUDENT IN AN ORGANIZED HEALTH CARE EDUCATION/TRAINING PROGRAM

## 2021-05-30 RX ORDER — INSULIN GLARGINE 100 [IU]/ML
40 INJECTION, SOLUTION SUBCUTANEOUS
Status: DISCONTINUED | OUTPATIENT
Start: 2021-05-30 | End: 2021-05-30

## 2021-05-30 RX ORDER — CARVEDILOL 12.5 MG/1
12.5 TABLET ORAL 2 TIMES DAILY WITH MEALS
Status: DISCONTINUED | OUTPATIENT
Start: 2021-05-30 | End: 2021-06-03 | Stop reason: HOSPADM

## 2021-05-30 RX ORDER — VERAPAMIL HYDROCHLORIDE 240 MG/1
240 TABLET, FILM COATED, EXTENDED RELEASE ORAL DAILY
Status: DISCONTINUED | OUTPATIENT
Start: 2021-05-31 | End: 2021-06-03 | Stop reason: HOSPADM

## 2021-05-30 RX ORDER — INSULIN GLARGINE 100 [IU]/ML
30 INJECTION, SOLUTION SUBCUTANEOUS
Status: DISCONTINUED | OUTPATIENT
Start: 2021-05-30 | End: 2021-05-30

## 2021-05-30 RX ORDER — INSULIN GLARGINE 100 [IU]/ML
30 INJECTION, SOLUTION SUBCUTANEOUS
Status: DISCONTINUED | OUTPATIENT
Start: 2021-05-30 | End: 2021-06-03 | Stop reason: HOSPADM

## 2021-05-30 RX ORDER — FAMOTIDINE 20 MG/1
20 TABLET, FILM COATED ORAL DAILY
Status: DISCONTINUED | OUTPATIENT
Start: 2021-05-30 | End: 2021-06-03 | Stop reason: HOSPADM

## 2021-05-30 RX ADMIN — ACETAMINOPHEN 650 MG: 325 TABLET, FILM COATED ORAL at 11:44

## 2021-05-30 RX ADMIN — ALBUTEROL SULFATE 2 PUFF: 90 AEROSOL, METERED RESPIRATORY (INHALATION) at 08:11

## 2021-05-30 RX ADMIN — INSULIN LISPRO 2 UNITS: 100 INJECTION, SOLUTION INTRAVENOUS; SUBCUTANEOUS at 12:01

## 2021-05-30 RX ADMIN — Medication: at 03:57

## 2021-05-30 RX ADMIN — CARVEDILOL 12.5 MG: 25 TABLET, FILM COATED ORAL at 17:29

## 2021-05-30 RX ADMIN — ALBUTEROL SULFATE 2 PUFF: 90 AEROSOL, METERED RESPIRATORY (INHALATION) at 11:59

## 2021-05-30 RX ADMIN — Medication 1000 UNITS: at 08:09

## 2021-05-30 RX ADMIN — LEVOTHYROXINE SODIUM 100 MCG: 100 TABLET ORAL at 06:36

## 2021-05-30 RX ADMIN — LORAZEPAM 1 MG: 1 TABLET ORAL at 21:15

## 2021-05-30 RX ADMIN — LORAZEPAM 1 MG: 1 TABLET ORAL at 11:59

## 2021-05-30 RX ADMIN — ATORVASTATIN CALCIUM 20 MG: 20 TABLET, FILM COATED ORAL at 08:09

## 2021-05-30 RX ADMIN — ACETAMINOPHEN 650 MG: 325 TABLET, FILM COATED ORAL at 18:30

## 2021-05-30 RX ADMIN — PANTOPRAZOLE SODIUM 40 MG: 40 TABLET, DELAYED RELEASE ORAL at 17:29

## 2021-05-30 RX ADMIN — FAMOTIDINE 20 MG: 20 TABLET ORAL at 11:59

## 2021-05-30 RX ADMIN — ONDANSETRON 4 MG: 4 TABLET, ORALLY DISINTEGRATING ORAL at 13:20

## 2021-05-30 RX ADMIN — INSULIN GLARGINE 30 UNITS: 100 INJECTION, SOLUTION SUBCUTANEOUS at 21:15

## 2021-05-30 RX ADMIN — VERAPAMIL HYDROCHLORIDE 120 MG: 120 TABLET, FILM COATED, EXTENDED RELEASE ORAL at 08:12

## 2021-05-30 RX ADMIN — HEPARIN SODIUM 7500 UNITS: 5000 INJECTION INTRAVENOUS; SUBCUTANEOUS at 21:15

## 2021-05-30 RX ADMIN — HEPARIN SODIUM 7500 UNITS: 5000 INJECTION INTRAVENOUS; SUBCUTANEOUS at 13:20

## 2021-05-30 RX ADMIN — HEPARIN SODIUM 7500 UNITS: 5000 INJECTION INTRAVENOUS; SUBCUTANEOUS at 06:38

## 2021-05-30 RX ADMIN — FLUVOXAMINE MALEATE 100 MG: 50 TABLET ORAL at 08:12

## 2021-05-30 RX ADMIN — FLUVOXAMINE MALEATE 100 MG: 50 TABLET ORAL at 17:32

## 2021-05-30 RX ADMIN — LURASIDONE HYDROCHLORIDE 20 MG: 20 TABLET, FILM COATED ORAL at 17:33

## 2021-05-30 RX ADMIN — ALBUTEROL SULFATE 2 PUFF: 90 AEROSOL, METERED RESPIRATORY (INHALATION) at 17:30

## 2021-05-30 RX ADMIN — INSULIN LISPRO 4 UNITS: 100 INJECTION, SOLUTION INTRAVENOUS; SUBCUTANEOUS at 17:34

## 2021-05-30 RX ADMIN — CARVEDILOL 6.25 MG: 6.25 TABLET, FILM COATED ORAL at 08:09

## 2021-05-30 RX ADMIN — SODIUM CHLORIDE, SODIUM GLUCONATE, SODIUM ACETATE, POTASSIUM CHLORIDE, MAGNESIUM CHLORIDE, SODIUM PHOSPHATE, DIBASIC, AND POTASSIUM PHOSPHATE 100 ML/HR: .53; .5; .37; .037; .03; .012; .00082 INJECTION, SOLUTION INTRAVENOUS at 00:01

## 2021-05-30 RX ADMIN — ACETAMINOPHEN 650 MG: 325 TABLET, FILM COATED ORAL at 02:27

## 2021-05-30 RX ADMIN — LISINOPRIL 20 MG: 20 TABLET ORAL at 08:09

## 2021-05-30 RX ADMIN — ALBUTEROL SULFATE 2 PUFF: 90 AEROSOL, METERED RESPIRATORY (INHALATION) at 21:16

## 2021-05-30 RX ADMIN — SUCRALFATE 1 G: 1 TABLET ORAL at 09:58

## 2021-05-30 RX ADMIN — PANTOPRAZOLE SODIUM 40 MG: 40 TABLET, DELAYED RELEASE ORAL at 06:36

## 2021-05-30 NOTE — PROGRESS NOTES
Patient's BS 61  Symptomatic- lightheaded, feeling like he might pass out  SOD notified  Patient given snack and glucose tab d/t being symptomatic  Will recheck sugar in 15 minutes

## 2021-05-30 NOTE — PROGRESS NOTES
INTERNAL MEDICINE RESIDENCY PROGRESS NOTE     Name: Hemal Musa   Age & Sex: 35 y o  male   MRN: [id number]  Unit/Bed#: -01   Encounter: [id number]  Team: SOD Team C     PATIENT INFORMATION     Name: Hemal Musa   Age & Sex: 35 y o  male   MRN: [id number]  Hospital Stay Days: 1    ASSESSMENT/PLAN     Principal Problem:    Hypertensive urgency  Active Problems:    Type 2 diabetes mellitus, with long-term current use of insulin (HCC)    OCD (obsessive compulsive disorder)    Schizoaffective disorder ([facility])    Acquired hypothyroidism    Epigastric pain    Migraine without aura and without status migrainosus, not intractable    Acute on chronic kidney failure ([facility])    GERD (gastroesophageal reflux disease)      GERD (gastroesophageal reflux disease)  Assessment & Plan  History of GERD  On protonix 40 mg BID as an outpatient  Plan:  · Protonix 40 mg BID  · pepcid PRN    Acute on chronic kidney failure [facility])  Assessment & Plan  Lab Results   Component Value Date    EGFR 43 05/30/2021    EGFR 46 05/29/2021    EGFR 50 05/28/2021    CREATININE 1 86 (H) 05/30/2021    CREATININE 1 77 (H) 05/29/2021    CREATININE 1 66 (H) 05/28/2021     Patient presenting with a creatinine of 1 99  Baseline appears to be 1 3-1 5  Likely prerenal due to poor PO intake 2/2 nausea as well as hypertension  Will hold ARB in setting of STEFANIA and treated with IVF hydration  Plan:  · Continues monitor  · Encourage increased PO intake      Migraine without aura and without status migrainosus, not intractable  Assessment & Plan  Patient with history of migraines and currently follows with neurology as an outpatient; last evaluated 10/2020  Currently managed as an outpatient on Aimovig 140 mg injection every 30 days       Plan:  -long history of migraines, seen by neurology outpatient    -continue Verapamil  mg for combo of migraine prophylaxis and blood pressure control  -magnesium replenished inpatient  -on discharge recommend starting B2 riboflavin 400 mg daily, in addition to 200 mg magnesium daily  -consider discharge with abortive medication Imitrex  · Tylenol 650 q6 hours PRN  · Outpatient neurology follow up     Epigastric pain  Assessment & Plan  Patient with a history of GERD and gastritis  Most recent EGD completed on 3/11/2021 demonstrating mild gastritis  Currently managed as an outpatient on protonix 40 mg BID and carafate 1g daily  Plan:  -question psychosomatic as etiology  No need for GI consult at this time  · Continue Protonix 40 mg BID  · Added Pepcid  · Carafate and zofran PRN  · Carb-controlled diet and tighter glucose control  · S/p IVF  · Right upper quadrant ultrasound ordered    Acquired hypothyroidism  Assessment & Plan  History of hypothyroidism; currently on levothyroxine 100 mcg as an outpatient  Most recent TSH 9 980 11/2020  Plan:  · 5/28: TSH elevated, free T4 low  Patient has not taken his home dose levo 100 mcg in at least a month, and his labs reflect this  Restarted patient on home dose levo 100 mcg  · 5/29: cont levo 100 mcg qd  Recommend Outpatient repeat TSH in 4 weeks outpatient      Schizoaffective disorder [facility])  Assessment & Plan  History of schizoaffective disorder  Mood currently stable  Managed on Fluroxamine 100 mg BID and Cariprazine 6 mg daily (non-formulary) as an outpatient  Follows with outpatient psychiatry but has not been evaluated by them in the last 6 months  Plan:  5/28: Patient's varying symptom complaints seem unrelated to an organic issue and more so illness anxiety vs psychosomatic disorder  Psychiatry was consulted and will be seeing the patient this afternoon for further recommendations on medications  Patient will need to follow-up with psych outpatient as well  For now is on Fluvoxamine 100 mg BID  No active SI/HI, depression, or hallucinations   Is a bit anxious  -will obtain baseline EKG in anticipation of starting many new medications, in addition to psych meds    5/29: continue with psych recommendations of Fluvoxamine 100 mg bid and Latuda 20 mg HS- he should be discharged with a 30 day supply of each  Dr Lita Sanchez psychiatrist was filling out the prior authorization for the [facility]  Will need to f/u with psychiatrist outpatient  CM gave patient list of psychiatrists to contact    -while inpatient can receive ativan PRN for anxiety  Psych recommends not d/c with this  Type 2 diabetes mellitus, with long-term current use of insulin [facility])  Assessment & Plan  Lab Results   Component Value Date    HGBA1C 9 8 (H) 05/29/2021     Patient with PMHx of insulin dependant DM  Most recent hemoglobin A1c 10 5 on 11/2020  Currently managed as an outpatient on Basaglar 50U qHS and lispro 10U TID  An an outpatient, patient states that he checks his BG 2x daily and obtains readings of 200-300  A1c 9 1    Plan:  · Continue POC glucose checks; BG goal of 140-180  · Insulin Regimen:  · Lantus 40U qHS  · SSI with meals  · Discontinue mealtime insulin  · Diabetic, carbohydrate restricted diet  (P) 151 [id number]    * Hypertensive urgency  Assessment & Plan  BP on admission 199/91  Patient complaining of abdominal pain with nausea and SOB on admission but otherwise denying headache or visual changes  Patient has had repeated admissions with elevated BP and does admit to compliance with home blood pressure regimen of amlodipine 10 mg daily, metoprolol succinate 50 mg daily, and losartan 100 mg daily  Patient has underwent secondary HTN workup which has been unremarkable thus far; however, patient has not had renal artery dopplers completed to rule out renal artery stenosis  Plan:  · Medications:  · Increased verapamil  mg daily for combo of BP and migraine control  · Increased lisinopril 20 mg daily for renal-protective and BP purposes  Cr with expected increase after initiation of lisinopril  · Increased Coreg 12 5 mg b i d    · Labetalol 20 mg q6 hours PRN for SBP >160 (first line)  · Hydralazine 20 mg q6 hours PRN for SBP >160 (second line)  ·  renal artery dopplers without pathology  CT abd/pelvis without adrenal masses      Disposition:  Continue inpatient care     SUBJECTIVE     Patient seen and examined  No acute events overnight  No specific complaints this morning  Overnight, patient had symptomatic hypoglycemia which resolved with orange juice/crackers  Update:  During rounds, patient complained of epigastric pain after eating breakfast     OBJECTIVE     Vitals:    21 1504 21 1841 21 2202 21 0715   BP: 167/100 159/97 155/95 (!) 172/102   Pulse: 80  82 88   Resp: 18  18 19   Temp: 98 5 °F (36 9 °C)  98 1 °F (36 7 °C) 98 °F (36 7 °C)   TempSrc:    Oral   SpO2: 95%  94% 96%   Weight:       Height:          Temperature:   Temp (24hrs), Av 2 °F (36 8 °C), Min:98 °F (36 7 °C), Max:98 5 °F (36 9 °C)    Temperature: 98 °F (36 7 °C)  Intake & Output:  I/O        07 -  0700  07 -  0700  07 -  0700    P  O  0 1436 240    I V  (mL/kg)  1391 3 (12 2)     Total Intake(mL/kg) 0 (0) 2827 3 (24 8) 240 (2 1)    Urine (mL/kg/hr) 1 (0)      Total Output 1      Net -1 +2827 3 +240           Unmeasured Urine Occurrence  5 x         Weights:   IBW (Ideal Body Weight): 54 6 kg    Body mass index is 46 01 kg/m²  Weight (last 2 days)     Date/Time   Weight    21 1500   114 (251 55)            Physical Exam  Vitals signs reviewed  Constitutional:       General: He is not in acute distress  Appearance: Normal appearance  He is obese  He is not ill-appearing  HENT:      Mouth/Throat:      Mouth: Mucous membranes are moist       Pharynx: Oropharynx is clear  Eyes:      General: No scleral icterus  Extraocular Movements: Extraocular movements intact  Conjunctiva/sclera: Conjunctivae normal    Neck:      Musculoskeletal: Normal range of motion and neck supple     Cardiovascular:      Rate and Rhythm: Normal rate and regular rhythm  Pulses: Normal pulses  Heart sounds: Normal heart sounds  No murmur  Pulmonary:      Effort: Pulmonary effort is normal  No respiratory distress  Breath sounds: Normal breath sounds  No wheezing or rales  Abdominal:      General: Abdomen is flat  Bowel sounds are normal       Palpations: Abdomen is soft  Tenderness: There is no abdominal tenderness  Musculoskeletal: Normal range of motion  Right lower leg: No edema  Left lower leg: No edema  Skin:     General: Skin is warm  Capillary Refill: Capillary refill takes less than 2 seconds  Neurological:      Mental Status: He is alert and oriented to person, place, and time  Psychiatric:         Mood and Affect: Mood normal          Thought Content: Thought content normal        LABORATORY DATA     Labs: I have personally reviewed pertinent reports  Results from last 7 days   Lab Units 05/29/21  0531 05/28/21  0533 05/27/21  1746   WBC Thousand/uL 8 55 8 22 9 10   HEMOGLOBIN g/dL 11 7* 11 7* 12 6   HEMATOCRIT % 35 9* 35 0* 37 5   PLATELETS Thousands/uL 333 330  325 359   NEUTROS PCT %  --  67 74   MONOS PCT %  --  7 6      Results from last 7 days   Lab Units 05/30/21  0509 05/29/21  0531 05/28/21  0533 05/27/21  1746   POTASSIUM mmol/L 3 9 4 0 3 8 4 6   CHLORIDE mmol/L 107 111* 107 103   CO2 mmol/L 28 26 27 26   BUN mg/dL 24 21 28* 37*   CREATININE mg/dL 1 86* 1 77* 1 66* 1 99*   CALCIUM mg/dL 8 8 8 8 8 6 9 4   ALK PHOS U/L  --   --   --  59   ALT U/L  --   --   --  18   AST U/L  --   --   --  14     Results from last 7 days   Lab Units 05/28/21  0533   MAGNESIUM mg/dL 2 3                  Results from last 7 days   Lab Units 05/27/21  1746   TROPONIN I ng/mL <0 02       IMAGING & DIAGNOSTIC TESTING     Radiology Results: I have personally reviewed pertinent reports  Xr Chest 2 Views    Result Date: 5/27/2021  Impression: No acute cardiopulmonary disease   Workstation performed: [id number] Other Diagnostic Testing: I have personally reviewed pertinent reports      ACTIVE MEDICATIONS     Current Facility-Administered Medications   Medication Dose Route Frequency    acetaminophen (TYLENOL) tablet 650 mg  650 mg Oral Q6H PRN    albuterol (PROVENTIL HFA,VENTOLIN HFA) inhaler 2 puff  2 puff Inhalation 4x Daily    atorvastatin (LIPITOR) tablet 20 mg  20 mg Oral Daily    carvedilol (COREG) tablet 12 5 mg  12 5 mg Oral BID With Meals    cholecalciferol (VITAMIN D3) tablet 1,000 Units  1,000 Units Oral Daily    famotidine (PEPCID) tablet 20 mg  20 mg Oral Daily    fluvoxaMINE (LUVOX) tablet 100 mg  100 mg Oral BID    heparin (porcine) subcutaneous injection 7,500 Units  7,500 Units Subcutaneous Q8H [address]    hydrALAZINE (APRESOLINE) injection 20 mg  20 mg Intravenous Q6H PRN    insulin glargine (LANTUS) subcutaneous injection 30 Units 0 3 mL  30 Units Subcutaneous HS    insulin lispro (HumaLOG) 100 units/mL subcutaneous injection 2-12 Units  2-12 Units Subcutaneous TID AC    Labetalol HCl (NORMODYNE) injection 20 mg  20 mg Intravenous Q6H PRN    levothyroxine tablet 100 mcg  100 mcg Oral Early Morning    Lidocaine Viscous HCl (XYLOCAINE) 2 % mucosal solution 15 mL  15 mL Swish & Swallow 4x Daily PRN    lisinopril (ZESTRIL) tablet 20 mg  20 mg Oral Daily    LORazepam (ATIVAN) tablet 1 mg  1 mg Oral Q8H PRN    LORazepam (ATIVAN) tablet 1 mg  1 mg Oral Once PRN    lurasidone (LATUDA) tablet 20 mg  20 mg Oral After Dinner    ondansetron (ZOFRAN-ODT) dispersible tablet 4 mg  4 mg Oral Q6H PRN    pantoprazole (PROTONIX) EC tablet 40 mg  40 mg Oral BID AC    prochlorperazine (COMPAZINE) tablet 10 mg  10 mg Oral Q6H PRN    sucralfate (CARAFATE) tablet 1 g  1 g Oral BID PRN    [START ON 5/31/2021] verapamil (CALAN-SR) CR tablet 240 mg  240 mg Oral Daily       VTE Pharmacologic Prophylaxis: Heparin  VTE Mechanical Prophylaxis: sequential compression device    Portions of the record may have been created with voice recognition software  Occasional wrong word or "sound a like" substitutions may have occurred due to the inherent limitations of voice recognition software    Read the chart carefully and recognize, using context, where substitutions have occurred   ==  Evelyn Delvalle, [facility]  Internal Medicine Residency PGY-1

## 2021-05-30 NOTE — UTILIZATION REVIEW
Inpatient Admission Authorization Request   NOTIFICATION OF INPATIENT ADMISSION/INPATIENT AUTHORIZATION REQUEST   SERVICING FACILITY:   [facility]  Address: [address] [place] [address], [address] [place] [address]  Tax ID: [id number]  NPI: [id number]  Place of Service: Inpatient [facility]  [address]  Place of Service Code: 24     ATTENDING PROVIDER:  Attending Name and NPI#: Brenda Vital [[id number]]  Address: [address] [place] [address], [address] [place] [address]  Phone: [phone]     UTILIZATION REVIEW CONTACT:  [facility] Utilization   Network Utilization Review Department  Phone: [phone]  Fax: [phone]  Email: [email or web address]     PHYSICIAN ADVISORY SERVICES:  FOR NIRI-RV-YFFE REVIEW - MEDICAL NECESSITY DENIAL  Phone: [phone]  Fax: [phone]  Email: [email or web address]     TYPE OF REQUEST:  Inpatient Status     ADMISSION INFORMATION:  ADMISSION DATE/TIME: 5/29/21 1349  PATIENT DIAGNOSIS CODE/DESCRIPTION:  Shortness of breath [R06 02]  Gastritis [K29 70]  STEFANIA (acute kidney injury) ([facility] [address] ) [N17 9]  Uncontrolled hypertension [I10]  Difficulty obtaining medication [Z91 14]  DISCHARGE DATE/TIME: No discharge date for patient encounter  DISCHARGE DISPOSITION (IF DISCHARGED): Home/Self Care     IMPORTANT INFORMATION:  Please contact the [facility] directly with any questions or concerns regarding this request  Department voicemails are confidential     Send requests for admission clinical reviews, concurrent reviews, approvals, and administrative denials due to lack of clinical to fax [phone]

## 2021-05-30 NOTE — PROGRESS NOTES
Patient called to say he felt strange  BS checked by RN, 49  Given snack and juice, also glucose tab d/t patient being symptomatic  Will recheck sugar in 15 min

## 2021-05-30 NOTE — UTILIZATION REVIEW
OBSERVATION 5/27/21 CONVERTED TO INPATIENT 5/29 @ 1349 DUE TO STEFANIA CONTINUING TO REQUIRE IV FLUIDS, HYPERTENSIVE URGENCY  Continued Stay Review  05/29/21 1349  Inpatient Admission Once     Question Answer Comment   Level of Care Med Surg    Estimated length of stay More than 2 Midnights    Certification I certify that inpatient services are medically necessary for this patient for a duration of greater than two midnights  See H&P and MD Progress Notes for additional information about the patient's course of treatment  05/29/21 1349       Date: 5/30                          Current Patient Class: Inpatient Current Level of Care: Med/surg    HPI:41 y o  male initially admitted on 5/29     Assessment/Plan:   5/30 Update:  Low blood sugar  Given food  Increased lisinopril 20 mg daily for renal-protective and BP purposes  Cr with expected increase after initiation of lisinopril  Continue to monitor closely  5/29 UPdate: BP intermittently elevated  Creat increased from day prior  Renal artery doppler without pathology  Continue with verapamil, lisinopril  Continue with gentle IVfluids then increase lisinopril tomorrow  Recheck BMP       Vital Signs:   Date/Time  Temp Pulse  Resp  BP  MAP (mmHg)  SpO2  O2 Device Patient Position - Orthostatic VS   05/30/21 0805  -- --  --  --  --  --  None (Room air) --   05/30/21 0715  98 °F (36 7 °C) 88  19  172/102Abnormal   112  96 %  None (Room air) --   05/29/21 22:02:12  98 1 °F (36 7 °C) 82  18  155/95  115  94 %  None (Room air) --   05/29/21 18:41:45  -- --  --  159/97  118  --  -- --   05/29/21 1634  -- --  --  --  --  --  None (Room air) --   05/29/21 15:04:07  98 5 °F (36 9 °C) 80  18  167/100  122  95 %  -- --   05/29/21 13:40:12  -- --  --  166/99  121  --  -- --   05/29/21 07:07:33  98 2 °F (36 8 °C) 89  18  166/100  122  94 %  None (Room air) --   05/29/21 0255  -- --  --  157/86  --  --  -- --   05/29/21 0022  -- --  --  --  --  --  None (Room air) -- 05/28/21 22:04:42  98 5 °F (36 9 °C) 98  17  149/93  112  92 %  -- --   05/28/21 18:21:47  -- 92  --  146/93  111  93 %  -- --   05/28/21 16:32:57  -- 105  --  169/107Abnormal   128  95 %  -- --   05/28/21 16:26:18  98 6 °F (37 °C) 108Abnormal   17  189/123Abnormal   145          Pertinent Labs/Diagnostic Results:         Results from last 7 days   Lab Units 05/29/21  0531 05/28/21  0533 05/27/21  1746   WBC Thousand/uL 8 55 8 22 9 10   HEMOGLOBIN g/dL 11 7* 11 7* 12 6   HEMATOCRIT % 35 9* 35 0* 37 5   PLATELETS Thousands/uL 333 330  325 359   NEUTROS ABS Thousands/µL  --  5 57 6 70         Results from last 7 days   Lab Units 05/30/21  0509 05/29/21  0531 05/28/21  0533 05/27/21  1746   SODIUM mmol/L 141 143 139 135*   POTASSIUM mmol/L 3 9 4 0 3 8 4 6   CHLORIDE mmol/L 107 111* 107 103   CO2 mmol/L 28 26 27 26   ANION GAP mmol/L 6 6 5 6   BUN mg/dL 24 21 28* 37*   CREATININE mg/dL 1 86* 1 77* 1 66* 1 99*   EGFR ml/min/1 73sq m 43 46 50 40   CALCIUM mg/dL 8 8 8 8 8 6 9 4   MAGNESIUM mg/dL  --   --  2 3  --      Results from last 7 days   Lab Units 05/27/21  1746   AST U/L 14   ALT U/L 18   ALK PHOS U/L 59   TOTAL PROTEIN g/dL 6 9   ALBUMIN g/dL 3 0*   TOTAL BILIRUBIN mg/dL 0 35     Results from last 7 days   Lab Units 05/30/21  1042 05/30/21  0643 05/30/21  0330 05/30/21  0310 05/30/21  0257 05/29/21  2159 05/29/21  2030 05/29/21  2003 05/29/21  1555 05/29/21  1144 05/29/21  1119 05/29/21  1035   POC GLUCOSE mg/dl 186* 84 137 56* 49* 250* 119 63* 170* 129 67 109     Results from last 7 days   Lab Units 05/30/21  0509 05/29/21  0531 05/28/21  0533 05/27/21  1746   GLUCOSE RANDOM mg/dL 68 134 230* 317*         Results from last 7 days   Lab Units 05/29/21  0531 05/27/21  1746   HEMOGLOBIN A1C % 9 8* 9 8*   EAG mg/dl 235 235     BETA-HYDROXYBUTYRATE   Date Value Ref Range Status   10/26/2020 1 4 (H) <0 6 mmol/L Final      Results from last 7 days   Lab Units 05/27/21  1746   TROPONIN I ng/mL <0 02     Results from last 7 days   Lab Units 05/27/21  1746   TSH 3RD GENERATON uIU/mL 13 400*     Results from last 7 days   Lab Units 05/27/21  1746   LIPASE u/L 65*       Medications:   Scheduled Medications:  albuterol, 2 puff, Inhalation, 4x Daily  atorvastatin, 20 mg, Oral, Daily  carvedilol, 12 5 mg, Oral, BID With Meals  cholecalciferol, 1,000 Units, Oral, Daily  fluvoxaMINE, 100 mg, Oral, BID  heparin (porcine), 7,500 Units, Subcutaneous, Q8H [address]  insulin glargine, 30 Units, Subcutaneous, HS  insulin lispro, 2-12 Units, Subcutaneous, TID AC  levothyroxine, 100 mcg, Oral, Early Morning  lisinopril, 20 mg, Oral, Daily  lurasidone, 20 mg, Oral, After Dinner  pantoprazole, 40 mg, Oral, BID AC  [START ON 5/31/2021] verapamil, 240 mg, Oral, Daily      Continuous IV Infusions:     PRN Meds:  acetaminophen, 650 mg, Oral, Q6H PRN  hydrALAZINE, 20 mg, Intravenous, Q6H PRN  Labetalol HCl, 20 mg, Intravenous, Q6H PRN  Lidocaine Viscous HCl, 15 mL, Swish & Swallow, 4x Daily PRN  LORazepam, 1 mg, Oral, Q8H PRN  LORazepam, 1 mg, Oral, Once PRN  ondansetron, 4 mg, Oral, Q6H PRN  prochlorperazine, 10 mg, Oral, Q6H PRN  sucralfate, 1 g, Oral, BID PRN        Discharge Plan: D  Network Utilization Review Department  ATTENTION: Please call with any questions or concerns to [phone] and carefully listen to the prompts so that you are directed to the right person  All voicemails are confidential   Abdirashid Cohen all requests for admission clinical reviews, approved or denied determinations and any other requests to dedicated fax number below belonging to the campus where the patient is receiving treatment   List of dedicated fax numbers for the Facilities:  [address] DENIALS (Administrative/Medical Necessity) [phone]   [address] [place] [address] (Maternity/NICU/Pediatrics) 261 [facility] [place] [address] [place] [address] [address] [place] [address] [phone]

## 2021-05-31 ENCOUNTER — APPOINTMENT (INPATIENT)
Dept: RADIOLOGY | Facility: HOSPITAL | Age: 41
DRG: 199 | End: 2021-05-31
Payer: COMMERCIAL

## 2021-05-31 LAB
ALBUMIN SERPL BCP-MCNC: 2.8 G/DL (ref 3.5–5)
ALP SERPL-CCNC: 56 U/L (ref 46–116)
ALT SERPL W P-5'-P-CCNC: 23 U/L (ref 12–78)
AMYLASE SERPL-CCNC: 35 IU/L (ref 25–115)
AMYLASE UR-CCNC: 15 U/L
AMYLASE/CREATININE RENAL CLEARANCE [RATIO] IN URINE AND SERUM OR PLASMA: 2 %
ANION GAP SERPL CALCULATED.3IONS-SCNC: 4 MMOL/L (ref 4–13)
AST SERPL W P-5'-P-CCNC: 21 U/L (ref 5–45)
BASOPHILS # BLD AUTO: 0.06 THOUSANDS/ΜL (ref 0–0.1)
BASOPHILS NFR BLD AUTO: 1 % (ref 0–1)
BILIRUB SERPL-MCNC: 1.1 MG/DL (ref 0.2–1)
BUN SERPL-MCNC: 23 MG/DL (ref 5–25)
CALCIUM ALBUM COR SERPL-MCNC: 9.9 MG/DL (ref 8.3–10.1)
CALCIUM SERPL-MCNC: 8.9 MG/DL (ref 8.3–10.1)
CHLORIDE SERPL-SCNC: 105 MMOL/L (ref 100–108)
CO2 SERPL-SCNC: 30 MMOL/L (ref 21–32)
CREAT SERPL-MCNC: 1.77 MG/DL (ref 0.6–1.3)
CREAT SERPL-MCNC: 1.92 MG/DL (ref 0.6–1.3)
CREAT UR-MCNC: 52.3 MG/DL
EOSINOPHIL # BLD AUTO: 0.32 THOUSAND/ΜL (ref 0–0.61)
EOSINOPHIL NFR BLD AUTO: 4 % (ref 0–6)
ERYTHROCYTE [DISTWIDTH] IN BLOOD BY AUTOMATED COUNT: 13.2 % (ref 11.6–15.1)
GFR SERPL CREATININE-BSD FRML MDRD: 42 ML/MIN/1.73SQ M
GFR SERPL CREATININE-BSD FRML MDRD: 46 ML/MIN/1.73SQ M
GLUCOSE SERPL-MCNC: 119 MG/DL (ref 65–140)
GLUCOSE SERPL-MCNC: 183 MG/DL (ref 65–140)
GLUCOSE SERPL-MCNC: 193 MG/DL (ref 65–140)
GLUCOSE SERPL-MCNC: 209 MG/DL (ref 65–140)
GLUCOSE SERPL-MCNC: 256 MG/DL (ref 65–140)
HCT VFR BLD AUTO: 36.4 % (ref 36.5–49.3)
HGB BLD-MCNC: 12 G/DL (ref 12–17)
IMM GRANULOCYTES # BLD AUTO: 0.04 THOUSAND/UL (ref 0–0.2)
IMM GRANULOCYTES NFR BLD AUTO: 1 % (ref 0–2)
LYMPHOCYTES # BLD AUTO: 1.61 THOUSANDS/ΜL (ref 0.6–4.47)
LYMPHOCYTES NFR BLD AUTO: 22 % (ref 14–44)
MCH RBC QN AUTO: 28 PG (ref 26.8–34.3)
MCHC RBC AUTO-ENTMCNC: 33 G/DL (ref 31.4–37.4)
MCV RBC AUTO: 85 FL (ref 82–98)
MONOCYTES # BLD AUTO: 0.64 THOUSAND/ΜL (ref 0.17–1.22)
MONOCYTES NFR BLD AUTO: 9 % (ref 4–12)
NEUTROPHILS # BLD AUTO: 4.71 THOUSANDS/ΜL (ref 1.85–7.62)
NEUTS SEG NFR BLD AUTO: 63 % (ref 43–75)
NRBC BLD AUTO-RTO: 0 /100 WBCS
PLATELET # BLD AUTO: 333 THOUSANDS/UL (ref 149–390)
PMV BLD AUTO: 9.6 FL (ref 8.9–12.7)
POTASSIUM SERPL-SCNC: 4.2 MMOL/L (ref 3.5–5.3)
PROT SERPL-MCNC: 6.3 G/DL (ref 6.4–8.2)
RBC # BLD AUTO: 4.29 MILLION/UL (ref 3.88–5.62)
SODIUM SERPL-SCNC: 139 MMOL/L (ref 136–145)
WBC # BLD AUTO: 7.38 THOUSAND/UL (ref 4.31–10.16)

## 2021-05-31 PROCEDURE — 82565 ASSAY OF CREATININE: CPT | Performed by: INTERNAL MEDICINE

## 2021-05-31 PROCEDURE — 82948 REAGENT STRIP/BLOOD GLUCOSE: CPT

## 2021-05-31 PROCEDURE — 82150 ASSAY OF AMYLASE: CPT | Performed by: INTERNAL MEDICINE

## 2021-05-31 PROCEDURE — 99233 SBSQ HOSP IP/OBS HIGH 50: CPT | Performed by: INTERNAL MEDICINE

## 2021-05-31 PROCEDURE — 82570 ASSAY OF URINE CREATININE: CPT | Performed by: INTERNAL MEDICINE

## 2021-05-31 PROCEDURE — 85025 COMPLETE CBC W/AUTO DIFF WBC: CPT | Performed by: STUDENT IN AN ORGANIZED HEALTH CARE EDUCATION/TRAINING PROGRAM

## 2021-05-31 PROCEDURE — 76705 ECHO EXAM OF ABDOMEN: CPT

## 2021-05-31 PROCEDURE — 80053 COMPREHEN METABOLIC PANEL: CPT | Performed by: STUDENT IN AN ORGANIZED HEALTH CARE EDUCATION/TRAINING PROGRAM

## 2021-05-31 RX ORDER — SUCRALFATE 1 G/1
1 TABLET ORAL 2 TIMES DAILY
Status: DISCONTINUED | OUTPATIENT
Start: 2021-05-31 | End: 2021-06-03 | Stop reason: HOSPADM

## 2021-05-31 RX ADMIN — ACETAMINOPHEN 650 MG: 325 TABLET, FILM COATED ORAL at 10:40

## 2021-05-31 RX ADMIN — INSULIN LISPRO 6 UNITS: 100 INJECTION, SOLUTION INTRAVENOUS; SUBCUTANEOUS at 10:31

## 2021-05-31 RX ADMIN — FAMOTIDINE 20 MG: 20 TABLET ORAL at 09:57

## 2021-05-31 RX ADMIN — HEPARIN SODIUM 7500 UNITS: 5000 INJECTION INTRAVENOUS; SUBCUTANEOUS at 21:23

## 2021-05-31 RX ADMIN — HEPARIN SODIUM 7500 UNITS: 5000 INJECTION INTRAVENOUS; SUBCUTANEOUS at 04:40

## 2021-05-31 RX ADMIN — ALBUTEROL SULFATE 2 PUFF: 90 AEROSOL, METERED RESPIRATORY (INHALATION) at 17:08

## 2021-05-31 RX ADMIN — VERAPAMIL HYDROCHLORIDE 240 MG: 240 TABLET ORAL at 09:57

## 2021-05-31 RX ADMIN — SUCRALFATE 1 G: 1 TABLET ORAL at 21:23

## 2021-05-31 RX ADMIN — INSULIN LISPRO 2 UNITS: 100 INJECTION, SOLUTION INTRAVENOUS; SUBCUTANEOUS at 07:57

## 2021-05-31 RX ADMIN — HEPARIN SODIUM 7500 UNITS: 5000 INJECTION INTRAVENOUS; SUBCUTANEOUS at 14:01

## 2021-05-31 RX ADMIN — ALBUTEROL SULFATE 2 PUFF: 90 AEROSOL, METERED RESPIRATORY (INHALATION) at 09:57

## 2021-05-31 RX ADMIN — ALBUTEROL SULFATE 2 PUFF: 90 AEROSOL, METERED RESPIRATORY (INHALATION) at 21:23

## 2021-05-31 RX ADMIN — ACETAMINOPHEN 650 MG: 325 TABLET, FILM COATED ORAL at 04:38

## 2021-05-31 RX ADMIN — LEVOTHYROXINE SODIUM 100 MCG: 100 TABLET ORAL at 04:40

## 2021-05-31 RX ADMIN — PANTOPRAZOLE SODIUM 40 MG: 40 TABLET, DELAYED RELEASE ORAL at 17:07

## 2021-05-31 RX ADMIN — FLUVOXAMINE MALEATE 100 MG: 50 TABLET ORAL at 09:57

## 2021-05-31 RX ADMIN — CARVEDILOL 12.5 MG: 25 TABLET, FILM COATED ORAL at 09:57

## 2021-05-31 RX ADMIN — SUCRALFATE 1 G: 1 TABLET ORAL at 04:35

## 2021-05-31 RX ADMIN — ACETAMINOPHEN 650 MG: 325 TABLET, FILM COATED ORAL at 17:07

## 2021-05-31 RX ADMIN — LISINOPRIL 20 MG: 20 TABLET ORAL at 09:57

## 2021-05-31 RX ADMIN — LORAZEPAM 1 MG: 1 TABLET ORAL at 17:07

## 2021-05-31 RX ADMIN — Medication 1000 UNITS: at 09:58

## 2021-05-31 RX ADMIN — FLUVOXAMINE MALEATE 100 MG: 50 TABLET ORAL at 17:07

## 2021-05-31 RX ADMIN — PANTOPRAZOLE SODIUM 40 MG: 40 TABLET, DELAYED RELEASE ORAL at 04:40

## 2021-05-31 RX ADMIN — ATORVASTATIN CALCIUM 20 MG: 20 TABLET, FILM COATED ORAL at 09:57

## 2021-05-31 RX ADMIN — LABETALOL 20 MG/4 ML (5 MG/ML) INTRAVENOUS SYRINGE 20 MG: at 03:19

## 2021-05-31 RX ADMIN — INSULIN GLARGINE 30 UNITS: 100 INJECTION, SOLUTION SUBCUTANEOUS at 21:23

## 2021-05-31 RX ADMIN — ONDANSETRON 4 MG: 4 TABLET, ORALLY DISINTEGRATING ORAL at 04:38

## 2021-05-31 RX ADMIN — LURASIDONE HYDROCHLORIDE 20 MG: 20 TABLET, FILM COATED ORAL at 17:07

## 2021-05-31 RX ADMIN — CARVEDILOL 12.5 MG: 25 TABLET, FILM COATED ORAL at 17:07

## 2021-05-31 NOTE — PROGRESS NOTES
INTERNAL MEDICINE RESIDENCY PROGRESS NOTE     Name: Mark Holguin   Age & Sex: 35 y o  male   MRN: [id number]  Unit/Bed#: -01   Encounter: [id number]  Team: SOD Team C     PATIENT INFORMATION     Name: Mark Holguin   Age & Sex: 35 y o  male   MRN: [id number]  Hospital Stay Days: 2    ASSESSMENT/PLAN     Principal Problem:    Hypertensive urgency  Active Problems:    Type 2 diabetes mellitus, with long-term current use of insulin (HCC)    OCD (obsessive compulsive disorder)    Schizoaffective disorder (HCC)    Acquired hypothyroidism    Epigastric pain    Migraine without aura and without status migrainosus, not intractable    Acute on chronic kidney failure ([facility])    GERD (gastroesophageal reflux disease)      GERD (gastroesophageal reflux disease)  Assessment & Plan  History of GERD  On protonix 40 mg BID as an outpatient  Plan:  · Protonix 40 mg BID  · pepcid PRN    Acute on chronic kidney failure [facility])  Assessment & Plan  Lab Results   Component Value Date    EGFR 43 05/30/2021    EGFR 46 05/29/2021    EGFR 50 05/28/2021    CREATININE 1 86 (H) 05/30/2021    CREATININE 1 77 (H) 05/29/2021    CREATININE 1 66 (H) 05/28/2021     Patient presenting with a creatinine of 1 99  Baseline appears to be 1 3-1 5  Likely prerenal due to poor PO intake 2/2 nausea as well as hypertension  Will hold ARB in setting of STEFANIA and treated with IVF hydration  Plan:  · Continues monitor  · Encourage increased PO intake      Migraine without aura and without status migrainosus, not intractable  Assessment & Plan  Patient with history of migraines and currently follows with neurology as an outpatient; last evaluated 10/2020  Currently managed as an outpatient on Aimovig 140 mg injection every 30 days       Plan:  -long history of migraines, seen by neurology outpatient    -continue Verapamil  mg for combo of migraine prophylaxis and blood pressure control  -magnesium replenished inpatient  -on discharge recommend starting B2 riboflavin 400 mg daily, in addition to 200 mg magnesium daily  -consider discharge with abortive medication Imitrex  · Tylenol 650 q6 hours PRN  · Outpatient neurology follow up     Epigastric pain  Assessment & Plan  Patient with a history of GERD and gastritis  Most recent EGD completed on 3/11/2021 demonstrating mild gastritis  Currently managed as an outpatient on protonix 40 mg BID and carafate 1g daily  Plan:  -question psychosomatic as etiology  No need for GI consult at this time  · Continue Protonix 40 mg BID  · Added Pepcid  · Carafate and zofran PRN  · Carb-controlled diet and tighter glucose control  · S/p IVF  · Right upper quadrant ultrasound ordered    Acquired hypothyroidism  Assessment & Plan  History of hypothyroidism; currently on levothyroxine 100 mcg as an outpatient  Most recent TSH 9 980 11/2020  Plan:  · 5/28: TSH elevated, free T4 low  Patient has not taken his home dose levo 100 mcg in at least a month, and his labs reflect this  Restarted patient on home dose levo 100 mcg  · 5/29: cont levo 100 mcg qd  Recommend Outpatient repeat TSH in 4 weeks outpatient      Schizoaffective disorder [facility])  Assessment & Plan  History of schizoaffective disorder  Mood currently stable  Managed on Fluroxamine 100 mg BID and Cariprazine 6 mg daily (non-formulary) as an outpatient  Follows with outpatient psychiatry but has not been evaluated by them in the last 6 months  Plan:  5/28: Patient's varying symptom complaints seem unrelated to an organic issue and more so illness anxiety vs psychosomatic disorder  Psychiatry was consulted and will be seeing the patient this afternoon for further recommendations on medications  Patient will need to follow-up with psych outpatient as well  For now is on Fluvoxamine 100 mg BID  No active SI/HI, depression, or hallucinations   Is a bit anxious  -will obtain baseline EKG in anticipation of starting many new medications, in addition to psych meds    5/29: continue with psych recommendations of Fluvoxamine 100 mg bid and Latuda 20 mg HS- he should be discharged with a 30 day supply of each  Dr Radha Gregory psychiatrist was filling out the prior authorization for the [facility]  Will need to f/u with psychiatrist outpatient  CM gave patient list of psychiatrists to contact    -while inpatient can receive ativan PRN for anxiety  Psych recommends not d/c with this  Type 2 diabetes mellitus, with long-term current use of insulin [facility])  Assessment & Plan  Lab Results   Component Value Date    HGBA1C 9 8 (H) 05/29/2021     Patient with PMHx of insulin dependant DM  Most recent hemoglobin A1c 10 5 on 11/2020  Currently managed as an outpatient on Basaglar 50U qHS and lispro 10U TID  An an outpatient, patient states that he checks his BG 2x daily and obtains readings of 200-300  A1c 9 1    Plan:  · Continue POC glucose checks; BG goal of 140-180  · Insulin Regimen:  · Lantus 40U qHS  · SSI with meals  · Discontinue mealtime insulin  · Diabetic, carbohydrate restricted diet  (P) 124 [id number]    * Hypertensive urgency  Assessment & Plan  BP on admission 199/91  Patient complaining of abdominal pain with nausea and SOB on admission but otherwise denying headache or visual changes  Patient has had repeated admissions with elevated BP and does admit to compliance with home blood pressure regimen of amlodipine 10 mg daily, metoprolol succinate 50 mg daily, and losartan 100 mg daily  Patient has underwent secondary HTN workup which has been unremarkable thus far; however, patient has not had renal artery dopplers completed to rule out renal artery stenosis  Plan:  · Medications:  · Increased verapamil  mg daily for combo of BP and migraine control  · Increased lisinopril 20 mg daily for renal-protective and BP purposes  Cr with expected increase after initiation of lisinopril  · Increased Coreg 12 5 mg b i d    · Labetalol 20 mg q6 hours PRN for SBP >160 (first line)  · Hydralazine 20 mg q6 hours PRN for SBP >160 (second line)  ·  renal artery dopplers without pathology  CT abd/pelvis without adrenal masses        Disposition: Continue inpatient management - follow blood pressure and reeval abdominal pain    SUBJECTIVE     Patient seen and examined  No acute events overnight  Patient reports abdominal pain resolved with carafate  Concerned regarding elevations in blood pressure  Denies lightheadedness, dizziness, chest pain, dyspnea, N/V/D/C, urinary complaint  OBJECTIVE     Vitals:    21 0008 21 0313 21 0346 21 0616   BP: (!) 174/102 (!) 174/102 158/98 158/99   Pulse:       Resp: 19      Temp: 99 3 °F (37 4 °C)      TempSrc:       SpO2:       Weight:   113 kg (249 lb 12 5 oz)    Height:          Temperature:   Temp (24hrs), Av 7 °F (37 6 °C), Min:99 3 °F (37 4 °C), Max:99 9 °F (37 7 °C)    Temperature: 99 3 °F (37 4 °C)  Intake & Output:  I/O        07 -  0700  07 -  0700  0701 -  0700    P  O  1436 360     I V  (mL/kg) 1391 3 (12 2)      Total Intake(mL/kg) 2827 3 (24 8) 360 (3 2)     Urine (mL/kg/hr)       Total Output       Net +2827 3 +360            Unmeasured Urine Occurrence 5 x          Weights:   IBW (Ideal Body Weight): 54 6 kg    Body mass index is 45 69 kg/m²  Weight (last 2 days)     Date/Time   Weight    21 03:46:17   113 (249 78)            Physical Exam  Vitals signs reviewed  Constitutional:       General: He is not in acute distress  Appearance: Normal appearance  He is obese  He is not ill-appearing, toxic-appearing or diaphoretic  HENT:      Head: Normocephalic and atraumatic  Right Ear: External ear normal       Left Ear: External ear normal       Nose: Nose normal       Mouth/Throat:      Mouth: Mucous membranes are moist       Pharynx: Oropharynx is clear  Eyes:      General:         Right eye: No discharge  Left eye: No discharge  Conjunctiva/sclera: Conjunctivae normal    Neck:      Musculoskeletal: Normal range of motion  Cardiovascular:      Rate and Rhythm: Normal rate and regular rhythm  Heart sounds: Normal heart sounds  Pulmonary:      Effort: Pulmonary effort is normal  No respiratory distress  Breath sounds: Normal breath sounds  No wheezing  Abdominal:      General: Bowel sounds are normal  There is no distension  Palpations: Abdomen is soft  Tenderness: There is abdominal tenderness  Musculoskeletal: Normal range of motion  General: No swelling or tenderness  Right lower leg: No edema  Left lower leg: No edema  Skin:     General: Skin is warm and dry  Coloration: Skin is not jaundiced  Findings: No bruising  Neurological:      General: No focal deficit present  Mental Status: He is alert and oriented to person, place, and time  Psychiatric:         Mood and Affect: Mood normal          Speech: Speech normal          Behavior: Behavior normal  Behavior is cooperative  Thought Content: Thought content is paranoid and delusional        LABORATORY DATA     Labs: I have personally reviewed pertinent reports    Results from last 7 days   Lab Units 05/31/21  0459 05/29/21  0531 05/28/21  0533 05/27/21  1746   WBC Thousand/uL 7 38 8 55 8 22 9 10   HEMOGLOBIN g/dL 12 0 11 7* 11 7* 12 6   HEMATOCRIT % 36 4* 35 9* 35 0* 37 5   PLATELETS Thousands/uL 333 333 330  325 359   NEUTROS PCT % 63  --  67 74   MONOS PCT % 9  --  7 6      Results from last 7 days   Lab Units 05/31/21 0459 05/30/21  0509 05/29/21  0531  05/27/21  1746   POTASSIUM mmol/L 4 2 3 9 4 0   < > 4 6   CHLORIDE mmol/L 105 107 111*   < > 103   CO2 mmol/L 30 28 26   < > 26   BUN mg/dL 23 24 21   < > 37*   CREATININE mg/dL 1 77* 1 86* 1 77*   < > 1 99*   CALCIUM mg/dL 8 9 8 8 8 8   < > 9 4   ALK PHOS U/L 56  --   --   --  59   ALT U/L 23  --   --   --  18   AST U/L 21  --   --   --  14    < > = values in this interval not displayed  Results from last 7 days   Lab Units 05/28/21  0533   MAGNESIUM mg/dL 2 3                  Results from last 7 days   Lab Units 05/27/21  1746   TROPONIN I ng/mL <0 02       IMAGING & DIAGNOSTIC TESTING     Radiology Results: I have personally reviewed pertinent reports  Xr Chest 2 Views    Result Date: 5/27/2021  Impression: No acute cardiopulmonary disease  Workstation performed: [id number]     Other Diagnostic Testing: I have personally reviewed pertinent reports      ACTIVE MEDICATIONS     Current Facility-Administered Medications   Medication Dose Route Frequency    acetaminophen (TYLENOL) tablet 650 mg  650 mg Oral Q6H PRN    albuterol (PROVENTIL HFA,VENTOLIN HFA) inhaler 2 puff  2 puff Inhalation 4x Daily    atorvastatin (LIPITOR) tablet 20 mg  20 mg Oral Daily    carvedilol (COREG) tablet 12 5 mg  12 5 mg Oral BID With Meals    cholecalciferol (VITAMIN D3) tablet 1,000 Units  1,000 Units Oral Daily    famotidine (PEPCID) tablet 20 mg  20 mg Oral Daily    fluvoxaMINE (LUVOX) tablet 100 mg  100 mg Oral BID    heparin (porcine) subcutaneous injection 7,500 Units  7,500 Units Subcutaneous Q8H [address]    hydrALAZINE (APRESOLINE) injection 20 mg  20 mg Intravenous Q6H PRN    insulin glargine (LANTUS) subcutaneous injection 30 Units 0 3 mL  30 Units Subcutaneous HS    insulin lispro (HumaLOG) 100 units/mL subcutaneous injection 2-12 Units  2-12 Units Subcutaneous TID AC    Labetalol HCl (NORMODYNE) injection 20 mg  20 mg Intravenous Q6H PRN    levothyroxine tablet 100 mcg  100 mcg Oral Early Morning    Lidocaine Viscous HCl (XYLOCAINE) 2 % mucosal solution 15 mL  15 mL Swish & Swallow 4x Daily PRN    lisinopril (ZESTRIL) tablet 20 mg  20 mg Oral Daily    LORazepam (ATIVAN) tablet 1 mg  1 mg Oral Q8H PRN    LORazepam (ATIVAN) tablet 1 mg  1 mg Oral Once PRN    lurasidone (LATUDA) tablet 20 mg  20 mg Oral After Dinner    ondansetron (ZOFRAN-ODT) dispersible tablet 4 mg  4 mg Oral Q6H PRN    pantoprazole (PROTONIX) EC tablet 40 mg  40 mg Oral BID AC    prochlorperazine (COMPAZINE) tablet 10 mg  10 mg Oral Q6H PRN    sucralfate (CARAFATE) tablet 1 g  1 g Oral BID PRN    verapamil (CALAN-SR) CR tablet 240 mg  240 mg Oral Daily       VTE Pharmacologic Prophylaxis: Heparin  VTE Mechanical Prophylaxis: sequential compression device    Portions of the record may have been created with voice recognition software  Occasional wrong word or "sound a like" substitutions may have occurred due to the inherent limitations of voice recognition software    Read the chart carefully and recognize, using context, where substitutions have occurred   ==  Erlinda Gan, [address] [facility]  Internal Medicine Residency PGY-1

## 2021-05-31 NOTE — CASE MANAGEMENT
No fax for [facility] Listen approval Sunday or Monday  CM department to follow up   CM spoke with resident Edie Olmstead possible d/c for wednesday

## 2021-05-31 NOTE — PLAN OF CARE
Problem: PAIN - ADULT  Goal: Verbalizes/displays adequate comfort level or baseline comfort level  Description: Interventions:  - Encourage patient to monitor pain and request assistance  - Assess pain using appropriate pain scale  - Administer analgesics based on type and severity of pain and evaluate response  - Implement non-pharmacological measures as appropriate and evaluate response  - Consider cultural and social influences on pain and pain management  - Notify physician/advanced practitioner if interventions unsuccessful or patient reports new pain  Outcome: Progressing     Problem: INFECTION - ADULT  Goal: Absence or prevention of progression during hospitalization  Description: INTERVENTIONS:  - Assess and monitor for signs and symptoms of infection  - Monitor lab/diagnostic results  - Monitor all insertion sites, i e  indwelling lines, tubes, and drains  - Monitor endotracheal if appropriate and nasal secretions for changes in amount and color  - [place] appropriate cooling/warming therapies per order  - Administer medications as ordered  - Instruct and encourage patient and family to use good hand hygiene technique  - Identify and instruct in appropriate isolation precautions for identified infection/condition  Outcome: Progressing     Problem: SAFETY ADULT  Goal: Patient will remain free of falls  Description: INTERVENTIONS:  - Assess patient frequently for physical needs  -  Identify cognitive and physical deficits and behaviors that affect risk of falls    -  [place] fall precautions as indicated by assessment   - Educate patient/family on patient safety including physical limitations  - Instruct patient to call for assistance with activity based on assessment  - Modify environment to reduce risk of injury  - Consider OT/PT consult to assist with strengthening/mobility  Outcome: Progressing  Goal: Maintain or return to baseline ADL function  Description: INTERVENTIONS:  -  Assess patient's ability to carry out ADLs; assess patient's baseline for ADL function and identify physical deficits which impact ability to perform ADLs (bathing, care of mouth/teeth, toileting, grooming, dressing, etc )  - Assess/evaluate cause of self-care deficits   - Assess range of motion  - Assess patient's mobility; develop plan if impaired  - Assess patient's need for assistive devices and provide as appropriate  - Encourage maximum independence but intervene and supervise when necessary  - Involve family in performance of ADLs  - Assess for home care needs following discharge   - Consider OT consult to assist with ADL evaluation and planning for discharge  - Provide patient education as appropriate  Outcome: Progressing  Goal: Maintain or return mobility status to optimal level  Description: INTERVENTIONS:  - Assess patient's baseline mobility status (ambulation, transfers, stairs, etc )    - Identify cognitive and physical deficits and behaviors that affect mobility  - Identify mobility aids required to assist with transfers and/or ambulation (gait belt, sit-to-stand, lift, walker, cane, etc )  - [place] fall precautions as indicated by assessment  - Record patient progress and toleration of activity level on Mobility SBAR; progress patient to next Phase/Stage  - Instruct patient to call for assistance with activity based on assessment  - Consider rehabilitation consult to assist with strengthening/weightbearing, etc   Outcome: Progressing     Problem: DISCHARGE PLANNING  Goal: Discharge to home or other facility with appropriate resources  Description: INTERVENTIONS:  - Identify barriers to discharge w/patient and caregiver  - Arrange for needed discharge resources and transportation as appropriate  - Identify discharge learning needs (meds, wound care, etc )  - Arrange for interpretive services to assist at discharge as needed  - Refer to Case Management Department for coordinating discharge planning if the patient needs post-hospital services based on physician/advanced practitioner order or complex needs related to functional status, cognitive ability, or social support system  Outcome: Progressing     Problem: Knowledge Deficit  Goal: Patient/family/caregiver demonstrates understanding of disease process, treatment plan, medications, and discharge instructions  Description: Complete learning assessment and assess knowledge base  Interventions:  - Provide teaching at level of understanding  - Provide teaching via preferred learning methods  Outcome: Progressing     Problem: CARDIOVASCULAR - ADULT  Goal: Maintains optimal cardiac output and hemodynamic stability  Description: INTERVENTIONS:  - Monitor I/O, vital signs and rhythm  - Monitor for S/S and trends of decreased cardiac output  - Administer and titrate ordered vasoactive medications to optimize hemodynamic stability  - Assess quality of pulses, skin color and temperature  - Assess for signs of decreased coronary artery perfusion  - Instruct patient to report change in severity of symptoms  Outcome: Progressing     Problem: Nutrition/Hydration-ADULT  Goal: Nutrient/Hydration intake appropriate for improving, restoring or maintaining nutritional needs  Description: Monitor and assess patient's nutrition/hydration status for malnutrition  Collaborate with interdisciplinary team and initiate plan and interventions as ordered  Monitor patient's weight and dietary intake as ordered or per policy  Utilize nutrition screening tool and intervene as necessary  Determine patient's food preferences and provide high-protein, high-caloric foods as appropriate       INTERVENTIONS:  - Monitor oral intake, urinary output, labs, and treatment plans  - Assess nutrition and hydration status and recommend course of action  - Evaluate amount of meals eaten  - Assist patient with eating if necessary   - Allow adequate time for meals  - Recommend/ encourage appropriate diets, oral nutritional supplements, and vitamin/mineral supplements  - Order, calculate, and assess calorie counts as needed  - Recommend, monitor, and adjust tube feedings and TPN/PPN based on assessed needs  - Assess need for intravenous fluids  - Provide specific nutrition/hydration education as appropriate  - Include patient/family/caregiver in decisions related to nutrition  Outcome: Progressing

## 2021-06-01 LAB
ALBUMIN SERPL BCP-MCNC: 2.7 G/DL (ref 3.5–5)
ALP SERPL-CCNC: 52 U/L (ref 46–116)
ALT SERPL W P-5'-P-CCNC: 22 U/L (ref 12–78)
ANION GAP SERPL CALCULATED.3IONS-SCNC: 7 MMOL/L (ref 4–13)
AST SERPL W P-5'-P-CCNC: 17 U/L (ref 5–45)
BASOPHILS # BLD AUTO: 0.05 THOUSANDS/ΜL (ref 0–0.1)
BASOPHILS NFR BLD AUTO: 1 % (ref 0–1)
BILIRUB SERPL-MCNC: 0.6 MG/DL (ref 0.2–1)
BUN SERPL-MCNC: 29 MG/DL (ref 5–25)
CALCIUM ALBUM COR SERPL-MCNC: 9.9 MG/DL (ref 8.3–10.1)
CALCIUM SERPL-MCNC: 8.9 MG/DL (ref 8.3–10.1)
CHLORIDE SERPL-SCNC: 107 MMOL/L (ref 100–108)
CO2 SERPL-SCNC: 26 MMOL/L (ref 21–32)
CREAT SERPL-MCNC: 2.01 MG/DL (ref 0.6–1.3)
EOSINOPHIL # BLD AUTO: 0.27 THOUSAND/ΜL (ref 0–0.61)
EOSINOPHIL NFR BLD AUTO: 4 % (ref 0–6)
ERYTHROCYTE [DISTWIDTH] IN BLOOD BY AUTOMATED COUNT: 13.2 % (ref 11.6–15.1)
GFR SERPL CREATININE-BSD FRML MDRD: 39 ML/MIN/1.73SQ M
GLUCOSE SERPL-MCNC: 168 MG/DL (ref 65–140)
GLUCOSE SERPL-MCNC: 170 MG/DL (ref 65–140)
GLUCOSE SERPL-MCNC: 201 MG/DL (ref 65–140)
GLUCOSE SERPL-MCNC: 220 MG/DL (ref 65–140)
GLUCOSE SERPL-MCNC: 252 MG/DL (ref 65–140)
HCT VFR BLD AUTO: 35.8 % (ref 36.5–49.3)
HGB BLD-MCNC: 12 G/DL (ref 12–17)
IMM GRANULOCYTES # BLD AUTO: 0.02 THOUSAND/UL (ref 0–0.2)
IMM GRANULOCYTES NFR BLD AUTO: 0 % (ref 0–2)
LYMPHOCYTES # BLD AUTO: 1.57 THOUSANDS/ΜL (ref 0.6–4.47)
LYMPHOCYTES NFR BLD AUTO: 25 % (ref 14–44)
MCH RBC QN AUTO: 28.5 PG (ref 26.8–34.3)
MCHC RBC AUTO-ENTMCNC: 33.5 G/DL (ref 31.4–37.4)
MCV RBC AUTO: 85 FL (ref 82–98)
MONOCYTES # BLD AUTO: 0.57 THOUSAND/ΜL (ref 0.17–1.22)
MONOCYTES NFR BLD AUTO: 9 % (ref 4–12)
NEUTROPHILS # BLD AUTO: 3.81 THOUSANDS/ΜL (ref 1.85–7.62)
NEUTS SEG NFR BLD AUTO: 61 % (ref 43–75)
NRBC BLD AUTO-RTO: 0 /100 WBCS
PLATELET # BLD AUTO: 311 THOUSANDS/UL (ref 149–390)
PMV BLD AUTO: 9.6 FL (ref 8.9–12.7)
POTASSIUM SERPL-SCNC: 4.4 MMOL/L (ref 3.5–5.3)
PROT SERPL-MCNC: 6.1 G/DL (ref 6.4–8.2)
RBC # BLD AUTO: 4.21 MILLION/UL (ref 3.88–5.62)
SODIUM SERPL-SCNC: 140 MMOL/L (ref 136–145)
WBC # BLD AUTO: 6.29 THOUSAND/UL (ref 4.31–10.16)

## 2021-06-01 PROCEDURE — 85025 COMPLETE CBC W/AUTO DIFF WBC: CPT | Performed by: STUDENT IN AN ORGANIZED HEALTH CARE EDUCATION/TRAINING PROGRAM

## 2021-06-01 PROCEDURE — 99255 IP/OBS CONSLTJ NEW/EST HI 80: CPT | Performed by: INTERNAL MEDICINE

## 2021-06-01 PROCEDURE — 80053 COMPREHEN METABOLIC PANEL: CPT | Performed by: STUDENT IN AN ORGANIZED HEALTH CARE EDUCATION/TRAINING PROGRAM

## 2021-06-01 PROCEDURE — 99231 SBSQ HOSP IP/OBS SF/LOW 25: CPT | Performed by: INTERNAL MEDICINE

## 2021-06-01 PROCEDURE — 82948 REAGENT STRIP/BLOOD GLUCOSE: CPT

## 2021-06-01 RX ORDER — BUTALBITAL, ACETAMINOPHEN AND CAFFEINE 50; 325; 40 MG/1; MG/1; MG/1
1 TABLET ORAL ONCE
Status: COMPLETED | OUTPATIENT
Start: 2021-06-01 | End: 2021-06-01

## 2021-06-01 RX ORDER — DOXAZOSIN 2 MG/1
2 TABLET ORAL
Status: DISCONTINUED | OUTPATIENT
Start: 2021-06-01 | End: 2021-06-02

## 2021-06-01 RX ORDER — FAMOTIDINE 20 MG/1
20 TABLET, FILM COATED ORAL DAILY PRN
Status: DISCONTINUED | OUTPATIENT
Start: 2021-06-01 | End: 2021-06-01

## 2021-06-01 RX ORDER — MAGNESIUM HYDROXIDE/ALUMINUM HYDROXICE/SIMETHICONE 120; 1200; 1200 MG/30ML; MG/30ML; MG/30ML
30 SUSPENSION ORAL 2 TIMES DAILY PRN
Status: DISCONTINUED | OUTPATIENT
Start: 2021-06-01 | End: 2021-06-03 | Stop reason: HOSPADM

## 2021-06-01 RX ORDER — HYDROXYZINE HYDROCHLORIDE 25 MG/1
25 TABLET, FILM COATED ORAL ONCE
Status: COMPLETED | OUTPATIENT
Start: 2021-06-01 | End: 2021-06-01

## 2021-06-01 RX ADMIN — ALUMINUM HYDROXIDE, MAGNESIUM HYDROXIDE, AND SIMETHICONE 30 ML: 200; 200; 20 SUSPENSION ORAL at 05:34

## 2021-06-01 RX ADMIN — Medication 1000 UNITS: at 08:04

## 2021-06-01 RX ADMIN — ALBUTEROL SULFATE 2 PUFF: 90 AEROSOL, METERED RESPIRATORY (INHALATION) at 11:18

## 2021-06-01 RX ADMIN — INSULIN LISPRO 3 UNITS: 100 INJECTION, SOLUTION INTRAVENOUS; SUBCUTANEOUS at 17:11

## 2021-06-01 RX ADMIN — FAMOTIDINE 20 MG: 20 TABLET ORAL at 08:04

## 2021-06-01 RX ADMIN — HYDROXYZINE HYDROCHLORIDE 25 MG: 25 TABLET ORAL at 16:52

## 2021-06-01 RX ADMIN — LORAZEPAM 1 MG: 1 TABLET ORAL at 07:50

## 2021-06-01 RX ADMIN — VERAPAMIL HYDROCHLORIDE 240 MG: 240 TABLET ORAL at 08:04

## 2021-06-01 RX ADMIN — HEPARIN SODIUM 7500 UNITS: 5000 INJECTION INTRAVENOUS; SUBCUTANEOUS at 14:33

## 2021-06-01 RX ADMIN — LEVOTHYROXINE SODIUM 100 MCG: 100 TABLET ORAL at 05:00

## 2021-06-01 RX ADMIN — LISINOPRIL 20 MG: 20 TABLET ORAL at 08:05

## 2021-06-01 RX ADMIN — FLUVOXAMINE MALEATE 100 MG: 50 TABLET ORAL at 08:05

## 2021-06-01 RX ADMIN — HEPARIN SODIUM 7500 UNITS: 5000 INJECTION INTRAVENOUS; SUBCUTANEOUS at 05:00

## 2021-06-01 RX ADMIN — ATORVASTATIN CALCIUM 20 MG: 20 TABLET, FILM COATED ORAL at 08:04

## 2021-06-01 RX ADMIN — LURASIDONE HYDROCHLORIDE 20 MG: 20 TABLET, FILM COATED ORAL at 17:01

## 2021-06-01 RX ADMIN — PANTOPRAZOLE SODIUM 40 MG: 40 TABLET, DELAYED RELEASE ORAL at 15:41

## 2021-06-01 RX ADMIN — ALBUTEROL SULFATE 2 PUFF: 90 AEROSOL, METERED RESPIRATORY (INHALATION) at 17:00

## 2021-06-01 RX ADMIN — INSULIN LISPRO 4 UNITS: 100 INJECTION, SOLUTION INTRAVENOUS; SUBCUTANEOUS at 11:18

## 2021-06-01 RX ADMIN — BUTALBITAL, ACETAMINOPHEN, AND CAFFEINE 1 TABLET: 50; 325; 40 TABLET ORAL at 09:04

## 2021-06-01 RX ADMIN — SUCRALFATE 1 G: 1 TABLET ORAL at 08:04

## 2021-06-01 RX ADMIN — ONDANSETRON 4 MG: 4 TABLET, ORALLY DISINTEGRATING ORAL at 14:42

## 2021-06-01 RX ADMIN — BUTALBITAL, ACETAMINOPHEN, AND CAFFEINE 1 TABLET: 50; 325; 40 TABLET ORAL at 18:38

## 2021-06-01 RX ADMIN — ACETAMINOPHEN 650 MG: 325 TABLET, FILM COATED ORAL at 14:33

## 2021-06-01 RX ADMIN — ALBUTEROL SULFATE 2 PUFF: 90 AEROSOL, METERED RESPIRATORY (INHALATION) at 21:28

## 2021-06-01 RX ADMIN — CARVEDILOL 12.5 MG: 25 TABLET, FILM COATED ORAL at 07:50

## 2021-06-01 RX ADMIN — ACETAMINOPHEN 650 MG: 325 TABLET, FILM COATED ORAL at 04:50

## 2021-06-01 RX ADMIN — INSULIN LISPRO 2 UNITS: 100 INJECTION, SOLUTION INTRAVENOUS; SUBCUTANEOUS at 07:55

## 2021-06-01 RX ADMIN — ONDANSETRON 4 MG: 4 TABLET, ORALLY DISINTEGRATING ORAL at 04:49

## 2021-06-01 RX ADMIN — CARVEDILOL 12.5 MG: 25 TABLET, FILM COATED ORAL at 15:38

## 2021-06-01 RX ADMIN — SUCRALFATE 1 G: 1 TABLET ORAL at 21:26

## 2021-06-01 RX ADMIN — ALBUTEROL SULFATE 2 PUFF: 90 AEROSOL, METERED RESPIRATORY (INHALATION) at 08:04

## 2021-06-01 RX ADMIN — FLUVOXAMINE MALEATE 100 MG: 50 TABLET ORAL at 17:01

## 2021-06-01 RX ADMIN — PANTOPRAZOLE SODIUM 40 MG: 40 TABLET, DELAYED RELEASE ORAL at 05:00

## 2021-06-01 RX ADMIN — INSULIN GLARGINE 30 UNITS: 100 INJECTION, SOLUTION SUBCUTANEOUS at 21:26

## 2021-06-01 RX ADMIN — HEPARIN SODIUM 7500 UNITS: 5000 INJECTION INTRAVENOUS; SUBCUTANEOUS at 21:26

## 2021-06-01 RX ADMIN — DOXAZOSIN 2 MG: 2 TABLET ORAL at 21:26

## 2021-06-01 RX ADMIN — INSULIN LISPRO 4 UNITS: 100 INJECTION, SOLUTION INTRAVENOUS; SUBCUTANEOUS at 17:11

## 2021-06-01 NOTE — CASE MANAGEMENT
AZIZA received fax from pt's [employer] insurance reporting "Latuda Oral Tablet 20mg a quantity of 34 for 34 days has been approved for 12 months from 6/1/2021 to 6/1/2022"  AZIZA called pt's [address] ([phone]) and spoke with Fabien Mac who informed that medication is covered in full  She reports that they have 26 pills in stock and will order the rest of the medication to fill the script

## 2021-06-01 NOTE — PLAN OF CARE
Problem: PAIN - ADULT  Goal: Verbalizes/displays adequate comfort level or baseline comfort level  Description: Interventions:  - Encourage patient to monitor pain and request assistance  - Assess pain using appropriate pain scale  - Administer analgesics based on type and severity of pain and evaluate response  - Implement non-pharmacological measures as appropriate and evaluate response  - Consider cultural and social influences on pain and pain management  - Notify physician/advanced practitioner if interventions unsuccessful or patient reports new pain  Outcome: Progressing     Problem: INFECTION - ADULT  Goal: Absence or prevention of progression during hospitalization  Description: INTERVENTIONS:  - Assess and monitor for signs and symptoms of infection  - Monitor lab/diagnostic results  - Monitor all insertion sites, i e  indwelling lines, tubes, and drains  - Monitor endotracheal if appropriate and nasal secretions for changes in amount and color  - [place] appropriate cooling/warming therapies per order  - Administer medications as ordered  - Instruct and encourage patient and family to use good hand hygiene technique  - Identify and instruct in appropriate isolation precautions for identified infection/condition  Outcome: Progressing     Problem: SAFETY ADULT  Goal: Patient will remain free of falls  Description: INTERVENTIONS:  - Assess patient frequently for physical needs  -  Identify cognitive and physical deficits and behaviors that affect risk of falls    -  [place] fall precautions as indicated by assessment   - Educate patient/family on patient safety including physical limitations  - Instruct patient to call for assistance with activity based on assessment  - Modify environment to reduce risk of injury  - Consider OT/PT consult to assist with strengthening/mobility  Outcome: Progressing  Goal: Maintain or return to baseline ADL function  Description: INTERVENTIONS:  -  Assess patient's ability to carry out ADLs; assess patient's baseline for ADL function and identify physical deficits which impact ability to perform ADLs (bathing, care of mouth/teeth, toileting, grooming, dressing, etc )  - Assess/evaluate cause of self-care deficits   - Assess range of motion  - Assess patient's mobility; develop plan if impaired  - Assess patient's need for assistive devices and provide as appropriate  - Encourage maximum independence but intervene and supervise when necessary  - Involve family in performance of ADLs  - Assess for home care needs following discharge   - Consider OT consult to assist with ADL evaluation and planning for discharge  - Provide patient education as appropriate  Outcome: Progressing  Goal: Maintain or return mobility status to optimal level  Description: INTERVENTIONS:  - Assess patient's baseline mobility status (ambulation, transfers, stairs, etc )    - Identify cognitive and physical deficits and behaviors that affect mobility  - Identify mobility aids required to assist with transfers and/or ambulation (gait belt, sit-to-stand, lift, walker, cane, etc )  - [place] fall precautions as indicated by assessment  - Record patient progress and toleration of activity level on Mobility SBAR; progress patient to next Phase/Stage  - Instruct patient to call for assistance with activity based on assessment  - Consider rehabilitation consult to assist with strengthening/weightbearing, etc   Outcome: Progressing     Problem: DISCHARGE PLANNING  Goal: Discharge to home or other facility with appropriate resources  Description: INTERVENTIONS:  - Identify barriers to discharge w/patient and caregiver  - Arrange for needed discharge resources and transportation as appropriate  - Identify discharge learning needs (meds, wound care, etc )  - Arrange for interpretive services to assist at discharge as needed  - Refer to Case Management Department for coordinating discharge planning if the patient needs post-hospital services based on physician/advanced practitioner order or complex needs related to functional status, cognitive ability, or social support system  Outcome: Progressing     Problem: Knowledge Deficit  Goal: Patient/family/caregiver demonstrates understanding of disease process, treatment plan, medications, and discharge instructions  Description: Complete learning assessment and assess knowledge base  Interventions:  - Provide teaching at level of understanding  - Provide teaching via preferred learning methods  Outcome: Progressing     Problem: CARDIOVASCULAR - ADULT  Goal: Maintains optimal cardiac output and hemodynamic stability  Description: INTERVENTIONS:  - Monitor I/O, vital signs and rhythm  - Monitor for S/S and trends of decreased cardiac output  - Administer and titrate ordered vasoactive medications to optimize hemodynamic stability  - Assess quality of pulses, skin color and temperature  - Assess for signs of decreased coronary artery perfusion  - Instruct patient to report change in severity of symptoms  Outcome: Progressing     Problem: Nutrition/Hydration-ADULT  Goal: Nutrient/Hydration intake appropriate for improving, restoring or maintaining nutritional needs  Description: Monitor and assess patient's nutrition/hydration status for malnutrition  Collaborate with interdisciplinary team and initiate plan and interventions as ordered  Monitor patient's weight and dietary intake as ordered or per policy  Utilize nutrition screening tool and intervene as necessary  Determine patient's food preferences and provide high-protein, high-caloric foods as appropriate       INTERVENTIONS:  - Monitor oral intake, urinary output, labs, and treatment plans  - Assess nutrition and hydration status and recommend course of action  - Evaluate amount of meals eaten  - Assist patient with eating if necessary   - Allow adequate time for meals  - Recommend/ encourage appropriate diets, oral nutritional supplements, and vitamin/mineral supplements  - Order, calculate, and assess calorie counts as needed  - Recommend, monitor, and adjust tube feedings and TPN/PPN based on assessed needs  - Assess need for intravenous fluids  - Provide specific nutrition/hydration education as appropriate  - Include patient/family/caregiver in decisions related to nutrition  Outcome: Progressing

## 2021-06-01 NOTE — CASE MANAGEMENT
CM called pt's [employer] ([phone]) to inquire about Latuda medication prior auth  CM spoke with rep, Layla who informed that Vincent Malcolm was put on hold as the request had no return fax number  CM provided CM fax number and rep informed that auth request was re-opened  She informed that there could be a 24 hour turn around time for determination  CM department to follow for determination

## 2021-06-01 NOTE — PROGRESS NOTES
INTERNAL MEDICINE RESIDENCY PROGRESS NOTE     Name: Ivone Marsh   Age & Sex: 35 y o  male   MRN: [id number]  Unit/Bed#: -[facility]   Encounter: [id number]  Team: SOD Team C     PATIENT INFORMATION     Name: Ivone Marsh   Age & Sex: 35 y o  male   MRN: [id number]  Hospital Stay Days: 3    ASSESSMENT/PLAN     Principal Problem:    Hypertensive urgency  Active Problems:    Type 2 diabetes mellitus, with long-term current use of insulin (HCC)    OCD (obsessive compulsive disorder)    Schizoaffective disorder ([facility])    Acquired hypothyroidism    Epigastric pain    Migraine without aura and without status migrainosus, not intractable    Acute on chronic kidney failure ([facility])    GERD (gastroesophageal reflux disease)      GERD (gastroesophageal reflux disease)  Assessment & Plan  History of GERD  On protonix 40 mg BID as an outpatient  Plan:  · Protonix 40 mg BID  · pepcid PRN    Acute on chronic kidney failure [facility])  Assessment & Plan  Lab Results   Component Value Date    EGFR 39 06/01/2021    EGFR 42 05/31/2021    EGFR 46 05/31/2021    CREATININE 2 01 (H) 06/01/2021    CREATININE 1 92 (H) 05/31/2021    CREATININE 1 77 (H) 05/31/2021     Patient presenting with a creatinine of 1 99  Baseline appears to be 1 5-1 6  Likely prerenal due to poor PO intake 2/2 nausea as well as hypertension  Plan:  · Cr increased to 2 today  · Nephrology consulted - appreciate recommendations  · Encourage increased PO intake      Migraine without aura and without status migrainosus, not intractable  Assessment & Plan  Patient with history of migraines and currently follows with neurology as an outpatient; last evaluated 10/2020  Currently managed as an outpatient on Aimovig 140 mg injection every 30 days       Plan:  -long history of migraines, seen by neurology outpatient    -continue Verapamil  mg for combo of migraine prophylaxis and blood pressure control  -magnesium replenished inpatient  -on discharge recommend starting B2 riboflavin 400 mg daily, in addition to 200 mg magnesium daily  -consider discharge with abortive medication Imitrex  · Tylenol 650 q6 hours PRN  · Outpatient neurology follow up     Epigastric pain  Assessment & Plan  Patient with a history of GERD and gastritis  Most recent EGD completed on 3/11/2021 demonstrating mild gastritis  Currently managed as an outpatient on protonix 40 mg BID and carafate 1g daily  Plan:  -question psychosomatic as etiology  No need for GI consult at this time  · Continue Protonix 40 mg BID  · Added Pepcid  · Schedule Carafate  · zofran PRN  · Carb-controlled diet and tighter glucose control  · S/p IVF  · Right upper quadrant ultrasound showed hepatomegaly    Acquired hypothyroidism  Assessment & Plan  History of hypothyroidism; currently on levothyroxine 100 mcg as an outpatient  Most recent TSH 9 980 11/2020  Plan:  · 5/28: TSH elevated, free T4 low  Patient has not taken his home dose levo 100 mcg in at least a month, and his labs reflect this  Restarted patient on home dose levo 100 mcg  · 5/29: cont levo 100 mcg qd  Recommend Outpatient repeat TSH in 4 weeks outpatient      Schizoaffective disorder [facility])  Assessment & Plan  History of schizoaffective disorder  Mood currently stable  Managed on Fluroxamine 100 mg BID and Cariprazine 6 mg daily (non-formulary) as an outpatient  Follows with outpatient psychiatry but has not been evaluated by them in the last 6 months  Plan:  5/28: Patient's varying symptom complaints seem unrelated to an organic issue and more so illness anxiety vs psychosomatic disorder  Psychiatry was consulted and will be seeing the patient this afternoon for further recommendations on medications  Patient will need to follow-up with psych outpatient as well  For now is on Fluvoxamine 100 mg BID  No active SI/HI, depression, or hallucinations   Is a bit anxious  -will obtain baseline EKG in anticipation of starting many new medications, in addition to psych meds    5/29: continue with psych recommendations of Fluvoxamine 100 mg bid and Latuda 20 mg HS- he should be discharged with a 30 day supply of each  Dr Fatimah Addison psychiatrist was filling out the prior authorization for the [facility]  Will need to f/u with psychiatrist outpatient  CM gave patient list of psychiatrists to contact    -while inpatient can receive ativan PRN for anxiety  Psych recommends not d/c with this  Case management following for prior auth for Latuda - appreciate assistance        Type 2 diabetes mellitus, with long-term current use of insulin [facility])  Assessment & Plan  Lab Results   Component Value Date    HGBA1C 9 8 (H) 05/29/2021     Patient with PMHx of insulin dependant DM  Most recent hemoglobin A1c 10 5 on 11/2020  Currently managed as an outpatient on Basaglar 50U qHS and lispro 10U TID  An an outpatient, patient states that he checks his BG 2x daily and obtains readings of 200-300  A1c 9 1    Plan:  · Continue POC glucose checks; BG goal of 140-180  · Insulin Regimen:  · Lantus 30U qHS  · Lispro 3 units TID With meals  · SSI with meals  · Diabetic, carbohydrate restricted diet  (P) 180 [id number]    * Hypertensive urgency  Assessment & Plan  BP on admission 199/91  Patient complaining of abdominal pain with nausea and SOB on admission but otherwise denying headache or visual changes  Patient has had repeated admissions with elevated BP and does admit to compliance with home blood pressure regimen of amlodipine 10 mg daily, metoprolol succinate 50 mg daily, and losartan 100 mg daily  Patient has underwent secondary HTN workup which has been unremarkable thus far; however, patient has not had renal artery dopplers completed to rule out renal artery stenosis  Plan:  · Medications:  · Increased verapamil  mg daily for combo of BP and migraine control  · Increased lisinopril 20 mg daily for renal-protective and BP purposes   Cr with expected increase after initiation of lisinopril  · Increased Coreg 12 5 mg b i d  · Labetalol 20 mg q6 hours PRN for SBP >160 (first line)  · Hydralazine 20 mg q6 hours PRN for SBP >160 (second line)  ·  renal artery dopplers without pathology  CT abd/pelvis without adrenal masses  · Nephrology consulted - appreciate recommendations  · Add Doxazocin 2mg QHS       Disposition: inpatient    SUBJECTIVE     Patient seen and examined  No acute events overnight  States no recurrence of abdominal pain overnight  Endorsing headache  No dizziness/lightheadedness, chest pain, dyspnea, N/V/D/C, urinary complaint  OBJECTIVE     Vitals:    21 1543 21 2245 21 0750 21 0754   BP: 157/99 153/95 (!) 156/102 (!) 156/102   BP Location:    Right arm   Pulse: 74   73   Resp: 20   20   Temp: 98 6 °F (37 °C) 99 3 °F (37 4 °C)  98 3 °F (36 8 °C)   TempSrc:    Oral   SpO2: 94%   96%   Weight:       Height:          Temperature:   Temp (24hrs), Av 7 °F (37 1 °C), Min:98 3 °F (36 8 °C), Max:99 3 °F (37 4 °C)    Temperature: 98 3 °F (36 8 °C)  Intake & Output:  I/O        07 -  0700  07 -  0700 06 07 -  0700    P  O  360 1800 440    I V  (mL/kg)       Total Intake(mL/kg) 360 (3 2) 1800 (15 9) 440 (3 9)    Urine (mL/kg/hr)  2300 (0 8) 350 (0 5)    Stool   0    Total Output  2300 350    Net +360 -500 +90           Unmeasured Urine Occurrence  3 x     Unmeasured Stool Occurrence   1 x        Weights:   IBW (Ideal Body Weight): 54 6 kg    Body mass index is 45 69 kg/m²  Weight (last 2 days)     Date/Time   Weight    21 03:46:17   113 (249 78)            Physical Exam  Vitals signs reviewed  Constitutional:       General: He is not in acute distress  Appearance: Normal appearance  He is obese  He is not ill-appearing, toxic-appearing or diaphoretic  HENT:      Head: Normocephalic and atraumatic        Right Ear: External ear normal       Left Ear: External ear normal       Nose: Nose normal  Mouth/Throat:      Mouth: Mucous membranes are moist       Pharynx: Oropharynx is clear  Eyes:      General:         Right eye: No discharge  Left eye: No discharge  Conjunctiva/sclera: Conjunctivae normal    Neck:      Musculoskeletal: Normal range of motion  Cardiovascular:      Rate and Rhythm: Normal rate and regular rhythm  Heart sounds: Normal heart sounds  Pulmonary:      Effort: Pulmonary effort is normal  No respiratory distress  Breath sounds: Normal breath sounds  No wheezing  Abdominal:      General: Bowel sounds are normal  There is no distension  Palpations: Abdomen is soft  Tenderness: There is no abdominal tenderness  Musculoskeletal: Normal range of motion  General: No swelling or tenderness  Right lower leg: No edema  Left lower leg: No edema  Skin:     General: Skin is warm and dry  Coloration: Skin is not jaundiced  Findings: No bruising  Neurological:      General: No focal deficit present  Mental Status: He is alert and oriented to person, place, and time  Psychiatric:         Mood and Affect: Mood normal          Speech: Speech normal          Behavior: Behavior normal  Behavior is cooperative  Thought Content: Thought content normal  Thought content is not paranoid or delusional        LABORATORY DATA     Labs: I have personally reviewed pertinent reports    Results from last 7 days   Lab Units 06/01/21  0455 05/31/21  0459 05/29/21  0531 05/28/21  0533   WBC Thousand/uL 6 29 7 38 8 55 8 22   HEMOGLOBIN g/dL 12 0 12 0 11 7* 11 7*   HEMATOCRIT % 35 8* 36 4* 35 9* 35 0*   PLATELETS Thousands/uL 311 333 333 330  325   NEUTROS PCT % 61 63  --  67   MONOS PCT % 9 9  --  7      Results from last 7 days   Lab Units 06/01/21  0456 05/31/21  1143 05/31/21  0459 05/30/21  0509  05/27/21  1746   POTASSIUM mmol/L 4 4  --  4 2 3 9   < > 4 6   CHLORIDE mmol/L 107  --  105 107   < > 103   CO2 mmol/L 26  --  30 28 < > 26   BUN mg/dL 29*  --  23 24   < > 37*   CREATININE mg/dL 2 01* 1 92* 1 77* 1 86*   < > 1 99*   CALCIUM mg/dL 8 9  --  8 9 8 8   < > 9 4   ALK PHOS U/L 52  --  56  --   --  59   ALT U/L 22  --  23  --   --  18   AST U/L 17  --  21  --   --  14    < > = values in this interval not displayed  Results from last 7 days   Lab Units 05/28/21  0533   MAGNESIUM mg/dL 2 3                  Results from last 7 days   Lab Units 05/27/21  1746   TROPONIN I ng/mL <0 02       IMAGING & DIAGNOSTIC TESTING     Radiology Results: I have personally reviewed pertinent reports  Xr Chest 2 Views    Result Date: 5/27/2021  Impression: No acute cardiopulmonary disease  Workstation performed: [id number]     Us Right Upper Quadrant    Result Date: 5/31/2021  Impression: Hepatomegaly  Workstation performed: [id number]     Other Diagnostic Testing: I have personally reviewed pertinent reports      ACTIVE MEDICATIONS     Current Facility-Administered Medications   Medication Dose Route Frequency    acetaminophen (TYLENOL) tablet 650 mg  650 mg Oral Q6H PRN    albuterol (PROVENTIL HFA,VENTOLIN HFA) inhaler 2 puff  2 puff Inhalation 4x Daily    aluminum-magnesium hydroxide-simethicone (MYLANTA) oral suspension 30 mL  30 mL Oral BID PRN    atorvastatin (LIPITOR) tablet 20 mg  20 mg Oral Daily    carvedilol (COREG) tablet 12 5 mg  12 5 mg Oral BID With Meals    cholecalciferol (VITAMIN D3) tablet 1,000 Units  1,000 Units Oral Daily    doxazosin (CARDURA) tablet 2 mg  2 mg Oral HS    famotidine (PEPCID) tablet 20 mg  20 mg Oral Daily    fluvoxaMINE (LUVOX) tablet 100 mg  100 mg Oral BID    heparin (porcine) subcutaneous injection 7,500 Units  7,500 Units Subcutaneous Q8H [address]    hydrALAZINE (APRESOLINE) injection 20 mg  20 mg Intravenous Q6H PRN    insulin glargine (LANTUS) subcutaneous injection 30 Units 0 3 mL  30 Units Subcutaneous HS    insulin lispro (HumaLOG) 100 units/mL subcutaneous injection 2-12 Units  2-12 Units Subcutaneous TID AC    insulin lispro (HumaLOG) 100 units/mL subcutaneous injection 3 Units  3 Units Subcutaneous TID With Meals    Labetalol HCl (NORMODYNE) injection 20 mg  20 mg Intravenous Q6H PRN    levothyroxine tablet 100 mcg  100 mcg Oral Early Morning    Lidocaine Viscous HCl (XYLOCAINE) 2 % mucosal solution 15 mL  15 mL Swish & Swallow 4x Daily PRN    lisinopril (ZESTRIL) tablet 20 mg  20 mg Oral Daily    LORazepam (ATIVAN) tablet 1 mg  1 mg Oral Q8H PRN    LORazepam (ATIVAN) tablet 1 mg  1 mg Oral Once PRN    lurasidone (LATUDA) tablet 20 mg  20 mg Oral After Dinner    ondansetron (ZOFRAN-ODT) dispersible tablet 4 mg  4 mg Oral Q6H PRN    pantoprazole (PROTONIX) EC tablet 40 mg  40 mg Oral BID AC    prochlorperazine (COMPAZINE) tablet 10 mg  10 mg Oral Q6H PRN    sucralfate (CARAFATE) tablet 1 g  1 g Oral BID    verapamil (CALAN-SR) CR tablet 240 mg  240 mg Oral Daily       VTE Pharmacologic Prophylaxis: Heparin  VTE Mechanical Prophylaxis: sequential compression device    Portions of the record may have been created with voice recognition software  Occasional wrong word or "sound a like" substitutions may have occurred due to the inherent limitations of voice recognition software    Read the chart carefully and recognize, using context, where substitutions have occurred   ==  Angelina Israel, [facility]  Internal Medicine Residency PGY-1

## 2021-06-01 NOTE — CONSULTS
Consultation - Nephrology   Gordon Serra 35 y o  male MRN: [id number]  Unit/Bed#: -01 Encounter: [id number]    ASSESSMENT/PLAN:   1  CKD III:  Baseline earlier this year around 1 5-1 7 but now may be 1 7-2  Appears to be steadily progressing over the past few years  · Suspect component of diabetic nephropathy and  hypertensive nephrosclerosis +/- NSAID use and hx of lithium   · CT Feb 2021:  No hydronephrosis  · UA Feb 2021:  3+ protein, trace glucose, occasional mucus threads   · UPC ratio 4 3g   · Continue lisinopril for proteinuria  · Discussed the importance of good blood pressure and diabetes control as well avoiding NSAIDs to prevent the progression of CKD  · Needs outpatient Nephrology follow up  2  Uncontrolled hypertension: was having difficultly getting his BP meds at home  · Previously at home was on amlodipine 10mg, metoprolol 50mg daily and losartan 100mg daily   · currently on verapamil 240mg daily (started for migraines), lisinopril 20mg daily, coreg 12 5mg bid   · Will need to monitor heart rate closely on 2 inotropic agents   · Will add doxazosin 2mg at bedtime   · Renal artery duplex showed no BRIAN   3  Schizoaffective Disorder   4  DM II: hgb A1c 9 8%    HISTORY OF PRESENT ILLNESS:  Requesting Physician: Rylee Salgado DO  Reason for Consult: uncontrolled hypertension and elevated creatinine     Gordon Serra is a 35y o  year old male who was admitted to [facility] on 5/27 with multiple complaints  Patient says he has had a few days of abdominal pain, shortness of breath and chest pain prior to admission  He also notes that he had missed many appointments recently as an outpatient and was unable to get his psych meds or blood pressure meds he thought he should come to the ER to get his medications  On presentation his blood pressure was 199/91 and creatinine was 1 99    His blood pressure medications have been adjusted by primary team but his pressure still remains above goal today nephrology was consulted  Patient denies following with a nephrologist as an outpatient but he is aware that he has some kidney disease  He has had diabetes for about 12 years and does have some early retinopathy  He does take ibuprofen 2 times every day  Also has history of lithium use many years ago for about 3 years  He currently is feeling fine denies any chest pain, shortness of breath, nausea, vomiting or diarrhea  He does have occasional headache  PAST MEDICAL HISTORY:  Past Medical History:   Diagnosis Date    Acute bronchitis due to other specified organisms 7/5/2019    Chronic headaches     [facility] ([email or web address]     Disease of thyroid gland     Esophagitis     Gastritis     Gastroparesis     GERD (gastroesophageal reflux disease)     Hypertension     Migraine     Migraines 03/11/2021    Obesity     Obsessive compulsive disorder     Psychiatric disorder     Schizoaffective disorder ([email or web address] 75 )        PAST SURGICAL HISTORY:  Past Surgical History:   Procedure Laterality Date    ESOPHAGOGASTRODUODENOSCOPY N/A 1/15/2019    Procedure: ESOPHAGOGASTRODUODENOSCOPY (EGD); Surgeon: Pérez Pinzon MD;  [facility];   Service: Gastroenterology       ALLERGIES:  Allergies   Allergen Reactions    Augmentin [Amoxicillin-Pot Clavulanate]     Amoxicillin Diarrhea    Clavulanic Acid Diarrhea    Erythromycin Diarrhea       SOCIAL HISTORY:  Social History     Substance and Sexual Activity   Alcohol Use Not Currently     Social History     Substance and Sexual Activity   Drug Use Not Currently     Social History     Tobacco Use   Smoking Status Never Smoker   Smokeless Tobacco Never Used       FAMILY HISTORY:  Family History   Problem Relation Age of Onset    COPD Mother     Hypertension Mother     Heart failure Mother     Rheum arthritis Family     Heart disease Family     Hypertension Father     Diabetes Father     Hyperlipidemia Father        MEDICATIONS:  Scheduled Meds:  Current Facility-Administered Medications   Medication Dose Route Frequency Provider Last Rate    acetaminophen  650 mg Oral Q6H PRN Marjorie Marilyn, DO      albuterol  2 puff Inhalation 4x Daily Jerri Shupp, DO      aluminum-magnesium hydroxide-simethicone  30 mL Oral BID PRN Mckenzie Sow, DO      atorvastatin  20 mg Oral Daily Jerri Shupp, DO      carvedilol  12 5 mg Oral BID With Meals Tatiana Logan, DO      cholecalciferol  1,000 Units Oral Daily Maykelriccy Ruiz, DO      famotidine  20 mg Oral Daily Harnishklilly Walton, DO      fluvoxaMINE  100 mg Oral BID Jerri Shupp, DO      heparin (porcine)  7,500 Units Subcutaneous Q8H [address] Debbie Ruiz, DO      hydrALAZINE  20 mg Intravenous Q6H PRN Capriccy Ruiz, DO      insulin glargine  30 Units Subcutaneous HS Tatiana Logan, DO      insulin lispro  2-12 Units Subcutaneous TID AC Marjorie Sanders, DO      Labetalol HCl  20 mg Intravenous Q6H PRN Capriccy Ruiz, DO      levothyroxine  100 mcg Oral Early Morning Jerri Shupp, DO      Lidocaine Viscous HCl  15 mL Swish & Swallow 4x Daily PRN Jerri upp, DO      lisinopril  20 mg Oral Daily Debbie Ruiz, DO      LORazepam  1 mg Oral Q8H PRN Mary Sis III, DO      LORazepam  1 mg Oral Once PRN Shemarmargot Leggett, DO      lurasidone  20 mg Oral After Dinner Mary Sis III, DO      ondansetron  4 mg Oral Q6H PRN Jerri Shupp, DO      pantoprazole  40 mg Oral BID AC Jerri Shupp, DO      prochlorperazine  10 mg Oral Q6H PRN Jerri Shupp, DO      sucralfate  1 g Oral BID Eunicebrad Cifuentes, DO      verapamil  240 mg Oral Daily Tatiana Logan, DO         PRN Meds:   acetaminophen    aluminum-magnesium hydroxide-simethicone    hydrALAZINE    Labetalol HCl    Lidocaine Viscous HCl    LORazepam    LORazepam    ondansetron    prochlorperazine    REVIEW OF SYSTEMS:  A complete review of systems was done   Pertinent positives and negatives noted in the HPI but otherwise the review of systems is negative  PHYSICAL EXAM:  Current Weight: Weight - Scale: 113 kg (249 lb 12 5 oz)  First Weight: Weight - Scale: 114 kg (251 lb 8 7 oz)  Vitals:    06/01/21 0754   BP: (!) 156/102   Pulse: 73   Resp: 20   Temp: 98 3 °F (36 8 °C)   SpO2: 96%       Intake/Output Summary (Last 24 hours) at 6/1/2021 1201  Last data filed at 6/1/2021 0904  Gross per 24 hour   Intake 600 ml   Output 2350 ml   Net -1750 ml     General:  No acute distress  Skin:  No rash  Eyes:  Sclerae anicteric  ENT:  Moist mucous membranes  Neck:  Trachea midline with no JVD  Chest:  Expiratory wheezing  bilaterally  CVS:  Regular rate and rhythm  Abdomen:  Soft, nontender, nondistended  Extremities:  No edema  Neuro:  Awake and alert  Psych:  Appropriate affect    Lab Results:   Results from last 7 days   Lab Units 06/01/21  0456 06/01/21  0455 05/31/21  1143 05/31/21  0459 05/30/21  0509 05/29/21  0531 05/28/21  0533 05/27/21  1746   WBC Thousand/uL  --  6 29  --  7 38  --  8 55 8 22 9 10   HEMOGLOBIN g/dL  --  12 0  --  12 0  --  11 7* 11 7* 12 6   HEMATOCRIT %  --  35 8*  --  36 4*  --  35 9* 35 0* 37 5   PLATELETS Thousands/uL  --  311  --  333  --  333 330  325 359   SODIUM mmol/L 140  --   --  139 141 143 139 135*   POTASSIUM mmol/L 4 4  --   --  4 2 3 9 4 0 3 8 4 6   CHLORIDE mmol/L 107  --   --  105 107 111* 107 103   CO2 mmol/L 26  --   --  30 28 26 27 26   BUN mg/dL 29*  --   --  23 24 21 28* 37*   CREATININE mg/dL 2 01*  --  1 92* 1 77* 1 86* 1 77* 1 66* 1 99*   CALCIUM mg/dL 8 9  --   --  8 9 8 8 8 8 8 6 9 4   MAGNESIUM mg/dL  --   --   --   --   --   --  2 3  --        Radiology Results:   US right upper quadrant   Final Result by Edith Orozco MD (05/31 1516)      Hepatomegaly  Workstation performed: [id number]         VAS renal artery complete   Final Result by Pattie Noel MD (05/28 1501)      XR chest 2 views   Final Result by Jovan Bonilla MD (05/27 1934)      No acute cardiopulmonary disease  Workstation performed: [id number]

## 2021-06-02 LAB
ANION GAP SERPL CALCULATED.3IONS-SCNC: 5 MMOL/L (ref 4–13)
ATRIAL RATE: 64 BPM
BASOPHILS # BLD AUTO: 0.05 THOUSANDS/ΜL (ref 0–0.1)
BASOPHILS NFR BLD AUTO: 1 % (ref 0–1)
BUN SERPL-MCNC: 38 MG/DL (ref 5–25)
CALCIUM SERPL-MCNC: 8.9 MG/DL (ref 8.3–10.1)
CHLORIDE SERPL-SCNC: 108 MMOL/L (ref 100–108)
CO2 SERPL-SCNC: 28 MMOL/L (ref 21–32)
CREAT SERPL-MCNC: 2.05 MG/DL (ref 0.6–1.3)
EOSINOPHIL # BLD AUTO: 0.24 THOUSAND/ΜL (ref 0–0.61)
EOSINOPHIL NFR BLD AUTO: 4 % (ref 0–6)
ERYTHROCYTE [DISTWIDTH] IN BLOOD BY AUTOMATED COUNT: 13.4 % (ref 11.6–15.1)
GFR SERPL CREATININE-BSD FRML MDRD: 39 ML/MIN/1.73SQ M
GLUCOSE SERPL-MCNC: 123 MG/DL (ref 65–140)
GLUCOSE SERPL-MCNC: 124 MG/DL (ref 65–140)
GLUCOSE SERPL-MCNC: 158 MG/DL (ref 65–140)
GLUCOSE SERPL-MCNC: 78 MG/DL (ref 65–140)
GLUCOSE SERPL-MCNC: 98 MG/DL (ref 65–140)
HCT VFR BLD AUTO: 35.2 % (ref 36.5–49.3)
HGB BLD-MCNC: 11.7 G/DL (ref 12–17)
IMM GRANULOCYTES # BLD AUTO: 0.03 THOUSAND/UL (ref 0–0.2)
IMM GRANULOCYTES NFR BLD AUTO: 1 % (ref 0–2)
LYMPHOCYTES # BLD AUTO: 1.82 THOUSANDS/ΜL (ref 0.6–4.47)
LYMPHOCYTES NFR BLD AUTO: 31 % (ref 14–44)
MAGNESIUM SERPL-MCNC: 2.3 MG/DL (ref 1.6–2.6)
MCH RBC QN AUTO: 28.3 PG (ref 26.8–34.3)
MCHC RBC AUTO-ENTMCNC: 33.2 G/DL (ref 31.4–37.4)
MCV RBC AUTO: 85 FL (ref 82–98)
MONOCYTES # BLD AUTO: 0.56 THOUSAND/ΜL (ref 0.17–1.22)
MONOCYTES NFR BLD AUTO: 9 % (ref 4–12)
NEUTROPHILS # BLD AUTO: 3.26 THOUSANDS/ΜL (ref 1.85–7.62)
NEUTS SEG NFR BLD AUTO: 54 % (ref 43–75)
NRBC BLD AUTO-RTO: 0 /100 WBCS
P AXIS: 47 DEGREES
PLATELET # BLD AUTO: 304 THOUSANDS/UL (ref 149–390)
PMV BLD AUTO: 9.6 FL (ref 8.9–12.7)
POTASSIUM SERPL-SCNC: 4.3 MMOL/L (ref 3.5–5.3)
PR INTERVAL: 184 MS
QRS AXIS: -12 DEGREES
QRSD INTERVAL: 110 MS
QT INTERVAL: 408 MS
QTC INTERVAL: 420 MS
RBC # BLD AUTO: 4.14 MILLION/UL (ref 3.88–5.62)
SODIUM SERPL-SCNC: 141 MMOL/L (ref 136–145)
T WAVE AXIS: 33 DEGREES
TROPONIN I SERPL-MCNC: <0.02 NG/ML
VENTRICULAR RATE: 64 BPM
WBC # BLD AUTO: 5.96 THOUSAND/UL (ref 4.31–10.16)

## 2021-06-02 PROCEDURE — 93005 ELECTROCARDIOGRAM TRACING: CPT

## 2021-06-02 PROCEDURE — 85025 COMPLETE CBC W/AUTO DIFF WBC: CPT | Performed by: STUDENT IN AN ORGANIZED HEALTH CARE EDUCATION/TRAINING PROGRAM

## 2021-06-02 PROCEDURE — 93010 ELECTROCARDIOGRAM REPORT: CPT | Performed by: INTERNAL MEDICINE

## 2021-06-02 PROCEDURE — 80048 BASIC METABOLIC PNL TOTAL CA: CPT | Performed by: PHYSICIAN ASSISTANT

## 2021-06-02 PROCEDURE — 99233 SBSQ HOSP IP/OBS HIGH 50: CPT | Performed by: INTERNAL MEDICINE

## 2021-06-02 PROCEDURE — 84484 ASSAY OF TROPONIN QUANT: CPT | Performed by: STUDENT IN AN ORGANIZED HEALTH CARE EDUCATION/TRAINING PROGRAM

## 2021-06-02 PROCEDURE — 83735 ASSAY OF MAGNESIUM: CPT | Performed by: INTERNAL MEDICINE

## 2021-06-02 PROCEDURE — 99231 SBSQ HOSP IP/OBS SF/LOW 25: CPT | Performed by: INTERNAL MEDICINE

## 2021-06-02 PROCEDURE — 82948 REAGENT STRIP/BLOOD GLUCOSE: CPT

## 2021-06-02 RX ORDER — LORAZEPAM 2 MG/ML
0.5 INJECTION INTRAMUSCULAR ONCE
Status: COMPLETED | OUTPATIENT
Start: 2021-06-02 | End: 2021-06-02

## 2021-06-02 RX ORDER — LISINOPRIL 20 MG/1
20 TABLET ORAL 2 TIMES DAILY
Status: DISCONTINUED | OUTPATIENT
Start: 2021-06-02 | End: 2021-06-03

## 2021-06-02 RX ORDER — BUTALBITAL, ACETAMINOPHEN AND CAFFEINE 50; 325; 40 MG/1; MG/1; MG/1
1 TABLET ORAL EVERY 4 HOURS PRN
Status: DISCONTINUED | OUTPATIENT
Start: 2021-06-02 | End: 2021-06-03 | Stop reason: HOSPADM

## 2021-06-02 RX ORDER — DOXAZOSIN 2 MG/1
2 TABLET ORAL 2 TIMES DAILY
Status: DISCONTINUED | OUTPATIENT
Start: 2021-06-02 | End: 2021-06-03

## 2021-06-02 RX ORDER — MAGNESIUM SULFATE HEPTAHYDRATE 40 MG/ML
2 INJECTION, SOLUTION INTRAVENOUS ONCE
Status: COMPLETED | OUTPATIENT
Start: 2021-06-02 | End: 2021-06-02

## 2021-06-02 RX ADMIN — INSULIN LISPRO 2 UNITS: 100 INJECTION, SOLUTION INTRAVENOUS; SUBCUTANEOUS at 16:25

## 2021-06-02 RX ADMIN — LORAZEPAM 0.5 MG: 2 INJECTION INTRAMUSCULAR; INTRAVENOUS at 14:55

## 2021-06-02 RX ADMIN — HEPARIN SODIUM 7500 UNITS: 5000 INJECTION INTRAVENOUS; SUBCUTANEOUS at 13:18

## 2021-06-02 RX ADMIN — DOXAZOSIN 2 MG: 2 TABLET ORAL at 11:33

## 2021-06-02 RX ADMIN — PROCHLORPERAZINE MALEATE 10 MG: 10 TABLET ORAL at 12:11

## 2021-06-02 RX ADMIN — LISINOPRIL 20 MG: 20 TABLET ORAL at 21:55

## 2021-06-02 RX ADMIN — ALUMINUM HYDROXIDE, MAGNESIUM HYDROXIDE, AND SIMETHICONE 30 ML: 200; 200; 20 SUSPENSION ORAL at 10:15

## 2021-06-02 RX ADMIN — VERAPAMIL HYDROCHLORIDE 240 MG: 240 TABLET ORAL at 09:01

## 2021-06-02 RX ADMIN — ONDANSETRON 4 MG: 4 TABLET, ORALLY DISINTEGRATING ORAL at 10:15

## 2021-06-02 RX ADMIN — CARVEDILOL 12.5 MG: 25 TABLET, FILM COATED ORAL at 16:17

## 2021-06-02 RX ADMIN — LEVOTHYROXINE SODIUM 100 MCG: 100 TABLET ORAL at 05:07

## 2021-06-02 RX ADMIN — ACETAMINOPHEN 650 MG: 325 TABLET, FILM COATED ORAL at 12:10

## 2021-06-02 RX ADMIN — INSULIN LISPRO 3 UNITS: 100 INJECTION, SOLUTION INTRAVENOUS; SUBCUTANEOUS at 07:27

## 2021-06-02 RX ADMIN — MAGNESIUM SULFATE HEPTAHYDRATE 2 G: 40 INJECTION, SOLUTION INTRAVENOUS at 10:39

## 2021-06-02 RX ADMIN — PANTOPRAZOLE SODIUM 40 MG: 40 TABLET, DELAYED RELEASE ORAL at 15:10

## 2021-06-02 RX ADMIN — BUTALBITAL, ACETAMINOPHEN, AND CAFFEINE 1 TABLET: 50; 325; 40 TABLET ORAL at 09:07

## 2021-06-02 RX ADMIN — PANTOPRAZOLE SODIUM 40 MG: 40 TABLET, DELAYED RELEASE ORAL at 05:07

## 2021-06-02 RX ADMIN — Medication 1000 UNITS: at 09:00

## 2021-06-02 RX ADMIN — BUTALBITAL, ACETAMINOPHEN, AND CAFFEINE 1 TABLET: 50; 325; 40 TABLET ORAL at 13:18

## 2021-06-02 RX ADMIN — SUCRALFATE 1 G: 1 TABLET ORAL at 21:56

## 2021-06-02 RX ADMIN — DOXAZOSIN 2 MG: 2 TABLET ORAL at 21:55

## 2021-06-02 RX ADMIN — ALBUTEROL SULFATE 2 PUFF: 90 AEROSOL, METERED RESPIRATORY (INHALATION) at 11:34

## 2021-06-02 RX ADMIN — LABETALOL 20 MG/4 ML (5 MG/ML) INTRAVENOUS SYRINGE 20 MG: at 10:00

## 2021-06-02 RX ADMIN — HEPARIN SODIUM 7500 UNITS: 5000 INJECTION INTRAVENOUS; SUBCUTANEOUS at 05:07

## 2021-06-02 RX ADMIN — HEPARIN SODIUM 7500 UNITS: 5000 INJECTION INTRAVENOUS; SUBCUTANEOUS at 21:55

## 2021-06-02 RX ADMIN — CARVEDILOL 12.5 MG: 25 TABLET, FILM COATED ORAL at 07:25

## 2021-06-02 RX ADMIN — LORAZEPAM 1 MG: 1 TABLET ORAL at 09:46

## 2021-06-02 RX ADMIN — LURASIDONE HYDROCHLORIDE 20 MG: 20 TABLET, FILM COATED ORAL at 19:59

## 2021-06-02 RX ADMIN — ALBUTEROL SULFATE 2 PUFF: 90 AEROSOL, METERED RESPIRATORY (INHALATION) at 08:58

## 2021-06-02 RX ADMIN — ALBUTEROL SULFATE 2 PUFF: 90 AEROSOL, METERED RESPIRATORY (INHALATION) at 19:59

## 2021-06-02 RX ADMIN — FLUVOXAMINE MALEATE 100 MG: 50 TABLET ORAL at 09:00

## 2021-06-02 RX ADMIN — BUTALBITAL, ACETAMINOPHEN, AND CAFFEINE 1 TABLET: 50; 325; 40 TABLET ORAL at 22:41

## 2021-06-02 RX ADMIN — ATORVASTATIN CALCIUM 20 MG: 20 TABLET, FILM COATED ORAL at 09:01

## 2021-06-02 RX ADMIN — LISINOPRIL 20 MG: 20 TABLET ORAL at 09:00

## 2021-06-02 RX ADMIN — FLUVOXAMINE MALEATE 100 MG: 50 TABLET ORAL at 20:00

## 2021-06-02 RX ADMIN — FAMOTIDINE 20 MG: 20 TABLET ORAL at 09:01

## 2021-06-02 RX ADMIN — SUCRALFATE 1 G: 1 TABLET ORAL at 09:00

## 2021-06-02 RX ADMIN — INSULIN GLARGINE 30 UNITS: 100 INJECTION, SOLUTION SUBCUTANEOUS at 21:56

## 2021-06-02 RX ADMIN — ACETAMINOPHEN 650 MG: 325 TABLET, FILM COATED ORAL at 18:08

## 2021-06-02 RX ADMIN — ACETAMINOPHEN 650 MG: 325 TABLET, FILM COATED ORAL at 05:43

## 2021-06-02 NOTE — PROGRESS NOTES
INTERNAL MEDICINE RESIDENCY PROGRESS NOTE     Name: Lainey Knight   Age & Sex: 35 y o  male   MRN: [id number]  Unit/Bed#: -01   Encounter: [id number]  Team: SOD Team C     PATIENT INFORMATION     Name: Lainey Knight   Age & Sex: 35 y o  male   MRN: [id number]  Hospital Stay Days: 4    ASSESSMENT/PLAN     Principal Problem:    Hypertensive urgency  Active Problems:    Type 2 diabetes mellitus, with long-term current use of insulin (HCC)    OCD (obsessive compulsive disorder)    Schizoaffective disorder (HCC)    Acquired hypothyroidism    Epigastric pain    Migraine without aura and without status migrainosus, not intractable    Acute on chronic kidney failure ([facility])    GERD (gastroesophageal reflux disease)      GERD (gastroesophageal reflux disease)  Assessment & Plan  History of GERD  On protonix 40 mg BID as an outpatient  Plan:  · Continue protonix, pepcid, and carafate    Acute on chronic kidney failure [facility])  Assessment & Plan  Lab Results   Component Value Date    EGFR 39 06/02/2021    EGFR 39 06/01/2021    EGFR 42 05/31/2021    CREATININE 2 05 (H) 06/02/2021    CREATININE 2 01 (H) 06/01/2021    CREATININE 1 92 (H) 05/31/2021     Patient presenting with a creatinine of 1 99  Baseline appears to be 1 5-1 6  Likely prerenal due to poor PO intake 2/2 nausea as well as hypertension  Plan:  · Cr stable at 2 today  · Nephrology consulted - appreciate recommendations  · Likely 2/2 hypertension and uncontrolled diabetes      Migraine without aura and without status migrainosus, not intractable  Assessment & Plan  Patient with history of migraines and currently follows with neurology as an outpatient; last evaluated 10/2020  Currently managed as an outpatient on Aimovig 140 mg injection every 30 days       Plan:  -long history of migraines, seen by neurology outpatient    -continue Verapamil  mg for combo of migraine prophylaxis and blood pressure control  -on discharge recommend starting B2 riboflavin 400 mg daily, in addition to 200 mg magnesium daily  -consider discharge with abortive medication Imitrex  · Tylenol 650 q6 hours PRN  · Outpatient neurology follow up     Epigastric pain  Assessment & Plan  Patient with a history of GERD and gastritis  Most recent EGD completed on 3/11/2021 demonstrating mild gastritis  Currently managed as an outpatient on protonix 40 mg BID and carafate 1g daily  Plan:  · Continue Protonix, pepcid, and carafate  · zofran PRN  · Carb-controlled diet and tighter glucose control  · Right upper quadrant ultrasound showed hepatomegaly    Acquired hypothyroidism  Assessment & Plan  History of hypothyroidism; currently on levothyroxine 100 mcg as an outpatient  Most recent TSH 9 980 11/2020  Plan:  · 5/28: TSH elevated, free T4 low  Patient has not taken his home dose levo 100 mcg in at least a month, and his labs reflect this  Restarted patient on home dose levo 100 mcg  · Recommend Outpatient repeat TSH in 4 weeks outpatient      Schizoaffective disorder [facility])  Assessment & Plan  History of schizoaffective disorder  Mood currently stable  Managed on Fluroxamine 100 mg BID and Cariprazine 6 mg daily (non-formulary) as an outpatient  Follows with outpatient psychiatry but has not been evaluated by them in the last 6 months  Plan:  5/28: Patient's varying symptom complaints seem unrelated to an organic issue and more so illness anxiety vs psychosomatic disorder  Psychiatry was consulted and will be seeing the patient this afternoon for further recommendations on medications  Patient will need to follow-up with psych outpatient as well  For now is on Fluvoxamine 100 mg BID  No active SI/HI, depression, or hallucinations   Is a bit anxious  -will obtain baseline EKG in anticipation of starting many new medications, in addition to psych meds    5/29: continue with psych recommendations of Fluvoxamine 100 mg bid and Latuda 20 mg HS- he should be discharged with a 30 day supply of each  Dr Misa John psychiatrist was filling out the prior authorization for the [facility]  Will need to f/u with psychiatrist outpatient  CM gave patient list of psychiatrists to contact    -while inpatient can receive ativan PRN for anxiety  Psych recommends not d/c with this  Case management following for prior auth for Latuda - appreciate assistance  · Medication approved - [facility] and patient's [facility] ordering more medication to supply patient, will discuss with patient where he would like Rx filled based on availability        Type 2 diabetes mellitus, with long-term current use of insulin [facility])  Assessment & Plan  Lab Results   Component Value Date    HGBA1C 9 8 (H) 05/29/2021     (P) [phone]     Patient with PMHx of insulin dependent DM  Most recent hemoglobin A1c 10 5 on 11/2020  Currently managed as an outpatient on Basaglar 40U qHS and lispro 10U TID  An an outpatient, patient states that he checks his BG 2x daily and obtains readings of 200-300  Plan:  · Continue POC glucose checks; BG goal of 140-180  · Insulin Regimen:  · Lantus 30U qHS  · Lispro 5 units TID With meals  · SSI with meals  · Diabetic, carbohydrate restricted diet    * Hypertensive urgency  Assessment & Plan  BP on admission 199/91  Patient complaining of abdominal pain with nausea and SOB on admission but otherwise denying headache or visual changes  Patient has had repeated admissions with elevated BP and does admit to compliance with home blood pressure regimen of amlodipine 10 mg daily, metoprolol succinate 50 mg daily, and losartan 100 mg daily  Patient has underwent secondary HTN workup which has been unremarkable thus far; however, patient has not had renal artery dopplers completed to rule out renal artery stenosis       Plan:  · Medications:  · Continue verapamil  mg daily for combo of BP and migraine control  · Continue lisinopril 20 mg daily for renal-protective and BP purposes  · Continue Coreg 12 5 mg b i d   · Continue Cardura 2mg BID  · Labetalol 20 mg q6 hours PRN for SBP >160 (first line)  · Hydralazine 20 mg q6 hours PRN for SBP >160 (second line)  ·  renal artery dopplers without pathology  CT abd/pelvis without adrenal masses  · Nephrology consulted - appreciate recommendations      Disposition: plan for discharge pending medication coverage    SUBJECTIVE     Patient seen and examined  No acute events overnight  Reports continues to have no abdominal pain  Does complain of ongoing headache, improved with fioricet and tylenol  States that he continues to feel well overall  Denies dizziness, lightheaded, chest pain, dyspnea, abdominal pain, N/V/D/C, or urinary complaint  OBJECTIVE     Vitals:    21 1050 21 1126 21 1137 21 1220   BP:  144/90 (!) 163/106 (!) 161/104   BP Location:       Pulse: 72      Resp:       Temp:   98 5 °F (36 9 °C)    TempSrc:   Oral    SpO2:       Weight:       Height:          Temperature:   Temp (24hrs), Av 3 °F (36 8 °C), Min:97 6 °F (36 4 °C), Max:98 9 °F (37 2 °C)    Temperature: 98 5 °F (36 9 °C)  Intake & Output:  I/O       701 -  0700  07 -  0700  07 -  0700    P  O  1800 2360     Total Intake(mL/kg) 1800 (15 9) 2360 (20 9)     Urine (mL/kg/hr) 2300 (0 8) 1750 (0 6)     Stool  0     Total Output 2300 1750     Net -500 +610            Unmeasured Urine Occurrence 3 x      Unmeasured Stool Occurrence  1 x         Weights:   IBW (Ideal Body Weight): 54 6 kg    Body mass index is 45 69 kg/m²  Weight (last 2 days)     Date/Time   Weight    21 03:46:17   113 (249 78)            Physical Exam  Vitals signs reviewed  Constitutional:       General: He is not in acute distress  Appearance: Normal appearance  He is obese  He is not ill-appearing, toxic-appearing or diaphoretic  HENT:      Head: Normocephalic and atraumatic        Right Ear: External ear normal       Left Ear: External ear normal       Nose: Nose normal       Mouth/Throat:      Mouth: Mucous membranes are moist       Pharynx: Oropharynx is clear  Eyes:      General:         Right eye: No discharge  Left eye: No discharge  Conjunctiva/sclera: Conjunctivae normal    Neck:      Musculoskeletal: Normal range of motion  Cardiovascular:      Rate and Rhythm: Normal rate and regular rhythm  Heart sounds: Normal heart sounds  Pulmonary:      Effort: Pulmonary effort is normal  No respiratory distress  Breath sounds: Normal breath sounds  No wheezing  Abdominal:      General: Bowel sounds are normal  There is no distension  Palpations: Abdomen is soft  Tenderness: There is no abdominal tenderness  Musculoskeletal: Normal range of motion  General: No swelling or tenderness  Right lower leg: No edema  Left lower leg: No edema  Skin:     General: Skin is warm and dry  Coloration: Skin is not jaundiced  Findings: No bruising  Neurological:      General: No focal deficit present  Mental Status: He is alert and oriented to person, place, and time  Psychiatric:         Mood and Affect: Mood normal          Speech: Speech normal          Behavior: Behavior normal  Behavior is cooperative  Thought Content: Thought content normal  Thought content is not paranoid or delusional        LABORATORY DATA     Labs: I have personally reviewed pertinent reports    Results from last 7 days   Lab Units 06/02/21 0523 06/01/21  0455 05/31/21  0459   WBC Thousand/uL 5 96 6 29 7 38   HEMOGLOBIN g/dL 11 7* 12 0 12 0   HEMATOCRIT % 35 2* 35 8* 36 4*   PLATELETS Thousands/uL 304 311 333   NEUTROS PCT % 54 61 63   MONOS PCT % 9 9 9      Results from last 7 days   Lab Units 06/02/21  0523 06/01/21  0456 05/31/21  1143 05/31/21  0459  05/27/21  1746   POTASSIUM mmol/L 4 3 4 4  --  4 2   < > 4 6   CHLORIDE mmol/L 108 107  --  105   < > 103   CO2 mmol/L 28 26  --  30   < > 26   BUN mg/dL 38* 29*  --  23   < > 37*   CREATININE mg/dL 2 05* 2 01* 1 92* 1 77*   < > 1 99*   CALCIUM mg/dL 8 9 8 9  --  8 9   < > 9 4   ALK PHOS U/L  --  52  --  56  --  59   ALT U/L  --  22  --  23  --  18   AST U/L  --  17  --  21  --  14    < > = values in this interval not displayed  Results from last 7 days   Lab Units 06/02/21  0523 05/28/21  0533   MAGNESIUM mg/dL 2 3 2 3                  Results from last 7 days   Lab Units 06/02/21  1056 05/27/21  1746   TROPONIN I ng/mL <0 02 <0 02       IMAGING & DIAGNOSTIC TESTING     Radiology Results: I have personally reviewed pertinent reports  Xr Chest 2 Views    Result Date: 5/27/2021  Impression: No acute cardiopulmonary disease  Workstation performed: [id number]     Us Right Upper Quadrant    Result Date: 5/31/2021  Impression: Hepatomegaly  Workstation performed: [id number]     Other Diagnostic Testing: I have personally reviewed pertinent reports      ACTIVE MEDICATIONS     Current Facility-Administered Medications   Medication Dose Route Frequency    acetaminophen (TYLENOL) tablet 650 mg  650 mg Oral Q6H PRN    albuterol (PROVENTIL HFA,VENTOLIN HFA) inhaler 2 puff  2 puff Inhalation 4x Daily    aluminum-magnesium hydroxide-simethicone (MYLANTA) oral suspension 30 mL  30 mL Oral BID PRN    atorvastatin (LIPITOR) tablet 20 mg  20 mg Oral Daily    butalbital-acetaminophen-caffeine (FIORICET,ESGIC) -40 mg per tablet 1 tablet  1 tablet Oral Q4H PRN    carvedilol (COREG) tablet 12 5 mg  12 5 mg Oral BID With Meals    cholecalciferol (VITAMIN D3) tablet 1,000 Units  1,000 Units Oral Daily    doxazosin (CARDURA) tablet 2 mg  2 mg Oral BID    famotidine (PEPCID) tablet 20 mg  20 mg Oral Daily    fluvoxaMINE (LUVOX) tablet 100 mg  100 mg Oral BID    heparin (porcine) subcutaneous injection 7,500 Units  7,500 Units Subcutaneous Q8H [facility]    hydrALAZINE (APRESOLINE) injection 20 mg  20 mg Intravenous Q6H PRN    insulin glargine (LANTUS) subcutaneous injection 30 Units 0 3 mL  30 Units Subcutaneous HS    insulin lispro (HumaLOG) 100 units/mL subcutaneous injection 2-12 Units  2-12 Units Subcutaneous TID AC    insulin lispro (HumaLOG) 100 units/mL subcutaneous injection 5 Units  5 Units Subcutaneous TID With Meals    Labetalol HCl (NORMODYNE) injection 20 mg  20 mg Intravenous Q6H PRN    levothyroxine tablet 100 mcg  100 mcg Oral Early Morning    Lidocaine Viscous HCl (XYLOCAINE) 2 % mucosal solution 15 mL  15 mL Swish & Swallow 4x Daily PRN    lisinopril (ZESTRIL) tablet 20 mg  20 mg Oral BID    LORazepam (ATIVAN) tablet 1 mg  1 mg Oral Q8H PRN    lurasidone (LATUDA) tablet 20 mg  20 mg Oral After Dinner    ondansetron (ZOFRAN-ODT) dispersible tablet 4 mg  4 mg Oral Q6H PRN    pantoprazole (PROTONIX) EC tablet 40 mg  40 mg Oral BID AC    prochlorperazine (COMPAZINE) tablet 10 mg  10 mg Oral Q6H PRN    sucralfate (CARAFATE) tablet 1 g  1 g Oral BID    verapamil (CALAN-SR) CR tablet 240 mg  240 mg Oral Daily       VTE Pharmacologic Prophylaxis: Heparin  VTE Mechanical Prophylaxis: sequential compression device    Portions of the record may have been created with voice recognition software  Occasional wrong word or "sound a like" substitutions may have occurred due to the inherent limitations of voice recognition software    Read the chart carefully and recognize, using context, where substitutions have occurred   ==  Lexie Reese, [facility]  Internal Medicine Residency PGY-1

## 2021-06-02 NOTE — PLAN OF CARE
Problem: PAIN - ADULT  Goal: Verbalizes/displays adequate comfort level or baseline comfort level  Description: Interventions:  - Encourage patient to monitor pain and request assistance  - Assess pain using appropriate pain scale  - Administer analgesics based on type and severity of pain and evaluate response  - Implement non-pharmacological measures as appropriate and evaluate response  - Consider cultural and social influences on pain and pain management  - Notify physician/advanced practitioner if interventions unsuccessful or patient reports new pain  Outcome: Progressing     Problem: INFECTION - ADULT  Goal: Absence or prevention of progression during hospitalization  Description: INTERVENTIONS:  - Assess and monitor for signs and symptoms of infection  - Monitor lab/diagnostic results  - Monitor all insertion sites, i e  indwelling lines, tubes, and drains  - Monitor endotracheal if appropriate and nasal secretions for changes in amount and color  - [place] appropriate cooling/warming therapies per order  - Administer medications as ordered  - Instruct and encourage patient and family to use good hand hygiene technique  - Identify and instruct in appropriate isolation precautions for identified infection/condition  Outcome: Progressing     Problem: SAFETY ADULT  Goal: Patient will remain free of falls  Description: INTERVENTIONS:  - Assess patient frequently for physical needs  -  Identify cognitive and physical deficits and behaviors that affect risk of falls    -  [place] fall precautions as indicated by assessment   - Educate patient/family on patient safety including physical limitations  - Instruct patient to call for assistance with activity based on assessment  - Modify environment to reduce risk of injury  - Consider OT/PT consult to assist with strengthening/mobility  Outcome: Progressing  Goal: Maintain or return to baseline ADL function  Description: INTERVENTIONS:  -  Assess patient's ability to carry out ADLs; assess patient's baseline for ADL function and identify physical deficits which impact ability to perform ADLs (bathing, care of mouth/teeth, toileting, grooming, dressing, etc )  - Assess/evaluate cause of self-care deficits   - Assess range of motion  - Assess patient's mobility; develop plan if impaired  - Assess patient's need for assistive devices and provide as appropriate  - Encourage maximum independence but intervene and supervise when necessary  - Involve family in performance of ADLs  - Assess for home care needs following discharge   - Consider OT consult to assist with ADL evaluation and planning for discharge  - Provide patient education as appropriate  Outcome: Progressing  Goal: Maintain or return mobility status to optimal level  Description: INTERVENTIONS:  - Assess patient's baseline mobility status (ambulation, transfers, stairs, etc )    - Identify cognitive and physical deficits and behaviors that affect mobility  - Identify mobility aids required to assist with transfers and/or ambulation (gait belt, sit-to-stand, lift, walker, cane, etc )  - [place] fall precautions as indicated by assessment  - Record patient progress and toleration of activity level on Mobility SBAR; progress patient to next Phase/Stage  - Instruct patient to call for assistance with activity based on assessment  - Consider rehabilitation consult to assist with strengthening/weightbearing, etc   Outcome: Progressing     Problem: DISCHARGE PLANNING  Goal: Discharge to home or other facility with appropriate resources  Description: INTERVENTIONS:  - Identify barriers to discharge w/patient and caregiver  - Arrange for needed discharge resources and transportation as appropriate  - Identify discharge learning needs (meds, wound care, etc )  - Arrange for interpretive services to assist at discharge as needed  - Refer to Case Management Department for coordinating discharge planning if the patient needs post-hospital services based on physician/advanced practitioner order or complex needs related to functional status, cognitive ability, or social support system  Outcome: Progressing     Problem: Knowledge Deficit  Goal: Patient/family/caregiver demonstrates understanding of disease process, treatment plan, medications, and discharge instructions  Description: Complete learning assessment and assess knowledge base  Interventions:  - Provide teaching at level of understanding  - Provide teaching via preferred learning methods  Outcome: Progressing     Problem: CARDIOVASCULAR - ADULT  Goal: Maintains optimal cardiac output and hemodynamic stability  Description: INTERVENTIONS:  - Monitor I/O, vital signs and rhythm  - Monitor for S/S and trends of decreased cardiac output  - Administer and titrate ordered vasoactive medications to optimize hemodynamic stability  - Assess quality of pulses, skin color and temperature  - Assess for signs of decreased coronary artery perfusion  - Instruct patient to report change in severity of symptoms  Outcome: Progressing     Problem: Nutrition/Hydration-ADULT  Goal: Nutrient/Hydration intake appropriate for improving, restoring or maintaining nutritional needs  Description: Monitor and assess patient's nutrition/hydration status for malnutrition  Collaborate with interdisciplinary team and initiate plan and interventions as ordered  Monitor patient's weight and dietary intake as ordered or per policy  Utilize nutrition screening tool and intervene as necessary  Determine patient's food preferences and provide high-protein, high-caloric foods as appropriate       INTERVENTIONS:  - Monitor oral intake, urinary output, labs, and treatment plans  - Assess nutrition and hydration status and recommend course of action  - Evaluate amount of meals eaten  - Assist patient with eating if necessary   - Allow adequate time for meals  - Recommend/ encourage appropriate diets, oral nutritional supplements, and vitamin/mineral supplements  - Order, calculate, and assess calorie counts as needed  - Recommend, monitor, and adjust tube feedings and TPN/PPN based on assessed needs  - Assess need for intravenous fluids  - Provide specific nutrition/hydration education as appropriate  - Include patient/family/caregiver in decisions related to nutrition  Outcome: Progressing     Problem: Potential for Falls  Goal: Patient will remain free of falls  Description: INTERVENTIONS:  - Assess patient frequently for physical needs  -  Identify cognitive and physical deficits and behaviors that affect risk of falls    -  [place] fall precautions as indicated by assessment   - Educate patient/family on patient safety including physical limitations  - Instruct patient to call for assistance with activity based on assessment  - Modify environment to reduce risk of injury  - Consider OT/PT consult to assist with strengthening/mobility  Outcome: Progressing

## 2021-06-02 NOTE — TELEPHONE ENCOUNTER
Patient of Izabella Nicolas PA-C  Received vm from Al at [employer] asking if we are submitting the appeal for the denial for emgality   [phone]    denial letter in epic dated 5/21 denying emgality:  need to document patient has been re-evaluated and have had a reduction in number of headache days or reduction in severity of migraine    I called patient, reached vm but unable to leave message as mailbox not set up  I spoke to patient's father who informed patient currently hospitalized  He reported only mild improvement with emgality; would like to consider an alternate in the future, prefers to discuss after discharge  I suggested he call back after patient discharged  I will also f/u in one week for status

## 2021-06-02 NOTE — UTILIZATION REVIEW
Continued Stay Review    Date: 06/02/2021                        Current Patient Class: Inpatient  Current Level of Care: Med/Surg    HPI:41 y o  male initially admitted on 05/29/2021  Hypertensive Urgency  Assessment/Plan: Not medically for DC today due to spike in PB  Medications:  Continue verapamil  mg daily for combo of BP and migraine control  Continue lisinopril 20 mg daily for renal-protective and BP purposes  Continue Coreg 12 5 mg b i d  Continue Cardura 2mg BID  Labetalol 20 mg q6 hours PRN for SBP >160 (first line)  Hydralazine 20 mg q6 hours PRN for SBP >160 (second line)  renal artery dopplers without pathology  CT abd/pelvis without adrenal masses    VTE Pharmacologic Prophylaxis: Heparin  VTE Mechanical Prophylaxis: sequential compression device  Vital Signs:   BP (!) 173/111 Comment: nurse aware  Pulse 63   Temp 98 2 °F (36 8 °C) (Oral)   Resp 20   Ht 5' 2" (1 575 m) Comment: 3/11/21 chart review  Wt 113 kg (249 lb 12 5 oz)   SpO2 96%   BMI 45 69 kg/m²   Vitals:     06/02/21 1050 06/02/21 1126 06/02/21 1137 06/02/21 1220   BP:   144/90 (!) 163/106 (!) 161/104   BP Location:           Pulse: 72         Resp:           Temp:     98 5 °F (36 9 °C)     TempSrc:     Oral     SpO2:           Weight:           Height:                 Pertinent Labs/Diagnostic Results:     Results from last 7 days   Lab Units 06/02/21  0523 06/01/21  0455 05/31/21  0459 05/29/21  0531 05/28/21  0533   WBC Thousand/uL 5 96 6 29 7 38 8 55 8 22   HEMOGLOBIN g/dL 11 7* 12 0 12 0 11 7* 11 7*   HEMATOCRIT % 35 2* 35 8* 36 4* 35 9* 35 0*   PLATELETS Thousands/uL 304 311 333 333 330  325   NEUTROS ABS Thousands/µL 3 26 3 81 4 71  --  5 57     Results from last 7 days   Lab Units 06/02/21  0523 06/01/21  0456 05/31/21  1143 05/31/21  0459 05/30/21  0509 05/29/21  0531 05/28/21  0533   SODIUM mmol/L 141 140  --  139 141 143 139   POTASSIUM mmol/L 4 3 4 4  --  4 2 3 9 4 0 3 8   CHLORIDE mmol/L 108 107  --  105 107 111* 107   CO2 mmol/L 28 26  --  30 28 26 27   ANION GAP mmol/L 5 7  --  4 6 6 5   BUN mg/dL 38* 29*  --  23 24 21 28*   CREATININE mg/dL 2 05* 2 01* 1 92* 1 77* 1 86* 1 77* 1 66*   EGFR ml/min/1 73sq m 39 39 42 46 43 46 50   CALCIUM mg/dL 8 9 8 9  --  8 9 8 8 8 8 8 6   MAGNESIUM mg/dL  --   --   --   --   --   --  2 3     Results from last 7 days   Lab Units 06/01/21  0456 05/31/21  0459 05/27/21  1746   AST U/L 17 21 14   ALT U/L 22 23 18   ALK PHOS U/L 52 56 59   TOTAL PROTEIN g/dL 6 1* 6 3* 6 9   ALBUMIN g/dL 2 7* 2 8* 3 0*   TOTAL BILIRUBIN mg/dL 0 60 1 10* 0 35     Results from last 7 days   Lab Units 06/02/21  0608 06/01/21  2059 06/01/21  1711 06/01/21  1021 06/01/21  0636 05/31/21  2110 05/31/21  1624 05/31/21  1022 05/31/21  0609 05/30/21  2106 05/30/21  1619 05/30/21  1042   POC GLUCOSE mg/dl 78 252* 201* 220* 168* 209* 119 256* 193* 248* 207* 186*     Results from last 7 days   Lab Units 06/02/21  0523 06/01/21  0456 05/31/21  0459 05/30/21  0509 05/29/21  0531 05/28/21  0533 05/27/21  1746   GLUCOSE RANDOM mg/dL 98 170* 183* 68 134 230* 317*     Results from last 7 days   Lab Units 05/29/21  0531 05/27/21  1746   HEMOGLOBIN A1C % 9 8* 9 8*   EAG mg/dl 235 235     BETA-HYDROXYBUTYRATE   Date Value Ref Range Status   10/26/2020 1 4 (H) <0 6 mmol/L Final      Results from last 7 days   Lab Units 05/27/21  1746   TROPONIN I ng/mL <0 02     Results from last 7 days   Lab Units 05/27/21  1746   TSH 3RD GENERATON uIU/mL 13 400*     Results from last 7 days   Lab Units 05/31/21  1143 05/27/21  1746   LIPASE u/L  --  65*   AMYLASE IU/L 35  --      Results from last 7 days   Lab Units 05/31/21  1144   CREATININE UR mg/dL 52 3     Medications:   Scheduled Medications:  albuterol, 2 puff, Inhalation, 4x Daily  atorvastatin, 20 mg, Oral, Daily  carvedilol, 12 5 mg, Oral, BID With Meals  cholecalciferol, 1,000 Units, Oral, Daily  doxazosin, 2 mg, Oral, HS  famotidine, 20 mg, Oral, Daily  fluvoxaMINE, 100 mg, Oral, BID  heparin (porcine), 7,500 Units, Subcutaneous, Q8H [facility]  insulin glargine, 30 Units, Subcutaneous, HS  insulin lispro, 2-12 Units, Subcutaneous, TID AC  insulin lispro, 5 Units, Subcutaneous, TID With Meals  levothyroxine, 100 mcg, Oral, Early Morning  lisinopril, 20 mg, Oral, Daily  lurasidone, 20 mg, Oral, After Dinner  pantoprazole, 40 mg, Oral, BID AC  sucralfate, 1 g, Oral, BID  verapamil, 240 mg, Oral, Daily      Continuous IV Infusions:     PRN Meds:  acetaminophen, 650 mg, Oral, Q6H PRN  aluminum-magnesium hydroxide-simethicone, 30 mL, Oral, BID PRN  butalbital-acetaminophen-caffeine, 1 tablet, Oral, Q4H PRN  hydrALAZINE, 20 mg, Intravenous, Q6H PRN  Labetalol HCl, 20 mg, Intravenous, Q6H PRN  Lidocaine Viscous HCl, 15 mL, Swish & Swallow, 4x Daily PRN  LORazepam, 1 mg, Oral, Q8H PRN  ondansetron, 4 mg, Oral, Q6H PRN  prochlorperazine, 10 mg, Oral, Q6H PRN        Discharge Plan: D    Network Utilization Review Department  ATTENTION: Please call with any questions or concerns to [phone] and carefully listen to the prompts so that you are directed to the right person  All voicemails are confidential   Lawanda Rose all requests for admission clinical reviews, approved or denied determinations and any other requests to dedicated fax number below belonging to the campus where the patient is receiving treatment   List of dedicated fax numbers for the Facilities:  [address] DENIALS (Administrative/Medical Necessity) [phone]   [address] (Maternity/NICU/Pediatrics) [phone]   [address] 40 [address] [facility] Yury [place] 3633 [address] [facility] [address] [place] [address] 331 [facility] [phone]   Filemon Ballard 37 [address] [place] [address] [phone]

## 2021-06-02 NOTE — PROGRESS NOTES
NEPHROLOGY PROGRESS NOTE   Stalin Rowell 35 y o  male MRN: [id number]  Unit/Bed#: -01 Encounter: [id number]      ASSESSMENT/PLAN:  1  CKD III:  Baseline earlier this year around 1 5-1 7 but now may be 1 7-2  Appears to be steadily progressing over the past few years  · Suspect component of diabetic nephropathy and  hypertensive nephrosclerosis +/- NSAID use and hx of lithium   · CT Feb 2021:  No hydronephrosis  · UA Feb 2021:  3+ protein, trace glucose, occasional mucus threads   · UPC ratio 4 3g   · Continue lisinopril for proteinuria  · Discussed the importance of good blood pressure and diabetes control as well avoiding NSAIDs to prevent the progression of CKD  · Needs outpatient Nephrology follow up--requests [place] office   2  Uncontrolled hypertension: was having difficultly getting his BP meds at home  · Previously at home was on amlodipine 10mg, metoprolol 50mg daily and losartan 100mg daily   · currently on verapamil 240mg daily (started for migraines), lisinopril 20mg daily, coreg 12 5mg bid, doxazosin 2mg qHs started yesterday   ? Will need to monitor heart rate closely on 2 inotropic agents   · BP was good overnight but now high again today-- will increase doxazosin to 2mg bid   · Renal artery duplex showed no BRIAN   3  Schizoaffective Disorder   4  DM II: hgb A1c 9 8%    Plan Summary:    Increase doxazosin to 2mg po bid    Trend BMP    Once discharged will follow up in [place] office    Discussed with primary team    SUBJECTIVE:  Feeling okay  Has a lot of questions about CKD and future dialysis      OBJECTIVE:  Current Weight: Weight - Scale: 113 kg (249 lb 12 5 oz)  Vitals:    06/02/21 1220   BP: (!) 161/104   Pulse:    Resp:    Temp:    SpO2:        Intake/Output Summary (Last 24 hours) at 6/2/2021 1241  Last data filed at 6/2/2021 1223  Gross per 24 hour   Intake 3440 ml   Output 3300 ml   Net 140 ml       General:  No acute distress  Skin:  No rash  Eyes:  Sclerae anicteric  ENT: Moist mucous membranes  Neck:  Trachea midline with no JVD  Chest:  Clear to auscultation bilaterally  CVS:  Regular rate and rhythm  Abdomen:  Soft, nontender, nondistended  Extremities:  No edema  Neuro:  Awake and alert  Psych:  Appropriate affect        Medications:  Scheduled Meds:  Current Facility-Administered Medications   Medication Dose Route Frequency Provider Last Rate    acetaminophen  650 mg Oral Q6H PRN Marjorie Sanders, DO      albuterol  2 puff Inhalation 4x Daily Jerri Shupp, DO      aluminum-magnesium hydroxide-simethicone  30 mL Oral BID PRN Debbie Ruiz, DO      atorvastatin  20 mg Oral Daily Jerri Shupp, DO      butalbital-acetaminophen-caffeine  1 tablet Oral Q4H PRN Irma Fuel, DO      carvedilol  12 5 mg Oral BID With Meals Tatiana Logan, DO      cholecalciferol  1,000 Units Oral Daily Debbie Napier, DO      doxazosin  2 mg Oral BID Elver Vann, DO      famotidine  20 mg Oral Daily Mona Walton, DO      fluvoxaMINE  100 mg Oral BID Jerri Mccoyp, DO      heparin (porcine)  7,500 Units Subcutaneous Q8H [facility] Debbie Ruiz, DO      hydrALAZINE  20 mg Intravenous Q6H PRN Debbie Ruiz, DO      insulin glargine  30 Units Subcutaneous [facility] Desmond, DO      insulin lispro  2-12 Units Subcutaneous TID AC Marjorie Sanders, DO      insulin lispro  5 Units Subcutaneous TID With Meals Irma Fuel, DO      Labetalol HCl  20 mg Intravenous Q6H PRN Debbie Ruiz, DO      levothyroxine  100 mcg Oral Early Morning Jerri Shupp, DO      Lidocaine Viscous HCl  15 mL Swish & Swallow 4x Daily PRN Jerri Shupp, DO      lisinopril  20 mg Oral Daily Debbie Ruiz, DO      LORazepam  1 mg Oral Q8H PRN Unice Real III, DO      lurasidone  20 mg Oral After Dinner Unice Real III, DO      ondansetron  4 mg Oral Q6H PRN Jerri Shupp, DO      pantoprazole  40 mg Oral BID AC Jerri Shupp, DO      prochlorperazine  10 mg Oral Q6H PRN Jerri Abdalla, DO      sucralfate  1 g Oral BID Rosalino Ibrahim, DO      verapamil  240 mg Oral Daily Tatiana Logan, DO         PRN Meds:   acetaminophen    aluminum-magnesium hydroxide-simethicone    butalbital-acetaminophen-caffeine    hydrALAZINE    Labetalol HCl    Lidocaine Viscous HCl    LORazepam    ondansetron    prochlorperazine    Laboratory Results:  Results from last 7 days   Lab Units 06/02/21  0523 06/01/21  0456 06/01/21  0455 05/31/21  1143 05/31/21  0459 05/30/21  0509 05/29/21  0531 05/28/21  0533 05/27/21  1746   WBC Thousand/uL 5 96  --  6 29  --  7 38  --  8 55 8 22 9 10   HEMOGLOBIN g/dL 11 7*  --  12 0  --  12 0  --  11 7* 11 7* 12 6   HEMATOCRIT % 35 2*  --  35 8*  --  36 4*  --  35 9* 35 0* 37 5   PLATELETS Thousands/uL 304  --  311  --  333  --  333 330  325 359   SODIUM mmol/L 141 140  --   --  139 141 143 139 135*   POTASSIUM mmol/L 4 3 4 4  --   --  4 2 3 9 4 0 3 8 4 6   CHLORIDE mmol/L 108 107  --   --  105 107 111* 107 103   CO2 mmol/L 28 26  --   --  30 28 26 27 26   BUN mg/dL 38* 29*  --   --  23 24 21 28* 37*   CREATININE mg/dL 2 05* 2 01*  --  1 92* 1 77* 1 86* 1 77* 1 66* 1 99*   CALCIUM mg/dL 8 9 8 9  --   --  8 9 8 8 8 8 8 6 9 4   MAGNESIUM mg/dL 2 3  --   --   --   --   --   --  2 3  --

## 2021-06-03 VITALS
TEMPERATURE: 98.6 F | HEIGHT: 62 IN | HEART RATE: 61 BPM | SYSTOLIC BLOOD PRESSURE: 120 MMHG | RESPIRATION RATE: 20 BRPM | WEIGHT: 249.78 LBS | BODY MASS INDEX: 45.97 KG/M2 | DIASTOLIC BLOOD PRESSURE: 78 MMHG | OXYGEN SATURATION: 93 %

## 2021-06-03 PROBLEM — R10.13 EPIGASTRIC PAIN: Status: RESOLVED | Noted: 2019-01-13 | Resolved: 2021-06-03

## 2021-06-03 PROBLEM — I16.0 HYPERTENSIVE URGENCY: Status: RESOLVED | Noted: 2018-10-31 | Resolved: 2021-06-03

## 2021-06-03 LAB
ALBUMIN SERPL BCP-MCNC: 2.6 G/DL (ref 3.5–5)
ALP SERPL-CCNC: 46 U/L (ref 46–116)
ALT SERPL W P-5'-P-CCNC: 22 U/L (ref 12–78)
ANION GAP SERPL CALCULATED.3IONS-SCNC: 5 MMOL/L (ref 4–13)
AST SERPL W P-5'-P-CCNC: 16 U/L (ref 5–45)
BASOPHILS # BLD AUTO: 0.05 THOUSANDS/ΜL (ref 0–0.1)
BASOPHILS NFR BLD AUTO: 1 % (ref 0–1)
BILIRUB SERPL-MCNC: 1.01 MG/DL (ref 0.2–1)
BUN SERPL-MCNC: 36 MG/DL (ref 5–25)
CALCIUM ALBUM COR SERPL-MCNC: 9.8 MG/DL (ref 8.3–10.1)
CALCIUM SERPL-MCNC: 8.7 MG/DL (ref 8.3–10.1)
CHLORIDE SERPL-SCNC: 103 MMOL/L (ref 100–108)
CO2 SERPL-SCNC: 27 MMOL/L (ref 21–32)
CREAT SERPL-MCNC: 1.93 MG/DL (ref 0.6–1.3)
EOSINOPHIL # BLD AUTO: 0.34 THOUSAND/ΜL (ref 0–0.61)
EOSINOPHIL NFR BLD AUTO: 6 % (ref 0–6)
ERYTHROCYTE [DISTWIDTH] IN BLOOD BY AUTOMATED COUNT: 13.2 % (ref 11.6–15.1)
GFR SERPL CREATININE-BSD FRML MDRD: 41 ML/MIN/1.73SQ M
GLUCOSE SERPL-MCNC: 151 MG/DL (ref 65–140)
GLUCOSE SERPL-MCNC: 171 MG/DL (ref 65–140)
GLUCOSE SERPL-MCNC: 243 MG/DL (ref 65–140)
GLUCOSE SERPL-MCNC: 96 MG/DL (ref 65–140)
HCT VFR BLD AUTO: 34.2 % (ref 36.5–49.3)
HGB BLD-MCNC: 11.3 G/DL (ref 12–17)
IMM GRANULOCYTES # BLD AUTO: 0.04 THOUSAND/UL (ref 0–0.2)
IMM GRANULOCYTES NFR BLD AUTO: 1 % (ref 0–2)
LYMPHOCYTES # BLD AUTO: 1.86 THOUSANDS/ΜL (ref 0.6–4.47)
LYMPHOCYTES NFR BLD AUTO: 30 % (ref 14–44)
MCH RBC QN AUTO: 27.9 PG (ref 26.8–34.3)
MCHC RBC AUTO-ENTMCNC: 33 G/DL (ref 31.4–37.4)
MCV RBC AUTO: 84 FL (ref 82–98)
MONOCYTES # BLD AUTO: 0.52 THOUSAND/ΜL (ref 0.17–1.22)
MONOCYTES NFR BLD AUTO: 8 % (ref 4–12)
NEUTROPHILS # BLD AUTO: 3.36 THOUSANDS/ΜL (ref 1.85–7.62)
NEUTS SEG NFR BLD AUTO: 54 % (ref 43–75)
NRBC BLD AUTO-RTO: 0 /100 WBCS
PLATELET # BLD AUTO: 266 THOUSANDS/UL (ref 149–390)
PMV BLD AUTO: 9.4 FL (ref 8.9–12.7)
POTASSIUM SERPL-SCNC: 4.1 MMOL/L (ref 3.5–5.3)
PROT SERPL-MCNC: 5.7 G/DL (ref 6.4–8.2)
RBC # BLD AUTO: 4.05 MILLION/UL (ref 3.88–5.62)
SODIUM SERPL-SCNC: 135 MMOL/L (ref 136–145)
WBC # BLD AUTO: 6.17 THOUSAND/UL (ref 4.31–10.16)

## 2021-06-03 PROCEDURE — 99232 SBSQ HOSP IP/OBS MODERATE 35: CPT | Performed by: INTERNAL MEDICINE

## 2021-06-03 PROCEDURE — 80053 COMPREHEN METABOLIC PANEL: CPT | Performed by: STUDENT IN AN ORGANIZED HEALTH CARE EDUCATION/TRAINING PROGRAM

## 2021-06-03 PROCEDURE — 82948 REAGENT STRIP/BLOOD GLUCOSE: CPT

## 2021-06-03 PROCEDURE — 85025 COMPLETE CBC W/AUTO DIFF WBC: CPT | Performed by: STUDENT IN AN ORGANIZED HEALTH CARE EDUCATION/TRAINING PROGRAM

## 2021-06-03 PROCEDURE — NC001 PR NO CHARGE: Performed by: INTERNAL MEDICINE

## 2021-06-03 RX ORDER — SUCRALFATE 1 G/1
1 TABLET ORAL 2 TIMES DAILY
Qty: 60 TABLET | Refills: 0 | Status: ON HOLD | OUTPATIENT
Start: 2021-06-03 | End: 2022-06-17 | Stop reason: SINTOL

## 2021-06-03 RX ORDER — LEVOTHYROXINE SODIUM 0.1 MG/1
100 TABLET ORAL
Qty: 30 TABLET | Refills: 0 | Status: SHIPPED | OUTPATIENT
Start: 2021-06-04 | End: 2021-07-16 | Stop reason: SDUPTHER

## 2021-06-03 RX ORDER — INSULIN GLARGINE 100 [IU]/ML
30 INJECTION, SOLUTION SUBCUTANEOUS
Qty: 10 ML | Refills: 0 | Status: ON HOLD | OUTPATIENT
Start: 2021-06-03 | End: 2022-06-17 | Stop reason: SDUPTHER

## 2021-06-03 RX ORDER — VERAPAMIL HYDROCHLORIDE 240 MG/1
240 TABLET, FILM COATED, EXTENDED RELEASE ORAL DAILY
Qty: 30 TABLET | Refills: 0 | Status: SHIPPED | OUTPATIENT
Start: 2021-06-03 | End: 2021-07-16 | Stop reason: SDUPTHER

## 2021-06-03 RX ORDER — LISINOPRIL 20 MG/1
20 TABLET ORAL 2 TIMES DAILY
Status: DISCONTINUED | OUTPATIENT
Start: 2021-06-03 | End: 2021-06-03 | Stop reason: HOSPADM

## 2021-06-03 RX ORDER — ONDANSETRON 4 MG/1
4 TABLET, ORALLY DISINTEGRATING ORAL EVERY 6 HOURS PRN
Qty: 20 TABLET | Refills: 0 | Status: SHIPPED | OUTPATIENT
Start: 2021-06-03 | End: 2021-07-16 | Stop reason: SDUPTHER

## 2021-06-03 RX ORDER — MELATONIN
1000 DAILY
Qty: 30 TABLET | Refills: 0 | Status: SHIPPED | OUTPATIENT
Start: 2021-06-04 | End: 2022-06-23

## 2021-06-03 RX ORDER — PANTOPRAZOLE SODIUM 40 MG/1
40 TABLET, DELAYED RELEASE ORAL
Qty: 60 TABLET | Refills: 0 | Status: SHIPPED | OUTPATIENT
Start: 2021-06-03 | End: 2021-07-16 | Stop reason: SDUPTHER

## 2021-06-03 RX ORDER — MAGNESIUM HYDROXIDE/ALUMINUM HYDROXICE/SIMETHICONE 120; 1200; 1200 MG/30ML; MG/30ML; MG/30ML
30 SUSPENSION ORAL 2 TIMES DAILY PRN
Qty: 355 ML | Refills: 0 | Status: SHIPPED | OUTPATIENT
Start: 2021-06-03 | End: 2022-07-21

## 2021-06-03 RX ORDER — FAMOTIDINE 20 MG/1
40 TABLET, FILM COATED ORAL DAILY
Qty: 60 TABLET | Refills: 0 | Status: SHIPPED | OUTPATIENT
Start: 2021-06-04 | End: 2021-07-16 | Stop reason: SDUPTHER

## 2021-06-03 RX ORDER — DOXAZOSIN 2 MG/1
2 TABLET ORAL 2 TIMES DAILY
Status: DISCONTINUED | OUTPATIENT
Start: 2021-06-03 | End: 2021-06-03

## 2021-06-03 RX ORDER — FLUVOXAMINE MALEATE 100 MG
100 TABLET ORAL 2 TIMES DAILY
Qty: 60 TABLET | Refills: 0 | Status: ON HOLD | OUTPATIENT
Start: 2021-06-03 | End: 2022-06-17 | Stop reason: ALTCHOICE

## 2021-06-03 RX ORDER — DOXAZOSIN 2 MG/1
2 TABLET ORAL DAILY
Qty: 30 TABLET | Refills: 0 | Status: SHIPPED | OUTPATIENT
Start: 2021-06-04 | End: 2021-07-16 | Stop reason: SDUPTHER

## 2021-06-03 RX ORDER — LISINOPRIL 20 MG/1
20 TABLET ORAL 2 TIMES DAILY
Qty: 60 TABLET | Refills: 0 | Status: SHIPPED | OUTPATIENT
Start: 2021-06-03 | End: 2021-07-16 | Stop reason: SDUPTHER

## 2021-06-03 RX ORDER — DOXAZOSIN 2 MG/1
2 TABLET ORAL DAILY
Status: DISCONTINUED | OUTPATIENT
Start: 2021-06-04 | End: 2021-06-03 | Stop reason: HOSPADM

## 2021-06-03 RX ORDER — CARVEDILOL 12.5 MG/1
12.5 TABLET ORAL 2 TIMES DAILY WITH MEALS
Qty: 60 TABLET | Refills: 0 | Status: SHIPPED | OUTPATIENT
Start: 2021-06-03 | End: 2021-07-16 | Stop reason: SDUPTHER

## 2021-06-03 RX ADMIN — HEPARIN SODIUM 7500 UNITS: 5000 INJECTION INTRAVENOUS; SUBCUTANEOUS at 05:21

## 2021-06-03 RX ADMIN — VERAPAMIL HYDROCHLORIDE 240 MG: 240 TABLET ORAL at 13:47

## 2021-06-03 RX ADMIN — LEVOTHYROXINE SODIUM 100 MCG: 100 TABLET ORAL at 05:21

## 2021-06-03 RX ADMIN — SUCRALFATE 1 G: 1 TABLET ORAL at 09:16

## 2021-06-03 RX ADMIN — FAMOTIDINE 20 MG: 20 TABLET ORAL at 09:16

## 2021-06-03 RX ADMIN — ATORVASTATIN CALCIUM 20 MG: 20 TABLET, FILM COATED ORAL at 09:16

## 2021-06-03 RX ADMIN — Medication 1000 UNITS: at 09:15

## 2021-06-03 RX ADMIN — CARVEDILOL 12.5 MG: 25 TABLET, FILM COATED ORAL at 09:16

## 2021-06-03 RX ADMIN — LORAZEPAM 1 MG: 1 TABLET ORAL at 00:34

## 2021-06-03 RX ADMIN — INSULIN LISPRO 4 UNITS: 100 INJECTION, SOLUTION INTRAVENOUS; SUBCUTANEOUS at 12:50

## 2021-06-03 RX ADMIN — PANTOPRAZOLE SODIUM 40 MG: 40 TABLET, DELAYED RELEASE ORAL at 05:21

## 2021-06-03 RX ADMIN — FLUVOXAMINE MALEATE 100 MG: 50 TABLET ORAL at 09:17

## 2021-06-03 RX ADMIN — INSULIN LISPRO 2 UNITS: 100 INJECTION, SOLUTION INTRAVENOUS; SUBCUTANEOUS at 10:20

## 2021-06-03 RX ADMIN — LISINOPRIL 20 MG: 20 TABLET ORAL at 09:16

## 2021-06-03 NOTE — DISCHARGE SUMMARY
INTERNAL MEDICINE RESIDENCY DISCHARGE SUMMARY     Ban Meehan   35 y o  male  MRN: [id number]  Room/Bed: /-01     [facility] 5   Encounter: [id number]    Principal Problem:    Hypertensive urgency  Active Problems:    Type 2 diabetes mellitus, with long-term current use of insulin ([facility])    OCD (obsessive compulsive disorder)    Schizoaffective disorder ([facility])    Acquired hypothyroidism    Epigastric pain    Migraine without aura and without status migrainosus, not intractable    Acute on chronic kidney failure ([facility])    GERD (gastroesophageal reflux disease)      GERD (gastroesophageal reflux disease)  Assessment & Plan  History of GERD  On protonix 40 mg BID as an outpatient  Plan:  · Continue protonix, pepcid, and carafate    Acute on chronic kidney failure [facility])  Assessment & Plan  Lab Results   Component Value Date    EGFR 39 06/02/2021    EGFR 39 06/01/2021    EGFR 42 05/31/2021    CREATININE 2 05 (H) 06/02/2021    CREATININE 2 01 (H) 06/01/2021    CREATININE 1 92 (H) 05/31/2021     Patient presenting with a creatinine of 1 99  Baseline appears to be 1 5-1 6  Likely prerenal due to poor PO intake 2/2 nausea as well as hypertension  Plan:  · Cr stable at 2 today  · Nephrology consulted - appreciate recommendations  · Likely 2/2 hypertension and uncontrolled diabetes      Migraine without aura and without status migrainosus, not intractable  Assessment & Plan  Patient with history of migraines and currently follows with neurology as an outpatient; last evaluated 10/2020  Currently managed as an outpatient on Aimovig 140 mg injection every 30 days       Plan:  -long history of migraines, seen by neurology outpatient    -continue Verapamil  mg for combo of migraine prophylaxis and blood pressure control  -on discharge recommend starting B2 riboflavin 400 mg daily, in addition to 200 mg magnesium daily  -consider discharge with abortive medication Imitrex  · Tylenol 650 q6 hours PRN  · Outpatient neurology follow up     Epigastric pain  Assessment & Plan  Patient with a history of GERD and gastritis  Most recent EGD completed on 3/11/2021 demonstrating mild gastritis  Currently managed as an outpatient on protonix 40 mg BID and carafate 1g daily  Plan:  · Continue Protonix, pepcid, and carafate  · zofran PRN  · Carb-controlled diet and tighter glucose control  · Right upper quadrant ultrasound showed hepatomegaly    Acquired hypothyroidism  Assessment & Plan  History of hypothyroidism; currently on levothyroxine 100 mcg as an outpatient  Most recent TSH 9 980 11/2020  Plan:  · 5/28: TSH elevated, free T4 low  Patient has not taken his home dose levo 100 mcg in at least a month, and his labs reflect this  Restarted patient on home dose levo 100 mcg  · Recommend Outpatient repeat TSH in 4 weeks outpatient      Schizoaffective disorder [facility])  Assessment & Plan  History of schizoaffective disorder  Mood currently stable  Managed on Fluroxamine 100 mg BID and Cariprazine 6 mg daily (non-formulary) as an outpatient  Follows with outpatient psychiatry but has not been evaluated by them in the last 6 months  Plan:  5/28: Patient's varying symptom complaints seem unrelated to an organic issue and more so illness anxiety vs psychosomatic disorder  Psychiatry was consulted and will be seeing the patient this afternoon for further recommendations on medications  Patient will need to follow-up with psych outpatient as well  For now is on Fluvoxamine 100 mg BID  No active SI/HI, depression, or hallucinations  Is a bit anxious  -will obtain baseline EKG in anticipation of starting many new medications, in addition to psych meds    5/29: continue with psych recommendations of Fluvoxamine 100 mg bid and Latuda 20 mg HS- he should be discharged with a 30 day supply of each   Dr Zachary Pederson psychiatrist was filling out the prior authorization for the Torie  Will need to f/u with psychiatrist outpatient  CM gave patient list of psychiatrists to contact    -while inpatient can receive ativan PRN for anxiety  Psych recommends not d/c with this  Case management following for prior auth for Latuda - appreciate assistance  · Medication approved - [facility] and patient's [facility] ordering more medication to supply patient, will discuss with patient where he would like Rx filled based on availability        Type 2 diabetes mellitus, with long-term current use of insulin [facility])  Assessment & Plan  Lab Results   Component Value Date    HGBA1C 9 8 (H) 05/29/2021     (P) [id number]     Patient with PMHx of insulin dependent DM  Most recent hemoglobin A1c 10 5 on 11/2020  Currently managed as an outpatient on Basaglar 40U qHS and lispro 10U TID  An an outpatient, patient states that he checks his BG 2x daily and obtains readings of 200-300  Plan:  · Continue POC glucose checks; BG goal of 140-180  · Insulin Regimen:  · Lantus 30U qHS  · Lispro 5 units TID With meals  · SSI with meals  · Diabetic, carbohydrate restricted diet    * Hypertensive urgency  Assessment & Plan  BP on admission 199/91  Patient complaining of abdominal pain with nausea and SOB on admission but otherwise denying headache or visual changes  Patient has had repeated admissions with elevated BP and does admit to compliance with home blood pressure regimen of amlodipine 10 mg daily, metoprolol succinate 50 mg daily, and losartan 100 mg daily  Patient has underwent secondary HTN workup which has been unremarkable thus far; however, patient has not had renal artery dopplers completed to rule out renal artery stenosis       Plan:  · Medications:  · Continue verapamil  mg daily for combo of BP and migraine control  · Continue lisinopril 20 mg daily for renal-protective and BP purposes  · Continue Coreg 12 5 mg b i d   · Continue Cardura 2mg BID  · Labetalol 20 mg q6 hours PRN for SBP >160 (first line)  · Hydralazine 20 mg q6 hours PRN for SBP >160 (second line)  ·  renal artery dopplers without pathology  CT abd/pelvis without adrenal masses  · Nephrology consulted - appreciate recommendations        [address]     Per Dr Cuong Milton H&P:  "Mr Paul Uriostegui is a 35year old male with PMHx of HTN, schizoaffective disorder, OCD, diabetes mellitus type II, migraines, and gastritis who presents to [facility] ED on 05/27/2021 with a complaint of epigastric pain and shortness of breath      Patient presents with the complaint of epigastric pain that has been ongoing for the last 3-4 days  Patient describes the pain as an intermittent dull, burning pain located primarily in the epigastrium that worsens with eating and with anxiety  Pain is relieved with Carafate  Associated symptoms includes nausea with episodes of NBNB vomiting and SOB  He denies hematemesis, melena, blood per rectum, CP, or palpitations  He also complains of inability to obtain his psych medications and explains that he is "in between psychiatrists" and wishes to "sign a 201 so he can get his psych medications " At this time, the patient denies any suicidal or homicidal ideations         On arrival to the ED, the patient was afebrile and hemodynamically stable  Initial vital signs were as follows:  HR 86, /91 RR 18, and SpO2 96% on room air  Labs significant for a creatinine of 1 99 (baseline 1 3-1 5) and blood glucose of 317  LFTs within normal limits and troponin negative  Chest x-ray was completed and demonstrated no acute cardiopulmonary disease  EKG with NSR and normal QTC       Patient will be admitted to the general medicine service for management of hypertensive urgency and psychiatry consultation  Per discussions with the patient, patient will remain a LEVEL 1 - FULL CODE  "    Patient admitted for hypertensive urgency and psychiatric evaluation   While admitted his blood pressure was very difficult to control and ultimately involved nephrology for assistance  Nephrology also consulted for STEFANIA on CKD, with worsening creatinine, and to establish outpatient nephrology follow up  Psychiatry evaluated patient and he was started on latuda and luvoxamine with improvement in his paranoid delusions and depression  While patient admitted, he did have complaint of headache and epigastric pain  Headache managed with fioricet and advised to follow up with PCP/neurology outpatient  Epigastric pain treated with pepcid, pantoprazole, and carafate  Patient's blood pressure well controlled on coreg 12 5 BID, doxazosin 2mg daily, lisinopril 20mg BID, and verapamil 240mg  Patient also noted to have episodes of hypoglycemia while admitted, ultimately reduced insulin regimen to Lantus 30u QHS and Lispro 5u TID with meals  Discussed with patient to continue to measure blood glucose while eating regular diet at home and have close PCP follow up at time of discharge  Advised to follow up with nephrology outpatient as well  All questions and concerns addressed prior to discharge  Subjective:  Patient seen and examined at bedside  No acute events overnight  Headache resolved  No further epigastric pain  Objective:  Vitals:    06/03/21 0426 06/03/21 0730 06/03/21 0913 06/03/21 1135   BP: 112/60 123/76 114/76 120/78   BP Location:  Right arm     Pulse: 61 61     Resp:  20  20   Temp:  98 2 °F (36 8 °C)  98 6 °F (37 °C)   TempSrc:  Oral     SpO2: 95% 93%     Weight:       Height:       Physical Exam  Vitals signs reviewed  Constitutional:       General: He is not in acute distress  Appearance: Normal appearance  He is obese  He is not ill-appearing, toxic-appearing or diaphoretic  HENT:      Head: Normocephalic and atraumatic  Right Ear: External ear normal       Left Ear: External ear normal       Nose: Nose normal       Mouth/Throat:      Mouth: Mucous membranes are moist       Pharynx: Oropharynx is clear  Eyes:      General:         Right eye: No discharge  Left eye: No discharge  Conjunctiva/sclera: Conjunctivae normal    Neck:      Musculoskeletal: Normal range of motion  Cardiovascular:      Rate and Rhythm: Normal rate and regular rhythm  Heart sounds: Normal heart sounds  Pulmonary:      Effort: Pulmonary effort is normal  No respiratory distress  Breath sounds: Normal breath sounds  No wheezing  Abdominal:      General: Bowel sounds are normal  There is no distension  Palpations: Abdomen is soft  Tenderness: There is no abdominal tenderness  Musculoskeletal: Normal range of motion  General: No swelling or tenderness  Right lower leg: No edema  Left lower leg: No edema  Skin:     General: Skin is warm and dry  Coloration: Skin is not jaundiced  Findings: No bruising  Neurological:      General: No focal deficit present  Mental Status: He is alert and oriented to person, place, and time  Psychiatric:         Mood and Affect: Mood normal          Speech: Speech normal          Behavior: Behavior normal  Behavior is cooperative  Thought Content:  Thought content normal  Thought content is not paranoid or delusional          DISCHARGE INFORMATION     PCP at Discharge: Dr Joe Mcneal    Admitting Provider: Marisol Hernandez DO  Admission Date: 5/27/2021    Discharge Provider: Marisol Hernandez DO  Discharge Date: 6/3/21    Discharge Disposition: Home/Self Care  Discharge Condition: stable  Discharge with Lines: no    Discharge Diet: diabetic diet  Activity Restrictions: none  Test Results Pending at Discharge: none    Discharge Diagnoses:  Principal Problem:    Hypertensive urgency  Active Problems:    Type 2 diabetes mellitus, with long-term current use of insulin ([facility])    OCD (obsessive compulsive disorder)    [facility] ([facility] )    Acquired hypothyroidism    Epigastric pain    Migraine without aura and without status migrainosus, not intractable    Acute on chronic kidney failure ([facility])    GERD (gastroesophageal reflux disease)  Resolved Problems:    * No resolved hospital problems  *      Consulting Providers:  Nephrology    Diagnostic & Therapeutic Procedures Performed:  Xr Chest 2 Views    Result Date: 5/27/2021  Impression: No acute cardiopulmonary disease  Workstation performed: [id number]     Us Right Upper Quadrant    Result Date: 5/31/2021  Impression: Hepatomegaly  Workstation performed: [id number]       Code Status: Level 1 - Full Code  Advance Directive & Living Will: <no information>  Power of :    POLST:      Medications:   Your Medications    STOP taking these medications    STOP taking these medications    Admelog SoloStar 100 units/mL injection pen  Generic drug: insulin lispro  Replaced by: insulin lispro 100 units/mL injection    amLODIPine 5 mg tablet  Commonly known as: NORVASC    atorvastatin 20 mg tablet  Commonly known as: LIPITOR    Basaglar KwikPen 100 units/mL injection pen  Generic drug: insulin glargine  Replaced by: insulin glargine 100 units/mL subcutaneous injection    cyclobenzaprine 5 mg tablet  Commonly known as: FLEXERIL    ergocalciferol 50,000 units  Commonly known as: VITAMIN D2    Lidocaine Viscous HCl 2 % mucosal solution  Commonly known as: XYLOCAINE    losartan 50 mg tablet  Commonly known as: COZAAR    metoclopramide 10 mg tablet  Commonly known as: REGLAN    metoprolol succinate 50 mg 24 hr tablet  Commonly known as: TOPROL-XL    ondansetron 4 mg tablet  Commonly known as: ZOFRAN  Replaced by: ondansetron 4 mg disintegrating tablet    sucralfate 1 g/10 mL suspension  Commonly known as: CARAFATE  Replaced by: sucralfate 1 g tablet   START taking these medications    START taking these medications     Morning Afternoon Evening Bedtime As Needed    aluminum-magnesium hydroxide-simethicone 200-200-20 mg/5 mL suspension  Commonly known as: MYLANTA  Take 30 mL by mouth 2 (two) times a day as needed for indigestion or heartburn  Refills: 0  Last time this was given: 30 mL on June 2, 2021 10:15 AM         carvedilol 12 5 mg tablet  Commonly known as: COREG  Take 1 tablet (12 5 mg total) by mouth 2 (two) times a day with meals  Refills: 0  Last time this was given: 12 5 mg on Tita 3, 2021  9:16 AM  Next Dose Due: 6/3 evening         cholecalciferol 1,000 units tablet  Commonly known as: VITAMIN D3  Start taking on: June 4, 2021  Take 1 tablet (1,000 Units total) by mouth daily  Refills: 0  Last time this was given: 1,000 Units on Tita 3, 2021  9:15 AM  Next Dose Due: 6/4 morning         doxazosin 2 mg tablet  Commonly known as: CARDURA  Start taking on: June 4, 2021  Take 1 tablet (2 mg total) by mouth daily  Refills: 0  Last time this was given: 2 mg on June 2, 2021  9:55 PM  Next Dose Due: 6/4 morning         insulin glargine 100 units/mL subcutaneous injection  Commonly known as: LANTUS  Inject 30 Units under the skin daily at bedtime  Refills: 0  Last time this was given: 30 Units on June 2, 2021  9:56 PM  Replaces: Davee Monks KwikPen 100 units/mL injection pen  Next Dose Due: 6/3 bedtime         insulin lispro 100 units/mL injection  Commonly known as: HumaLOG  Inject 5 Units under the skin 3 (three) times a day with meals  Refills: 0  Last time this was given: Ask your nurse or doctor  Replaces: Admelog SoloStar 100 units/mL injection pen  Next Dose Due: 6/3 dinnertime         lisinopril 20 mg tablet  Commonly known as: ZESTRIL  Take 1 tablet (20 mg total) by mouth 2 (two) times a day  Refills: 0  Last time this was given: 20 mg on Tita 3, 2021  9:16 AM  Next Dose Due: 6/3 evening         lurasidone 20 mg tablet  Commonly known as: LATUDA  Take 1 tablet (20 mg total) by mouth daily after dinner  Refills: 0  Last time this was given: 20 mg on June 2, 2021  7:59 PM  Doctor's comments: Price check - case management completed prior authorization and medication approved by insurance   Patient would like to  from [facility] at time of discharge  Next Dose Due: 6/3 evening         ondansetron 4 mg disintegrating tablet  Commonly known as: ZOFRAN-ODT  Take 1 tablet (4 mg total) by mouth every 6 (six) hours as needed for nausea or vomiting  Refills: 0  Last time this was given: 4 mg on June 2, 2021 10:15 AM  Replaces: ondansetron 4 mg tablet         sucralfate 1 g tablet  Commonly known as: CARAFATE  Take 1 tablet (1 g total) by mouth 2 (two) times a day  Refills: 0  Last time this was given: 1 g on Tita 3, 2021  9:16 AM  Replaces: sucralfate 1 g/10 mL suspension  Next Dose Due: 6/3 evening         verapamil 240 mg CR tablet  Commonly known as: CALAN-SR  Take 1 tablet (240 mg total) by mouth daily  Refills: 0  Last time this was given: 240 mg on Tita 3, 2021  1:47 PM  Next Dose Due: 6/4 morning        CHANGE how you take these medications    CHANGE how you take these medications     Morning Afternoon Evening Bedtime As Needed    famotidine 20 mg tablet  Commonly known as: PEPCID  Start taking on: June 4, 2021  Take 2 tablets (40 mg total) by mouth daily  Refills: 0  Last time this was given: 20 mg on Tita 3, 2021  9:16 AM  What changed: when to take this  Next Dose Due: 6/4 morning         fluvoxaMINE 100 mg tablet  Commonly known as: LUVOX  Take 1 tablet (100 mg total) by mouth 2 (two) times a day  Refills: 0  Last time this was given: 100 mg on Tita 3, 2021  9:17 AM  What changed:   0 how much to take  0 when to take this  Next Dose Due: 6/3 evening         levothyroxine 100 mcg tablet  Start taking on: June 4, 2021  Take 1 tablet (100 mcg total) by mouth daily in the early morning  Refills: 0  Last time this was given: 100 mcg on Tita 3, 2021  5:21 AM  What changed: when to take this  Next Dose Due: 6/4 morning         pantoprazole 40 mg tablet  Commonly known as: PROTONIX  Take 1 tablet (40 mg total) by mouth 2 (two) times a day before meals  Refills: 0  Last time this was given: 40 mg on Tita 3, 2021  5:21 AM  What changed: when to take this  Next Dose Due: 6/3 before dinner        CONTINUE taking these medications    CONTINUE taking these medications     Morning Afternoon Evening Bedtime As Needed    Accu-Chek Guide test strip  Generic drug: glucose blood  4 (four) times a day Test  Refills: 1         albuterol 90 mcg/act inhaler  Commonly known as: PROVENTIL HFA,VENTOLIN HFA  Inhale 2 puffs 4 (four) times a day  Refills: 0  Last time this was given: 2 puffs on June 2, 2021  7:59 PM  Doctor's comments: Substitution to a formulary equivalent within the same pharmaceutical class is authorized  Next Dose Due: As needed         Galcanezumab-gnlm 120 MG/ML Soaj  Inject 120 mg under the skin every 30 (thirty) days  Refills: 11  Next Dose Due: resume home regimen         prochlorperazine 10 mg tablet  Commonly known as: COMPAZINE  Take 1 tablet (10 mg total) by mouth every 6 (six) hours as needed (migraine)  Refills: 0  Last time this was given: 10 mg on June 2, 2021 12:11 PM        OTHER medications on file    OTHER medications on file     Morning Afternoon Evening Bedtime As Needed    Accu-Chek FastClix Lancets Misc  4 (four) times a day Not checking 4 times a day  Patient stated maybe 2 times a day  Refills: 1         Accu-Chek Guide w/Device Kit  Checking 2 times a day  Refills: 0         Pen Needles 3/16" 31G X 5 MM Misc  Use 4 (four) times a day  Refills: 3                Allergies: Allergies   Allergen Reactions    Augmentin [Amoxicillin-Pot Clavulanate]     Amoxicillin Diarrhea    Clavulanic Acid Diarrhea    Erythromycin Diarrhea       FOLLOW-UP     PCP Outpatient Follow-up:  Call for follow up appointment to be seen in 1-2 weeks    Consulting Providers Follow-up:  Nephrology    Active Issues Requiring Follow-up:   CKD, T2DM, Hypertension    Discharge Statement:   I spent 45 minutes minutes discharging the patient  This time was spent on the day of discharge   I had direct contact with the patient on the day of discharge  Additional documentation is required if more than 30 minutes were spent on discharge  Portions of the record may have been created with voice recognition software  Occasional wrong word or "sound a like" substitutions may have occurred due to the inherent limitations of voice recognition software    Read the chart carefully and recognize, using context, where substitutions have occurred     ==  Lupe Lazaro, [address] [facility]  Internal Medicine Resident PGY-1

## 2021-06-03 NOTE — PLAN OF CARE
Problem: PAIN - ADULT  Goal: Verbalizes/displays adequate comfort level or baseline comfort level  Description: Interventions:  - Encourage patient to monitor pain and request assistance  - Assess pain using appropriate pain scale  - Administer analgesics based on type and severity of pain and evaluate response  - Implement non-pharmacological measures as appropriate and evaluate response  - Consider cultural and social influences on pain and pain management  - Notify physician/advanced practitioner if interventions unsuccessful or patient reports new pain  Outcome: Progressing     Problem: INFECTION - ADULT  Goal: Absence or prevention of progression during hospitalization  Description: INTERVENTIONS:  - Assess and monitor for signs and symptoms of infection  - Monitor lab/diagnostic results  - Monitor all insertion sites, i e  indwelling lines, tubes, and drains  - Monitor endotracheal if appropriate and nasal secretions for changes in amount and color  - [place] appropriate cooling/warming therapies per order  - Administer medications as ordered  - Instruct and encourage patient and family to use good hand hygiene technique  - Identify and instruct in appropriate isolation precautions for identified infection/condition  Outcome: Progressing     Problem: SAFETY ADULT  Goal: Patient will remain free of falls  Description: INTERVENTIONS:  - Assess patient frequently for physical needs  -  Identify cognitive and physical deficits and behaviors that affect risk of falls    -  [place] fall precautions as indicated by assessment   - Educate patient/family on patient safety including physical limitations  - Instruct patient to call for assistance with activity based on assessment  - Modify environment to reduce risk of injury  - Consider OT/PT consult to assist with strengthening/mobility  Outcome: Progressing  Goal: Maintain or return to baseline ADL function  Description: INTERVENTIONS:  -  Assess patient's ability to carry out ADLs; assess patient's baseline for ADL function and identify physical deficits which impact ability to perform ADLs (bathing, care of mouth/teeth, toileting, grooming, dressing, etc )  - Assess/evaluate cause of self-care deficits   - Assess range of motion  - Assess patient's mobility; develop plan if impaired  - Assess patient's need for assistive devices and provide as appropriate  - Encourage maximum independence but intervene and supervise when necessary  - Involve family in performance of ADLs  - Assess for home care needs following discharge   - Consider OT consult to assist with ADL evaluation and planning for discharge  - Provide patient education as appropriate  Outcome: Progressing  Goal: Maintain or return mobility status to optimal level  Description: INTERVENTIONS:  - Assess patient's baseline mobility status (ambulation, transfers, stairs, etc )    - Identify cognitive and physical deficits and behaviors that affect mobility  - Identify mobility aids required to assist with transfers and/or ambulation (gait belt, sit-to-stand, lift, walker, cane, etc )  - [place] fall precautions as indicated by assessment  - Record patient progress and toleration of activity level on Mobility SBAR; progress patient to next Phase/Stage  - Instruct patient to call for assistance with activity based on assessment  - Consider rehabilitation consult to assist with strengthening/weightbearing, etc   Outcome: Progressing     Problem: DISCHARGE PLANNING  Goal: Discharge to home or other facility with appropriate resources  Description: INTERVENTIONS:  - Identify barriers to discharge w/patient and caregiver  - Arrange for needed discharge resources and transportation as appropriate  - Identify discharge learning needs (meds, wound care, etc )  - Arrange for interpretive services to assist at discharge as needed  - Refer to Case Management Department for coordinating discharge planning if the patient needs post-hospital services based on physician/advanced practitioner order or complex needs related to functional status, cognitive ability, or social support system  Outcome: Progressing     Problem: Knowledge Deficit  Goal: Patient/family/caregiver demonstrates understanding of disease process, treatment plan, medications, and discharge instructions  Description: Complete learning assessment and assess knowledge base  Interventions:  - Provide teaching at level of understanding  - Provide teaching via preferred learning methods  Outcome: Progressing     Problem: CARDIOVASCULAR - ADULT  Goal: Maintains optimal cardiac output and hemodynamic stability  Description: INTERVENTIONS:  - Monitor I/O, vital signs and rhythm  - Monitor for S/S and trends of decreased cardiac output  - Administer and titrate ordered vasoactive medications to optimize hemodynamic stability  - Assess quality of pulses, skin color and temperature  - Assess for signs of decreased coronary artery perfusion  - Instruct patient to report change in severity of symptoms  Outcome: Progressing     Problem: Nutrition/Hydration-ADULT  Goal: Nutrient/Hydration intake appropriate for improving, restoring or maintaining nutritional needs  Description: Monitor and assess patient's nutrition/hydration status for malnutrition  Collaborate with interdisciplinary team and initiate plan and interventions as ordered  Monitor patient's weight and dietary intake as ordered or per policy  Utilize nutrition screening tool and intervene as necessary  Determine patient's food preferences and provide high-protein, high-caloric foods as appropriate       INTERVENTIONS:  - Monitor oral intake, urinary output, labs, and treatment plans  - Assess nutrition and hydration status and recommend course of action  - Evaluate amount of meals eaten  - Assist patient with eating if necessary   - Allow adequate time for meals  - Recommend/ encourage appropriate diets, oral nutritional supplements, and vitamin/mineral supplements  - Order, calculate, and assess calorie counts as needed  - Recommend, monitor, and adjust tube feedings and TPN/PPN based on assessed needs  - Assess need for intravenous fluids  - Provide specific nutrition/hydration education as appropriate  - Include patient/family/caregiver in decisions related to nutrition  Outcome: Progressing     Problem: Potential for Falls  Goal: Patient will remain free of falls  Description: INTERVENTIONS:  - Assess patient frequently for physical needs  -  Identify cognitive and physical deficits and behaviors that affect risk of falls    -  [place] fall precautions as indicated by assessment   - Educate patient/family on patient safety including physical limitations  - Instruct patient to call for assistance with activity based on assessment  - Modify environment to reduce risk of injury  - Consider OT/PT consult to assist with strengthening/mobility  Outcome: Progressing

## 2021-06-03 NOTE — PROGRESS NOTES
NEPHROLOGY PROGRESS NOTE   Ivone Draft 35 y o  male MRN: [id number]  Unit/Bed#: -01 Encounter: [id number]      ASSESSMENT/PLAN:  1  CKD III:  Baseline earlier this year around 1 5-1 7 but now may be 1 7-2  Appears to be steadily progressing over the past few years  Creatinine stable at 1 93 today   · Suspect component of diabetic nephropathy and  hypertensive nephrosclerosis +/- NSAID use and hx of lithium   · CT Feb 2021:  No hydronephrosis  · UA Feb 2021:  3+ protein, trace glucose, occasional mucus threads   · UPC ratio 4 3g   · Continue lisinopril for proteinuria  · Discussed the importance of good blood pressure and diabetes control as well avoiding NSAIDs to prevent the progression of CKD  · Needs outpatient Nephrology follow up--requests [place] office   2  Uncontrolled hypertension: was having difficultly getting his BP meds at home  Yesterday doxazosin and lisinopril were increased and blood pressure is significantly better today  · Previously at home was on amlodipine 10mg, metoprolol 50mg daily and losartan 100mg daily   · currently on verapamil 240mg daily (started for migraines), lisinopril 20mg b i d , coreg 12 5mg bid, doxazosin 2mg b i d    ? Will need to monitor heart rate closely on 2 inotropic agents   · Renal artery duplex showed no BRIAN   3  Schizoaffective Disorder   4  DM II: hgb A1c 9 8%    Plan Summary:   Norma Méndez for discharge from renal standpoint   follow up in [place] office --will send office message to schedule follow up     SUBJECTIVE:  Feeling well today  Blood pressure significantly better today but no lightheadedness or dizziness      OBJECTIVE:  Current Weight: Weight - Scale: 113 kg (249 lb 12 5 oz)  Vitals:    06/03/21 1135   BP: 120/78   Pulse:    Resp: 20   Temp: 98 6 °F (37 °C)   SpO2:        Intake/Output Summary (Last 24 hours) at 6/3/2021 1148  Last data filed at 6/3/2021 0730  Gross per 24 hour   Intake 1780 ml   Output 3000 ml   Net -1220 ml       General: No acute distress  Skin:  No rash  Eyes:  Sclerae anicteric  ENT:  Moist mucous membranes  Neck:  Trachea midline with no JVD  Chest:  Clear to auscultation bilaterally  CVS:  Regular rate and rhythm  Abdomen:  Soft, nontender, nondistended  Extremities:  No edema  Neuro:  Awake and alert  Psych:  Appropriate affect    Medications:  Scheduled Meds:  Current Facility-Administered Medications   Medication Dose Route Frequency Provider Last Rate    acetaminophen  650 mg Oral Q6H PRN Marjorievidya Sanders, DO      albuterol  2 puff Inhalation 4x Daily Jerri Shupp, DO      aluminum-magnesium hydroxide-simethicone  30 mL Oral BID PRN Debbie Ruiz, DO      atorvastatin  20 mg Oral Daily Jerri Shupp, DO      butalbital-acetaminophen-caffeine  1 tablet Oral Q4H PRN Adina Langston, DO      carvedilol  12 5 mg Oral BID With Meals Tatiana Logan, DO      cholecalciferol  1,000 Units Oral Daily Debbie Ocampo, DO      [START ON 6/4/2021] doxazosin  2 mg Oral Daily Sabine Vann, DO      famotidine  20 mg Oral Daily Mona Walton, DO      fluvoxaMINE  100 mg Oral BID Jerri Shupp, DO      heparin (porcine)  7,500 Units Subcutaneous Q8H [facility] Debbie Ruiz, DO      hydrALAZINE  20 mg Intravenous Q6H PRN Debbie Ruiz, DO      insulin glargine  30 Units Subcutaneous HS Tatiana Logan, DO      insulin lispro  2-12 Units Subcutaneous TID AC Marjorie Sanders, DO      insulin lispro  5 Units Subcutaneous TID With Meals Adina Langston, DO      Labetalol HCl  20 mg Intravenous Q6H PRN Debbie Ruiz, DO      levothyroxine  100 mcg Oral Early Morning Jerri Shupp, DO      Lidocaine Viscous HCl  15 mL Swish & Swallow 4x Daily PRN Jerri Shupp, DO      lisinopril  20 mg Oral BID Sabine Vann, DO      LORazepam  1 mg Oral Q8H PRN Kinjal [place] III, DO      lurasidone  20 mg Oral After [employer] III, DO      ondansetron  4 mg Oral Q6H PRN Jerri Shupp, DO      pantoprazole  40 mg Oral BID AC Jerri Shupp, DO      prochlorperazine  10 mg Oral Q6H PRN Jerri Shupp, DO      sucralfate  1 g Oral BID Rosalino Ibrahim, DO      verapamil  240 mg Oral Daily Tatiana Logan,          PRN Meds:   acetaminophen    aluminum-magnesium hydroxide-simethicone    butalbital-acetaminophen-caffeine    hydrALAZINE    Labetalol HCl    Lidocaine Viscous HCl    LORazepam    ondansetron    prochlorperazine    Laboratory Results:  Results from last 7 days   Lab Units 06/03/21  0450 06/02/21  0523 06/01/21  0456 06/01/21  0455 05/31/21  1143 05/31/21  0459 05/30/21  0509 05/29/21  0531 05/28/21  0533 05/27/21  1746   WBC Thousand/uL 6 17 5 96  --  6 29  --  7 38  --  8 55 8 22 9 10   HEMOGLOBIN g/dL 11 3* 11 7*  --  12 0  --  12 0  --  11 7* 11 7* 12 6   HEMATOCRIT % 34 2* 35 2*  --  35 8*  --  36 4*  --  35 9* 35 0* 37 5   PLATELETS Thousands/uL 266 304  --  311  --  333  --  333 330  325 359   SODIUM mmol/L 135* 141 140  --   --  139 141 143 139 135*   POTASSIUM mmol/L 4 1 4 3 4 4  --   --  4 2 3 9 4 0 3 8 4 6   CHLORIDE mmol/L 103 108 107  --   --  105 107 111* 107 103   CO2 mmol/L 27 28 26  --   --  30 28 26 27 26   BUN mg/dL 36* 38* 29*  --   --  23 24 21 28* 37*   CREATININE mg/dL 1 93* 2 05* 2 01*  --  1 92* 1 77* 1 86* 1 77* 1 66* 1 99*   CALCIUM mg/dL 8 7 8 9 8 9  --   --  8 9 8 8 8 8 8 6 9 4   MAGNESIUM mg/dL  --  2 3  --   --   --   --   --   --  2 3  --

## 2021-06-03 NOTE — DISCHARGE INSTRUCTIONS
-multiple medications were changed and added to your daily routine this admission to optimize your blood pressure, blood sugar, psychiatric, and headache management  Please refer to your discharge papers for specifics    -For migraine prophylaxis recommend taking 400 mg of vitamin B2 (riboflavin) daily in addition to magnesium 200 mg daily  -continue taking Verapamil daily  This medication is for both migraine prophylaxis and to control your blood pressure  -you are being discharged with migraine abortive medication Imitrex (sumatriptan)  -please follow-up with a neurologist to continue management of your headaches  -please follow-up with a psychiatrist   -please continue taking your psychiatric medications daily  -please continue taking all other medications as prescribed    Type 2 Diabetes Management for Adults   WHAT YOU NEED TO KNOW:   Type 2 diabetes is a disease that affects how your body uses glucose (sugar)  Normally, when the blood sugar level increases, the pancreas makes more insulin  Insulin helps move sugar out of the blood so it can be used for energy  Type 2 diabetes develops because either the body cannot make enough insulin, or it cannot use the insulin correctly  Type 2 diabetes can be controlled to prevent damage to your heart, blood vessels, and other organs  DISCHARGE INSTRUCTIONS:   What you can do to manage your blood sugar levels:   · Make healthy food choices  Healthy foods can give you energy to learn and be active  Healthy foods can also help you keep your blood sugar in balance, and manage or lose weight safely  Work with a dietitian to develop a meal plan that works for you and your schedule  A dietitian can help you learn how to eat the right amount of carbohydrates during your meals and snacks  Carbohydrates can raise your blood sugar if you eat too many at one time  Some foods that contain carbohydrates include breads, cereals, rice, pasta, and sweets       · Make healthy beverage choices  Drink water  Decrease the amount of drinks with sugar substitutes you have, such as diet sodas  Avoid sugar-sweetened drinks, such as regular sodas and fruit juice  · Get regular physical activity  Physical activity helps to lower your blood sugar levels  It can also help you manage your weight  Get at least 150 minutes of moderate to vigorous aerobic physical activity each week  Do not miss more than 2 days in a row  Do not sit longer than 30 minutes at a time  Your healthcare provider can help you create an activity plan  The plan can include the best activities for you and can help you build your strength and endurance  · Maintain a healthy weight  Ask your healthcare provider how much you should weigh  Ask him or her to help you create a safe weight loss plan if you are overweight  Weight loss can improve your blood sugar levels  · Check your blood sugar level as directed and as needed  Ask your healthcare provider what your blood sugar levels should be  ? Look at your schedule and make a plan for when you will check your blood sugar levels throughout the day  ? Check more often if you think your blood sugar is too high or too low  This will allow you to take care of any low or high blood sugar levels so they do not interfere with your activities  ? Rotate the sites where you do fingersticks  This will help make the checks less painful, and make fingerstick sites less noticeable  ? Write down your blood sugar levels so you can show them to your healthcare provider during your visits  Talk to your healthcare provider if you are having trouble keeping your blood sugar at the recommended levels  · Take your diabetes medicine or insulin as directed  You may need diabetes medicine, insulin, or both to help control your blood sugar levels  Your healthcare provider will teach you how and when to take your diabetes medicine or insulin      What you need to know about high blood sugar:  High blood sugar may not cause any symptoms  It may cause you to feel more thirsty than usual or urinate more often than usual  Over time, high blood sugar levels can damage your nerves, blood vessels, tissues, and organs  · Large meals or large amounts of carbohydrates at one time can raise your blood sugar  · Decreased physical activity can raise your blood sugar  For example, your blood sugar can increase if you stop playing a sport or getting regular physical activity  Do not sit for longer than 90 minutes at a time  · Stress can raise your blood sugar  Ask your healthcare provider for help if you are having trouble managing stress  · Illness can raise your blood sugar  This can happen even if you eat less than usual while you are sick  Work with your healthcare provider to develop a sick day plan  This is a plan that helps you manage your blood sugar levels while you are sick  · A lower dose of medicine or insulin, or a late dose, can raise your blood sugar  There is not enough time for your medicine or insulin to work as it should if you take it late  When you take a lower dose, there is not enough medicine or insulin needed to lower your blood sugar  What you need to know about low blood sugar:  Be aware of low blood sugar symptoms if you use insulin  You can prevent symptoms such as shakiness, dizziness, irritability, or confusion by preventing low blood sugar  · Treat low blood sugar right away  Eat 15 grams of carbohydrate, such as 4 ounces of juice or 1 tube of glucose gel  Check your blood sugar again 10 to 15 minutes later  When your blood sugar goes back to normal, eat a meal or snack to prevent another decrease in blood sugar  ·          · Your blood sugar can get too low if you take diabetes medicine or insulin and do not eat enough food  It can also happen if you skip a meal or snack  · Increased physical activity can cause low blood sugar  If you use insulin, check your blood sugar before you exercise  If your blood sugar is below 100 mg/dL, eat 15 grams of carbohydrate  If you will exercise for more than 1 hour, check your blood sugar every 30 minutes  You may need to adjust your insulin before exercise  You may need a carbohydrate snack during or after exercise  Other things you can do to manage your diabetes:   · Wear medical alert jewelry or carry a card that says you have diabetes  Ask where to get these items  · Be safe when you drive  Check your blood sugar before you drive if you use insulin, and you think your blood sugar is low  If your blood sugar is low, eat 15 grams of carbohydrate and wait for your blood sugar to go back to normal  Keep snacks that contain carbohydrate in the car  If you feel like your blood sugar is low while you are driving, pull over and check your blood sugar level  Treat low blood sugar before you start driving again, if needed  · Know the risks if you choose to drink alcohol  Alcohol can cause your blood sugar levels to be low if you use insulin  Alcohol can cause high blood sugar levels and weight gain if you drink too much  Women should limit alcohol to 1 drink a day  Men should limit alcohol to 2 drinks a day  A drink of alcohol is 12 ounces of beer, 5 ounces of wine, or 1½ ounces of liquor  · Do not smoke  Nicotine can damage blood vessels and make it more difficult to manage your diabetes  Do not use e-cigarettes or smokeless tobacco in place of cigarettes or to help you quit  They still contain nicotine  Ask your healthcare provider for information if you currently smoke and need help quitting  · Have screenings for complications of diabetes and other conditions that happen with diabetes  You will need to be screened for kidney problems, high cholesterol, high blood pressure, blood vessel problems, eye problems, and eating disorders   Some screenings may begin right away and some may happen within the first 5 years of diagnosis  You will need to continue screenings through your lifetime  Keep your follow-up appointments with all providers  · Ask about vaccines  You have a higher risk for serious illness if you get the flu, pneumonia, or hepatitis  Ask your healthcare provider if you should get a flu, pneumonia, or hepatitis B vaccine, and when to get the vaccine  Follow up with your healthcare provider as directed: You may need to return to have your A1c every 3 months  An A1c test shows the average amount of sugar in your blood over the past 2 to 3 months  Your healthcare provider will tell you what your A1c level should be  © Copyright [address] Information is for End User's use only and may not be sold, redistributed or otherwise used for commercial purposes  All illustrations and images included in CareNotes® are the copyrighted property of [employer]  or Gabi Barry  The above information is an  only  It is not intended as medical advice for individual conditions or treatments  Talk to your doctor, nurse or pharmacist before following any medical regimen to see if it is safe and effective for you

## 2021-06-04 ENCOUNTER — TRANSITIONAL CARE MANAGEMENT (OUTPATIENT)
Dept: FAMILY MEDICINE CLINIC | Facility: CLINIC | Age: 41
End: 2021-06-04

## 2021-06-04 ENCOUNTER — TELEPHONE (OUTPATIENT)
Dept: NEPHROLOGY | Facility: CLINIC | Age: 41
End: 2021-06-04

## 2021-06-04 NOTE — TELEPHONE ENCOUNTER
----- Message from Anastasiya Kendall PA-C sent at 6/3/2021 11:52 AM EDT -----  Patient being d/c from [facility] today   Please schedule hospital follow up for CKD and hypertension in the next 2-4 weeks

## 2021-06-07 NOTE — TELEPHONE ENCOUNTER
Tried calling patient again voicemail not set up  Letter sent to the patient to call the office to set up hospital follow up

## 2021-06-10 NOTE — UTILIZATION REVIEW
Notification of Discharge   This is a Notification of Discharge from our facility [address]  Please be advised that this patient has been discharge from our facility  Below you will find the admission and discharge date and time including the patients disposition  UTILIZATION REVIEW CONTACT:  Maynor Schroeder  Utilization   Network Utilization Review Department  Phone: [phone] x carefully listen to the prompts  All voicemails are confidential   Email: [email or web address]     PHYSICIAN ADVISORY SERVICES:  FOR VOQX-KE-BUHT REVIEW - MEDICAL NECESSITY DENIAL  Phone: [phone]  Fax: [phone]  Email: [email or web address]     PRESENTATION DATE: 5/27/2021  4:05 PM  OBERVATION ADMISSION DATE:   INPATIENT ADMISSION DATE: 5/29/21 1349   DISCHARGE DATE: 6/3/2021  3:06 PM  DISPOSITION: Home/Self Care Home/Self Care      IMPORTANT INFORMATION:  Send all requests for admission clinical reviews, approved or denied determinations and any other requests to dedicated fax number below belonging to the campus where the patient is receiving treatment   List of dedicated fax numbers:  [address] DENIALS (Administrative/Medical Necessity) [phone]   1000  [facility] (Maternity/NICU/Pediatrics) [phone]   [facility] Sheppard [phone]   [facility] [phone]   [facility]  [phone]   Hernesto Amanda [facility] [address] [place] [address] [phone]   [facility]  [phone]   [address] [facility] [address] [facility]  [address] [facility] [address] [facility] [phone]

## 2021-06-17 NOTE — TELEPHONE ENCOUNTER
Reached vm, left message for father to please call back if patient wants to pursue authorization for medication prescribed or try an alternate  We will await call if interested

## 2021-06-17 NOTE — TELEPHONE ENCOUNTER
Received call from [place] [employer] [facility] regarding emgality denial     Requesting we call [facility] at [phone] with additional information:  Need to know the reduction number of headaches

## 2021-07-12 NOTE — TELEPHONE ENCOUNTER
Received vm from [facility] regarding emgality PA that was denied  Requested we contact patient to determine if he had reduction in frequency or severity of headaches  Please see this communication thread, many attempts to contact patient already made

## 2021-07-16 ENCOUNTER — OFFICE VISIT (OUTPATIENT)
Dept: FAMILY MEDICINE CLINIC | Facility: CLINIC | Age: 41
End: 2021-07-16
Payer: COMMERCIAL

## 2021-07-16 ENCOUNTER — NURSE TRIAGE (OUTPATIENT)
Dept: OTHER | Facility: OTHER | Age: 41
End: 2021-07-16

## 2021-07-16 VITALS
OXYGEN SATURATION: 98 % | BODY MASS INDEX: 46.56 KG/M2 | WEIGHT: 253 LBS | RESPIRATION RATE: 16 BRPM | HEART RATE: 108 BPM | SYSTOLIC BLOOD PRESSURE: 122 MMHG | DIASTOLIC BLOOD PRESSURE: 78 MMHG | TEMPERATURE: 98.3 F | HEIGHT: 62 IN

## 2021-07-16 DIAGNOSIS — E03.9 ACQUIRED HYPOTHYROIDISM: ICD-10-CM

## 2021-07-16 DIAGNOSIS — I16.0 HYPERTENSIVE URGENCY: ICD-10-CM

## 2021-07-16 DIAGNOSIS — K21.9 GASTROESOPHAGEAL REFLUX DISEASE, UNSPECIFIED WHETHER ESOPHAGITIS PRESENT: ICD-10-CM

## 2021-07-16 DIAGNOSIS — E66.01 MORBID OBESITY WITH BMI OF 45.0-49.9, ADULT (HCC): ICD-10-CM

## 2021-07-16 DIAGNOSIS — E78.49 OTHER HYPERLIPIDEMIA: ICD-10-CM

## 2021-07-16 DIAGNOSIS — I16.1 HYPERTENSIVE EMERGENCY: ICD-10-CM

## 2021-07-16 DIAGNOSIS — E11.65 TYPE 2 DIABETES MELLITUS WITH HYPERGLYCEMIA, WITH LONG-TERM CURRENT USE OF INSULIN (HCC): ICD-10-CM

## 2021-07-16 DIAGNOSIS — G43.009 MIGRAINE WITHOUT AURA AND WITHOUT STATUS MIGRAINOSUS, NOT INTRACTABLE: ICD-10-CM

## 2021-07-16 DIAGNOSIS — Z79.4 TYPE 2 DIABETES MELLITUS WITH HYPERGLYCEMIA, WITH LONG-TERM CURRENT USE OF INSULIN (HCC): ICD-10-CM

## 2021-07-16 DIAGNOSIS — F25.1 SCHIZOAFFECTIVE DISORDER, DEPRESSIVE TYPE (HCC): Chronic | ICD-10-CM

## 2021-07-16 DIAGNOSIS — F31.9 BIPOLAR 1 DISORDER (HCC): ICD-10-CM

## 2021-07-16 DIAGNOSIS — F41.9 ANXIETY: Primary | ICD-10-CM

## 2021-07-16 DIAGNOSIS — F42.9 OBSESSIVE-COMPULSIVE DISORDER, UNSPECIFIED TYPE: ICD-10-CM

## 2021-07-16 DIAGNOSIS — R10.13 EPIGASTRIC PAIN: ICD-10-CM

## 2021-07-16 LAB — SL AMB POCT HEMOGLOBIN AIC: 12.1 (ref ?–6.5)

## 2021-07-16 PROCEDURE — 83036 HEMOGLOBIN GLYCOSYLATED A1C: CPT | Performed by: FAMILY MEDICINE

## 2021-07-16 PROCEDURE — 99215 OFFICE O/P EST HI 40 MIN: CPT | Performed by: FAMILY MEDICINE

## 2021-07-16 RX ORDER — CARVEDILOL 12.5 MG/1
12.5 TABLET ORAL 2 TIMES DAILY WITH MEALS
Qty: 60 TABLET | Refills: 5 | Status: SHIPPED | OUTPATIENT
Start: 2021-07-16 | End: 2022-06-23

## 2021-07-16 RX ORDER — INSULIN GLARGINE 100 [IU]/ML
50 INJECTION, SOLUTION SUBCUTANEOUS
Qty: 15 ML | Refills: 3 | Status: SHIPPED | OUTPATIENT
Start: 2021-07-16 | End: 2022-04-19

## 2021-07-16 RX ORDER — LEVOTHYROXINE SODIUM 0.1 MG/1
100 TABLET ORAL
Qty: 30 TABLET | Refills: 5 | Status: SHIPPED | OUTPATIENT
Start: 2021-07-16 | End: 2021-08-23 | Stop reason: SDUPTHER

## 2021-07-16 RX ORDER — ONDANSETRON 4 MG/1
4 TABLET, ORALLY DISINTEGRATING ORAL EVERY 6 HOURS PRN
Qty: 20 TABLET | Refills: 0 | Status: SHIPPED | OUTPATIENT
Start: 2021-07-16 | End: 2022-01-14

## 2021-07-16 RX ORDER — DOXAZOSIN 2 MG/1
2 TABLET ORAL DAILY
Qty: 30 TABLET | Refills: 5 | Status: ON HOLD | OUTPATIENT
Start: 2021-07-16 | End: 2022-06-23 | Stop reason: SDUPTHER

## 2021-07-16 RX ORDER — LISINOPRIL 20 MG/1
20 TABLET ORAL 2 TIMES DAILY
Qty: 60 TABLET | Refills: 5 | Status: SHIPPED | OUTPATIENT
Start: 2021-07-16 | End: 2022-06-23

## 2021-07-16 RX ORDER — VERAPAMIL HYDROCHLORIDE 240 MG/1
240 TABLET, FILM COATED, EXTENDED RELEASE ORAL DAILY
Qty: 30 TABLET | Refills: 5 | Status: SHIPPED | OUTPATIENT
Start: 2021-07-16 | End: 2022-06-23

## 2021-07-16 RX ORDER — LORAZEPAM 0.5 MG/1
0.5 TABLET ORAL DAILY PRN
Qty: 30 TABLET | Refills: 0 | Status: SHIPPED | OUTPATIENT
Start: 2021-07-16 | End: 2022-07-21

## 2021-07-16 RX ORDER — PANTOPRAZOLE SODIUM 40 MG/1
40 TABLET, DELAYED RELEASE ORAL
Qty: 60 TABLET | Refills: 5 | Status: ON HOLD | OUTPATIENT
Start: 2021-07-16 | End: 2022-06-23 | Stop reason: SDUPTHER

## 2021-07-16 RX ORDER — FAMOTIDINE 40 MG/1
40 TABLET, FILM COATED ORAL DAILY
Qty: 30 TABLET | Refills: 5 | Status: SHIPPED | OUTPATIENT
Start: 2021-07-16

## 2021-07-16 NOTE — ASSESSMENT & PLAN NOTE
I am going to increase Latuda  He was given prescription for Ativan 0 5 mg daily as needed  Was discussed about possible side effects

## 2021-07-16 NOTE — ASSESSMENT & PLAN NOTE
Improving  Continue on famotidine 40 mg and Protonix  He is to follow-up with GI  He finished his Carafate

## 2021-07-16 NOTE — ASSESSMENT & PLAN NOTE
Lab Results   Component Value Date    HGBA1C 12 1 (A) 07/16/2021     Not well controlled  He refuses to go see endocrinologist   I am going to put him back on Lantus 50 units q h s  He is to continue with Humalog 5 units with meals  He is to monitor his fasting glucose and 2 hours after each meal   He was told to increase his insulin to units every 2 days if his fasting glucose continue to be more than 140  He is to come back in 3 weeks

## 2021-07-16 NOTE — PROGRESS NOTES
Assessment/Plan:  Type 2 diabetes mellitus, with long-term current use of insulin (Former[facility])    Lab Results   Component Value Date    HGBA1C 12 1 (A) 07/16/2021     Not well controlled  He refuses to go see endocrinologist   I am going to put him back on Lantus 50 units q h s  He is to continue with Humalog 5 units with meals  He is to monitor his fasting glucose and 2 hours after each meal   He was told to increase his insulin to units every 2 days if his fasting glucose continue to be more than 140  He is to come back in 3 weeks  GERD (gastroesophageal reflux disease)    Improving  Continue on famotidine 40 mg and Protonix  He is to follow-up with GI  He finished his Carafate  Acquired hypothyroidism    Stable  Continue same  He was given refill  Migraine without aura and without status migrainosus, not intractable    Stable  Continue same  Will continue to monitor  Schizoaffective disorder ([facility] )    I am going to increase his Latuda to 40 mg daily  He is to see psychiatrist     Anxiety    I am going to increase Latuda  He was given prescription for Ativan 0 5 mg daily as needed  Was discussed about possible side effects  OCD (obsessive compulsive disorder)   Increase Latuda to 40 mg  He wants to hold off Luvox for now  He is to see psychiatrist     Bipolar 1 disorder ([facility] )    Not well controlled but improving  Increase Latuda to 40 mg daily  He is to see psychiatrist     Other hyperlipidemia    Continue same  Will continue to monitor  Hypertensive emergency    Blood pressure improved  Continue same  He is to see cardiologist        Diagnoses and all orders for this visit:    Anxiety  -     LORazepam (ATIVAN) 0 5 mg tablet; Take 1 tablet (0 5 mg total) by mouth daily as needed for anxiety    Type 2 diabetes mellitus with hyperglycemia, with long-term current use of insulin ([facility])  -     insulin glargine (Lantus SoloStar) 100 units/mL injection pen;  Inject 50 Units under the skin daily at bedtime  -     POCT hemoglobin A1c    Schizoaffective disorder, depressive type (HCC)  -     lurasidone (LATUDA) 40 mg tablet; Take 1 tablet (40 mg total) by mouth daily after dinner    Acquired hypothyroidism  -     levothyroxine 100 mcg tablet; Take 1 tablet (100 mcg total) by mouth daily in the early morning    Hypertensive urgency  -     lisinopril (ZESTRIL) 20 mg tablet; Take 1 tablet (20 mg total) by mouth 2 (two) times a day  -     verapamil (CALAN-SR) 240 mg CR tablet; Take 1 tablet (240 mg total) by mouth daily  -     carvedilol (COREG) 12 5 mg tablet; Take 1 tablet (12 5 mg total) by mouth 2 (two) times a day with meals  -     doxazosin (CARDURA) 2 mg tablet; Take 1 tablet (2 mg total) by mouth daily    Epigastric pain  -     famotidine (PEPCID) 40 MG tablet; Take 1 tablet (40 mg total) by mouth daily  -     pantoprazole (PROTONIX) 40 mg tablet; Take 1 tablet (40 mg total) by mouth 2 (two) times a day before meals  -     ondansetron (ZOFRAN-ODT) 4 mg disintegrating tablet; Take 1 tablet (4 mg total) by mouth every 6 (six) hours as needed for nausea or vomiting    Gastroesophageal reflux disease, unspecified whether esophagitis present    Migraine without aura and without status migrainosus, not intractable    Obsessive-compulsive disorder, unspecified type    Bipolar 1 disorder ([facility] )    Other hyperlipidemia    Hypertensive emergency    Morbid obesity with BMI of 45 0-49 9, adult ([facility] )          There are no Patient Instructions on file for this visit  Return in about 3 weeks (around 8/6/2021)  Subjective:      Patient ID: Reymundo Meier is a 35 y o  male  Chief Complaint   Patient presents with    Hypertension    Hypothyroidism    Diabetes         He is here today for follow-up multiple medical problems  He is noncompliant with his medications or with his office visits  He requested refills and he was told he need to come in to the office    He stated his blood sugar measurements have been up to 400 in a m  He was hospitalized recently and his Lantus was decreased to 30 units q h s   His A1c in May was 9 8  Today I checked his A1c in the office was elevated at 12 1  He also has history of schizophrenia and anxiety  With obsessive-compulsive disorder  He was discharged from the hospital on late to the 20 mg daily  He stated it is helping but not controlling his symptoms very well  He has not been seen by psychiatrist yet  He is to follow-up with nephrologist but has not been seen by them yet  The following portions of the patient's history were reviewed and updated as appropriate: allergies, current medications, past family history, past medical history, past social history, past surgical history and problem list     Review of Systems   Constitutional: Negative for chills and fever  HENT: Negative for trouble swallowing  Eyes: Negative for visual disturbance  Respiratory: Negative for cough and shortness of breath  Cardiovascular: Negative for chest pain and palpitations  Gastrointestinal: Negative for abdominal pain, blood in stool and vomiting  Endocrine: Negative for cold intolerance and heat intolerance  Genitourinary: Negative for difficulty urinating and dysuria  Musculoskeletal: Negative for gait problem  Skin: Negative for rash  Neurological: Negative for dizziness, syncope and headaches  Hematological: Negative for adenopathy  Psychiatric/Behavioral: Negative for behavioral problems  Current Outpatient Medications   Medication Sig Dispense Refill    ACCU-CHEK FASTCLIX LANCETS MISC 4 (four) times a day Not checking 4 times a day   Patient stated maybe 2 times a day  1    ACCU-CHEK GUIDE test strip 4 (four) times a day Test  1    albuterol (PROVENTIL HFA,VENTOLIN HFA) 90 mcg/act inhaler Inhale 2 puffs 4 (four) times a day 8 g 0    aluminum-magnesium hydroxide-simethicone (MYLANTA) 200-200-20 mg/5 mL suspension Take 30 mL by mouth 2 (two) times a day as needed for indigestion or heartburn 355 mL 0    Blood Glucose Monitoring Suppl (ACCU-CHEK GUIDE) w/Device KIT Checking 2 times a day  0    carvedilol (COREG) 12 5 mg tablet Take 1 tablet (12 5 mg total) by mouth 2 (two) times a day with meals 60 tablet 5    cholecalciferol (VITAMIN D3) 1,000 units tablet Take 1 tablet (1,000 Units total) by mouth daily 30 tablet 0    doxazosin (CARDURA) 2 mg tablet Take 1 tablet (2 mg total) by mouth daily 30 tablet 5    famotidine (PEPCID) 40 MG tablet Take 1 tablet (40 mg total) by mouth daily 30 tablet 5    insulin glargine (LANTUS) 100 units/mL subcutaneous injection Inject 30 Units under the skin daily at bedtime (Patient taking differently: Inject 50 Units under the skin daily at bedtime ) 10 mL 0    insulin lispro (HumaLOG) 100 units/mL injection Inject 5 Units under the skin 3 (three) times a day with meals 10 mL 0    Insulin Pen Needle (Pen Needles 3/16") 31G X 5 MM MISC Use 4 (four) times a day 200 each 3    levothyroxine 100 mcg tablet Take 1 tablet (100 mcg total) by mouth daily in the early morning 30 tablet 5    lisinopril (ZESTRIL) 20 mg tablet Take 1 tablet (20 mg total) by mouth 2 (two) times a day 60 tablet 5    lurasidone (LATUDA) 40 mg tablet Take 1 tablet (40 mg total) by mouth daily after dinner 30 tablet 3    ondansetron (ZOFRAN-ODT) 4 mg disintegrating tablet Take 1 tablet (4 mg total) by mouth every 6 (six) hours as needed for nausea or vomiting 20 tablet 0    pantoprazole (PROTONIX) 40 mg tablet Take 1 tablet (40 mg total) by mouth 2 (two) times a day before meals 60 tablet 5    prochlorperazine (COMPAZINE) 10 mg tablet Take 1 tablet (10 mg total) by mouth every 6 (six) hours as needed (migraine) 10 tablet 0    sucralfate (CARAFATE) 1 g tablet Take 1 tablet (1 g total) by mouth 2 (two) times a day 60 tablet 0    verapamil (CALAN-SR) 240 mg CR tablet Take 1 tablet (240 mg total) by mouth daily 30 tablet 5    fluvoxaMINE (LUVOX) 100 mg tablet Take 1 tablet (100 mg total) by mouth 2 (two) times a day (Patient not taking: Reported on 7/16/2021) 60 tablet 0    Galcanezumab-gnlm 120 MG/ML SOAJ Inject 120 mg under the skin every 30 (thirty) days (Patient not taking: Reported on 7/16/2021) 1 pen 11    insulin glargine (Lantus SoloStar) 100 units/mL injection pen Inject 50 Units under the skin daily at bedtime 15 mL 3    LORazepam (ATIVAN) 0 5 mg tablet Take 1 tablet (0 5 mg total) by mouth daily as needed for anxiety 30 tablet 0     No current facility-administered medications for this visit  Objective:    /78 (BP Location: Left arm, Patient Position: Sitting, Cuff Size: Large)   Pulse (!) 108   Temp 98 3 °F (36 8 °C) (Tympanic)   Resp 16   Ht 5' 2" (1 575 m)   Wt 115 kg (253 lb)   SpO2 98%   BMI 46 27 kg/m²        Physical Exam  Vitals and nursing note reviewed  Constitutional:       Appearance: He is well-developed  HENT:      Head: Normocephalic and atraumatic  Eyes:      Pupils: Pupils are equal, round, and reactive to light  Cardiovascular:      Rate and Rhythm: Normal rate and regular rhythm  Heart sounds: Normal heart sounds  Pulmonary:      Effort: Pulmonary effort is normal       Breath sounds: Normal breath sounds  Abdominal:      General: Bowel sounds are normal       Palpations: Abdomen is soft  Musculoskeletal:         General: Normal range of motion  Cervical back: Normal range of motion and neck supple  Lymphadenopathy:      Cervical: No cervical adenopathy  Skin:     General: Skin is warm  Findings: No rash  Neurological:      Mental Status: He is alert and oriented to person, place, and time  Cranial Nerves: No cranial nerve deficit  Mitch Joel MD BMI Counseling: Body mass index is 46 27 kg/m²   The BMI is above normal  Nutrition recommendations include reducing portion sizes, decreasing overall calorie intake and 3-5 servings of fruits/vegetables daily  Exercise recommendations include moderate aerobic physical activity for 150 minutes/week

## 2021-07-17 NOTE — TELEPHONE ENCOUNTER
Sent 7 pills of Latuda 40mg to [facility]  Reason for Disposition   [1] Prescription prescribed recently is not at pharmacy AND [2] triager has access to patient's EMR AND [3] prescription is recorded in the EMR    Answer Assessment - Initial Assessment Questions  1  NAME of MEDICATION: "What medicine are you calling about?"      Latuda 40mg     2  QUESTION: "What is your question?" (e g , medication refill, side effect)      The pharmacy said they never received it  Dc Carranza it was sent then canceled saying provider denied  3  PRESCRIBING HCP: "Who prescribed it?" Reason: if prescribed by specialist, call should be referred to that group  Dr Eugenio Vegas     4  SYMPTOMS: "Do you have any symptoms?"      No     5   SEVERITY: If symptoms are present, ask "Are they mild, moderate or severe?"      N/A    Protocols used: MEDICATION QUESTION CALL-ADULT-

## 2021-07-17 NOTE — TELEPHONE ENCOUNTER
Regarding: Medication  ----- Message from Khang Taylor sent at 7/16/2021  7:20 PM EDT -----  Pt's mom called regarding rx, " We recently changed out pharmacy info and spoke to doctor to send rx to the new pharmacy  Doctor did sent medication except for rx Latuda 40 mg tablet   He needs this medication, he does not have any left for the weekend "

## 2021-07-19 DIAGNOSIS — F25.1 SCHIZOAFFECTIVE DISORDER, DEPRESSIVE TYPE (HCC): Chronic | ICD-10-CM

## 2021-07-19 DIAGNOSIS — E11.65 TYPE 2 DIABETES MELLITUS WITH HYPERGLYCEMIA, WITH LONG-TERM CURRENT USE OF INSULIN (HCC): ICD-10-CM

## 2021-07-19 DIAGNOSIS — Z79.4 TYPE 2 DIABETES MELLITUS WITH HYPERGLYCEMIA, WITH LONG-TERM CURRENT USE OF INSULIN (HCC): ICD-10-CM

## 2021-07-19 NOTE — TELEPHONE ENCOUNTER
Pt guardian stated pt came home with Latuda 40mg from hospital and insurance told her it needs  Prior auth or will be over $400  Can we please check on this for the pt

## 2021-07-19 NOTE — TELEPHONE ENCOUNTER
We did receive a authorization request from [facility] to be done for the gatito Galvan will work on this but it can take time for insurance to review and approve once auth is placed

## 2021-07-19 NOTE — TELEPHONE ENCOUNTER
Prior  auth started thRough Cover my meds for [employer]  KEY: [id number]  Outcome pending    Pt has tried :lorazepam, Latuda 20mg, Fluroxamine 100mg BID, Cariprazine 6 mg (non-formulary in hospital), Ziprasidone(geodon)40mg, Abilify 30mg, Effexor 75mf, Restoril 30mg, Benaztropine 1mg

## 2021-07-19 NOTE — TELEPHONE ENCOUNTER
Latuda 40 mg was already sent to pharmacy on 7/16/21  I called [facility] and they never received the refill  I was unable to call medication in because it needs to be sent electronically  Refill sent back to provider for approval as stat since he only has 1 pill left

## 2021-07-19 NOTE — TELEPHONE ENCOUNTER
Latuda 40 mg was already sent to pharmacy on 7/16/21  I called [facility] and they never received the refill  I was unable to call medication in because it needs to be sent electronically  Refill sent back to provider for approval as stat since he only has 1 pill left  The pharmacy never received our refill on Latuda to determine if it needs prior auth   I was not told it needed one when I spoke with pharmacist

## 2021-07-20 NOTE — TELEPHONE ENCOUNTER
Phone call from Nidia Starkey, states Nilay Sutton needs a prior auth for his Humalog Insulin  Medication refill was requested yesterday   Please advise when this is taken care of

## 2021-07-20 NOTE — TELEPHONE ENCOUNTER
humalog prior auth request faxed to [email or web address]     Pt has tried lantus,basaglar,admelog,novolog, toujeo, metformin (caused gi upset) ,byetta,januvia

## 2021-07-20 NOTE — TELEPHONE ENCOUNTER
Per [facility], Latuda 40mg tab approved 34 tabs per 34 days  Valid 7/19/21-6/1/22  [facility] notified  Pt's voice mail not set up, l/m on Grisel's voice mail stating medication approved and pharmacy states ready for

## 2021-07-22 NOTE — TELEPHONE ENCOUNTER
Pt mom called and [employer] told her we still have not applied for prior auth for his humalog pen and they are almost out  Please call mom to advise the status   * fax from [facility] received at this time for item as well

## 2021-07-23 NOTE — TELEPHONE ENCOUNTER
Humalog denied  Formulary : Aoidra solostar Aoidra vial, insulin Aspart pen fill (generic novolog), Insulin Aspart pen (generic novolog flex pen) lispro kwikpen u-100 (generic humalog kwik pen u-johnathan),Insulin lispro vial      Please advise    Pt has tried lantus, basaglar,admelog solostar,novolog,toujeo,metformin, byetta, januvia Rosella Goldmann

## 2021-07-27 ENCOUNTER — TELEPHONE (OUTPATIENT)
Dept: FAMILY MEDICINE CLINIC | Facility: CLINIC | Age: 41
End: 2021-07-27

## 2021-07-27 NOTE — TELEPHONE ENCOUNTER
Per Dr Cheryl Hollins, gave 3 samples of humalog pen    Left message on father's voicemail to  at office

## 2021-07-27 NOTE — TELEPHONE ENCOUNTER
Phone call from patients father, states Humalog was denied for Carolina Chun  Please send another med to pharmacy ( see previous message from Julian re: formulary med)  Pt is out of the Humalog  Also, Dad is wondering if we have samples ? Please advise, he has running around to do & will stop at the office this afternoon  Please call [phone]

## 2021-07-30 ENCOUNTER — TELEPHONE (OUTPATIENT)
Dept: GASTROENTEROLOGY | Facility: CLINIC | Age: 41
End: 2021-07-30

## 2021-08-02 NOTE — TELEPHONE ENCOUNTER
Patients GI provider:  Dr Salguero Reusing    Number to return call: [phone]    Reason for call: Pt calling stating he is experiencing nausea, vomiting and esophagus pain even on all of his medications      Scheduled procedure/appointment date if applicable: N/A
Pt of Dr Valerie Rivera last seen 2/5/2021   EGD 3/11/2021 was normal with erosive gastritis  HX: GERD, N/V    Tried to call pt to discuss  No answer  No VM set up   Will try again later
Tried to call pt again  No answer  No VM set up 
[Initial Visit ___] : [unfilled] is here today for an initial visit  for [unfilled]

## 2021-08-11 ENCOUNTER — TELEPHONE (OUTPATIENT)
Dept: FAMILY MEDICINE CLINIC | Facility: CLINIC | Age: 41
End: 2021-08-11

## 2021-08-11 NOTE — TELEPHONE ENCOUNTER
TCM scheduled for Thursday 8/18 with Dr Sea Erickson for discharge from [facility] AT THE [facility] 8/13/21

## 2021-08-11 NOTE — TELEPHONE ENCOUNTER
TCM call cannot be started till pt is discharge from hospital   Also pt was in for behavioral health which is not a TCM- will double check with Igor Nevarez the

## 2021-08-12 ENCOUNTER — TELEPHONE (OUTPATIENT)
Dept: FAMILY MEDICINE CLINIC | Facility: CLINIC | Age: 41
End: 2021-08-12

## 2021-08-12 DIAGNOSIS — G43.909 MIGRAINE WITHOUT STATUS MIGRAINOSUS, NOT INTRACTABLE, UNSPECIFIED MIGRAINE TYPE: Primary | ICD-10-CM

## 2021-08-12 NOTE — TELEPHONE ENCOUNTER
I doubled checked with Zarina Whitley and behavioral health is not a TCM    I changed visit type to hospital follow up

## 2021-08-12 NOTE — TELEPHONE ENCOUNTER
Phone call from Angeli taveras at [facility] Neurology, she is requesting an ambulatory referral be  put in [address]  Use diag  Code [id number] (migraine)  Any ques  , call her at [phone]

## 2021-08-18 ENCOUNTER — OFFICE VISIT (OUTPATIENT)
Dept: NEUROLOGY | Facility: CLINIC | Age: 41
End: 2021-08-18
Payer: COMMERCIAL

## 2021-08-18 VITALS
SYSTOLIC BLOOD PRESSURE: 148 MMHG | BODY MASS INDEX: 52.85 KG/M2 | WEIGHT: 287.2 LBS | DIASTOLIC BLOOD PRESSURE: 90 MMHG | HEART RATE: 72 BPM | TEMPERATURE: 97.8 F | RESPIRATION RATE: 20 BRPM | HEIGHT: 62 IN

## 2021-08-18 DIAGNOSIS — G47.33 OBSTRUCTIVE SLEEP APNEA (ADULT) (PEDIATRIC): ICD-10-CM

## 2021-08-18 DIAGNOSIS — G43.709 CHRONIC MIGRAINE WITHOUT AURA WITHOUT STATUS MIGRAINOSUS, NOT INTRACTABLE: ICD-10-CM

## 2021-08-18 DIAGNOSIS — G43.909 MIGRAINE WITHOUT STATUS MIGRAINOSUS, NOT INTRACTABLE, UNSPECIFIED MIGRAINE TYPE: ICD-10-CM

## 2021-08-18 DIAGNOSIS — M54.2 CERVICALGIA: Primary | ICD-10-CM

## 2021-08-18 PROCEDURE — 99214 OFFICE O/P EST MOD 30 MIN: CPT | Performed by: NURSE PRACTITIONER

## 2021-08-18 RX ORDER — CYCLOBENZAPRINE HCL 10 MG
10 TABLET ORAL 2 TIMES DAILY PRN
Qty: 30 TABLET | Refills: 0 | Status: SHIPPED | OUTPATIENT
Start: 2021-08-18 | End: 2021-10-06 | Stop reason: SDUPTHER

## 2021-08-18 RX ORDER — PROCHLORPERAZINE MALEATE 10 MG
10 TABLET ORAL EVERY 6 HOURS PRN
Qty: 10 TABLET | Refills: 0 | Status: SHIPPED | OUTPATIENT
Start: 2021-08-18 | End: 2021-10-06 | Stop reason: SDUPTHER

## 2021-08-18 RX ORDER — PROPRANOLOL HYDROCHLORIDE 10 MG/1
10 TABLET ORAL
COMMUNITY
Start: 2021-08-13 | End: 2022-06-23

## 2021-08-18 RX ORDER — ATORVASTATIN CALCIUM 10 MG/1
10 TABLET, FILM COATED ORAL
COMMUNITY
Start: 2021-08-17 | End: 2022-06-23

## 2021-08-18 RX ORDER — CARIPRAZINE 6 MG/1
6 CAPSULE, GELATIN COATED ORAL DAILY
COMMUNITY
Start: 2021-08-13

## 2021-08-18 NOTE — PATIENT INSTRUCTIONS
Sleep study to rule out sleep apnea  Continue follow up with cardiology and nephrology for hypertension  Use flexeril or compazine to help acutely with headache; on verapamil/propranolol for preventative and this has helped; start ajovy for prevention    Do PT for neck pain  Follow up in 2 months

## 2021-08-18 NOTE — PROGRESS NOTES
Patient ID: Erlinda Arredondo is a 35 y o  male  Assessment/Plan:    Migraine without aura and without status migrainosus, not intractable  Seen in follow up today for headache  Appears to be improved from previous with more control of BP and mood, but still having 6 headaches/week with migrainous features  He always had poor sleep; 3-4 hours/night  Sleep study ordered for further evaluation  He has neck pain and posterior headaches  He failed emgality; he may be willing to try botox in future but still slightly hesitant  I suggest we try another CGRP drug; ajovy prescribed  I have refilled his flexeril on a PRN basis as well as compazine  He is encouraged to do home neck exercises and go to PT  IF this is ineffective or if there is new complaints that are concerning I may repeat MRI C spine in future as he has spinal stenosis on previous MRI  Patient instructions:  Sleep study to rule out sleep apnea  Continue follow up with cardiology and nephrology for hypertension  Use flexeril or compazine to help acutely with headache; on verapamil/propranolol for preventative and this has helped; start ajovy for prevention  Do PT for neck pain  Follow up in 2 months        Diagnoses and all orders for this visit:    Cervicalgia  -     Ambulatory referral to Comprehensive Spine PT; Future    Migraine without status migrainosus, not intractable, unspecified migraine type  -     Ambulatory referral to Neurology  -     fremanezumab-vfrm (Ajovy) 225 MG/1 5ML auto-injector; Inject 1 5 mL (225 mg total) under the skin every 30 (thirty) days  -     cyclobenzaprine (FLEXERIL) 10 mg tablet; Take 1 tablet (10 mg total) by mouth 2 (two) times a day as needed for muscle spasms for up to 15 days    Obstructive sleep apnea (adult) (pediatric)  -     Cancel: Home Study; Future  -     Diagnostic Sleep Study;  Future    Chronic migraine without aura without status migrainosus, not intractable  -     prochlorperazine (COMPAZINE) 10 mg tablet; Take 1 tablet (10 mg total) by mouth every 6 (six) hours as needed (migraine)        Subjective:    MAHENDRA Jones is a  Right handed 35year old male with a past medical history of DM (uncontrolled with recent a1c 11 6), hypothyroidism, hyperlipidemia, uncontrolled hypertension with hypertensive emergency, schizoaffective disorder, OCD, morbid obesity, and occipital neuralgia/migraine headaches who presents for follow up  He was last seen in this office in October 2020  Since that time he has been admitted 2x to behavioral health units and medications adjusted  He had a CT head which was negative for any acute abnormality  He is back on psychiatric medications and has appropriate follow up  He presents today with his mother  He states his headaches have been better with blood pressure treatment but he still gets a pressure from the back of the head to the front of the head, both sides, with occasional flashes of light and photophobia  He denies nausea/vomiting  He states didn't want to do botox in past because he thought it would cause ALS (clarified this for him that it would not cause ALS)  He had been placed on emgality for headaches; states this was not effective despite using for 6 months; he is willing to try a different CGRP drug  He states recently put on flexeril and this was helpful  Compazine also helpful in past; cannot use imitrex b/c of unstable BP's  He does have neck pain with headaches and he had a previous MRI C spine/neurosurgery evaluation which he states was nonsurgical at the time  He did not go to PT  The following portions of the patient's history were reviewed and updated as appropriate: allergies, current medications, past family history, past medical history, past social history, past surgical history and problem list          Objective:    Blood pressure 148/90, pulse 72, temperature 97 8 °F (36 6 °C), temperature source Temporal, resp   rate 20, height 5' 2" (1 575 m), weight 130 kg (287 lb 3 2 oz)  Physical Exam  Vitals reviewed  Constitutional:       Appearance: He is morbidly obese  HENT:      Head: Normocephalic and atraumatic  Mouth/Throat:      Mouth: Mucous membranes are moist       Pharynx: Oropharynx is clear  Eyes:      General: Lids are normal       Extraocular Movements: Extraocular movements intact  Pupils: Pupils are equal, round, and reactive to light  Neck:      Comments: No significant signs of radiculopathy on exam  Cardiovascular:      Rate and Rhythm: Normal rate and regular rhythm  Pulses: Normal pulses  Heart sounds: Normal heart sounds  Pulmonary:      Effort: Pulmonary effort is normal       Breath sounds: Normal breath sounds  Abdominal:      General: Bowel sounds are normal  There is no distension  Musculoskeletal:         General: Normal range of motion  Cervical back: No rigidity  Muscular tenderness present  Normal range of motion  Skin:     General: Skin is warm and dry  Neurological:      General: No focal deficit present  Mental Status: He is alert  Deep Tendon Reflexes: Strength normal       Reflex Scores:       Bicep reflexes are 1+ on the right side and 1+ on the left side  Brachioradialis reflexes are 1+ on the right side and 1+ on the left side  Patellar reflexes are 2+ on the right side and 2+ on the left side  Achilles reflexes are 2+ on the right side and 2+ on the left side  Psychiatric:         Mood and Affect: Mood normal          Speech: Speech normal          Behavior: Behavior normal          Neurological Exam  Mental Status  Alert  Speech is normal  Fund of knowledge is appropriate for level of education  Cranial Nerves  CN II: Visual acuity is normal  Visual fields full to confrontation  CN III, IV, VI: Extraocular movements intact bilaterally  Normal lids and orbits bilaterally  Pupils equal round and reactive to light bilaterally    CN V: Facial sensation is normal   CN VII: Full and symmetric facial movement  CN VIII: Hearing is normal   CN IX, X: Palate elevates symmetrically  Normal gag reflex  CN XI: Shoulder shrug strength is normal   CN XII: Tongue midline without atrophy or fasciculations  Motor   Normal muscle tone  No abnormal involuntary movements  Strength is 5/5 throughout all four extremities  Sensory  Light touch is normal in upper and lower extremities  Reflexes                                           Right                      Left  Brachioradialis                    1+                         1+  Biceps                                 1+                         1+  Patellar                                2+                         2+  Achilles                                2+                         2+    Coordination  Right: Finger-to-nose normal   Left: Finger-to-nose normal     Gait  Casual gait is normal including stance, stride, and arm swing  Able to rise from chair without using arms  ROS:    Review of Systems   Constitutional: Positive for fatigue  Negative for appetite change and fever  HENT: Negative  Negative for hearing loss, tinnitus, trouble swallowing and voice change  Eyes: Positive for photophobia  Negative for pain  Respiratory: Positive for shortness of breath  Cardiovascular: Negative for chest pain and palpitations  Gastrointestinal: Positive for nausea  Negative for constipation, diarrhea and vomiting  Endocrine: Negative  Negative for cold intolerance and heat intolerance  Genitourinary: Negative for dysuria, frequency and urgency  Musculoskeletal: Negative  Negative for arthralgias, myalgias, neck pain and neck stiffness  Skin: Negative  Negative for rash and wound  Neurological: Positive for light-headedness (positional) and headaches (6 per week-no change)  Negative for dizziness, tremors, seizures, syncope, facial asymmetry, speech difficulty, weakness and numbness  Hematological: Negative  Does not bruise/bleed easily  Psychiatric/Behavioral: Positive for confusion (previous dx of episodic confusion)  Negative for hallucinations and sleep disturbance       ROS was reviewed and updated as appropriate

## 2021-08-19 NOTE — ASSESSMENT & PLAN NOTE
Seen in follow up today for headache  Appears to be improved from previous with more control of BP and mood, but still having 6 headaches/week with migrainous features  He always had poor sleep; 3-4 hours/night  Sleep study ordered for further evaluation  He has neck pain and posterior headaches  He failed emgality; he may be willing to try botox in future but still slightly hesitant  I suggest we try another CGRP drug; ajovy prescribed  I have refilled his flexeril on a PRN basis as well as compazine  He is encouraged to do home neck exercises and go to PT  IF this is ineffective or if there is new complaints that are concerning I may repeat MRI C spine in future as he has spinal stenosis on previous MRI  Patient instructions:  Sleep study to rule out sleep apnea  Continue follow up with cardiology and nephrology for hypertension  Use flexeril or compazine to help acutely with headache; on verapamil/propranolol for preventative and this has helped; start ajovy for prevention    Do PT for neck pain  Follow up in 2 months

## 2021-08-23 ENCOUNTER — HOSPITAL ENCOUNTER (OUTPATIENT)
Dept: RADIOLOGY | Facility: HOSPITAL | Age: 41
Discharge: HOME | End: 2021-08-23
Payer: COMMERCIAL

## 2021-08-23 ENCOUNTER — TELEPHONE (OUTPATIENT)
Dept: FAMILY MEDICINE CLINIC | Facility: CLINIC | Age: 41
End: 2021-08-23

## 2021-08-23 ENCOUNTER — OFFICE VISIT (OUTPATIENT)
Dept: FAMILY MEDICINE CLINIC | Facility: CLINIC | Age: 41
End: 2021-08-23
Payer: COMMERCIAL

## 2021-08-23 ENCOUNTER — HOSPITAL ENCOUNTER (OUTPATIENT)
Dept: RADIOLOGY | Facility: IMAGING CENTER | Age: 41
Discharge: HOME | End: 2021-08-23
Payer: COMMERCIAL

## 2021-08-23 VITALS
HEIGHT: 62 IN | WEIGHT: 290 LBS | RESPIRATION RATE: 16 BRPM | BODY MASS INDEX: 53.37 KG/M2 | DIASTOLIC BLOOD PRESSURE: 98 MMHG | SYSTOLIC BLOOD PRESSURE: 150 MMHG | HEART RATE: 74 BPM | TEMPERATURE: 97.4 F | OXYGEN SATURATION: 99 %

## 2021-08-23 DIAGNOSIS — G43.009 MIGRAINE WITHOUT AURA AND WITHOUT STATUS MIGRAINOSUS, NOT INTRACTABLE: ICD-10-CM

## 2021-08-23 DIAGNOSIS — F42.9 OBSESSIVE-COMPULSIVE DISORDER, UNSPECIFIED TYPE: ICD-10-CM

## 2021-08-23 DIAGNOSIS — E10.9 TYPE 1 DIABETES MELLITUS WITHOUT COMPLICATION (HCC): ICD-10-CM

## 2021-08-23 DIAGNOSIS — R06.02 SOB (SHORTNESS OF BREATH): ICD-10-CM

## 2021-08-23 DIAGNOSIS — R60.9 PERIPHERAL EDEMA: ICD-10-CM

## 2021-08-23 DIAGNOSIS — E03.9 ACQUIRED HYPOTHYROIDISM: Primary | ICD-10-CM

## 2021-08-23 DIAGNOSIS — F25.1 SCHIZOAFFECTIVE DISORDER, DEPRESSIVE TYPE (HCC): ICD-10-CM

## 2021-08-23 PROBLEM — E78.2 HYPERLIPIDEMIA, MIXED: Status: ACTIVE | Noted: 2019-06-07

## 2021-08-23 PROBLEM — R51.9 HEADACHE: Status: ACTIVE | Noted: 2019-02-15

## 2021-08-23 PROBLEM — I10 HYPERTENSION: Status: ACTIVE | Noted: 2021-08-23

## 2021-08-23 PROCEDURE — 3080F DIAST BP >= 90 MM HG: CPT | Performed by: FAMILY MEDICINE

## 2021-08-23 PROCEDURE — 71046 X-RAY EXAM CHEST 2 VIEWS: CPT

## 2021-08-23 PROCEDURE — 3077F SYST BP >= 140 MM HG: CPT | Performed by: FAMILY MEDICINE

## 2021-08-23 PROCEDURE — 99214 OFFICE O/P EST MOD 30 MIN: CPT | Performed by: FAMILY MEDICINE

## 2021-08-23 RX ORDER — LEVOTHYROXINE SODIUM 112 UG/1
112 TABLET ORAL
Qty: 90 TABLET | Refills: 0
Start: 2021-08-23 | End: 2022-06-23

## 2021-08-23 RX ORDER — FUROSEMIDE 20 MG/1
20 TABLET ORAL DAILY
Qty: 30 TABLET | Refills: 0 | Status: SHIPPED | OUTPATIENT
Start: 2021-08-23 | End: 2022-01-27

## 2021-08-23 NOTE — ASSESSMENT & PLAN NOTE
Was given prescription for Lasix 20 mg was told to take 40 mg daily for 3 days and then 20 mg  Come back in 1 week  I am going to check echocardiogram and chest x-ray

## 2021-08-23 NOTE — ASSESSMENT & PLAN NOTE
I am going to check echocardiogram and chest x-ray  Was given prescription for Lasix  Referral to see cardiologist   If symptoms worse go to emergency room  Come back in 1 week

## 2021-08-23 NOTE — TELEPHONE ENCOUNTER
No prior auth required for Echo complete  Called pt to inform okay to sched , voice mail not set up  L/m informing  Calin Rivera

## 2021-08-23 NOTE — PROGRESS NOTES
Assessment/Plan:  Hypothyroidism    Not well controlled  Was adjusted recently to 112 mcg daily  Continue follow-up with endocrinologist     Type 1 diabetes mellitus without complication ([facility] [address] )    Lab Results   Component Value Date    HGBA1C 11 6 (H) 08/03/2021     Very poor control  His insulin was adjusted recently  Continue to follow-up with endocrinologist   Was discussed with patient about using his medications as prescribed and follow low carb diet  Migraine without aura and without status migrainosus, not intractable    Improving since started on metoprolol  OCD (obsessive compulsive disorder)    Was started back on Luvox  Continue to follow-up with psych  Schizoaffective disorder, depressive type (HCC)    Medications were adjusted symptoms improving  Continue same  Continue follow-up with psych  Peripheral edema    Was given prescription for Lasix 20 mg was told to take 40 mg daily for 3 days and then 20 mg  Come back in 1 week  I am going to check echocardiogram and chest x-ray  SOB (shortness of breath)    I am going to check echocardiogram and chest x-ray  Was given prescription for Lasix  Referral to see cardiologist   If symptoms worse go to emergency room  Come back in 1 week  Diagnoses and all orders for this visit:    Acquired hypothyroidism  -     levothyroxine 112 mcg tablet; Take 1 tablet (112 mcg total) by mouth daily in the early morning    SOB (shortness of breath)  -     furosemide (LASIX) 20 mg tablet; Take 1 tablet (20 mg total) by mouth daily  -     Echo complete with contrast if indicated; Future  -     Ambulatory referral to Cardiology; Future  -     XR chest pa & lateral; Future    Peripheral edema  -     furosemide (LASIX) 20 mg tablet; Take 1 tablet (20 mg total) by mouth daily  -     Echo complete with contrast if indicated; Future  -     Ambulatory referral to Cardiology;  Future  -     XR chest pa & lateral; Future    Type 1 diabetes mellitus without complication (HCC)    Migraine without aura and without status migrainosus, not intractable    Obsessive-compulsive disorder, unspecified type    [facility]          There are no Patient Instructions on file for this visit  Return in about 1 week (around 8/30/2021)  Subjective:      Patient ID: Zohaib Regan is a 35 y o  male  Chief Complaint   Patient presents with    Follow-up         He is here today for follow-up post [facility]  His psych medications were adjusted  He has been doing better since then  He has been compliant with his medications since discharge from the hospital   He was seen by endocrinologist and his insulin and levothyroxine were adjusted  Today he complained of having problems with shortness of breath with exertion  Denies any nocturnal dyspnea  Denies any fever or chills  Complained of peripheral edema  There was a weight gain over the last couple months  The following portions of the patient's history were reviewed and updated as appropriate: allergies, current medications, past family history, past medical history, past social history, past surgical history and problem list     Review of Systems   Constitutional: Negative for chills and fever  HENT: Negative for trouble swallowing  Eyes: Negative for visual disturbance  Respiratory: Negative for cough and shortness of breath  Cardiovascular: Negative for chest pain and palpitations  Gastrointestinal: Negative for abdominal pain, blood in stool and vomiting  Endocrine: Negative for cold intolerance and heat intolerance  Genitourinary: Negative for difficulty urinating and dysuria  Musculoskeletal: Negative for gait problem  Skin: Negative for rash  Neurological: Negative for dizziness, syncope and headaches  Hematological: Negative for adenopathy  Psychiatric/Behavioral: Negative for behavioral problems           Current Outpatient Medications   Medication Sig Dispense Refill    ACCU-CHEK FASTCLIX LANCETS MISC 4 (four) times a day Not checking 4 times a day   Patient stated maybe 2 times a day  1    ACCU-CHEK GUIDE test strip 4 (four) times a day Test  1    albuterol (PROVENTIL HFA,VENTOLIN HFA) 90 mcg/act inhaler Inhale 2 puffs 4 (four) times a day 8 g 0    aluminum-magnesium hydroxide-simethicone (MYLANTA) 200-200-20 mg/5 mL suspension Take 30 mL by mouth 2 (two) times a day as needed for indigestion or heartburn 355 mL 0    atorvastatin (LIPITOR) 10 mg tablet Take 10 mg by mouth      Blood Glucose Monitoring Suppl (ACCU-CHEK GUIDE) w/Device KIT Checking 2 times a day  0    carvedilol (COREG) 12 5 mg tablet Take 1 tablet (12 5 mg total) by mouth 2 (two) times a day with meals 60 tablet 5    cholecalciferol (VITAMIN D3) 1,000 units tablet Take 1 tablet (1,000 Units total) by mouth daily 30 tablet 0    cyclobenzaprine (FLEXERIL) 10 mg tablet Take 1 tablet (10 mg total) by mouth 2 (two) times a day as needed for muscle spasms for up to 15 days 30 tablet 0    doxazosin (CARDURA) 2 mg tablet Take 1 tablet (2 mg total) by mouth daily 30 tablet 5    famotidine (PEPCID) 40 MG tablet Take 1 tablet (40 mg total) by mouth daily 30 tablet 5    fluvoxaMINE (LUVOX) 100 mg tablet Take 1 tablet (100 mg total) by mouth 2 (two) times a day (Patient taking differently: 100 mg Take 50mg in AM and 100mg at HS) 60 tablet 0    glucagon 1 MG injection Inject 1 mg under the skin      insulin glargine (Lantus SoloStar) 100 units/mL injection pen Inject 50 Units under the skin daily at bedtime (Patient taking differently: Inject 30 Units under the skin daily at bedtime ) 15 mL 3    insulin lispro (HumaLOG) 100 units/mL injection Inject 5 Units under the skin 3 (three) times a day with meals (Patient taking differently: Inject 6 Units under the skin 3 (three) times a day with meals ) 10 mL 0    Insulin Pen Needle (Pen Needles 3/16") 31G X 5 MM MISC Use 4 (four) times a day 200 each 3    levothyroxine 112 mcg tablet Take 1 tablet (112 mcg total) by mouth daily in the early morning 90 tablet 0    lisinopril (ZESTRIL) 20 mg tablet Take 1 tablet (20 mg total) by mouth 2 (two) times a day 60 tablet 5    LORazepam (ATIVAN) 0 5 mg tablet Take 1 tablet (0 5 mg total) by mouth daily as needed for anxiety 30 tablet 0    ondansetron (ZOFRAN-ODT) 4 mg disintegrating tablet Take 1 tablet (4 mg total) by mouth every 6 (six) hours as needed for nausea or vomiting 20 tablet 0    pantoprazole (PROTONIX) 40 mg tablet Take 1 tablet (40 mg total) by mouth 2 (two) times a day before meals 60 tablet 5    prochlorperazine (COMPAZINE) 10 mg tablet Take 1 tablet (10 mg total) by mouth every 6 (six) hours as needed (migraine) 10 tablet 0    propranolol (INDERAL) 10 mg tablet 10 mg Take 10mg in am and 20mg at HS      sucralfate (CARAFATE) 1 g tablet Take 1 tablet (1 g total) by mouth 2 (two) times a day 60 tablet 0    verapamil (CALAN-SR) 240 mg CR tablet Take 1 tablet (240 mg total) by mouth daily 30 tablet 5    Vraylar 6 MG capsule Take 6 mg by mouth daily      fremanezumab-vfrm (Ajovy) 225 MG/1 5ML auto-injector Inject 1 5 mL (225 mg total) under the skin every 30 (thirty) days 4 5 mL 0    furosemide (LASIX) 20 mg tablet Take 1 tablet (20 mg total) by mouth daily 30 tablet 0    insulin glargine (LANTUS) 100 units/mL subcutaneous injection Inject 30 Units under the skin daily at bedtime (Patient not taking: Reported on 8/23/2021) 10 mL 0    lurasidone (LATUDA) 40 mg tablet Take 1 tablet (40 mg total) by mouth daily after dinner (Patient not taking: Reported on 8/18/2021) 30 tablet 2     No current facility-administered medications for this visit         Objective:    /98 (BP Location: Left arm, Patient Position: Sitting, Cuff Size: Adult)   Pulse 74   Temp (!) 97 4 °F (36 3 °C) (Tympanic)   Resp 16   Ht 5' 2" (1 575 m)   Wt 132 kg (290 lb)   SpO2 99%   BMI 53 04 kg/m²        Physical Exam  Vitals and nursing note reviewed  Constitutional:       Appearance: Normal appearance  He is well-developed  HENT:      Head: Normocephalic and atraumatic  Eyes:      Pupils: Pupils are equal, round, and reactive to light  Cardiovascular:      Rate and Rhythm: Normal rate and regular rhythm  Heart sounds: Normal heart sounds  Pulmonary:      Effort: Pulmonary effort is normal       Breath sounds: Normal breath sounds  Abdominal:      General: Bowel sounds are normal       Palpations: Abdomen is soft  Musculoskeletal:         General: Normal range of motion  Cervical back: Normal range of motion and neck supple  Lymphadenopathy:      Cervical: No cervical adenopathy  Skin:     General: Skin is warm  Findings: No rash  Neurological:      Mental Status: He is alert and oriented to person, place, and time  Cranial Nerves: No cranial nerve deficit                  Jassi Quezada MD

## 2021-08-23 NOTE — ASSESSMENT & PLAN NOTE
Lab Results   Component Value Date    HGBA1C 11 6 (H) 08/03/2021     Very poor control  His insulin was adjusted recently  Continue to follow-up with endocrinologist   Was discussed with patient about using his medications as prescribed and follow low carb diet

## 2021-08-23 NOTE — ASSESSMENT & PLAN NOTE
Not well controlled  Was adjusted recently to 112 mcg daily    Continue follow-up with endocrinologist

## 2021-08-24 ENCOUNTER — TELEPHONE (OUTPATIENT)
Dept: ADMINISTRATIVE | Facility: OTHER | Age: 41
End: 2021-08-24

## 2021-08-24 NOTE — TELEPHONE ENCOUNTER
----- Message from Isa Riberar sent at 8/24/2021  8:45 AM EDT -----  Regarding: Foot Exam  08/24/21 8:45 AM    Hello, our patient Milton  has had Diabetic Foot Exam completed/performed  Please assist in updating the patient chart by pulling the document from Encounters Tab within Chart Review (Endo)  The date of service is 8/17/21       Thank you,  Isa Alvarez   S Bette

## 2021-08-26 ENCOUNTER — TELEPHONE (OUTPATIENT)
Dept: FAMILY MEDICINE CLINIC | Facility: CLINIC | Age: 41
End: 2021-08-26

## 2021-08-26 NOTE — TELEPHONE ENCOUNTER
----- Message from Dylan Ortiz MD sent at 8/26/2021  6:39 AM EDT -----  Chest x-ray came back normal

## 2021-08-30 ENCOUNTER — TELEPHONE (OUTPATIENT)
Dept: SLEEP CENTER | Facility: CLINIC | Age: 41
End: 2021-08-30

## 2021-08-30 ENCOUNTER — TELEPHONE (OUTPATIENT)
Dept: FAMILY MEDICINE CLINIC | Facility: CLINIC | Age: 41
End: 2021-08-30

## 2021-08-30 DIAGNOSIS — G43.909 MIGRAINE WITHOUT STATUS MIGRAINOSUS, NOT INTRACTABLE, UNSPECIFIED MIGRAINE TYPE: ICD-10-CM

## 2021-08-30 NOTE — LETTER
August 30, 2021     Dawson Cuellar  [address]  [facility] [address] [place] [id number]      Dear Mr Jeanne Casillas:    Below are the results from your recent visit:    Resulted Orders   XR chest pa & lateral    Narrative    CHEST     INDICATION:   R06 02: Shortness of breath  R60 9: Edema, unspecified  COMPARISON:  5/27/2021    EXAM PERFORMED/VIEWS:  XR CHEST PA & LATERAL      FINDINGS:    Cardiomediastinal silhouette appears unremarkable  The lungs are clear  No pneumothorax or pleural effusion  Osseous structures appear within normal limits for patient age  Impression    No acute cardiopulmonary disease  Workstation performed: [id number]       The test results show that your chest xray was normal I    If you have any questions or concerns, please don't hesitate to call           Sincerely,        Luz Gustafson MD

## 2021-08-30 NOTE — TELEPHONE ENCOUNTER
----- Message from Lloyd Keller MD sent at 8/27/2021 10:44 AM EDT -----  Approved  ----- Message -----  From: Shoaib Acevedo MA  Sent: 8/19/2021   8:56 AM EDT  To: Sleep Medicine [place] Provider    This sleep study needs approval      If approved please sign and return to clerical pool  If denied please include reasons why  Also provide alternative testing if warranted  Please sign and return to clerical pool

## 2021-08-30 NOTE — TELEPHONE ENCOUNTER
----- Message from Oracio Schuler MD sent at 8/26/2021  6:39 AM EDT -----  Chest x-ray came back normal

## 2021-08-31 NOTE — TELEPHONE ENCOUNTER
Medication denied  Appeal submitted showing documentation that the patient is having 6 migraines per week for the last 6 months

## 2021-09-08 NOTE — TELEPHONE ENCOUNTER
Called to check on the status of the appeal  Was told that they did not receive the appeal information  Submitted over the phone and is under review

## 2021-09-09 NOTE — TELEPHONE ENCOUNTER
Ajovy approved through 3/2022  Must be processed through [facility]  Please sign off on new script to perform

## 2021-10-06 ENCOUNTER — TELEPHONE (OUTPATIENT)
Dept: SURGERY | Facility: HOSPITAL | Age: 41
End: 2021-10-06

## 2021-10-06 DIAGNOSIS — G43.909 MIGRAINE WITHOUT STATUS MIGRAINOSUS, NOT INTRACTABLE, UNSPECIFIED MIGRAINE TYPE: ICD-10-CM

## 2021-10-06 DIAGNOSIS — G43.709 CHRONIC MIGRAINE WITHOUT AURA WITHOUT STATUS MIGRAINOSUS, NOT INTRACTABLE: ICD-10-CM

## 2021-10-06 RX ORDER — PROCHLORPERAZINE MALEATE 10 MG
10 TABLET ORAL EVERY 6 HOURS PRN
Qty: 10 TABLET | Refills: 0 | Status: SHIPPED | OUTPATIENT
Start: 2021-10-06 | End: 2022-06-23

## 2021-10-06 RX ORDER — CYCLOBENZAPRINE HCL 10 MG
10 TABLET ORAL 2 TIMES DAILY PRN
Qty: 30 TABLET | Refills: 0 | Status: ON HOLD | OUTPATIENT
Start: 2021-10-06 | End: 2022-06-17 | Stop reason: ALTCHOICE

## 2021-10-19 ENCOUNTER — TELEPHONE (OUTPATIENT)
Dept: NEUROLOGY | Facility: CLINIC | Age: 41
End: 2021-10-19

## 2021-10-20 ENCOUNTER — OFFICE VISIT (OUTPATIENT)
Dept: NEUROLOGY | Facility: CLINIC | Age: 41
End: 2021-10-20
Payer: COMMERCIAL

## 2021-10-20 VITALS
SYSTOLIC BLOOD PRESSURE: 164 MMHG | HEART RATE: 84 BPM | BODY MASS INDEX: 55.28 KG/M2 | TEMPERATURE: 97.4 F | WEIGHT: 312 LBS | DIASTOLIC BLOOD PRESSURE: 100 MMHG | HEIGHT: 63 IN | RESPIRATION RATE: 16 BRPM

## 2021-10-20 DIAGNOSIS — G43.009 MIGRAINE WITHOUT AURA AND WITHOUT STATUS MIGRAINOSUS, NOT INTRACTABLE: Primary | ICD-10-CM

## 2021-10-20 DIAGNOSIS — I10 HYPERTENSION, UNSPECIFIED TYPE: ICD-10-CM

## 2021-10-20 PROCEDURE — 3077F SYST BP >= 140 MM HG: CPT | Performed by: NURSE PRACTITIONER

## 2021-10-20 PROCEDURE — 3080F DIAST BP >= 90 MM HG: CPT | Performed by: NURSE PRACTITIONER

## 2021-10-20 PROCEDURE — 99213 OFFICE O/P EST LOW 20 MIN: CPT | Performed by: NURSE PRACTITIONER

## 2021-10-20 RX ORDER — FLUOXETINE HYDROCHLORIDE 20 MG/1
20 CAPSULE ORAL EVERY MORNING
COMMUNITY
Start: 2021-10-15

## 2022-01-14 DIAGNOSIS — R10.13 EPIGASTRIC PAIN: ICD-10-CM

## 2022-01-14 RX ORDER — ONDANSETRON 4 MG/1
TABLET, ORALLY DISINTEGRATING ORAL
Qty: 20 TABLET | Refills: 0 | Status: SHIPPED | OUTPATIENT
Start: 2022-01-14 | End: 2022-06-23

## 2022-01-27 DIAGNOSIS — R06.02 SOB (SHORTNESS OF BREATH): ICD-10-CM

## 2022-01-27 DIAGNOSIS — R60.9 PERIPHERAL EDEMA: ICD-10-CM

## 2022-01-27 RX ORDER — FUROSEMIDE 20 MG/1
TABLET ORAL
Qty: 30 TABLET | Refills: 0 | Status: SHIPPED | OUTPATIENT
Start: 2022-01-27 | End: 2022-04-19

## 2022-02-03 ENCOUNTER — HOSPITAL ENCOUNTER (OUTPATIENT)
Dept: GASTROENTEROLOGY | Facility: HOSPITAL | Age: 42
Setting detail: OUTPATIENT SURGERY
Discharge: HOME | End: 2022-02-03
Attending: INTERNAL MEDICINE | Admitting: INTERNAL MEDICINE
Payer: COMMERCIAL

## 2022-02-03 ENCOUNTER — ANESTHESIA EVENT (OUTPATIENT)
Dept: GASTROENTEROLOGY | Facility: HOSPITAL | Age: 42
End: 2022-02-03

## 2022-02-03 ENCOUNTER — ANESTHESIA (OUTPATIENT)
Dept: GASTROENTEROLOGY | Facility: HOSPITAL | Age: 42
End: 2022-02-03

## 2022-02-03 VITALS
RESPIRATION RATE: 18 BRPM | WEIGHT: 280 LBS | HEIGHT: 63 IN | BODY MASS INDEX: 49.61 KG/M2 | SYSTOLIC BLOOD PRESSURE: 141 MMHG | DIASTOLIC BLOOD PRESSURE: 64 MMHG | TEMPERATURE: 97.4 F | HEART RATE: 76 BPM | OXYGEN SATURATION: 97 %

## 2022-02-03 DIAGNOSIS — K21.9 GASTRO-ESOPHAGEAL REFLUX DISEASE WITHOUT ESOPHAGITIS: ICD-10-CM

## 2022-02-03 DIAGNOSIS — R11.2 NAUSEA WITH VOMITING, UNSPECIFIED: ICD-10-CM

## 2022-02-03 DIAGNOSIS — R13.10 DYSPHAGIA, UNSPECIFIED: ICD-10-CM

## 2022-02-03 DIAGNOSIS — R10.13 EPIGASTRIC PAIN: ICD-10-CM

## 2022-02-03 LAB — GLUCOSE SERPL-MCNC: 156 MG/DL (ref 65–140)

## 2022-02-03 PROCEDURE — C1726 CATH, BAL DIL, NON-VASCULAR: HCPCS

## 2022-02-03 PROCEDURE — 88305 TISSUE EXAM BY PATHOLOGIST: CPT | Performed by: PATHOLOGY

## 2022-02-03 PROCEDURE — 82948 REAGENT STRIP/BLOOD GLUCOSE: CPT

## 2022-02-03 RX ORDER — ONDANSETRON 2 MG/ML
4 INJECTION INTRAMUSCULAR; INTRAVENOUS ONCE AS NEEDED
Status: CANCELLED | OUTPATIENT
Start: 2022-02-03

## 2022-02-03 RX ORDER — SODIUM CHLORIDE, SODIUM LACTATE, POTASSIUM CHLORIDE, CALCIUM CHLORIDE 600; 310; 30; 20 MG/100ML; MG/100ML; MG/100ML; MG/100ML
125 INJECTION, SOLUTION INTRAVENOUS CONTINUOUS
Status: DISCONTINUED | OUTPATIENT
Start: 2022-02-03 | End: 2022-02-07 | Stop reason: HOSPADM

## 2022-02-03 RX ORDER — PROPOFOL 10 MG/ML
INJECTION, EMULSION INTRAVENOUS AS NEEDED
Status: DISCONTINUED | OUTPATIENT
Start: 2022-02-03 | End: 2022-02-03

## 2022-02-03 RX ORDER — SODIUM CHLORIDE, SODIUM LACTATE, POTASSIUM CHLORIDE, CALCIUM CHLORIDE 600; 310; 30; 20 MG/100ML; MG/100ML; MG/100ML; MG/100ML
20 INJECTION, SOLUTION INTRAVENOUS CONTINUOUS
Status: CANCELLED | OUTPATIENT
Start: 2022-02-03

## 2022-02-03 RX ORDER — SODIUM CHLORIDE, SODIUM LACTATE, POTASSIUM CHLORIDE, CALCIUM CHLORIDE 600; 310; 30; 20 MG/100ML; MG/100ML; MG/100ML; MG/100ML
INJECTION, SOLUTION INTRAVENOUS CONTINUOUS PRN
Status: DISCONTINUED | OUTPATIENT
Start: 2022-02-03 | End: 2022-02-03

## 2022-02-03 RX ADMIN — SODIUM CHLORIDE, SODIUM LACTATE, POTASSIUM CHLORIDE, AND CALCIUM CHLORIDE 125 ML/HR: .6; .31; .03; .02 INJECTION, SOLUTION INTRAVENOUS at 07:57

## 2022-02-03 RX ADMIN — PROPOFOL 150 MG: 10 INJECTION, EMULSION INTRAVENOUS at 08:33

## 2022-02-03 RX ADMIN — PROPOFOL 50 MG: 10 INJECTION, EMULSION INTRAVENOUS at 08:36

## 2022-02-03 RX ADMIN — SODIUM CHLORIDE, SODIUM LACTATE, POTASSIUM CHLORIDE, AND CALCIUM CHLORIDE: .6; .31; .03; .02 INJECTION, SOLUTION INTRAVENOUS at 08:29

## 2022-02-03 NOTE — ANESTHESIA PREPROCEDURE EVALUATION
Procedure:  EGD    Relevant Problems   CARDIO   (+) Chronic migraine without aura without status migrainosus, not intractable   (+) Hyperlipidemia, mixed   (+) Hypertension   (+) Hypertensive emergency   (+) Migraine without aura and without status migrainosus, not intractable      ENDO   (+) Hypothyroidism   (+) Type 1 diabetes mellitus without complication (HCC)   (+) Type 2 diabetes mellitus, with long-term current use of insulin ([facility])      GI/HEPATIC   (+) GERD (gastroesophageal reflux disease)      /RENAL   (+) Acute on chronic kidney failure ([facility])      NEURO/PSYCH   (+) Anxiety   (+) Chronic migraine without aura without status migrainosus, not intractable   (+) Depression   (+) Headache   (+) Migraine without aura and without status migrainosus, not intractable   (+) OCD (obsessive compulsive disorder)   (+) Schizoaffective disorder, depressive type ([facility])      PULMONARY   (+) SOB (shortness of breath)      Other   (+) Morbid obesity with BMI of 50 0-59 9, adult ([facility])        Physical Exam    Airway    Mallampati score: II  TM Distance: >3 FB  Neck ROM: full     Dental   Comment: Numerous chipped and cracked teeth,     Cardiovascular      Pulmonary      Other Findings        Anesthesia Plan  ASA Score- 3     Anesthesia Type- IV sedation with anesthesia with ASA Monitors  Additional Monitors:   Airway Plan:           Plan Factors-Exercise tolerance (METS): >4 METS  Chart reviewed  Existing labs reviewed  Patient summary reviewed  Induction- intravenous  Postoperative Plan-     Informed Consent- Anesthetic plan and risks discussed with patient  I personally reviewed this patient with the CRNA  Discussed and agreed on the Anesthesia Plan with the CRNA  Ankur Brown

## 2022-02-03 NOTE — ANESTHESIA POSTPROCEDURE EVALUATION
Post-Op Assessment Note    CV Status:  Stable  Pain Score: 0    Pain management: adequate     Mental Status:  Alert   Hydration Status:  Stable   PONV Controlled:  None   Airway Patency:  Patent      Post Op Vitals Reviewed: Yes      Staff: CRNA         No complications documented      BP  190/90   Temp   97   Pulse  78   Resp   16   SpO2   98

## 2022-02-03 NOTE — INTERVAL H&P NOTE
H&P reviewed  After examining the patient I find no changes in the patients condition since the H&P had been written      Vitals:    02/03/22 0757   BP: (!) 183/93   Pulse: 78   Resp: 18   Temp: 97 9 °F (36 6 °C)   SpO2: 98%

## 2022-03-21 NOTE — ASSESSMENT & PLAN NOTE
Blood pressure improved  Continue same    He is to see cardiologist  Pharmacokinetic Assessment Follow Up: IV Vancomycin    Vancomycin serum concentration assessment(s):    Trough was drawn ~10.5 hours after last dose administered. The measurement is within the desired definitive target range of 15 to 20 mcg/mL.    Vancomycin Regimen Plan:    12 hour dosing may be too frequent for this patient at this time  Will schedule a one time dose of vancomycin IV 1000 mg  Random to be collected on 3/22 at 0630    Drug levels (last 3 results):  Recent Labs   Lab Result Units 03/19/22  1141 03/20/22  1536 03/21/22  0515   Vancomycin, Random ug/mL 2.7 3.5 16.7       Pharmacy will continue to follow and monitor vancomycin.    Please contact pharmacy at extension [phone] for questions regarding this assessment.    Thank you for the consult,   Cady Nielsen       Patient brief summary:  Beatriz Adame is a 68 y.o. female initiated on antimicrobial therapy with IV Vancomycin for treatment of bone/joint infection      Drug Allergies:   Review of patient's allergies indicates:   Allergen Reactions    Tomato (solanum lycopersicum) Hives and Itching       Actual Body Weight:   52.5 kg    Renal Function:   Estimated Creatinine Clearance: 24.1 mL/min (A) (based on SCr of 1.8 mg/dL (H)).     Dialysis Method (if applicable):  N/A    CBC (last 72 hours):  Recent Labs   Lab Result Units 03/18/22  1640 03/19/22  0451 03/20/22  1254 03/21/22  0515   WBC K/uL 13.84* 10.85 11.46 11.83   Hemoglobin g/dL 7.3* 6.9* 8.7* 9.0*   Hematocrit % 23.7* 22.1* 28.1* 28.1*   Platelets K/uL 156 165 194 175   Gran % % 74.7* 64.1 65.7 65.2   Lymph % % 17.0* 26.0 24.3 23.7   Mono % % 6.7 7.5 7.5 8.3   Eosinophil % % 1.1 1.8 1.8 2.1   Basophil % % 0.2 0.3 0.4 0.4   Differential Method  Automated Automated Automated Automated       Metabolic Panel (last 72 hours):  Recent Labs   Lab Result Units 03/18/22  1640 03/18/22  1659 03/19/22  0451 03/20/22  1254 03/21/22  0515   Sodium mmol/L 141  --  141 140 141   Potassium mmol/L 3.6   --  3.1* 4.4 4.3   Chloride mmol/L 105  --  106 109 105   CO2 mmol/L 27  --  26 24 27   Glucose mg/dL 136*  --  144* 164* 124*   Glucose, UA   --  Negative  --   --   --    BUN mg/dL 28*  --  27* 29* 26*   Creatinine mg/dL 2.3*  --  2.3* 2.0* 1.8*   Albumin g/dL 2.4*  --  2.1* 2.4* 2.3*   Total Bilirubin mg/dL 0.2  --  0.2 0.5 0.5   Alkaline Phosphatase U/L 75  --  71 117 76   AST U/L 14  --  12 11 12   ALT U/L 10  --  9* 11 10   Magnesium mg/dL 2.3  --   --   --   --    Phosphorus mg/dL 3.3  --   --   --   --        Vancomycin Administrations:  vancomycin given in the last 96 hours                   vancomycin in dextrose 5 % 1 gram/250 mL IVPB 1,000 mg (mg) 1,000 mg New Bag 03/20/22 1840    vancomycin in dextrose 5 % 1 gram/250 mL IVPB 1,000 mg (mg) 1,000 mg New Bag 03/19/22 1625    vancomycin 750 mg in dextrose 5 % 250 mL IVPB (ready to mix system) (mg) 750 mg New Bag 03/18/22 1851                Microbiologic Results:  Microbiology Results (last 7 days)     Procedure Component Value Units Date/Time    Urine culture [[id number]]  (Abnormal) Collected: 03/18/22 1659    Order Status: Completed Specimen: Urine Updated: 03/20/22 2100     Urine Culture, Routine GRAM NEGATIVE MARYAN  > 100,000 cfu/ml  Identification and susceptibility pending      Narrative:      Specimen Source->Urine    Blood culture x two cultures. Draw prior to antibiotics. [[id number]] Collected: 03/18/22 1641    Order Status: Completed Specimen: Blood from Peripheral, Antecubital, Right Updated: 03/20/22 2012     Blood Culture, Routine No Growth to date      No Growth to date      No Growth to date    Narrative:      Aerobic and anaerobic    Blood culture x two cultures. Draw prior to antibiotics. [[id number]] Collected: 03/18/22 1641    Order Status: Completed Specimen: Blood from Peripheral, Antecubital, Right Updated: 03/20/22 2012     Blood Culture, Routine No Growth to date      No Growth to date      No Growth to date    Narrative:      Aerobic  and anaerobic    Aerobic culture [[id number]]  (Abnormal) Collected: 03/18/22 1620    Order Status: Completed Specimen: Wound from Toe, Right Foot Updated: 03/20/22 0968     Aerobic Bacterial Culture STAPHYLOCOCCUS AUREUS  Many  Susceptibility pending

## 2022-03-23 ENCOUNTER — TELEPHONE (OUTPATIENT)
Dept: NEUROLOGY | Facility: CLINIC | Age: 42
End: 2022-03-23

## 2022-03-23 DIAGNOSIS — G43.009 MIGRAINE WITHOUT AURA AND WITHOUT STATUS MIGRAINOSUS, NOT INTRACTABLE: Primary | ICD-10-CM

## 2022-03-23 NOTE — TELEPHONE ENCOUNTER
Fax received from plan requesting PA for Kyra  Form completed and faxed back to [facility] [address]      Phone: [phone]

## 2022-04-18 DIAGNOSIS — Z79.4 TYPE 2 DIABETES MELLITUS WITH HYPERGLYCEMIA, WITH LONG-TERM CURRENT USE OF INSULIN (HCC): ICD-10-CM

## 2022-04-18 DIAGNOSIS — R06.02 SOB (SHORTNESS OF BREATH): ICD-10-CM

## 2022-04-18 DIAGNOSIS — R60.9 PERIPHERAL EDEMA: ICD-10-CM

## 2022-04-18 DIAGNOSIS — E11.65 TYPE 2 DIABETES MELLITUS WITH HYPERGLYCEMIA, WITH LONG-TERM CURRENT USE OF INSULIN (HCC): ICD-10-CM

## 2022-04-19 RX ORDER — INSULIN GLARGINE 100 [IU]/ML
INJECTION, SOLUTION SUBCUTANEOUS
Qty: 15 ML | Refills: 0 | Status: SHIPPED | OUTPATIENT
Start: 2022-04-19 | End: 2022-06-23

## 2022-04-19 RX ORDER — FUROSEMIDE 20 MG/1
TABLET ORAL
Qty: 30 TABLET | Refills: 0 | Status: SHIPPED | OUTPATIENT
Start: 2022-04-19 | End: 2022-06-23

## 2022-06-17 ENCOUNTER — HOSPITAL ENCOUNTER (INPATIENT)
Facility: HOSPITAL | Age: 42
LOS: 6 days | Discharge: HOME | DRG: 469 | End: 2022-06-23
Attending: EMERGENCY MEDICINE | Admitting: INTERNAL MEDICINE
Payer: COMMERCIAL

## 2022-06-17 ENCOUNTER — APPOINTMENT (INPATIENT)
Dept: ULTRASOUND IMAGING | Facility: HOSPITAL | Age: 42
DRG: 469 | End: 2022-06-17
Payer: COMMERCIAL

## 2022-06-17 ENCOUNTER — APPOINTMENT (INPATIENT)
Dept: NON INVASIVE DIAGNOSTICS | Facility: HOSPITAL | Age: 42
DRG: 469 | End: 2022-06-17
Payer: COMMERCIAL

## 2022-06-17 ENCOUNTER — APPOINTMENT (EMERGENCY)
Dept: RADIOLOGY | Facility: HOSPITAL | Age: 42
DRG: 469 | End: 2022-06-17
Payer: COMMERCIAL

## 2022-06-17 DIAGNOSIS — E10.9 TYPE 1 DIABETES MELLITUS WITHOUT COMPLICATION (HCC): ICD-10-CM

## 2022-06-17 DIAGNOSIS — R10.13 EPIGASTRIC PAIN: ICD-10-CM

## 2022-06-17 DIAGNOSIS — N17.9 AKI (ACUTE KIDNEY INJURY) (HCC): Primary | ICD-10-CM

## 2022-06-17 DIAGNOSIS — I16.0 HYPERTENSIVE URGENCY: ICD-10-CM

## 2022-06-17 DIAGNOSIS — E03.8 OTHER SPECIFIED HYPOTHYROIDISM: ICD-10-CM

## 2022-06-17 DIAGNOSIS — I10 HYPERTENSION, UNSPECIFIED TYPE: ICD-10-CM

## 2022-06-17 DIAGNOSIS — S91.109A OPEN TOE WOUND: ICD-10-CM

## 2022-06-17 DIAGNOSIS — J20.9 ACUTE BRONCHITIS, UNSPECIFIED ORGANISM: ICD-10-CM

## 2022-06-17 DIAGNOSIS — Z91.14 NONCOMPLIANCE WITH MEDICATIONS: ICD-10-CM

## 2022-06-17 DIAGNOSIS — R06.2 WHEEZING: ICD-10-CM

## 2022-06-17 PROBLEM — E87.1 HYPONATREMIA: Status: ACTIVE | Noted: 2022-06-17

## 2022-06-17 PROBLEM — E87.70 VOLUME OVERLOAD: Status: ACTIVE | Noted: 2022-06-17

## 2022-06-17 LAB
2HR DELTA HS TROPONIN: -1 NG/L
ANION GAP SERPL CALCULATED.3IONS-SCNC: 8 MMOL/L (ref 4–13)
AORTIC ROOT: 3.5 CM
APICAL FOUR CHAMBER EJECTION FRACTION: 34 %
ATRIAL RATE: 84 BPM
ATRIAL RATE: 91 BPM
BASOPHILS # BLD AUTO: 0.05 THOUSANDS/ΜL (ref 0–0.1)
BASOPHILS NFR BLD AUTO: 1 % (ref 0–1)
BNP SERPL-MCNC: 110 PG/ML (ref 0–100)
BUN SERPL-MCNC: 52 MG/DL (ref 5–25)
CALCIUM SERPL-MCNC: 8.1 MG/DL (ref 8.4–10.2)
CARDIAC TROPONIN I PNL SERPL HS: 10 NG/L
CARDIAC TROPONIN I PNL SERPL HS: 11 NG/L
CHLORIDE SERPL-SCNC: 95 MMOL/L (ref 96–108)
CO2 SERPL-SCNC: 26 MMOL/L (ref 21–32)
CREAT SERPL-MCNC: 2.99 MG/DL (ref 0.6–1.3)
CREAT UR-MCNC: 49.1 MG/DL
CREAT UR-MCNC: 49.1 MG/DL
CREAT UR-MCNC: 49.4 MG/DL
DOP CALC RVOT PEAK VEL: 0.88 M/S
DOP CALC RVOT VTI: 18.17 CM
E WAVE DECELERATION TIME: 154 MS
E/A RATIO: 1.36
EOSINOPHIL # BLD AUTO: 0.35 THOUSAND/ΜL (ref 0–0.61)
EOSINOPHIL NFR BLD AUTO: 3 % (ref 0–6)
ERYTHROCYTE [DISTWIDTH] IN BLOOD BY AUTOMATED COUNT: 12.9 % (ref 11.6–15.1)
FRACTIONAL SHORTENING: 28 (ref 28–44)
GFR SERPL CREATININE-BSD FRML MDRD: 24 ML/MIN/1.73SQ M
GLUCOSE SERPL-MCNC: 145 MG/DL (ref 65–140)
GLUCOSE SERPL-MCNC: 220 MG/DL (ref 65–140)
GLUCOSE SERPL-MCNC: 344 MG/DL (ref 65–140)
GLUCOSE SERPL-MCNC: 93 MG/DL (ref 65–140)
HCT VFR BLD AUTO: 29.3 % (ref 36.5–49.3)
HGB BLD-MCNC: 9.8 G/DL (ref 12–17)
IMM GRANULOCYTES # BLD AUTO: 0.09 THOUSAND/UL (ref 0–0.2)
IMM GRANULOCYTES NFR BLD AUTO: 1 % (ref 0–2)
INTERVENTRICULAR SEPTUM IN DIASTOLE (PARASTERNAL SHORT AXIS VIEW): 1 CM
INTERVENTRICULAR SEPTUM: 1 CM (ref 0.6–1.1)
LAAS-AP4: 22.9 CM2
LEFT ATRIUM SIZE: 4.8 CM
LEFT ATRIUM VOLUME INDEX (MOD BIPLANE): 21.5
LEFT INTERNAL DIMENSION IN SYSTOLE: 4.1 CM (ref 2.1–4)
LEFT VENTRICULAR INTERNAL DIMENSION IN DIASTOLE: 5.7 CM (ref 3.5–6)
LEFT VENTRICULAR POSTERIOR WALL IN END DIASTOLE: 1 CM
LEFT VENTRICULAR STROKE VOLUME: 83 ML
LVSV (TEICH): 83 ML
LYMPHOCYTES # BLD AUTO: 1.58 THOUSANDS/ΜL (ref 0.6–4.47)
LYMPHOCYTES NFR BLD AUTO: 15 % (ref 14–44)
MAGNESIUM SERPL-MCNC: 1.7 MG/DL (ref 1.9–2.7)
MCH RBC QN AUTO: 27.9 PG (ref 26.8–34.3)
MCHC RBC AUTO-ENTMCNC: 33.4 G/DL (ref 31.4–37.4)
MCV RBC AUTO: 84 FL (ref 82–98)
MICROALBUMIN UR-MCNC: 1440 MG/L (ref 0–20)
MICROALBUMIN UR-MCNC: 1480 MG/L (ref 0–20)
MICROALBUMIN/CREAT 24H UR: 2933 MG/G CREATININE (ref 0–30)
MICROALBUMIN/CREAT 24H UR: 2996 MG/G CREATININE (ref 0–30)
MONOCYTES # BLD AUTO: 0.77 THOUSAND/ΜL (ref 0.17–1.22)
MONOCYTES NFR BLD AUTO: 7 % (ref 4–12)
MV E'TISSUE VEL-SEP: 13 CM/S
MV PEAK A VEL: 0.81 M/S
MV PEAK E VEL: 110 CM/S
MV STENOSIS PRESSURE HALF TIME: 45 MS
MV VALVE AREA P 1/2 METHOD: 4.9
NEUTROPHILS # BLD AUTO: 7.71 THOUSANDS/ΜL (ref 1.85–7.62)
NEUTS SEG NFR BLD AUTO: 73 % (ref 43–75)
NRBC BLD AUTO-RTO: 0 /100 WBCS
OSMOLALITY UR: 247 MMOL/KG
P AXIS: 40 DEGREES
P AXIS: 51 DEGREES
PLATELET # BLD AUTO: 366 THOUSANDS/UL (ref 149–390)
PMV BLD AUTO: 9.3 FL (ref 8.9–12.7)
POTASSIUM SERPL-SCNC: 4.2 MMOL/L (ref 3.5–5.3)
PR INTERVAL: 182 MS
PR INTERVAL: 188 MS
PROT UR-MCNC: 205 MG/DL
PROT UR-MCNC: 205 MG/DL
PROT/CREAT UR: 4.15 MG/G{CREAT} (ref 0–0.1)
PROT/CREAT UR: 4.18 MG/G{CREAT} (ref 0–0.1)
QRS AXIS: -17 DEGREES
QRS AXIS: -18 DEGREES
QRSD INTERVAL: 102 MS
QRSD INTERVAL: 98 MS
QT INTERVAL: 362 MS
QT INTERVAL: 378 MS
QTC INTERVAL: 445 MS
QTC INTERVAL: 446 MS
RBC # BLD AUTO: 3.51 MILLION/UL (ref 3.88–5.62)
RIGHT VENTRICLE ID DIMENSION: 3.7 CM
SL CV LV EF: 55
SL CV PED ECHO LEFT VENTRICLE DIASTOLIC VOLUME (MOD BIPLANE) 2D: 157 ML
SL CV PED ECHO LEFT VENTRICLE SYSTOLIC VOLUME (MOD BIPLANE) 2D: 74 ML
SL CV RVOT MAX GRADIENT: 3 MMHG
SL CV RVOT MEAN GRADIENT: 2 MMHG
SL CV RVOT VMEAN: 0.59 M/S
SODIUM 24H UR-SCNC: 38 MOL/L
SODIUM SERPL-SCNC: 129 MMOL/L (ref 135–147)
T WAVE AXIS: 79 DEGREES
T WAVE AXIS: 84 DEGREES
T4 FREE SERPL-MCNC: 0.71 NG/DL (ref 0.76–1.46)
TR MAX PG: 22 MMHG
TR PEAK VELOCITY: 2.3 M/S
TRICUSPID VALVE PEAK REGURGITATION VELOCITY: 2.32 M/S
TSH SERPL DL<=0.05 MIU/L-ACNC: 40.32 UIU/ML (ref 0.45–4.5)
UUN 24H UR-MCNC: 281 MG/DL
VENTRICULAR RATE: 84 BPM
VENTRICULAR RATE: 91 BPM
WBC # BLD AUTO: 10.55 THOUSAND/UL (ref 4.31–10.16)

## 2022-06-17 PROCEDURE — 96374 THER/PROPH/DIAG INJ IV PUSH: CPT

## 2022-06-17 PROCEDURE — 76770 US EXAM ABDO BACK WALL COMP: CPT

## 2022-06-17 PROCEDURE — 93010 ELECTROCARDIOGRAM REPORT: CPT | Performed by: INTERNAL MEDICINE

## 2022-06-17 PROCEDURE — 82570 ASSAY OF URINE CREATININE: CPT | Performed by: INTERNAL MEDICINE

## 2022-06-17 PROCEDURE — 84443 ASSAY THYROID STIM HORMONE: CPT | Performed by: INTERNAL MEDICINE

## 2022-06-17 PROCEDURE — 85025 COMPLETE CBC W/AUTO DIFF WBC: CPT | Performed by: EMERGENCY MEDICINE

## 2022-06-17 PROCEDURE — 84484 ASSAY OF TROPONIN QUANT: CPT | Performed by: EMERGENCY MEDICINE

## 2022-06-17 PROCEDURE — 99223 1ST HOSP IP/OBS HIGH 75: CPT | Performed by: INTERNAL MEDICINE

## 2022-06-17 PROCEDURE — 84156 ASSAY OF PROTEIN URINE: CPT | Performed by: INTERNAL MEDICINE

## 2022-06-17 PROCEDURE — 84540 ASSAY OF URINE/UREA-N: CPT | Performed by: INTERNAL MEDICINE

## 2022-06-17 PROCEDURE — 71045 X-RAY EXAM CHEST 1 VIEW: CPT

## 2022-06-17 PROCEDURE — 82043 UR ALBUMIN QUANTITATIVE: CPT | Performed by: INTERNAL MEDICINE

## 2022-06-17 PROCEDURE — 83935 ASSAY OF URINE OSMOLALITY: CPT | Performed by: INTERNAL MEDICINE

## 2022-06-17 PROCEDURE — 84300 ASSAY OF URINE SODIUM: CPT | Performed by: INTERNAL MEDICINE

## 2022-06-17 PROCEDURE — 83880 ASSAY OF NATRIURETIC PEPTIDE: CPT | Performed by: EMERGENCY MEDICINE

## 2022-06-17 PROCEDURE — 99255 IP/OBS CONSLTJ NEW/EST HI 80: CPT | Performed by: INTERNAL MEDICINE

## 2022-06-17 PROCEDURE — 99285 EMERGENCY DEPT VISIT HI MDM: CPT | Performed by: EMERGENCY MEDICINE

## 2022-06-17 PROCEDURE — 84439 ASSAY OF FREE THYROXINE: CPT | Performed by: INTERNAL MEDICINE

## 2022-06-17 PROCEDURE — 83735 ASSAY OF MAGNESIUM: CPT | Performed by: EMERGENCY MEDICINE

## 2022-06-17 PROCEDURE — 93306 TTE W/DOPPLER COMPLETE: CPT | Performed by: INTERNAL MEDICINE

## 2022-06-17 PROCEDURE — 93306 TTE W/DOPPLER COMPLETE: CPT

## 2022-06-17 PROCEDURE — 94640 AIRWAY INHALATION TREATMENT: CPT

## 2022-06-17 PROCEDURE — 80048 BASIC METABOLIC PNL TOTAL CA: CPT | Performed by: EMERGENCY MEDICINE

## 2022-06-17 PROCEDURE — 99285 EMERGENCY DEPT VISIT HI MDM: CPT

## 2022-06-17 PROCEDURE — 93005 ELECTROCARDIOGRAM TRACING: CPT

## 2022-06-17 PROCEDURE — 82948 REAGENT STRIP/BLOOD GLUCOSE: CPT

## 2022-06-17 PROCEDURE — 36415 COLL VENOUS BLD VENIPUNCTURE: CPT | Performed by: INTERNAL MEDICINE

## 2022-06-17 PROCEDURE — 84484 ASSAY OF TROPONIN QUANT: CPT | Performed by: INTERNAL MEDICINE

## 2022-06-17 RX ORDER — INSULIN LISPRO 100 [IU]/ML
6 INJECTION, SOLUTION INTRAVENOUS; SUBCUTANEOUS
Status: DISCONTINUED | OUTPATIENT
Start: 2022-06-17 | End: 2022-06-20

## 2022-06-17 RX ORDER — ONDANSETRON 2 MG/ML
4 INJECTION INTRAMUSCULAR; INTRAVENOUS EVERY 6 HOURS PRN
Status: DISCONTINUED | OUTPATIENT
Start: 2022-06-17 | End: 2022-06-23 | Stop reason: HOSPADM

## 2022-06-17 RX ORDER — DOXAZOSIN MESYLATE 4 MG/1
4 TABLET ORAL DAILY
Status: DISCONTINUED | OUTPATIENT
Start: 2022-06-18 | End: 2022-06-18

## 2022-06-17 RX ORDER — LISINOPRIL 20 MG/1
20 TABLET ORAL ONCE
Status: COMPLETED | OUTPATIENT
Start: 2022-06-17 | End: 2022-06-17

## 2022-06-17 RX ORDER — LISINOPRIL 20 MG/1
20 TABLET ORAL 2 TIMES DAILY
Status: DISCONTINUED | OUTPATIENT
Start: 2022-06-17 | End: 2022-06-17

## 2022-06-17 RX ORDER — SODIUM CHLORIDE 9 MG/ML
3 INJECTION INTRAVENOUS
Status: DISCONTINUED | OUTPATIENT
Start: 2022-06-17 | End: 2022-06-23 | Stop reason: HOSPADM

## 2022-06-17 RX ORDER — FUROSEMIDE 10 MG/ML
40 INJECTION INTRAMUSCULAR; INTRAVENOUS ONCE
Status: COMPLETED | OUTPATIENT
Start: 2022-06-17 | End: 2022-06-17

## 2022-06-17 RX ORDER — CARVEDILOL 3.12 MG/1
12.5 TABLET ORAL 2 TIMES DAILY WITH MEALS
Status: DISCONTINUED | OUTPATIENT
Start: 2022-06-17 | End: 2022-06-17

## 2022-06-17 RX ORDER — CARVEDILOL 12.5 MG/1
12.5 TABLET ORAL 2 TIMES DAILY WITH MEALS
Status: DISCONTINUED | OUTPATIENT
Start: 2022-06-17 | End: 2022-06-20

## 2022-06-17 RX ORDER — DOXAZOSIN MESYLATE 4 MG/1
2 TABLET ORAL DAILY
Status: DISCONTINUED | OUTPATIENT
Start: 2022-06-17 | End: 2022-06-17

## 2022-06-17 RX ORDER — LORAZEPAM 0.5 MG/1
0.5 TABLET ORAL DAILY PRN
Status: DISCONTINUED | OUTPATIENT
Start: 2022-06-17 | End: 2022-06-21

## 2022-06-17 RX ORDER — INSULIN GLARGINE 100 [IU]/ML
50 INJECTION, SOLUTION SUBCUTANEOUS
Status: DISCONTINUED | OUTPATIENT
Start: 2022-06-17 | End: 2022-06-17

## 2022-06-17 RX ORDER — CAFFEINE 200 MG
200 TABLET ORAL DAILY PRN
COMMUNITY
End: 2022-06-23

## 2022-06-17 RX ORDER — FAMOTIDINE 20 MG/1
40 TABLET, FILM COATED ORAL DAILY
Status: DISCONTINUED | OUTPATIENT
Start: 2022-06-17 | End: 2022-06-23 | Stop reason: HOSPADM

## 2022-06-17 RX ORDER — LISINOPRIL 20 MG/1
20 TABLET ORAL 2 TIMES DAILY
Status: DISCONTINUED | OUTPATIENT
Start: 2022-06-17 | End: 2022-06-18

## 2022-06-17 RX ORDER — MAGNESIUM HYDROXIDE/ALUMINUM HYDROXICE/SIMETHICONE 120; 1200; 1200 MG/30ML; MG/30ML; MG/30ML
30 SUSPENSION ORAL ONCE
Status: COMPLETED | OUTPATIENT
Start: 2022-06-17 | End: 2022-06-17

## 2022-06-17 RX ORDER — IPRATROPIUM BROMIDE AND ALBUTEROL SULFATE 2.5; .5 MG/3ML; MG/3ML
3 SOLUTION RESPIRATORY (INHALATION) ONCE
Status: COMPLETED | OUTPATIENT
Start: 2022-06-17 | End: 2022-06-17

## 2022-06-17 RX ORDER — LEVOTHYROXINE SODIUM 112 UG/1
112 TABLET ORAL
Status: DISCONTINUED | OUTPATIENT
Start: 2022-06-17 | End: 2022-06-18

## 2022-06-17 RX ORDER — INSULIN LISPRO 100 [IU]/ML
2-12 INJECTION, SOLUTION INTRAVENOUS; SUBCUTANEOUS
Status: DISCONTINUED | OUTPATIENT
Start: 2022-06-17 | End: 2022-06-23 | Stop reason: HOSPADM

## 2022-06-17 RX ORDER — HYDRALAZINE HYDROCHLORIDE 20 MG/ML
5 INJECTION INTRAMUSCULAR; INTRAVENOUS EVERY 6 HOURS PRN
Status: DISCONTINUED | OUTPATIENT
Start: 2022-06-17 | End: 2022-06-23 | Stop reason: HOSPADM

## 2022-06-17 RX ORDER — HYDRALAZINE HYDROCHLORIDE 25 MG/1
25 TABLET, FILM COATED ORAL EVERY 8 HOURS SCHEDULED
Status: DISCONTINUED | OUTPATIENT
Start: 2022-06-17 | End: 2022-06-18

## 2022-06-17 RX ORDER — PROPRANOLOL HYDROCHLORIDE 20 MG/1
10 TABLET ORAL EVERY 12 HOURS SCHEDULED
Status: DISCONTINUED | OUTPATIENT
Start: 2022-06-17 | End: 2022-06-17

## 2022-06-17 RX ORDER — ACETAMINOPHEN 325 MG/1
650 TABLET ORAL EVERY 4 HOURS PRN
Status: DISCONTINUED | OUTPATIENT
Start: 2022-06-17 | End: 2022-06-23 | Stop reason: HOSPADM

## 2022-06-17 RX ORDER — FLUOXETINE HYDROCHLORIDE 20 MG/1
20 CAPSULE ORAL EVERY MORNING
Status: DISCONTINUED | OUTPATIENT
Start: 2022-06-17 | End: 2022-06-23 | Stop reason: HOSPADM

## 2022-06-17 RX ORDER — ALBUTEROL SULFATE 90 UG/1
2 AEROSOL, METERED RESPIRATORY (INHALATION) 4 TIMES DAILY
Status: DISCONTINUED | OUTPATIENT
Start: 2022-06-17 | End: 2022-06-23 | Stop reason: HOSPADM

## 2022-06-17 RX ORDER — FUROSEMIDE 10 MG/ML
40 INJECTION INTRAMUSCULAR; INTRAVENOUS DAILY
Status: DISCONTINUED | OUTPATIENT
Start: 2022-06-17 | End: 2022-06-17

## 2022-06-17 RX ORDER — HEPARIN SODIUM 5000 [USP'U]/ML
5000 INJECTION, SOLUTION INTRAVENOUS; SUBCUTANEOUS EVERY 8 HOURS SCHEDULED
Status: DISCONTINUED | OUTPATIENT
Start: 2022-06-17 | End: 2022-06-23 | Stop reason: HOSPADM

## 2022-06-17 RX ORDER — PROPRANOLOL HYDROCHLORIDE 20 MG/1
10 TABLET ORAL ONCE
Status: COMPLETED | OUTPATIENT
Start: 2022-06-17 | End: 2022-06-17

## 2022-06-17 RX ORDER — PANTOPRAZOLE SODIUM 40 MG/1
40 TABLET, DELAYED RELEASE ORAL
Status: DISCONTINUED | OUTPATIENT
Start: 2022-06-17 | End: 2022-06-23 | Stop reason: HOSPADM

## 2022-06-17 RX ORDER — INSULIN GLARGINE 100 [IU]/ML
40 INJECTION, SOLUTION SUBCUTANEOUS
Status: DISCONTINUED | OUTPATIENT
Start: 2022-06-17 | End: 2022-06-20

## 2022-06-17 RX ORDER — FUROSEMIDE 10 MG/ML
40 INJECTION INTRAMUSCULAR; INTRAVENOUS
Status: DISCONTINUED | OUTPATIENT
Start: 2022-06-17 | End: 2022-06-18

## 2022-06-17 RX ORDER — ASPIRIN 81 MG/1
324 TABLET, CHEWABLE ORAL ONCE
Status: COMPLETED | OUTPATIENT
Start: 2022-06-17 | End: 2022-06-17

## 2022-06-17 RX ORDER — MAGNESIUM SULFATE HEPTAHYDRATE 40 MG/ML
2 INJECTION, SOLUTION INTRAVENOUS ONCE
Status: COMPLETED | OUTPATIENT
Start: 2022-06-17 | End: 2022-06-17

## 2022-06-17 RX ADMIN — INSULIN LISPRO 6 UNITS: 100 INJECTION, SOLUTION INTRAVENOUS; SUBCUTANEOUS at 13:01

## 2022-06-17 RX ADMIN — ALBUTEROL SULFATE 2 PUFF: 90 AEROSOL, METERED RESPIRATORY (INHALATION) at 22:03

## 2022-06-17 RX ADMIN — CARVEDILOL 12.5 MG: 3.12 TABLET, FILM COATED ORAL at 07:52

## 2022-06-17 RX ADMIN — ACETAMINOPHEN 650 MG: 325 TABLET ORAL at 19:56

## 2022-06-17 RX ADMIN — CARVEDILOL 12.5 MG: 12.5 TABLET, FILM COATED ORAL at 17:12

## 2022-06-17 RX ADMIN — FUROSEMIDE 40 MG: 10 INJECTION, SOLUTION INTRAMUSCULAR; INTRAVENOUS at 17:13

## 2022-06-17 RX ADMIN — CARIPRAZINE 6 MG: 4.5 CAPSULE, GELATIN COATED ORAL at 13:02

## 2022-06-17 RX ADMIN — HYDRALAZINE HYDROCHLORIDE 25 MG: 25 TABLET ORAL at 21:58

## 2022-06-17 RX ADMIN — LEVOTHYROXINE SODIUM 112 MCG: 112 TABLET ORAL at 09:01

## 2022-06-17 RX ADMIN — HEPARIN SODIUM 5000 UNITS: 5000 INJECTION INTRAVENOUS; SUBCUTANEOUS at 09:02

## 2022-06-17 RX ADMIN — ASPIRIN 81 MG CHEWABLE TABLET 324 MG: 81 TABLET CHEWABLE at 07:26

## 2022-06-17 RX ADMIN — IPRATROPIUM BROMIDE AND ALBUTEROL SULFATE 3 ML: 2.5; .5 SOLUTION RESPIRATORY (INHALATION) at 07:26

## 2022-06-17 RX ADMIN — FAMOTIDINE 40 MG: 20 TABLET ORAL at 09:00

## 2022-06-17 RX ADMIN — LISINOPRIL 20 MG: 20 TABLET ORAL at 17:12

## 2022-06-17 RX ADMIN — INSULIN LISPRO 8 UNITS: 100 INJECTION, SOLUTION INTRAVENOUS; SUBCUTANEOUS at 21:58

## 2022-06-17 RX ADMIN — FUROSEMIDE 40 MG: 10 INJECTION, SOLUTION INTRAMUSCULAR; INTRAVENOUS at 07:28

## 2022-06-17 RX ADMIN — HEPARIN SODIUM 5000 UNITS: 5000 INJECTION INTRAVENOUS; SUBCUTANEOUS at 17:13

## 2022-06-17 RX ADMIN — HEPARIN SODIUM 5000 UNITS: 5000 INJECTION INTRAVENOUS; SUBCUTANEOUS at 22:03

## 2022-06-17 RX ADMIN — FLUOXETINE 20 MG: 20 CAPSULE ORAL at 09:02

## 2022-06-17 RX ADMIN — ALBUTEROL SULFATE 2 PUFF: 90 AEROSOL, METERED RESPIRATORY (INHALATION) at 18:20

## 2022-06-17 RX ADMIN — PANTOPRAZOLE SODIUM 40 MG: 40 TABLET, DELAYED RELEASE ORAL at 09:01

## 2022-06-17 RX ADMIN — DOXAZOSIN 2 MG: 4 TABLET ORAL at 09:00

## 2022-06-17 RX ADMIN — PROPRANOLOL HYDROCHLORIDE 10 MG: 20 TABLET ORAL at 07:53

## 2022-06-17 RX ADMIN — LISINOPRIL 20 MG: 20 TABLET ORAL at 07:53

## 2022-06-17 RX ADMIN — ALBUTEROL SULFATE 2 PUFF: 90 AEROSOL, METERED RESPIRATORY (INHALATION) at 12:00

## 2022-06-17 RX ADMIN — ALBUTEROL SULFATE 2 PUFF: 90 AEROSOL, METERED RESPIRATORY (INHALATION) at 09:00

## 2022-06-17 RX ADMIN — ALUMINUM HYDROXIDE, MAGNESIUM HYDROXIDE, AND SIMETHICONE 30 ML: 200; 200; 20 SUSPENSION ORAL at 18:22

## 2022-06-17 RX ADMIN — MAGNESIUM SULFATE HEPTAHYDRATE 2 G: 40 INJECTION, SOLUTION INTRAVENOUS at 09:02

## 2022-06-17 RX ADMIN — INSULIN GLARGINE 40 UNITS: 100 INJECTION, SOLUTION SUBCUTANEOUS at 21:59

## 2022-06-17 RX ADMIN — VERAPAMIL HYDROCHLORIDE 240 MG: 120 TABLET, FILM COATED, EXTENDED RELEASE ORAL at 09:02

## 2022-06-17 NOTE — ED PROVIDER NOTES
History  Chief Complaint   Patient presents with    Shortness of Breath     Patient reports increased shortness of breath for 3 to 4 days  Patient reports dyspnea with exertion  Patient reports left sided chest pain that started 3 to 4 days ago      Patient is a 33-year-old male with history of diabetes mellitus, hypertension that presents for evaluation of dyspnea  Patient says that over the past 3-4 days he has had worsening intermittent exertional dyspnea that is moderate severity  He also describes substernal nonradiating chest pressure associated with his exertional dyspnea  He denies any nausea vomiting or diaphoresis  He has not taken anything for his symptoms  He has not been compliant medications home  He also endorses bilateral lower extremity edema over the past several days to about a week that has been worsening  He denies urinary or bowel complaints  Prior to Admission Medications   Prescriptions Last Dose Informant Patient Reported? Taking? ACCU-CHEK FASTCLIX LANCETS [facility] 2022 at Unknown time Self Yes Yes   Si (four) times a day Not checking 4 times a day   Patient stated maybe 2 times a day   ACCU-CHEK GUIDE test strip 2022 at Unknown time Self Yes Yes   Si (four) times a day Test   Aspirin-Acetaminophen-Caffeine (EXCEDRIN PO) Past Week at Unknown time Self Yes Yes   Sig: Take 2 tablets by mouth if needed (Headache)   Blood Glucose Monitoring Suppl (ACCU-CHEK GUIDE) w/Device KIT 2022 at Unknown time Self Yes Yes   Sig: Checking 2 times a day   FLUoxetine (PROzac) 20 mg capsule 2022 at Unknown time Self Yes Yes   Sig: Take 20 mg by mouth every morning   Insulin Pen Needle (Pen Needles 3/16") 31G X 5 MM MISC 2022 at Unknown time Self No Yes   Sig: Use 4 (four) times a day   LORazepam (ATIVAN) 0 5 mg tablet Not Taking at Unknown time Self No No   Sig: Take 1 tablet (0 5 mg total) by mouth daily as needed for anxiety   Patient not taking: Reported on 6/17/2022   Lantus SoloStar 100 units/mL injection pen 6/16/2022 at Unknown time Self No Yes   Sig: inject 50 units subcutaneously at bedtime   Patient taking differently: Inject 40 Units under the skin daily at bedtime   Vraylar 6 MG capsule 6/17/2022 at Unknown time Self Yes Yes   Sig: Take 6 mg by mouth daily   albuterol (PROVENTIL HFA,VENTOLIN HFA) 90 mcg/act inhaler 6/17/2022 at Unknown time Self No Yes   Sig: Inhale 2 puffs 4 (four) times a day   Patient taking differently: Inhale 2 puffs every 6 (six) hours as needed for shortness of breath   aluminum-magnesium hydroxide-simethicone (MYLANTA) 200-200-20 mg/5 mL suspension Not Taking at Unknown time Self No No   Sig: Take 30 mL by mouth 2 (two) times a day as needed for indigestion or heartburn   Patient not taking: Reported on 6/17/2022   atorvastatin (LIPITOR) 10 mg tablet Not Taking at Unknown time Self Yes No   Sig: Take 10 mg by mouth   Patient not taking: Reported on 6/17/2022   caffeine 200 mg tablet 6/17/2022 at Unknown time Self Yes Yes   Sig: Take 200 mg by mouth daily as needed for headaches   carvedilol (COREG) 12 5 mg tablet 6/17/2022 at Unknown time Self No Yes   Sig: Take 1 tablet (12 5 mg total) by mouth 2 (two) times a day with meals   cholecalciferol (VITAMIN D3) 1,000 units tablet Not Taking at Unknown time Self No No   Sig: Take 1 tablet (1,000 Units total) by mouth daily   Patient not taking: Reported on 6/17/2022   doxazosin (CARDURA) 2 mg tablet 6/17/2022 at Unknown time Self No Yes   Sig: Take 1 tablet (2 mg total) by mouth daily   famotidine (PEPCID) 40 MG tablet 6/17/2022 at Unknown time Self No Yes   Sig: Take 1 tablet (40 mg total) by mouth daily   fremanezumab-vfrm (Ajovy) 225 MG/1 5ML auto-injector Past Month at Unknown time  No Yes   Sig: Inject 1 pen every 30 days     furosemide (LASIX) 20 mg tablet Not Taking at Unknown time  No No   Sig: take 1 tablet by mouth once daily   Patient not taking: Reported on 6/17/2022   glucagon 1 MG injection Not Taking at Unknown time Self Yes No   Sig: Inject 1 mg under the skin   Patient not taking: Reported on 6/17/2022   insulin lispro (HumaLOG) 100 units/mL injection 6/17/2022 at Unknown time Self No Yes   Sig: Inject 5 Units under the skin 3 (three) times a day with meals   Patient taking differently: Inject 6 Units under the skin 3 (three) times a day with meals   levothyroxine 112 mcg tablet More than a month at Unknown time Self No No   Sig: Take 1 tablet (112 mcg total) by mouth daily in the early morning   lisinopril (ZESTRIL) 20 mg tablet 6/17/2022 at Unknown time Self No Yes   Sig: Take 1 tablet (20 mg total) by mouth 2 (two) times a day   ondansetron (ZOFRAN-ODT) 4 mg disintegrating tablet More than a month at Unknown time  No No   Sig: dissolve 1 tablet ON TONGUE every 6 hours if needed for nausea or vomiting   pantoprazole (PROTONIX) 40 mg tablet 6/17/2022 at Unknown time Self No Yes   Sig: Take 1 tablet (40 mg total) by mouth 2 (two) times a day before meals   prochlorperazine (COMPAZINE) 10 mg tablet Not Taking at Unknown time Self No No   Sig: Take 1 tablet (10 mg total) by mouth every 6 (six) hours as needed (migraine)   Patient not taking: Reported on 6/17/2022   propranolol (INDERAL) 10 mg tablet 6/17/2022 at Unknown time Self Yes Yes   Sig: 10 mg Take 10mg in am and 20mg at HS   verapamil (CALAN-SR) 240 mg CR tablet 6/17/2022 at Unknown time Self No Yes   Sig: Take 1 tablet (240 mg total) by mouth daily      Facility-Administered Medications: None       Past Medical History:   Diagnosis Date    Acute bronchitis due to other specified organisms 7/5/2019    Chronic headaches     Diabetes mellitus (HCC)     Disease of thyroid gland     Esophagitis     Gastritis     Gastroparesis     GERD (gastroesophageal reflux disease)     Hypertension     Migraine     Migraines 03/11/2021    Obesity     Obsessive compulsive disorder     Psychiatric disorder     Schizoaffective disorder [facility])        Past Surgical History:   Procedure Laterality Date    ESOPHAGOGASTRODUODENOSCOPY N/A 1/15/2019    Procedure: ESOPHAGOGASTRODUODENOSCOPY (EGD); Surgeon: Anders Castelan MD;  Location: [facility]; Service: Gastroenterology       Family History   Problem Relation Age of Onset    COPD Mother     Hypertension Mother     Heart failure Mother     Rheum arthritis Family     Heart disease Family     Hypertension Father     Diabetes Father     Hyperlipidemia Father      I have reviewed and agree with the history as documented  E-Cigarette/Vaping    E-Cigarette Use Never User      E-Cigarette/Vaping Substances    Nicotine No     THC No     CBD No     Flavoring No      Social History     Tobacco Use    Smoking status: Never Smoker    Smokeless tobacco: Never Used   Vaping Use    Vaping Use: Never used   Substance Use Topics    Alcohol use: Not Currently    Drug use: Not Currently       Review of Systems   Constitutional: Positive for fatigue  Negative for fever  HENT: Negative for sore throat  Eyes: Negative for photophobia  Respiratory: Positive for shortness of breath  Cardiovascular: Positive for chest pain and leg swelling  Gastrointestinal: Negative for abdominal pain  Genitourinary: Negative for dysuria  Musculoskeletal: Negative for back pain  Skin: Negative for rash  Neurological: Negative for light-headedness  Hematological: Negative for adenopathy  Psychiatric/Behavioral: Negative for agitation  All other systems reviewed and are negative  Physical Exam  Physical Exam  Vitals reviewed  Constitutional:       General: He is not in acute distress  Appearance: He is well-developed  HENT:      Head: Normocephalic  Eyes:      Pupils: Pupils are equal, round, and reactive to light  Cardiovascular:      Rate and Rhythm: Normal rate and regular rhythm  Heart sounds: Normal heart sounds  No murmur heard  No friction rub  No gallop     Pulmonary: Effort: Pulmonary effort is normal       Breath sounds: Normal breath sounds  Comments: Lungs are clear no increased work of breathing noted  Abdominal:      General: Bowel sounds are normal  There is no distension  Palpations: Abdomen is soft  Tenderness: There is no abdominal tenderness  There is no guarding  Musculoskeletal:         General: Normal range of motion  Cervical back: Normal range of motion and neck supple  Right lower leg: Edema present  Left lower leg: Edema present  Comments: Pitting edema to the mid shin bilaterally 2+   Skin:     Capillary Refill: Capillary refill takes less than 2 seconds  Neurological:      Mental Status: He is alert and oriented to person, place, and time  Cranial Nerves: No cranial nerve deficit  Sensory: No sensory deficit  Motor: No abnormal muscle tone  Psychiatric:         Behavior: Behavior normal          Thought Content:  Thought content normal          Judgment: Judgment normal          Vital Signs  ED Triage Vitals [06/17/22 0712]   Temperature Pulse Respirations Blood Pressure SpO2   98 1 °F (36 7 °C) 92 19 (!) 195/103 97 %      Temp Source Heart Rate Source Patient Position - Orthostatic VS BP Location FiO2 (%)   Temporal Monitor Lying Right arm --      Pain Score       4           Vitals:    06/18/22 0551 06/18/22 0826 06/18/22 1238 06/18/22 1511   BP: 147/72 (!) 164/104 106/59 107/61   Pulse:  82 67 68   Patient Position - Orthostatic VS:             Visual Acuity      ED Medications  Medications   sodium chloride (PF) 0 9 % injection 3 mL (has no administration in time range)   albuterol (PROVENTIL HFA,VENTOLIN HFA) inhaler 2 puff (2 puffs Inhalation Given 6/18/22 1709)   carvedilol (COREG) tablet 12 5 mg (12 5 mg Oral Not Given 6/18/22 1623)   famotidine (PEPCID) tablet 40 mg (40 mg Oral Given 6/18/22 0847)   FLUoxetine (PROzac) capsule 20 mg (20 mg Oral Given 6/18/22 0847)   insulin lispro (HumaLOG) 100 units/mL subcutaneous injection 6 Units (6 Units Subcutaneous Not Given 6/18/22 1652)   LORazepam (ATIVAN) tablet 0 5 mg (has no administration in time range)   pantoprazole (PROTONIX) EC tablet 40 mg (40 mg Oral Given 6/18/22 0551)   acetaminophen (TYLENOL) tablet 650 mg (650 mg Oral Given 6/18/22 0249)   ondansetron (ZOFRAN) injection 4 mg (has no administration in time range)   heparin (porcine) subcutaneous injection 5,000 Units (5,000 Units Subcutaneous Given 6/18/22 1426)   hydrALAZINE (APRESOLINE) injection 5 mg (has no administration in time range)   insulin glargine (LANTUS) subcutaneous injection 40 Units 0 4 mL (40 Units Subcutaneous Given 6/17/22 2159)   cariprazine (VRAYLAR) capsule 6 mg (6 mg Oral Given 6/18/22 0847)   insulin lispro (HumaLOG) 100 units/mL subcutaneous injection 2-12 Units (2 Units Subcutaneous Not Given 6/18/22 1628)   insulin lispro (HumaLOG) 100 units/mL subcutaneous injection 2-12 Units (8 Units Subcutaneous Given 6/17/22 2158)   dextromethorphan-guaiFENesin (ROBITUSSIN DM) oral syrup 10 mL (10 mL Oral Given 6/18/22 1016)   ipratropium (ATROVENT HFA) inhaler 2 puff (2 puffs Inhalation Given 6/18/22 1708)   levothyroxine tablet 125 mcg (has no administration in time range)   doxazosin (CARDURA) tablet 2 mg (has no administration in time range)   lisinopril (ZESTRIL) tablet 10 mg (has no administration in time range)   furosemide (LASIX) injection 40 mg (has no administration in time range)   aspirin chewable tablet 324 mg (324 mg Oral Given 6/17/22 0726)   ipratropium-albuterol (DUO-NEB) 0 5-2 5 mg/3 mL inhalation solution 3 mL (3 mL Nebulization Given 6/17/22 0726)   furosemide (LASIX) injection 40 mg (40 mg Intravenous Given 6/17/22 0728)   propranolol (INDERAL) tablet 10 mg (10 mg Oral Given 6/17/22 0753)   lisinopril (ZESTRIL) tablet 20 mg (20 mg Oral Given 6/17/22 7451)   magnesium sulfate 2 g/50 mL IVPB (premix) 2 g (0 g Intravenous Stopped 6/17/22 7338)   pneumococcal 23-valent polysaccharide vaccine (PNEUMOVAX-23) injection 0 5 mL (0 5 mL Subcutaneous Given 6/18/22 1019)   aluminum-magnesium hydroxide-simethicone (MYLANTA) oral suspension 30 mL (30 mL Oral Given 6/17/22 1822)       Diagnostic Studies  Results Reviewed     Procedure Component Value Units Date/Time    Osmolality-"If this is regarding a toxic alcohol, STOP  Test is not routinely indicated   Please consult medical  on call for further guidance " [[id number]]  (Abnormal) Collected: 06/18/22 0545    Lab Status: Final result Specimen: Blood from Arm, Right Updated: 06/18/22 1415     Osmolality Serum 301 mmol/KG     Basic metabolic panel [[id number]]  (Abnormal) Collected: 06/18/22 0545    Lab Status: Final result Specimen: Blood from Arm, Right Updated: 06/18/22 0624     Sodium 132 mmol/L      Potassium 4 3 mmol/L      Chloride 97 mmol/L      CO2 26 mmol/L      ANION GAP 9 mmol/L      BUN 60 mg/dL      Creatinine 3 73 mg/dL      Glucose 126 mg/dL      Calcium 8 0 mg/dL      eGFR 18 ml/min/1 73sq m     Narrative:      [place] guidelines for Chronic Kidney Disease (CKD):     Stage 1 with normal or high GFR (GFR > 90 mL/min/1 73 square meters)    Stage 2 Mild CKD (GFR = 60-89 mL/min/1 73 square meters)    Stage 3A Moderate CKD (GFR = 45-59 mL/min/1 73 square meters)    Stage 3B Moderate CKD (GFR = 30-44 mL/min/1 73 square meters)    Stage 4 Severe CKD (GFR = 15-29 mL/min/1 73 square meters)    Stage 5 End Stage CKD (GFR <15 mL/min/1 73 square meters)  Note: GFR calculation is accurate only with a steady state creatinine    CBC and differential [[id number]]  (Abnormal) Collected: 06/18/22 0545    Lab Status: Final result Specimen: Blood from Arm, Right Updated: 06/18/22 0600     WBC 8 80 Thousand/uL      RBC 3 50 Million/uL      Hemoglobin 9 6 g/dL      Hematocrit 29 6 %      MCV 85 fL      MCH 27 4 pg      MCHC 32 4 g/dL      RDW 13 0 %      MPV 9 2 fL      Platelets 596 Thousands/uL      nRBC 0 /100 WBCs      Neutrophils Relative 72 %      Immat GRANS % 1 %      Lymphocytes Relative 15 %      Monocytes Relative 8 %      Eosinophils Relative 3 %      Basophils Relative 1 %      Neutrophils Absolute 6 37 Thousands/µL      Immature Grans Absolute 0 06 Thousand/uL      Lymphocytes Absolute 1 32 Thousands/µL      Monocytes Absolute 0 73 Thousand/µL      Eosinophils Absolute 0 28 Thousand/µL      Basophils Absolute 0 04 Thousands/µL     Catecholamines, fractionated, plasma [[id number]] Collected: 06/18/22 0545    Lab Status: In process Specimen: Blood from Arm, Right Updated: 06/18/22 0556    Microalbumin / creatinine urine ratio [[id number]]  (Abnormal) Collected: 06/17/22 1626    Lab Status: Final result Specimen: Urine, Clean Catch Updated: 06/17/22 2107     Creatinine, Ur 49 1 mg/dL      Microalbum  ,U,Random 1,440 0 mg/L      Microalb Creat Ratio 2,933 mg/g creatinine     Protein / creatinine ratio, urine [[id number]]  (Abnormal) Collected: 06/17/22 1626    Lab Status: Final result Specimen: Urine, Clean Catch Updated: 06/17/22 2107     Creatinine, Ur 49 1 mg/dL      Protein Urine Random 205 mg/dL      Prot/Creat Ratio, Ur 4 18    Microalbumin / creatinine urine ratio [[id number]]  (Abnormal) Collected: 06/17/22 1626    Lab Status: Final result Specimen: Urine, Clean Catch Updated: 06/17/22 2107     Creatinine, Ur 49 4 mg/dL      Microalbum  ,U,Random 1,480 0 mg/L      Microalb Creat Ratio 2,996 mg/g creatinine     Protein / creatinine ratio, urine [[id number]]  (Abnormal) Collected: 06/17/22 1626    Lab Status: Final result Specimen: Urine, Clean Catch Updated: 06/17/22 2107     Creatinine, Ur 49 4 mg/dL      Protein Urine Random 205 mg/dL      Prot/Creat Ratio, Ur 4 15    Urea nitrogen, urine [[id number]] Collected: 06/17/22 1626    Lab Status: Final result Specimen: Urine, Clean Catch Updated: 06/17/22 2107     Urea Nitrogen, Ur 281 mg/dL     Sodium, urine, random [[id number]] Collected: 06/17/22 1626    Lab Status: Final result Specimen: Urine, Clean Catch Updated: 06/17/22 2107     Sodium, Ur 38    Creatinine, urine, random [[id number]] Collected: 06/17/22 1626    Lab Status: Final result Specimen: Urine, Clean Catch Updated: 06/17/22 2107     Creatinine, Ur 49 4 mg/dL     T4, free [[id number]]  (Abnormal) Collected: 06/17/22 0725    Lab Status: Final result Specimen: Blood from Arm, Left Updated: 06/17/22 2013     Free T4 0 71 ng/dL     Osmolality, urine [[id number]]  (Abnormal) Collected: 06/17/22 1626    Lab Status: Final result Specimen: Urine, Clean Catch Updated: 06/17/22 1955     Osmolality, Ur 247 mmol/KG     Fingerstick Glucose (POCT) [[id number]]  (Abnormal) Collected: 06/17/22 1112    Lab Status: Final result Updated: 06/17/22 1116     POC Glucose 145 mg/dl     TSH, 3rd generation with Free T4 reflex [[id number]]  (Abnormal) Collected: 06/17/22 0725    Lab Status: Final result Specimen: Blood from Arm, Left Updated: 06/17/22 0952     TSH 3RD GENERATON 40 319 uIU/mL     Narrative:      Patients undergoing fluorescein dye angiography may retain small amounts of fluorescein in the body for 48-72 hours post procedure  Samples containing fluorescein can produce falsely depressed TSH values  If the patient had this procedure,a specimen should be resubmitted post fluorescein clearance        HS Troponin I 2hr [[id number]]  (Normal) Collected: 06/17/22 0912    Lab Status: Final result Specimen: Blood from Arm, Left Updated: 06/17/22 0942     hs TnI 2hr 10 ng/L      Delta 2hr hsTnI -1 ng/L     HS Troponin 0hr (reflex protocol) [[id number]]  (Normal) Collected: 06/17/22 0725    Lab Status: Final result Specimen: Blood from Arm, Left Updated: 06/17/22 0801     hs TnI 0hr 11 ng/L     B-Type Natriuretic Peptide(BNP) AN, [place], [place] [facility] Only [[id number]]  (Abnormal) Collected: 06/17/22 0725    Lab Status: Final result Specimen: Blood from Arm, Left Updated: 06/17/22 0759      pg/mL     Basic metabolic panel [[id number]]  (Abnormal) Collected: 06/17/22 0725    Lab Status: Final result Specimen: Blood from Arm, Left Updated: 06/17/22 0756     Sodium 129 mmol/L      Potassium 4 2 mmol/L      Chloride 95 mmol/L      CO2 26 mmol/L      ANION GAP 8 mmol/L      BUN 52 mg/dL      Creatinine 2 99 mg/dL      Glucose 220 mg/dL      Calcium 8 1 mg/dL      eGFR 24 ml/min/1 73sq m     Narrative:      [employer] guidelines for Chronic Kidney Disease (CKD):     Stage 1 with normal or high GFR (GFR > 90 mL/min/1 73 square meters)    Stage 2 Mild CKD (GFR = 60-89 mL/min/1 73 square meters)    Stage 3A Moderate CKD (GFR = 45-59 mL/min/1 73 square meters)    Stage 3B Moderate CKD (GFR = 30-44 mL/min/1 73 square meters)    Stage 4 Severe CKD (GFR = 15-29 mL/min/1 73 square meters)    Stage 5 End Stage CKD (GFR <15 mL/min/1 73 square meters)  Note: GFR calculation is accurate only with a steady state creatinine    Magnesium [[id number]]  (Abnormal) Collected: 06/17/22 0725    Lab Status: Final result Specimen: Blood from Arm, Left Updated: 06/17/22 0756     Magnesium 1 7 mg/dL     CBC and differential [[id number]]  (Abnormal) Collected: 06/17/22 0725    Lab Status: Final result Specimen: Blood from Arm, Left Updated: 06/17/22 0734     WBC 10 55 Thousand/uL      RBC 3 51 Million/uL      Hemoglobin 9 8 g/dL      Hematocrit 29 3 %      MCV 84 fL      MCH 27 9 pg      MCHC 33 4 g/dL      RDW 12 9 %      MPV 9 3 fL      Platelets 413 Thousands/uL      nRBC 0 /100 WBCs      Neutrophils Relative 73 %      Immat GRANS % 1 %      Lymphocytes Relative 15 %      Monocytes Relative 7 %      Eosinophils Relative 3 %      Basophils Relative 1 %      Neutrophils Absolute 7 71 Thousands/µL      Immature Grans Absolute 0 09 Thousand/uL      Lymphocytes Absolute 1 58 Thousands/µL      Monocytes Absolute 0 77 Thousand/µL      Eosinophils Absolute 0 35 Thousand/µL      Basophils Absolute 0 05 Thousands/µL                  US kidney and bladder   Final Result by Thuy Marie DO (06/17 7513)      Normal              Workstation performed: [id number]         X-ray chest 1 view portable   Final Result by Michelle Elder MD (06/17 4724)      No acute cardiopulmonary disease  Workstation performed: [id number]                    Procedures  ECG 12 Lead Documentation Only    Date/Time: 6/17/2022 7:34 AM  Performed by: Sonny Haley MD  Authorized by: Sonny Haley MD     ECG reviewed by me, the ED Provider: yes    Patient location:  ED  Previous ECG:     Previous ECG:  Compared to current    Similarity:  No change    Comparison to cardiac monitor: Yes    Interpretation:     Interpretation: normal    Rate:     ECG rate assessment: normal    Rhythm:     Rhythm: sinus rhythm    Ectopy:     Ectopy: none    QRS:     QRS axis:  Normal    QRS intervals:  Normal  Conduction:     Conduction: normal    ST segments:     ST segments:  Normal  T waves:     T waves: normal               ED Course  ED Course as of 06/18/22 1743   Fri Jun 17, 2022   0735 Hemoglobin(!): 9 8  Slight decrease from baseline 11-12 1 year ago   0748 Home blood pressure meds placed                               SBIRT 20yo+    Flowsheet Row Most Recent Value   SBIRT (23 yo +)    In order to provide better care to our patients, we are screening all of our patients for alcohol and drug use  Would it be okay to ask you these screening questions? Yes Filed at: 06/17/2022 0716   Initial Alcohol Screen: US AUDIT-C     1  How often do you have a drink containing alcohol? 0 Filed at: 06/17/2022 0716   2  How many drinks containing alcohol do you have on a typical day you are drinking? 0 Filed at: 06/17/2022 0716   3a  Male UNDER 65: How often do you have five or more drinks on one occasion? 0 Filed at: 06/17/2022 0716   3b  FEMALE Any Age, or MALE 65+: How often do you have 4 or more drinks on one occassion?  0 Filed at: 06/17/2022 0716   Audit-C Score 0 Filed at: 06/17/2022 5137   CLIFT[place]: How many times in the past year have you    Used an illegal drug or used a prescription medication for non-medical reasons? Never Filed at: 06/17/2022 0716                    MDM  Number of Diagnoses or Management Options  STEFANIA (acute kidney injury) ([facility] )  Hypertensive urgency  Noncompliance with medications  Diagnosis management comments: Patient is a 28-year-old male that presents for evaluation of chest pain and dyspnea  Believe this is likely secondary to medication noncompliance as the patient is initially significantly hypertensive with lower extremity swelling  Possible underlying CHF  He he was given his home medications and started on IV Lasix and will be admitted hospital for further workup and management  Disposition  Final diagnoses:   STEFANIA (acute kidney injury) ([facility] )   Hypertensive urgency   Noncompliance with medications     Time reflects when diagnosis was documented in both MDM as applicable and the Disposition within this note     Time User Action Codes Description Comment    6/17/2022  8:16 AM Yessenia Camacho Add [N17 9] STEFANIA (acute kidney injury) ([facility] )     6/17/2022  8:16 AM Lyndsey Angry Add [I16 0] Hypertensive urgency     6/17/2022  8:16 AM Lyndsey Angry Add [Z91 14] Noncompliance with medications     6/18/2022  8:26 AM Coreen Sanchez Add [S91 109A] Open toe wound       ED Disposition     ED Disposition   Admit    Condition   Stable    Date/Time   Fri Jun 17, 2022  8:16 AM    Comment   Case was discussed with FITO and the patient's admission status was agreed to be Admission Status: inpatient status to the service of Dr Samira Edge   Follow-up Information    None         Current Discharge Medication List      CONTINUE these medications which have NOT CHANGED    Details   ACCU-CHEK FASTCLIX LANCETS MISC 4 (four) times a day Not checking 4 times a day   Patient stated maybe 2 times a day  Refills: 1      ACCU-CHEK GUIDE test strip 4 (four) times a day Test  Refills: 1      albuterol (PROVENTIL HFA,VENTOLIN HFA) 90 mcg/act inhaler Inhale 2 puffs 4 (four) times a day  Qty: 8 g, Refills: 0    Comments: Substitution to a formulary equivalent within the same pharmaceutical class is authorized  Associated Diagnoses: Wheezing; Acute bronchitis, unspecified organism      Aspirin-Acetaminophen-Caffeine (EXCEDRIN PO) Take 2 tablets by mouth if needed (Headache)      Blood Glucose Monitoring Suppl (ACCU-CHEK GUIDE) w/Device KIT Checking 2 times a day  Refills: 0      caffeine 200 mg tablet Take 200 mg by mouth daily as needed for headaches      carvedilol (COREG) 12 5 mg tablet Take 1 tablet (12 5 mg total) by mouth 2 (two) times a day with meals  Qty: 60 tablet, Refills: 5    Associated Diagnoses: Hypertensive urgency      doxazosin (CARDURA) 2 mg tablet Take 1 tablet (2 mg total) by mouth daily  Qty: 30 tablet, Refills: 5    Associated Diagnoses: Hypertensive urgency      famotidine (PEPCID) 40 MG tablet Take 1 tablet (40 mg total) by mouth daily  Qty: 30 tablet, Refills: 5    Associated Diagnoses: Epigastric pain      FLUoxetine (PROzac) 20 mg capsule Take 20 mg by mouth every morning      fremanezumab-vfrm (Ajovy) 225 MG/1 5ML auto-injector Inject 1 pen every 30 days    Qty: 1 5 mL, Refills: 11    Associated Diagnoses: Migraine without aura and without status migrainosus, not intractable      insulin lispro (HumaLOG) 100 units/mL injection Inject 5 Units under the skin 3 (three) times a day with meals  Qty: 10 mL, Refills: 0    Associated Diagnoses: Type 2 diabetes mellitus with hyperglycemia, with long-term current use of insulin (HCC)      Insulin Pen Needle (Pen Needles 3/16") 31G X 5 MM MISC Use 4 (four) times a day  Qty: 200 each, Refills: 3    Associated Diagnoses: Type 2 diabetes mellitus without complication, unspecified whether long term insulin use (HCC)      Lantus SoloStar 100 units/mL injection pen inject 50 units subcutaneously at bedtime  Qty: 15 mL, Refills: 0    Associated Diagnoses: Type 2 diabetes mellitus with hyperglycemia, with long-term current use of insulin (HCC)      lisinopril (ZESTRIL) 20 mg tablet Take 1 tablet (20 mg total) by mouth 2 (two) times a day  Qty: 60 tablet, Refills: 5    Associated Diagnoses: Hypertensive urgency      pantoprazole (PROTONIX) 40 mg tablet Take 1 tablet (40 mg total) by mouth 2 (two) times a day before meals  Qty: 60 tablet, Refills: 5    Associated Diagnoses: Epigastric pain      propranolol (INDERAL) 10 mg tablet 10 mg Take 10mg in am and 20mg at HS      verapamil (CALAN-SR) 240 mg CR tablet Take 1 tablet (240 mg total) by mouth daily  Qty: 30 tablet, Refills: 5    Associated Diagnoses: Hypertensive urgency      Vraylar 6 MG capsule Take 6 mg by mouth daily      aluminum-magnesium hydroxide-simethicone (MYLANTA) 200-200-20 mg/5 mL suspension Take 30 mL by mouth 2 (two) times a day as needed for indigestion or heartburn  Qty: 355 mL, Refills: 0    Associated Diagnoses: Epigastric pain      atorvastatin (LIPITOR) 10 mg tablet Take 10 mg by mouth      cholecalciferol (VITAMIN D3) 1,000 units tablet Take 1 tablet (1,000 Units total) by mouth daily  Qty: 30 tablet, Refills: 0    Associated Diagnoses: Hypertensive urgency      furosemide (LASIX) 20 mg tablet take 1 tablet by mouth once daily  Qty: 30 tablet, Refills: 0    Associated Diagnoses: SOB (shortness of breath); Peripheral edema      glucagon 1 MG injection Inject 1 mg under the skin      levothyroxine 112 mcg tablet Take 1 tablet (112 mcg total) by mouth daily in the early morning  Qty: 90 tablet, Refills: 0    Associated Diagnoses: Acquired hypothyroidism      LORazepam (ATIVAN) 0 5 mg tablet Take 1 tablet (0 5 mg total) by mouth daily as needed for anxiety  Qty: 30 tablet, Refills: 0    Associated Diagnoses: Anxiety      ondansetron (ZOFRAN-ODT) 4 mg disintegrating tablet dissolve 1 tablet ON TONGUE every 6 hours if needed for nausea or vomiting  Qty: 20 tablet, Refills: 0    Associated Diagnoses: Epigastric pain      prochlorperazine (COMPAZINE) 10 mg tablet Take 1 tablet (10 mg total) by mouth every 6 (six) hours as needed (migraine)  Qty: 10 tablet, Refills: 0    Associated Diagnoses: Chronic migraine without aura without status migrainosus, not intractable             No discharge procedures on file      PDMP Review       Value Time User    PDMP Reviewed  Yes 7/16/2021  3:11 PM Esther Smith MD          ED Provider  Electronically Signed by           Yousuf Johnston MD  06/18/22 [phone] [id number]

## 2022-06-17 NOTE — ASSESSMENT & PLAN NOTE
· Creatinine 2 99 up from baseline of approximately 1 50  · Likely secondary to hypertensive crisis verses florid volume overload  · Renal failure should improve with IV diuresis  · Trend BMP  · Avoid nephrotoxic agents and hypotension  · Nephrology consult ; appreciate recommendations regarding diuresis

## 2022-06-17 NOTE — PLAN OF CARE
Problem: Potential for Falls  Goal: Patient will remain free of falls  Description: INTERVENTIONS:  - Educate patient/family on patient safety including physical limitations  - Instruct patient to call for assistance with activity   - Consult OT/PT to assist with strengthening/mobility   - Keep Call bell within reach  - Keep bed low and locked with side rails adjusted as appropriate  - Keep care items and personal belongings within reach  - Initiate and maintain comfort rounds  - Make Fall Risk Sign visible to staff  - Offer Toileting every 1 Hours, in advance of need  - Initiate/Maintain bed alarm  - Obtain necessary fall risk management equipment: bed   - Apply yellow socks and bracelet for high fall risk patients  - Consider moving patient to room near nurses station  Outcome: Progressing     Problem: MOBILITY - ADULT  Goal: Maintain or return to baseline ADL function  Description: INTERVENTIONS:  -  Assess patient's ability to carry out ADLs; assess patient's baseline for ADL function and identify physical deficits which impact ability to perform ADLs (bathing, care of mouth/teeth, toileting, grooming, dressing, etc )  - Assess/evaluate cause of self-care deficits   - Assess range of motion  - Assess patient's mobility; develop plan if impaired  - Assess patient's need for assistive devices and provide as appropriate  - Encourage maximum independence but intervene and supervise when necessary  - Involve family in performance of ADLs  - Assess for home care needs following discharge   - Consider OT consult to assist with ADL evaluation and planning for discharge  - Provide patient education as appropriate  Outcome: Progressing  Goal: Maintains/Returns to pre admission functional level  Description: INTERVENTIONS:  - Perform BMAT or MOVE assessment daily    - Set and communicate daily mobility goal to care team and patient/family/caregiver     - Collaborate with rehabilitation services on mobility goals if consulted  - Perform Range of Motion 3 times a day  - Reposition patient every 2 hours    - Dangle patient 3 times a day  - Stand patient 3 times a day  - Ambulate patient 3 times a day  - Out of bed to chair 3 times a day   - Out of bed for meals 3 times a day  - Out of bed for toileting  - Record patient progress and toleration of activity level   Outcome: Progressing     Problem: PAIN - ADULT  Goal: Verbalizes/displays adequate comfort level or baseline comfort level  Description: Interventions:  - Encourage patient to monitor pain and request assistance  - Assess pain using appropriate pain scale  - Administer analgesics based on type and severity of pain and evaluate response  - Implement non-pharmacological measures as appropriate and evaluate response  - Consider cultural and social influences on pain and pain management  - Notify physician/advanced practitioner if interventions unsuccessful or patient reports new pain  Outcome: Progressing     Problem: INFECTION - ADULT  Goal: Absence or prevention of progression during hospitalization  Description: INTERVENTIONS:  - Assess and monitor for signs and symptoms of infection  - Monitor lab/diagnostic results  - Monitor all insertion sites, i e  indwelling lines, tubes, and drains  - Monitor endotracheal if appropriate and nasal secretions for changes in amount and color  - Clark appropriate cooling/warming therapies per order  - Administer medications as ordered  - Instruct and encourage patient and family to use good hand hygiene technique  - Identify and instruct in appropriate isolation precautions for identified infection/condition  Outcome: Progressing  Goal: Absence of fever/infection during neutropenic period  Description: INTERVENTIONS:  - Monitor WBC    Outcome: Progressing     Problem: SAFETY ADULT  Goal: Patient will remain free of falls  Description: INTERVENTIONS:  - Educate patient/family on patient safety including physical limitations  - Instruct patient to call for assistance with activity   - Consult OT/PT to assist with strengthening/mobility   - Keep Call bell within reach  - Keep bed low and locked with side rails adjusted as appropriate  - Keep care items and personal belongings within reach  - Initiate and maintain comfort rounds  - Make Fall Risk Sign visible to staff  - Offer Toileting every 1 Hours, in advance of need  - Initiate/Maintain bed alarm  - Obtain necessary fall risk management equipment: bed   - Apply yellow socks and bracelet for high fall risk patients  - Consider moving patient to room near nurses station  Outcome: Progressing  Goal: Maintain or return to baseline ADL function  Description: INTERVENTIONS:  -  Assess patient's ability to carry out ADLs; assess patient's baseline for ADL function and identify physical deficits which impact ability to perform ADLs (bathing, care of mouth/teeth, toileting, grooming, dressing, etc )  - Assess/evaluate cause of self-care deficits   - Assess range of motion  - Assess patient's mobility; develop plan if impaired  - Assess patient's need for assistive devices and provide as appropriate  - Encourage maximum independence but intervene and supervise when necessary  - Involve family in performance of ADLs  - Assess for home care needs following discharge   - Consider OT consult to assist with ADL evaluation and planning for discharge  - Provide patient education as appropriate  Outcome: Progressing  Goal: Maintains/Returns to pre admission functional level  Description: INTERVENTIONS:  - Perform BMAT or MOVE assessment daily    - Set and communicate daily mobility goal to care team and patient/family/caregiver  - Collaborate with rehabilitation services on mobility goals if consulted  - Perform Range of Motion 3 times a day  - Reposition patient every 2 hours    - Dangle patient 3 times a day  - Stand patient 3 times a day  - Ambulate patient 3 times a day  - Out of bed to chair 3 times a day   - Out of bed for meals 3 times a day  - Out of bed for toileting  - Record patient progress and toleration of activity level   Outcome: Progressing     Problem: DISCHARGE PLANNING  Goal: Discharge to home or other facility with appropriate resources  Description: INTERVENTIONS:  - Identify barriers to discharge w/patient and caregiver  - Arrange for needed discharge resources and transportation as appropriate  - Identify discharge learning needs (meds, wound care, etc )  - Arrange for interpretive services to assist at discharge as needed  - Refer to Case Management Department for coordinating discharge planning if the patient needs post-hospital services based on physician/advanced practitioner order or complex needs related to functional status, cognitive ability, or social support system  Outcome: Progressing     Problem: Knowledge Deficit  Goal: Patient/family/caregiver demonstrates understanding of disease process, treatment plan, medications, and discharge instructions  Description: Complete learning assessment and assess knowledge base    Interventions:  - Provide teaching at level of understanding  - Provide teaching via preferred learning methods  Outcome: Progressing     Problem: CARDIOVASCULAR - ADULT  Goal: Maintains optimal cardiac output and hemodynamic stability  Description: INTERVENTIONS:  - Monitor I/O, vital signs and rhythm  - Monitor for S/S and trends of decreased cardiac output  - Administer and titrate ordered vasoactive medications to optimize hemodynamic stability  - Assess quality of pulses, skin color and temperature  - Assess for signs of decreased coronary artery perfusion  - Instruct patient to report change in severity of symptoms  Outcome: Progressing  Goal: Absence of cardiac dysrhythmias or at baseline rhythm  Description: INTERVENTIONS:  - Continuous cardiac monitoring, vital signs, obtain 12 lead EKG if ordered  - Administer antiarrhythmic and heart rate control medications as ordered  - Monitor electrolytes and administer replacement therapy as ordered  Outcome: Progressing

## 2022-06-17 NOTE — ASSESSMENT & PLAN NOTE
· Continue home Vraylar and Prozac  · Mood appears stable  · Denies suicidal or homicidal ideations at this time

## 2022-06-17 NOTE — ASSESSMENT & PLAN NOTE
Lab Results   Component Value Date    HGBA1C 11 6 (H) 08/03/2021       No results for input(s): POCGLU in the last 72 hours      Blood Sugar Average: Last 72 hrs:     · Poorly controlled with hemoglobin A1c of 11 6%  · Continue Lantus 40 units daily at bedtime and 6 units of short-acting insulin 3 times daily with meals  · Sliding scale insulin algorithm 4

## 2022-06-17 NOTE — ASSESSMENT & PLAN NOTE
· Likely secondary to fluid volume overload verses hypothyroidism  · Check TSH  · IV diuresis as above  · Trend BMP  · Nephrology consultation as above

## 2022-06-17 NOTE — NURSING NOTE
Pt co 3/10 cp while sitting in the chair, back in bed, vitals taken and charted, pt belched and then stated that it feels better and is totally gone, states that he thinks its reflux, dr Josemanuel hSaw made aware, order received and maalox given, pt presently in bed in no acute distress watching tv

## 2022-06-17 NOTE — ASSESSMENT & PLAN NOTE
· Patient has been noncompliant with home levothyroxine  · Check TSH  · Resume home levothyroxine 112 mcg PO daily

## 2022-06-17 NOTE — CASE MANAGEMENT
Case Management Assessment & Discharge Planning Note    Patient name Caren Farfanic  Location ED 23/ED 23 MRN [id number]  : 1980 Date 2022       Current Admission Date: 2022  Current Admission Diagnosis:Hypertensive emergency   Patient Active Problem List    Diagnosis Date Noted    Volume overload 2022    Hyponatremia 2022    Hypertension 2021    SOB (shortness of breath) 2021    GERD (gastroesophageal reflux disease) 2021    Family history of heart disease 2021    Hypokalemia 2021    Chest pressure 2021    Elevated troponin 2021    Acute on chronic kidney failure ([facility] ) 2021    Acute respiratory disease due to COVID-19 virus 2021    STEFANIA (acute kidney injury) ([facility] ) 2020    Schizo affective schizophrenia ([place] [address] ) 10/25/2020    Hypertensive emergency 2020    Encephalopathy 2020    Leukocytosis 2020    Chronic migraine without aura without status migrainosus, not intractable 2019    Difficulty urinating 2019    Urinary tract infection without hematuria 2019    Penile pain 2019    Spinal stenosis in cervical region 2019    Class 3 severe obesity due to excess calories with serious comorbidity and body mass index (BMI) of 60 0 to 69 9 in adult ([facility] ) 2019    Migraine without aura and without status migrainosus, not intractable 2019    Peripheral edema 2019    Hyperlipidemia, mixed 2019    Bipolar 1 disorder ([facility] ) 2019    Depression 2019    Type 1 diabetes mellitus without complication ([place] [address] )     Urinary retention 2019    Occipital neuralgia of left side 02/15/2019    Headache 02/15/2019    Nodule of parotid gland 02/15/2019    Snoring 2018    Insomnia 2018    Hypersomnia 2018    Vitamin D deficiency 2018    Episodic confusion     OCD (obsessive compulsive disorder) 10/31/2018  Schizoaffective disorder, depressive type ([facility] ) 10/31/2018    Hypothyroidism 10/31/2018    Morbid obesity with BMI of 50 0-59 9, adult ([facility] ) 10/31/2018    Anxiety 10/31/2018    Type 2 diabetes mellitus, with long-term current use of insulin ([facility] ) 10/17/2018    Abdominal pain 05/12/2018    Vomiting 05/12/2018      LOS (days): 0  Geometric Mean LOS (GMLOS) (days):   Days to GMLOS:     OBJECTIVE:    Risk of Unplanned Readmission Score: 24 52      Current admission status: Inpatient    Preferred Pharmacy:   [address] [facility], [address]  [address] [place] [id number]  Phone: [phone] Fax: [phone] [facility], [address] [place] [address] [address]  [address] [facility] [address] [address]  Phone: [phone] Fax: [phone], [address] [facility] [address] [facility] [address] [facility] 38 [facility]  Phone: [phone] Fax: [phone]    Primary Care Provider: Heather Quijano MD    Primary Insurance: Crissy Sevilla  Secondary Insurance:     ASSESSMENT:  [address] 26 Proxies    There are no active Health Care Proxies on file         Advance Directives  Does patient have a [address]?: No  Was patient offered paperwork?: Yes (provided information to patient)  Does patient currently have a Health Care decision maker?: No  Does patient have Advance Directives?: No  Was patient offered paperwork?: Yes (provided information to patient)  Primary Contact: Karl Kaur (Father)   [phone] (Mobile)    Readmission Root Cause  30 Day Readmission: No    Patient Information  Admitted from[de-identified] Home  Mental Status: Alert  During Assessment patient was accompanied by: Not accompanied during assessment  Assessment information provided by[de-identified] Patient  Primary Caregiver: Self  Support Systems: Parent  [place] of Residence: [address] [place] [address] do you live in?: [address] entry access options   Select all that apply : Stairs  Number of steps to enter home : 2  Do the steps have railings?: No  Type of Current Residence: 2 story home  Upon entering residence, is there a bedroom on the main floor (no further steps)?: No  A bedroom is located on the following floor levels of residence (select all that apply):: 2nd Floor  Upon entering residence, is there a bathroom on the main floor (no further steps)?: Yes  Number of steps to 2nd floor from main floor: One Flight  In the last 12 months, was there a time when you were not able to pay the mortgage or rent on time?: No  In the last 12 months, how many places have you lived?: 1  In the last 12 months, was there a time when you did not have a steady place to sleep or slept in a shelter (including now)?: No  Homeless/housing insecurity resource given?: N/A  Living Arrangements: Lives w/ Parent(s)  Is patient a ?: No    Activities of Daily Living Prior to Admission  Functional Status: Independent  Completes ADLs independently?: Yes  Ambulates independently?: Yes  Does patient use assisted devices?: No  Does patient currently own DME?: No  Does patient have a history of Outpatient Therapy (PT/OT)?: No  Does the patient have a history of Short-Term Rehab?: No  Does patient have a history of HHC?: No  Does patient currently have [facility]?: No    Patient Information Continued  Income Source: SSI/SSD  Does patient have prescription coverage?: Yes  Within the past 12 months, you worried that your food would run out before you got the money to buy more : Never true  Within the past 12 months, the food you bought just didn't last and you didn't have money to get more : Never true  Food insecurity resource given?: N/A  Does patient receive dialysis treatments?: No  Does patient have a history of substance abuse?: No  Does patient have a history of Mental Health Diagnosis?: Yes  Is patient receiving treatment for mental health?: Yes  Has patient received inpatient treatment related to mental health in the last 2 years?: Yes [facility] 8/21/21 IP admission)    Means of Transportation  Means of Transport to Appts[de-identified] Family transport  In the past 12 months, has lack of transportation kept you from medical appointments or from getting medications?: No  In the past 12 months, has lack of transportation kept you from meetings, work, or from getting things needed for daily living?: No  Was application for public transport provided?: N/A    DISCHARGE DETAILS:    Discharge planning discussed with[de-identified] Patient  Freedom of Choice: Yes     CM contacted family/caregiver?: Yes (Left VM for father Madina Saul)     Contacts  Patient Contacts: Levar Donahue (Father)   [phone] (Mobile)  Relationship to Patient[de-identified] Family  Contact Method: Phone  Phone Number: Levar Donahue (Father)   [phone] (Mobile)  Reason/Outcome: Emergency [address]         Is the patient interested in [facility] at discharge?: No    DME Referral Provided  Referral made for DME?: No    Other Referral/Resources/Interventions Provided:  Interventions: None Indicated     Discharge Destination Plan[de-identified] Home     Additional Comments: Met with patient at bedside in ED  Call to father Madina Saul and left VM  No DC needs identified

## 2022-06-17 NOTE — CONSULTS
Consultation - Nephrology   Janeal Flank 38 y o  male MRN: [id number]  Unit/Bed#: ED 23 Encounter: [id number]      A/P:  1  Acute kidney injury/subacute kidney injury   - baseline creatinine had been lower at approximately 1 5 mg/dL  He presents  With a creatinine of 2 99 mg/dL   - likely due to failure of autoregulation due to uncontrolled hypertension and volume overload  - agree with Lasix  - 40 mg IV twice a day   -check kidney and bladder ultrasound check urine protein studies and urine electrolytes   - will increase doxazosin to 4 mg   - add hydralazine 25 mg 3 times a day - aim for blood pressure less than 140/90    2  Lethargy   - consider a sleep study or overnight oximetry   - check TSH     3  Diabetes type 2 with long-term use of insulin and hyperglycemia   - sugars will be covered with Lantus and short-acting Humalog     4  Hypertensive urgency   - as above will increase his medication    5  Hyponatremia   - likely due To hypovolemia and dilution -    -check urine and serum osmolality continue diuretic therapy    6  Hypothyroidism   - TSH is > 40 due to noncompliance and contributing to lethargy and volume overload    - levothyroxine 112 mcg ordered       Thank you for allowing us to participate in the care of your patient  Please feel free to contact us regarding the care of this patient, or any other questions/concerns that may be applicable      Patient Active Problem List   Diagnosis    Type 2 diabetes mellitus, with long-term current use of insulin (HCC)    Abdominal pain    OCD (obsessive compulsive disorder)    Schizoaffective disorder, depressive type ([facility] [address] )    Hypothyroidism    Morbid obesity with BMI of 50 0-59 9, adult (HCC)    Anxiety    Episodic confusion    Snoring    Insomnia    Hypersomnia    Vitamin D deficiency    Vomiting    Occipital neuralgia of left side    Headache    Nodule of parotid gland    Bipolar 1 disorder (HCC)    Depression    Type 1 diabetes mellitus without complication ([facility] )    Urinary retention    Peripheral edema    Hyperlipidemia, mixed    Migraine without aura and without status migrainosus, not intractable    Class 3 severe obesity due to excess calories with serious comorbidity and body mass index (BMI) of 60 0 to 69 9 in adult [facility])    Spinal stenosis in cervical region    Difficulty urinating    Urinary tract infection without hematuria    Penile pain    Chronic migraine without aura without status migrainosus, not intractable    Hypertensive emergency    Encephalopathy    Leukocytosis    Schizo affective schizophrenia ([facility] )    STEFANIA (acute kidney injury) ([place] [address] )    Acute respiratory disease due to COVID-19 virus    Elevated troponin    Acute on chronic kidney failure ([facility] )    Family history of heart disease    Hypokalemia    Chest pressure    GERD (gastroesophageal reflux disease)    Hypertension    SOB (shortness of breath)    Volume overload    Hyponatremia       History of Present Illness   Physician Requesting Consult: Meghana Speaker, DO  Reason for Consult / Principal Problem: acute kidney injury and hypertensive urgency/diabetic nephropathy  Hx and PE limited by:   HPI: Kian Frazier is a 38y o  year old male who presents with chest pain described as dull, intermittent over the past month associated with dyspnea and on associated with meals  He presented to the emergency department was found to have a creatinine of 2 99  His baseline creatinine was 1 5 mg/dL  His blood pressure was 195/103 on presentation  He was urgently treated with Lasix and carvedilol, lisinopril 20 mg daily and doxazosin 2 mg were restarted  His  Blood pressure remains high  - he had a similar presentation last year and was treated with antihypertensives  He also has a history of volume overload  He has diabetes mellitus for the past 10 years insulin requiring    He has a history of preserved left ventricular function with mild concentric hypertrophy  He had a negative renal artery Doppler last year  Unfortunately because he was and running low on his medications he stopped taking them  He says he has no financial barriers to obtaining his medications  He does have a history of schizoaffective disorder  Has a history of chronic kidney disease stage 3 due to diabetic nephropathy  He does have associated retinopathy  He has uncontrolled diabetes with hemoglobin A1c of 11 6%  Aldosterone, renew 3 assay, a GBM antibodies and ANCA levels were done in the past   All were negative      History obtained from chart review and the patient    Constitutional ROS-Has fatigue, no  fever, chills, night sweats, weight changes  HEENT ROS- Denies history of eye surgeries, glaucoma, headaches  Has etinopathy and or history of trauma, blurred vision     Endocrine ROS- Long history diabetes mellitus or thyroid disease  Cardiovascular ROS-Had dull chest pain, palpitation, dyspnea exertion, orthopnea, no claudication  Pulmonary ROS- Denies history of COPD, asthma  Denies cough, hemoptysis, shortness of breath  Never smoked  GI ROS- Denies abdominal pain, diarrhea, nausea, swallowing problems, vomiting, constipation, blood in stools, fecal incontinence  Hematological ROS- Denies history of easy bruising,  Genitourinary ROS- Denies recent hematuria, pyuria, flank pain, change in urinary stream, decreased urinary output, increased urinary frequency, nocturia, foamy urine, or urinary incontinence  Says urine is clear  Lymphatic ROS- Denies lymphadenopathy  Musculoskeletal ROS- Denies history of muscle weakness, joint pain  Dermatological ROS- Denies rash, wounds, ulcers, itching, jaundice  Psychiatric ROS- Denies anxiety, depression, hallucinations, disorientation  He has schizoaffective disorder by history but denies symptoms   Neurological ROS- No stroke or TIA symptoms        Historical Information   Past Medical History:   Diagnosis Date    Acute bronchitis due to other specified organisms 7/5/2019    Chronic headaches     Diabetes mellitus ([address] )     Disease of thyroid gland     Esophagitis     Gastritis     Gastroparesis     GERD (gastroesophageal reflux disease)     Hypertension     Migraine     Migraines 03/11/2021    Obesity     Obsessive compulsive disorder     Psychiatric disorder     Schizoaffective disorder [facility])      Past Surgical History:   Procedure Laterality Date    ESOPHAGOGASTRODUODENOSCOPY N/A 1/15/2019    Procedure: ESOPHAGOGASTRODUODENOSCOPY (EGD); Surgeon: Prem Cotton MD;  Location: [facility];   Service: Gastroenterology     Social History   Social History     Substance and Sexual Activity   Alcohol Use Not Currently     Social History     Substance and Sexual Activity   Drug Use Not Currently     Social History     Tobacco Use   Smoking Status Never Smoker   Smokeless Tobacco Never Used     Family History   Problem Relation Age of Onset    COPD Mother     Hypertension Mother     Heart failure Mother     Rheum arthritis Family     Heart disease Family     Hypertension Father     Diabetes Father     Hyperlipidemia Father        Meds/Allergies   all current active meds have been reviewed, current meds:   Current Facility-Administered Medications   Medication Dose Route Frequency    acetaminophen (TYLENOL) tablet 650 mg  650 mg Oral Q4H PRN    albuterol (PROVENTIL HFA,VENTOLIN HFA) inhaler 2 puff  2 puff Inhalation 4x Daily    cariprazine (VRAYLAR) capsule 6 mg  6 mg Oral Daily    carvedilol (COREG) tablet 12 5 mg  12 5 mg Oral BID With Meals    doxazosin (CARDURA) tablet 2 mg  2 mg Oral Daily    famotidine (PEPCID) tablet 40 mg  40 mg Oral Daily    FLUoxetine (PROzac) capsule 20 mg  20 mg Oral QAM    furosemide (LASIX) injection 40 mg  40 mg Intravenous BID (diuretic)    heparin (porcine) subcutaneous injection 5,000 Units  5,000 Units Subcutaneous Q8H [address]    hydrALAZINE (APRESOLINE) injection 5 mg  5 mg Intravenous Q6H PRN    insulin glargine (LANTUS) subcutaneous injection 40 Units 0 4 mL  40 Units Subcutaneous HS    insulin lispro (HumaLOG) 100 units/mL subcutaneous injection 2-12 Units  2-12 Units Subcutaneous TID AC    insulin lispro (HumaLOG) 100 units/mL subcutaneous injection 2-12 Units  2-12 Units Subcutaneous HS    insulin lispro (HumaLOG) 100 units/mL subcutaneous injection 6 Units  6 Units Subcutaneous TID With Meals    levothyroxine tablet 112 mcg  112 mcg Oral Early Morning    lisinopril (ZESTRIL) tablet 20 mg  20 mg Oral BID    LORazepam (ATIVAN) tablet 0 5 mg  0 5 mg Oral Daily PRN    ondansetron (ZOFRAN) injection 4 mg  4 mg Intravenous Q6H PRN    pantoprazole (PROTONIX) EC tablet 40 mg  40 mg Oral Early Morning    sodium chloride (PF) 0 9 % injection 3 mL  3 mL Intravenous Q1H PRN    verapamil (CALAN-SR) CR tablet 240 mg  240 mg Oral Daily    and PTA meds:  (Not in a hospital admission)        Allergies   Allergen Reactions    Amoxicillin Diarrhea    Augmentin [Amoxicillin-Pot Clavulanate] Diarrhea    Clavulanic Acid Diarrhea    Erythromycin Diarrhea       Objective     Intake/Output Summary (Last 24 hours) at 6/17/2022 1239  Last data filed at 6/17/2022 0854  Gross per 24 hour   Intake --   Output 1000 ml   Net -1000 ml       Invasive Devices:        Physical Exam      No intake/output data recorded  Vitals:    06/17/22 1018   BP: (!) 180/80   Pulse: 90   Resp:    Temp:    SpO2:        General Appearance:     Very sleepy and needs verbal prodding to answer questions  Obese   Head:    Normocephalic  Atraumatic  Normal jaw occlusion  Eyes:    Lids, conjunctiva normal  No scleral icterus  Ears:    Normal external ears  Nose:   Nares normal  No drainage  Mouth:   Lips, tongue normal  Mucosa normal  Phonation normal    Neck:   Supple  Symmetrical    Back:     Symmetric  No CVA tenderness  Lungs:     Normal respiratory effort  Clear to auscultation bilaterally     Chest wall:    No tenderness or deformity  Heart:    Regular rate and rhythm  Normal S1 and S2  No murmur  No JVD  No edema  Abdomen:     Soft  Non-tender  Bowel sounds active  Genitourinary:   No Giang catheter present  Extremities:   Extremities normal  Atraumatic  No cyanosis  has edema bilaterally   Skin:   Warm and dry  No pallor, jaundice, rash, ecchymoses  Neurologic:   Alert and oriented to person, place, time  No focal deficit  Current Weight: Weight - Scale: (!) 147 kg (325 lb)  First Weight: Weight - Scale: (!) 147 kg (325 lb)    Lab Results:  I have personally reviewed pertinent labs  CBC:   Lab Results   Component Value Date    WBC 10 55 (H) 06/17/2022    HGB 9 8 (L) 06/17/2022    HCT 29 3 (L) 06/17/2022    MCV 84 06/17/2022     06/17/2022    MCH 27 9 06/17/2022    MCHC 33 4 06/17/2022    RDW 12 9 06/17/2022    MPV 9 3 06/17/2022    NRBC 0 06/17/2022     CMP:   Lab Results   Component Value Date    K 4 2 06/17/2022    CL 95 (L) 06/17/2022    CO2 26 06/17/2022    BUN 52 (H) 06/17/2022    CREATININE 2 99 (H) 06/17/2022    CALCIUM 8 1 (L) 06/17/2022    EGFR 24 06/17/2022     Phosphorus: No results found for: PHOS  Magnesium:   Lab Results   Component Value Date    MG 1 7 (L) 06/17/2022     Urinalysis: No results found for: Lew Herrera, SPECGRAV, PHUR, LEUKOCYTESUR, NITRITE, PROTEINUA, GLUCOSEU, KETONESU, BILIRUBINUR, BLOODU  Ionized Calcium: No results found for: CAION  Coagulation: No results found for: PT, INR, APTT  Troponin: No results found for: TROPONINI  ABG: No results found for: PHART, QQA9XHV, PO2ART, DSX9COO, S9JRAYFE, BEART, SOURCE    Results from last 7 days   Lab Units 06/17/22  0725   POTASSIUM mmol/L 4 2   CHLORIDE mmol/L 95*   CO2 mmol/L 26   BUN mg/dL 52*   CREATININE mg/dL 2 99*   CALCIUM mg/dL 8 1*       Radiology review:  Procedure: X-ray chest 1 view portable    Result Date: 6/17/2022  Narrative: CHEST INDICATION:   chest pain  Short of breath    Covid positive on 1/5/2021  COMPARISON:  Chest radiograph from 8/23/2021 and chest CT from 1/11/2021  EXAM PERFORMED/VIEWS:  XR CHEST PORTABLE FINDINGS: Cardiomediastinal silhouette appears unremarkable  Low lung volumes with no acute disease  No effusion or pneumothorax  Osseous structures appear within normal limits for patient age  Impression: No acute cardiopulmonary disease  Workstation performed: [id number]     Procedure: Echo complete w/ contrast if indicated    Result Date: 6/17/2022  Narrative: Logan Moose  Left Ventricle: Left ventricular cavity size is normal  Wall thickness is mildly increased  There is mild concentric hypertrophy  The left ventricular ejection fraction is 55%  Systolic function is normal  Wall motion is normal    Right Ventricle: Systolic function is normal    Pericardium: There is no pericardial effusion  EKG, Pathology, and Other Studies: I personally reviewed the CXR which was a portable  Has low lung volumes and heart appears enlarged      Counseling / Coordination of Care  Total ADDITIONAL floor / unit time spent today 40 minutes  Greater than 50% of total time was spent with the patient and / or family counseling and / or coordination of care  A description of the counseling / coordination of care: follows    James Sagastume MD      This consultation note was produced in part using a dictation device which may document imprecise wording from author's original intent

## 2022-06-17 NOTE — ASSESSMENT & PLAN NOTE
· Physical exam remarkable for significant bilateral lower extremity edema and abdominal distension  · Likely secondary to fluid retention in the setting of high sodium diet and medication noncompliance  · Lasix 40 mg IV BID  · Nephrology consult in the setting of acute renal failure and significant volume overload  · Monitor volume status  · Check 2D echocardiogram in the setting of left ventricular hypertrophy and florid volume overload

## 2022-06-17 NOTE — ASSESSMENT & PLAN NOTE
· Present on admission as evidenced by BMI > 49  · Encouraged lifestyle modifications and weight loss

## 2022-06-17 NOTE — ASSESSMENT & PLAN NOTE
· Secondary to medication noncompliance and dietary indiscretion as patient states he has been eating a high sodium diet  · Present on admission as evidenced by acute renal failure and SBPs > 180 mmHg  · Restart home carvedilol, doxazosin, lisinopril, verapamil  · Hydralazine 5 mg IV q6 hours PRN SBP > 180 mmHg  · Monitor blood pressures

## 2022-06-17 NOTE — H&P
2018 [facility] 1980, 38 y o  male MRN: [id number]  Unit/Bed#: ED 23 Encounter: [id number]  Primary Care Provider: Char oFote MD   Date and time admitted to hospital: 6/17/2022  7:09 AM    * Hypertensive emergency  Assessment & Plan  · Secondary to medication noncompliance and dietary indiscretion as patient states he has been eating a high sodium diet  · Present on admission as evidenced by acute renal failure and SBPs > 180 mmHg  · Restart home carvedilol, doxazosin, lisinopril, verapamil  · Hydralazine 5 mg IV q6 hours PRN SBP > 180 mmHg  · Monitor blood pressures    Volume overload  Assessment & Plan  · Physical exam remarkable for significant bilateral lower extremity edema and abdominal distension  · Likely secondary to fluid retention in the setting of high sodium diet and medication noncompliance  · Lasix 40 mg IV BID  · Nephrology consult in the setting of acute renal failure and significant volume overload  · Monitor volume status  · Check 2D echocardiogram in the setting of left ventricular hypertrophy and florid volume overload    STEFANIA (acute kidney injury) ([facility] [address] )  Assessment & Plan  · Creatinine 2 99 up from baseline of approximately 1 50  · Likely secondary to hypertensive crisis verses florid volume overload  · Renal failure should improve with IV diuresis  · Trend BMP  · Avoid nephrotoxic agents and hypotension  · Nephrology consult ; appreciate recommendations regarding diuresis    Hyponatremia  Assessment & Plan  · Likely secondary to fluid volume overload verses hypothyroidism  · Check TSH  · IV diuresis as above  · Trend BMP  · Nephrology consultation as above    Hypothyroidism  Assessment & Plan  · Patient has been noncompliant with home levothyroxine  · Check TSH  · Resume home levothyroxine 112 mcg PO daily    GERD (gastroesophageal reflux disease)  Assessment & Plan  · Continue home PPI    Type 2 diabetes mellitus, with long-term current use of insulin [facility])  Assessment & Plan  Lab Results   Component Value Date    HGBA1C 11 6 (H) 08/03/2021       No results for input(s): POCGLU in the last 72 hours  Blood Sugar Average: Last 72 hrs:     · Poorly controlled with hemoglobin A1c of 11 6%  · Continue Lantus 40 units daily at bedtime and 6 units of short-acting insulin 3 times daily with meals  · Sliding scale insulin algorithm 4    Morbid obesity with BMI of 50 0-59 9, adult ([facility])  Assessment & Plan  · Present on admission as evidenced by BMI > 49  · Encouraged lifestyle modifications and weight loss    Bipolar 1 disorder ([facility] [address] )  Assessment & Plan  · Continue home Vraylar and Prozac  · Mood appears stable  · Denies suicidal or homicidal ideations at this time      VTE Prophylaxis: Heparin  Code Status: Level 1 Full Code    Anticipated Length of Stay:  Patient will be admitted on an Inpatient basis with an anticipated length of stay of  2 midnights  Justification for Hospital Stay:  Florid volume overload    Total Time for Visit, including Counseling / Coordination of Care: 60 minutes  Greater than 50% of this total time spent on direct patient counseling and coordination of care  Chief Complaint:  Shortness of breath/chest pain      History of Present Illness:    Rachel Carney is a 38 y o  male with a past medical history significant for poorly controlled type 2 diabetes mellitus on insulin, primary hypertension, bipolar 1 disorder, hypothyroidism who presents with complaints of chest pain and shortness of breath  The patient states he has been noncompliant with his home antihypertensive medication regimen  He states that he has been drinking broth and states he has noticed worsening swelling in his legs and abdomen  In the ED, the patient was found to be significantly hypertensive and floridly volume overloaded  Laboratory analysis remarkable for acute renal failure of 2 99 up from baseline of approximately 1 50  He was given Lasix in the ED  He was admitted to the general medicine service for further evaluation and treatment of volume overload and acute renal failure  Review of Systems:    Review of Systems   Constitutional: Negative for chills and fever  HENT: Negative for ear pain and sore throat  Eyes: Negative for pain and visual disturbance  Respiratory: Positive for shortness of breath  Negative for cough  Cardiovascular: Positive for chest pain  Negative for palpitations  Gastrointestinal: Negative for abdominal pain and vomiting  Genitourinary: Negative for dysuria and hematuria  Musculoskeletal: Negative for arthralgias and back pain  Skin: Negative for color change and rash  Neurological: Negative for seizures and syncope  All other systems reviewed and are negative  Past Medical and Surgical History:     Past Medical History:   Diagnosis Date    Acute bronchitis due to other specified organisms 7/5/2019    Chronic headaches     Diabetes mellitus ([address] )     Disease of thyroid gland     Esophagitis     Gastritis     Gastroparesis     GERD (gastroesophageal reflux disease)     Hypertension     Migraine     Migraines 03/11/2021    Obesity     Obsessive compulsive disorder     Psychiatric disorder     Schizoaffective disorder [facility])        Past Surgical History:   Procedure Laterality Date    ESOPHAGOGASTRODUODENOSCOPY N/A 1/15/2019    Procedure: ESOPHAGOGASTRODUODENOSCOPY (EGD); Surgeon: Elliott Arenas MD;  Location: [facility]; Service: Gastroenterology       Meds/Allergies:    Prior to Admission medications    Medication Sig Start Date End Date Taking? Authorizing Provider   ACCU-CHEGARRY FASTCLIX LANCETS MISC 4 (four) times a day Not checking 4 times a day   Patient stated maybe 2 times a day 5/8/19   Historical Provider, MD ALVAREZ GUIDE test strip 4 (four) times a day Test 5/8/19   Historical Provider, MD   albuterol (PROVENTIL HFA,VENTOLIN HFA) 90 mcg/act inhaler Inhale 2 puffs 4 (four) times a day 1/12/21   DOREEN Beard   aluminum-magnesium hydroxide-simethicone (MYLANTA) 200-200-20 mg/5 mL suspension Take 30 mL by mouth 2 (two) times a day as needed for indigestion or heartburn 6/3/21   Ban Howe DO   atorvastatin (LIPITOR) 10 mg tablet Take 10 mg by mouth  Patient not taking: Reported on 6/17/2022 8/17/21 8/17/22  Historical Provider, MD   Blood Glucose Monitoring Suppl (ACCU-CHEK GUIDE) w/Device KIT Checking 2 times a day 5/9/19   Historical Provider, MD   carvedilol (COREG) 12 5 mg tablet Take 1 tablet (12 5 mg total) by mouth 2 (two) times a day with meals 7/16/21   Candi Watt MD   cholecalciferol (VITAMIN D3) 1,000 units tablet Take 1 tablet (1,000 Units total) by mouth daily  Patient not taking: Reported on 6/17/2022 6/4/21   Ban Howe DO   cyclobenzaprine (FLEXERIL) 10 mg tablet Take 1 tablet (10 mg total) by mouth 2 (two) times a day as needed for muscle spasms for up to 15 days 10/6/21 10/21/21  DOREEN Diego   doxazosin (CARDURA) 2 mg tablet Take 1 tablet (2 mg total) by mouth daily 7/16/21   Candi Watt MD   famotidine (PEPCID) 40 MG tablet Take 1 tablet (40 mg total) by mouth daily 7/16/21   Candi Watt MD   FLUoxetine (PROzac) 20 mg capsule Take 20 mg by mouth every morning 10/15/21   Historical Provider, MD   fluvoxaMINE (LUVOX) 100 mg tablet Take 1 tablet (100 mg total) by mouth 2 (two) times a day  Patient not taking: Reported on 10/20/2021 6/3/21   Ban Howe DO   fremanezumab-vfrm (Ajovy) 225 MG/1 5ML auto-injector Inject 1 pen every 30 days   3/31/22   DOREEN Diego   furosemide (LASIX) 20 mg tablet take 1 tablet by mouth once daily  Patient not taking: Reported on 6/17/2022 4/19/22   Candi Watt MD   glucagon 1 MG injection Inject 1 mg under the skin 8/17/21   Historical Provider, MD   insulin glargine (LANTUS) 100 units/mL subcutaneous injection Inject 30 Units under the skin daily at bedtime  Patient not taking: Reported on 8/23/2021 6/3/21   Pete Solomon DO   insulin lispro (HumaLOG) 100 units/mL injection Inject 5 Units under the skin 3 (three) times a day with meals  Patient taking differently: Inject 6 Units under the skin 3 (three) times a day with meals  7/19/21   [facility]   Insulin Pen Needle (Pen Needles 3/16") 31G X 5 MM MISC Use 4 (four) times a day 1/12/21   DOREEN Narayanan   Lantus SoloStar 100 units/mL injection pen inject 50 units subcutaneously at bedtime 4/19/22   [facility], [facility]   levothyroxine 112 mcg tablet Take 1 tablet (112 mcg total) by mouth daily in the early morning 8/23/21   [facility], MD   lisinopril (ZESTRIL) 20 mg tablet Take 1 tablet (20 mg total) by mouth 2 (two) times a day 7/16/21   [facility], MD   LORazepam (ATIVAN) 0 5 mg tablet Take 1 tablet (0 5 mg total) by mouth daily as needed for anxiety 7/16/21   [facility], MD   lurasidone (LATUDA) 40 mg tablet Take 1 tablet (40 mg total) by mouth daily after dinner  Patient not taking: Reported on 8/18/2021 7/19/21   [facility]   ondansetron (ZOFRAN-ODT) 4 mg disintegrating tablet dissolve 1 tablet ON TONGUE every 6 hours if needed for nausea or vomiting 1/14/22   [facility], MD   pantoprazole (PROTONIX) 40 mg tablet Take 1 tablet (40 mg total) by mouth 2 (two) times a day before meals 7/16/21   [facility], MD   prochlorperazine (COMPAZINE) 10 mg tablet Take 1 tablet (10 mg total) by mouth every 6 (six) hours as needed (migraine) 10/6/21   DOREEN Carreon   propranolol (INDERAL) 10 mg tablet 10 mg Take 10mg in am and 20mg at [facility] 8/13/21   Historical Provider, MD   sucralfate (CARAFATE) 1 g tablet Take 1 tablet (1 g total) by mouth 2 (two) times a day  Patient not taking: Reported on 6/17/2022 6/3/21   Pete Solomon DO   verapamil (CALAN-SR) 240 mg CR tablet Take 1 tablet (240 mg total) by mouth daily 7/16/21   MD Omayra Graylar 6 MG capsule Take 6 mg by mouth daily 8/13/21   Historical Provider, MD       Allergies: Allergies   Allergen Reactions    Amoxicillin Diarrhea    Augmentin [Amoxicillin-Pot Clavulanate] Diarrhea    Clavulanic Acid Diarrhea    Erythromycin Diarrhea       Social History:     Marital Status: Single   Substance Use History:   Social History     Substance and Sexual Activity   Alcohol Use Not Currently     Social History     Tobacco Use   Smoking Status Never Smoker   Smokeless Tobacco Never Used     Social History     Substance and Sexual Activity   Drug Use Not Currently       Family History:    Pertinent family history reviewed    Physical Exam:     Vitals:   Blood Pressure: 165/87 (06/17/22 0902)  Pulse: 83 (06/17/22 0900)  Temperature: 98 1 °F (36 7 °C) (06/17/22 0712)  Temp Source: Temporal (06/17/22 3826)  Respirations: 18 (06/17/22 0900)  SpO2: 97 % (06/17/22 0900)    Physical Exam  Vitals and nursing note reviewed  Constitutional:       Appearance: He is well-developed  He is obese  HENT:      Head: Normocephalic and atraumatic  Eyes:      Conjunctiva/sclera: Conjunctivae normal    Cardiovascular:      Rate and Rhythm: Normal rate and regular rhythm  Heart sounds: No murmur heard  Pulmonary:      Effort: Pulmonary effort is normal  No respiratory distress  Breath sounds: Normal breath sounds  Abdominal:      General: There is distension  Palpations: Abdomen is soft  Tenderness: There is no abdominal tenderness  Musculoskeletal:      Cervical back: Neck supple  Right lower leg: Edema present  Left lower leg: Edema present  Skin:     General: Skin is warm and dry  Neurological:      Mental Status: He is alert            Additional Data:     Lab Results: I have reviewed pertinent results     Results from last 7 days   Lab Units 06/17/22  0725   WBC Thousand/uL 10 55*   HEMOGLOBIN g/dL 9 8*   HEMATOCRIT % 29 3*   PLATELETS Thousands/uL 366   NEUTROS PCT % 73   LYMPHS PCT % 15   MONOS PCT % 7   EOS PCT % 3     Results from last 7 days   Lab Units 06/17/22  0725   SODIUM mmol/L 129*   POTASSIUM mmol/L 4 2   CHLORIDE mmol/L 95*   CO2 mmol/L 26   BUN mg/dL 52*   CREATININE mg/dL 2 99*   ANION GAP mmol/L 8   CALCIUM mg/dL 8 1*   GLUCOSE RANDOM mg/dL 220*                       Imaging: I have reviewed pertinent imaging     X-ray chest 1 view portable   Final Result by Ko Mei MD (06/17 0310)      No acute cardiopulmonary disease  Workstation performed: [id number]             EKG, Pathology, and Other Studies Reviewed on Admission:   · EKG: Reviewed     Allscripts / Epic Records Reviewed    ** Please Note: This note has been constructed using a voice recognition system   **

## 2022-06-18 LAB
ANION GAP SERPL CALCULATED.3IONS-SCNC: 9 MMOL/L (ref 4–13)
BASOPHILS # BLD AUTO: 0.04 THOUSANDS/ΜL (ref 0–0.1)
BASOPHILS NFR BLD AUTO: 1 % (ref 0–1)
BUN SERPL-MCNC: 60 MG/DL (ref 5–25)
CALCIUM SERPL-MCNC: 8 MG/DL (ref 8.4–10.2)
CHLORIDE SERPL-SCNC: 97 MMOL/L (ref 96–108)
CO2 SERPL-SCNC: 26 MMOL/L (ref 21–32)
CREAT SERPL-MCNC: 3.73 MG/DL (ref 0.6–1.3)
EOSINOPHIL # BLD AUTO: 0.28 THOUSAND/ΜL (ref 0–0.61)
EOSINOPHIL NFR BLD AUTO: 3 % (ref 0–6)
ERYTHROCYTE [DISTWIDTH] IN BLOOD BY AUTOMATED COUNT: 13 % (ref 11.6–15.1)
GFR SERPL CREATININE-BSD FRML MDRD: 18 ML/MIN/1.73SQ M
GLUCOSE SERPL-MCNC: 115 MG/DL (ref 65–140)
GLUCOSE SERPL-MCNC: 125 MG/DL (ref 65–140)
GLUCOSE SERPL-MCNC: 126 MG/DL (ref 65–140)
GLUCOSE SERPL-MCNC: 139 MG/DL (ref 65–140)
GLUCOSE SERPL-MCNC: 170 MG/DL (ref 65–140)
GLUCOSE SERPL-MCNC: 71 MG/DL (ref 65–140)
HCT VFR BLD AUTO: 29.6 % (ref 36.5–49.3)
HGB BLD-MCNC: 9.6 G/DL (ref 12–17)
IMM GRANULOCYTES # BLD AUTO: 0.06 THOUSAND/UL (ref 0–0.2)
IMM GRANULOCYTES NFR BLD AUTO: 1 % (ref 0–2)
LYMPHOCYTES # BLD AUTO: 1.32 THOUSANDS/ΜL (ref 0.6–4.47)
LYMPHOCYTES NFR BLD AUTO: 15 % (ref 14–44)
MCH RBC QN AUTO: 27.4 PG (ref 26.8–34.3)
MCHC RBC AUTO-ENTMCNC: 32.4 G/DL (ref 31.4–37.4)
MCV RBC AUTO: 85 FL (ref 82–98)
MONOCYTES # BLD AUTO: 0.73 THOUSAND/ΜL (ref 0.17–1.22)
MONOCYTES NFR BLD AUTO: 8 % (ref 4–12)
NEUTROPHILS # BLD AUTO: 6.37 THOUSANDS/ΜL (ref 1.85–7.62)
NEUTS SEG NFR BLD AUTO: 72 % (ref 43–75)
NRBC BLD AUTO-RTO: 0 /100 WBCS
OSMOLALITY UR/SERPL-RTO: 301 MMOL/KG (ref 282–298)
PLATELET # BLD AUTO: 375 THOUSANDS/UL (ref 149–390)
PMV BLD AUTO: 9.2 FL (ref 8.9–12.7)
POTASSIUM SERPL-SCNC: 4.3 MMOL/L (ref 3.5–5.3)
RBC # BLD AUTO: 3.5 MILLION/UL (ref 3.88–5.62)
SODIUM SERPL-SCNC: 132 MMOL/L (ref 135–147)
WBC # BLD AUTO: 8.8 THOUSAND/UL (ref 4.31–10.16)

## 2022-06-18 PROCEDURE — 82948 REAGENT STRIP/BLOOD GLUCOSE: CPT

## 2022-06-18 PROCEDURE — 85025 COMPLETE CBC W/AUTO DIFF WBC: CPT | Performed by: INTERNAL MEDICINE

## 2022-06-18 PROCEDURE — 90471 IMMUNIZATION ADMIN: CPT | Performed by: INTERNAL MEDICINE

## 2022-06-18 PROCEDURE — 80048 BASIC METABOLIC PNL TOTAL CA: CPT | Performed by: INTERNAL MEDICINE

## 2022-06-18 PROCEDURE — 99233 SBSQ HOSP IP/OBS HIGH 50: CPT | Performed by: INTERNAL MEDICINE

## 2022-06-18 PROCEDURE — 99232 SBSQ HOSP IP/OBS MODERATE 35: CPT | Performed by: INTERNAL MEDICINE

## 2022-06-18 PROCEDURE — 83930 ASSAY OF BLOOD OSMOLALITY: CPT | Performed by: INTERNAL MEDICINE

## 2022-06-18 PROCEDURE — 82384 ASSAY THREE CATECHOLAMINES: CPT | Performed by: INTERNAL MEDICINE

## 2022-06-18 PROCEDURE — 90732 PPSV23 VACC 2 YRS+ SUBQ/IM: CPT | Performed by: INTERNAL MEDICINE

## 2022-06-18 RX ORDER — LISINOPRIL 10 MG/1
10 TABLET ORAL DAILY
Status: DISCONTINUED | OUTPATIENT
Start: 2022-06-19 | End: 2022-06-20

## 2022-06-18 RX ORDER — MAGNESIUM HYDROXIDE/ALUMINUM HYDROXICE/SIMETHICONE 120; 1200; 1200 MG/30ML; MG/30ML; MG/30ML
30 SUSPENSION ORAL EVERY 4 HOURS PRN
Status: DISCONTINUED | OUTPATIENT
Start: 2022-06-18 | End: 2022-06-23 | Stop reason: HOSPADM

## 2022-06-18 RX ORDER — FUROSEMIDE 10 MG/ML
40 INJECTION INTRAMUSCULAR; INTRAVENOUS DAILY
Status: DISCONTINUED | OUTPATIENT
Start: 2022-06-19 | End: 2022-06-18

## 2022-06-18 RX ORDER — GUAIFENESIN/DEXTROMETHORPHAN 100-10MG/5
10 SYRUP ORAL EVERY 4 HOURS PRN
Status: DISCONTINUED | OUTPATIENT
Start: 2022-06-18 | End: 2022-06-23 | Stop reason: HOSPADM

## 2022-06-18 RX ORDER — DOXAZOSIN 2 MG/1
2 TABLET ORAL DAILY
Status: DISCONTINUED | OUTPATIENT
Start: 2022-06-19 | End: 2022-06-23 | Stop reason: HOSPADM

## 2022-06-18 RX ORDER — FUROSEMIDE 10 MG/ML
40 INJECTION INTRAMUSCULAR; INTRAVENOUS
Status: DISCONTINUED | OUTPATIENT
Start: 2022-06-19 | End: 2022-06-19

## 2022-06-18 RX ADMIN — ALBUTEROL SULFATE 2 PUFF: 90 AEROSOL, METERED RESPIRATORY (INHALATION) at 17:09

## 2022-06-18 RX ADMIN — IPRATROPIUM BROMIDE 2 PUFF: 17 AEROSOL, METERED RESPIRATORY (INHALATION) at 21:30

## 2022-06-18 RX ADMIN — LEVOTHYROXINE SODIUM 112 MCG: 112 TABLET ORAL at 05:51

## 2022-06-18 RX ADMIN — GLYCERIN, HYPROMELLOSE, POLYETHYLENE GLYCOL 1 DROP: .2; .2; 1 LIQUID OPHTHALMIC at 21:29

## 2022-06-18 RX ADMIN — PNEUMOCOCCAL VACCINE POLYVALENT 0.5 ML
25; 25; 25; 25; 25; 25; 25; 25; 25; 25; 25; 25; 25; 25; 25; 25; 25; 25; 25; 25; 25; 25; 25 INJECTION, SOLUTION INTRAMUSCULAR; SUBCUTANEOUS at 10:19

## 2022-06-18 RX ADMIN — HYDRALAZINE HYDROCHLORIDE 5 MG: 20 INJECTION INTRAMUSCULAR; INTRAVENOUS at 20:32

## 2022-06-18 RX ADMIN — HEPARIN SODIUM 5000 UNITS: 5000 INJECTION INTRAVENOUS; SUBCUTANEOUS at 05:51

## 2022-06-18 RX ADMIN — IPRATROPIUM BROMIDE 2 PUFF: 17 AEROSOL, METERED RESPIRATORY (INHALATION) at 17:08

## 2022-06-18 RX ADMIN — INSULIN GLARGINE 40 UNITS: 100 INJECTION, SOLUTION SUBCUTANEOUS at 21:39

## 2022-06-18 RX ADMIN — HYDRALAZINE HYDROCHLORIDE 25 MG: 25 TABLET ORAL at 05:51

## 2022-06-18 RX ADMIN — ACETAMINOPHEN 650 MG: 325 TABLET ORAL at 02:49

## 2022-06-18 RX ADMIN — INSULIN LISPRO 6 UNITS: 100 INJECTION, SOLUTION INTRAVENOUS; SUBCUTANEOUS at 08:53

## 2022-06-18 RX ADMIN — ACETAMINOPHEN 650 MG: 325 TABLET ORAL at 23:29

## 2022-06-18 RX ADMIN — PANTOPRAZOLE SODIUM 40 MG: 40 TABLET, DELAYED RELEASE ORAL at 05:51

## 2022-06-18 RX ADMIN — ALBUTEROL SULFATE 2 PUFF: 90 AEROSOL, METERED RESPIRATORY (INHALATION) at 12:32

## 2022-06-18 RX ADMIN — ALBUTEROL SULFATE 2 PUFF: 90 AEROSOL, METERED RESPIRATORY (INHALATION) at 08:49

## 2022-06-18 RX ADMIN — CARIPRAZINE 6 MG: 4.5 CAPSULE, GELATIN COATED ORAL at 08:47

## 2022-06-18 RX ADMIN — FAMOTIDINE 40 MG: 20 TABLET ORAL at 08:47

## 2022-06-18 RX ADMIN — FLUOXETINE 20 MG: 20 CAPSULE ORAL at 08:47

## 2022-06-18 RX ADMIN — IPRATROPIUM BROMIDE 2 PUFF: 17 AEROSOL, METERED RESPIRATORY (INHALATION) at 12:33

## 2022-06-18 RX ADMIN — GUAIFENESIN AND DEXTROMETHORPHAN 10 ML: 100; 10 SYRUP ORAL at 10:16

## 2022-06-18 RX ADMIN — FUROSEMIDE 40 MG: 10 INJECTION, SOLUTION INTRAMUSCULAR; INTRAVENOUS at 08:47

## 2022-06-18 RX ADMIN — DOXAZOSIN 4 MG: 4 TABLET ORAL at 08:47

## 2022-06-18 RX ADMIN — CARVEDILOL 12.5 MG: 12.5 TABLET, FILM COATED ORAL at 08:55

## 2022-06-18 RX ADMIN — HEPARIN SODIUM 5000 UNITS: 5000 INJECTION INTRAVENOUS; SUBCUTANEOUS at 21:35

## 2022-06-18 RX ADMIN — VERAPAMIL HYDROCHLORIDE 240 MG: 120 TABLET, FILM COATED, EXTENDED RELEASE ORAL at 08:47

## 2022-06-18 RX ADMIN — ALBUTEROL SULFATE 2 PUFF: 90 AEROSOL, METERED RESPIRATORY (INHALATION) at 21:30

## 2022-06-18 RX ADMIN — HEPARIN SODIUM 5000 UNITS: 5000 INJECTION INTRAVENOUS; SUBCUTANEOUS at 14:26

## 2022-06-18 RX ADMIN — LISINOPRIL 20 MG: 20 TABLET ORAL at 08:47

## 2022-06-18 RX ADMIN — INSULIN LISPRO 2 UNITS: 100 INJECTION, SOLUTION INTRAVENOUS; SUBCUTANEOUS at 21:30

## 2022-06-18 NOTE — ASSESSMENT & PLAN NOTE
Lab Results   Component Value Date    HGBA1C 11 6 (H) 08/03/2021       Recent Labs     06/17/22  2055 06/18/22  0309 06/18/22  0543 06/18/22  1054   POCGLU 344* 71 125 115       Blood Sugar Average: Last 72 hrs:  (P) 293 [id number]   · Poorly controlled with hemoglobin A1c of 11 6%  · Blood sugar acceptable  · Continue Lantus 40 units daily at bedtime and 6 units of short-acting insulin 3 times daily with meals  · Sliding scale insulin algorithm 4

## 2022-06-18 NOTE — ASSESSMENT & PLAN NOTE
· Patient has been noncompliant with home levothyroxine  · TSH 40 31, free T4 0 71  · Will increase levothyroxine 125 mcg PO daily  · Recheck TSH in 4-6 weeks

## 2022-06-18 NOTE — ASSESSMENT & PLAN NOTE
· Secondary to medication noncompliance and dietary indiscretion as patient states he has been eating a high sodium diet  · Present on admission as evidenced by acute renal failure and SBPs > 180 mmHg  · Continue home carvedilol, lisinopril, verapamil  · Doxazosin increased to 4 mg  · Hydralazine 5 mg IV q6 hours PRN SBP > 180 mmHg  · Monitor blood pressures

## 2022-06-18 NOTE — PLAN OF CARE
Problem: Potential for Falls  Goal: Patient will remain free of falls  Description: INTERVENTIONS:  - Educate patient/family on patient safety including physical limitations  - Instruct patient to call for assistance with activity   - Consult OT/PT to assist with strengthening/mobility   - Keep Call bell within reach  - Keep bed low and locked with side rails adjusted as appropriate  - Keep care items and personal belongings within reach  - Initiate and maintain comfort rounds  - Make Fall Risk Sign visible to staff  - Offer Toileting every 2 Hours, in advance of need  - Initiate/Maintain bed alarm  - Obtain necessary fall risk management equipment: call bell reach  - Apply yellow socks and bracelet for high fall risk patients  - Consider moving patient to room near nurses station  Outcome: Progressing

## 2022-06-18 NOTE — ASSESSMENT & PLAN NOTE
· Physical exam remarkable for significant bilateral lower extremity edema and abdominal distension  · Likely secondary to fluid retention in the setting of high sodium diet and medication noncompliance  · Continue Lasix 40 mg IV BID  · Nephrology on board  · Echo showed LVEF 55%, mild concentric hypertrophy  Wall motion and diastolic function is normal   No pericardial effusion    · Monitor volume status

## 2022-06-18 NOTE — PROGRESS NOTES
Progress Note - Nephrology   Delilah Cha 38 y o  male MRN: [id number]  Unit/Bed#: -01 Encounter: [id number]    A/P:  1  Hypertensive urgency   - blood pressure is now low after addition of doxazosin 4 mg which was a dosage increase, lisinopril 20 b i d , carvedilol and verapamil   - will decrease doxazosin to 2 mg at bedtime and stop verapamil  He is on carvedilol 12 5 mg twice a day and blood pressure is low    - aim for a blood pressure in 130 range    2  Acute kidney injury   - creatinine effie on diuretic and antihypertensive therapy   - he has underlying kidney disease with a MAC of 2933 mg/g   - may have underlying diabetic nephropathy and will need a workup   - kidney and bladder ultrasound unremarkable   - check a PVR as had a high prevoid bladder volume    3  Volume overload   - has diuresed 2 6 liters   - he had a clear CXR and lungs are clear   - however he still has significant edema   - check an albumin level as may be third spacing due to proteinuria     4  Marked hypothyroidism    - with a TSH  40 3 due to  Noncompliance with meds   - contributing to volume overload and renal failure   - receiving levothyroxine 112 mcg/     5  Type 2 diabetes mellitus with long-term current use of insulin   - would benefit from an endocrinology evaluation as his hemoglobin A1c is 11 6   - sugars are being covered according to sliding scale insulin algorithm four    6  Morbid obesity with a BMI  Of 50-59 point - will need dietary intervention    7  Hyponatremia   - improving with diuresis to 132 millimoles per L today    8  GERD   - receiving pantoprazole    9   Bipolar type 1      Follow up reason for today's visit:     Hypertensive emergency    Patient Active Problem List   Diagnosis    Type 2 diabetes mellitus, with long-term current use of insulin ([facility] [address] )    Abdominal pain    OCD (obsessive compulsive disorder)    Schizoaffective disorder, depressive type ([facility] [address] )    Hypothyroidism    Morbid obesity with BMI of 50 0-59 9, adult (HCC)    Anxiety    Episodic confusion    Snoring    Insomnia    Hypersomnia    Vitamin D deficiency    Vomiting    Occipital neuralgia of left side    Headache    Nodule of parotid gland    Bipolar 1 disorder (HCC)    Depression    Type 1 diabetes mellitus without complication ([facility])    Urinary retention    Peripheral edema    Hyperlipidemia, mixed    Migraine without aura and without status migrainosus, not intractable    Class 3 severe obesity due to excess calories with serious comorbidity and body mass index (BMI) of 60 0 to 69 9 in adult [facility])    Spinal stenosis in cervical region    Difficulty urinating    Urinary tract infection without hematuria    Penile pain    Chronic migraine without aura without status migrainosus, not intractable    Hypertensive emergency    Encephalopathy    Leukocytosis    Schizo affective schizophrenia ([facility] [address] )    STEFANIA (acute kidney injury) ([facility] [address] )    Acute respiratory disease due to COVID-19 virus    Elevated troponin    Acute on chronic kidney failure ([facility])    Family history of heart disease    Hypokalemia    Chest pressure    GERD (gastroesophageal reflux disease)    Hypertension    SOB (shortness of breath)    Volume overload    Hyponatremia         Subjective:   He says he is less dyspneic  A 10 point review of systems is otherwise unremarkable    Objective:     Vitals: Blood pressure 107/61, pulse 68, temperature (!) 97 3 °F (36 3 °C), resp  rate 18, height 5' 2" (1 575 m), weight (!) 154 kg (340 lb 9 8 oz), SpO2 93 %  ,Body mass index is 62 3 kg/m²  Weight (last 2 days)     Date/Time Weight    06/17/22 15:54:18 154 (340 61)    06/17/22 1018 147 (325)            Intake/Output Summary (Last 24 hours) at 6/18/2022 1655  Last data filed at 6/18/2022 1001  Gross per 24 hour   Intake 465 ml   Output 1700 ml   Net -1235 ml     I/O last 3 completed shifts:   In: 275 [P O :225; IV Piggyback:50]  Out: 2117 [[id number]] Physical Exam: /61   Pulse 68   Temp (!) 97 3 °F (36 3 °C)   Resp 18   Ht 5' 2" (1 575 m)   Wt (!) 154 kg (340 lb 9 8 oz)   SpO2 93%   BMI 62 30 kg/m²     General Appearance:    Alert, cooperative, no distress, appears stated age   Head:    Normocephalic, without obvious abnormality, atraumatic   Eyes:    Conjunctiva/corneas clear   Ears:    Normal external ears   Nose:   Nares normal, septum midline, mucosa normal, no drainage    or sinus tenderness   Throat:   Lips, mucosa, and tongue normal; teeth and gums normal   Neck:   Supple, symmetrical, trachea midline, no adenopathy;        thyroid:  No enlargement/tenderness/nodules; no carotid    bruit or JVD   Back:     Symmetric, no curvature, ROM normal, no CVA tenderness   Lungs:     Clear to auscultation bilaterally, respirations unlabored   Chest wall:    No tenderness or deformity   Heart:    Regular rate and rhythm, S1 and S2 normal, no murmur, rub   or gallop   Abdomen:     Soft, non-tender, bowel sounds active   Extremities:   Extremities normal, atraumatic, no cyanosis or edema   Skin:   Skin color, texture, turgor normal, no rashes or lesions   Lymph nodes:   Cervical normal   Neurologic:   CNII-XII intact            Lab, Imaging and other studies: I have personally reviewed pertinent labs  CBC:   Lab Results   Component Value Date    WBC 8 80 06/18/2022    HGB 9 6 (L) 06/18/2022    HCT 29 6 (L) 06/18/2022    MCV 85 06/18/2022     06/18/2022    MCH 27 4 06/18/2022    MCHC 32 4 06/18/2022    RDW 13 0 06/18/2022    MPV 9 2 06/18/2022    NRBC 0 06/18/2022     CMP:   Lab Results   Component Value Date    K 4 3 06/18/2022    CL 97 06/18/2022    CO2 26 06/18/2022    BUN 60 (H) 06/18/2022    CREATININE 3 73 (H) 06/18/2022    CALCIUM 8 0 (L) 06/18/2022    EGFR 18 06/18/2022           Results from last 7 days   Lab Units 06/18/22  0545 06/17/22  0725   POTASSIUM mmol/L 4 3 4 2   CHLORIDE mmol/L 97 95*   CO2 mmol/L 26 26   BUN mg/dL 60* 52* CREATININE mg/dL 3 73* 2 99*   CALCIUM mg/dL 8 0* 8 1*         Phosphorus: No results found for: PHOS  Magnesium: No results found for: MG  Urinalysis: No results found for: Lew Herrera, SPECGRAV, PHUR, LEUKOCYTESUR, NITRITE, PROTEINUA, GLUCOSEU, KETONESU, BILIRUBINUR, BLOODU  Ionized Calcium: No results found for: CAION  Coagulation: No results found for: PT, INR, APTT  Troponin: No results found for: TROPONINI  ABG: No results found for: PHART, APK2NXX, PO2ART, DIN1NWZ, K3KVWBWK, BEART, SOURCE  Radiology review:     IMAGING  Procedure: X-ray chest 1 view portable    Result Date: 6/17/2022  Narrative: CHEST INDICATION:   chest pain  Short of breath  Covid positive on 1/5/2021  COMPARISON:  Chest radiograph from 8/23/2021 and chest CT from 1/11/2021  EXAM PERFORMED/VIEWS:  XR CHEST PORTABLE FINDINGS: Cardiomediastinal silhouette appears unremarkable  Low lung volumes with no acute disease  No effusion or pneumothorax  Osseous structures appear within normal limits for patient age  Impression: No acute cardiopulmonary disease  Workstation performed: [id number]     Procedure: US kidney and bladder    Result Date: 6/17/2022  Narrative: RENAL ULTRASOUND INDICATION:   ilya  COMPARISON: None TECHNIQUE:   Ultrasound of the retroperitoneum was performed with a curvilinear transducer utilizing volumetric sweeps and still imaging techniques  FINDINGS: KIDNEYS: Symmetric and normal size  Right kidney:  10 9 x 4 3 x 4 7 cm  Volume 116 0 mL Left kidney:  11 3 x 4 6 x 5 0 cm  Volume 136 0 mL Right kidney Normal echogenicity and contour  No mass is identified  No hydronephrosis  No shadowing calculi  No perinephric fluid collections  Left kidney Normal echogenicity and contour  No mass is identified  No hydronephrosis  No shadowing calculi  No perinephric fluid collections  URETERS: Nonvisualized  BLADDER: Normally distended  Prevoid bladder volume 432 mL  No focal thickening or mass lesions   Bilateral ureteral jets detected  Impression: Normal  Workstation performed: [id number]     Procedure: Echo complete w/ contrast if indicated    Result Date: 6/17/2022  Narrative: [place]dez  Left Ventricle: Left ventricular cavity size is normal  Wall thickness is mildly increased  There is mild concentric hypertrophy  The left ventricular ejection fraction is 55%  Systolic function is normal  Wall motion is normal    Right Ventricle: Systolic function is normal    Pericardium: There is no pericardial effusion         Current Facility-Administered Medications   Medication Dose Route Frequency    acetaminophen (TYLENOL) tablet 650 mg  650 mg Oral Q4H PRN    albuterol (PROVENTIL HFA,VENTOLIN HFA) inhaler 2 puff  2 puff Inhalation 4x Daily    cariprazine (VRAYLAR) capsule 6 mg  6 mg Oral Daily    carvedilol (COREG) tablet 12 5 mg  12 5 mg Oral BID With Meals    dextromethorphan-guaiFENesin (ROBITUSSIN DM) oral syrup 10 mL  10 mL Oral Q4H PRN    doxazosin (CARDURA) tablet 4 mg  4 mg Oral Daily    famotidine (PEPCID) tablet 40 mg  40 mg Oral Daily    FLUoxetine (PROzac) capsule 20 mg  20 mg Oral QAM    furosemide (LASIX) injection 40 mg  40 mg Intravenous BID (diuretic)    heparin (porcine) subcutaneous injection 5,000 Units  5,000 Units Subcutaneous Q8H [address]    hydrALAZINE (APRESOLINE) injection 5 mg  5 mg Intravenous Q6H PRN    hydrALAZINE (APRESOLINE) tablet 25 mg  25 mg Oral Q8H [address]    insulin glargine (LANTUS) subcutaneous injection 40 Units 0 4 mL  40 Units Subcutaneous HS    insulin lispro (HumaLOG) 100 units/mL subcutaneous injection 2-12 Units  2-12 Units Subcutaneous TID AC    insulin lispro (HumaLOG) 100 units/mL subcutaneous injection 2-12 Units  2-12 Units Subcutaneous HS    insulin lispro (HumaLOG) 100 units/mL subcutaneous injection 6 Units  6 Units Subcutaneous TID With Meals    ipratropium (ATROVENT HFA) inhaler 2 puff  2 puff Inhalation 4x Daily    [START ON 6/19/2022] levothyroxine tablet 125 mcg  125 mcg Oral Early Morning    lisinopril (ZESTRIL) tablet 20 mg  20 mg Oral BID    LORazepam (ATIVAN) tablet 0 5 mg  0 5 mg Oral Daily PRN    ondansetron (ZOFRAN) injection 4 mg  4 mg Intravenous Q6H PRN    pantoprazole (PROTONIX) EC tablet 40 mg  40 mg Oral Early Morning    sodium chloride (PF) 0 9 % injection 3 mL  3 mL Intravenous Q1H PRN    verapamil (CALAN-SR) CR tablet 240 mg  240 mg Oral Daily     Medications Discontinued During This Encounter   Medication Reason    carvedilol (COREG) tablet 12 5 mg     lisinopril (ZESTRIL) tablet 20 mg     verapamil (CALAN-SR) CR tablet 240 mg     insulin glargine (LANTUS) subcutaneous injection 50 Units 0 5 mL     propranolol (INDERAL) tablet 10 mg     furosemide (LASIX) injection 40 mg     doxazosin (CARDURA) tablet 2 mg     fluvoxaMINE (LUVOX) 100 mg tablet Discontinued by another clinician    cyclobenzaprine (FLEXERIL) 10 mg tablet Therapy completed    insulin glargine (LANTUS) 100 units/mL subcutaneous injection Duplicate order    lurasidone (LATUDA) 40 mg tablet Discontinued by another clinician    sucralfate (CARAFATE) 1 g tablet Side effects    ipratropium (ATROVENT) 0 02 % inhalation solution 0 5 mg     levothyroxine tablet 112 mcg        Monica Kang MD      This progress note was produced in part using a dictation device which may document imprecise wording from author's original intent

## 2022-06-18 NOTE — ASSESSMENT & PLAN NOTE
· Likely secondary to fluid volume overload verses hypothyroidism  · IV diuresis as above  · Trend BMP  · Nephrology consultation as above

## 2022-06-18 NOTE — ASSESSMENT & PLAN NOTE
· Creatinine 3 7 3 up from 2 99 (baseline of approximately 1 50)  · Likely secondary to hypertensive crisis verses florid volume overload  · Trend BMP  · Avoid nephrotoxic agents and hypotension  · Nephrology consult ; appreciate recommendations regarding diuresis  · Renal ultrasound showed normal

## 2022-06-18 NOTE — PROGRESS NOTES
[address]  Progress Note - Sarmad Parisi 1980, 38 y o  male MRN: [id number]  Unit/Bed#: -[place] Encounter: [id number]  Primary Care Provider: Joce Gallegos MD   Date and time admitted to hospital: 6/17/2022  7:09 AM    * Hypertensive emergency  Assessment & Plan  · Secondary to medication noncompliance and dietary indiscretion as patient states he has been eating a high sodium diet  · Present on admission as evidenced by acute renal failure and SBPs > 180 mmHg  · Continue home carvedilol, lisinopril, verapamil  · Doxazosin increased to 4 mg  · Hydralazine 5 mg IV q6 hours PRN SBP > 180 mmHg  · Monitor blood pressures    Hyponatremia  Assessment & Plan  · Likely secondary to fluid volume overload verses hypothyroidism  · IV diuresis as above  · Trend BMP  · Nephrology consultation as above    Volume overload  Assessment & Plan  · Physical exam remarkable for significant bilateral lower extremity edema and abdominal distension  · Likely secondary to fluid retention in the setting of high sodium diet and medication noncompliance  · Continue Lasix 40 mg IV BID  · Nephrology on board  · Echo showed LVEF 55%, mild concentric hypertrophy  Wall motion and diastolic function is normal   No pericardial effusion    · Monitor volume status    GERD (gastroesophageal reflux disease)  Assessment & Plan  · Continue home PPI    STEFANIA (acute kidney injury) ([address] )  Assessment & Plan  · Creatinine 3 7 3 up from 2 99 (baseline of approximately 1 50)  · Likely secondary to hypertensive crisis verses florid volume overload  · Trend BMP  · Avoid nephrotoxic agents and hypotension  · Nephrology consult ; appreciate recommendations regarding diuresis  · Renal ultrasound showed normal    Bipolar 1 disorder ([facility] [address] )  Assessment & Plan  · Continue home Vraylar and Prozac  · Mood appears stable  · Denies suicidal or homicidal ideations at this time    Morbid obesity with BMI of 50 0-59 9, adult ([facility] [address] )  Assessment & Plan  · Present on admission as evidenced by BMI > 49  · Encouraged lifestyle modifications and weight loss    Hypothyroidism  Assessment & Plan  · Patient has been noncompliant with home levothyroxine  · TSH 40 31, free T4 0 71  · Will increase levothyroxine 125 mcg PO daily  · Recheck TSH in 4-6 weeks    Type 2 diabetes mellitus, with long-term current use of insulin [facility])  Assessment & Plan  Lab Results   Component Value Date    HGBA1C 11 6 (H) 2021       Recent Labs     22  2055 22  0309 22  0543 22  1054   POCGLU 344* 71 125 115       Blood Sugar Average: Last 72 hrs:  (P) 854 [id number]   · Poorly controlled with hemoglobin A1c of 11 6%  · Blood sugar acceptable  · Continue Lantus 40 units daily at bedtime and 6 units of short-acting insulin 3 times daily with meals  · Sliding scale insulin algorithm 4      VTE Pharmacologic Prophylaxis:   Pharmacologic: Heparin  Mechanical VTE Prophylaxis in Place: Yes    Patient Centered Rounds: I have performed bedside rounds with nursing staff today  Discussions with Specialists or Other Care Team Provider:  Discussed with Case Management, nurse    Education and Discussions with Family / Patient:  Discussed with patient    Time Spent for Care: 45 minutes  More than 50% of total time spent on counseling and coordination of care as described above  Current Length of Stay: 1 day(s)    Current Patient Status: Inpatient   Certification Statement: The patient will continue to require additional inpatient hospital stay due to Hypertensive emergency, STEFANIA    Discharge Plan / Estimated Discharge Date:  Pending      Code Status: Level 1 - Full Code      Subjective:   Patient was seen and examined at bedside  The patient said he has cough  His headache has some relief with Tylenol  He denies any blurry vision, chest pain, palpitation, shortness of breath, N/V, abd pain        Objective:     Vitals:   Temp (24hrs), Av 1 °F (36 7 °C), Min:97 7 °F (36 5 °C), Max:98 5 °F (36 9 °C)    Temp:  [97 7 °F (36 5 °C)-98 5 °F (36 9 °C)] 98 °F (36 7 °C)  HR:  [63-82] 67  Resp:  [17-20] 17  BP: (102-164)/() 106/59  SpO2:  [94 %-95 %] 94 %  Body mass index is 62 3 kg/m²  Input and Output Summary (last 24 hours): Intake/Output Summary (Last 24 hours) at 6/18/2022 1239  Last data filed at 6/18/2022 1001  Gross per 24 hour   Intake 515 ml   Output 2117 ml   Net -1602 ml       Physical Exam:     Physical Exam  General: breathing well on room air, no acute distress  HEENT: NC/AT, PERRL, EOM - normal  Neck: Supple  Pulm/Chest: Normal chest wall expansion, clear breath sounds on both side, bilateral wheezing crackles appreciated  CVS: RRR, normal S1&S2, no murmur appreciated, capillary refill <2s  Abd: soft, non tender, distended, bowel sounds +  MSK: move all 4 extremities spontaneously  Skin: warm  CNS: no acute focal neuro deficit      Additional Data:     Labs:    Results from last 7 days   Lab Units 06/18/22  0545   WBC Thousand/uL 8 80   HEMOGLOBIN g/dL 9 6*   HEMATOCRIT % 29 6*   PLATELETS Thousands/uL 375   NEUTROS PCT % 72   LYMPHS PCT % 15   MONOS PCT % 8   EOS PCT % 3     Results from last 7 days   Lab Units 06/18/22  0545   POTASSIUM mmol/L 4 3   CHLORIDE mmol/L 97   CO2 mmol/L 26   BUN mg/dL 60*   CREATININE mg/dL 3 73*   CALCIUM mg/dL 8 0*           * I Have Reviewed All Lab Data Listed Above  * Additional Pertinent Lab Tests Reviewed:  [facility] Admission Reviewed    Imaging:    Imaging Reports Reviewed Today Include:  Renal ultrasound  Imaging Personally Reviewed by Myself Includes:  None      Recent Cultures (last 7 days):           Last 24 Hours Medication List:   Current Facility-Administered Medications   Medication Dose Route Frequency Provider Last Rate    acetaminophen  650 mg Oral Q4H PRN Leal Cooks, DO      albuterol  2 puff Inhalation 4x Daily Leal Cooks, DO      cariprazine  6 mg Oral Daily Doug Finely, DO      carvedilol  12 5 mg Oral BID With Meals Doug Finely, DO      dextromethorphan-guaiFENesin  10 mL Oral Q4H PRN Kaylen Zapata MD      doxazosin  4 mg Oral Daily Viola Cameron MD      famotidine  40 mg Oral Daily Doug Finely, DO      FLUoxetine  20 mg Oral QAM Doug Finely, DO      furosemide  40 mg Intravenous BID (diuretic) Doug Finely, DO      heparin (porcine)  5,000 Units Subcutaneous [facility] Doug Finely, DO      hydrALAZINE  5 mg Intravenous Q6H PRN Doug Finely, DO      hydrALAZINE  25 mg Oral [facility] Viola Cameron MD      insulin glargine  40 Units Subcutaneous HS Doug Finely, DO      insulin lispro  2-12 Units Subcutaneous TID AC Doug Finely, DO      insulin lispro  2-12 Units Subcutaneous HS Doug Finely, DO      insulin lispro  6 Units Subcutaneous TID With Meals Doug Finely, DO      ipratropium  2 puff Inhalation 4x Daily MD Alexandru Daugherty [START ON 6/19/2022] levothyroxine  125 mcg Oral Early Morning Kaylen Zapata MD      lisinopril  20 mg Oral BID Doug Finely, DO      LORazepam  0 5 mg Oral Daily PRN Doug Finely, DO      ondansetron  4 mg Intravenous Q6H PRN Doug Finely, DO      pantoprazole  40 mg Oral Early Morning Doug Finely, DO      sodium chloride (PF)  3 mL Intravenous Q1H PRN Doug Finely, DO      verapamil  240 mg Oral Daily Doug Finely, DO          Today, Patient Was Seen By: Kaylen Zapata MD    ** Please Note: Dragon 360 Dictation voice to text software may have been used in the creation of this document   **

## 2022-06-19 ENCOUNTER — APPOINTMENT (INPATIENT)
Dept: RADIOLOGY | Facility: HOSPITAL | Age: 42
DRG: 469 | End: 2022-06-19
Payer: COMMERCIAL

## 2022-06-19 PROBLEM — S91.109A OPEN TOE WOUND: Status: ACTIVE | Noted: 2022-06-19

## 2022-06-19 LAB
ANION GAP SERPL CALCULATED.3IONS-SCNC: 8 MMOL/L (ref 4–13)
BASOPHILS # BLD AUTO: 0.04 THOUSANDS/ΜL (ref 0–0.1)
BASOPHILS NFR BLD AUTO: 1 % (ref 0–1)
BUN SERPL-MCNC: 59 MG/DL (ref 5–25)
CALCIUM SERPL-MCNC: 8 MG/DL (ref 8.4–10.2)
CHLORIDE SERPL-SCNC: 99 MMOL/L (ref 96–108)
CO2 SERPL-SCNC: 29 MMOL/L (ref 21–32)
CREAT SERPL-MCNC: 3.78 MG/DL (ref 0.6–1.3)
EOSINOPHIL # BLD AUTO: 0.29 THOUSAND/ΜL (ref 0–0.61)
EOSINOPHIL NFR BLD AUTO: 4 % (ref 0–6)
ERYTHROCYTE [DISTWIDTH] IN BLOOD BY AUTOMATED COUNT: 13 % (ref 11.6–15.1)
GFR SERPL CREATININE-BSD FRML MDRD: 18 ML/MIN/1.73SQ M
GLUCOSE SERPL-MCNC: 114 MG/DL (ref 65–140)
GLUCOSE SERPL-MCNC: 127 MG/DL (ref 65–140)
GLUCOSE SERPL-MCNC: 133 MG/DL (ref 65–140)
GLUCOSE SERPL-MCNC: 160 MG/DL (ref 65–140)
GLUCOSE SERPL-MCNC: 201 MG/DL (ref 65–140)
GLUCOSE SERPL-MCNC: 271 MG/DL (ref 65–140)
GLUCOSE SERPL-MCNC: 58 MG/DL (ref 65–140)
GLUCOSE SERPL-MCNC: 69 MG/DL (ref 65–140)
HCT VFR BLD AUTO: 28.6 % (ref 36.5–49.3)
HGB BLD-MCNC: 9.4 G/DL (ref 12–17)
IMM GRANULOCYTES # BLD AUTO: 0.03 THOUSAND/UL (ref 0–0.2)
IMM GRANULOCYTES NFR BLD AUTO: 0 % (ref 0–2)
LYMPHOCYTES # BLD AUTO: 1.11 THOUSANDS/ΜL (ref 0.6–4.47)
LYMPHOCYTES NFR BLD AUTO: 16 % (ref 14–44)
MCH RBC QN AUTO: 27.8 PG (ref 26.8–34.3)
MCHC RBC AUTO-ENTMCNC: 32.9 G/DL (ref 31.4–37.4)
MCV RBC AUTO: 85 FL (ref 82–98)
MONOCYTES # BLD AUTO: 0.54 THOUSAND/ΜL (ref 0.17–1.22)
MONOCYTES NFR BLD AUTO: 8 % (ref 4–12)
NEUTROPHILS # BLD AUTO: 5.04 THOUSANDS/ΜL (ref 1.85–7.62)
NEUTS SEG NFR BLD AUTO: 71 % (ref 43–75)
NRBC BLD AUTO-RTO: 0 /100 WBCS
PLATELET # BLD AUTO: 320 THOUSANDS/UL (ref 149–390)
PMV BLD AUTO: 9.1 FL (ref 8.9–12.7)
POTASSIUM SERPL-SCNC: 3.9 MMOL/L (ref 3.5–5.3)
RBC # BLD AUTO: 3.38 MILLION/UL (ref 3.88–5.62)
SODIUM SERPL-SCNC: 136 MMOL/L (ref 135–147)
WBC # BLD AUTO: 7.05 THOUSAND/UL (ref 4.31–10.16)

## 2022-06-19 PROCEDURE — 73630 X-RAY EXAM OF FOOT: CPT

## 2022-06-19 PROCEDURE — 99232 SBSQ HOSP IP/OBS MODERATE 35: CPT | Performed by: INTERNAL MEDICINE

## 2022-06-19 PROCEDURE — 85025 COMPLETE CBC W/AUTO DIFF WBC: CPT | Performed by: INTERNAL MEDICINE

## 2022-06-19 PROCEDURE — 99254 IP/OBS CNSLTJ NEW/EST MOD 60: CPT | Performed by: PODIATRIST

## 2022-06-19 PROCEDURE — 82948 REAGENT STRIP/BLOOD GLUCOSE: CPT

## 2022-06-19 PROCEDURE — 80048 BASIC METABOLIC PNL TOTAL CA: CPT | Performed by: INTERNAL MEDICINE

## 2022-06-19 PROCEDURE — 11042 DBRDMT SUBQ TIS 1ST 20SQCM/<: CPT | Performed by: PODIATRIST

## 2022-06-19 RX ORDER — LORAZEPAM 2 MG/ML
1 INJECTION INTRAMUSCULAR ONCE
Status: COMPLETED | OUTPATIENT
Start: 2022-06-19 | End: 2022-06-19

## 2022-06-19 RX ORDER — FUROSEMIDE 10 MG/ML
40 INJECTION INTRAMUSCULAR; INTRAVENOUS
Status: DISCONTINUED | OUTPATIENT
Start: 2022-06-19 | End: 2022-06-21

## 2022-06-19 RX ADMIN — ALBUTEROL SULFATE 2 PUFF: 90 AEROSOL, METERED RESPIRATORY (INHALATION) at 08:57

## 2022-06-19 RX ADMIN — LORAZEPAM 0.5 MG: 0.5 TABLET ORAL at 11:37

## 2022-06-19 RX ADMIN — INSULIN LISPRO 6 UNITS: 100 INJECTION, SOLUTION INTRAVENOUS; SUBCUTANEOUS at 12:08

## 2022-06-19 RX ADMIN — INSULIN LISPRO 4 UNITS: 100 INJECTION, SOLUTION INTRAVENOUS; SUBCUTANEOUS at 12:07

## 2022-06-19 RX ADMIN — IPRATROPIUM BROMIDE 2 PUFF: 17 AEROSOL, METERED RESPIRATORY (INHALATION) at 12:07

## 2022-06-19 RX ADMIN — IPRATROPIUM BROMIDE 2 PUFF: 17 AEROSOL, METERED RESPIRATORY (INHALATION) at 21:37

## 2022-06-19 RX ADMIN — DOXAZOSIN 2 MG: 2 TABLET ORAL at 08:57

## 2022-06-19 RX ADMIN — LISINOPRIL 10 MG: 10 TABLET ORAL at 08:57

## 2022-06-19 RX ADMIN — IPRATROPIUM BROMIDE 2 PUFF: 17 AEROSOL, METERED RESPIRATORY (INHALATION) at 08:57

## 2022-06-19 RX ADMIN — HEPARIN SODIUM 5000 UNITS: 5000 INJECTION INTRAVENOUS; SUBCUTANEOUS at 21:35

## 2022-06-19 RX ADMIN — INSULIN GLARGINE 40 UNITS: 100 INJECTION, SOLUTION SUBCUTANEOUS at 21:37

## 2022-06-19 RX ADMIN — INSULIN LISPRO 6 UNITS: 100 INJECTION, SOLUTION INTRAVENOUS; SUBCUTANEOUS at 21:37

## 2022-06-19 RX ADMIN — CARIPRAZINE 6 MG: 4.5 CAPSULE, GELATIN COATED ORAL at 08:56

## 2022-06-19 RX ADMIN — GUAIFENESIN AND DEXTROMETHORPHAN 10 ML: 100; 10 SYRUP ORAL at 00:19

## 2022-06-19 RX ADMIN — IPRATROPIUM BROMIDE 2 PUFF: 17 AEROSOL, METERED RESPIRATORY (INHALATION) at 17:29

## 2022-06-19 RX ADMIN — ALBUTEROL SULFATE 2 PUFF: 90 AEROSOL, METERED RESPIRATORY (INHALATION) at 21:37

## 2022-06-19 RX ADMIN — CARVEDILOL 12.5 MG: 12.5 TABLET, FILM COATED ORAL at 08:57

## 2022-06-19 RX ADMIN — LORAZEPAM 1 MG: 2 INJECTION INTRAMUSCULAR; INTRAVENOUS at 23:25

## 2022-06-19 RX ADMIN — FAMOTIDINE 40 MG: 20 TABLET ORAL at 08:57

## 2022-06-19 RX ADMIN — PANTOPRAZOLE SODIUM 40 MG: 40 TABLET, DELAYED RELEASE ORAL at 05:38

## 2022-06-19 RX ADMIN — ALBUTEROL SULFATE 2 PUFF: 90 AEROSOL, METERED RESPIRATORY (INHALATION) at 17:28

## 2022-06-19 RX ADMIN — FUROSEMIDE 40 MG: 10 INJECTION, SOLUTION INTRAMUSCULAR; INTRAVENOUS at 08:57

## 2022-06-19 RX ADMIN — HEPARIN SODIUM 5000 UNITS: 5000 INJECTION INTRAVENOUS; SUBCUTANEOUS at 05:38

## 2022-06-19 RX ADMIN — FUROSEMIDE 40 MG: 10 INJECTION, SOLUTION INTRAMUSCULAR; INTRAVENOUS at 17:29

## 2022-06-19 RX ADMIN — ACETAMINOPHEN 650 MG: 325 TABLET ORAL at 19:32

## 2022-06-19 RX ADMIN — HEPARIN SODIUM 5000 UNITS: 5000 INJECTION INTRAVENOUS; SUBCUTANEOUS at 15:07

## 2022-06-19 RX ADMIN — FLUOXETINE 20 MG: 20 CAPSULE ORAL at 08:57

## 2022-06-19 RX ADMIN — LEVOTHYROXINE SODIUM 125 MCG: 25 TABLET ORAL at 05:37

## 2022-06-19 RX ADMIN — ALBUTEROL SULFATE 2 PUFF: 90 AEROSOL, METERED RESPIRATORY (INHALATION) at 12:07

## 2022-06-19 RX ADMIN — INSULIN LISPRO 6 UNITS: 100 INJECTION, SOLUTION INTRAVENOUS; SUBCUTANEOUS at 08:59

## 2022-06-19 RX ADMIN — FUROSEMIDE 40 MG: 10 INJECTION, SOLUTION INTRAMUSCULAR; INTRAVENOUS at 12:07

## 2022-06-19 RX ADMIN — CARVEDILOL 12.5 MG: 12.5 TABLET, FILM COATED ORAL at 16:52

## 2022-06-19 NOTE — ASSESSMENT & PLAN NOTE
· Physical exam remarkable for significant bilateral lower extremity edema and abdominal distension  · Likely secondary to fluid retention in the setting of high sodium diet and medication noncompliance  · Nephrology on board - increase Lasix to 40 TID  · Echo showed LVEF 55%, mild concentric hypertrophy  Wall motion and diastolic function is normal   No pericardial effusion    · Monitor volume status

## 2022-06-19 NOTE — PROGRESS NOTES
[address]  Progress Note - Izabella  1980, 38 y o  male MRN: [id number]  Unit/Bed#: -[facility] Encounter: [id number]  Primary Care Provider: Eleanor Hay MD   Date and time admitted to hospital: 6/17/2022  7:09 AM    * Hypertensive emergency  Assessment & Plan  · BP acceptable  · Secondary to medication noncompliance and dietary indiscretion as patient states he has been eating a high sodium diet  · Present on admission as evidenced by acute renal failure and SBPs > 180 mmHg  · Continue home carvedilol, lisinopril, verapamil, doxazosin  · Hydralazine 5 mg IV q6 hours PRN SBP > 180 mmHg  · Monitor blood pressures    Open toe wound  Assessment & Plan  · s/p debridement of his ulceration  · Podiatry on board - input appreciated, x-ray and MRI of right foot  · Continue dressing    Hyponatremia  Assessment & Plan  · Resolved, Likely secondary to fluid volume overload verses hypothyroidism  · IV diuresis as above  · Trend BMP  · Nephrology consultation as above    Volume overload  Assessment & Plan  · Physical exam remarkable for significant bilateral lower extremity edema and abdominal distension  · Likely secondary to fluid retention in the setting of high sodium diet and medication noncompliance  · Nephrology on board - increase Lasix to 40 TID  · Echo showed LVEF 55%, mild concentric hypertrophy  Wall motion and diastolic function is normal   No pericardial effusion    · Monitor volume status    GERD (gastroesophageal reflux disease)  Assessment & Plan  · Continue home PPI    STEFANIA (acute kidney injury) ([facility] [address] )  Assessment & Plan  · Creatinine 3 7 range, could be his new baseline  · Likely secondary to hypertensive crisis verses florid volume overload  · Trend BMP  · Avoid nephrotoxic agents and hypotension  · Nephrology consult ; appreciate recommendations regarding diuresis  · Renal ultrasound showed normal    Bipolar 1 disorder ([facility] [address] )  Assessment & Plan  · Continue home [address] and Prozac  · Mood appears stable  · Denies suicidal or homicidal ideations at this time    Morbid obesity with BMI of 50 0-59 9, adult ([address] )  Assessment & Plan  · Present on admission as evidenced by BMI > 49  · Encouraged lifestyle modifications and weight loss    Hypothyroidism  Assessment & Plan  · Patient has been noncompliant with home levothyroxine  · TSH 40 31, free T4 0 71  · Increased levothyroxine to 125 mcg PO daily  · Recheck TSH in 4-6 weeks    Type 2 diabetes mellitus, with long-term current use of insulin [facility])  Assessment & Plan  Lab Results   Component Value Date    HGBA1C 11 6 (H) 08/03/2021       Recent Labs     06/18/22  2054 06/19/22  0410 06/19/22  0536 06/19/22  1130   POCGLU 170* 69 133 201*       Blood Sugar Average: Last 72 hrs:  (P) 145 [id number]   · Poorly controlled with hemoglobin A1c of 11 6  · Blood sugar acceptable  · Continue Lantus 40 units daily at bedtime and 6 units of short-acting insulin 3 times daily with meals  · Sliding scale insulin algorithm 4  · Recommended outpatient Endocrinology follow-up      VTE Pharmacologic Prophylaxis:   Pharmacologic: Heparin  Mechanical VTE Prophylaxis in Place: Yes    Patient Centered Rounds: I have performed bedside rounds with nursing staff today  Discussions with Specialists or Other Care Team Provider:  Discussed with nephrologist    Education and Discussions with Family / Patient:  Discussed with patient    Time Spent for Care: 45 minutes  More than 50% of total time spent on counseling and coordination of care as described above  Current Length of Stay: 2 day(s)    Current Patient Status: Inpatient   Certification Statement: The patient will continue to require additional inpatient hospital stay due to Pending x-ray and MRI foot  Volume overload requiring IV diuretics    Discharge Plan / Estimated Discharge Date:  Pending      Code Status: Level 1 - Full Code      Subjective:   Patient was seen and examined at bedside   The patient denies any  chest pain, palpitation, shortness of breath, N/V, abd pain  Patient still has cough  Patient has been ambulating well  Chest x-ray reviewed with patient no acute cardiopulmonary disease  Objective:     Vitals:   Temp (24hrs), Av 5 °F (36 4 °C), Min:97 °F (36 1 °C), Max:98 2 °F (36 8 °C)    Temp:  [97 °F (36 1 °C)-98 2 °F (36 8 °C)] 97 °F (36 1 °C)  HR:  [67-82] 76  Resp:  [18] 18  BP: (106-181)/() 142/67  SpO2:  [93 %-96 %] 95 %  Body mass index is 62 3 kg/m²  Input and Output Summary (last 24 hours): Intake/Output Summary (Last 24 hours) at 2022 1214  Last data filed at 2022 1100  Gross per 24 hour   Intake 1440 ml   Output 1200 ml   Net 240 ml       Physical Exam:     Physical Exam  General: breathing well on room air, no acute distress  HEENT: NC/AT, PERRL, EOM - normal  Neck: Supple  Pulm/Chest: Normal chest wall expansion, clear breath sounds on both side, no wheezing/rhonchi or crackles appreciated  CVS: RRR, normal S1&S2, no murmur appreciated, capillary refill <2s  Abd: soft, non tender, distended, bowel sounds +  MSK: move all 4 extremities spontaneously, right foot wound covered with dressing  Skin: warm  CNS: no acute focal neuro deficit      Additional Data:     Labs:    Results from last 7 days   Lab Units 22  0540   WBC Thousand/uL 7 05   HEMOGLOBIN g/dL 9 4*   HEMATOCRIT % 28 6*   PLATELETS Thousands/uL 320   NEUTROS PCT % 71   LYMPHS PCT % 16   MONOS PCT % 8   EOS PCT % 4     Results from last 7 days   Lab Units 22  0540   POTASSIUM mmol/L 3 9   CHLORIDE mmol/L 99   CO2 mmol/L 29   BUN mg/dL 59*   CREATININE mg/dL 3 78*   CALCIUM mg/dL 8 0*           * I Have Reviewed All Lab Data Listed Above  * Additional Pertinent Lab Tests Reviewed:  All Labs For Current Hospital Admission Reviewed    Imaging:    Imaging Reports Reviewed Today Include: None  Imaging Personally Reviewed by Myself Includes:  None      Recent Cultures (last 7 days): Last 24 Hours Medication List:   Current Facility-Administered Medications   Medication Dose Route Frequency Provider Last Rate    acetaminophen  650 mg Oral Q4H PRN Dallas Brill, DO      albuterol  2 puff Inhalation 4x Daily Dallas Brill, DO      aluminum-magnesium hydroxide-simethicone  30 mL Oral Q4H PRN Viviana Menon PA-C      cariprazine  6 mg Oral Daily Dallas Brill, DO      carvedilol  12 5 mg Oral BID With Meals Dallas Brill, DO      dextromethorphan-guaiFENesin  10 mL Oral Q4H PRN Jennifer Bowie MD      doxazosin  2 mg Oral Daily Vincent Rosales MD      famotidine  40 mg Oral Daily Dallas Brill, DO      FLUoxetine  20 mg Oral QAM Dallas Brill, DO      furosemide  40 mg Intravenous TID (diuretic) Vincent Rosales MD      glycerin-hypromellose-  1 drop Both Eyes Q3H PRN Viviana Menon PA-C      heparin (porcine)  5,000 Units Subcutaneous [facility] Dallas Brill, DO      hydrALAZINE  5 mg Intravenous Q6H PRN Dallas Brill, DO      insulin glargine  40 Units Subcutaneous HS Dallas Brill, DO      insulin lispro  2-12 Units Subcutaneous TID AC Parmjit PAULY Duquerty, DO      insulin lispro  2-12 Units Subcutaneous HS Dallas Brill, DO      insulin lispro  6 Units Subcutaneous TID With Meals Nikolai Brill, DO      ipratropium  2 puff Inhalation 4x Daily Jennifer Bowie MD      levothyroxine  125 mcg Oral Early Morning Jennifer Bowie MD      lisinopril  10 mg Oral Daily Vincent Rosales MD      LORazepam  0 5 mg Oral Daily PRN Nikolai Brill, DO      ondansetron  4 mg Intravenous Q6H PRN Dallas Brill, DO      pantoprazole  40 mg Oral Early Morning Nikolai Brill, DO      sodium chloride (PF)  3 mL Intravenous Q1H PRN Nikolai Brill, DO          Today, Patient Was Seen By: Jennifer Bowie MD    ** Please Note: Dragon 360 Dictation voice to text software may have been used in the creation of this document   **

## 2022-06-19 NOTE — CONSULTS
[address] Podiatry - Consultation    Patient Information:   Oscar Solitario 38 y o  male MRN: [id number]  Unit/Bed#: -01 Encounter: [id number]  PCP: Telma Rocha MD  Date of Admission:  6/17/2022  Date of Consultation: 06/19/22  Requesting Physician: Rupesh Escobedo MD      ASSESSMENT:    Oscar Solitario is a 38 y o  male with:    1  Diabetes with neuropathy  2  2  Ulceration of right foot with bone exposed  3  3  Morbid obesity    PLAN:    · There is a debridement of his ulceration which was excisional in nature as below  And dry sterile dressing was then applied to the right foot  · X-rays or the right foot, will also obtain MRI to determine the extent of bony infection  · Due to the chronicity, malodor he will likely need right hallux amputation following x-rays and MRI  Imaging will further dictate surgical intervention   Rest of care per primary team     A formal timeout including patient identification, laterality and existing allergies [facility] was conducted  Procedure Performed: Debridement of subcutaneous tissue- <20 sq cm (23067)  Anesthetic used as needed to reduce discomfort  Under aseptic technique, the wound was thoroughly explored for undermining, deep sinus tracts and bone involvement  Sterile instrumentation including scalpel used to excise the clearly delineated devitalized subcutaneous tissue exposing viable tissue  Bleeding controlled with gentle pressure  Patient tolerated debridement without complications  The wound was dressed  Wound dressing technique and frequency described to patient  Utilizing sterile 15 blade tissue was removed including callus, necrotic tissue, nonviable tissue to the right hallux    The wounds following debridement measured 1 x 1 x 0 2 cm with probe to bone               Weight Bearing Status: WBAT    SUBJECTIVE    History of Present Illness:    Oscar Solitario is a 38 y o  male with past medical history of diabetes with neuropathy who presents with callus on the bottom of his big toe this been present for 2-3 months  States that he has picked this, and noted some drainage  He cannot feel this area denies any signs of infection        Review of Systems:    Constitutional: Negative  HENT: Negative  Eyes: Negative  Respiratory: Negative  Cardiovascular: Negative  Gastrointestinal: Negative  Musculoskeletal: neg   Skin:as above   Neurological: neg   Psych: Negative  Past Medical and Surgical History:     Past Medical History:   Diagnosis Date    Acute bronchitis due to other specified organisms 7/5/2019    Chronic headaches     Diabetes mellitus ([address] )     Disease of thyroid gland     Esophagitis     Gastritis     Gastroparesis     GERD (gastroesophageal reflux disease)     Hypertension     Migraine     Migraines 03/11/2021    Obesity     Obsessive compulsive disorder     Psychiatric disorder     Schizoaffective disorder [facility])        Past Surgical History:   Procedure Laterality Date    ESOPHAGOGASTRODUODENOSCOPY N/A 1/15/2019    Procedure: ESOPHAGOGASTRODUODENOSCOPY (EGD); Surgeon: Emiliano Reagan MD;  Location: [facility];   Service: Gastroenterology       Meds/Allergies:    Medications Prior to Admission   Medication    ACCU-CHEK FASTCLIX LANCETS [facility]    ACCU-CHEK GUIDE test strip    albuterol (PROVENTIL HFA,VENTOLIN HFA) 90 mcg/act inhaler    Aspirin-Acetaminophen-Caffeine (EXCEDRIN PO)    Blood Glucose Monitoring Suppl (ACCU-CHEK GUIDE) w/Device KIT    caffeine 200 mg tablet    carvedilol (COREG) 12 5 mg tablet    doxazosin (CARDURA) 2 mg tablet    famotidine (PEPCID) 40 MG tablet    FLUoxetine (PROzac) 20 mg capsule    fremanezumab-vfrm (Ajovy) 225 MG/1 5ML auto-injector    insulin lispro (HumaLOG) 100 units/mL injection    Insulin Pen Needle (Pen Needles 3/16") 31G X 5 MM MISC    Lantus SoloStar 100 units/mL injection pen    lisinopril (ZESTRIL) 20 mg tablet    pantoprazole (PROTONIX) 40 mg tablet    propranolol (INDERAL) 10 mg tablet    verapamil (CALAN-SR) 240 mg CR tablet    Vraylar 6 MG capsule    aluminum-magnesium hydroxide-simethicone (MYLANTA) 200-200-20 mg/5 mL suspension    atorvastatin (LIPITOR) 10 mg tablet    cholecalciferol (VITAMIN D3) 1,000 units tablet    furosemide (LASIX) 20 mg tablet    glucagon 1 MG injection    levothyroxine 112 mcg tablet    LORazepam (ATIVAN) 0 5 mg tablet    ondansetron (ZOFRAN-ODT) 4 mg disintegrating tablet    prochlorperazine (COMPAZINE) 10 mg tablet       Allergies   Allergen Reactions    Amoxicillin Diarrhea    Augmentin [Amoxicillin-Pot Clavulanate] Diarrhea    Clavulanic Acid Diarrhea    Erythromycin Diarrhea       Social History:     Marital Status: Single    Substance Use History:   Social History     Substance and Sexual Activity   Alcohol Use Not Currently     Social History     Tobacco Use   Smoking Status Never Smoker   Smokeless Tobacco Never Used     Social History     Substance and Sexual Activity   Drug Use Not Currently       Family History:    Family History   Problem Relation Age of Onset    COPD Mother     Hypertension Mother     Heart failure Mother     Rheum arthritis Family     Heart disease Family     Hypertension Father     Diabetes Father     Hyperlipidemia Father          OBJECTIVE:    Vitals:   Blood Pressure: 142/67 (06/19/22 0747)  Pulse: 76 (06/19/22 0747)  Temperature: (!) 97 °F (36 1 °C) (06/19/22 0747)  Temp Source: Oral (06/17/22 1554)  Respirations: 18 (06/19/22 0747)  Height: 5' 2" (157 5 cm) (06/17/22 1554)  Weight - Scale: (!) 154 kg (340 lb 9 8 oz) (06/17/22 1554)  SpO2: 95 % (06/19/22 0747)    Physical Exam:     General Appearance: Alert, cooperative, no distress  HEENT: Head normocephalic, atraumatic, without obvious abnormality  Heart: Normal rate and rhythm  Lungs: Non-labored breathing  No respiratory distress  Abdomen: Without distension    Psychiatric: AAOx3  Lower Extremity:  Vascular:   DP/PT pedal pulses on the left are present  DP/PT pedal pulses on the right are present  CRT brisk at the digits  Pedal hair is absent  1+ edema noted at bilateral lower extremities  Skin temperature is within normal limits bilaterally  Musculoskeletal:  MMT is 5/5 in all muscle compartments bilaterally  Passive ROM at the 1st MPJ and ankle joint are Decreased bilaterally with the leg extended  Active ROM at the lesser digits is intact  No Pain on palpation of any pedal structure  Dermatological:  His ulceration on the plantar aspect of the right hallux measuring 1 x 1 x 0 2 cm following debridement  With probe to bone  There is malodor coming from this, no erythema, no other signs of infection     Neurological:  Gross sensation is intact  Protective sensation is absent  Patient Reports numbness and/or paresthesias  Additional Data:     Lab Results: I have personally reviewed pertinent labs including:    Results from last 7 days   Lab Units 06/19/22  0540   WBC Thousand/uL 7 05   HEMOGLOBIN g/dL 9 4*   HEMATOCRIT % 28 6*   PLATELETS Thousands/uL 320   NEUTROS PCT % 71   LYMPHS PCT % 16   MONOS PCT % 8   EOS PCT % 4     Results from last 7 days   Lab Units 06/19/22  0540   POTASSIUM mmol/L 3 9   CHLORIDE mmol/L 99   CO2 mmol/L 29   BUN mg/dL 59*   CREATININE mg/dL 3 78*   CALCIUM mg/dL 8 0*           Cultures: I have personally reviewed pertinent cultures including:              Imaging: I have personally reviewed pertinent films in PACS  EKG, Pathology, and Other Studies: I have personally reviewed pertinent reports  ** Please Note: Portions of the record may have been created with voice recognition software  Occasional wrong word or "sound a like" substitutions may have occurred due to the inherent limitations of voice recognition software  Read the chart carefully and recognize, using context, where substitutions have occurred   **

## 2022-06-19 NOTE — UTILIZATION REVIEW
Initial Clinical Review    Admission: Date/Time/Statement:   Admission Orders (From admission, onward)     Ordered        06/17/22 0817  Inpatient Admission  Once                      Orders Placed This Encounter   Procedures    Inpatient Admission     Standing Status:   Standing     Number of Occurrences:   1     Order Specific Question:   Level of Care     Answer:   Med Surg [16]     Order Specific Question:   Estimated length of stay     Answer:   More than 2 Midnights     Order Specific Question:   Certification     Answer:   I certify that inpatient services are medically necessary for this patient for a duration of greater than two midnights  See H&P and MD Progress Notes for additional information about the patient's course of treatment  ED Arrival Information     Expected   -    Arrival   6/17/2022 07:08    Acuity   Emergent            Means of arrival   Ambulance    Escorted by   [facility]    Service   Hospitalist    Admission type   Emergency            Arrival complaint   chest pain           Chief Complaint   Patient presents with    Shortness of Breath     Patient reports increased shortness of breath for 3 to 4 days  Patient reports dyspnea with exertion  Patient reports left sided chest pain that started 3 to 4 days ago        Initial Presentation: 38 y o  male with pmh of T2dm on insulin, htn, bipolr1, hypothyroidism presented to the ED from home via EMS with chest pain and sob  Pt was noncompliant with home antihypertensive regimen and diet  He has been drinking broth and noticed worsening swelling of LE  In the ED, he was found hypertensive with BP- 195/103 and fld overloaded  Labs, Cr- 2 99( baseline 1 5), sodium 129  Lasix 40 mg IV, 324 mg po ASA, IV Mg Sulfate given  On exam, Abdominal distension present  B/l LE edema present      Plan: Inpatient admission for evaluation and treatment of hypertensive emergency, vol overload, STEFANIA, hyponatremia: Restart home carvedilol, doxazosin, lisinopril, verapamil  Hydralazine 5 mg IV q6 hours PRN SBP > 180 mmHg  Monitor BP  Lasix 40 mg IV BID  Nephrology consult   Monitor volume status  Check 2D echocardiogram  Trend BMP  Check TSH     06/17 Nephrology Consult; STEFANIA likely due to failure of autoregulation due to uncontrolled hypertension and volume overload  Cont IV Lasix 40 mg bid, check kidney and bladder ultrasound check urine protein studies and urine electrolytes  will increase doxazosin to 4 mg   add hydralazine 25 mg 3 times a day - aim for blood pressure less than 140/90       Date: 06/18   Day 2:   IM Notes: Pt reports cough and HA that was relived by Tylenol  Creatinine up 3 73  Renal ultrasound showed normal  TSH 40 31, free T4 0 71  Continue home carvedilol, lisinopril, verapamil  Doxazosin increased to 4 mg  Hydralazine 5 mg IV q6 hours PRN SBP > 180 mmHg  Monitor blood pressures  Continue Lasix 40 mg IV BID  Cont to trend creatinine  Will increase levothyroxine 125 mcg PO daily  PE: AAO x 3, breatning well in RA  Lungs with bilateral wheezing, crackles present      ED Triage Vitals [06/17/22 0712]   Temperature Pulse Respirations Blood Pressure SpO2   98 1 °F (36 7 °C) 92 19 (!) 195/103 97 %      Temp Source Heart Rate Source Patient Position - Orthostatic VS BP Location FiO2 (%)   Temporal Monitor Lying Right arm --      Pain Score       4          Wt Readings from Last 1 Encounters:   06/17/22 (!) 154 kg (340 lb 9 8 oz)     Additional Vital Signs:   Date/Time Temp Pulse Resp BP MAP (mmHg) SpO2 O2 Device Patient Position - Orthostatic VS   06/19/22 07:47:20 97 °F (36 1 °C) Abnormal  76 18 142/67 92 95 % -- --   06/18/22 2329 -- -- -- -- -- -- None (Room air) --   06/18/22 2300 98 2 °F (36 8 °C) 82 18 164/88 113 93 % -- --   06/18/22 21:43:17 -- 79 -- 147/94 112 96 % -- --   06/18/22 2032 -- -- -- 181/104 Abnormal  -- -- -- --   06/18/22 15:11:59 97 3 °F (36 3 °C) Abnormal  68 18 107/61 76 93 % -- --   06/18/22 12:38:23 -- 67 -- 106/59 75 94 % -- --   06/18/22 08:26:36 98 °F (36 7 °C) 82 17 164/104 Abnormal  124 94 % -- --   06/18/22 0551 -- -- -- 147/72 -- -- -- --   06/17/22 22:05:29 97 7 °F (36 5 °C) 72 -- 164/90 115 94 % -- --   06/17/22 2158 -- -- -- 164/90 -- -- -- --   06/17/22 1947 -- -- -- -- -- -- None (Room air) --   06/17/22 18:08:32 98 1 °F (36 7 °C) 79 20 155/90 112 94 % -- --   06/17/22 15:54:18 98 5 °F (36 9 °C) 67 20 163/93 116 95 % None (Room air) Lying   06/17/22 1300 -- 63 -- 102/56 74 94 % -- --   06/17/22 1018 -- 90 -- 180/80 Abnormal  -- -- -- --   06/17/22 0902 -- -- -- 165/87 -- -- -- --   06/17/22 0900 -- 83 18 165/87 120 97 % -- --   06/17/22 0830 -- 88 20 186/88 Abnormal  127 98 % None (Room air) --   06/17/22 0730 -- 88 19 188/95 Abnormal  134 100 % -- --     Pertinent Labs/Diagnostic Test Results:   US kidney and bladder   Final Result by Joan Parks DO (06/17 1704)      Normal              Workstation performed: [id number]         X-ray chest 1 view portable   Final Result by Diane Aguirre MD (06/17 1993)      No acute cardiopulmonary disease  Workstation performed: [id number]         XR foot 3+ vw right    (Results Pending)   MRI inpatient order    (Results Pending)     06/17  EKG result; Normal sinus rhythm  Left ventricular hypertrophy with repolarization abnormality  ECHO:   Left Ventricle: Left ventricular cavity size is normal  Wall thickness is mildly increased  There is mild concentric hypertrophy  The left ventricular ejection fraction is 55%  Systolic function is normal  Wall motion is normal     Right Ventricle: Systolic function is normal     Pericardium: There is no pericardial effusion        Results from last 7 days   Lab Units 06/19/22  0540 06/18/22  0545 06/17/22  0725   WBC Thousand/uL 7 05 8 80 10 55*   HEMOGLOBIN g/dL 9 4* 9 6* 9 8*   HEMATOCRIT % 28 6* 29 6* 29 3*   PLATELETS Thousands/uL 320 375 366   NEUTROS ABS Thousands/µL 5 04 6 37 7 71*         Results from last 7 days   Lab Units 06/19/22  0540 06/18/22  0545 06/17/22  0725   SODIUM mmol/L 136 132* 129*   POTASSIUM mmol/L 3 9 4 3 4 2   CHLORIDE mmol/L 99 97 95*   CO2 mmol/L 29 26 26   ANION GAP mmol/L 8 9 8   BUN mg/dL 59* 60* 52*   CREATININE mg/dL 3 78* 3 73* 2 99*   EGFR ml/min/1 73sq m 18 18 24   CALCIUM mg/dL 8 0* 8 0* 8 1*   MAGNESIUM mg/dL  --   --  1 7*         Results from last 7 days   Lab Units 06/19/22  1438 06/19/22  1401 06/19/22  1130 06/19/22  0536 06/19/22  0410 06/18/22  2054 06/18/22  1508 06/18/22  1054 06/18/22  0543 06/18/22  0309 06/17/22  2055 06/17/22  1555   POC GLUCOSE mg/dl 160* 58* 201* 133 69 170* 139 115 125 71 344* 93     Results from last 7 days   Lab Units 06/19/22  0540 06/18/22  0545 06/17/22  0725   GLUCOSE RANDOM mg/dL 114 126 220*     Results from last 7 days   Lab Units 06/18/22  0545   OSMOLALITY, SERUM mmol/*         BETA-HYDROXYBUTYRATE   Date Value Ref Range Status   10/26/2020 1 4 (H) <0 6 mmol/L Final                      Results from last 7 days   Lab Units 06/17/22  0912 06/17/22  0725   HS TNI 0HR ng/L  --  11   HS TNI 2HR ng/L 10  --    HSTNI D2 ng/L -1  --              Results from last 7 days   Lab Units 06/17/22  0725   TSH 3RD GENERATON uIU/mL 40 319*       Results from last 7 days   Lab Units 06/17/22  0725   BNP pg/mL 110*       Results from last 7 days   Lab Units 06/18/22  0545 06/17/22  1626   OSMOLALITY, SERUM mmol/*  --    OSMO UR mmol/KG  --  247*     Results from last 7 days   Lab Units 06/17/22  1626   SODIUM UR  38   CREATININE UR mg/dL 49 4  49 4  49 4  49 1  49 1   PROTEIN UR mg/dL 205  205   PROT/CREAT RATIO UR  4 15*  4 18*     ED Treatment:   Medication Administration from 06/17/2022 0708 to 06/17/2022 1548       Date/Time Order Dose Route Action     06/17/2022 0726 aspirin chewable tablet 324 mg 324 mg Oral Given     06/17/2022 0726 ipratropium-albuterol (DUO-NEB) 0 5-2 5 mg/3 mL inhalation solution 3 mL 3 mL Nebulization Given     06/17/2022 0728 furosemide (LASIX) injection 40 mg 40 mg Intravenous Given     06/17/2022 0752 carvedilol (COREG) tablet 12 5 mg 12 5 mg Oral Given     06/17/2022 0902 verapamil (CALAN-SR) CR tablet 240 mg 240 mg Oral Given     06/17/2022 0753 propranolol (INDERAL) tablet 10 mg 10 mg Oral Given     06/17/2022 0753 lisinopril (ZESTRIL) tablet 20 mg 20 mg Oral Given     06/17/2022 1259 magnesium sulfate 2 g/50 mL IVPB (premix) 2 g 0 g Intravenous Stopped     06/17/2022 0902 magnesium sulfate 2 g/50 mL IVPB (premix) 2 g 2 g Intravenous New Bag     06/17/2022 1200 albuterol (PROVENTIL HFA,VENTOLIN HFA) inhaler 2 puff 2 puff Inhalation Given     06/17/2022 0900 albuterol (PROVENTIL HFA,VENTOLIN HFA) inhaler 2 puff 2 puff Inhalation Given     06/17/2022 0900 doxazosin (CARDURA) tablet 2 mg 2 mg Oral Given     06/17/2022 0900 famotidine (PEPCID) tablet 40 mg 40 mg Oral Given     06/17/2022 0902 FLUoxetine (PROzac) capsule 20 mg 20 mg Oral Given     06/17/2022 1301 insulin lispro (HumaLOG) 100 units/mL subcutaneous injection 6 Units 6 Units Subcutaneous Given     06/17/2022 0912 insulin lispro (HumaLOG) 100 units/mL subcutaneous injection 6 Units 0 Units Subcutaneous Hold     06/17/2022 0901 levothyroxine tablet 112 mcg 112 mcg Oral Given     06/17/2022 0901 pantoprazole (PROTONIX) EC tablet 40 mg 40 mg Oral Given     06/17/2022 0902 heparin (porcine) subcutaneous injection 5,000 Units 5,000 Units Subcutaneous Given     06/17/2022 1302 cariprazine (VRAYLAR) capsule 6 mg 6 mg Oral Given     06/17/2022 1147 insulin lispro (HumaLOG) 100 units/mL subcutaneous injection 2-12 Units 2 Units Subcutaneous Not Given     06/17/2022 1400 hydrALAZINE (APRESOLINE) tablet 25 mg 25 mg Oral Not Given        Past Medical History:   Diagnosis Date    Acute bronchitis due to other specified organisms 7/5/2019    Chronic headaches     Diabetes mellitus ([facility] [address] )     Disease of thyroid gland     Esophagitis     Gastritis     Gastroparesis     GERD (gastroesophageal reflux disease)     Hypertension     Migraine     Migraines 03/11/2021    Obesity     Obsessive compulsive disorder     Psychiatric disorder     Schizoaffective disorder ([place] [address] )      Present on Admission:   Hypothyroidism   Bipolar 1 disorder ([place] [address] )   Hypertensive emergency   GERD (gastroesophageal reflux disease)   Volume overload   STEFANIA (acute kidney injury) ([place] [address] )   Hyponatremia      Admitting Diagnosis: Shortness of breath [R06 02]  STEFANIA (acute kidney injury) ([place] [address] ) [N17 9]  Noncompliance with medications [Z91 14]  Hypertensive urgency [I16 0]  Age/Sex: 38 y o  male  Admission Orders:  SCd  I/O  Bladder scan      Scheduled Medications:  albuterol, 2 puff, Inhalation, 4x Daily  cariprazine, 6 mg, Oral, Daily  carvedilol, 12 5 mg, Oral, BID With Meals  doxazosin, 2 mg, Oral, Daily  famotidine, 40 mg, Oral, Daily  FLUoxetine, 20 mg, Oral, QAM  furosemide, 40 mg, Intravenous, BID (diuretic)  heparin (porcine), 5,000 Units, Subcutaneous, Q8H SOFI  insulin glargine, 40 Units, Subcutaneous, HS  insulin lispro, 2-12 Units, Subcutaneous, TID AC  insulin lispro, 2-12 Units, Subcutaneous, HS  insulin lispro, 6 Units, Subcutaneous, TID With Meals  ipratropium, 2 puff, Inhalation, 4x Daily  levothyroxine, 125 mcg, Oral, Early Morning  lisinopril, 10 mg, Oral, Daily  pantoprazole, 40 mg, Oral, Early Morning      Continuous IV Infusions: none     PRN Meds:  acetaminophen, 650 mg, Oral, Q4H PRN 06/17 x 1, 06/18 x 2  aluminum-magnesium hydroxide-simethicone, 30 mL, Oral, Q4H PRN  dextromethorphan-guaiFENesin, 10 mL, Oral, Q4H PRN 06/18 x 1  glycerin-hypromellose-, 1 drop, Both Eyes, Q3H PRN 06/18 x 1  hydrALAZINE, 5 mg, Intravenous, Q6H PRN 06/18 x 1  LORazepam, 0 5 mg, Oral, Daily PRN  ondansetron, 4 mg, Intravenous, Q6H PRN  sodium chloride (PF), 3 mL, Intravenous, Q1H PRN        IP CONSULT TO CASE MANAGEMENT  IP CONSULT TO NEPHROLOGY  IP CONSULT TO PODIATRY    Network Utilization Review Department  ATTENTION: Please call with any questions or concerns to [phone] and carefully listen to the prompts so that you are directed to the right person  All voicemails are confidential   Celine Villalobos all requests for admission clinical reviews, approved or denied determinations and any other requests to dedicated fax number below belonging to the campus where the patient is receiving treatment   List of dedicated fax numbers for the Facilities:  [address] DENIALS (Administrative/Medical Necessity) [phone]   [address] (Maternity/NICU/Pediatrics) [phone]   [address] [place] [address]  [address] [facility] [place] [address] [id number] [place] [address] [address] [place] [address] [phone]

## 2022-06-19 NOTE — PROGRESS NOTES
Progress Note - Nephrology   Lela Mariee 38 y o  male MRN: [id number]  Unit/Bed#: -01 Encounter: [id number]    A/P:  1  Hypertensive urgency   - blood pressure initially was 195/103  Polypharmacy was instituted and blood pressure dropped   - current blood pressure today is 142/67 and he feels well  - will be discharged on the current medications and follow as an outpatient    2  Acute kidney injury superimposed on chronic kidney disease   - creatinine is 3 78 mg/dL today which Is unchanged from admission    - he has significant micro albuminuria  - ultrasound was unremarkable   - he likely has intrinsic kidney disease and his FeUrea was 32% consistent with this   - Baseline creatinine was 1 99mg/dl last August with limited lab data available   - this may be a new baseline, however, will continue to trend   - he is markedly hypothyroid which will contribute to acute kidney injury    3  Volume overload   - Standing weight demonstrates a loss of 2 4 kg   - continue diuretic therapy   - check albumin level   - increase frequency of Lasix 40mg IV to tid    4  Type 2 diabetes mellitus With long-term current use of insulin -    - has been noncompliance and his Hemoglobin A1c is 11 6    - sugars are being covered    5   Hyponatremia   - resolved         Follow up reason for today's visit:  Acute kidney injury/hypertensive urgency/volume overload/type 2 diabetes mellitus with long-term current use of insulin and noncompliance    Hypertensive emergency    Patient Active Problem List   Diagnosis    Type 2 diabetes mellitus, with long-term current use of insulin (HCC)    Abdominal pain    OCD (obsessive compulsive disorder)    Schizoaffective disorder, depressive type ([facility] [address] )    Hypothyroidism    Morbid obesity with BMI of 50 0-59 9, adult (HCC)    Anxiety    Episodic confusion    Snoring    Insomnia    Hypersomnia    Vitamin D deficiency    Vomiting    Occipital neuralgia of left side    Headache    Nodule of parotid gland    Bipolar 1 disorder (HCC)    Depression    Type 1 diabetes mellitus without complication (HCC)    Urinary retention    Peripheral edema    Hyperlipidemia, mixed    Migraine without aura and without status migrainosus, not intractable    Class 3 severe obesity due to excess calories with serious comorbidity and body mass index (BMI) of 60 0 to 69 9 in adult [facility])    Spinal stenosis in cervical region    Difficulty urinating    Urinary tract infection without hematuria    Penile pain    Chronic migraine without aura without status migrainosus, not intractable    Hypertensive emergency    Encephalopathy    Leukocytosis    Schizo affective schizophrenia ([facility] [address] )    [facility]    Acute respiratory disease due to COVID-19 virus    Elevated troponin    Acute on chronic kidney failure ([facility]    Family history of heart disease    Hypokalemia    Chest pressure    GERD (gastroesophageal reflux disease)    Hypertension    SOB (shortness of breath)    Volume overload    Hyponatremia         Subjective:   A 10 point ROS is negative  He feels improved and is walking without distress    Objective:     Vitals: Blood pressure 142/67, pulse 76, temperature (!) 97 °F (36 1 °C), resp  rate 18, height 5' 2" (1 575 m), weight (!) 154 kg (340 lb 9 8 oz), SpO2 95 %  ,Body mass index is 62 3 kg/m²  Weight (last 2 days)     Date/Time Weight    06/17/22 15:54:18 154 (340 61)    06/17/22 1018 147 (325)            Intake/Output Summary (Last 24 hours) at 6/19/2022 1140  Last data filed at 6/19/2022 1100  Gross per 24 hour   Intake 1440 ml   Output 1200 ml   Net 240 ml     I/O last 3 completed shifts:   In: 1320 [P O :1320]  Out: 2700 [Urine:2700]         Physical Exam: /67   Pulse 76   Temp (!) 97 °F (36 1 °C)   Resp 18   Ht 5' 2" (1 575 m)   Wt (!) 154 kg (340 lb 9 8 oz)   SpO2 95%   BMI 62 30 kg/m²     General Appearance:    Alert, obese cooperative, no distress, appears stated age   Head:    Normocephalic, without obvious abnormality, atraumatic   Eyes:    Conjunctiva/corneas clear   Ears:    Normal external ears   Nose:   Nares normal, septum midline, mucosa normal, no drainage    or sinus tenderness   Throat:   Lips, mucosa, and tongue normal; teeth and gums normal   Neck:   Supple, symmetrical, trachea midline, no adenopathy;        thyroid:  No enlargement/tenderness/nodules; no carotid    bruit or JVD   Back:     Symmetric, no curvature, ROM normal, no CVA tenderness   Lungs:     Exp crackles to auscultation bilaterally, respirations unlabored   Chest wall:    No tenderness or deformity   Heart:    Regular rate and rhythm, S1 and S2 normal, no murmur, rub   or gallop   Abdomen:     Soft, non-tender, bowel sounds active   Extremities:   Extremities1+ edema   Skin:   Skin color, texture, turgor normal, no rashes or lesions   Lymph nodes:   Cervical normal   Neurologic:   CNII-XII intact            Lab, Imaging and other studies: I have personally reviewed pertinent labs  CBC:   Lab Results   Component Value Date    WBC 7 05 06/19/2022    HGB 9 4 (L) 06/19/2022    HCT 28 6 (L) 06/19/2022    MCV 85 06/19/2022     06/19/2022    MCH 27 8 06/19/2022    MCHC 32 9 06/19/2022    RDW 13 0 06/19/2022    MPV 9 1 06/19/2022    NRBC 0 06/19/2022     CMP:   Lab Results   Component Value Date    K 3 9 06/19/2022    CL 99 06/19/2022    CO2 29 06/19/2022    BUN 59 (H) 06/19/2022    CREATININE 3 78 (H) 06/19/2022    CALCIUM 8 0 (L) 06/19/2022    EGFR 18 06/19/2022           Results from last 7 days   Lab Units 06/19/22  0540 06/18/22  0545 06/17/22  0725   POTASSIUM mmol/L 3 9 4 3 4 2   CHLORIDE mmol/L 99 97 95*   CO2 mmol/L 29 26 26   BUN mg/dL 59* 60* 52*   CREATININE mg/dL 3 78* 3 73* 2 99*   CALCIUM mg/dL 8 0* 8 0* 8 1*         Phosphorus: No results found for: PHOS  Magnesium: No results found for: MG  Urinalysis: No results found for: Aleksandra Hernandez, SPECGRAV, Lavon Cabrera, NITRITE, PROTEINUA, GLUCOSEU, KETONESU, BILIRUBINUR, BLOODU  Ionized Calcium: No results found for: CAION  Coagulation: No results found for: PT, INR, APTT  Troponin: No results found for: TROPONINI  ABG: No results found for: PHART, NOV1DKO, PO2ART, FCE2GQO, V8KZPKLS, BEART, SOURCE  Radiology review:     IMAGING  Procedure: X-ray chest 1 view portable    Result Date: 6/17/2022  Narrative: CHEST INDICATION:   chest pain  Short of breath  Covid positive on 1/5/2021  COMPARISON:  Chest radiograph from 8/23/2021 and chest CT from 1/11/2021  EXAM PERFORMED/VIEWS:  XR CHEST PORTABLE FINDINGS: Cardiomediastinal silhouette appears unremarkable  Low lung volumes with no acute disease  No effusion or pneumothorax  Osseous structures appear within normal limits for patient age  Impression: No acute cardiopulmonary disease  Workstation performed: [id number]     Procedure: US kidney and bladder    Result Date: 6/17/2022  Narrative: RENAL ULTRASOUND INDICATION:   ilya  COMPARISON: None TECHNIQUE:   Ultrasound of the retroperitoneum was performed with a curvilinear transducer utilizing volumetric sweeps and still imaging techniques  FINDINGS: KIDNEYS: Symmetric and normal size  Right kidney:  10 9 x 4 3 x 4 7 cm  Volume 116 0 mL Left kidney:  11 3 x 4 6 x 5 0 cm  Volume 136 0 mL Right kidney Normal echogenicity and contour  No mass is identified  No hydronephrosis  No shadowing calculi  No perinephric fluid collections  Left kidney Normal echogenicity and contour  No mass is identified  No hydronephrosis  No shadowing calculi  No perinephric fluid collections  URETERS: Nonvisualized  BLADDER: Normally distended  Prevoid bladder volume 432 mL  No focal thickening or mass lesions  Bilateral ureteral jets detected       Impression: Normal  Workstation performed: [id number]     Procedure: Echo complete w/ contrast if indicated    Result Date: 6/17/2022  Narrative: Ethelyn Atwood  Left Ventricle: Left ventricular cavity size is normal  Wall thickness is mildly increased  There is mild concentric hypertrophy  The left ventricular ejection fraction is 55%  Systolic function is normal  Wall motion is normal    Right Ventricle: Systolic function is normal    Pericardium: There is no pericardial effusion         Current Facility-Administered Medications   Medication Dose Route Frequency    acetaminophen (TYLENOL) tablet 650 mg  650 mg Oral Q4H PRN    albuterol (PROVENTIL HFA,VENTOLIN HFA) inhaler 2 puff  2 puff Inhalation 4x Daily    aluminum-magnesium hydroxide-simethicone (MYLANTA) oral suspension 30 mL  30 mL Oral Q4H PRN    cariprazine (VRAYLAR) capsule 6 mg  6 mg Oral Daily    carvedilol (COREG) tablet 12 5 mg  12 5 mg Oral BID With Meals    dextromethorphan-guaiFENesin (ROBITUSSIN DM) oral syrup 10 mL  10 mL Oral Q4H PRN    doxazosin (CARDURA) tablet 2 mg  2 mg Oral Daily    famotidine (PEPCID) tablet 40 mg  40 mg Oral Daily    FLUoxetine (PROzac) capsule 20 mg  20 mg Oral QAM    furosemide (LASIX) injection 40 mg  40 mg Intravenous BID (diuretic)    glycerin-hypromellose- (ARTIFICIAL TEARS) ophthalmic solution 1 drop  1 drop Both Eyes Q3H PRN    heparin (porcine) subcutaneous injection 5,000 Units  5,000 Units Subcutaneous Q8H [facility]    hydrALAZINE (APRESOLINE) injection 5 mg  5 mg Intravenous Q6H PRN    insulin glargine (LANTUS) subcutaneous injection 40 Units 0 4 mL  40 Units Subcutaneous HS    insulin lispro (HumaLOG) 100 units/mL subcutaneous injection 2-12 Units  2-12 Units Subcutaneous TID AC    insulin lispro (HumaLOG) 100 units/mL subcutaneous injection 2-12 Units  2-12 Units Subcutaneous HS    insulin lispro (HumaLOG) 100 units/mL subcutaneous injection 6 Units  6 Units Subcutaneous TID With Meals    ipratropium (ATROVENT HFA) inhaler 2 puff  2 puff Inhalation 4x Daily    levothyroxine tablet 125 mcg  125 mcg Oral Early Morning    lisinopril (ZESTRIL) tablet 10 mg  10 mg Oral Daily    LORazepam (ATIVAN) tablet 0 5 mg  0 5 mg Oral Daily PRN    ondansetron (ZOFRAN) injection 4 mg  4 mg Intravenous Q6H PRN    pantoprazole (PROTONIX) EC tablet 40 mg  40 mg Oral Early Morning    sodium chloride (PF) 0 9 % injection 3 mL  3 mL Intravenous Q1H PRN     Medications Discontinued During This Encounter   Medication Reason    carvedilol (COREG) tablet 12 5 mg     lisinopril (ZESTRIL) tablet 20 mg     verapamil (CALAN-SR) CR tablet 240 mg     insulin glargine (LANTUS) subcutaneous injection 50 Units 0 5 mL     propranolol (INDERAL) tablet 10 mg     furosemide (LASIX) injection 40 mg     doxazosin (CARDURA) tablet 2 mg     fluvoxaMINE (LUVOX) 100 mg tablet Discontinued by another clinician    cyclobenzaprine (FLEXERIL) 10 mg tablet Therapy completed    insulin glargine (LANTUS) 100 units/mL subcutaneous injection Duplicate order    lurasidone (LATUDA) 40 mg tablet Discontinued by another clinician    sucralfate (CARAFATE) 1 g tablet Side effects    ipratropium (ATROVENT) 0 02 % inhalation solution 0 5 mg     levothyroxine tablet 112 mcg     hydrALAZINE (APRESOLINE) tablet 25 mg     lisinopril (ZESTRIL) tablet 20 mg     doxazosin (CARDURA) tablet 4 mg     verapamil (CALAN-SR) CR tablet 240 mg     furosemide (LASIX) injection 40 mg     furosemide (LASIX) injection 40 mg        James Sagastume MD      This progress note was produced in part using a dictation device which may document imprecise wording from author's original intent

## 2022-06-19 NOTE — ASSESSMENT & PLAN NOTE
· BP acceptable  · Secondary to medication noncompliance and dietary indiscretion as patient states he has been eating a high sodium diet  · Present on admission as evidenced by acute renal failure and SBPs > 180 mmHg  · Continue home carvedilol, lisinopril, verapamil, doxazosin  · Hydralazine 5 mg IV q6 hours PRN SBP > 180 mmHg  · Monitor blood pressures

## 2022-06-19 NOTE — ASSESSMENT & PLAN NOTE
Lab Results   Component Value Date    HGBA1C 11 6 (H) 08/03/2021       Recent Labs     06/18/22 2054 06/19/22  0410 06/19/22  0536 06/19/22  1130   POCGLU 170* 69 133 201*       Blood Sugar Average: Last 72 hrs:  (P) 145 [id number]   · Poorly controlled with hemoglobin A1c of 11 6  · Blood sugar acceptable  · Continue Lantus 40 units daily at bedtime and 6 units of short-acting insulin 3 times daily with meals  · Sliding scale insulin algorithm 4  · Recommended outpatient Endocrinology follow-up

## 2022-06-19 NOTE — ASSESSMENT & PLAN NOTE
· Resolved, Likely secondary to fluid volume overload verses hypothyroidism  · IV diuresis as above  · Trend BMP  · Nephrology consultation as above

## 2022-06-19 NOTE — ASSESSMENT & PLAN NOTE
· Creatinine 3 7 range, could be his new baseline  · Likely secondary to hypertensive crisis verses florid volume overload  · Trend BMP  · Avoid nephrotoxic agents and hypotension  · Nephrology consult ; appreciate recommendations regarding diuresis  · Renal ultrasound showed normal

## 2022-06-19 NOTE — ASSESSMENT & PLAN NOTE
· Patient has been noncompliant with home levothyroxine  · TSH 40 31, free T4 0 71  · Increased levothyroxine to 125 mcg PO daily  · Recheck TSH in 4-6 weeks

## 2022-06-19 NOTE — ASSESSMENT & PLAN NOTE
· s/p debridement of his ulceration  · Podiatry on board - input appreciated, x-ray and MRI of right foot  · Continue dressing

## 2022-06-20 ENCOUNTER — APPOINTMENT (INPATIENT)
Dept: MRI IMAGING | Facility: HOSPITAL | Age: 42
DRG: 469 | End: 2022-06-20
Payer: COMMERCIAL

## 2022-06-20 LAB
ALBUMIN SERPL BCP-MCNC: 3.1 G/DL (ref 3.5–5)
ANION GAP SERPL CALCULATED.3IONS-SCNC: 9 MMOL/L (ref 4–13)
BASOPHILS # BLD AUTO: 0.04 THOUSANDS/ΜL (ref 0–0.1)
BASOPHILS NFR BLD AUTO: 1 % (ref 0–1)
BUN SERPL-MCNC: 58 MG/DL (ref 5–25)
CALCIUM SERPL-MCNC: 8.8 MG/DL (ref 8.4–10.2)
CHLORIDE SERPL-SCNC: 99 MMOL/L (ref 96–108)
CO2 SERPL-SCNC: 29 MMOL/L (ref 21–32)
CREAT SERPL-MCNC: 3.86 MG/DL (ref 0.6–1.3)
EOSINOPHIL # BLD AUTO: 0.31 THOUSAND/ΜL (ref 0–0.61)
EOSINOPHIL NFR BLD AUTO: 4 % (ref 0–6)
ERYTHROCYTE [DISTWIDTH] IN BLOOD BY AUTOMATED COUNT: 13.1 % (ref 11.6–15.1)
GFR SERPL CREATININE-BSD FRML MDRD: 17 ML/MIN/1.73SQ M
GLUCOSE SERPL-MCNC: 115 MG/DL (ref 65–140)
GLUCOSE SERPL-MCNC: 182 MG/DL (ref 65–140)
GLUCOSE SERPL-MCNC: 375 MG/DL (ref 65–140)
GLUCOSE SERPL-MCNC: 429 MG/DL (ref 65–140)
GLUCOSE SERPL-MCNC: 44 MG/DL (ref 65–140)
GLUCOSE SERPL-MCNC: 55 MG/DL (ref 65–140)
GLUCOSE SERPL-MCNC: 72 MG/DL (ref 65–140)
GLUCOSE SERPL-MCNC: 76 MG/DL (ref 65–140)
GLUCOSE SERPL-MCNC: 77 MG/DL (ref 65–140)
GLUCOSE SERPL-MCNC: 96 MG/DL (ref 65–140)
HCT VFR BLD AUTO: 30.9 % (ref 36.5–49.3)
HGB BLD-MCNC: 9.8 G/DL (ref 12–17)
IMM GRANULOCYTES # BLD AUTO: 0.03 THOUSAND/UL (ref 0–0.2)
IMM GRANULOCYTES NFR BLD AUTO: 0 % (ref 0–2)
LYMPHOCYTES # BLD AUTO: 1.69 THOUSANDS/ΜL (ref 0.6–4.47)
LYMPHOCYTES NFR BLD AUTO: 20 % (ref 14–44)
MCH RBC QN AUTO: 27.1 PG (ref 26.8–34.3)
MCHC RBC AUTO-ENTMCNC: 31.7 G/DL (ref 31.4–37.4)
MCV RBC AUTO: 86 FL (ref 82–98)
MONOCYTES # BLD AUTO: 0.68 THOUSAND/ΜL (ref 0.17–1.22)
MONOCYTES NFR BLD AUTO: 8 % (ref 4–12)
NEUTROPHILS # BLD AUTO: 5.64 THOUSANDS/ΜL (ref 1.85–7.62)
NEUTS SEG NFR BLD AUTO: 67 % (ref 43–75)
NRBC BLD AUTO-RTO: 0 /100 WBCS
PLATELET # BLD AUTO: 374 THOUSANDS/UL (ref 149–390)
PMV BLD AUTO: 9.2 FL (ref 8.9–12.7)
POTASSIUM SERPL-SCNC: 4.4 MMOL/L (ref 3.5–5.3)
RBC # BLD AUTO: 3.61 MILLION/UL (ref 3.88–5.62)
SODIUM SERPL-SCNC: 137 MMOL/L (ref 135–147)
WBC # BLD AUTO: 8.39 THOUSAND/UL (ref 4.31–10.16)

## 2022-06-20 PROCEDURE — 82948 REAGENT STRIP/BLOOD GLUCOSE: CPT

## 2022-06-20 PROCEDURE — 99232 SBSQ HOSP IP/OBS MODERATE 35: CPT | Performed by: PHYSICIAN ASSISTANT

## 2022-06-20 PROCEDURE — 99232 SBSQ HOSP IP/OBS MODERATE 35: CPT | Performed by: INTERNAL MEDICINE

## 2022-06-20 PROCEDURE — 85025 COMPLETE CBC W/AUTO DIFF WBC: CPT | Performed by: INTERNAL MEDICINE

## 2022-06-20 PROCEDURE — 82040 ASSAY OF SERUM ALBUMIN: CPT | Performed by: INTERNAL MEDICINE

## 2022-06-20 PROCEDURE — G1004 CDSM NDSC: HCPCS

## 2022-06-20 PROCEDURE — 73718 MRI LOWER EXTREMITY W/O DYE: CPT

## 2022-06-20 PROCEDURE — 80048 BASIC METABOLIC PNL TOTAL CA: CPT | Performed by: INTERNAL MEDICINE

## 2022-06-20 RX ORDER — CARVEDILOL 25 MG/1
25 TABLET ORAL 2 TIMES DAILY WITH MEALS
Status: DISCONTINUED | OUTPATIENT
Start: 2022-06-20 | End: 2022-06-23 | Stop reason: HOSPADM

## 2022-06-20 RX ORDER — LANOLIN ALCOHOL/MO/W.PET/CERES
6 CREAM (GRAM) TOPICAL
Status: DISCONTINUED | OUTPATIENT
Start: 2022-06-20 | End: 2022-06-23 | Stop reason: HOSPADM

## 2022-06-20 RX ORDER — INSULIN LISPRO 100 [IU]/ML
10 INJECTION, SOLUTION INTRAVENOUS; SUBCUTANEOUS
Status: DISCONTINUED | OUTPATIENT
Start: 2022-06-20 | End: 2022-06-20

## 2022-06-20 RX ORDER — NYSTATIN 100000 [USP'U]/G
POWDER TOPICAL 2 TIMES DAILY
Status: DISCONTINUED | OUTPATIENT
Start: 2022-06-20 | End: 2022-06-23 | Stop reason: HOSPADM

## 2022-06-20 RX ORDER — INSULIN GLARGINE 100 [IU]/ML
20 INJECTION, SOLUTION SUBCUTANEOUS
Status: DISCONTINUED | OUTPATIENT
Start: 2022-06-20 | End: 2022-06-23 | Stop reason: HOSPADM

## 2022-06-20 RX ORDER — DEXTROSE MONOHYDRATE 25 G/50ML
INJECTION, SOLUTION INTRAVENOUS
Status: DISPENSED
Start: 2022-06-20 | End: 2022-06-21

## 2022-06-20 RX ORDER — INSULIN GLARGINE 100 [IU]/ML
30 INJECTION, SOLUTION SUBCUTANEOUS
Status: DISCONTINUED | OUTPATIENT
Start: 2022-06-20 | End: 2022-06-20

## 2022-06-20 RX ORDER — INSULIN LISPRO 100 [IU]/ML
5 INJECTION, SOLUTION INTRAVENOUS; SUBCUTANEOUS
Status: DISCONTINUED | OUTPATIENT
Start: 2022-06-20 | End: 2022-06-20

## 2022-06-20 RX ADMIN — HEPARIN SODIUM 5000 UNITS: 5000 INJECTION INTRAVENOUS; SUBCUTANEOUS at 21:13

## 2022-06-20 RX ADMIN — LORAZEPAM 0.5 MG: 0.5 TABLET ORAL at 21:59

## 2022-06-20 RX ADMIN — CARVEDILOL 12.5 MG: 12.5 TABLET, FILM COATED ORAL at 09:21

## 2022-06-20 RX ADMIN — IPRATROPIUM BROMIDE 2 PUFF: 17 AEROSOL, METERED RESPIRATORY (INHALATION) at 17:25

## 2022-06-20 RX ADMIN — FUROSEMIDE 40 MG: 10 INJECTION, SOLUTION INTRAMUSCULAR; INTRAVENOUS at 05:26

## 2022-06-20 RX ADMIN — CARIPRAZINE 6 MG: 4.5 CAPSULE, GELATIN COATED ORAL at 09:20

## 2022-06-20 RX ADMIN — INSULIN LISPRO 12 UNITS: 100 INJECTION, SOLUTION INTRAVENOUS; SUBCUTANEOUS at 21:14

## 2022-06-20 RX ADMIN — FAMOTIDINE 40 MG: 20 TABLET ORAL at 09:22

## 2022-06-20 RX ADMIN — PANTOPRAZOLE SODIUM 40 MG: 40 TABLET, DELAYED RELEASE ORAL at 05:25

## 2022-06-20 RX ADMIN — INSULIN LISPRO 6 UNITS: 100 INJECTION, SOLUTION INTRAVENOUS; SUBCUTANEOUS at 12:54

## 2022-06-20 RX ADMIN — NYSTATIN: 100000 POWDER TOPICAL at 21:14

## 2022-06-20 RX ADMIN — Medication 6 MG: at 22:56

## 2022-06-20 RX ADMIN — CARVEDILOL 25 MG: 25 TABLET, FILM COATED ORAL at 17:25

## 2022-06-20 RX ADMIN — INSULIN LISPRO 10 UNITS: 100 INJECTION, SOLUTION INTRAVENOUS; SUBCUTANEOUS at 12:53

## 2022-06-20 RX ADMIN — DOXAZOSIN 2 MG: 2 TABLET ORAL at 09:21

## 2022-06-20 RX ADMIN — FUROSEMIDE 40 MG: 10 INJECTION, SOLUTION INTRAMUSCULAR; INTRAVENOUS at 17:25

## 2022-06-20 RX ADMIN — LEVOTHYROXINE SODIUM 125 MCG: 25 TABLET ORAL at 05:25

## 2022-06-20 RX ADMIN — HEPARIN SODIUM 5000 UNITS: 5000 INJECTION INTRAVENOUS; SUBCUTANEOUS at 05:27

## 2022-06-20 RX ADMIN — ALBUTEROL SULFATE 2 PUFF: 90 AEROSOL, METERED RESPIRATORY (INHALATION) at 12:58

## 2022-06-20 RX ADMIN — ALBUTEROL SULFATE 2 PUFF: 90 AEROSOL, METERED RESPIRATORY (INHALATION) at 17:25

## 2022-06-20 RX ADMIN — ALBUTEROL SULFATE 2 PUFF: 90 AEROSOL, METERED RESPIRATORY (INHALATION) at 21:15

## 2022-06-20 RX ADMIN — LISINOPRIL 10 MG: 10 TABLET ORAL at 09:21

## 2022-06-20 RX ADMIN — HEPARIN SODIUM 5000 UNITS: 5000 INJECTION INTRAVENOUS; SUBCUTANEOUS at 13:02

## 2022-06-20 RX ADMIN — IPRATROPIUM BROMIDE 2 PUFF: 17 AEROSOL, METERED RESPIRATORY (INHALATION) at 09:22

## 2022-06-20 RX ADMIN — FUROSEMIDE 40 MG: 10 INJECTION, SOLUTION INTRAMUSCULAR; INTRAVENOUS at 12:56

## 2022-06-20 RX ADMIN — IPRATROPIUM BROMIDE 2 PUFF: 17 AEROSOL, METERED RESPIRATORY (INHALATION) at 12:58

## 2022-06-20 RX ADMIN — INSULIN GLARGINE 20 UNITS: 100 INJECTION, SOLUTION SUBCUTANEOUS at 21:13

## 2022-06-20 RX ADMIN — FLUOXETINE 20 MG: 20 CAPSULE ORAL at 09:22

## 2022-06-20 RX ADMIN — IPRATROPIUM BROMIDE 2 PUFF: 17 AEROSOL, METERED RESPIRATORY (INHALATION) at 21:15

## 2022-06-20 RX ADMIN — ALBUTEROL SULFATE 2 PUFF: 90 AEROSOL, METERED RESPIRATORY (INHALATION) at 09:22

## 2022-06-20 NOTE — ASSESSMENT & PLAN NOTE
· Improvement in lower extremity edema and abdominal distension  · TTE (6/20): LVEF 55%, mild concentric hypertrophy  Wall motion and diastolic function is normal   No pericardial effusion    · Lasix as above  · Nephrology following

## 2022-06-20 NOTE — PLAN OF CARE
Problem: PAIN - ADULT  Goal: Verbalizes/displays adequate comfort level or baseline comfort level  Description: Interventions:  - Encourage patient to monitor pain and request assistance  - Assess pain using appropriate pain scale  - Administer analgesics based on type and severity of pain and evaluate response  - Implement non-pharmacological measures as appropriate and evaluate response  - Consider cultural and social influences on pain and pain management  - Notify physician/advanced practitioner if interventions unsuccessful or patient reports new pain  Outcome: Progressing     Problem: INFECTION - ADULT  Goal: Absence or prevention of progression during hospitalization  Description: INTERVENTIONS:  - Assess and monitor for signs and symptoms of infection  - Monitor lab/diagnostic results  - Monitor all insertion sites, i e  indwelling lines, tubes, and drains  - Monitor endotracheal if appropriate and nasal secretions for changes in amount and color  - Geff appropriate cooling/warming therapies per order  - Administer medications as ordered  - Instruct and encourage patient and family to use good hand hygiene technique  - Identify and instruct in appropriate isolation precautions for identified infection/condition  Outcome: Progressing     Problem: INFECTION - ADULT  Goal: Absence of fever/infection during neutropenic period  Description: INTERVENTIONS:  - Monitor WBC    Outcome: Progressing

## 2022-06-20 NOTE — PLAN OF CARE
Problem: Potential for Falls  Goal: Patient will remain free of falls  Description: INTERVENTIONS:  - Educate patient/family on patient safety including physical limitations  - Instruct patient to call for assistance with activity   - Consult OT/PT to assist with strengthening/mobility   - Keep Call bell within reach  - Keep bed low and locked with side rails adjusted as appropriate  - Keep care items and personal belongings within reach  - Initiate and maintain comfort rounds  - Make Fall Risk Sign visible to staff  - Offer Toileting every 2 Hours, in advance of need  - Initiate/Maintain bed alarm  - Obtain necessary fall risk management equipment: walker   - Apply yellow socks and bracelet for high fall risk patients  - Consider moving patient to room near nurses station  Outcome: Progressing     Problem: MOBILITY - ADULT  Goal: Maintain or return to baseline ADL function  Description: INTERVENTIONS:  -  Assess patient's ability to carry out ADLs; assess patient's baseline for ADL function and identify physical deficits which impact ability to perform ADLs (bathing, care of mouth/teeth, toileting, grooming, dressing, etc )  - Assess/evaluate cause of self-care deficits   - Assess range of motion  - Assess patient's mobility; develop plan if impaired  - Assess patient's need for assistive devices and provide as appropriate  - Encourage maximum independence but intervene and supervise when necessary  - Involve family in performance of ADLs  - Assess for home care needs following discharge   - Consider OT consult to assist with ADL evaluation and planning for discharge  - Provide patient education as appropriate  Outcome: Progressing  Goal: Maintains/Returns to pre admission functional level  Description: INTERVENTIONS:  - Perform BMAT or MOVE assessment daily    - Set and communicate daily mobility goal to care team and patient/family/caregiver     - Collaborate with rehabilitation services on mobility goals if consulted  - Perform Range of Motion 3 times a day  - Reposition patient every 2 hours    - Dangle patient 3 times a day  - Stand patient 3 times a day  - Ambulate patient 3 times a day  - Out of bed to chair 3 times a day   - Out of bed for meals 3 times a day  - Out of bed for toileting  - Record patient progress and toleration of activity level   Outcome: Progressing     Problem: PAIN - ADULT  Goal: Verbalizes/displays adequate comfort level or baseline comfort level  Description: Interventions:  - Encourage patient to monitor pain and request assistance  - Assess pain using appropriate pain scale  - Administer analgesics based on type and severity of pain and evaluate response  - Implement non-pharmacological measures as appropriate and evaluate response  - Consider cultural and social influences on pain and pain management  - Notify physician/advanced practitioner if interventions unsuccessful or patient reports new pain  Outcome: Progressing     Problem: INFECTION - ADULT  Goal: Absence or prevention of progression during hospitalization  Description: INTERVENTIONS:  - Assess and monitor for signs and symptoms of infection  - Monitor lab/diagnostic results  - Monitor all insertion sites, i e  indwelling lines, tubes, and drains  - Monitor endotracheal if appropriate and nasal secretions for changes in amount and color  - [place] appropriate cooling/warming therapies per order  - Administer medications as ordered  - Instruct and encourage patient and family to use good hand hygiene technique  - Identify and instruct in appropriate isolation precautions for identified infection/condition  Outcome: Progressing  Goal: Absence of fever/infection during neutropenic period  Description: INTERVENTIONS:  - Monitor WBC    Outcome: Progressing     Problem: SAFETY ADULT  Goal: Patient will remain free of falls  Description: INTERVENTIONS:  - Educate patient/family on patient safety including physical limitations  - Instruct patient to call for assistance with activity   - Consult OT/PT to assist with strengthening/mobility   - Keep Call bell within reach  - Keep bed low and locked with side rails adjusted as appropriate  - Keep care items and personal belongings within reach  - Initiate and maintain comfort rounds  - Make Fall Risk Sign visible to staff  - Offer Toileting every 2 Hours, in advance of need  - Initiate/Maintain bed alarm  - Obtain necessary fall risk management equipment: walker   - Apply yellow socks and bracelet for high fall risk patients  - Consider moving patient to room near nurses station  Outcome: Progressing  Goal: Maintain or return to baseline ADL function  Description: INTERVENTIONS:  -  Assess patient's ability to carry out ADLs; assess patient's baseline for ADL function and identify physical deficits which impact ability to perform ADLs (bathing, care of mouth/teeth, toileting, grooming, dressing, etc )  - Assess/evaluate cause of self-care deficits   - Assess range of motion  - Assess patient's mobility; develop plan if impaired  - Assess patient's need for assistive devices and provide as appropriate  - Encourage maximum independence but intervene and supervise when necessary  - Involve family in performance of ADLs  - Assess for home care needs following discharge   - Consider OT consult to assist with ADL evaluation and planning for discharge  - Provide patient education as appropriate  Outcome: Progressing  Goal: Maintains/Returns to pre admission functional level  Description: INTERVENTIONS:  - Perform BMAT or MOVE assessment daily    - Set and communicate daily mobility goal to care team and patient/family/caregiver  - Collaborate with rehabilitation services on mobility goals if consulted  - Perform Range of Motion 3 times a day  - Reposition patient every 2 hours    - Dangle patient 3 times a day  - Stand patient 3 times a day  - Ambulate patient 3 times a day  - Out of bed to chair 3 times a day   - Out of bed for meals 3 times a day  - Out of bed for toileting  - Record patient progress and toleration of activity level   Outcome: Progressing     Problem: DISCHARGE PLANNING  Goal: Discharge to home or other facility with appropriate resources  Description: INTERVENTIONS:  - Identify barriers to discharge w/patient and caregiver  - Arrange for needed discharge resources and transportation as appropriate  - Identify discharge learning needs (meds, wound care, etc )  - Arrange for interpretive services to assist at discharge as needed  - Refer to Case Management Department for coordinating discharge planning if the patient needs post-hospital services based on physician/advanced practitioner order or complex needs related to functional status, cognitive ability, or social support system  Outcome: Progressing     Problem: Knowledge Deficit  Goal: Patient/family/caregiver demonstrates understanding of disease process, treatment plan, medications, and discharge instructions  Description: Complete learning assessment and assess knowledge base    Interventions:  - Provide teaching at level of understanding  - Provide teaching via preferred learning methods  Outcome: Progressing     Problem: CARDIOVASCULAR - ADULT  Goal: Maintains optimal cardiac output and hemodynamic stability  Description: INTERVENTIONS:  - Monitor I/O, vital signs and rhythm  - Monitor for S/S and trends of decreased cardiac output  - Administer and titrate ordered vasoactive medications to optimize hemodynamic stability  - Assess quality of pulses, skin color and temperature  - Assess for signs of decreased coronary artery perfusion  - Instruct patient to report change in severity of symptoms  Outcome: Progressing  Goal: Absence of cardiac dysrhythmias or at baseline rhythm  Description: INTERVENTIONS:  - Continuous cardiac monitoring, vital signs, obtain 12 lead EKG if ordered  - Administer antiarrhythmic and heart rate control medications as ordered  - Monitor electrolytes and administer replacement therapy as ordered  Outcome: Progressing     Problem: Nutrition/Hydration-ADULT  Goal: Nutrient/Hydration intake appropriate for improving, restoring or maintaining nutritional needs  Description: Monitor and assess patient's nutrition/hydration status for malnutrition  Collaborate with interdisciplinary team and initiate plan and interventions as ordered  Monitor patient's weight and dietary intake as ordered or per policy  Utilize nutrition screening tool and intervene as necessary  Determine patient's food preferences and provide high-protein, high-caloric foods as appropriate       INTERVENTIONS:  - Monitor oral intake, urinary output, labs, and treatment plans  - Assess nutrition and hydration status and recommend course of action  - Evaluate amount of meals eaten  - Assist patient with eating if necessary   - Allow adequate time for meals  - Recommend/ encourage appropriate diets, oral nutritional supplements, and vitamin/mineral supplements  - Order, calculate, and assess calorie counts as needed  - Recommend, monitor, and adjust tube feedings and TPN/PPN based on assessed needs  - Assess need for intravenous fluids  - Provide specific nutrition/hydration education as appropriate  - Include patient/family/caregiver in decisions related to nutrition  Outcome: Progressing     Problem: Prexisting or High Potential for Compromised Skin Integrity  Goal: Skin integrity is maintained or improved  Description: INTERVENTIONS:  - Identify patients at risk for skin breakdown  - Assess and monitor skin integrity  - Assess and monitor nutrition and hydration status  - Monitor labs   - Assess for incontinence   - Turn and reposition patient  - Assist with mobility/ambulation  - Relieve pressure over bony prominences  - Avoid friction and shearing  - Provide appropriate hygiene as needed including keeping skin clean and dry  - Evaluate need for skin moisturizer/barrier cream  - Collaborate with interdisciplinary team   - Patient/family teaching  - Consider wound care consult   Outcome: Progressing

## 2022-06-20 NOTE — ASSESSMENT & PLAN NOTE
· Creatinine with slight increased this morning to 3 86  · Lisinopril discontinued  · Continue Lasix as above  · Nephrology following; appreciate further recommendations  · Follow-up morning labs

## 2022-06-20 NOTE — ASSESSMENT & PLAN NOTE
Lab Results   Component Value Date    HGBA1C 11 6 (H) 08/03/2021       Recent Labs     06/19/22  1401 06/19/22  1438 06/19/22  1630 06/19/22 2020   POCGLU 58* 160* 127 271*       Blood Sugar Average: Last 72 hrs:  (P) 246 [id number]     · Continue Lantus 40 QHS  · Increase Lispro to 10U TID with meals  · Continue corrective sliding scale insulin, Accu-Cheks, and hypoglycemic protocol  · Recommended outpatient Endocrinology follow-up  · Continue carb controlled diet

## 2022-06-20 NOTE — QUICK NOTE
Notified on hypoglycemia with glucose of 44  Hypoglycemic protocol is in place and glucose has improved following OJ, crackers, and 100% of his dinner  Insulin was adjusted  Will continue corrective sliding scale insulin but decrease Lantus to 20U QHS and DC meal time insulin for now  Patient is lethargic but was, again, able to stay awake long enough to eat 100% of his dinner tray  He is easily arousable and oriented x 4

## 2022-06-20 NOTE — NURSING NOTE
Patient had a BS of 44 today at 36  Patient was fully awake and able to eat and drink  Notified Dr Thalia Yates  Patient had orange juice, milk, pudding, crackers and he ate 100% of dinner tray   before dinner  Patient is still very drowsy and not able to stay awake for more than a few seconds at a time  I notified Dr Thalia Yates that I was having some concerns about patient  Dr Thalia Yates assessed patient at bedside  Will continue to monitor

## 2022-06-20 NOTE — ASSESSMENT & PLAN NOTE
· s/p debridement of his ulceration  · MRI R Foot (6/20): Mild ulceration plantar surface of the great toe at the level of the proximal and distal phalanges without MR evidence for osteomyelitis  Small cystic ganglion plantar surface of the medial forefoot at the level of the 1st MTP joint     · Podiatry following

## 2022-06-20 NOTE — PROGRESS NOTES
[facility]  Progress Note - Trenton Duffy 1980, 38 y o  male MRN: [id number]  Unit/Bed#: -01 Encounter: [id number]  Primary Care Provider: Lizette Youngblood MD   Date and time admitted to hospital: 6/17/2022  7:09 AM    * Hypertensive emergency  Assessment & Plan  · Present on admission as evidenced by acute renal failure and SBPs > 180 mmHg  · Likely due to medication non-compliance; blood pressure has improved since admission  · Continue home Coreg 25 mg twice daily and Cardura 2 mg daily  · Continue Lasix 40mg TID  · Lisinopril discontinued this AM  · Continue to monitor blood pressure trends  · Nephrology following    [facility]  Assessment & Plan  · Creatinine with slight increased this morning to 3 86  · Lisinopril discontinued  · Continue Lasix as above  · Nephrology following; appreciate further recommendations  · Follow-up morning labs    Open toe wound  Assessment & Plan  · s/p debridement of his ulceration  · MRI R Foot (6/20): Mild ulceration plantar surface of the great toe at the level of the proximal and distal phalanges without MR evidence for osteomyelitis  Small cystic ganglion plantar surface of the medial forefoot at the level of the 1st MTP joint  · Podiatry following    Volume overload  Assessment & Plan  · Improvement in lower extremity edema and abdominal distension  · TTE (6/20): LVEF 55%, mild concentric hypertrophy  Wall motion and diastolic function is normal   No pericardial effusion    · Lasix as above  · Nephrology following    Type 2 diabetes mellitus, with long-term current use of insulin [facility])  Assessment & Plan  Lab Results   Component Value Date    HGBA1C 11 6 (H) 08/03/2021       Recent Labs     06/19/22  1401 06/19/22  1438 06/19/22  1630 06/19/22 2020   POCGLU 58* 160* 127 271*       Blood Sugar Average: Last 72 hrs:  (P) 137 [id number]     · Continue Lantus 40 QHS  · Increase Lispro to 10U TID with meals  · Continue corrective sliding scale insulin, Accu-Cheks, and hypoglycemic protocol  · Recommended outpatient Endocrinology follow-up  · Continue carb controlled diet    Bipolar 1 disorder ([address] )  Assessment & Plan  · Continue home Vraylar and Prozac    Hypothyroidism   · TSH 40 31, Free T4 0 71  · Continue Levothyroxine to 125 mcg PO daily  · Recheck TSH in 4-6 weeks    VTE Pharmacologic Prophylaxis: VTE Score: 4 Moderate Risk (Score 3-4) - Pharmacological DVT Prophylaxis Ordered: heparin  Patient Centered Rounds: I performed bedside rounds with nursing staff today  Discussions with Specialists or Other Care Team Provider: Nursing and CM    Education and Discussions with Family / Patient: Updated  (father and mother) via phone  Time Spent for Care: 45 minutes  More than 50% of total time spent on counseling and coordination of care as described above  Current Length of Stay: 3 day(s)  Current Patient Status: Inpatient   Certification Statement: The patient will continue to require additional inpatient hospital stay due to volume overload requring IV diuretics; better BP control; STEFANIA on CKD  Discharge Plan: TBD based on clinical improvement  Plan to DC to home once medically cleared  Code Status: Level 1 - Full Code    Subjective:   Resting comfortably in bed without any acute complaints  No significant overnight events reported by nursing  Objective:     Vitals:   Temp (24hrs), Av 7 °F (37 1 °C), Min:98 5 °F (36 9 °C), Max:98 9 °F (37 2 °C)    Temp:  [98 5 °F (36 9 °C)-98 9 °F (37 2 °C)] 98 5 °F (36 9 °C)  HR:  [83-89] 89  Resp:  [18-20] 18  BP: (137-171)/() 137/95  SpO2:  [92 %-96 %] 95 %  Body mass index is 62 3 kg/m²  Input and Output Summary (last 24 hours):      Intake/Output Summary (Last 24 hours) at 2022 1341  Last data filed at 2022 1100  Gross per 24 hour   Intake 705 ml   Output 1574 ml   Net -869 ml       Physical Exam:   Physical Exam  Vitals and nursing note reviewed  Constitutional:       Appearance: Normal appearance  He is obese  HENT:      Head: Normocephalic and atraumatic  Mouth/Throat:      Mouth: Mucous membranes are moist       Pharynx: Oropharynx is clear  Eyes:      Extraocular Movements: Extraocular movements intact  Conjunctiva/sclera: Conjunctivae normal    Cardiovascular:      Rate and Rhythm: Normal rate and regular rhythm  Pulses: Normal pulses  Heart sounds: Normal heart sounds  Pulmonary:      Effort: Pulmonary effort is normal  No respiratory distress  Breath sounds: Normal breath sounds  No wheezing  Abdominal:      General: Bowel sounds are normal  There is no distension  Palpations: Abdomen is soft  Tenderness: There is no abdominal tenderness  Musculoskeletal:         General: Normal range of motion  Cervical back: Normal range of motion and neck supple  Right lower leg: Edema present  Left lower leg: Edema present  Skin:     General: Skin is warm and dry  Comments: R great toe ulceration   Neurological:      General: No focal deficit present  Mental Status: He is alert and oriented to person, place, and time  Mental status is at baseline     Psychiatric:         Mood and Affect: Mood normal          Behavior: Behavior normal          Judgment: Judgment normal           Labs:  Results from last 7 days   Lab Units 06/20/22  0510   WBC Thousand/uL 8 39   HEMOGLOBIN g/dL 9 8*   HEMATOCRIT % 30 9*   PLATELETS Thousands/uL 374   NEUTROS PCT % 67   LYMPHS PCT % 20   MONOS PCT % 8   EOS PCT % 4     Results from last 7 days   Lab Units 06/20/22  0510   SODIUM mmol/L 137   POTASSIUM mmol/L 4 4   CHLORIDE mmol/L 99   CO2 mmol/L 29   BUN mg/dL 58*   CREATININE mg/dL 3 86*   ANION GAP mmol/L 9   CALCIUM mg/dL 8 8   ALBUMIN g/dL 3 1*   GLUCOSE RANDOM mg/dL 96         Results from last 7 days   Lab Units 06/20/22  1031 06/20/22  0611 06/19/22  2020 06/19/22  1630 06/19/22  1438 06/19/22  1401 06/19/22  1130 06/19/22  0536 06/19/22  0410 06/18/22  2054 06/18/22  1508 06/18/22  1054   POC GLUCOSE mg/dl 375* 76 271* 127 160* 58* 201* 133 69 170* 139 115               Lines/Drains:  Invasive Devices  Report    Peripheral Intravenous Line  Duration           Peripheral IV 06/18/22 Right Antecubital 1 day              Imaging: Reviewed radiology reports from this admission including: MRI RLE    Recent Cultures (last 7 days):         Last 24 Hours Medication List:   Current Facility-Administered Medications   Medication Dose Route Frequency Provider Last Rate    acetaminophen  650 mg Oral Q4H PRN Heidy Benjamin, DO      albuterol  2 puff Inhalation 4x Daily Heidy Benjamin, DO      aluminum-magnesium hydroxide-simethicone  30 mL Oral Q4H PRN Lainey Pisano PA-C      cariprazine  6 mg Oral Daily Heidy Benjamin, DO      carvedilol  25 mg Oral BID With Meals [place], [place]      dextromethorphan-guaiFENesin  10 mL Oral Q4H PRN Rita Moya MD      doxazosin  2 mg Oral Daily Fredericka Seip, MD      famotidine  40 mg Oral Daily Heidy Andradean, DO      FLUoxetine  20 mg Oral QAM Heidy Benjamin, DO      furosemide  40 mg Intravenous TID (diuretic) Fredericka Seip, MD      glycerin-hypromellose-  1 drop Both Eyes Q3H PRN Lainey Pisano PA-C      heparin (porcine)  5,000 Units Subcutaneous Q8H [facility] Heidy Benjamin, DO      hydrALAZINE  5 mg Intravenous Q6H PRN Heidy Benjamin, DO      insulin glargine  40 Units Subcutaneous HS Heidy Benjamin, DO      insulin lispro  10 Units Subcutaneous TID With Meals [facility] Joel, DO      insulin lispro  2-12 Units Subcutaneous TID  Heidy Benjamin, DO      insulin lispro  2-12 Units Subcutaneous HS Heidy Sourav, DO      ipratropium  2 puff Inhalation 4x Daily Rita Moya MD      levothyroxine  125 mcg Oral Early Morning Rita Moya MD      LORazepam  0 5 mg Oral Daily PRN Heidy Benjamin, DO      ondansetron  4 mg Intravenous Q6H PRN Lisa Canary, DO      pantoprazole  40 mg Oral Early Morning Lisa Canary, DO      sodium chloride (PF)  3 mL Intravenous Q1H PRN Lisa Canary, DO          Today, Patient Was Seen By: Reinaldo Hadley DO    **Please Note: This note may have been constructed using a voice recognition system  **

## 2022-06-20 NOTE — PROGRESS NOTES
Progress Note - Nephrology   Vipul Sloan 38 y o  male MRN: [id number]  Unit/Bed#: -01 Encounter: [id number]    Assessment and Plan    1  Acute kidney injury, present admission, superimposed on chronic kidney disease  Will hold ACE-inhibitor  Trend renal function  Continue to provide supportive care  Optimize hemodynamics  Maintain mean arterial pressure greater than 65 mmHg  Renally dose medications  Avoid nephrotoxins  Please discuss with renal any diuretics or possible nephrotoxic agents, such as NSAIDs or IV contrast  Recommend avoidance of PPIs in favor of H2 blocker, if appropriate for clinical scenario  Monitor for urinary retention- strict I&Os, record bladder scan PVRs and follow urinary retention protocol  2  Chronic kidney disease stage 3 with baseline creatinine around 2 0 mg/dL  · Will need outpatient nephrology follow-up  3  Volume overload  · Continue 1 5 L fluid restriction, furosemide 40 mg 3 times daily  Will request 2 g sodium restriction  4  Morbid obesity with BMI 62  · Body mass index is 62 3 kg/m²  BMI greater than 40 is associated with greater risk of progression of renal disease secondary to glomerular hyperfiltration  Recommend weight loss  5  Hypertensive urgency  · Temporarily hold lisinopril due to acute kidney injury  Increase carvedilol to 25 mg twice daily  Monitor blood pressure and heart rate  Continue doxazosin 2 mg at bedtime  Patient notes elevated blood pressures overnight  Consider dose increase of doxazosin      Follow up reason for today's visit:  Acute kidney injury    Hypertensive emergency    Patient Active Problem List   Diagnosis    Type 2 diabetes mellitus, with long-term current use of insulin ([facility] )    Abdominal pain    OCD (obsessive compulsive disorder)    Schizoaffective disorder, depressive type ([facility] )    Hypothyroidism    Morbid obesity with BMI of 50 0-59 9, adult (HCC)    Anxiety    Episodic confusion    Snoring    Insomnia    Hypersomnia    Vitamin D deficiency    Vomiting    Occipital neuralgia of left side    Headache    Nodule of parotid gland    Bipolar 1 disorder (HCC)    Depression    Type 1 diabetes mellitus without complication (HCC)    Urinary retention    Peripheral edema    Hyperlipidemia, mixed    Migraine without aura and without status migrainosus, not intractable    Class 3 severe obesity due to excess calories with serious comorbidity and body mass index (BMI) of 60 0 to 69 9 in adult [facility])    Spinal stenosis in cervical region    Difficulty urinating    Urinary tract infection without hematuria    Penile pain    Chronic migraine without aura without status migrainosus, not intractable    Hypertensive emergency    Encephalopathy    Leukocytosis    Schizo affective schizophrenia ([facility] )    [facility]    Acute respiratory disease due to COVID-19 virus    Elevated troponin    Acute on chronic kidney failure ([facility]    Family history of heart disease    Hypokalemia    Chest pressure    GERD (gastroesophageal reflux disease)    Hypertension    SOB (shortness of breath)    Volume overload    Hyponatremia    Open toe wound         Subjective:   Denies physical complaints  States he is concerned anticipated results of MRI  A complete 10 point review of systems was performed and is otherwise negative  Objective:     Vitals: Blood pressure 137/95, pulse 89, temperature 98 5 °F (36 9 °C), temperature source Oral, resp  rate 18, height 5' 2" (1 575 m), weight (!) 154 kg (340 lb 9 8 oz), SpO2 95 %  ,Body mass index is 62 3 kg/m²  Weight (last 2 days)     None            Intake/Output Summary (Last 24 hours) at 6/20/2022 1355  Last data filed at 6/20/2022 1100  Gross per 24 hour   Intake 705 ml   Output 1574 ml   Net -869 ml     I/O last 3 completed shifts:   In: 705 [P O :705]  Out: 6897 [Urine:1074]         Physical Exam: /95   Pulse 89   Temp 98 5 °F (36 9 °C) (Oral) Resp 18   Ht 5' 2" (1 575 m)   Wt (!) 154 kg (340 lb 9 8 oz)   SpO2 95%   BMI 62 30 kg/m²     General Appearance:    No acute distress  Cooperative  Appears stated age  Head:    Normocephalic  Atraumatic  Normal jaw occlusion  Eyes:    Lids, conjunctiva normal  No scleral icterus  Ears:    Normal external ears  Nose:   Nares normal  No drainage  Mouth:   Lips, tongue normal  Mucosa normal  Phonation normal    Neck:   Supple  Symmetrical    Back:     Symmetric  No CVA tenderness  Lungs:     Normal respiratory effort  Clear to auscultation bilaterally  Chest wall:    No tenderness or deformity  Heart:    Regular rate and rhythm  Normal S1 and S2  No murmur  No JVD  No edema  Abdomen:     Soft  Non-tender  Bowel sounds active  Genitourinary:   No Giang catheter present  Extremities:   Extremities normal  Atraumatic  No cyanosis  Skin:   Warm and dry  No pallor, jaundice, rash, ecchymoses  Neurologic:   Alert and oriented to person, place, time  No focal deficit  Lab, Imaging and other studies: I have personally reviewed pertinent labs  CBC:   Lab Results   Component Value Date    WBC 8 39 06/20/2022    HGB 9 8 (L) 06/20/2022    HCT 30 9 (L) 06/20/2022    MCV 86 06/20/2022     06/20/2022    MCH 27 1 06/20/2022    MCHC 31 7 06/20/2022    RDW 13 1 06/20/2022    MPV 9 2 06/20/2022    NRBC 0 06/20/2022     CMP:   Lab Results   Component Value Date    K 4 4 06/20/2022    CL 99 06/20/2022    CO2 29 06/20/2022    BUN 58 (H) 06/20/2022    CREATININE 3 86 (H) 06/20/2022    CALCIUM 8 8 06/20/2022    EGFR 17 06/20/2022           Results from last 7 days   Lab Units 06/20/22  0510 06/19/22  0540 06/18/22  0545   POTASSIUM mmol/L 4 4 3 9 4 3   CHLORIDE mmol/L 99 99 97   CO2 mmol/L 29 29 26   BUN mg/dL 58* 59* 60*   CREATININE mg/dL 3 86* 3 78* 3 73*   CALCIUM mg/dL 8 8 8 0* 8 0*         Phosphorus: No results found for: PHOS  Magnesium: No results found for: MG  Urinalysis: No results found for: Willetta Fees, SPECGRAV, PHUR, LEUKOCYTESUR, NITRITE, PROTEINUA, GLUCOSEU, KETONESU, BILIRUBINUR, BLOODU  Ionized Calcium: No results found for: CAION  Coagulation: No results found for: PT, INR, APTT  Troponin: No results found for: TROPONINI  ABG: No results found for: PHART, CZR6DZY, PO2ART, UVJ2OIN, T0XTOOKG, BEART, SOURCE  Radiology review:     IMAGING  Procedure: XR foot 3+ vw right    Result Date: 6/20/2022  Narrative: RIGHT FOOT INDICATION:   r/o OM of the right hallux  COMPARISON:  None VIEWS:  XR FOOT 3+ VW RIGHT Images: 3 FINDINGS: There is no acute fracture or dislocation  No significant degenerative changes  No lytic or blastic osseous lesion  Soft tissues are unremarkable  Impression: No acute osseous abnormality  Workstation performed: [id number]     Procedure: MRI foot/forefoot toes right wo contrast    Result Date: 6/20/2022  Narrative: MRI FOOT/FOREFOOT TOES RIGHT WO CONTRAST INDICATION:   r/o OM of the right hallux  COMPARISON: Correlation is made with the prior radiograph dated 6/19/2022  TECHNIQUE:  MR sequences were obtained of the right foot including the following:  Localizer, sagittal T1/STIR, coronal T1/T2 fat/ sat/STIR, axial T2 fat sat/STIR/PD  Images were acquired on a 1 5 Karin unit  Imaging performed on 1 5T MRI Gadolinium was not used FINDINGS: SUBCUTANEOUS TISSUES: There is mild ulceration along the plantar surface of the great toe at the level of the proximal and distal phalanges  Probable mild surrounding cellulitis  No discrete abscess  There is a focal cystic prominence measuring approximately 1 0 x 0 6 cm located just superficial to the flexor tendon at the level of the 1st MTP joint likely a tiny cystic ganglion  BONES:  No confluent altered marrow signal to suggest osteomyelitis  No acute fracture  FIRST MTP JOINT:  Intact  SESAMOID BONES:  Intact  OTHER ARTICULAR SURFACE:  Normal  PLANTAR FASCIA:  Intact  LISFRANC LIGAMENT:  Intact   FOREFOOT TENDONS: Intact  INTERMETATARSAL REGIONS: No bursitis or Maxwell's neuroma  MUSCULATURE: Intact  Impression: Mild ulceration plantar surface of the great toe at the level of the proximal and distal phalanges without MR evidence for osteomyelitis  Small cystic ganglion plantar surface of the medial forefoot at the level of the 1st MTP joint  Workstation performed: [id number]     Procedure: US kidney and bladder    Result Date: 6/17/2022  Narrative: RENAL ULTRASOUND INDICATION:   ilya  COMPARISON: None TECHNIQUE:   Ultrasound of the retroperitoneum was performed with a curvilinear transducer utilizing volumetric sweeps and still imaging techniques  FINDINGS: KIDNEYS: Symmetric and normal size  Right kidney:  10 9 x 4 3 x 4 7 cm  Volume 116 0 mL Left kidney:  11 3 x 4 6 x 5 0 cm  Volume 136 0 mL Right kidney Normal echogenicity and contour  No mass is identified  No hydronephrosis  No shadowing calculi  No perinephric fluid collections  Left kidney Normal echogenicity and contour  No mass is identified  No hydronephrosis  No shadowing calculi  No perinephric fluid collections  URETERS: Nonvisualized  BLADDER: Normally distended  Prevoid bladder volume 432 mL  No focal thickening or mass lesions  Bilateral ureteral jets detected       Impression: Normal  Workstation performed: [id number]       Current Facility-Administered Medications   Medication Dose Route Frequency    acetaminophen (TYLENOL) tablet 650 mg  650 mg Oral Q4H PRN    albuterol (PROVENTIL HFA,VENTOLIN HFA) inhaler 2 puff  2 puff Inhalation 4x Daily    aluminum-magnesium hydroxide-simethicone (MYLANTA) oral suspension 30 mL  30 mL Oral Q4H PRN    cariprazine (VRAYLAR) capsule 6 mg  6 mg Oral Daily    carvedilol (COREG) tablet 25 mg  25 mg Oral BID With Meals    dextromethorphan-guaiFENesin (ROBITUSSIN DM) oral syrup 10 mL  10 mL Oral Q4H PRN    doxazosin (CARDURA) tablet 2 mg  2 mg Oral Daily    famotidine (PEPCID) tablet 40 mg  40 mg Oral Daily    FLUoxetine (PROzac) capsule 20 mg  20 mg Oral QAM    furosemide (LASIX) injection 40 mg  40 mg Intravenous TID (diuretic)    glycerin-hypromellose- (ARTIFICIAL TEARS) ophthalmic solution 1 drop  1 drop Both Eyes Q3H PRN    heparin (porcine) subcutaneous injection 5,000 Units  5,000 Units Subcutaneous Q8H [address]    hydrALAZINE (APRESOLINE) injection 5 mg  5 mg Intravenous Q6H PRN    insulin glargine (LANTUS) subcutaneous injection 40 Units 0 4 mL  40 Units Subcutaneous HS    insulin lispro (HumaLOG) 100 units/mL subcutaneous injection 10 Units  10 Units Subcutaneous TID With Meals    insulin lispro (HumaLOG) 100 units/mL subcutaneous injection 2-12 Units  2-12 Units Subcutaneous TID AC    insulin lispro (HumaLOG) 100 units/mL subcutaneous injection 2-12 Units  2-12 Units Subcutaneous HS    ipratropium (ATROVENT HFA) inhaler 2 puff  2 puff Inhalation 4x Daily    levothyroxine tablet 125 mcg  125 mcg Oral Early Morning    LORazepam (ATIVAN) tablet 0 5 mg  0 5 mg Oral Daily PRN    ondansetron (ZOFRAN) injection 4 mg  4 mg Intravenous Q6H PRN    pantoprazole (PROTONIX) EC tablet 40 mg  40 mg Oral Early Morning    sodium chloride (PF) 0 9 % injection 3 mL  3 mL Intravenous Q1H PRN     Medications Discontinued During This Encounter   Medication Reason    carvedilol (COREG) tablet 12 5 mg     lisinopril (ZESTRIL) tablet 20 mg     verapamil (CALAN-SR) CR tablet 240 mg     insulin glargine (LANTUS) subcutaneous injection 50 Units 0 5 mL     propranolol (INDERAL) tablet 10 mg     furosemide (LASIX) injection 40 mg     doxazosin (CARDURA) tablet 2 mg     fluvoxaMINE (LUVOX) 100 mg tablet Discontinued by another clinician    cyclobenzaprine (FLEXERIL) 10 mg tablet Therapy completed    insulin glargine (LANTUS) 100 units/mL subcutaneous injection Duplicate order    lurasidone (LATUDA) 40 mg tablet Discontinued by another clinician    sucralfate (CARAFATE) 1 g tablet Side effects    ipratropium (ATROVENT) 0 02 % inhalation solution 0 5 mg     levothyroxine tablet 112 mcg     hydrALAZINE (APRESOLINE) tablet 25 mg     lisinopril (ZESTRIL) tablet 20 mg     doxazosin (CARDURA) tablet 4 mg     verapamil (CALAN-SR) CR tablet 240 mg     furosemide (LASIX) injection 40 mg     furosemide (LASIX) injection 40 mg     furosemide (LASIX) injection 40 mg     lisinopril (ZESTRIL) tablet 10 mg     carvedilol (COREG) tablet 12 5 mg     insulin lispro (HumaLOG) 100 units/mL subcutaneous injection 6 Units        Ivet Allen Zamorano PA-C    Portions of the record may have been created with voice recognition software  Occasional wrong word or "sound a like" substitutions may have occurred due to the inherent limitations of voice recognition software  Read the chart carefully and recognize, using context, where substitutions have occurred

## 2022-06-21 LAB
ANION GAP SERPL CALCULATED.3IONS-SCNC: 7 MMOL/L (ref 4–13)
BUN SERPL-MCNC: 60 MG/DL (ref 5–25)
CALCIUM SERPL-MCNC: 8.8 MG/DL (ref 8.4–10.2)
CHLORIDE SERPL-SCNC: 97 MMOL/L (ref 96–108)
CO2 SERPL-SCNC: 31 MMOL/L (ref 21–32)
CREAT SERPL-MCNC: 3.9 MG/DL (ref 0.6–1.3)
ERYTHROCYTE [DISTWIDTH] IN BLOOD BY AUTOMATED COUNT: 12.9 % (ref 11.6–15.1)
GFR SERPL CREATININE-BSD FRML MDRD: 17 ML/MIN/1.73SQ M
GLUCOSE SERPL-MCNC: 158 MG/DL (ref 65–140)
GLUCOSE SERPL-MCNC: 165 MG/DL (ref 65–140)
GLUCOSE SERPL-MCNC: 180 MG/DL (ref 65–140)
GLUCOSE SERPL-MCNC: 191 MG/DL (ref 65–140)
GLUCOSE SERPL-MCNC: 355 MG/DL (ref 65–140)
HCT VFR BLD AUTO: 30.4 % (ref 36.5–49.3)
HGB BLD-MCNC: 9.5 G/DL (ref 12–17)
MCH RBC QN AUTO: 26.7 PG (ref 26.8–34.3)
MCHC RBC AUTO-ENTMCNC: 31.3 G/DL (ref 31.4–37.4)
MCV RBC AUTO: 85 FL (ref 82–98)
PLATELET # BLD AUTO: 365 THOUSANDS/UL (ref 149–390)
PMV BLD AUTO: 9.3 FL (ref 8.9–12.7)
POTASSIUM SERPL-SCNC: 4.3 MMOL/L (ref 3.5–5.3)
RBC # BLD AUTO: 3.56 MILLION/UL (ref 3.88–5.62)
SODIUM SERPL-SCNC: 135 MMOL/L (ref 135–147)
WBC # BLD AUTO: 8.36 THOUSAND/UL (ref 4.31–10.16)

## 2022-06-21 PROCEDURE — 85027 COMPLETE CBC AUTOMATED: CPT | Performed by: INTERNAL MEDICINE

## 2022-06-21 PROCEDURE — 80048 BASIC METABOLIC PNL TOTAL CA: CPT | Performed by: PHYSICIAN ASSISTANT

## 2022-06-21 PROCEDURE — 82948 REAGENT STRIP/BLOOD GLUCOSE: CPT

## 2022-06-21 PROCEDURE — 99232 SBSQ HOSP IP/OBS MODERATE 35: CPT | Performed by: PHYSICIAN ASSISTANT

## 2022-06-21 PROCEDURE — 99232 SBSQ HOSP IP/OBS MODERATE 35: CPT | Performed by: PODIATRIST

## 2022-06-21 PROCEDURE — 99233 SBSQ HOSP IP/OBS HIGH 50: CPT | Performed by: INTERNAL MEDICINE

## 2022-06-21 RX ORDER — LORAZEPAM 0.5 MG/1
0.5 TABLET ORAL 2 TIMES DAILY
Status: DISCONTINUED | OUTPATIENT
Start: 2022-06-21 | End: 2022-06-21

## 2022-06-21 RX ORDER — LORAZEPAM 0.5 MG/1
0.5 TABLET ORAL EVERY 6 HOURS PRN
Status: DISCONTINUED | OUTPATIENT
Start: 2022-06-21 | End: 2022-06-23 | Stop reason: HOSPADM

## 2022-06-21 RX ORDER — INSULIN LISPRO 100 [IU]/ML
5 INJECTION, SOLUTION INTRAVENOUS; SUBCUTANEOUS
Status: DISCONTINUED | OUTPATIENT
Start: 2022-06-21 | End: 2022-06-23 | Stop reason: HOSPADM

## 2022-06-21 RX ORDER — HYDRALAZINE HYDROCHLORIDE 25 MG/1
25 TABLET, FILM COATED ORAL EVERY 8 HOURS SCHEDULED
Status: DISCONTINUED | OUTPATIENT
Start: 2022-06-21 | End: 2022-06-23 | Stop reason: HOSPADM

## 2022-06-21 RX ORDER — FUROSEMIDE 10 MG/ML
40 INJECTION INTRAMUSCULAR; INTRAVENOUS
Status: DISCONTINUED | OUTPATIENT
Start: 2022-06-21 | End: 2022-06-22

## 2022-06-21 RX ADMIN — CARIPRAZINE 6 MG: 4.5 CAPSULE, GELATIN COATED ORAL at 08:01

## 2022-06-21 RX ADMIN — IPRATROPIUM BROMIDE 2 PUFF: 17 AEROSOL, METERED RESPIRATORY (INHALATION) at 08:04

## 2022-06-21 RX ADMIN — FUROSEMIDE 40 MG: 10 INJECTION, SOLUTION INTRAMUSCULAR; INTRAVENOUS at 05:28

## 2022-06-21 RX ADMIN — ACETAMINOPHEN 650 MG: 325 TABLET ORAL at 05:27

## 2022-06-21 RX ADMIN — CARVEDILOL 25 MG: 25 TABLET, FILM COATED ORAL at 17:38

## 2022-06-21 RX ADMIN — ALBUTEROL SULFATE 2 PUFF: 90 AEROSOL, METERED RESPIRATORY (INHALATION) at 21:59

## 2022-06-21 RX ADMIN — PANTOPRAZOLE SODIUM 40 MG: 40 TABLET, DELAYED RELEASE ORAL at 05:27

## 2022-06-21 RX ADMIN — ACETAMINOPHEN 650 MG: 325 TABLET ORAL at 12:28

## 2022-06-21 RX ADMIN — INSULIN LISPRO 2 UNITS: 100 INJECTION, SOLUTION INTRAVENOUS; SUBCUTANEOUS at 09:16

## 2022-06-21 RX ADMIN — IPRATROPIUM BROMIDE 2 PUFF: 17 AEROSOL, METERED RESPIRATORY (INHALATION) at 17:40

## 2022-06-21 RX ADMIN — HYDRALAZINE HYDROCHLORIDE 25 MG: 25 TABLET ORAL at 09:19

## 2022-06-21 RX ADMIN — HYDRALAZINE HYDROCHLORIDE 25 MG: 25 TABLET ORAL at 22:02

## 2022-06-21 RX ADMIN — INSULIN LISPRO 5 UNITS: 100 INJECTION, SOLUTION INTRAVENOUS; SUBCUTANEOUS at 17:39

## 2022-06-21 RX ADMIN — DOXAZOSIN 2 MG: 2 TABLET ORAL at 08:01

## 2022-06-21 RX ADMIN — HEPARIN SODIUM 5000 UNITS: 5000 INJECTION INTRAVENOUS; SUBCUTANEOUS at 21:58

## 2022-06-21 RX ADMIN — INSULIN GLARGINE 20 UNITS: 100 INJECTION, SOLUTION SUBCUTANEOUS at 22:00

## 2022-06-21 RX ADMIN — ALBUTEROL SULFATE 2 PUFF: 90 AEROSOL, METERED RESPIRATORY (INHALATION) at 08:04

## 2022-06-21 RX ADMIN — NYSTATIN: 100000 POWDER TOPICAL at 17:40

## 2022-06-21 RX ADMIN — HEPARIN SODIUM 5000 UNITS: 5000 INJECTION INTRAVENOUS; SUBCUTANEOUS at 05:27

## 2022-06-21 RX ADMIN — FLUOXETINE 20 MG: 20 CAPSULE ORAL at 08:01

## 2022-06-21 RX ADMIN — LEVOTHYROXINE SODIUM 125 MCG: 25 TABLET ORAL at 05:27

## 2022-06-21 RX ADMIN — FUROSEMIDE 40 MG: 10 INJECTION, SOLUTION INTRAMUSCULAR; INTRAVENOUS at 16:19

## 2022-06-21 RX ADMIN — LORAZEPAM 0.5 MG: 0.5 TABLET ORAL at 22:41

## 2022-06-21 RX ADMIN — FAMOTIDINE 40 MG: 20 TABLET ORAL at 08:01

## 2022-06-21 RX ADMIN — IPRATROPIUM BROMIDE 2 PUFF: 17 AEROSOL, METERED RESPIRATORY (INHALATION) at 12:07

## 2022-06-21 RX ADMIN — ALBUTEROL SULFATE 2 PUFF: 90 AEROSOL, METERED RESPIRATORY (INHALATION) at 12:07

## 2022-06-21 RX ADMIN — INSULIN LISPRO 2 UNITS: 100 INJECTION, SOLUTION INTRAVENOUS; SUBCUTANEOUS at 17:39

## 2022-06-21 RX ADMIN — HYDRALAZINE HYDROCHLORIDE 25 MG: 25 TABLET ORAL at 16:19

## 2022-06-21 RX ADMIN — INSULIN LISPRO 2 UNITS: 100 INJECTION, SOLUTION INTRAVENOUS; SUBCUTANEOUS at 21:59

## 2022-06-21 RX ADMIN — IPRATROPIUM BROMIDE 2 PUFF: 17 AEROSOL, METERED RESPIRATORY (INHALATION) at 21:59

## 2022-06-21 RX ADMIN — NYSTATIN: 100000 POWDER TOPICAL at 08:04

## 2022-06-21 RX ADMIN — Medication 6 MG: at 21:59

## 2022-06-21 RX ADMIN — HEPARIN SODIUM 5000 UNITS: 5000 INJECTION INTRAVENOUS; SUBCUTANEOUS at 14:27

## 2022-06-21 RX ADMIN — LORAZEPAM 0.5 MG: 0.5 TABLET ORAL at 14:55

## 2022-06-21 RX ADMIN — INSULIN LISPRO 10 UNITS: 100 INJECTION, SOLUTION INTRAVENOUS; SUBCUTANEOUS at 12:06

## 2022-06-21 RX ADMIN — CARVEDILOL 25 MG: 25 TABLET, FILM COATED ORAL at 08:01

## 2022-06-21 RX ADMIN — ALBUTEROL SULFATE 2 PUFF: 90 AEROSOL, METERED RESPIRATORY (INHALATION) at 17:40

## 2022-06-21 NOTE — PROGRESS NOTES
Progress Note - Nephrology   Izabella  38 y o  male MRN: [id number]  Unit/Bed#: -01 Encounter: [id number]    Assessment and Plan    1  Acute kidney injury, present admission, superimposed on chronic kidney disease  · Renal function continues to worsen to creatinine 3 9 mg/dL  Patient is nonoliguric with 1 9 L urine output  Fraction excretion of urea is decreased to 32%, but would defer volume expansion given volume overload  Could consider temporary hold of loop diuretic, but patient examines volume overloaded, so recommend continuation at this time  This is being continued at twice daily, decreased from 3 times daily  May be lead rise following ACE-inhibitor use  Will re-evaluate tomorrow  If any worsening, consider for workup with possible renal biopsy  Consideration will be deferred to primary nephrologist   Trend renal function  Continue to provide supportive care  Optimize hemodynamics  Maintain mean arterial pressure greater than 65 mmHg  Renally dose medications  Avoid nephrotoxins  Please discuss with renal any diuretics or possible nephrotoxic agents, such as NSAIDs or IV contrast  Recommend avoidance of PPIs in favor of H2 blocker, if appropriate for clinical scenario  Monitor for urinary retention- strict I&Os, record bladder scan PVRs and follow urinary retention protocol  2  Chronic kidney disease stage 3 with baseline creatinine around 2 0 mg/dL with nephrotic range proteinuria  ? Will need outpatient nephrology follow-up within 2 weeks with BMP prior  3  Volume overload  ? 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    Follow up reason for today's visit:     Hypertensive emergency    Patient Active Problem List   Diagnosis    Type 2 diabetes mellitus, with long-term current use of insulin ([place] [address] )    Abdominal pain    OCD (obsessive compulsive disorder)    Schizoaffective disorder, depressive type ([place] [address] )    Hypothyroidism    Morbid obesity with BMI of 50 0-59 9, adult ([place] [address] )    Anxiety    Episodic confusion    Snoring    Insomnia    Hypersomnia    Vitamin D deficiency    Vomiting    Occipital neuralgia of left side    Headache    Nodule of parotid gland    Bipolar 1 disorder (HCC)    Depression    Type 1 diabetes mellitus without complication ([place] [address] )    Urinary retention    Peripheral edema    Hyperlipidemia, mixed    Migraine without aura and without status migrainosus, not intractable    Class 3 severe obesity due to excess calories with serious comorbidity and body mass index (BMI) of 60 0 to 69 9 in adult [facility])    Spinal stenosis in cervical region    Difficulty urinating    Urinary tract infection without hematuria    Penile pain    Chronic migraine without aura without status migrainosus, not intractable    Hypertensive emergency    Encephalopathy    Leukocytosis    Schizo affective schizophrenia ([facility] )    STEFANIA (acute kidney injury) ([place] [address] )    Acute respiratory disease due to COVID-19 virus    Elevated troponin    Acute on chronic kidney failure ([place] [address] )    Family history of heart disease    Hypokalemia    Chest pressure    GERD (gastroesophageal reflux disease)    Hypertension    SOB (shortness of breath)    Volume overload    Hyponatremia    Open toe wound         Subjective:   Denies physical complaints   A complete 10 point review of systems was performed and is otherwise negative  Objective:     Vitals: Blood pressure 159/97, pulse 77, temperature 98 1 °F (36 7 °C), resp  rate 18, height 5' 2" (1 575 m), weight (!) 154 kg (340 lb 9 8 oz), SpO2 97 %  ,Body mass index is 62 3 kg/m²  Weight (last 2 days)     None            Intake/Output Summary (Last 24 hours) at 6/21/2022 1331  Last data filed at 6/21/2022 1300  Gross per 24 hour   Intake 2190 ml   Output 1000 ml   Net 1190 ml     I/O last 3 completed shifts: In: 1950 [P O :1950]  Out: 1900 [Urine:1900]         Physical Exam: /97   Pulse 77   Temp 98 1 °F (36 7 °C)   Resp 18   Ht 5' 2" (1 575 m)   Wt (!) 154 kg (340 lb 9 8 oz)   SpO2 97%   BMI 62 30 kg/m²     General Appearance:    No acute distress  Cooperative  Appears stated age  Head:    Normocephalic  Atraumatic  Normal jaw occlusion  Eyes:    Lids, conjunctiva normal  No scleral icterus  Ears:    Normal external ears  Nose:   Nares normal  No drainage  Mouth:   Lips, tongue normal  Mucosa normal  Phonation normal    Neck:   Supple  Symmetrical    Back:     Symmetric  No CVA tenderness  Lungs:     Normal respiratory effort  Clear to auscultation bilaterally  Chest wall:    No tenderness or deformity  Heart:    Regular rate and rhythm  Normal S1 and S2  No murmur  No JVD  2+ edema  Abdomen:     Soft  Non-tender  Bowel sounds active  Genitourinary:   No Giang catheter present  Extremities:   Extremities normal  Atraumatic  No cyanosis  Skin:   Warm and dry  No pallor, jaundice, rash, ecchymoses  Neurologic:   Alert and oriented to person, place, time  No focal deficit  Lab, Imaging and other studies: I have personally reviewed pertinent labs    CBC:   Lab Results   Component Value Date    WBC 8 36 06/21/2022    HGB 9 5 (L) 06/21/2022    HCT 30 4 (L) 06/21/2022    MCV 85 06/21/2022     06/21/2022    MCH 26 7 (L) 06/21/2022    MCHC 31 3 (L) 06/21/2022    RDW 12 9 06/21/2022    MPV 9 3 06/21/2022 CMP:   Lab Results   Component Value Date    K 4 3 06/21/2022    CL 97 06/21/2022    CO2 31 06/21/2022    BUN 60 (H) 06/21/2022    CREATININE 3 90 (H) 06/21/2022    CALCIUM 8 8 06/21/2022    EGFR 17 06/21/2022         Results from last 7 days   Lab Units 06/21/22  0444 06/20/22  0510 06/19/22  0540   POTASSIUM mmol/L 4 3 4 4 3 9   CHLORIDE mmol/L 97 99 99   CO2 mmol/L 31 29 29   BUN mg/dL 60* 58* 59*   CREATININE mg/dL 3 90* 3 86* 3 78*   CALCIUM mg/dL 8 8 8 8 8 0*         Phosphorus: No results found for: PHOS  Magnesium: No results found for: MG  Urinalysis: No results found for: Elida Kameron, SPECGRAV, PHUR, LEUKOCYTESUR, NITRITE, PROTEINUA, GLUCOSEU, KETONESU, BILIRUBINUR, BLOODU  Ionized Calcium: No results found for: CAION  Coagulation: No results found for: PT, INR, APTT  Troponin: No results found for: TROPONINI  ABG: No results found for: PHART, VTJ6FGS, PO2ART, KFV9MMK, D7SRAPGS, BEART, SOURCE  Radiology review:     IMAGING  Procedure: XR foot 3+ vw right    Result Date: 6/20/2022  Narrative: RIGHT FOOT INDICATION:   r/o OM of the right hallux  COMPARISON:  None VIEWS:  XR FOOT 3+ VW RIGHT Images: 3 FINDINGS: There is no acute fracture or dislocation  No significant degenerative changes  No lytic or blastic osseous lesion  Soft tissues are unremarkable  Impression: No acute osseous abnormality  Workstation performed: [id number]     Procedure: MRI foot/forefoot toes right wo contrast    Result Date: 6/20/2022  Narrative: MRI FOOT/FOREFOOT TOES RIGHT WO CONTRAST INDICATION:   r/o OM of the right hallux  COMPARISON: Correlation is made with the prior radiograph dated 6/19/2022  TECHNIQUE:  MR sequences were obtained of the right foot including the following:  Localizer, sagittal T1/STIR, coronal T1/T2 fat/ sat/STIR, axial T2 fat sat/STIR/PD  Images were acquired on a 1 5 Karin unit   Imaging performed on 1 5T MRI Gadolinium was not used FINDINGS: SUBCUTANEOUS TISSUES: There is mild ulceration along the plantar surface of the great toe at the level of the proximal and distal phalanges  Probable mild surrounding cellulitis  No discrete abscess  There is a focal cystic prominence measuring approximately 1 0 x 0 6 cm located just superficial to the flexor tendon at the level of the 1st MTP joint likely a tiny cystic ganglion  BONES:  No confluent altered marrow signal to suggest osteomyelitis  No acute fracture  FIRST MTP JOINT:  Intact  SESAMOID BONES:  Intact  OTHER ARTICULAR SURFACE:  Normal  PLANTAR FASCIA:  Intact  LISFRANC LIGAMENT:  Intact  FOREFOOT TENDONS: Intact  INTERMETATARSAL REGIONS: No bursitis or Maxwell's neuroma  MUSCULATURE: Intact  Impression: Mild ulceration plantar surface of the great toe at the level of the proximal and distal phalanges without MR evidence for osteomyelitis  Small cystic ganglion plantar surface of the medial forefoot at the level of the 1st MTP joint   Workstation performed: [id number]       Current Facility-Administered Medications   Medication Dose Route Frequency    acetaminophen (TYLENOL) tablet 650 mg  650 mg Oral Q4H PRN    albuterol (PROVENTIL HFA,VENTOLIN HFA) inhaler 2 puff  2 puff Inhalation 4x Daily    aluminum-magnesium hydroxide-simethicone (MYLANTA) oral suspension 30 mL  30 mL Oral Q4H PRN    cariprazine (VRAYLAR) capsule 6 mg  6 mg Oral Daily    carvedilol (COREG) tablet 25 mg  25 mg Oral BID With Meals    dextromethorphan-guaiFENesin (ROBITUSSIN DM) oral syrup 10 mL  10 mL Oral Q4H PRN    doxazosin (CARDURA) tablet 2 mg  2 mg Oral Daily    famotidine (PEPCID) tablet 40 mg  40 mg Oral Daily    FLUoxetine (PROzac) capsule 20 mg  20 mg Oral QAM    furosemide (LASIX) injection 40 mg  40 mg Intravenous BID (diuretic)    glycerin-hypromellose- (ARTIFICIAL TEARS) ophthalmic solution 1 drop  1 drop Both Eyes Q3H PRN    heparin (porcine) subcutaneous injection 5,000 Units  5,000 Units Subcutaneous Q8H [address]    hydrALAZINE (APRESOLINE) injection 5 mg  5 mg Intravenous Q6H PRN    hydrALAZINE (APRESOLINE) tablet 25 mg  25 mg Oral Q8H [address]    insulin glargine (LANTUS) subcutaneous injection 20 Units 0 2 mL  20 Units Subcutaneous HS    insulin lispro (HumaLOG) 100 units/mL subcutaneous injection 2-12 Units  2-12 Units Subcutaneous TID AC    insulin lispro (HumaLOG) 100 units/mL subcutaneous injection 2-12 Units  2-12 Units Subcutaneous HS    insulin lispro (HumaLOG) 100 units/mL subcutaneous injection 5 Units  5 Units Subcutaneous TID With Meals    ipratropium (ATROVENT HFA) inhaler 2 puff  2 puff Inhalation 4x Daily    levothyroxine tablet 125 mcg  125 mcg Oral Early Morning    LORazepam (ATIVAN) tablet 0 5 mg  0 5 mg Oral Daily PRN    melatonin tablet 6 mg  6 mg Oral HS    nystatin (MYCOSTATIN) powder   Topical BID    ondansetron (ZOFRAN) injection 4 mg  4 mg Intravenous Q6H PRN    pantoprazole (PROTONIX) EC tablet 40 mg  40 mg Oral Early Morning    sodium chloride (PF) 0 9 % injection 3 mL  3 mL Intravenous Q1H PRN     Medications Discontinued During This Encounter   Medication Reason    carvedilol (COREG) tablet 12 5 mg     lisinopril (ZESTRIL) tablet 20 mg     verapamil (CALAN-SR) CR tablet 240 mg     insulin glargine (LANTUS) subcutaneous injection 50 Units 0 5 mL     propranolol (INDERAL) tablet 10 mg     furosemide (LASIX) injection 40 mg     doxazosin (CARDURA) tablet 2 mg     fluvoxaMINE (LUVOX) 100 mg tablet Discontinued by another clinician    cyclobenzaprine (FLEXERIL) 10 mg tablet Therapy completed    insulin glargine (LANTUS) 100 units/mL subcutaneous injection Duplicate order    lurasidone (LATUDA) 40 mg tablet Discontinued by another clinician    sucralfate (CARAFATE) 1 g tablet Side effects    ipratropium (ATROVENT) 0 02 % inhalation solution 0 5 mg     levothyroxine tablet 112 mcg     hydrALAZINE (APRESOLINE) tablet 25 mg     lisinopril (ZESTRIL) tablet 20 mg     doxazosin (CARDURA) tablet 4 mg     verapamil (CALAN-SR) CR tablet 240 mg     furosemide (LASIX) injection 40 mg     furosemide (LASIX) injection 40 mg     furosemide (LASIX) injection 40 mg     lisinopril (ZESTRIL) tablet 10 mg     carvedilol (COREG) tablet 12 5 mg     insulin lispro (HumaLOG) 100 units/mL subcutaneous injection 6 Units     insulin glargine (LANTUS) subcutaneous injection 40 Units 0 4 mL     insulin lispro (HumaLOG) 100 units/mL subcutaneous injection 10 Units     insulin lispro (HumaLOG) 100 units/mL subcutaneous injection 5 Units     insulin glargine (LANTUS) subcutaneous injection 30 Units 0 3 mL     furosemide (LASIX) injection 40 mg        August Robbins PA-C    Portions of the record may have been created with voice recognition software  Occasional wrong word or "sound a like" substitutions may have occurred due to the inherent limitations of voice recognition software  Read the chart carefully and recognize, using context, where substitutions have occurred

## 2022-06-21 NOTE — PROGRESS NOTES
[facility]  Progress Note - Janeal Flank 1980, 38 y o  male MRN: [id number]  Unit/Bed#: -[place] Encounter: [id number]  Primary Care Provider: Rogelio Mccall MD   Date and time admitted to hospital: 6/17/2022  7:09 AM    * Hypertensive emergency  Assessment & Plan  · Present on admission as evidenced by acute renal failure and SBPs > 180 mmHg  · Likely due to medication non-compliance; blood pressure has improved since admission  · Continue Coreg 25 mg twice daily and Cardura 2 mg daily  · Begin Hydralazine 25mg Q8H  · Decrease Lasix to BID and continue to hold Lisinopril given renal function  · Continue to monitor blood pressure trends  · Nephrology following    STEFANIA (acute kidney injury) ([facility] )  Assessment & Plan  · Creatinine with slight increased this morning to 3 9  · Lisinopril discontinued and Lasix held as above  · Nephrology following; appreciate further recommendations  · Follow-up morning labs    Open toe wound  Assessment & Plan  · s/p debridement of his ulceration  · MRI R Foot (6/20): Mild ulceration plantar surface of the great toe at the level of the proximal and distal phalanges without MR evidence for osteomyelitis  Small cystic ganglion plantar surface of the medial forefoot at the level of the 1st MTP joint  · No further Podiatry intervention required  · Continue local wound care    Volume overload  Assessment & Plan  · Continued edema noted  · TTE (6/20): LVEF 55%, mild concentric hypertrophy  Wall motion and diastolic function is normal   No pericardial effusion    · Monitor Is and Os and daily weights  · Lasix as above  · Nephrology following    Type 2 diabetes mellitus, with long-term current use of insulin [facility])  Assessment & Plan  Lab Results   Component Value Date    HGBA1C 11 6 (H) 08/03/2021       Recent Labs     06/20/22  1655 06/20/22  2051 06/21/22  0608 06/21/22  1119   POCGLU 182* 429* 180* 355*       Blood Sugar Average: Last 72 hrs:  (P) 796 [id number]     · Lantus decreased to 20 QHS with Lispro 5U TID given hypoglycemia yesterday afternoon  · Continue corrective sliding scale insulin, Accu-Cheks, and hypoglycemic protocol  · Recommended outpatient Endocrinology follow-up  · Continue carb controlled diet    Bipolar 1 disorder ([facility] )  Assessment & Plan  · Continue home Vraylar and Prozac      Hypothyroidism  Assessment & Plan  · TSH 40 31, Free T4 0 71  · Continue Levothyroxine to 125 mcg PO daily  · Recheck TSH in 4-6 weeks      VTE Pharmacologic Prophylaxis: VTE Score: 4 Moderate Risk (Score 3-4) - Pharmacological DVT Prophylaxis Ordered: heparin  Patient Centered Rounds: I performed bedside rounds with nursing staff today  Discussions with Specialists or Other Care Team Provider: Podiatry, Nephrology, Nursing, and CM    Education and Discussions with Family / Patient: Updated  (father and mother) via phone  Time Spent for Care: 45 minutes  More than 50% of total time spent on counseling and coordination of care as described above  Current Length of Stay: 4 day(s)  Current Patient Status: Inpatient   Certification Statement: The patient will continue to require additional inpatient hospital stay due to STEFANIA on CKD  Discharge Plan: TBD based on clinical improvement  Code Status: Level 1 - Full Code    Subjective:   Patient resting comfortably in bed without any acute complaints  No significant overnight events reported by nursing  Objective:     Vitals:   Temp (24hrs), Av 8 °F (36 6 °C), Min:97 1 °F (36 2 °C), Max:98 3 °F (36 8 °C)    Temp:  [97 1 °F (36 2 °C)-98 3 °F (36 8 °C)] 98 1 °F (36 7 °C)  HR:  [77-88] 77  Resp:  [16-18] 18  BP: (131-179)/() 159/97  SpO2:  [95 %-97 %] 97 %  Body mass index is 62 3 kg/m²  Input and Output Summary (last 24 hours):      Intake/Output Summary (Last 24 hours) at 2022 1332  Last data filed at 2022 1300  Gross per 24 hour   Intake 2190 ml   Output 1000 ml Net 1190 ml       Physical Exam:   Physical Exam  Vitals and nursing note reviewed  Constitutional:       General: He is not in acute distress  Appearance: Normal appearance  He is obese  HENT:      Head: Normocephalic and atraumatic  Mouth/Throat:      Mouth: Mucous membranes are moist       Pharynx: Oropharynx is clear  Eyes:      Extraocular Movements: Extraocular movements intact  Conjunctiva/sclera: Conjunctivae normal    Cardiovascular:      Rate and Rhythm: Normal rate and regular rhythm  Pulses: Normal pulses  Heart sounds: Normal heart sounds  Pulmonary:      Effort: Pulmonary effort is normal       Breath sounds: Normal breath sounds  No wheezing  Abdominal:      General: Bowel sounds are normal  There is no distension  Palpations: Abdomen is soft  Tenderness: There is no abdominal tenderness  Musculoskeletal:      Cervical back: Normal range of motion and neck supple  Right lower leg: Edema present  Left lower leg: Edema present  Comments: Right great toe ulcerations   Skin:     General: Skin is warm and dry  Neurological:      General: No focal deficit present  Mental Status: He is alert and oriented to person, place, and time  Mental status is at baseline     Psychiatric:         Mood and Affect: Mood normal          Behavior: Behavior normal          Judgment: Judgment normal        Labs:  Results from last 7 days   Lab Units 06/21/22  0444 06/20/22  0510   WBC Thousand/uL 8 36 8 39   HEMOGLOBIN g/dL 9 5* 9 8*   HEMATOCRIT % 30 4* 30 9*   PLATELETS Thousands/uL 365 374   NEUTROS PCT %  --  67   LYMPHS PCT %  --  20   MONOS PCT %  --  8   EOS PCT %  --  4     Results from last 7 days   Lab Units 06/21/22  0444 06/20/22  0510   SODIUM mmol/L 135 137   POTASSIUM mmol/L 4 3 4 4   CHLORIDE mmol/L 97 99   CO2 mmol/L 31 29   BUN mg/dL 60* 58*   CREATININE mg/dL 3 90* 3 86*   ANION GAP mmol/L 7 9   CALCIUM mg/dL 8 8 8 8   ALBUMIN g/dL  -- 3 1*   GLUCOSE RANDOM mg/dL 191* 96         Results from last 7 days   Lab Units 06/21/22  1119 06/21/22  0608 06/20/22  2051 06/20/22  1655 06/20/22  1624 06/20/22  1608 06/20/22  1602 06/20/22  1552 06/20/22  1503 06/20/22  1031 06/20/22  0611 06/19/22 2020   POC GLUCOSE mg/dl 355* 180* 429* 182* 115 77 55* 44* 72 375* 76 271*               Lines/Drains:  Invasive Devices  Report    Peripheral Intravenous Line  Duration           Peripheral IV 06/18/22 Right Antecubital 2 days              Imaging: No new imaging       Recent Cultures (last 7 days):         Last 24 Hours Medication List:   Current Facility-Administered Medications   Medication Dose Route Frequency Provider Last Rate    acetaminophen  650 mg Oral Q4H PRN [facility], DO      albuterol  2 puff Inhalation 4x Daily [facility], DO      aluminum-magnesium hydroxide-simethicone  30 mL Oral Q4H PRN Nery Mccarty PA-C      cariprazine  6 mg Oral Daily [facility], DO      carvedilol  25 mg Oral BID With Meals [place], [place]      dextromethorphan-guaiFENesin  10 mL Oral Q4H PRN Jenni Carlson MD      doxazosin  2 mg Oral Daily Emilie Maxwell MD      famotidine  40 mg Oral Daily [facility], DO      FLUoxetine  20 mg Oral QAM [facility], DO      furosemide  40 mg Intravenous BID (diuretic) [facility], DO      glycerin-hypromellose-  1 drop Both Eyes Q3H PRN Nery Mccarty PA-C      heparin (porcine)  5,000 Units Subcutaneous [facility], DO      hydrALAZINE  5 mg Intravenous Q6H PRN [facility], DO      hydrALAZINE  25 mg Oral [facility], DO      insulin glargine  20 Units Subcutaneous HS [facility], DO      insulin lispro  2-12 Units Subcutaneous TID AC [facility], DO      insulin lispro  2-12 Units Subcutaneous HS [facility], DO      insulin lispro  5 Units Subcutaneous TID With Meals [facility], DO      ipratropium  2 puff Inhalation 4x Daily Brien Gonzalez MD      levothyroxine  125 mcg Oral Early Morning Brien Gonzalez MD      LORazepam  0 5 mg Oral Daily PRN Janice Chan, DO      melatonin  6 mg Oral HS [place]      nystatin   Topical BID [place], [place]      ondansetron  4 mg Intravenous Q6H PRN Janice Chan, DO      pantoprazole  40 mg Oral Early Morning Janice Chan, DO      sodium chloride (PF)  3 mL Intravenous Q1H PRN Janice Chan DO          Today, Patient Was Seen By: [address] [place] [address],[address], DO    **Please Note: This note may have been constructed using a voice recognition system  **

## 2022-06-21 NOTE — ASSESSMENT & PLAN NOTE
· s/p debridement of his ulceration  · MRI R Foot (6/20): Mild ulceration plantar surface of the great toe at the level of the proximal and distal phalanges without MR evidence for osteomyelitis  Small cystic ganglion plantar surface of the medial forefoot at the level of the 1st MTP joint     · No further Podiatry intervention required  · Continue local wound care

## 2022-06-21 NOTE — DISCHARGE INSTRUCTIONS
Podiatry:  Please change dressing to the right foot every other day with Acticoat size wound, 4x4s and dry sterile dressing cover with Kerlix    Weight-bearing as tolerated to the right foot in surgical shoe

## 2022-06-21 NOTE — PLAN OF CARE
Problem: Potential for Falls  Goal: Patient will remain free of falls  Description: INTERVENTIONS:  - Educate patient/family on patient safety including physical limitations  - Instruct patient to call for assistance with activity   - Consult OT/PT to assist with strengthening/mobility   - Keep Call bell within reach  - Keep bed low and locked with side rails adjusted as appropriate  - Keep care items and personal belongings within reach  - Initiate and maintain comfort rounds  - Make Fall Risk Sign visible to staff  - Offer Toileting every 2 Hours, in advance of need  - Initiate/Maintain bed alarm  - Obtain necessary fall risk management equipment: walker   - Apply yellow socks and bracelet for high fall risk patients  - Consider moving patient to room near nurses station  Outcome: Progressing     Problem: MOBILITY - ADULT  Goal: Maintain or return to baseline ADL function  Description: INTERVENTIONS:  -  Assess patient's ability to carry out ADLs; assess patient's baseline for ADL function and identify physical deficits which impact ability to perform ADLs (bathing, care of mouth/teeth, toileting, grooming, dressing, etc )  - Assess/evaluate cause of self-care deficits   - Assess range of motion  - Assess patient's mobility; develop plan if impaired  - Assess patient's need for assistive devices and provide as appropriate  - Encourage maximum independence but intervene and supervise when necessary  - Involve family in performance of ADLs  - Assess for home care needs following discharge   - Consider OT consult to assist with ADL evaluation and planning for discharge  - Provide patient education as appropriate  Outcome: Progressing  Goal: Maintains/Returns to pre admission functional level  Description: INTERVENTIONS:  - Perform BMAT or MOVE assessment daily    - Set and communicate daily mobility goal to care team and patient/family/caregiver     - Collaborate with rehabilitation services on mobility goals if consulted  - Perform Range of Motion 3 times a day  - Reposition patient every 2 hours    - Dangle patient 3 times a day  - Stand patient 3 times a day  - Ambulate patient 3 times a day  - Out of bed to chair 3 times a day   - Out of bed for meals 3 times a day  - Out of bed for toileting  - Record patient progress and toleration of activity level   Outcome: Progressing     Problem: PAIN - ADULT  Goal: Verbalizes/displays adequate comfort level or baseline comfort level  Description: Interventions:  - Encourage patient to monitor pain and request assistance  - Assess pain using appropriate pain scale  - Administer analgesics based on type and severity of pain and evaluate response  - Implement non-pharmacological measures as appropriate and evaluate response  - Consider cultural and social influences on pain and pain management  - Notify physician/advanced practitioner if interventions unsuccessful or patient reports new pain  Outcome: Progressing     Problem: INFECTION - ADULT  Goal: Absence or prevention of progression during hospitalization  Description: INTERVENTIONS:  - Assess and monitor for signs and symptoms of infection  - Monitor lab/diagnostic results  - Monitor all insertion sites, i e  indwelling lines, tubes, and drains  - Monitor endotracheal if appropriate and nasal secretions for changes in amount and color  - [place] appropriate cooling/warming therapies per order  - Administer medications as ordered  - Instruct and encourage patient and family to use good hand hygiene technique  - Identify and instruct in appropriate isolation precautions for identified infection/condition  Outcome: Progressing  Goal: Absence of fever/infection during neutropenic period  Description: INTERVENTIONS:  - Monitor WBC    Outcome: Progressing     Problem: SAFETY ADULT  Goal: Patient will remain free of falls  Description: INTERVENTIONS:  - Educate patient/family on patient safety including physical limitations  - Instruct patient to call for assistance with activity   - Consult OT/PT to assist with strengthening/mobility   - Keep Call bell within reach  - Keep bed low and locked with side rails adjusted as appropriate  - Keep care items and personal belongings within reach  - Initiate and maintain comfort rounds  - Make Fall Risk Sign visible to staff  - Offer Toileting every 2 Hours, in advance of need  - Initiate/Maintain bed alarm  - Obtain necessary fall risk management equipment: walker   - Apply yellow socks and bracelet for high fall risk patients  - Consider moving patient to room near nurses station  Outcome: Progressing  Goal: Maintain or return to baseline ADL function  Description: INTERVENTIONS:  -  Assess patient's ability to carry out ADLs; assess patient's baseline for ADL function and identify physical deficits which impact ability to perform ADLs (bathing, care of mouth/teeth, toileting, grooming, dressing, etc )  - Assess/evaluate cause of self-care deficits   - Assess range of motion  - Assess patient's mobility; develop plan if impaired  - Assess patient's need for assistive devices and provide as appropriate  - Encourage maximum independence but intervene and supervise when necessary  - Involve family in performance of ADLs  - Assess for home care needs following discharge   - Consider OT consult to assist with ADL evaluation and planning for discharge  - Provide patient education as appropriate  Outcome: Progressing  Goal: Maintains/Returns to pre admission functional level  Description: INTERVENTIONS:  - Perform BMAT or MOVE assessment daily    - Set and communicate daily mobility goal to care team and patient/family/caregiver  - Collaborate with rehabilitation services on mobility goals if consulted  - Perform Range of Motion 3 times a day  - Reposition patient every 2 hours    - Dangle patient 3 times a day  - Stand patient 3 times a day  - Ambulate patient 3 times a day  - Out of bed to chair 3 times a day   - Out of bed for meals 3 times a day  - Out of bed for toileting  - Record patient progress and toleration of activity level   Outcome: Progressing     Problem: DISCHARGE PLANNING  Goal: Discharge to home or other facility with appropriate resources  Description: INTERVENTIONS:  - Identify barriers to discharge w/patient and caregiver  - Arrange for needed discharge resources and transportation as appropriate  - Identify discharge learning needs (meds, wound care, etc )  - Arrange for interpretive services to assist at discharge as needed  - Refer to Case Management Department for coordinating discharge planning if the patient needs post-hospital services based on physician/advanced practitioner order or complex needs related to functional status, cognitive ability, or social support system  Outcome: Progressing     Problem: Knowledge Deficit  Goal: Patient/family/caregiver demonstrates understanding of disease process, treatment plan, medications, and discharge instructions  Description: Complete learning assessment and assess knowledge base    Interventions:  - Provide teaching at level of understanding  - Provide teaching via preferred learning methods  Outcome: Progressing     Problem: CARDIOVASCULAR - ADULT  Goal: Maintains optimal cardiac output and hemodynamic stability  Description: INTERVENTIONS:  - Monitor I/O, vital signs and rhythm  - Monitor for S/S and trends of decreased cardiac output  - Administer and titrate ordered vasoactive medications to optimize hemodynamic stability  - Assess quality of pulses, skin color and temperature  - Assess for signs of decreased coronary artery perfusion  - Instruct patient to report change in severity of symptoms  Outcome: Progressing  Goal: Absence of cardiac dysrhythmias or at baseline rhythm  Description: INTERVENTIONS:  - Continuous cardiac monitoring, vital signs, obtain 12 lead EKG if ordered  - Administer antiarrhythmic and heart rate control medications as ordered  - Monitor electrolytes and administer replacement therapy as ordered  Outcome: Progressing     Problem: Nutrition/Hydration-ADULT  Goal: Nutrient/Hydration intake appropriate for improving, restoring or maintaining nutritional needs  Description: Monitor and assess patient's nutrition/hydration status for malnutrition  Collaborate with interdisciplinary team and initiate plan and interventions as ordered  Monitor patient's weight and dietary intake as ordered or per policy  Utilize nutrition screening tool and intervene as necessary  Determine patient's food preferences and provide high-protein, high-caloric foods as appropriate       INTERVENTIONS:  - Monitor oral intake, urinary output, labs, and treatment plans  - Assess nutrition and hydration status and recommend course of action  - Evaluate amount of meals eaten  - Assist patient with eating if necessary   - Allow adequate time for meals  - Recommend/ encourage appropriate diets, oral nutritional supplements, and vitamin/mineral supplements  - Order, calculate, and assess calorie counts as needed  - Recommend, monitor, and adjust tube feedings and TPN/PPN based on assessed needs  - Assess need for intravenous fluids  - Provide specific nutrition/hydration education as appropriate  - Include patient/family/caregiver in decisions related to nutrition  Outcome: Progressing     Problem: Prexisting or High Potential for Compromised Skin Integrity  Goal: Skin integrity is maintained or improved  Description: INTERVENTIONS:  - Identify patients at risk for skin breakdown  - Assess and monitor skin integrity  - Assess and monitor nutrition and hydration status  - Monitor labs   - Assess for incontinence   - Turn and reposition patient  - Assist with mobility/ambulation  - Relieve pressure over bony prominences  - Avoid friction and shearing  - Provide appropriate hygiene as needed including keeping skin clean and dry  - Evaluate need for skin moisturizer/barrier cream  - Collaborate with interdisciplinary team   - Patient/family teaching  - Consider wound care consult   Outcome: Progressing

## 2022-06-21 NOTE — ASSESSMENT & PLAN NOTE
· Creatinine with slight increased this morning to 3 9  · Lisinopril discontinued and Lasix held as above  · Nephrology following; appreciate further recommendations  · Follow-up morning labs

## 2022-06-21 NOTE — PLAN OF CARE
Problem: Potential for Falls  Goal: Patient will remain free of falls  Description: INTERVENTIONS:  - Educate patient/family on patient safety including physical limitations  - Instruct patient to call for assistance with activity   - Consult OT/PT to assist with strengthening/mobility   - Keep Call bell within reach  - Keep bed low and locked with side rails adjusted as appropriate  - Keep care items and personal belongings within reach  - Initiate and maintain comfort rounds  - Make Fall Risk Sign visible to staff  - Offer Toileting every 2 Hours, in advance of need  - Initiate/Maintain bed alarm  - Obtain necessary fall risk management equipment: bed alarm  - Apply yellow socks and bracelet for high fall risk patients  - Consider moving patient to room near nurses station  Outcome: Progressing     Problem: MOBILITY - ADULT  Goal: Maintain or return to baseline ADL function  Description: INTERVENTIONS:  -  Assess patient's ability to carry out ADLs; assess patient's baseline for ADL function and identify physical deficits which impact ability to perform ADLs (bathing, care of mouth/teeth, toileting, grooming, dressing, etc )  - Assess/evaluate cause of self-care deficits   - Assess range of motion  - Assess patient's mobility; develop plan if impaired  - Assess patient's need for assistive devices and provide as appropriate  - Encourage maximum independence but intervene and supervise when necessary  - Involve family in performance of ADLs  - Assess for home care needs following discharge   - Consider OT consult to assist with ADL evaluation and planning for discharge  - Provide patient education as appropriate  Outcome: Progressing  Goal: Maintains/Returns to pre admission functional level  Description: INTERVENTIONS:  - Perform BMAT or MOVE assessment daily    - Set and communicate daily mobility goal to care team and patient/family/caregiver     - Collaborate with rehabilitation services on mobility goals if consulted  - Perform Range of Motion 3 times a day  - Reposition patient every 2 hours    - Dangle patient 3 times a day  - Stand patient 3 times a day  - Ambulate patient 3 times a day  - Out of bed to chair 3 times a day   - Out of bed for meals 3 times a day  - Out of bed for toileting  - Record patient progress and toleration of activity level   Outcome: Progressing     Problem: PAIN - ADULT  Goal: Verbalizes/displays adequate comfort level or baseline comfort level  Description: Interventions:  - Encourage patient to monitor pain and request assistance  - Assess pain using appropriate pain scale  - Administer analgesics based on type and severity of pain and evaluate response  - Implement non-pharmacological measures as appropriate and evaluate response  - Consider cultural and social influences on pain and pain management  - Notify physician/advanced practitioner if interventions unsuccessful or patient reports new pain  Outcome: Progressing     Problem: INFECTION - ADULT  Goal: Absence or prevention of progression during hospitalization  Description: INTERVENTIONS:  - Assess and monitor for signs and symptoms of infection  - Monitor lab/diagnostic results  - Monitor all insertion sites, i e  indwelling lines, tubes, and drains  - Monitor endotracheal if appropriate and nasal secretions for changes in amount and color  - [place] appropriate cooling/warming therapies per order  - Administer medications as ordered  - Instruct and encourage patient and family to use good hand hygiene technique  - Identify and instruct in appropriate isolation precautions for identified infection/condition  Outcome: Progressing  Goal: Absence of fever/infection during neutropenic period  Description: INTERVENTIONS:  - Monitor WBC    Outcome: Progressing     Problem: SAFETY ADULT  Goal: Patient will remain free of falls  Description: INTERVENTIONS:  - Educate patient/family on patient safety including physical limitations  - Instruct patient to call for assistance with activity   - Consult OT/PT to assist with strengthening/mobility   - Keep Call bell within reach  - Keep bed low and locked with side rails adjusted as appropriate  - Keep care items and personal belongings within reach  - Initiate and maintain comfort rounds  - Make Fall Risk Sign visible to staff  - Offer Toileting every 2 Hours, in advance of need  - Initiate/Maintain bed alarm  - Obtain necessary fall risk management equipment: bed alarm  - Apply yellow socks and bracelet for high fall risk patients  - Consider moving patient to room near nurses station  Outcome: Progressing  Goal: Maintain or return to baseline ADL function  Description: INTERVENTIONS:  -  Assess patient's ability to carry out ADLs; assess patient's baseline for ADL function and identify physical deficits which impact ability to perform ADLs (bathing, care of mouth/teeth, toileting, grooming, dressing, etc )  - Assess/evaluate cause of self-care deficits   - Assess range of motion  - Assess patient's mobility; develop plan if impaired  - Assess patient's need for assistive devices and provide as appropriate  - Encourage maximum independence but intervene and supervise when necessary  - Involve family in performance of ADLs  - Assess for home care needs following discharge   - Consider OT consult to assist with ADL evaluation and planning for discharge  - Provide patient education as appropriate  Outcome: Progressing  Goal: Maintains/Returns to pre admission functional level  Description: INTERVENTIONS:  - Perform BMAT or MOVE assessment daily    - Set and communicate daily mobility goal to care team and patient/family/caregiver  - Collaborate with rehabilitation services on mobility goals if consulted  - Perform Range of Motion 3 times a day  - Reposition patient every 2 hours    - Dangle patient 3 times a day  - Stand patient 3 times a day  - Ambulate patient 3 times a day  - Out of bed to chair 3 times a day   - Out of bed for meals 3 times a day  - Out of bed for toileting  - Record patient progress and toleration of activity level   Outcome: Progressing     Problem: DISCHARGE PLANNING  Goal: Discharge to home or other facility with appropriate resources  Description: INTERVENTIONS:  - Identify barriers to discharge w/patient and caregiver  - Arrange for needed discharge resources and transportation as appropriate  - Identify discharge learning needs (meds, wound care, etc )  - Arrange for interpretive services to assist at discharge as needed  - Refer to Case Management Department for coordinating discharge planning if the patient needs post-hospital services based on physician/advanced practitioner order or complex needs related to functional status, cognitive ability, or social support system  Outcome: Progressing     Problem: Knowledge Deficit  Goal: Patient/family/caregiver demonstrates understanding of disease process, treatment plan, medications, and discharge instructions  Description: Complete learning assessment and assess knowledge base    Interventions:  - Provide teaching at level of understanding  - Provide teaching via preferred learning methods  Outcome: Progressing     Problem: CARDIOVASCULAR - ADULT  Goal: Maintains optimal cardiac output and hemodynamic stability  Description: INTERVENTIONS:  - Monitor I/O, vital signs and rhythm  - Monitor for S/S and trends of decreased cardiac output  - Administer and titrate ordered vasoactive medications to optimize hemodynamic stability  - Assess quality of pulses, skin color and temperature  - Assess for signs of decreased coronary artery perfusion  - Instruct patient to report change in severity of symptoms  Outcome: Progressing  Goal: Absence of cardiac dysrhythmias or at baseline rhythm  Description: INTERVENTIONS:  - Continuous cardiac monitoring, vital signs, obtain 12 lead EKG if ordered  - Administer antiarrhythmic and heart rate control medications as ordered  - Monitor electrolytes and administer replacement therapy as ordered  Outcome: Progressing     Problem: Nutrition/Hydration-ADULT  Goal: Nutrient/Hydration intake appropriate for improving, restoring or maintaining nutritional needs  Description: Monitor and assess patient's nutrition/hydration status for malnutrition  Collaborate with interdisciplinary team and initiate plan and interventions as ordered  Monitor patient's weight and dietary intake as ordered or per policy  Utilize nutrition screening tool and intervene as necessary  Determine patient's food preferences and provide high-protein, high-caloric foods as appropriate       INTERVENTIONS:  - Monitor oral intake, urinary output, labs, and treatment plans  - Assess nutrition and hydration status and recommend course of action  - Evaluate amount of meals eaten  - Assist patient with eating if necessary   - Allow adequate time for meals  - Recommend/ encourage appropriate diets, oral nutritional supplements, and vitamin/mineral supplements  - Order, calculate, and assess calorie counts as needed  - Recommend, monitor, and adjust tube feedings and TPN/PPN based on assessed needs  - Assess need for intravenous fluids  - Provide specific nutrition/hydration education as appropriate  - Include patient/family/caregiver in decisions related to nutrition  Outcome: Progressing     Problem: Prexisting or High Potential for Compromised Skin Integrity  Goal: Skin integrity is maintained or improved  Description: INTERVENTIONS:  - Identify patients at risk for skin breakdown  - Assess and monitor skin integrity  - Assess and monitor nutrition and hydration status  - Monitor labs   - Assess for incontinence   - Turn and reposition patient  - Assist with mobility/ambulation  - Relieve pressure over bony prominences  - Avoid friction and shearing  - Provide appropriate hygiene as needed including keeping skin clean and dry  - Evaluate need for skin moisturizer/barrier cream  - Collaborate with interdisciplinary team   - Patient/family teaching  - Consider wound care consult   Outcome: Progressing

## 2022-06-21 NOTE — ASSESSMENT & PLAN NOTE
Lab Results   Component Value Date    HGBA1C 11 6 (H) 08/03/2021       Recent Labs     06/20/22  1655 06/20/22 2051 06/21/22  0608 06/21/22  1119   POCGLU 182* 429* 180* 355*       Blood Sugar Average: Last 72 hrs:  (P) 156 [id number]     · Lantus decreased to 20 QHS with Lispro 5U TID given hypoglycemia yesterday afternoon  · Continue corrective sliding scale insulin, Accu-Cheks, and hypoglycemic protocol  · Recommended outpatient Endocrinology follow-up  · Continue carb controlled diet

## 2022-06-21 NOTE — PROGRESS NOTES
Progress Note - Luke Mena 38 y o  male MRN: [id number]    Unit/Bed#: -01 Encounter: [id number]      Assessment:  1  Diabetes with neuropathy  2  Ulceration of the right foot    Plan:  - MRI was reviewed and shows no marrow changes  - Patient is stable for d/c from a podiatric standpoint  - Patient is to WBAT to the right foot in surgical shoe  - would benefit from referral to wound care for application of total contact casting  Discharge in wound care instructions written    Subjective:   Patient seen at bedside he has question about his feet, does not see a podiatrist on an outpatient basis    Objective:     Vitals: Blood pressure 158/97, pulse 81, temperature 98 °F (36 7 °C), resp  rate 20, height 5' 2" (1 575 m), weight (!) 149 kg (328 lb 7 8 oz), SpO2 94 %  ,Body mass index is 60 08 kg/m²  Intake/Output Summary (Last 24 hours) at 6/21/2022 1545  Last data filed at 6/21/2022 1401  Gross per 24 hour   Intake 2190 ml   Output 2000 ml   Net 190 ml       Physical Exam:  Dressing left intact to the right foot, no evidence of strike through, no tenderness to palpation  Neurovascular status is at baseline      Invasive Devices  Report    Peripheral Intravenous Line  Duration           Peripheral IV 06/18/22 Right Antecubital 2 days

## 2022-06-21 NOTE — ASSESSMENT & PLAN NOTE
· Present on admission as evidenced by acute renal failure and SBPs > 180 mmHg  · Likely due to medication non-compliance; blood pressure has improved since admission  · Continue Coreg 25 mg twice daily and Cardura 2 mg daily  · Begin Hydralazine 25mg Q8H  · Decrease Lasix to BID and continue to hold Lisinopril given renal function  · Continue to monitor blood pressure trends  · Nephrology following

## 2022-06-21 NOTE — WOUND OSTOMY CARE
Consult Note - Wound   Cory Allen 38 y o  male MRN: [id number]  Unit/Bed#: -01 Encounter: [id number]    History and Present Illness:  38year old male patient admitted with hypertensive emergency  Wound care consulted for right great toe diabetic ulcer  Patient history significant for DDM2, bipolar 1 disorder, OCD, morbid obesity with BMI of 62 10, anxiety, and chronic migraines  Assessment Findings:   Patient in bed for assessment, he is awake, alert and oriented  Podiatry was consulted and saw patient for right great toe wound and has written wound care orders  Patient is independent for care, is not incontinent  Prevention orders written as patient has no other skin issues  1  Bilateral, sacrum and buttocks intact  Patient complained of rectal itching, foaming cleanser and hydraguard provided to patient with instructions for use    Skin Care Plan:   1  Cleanse buttocks with soap and water or Remedy foaming cleanser, apply Hydraguard to inner buttocks BID and PRN  2  Skin nourishing cream to the skin daily  3  Hydraguard to bilateral heels     Wounds:  Wound 06/17/22 Diabetic Ulcer Toe (Comment  which one) Anterior;Right (Active)   Wound Image    06/17/22 1719   Wound Description SOFÍA 06/20/22 2025   Pressure Injury Stage O 06/18/22 0900   [place]-wound Assessment Pink 06/18/22 0900   Wound Length (cm) 2 cm 06/17/22 1720   Wound Width (cm) 1 cm 06/17/22 1720   Wound Surface Area (cm^2) 2 cm^2 06/17/22 1720   Drainage Amount None 06/17/22 1720   Treatments Irrigation with NSS 06/17/22 1720   Dressing Dry dressing 06/19/22 1100   Dressing Changed Changed 06/20/22 1200   Patient Tolerance Tolerated well 06/20/22 1200   Dressing Status Clean;Dry; Intact 06/20/22 2025     Reviewed plan of care with primary RN [facility]  Recommendations written as orders  Wound care team to sign off  Questions or concerns contact Wound Care  Team viaTigertext    Georgina NELSON, RN, [employer]

## 2022-06-21 NOTE — ASSESSMENT & PLAN NOTE
· Continued edema noted  · TTE (6/20): LVEF 55%, mild concentric hypertrophy  Wall motion and diastolic function is normal   No pericardial effusion    · Monitor Is and Os and daily weights  · Lasix as above  · Nephrology following

## 2022-06-22 LAB
ALBUMIN SERPL BCP-MCNC: 3.3 G/DL (ref 3.5–5)
ALP SERPL-CCNC: 67 U/L (ref 34–104)
ALT SERPL W P-5'-P-CCNC: 15 U/L (ref 7–52)
ANION GAP SERPL CALCULATED.3IONS-SCNC: 9 MMOL/L (ref 4–13)
AST SERPL W P-5'-P-CCNC: 17 U/L (ref 13–39)
BASOPHILS # BLD AUTO: 0.05 THOUSANDS/ΜL (ref 0–0.1)
BASOPHILS NFR BLD AUTO: 1 % (ref 0–1)
BILIRUB SERPL-MCNC: 0.34 MG/DL (ref 0.2–1)
BUN SERPL-MCNC: 63 MG/DL (ref 5–25)
CALCIUM ALBUM COR SERPL-MCNC: 9.6 MG/DL (ref 8.3–10.1)
CALCIUM SERPL-MCNC: 9 MG/DL (ref 8.4–10.2)
CHLORIDE SERPL-SCNC: 95 MMOL/L (ref 96–108)
CO2 SERPL-SCNC: 30 MMOL/L (ref 21–32)
CREAT SERPL-MCNC: 4.16 MG/DL (ref 0.6–1.3)
EOSINOPHIL # BLD AUTO: 0.31 THOUSAND/ΜL (ref 0–0.61)
EOSINOPHIL NFR BLD AUTO: 3 % (ref 0–6)
ERYTHROCYTE [DISTWIDTH] IN BLOOD BY AUTOMATED COUNT: 12.9 % (ref 11.6–15.1)
GFR SERPL CREATININE-BSD FRML MDRD: 16 ML/MIN/1.73SQ M
GLUCOSE SERPL-MCNC: 143 MG/DL (ref 65–140)
GLUCOSE SERPL-MCNC: 224 MG/DL (ref 65–140)
GLUCOSE SERPL-MCNC: 231 MG/DL (ref 65–140)
GLUCOSE SERPL-MCNC: 235 MG/DL (ref 65–140)
GLUCOSE SERPL-MCNC: 281 MG/DL (ref 65–140)
HCT VFR BLD AUTO: 32.8 % (ref 36.5–49.3)
HGB BLD-MCNC: 10.5 G/DL (ref 12–17)
IMM GRANULOCYTES # BLD AUTO: 0.04 THOUSAND/UL (ref 0–0.2)
IMM GRANULOCYTES NFR BLD AUTO: 0 % (ref 0–2)
LYMPHOCYTES # BLD AUTO: 1.89 THOUSANDS/ΜL (ref 0.6–4.47)
LYMPHOCYTES NFR BLD AUTO: 21 % (ref 14–44)
MAGNESIUM SERPL-MCNC: 2.1 MG/DL (ref 1.9–2.7)
MCH RBC QN AUTO: 27.5 PG (ref 26.8–34.3)
MCHC RBC AUTO-ENTMCNC: 32 G/DL (ref 31.4–37.4)
MCV RBC AUTO: 86 FL (ref 82–98)
MONOCYTES # BLD AUTO: 0.57 THOUSAND/ΜL (ref 0.17–1.22)
MONOCYTES NFR BLD AUTO: 6 % (ref 4–12)
NEUTROPHILS # BLD AUTO: 6.13 THOUSANDS/ΜL (ref 1.85–7.62)
NEUTS SEG NFR BLD AUTO: 69 % (ref 43–75)
NRBC BLD AUTO-RTO: 0 /100 WBCS
PHOSPHATE SERPL-MCNC: 4.3 MG/DL (ref 2.7–4.5)
PLATELET # BLD AUTO: 394 THOUSANDS/UL (ref 149–390)
PMV BLD AUTO: 9.4 FL (ref 8.9–12.7)
POTASSIUM SERPL-SCNC: 4.4 MMOL/L (ref 3.5–5.3)
PROT SERPL-MCNC: 6.7 G/DL (ref 6.4–8.4)
RBC # BLD AUTO: 3.82 MILLION/UL (ref 3.88–5.62)
SODIUM SERPL-SCNC: 134 MMOL/L (ref 135–147)
WBC # BLD AUTO: 8.99 THOUSAND/UL (ref 4.31–10.16)

## 2022-06-22 PROCEDURE — 85025 COMPLETE CBC W/AUTO DIFF WBC: CPT | Performed by: PHYSICIAN ASSISTANT

## 2022-06-22 PROCEDURE — 83735 ASSAY OF MAGNESIUM: CPT | Performed by: PHYSICIAN ASSISTANT

## 2022-06-22 PROCEDURE — 80053 COMPREHEN METABOLIC PANEL: CPT | Performed by: PHYSICIAN ASSISTANT

## 2022-06-22 PROCEDURE — 84100 ASSAY OF PHOSPHORUS: CPT | Performed by: PHYSICIAN ASSISTANT

## 2022-06-22 PROCEDURE — 82948 REAGENT STRIP/BLOOD GLUCOSE: CPT

## 2022-06-22 PROCEDURE — 99232 SBSQ HOSP IP/OBS MODERATE 35: CPT | Performed by: INTERNAL MEDICINE

## 2022-06-22 PROCEDURE — 99233 SBSQ HOSP IP/OBS HIGH 50: CPT | Performed by: INTERNAL MEDICINE

## 2022-06-22 RX ORDER — INSULIN LISPRO 100 [IU]/ML
5 INJECTION, SOLUTION INTRAVENOUS; SUBCUTANEOUS
Status: DISCONTINUED | OUTPATIENT
Start: 2022-06-22 | End: 2022-06-22 | Stop reason: SDUPTHER

## 2022-06-22 RX ADMIN — INSULIN LISPRO 4 UNITS: 100 INJECTION, SOLUTION INTRAVENOUS; SUBCUTANEOUS at 09:06

## 2022-06-22 RX ADMIN — FLUOXETINE 20 MG: 20 CAPSULE ORAL at 09:27

## 2022-06-22 RX ADMIN — Medication 6 MG: at 21:04

## 2022-06-22 RX ADMIN — ACETAMINOPHEN 650 MG: 325 TABLET ORAL at 05:03

## 2022-06-22 RX ADMIN — INSULIN LISPRO 5 UNITS: 100 INJECTION, SOLUTION INTRAVENOUS; SUBCUTANEOUS at 09:06

## 2022-06-22 RX ADMIN — INSULIN LISPRO 2 UNITS: 100 INJECTION, SOLUTION INTRAVENOUS; SUBCUTANEOUS at 21:04

## 2022-06-22 RX ADMIN — FUROSEMIDE 40 MG: 10 INJECTION, SOLUTION INTRAMUSCULAR; INTRAVENOUS at 08:38

## 2022-06-22 RX ADMIN — ALBUTEROL SULFATE 2 PUFF: 90 AEROSOL, METERED RESPIRATORY (INHALATION) at 17:13

## 2022-06-22 RX ADMIN — ACETAMINOPHEN 650 MG: 325 TABLET ORAL at 16:14

## 2022-06-22 RX ADMIN — HEPARIN SODIUM 5000 UNITS: 5000 INJECTION INTRAVENOUS; SUBCUTANEOUS at 05:04

## 2022-06-22 RX ADMIN — ONDANSETRON 4 MG: 2 INJECTION INTRAMUSCULAR; INTRAVENOUS at 12:10

## 2022-06-22 RX ADMIN — IPRATROPIUM BROMIDE 2 PUFF: 17 AEROSOL, METERED RESPIRATORY (INHALATION) at 21:06

## 2022-06-22 RX ADMIN — PANTOPRAZOLE SODIUM 40 MG: 40 TABLET, DELAYED RELEASE ORAL at 05:03

## 2022-06-22 RX ADMIN — CARVEDILOL 25 MG: 25 TABLET, FILM COATED ORAL at 17:13

## 2022-06-22 RX ADMIN — CARVEDILOL 25 MG: 25 TABLET, FILM COATED ORAL at 07:32

## 2022-06-22 RX ADMIN — CARIPRAZINE 6 MG: 4.5 CAPSULE, GELATIN COATED ORAL at 09:27

## 2022-06-22 RX ADMIN — NYSTATIN: 100000 POWDER TOPICAL at 17:15

## 2022-06-22 RX ADMIN — IPRATROPIUM BROMIDE 2 PUFF: 17 AEROSOL, METERED RESPIRATORY (INHALATION) at 09:28

## 2022-06-22 RX ADMIN — LORAZEPAM 0.5 MG: 0.5 TABLET ORAL at 07:31

## 2022-06-22 RX ADMIN — ALBUTEROL SULFATE 2 PUFF: 90 AEROSOL, METERED RESPIRATORY (INHALATION) at 21:05

## 2022-06-22 RX ADMIN — INSULIN LISPRO 6 UNITS: 100 INJECTION, SOLUTION INTRAVENOUS; SUBCUTANEOUS at 12:03

## 2022-06-22 RX ADMIN — HYDRALAZINE HYDROCHLORIDE 25 MG: 25 TABLET ORAL at 21:04

## 2022-06-22 RX ADMIN — LEVOTHYROXINE SODIUM 125 MCG: 25 TABLET ORAL at 05:04

## 2022-06-22 RX ADMIN — INSULIN LISPRO 5 UNITS: 100 INJECTION, SOLUTION INTRAVENOUS; SUBCUTANEOUS at 17:13

## 2022-06-22 RX ADMIN — ALBUTEROL SULFATE 2 PUFF: 90 AEROSOL, METERED RESPIRATORY (INHALATION) at 09:28

## 2022-06-22 RX ADMIN — HYDRALAZINE HYDROCHLORIDE 25 MG: 25 TABLET ORAL at 05:03

## 2022-06-22 RX ADMIN — FAMOTIDINE 40 MG: 20 TABLET ORAL at 09:27

## 2022-06-22 RX ADMIN — HEPARIN SODIUM 5000 UNITS: 5000 INJECTION INTRAVENOUS; SUBCUTANEOUS at 21:04

## 2022-06-22 RX ADMIN — IPRATROPIUM BROMIDE 2 PUFF: 17 AEROSOL, METERED RESPIRATORY (INHALATION) at 17:13

## 2022-06-22 RX ADMIN — INSULIN GLARGINE 20 UNITS: 100 INJECTION, SOLUTION SUBCUTANEOUS at 21:04

## 2022-06-22 RX ADMIN — NYSTATIN: 100000 POWDER TOPICAL at 09:28

## 2022-06-22 RX ADMIN — INSULIN LISPRO 5 UNITS: 100 INJECTION, SOLUTION INTRAVENOUS; SUBCUTANEOUS at 12:03

## 2022-06-22 RX ADMIN — HYDRALAZINE HYDROCHLORIDE 25 MG: 25 TABLET ORAL at 13:17

## 2022-06-22 RX ADMIN — HEPARIN SODIUM 5000 UNITS: 5000 INJECTION INTRAVENOUS; SUBCUTANEOUS at 13:17

## 2022-06-22 RX ADMIN — DOXAZOSIN 2 MG: 2 TABLET ORAL at 09:27

## 2022-06-22 NOTE — ASSESSMENT & PLAN NOTE
· No significant electrolyte derangements and with good urine output  · Creatinine with slight increased this morning to 4 16  · Urine studies consistent with pre-renal etiology  · Discontinue Lasix at this time  · Nephrology following; appreciate further recommendations  · Follow-up morning labs

## 2022-06-22 NOTE — ASSESSMENT & PLAN NOTE
· Improved edema at this time  · TTE (6/20): LVEF 55%, mild concentric hypertrophy  Wall motion and diastolic function is normal   No pericardial effusion    · Monitor Is and Os and daily weights

## 2022-06-22 NOTE — PROGRESS NOTES
Progress Note - Nephrology   Mushtaq Teri 38 y o  male MRN: [id number]  Unit/Bed#: -[place] Encounter: [id number]    A/P:  1  Acute kidney injury superimposed on CKD 4   - he had a baseline creatinine of approx 2 9-3 mg   - admitted with volume overload, hypertensive emergency and uncontrolled BS   - urine lytes demonstrate 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   - renal ultrasound is normal   - has marked microalbuminuria   - I spent an additional 25 minutes with him explaining kidney anatomy, ATN, effects of uncontrolled hypertension on the kidneys and glycosuria  - I told him that at a GFR of 16 ml/min he was not far from dialysis and consideration of a kidney transplant - we discussed noncompliance  He has no financial barriers to obtaining meds   - He understands the effects of uncontrolled hypertension and hyperglycemia on the kidney   - I explained that he would need to lose weight to obtain a kidney transplant due to abdominal obesity (difficult to place a graft kidney surgically) and he understood   - he will follow up with our office after discharge    2  DM -2 with long term current use of insulin   - He does not have a CGM and needs endocrine follow up as an outpatient  as A1c 11 6   - He has had DM since 2007 and has significant albuminuria   - he likely has diabetic nephropathy and focal glomerulosclerosis due to glomerulomegaly due to morbid obesity   - this was explained to him and he understood   - he neeeds referral to weight management and endocrinology as an outpatient    3  Hypertensive emergency   - he is now adequately controlled at 140-150 in the hospital   - he has a blood pressure cuff and will self monitor   - he has hydralazine for additional treatment should his BP shoot up which was a concern of his  4  Bipolar 1   - he will take his Vraylar    5  Volume overload   - he examines euvolemic today and does not need diuretic therapy    6   Open toe wound   - he was seen by podiatry    7  Profound hypothyroidism    - levothyroxine started and he will be compliant with its use   -          Follow up reason for today's visit: Acute kidney injury/cKD/ diabetic nephropathy/hypothyroid    STEFANIA (acute kidney injury) [facility])    Patient Active Problem List   Diagnosis    Type 2 diabetes mellitus, with long-term current use of insulin ([place] [address] )    Abdominal pain    OCD (obsessive compulsive disorder)    Schizoaffective disorder, depressive type ([place] [address] )    Hypothyroidism    Morbid obesity with BMI of 50 0-59 9, adult ([place] [address] )    Anxiety    Episodic confusion    Snoring    Insomnia    Hypersomnia    Vitamin D deficiency    Vomiting    Occipital neuralgia of left side    Headache    Nodule of parotid gland    Bipolar 1 disorder (HCC)    Depression    Type 1 diabetes mellitus without complication ([place] [address] )    Urinary retention    Peripheral edema    Hyperlipidemia, mixed    Migraine without aura and without status migrainosus, not intractable    Class 3 severe obesity due to excess calories with serious comorbidity and body mass index (BMI) of 60 0 to 69 9 in adult [facility])    Spinal stenosis in cervical region    Difficulty urinating    Urinary tract infection without hematuria    Penile pain    Chronic migraine without aura without status migrainosus, not intractable    Hypertensive emergency    Encephalopathy    Leukocytosis    Schizo affective schizophrenia ([place] [address] )    STEFANIA (acute kidney injury) ([place] [address] )    Acute respiratory disease due to COVID-19 virus    Elevated troponin    Acute on chronic kidney failure ([place] [address] )    Family history of heart disease    Hypokalemia    Chest pressure    GERD (gastroesophageal reflux disease)    Hypertension    SOB (shortness of breath)    Volume overload    Hyponatremia    Open toe wound         Subjective:   A 10 point ROS is negative    Objective:     Vitals: Blood pressure 150/92, pulse 79, temperature 98 5 °F (36 9 °C), resp   rate 20, height 5' 2" (1 575 m), weight (!) 147 kg (324 lb 15 3 oz), SpO2 94 %  ,Body mass index is 59 44 kg/m²  Weight (last 2 days)     Date/Time Weight    06/22/22 0553 147 (324 96)    06/21/22 1313 149 (328 49)            Intake/Output Summary (Last 24 hours) at 6/22/2022 1512  Last data filed at 6/22/2022 1301  Gross per 24 hour   Intake 640 ml   Output 3150 ml   Net -2510 ml     I/O last 3 completed shifts: In: 1860 [P O :1860]  Out: 3550 [Urine:3550]         Physical Exam: /92   Pulse 79   Temp 98 5 °F (36 9 °C)   Resp 20   Ht 5' 2" (1 575 m)   Wt (!) 147 kg (324 lb 15 3 oz)   SpO2 94%   BMI 59 44 kg/m²     General Appearance:    Alert obese, cooperative, no distress, appears stated age   Head:    Normocephalic, without obvious abnormality, atraumatic   Eyes:    Conjunctiva/corneas clear   Ears:    Normal external ears   Nose:   Nares normal, septum midline, mucosa normal, no drainage    or sinus tenderness   Throat:   Lips, mucosa, and tongue normal; teeth and gums normal   Neck:   Supple, symmetrical, trachea midline, no adenopathy;        thyroid:  No enlargement/tenderness/nodules; no carotid    bruit or JVD   Back:     Symmetric, no curvature, ROM normal, no CVA tenderness   Lungs:     Clear to auscultation bilaterally, respirations unlabored   Chest wall:    No tenderness or deformity   Heart:    Regular rate and rhythm, S1 and S2 normal, no murmur, rub   or gallop   Abdomen:     Soft, non-tender, bowel sounds active   Extremities:   Extremities normal, atraumatic, no cyanosis or edema   Skin:   Skin color, texture, turgor normal, no rashes or lesions   Lymph nodes:   Cervical normal   Neurologic:   CNII-XII intact            Lab, Imaging and other studies: I have personally reviewed pertinent labs    CBC:   Lab Results   Component Value Date    WBC 8 99 06/22/2022    HGB 10 5 (L) 06/22/2022    HCT 32 8 (L) 06/22/2022    MCV 86 06/22/2022     (H) 06/22/2022    MCH 27 5 06/22/2022    MCHC 32 0 06/22/2022    RDW 12 9 06/22/2022    MPV 9 4 06/22/2022    NRBC 0 06/22/2022     CMP:   Lab Results   Component Value Date    K 4 4 06/22/2022    CL 95 (L) 06/22/2022    CO2 30 06/22/2022    BUN 63 (H) 06/22/2022    CREATININE 4 16 (H) 06/22/2022    CALCIUM 9 0 06/22/2022    AST 17 06/22/2022    ALT 15 06/22/2022    ALKPHOS 67 06/22/2022    EGFR 16 06/22/2022         Results from last 7 days   Lab Units 06/22/22  0432 06/21/22  0444 06/20/22  0510   POTASSIUM mmol/L 4 4 4 3 4 4   CHLORIDE mmol/L 95* 97 99   CO2 mmol/L 30 31 29   BUN mg/dL 63* 60* 58*   CREATININE mg/dL 4 16* 3 90* 3 86*   CALCIUM mg/dL 9 0 8 8 8 8   ALK PHOS U/L 67  --   --    ALT U/L 15  --   --    AST U/L 17  --   --          Phosphorus:   Lab Results   Component Value Date    PHOS 4 3 06/22/2022     Magnesium:   Lab Results   Component Value Date    MG 2 1 06/22/2022     Urinalysis: No results found for: COLORU, CLARITYU, SPECGRAV, PHUR, LEUKOCYTESUR, NITRITE, PROTEINUA, GLUCOSEU, KETONESU, BILIRUBINUR, BLOODU  Ionized Calcium: No results found for: CAION  Coagulation: No results found for: PT, INR, APTT  Troponin: No results found for: TROPONINI  ABG: No results found for: PHART, ZOH5NUM, PO2ART, BAV1UOJ, Z0EOYILE, BEART, SOURCE  Radiology review:     IMAGING  Procedure: MRI foot/forefoot toes right wo contrast    Result Date: 6/20/2022  Narrative: MRI FOOT/FOREFOOT TOES RIGHT WO CONTRAST INDICATION:   r/o OM of the right hallux  COMPARISON: Correlation is made with the prior radiograph dated 6/19/2022  TECHNIQUE:  MR sequences were obtained of the right foot including the following:  Localizer, sagittal T1/STIR, coronal T1/T2 fat/ sat/STIR, axial T2 fat sat/STIR/PD  Images were acquired on a 1 5 Karin unit  Imaging performed on 1 5T MRI Gadolinium was not used FINDINGS: SUBCUTANEOUS TISSUES: There is mild ulceration along the plantar surface of the great toe at the level of the proximal and distal phalanges    Probable mild surrounding cellulitis  No discrete abscess  There is a focal cystic prominence measuring approximately 1 0 x 0 6 cm located just superficial to the flexor tendon at the level of the 1st MTP joint likely a tiny cystic ganglion  BONES:  No confluent altered marrow signal to suggest osteomyelitis  No acute fracture  FIRST MTP JOINT:  Intact  SESAMOID BONES:  Intact  OTHER ARTICULAR SURFACE:  Normal  PLANTAR FASCIA:  Intact  LISFRANC LIGAMENT:  Intact  FOREFOOT TENDONS: Intact  INTERMETATARSAL REGIONS: No bursitis or Maxwell's neuroma  MUSCULATURE: Intact  Impression: Mild ulceration plantar surface of the great toe at the level of the proximal and distal phalanges without MR evidence for osteomyelitis  Small cystic ganglion plantar surface of the medial forefoot at the level of the 1st MTP joint   Workstation performed: [id number]       Current Facility-Administered Medications   Medication Dose Route Frequency    acetaminophen (TYLENOL) tablet 650 mg  650 mg Oral Q4H PRN    albuterol (PROVENTIL HFA,VENTOLIN HFA) inhaler 2 puff  2 puff Inhalation 4x Daily    aluminum-magnesium hydroxide-simethicone (MYLANTA) oral suspension 30 mL  30 mL Oral Q4H PRN    cariprazine (VRAYLAR) capsule 6 mg  6 mg Oral Daily    carvedilol (COREG) tablet 25 mg  25 mg Oral BID With Meals    dextromethorphan-guaiFENesin (ROBITUSSIN DM) oral syrup 10 mL  10 mL Oral Q4H PRN    doxazosin (CARDURA) tablet 2 mg  2 mg Oral Daily    famotidine (PEPCID) tablet 40 mg  40 mg Oral Daily    FLUoxetine (PROzac) capsule 20 mg  20 mg Oral QAM    glycerin-hypromellose- (ARTIFICIAL TEARS) ophthalmic solution 1 drop  1 drop Both Eyes Q3H PRN    heparin (porcine) subcutaneous injection 5,000 Units  5,000 Units Subcutaneous Q8H [facility]    hydrALAZINE (APRESOLINE) injection 5 mg  5 mg Intravenous Q6H PRN    hydrALAZINE (APRESOLINE) tablet 25 mg  25 mg Oral Q8H [facility]    insulin glargine (LANTUS) subcutaneous injection 20 Units 0 2 mL  20 Units Subcutaneous HS    insulin lispro (HumaLOG) 100 units/mL subcutaneous injection 2-12 Units  2-12 Units Subcutaneous TID AC    insulin lispro (HumaLOG) 100 units/mL subcutaneous injection 2-12 Units  2-12 Units Subcutaneous HS    insulin lispro (HumaLOG) 100 units/mL subcutaneous injection 5 Units  5 Units Subcutaneous TID With Meals    ipratropium (ATROVENT HFA) inhaler 2 puff  2 puff Inhalation 4x Daily    levothyroxine tablet 125 mcg  125 mcg Oral Early Morning    LORazepam (ATIVAN) tablet 0 5 mg  0 5 mg Oral Q6H PRN    melatonin tablet 6 mg  6 mg Oral HS    nystatin (MYCOSTATIN) powder   Topical BID    ondansetron (ZOFRAN) injection 4 mg  4 mg Intravenous Q6H PRN    pantoprazole (PROTONIX) EC tablet 40 mg  40 mg Oral Early Morning    sodium chloride (PF) 0 9 % injection 3 mL  3 mL Intravenous Q1H PRN     Medications Discontinued During This Encounter   Medication Reason    carvedilol (COREG) tablet 12 5 mg     lisinopril (ZESTRIL) tablet 20 mg     verapamil (CALAN-SR) CR tablet 240 mg     insulin glargine (LANTUS) subcutaneous injection 50 Units 0 5 mL     propranolol (INDERAL) tablet 10 mg     furosemide (LASIX) injection 40 mg     doxazosin (CARDURA) tablet 2 mg     fluvoxaMINE (LUVOX) 100 mg tablet Discontinued by another clinician    cyclobenzaprine (FLEXERIL) 10 mg tablet Therapy completed    insulin glargine (LANTUS) 100 units/mL subcutaneous injection Duplicate order    lurasidone (LATUDA) 40 mg tablet Discontinued by another clinician    sucralfate (CARAFATE) 1 g tablet Side effects    ipratropium (ATROVENT) 0 02 % inhalation solution 0 5 mg     levothyroxine tablet 112 mcg     hydrALAZINE (APRESOLINE) tablet 25 mg     lisinopril (ZESTRIL) tablet 20 mg     doxazosin (CARDURA) tablet 4 mg     verapamil (CALAN-SR) CR tablet 240 mg     furosemide (LASIX) injection 40 mg     furosemide (LASIX) injection 40 mg     furosemide (LASIX) injection 40 mg     lisinopril (ZESTRIL) tablet 10 mg     carvedilol (COREG) tablet 12 5 mg     insulin lispro (HumaLOG) 100 units/mL subcutaneous injection 6 Units     insulin glargine (LANTUS) subcutaneous injection 40 Units 0 4 mL     insulin lispro (HumaLOG) 100 units/mL subcutaneous injection 10 Units     insulin lispro (HumaLOG) 100 units/mL subcutaneous injection 5 Units     insulin glargine (LANTUS) subcutaneous injection 30 Units 0 3 mL     furosemide (LASIX) injection 40 mg     LORazepam (ATIVAN) tablet 0 5 mg     LORazepam (ATIVAN) tablet 0 5 mg     insulin lispro (HumaLOG) 100 units/mL subcutaneous injection 5 Units Duplicate order    furosemide (LASIX) injection 40 mg        Ellen Bustillo MD      This progress note was produced in part using a dictation device which may document imprecise wording from author's original intent

## 2022-06-22 NOTE — ASSESSMENT & PLAN NOTE
· Present on admission as evidenced by acute renal failure and SBPs > 180 mmHg  · Likely due to medication non-compliance; blood pressure has improved since admission  · Continue Coreg 25 mg twice daily, Cardura 2 mg daily, and Hydralazine 25mg Q8H  · Continue to monitor blood pressure trends

## 2022-06-22 NOTE — UTILIZATION REVIEW
Continued Stay Review    Date: 6/22                          Current Patient Class: Inpatient  Current Level of Care: MEd/surg    HPI:42 y o  male initially admitted on 6/17     Assessment/Plan: Increase creat today  DC IV lasix  Continue with coreg, cardura, hydralazine  Bilateral lower extremity edema  6/21 Podiatry consult:  MRI shows no marrow changes  Weight bearing as tolerated to right foot, in surgical shoe  Vital Signs:   Date/Time Temp Pulse Resp BP MAP (mmHg) SpO2 O2 Device Patient Position - Orthostatic VS   06/22/22 07:00:14 -- 79 -- 150/92 111 94 % -- --   06/22/22 0659 98 5 °F (36 9 °C) -- 20 -- -- -- -- --   06/22/22 05:02:50 -- 82 -- 154/93 113 94 % -- --   06/21/22 22:02:34 97 4 °F (36 3 °C) Abnormal  79 18 142/86 105 95 % -- --   06/21/22 2202 -- -- -- 142/86 -- -- -- --   06/21/22 18:13:37 -- 83 -- 159/91 114 96 % None (Room air) --   06/21/22 17:42:49 -- 81 -- 168/98 121 95 % -- --   06/21/22 14:12:10 98 °F (36 7 °C) 81 20 158/97 117 94 % None (Room air) --   06/21/22 12:26:18 -- 77 -- 159/97 118 97 % -- --   06/21/22 09:20:19 -- 79 -- 161/94 116 96 % -- --   06/21/22 07:30:18 98 1 °F (36 7 °C) 81 18 166/103 Abnormal  124 95 % --      Pertinent Labs/Diagnostic Results:   · MRI R Foot (6/20): Mild ulceration plantar surface of the great toe at the level of the proximal and distal phalanges without MR evidence for osteomyelitis  Small cystic ganglion plantar surface of the medial forefoot at the level of the 1st MTP joint           Results from last 7 days   Lab Units 06/22/22  0432 06/21/22  0444 06/20/22  0510 06/19/22  0540 06/18/22  0545   WBC Thousand/uL 8 99 8 36 8 39 7 05 8 80   HEMOGLOBIN g/dL 10 5* 9 5* 9 8* 9 4* 9 6*   HEMATOCRIT % 32 8* 30 4* 30 9* 28 6* 29 6*   PLATELETS Thousands/uL 394* 365 374 320 375   NEUTROS ABS Thousands/µL 6 13  --  5 64 5 04 6 37         Results from last 7 days   Lab Units 06/22/22  0432 06/21/22  0444 06/20/22  0510 06/19/22  0540 06/18/22  0545 06/17/22  0725   SODIUM mmol/L 134* 135 137 136 132* 129*   POTASSIUM mmol/L 4 4 4 3 4 4 3 9 4 3 4 2   CHLORIDE mmol/L 95* 97 99 99 97 95*   CO2 mmol/L 30 31 29 29 26 26   ANION GAP mmol/L 9 7 9 8 9 8   BUN mg/dL 63* 60* 58* 59* 60* 52*   CREATININE mg/dL 4 16* 3 90* 3 86* 3 78* 3 73* 2 99*   EGFR ml/min/1 73sq m 16 17 17 18 18 24   CALCIUM mg/dL 9 0 8 8 8 8 8 0* 8 0* 8 1*   MAGNESIUM mg/dL 2 1  --   --   --   --  1 7*   PHOSPHORUS mg/dL 4 3  --   --   --   --   --      Results from last 7 days   Lab Units 06/22/22  0432 06/20/22  0510   AST U/L 17  --    ALT U/L 15  --    ALK PHOS U/L 67  --    TOTAL PROTEIN g/dL 6 7  --    ALBUMIN g/dL 3 3* 3 1*   TOTAL BILIRUBIN mg/dL 0 34  --      Results from last 7 days   Lab Units 06/22/22  1117 06/22/22  0623 06/21/22  2032 06/21/22  1533 06/21/22  1119 06/21/22  0608 06/20/22  2051 06/20/22  1655 06/20/22  1624 06/20/22  1608 06/20/22  1602 06/20/22  1552   POC GLUCOSE mg/dl 281* 224* 158* 165* 355* 180* 429* 182* 115 77 55* 44*     Results from last 7 days   Lab Units 06/22/22  0432 06/21/22  0444 06/20/22  0510 06/19/22  0540 06/18/22  0545 06/17/22  0725   GLUCOSE RANDOM mg/dL 235* 191* 96 114 126 220*     Results from last 7 days   Lab Units 06/18/22  0545   OSMOLALITY, SERUM mmol/*         BETA-HYDROXYBUTYRATE   Date Value Ref Range Status   10/26/2020 1 4 (H) <0 6 mmol/L Final        Results from last 7 days   Lab Units 06/17/22  0912 06/17/22  0725   HS TNI 0HR ng/L  --  11   HS TNI 2HR ng/L 10  --    HSTNI D2 ng/L -1  --      Results from last 7 days   Lab Units 06/17/22  0725   TSH 3RD GENERATON uIU/mL 40 319*         Results from last 7 days   Lab Units 06/17/22  0725   BNP pg/mL 110*         Results from last 7 days   Lab Units 06/18/22  0545 06/17/22  1626   OSMOLALITY, SERUM mmol/*  --    OSMO UR mmol/KG  --  247*     Results from last 7 days   Lab Units 06/17/22  1626   SODIUM UR  38   CREATININE UR mg/dL 49 4  49 4  49 4  49 1  49 1   PROTEIN UR mg/dL 205  205   PROT/CREAT RATIO UR  4 15*  4 18*       Medications:   Scheduled Medications:  albuterol, 2 puff, Inhalation, 4x Daily  cariprazine, 6 mg, Oral, Daily  carvedilol, 25 mg, Oral, BID With Meals  doxazosin, 2 mg, Oral, Daily  famotidine, 40 mg, Oral, Daily  FLUoxetine, 20 mg, Oral, QAM  heparin (porcine), 5,000 Units, Subcutaneous, Q8H SOFI  hydrALAZINE, 25 mg, Oral, Q8H SOFI  insulin glargine, 20 Units, Subcutaneous, HS  insulin lispro, 2-12 Units, Subcutaneous, TID AC  insulin lispro, 2-12 Units, Subcutaneous, HS  insulin lispro, 5 Units, Subcutaneous, TID With Meals  ipratropium, 2 puff, Inhalation, 4x Daily  levothyroxine, 125 mcg, Oral, Early Morning  melatonin, 6 mg, Oral, HS  nystatin, , Topical, BID  pantoprazole, 40 mg, Oral, Early Morning      Continuous IV Infusions:     PRN Meds:  acetaminophen, 650 mg, Oral, Q4H PRN  aluminum-magnesium hydroxide-simethicone, 30 mL, Oral, Q4H PRN  dextromethorphan-guaiFENesin, 10 mL, Oral, Q4H PRN  glycerin-hypromellose-, 1 drop, Both Eyes, Q3H PRN  hydrALAZINE, 5 mg, Intravenous, Q6H PRN  LORazepam, 0 5 mg, Oral, Q6H PRN  ondansetron, 4 mg, Intravenous, Q6H PRN  sodium chloride (PF), 3 mL, Intravenous, Q1H PRN        Discharge Plan: D    Network Utilization Review Department  ATTENTION: Please call with any questions or concerns to [phone] and carefully listen to the prompts so that you are directed to the right person  All voicemails are confidential   Lena Lanier all requests for admission clinical reviews, approved or denied determinations and any other requests to dedicated fax number below belonging to the campus where the patient is receiving treatment   List of dedicated fax numbers for the Facilities:  [address] DENIALS (Administrative/Medical Necessity) [phone]   [address] (Maternity/NICU/Pediatrics) [phone]   5000 [facility] Bola Dsouza [phone]   8049 [facility] [phone]   [address] [facility] [address] [address] [place] [address] [address] [phone]   [address] [phone]

## 2022-06-22 NOTE — PLAN OF CARE
Problem: Potential for Falls  Goal: Patient will remain free of falls  Description: INTERVENTIONS:  - Educate patient/family on patient safety including physical limitations  - Instruct patient to call for assistance with activity   - Consult OT/PT to assist with strengthening/mobility   - Keep Call bell within reach  - Keep bed low and locked with side rails adjusted as appropriate  - Keep care items and personal belongings within reach  - Initiate and maintain comfort rounds  - Make Fall Risk Sign visible to staff  - Offer Toileting every 2 Hours, in advance of need  - Apply yellow socks and bracelet for high fall risk patients  - Consider moving patient to room near nurses station  Outcome: Progressing     Problem: MOBILITY - ADULT  Goal: Maintain or return to baseline ADL function  Description: INTERVENTIONS:  -  Assess patient's ability to carry out ADLs; assess patient's baseline for ADL function and identify physical deficits which impact ability to perform ADLs (bathing, care of mouth/teeth, toileting, grooming, dressing, etc )  - Assess/evaluate cause of self-care deficits   - Assess range of motion  - Assess patient's mobility; develop plan if impaired  - Assess patient's need for assistive devices and provide as appropriate  - Encourage maximum independence but intervene and supervise when necessary  - Involve family in performance of ADLs  - Assess for home care needs following discharge   - Consider OT consult to assist with ADL evaluation and planning for discharge  - Provide patient education as appropriate  Outcome: Progressing  Goal: Maintains/Returns to pre admission functional level  Description: INTERVENTIONS:  - Perform BMAT or MOVE assessment daily    - Set and communicate daily mobility goal to care team and patient/family/caregiver  - Collaborate with rehabilitation services on mobility goals if consulted  - Perform Range of Motion 3 times a day  - Reposition patient every 2 hours    - Dangle patient 3 times a day  - Stand patient 3 times a day  - Ambulate patient 3 times a day  - Out of bed to chair 3 times a day   - Out of bed for meals 3 times a day  - Out of bed for toileting  - Record patient progress and toleration of activity level   Outcome: Progressing     Problem: PAIN - ADULT  Goal: Verbalizes/displays adequate comfort level or baseline comfort level  Description: Interventions:  - Encourage patient to monitor pain and request assistance  - Assess pain using appropriate pain scale  - Administer analgesics based on type and severity of pain and evaluate response  - Implement non-pharmacological measures as appropriate and evaluate response  - Consider cultural and social influences on pain and pain management  - Notify physician/advanced practitioner if interventions unsuccessful or patient reports new pain  Outcome: Progressing     Problem: INFECTION - ADULT  Goal: Absence or prevention of progression during hospitalization  Description: INTERVENTIONS:  - Assess and monitor for signs and symptoms of infection  - Monitor lab/diagnostic results  - Monitor all insertion sites, i e  indwelling lines, tubes, and drains  - Monitor endotracheal if appropriate and nasal secretions for changes in amount and color  - [place] appropriate cooling/warming therapies per order  - Administer medications as ordered  - Instruct and encourage patient and family to use good hand hygiene technique  - Identify and instruct in appropriate isolation precautions for identified infection/condition  Outcome: Progressing  Goal: Absence of fever/infection during neutropenic period  Description: INTERVENTIONS:  - Monitor WBC    Outcome: Progressing     Problem: SAFETY ADULT  Goal: Patient will remain free of falls  Description: INTERVENTIONS:  - Educate patient/family on patient safety including physical limitations  - Instruct patient to call for assistance with activity   - Consult OT/PT to assist with strengthening/mobility   - Keep Call bell within reach  - Keep bed low and locked with side rails adjusted as appropriate  - Keep care items and personal belongings within reach  - Initiate and maintain comfort rounds  - Make Fall Risk Sign visible to staff  - Offer Toileting every 2 Hours, in advance of need  - Apply yellow socks and bracelet for high fall risk patients  - Consider moving patient to room near nurses station  Outcome: Progressing  Goal: Maintain or return to baseline ADL function  Description: INTERVENTIONS:  -  Assess patient's ability to carry out ADLs; assess patient's baseline for ADL function and identify physical deficits which impact ability to perform ADLs (bathing, care of mouth/teeth, toileting, grooming, dressing, etc )  - Assess/evaluate cause of self-care deficits   - Assess range of motion  - Assess patient's mobility; develop plan if impaired  - Assess patient's need for assistive devices and provide as appropriate  - Encourage maximum independence but intervene and supervise when necessary  - Involve family in performance of ADLs  - Assess for home care needs following discharge   - Consider OT consult to assist with ADL evaluation and planning for discharge  - Provide patient education as appropriate  Outcome: Progressing  Goal: Maintains/Returns to pre admission functional level  Description: INTERVENTIONS:  - Perform BMAT or MOVE assessment daily    - Set and communicate daily mobility goal to care team and patient/family/caregiver  - Collaborate with rehabilitation services on mobility goals if consulted  - Perform Range of Motion 3 times a day  - Reposition patient every 2 hours    - Dangle patient 3 times a day  - Stand patient 3 times a day  - Ambulate patient 3 times a day  - Out of bed to chair 3 times a day   - Out of bed for meals 3 times a day  - Out of bed for toileting  - Record patient progress and toleration of activity level   Outcome: Progressing     Problem: DISCHARGE PLANNING  Goal: Discharge to home or other facility with appropriate resources  Description: INTERVENTIONS:  - Identify barriers to discharge w/patient and caregiver  - Arrange for needed discharge resources and transportation as appropriate  - Identify discharge learning needs (meds, wound care, etc )  - Arrange for interpretive services to assist at discharge as needed  - Refer to Case Management Department for coordinating discharge planning if the patient needs post-hospital services based on physician/advanced practitioner order or complex needs related to functional status, cognitive ability, or social support system  Outcome: Progressing     Problem: Knowledge Deficit  Goal: Patient/family/caregiver demonstrates understanding of disease process, treatment plan, medications, and discharge instructions  Description: Complete learning assessment and assess knowledge base    Interventions:  - Provide teaching at level of understanding  - Provide teaching via preferred learning methods  Outcome: Progressing     Problem: CARDIOVASCULAR - ADULT  Goal: Maintains optimal cardiac output and hemodynamic stability  Description: INTERVENTIONS:  - Monitor I/O, vital signs and rhythm  - Monitor for S/S and trends of decreased cardiac output  - Administer and titrate ordered vasoactive medications to optimize hemodynamic stability  - Assess quality of pulses, skin color and temperature  - Assess for signs of decreased coronary artery perfusion  - Instruct patient to report change in severity of symptoms  Outcome: Progressing  Goal: Absence of cardiac dysrhythmias or at baseline rhythm  Description: INTERVENTIONS:  - Continuous cardiac monitoring, vital signs, obtain 12 lead EKG if ordered  - Administer antiarrhythmic and heart rate control medications as ordered  - Monitor electrolytes and administer replacement therapy as ordered  Outcome: Progressing     Problem: Nutrition/Hydration-ADULT  Goal: Nutrient/Hydration intake appropriate for improving, restoring or maintaining nutritional needs  Description: Monitor and assess patient's nutrition/hydration status for malnutrition  Collaborate with interdisciplinary team and initiate plan and interventions as ordered  Monitor patient's weight and dietary intake as ordered or per policy  Utilize nutrition screening tool and intervene as necessary  Determine patient's food preferences and provide high-protein, high-caloric foods as appropriate       INTERVENTIONS:  - Monitor oral intake, urinary output, labs, and treatment plans  - Assess nutrition and hydration status and recommend course of action  - Evaluate amount of meals eaten  - Assist patient with eating if necessary   - Allow adequate time for meals  - Recommend/ encourage appropriate diets, oral nutritional supplements, and vitamin/mineral supplements  - Order, calculate, and assess calorie counts as needed  - Recommend, monitor, and adjust tube feedings and TPN/PPN based on assessed needs  - Assess need for intravenous fluids  - Provide specific nutrition/hydration education as appropriate  - Include patient/family/caregiver in decisions related to nutrition  Outcome: Progressing     Problem: Prexisting or High Potential for Compromised Skin Integrity  Goal: Skin integrity is maintained or improved  Description: INTERVENTIONS:  - Identify patients at risk for skin breakdown  - Assess and monitor skin integrity  - Assess and monitor nutrition and hydration status  - Monitor labs   - Assess for incontinence   - Turn and reposition patient  - Assist with mobility/ambulation  - Relieve pressure over bony prominences  - Avoid friction and shearing  - Provide appropriate hygiene as needed including keeping skin clean and dry  - Evaluate need for skin moisturizer/barrier cream  - Collaborate with interdisciplinary team   - Patient/family teaching  - Consider wound care consult   Outcome: Progressing

## 2022-06-22 NOTE — CASE MANAGEMENT
Case Management Discharge Planning Note    Patient name Delilah Cha  Location /-01 MRN [id number]  : 1980 Date 2022       Current Admission Date: 2022  Current Admission Diagnosis:Hypertensive emergency   Patient Active Problem List    Diagnosis Date Noted    Open toe wound 2022    Volume overload 2022    Hyponatremia 2022    Hypertension 2021    SOB (shortness of breath) 2021    GERD (gastroesophageal reflux disease) 2021    Family history of heart disease 2021    Hypokalemia 2021    Chest pressure 2021    Elevated troponin 2021    Acute on chronic kidney failure ([facility] ) 2021    Acute respiratory disease due to COVID-19 virus 2021    [facility] 2020    Schizo affective schizophrenia ([facility] ) 10/25/2020    Hypertensive emergency 2020    Encephalopathy 2020    Leukocytosis 2020    Chronic migraine without aura without status migrainosus, not intractable 2019    Difficulty urinating 2019    Urinary tract infection without hematuria 2019    Penile pain 2019    Spinal stenosis in cervical region 2019    Class 3 severe obesity due to excess calories with serious comorbidity and body mass index (BMI) of 60 0 to 69 9 in adult ([facility] ) 2019    Migraine without aura and without status migrainosus, not intractable 2019    Peripheral edema 2019    Hyperlipidemia, mixed 2019    Bipolar 1 disorder ([facility] ) 2019    Depression 2019    Type 1 diabetes mellitus without complication ([facility] )     Urinary retention 2019    Occipital neuralgia of left side 02/15/2019    Headache 02/15/2019    Nodule of parotid gland 02/15/2019    Snoring 2018    Insomnia 2018    Hypersomnia 2018    Vitamin D deficiency 2018    Episodic confusion     OCD (obsessive compulsive disorder) 10/31/2018    Schizoaffective disorder, depressive type ([facility] [facility] ) 10/31/2018    Hypothyroidism 10/31/2018    Morbid obesity with BMI of 50 0-59 9, adult ([facility] ) 10/31/2018    Anxiety 10/31/2018    Type 2 diabetes mellitus, with long-term current use of insulin ([facility] ) 10/17/2018    Abdominal pain 05/12/2018    Vomiting 05/12/2018      LOS (days): 5  Geometric Mean LOS (GMLOS) (days):   Days to GMLOS:     OBJECTIVE:  Risk of Unplanned Readmission Score: 25 73         Current admission status: Inpatient   Preferred Pharmacy:   [address] [place] [address]  [address] [place] [id number]  Phone: [phone] Fax: [address] [place] [address]  Phone: [phone] Fax: [phone]    Primary Care Provider: Lawrence Rodriguez MD    Primary Insurance: [address] [place] [address]  Secondary Insurance:     DISCHARGE DETAILS:  Pt will require outpatient wound care follow up at [facility] wound care center on discharge as per Podiatry  CM to arrange for same

## 2022-06-22 NOTE — PROGRESS NOTES
[facility] [address]  Progress Note - Radha Sagastume 1980, 38 y o  male MRN: [id number]  Unit/Bed#: -Babak Encounter: [id number]  Primary Care Provider: Lawrence Rodriguez MD   Date and time admitted to hospital: 6/17/2022  7:09 AM    * STEFANIA (acute kidney injury) ([address] )  Assessment & Plan  · No significant electrolyte derangements and with good urine output  · Creatinine with slight increased this morning to 4 16  · Urine studies consistent with pre-renal etiology  · Discontinue Lasix at this time  · Nephrology following; appreciate further recommendations  · Follow-up morning labs    Hypertensive emergency  Assessment & Plan  · Present on admission as evidenced by acute renal failure and SBPs > 180 mmHg  · Likely due to medication non-compliance; blood pressure has improved since admission  · Continue Coreg 25 mg twice daily, Cardura 2 mg daily, and Hydralazine 25mg Q8H  · Continue to monitor blood pressure trends    Open toe wound  Assessment & Plan  · s/p debridement of his ulceration  · MRI R Foot (6/20): Mild ulceration plantar surface of the great toe at the level of the proximal and distal phalanges without MR evidence for osteomyelitis  Small cystic ganglion plantar surface of the medial forefoot at the level of the 1st MTP joint  · No further Podiatry intervention required  · Continue local wound care    Volume overload  Assessment & Plan  · Improved edema at this time  · TTE (6/20): LVEF 55%, mild concentric hypertrophy  Wall motion and diastolic function is normal   No pericardial effusion    · Monitor Is and Os and daily weights    Type 2 diabetes mellitus, with long-term current use of insulin [facility])  Assessment & Plan  Lab Results   Component Value Date    HGBA1C 11 6 (H) 08/03/2021       Recent Labs     06/21/22  1533 06/21/22  2032 06/22/22  0623 06/22/22  1117   POCGLU 165* 158* 224* 281*       Blood Sugar Average: Last 72 hrs:  (P) 500 [id number]     · Continue Lantus 20 QHS with Lispro 5U TID   · Continue corrective sliding scale insulin, Accu-Cheks, and hypoglycemic protocol  · Recommended outpatient Endocrinology follow-up  · Continue carb controlled diet    Bipolar 1 disorder ([address] )  Assessment & Plan  · Continue home Vraylar and Prozac      Hypothyroidism  Assessment & Plan  · TSH 40 31, Free T4 0 71  · Continue Levothyroxine to 125 mcg PO daily  · Recheck TSH in 4-6 weeks      VTE Pharmacologic Prophylaxis: VTE Score: 4 Moderate Risk (Score 3-4) - Pharmacological DVT Prophylaxis Ordered: heparin  Patient Centered Rounds: I performed bedside rounds with nursing staff today  Discussions with Specialists or Other Care Team Provider: Nephrology, PT/OT, Nursing, and CM    Education and Discussions with Family / Patient: Attempted to update  (father) via phone  Left voicemail  Time Spent for Care: 45 minutes  More than 50% of total time spent on counseling and coordination of care as described above  Current Length of Stay: 5 day(s)  Current Patient Status: Inpatient   Certification Statement: The patient will continue to require additional inpatient hospital stay due to STEFANIA with rising Cr  Discharge Plan: To be determined based on clinical improvement  Code Status: Level 1 - Full Code    Subjective:   Patient resting comfortably in bed without any acute complaints  He notes continued good urine output and improvement in his lower extremity and abdominal edema  No significant over night events reported by nursing  Objective:     Vitals:   Temp (24hrs), Av °F (36 7 °C), Min:97 4 °F (36 3 °C), Max:98 5 °F (36 9 °C)    Temp:  [97 4 °F (36 3 °C)-98 5 °F (36 9 °C)] 98 5 °F (36 9 °C)  HR:  [79-83] 79  Resp:  [18-20] 20  BP: (142-168)/(86-98) 150/92  SpO2:  [94 %-96 %] 94 %  Body mass index is 59 44 kg/m²  Input and Output Summary (last 24 hours):      Intake/Output Summary (Last 24 hours) at 2022 1348  Last data filed at 2022 1301  Gross per 24 hour   Intake 880 ml   Output 4150 ml   Net -3270 ml       Physical Exam:   Physical Exam  Constitutional:       General: He is not in acute distress  Appearance: Normal appearance  He is obese  HENT:      Head: Normocephalic and atraumatic  Mouth/Throat:      Mouth: Mucous membranes are moist       Pharynx: Oropharynx is clear  Eyes:      Extraocular Movements: Extraocular movements intact  Conjunctiva/sclera: Conjunctivae normal    Cardiovascular:      Rate and Rhythm: Normal rate and regular rhythm  Pulses: Normal pulses  Heart sounds: Normal heart sounds  Pulmonary:      Effort: Pulmonary effort is normal  No respiratory distress  Breath sounds: Normal breath sounds  No wheezing  Abdominal:      General: Bowel sounds are normal  There is no distension  Palpations: Abdomen is soft  Tenderness: There is no abdominal tenderness  Musculoskeletal:         General: Normal range of motion  Cervical back: Normal range of motion and neck supple  Right lower leg: Edema present  Left lower leg: Edema present  Skin:     General: Skin is warm and dry  Neurological:      General: No focal deficit present  Mental Status: He is alert and oriented to person, place, and time  Mental status is at baseline     Psychiatric:         Mood and Affect: Mood normal          Behavior: Behavior normal          Judgment: Judgment normal           Labs:  Results from last 7 days   Lab Units 06/22/22  0432   WBC Thousand/uL 8 99   HEMOGLOBIN g/dL 10 5*   HEMATOCRIT % 32 8*   PLATELETS Thousands/uL 394*   NEUTROS PCT % 69   LYMPHS PCT % 21   MONOS PCT % 6   EOS PCT % 3     Results from last 7 days   Lab Units 06/22/22  0432   SODIUM mmol/L 134*   POTASSIUM mmol/L 4 4   CHLORIDE mmol/L 95*   CO2 mmol/L 30   BUN mg/dL 63*   CREATININE mg/dL 4 16*   ANION GAP mmol/L 9   CALCIUM mg/dL 9 0   ALBUMIN g/dL 3 3*   TOTAL BILIRUBIN mg/dL 0 34   ALK PHOS U/L 67   ALT U/L 15 AST U/L 17   GLUCOSE RANDOM mg/dL 235*         Results from last 7 days   Lab Units 06/22/22  1117 06/22/22  0623 06/21/22  2032 06/21/22  1533 06/21/22  1119 06/21/22  0608 06/20/22  2051 06/20/22  1655 06/20/22  1624 06/20/22  1608 06/20/22  1602 06/20/22  1552   POC GLUCOSE mg/dl 281* 224* 158* 165* 355* 180* 429* 182* 115 77 55* 44*               Lines/Drains:  Invasive Devices  Report    Peripheral Intravenous Line  Duration           Peripheral IV 06/22/22 Dorsal (posterior); Right Hand <1 day              Imaging: No new imaging       Recent Cultures (last 7 days):         Last 24 Hours Medication List:   Current Facility-Administered Medications   Medication Dose Route Frequency Provider Last Rate    acetaminophen  650 mg Oral Q4H PRN Gislee Rios, DO      albuterol  2 puff Inhalation 4x Daily Gisele Rios DO      aluminum-magnesium hydroxide-simethicone  30 mL Oral Q4H PRN Hal Figueroa PA-C      cariprazine  6 mg Oral Daily Gisele Rios, DO      carvedilol  25 mg Oral BID With Meals [place], [place]      dextromethorphan-guaiFENesin  10 mL Oral Q4H PRN Joanie Fernando MD      doxazosin  2 mg Oral Daily Rene Cesar MD      famotidine  40 mg Oral Daily Gisele Rios, DO      FLUoxetine  20 mg Oral QAM Gisele Rios DO      glycerin-hypromellose-  1 drop Both Eyes Q3H PRN Hal Figueroa PA-C      heparin (porcine)  5,000 Units Subcutaneous [facility] Gisele Rios DO      hydrALAZINE  5 mg Intravenous Q6H PRN Gisele Rios DO      hydrALAZINE  25 mg Oral [facility] [address] [facility] Joel, DO      insulin glargine  20 Units Subcutaneous HS [facility], DO      insulin lispro  2-12 Units Subcutaneous TID AC Gisele Rios, DO      insulin lispro  2-12 Units Subcutaneous HS Gisele Rios, DO      insulin lispro  5 Units Subcutaneous TID With Meals [facility], DO      ipratropium  2 puff Inhalation 4x Daily Joanie Fernando MD      levothyroxine  125 mcg Oral Early Morning Ayesha Souza MD      LORazepam  0 5 mg Oral Q6H PRN Selena Zaida Joel, DO      melatonin  6 mg Oral HS [place]ena [place], [place]      nystatin   Topical BID Ximena Shin, [place]      ondansetron  4 mg Intravenous Q6H PRN Larene Enoch, DO      pantoprazole  40 mg Oral Early Morning Larene Enoch, DO      sodium chloride (PF)  3 mL Intravenous Q1H PRN Tia Kendall DO          Today, Patient Was Seen By: Phu Russell DO    **Please Note: This note may have been constructed using a voice recognition system  **

## 2022-06-22 NOTE — PLAN OF CARE
Problem: Potential for Falls  Goal: Patient will remain free of falls  Description: INTERVENTIONS:  - Educate patient/family on patient safety including physical limitations  - Instruct patient to call for assistance with activity   - Consult OT/PT to assist with strengthening/mobility   - Keep Call bell within reach  - Keep bed low and locked with side rails adjusted as appropriate  - Keep care items and personal belongings within reach  - Initiate and maintain comfort rounds  - Make Fall Risk Sign visible to staff  - Offer Toileting every 2 Hours, in advance of need  - Initiate/Maintain bed alarm  - Obtain necessary fall risk management equipment: walker   - Apply yellow socks and bracelet for high fall risk patients  - Consider moving patient to room near nurses station  Outcome: Progressing     Problem: MOBILITY - ADULT  Goal: Maintain or return to baseline ADL function  Description: INTERVENTIONS:  -  Assess patient's ability to carry out ADLs; assess patient's baseline for ADL function and identify physical deficits which impact ability to perform ADLs (bathing, care of mouth/teeth, toileting, grooming, dressing, etc )  - Assess/evaluate cause of self-care deficits   - Assess range of motion  - Assess patient's mobility; develop plan if impaired  - Assess patient's need for assistive devices and provide as appropriate  - Encourage maximum independence but intervene and supervise when necessary  - Involve family in performance of ADLs  - Assess for home care needs following discharge   - Consider OT consult to assist with ADL evaluation and planning for discharge  - Provide patient education as appropriate  Outcome: Progressing  Goal: Maintains/Returns to pre admission functional level  Description: INTERVENTIONS:  - Perform BMAT or MOVE assessment daily    - Set and communicate daily mobility goal to care team and patient/family/caregiver     - Collaborate with rehabilitation services on mobility goals if consulted  - Perform Range of Motion 3 times a day  - Reposition patient every 2 hours    - Dangle patient 3 times a day  - Stand patient 3 times a day  - Ambulate patient 3 times a day  - Out of bed to chair 3 times a day   - Out of bed for meals 3 times a day  - Out of bed for toileting  - Record patient progress and toleration of activity level   Outcome: Progressing     Problem: PAIN - ADULT  Goal: Verbalizes/displays adequate comfort level or baseline comfort level  Description: Interventions:  - Encourage patient to monitor pain and request assistance  - Assess pain using appropriate pain scale  - Administer analgesics based on type and severity of pain and evaluate response  - Implement non-pharmacological measures as appropriate and evaluate response  - Consider cultural and social influences on pain and pain management  - Notify physician/advanced practitioner if interventions unsuccessful or patient reports new pain  Outcome: Progressing     Problem: INFECTION - ADULT  Goal: Absence or prevention of progression during hospitalization  Description: INTERVENTIONS:  - Assess and monitor for signs and symptoms of infection  - Monitor lab/diagnostic results  - Monitor all insertion sites, i e  indwelling lines, tubes, and drains  - Monitor endotracheal if appropriate and nasal secretions for changes in amount and color  - [place] appropriate cooling/warming therapies per order  - Administer medications as ordered  - Instruct and encourage patient and family to use good hand hygiene technique  - Identify and instruct in appropriate isolation precautions for identified infection/condition  Outcome: Progressing  Goal: Absence of fever/infection during neutropenic period  Description: INTERVENTIONS:  - Monitor WBC    Outcome: Progressing     Problem: SAFETY ADULT  Goal: Patient will remain free of falls  Description: INTERVENTIONS:  - Educate patient/family on patient safety including physical limitations  - Instruct patient to call for assistance with activity   - Consult OT/PT to assist with strengthening/mobility   - Keep Call bell within reach  - Keep bed low and locked with side rails adjusted as appropriate  - Keep care items and personal belongings within reach  - Initiate and maintain comfort rounds  - Make Fall Risk Sign visible to staff  - Offer Toileting every 2 Hours, in advance of need  - Initiate/Maintain bed alarm  - Obtain necessary fall risk management equipment: walker   - Apply yellow socks and bracelet for high fall risk patients  - Consider moving patient to room near nurses station  Outcome: Progressing  Goal: Maintain or return to baseline ADL function  Description: INTERVENTIONS:  -  Assess patient's ability to carry out ADLs; assess patient's baseline for ADL function and identify physical deficits which impact ability to perform ADLs (bathing, care of mouth/teeth, toileting, grooming, dressing, etc )  - Assess/evaluate cause of self-care deficits   - Assess range of motion  - Assess patient's mobility; develop plan if impaired  - Assess patient's need for assistive devices and provide as appropriate  - Encourage maximum independence but intervene and supervise when necessary  - Involve family in performance of ADLs  - Assess for home care needs following discharge   - Consider OT consult to assist with ADL evaluation and planning for discharge  - Provide patient education as appropriate  Outcome: Progressing  Goal: Maintains/Returns to pre admission functional level  Description: INTERVENTIONS:  - Perform BMAT or MOVE assessment daily    - Set and communicate daily mobility goal to care team and patient/family/caregiver  - Collaborate with rehabilitation services on mobility goals if consulted  - Perform Range of Motion 3 times a day  - Reposition patient every 2 hours    - Dangle patient 3 times a day  - Stand patient 3 times a day  - Ambulate patient 3 times a day  - Out of bed to chair 3 times a day   - Out of bed for meals 3 times a day  - Out of bed for toileting  - Record patient progress and toleration of activity level   Outcome: Progressing     Problem: DISCHARGE PLANNING  Goal: Discharge to home or other facility with appropriate resources  Description: INTERVENTIONS:  - Identify barriers to discharge w/patient and caregiver  - Arrange for needed discharge resources and transportation as appropriate  - Identify discharge learning needs (meds, wound care, etc )  - Arrange for interpretive services to assist at discharge as needed  - Refer to Case Management Department for coordinating discharge planning if the patient needs post-hospital services based on physician/advanced practitioner order or complex needs related to functional status, cognitive ability, or social support system  Outcome: Progressing     Problem: Knowledge Deficit  Goal: Patient/family/caregiver demonstrates understanding of disease process, treatment plan, medications, and discharge instructions  Description: Complete learning assessment and assess knowledge base    Interventions:  - Provide teaching at level of understanding  - Provide teaching via preferred learning methods  Outcome: Progressing     Problem: CARDIOVASCULAR - ADULT  Goal: Maintains optimal cardiac output and hemodynamic stability  Description: INTERVENTIONS:  - Monitor I/O, vital signs and rhythm  - Monitor for S/S and trends of decreased cardiac output  - Administer and titrate ordered vasoactive medications to optimize hemodynamic stability  - Assess quality of pulses, skin color and temperature  - Assess for signs of decreased coronary artery perfusion  - Instruct patient to report change in severity of symptoms  Outcome: Progressing  Goal: Absence of cardiac dysrhythmias or at baseline rhythm  Description: INTERVENTIONS:  - Continuous cardiac monitoring, vital signs, obtain 12 lead EKG if ordered  - Administer antiarrhythmic and heart rate control medications as ordered  - Monitor electrolytes and administer replacement therapy as ordered  Outcome: Progressing     Problem: Nutrition/Hydration-ADULT  Goal: Nutrient/Hydration intake appropriate for improving, restoring or maintaining nutritional needs  Description: Monitor and assess patient's nutrition/hydration status for malnutrition  Collaborate with interdisciplinary team and initiate plan and interventions as ordered  Monitor patient's weight and dietary intake as ordered or per policy  Utilize nutrition screening tool and intervene as necessary  Determine patient's food preferences and provide high-protein, high-caloric foods as appropriate       INTERVENTIONS:  - Monitor oral intake, urinary output, labs, and treatment plans  - Assess nutrition and hydration status and recommend course of action  - Evaluate amount of meals eaten  - Assist patient with eating if necessary   - Allow adequate time for meals  - Recommend/ encourage appropriate diets, oral nutritional supplements, and vitamin/mineral supplements  - Order, calculate, and assess calorie counts as needed  - Recommend, monitor, and adjust tube feedings and TPN/PPN based on assessed needs  - Assess need for intravenous fluids  - Provide specific nutrition/hydration education as appropriate  - Include patient/family/caregiver in decisions related to nutrition  Outcome: Progressing     Problem: Prexisting or High Potential for Compromised Skin Integrity  Goal: Skin integrity is maintained or improved  Description: INTERVENTIONS:  - Identify patients at risk for skin breakdown  - Assess and monitor skin integrity  - Assess and monitor nutrition and hydration status  - Monitor labs   - Assess for incontinence   - Turn and reposition patient  - Assist with mobility/ambulation  - Relieve pressure over bony prominences  - Avoid friction and shearing  - Provide appropriate hygiene as needed including keeping skin clean and dry  - Evaluate need for skin moisturizer/barrier cream  - Collaborate with interdisciplinary team   - Patient/family teaching  - Consider wound care consult   Outcome: Progressing

## 2022-06-22 NOTE — ASSESSMENT & PLAN NOTE
Lab Results   Component Value Date    HGBA1C 11 6 (H) 08/03/2021       Recent Labs     06/21/22  1533 06/21/22 2032 06/22/22  0623 06/22/22  1117   POCGLU 165* 158* 224* 281*       Blood Sugar Average: Last 72 hrs:  (P) 614 [id number]     · Continue Lantus 20 QHS with Lispro 5U TID   · Continue corrective sliding scale insulin, Accu-Cheks, and hypoglycemic protocol  · Recommended outpatient Endocrinology follow-up  · Continue carb controlled diet

## 2022-06-23 ENCOUNTER — TELEPHONE (OUTPATIENT)
Dept: OTHER | Facility: HOSPITAL | Age: 42
End: 2022-06-23

## 2022-06-23 VITALS
HEART RATE: 81 BPM | HEIGHT: 62 IN | TEMPERATURE: 98 F | SYSTOLIC BLOOD PRESSURE: 161 MMHG | RESPIRATION RATE: 16 BRPM | BODY MASS INDEX: 57.97 KG/M2 | DIASTOLIC BLOOD PRESSURE: 91 MMHG | WEIGHT: 315 LBS | OXYGEN SATURATION: 94 %

## 2022-06-23 DIAGNOSIS — N17.9 AKI (ACUTE KIDNEY INJURY) (HCC): Primary | ICD-10-CM

## 2022-06-23 LAB
ANION GAP SERPL CALCULATED.3IONS-SCNC: 8 MMOL/L (ref 4–13)
BUN SERPL-MCNC: 70 MG/DL (ref 5–25)
CALCIUM SERPL-MCNC: 8.4 MG/DL (ref 8.4–10.2)
CHLORIDE SERPL-SCNC: 99 MMOL/L (ref 96–108)
CO2 SERPL-SCNC: 30 MMOL/L (ref 21–32)
CREAT SERPL-MCNC: 4.42 MG/DL (ref 0.6–1.3)
DOPAMINE 24H UR-MRATE: <30 PG/ML (ref 0–48)
EPINEPH PLAS-MCNC: 557 PG/ML (ref 0–62)
ERYTHROCYTE [DISTWIDTH] IN BLOOD BY AUTOMATED COUNT: 12.9 % (ref 11.6–15.1)
GFR SERPL CREATININE-BSD FRML MDRD: 15 ML/MIN/1.73SQ M
GLUCOSE SERPL-MCNC: 234 MG/DL (ref 65–140)
GLUCOSE SERPL-MCNC: 246 MG/DL (ref 65–140)
GLUCOSE SERPL-MCNC: 271 MG/DL (ref 65–140)
HCT VFR BLD AUTO: 30.4 % (ref 36.5–49.3)
HGB BLD-MCNC: 9.6 G/DL (ref 12–17)
MCH RBC QN AUTO: 27.2 PG (ref 26.8–34.3)
MCHC RBC AUTO-ENTMCNC: 31.6 G/DL (ref 31.4–37.4)
MCV RBC AUTO: 86 FL (ref 82–98)
NOREPINEPH PLAS-MCNC: 238 PG/ML (ref 0–874)
PLATELET # BLD AUTO: 353 THOUSANDS/UL (ref 149–390)
PMV BLD AUTO: 9.4 FL (ref 8.9–12.7)
POTASSIUM SERPL-SCNC: 4.3 MMOL/L (ref 3.5–5.3)
RBC # BLD AUTO: 3.53 MILLION/UL (ref 3.88–5.62)
SODIUM SERPL-SCNC: 137 MMOL/L (ref 135–147)
WBC # BLD AUTO: 7.95 THOUSAND/UL (ref 4.31–10.16)

## 2022-06-23 PROCEDURE — 80048 BASIC METABOLIC PNL TOTAL CA: CPT | Performed by: INTERNAL MEDICINE

## 2022-06-23 PROCEDURE — 99239 HOSP IP/OBS DSCHRG MGMT >30: CPT | Performed by: INTERNAL MEDICINE

## 2022-06-23 PROCEDURE — 82948 REAGENT STRIP/BLOOD GLUCOSE: CPT

## 2022-06-23 PROCEDURE — 85027 COMPLETE CBC AUTOMATED: CPT | Performed by: INTERNAL MEDICINE

## 2022-06-23 RX ORDER — PANTOPRAZOLE SODIUM 40 MG/1
40 TABLET, DELAYED RELEASE ORAL
Qty: 60 TABLET | Refills: 0 | Status: SHIPPED | OUTPATIENT
Start: 2022-06-23 | End: 2022-07-23

## 2022-06-23 RX ORDER — INSULIN LISPRO 100 [IU]/ML
5 INJECTION, SOLUTION INTRAVENOUS; SUBCUTANEOUS
Qty: 10 ML | Refills: 0 | Status: SHIPPED | OUTPATIENT
Start: 2022-06-23

## 2022-06-23 RX ORDER — HYDRALAZINE HYDROCHLORIDE 25 MG/1
25 TABLET, FILM COATED ORAL 3 TIMES DAILY
Qty: 90 TABLET | Refills: 0 | Status: SHIPPED | OUTPATIENT
Start: 2022-06-23 | End: 2022-07-21

## 2022-06-23 RX ORDER — CARVEDILOL 25 MG/1
25 TABLET ORAL 2 TIMES DAILY WITH MEALS
Qty: 60 TABLET | Refills: 0 | Status: SHIPPED | OUTPATIENT
Start: 2022-06-23 | End: 2022-07-23

## 2022-06-23 RX ORDER — INSULIN GLARGINE 100 [IU]/ML
20 INJECTION, SOLUTION SUBCUTANEOUS
Qty: 10 ML | Refills: 0 | Status: SHIPPED | OUTPATIENT
Start: 2022-06-23

## 2022-06-23 RX ORDER — DOXAZOSIN 2 MG/1
2 TABLET ORAL DAILY
Qty: 30 TABLET | Refills: 0 | Status: SHIPPED | OUTPATIENT
Start: 2022-06-23 | End: 2022-07-23

## 2022-06-23 RX ORDER — ALBUTEROL SULFATE 90 UG/1
2 AEROSOL, METERED RESPIRATORY (INHALATION) EVERY 6 HOURS PRN
Qty: 8 G | Refills: 0 | Status: ON HOLD | OUTPATIENT
Start: 2022-06-23 | End: 2022-07-17 | Stop reason: ALTCHOICE

## 2022-06-23 RX ORDER — LEVOTHYROXINE SODIUM 0.12 MG/1
125 TABLET ORAL
Qty: 30 TABLET | Refills: 0 | Status: SHIPPED | OUTPATIENT
Start: 2022-06-24 | End: 2022-07-24

## 2022-06-23 RX ADMIN — HEPARIN SODIUM 5000 UNITS: 5000 INJECTION INTRAVENOUS; SUBCUTANEOUS at 05:20

## 2022-06-23 RX ADMIN — LEVOTHYROXINE SODIUM 125 MCG: 25 TABLET ORAL at 05:20

## 2022-06-23 RX ADMIN — CARIPRAZINE 6 MG: 4.5 CAPSULE, GELATIN COATED ORAL at 09:15

## 2022-06-23 RX ADMIN — LORAZEPAM 0.5 MG: 0.5 TABLET ORAL at 00:20

## 2022-06-23 RX ADMIN — ALBUTEROL SULFATE 2 PUFF: 90 AEROSOL, METERED RESPIRATORY (INHALATION) at 09:15

## 2022-06-23 RX ADMIN — INSULIN LISPRO 5 UNITS: 100 INJECTION, SOLUTION INTRAVENOUS; SUBCUTANEOUS at 07:30

## 2022-06-23 RX ADMIN — PANTOPRAZOLE SODIUM 40 MG: 40 TABLET, DELAYED RELEASE ORAL at 05:20

## 2022-06-23 RX ADMIN — INSULIN LISPRO 4 UNITS: 100 INJECTION, SOLUTION INTRAVENOUS; SUBCUTANEOUS at 11:28

## 2022-06-23 RX ADMIN — FAMOTIDINE 40 MG: 20 TABLET ORAL at 09:15

## 2022-06-23 RX ADMIN — CARVEDILOL 25 MG: 25 TABLET, FILM COATED ORAL at 07:29

## 2022-06-23 RX ADMIN — DOXAZOSIN 2 MG: 2 TABLET ORAL at 09:15

## 2022-06-23 RX ADMIN — NYSTATIN: 100000 POWDER TOPICAL at 09:18

## 2022-06-23 RX ADMIN — IPRATROPIUM BROMIDE 2 PUFF: 17 AEROSOL, METERED RESPIRATORY (INHALATION) at 09:15

## 2022-06-23 RX ADMIN — ALBUTEROL SULFATE 2 PUFF: 90 AEROSOL, METERED RESPIRATORY (INHALATION) at 11:27

## 2022-06-23 RX ADMIN — INSULIN LISPRO 6 UNITS: 100 INJECTION, SOLUTION INTRAVENOUS; SUBCUTANEOUS at 07:31

## 2022-06-23 RX ADMIN — INSULIN LISPRO 5 UNITS: 100 INJECTION, SOLUTION INTRAVENOUS; SUBCUTANEOUS at 11:28

## 2022-06-23 RX ADMIN — LORAZEPAM 0.5 MG: 0.5 TABLET ORAL at 07:39

## 2022-06-23 RX ADMIN — ACETAMINOPHEN 650 MG: 325 TABLET ORAL at 00:20

## 2022-06-23 RX ADMIN — IPRATROPIUM BROMIDE 2 PUFF: 17 AEROSOL, METERED RESPIRATORY (INHALATION) at 11:27

## 2022-06-23 RX ADMIN — HYDRALAZINE HYDROCHLORIDE 25 MG: 25 TABLET ORAL at 05:20

## 2022-06-23 RX ADMIN — FLUOXETINE 20 MG: 20 CAPSULE ORAL at 09:15

## 2022-06-23 NOTE — CASE MANAGEMENT
Case Management Discharge Planning Note    Patient name Janeal Flank  Location /-01 MRN [id number]  : 1980 Date 2022       Current Admission Date: 2022  Current Admission Diagnosis:STEFANIA (acute kidney injury) [facility])   Patient Active Problem List    Diagnosis Date Noted    Open toe wound 2022    Volume overload 2022    Hyponatremia 2022    Hypertension 2021    SOB (shortness of breath) 2021    GERD (gastroesophageal reflux disease) 2021    Family history of heart disease 2021    Hypokalemia 2021    Chest pressure 2021    Elevated troponin 2021    Acute on chronic kidney failure ([facility] ) 2021    Acute respiratory disease due to COVID-19 virus 2021    STEFANIA (acute kidney injury) ([facility] ) 2020    Schizo affective schizophrenia ([facility] ) 10/25/2020    Hypertensive emergency 2020    Encephalopathy 2020    Leukocytosis 2020    Chronic migraine without aura without status migrainosus, not intractable 2019    Difficulty urinating 2019    Urinary tract infection without hematuria 2019    Penile pain 2019    Spinal stenosis in cervical region 2019    Class 3 severe obesity due to excess calories with serious comorbidity and body mass index (BMI) of 60 0 to 69 9 in adult ([facility] ) 2019    Migraine without aura and without status migrainosus, not intractable 2019    Peripheral edema 2019    Hyperlipidemia, mixed 2019    Bipolar 1 disorder ([facility] ) 2019    Depression 2019    Type 1 diabetes mellitus without complication [facility]     Urinary retention 2019    Occipital neuralgia of left side 02/15/2019    Headache 02/15/2019    Nodule of parotid gland 02/15/2019    Snoring 2018    Insomnia 2018    Hypersomnia 2018    Vitamin D deficiency 2018    Episodic confusion     OCD (obsessive compulsive disorder) 10/31/2018    Schizoaffective disorder, depressive type ([facility] ) 10/31/2018    Hypothyroidism 10/31/2018    Morbid obesity with BMI of 50 0-59 9, adult ([facility] ) 10/31/2018    Anxiety 10/31/2018    Type 2 diabetes mellitus, with long-term current use of insulin ([facility] ) 10/17/2018    Abdominal pain 05/12/2018    Vomiting 05/12/2018      LOS (days): 6  Geometric Mean LOS (GMLOS) (days):   Days to GMLOS:     OBJECTIVE:  Risk of Unplanned Readmission Score: 25 16         Current admission status: Inpatient   Preferred Pharmacy:   [address] [place] [id number]  Phone: [phone] Fax: [address] [place] [address] Road  [address] [place] [address]  Phone: [phone] Fax: [phone]    Primary Care Provider: Nathaniel Kohler MD    Primary Insurance: Ban Lopez  Secondary Insurance:     DISCHARGE DETAILS:  Pt stable for d/c home today per Dr Addy Waldron from [facility]  Pt will return home with family transporting  He will follow up with podiatry and wound care center as indicated by podiatry  Appointments scheduled

## 2022-06-23 NOTE — NURSING NOTE
Pt discharged home, pt denies pain, denies shortness of breath  IV removed without complications and tip intact  All belongings with pt  Discharge instructions read and explained to pt all questions answered, mother and father present  Pt escorted to front entrance and assisted into car of mother

## 2022-06-23 NOTE — PLAN OF CARE
Problem: Potential for Falls  Goal: Patient will remain free of falls  Description: INTERVENTIONS:  - Educate patient/family on patient safety including physical limitations  - Instruct patient to call for assistance with activity   - Consult OT/PT to assist with strengthening/mobility   - Keep Call bell within reach  - Keep bed low and locked with side rails adjusted as appropriate  - Keep care items and personal belongings within reach  - Initiate and maintain comfort rounds  - Make Fall Risk Sign visible to staff  - Offer Toileting every 2 Hours, in advance of need  - Initiate/Maintain bed alarm  - Obtain necessary fall risk management equipment: non slip socks  - Apply yellow socks and bracelet for high fall risk patients  - Consider moving patient to room near nurses station  Outcome: Progressing     Problem: MOBILITY - ADULT  Goal: Maintain or return to baseline ADL function  Description: INTERVENTIONS:  -  Assess patient's ability to carry out ADLs; assess patient's baseline for ADL function and identify physical deficits which impact ability to perform ADLs (bathing, care of mouth/teeth, toileting, grooming, dressing, etc )  - Assess/evaluate cause of self-care deficits   - Assess range of motion  - Assess patient's mobility; develop plan if impaired  - Assess patient's need for assistive devices and provide as appropriate  - Encourage maximum independence but intervene and supervise when necessary  - Involve family in performance of ADLs  - Assess for home care needs following discharge   - Consider OT consult to assist with ADL evaluation and planning for discharge  - Provide patient education as appropriate  Outcome: Progressing  Goal: Maintains/Returns to pre admission functional level  Description: INTERVENTIONS:  - Perform BMAT or MOVE assessment daily    - Set and communicate daily mobility goal to care team and patient/family/caregiver     - Collaborate with rehabilitation services on mobility goals if consulted  - Perform Range of Motion 2 times a day  - Reposition patient every 2 hours    - Dangle patient 3 times a day  - Stand patient 3 times a day  - Ambulate patient 3 times a day  - Out of bed to chair 3 times a day   - Out of bed for meals 3 times a day  - Out of bed for toileting  - Record patient progress and toleration of activity level   Outcome: Progressing     Problem: PAIN - ADULT  Goal: Verbalizes/displays adequate comfort level or baseline comfort level  Description: Interventions:  - Encourage patient to monitor pain and request assistance  - Assess pain using appropriate pain scale  - Administer analgesics based on type and severity of pain and evaluate response  - Implement non-pharmacological measures as appropriate and evaluate response  - Consider cultural and social influences on pain and pain management  - Notify physician/advanced practitioner if interventions unsuccessful or patient reports new pain  Outcome: Progressing     Problem: INFECTION - ADULT  Goal: Absence or prevention of progression during hospitalization  Description: INTERVENTIONS:  - Assess and monitor for signs and symptoms of infection  - Monitor lab/diagnostic results  - Monitor all insertion sites, i e  indwelling lines, tubes, and drains  - Monitor endotracheal if appropriate and nasal secretions for changes in amount and color  - [place] appropriate cooling/warming therapies per order  - Administer medications as ordered  - Instruct and encourage patient and family to use good hand hygiene technique  - Identify and instruct in appropriate isolation precautions for identified infection/condition  Outcome: Progressing  Goal: Absence of fever/infection during neutropenic period  Description: INTERVENTIONS:  - Monitor WBC    Outcome: Progressing     Problem: SAFETY ADULT  Goal: Patient will remain free of falls  Description: INTERVENTIONS:  - Educate patient/family on patient safety including physical limitations  - Instruct patient to call for assistance with activity   - Consult OT/PT to assist with strengthening/mobility   - Keep Call bell within reach  - Keep bed low and locked with side rails adjusted as appropriate  - Keep care items and personal belongings within reach  - Initiate and maintain comfort rounds  - Make Fall Risk Sign visible to staff  - Offer Toileting every 2 Hours, in advance of need  - Initiate/Maintain bed alarm  - Obtain necessary fall risk management equipment: non slip socks  - Apply yellow socks and bracelet for high fall risk patients  - Consider moving patient to room near nurses station  Outcome: Progressing  Goal: Maintain or return to baseline ADL function  Description: INTERVENTIONS:  -  Assess patient's ability to carry out ADLs; assess patient's baseline for ADL function and identify physical deficits which impact ability to perform ADLs (bathing, care of mouth/teeth, toileting, grooming, dressing, etc )  - Assess/evaluate cause of self-care deficits   - Assess range of motion  - Assess patient's mobility; develop plan if impaired  - Assess patient's need for assistive devices and provide as appropriate  - Encourage maximum independence but intervene and supervise when necessary  - Involve family in performance of ADLs  - Assess for home care needs following discharge   - Consider OT consult to assist with ADL evaluation and planning for discharge  - Provide patient education as appropriate  Outcome: Progressing  Goal: Maintains/Returns to pre admission functional level  Description: INTERVENTIONS:  - Perform BMAT or MOVE assessment daily    - Set and communicate daily mobility goal to care team and patient/family/caregiver  - Collaborate with rehabilitation services on mobility goals if consulted  - Perform Range of Motion 2 times a day  - Reposition patient every 2 hours    - Dangle patient 3 times a day  - Stand patient 3 times a day  - Ambulate patient 3 times a day  - Out of bed to chair 3 times a day   - Out of bed for meals 3 times a day  - Out of bed for toileting  - Record patient progress and toleration of activity level   Outcome: Progressing     Problem: DISCHARGE PLANNING  Goal: Discharge to home or other facility with appropriate resources  Description: INTERVENTIONS:  - Identify barriers to discharge w/patient and caregiver  - Arrange for needed discharge resources and transportation as appropriate  - Identify discharge learning needs (meds, wound care, etc )  - Arrange for interpretive services to assist at discharge as needed  - Refer to Case Management Department for coordinating discharge planning if the patient needs post-hospital services based on physician/advanced practitioner order or complex needs related to functional status, cognitive ability, or social support system  Outcome: Progressing     Problem: Knowledge Deficit  Goal: Patient/family/caregiver demonstrates understanding of disease process, treatment plan, medications, and discharge instructions  Description: Complete learning assessment and assess knowledge base    Interventions:  - Provide teaching at level of understanding  - Provide teaching via preferred learning methods  Outcome: Progressing     Problem: CARDIOVASCULAR - ADULT  Goal: Maintains optimal cardiac output and hemodynamic stability  Description: INTERVENTIONS:  - Monitor I/O, vital signs and rhythm  - Monitor for S/S and trends of decreased cardiac output  - Administer and titrate ordered vasoactive medications to optimize hemodynamic stability  - Assess quality of pulses, skin color and temperature  - Assess for signs of decreased coronary artery perfusion  - Instruct patient to report change in severity of symptoms  Outcome: Progressing  Goal: Absence of cardiac dysrhythmias or at baseline rhythm  Description: INTERVENTIONS:  - Continuous cardiac monitoring, vital signs, obtain 12 lead EKG if ordered  - Administer antiarrhythmic and heart rate control medications as ordered  - Monitor electrolytes and administer replacement therapy as ordered  Outcome: Progressing     Problem: Nutrition/Hydration-ADULT  Goal: Nutrient/Hydration intake appropriate for improving, restoring or maintaining nutritional needs  Description: Monitor and assess patient's nutrition/hydration status for malnutrition  Collaborate with interdisciplinary team and initiate plan and interventions as ordered  Monitor patient's weight and dietary intake as ordered or per policy  Utilize nutrition screening tool and intervene as necessary  Determine patient's food preferences and provide high-protein, high-caloric foods as appropriate       INTERVENTIONS:  - Monitor oral intake, urinary output, labs, and treatment plans  - Assess nutrition and hydration status and recommend course of action  - Evaluate amount of meals eaten  - Assist patient with eating if necessary   - Allow adequate time for meals  - Recommend/ encourage appropriate diets, oral nutritional supplements, and vitamin/mineral supplements  - Order, calculate, and assess calorie counts as needed  - Recommend, monitor, and adjust tube feedings and TPN/PPN based on assessed needs  - Assess need for intravenous fluids  - Provide specific nutrition/hydration education as appropriate  - Include patient/family/caregiver in decisions related to nutrition  Outcome: Progressing     Problem: Prexisting or High Potential for Compromised Skin Integrity  Goal: Skin integrity is maintained or improved  Description: INTERVENTIONS:  - Identify patients at risk for skin breakdown  - Assess and monitor skin integrity  - Assess and monitor nutrition and hydration status  - Monitor labs   - Assess for incontinence   - Turn and reposition patient  - Assist with mobility/ambulation  - Relieve pressure over bony prominences  - Avoid friction and shearing  - Provide appropriate hygiene as needed including keeping skin clean and dry  - Evaluate need for skin moisturizer/barrier cream  - Collaborate with interdisciplinary team   - Patient/family teaching  - Consider wound care consult   Outcome: Progressing

## 2022-06-23 NOTE — ASSESSMENT & PLAN NOTE
· STEFANIA on CKD IV; with progression of renal failure  · No significant electrolyte derangements and with good urine output  · Creatinine again increased to 4 4  · Nephrology recommending no further work-up in patient and has recommended close outpatient follow-up  · To be seen in Nephrology clinic within 2 weeks with repeat labs prior

## 2022-06-23 NOTE — ASSESSMENT & PLAN NOTE
Lab Results   Component Value Date    HGBA1C 11 6 (H) 08/03/2021       Recent Labs     06/22/22  1603 06/22/22 2051 06/23/22  0513 06/23/22  1110   POCGLU 143* 231* 271* 234*       Blood Sugar Average: Last 72 hrs:  (P) 193     · Continue Lantus 20 QHS with Lispro 5U TID   · Recommended outpatient Endocrinology follow-up  · Continue carb controlled diet

## 2022-06-23 NOTE — ASSESSMENT & PLAN NOTE
· Present on admission as evidenced by acute renal failure and SBPs > 180 mmHg  · Likely due to medication non-compliance; blood pressure has improved since admission  · Continue Coreg 25 mg twice daily, Cardura 2 mg daily, and Hydralazine 25mg Q8H  · Counseled to monitor BP daily in the outpatient setting

## 2022-06-23 NOTE — ASSESSMENT & PLAN NOTE
· s/p debridement of his ulceration  · MRI R Foot (6/20): Mild ulceration plantar surface of the great toe at the level of the proximal and distal phalanges without MR evidence for osteomyelitis  Small cystic ganglion plantar surface of the medial forefoot at the level of the 1st MTP joint     · Scheduled for outpatient wound care

## 2022-06-23 NOTE — DISCHARGE SUMMARY
Rima U  64   Discharge- Elder Brandee 1980, 38 y o  male MRN: [id number]  Unit/Bed#: -01 Encounter: [id number]  Primary Care Provider: Etienne Gayle MD   Date and time admitted to hospital: 6/17/2022  7:09 AM    * STEFANIA (acute kidney injury) [facility])  Assessment & Plan  · STEFANIA on CKD IV; with progression of renal failure  · No significant electrolyte derangements and with good urine output  · Creatinine again increased to 4 4  · Nephrology recommending no further work-up in patient and has recommended close outpatient follow-up  · To be seen in Nephrology clinic within 2 weeks with repeat labs prior      Hypertensive emergency  Assessment & Plan  · Present on admission as evidenced by acute renal failure and SBPs > 180 mmHg  · Likely due to medication non-compliance; blood pressure has improved since admission  · Continue Coreg 25 mg twice daily, Cardura 2 mg daily, and Hydralazine 25mg Q8H  · Counseled to monitor BP daily in the outpatient setting    Open toe wound  Assessment & Plan  · s/p debridement of his ulceration  · MRI R Foot (6/20): Mild ulceration plantar surface of the great toe at the level of the proximal and distal phalanges without MR evidence for osteomyelitis  Small cystic ganglion plantar surface of the medial forefoot at the level of the 1st MTP joint     · Scheduled for outpatient wound care     Type 2 diabetes mellitus, with long-term current use of insulin [facility])  Assessment & Plan  Lab Results   Component Value Date    HGBA1C 11 6 (H) 08/03/2021       Recent Labs     06/22/22  1603 06/22/22  2051 06/23/22  0513 06/23/22  1110   POCGLU 143* 231* 271* 234*       Blood Sugar Average: Last 72 hrs:  (P) 193     · Continue Lantus 20 QHS with Lispro 5U TID   · Recommended outpatient Endocrinology follow-up  · Continue carb controlled diet    Bipolar 1 disorder ([address] )  Assessment & Plan  · Continue home Vraylar and Prozac      Hypothyroidism  Assessment & Plan  · TSH 40 31, Free T4 0 71  · Continue Levothyroxine to 125 mcg PO daily  · Recheck TSH in 4-6 weeks      Medical Problems             Resolved Problems  Date Reviewed: 6/19/2022   None               Discharging Physician / Practitioner: Lauren Russell DO  PCP: George Carrillo MD  Admission Date:   Admission Orders (From admission, onward)     Ordered        06/17/22 0817  Inpatient Admission  Once                      Discharge Date: 06/23/22    Consultations During Hospital Stay:  · Nephrology and Podiatry     Procedures Performed:   · None    Significant Findings / Test Results:   · MRI R Foot (6/20): Mild ulceration plantar surface of the great toe at the level of the proximal and distal phalanges without MR evidence for osteomyelitis  Small cystic ganglion plantar surface of the medial forefoot at the level of the 1st MTP joint  · Worsening renal function (rising Cr- 4 4 at discharge)    Incidental Findings:   · None     Test Results Pending at Discharge (will require follow up): · None     Outpatient Tests Requested:  · To be determined in the out patient setting upon follow-up with PCP and Nephrology  · Recommend repeat BMP in 1-2 weeks  · Recommend repeat TSH/Free T4 in 4 weeks    Complications:  None    Reason for Admission: Hypertensive Emergency    Hospital Course:   Gloria Elias is a 38 y o  male patient who originally presented to the hospital on 6/17/2022 due to chest pain and shortness of breath  Please see H&P as documented by Dr Matt Verdugo for complete details regarding history of presenting illness  In brief, the patient has a PMHx of DMII, HTN, and hypothyroidism who presented for evaluation of worsening lower extremity swelling, chest pain, and shortness of breath  The patient was reportedly non-compliant with his home medications  Upon arrival to the ED, he was extrememly hypertensive with extensive volume-overload, and worsening renal function   He was ultimately admitted to the medical floor for further observation and management  During his admission, his anti-hypertensives were adjusted for tighter control and he was diuresed to a euvolemic state  Though these therapies gave him relief of his presenting symptoms, he continued to have worsening renal function  He was evaluated by Nephrology who believed his kidney function is not likely to improve significantly and his urine studies were consistent with 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  As he returned to a euvolemic state without electrolyte abnormality and good urine output, he was advised on medication compliance and close renal follow-up  He was ultimately discharged home in stable condition  All of his questions were answered prior to departure and he expressed understanding and agreement with the plan  This is a brief discharge summary  Please see full medical record for more details  Please see above list of diagnoses and related plan for additional information  Condition at Discharge: stable    Discharge Day Visit / Exam:   Subjective:  Patient resting comfortably in bed without any acute complaints  No over night events reported by nursing  Vitals: Blood Pressure: 161/91 (06/23/22 0916)  Pulse: 81 (06/23/22 0916)  Temperature: 98 °F (36 7 °C) (06/23/22 0706)  Temp Source: Oral (06/23/22 0706)  Respirations: 16 (06/23/22 0916)  Height: 5' 2" (157 5 cm) (06/17/22 1554)  Weight - Scale: (!) 146 kg (321 lb 14 oz) (06/23/22 0600)  SpO2: 94 % (06/23/22 0916)     Exam:   Physical Exam  Constitutional:       General: He is not in acute distress  Appearance: Normal appearance  He is obese  HENT:      Head: Normocephalic and atraumatic  Mouth/Throat:      Mouth: Mucous membranes are moist       Pharynx: Oropharynx is clear  Eyes:      Extraocular Movements: Extraocular movements intact  Conjunctiva/sclera: Conjunctivae normal    Cardiovascular:      Rate and Rhythm: Normal rate and regular rhythm        Pulses: Normal pulses  Heart sounds: Normal heart sounds  Pulmonary:      Effort: Pulmonary effort is normal  No respiratory distress  Breath sounds: Normal breath sounds  No wheezing  Abdominal:      General: Bowel sounds are normal  There is no distension  Palpations: Abdomen is soft  Tenderness: There is no abdominal tenderness  Musculoskeletal:         General: Normal range of motion  Cervical back: Normal range of motion and neck supple  Skin:     General: Skin is warm and dry  Comments: Right great toe diabetic ulcer   Neurological:      General: No focal deficit present  Mental Status: He is alert and oriented to person, place, and time  Mental status is at baseline  Psychiatric:         Mood and Affect: Mood normal          Behavior: Behavior normal          Judgment: Judgment normal         Discussion with Family: Updated  (father and mother) via phone  Discharge instructions/Information to patient and family:   See after visit summary for information provided to patient and family  Provisions for Follow-Up Care:  See after visit summary for information related to follow-up care and any pertinent home health orders  Disposition:   Home    Planned Readmission: None     Discharge Statement:  I spent > 35 minutes discharging the patient  This time was spent on the day of discharge  I had direct contact with the patient on the day of discharge  Greater than 50% of the total time was spent examining patient, answering all patient questions, arranging and discussing plan of care with patient as well as directly providing post-discharge instructions  Additional time then spent on discharge activities  Discharge Medications:  See after visit summary for reconciled discharge medications provided to patient and/or family        **Please Note: This note may have been constructed using a voice recognition system**

## 2022-06-23 NOTE — PLAN OF CARE
Problem: Potential for Falls  Goal: Patient will remain free of falls  Description: INTERVENTIONS:  - Educate patient/family on patient safety including physical limitations  - Instruct patient to call for assistance with activity   - Consult OT/PT to assist with strengthening/mobility   - Keep Call bell within reach  - Keep bed low and locked with side rails adjusted as appropriate  - Keep care items and personal belongings within reach  - Initiate and maintain comfort rounds  - Make Fall Risk Sign visible to staff  - Offer Toileting every 2 Hours, in advance of need  - Apply yellow socks and bracelet for high fall risk patients  - Consider moving patient to room near nurses station  Outcome: Progressing     Problem: MOBILITY - ADULT  Goal: Maintain or return to baseline ADL function  Description: INTERVENTIONS:  -  Assess patient's ability to carry out ADLs; assess patient's baseline for ADL function and identify physical deficits which impact ability to perform ADLs (bathing, care of mouth/teeth, toileting, grooming, dressing, etc )  - Assess/evaluate cause of self-care deficits   - Assess range of motion  - Assess patient's mobility; develop plan if impaired  - Assess patient's need for assistive devices and provide as appropriate  - Encourage maximum independence but intervene and supervise when necessary  - Involve family in performance of ADLs  - Assess for home care needs following discharge   - Consider OT consult to assist with ADL evaluation and planning for discharge  - Provide patient education as appropriate  Outcome: Progressing  Goal: Maintains/Returns to pre admission functional level  Description: INTERVENTIONS:  - Perform BMAT or MOVE assessment daily    - Set and communicate daily mobility goal to care team and patient/family/caregiver  - Collaborate with rehabilitation services on mobility goals if consulted  - Perform Range of Motion 3 times a day  - Reposition patient every 2 hours    - Dangle patient 3 times a day  - Stand patient 3 times a day  - Ambulate patient 3 times a day  - Out of bed to chair 3 times a day   - Out of bed for meals 3 times a day  - Out of bed for toileting  - Record patient progress and toleration of activity level   Outcome: Progressing     Problem: PAIN - ADULT  Goal: Verbalizes/displays adequate comfort level or baseline comfort level  Description: Interventions:  - Encourage patient to monitor pain and request assistance  - Assess pain using appropriate pain scale  - Administer analgesics based on type and severity of pain and evaluate response  - Implement non-pharmacological measures as appropriate and evaluate response  - Consider cultural and social influences on pain and pain management  - Notify physician/advanced practitioner if interventions unsuccessful or patient reports new pain  Outcome: Progressing     Problem: INFECTION - ADULT  Goal: Absence or prevention of progression during hospitalization  Description: INTERVENTIONS:  - Assess and monitor for signs and symptoms of infection  - Monitor lab/diagnostic results  - Monitor all insertion sites, i e  indwelling lines, tubes, and drains  - Monitor endotracheal if appropriate and nasal secretions for changes in amount and color  - [place] appropriate cooling/warming therapies per order  - Administer medications as ordered  - Instruct and encourage patient and family to use good hand hygiene technique  - Identify and instruct in appropriate isolation precautions for identified infection/condition  Outcome: Progressing  Goal: Absence of fever/infection during neutropenic period  Description: INTERVENTIONS:  - Monitor WBC    Outcome: Progressing     Problem: SAFETY ADULT  Goal: Patient will remain free of falls  Description: INTERVENTIONS:  - Educate patient/family on patient safety including physical limitations  - Instruct patient to call for assistance with activity   - Consult OT/PT to assist with strengthening/mobility   - Keep Call bell within reach  - Keep bed low and locked with side rails adjusted as appropriate  - Keep care items and personal belongings within reach  - Initiate and maintain comfort rounds  - Make Fall Risk Sign visible to staff  - Offer Toileting every 2 Hours, in advance of need  - Apply yellow socks and bracelet for high fall risk patients  - Consider moving patient to room near nurses station  Outcome: Progressing  Goal: Maintain or return to baseline ADL function  Description: INTERVENTIONS:  -  Assess patient's ability to carry out ADLs; assess patient's baseline for ADL function and identify physical deficits which impact ability to perform ADLs (bathing, care of mouth/teeth, toileting, grooming, dressing, etc )  - Assess/evaluate cause of self-care deficits   - Assess range of motion  - Assess patient's mobility; develop plan if impaired  - Assess patient's need for assistive devices and provide as appropriate  - Encourage maximum independence but intervene and supervise when necessary  - Involve family in performance of ADLs  - Assess for home care needs following discharge   - Consider OT consult to assist with ADL evaluation and planning for discharge  - Provide patient education as appropriate  Outcome: Progressing  Goal: Maintains/Returns to pre admission functional level  Description: INTERVENTIONS:  - Perform BMAT or MOVE assessment daily    - Set and communicate daily mobility goal to care team and patient/family/caregiver  - Collaborate with rehabilitation services on mobility goals if consulted  - Perform Range of Motion 3 times a day  - Reposition patient every 2 hours    - Dangle patient 3 times a day  - Stand patient 3 times a day  - Ambulate patient 3 times a day  - Out of bed to chair 3 times a day   - Out of bed for meals 3 times a day  - Out of bed for toileting  - Record patient progress and toleration of activity level   Outcome: Progressing     Problem: DISCHARGE PLANNING  Goal: Discharge to home or other facility with appropriate resources  Description: INTERVENTIONS:  - Identify barriers to discharge w/patient and caregiver  - Arrange for needed discharge resources and transportation as appropriate  - Identify discharge learning needs (meds, wound care, etc )  - Arrange for interpretive services to assist at discharge as needed  - Refer to Case Management Department for coordinating discharge planning if the patient needs post-hospital services based on physician/advanced practitioner order or complex needs related to functional status, cognitive ability, or social support system  Outcome: Progressing     Problem: Knowledge Deficit  Goal: Patient/family/caregiver demonstrates understanding of disease process, treatment plan, medications, and discharge instructions  Description: Complete learning assessment and assess knowledge base    Interventions:  - Provide teaching at level of understanding  - Provide teaching via preferred learning methods  Outcome: Progressing     Problem: CARDIOVASCULAR - ADULT  Goal: Maintains optimal cardiac output and hemodynamic stability  Description: INTERVENTIONS:  - Monitor I/O, vital signs and rhythm  - Monitor for S/S and trends of decreased cardiac output  - Administer and titrate ordered vasoactive medications to optimize hemodynamic stability  - Assess quality of pulses, skin color and temperature  - Assess for signs of decreased coronary artery perfusion  - Instruct patient to report change in severity of symptoms  Outcome: Progressing  Goal: Absence of cardiac dysrhythmias or at baseline rhythm  Description: INTERVENTIONS:  - Continuous cardiac monitoring, vital signs, obtain 12 lead EKG if ordered  - Administer antiarrhythmic and heart rate control medications as ordered  - Monitor electrolytes and administer replacement therapy as ordered  Outcome: Progressing     Problem: Nutrition/Hydration-ADULT  Goal: Nutrient/Hydration intake appropriate for improving, restoring or maintaining nutritional needs  Description: Monitor and assess patient's nutrition/hydration status for malnutrition  Collaborate with interdisciplinary team and initiate plan and interventions as ordered  Monitor patient's weight and dietary intake as ordered or per policy  Utilize nutrition screening tool and intervene as necessary  Determine patient's food preferences and provide high-protein, high-caloric foods as appropriate       INTERVENTIONS:  - Monitor oral intake, urinary output, labs, and treatment plans  - Assess nutrition and hydration status and recommend course of action  - Evaluate amount of meals eaten  - Assist patient with eating if necessary   - Allow adequate time for meals  - Recommend/ encourage appropriate diets, oral nutritional supplements, and vitamin/mineral supplements  - Order, calculate, and assess calorie counts as needed  - Recommend, monitor, and adjust tube feedings and TPN/PPN based on assessed needs  - Assess need for intravenous fluids  - Provide specific nutrition/hydration education as appropriate  - Include patient/family/caregiver in decisions related to nutrition  Outcome: Progressing     Problem: Prexisting or High Potential for Compromised Skin Integrity  Goal: Skin integrity is maintained or improved  Description: INTERVENTIONS:  - Identify patients at risk for skin breakdown  - Assess and monitor skin integrity  - Assess and monitor nutrition and hydration status  - Monitor labs   - Assess for incontinence   - Turn and reposition patient  - Assist with mobility/ambulation  - Relieve pressure over bony prominences  - Avoid friction and shearing  - Provide appropriate hygiene as needed including keeping skin clean and dry  - Evaluate need for skin moisturizer/barrier cream  - Collaborate with interdisciplinary team   - Patient/family teaching  - Consider wound care consult   Outcome: Progressing

## 2022-06-24 NOTE — UTILIZATION REVIEW
Notification of Discharge   This is a Notification of Discharge from our facility [address]  Please be advised that this patient has been discharge from our facility  Below you will find the admission and discharge date and time including the patients disposition  UTILIZATION REVIEW CONTACT:  Jodi Bob  Utilization   Network Utilization Review Department  Phone: [phone] carefully listen to the prompts  All voicemails are confidential   Email: [email or web address]     PHYSICIAN ADVISORY SERVICES:  FOR [id number] REVIEW - MEDICAL NECESSITY DENIAL  Phone: [phone]  Fax: [phone]  Email: [email or web address]     PRESENTATION DATE: 6/17/2022  7:09 AM  OBERVATION ADMISSION DATE:  INPATIENT ADMISSION DATE: 6/17/22  8:17 AM   DISCHARGE DATE: 6/23/2022 12:31 PM  DISPOSITION: Home/Self Care Home/Self Care      IMPORTANT INFORMATION:  Send all requests for admission clinical reviews, approved or denied determinations and any other requests to dedicated fax number below belonging to the campus where the patient is receiving treatment   List of dedicated fax numbers:  [address] DENIALS (Administrative/Medical Necessity) [phone]   [address] (Maternity/NICU/Pediatrics) [phone]   [facility] [phone]   [address] [place] [address] [phone]   [address] [place] [address] [phone]   [address] [phone]   [facility]  [phone]   2205 [facility] [address]  [address] [phone]

## 2022-07-16 ENCOUNTER — HOSPITAL ENCOUNTER (INPATIENT)
Facility: HOSPITAL | Age: 42
LOS: 4 days | Discharge: HOME | DRG: 425 | End: 2022-07-21
Attending: FAMILY MEDICINE | Admitting: INTERNAL MEDICINE
Payer: COMMERCIAL

## 2022-07-16 ENCOUNTER — APPOINTMENT (EMERGENCY)
Dept: RADIOLOGY | Facility: HOSPITAL | Age: 42
DRG: 425 | End: 2022-07-16
Payer: COMMERCIAL

## 2022-07-16 DIAGNOSIS — R60.0 BILATERAL LOWER EXTREMITY EDEMA: ICD-10-CM

## 2022-07-16 DIAGNOSIS — N17.9 ACUTE ON CHRONIC RENAL FAILURE (HCC): ICD-10-CM

## 2022-07-16 DIAGNOSIS — J81.1 PULMONARY EDEMA: Primary | ICD-10-CM

## 2022-07-16 DIAGNOSIS — F25.9 SCHIZO AFFECTIVE SCHIZOPHRENIA (HCC): ICD-10-CM

## 2022-07-16 DIAGNOSIS — I16.1 HYPERTENSIVE EMERGENCY: ICD-10-CM

## 2022-07-16 DIAGNOSIS — R06.00 DYSPNEA: ICD-10-CM

## 2022-07-16 DIAGNOSIS — R06.02 SOB (SHORTNESS OF BREATH): ICD-10-CM

## 2022-07-16 DIAGNOSIS — I10 PRIMARY HYPERTENSION: ICD-10-CM

## 2022-07-16 DIAGNOSIS — S91.109D OPEN WOUND OF TOE, SUBSEQUENT ENCOUNTER: ICD-10-CM

## 2022-07-16 DIAGNOSIS — R09.02 HYPOXIA: ICD-10-CM

## 2022-07-16 DIAGNOSIS — N18.9 ACUTE ON CHRONIC RENAL FAILURE (HCC): ICD-10-CM

## 2022-07-16 DIAGNOSIS — N18.4 ACUTE RENAL FAILURE WITH ACUTE TUBULAR NECROSIS SUPERIMPOSED ON STAGE 4 CHRONIC KIDNEY DISEASE (HCC): ICD-10-CM

## 2022-07-16 DIAGNOSIS — N17.9 AKI (ACUTE KIDNEY INJURY) (HCC): ICD-10-CM

## 2022-07-16 DIAGNOSIS — F41.9 ANXIETY: Chronic | ICD-10-CM

## 2022-07-16 DIAGNOSIS — N17.0 ACUTE RENAL FAILURE WITH ACUTE TUBULAR NECROSIS SUPERIMPOSED ON STAGE 4 CHRONIC KIDNEY DISEASE (HCC): ICD-10-CM

## 2022-07-16 PROBLEM — I50.23 ACUTE ON CHRONIC SYSTOLIC HEART FAILURE (HCC): Status: ACTIVE | Noted: 2022-06-17

## 2022-07-16 LAB
2HR DELTA HS TROPONIN: 0 NG/L
ANION GAP SERPL CALCULATED.3IONS-SCNC: 8 MMOL/L (ref 4–13)
BASOPHILS # BLD AUTO: 0.04 THOUSANDS/ΜL (ref 0–0.1)
BASOPHILS NFR BLD AUTO: 0 % (ref 0–1)
BNP SERPL-MCNC: 121 PG/ML (ref 0–100)
BUN SERPL-MCNC: 55 MG/DL (ref 5–25)
CALCIUM SERPL-MCNC: 8.4 MG/DL (ref 8.4–10.2)
CARDIAC TROPONIN I PNL SERPL HS: 8 NG/L
CARDIAC TROPONIN I PNL SERPL HS: 8 NG/L
CHLORIDE SERPL-SCNC: 94 MMOL/L (ref 96–108)
CO2 SERPL-SCNC: 25 MMOL/L (ref 21–32)
CREAT SERPL-MCNC: 3.19 MG/DL (ref 0.6–1.3)
EOSINOPHIL # BLD AUTO: 0.46 THOUSAND/ΜL (ref 0–0.61)
EOSINOPHIL NFR BLD AUTO: 4 % (ref 0–6)
ERYTHROCYTE [DISTWIDTH] IN BLOOD BY AUTOMATED COUNT: 13 % (ref 11.6–15.1)
FLUAV RNA RESP QL NAA+PROBE: NEGATIVE
FLUBV RNA RESP QL NAA+PROBE: NEGATIVE
GFR SERPL CREATININE-BSD FRML MDRD: 22 ML/MIN/1.73SQ M
GLUCOSE SERPL-MCNC: 191 MG/DL (ref 65–140)
GLUCOSE SERPL-MCNC: 216 MG/DL (ref 65–140)
GLUCOSE SERPL-MCNC: 249 MG/DL (ref 65–140)
GLUCOSE SERPL-MCNC: 304 MG/DL (ref 65–140)
HCT VFR BLD AUTO: 25.7 % (ref 36.5–49.3)
HGB BLD-MCNC: 8.5 G/DL (ref 12–17)
IMM GRANULOCYTES # BLD AUTO: 0.06 THOUSAND/UL (ref 0–0.2)
IMM GRANULOCYTES NFR BLD AUTO: 1 % (ref 0–2)
INR PPP: 1.04 (ref 0.84–1.19)
LYMPHOCYTES # BLD AUTO: 0.82 THOUSANDS/ΜL (ref 0.6–4.47)
LYMPHOCYTES NFR BLD AUTO: 7 % (ref 14–44)
MCH RBC QN AUTO: 28.2 PG (ref 26.8–34.3)
MCHC RBC AUTO-ENTMCNC: 33.1 G/DL (ref 31.4–37.4)
MCV RBC AUTO: 85 FL (ref 82–98)
MONOCYTES # BLD AUTO: 0.72 THOUSAND/ΜL (ref 0.17–1.22)
MONOCYTES NFR BLD AUTO: 6 % (ref 4–12)
NEUTROPHILS # BLD AUTO: 9.28 THOUSANDS/ΜL (ref 1.85–7.62)
NEUTS SEG NFR BLD AUTO: 82 % (ref 43–75)
NRBC BLD AUTO-RTO: 0 /100 WBCS
PLATELET # BLD AUTO: 287 THOUSANDS/UL (ref 149–390)
PMV BLD AUTO: 8.8 FL (ref 8.9–12.7)
POTASSIUM SERPL-SCNC: 5.1 MMOL/L (ref 3.5–5.3)
PROTHROMBIN TIME: 13.6 SECONDS (ref 11.6–14.5)
RBC # BLD AUTO: 3.01 MILLION/UL (ref 3.88–5.62)
RSV RNA RESP QL NAA+PROBE: NEGATIVE
SARS-COV-2 RNA RESP QL NAA+PROBE: NEGATIVE
SODIUM SERPL-SCNC: 127 MMOL/L (ref 135–147)
WBC # BLD AUTO: 11.38 THOUSAND/UL (ref 4.31–10.16)

## 2022-07-16 PROCEDURE — 99291 CRITICAL CARE FIRST HOUR: CPT | Performed by: FAMILY MEDICINE

## 2022-07-16 PROCEDURE — 83880 ASSAY OF NATRIURETIC PEPTIDE: CPT | Performed by: FAMILY MEDICINE

## 2022-07-16 PROCEDURE — 97161 PT EVAL LOW COMPLEX 20 MIN: CPT

## 2022-07-16 PROCEDURE — 96375 TX/PRO/DX INJ NEW DRUG ADDON: CPT

## 2022-07-16 PROCEDURE — 97165 OT EVAL LOW COMPLEX 30 MIN: CPT

## 2022-07-16 PROCEDURE — 84484 ASSAY OF TROPONIN QUANT: CPT | Performed by: NURSE PRACTITIONER

## 2022-07-16 PROCEDURE — 82948 REAGENT STRIP/BLOOD GLUCOSE: CPT

## 2022-07-16 PROCEDURE — 96374 THER/PROPH/DIAG INJ IV PUSH: CPT

## 2022-07-16 PROCEDURE — 85025 COMPLETE CBC W/AUTO DIFF WBC: CPT | Performed by: FAMILY MEDICINE

## 2022-07-16 PROCEDURE — 99220 PR INITIAL OBSERVATION CARE/DAY 70 MINUTES: CPT | Performed by: NURSE PRACTITIONER

## 2022-07-16 PROCEDURE — 99285 EMERGENCY DEPT VISIT HI MDM: CPT

## 2022-07-16 PROCEDURE — 71045 X-RAY EXAM CHEST 1 VIEW: CPT

## 2022-07-16 PROCEDURE — 0241U HB NFCT DS VIR RESP RNA 4 TRGT: CPT | Performed by: FAMILY MEDICINE

## 2022-07-16 PROCEDURE — 93005 ELECTROCARDIOGRAM TRACING: CPT

## 2022-07-16 PROCEDURE — 83036 HEMOGLOBIN GLYCOSYLATED A1C: CPT | Performed by: NURSE PRACTITIONER

## 2022-07-16 PROCEDURE — 84484 ASSAY OF TROPONIN QUANT: CPT | Performed by: FAMILY MEDICINE

## 2022-07-16 PROCEDURE — 85610 PROTHROMBIN TIME: CPT | Performed by: FAMILY MEDICINE

## 2022-07-16 PROCEDURE — 80048 BASIC METABOLIC PNL TOTAL CA: CPT | Performed by: FAMILY MEDICINE

## 2022-07-16 PROCEDURE — 36415 COLL VENOUS BLD VENIPUNCTURE: CPT | Performed by: FAMILY MEDICINE

## 2022-07-16 RX ORDER — FLUOXETINE HYDROCHLORIDE 20 MG/1
20 CAPSULE ORAL EVERY MORNING
Status: DISCONTINUED | OUTPATIENT
Start: 2022-07-16 | End: 2022-07-21 | Stop reason: HOSPADM

## 2022-07-16 RX ORDER — DOXAZOSIN 2 MG/1
2 TABLET ORAL DAILY
Status: DISCONTINUED | OUTPATIENT
Start: 2022-07-16 | End: 2022-07-21 | Stop reason: HOSPADM

## 2022-07-16 RX ORDER — HYDRALAZINE HYDROCHLORIDE 25 MG/1
50 TABLET, FILM COATED ORAL 3 TIMES DAILY
Status: DISCONTINUED | OUTPATIENT
Start: 2022-07-16 | End: 2022-07-21 | Stop reason: HOSPADM

## 2022-07-16 RX ORDER — INSULIN GLARGINE 100 [IU]/ML
20 INJECTION, SOLUTION SUBCUTANEOUS
Status: DISCONTINUED | OUTPATIENT
Start: 2022-07-16 | End: 2022-07-21 | Stop reason: HOSPADM

## 2022-07-16 RX ORDER — FAMOTIDINE 20 MG/1
40 TABLET, FILM COATED ORAL DAILY
Status: DISCONTINUED | OUTPATIENT
Start: 2022-07-16 | End: 2022-07-21 | Stop reason: HOSPADM

## 2022-07-16 RX ORDER — INSULIN LISPRO 100 [IU]/ML
1-5 INJECTION, SOLUTION INTRAVENOUS; SUBCUTANEOUS
Status: DISCONTINUED | OUTPATIENT
Start: 2022-07-16 | End: 2022-07-21 | Stop reason: HOSPADM

## 2022-07-16 RX ORDER — HYDRALAZINE HYDROCHLORIDE 25 MG/1
25 TABLET, FILM COATED ORAL ONCE
Status: COMPLETED | OUTPATIENT
Start: 2022-07-16 | End: 2022-07-16

## 2022-07-16 RX ORDER — FUROSEMIDE 10 MG/ML
40 INJECTION INTRAMUSCULAR; INTRAVENOUS
Status: DISCONTINUED | OUTPATIENT
Start: 2022-07-16 | End: 2022-07-17

## 2022-07-16 RX ORDER — CARVEDILOL 25 MG/1
25 TABLET ORAL 2 TIMES DAILY WITH MEALS
Status: DISCONTINUED | OUTPATIENT
Start: 2022-07-16 | End: 2022-07-21 | Stop reason: HOSPADM

## 2022-07-16 RX ORDER — LABETALOL HYDROCHLORIDE 5 MG/ML
10 INJECTION, SOLUTION INTRAVENOUS EVERY 6 HOURS PRN
Status: DISCONTINUED | OUTPATIENT
Start: 2022-07-16 | End: 2022-07-18

## 2022-07-16 RX ORDER — METHYLPREDNISOLONE SODIUM SUCCINATE 125 MG/2ML
125 INJECTION, POWDER, LYOPHILIZED, FOR SOLUTION INTRAMUSCULAR; INTRAVENOUS ONCE
Status: COMPLETED | OUTPATIENT
Start: 2022-07-16 | End: 2022-07-16

## 2022-07-16 RX ORDER — ONDANSETRON 2 MG/ML
4 INJECTION INTRAMUSCULAR; INTRAVENOUS EVERY 4 HOURS PRN
Status: DISCONTINUED | OUTPATIENT
Start: 2022-07-16 | End: 2022-07-21 | Stop reason: HOSPADM

## 2022-07-16 RX ORDER — HYDRALAZINE HYDROCHLORIDE 25 MG/1
25 TABLET, FILM COATED ORAL 3 TIMES DAILY
Status: DISCONTINUED | OUTPATIENT
Start: 2022-07-16 | End: 2022-07-16

## 2022-07-16 RX ORDER — ACETAMINOPHEN 325 MG/1
650 TABLET ORAL EVERY 6 HOURS PRN
Status: DISCONTINUED | OUTPATIENT
Start: 2022-07-16 | End: 2022-07-21 | Stop reason: HOSPADM

## 2022-07-16 RX ORDER — INSULIN LISPRO 100 [IU]/ML
5 INJECTION, SOLUTION INTRAVENOUS; SUBCUTANEOUS
Status: DISCONTINUED | OUTPATIENT
Start: 2022-07-16 | End: 2022-07-21 | Stop reason: HOSPADM

## 2022-07-16 RX ORDER — FUROSEMIDE 10 MG/ML
40 INJECTION INTRAMUSCULAR; INTRAVENOUS ONCE
Status: COMPLETED | OUTPATIENT
Start: 2022-07-16 | End: 2022-07-16

## 2022-07-16 RX ORDER — HEPARIN SODIUM 5000 [USP'U]/ML
5000 INJECTION, SOLUTION INTRAVENOUS; SUBCUTANEOUS EVERY 8 HOURS SCHEDULED
Status: DISCONTINUED | OUTPATIENT
Start: 2022-07-16 | End: 2022-07-21 | Stop reason: HOSPADM

## 2022-07-16 RX ADMIN — ACETAMINOPHEN 650 MG: 325 TABLET ORAL at 17:25

## 2022-07-16 RX ADMIN — INSULIN GLARGINE 20 UNITS: 100 INJECTION, SOLUTION SUBCUTANEOUS at 21:10

## 2022-07-16 RX ADMIN — INSULIN LISPRO 2 UNITS: 100 INJECTION, SOLUTION INTRAVENOUS; SUBCUTANEOUS at 17:22

## 2022-07-16 RX ADMIN — ALBUTEROL SULFATE 2.5 MG: 2.5 SOLUTION RESPIRATORY (INHALATION) at 08:17

## 2022-07-16 RX ADMIN — ONDANSETRON 4 MG: 2 INJECTION INTRAMUSCULAR; INTRAVENOUS at 18:30

## 2022-07-16 RX ADMIN — FUROSEMIDE 40 MG: 10 INJECTION, SOLUTION INTRAMUSCULAR; INTRAVENOUS at 16:04

## 2022-07-16 RX ADMIN — CARIPRAZINE 6 MG: 4.5 CAPSULE, GELATIN COATED ORAL at 14:08

## 2022-07-16 RX ADMIN — HEPARIN SODIUM 5000 UNITS: 5000 INJECTION INTRAVENOUS; SUBCUTANEOUS at 21:08

## 2022-07-16 RX ADMIN — HYDRALAZINE HYDROCHLORIDE 50 MG: 25 TABLET ORAL at 21:10

## 2022-07-16 RX ADMIN — HYDRALAZINE HYDROCHLORIDE 25 MG: 25 TABLET, FILM COATED ORAL at 14:02

## 2022-07-16 RX ADMIN — INSULIN LISPRO 3 UNITS: 100 INJECTION, SOLUTION INTRAVENOUS; SUBCUTANEOUS at 21:10

## 2022-07-16 RX ADMIN — METHYLPREDNISOLONE SODIUM SUCCINATE 125 MG: 125 INJECTION, POWDER, FOR SOLUTION INTRAMUSCULAR; INTRAVENOUS at 08:17

## 2022-07-16 RX ADMIN — INSULIN LISPRO 5 UNITS: 100 INJECTION, SOLUTION INTRAVENOUS; SUBCUTANEOUS at 13:00

## 2022-07-16 RX ADMIN — INSULIN LISPRO 2 UNITS: 100 INJECTION, SOLUTION INTRAVENOUS; SUBCUTANEOUS at 12:59

## 2022-07-16 RX ADMIN — HYDRALAZINE HYDROCHLORIDE 25 MG: 25 TABLET ORAL at 10:52

## 2022-07-16 RX ADMIN — DOXAZOSIN 2 MG: 2 TABLET ORAL at 10:51

## 2022-07-16 RX ADMIN — CARVEDILOL 25 MG: 25 TABLET, FILM COATED ORAL at 16:04

## 2022-07-16 RX ADMIN — ACETAMINOPHEN 650 MG: 325 TABLET ORAL at 23:27

## 2022-07-16 RX ADMIN — FUROSEMIDE 40 MG: 10 INJECTION, SOLUTION INTRAMUSCULAR; INTRAVENOUS at 08:17

## 2022-07-16 RX ADMIN — FAMOTIDINE 40 MG: 20 TABLET ORAL at 10:55

## 2022-07-16 RX ADMIN — FLUOXETINE 20 MG: 20 CAPSULE ORAL at 10:55

## 2022-07-16 RX ADMIN — LEVOTHYROXINE SODIUM 125 MCG: 25 TABLET ORAL at 10:55

## 2022-07-16 RX ADMIN — INSULIN LISPRO 5 UNITS: 100 INJECTION, SOLUTION INTRAVENOUS; SUBCUTANEOUS at 17:22

## 2022-07-16 RX ADMIN — HYDRALAZINE HYDROCHLORIDE 25 MG: 25 TABLET ORAL at 08:26

## 2022-07-16 RX ADMIN — HEPARIN SODIUM 5000 UNITS: 5000 INJECTION INTRAVENOUS; SUBCUTANEOUS at 14:03

## 2022-07-16 NOTE — ASSESSMENT & PLAN NOTE
Lab Results   Component Value Date    HGBA1C 11 6 (H) 08/03/2021       Recent Labs     07/16/22  1129   POCGLU 216*       Blood Sugar Average: Last 72 hrs:  (P) 216   Poorly-controlled diabetes  Obtain A1c  Continue with Lantus and meal coverage; SSI while in the hospital

## 2022-07-16 NOTE — PLAN OF CARE
Problem: OCCUPATIONAL THERAPY ADULT  Goal: Performs self-care activities at highest level of function for planned discharge setting  See evaluation for individualized goals  Description: Treatment Interventions: ADL retraining, Equipment evaluation/education, Compensatory technique education, Energy conservation, Activityengagement    See flowsheet documentation for full assessment, interventions and recommendations  Note: Limitation: Decreased ADL status, Decreased Safe judgement during ADL, Decreased high-level ADLs  Prognosis: Good  Assessment: Pt is a 38 y o  male seen for OT evaluation s/p admit to [facility] on 7/16/2022 w/ Acute on chronic systolic heart failure ([facility] [address] )  Comorbidities affecting pt's functional performance at time of assessment include: DM II, HTN, Obesity, OCD, Schizoaffective disorder  Personal factors affecting pt at time of IE include:steps to enter environment and difficulty performing ADLS  Prior to admission, pt was Mod I with ADLs  Upon evaluation: the following deficits impact occupational performance: decreased balance and decreased tolerance  Pt to benefit from continued skilled OT tx while in the hospital to address deficits as defined above and maximize level of functional independence w ADL's and functional mobility  Occupational Performance areas to address include: bathing/shower, toilet hygiene, dressing and clothing management  From OT standpoint, recommendation at time of d/c would be no rehab needs       OT Discharge Recommendation: No rehabilitation needs     Gregory Mcmullen MS, OTR/L

## 2022-07-16 NOTE — OCCUPATIONAL THERAPY NOTE
Occupational Therapy Evaluation      Ernesto Lion    7/16/2022    Principal Problem:    Acute on chronic systolic heart failure ([facility] )  Active Problems:    Type 2 diabetes mellitus without complication, with long-term current use of insulin ([facility])    Schizoaffective disorder, depressive type ([facility] )    Morbid obesity with BMI of 50 0-59 9, adult ([facility] )    Stage 4 chronic kidney disease ([facility] )    Primary hypertension      Past Medical History:   Diagnosis Date    Acute bronchitis due to other specified organisms 07/05/2019    Chronic headaches     Diabetes mellitus ([facility] )     Disease of thyroid gland     Esophagitis     Gastritis     Gastroparesis     GERD (gastroesophageal reflux disease)     Hypertension     Migraine     Migraines 03/11/2021    Obesity     Obsessive compulsive disorder     Psychiatric disorder     Renal disorder     Schizoaffective disorder [facility])        Past Surgical History:   Procedure Laterality Date    ESOPHAGOGASTRODUODENOSCOPY N/A 1/15/2019    Procedure: ESOPHAGOGASTRODUODENOSCOPY (EGD); Surgeon: Michelle Funk MD;  Location: [facility]; Service: Gastroenterology        07/16/22 1243   OT Last Visit   OT Visit Date 07/16/22   Note Type   Note type Evaluation   Restrictions/Precautions   Weight Bearing Precautions Per Order No   Braces or Orthoses   (R surgical shoe)   Other Precautions Multiple lines;Telemetry; Fall Risk   Pain Assessment   Pain Assessment Tool 0-10   Pain Score No Pain   Home Living   Type of Home House   Home Layout Two level;1/2 bath on main level;Bed/bath upstairs;Stairs to enter with rails  (2 KASANDRA, FF to 2nd floor)   Bathroom Shower/Tub Tub/shower unit  (also has walk-in)   Bathroom Toilet Standard   Bathroom Equipment Grab bars in shower; Shower chair   [address]   (none PTA)   Prior Function   Level of [place] Independent with ADLs and functional mobility   Lives With Family  (step mom and dad)   Receives Help From Family   ADL Assistance Independent   IADLs Needs assistance   Falls in the last 6 months 0   Comments -   Subjective   Subjective "I like to listen to music"   ADL   Eating Assistance 6  Modified independent   UB Bathing Assistance 5  Supervision/Setup   LB Bathing Assistance 3  Moderate Assistance   [address] 4  Minimal Assistance   LB Dressing Assistance 3  Moderate Assistance   Bed Mobility   Supine to Sit 6  Modified independent   Additional items HOB elevated; Increased time required   Sit to Supine 6  Modified independent   Additional items Increased time required   Transfers   Sit to Stand 6  Modified independent   Additional items Increased time required   Stand to Sit 6  Modified independent   Additional items Increased time required   Balance   Static Sitting Normal   Dynamic Sitting Normal   Static Standing Good   Dynamic Standing Fair +   Ambulatory Fair +   Activity Tolerance   Activity Tolerance Patient tolerated treatment well  (BPs: supine: 207/98, sitting 189/92, end of session 204/96)   Nurse Made Aware MARJ Barrow Assessment   RUE Assessment WFL   LUE Assessment   LUE Assessment WFL   Hand Function   Gross Motor Coordination Functional   Fine Motor Coordination Functional   Vision-Basic Assessment   Current Vision Wears glasses all the time   Cognition   Overall Cognitive Status [facility]   Arousal/Participation Alert; Cooperative   Attention Within functional limits   Orientation Level Oriented X4   Memory Within functional limits   Following Commands Follows all commands and directions without difficulty   Assessment   Limitation Decreased ADL status; Decreased Safe judgement during ADL;Decreased high-level ADLs   Prognosis Good   Assessment Pt is a 38 y o  male seen for OT evaluation s/p admit to [facility] [place] [address] on 7/16/2022 w/ Acute on chronic systolic heart failure ([facility] )    Comorbidities affecting pt's functional performance at time of assessment include: DM II, HTN, Obesity, OCD, Schizoaffective disorder  Personal factors affecting pt at time of IE include:steps to enter environment and difficulty performing ADLS  Prior to admission, pt was Mod I with ADLs  Upon evaluation: the following deficits impact occupational performance: decreased balance and decreased tolerance  Pt to benefit from continued skilled OT tx while in the hospital to address deficits as defined above and maximize level of functional independence w ADL's and functional mobility  Occupational Performance areas to address include: bathing/shower, toilet hygiene, dressing and clothing management  From OT standpoint, recommendation at time of d/c would be no rehab needs  Goals   Patient Goals To get a bed on the med/surge floor   Plan   Treatment Interventions ADL retraining;Equipment evaluation/education; Compensatory technique education; Energy conservation; Activityengagement   Goal Expiration Date 07/26/22   OT Treatment Day 0   OT Frequency 2-3x/wk   Recommendation   OT Discharge Recommendation No rehabilitation needs   Additional Comments  Pt seen as a co-eval with PT due to the patient's co-morbidities, clinically unstable presentation, and present impairments which are a regression from the patient's baseline  Additional Comments 2 The patient's raw score on the AM-PAC Daily Activity inpatient short form is 19, standardized score is 40 22, greater than 39 4  Patients at this level are likely to benefit from discharge to home  Please refer to the recommendation of the Occupational Therapist for safe discharge planning     AM-PAC Daily Activity Inpatient   Lower Body Dressing 2   Bathing 3   Toileting 3   Upper Body Dressing 3   Grooming 4   Eating 4   Daily Activity Raw Score 19   Daily Activity Standardized Score (Calc for Raw Score >=11) 40 22   AM-[facility] Applied Cognition Inpatient   Following a Speech/Presentation 4   Understanding Ordinary Conversation 4   Taking Medications 4   Remembering Where Things Are Placed or Put Away 4 Remembering List of 4-5 Errands 3   Taking Care of Complicated Tasks 3   Applied Cognition Raw Score 22   Applied Cognition Standardized Score 47 83     GOALS:    Pt will achieve the following within specified time frame: STG  Pt will achieve the following goals within 5 days    *UB ADL with (S) for inc'd independence with self cares    *LB ADL with Min (A) using AE prn for inc'd independence with self cares    *Toileting with (S) for clothing management and hygiene for return to PLOF with personal care    *Increase dyn stand balance to G- for inc'd safety with standing purposeful tasks    *Increase stand tolerance x5 m for inc'd tolerance with standing purposeful tasks    *Participate in 10m UE therex to increase overall stamina/activity tolerance for purposeful tasks    Pt will achieve the following within specified time frame: LTG  Pt will achieve the following goals within 10 days    *UB ADL with (I) for inc'd independence with self cares    *LB ADL with (S) using AE prn for inc'd independence with self cares    *Toileting with (I) for clothing management and hygiene for return to PLOF with personal care    *Increase dyn stand balance to G for inc'd safety with standing purposeful tasks    *Increase stand tolerance x10 m for inc'd tolerance with standing purposeful tasks      Gregory Thomas MS, OTR/L

## 2022-07-16 NOTE — H&P
2018 [facility] 1980, 38 y o  male MRN: [id number]  Unit/Bed#: ED 21 Encounter: [id number]  Primary Care Provider: Wisam Almaraz MD   Date and time admitted to hospital: 7/16/2022  7:29 AM    * Acute on chronic systolic heart failure [facility])  Assessment & Plan  Wt Readings from Last 3 Encounters:   07/16/22 (!) 167 kg (368 lb 6 2 oz)   06/23/22 (!) 146 kg (321 lb 14 oz)   06/19/22 (!) 154 kg (339 lb 8 1 oz)     · Mild exacerbation   · Slightly elevated , worsening lower extremities edema and dyspnea   · Echocardiogram on 06/17/2022 shows LVEF of 55%  · Continue with Coreg  · Suspect high sodium diet at home   · Patient is not on any diuretic at home   · Received lasix 40 IV x 1 in ED  Will continue with IV lasix  May need a high dose with elevated renal function  Additionally the patient will likely need diuretic on discharge  · Daily weight, low salt diet       Primary hypertension  Assessment & Plan  · With hypertensive urgency POA   · Will increase hydralazine to 50 mg t i d , continue with carvedilol     · IV furosemide 40 mg b i d   · Monitor BP closely     Stage 4 chronic kidney disease [facility])  Assessment & Plan  Lab Results   Component Value Date    EGFR 22 07/16/2022    EGFR 15 06/23/2022    EGFR 16 06/22/2022    CREATININE 3 19 (H) 07/16/2022    CREATININE 4 42 (H) 06/23/2022    CREATININE 4 16 (H) 06/22/2022   · Baseline Cr appears to be 1 9-2 1 mg/dL in 2021; his new baseline appears to be in the 2 9-3 with recent dx of ATN   · Supposed to follow up with Nephrology outpatient however has been unable to get out of the house  · Consult nephrology   · Continue with diuresis   · Monitor renal function closely   · Avoid hypotension and nephrotoxins    Morbid obesity with BMI of 50 0-59 9, adult [facility])  Assessment & Plan  · Supportive care   · Dietitian consult     Schizoaffective disorder, depressive type [facility])  Assessment & Plan  · Continue with home regimen Type 2 diabetes mellitus without complication, with long-term current use of insulin ([facility])  Assessment & Plan  Lab Results   Component Value Date    HGBA1C 11 6 (H) 08/03/2021       Recent Labs     07/16/22  1129   POCGLU 216*       Blood Sugar Average: Last 72 hrs:  (P) 216   Poorly-controlled diabetes  Obtain A1c  Continue with Lantus and meal coverage; SSI while in the hospital       VTE Prophylaxis: Heparin  Code Status: Full code   POLST: POLST is not applicable to this patient  Discussion with family: none present during exam     Anticipated Length of Stay:  Patient will be admitted on an Observation basis with an anticipated length of stay of  < 2 midnights  Justification for Hospital Stay:  CHF exacerbation    Chief Complaint:   Shortness of breath and worsening lower extremity edema    History of Present Illness:    Park Lopez is a 38 y o  male who presents with worsening shortness of breath and lower extremity edema  The patient past medical history of chronic kidney disease, obesity, hypertension and diabetes  He presented today with worsening shortness of breath and lower extremity edema  The patient was recently admitted here on 6/17-6/23/2022 with acute on chronic kidney disease  He subsequently was discharged home with follow-up appointment with Nephrology  The patient however has not been able to get out house and with worsening lower extremity edema he states that he has not been able to ambulate much  Additionally he reports worsening shortness of breath which is worse during exertion  He denies any chest pain, palpitation, dizziness, lightheadedness, or urinary symptoms  He reports having a headache this morning with 1 episodes of nonbloody vomiting  He was noted to be volume overloaded in the ED and subsequently is being admitted  Review of Systems:  Review of Systems   Constitutional: Negative for chills, fatigue and fever     HENT: Negative for congestion, ear pain, postnasal drip and sore throat  Eyes: Negative for discharge, itching and visual disturbance  Respiratory: Positive for shortness of breath  Negative for cough and chest tightness  Cardiovascular: Positive for leg swelling  Negative for chest pain and palpitations  Gastrointestinal: Negative for abdominal pain, nausea and vomiting  Endocrine: Negative for cold intolerance and heat intolerance  Genitourinary: Negative for difficulty urinating, dysuria and hematuria  Musculoskeletal: Negative for back pain, gait problem and neck pain  Skin: Negative for rash and wound  Neurological: Positive for weakness  Negative for dizziness, speech difficulty, light-headedness and headaches  Psychiatric/Behavioral: Negative for confusion, decreased concentration and dysphoric mood  Past Medical and Surgical History:   Past Medical History:   Diagnosis Date    Acute bronchitis due to other specified organisms 07/05/2019    Chronic headaches     [facility] mellitus ([facility] )     Disease of thyroid gland     Esophagitis     Gastritis     Gastroparesis     GERD (gastroesophageal reflux disease)     Hypertension     Migraine     Migraines 03/11/2021    Obesity     Obsessive compulsive disorder     Psychiatric disorder     Renal disorder     Schizoaffective disorder [facility])        Past Surgical History:   Procedure Laterality Date    ESOPHAGOGASTRODUODENOSCOPY N/A 1/15/2019    Procedure: ESOPHAGOGASTRODUODENOSCOPY (EGD); Surgeon: Marla Lerma MD;  Location: [facility]; Service: Gastroenterology       Meds/Allergies:  Prior to Admission medications    Medication Sig Start Date End Date Taking? Authorizing Provider   ACCU-CHEK FASTCLIX LANCETS MISC 4 (four) times a day Not checking 4 times a day   Patient stated maybe 2 times a day 5/8/19   Historical Provider, MD   albuterol (PROVENTIL HFA,VENTOLIN HFA) 90 mcg/act inhaler Inhale 2 puffs every 6 (six) hours as needed for shortness of breath 6/23/22 [facility], DO   aluminum-magnesium hydroxide-simethicone (MYLANTA) 200-200-20 mg/5 mL suspension Take 30 mL by mouth 2 (two) times a day as needed for indigestion or heartburn  Patient not taking: Reported on 6/17/2022 6/3/21   Nael Morrison, DO   Blood Glucose Monitoring Suppl (ACCU-CHEK GUIDE) w/Device KIT Checking 2 times a day 5/9/19   Historical Provider, MD   carvedilol (COREG) 25 mg tablet Take 1 tablet (25 mg total) by mouth 2 (two) times a day with meals 6/23/22 7/23/22  Den Sahil, DO   doxazosin (CARDURA) 2 mg tablet Take 1 tablet (2 mg total) by mouth daily 6/23/22 7/23/22  [facility], DO   famotidine (PEPCID) 40 MG tablet Take 1 tablet (40 mg total) by mouth daily 7/16/21   Alfredo Ramos MD   FLUoxetine (PROzac) 20 mg capsule Take 20 mg by mouth every morning 10/15/21   Historical Provider, MD   hydrALAZINE (APRESOLINE) 25 mg tablet Take 1 tablet (25 mg total) by mouth 3 (three) times a day 6/23/22 7/23/22  Soham Baxter, DO   insulin glargine (LANTUS) 100 units/mL subcutaneous injection Inject 20 Units under the skin daily at bedtime 6/23/22   Soham Baxter, DO   insulin lispro (HumaLOG) 100 units/mL injection Inject 5 Units under the skin 3 (three) times a day with meals 6/23/22   [facility], DO   Insulin Pen Needle (Pen Needles 3/16") 31G X 5 MM MISC Use 4 (four) times a day 1/12/21   DOREEN Birmingham   levothyroxine 125 mcg tablet Take 1 tablet (125 mcg total) by mouth daily in the early morning 6/24/22 7/24/22  Soham Baxter, DO   LORazepam (ATIVAN) 0 5 mg tablet Take 1 tablet (0 5 mg total) by mouth daily as needed for anxiety  Patient not taking: Reported on 6/17/2022 7/16/21   Alfredo Ramos MD   pantoprazole (PROTONIX) 40 mg tablet Take 1 tablet (40 mg total) by mouth 2 (two) times a day before meals 6/23/22 7/23/22  DO Omayra Lemonslar 6 MG capsule Take 6 mg by mouth daily 8/13/21   Historical Provider, MD CORREA have reviewed home medications with patient personally  Allergies: Allergies   Allergen Reactions    Amoxicillin Diarrhea    Augmentin [Amoxicillin-Pot Clavulanate] Diarrhea    Clavulanic Acid Diarrhea    Erythromycin Diarrhea       Social History:  Marital Status: Single   Occupation: n/a   Patient Pre-hospital Living Situation: family   Patient Pre-hospital Level of Mobility: independent   Patient Pre-hospital Diet Restrictions: none   Substance Use History:   Social History     Substance and Sexual Activity   Alcohol Use Not Currently     Social History     Tobacco Use   Smoking Status Never Smoker   Smokeless Tobacco Never Used     Social History     Substance and Sexual Activity   Drug Use Not Currently       Family History:  I have reviewed the patients family history    Physical Exam:   Vitals:   Blood Pressure: (!) 191/97 (07/16/22 1130)  Pulse: 85 (07/16/22 1130)  Temperature: 98 1 °F (36 7 °C) (07/16/22 0731)  Temp Source: Oral (07/16/22 0731)  Respirations: 20 (07/16/22 0734)  Weight - Scale: (!) 167 kg (368 lb 6 2 oz) (07/16/22 1102)  SpO2: 93 % (07/16/22 1130)    Physical Exam  Vitals and nursing note reviewed  Constitutional:       General: He is not in acute distress  Appearance: Normal appearance  He is obese  He is ill-appearing  HENT:      Head: Normocephalic and atraumatic  Right Ear: External ear normal       Left Ear: External ear normal       Nose: Nose normal       Mouth/Throat:      Mouth: Mucous membranes are moist       Pharynx: Oropharynx is clear  Eyes:      General:         Right eye: No discharge  Left eye: No discharge  Extraocular Movements: Extraocular movements intact  Pupils: Pupils are equal, round, and reactive to light  Cardiovascular:      Rate and Rhythm: Normal rate and regular rhythm  Pulses: Normal pulses  Heart sounds: Normal heart sounds  No murmur heard    Pulmonary:      Effort: Pulmonary effort is normal  No respiratory distress  Breath sounds: No wheezing or rales  Comments: Decreased breath sounds in bases    Abdominal:      General: Bowel sounds are normal  There is no distension  Palpations: Abdomen is soft  There is no mass  Tenderness: There is no abdominal tenderness  Musculoskeletal:         General: No swelling, tenderness or deformity  Normal range of motion  Cervical back: Normal range of motion and neck supple  No rigidity  Right lower leg: Edema present  Left lower leg: Edema present  Skin:     General: Skin is warm and dry  Capillary Refill: Capillary refill takes less than 2 seconds  Coloration: Skin is not pale  Findings: No erythema  Neurological:      General: No focal deficit present  Mental Status: He is alert and oriented to person, place, and time  Mental status is at baseline  Psychiatric:         Mood and Affect: Mood normal          Behavior: Behavior normal          Thought Content: Thought content normal          Judgment: Judgment normal          Additional Data:   Lab Results: I have personally reviewed pertinent reports  Results from last 7 days   Lab Units 07/16/22  0743   WBC Thousand/uL 11 38*   HEMOGLOBIN g/dL 8 5*   HEMATOCRIT % 25 7*   PLATELETS Thousands/uL 287   NEUTROS PCT % 82*   LYMPHS PCT % 7*   MONOS PCT % 6   EOS PCT % 4     Results from last 7 days   Lab Units 07/16/22  0743   SODIUM mmol/L 127*   POTASSIUM mmol/L 5 1   CHLORIDE mmol/L 94*   CO2 mmol/L 25   BUN mg/dL 55*   CREATININE mg/dL 3 19*   CALCIUM mg/dL 8 4     Results from last 7 days   Lab Units 07/16/22  0743   INR  1 04     Results from last 7 days   Lab Units 07/16/22  1129   POC GLUCOSE mg/dl 216*           Imaging: I have personally reviewed pertinent reports      X-ray chest 1 view portable   Final Result by Aries Vincent MD (07/16 0712)      Probable pulmonary edema with focal consolidation in the right upper lung which could be due to asymmetric localized edema or superimposed pneumonia  Workstation performed: [id number]             EKG, Pathology, and Other Studies Reviewed on Admission:   · 6/17 echocardiogram shows LVEF of 55%  Epic Records Reviewed: Yes     ** Please Note: This note has been constructed using a voice recognition system   **

## 2022-07-16 NOTE — ASSESSMENT & PLAN NOTE
· With hypertensive urgency POA   · Will increase hydralazine to 50 mg t i d , continue with carvedilol     · IV furosemide 40 mg b i d   · Monitor BP closely

## 2022-07-16 NOTE — ASSESSMENT & PLAN NOTE
Wt Readings from Last 3 Encounters:   07/16/22 (!) 167 kg (368 lb 6 2 oz)   06/23/22 (!) 146 kg (321 lb 14 oz)   06/19/22 (!) 154 kg (339 lb 8 1 oz)     · Mild exacerbation   · Slightly elevated , worsening lower extremities edema and dyspnea   · Echocardiogram on 06/17/2022 shows LVEF of 55%  · Continue with Coreg  · Suspect high sodium diet at home   · Patient is not on any diuretic at home   · Received lasix 40 IV x 1 in ED  Will continue with IV lasix  May need a high dose with elevated renal function  Additionally the patient will likely need diuretic on discharge     · Daily weight, low salt diet

## 2022-07-16 NOTE — ED PROVIDER NOTES
History  Chief Complaint   Patient presents with    Shortness of Breath    Leg Swelling       History provided by:  Patient   used: No    Shortness of Breath  Severity:  Moderate  Onset quality:  Gradual  Duration:  1 week  Timing:  Constant  Progression:  Worsening  Chronicity:  Recurrent  Context: activity    Relieved by:  None tried  Worsened by: Activity  Ineffective treatments:  None tried  Associated symptoms: wheezing    Associated symptoms: no abdominal pain, no chest pain, no claudication, no cough, no diaphoresis, no ear pain, no fever, no headaches, no hemoptysis, no neck pain, no rash, no sore throat, no sputum production, no syncope and no swollen glands    Risk factors: obesity     This is a 43-year-old morbidly obese male presented with complain of shortness of breath and the lower extremity swelling  Patient states he has been having shortness of breath for over a week  Leg swelling is chronic when he left the hospital last time  He states he was discharged about 3 weeks ago was diagnosed with renal failure and was supposed to follow up with the her his kidney doctor however he was unable to make it secondary to his leg swelling and difficulty getting out of his house  He denies any chest pain at this time does complain of shortness of breath oxygen saturation is 92% on room air at this time patient is placed on 2 L nasal cannula  He denies any abdominal pain nausea had a nap 1 episode of vomiting today  States his lower extremity as worsen  Denies any fever chills at this time  Does appear to be somewhat uncomfortable but not in any distress at this time  He also complain of chronic sore on both of his feet  Prior to Admission Medications   Prescriptions Last Dose Informant Patient Reported? Taking? ACCU-CHEK FASTCLIX LANCETS MISC  Self Yes No   Si (four) times a day Not checking 4 times a day   Patient stated maybe 2 times a day   Blood Glucose Monitoring Suppl (ACCU-CHEK GUIDE) w/Device KIT  Self Yes No   Sig: Checking 2 times a day   FLUoxetine (PROzac) 20 mg capsule  Self Yes No   Sig: Take 20 mg by mouth every morning   Insulin Pen Needle (Pen Needles 3/16") 31G X 5 MM MISC  Self No No   Sig: Use 4 (four) times a day   LORazepam (ATIVAN) 0 5 mg tablet  Self No No   Sig: Take 1 tablet (0 5 mg total) by mouth daily as needed for anxiety   Patient not taking: Reported on 6/17/2022   Vraylar 6 MG capsule  Self Yes No   Sig: Take 6 mg by mouth daily   albuterol (PROVENTIL HFA,VENTOLIN HFA) 90 mcg/act inhaler   No No   Sig: Inhale 2 puffs every 6 (six) hours as needed for shortness of breath   aluminum-magnesium hydroxide-simethicone (MYLANTA) 200-200-20 mg/5 mL suspension  Self No No   Sig: Take 30 mL by mouth 2 (two) times a day as needed for indigestion or heartburn   Patient not taking: Reported on 6/17/2022   carvedilol (COREG) 25 mg tablet   No No   Sig: Take 1 tablet (25 mg total) by mouth 2 (two) times a day with meals   doxazosin (CARDURA) 2 mg tablet   No No   Sig: Take 1 tablet (2 mg total) by mouth daily   famotidine (PEPCID) 40 MG tablet  Self No No   Sig: Take 1 tablet (40 mg total) by mouth daily   hydrALAZINE (APRESOLINE) 25 mg tablet   No No   Sig: Take 1 tablet (25 mg total) by mouth 3 (three) times a day   insulin glargine (LANTUS) 100 units/mL subcutaneous injection   No No   Sig: Inject 20 Units under the skin daily at bedtime   insulin lispro (HumaLOG) 100 units/mL injection   No No   Sig: Inject 5 Units under the skin 3 (three) times a day with meals   levothyroxine 125 mcg tablet   No No   Sig: Take 1 tablet (125 mcg total) by mouth daily in the early morning   pantoprazole (PROTONIX) 40 mg tablet   No No   Sig: Take 1 tablet (40 mg total) by mouth 2 (two) times a day before meals      Facility-Administered Medications: None       Past Medical History:   Diagnosis Date    Acute bronchitis due to other specified organisms 07/05/2019    Chronic headaches     Diabetes mellitus ([facility] [address] )     Disease of thyroid gland     Esophagitis     Gastritis     Gastroparesis     GERD (gastroesophageal reflux disease)     Hypertension     Migraine     Migraines 03/11/2021    Obesity     Obsessive compulsive disorder     Psychiatric disorder     Renal disorder     Schizoaffective disorder [facility])        Past Surgical History:   Procedure Laterality Date    ESOPHAGOGASTRODUODENOSCOPY N/A 1/15/2019    Procedure: ESOPHAGOGASTRODUODENOSCOPY (EGD); Surgeon: Arnav Wills MD;  Location: [facility]; Service: Gastroenterology       Family History   Problem Relation Age of Onset    COPD Mother     Hypertension Mother     Heart failure Mother     Rheum arthritis Family     Heart disease Family     Hypertension Father     Diabetes Father     Hyperlipidemia Father      I have reviewed and agree with the history as documented  E-Cigarette/Vaping    E-Cigarette Use Never User      E-Cigarette/Vaping Substances    Nicotine No     THC No     CBD No     Flavoring No      Social History     Tobacco Use    Smoking status: Never Smoker    Smokeless tobacco: Never Used   Vaping Use    Vaping Use: Never used   Substance Use Topics    Alcohol use: Not Currently    Drug use: Not Currently       Review of Systems   Constitutional: Negative for diaphoresis and fever  HENT: Negative for ear pain and sore throat  Eyes: Negative  Respiratory: Positive for shortness of breath and wheezing  Negative for cough, hemoptysis and sputum production  Cardiovascular: Negative for chest pain, claudication and syncope  Gastrointestinal: Negative for abdominal pain  Endocrine: Negative  Genitourinary: Negative  Musculoskeletal: Negative  Negative for neck pain  Skin: Positive for wound (Chronic wound on his feet)  Negative for rash  Allergic/Immunologic: Negative  Neurological: Negative for headaches  Psychiatric/Behavioral: Negative          Physical Exam  Physical Exam  Vitals and nursing note reviewed  Constitutional:       Appearance: He is well-developed  He is obese  He is not ill-appearing or diaphoretic  Comments: Slightly uncomfortable   HENT:      Head: Normocephalic and atraumatic  Right Ear: External ear normal       Left Ear: External ear normal       Nose: Nose normal       Mouth/Throat:      Mouth: Mucous membranes are moist       Pharynx: Oropharynx is clear  No posterior oropharyngeal erythema  Eyes:      Conjunctiva/sclera: Conjunctivae normal       Pupils: Pupils are equal, round, and reactive to light  Cardiovascular:      Rate and Rhythm: Normal rate and regular rhythm  Pulses: Normal pulses  Heart sounds: Normal heart sounds  Pulmonary:      Effort: Pulmonary effort is normal  No respiratory distress  Breath sounds: Wheezing and rales present  Abdominal:      General: Bowel sounds are normal  There is no distension  Palpations: Abdomen is soft  Tenderness: There is no abdominal tenderness  Musculoskeletal:         General: Normal range of motion  Cervical back: Normal range of motion and neck supple  Right lower leg: Edema present  Left lower leg: Edema present  Lymphadenopathy:      Cervical: No cervical adenopathy  Skin:     General: Skin is warm and dry  Capillary Refill: Capillary refill takes less than 2 seconds  Findings: Wound present  Comments: Bilateral foot: Mild ulceration plantar surface of the great toe at the level of the proximal and distal phalanges  Small cystic ganglion plantar surface of the medial forefoot at the level of the 1st MTP joint  Neurological:      Mental Status: He is alert and oriented to person, place, and time  Psychiatric:         Mood and Affect: Mood is anxious           Behavior: Behavior normal          Vital Signs  ED Triage Vitals   Temperature Pulse Respirations Blood Pressure SpO2   07/16/22 0731 07/16/22 0734 07/16/22 0734 07/16/22 0734 07/16/22 0734   98 1 °F (36 7 °C) 90 20 (!) 180/89 93 %      Temp Source Heart Rate Source Patient Position - Orthostatic VS BP Location FiO2 (%)   07/16/22 0731 07/16/22 0734 07/16/22 0734 07/16/22 0734 --   Oral Monitor Sitting Left arm       Pain Score       07/16/22 0732       No Pain           Vitals:    07/16/22 0734 07/16/22 0826   BP: (!) 180/89 (!) 185/92   Pulse: 90    Patient Position - Orthostatic VS: Sitting          Visual Acuity      ED Medications  Medications   albuterol inhalation solution 2 5 mg (2 5 mg Nebulization Given 7/16/22 0817)   methylPREDNISolone sodium succinate (Solu-MEDROL) injection 125 mg (125 mg Intravenous Given 7/16/22 0817)   furosemide (LASIX) injection 40 mg (40 mg Intravenous Given 7/16/22 0817)   hydrALAZINE (APRESOLINE) tablet 25 mg (25 mg Oral Given 7/16/22 0826)       Diagnostic Studies  Results Reviewed     Procedure Component Value Units Date/Time    FLU/RSV/COVID - if FLU/RSV clinically relevant [[id number]] Collected: 07/16/22 0832    Lab Status: No result Specimen: Nares from Nasopharyngeal Swab     HS Troponin 0hr (reflex protocol) [[id number]]  (Normal) Collected: 07/16/22 0743    Lab Status: Final result Specimen: Blood from Arm, Right Updated: 07/16/22 0820     hs TnI 0hr 8 ng/L     HS Troponin I 2hr [[id number]]     Lab Status: No result Specimen: Blood     HS Troponin I 4hr [[id number]]     Lab Status: No result Specimen: Blood     B-Type Natriuretic Peptide(BNP) [facility], [place], [facility] Only [[id number]]  (Abnormal) Collected: 07/16/22 0743    Lab Status: Final result Specimen: Blood from Arm, Right Updated: 07/16/22 0819      pg/mL     Basic metabolic panel [[id number]]  (Abnormal) Collected: 07/16/22 0743    Lab Status: Final result Specimen: Blood from Arm, Right Updated: 07/16/22 0815     Sodium 127 mmol/L      Potassium 5 1 mmol/L      Chloride 94 mmol/L      CO2 25 mmol/L      ANION GAP 8 mmol/L      BUN 55 mg/dL      Creatinine 3 19 mg/dL      Glucose 191 mg/dL      Calcium 8 4 mg/dL      eGFR 22 ml/min/1 73sq m     Narrative:      National Kidney Disease Foundation guidelines for Chronic Kidney Disease (CKD):     Stage 1 with normal or high GFR (GFR > 90 mL/min/1 73 square meters)    Stage 2 Mild CKD (GFR = 60-89 mL/min/1 73 square meters)    Stage 3A Moderate CKD (GFR = 45-59 mL/min/1 73 square meters)    Stage 3B Moderate CKD (GFR = 30-44 mL/min/1 73 square meters)    Stage 4 Severe CKD (GFR = 15-29 mL/min/1 73 square meters)    Stage 5 End Stage CKD (GFR <15 mL/min/1 73 square meters)  Note: GFR calculation is accurate only with a steady state creatinine    Protime-INR [[id number]]  (Normal) Collected: 07/16/22 0743    Lab Status: Final result Specimen: Blood from Arm, Right Updated: 07/16/22 0807     Protime 13 6 seconds      INR 1 04    CBC and differential [[id number]]  (Abnormal) Collected: 07/16/22 0743    Lab Status: Final result Specimen: Blood from Arm, Right Updated: 07/16/22 0751     WBC 11 38 Thousand/uL      RBC 3 01 Million/uL      Hemoglobin 8 5 g/dL      Hematocrit 25 7 %      MCV 85 fL      MCH 28 2 pg      MCHC 33 1 g/dL      RDW 13 0 %      MPV 8 8 fL      Platelets 284 Thousands/uL      nRBC 0 /100 WBCs      Neutrophils Relative 82 %      Immat GRANS % 1 %      Lymphocytes Relative 7 %      Monocytes Relative 6 %      Eosinophils Relative 4 %      Basophils Relative 0 %      Neutrophils Absolute 9 28 Thousands/µL      Immature Grans Absolute 0 06 Thousand/uL      Lymphocytes Absolute 0 82 Thousands/µL      Monocytes Absolute 0 72 Thousand/µL      Eosinophils Absolute 0 46 Thousand/µL      Basophils Absolute 0 04 Thousands/µL                  X-ray chest 1 view portable   Final Result by Medina Lee MD (07/16 0448)      Probable pulmonary edema with focal consolidation in the right upper lung which could be due to asymmetric localized edema or superimposed pneumonia                    Workstation performed: [id number]                    Procedures  CriticalCare Time  Performed by: Gage Pisano MD  Authorized by: Gage Pisano MD     Critical care provider statement:     Critical care time (minutes):  45    Critical care start time:  7/16/2022 7:20 AM    Critical care end time:  7/16/2022 8:32 AM    Critical care time was exclusive of:  Separately billable procedures and treating other patients and teaching time    Critical care was necessary to treat or prevent imminent or life-threatening deterioration of the following conditions:  Circulatory failure and respiratory failure    Critical care was time spent personally by me on the following activities:  Blood draw for specimens, obtaining history from patient or surrogate, development of treatment plan with patient or surrogate, discussions with consultants, discussions with primary provider, evaluation of patient's response to treatment, examination of patient, review of old charts, re-evaluation of patient's condition, ordering and review of radiographic studies, ordering and review of laboratory studies, ordering and performing treatments and interventions and interpretation of cardiac output measurements    I assumed direction of critical care for this patient from another provider in my specialty: no               ED Course  ED Course as of 07/16/22 0834   Sat Jul 16, 2022   0826 [place] text Parmjit Mckeon waiting for call back    0826 Creatinine(!): 3 19   0826 WBC(!): 11 38  [facility]Auq[facility]/Call SLIM (Parmjit Peralta(SLN))  [address] [facility] [address] thanks Obs med surg for now Ana Wade will be down shortly                               SBIRT [facility] Most Recent Value   SBIRT (23 yo +)    In order to provide better care to our patients, we are screening all of our patients for alcohol and drug use  Would it be okay to ask you these screening questions? Yes Filed at: 07/16/2022 3874   Initial Alcohol Screen: US AUDIT-C     1   How often do you have a drink containing alcohol? 0 Filed at: 07/16/2022 0833   2  How many drinks containing alcohol do you have on a typical day you are drinking? 0 Filed at: 07/16/2022 0833   3a  Male UNDER 65: How often do you have five or more drinks on one occasion? 0 Filed at: 07/16/2022 0833   3b  FEMALE Any Age, or MALE 65+: How often do you have 4 or more drinks on one occassion? 0 Filed at: 07/16/2022 0833   Audit-C Score 0 Filed at: 07/16/2022 7549   CLIFTON: How many times in the past year have you    Used an illegal drug or used a prescription medication for non-medical reasons? Never Filed at: 07/16/2022 6060                    MDM    Disposition  Final diagnoses:   Pulmonary edema   Bilateral lower extremity edema   Acute on chronic renal failure (HCC)   Hypoxia   Dyspnea     Time reflects when diagnosis was documented in both MDM as applicable and the Disposition within this note     Time User Action Codes Description Comment    7/16/2022  8:29 AM Rozann Humbles Add [J81 1] Pulmonary edema     7/16/2022  8:29 AM Rozann Humbles Add [R60 0] Bilateral lower extremity edema     7/16/2022  8:30 AM Rozann Humbles Add [N17 9,  N18 9] Acute on chronic renal failure ([facility] [address] )     7/16/2022  8:30 AM Rozann Humbles Add [R09 02] Hypoxia     7/16/2022  8:30 AM Rozann Humbles Add [R06 00] Dyspnea       ED Disposition     ED Disposition   Admit    Condition   Stable    Date/Time   Sat Jul 16, 2022  8:29 AM    Comment   Case was discussed with Parmjit Mckeon and the patient's admission status was agreed to be Admission Status: observation status to the service of Dr Belen Booker   Follow-up Information    None         Patient's Medications   Discharge Prescriptions    No medications on file       No discharge procedures on file      PDMP Review       Value Time User    PDMP Reviewed  Yes 7/16/2021  3:11 PM Jude Mello MD          ED Provider  Electronically Signed by           Mirza Cabrera MD  07/16/22 3167

## 2022-07-16 NOTE — ASSESSMENT & PLAN NOTE
Lab Results   Component Value Date    EGFR 22 07/16/2022    EGFR 15 06/23/2022    EGFR 16 06/22/2022    CREATININE 3 19 (H) 07/16/2022    CREATININE 4 42 (H) 06/23/2022    CREATININE 4 16 (H) 06/22/2022   · Baseline Cr appears to be 1 9-2 1 mg/dL in 2021; his new baseline appears to be in the 2 9-3 with recent dx of ATN   · Supposed to follow up with Nephrology outpatient however has been unable to get out of the house  · Consult nephrology   · Continue with diuresis   · Monitor renal function closely   · Avoid hypotension and nephrotoxins

## 2022-07-17 PROBLEM — I12.9 TYPE 2 DIABETES MELLITUS WITH STAGE 4 CHRONIC KIDNEY DISEASE AND HYPERTENSION (HCC): Status: ACTIVE | Noted: 2021-02-12

## 2022-07-17 PROBLEM — E11.22 TYPE 2 DIABETES MELLITUS WITH STAGE 4 CHRONIC KIDNEY DISEASE AND HYPERTENSION (HCC): Status: ACTIVE | Noted: 2021-02-12

## 2022-07-17 LAB
ANION GAP SERPL CALCULATED.3IONS-SCNC: 7 MMOL/L (ref 4–13)
ATRIAL RATE: 84 BPM
ATRIAL RATE: 88 BPM
ATRIAL RATE: 94 BPM
BUN SERPL-MCNC: 57 MG/DL (ref 5–25)
CALCIUM SERPL-MCNC: 8.8 MG/DL (ref 8.4–10.2)
CHLORIDE SERPL-SCNC: 97 MMOL/L (ref 96–108)
CO2 SERPL-SCNC: 28 MMOL/L (ref 21–32)
CREAT SERPL-MCNC: 3.38 MG/DL (ref 0.6–1.3)
ERYTHROCYTE [DISTWIDTH] IN BLOOD BY AUTOMATED COUNT: 13.1 % (ref 11.6–15.1)
EST. AVERAGE GLUCOSE BLD GHB EST-MCNC: 214 MG/DL
GFR SERPL CREATININE-BSD FRML MDRD: 20 ML/MIN/1.73SQ M
GLUCOSE SERPL-MCNC: 140 MG/DL (ref 65–140)
GLUCOSE SERPL-MCNC: 142 MG/DL (ref 65–140)
GLUCOSE SERPL-MCNC: 164 MG/DL (ref 65–140)
GLUCOSE SERPL-MCNC: 229 MG/DL (ref 65–140)
GLUCOSE SERPL-MCNC: 70 MG/DL (ref 65–140)
GLUCOSE SERPL-MCNC: 95 MG/DL (ref 65–140)
HBA1C MFR BLD: 9.1 %
HCT VFR BLD AUTO: 25.5 % (ref 36.5–49.3)
HGB BLD-MCNC: 8.3 G/DL (ref 12–17)
MCH RBC QN AUTO: 27.9 PG (ref 26.8–34.3)
MCHC RBC AUTO-ENTMCNC: 32.5 G/DL (ref 31.4–37.4)
MCV RBC AUTO: 86 FL (ref 82–98)
P AXIS: 47 DEGREES
P AXIS: 53 DEGREES
P AXIS: 60 DEGREES
PLATELET # BLD AUTO: 322 THOUSANDS/UL (ref 149–390)
PMV BLD AUTO: 9.5 FL (ref 8.9–12.7)
POTASSIUM SERPL-SCNC: 4.5 MMOL/L (ref 3.5–5.3)
PR INTERVAL: 186 MS
PR INTERVAL: 192 MS
PR INTERVAL: 192 MS
PROCALCITONIN SERPL-MCNC: 0.1 NG/ML
QRS AXIS: -2 DEGREES
QRS AXIS: -25 DEGREES
QRS AXIS: -5 DEGREES
QRSD INTERVAL: 92 MS
QRSD INTERVAL: 94 MS
QRSD INTERVAL: 96 MS
QT INTERVAL: 366 MS
QT INTERVAL: 368 MS
QT INTERVAL: 382 MS
QTC INTERVAL: 445 MS
QTC INTERVAL: 451 MS
QTC INTERVAL: 457 MS
RBC # BLD AUTO: 2.98 MILLION/UL (ref 3.88–5.62)
SODIUM SERPL-SCNC: 132 MMOL/L (ref 135–147)
T WAVE AXIS: 69 DEGREES
T WAVE AXIS: 80 DEGREES
T WAVE AXIS: 80 DEGREES
VENTRICULAR RATE: 84 BPM
VENTRICULAR RATE: 88 BPM
VENTRICULAR RATE: 94 BPM
WBC # BLD AUTO: 12.37 THOUSAND/UL (ref 4.31–10.16)

## 2022-07-17 PROCEDURE — 93010 ELECTROCARDIOGRAM REPORT: CPT | Performed by: INTERNAL MEDICINE

## 2022-07-17 PROCEDURE — 99225 PR SBSQ OBSERVATION CARE/DAY 25 MINUTES: CPT

## 2022-07-17 PROCEDURE — 99215 OFFICE O/P EST HI 40 MIN: CPT | Performed by: INTERNAL MEDICINE

## 2022-07-17 PROCEDURE — 84145 PROCALCITONIN (PCT): CPT

## 2022-07-17 PROCEDURE — 99245 OFF/OP CONSLTJ NEW/EST HI 55: CPT | Performed by: INTERNAL MEDICINE

## 2022-07-17 PROCEDURE — 80048 BASIC METABOLIC PNL TOTAL CA: CPT | Performed by: NURSE PRACTITIONER

## 2022-07-17 PROCEDURE — 84156 ASSAY OF PROTEIN URINE: CPT | Performed by: INTERNAL MEDICINE

## 2022-07-17 PROCEDURE — 85027 COMPLETE CBC AUTOMATED: CPT | Performed by: NURSE PRACTITIONER

## 2022-07-17 PROCEDURE — 82948 REAGENT STRIP/BLOOD GLUCOSE: CPT

## 2022-07-17 PROCEDURE — 94664 DEMO&/EVAL PT USE INHALER: CPT

## 2022-07-17 PROCEDURE — 82570 ASSAY OF URINE CREATININE: CPT | Performed by: INTERNAL MEDICINE

## 2022-07-17 PROCEDURE — 84540 ASSAY OF URINE/UREA-N: CPT | Performed by: INTERNAL MEDICINE

## 2022-07-17 PROCEDURE — 82043 UR ALBUMIN QUANTITATIVE: CPT | Performed by: INTERNAL MEDICINE

## 2022-07-17 RX ORDER — ALBUTEROL SULFATE 90 UG/1
2 AEROSOL, METERED RESPIRATORY (INHALATION) EVERY 4 HOURS PRN
Status: DISCONTINUED | OUTPATIENT
Start: 2022-07-17 | End: 2022-07-21 | Stop reason: HOSPADM

## 2022-07-17 RX ORDER — TRAMADOL HYDROCHLORIDE 50 MG/1
50 TABLET ORAL ONCE
Status: COMPLETED | OUTPATIENT
Start: 2022-07-17 | End: 2022-07-17

## 2022-07-17 RX ORDER — ALBUTEROL SULFATE 90 UG/1
2 AEROSOL, METERED RESPIRATORY (INHALATION) EVERY 4 HOURS PRN
Status: DISCONTINUED | OUTPATIENT
Start: 2022-07-17 | End: 2022-07-17

## 2022-07-17 RX ORDER — BUMETANIDE 0.25 MG/ML
4 INJECTION, SOLUTION INTRAMUSCULAR; INTRAVENOUS 2 TIMES DAILY
Status: DISCONTINUED | OUTPATIENT
Start: 2022-07-17 | End: 2022-07-21

## 2022-07-17 RX ORDER — CARVEDILOL 25 MG/1
25 TABLET ORAL 2 TIMES DAILY WITH MEALS
Status: CANCELLED | OUTPATIENT
Start: 2022-07-17

## 2022-07-17 RX ORDER — DOXAZOSIN MESYLATE 4 MG/1
4 TABLET ORAL DAILY
Status: CANCELLED | OUTPATIENT
Start: 2022-07-18

## 2022-07-17 RX ADMIN — LEVOTHYROXINE SODIUM 125 MCG: 25 TABLET ORAL at 05:02

## 2022-07-17 RX ADMIN — HEPARIN SODIUM 5000 UNITS: 5000 INJECTION INTRAVENOUS; SUBCUTANEOUS at 13:53

## 2022-07-17 RX ADMIN — ALBUTEROL SULFATE 2 PUFF: 90 AEROSOL, METERED RESPIRATORY (INHALATION) at 00:25

## 2022-07-17 RX ADMIN — INSULIN LISPRO 5 UNITS: 100 INJECTION, SOLUTION INTRAVENOUS; SUBCUTANEOUS at 12:27

## 2022-07-17 RX ADMIN — CARVEDILOL 25 MG: 25 TABLET, FILM COATED ORAL at 08:15

## 2022-07-17 RX ADMIN — LABETALOL HYDROCHLORIDE 10 MG: 5 INJECTION, SOLUTION INTRAVENOUS at 23:46

## 2022-07-17 RX ADMIN — HYDRALAZINE HYDROCHLORIDE 50 MG: 25 TABLET ORAL at 16:22

## 2022-07-17 RX ADMIN — ACETAMINOPHEN 650 MG: 325 TABLET ORAL at 11:06

## 2022-07-17 RX ADMIN — BUMETANIDE 4 MG: 0.25 INJECTION INTRAMUSCULAR; INTRAVENOUS at 19:47

## 2022-07-17 RX ADMIN — HYDRALAZINE HYDROCHLORIDE 50 MG: 25 TABLET ORAL at 20:41

## 2022-07-17 RX ADMIN — HEPARIN SODIUM 5000 UNITS: 5000 INJECTION INTRAVENOUS; SUBCUTANEOUS at 22:25

## 2022-07-17 RX ADMIN — FUROSEMIDE 40 MG: 10 INJECTION, SOLUTION INTRAMUSCULAR; INTRAVENOUS at 08:15

## 2022-07-17 RX ADMIN — INSULIN LISPRO 1 UNITS: 100 INJECTION, SOLUTION INTRAVENOUS; SUBCUTANEOUS at 08:16

## 2022-07-17 RX ADMIN — BUMETANIDE 4 MG: 0.25 INJECTION INTRAMUSCULAR; INTRAVENOUS at 14:07

## 2022-07-17 RX ADMIN — CARIPRAZINE 6 MG: 4.5 CAPSULE, GELATIN COATED ORAL at 08:15

## 2022-07-17 RX ADMIN — TRAMADOL HYDROCHLORIDE 50 MG: 50 TABLET, COATED ORAL at 22:25

## 2022-07-17 RX ADMIN — DOXAZOSIN 2 MG: 2 TABLET ORAL at 08:15

## 2022-07-17 RX ADMIN — HEPARIN SODIUM 5000 UNITS: 5000 INJECTION INTRAVENOUS; SUBCUTANEOUS at 05:02

## 2022-07-17 RX ADMIN — FLUOXETINE 20 MG: 20 CAPSULE ORAL at 08:15

## 2022-07-17 RX ADMIN — ACETAMINOPHEN 650 MG: 325 TABLET ORAL at 19:31

## 2022-07-17 RX ADMIN — INSULIN LISPRO 5 UNITS: 100 INJECTION, SOLUTION INTRAVENOUS; SUBCUTANEOUS at 08:18

## 2022-07-17 RX ADMIN — CARVEDILOL 25 MG: 25 TABLET, FILM COATED ORAL at 16:22

## 2022-07-17 RX ADMIN — HYDRALAZINE HYDROCHLORIDE 50 MG: 25 TABLET ORAL at 08:15

## 2022-07-17 RX ADMIN — FAMOTIDINE 40 MG: 20 TABLET ORAL at 08:15

## 2022-07-17 RX ADMIN — INSULIN GLARGINE 20 UNITS: 100 INJECTION, SOLUTION SUBCUTANEOUS at 22:25

## 2022-07-17 NOTE — PLAN OF CARE
Problem: PAIN - ADULT  Goal: Verbalizes/displays adequate comfort level or baseline comfort level  Description: Interventions:  - Encourage patient to monitor pain and request assistance  - Assess pain using appropriate pain scale  - Administer analgesics based on type and severity of pain and evaluate response  - Implement non-pharmacological measures as appropriate and evaluate response  - Consider cultural and social influences on pain and pain management  - Notify physician/advanced practitioner if interventions unsuccessful or patient reports new pain  Outcome: Progressing     Problem: INFECTION - ADULT  Goal: Absence or prevention of progression during hospitalization  Description: INTERVENTIONS:  - Assess and monitor for signs and symptoms of infection  - Monitor lab/diagnostic results  - Monitor all insertion sites, i e  indwelling lines, tubes, and drains  - Monitor endotracheal if appropriate and nasal secretions for changes in amount and color  - [place] appropriate cooling/warming therapies per order  - Administer medications as ordered  - Instruct and encourage patient and family to use good hand hygiene technique  - Identify and instruct in appropriate isolation precautions for identified infection/condition  Outcome: Progressing  Goal: Absence of fever/infection during neutropenic period  Description: INTERVENTIONS:  - Monitor WBC    Outcome: Progressing     Problem: SAFETY ADULT  Goal: Patient will remain free of falls  Description: INTERVENTIONS:  - Educate patient/family on patient safety including physical limitations  - Instruct patient to call for assistance with activity   - Consult OT/PT to assist with strengthening/mobility   - Keep Call bell within reach  - Keep bed low and locked with side rails adjusted as appropriate  - Keep care items and personal belongings within reach  - Initiate and maintain comfort rounds  - Make Fall Risk Sign visible to staff  - Offer Toileting every 2 Hours, in advance of need  - Initiate/Maintain bed alarm  - Obtain necessary fall risk management equipment: bed alarm  - Apply yellow socks and bracelet for high fall risk patients  - Consider moving patient to room near nurses station  Outcome: Progressing  Goal: Maintain or return to baseline ADL function  Description: INTERVENTIONS:  -  Assess patient's ability to carry out ADLs; assess patient's baseline for ADL function and identify physical deficits which impact ability to perform ADLs (bathing, care of mouth/teeth, toileting, grooming, dressing, etc )  - Assess/evaluate cause of self-care deficits   - Assess range of motion  - Assess patient's mobility; develop plan if impaired  - Assess patient's need for assistive devices and provide as appropriate  - Encourage maximum independence but intervene and supervise when necessary  - Involve family in performance of ADLs  - Assess for home care needs following discharge   - Consider OT consult to assist with ADL evaluation and planning for discharge  - Provide patient education as appropriate  Outcome: Progressing  Goal: Maintains/Returns to pre admission functional level  Description: INTERVENTIONS:  - Perform BMAT or MOVE assessment daily    - Set and communicate daily mobility goal to care team and patient/family/caregiver  - Collaborate with rehabilitation services on mobility goals if consulted  - Perform Range of Motion 3 times a day  - Reposition patient every 2 hours    - Dangle patient 3 times a day  - Stand patient 3 times a day  - Ambulate patient 3 times a day  - Out of bed to chair 3 times a day   - Out of bed for meals 3 times a day  - Out of bed for toileting  - Record patient progress and toleration of activity level   Outcome: Progressing     Problem: DISCHARGE PLANNING  Goal: Discharge to home or other facility with appropriate resources  Description: INTERVENTIONS:  - Identify barriers to discharge w/patient and caregiver  - Arrange for needed discharge resources and transportation as appropriate  - Identify discharge learning needs (meds, wound care, etc )  - Arrange for interpretive services to assist at discharge as needed  - Refer to Case Management Department for coordinating discharge planning if the patient needs post-hospital services based on physician/advanced practitioner order or complex needs related to functional status, cognitive ability, or social support system  Outcome: Progressing     Problem: Knowledge Deficit  Goal: Patient/family/caregiver demonstrates understanding of disease process, treatment plan, medications, and discharge instructions  Description: Complete learning assessment and assess knowledge base    Interventions:  - Provide teaching at level of understanding  - Provide teaching via preferred learning methods  Outcome: Progressing     Problem: Prexisting or High Potential for Compromised Skin Integrity  Goal: Skin integrity is maintained or improved  Description: INTERVENTIONS:  - Identify patients at risk for skin breakdown  - Assess and monitor skin integrity  - Assess and monitor nutrition and hydration status  - Monitor labs   - Assess for incontinence   - Turn and reposition patient  - Assist with mobility/ambulation  - Relieve pressure over bony prominences  - Avoid friction and shearing  - Provide appropriate hygiene as needed including keeping skin clean and dry  - Evaluate need for skin moisturizer/barrier cream  - Collaborate with interdisciplinary team   - Patient/family teaching  - Consider wound care consult   Outcome: Progressing     Problem: Potential for Falls  Goal: Patient will remain free of falls  Description: INTERVENTIONS:  - Educate patient/family on patient safety including physical limitations  - Instruct patient to call for assistance with activity   - Consult OT/PT to assist with strengthening/mobility   - Keep Call bell within reach  - Keep bed low and locked with side rails adjusted as appropriate  - Keep care items and personal belongings within reach  - Initiate and maintain comfort rounds  - Make Fall Risk Sign visible to staff  - Offer Toileting every 2 Hours, in advance of need  - Initiate/Maintain bed alarm  - Obtain necessary fall risk management equipment: bed alarm  - Apply yellow socks and bracelet for high fall risk patients  - Consider moving patient to room near nurses station  Outcome: Progressing

## 2022-07-17 NOTE — CONSULTS
Consultation - Nephrology   Ponce Johns 38 y o  male MRN: [id number]  Unit/Bed#: -01 Encounter: [id number]      A/P:  1  Acute kidney injury present on admission superimposed on chronic kidney disease stage 4   - he had a creatinine of 4 16 mg/dl on 6/22 and was to be seen in the outpatient office for kidney education and close monitoring but failed to show for appt    - he presents with a creatinine of 3 19 which effie to 3 38 mg/dl with diuresis   - kidney ultrasound in June was unremarkable   - urine protein studies suggested nephrotic syndrome with a serum albumin < 3 g   - will recheck urine studies and check PVR   - may have to accept a higher creatinine to diurese effectively   - he has gained 13 kg of water weight since 6/22/22 - change to Bumex 4mg IV bid and follow function    2  Chronic kidney disease stage 4   - as above    3  Primary hypertension   - blood pressure is high   - he had an elevated epinephrine level on catecholamine check   - recheck metanephrines   - may collect a 24 hr urine dep on the result   - increase Cardura to 4 mg at hs    4  Morbid obesity with BMI 50-59 9   - dietary consult    5  Diabetes mellitus type 2 without complications with long-term current use of insulin   - uncontrolled and likely has diabetic nephropathy  Check an A1c        -        Thank you for allowing us to participate in the care of your patient  Please feel free to contact us regarding the care of this patient, or any other questions/concerns that may be applicable      Patient Active Problem List   Diagnosis    Type 2 diabetes mellitus without complication, with long-term current use of insulin ([address] )    Abdominal pain    OCD (obsessive compulsive disorder)    Schizoaffective disorder, depressive type ([address] )    Hypothyroidism    Morbid obesity with BMI of 50 0-59 9, adult (HCC)    Anxiety    Episodic confusion    Snoring    Insomnia    Hypersomnia    Vitamin D deficiency    Vomiting    Occipital neuralgia of left side    Headache    Nodule of parotid gland    Bipolar 1 disorder (HCC)    Depression    Type 1 diabetes mellitus without complication (HCC)    Urinary retention    Peripheral edema    Hyperlipidemia, mixed    Migraine without aura and without status migrainosus, not intractable    Class 3 severe obesity due to excess calories with serious comorbidity and body mass index (BMI) of 60 0 to 69 9 in adult [facility])    Spinal stenosis in cervical region    Difficulty urinating    Urinary tract infection without hematuria    Penile pain    Chronic migraine without aura without status migrainosus, not intractable    Hypertensive emergency    Encephalopathy    Leukocytosis    Schizo affective schizophrenia ([facility] )    STEFANIA (acute kidney injury) ([facility] )    Acute respiratory disease due to COVID-19 virus    Elevated troponin    Type 2 diabetes mellitus with stage 4 chronic kidney disease and hypertension ([facility] )    Family history of heart disease    Hypokalemia    Chest pressure    GERD (gastroesophageal reflux disease)    Hypertension    SOB (shortness of breath)    Volume overload    Hyponatremia    Open toe wound    Primary hypertension       History of Present Illness   Physician Requesting Consult: Triston Monsalve DO  Reason for Consult / Principal Problem:  Acute kidney injury and volume overload  Hx and PE limited by:   HPI: Allen Miller is a 38y o  year old male who presents with marked volume overload  He was not taking diuretics and was drinking 1-2 gallons of water a day as he "was thirsty"  He had increasing shortness of breath with mild chest discomfort  He says he is not been able to come to the office due to worsening lower extremity edema  He had been admitted in June with a creatinine of 2 99 with uncontrolled hypertension and volume overload  He did have improvement in his creatinine with diuresis and blood pressure control    However he presented with uncontrolled hypertension on this visit  His creatinine was also at 4 46    History obtained from chart review and the patient    Constitutional ROS- Denies fatigue, fever, chills, night sweats has , weight changes  HEENT ROS- Denies history of eye surgeries, glaucoma, had daily non migraine headaches no or history of trauma, blurred vision     Endocrine ROS- Long  history diabetes mellitus no thyroid disease  Cardiovascular ROS- Denies chest pain, palpitation, dyspnea exertion, orthopnea, claudication  Pulmonary ROS- Denies history of COPD, asthma  Denies cough, hemoptysis, had shortness of breath  GI ROS- Denies abdominal pain, diarrhea, nausea, swallowing problems, had 1 vomiting, no constipation, blood in stools, fecal incontinence  Hematological ROS- Denies history of easy bruising, blood clots, bleeding or blood transfusions  Genitourinary ROS- Denies recent hematuria, pyuria, flank pain, change in urinary stream, decreased urinary output, increased urinary frequency, nocturia, foamy urine, or urinary incontinence     Urine clear yellow  Lymphatic ROS- Denies lymphadenopathy  Musculoskeletal ROS- Has history of muscle weakness, no joint pain  Had increasing edema  Dermatological ROS- Denies rash has left toe wounds, no ulcers, itching, jaundice  Psychiatric ROS- Denies anxiety, depression,   Neurological ROS- No stroke or TIA symptoms        Historical Information   Past Medical History:   Diagnosis Date    Acute bronchitis due to other specified organisms 07/05/2019    Chronic headaches     Diabetes mellitus ([address] )     Disease of thyroid gland     Esophagitis     Gastritis     Gastroparesis     GERD (gastroesophageal reflux disease)     Hypertension     Migraine     Migraines 03/11/2021    Obesity     Obsessive compulsive disorder     Psychiatric disorder     Renal disorder     Schizoaffective disorder [facility])      Past Surgical History:   Procedure Laterality Date    ESOPHAGOGASTRODUODENOSCOPY N/A 1/15/2019    Procedure: ESOPHAGOGASTRODUODENOSCOPY (EGD); Surgeon: Jeffrey Wallace MD;  Location: [facility];   Service: Gastroenterology     Social History   Social History     Substance and Sexual Activity   Alcohol Use Not Currently     Social History     Substance and Sexual Activity   Drug Use Not Currently     Social History     Tobacco Use   Smoking Status Never Smoker   Smokeless Tobacco Never Used     Family History   Problem Relation Age of Onset    COPD Mother     Hypertension Mother     Heart failure Mother     Rheum arthritis Family     Heart disease Family     Hypertension Father     Diabetes Father     Hyperlipidemia Father        Meds/Allergies   all current active meds have been reviewed, current meds:   Current Facility-Administered Medications   Medication Dose Route Frequency    acetaminophen (TYLENOL) tablet 650 mg  650 mg Oral Q6H PRN    albuterol (PROVENTIL HFA,VENTOLIN HFA) inhaler 2 puff  2 puff Inhalation Q4H PRN    cariprazine (VRAYLAR) capsule 6 mg  6 mg Oral Daily    carvedilol (COREG) tablet 25 mg  25 mg Oral BID With Meals    doxazosin (CARDURA) tablet 2 mg  2 mg Oral Daily    famotidine (PEPCID) tablet 40 mg  40 mg Oral Daily    FLUoxetine (PROzac) capsule 20 mg  20 mg Oral QAM    furosemide (LASIX) injection 40 mg  40 mg Intravenous BID (diuretic)    heparin (porcine) subcutaneous injection 5,000 Units  5,000 Units Subcutaneous Q8H [address]    hydrALAZINE (APRESOLINE) tablet 50 mg  50 mg Oral TID    insulin glargine (LANTUS) subcutaneous injection 20 Units 0 2 mL  20 Units Subcutaneous HS    insulin lispro (HumaLOG) 100 units/mL subcutaneous injection 1-5 Units  1-5 Units Subcutaneous TID AC    insulin lispro (HumaLOG) 100 units/mL subcutaneous injection 1-5 Units  1-5 Units Subcutaneous HS    insulin lispro (HumaLOG) 100 units/mL subcutaneous injection 5 Units  5 Units Subcutaneous TID With Meals    labetalol (NORMODYNE) injection 10 mg 10 mg Intravenous Q6H PRN    levothyroxine tablet 125 mcg  125 mcg Oral Early Morning    ondansetron (ZOFRAN) injection 4 mg  4 mg Intravenous Q4H PRN    and PTA meds:    Medications Prior to Admission   Medication    ACCU-CHEK FASTCLIX LANCETS MISC    aluminum-magnesium hydroxide-simethicone (MYLANTA) 200-200-20 mg/5 mL suspension    Blood Glucose Monitoring Suppl (ACCU-CHEK GUIDE) w/Device KIT    carvedilol (COREG) 25 mg tablet    doxazosin (CARDURA) 2 mg tablet    famotidine (PEPCID) 40 MG tablet    FLUoxetine (PROzac) 20 mg capsule    hydrALAZINE (APRESOLINE) 25 mg tablet    insulin glargine (LANTUS) 100 units/mL subcutaneous injection    insulin lispro (HumaLOG) 100 units/mL injection    Insulin Pen Needle (Pen Needles 3/16") 31G X 5 MM MISC    levothyroxine 125 mcg tablet    LORazepam (ATIVAN) 0 5 mg tablet    pantoprazole (PROTONIX) 40 mg tablet    Vraylar 6 MG capsule         Allergies   Allergen Reactions    Amoxicillin Diarrhea    Augmentin [Amoxicillin-Pot Clavulanate] Diarrhea    Clavulanic Acid Diarrhea    Erythromycin Diarrhea       Objective     Intake/Output Summary (Last 24 hours) at 7/17/2022 1220  Last data filed at 7/17/2022 1101  Gross per 24 hour   Intake 945 ml   Output 2950 ml   Net -2005 ml       Invasive Devices:        Physical Exam      I/O last 3 completed shifts: In: 465 [P O :465]  Out: 4871 [Urine:3475]    Vitals:    07/17/22 1140   BP: 146/87   Pulse: 78   Resp: 17   Temp:    SpO2: 94%       General Appearance:    No acute distress  Cooperative  Appears stated age  Head:    Normocephalic  Atraumatic  Normal jaw occlusion  Eyes:    Lids, conjunctiva normal  No scleral icterus  Ears:    Normal external ears  Nose:   Nares normal  No drainage  Mouth:   Lips, tongue normal  Mucosa normal  Phonation normal    Neck:   Supple  Symmetrical    Back:     Symmetric  No CVA tenderness  Lungs:     Normal respiratory effort  Clear to auscultation bilaterally  Chest wall:    No tenderness or deformity  Heart:    Regular rate and rhythm  Normal S1 and S2  No murmur  No JVD  No edema  Abdomen:     Soft  Non-tender  Bowel sounds active  Genitourinary:   No Giang catheter present  Extremities:   Extremities normal  Atraumatic  No cyanosis  Skin:   Warm and dry  No pallor, jaundice, rash, ecchymoses  Neurologic:   Alert and oriented to person, place, time  No focal deficit  Current Weight: Weight - Scale: (!) 163 kg (360 lb 0 2 oz)  First Weight: Weight - Scale: (!) 167 kg (368 lb 6 2 oz)    Lab Results:  I have personally reviewed pertinent labs  CBC:   Lab Results   Component Value Date    WBC 12 37 (H) 07/17/2022    HGB 8 3 (L) 07/17/2022    HCT 25 5 (L) 07/17/2022    MCV 86 07/17/2022     07/17/2022    MCH 27 9 07/17/2022    MCHC 32 5 07/17/2022    RDW 13 1 07/17/2022    MPV 9 5 07/17/2022     CMP:   Lab Results   Component Value Date    K 4 5 07/17/2022    CL 97 07/17/2022    CO2 28 07/17/2022    BUN 57 (H) 07/17/2022    CREATININE 3 38 (H) 07/17/2022    CALCIUM 8 8 07/17/2022    EGFR 20 07/17/2022     Phosphorus: No results found for: PHOS  Magnesium: No results found for: MG  Urinalysis: No results found for: Dorthey Docker, SPECGRAV, PHUR, LEUKOCYTESUR, NITRITE, PROTEINUA, GLUCOSEU, KETONESU, BILIRUBINUR, BLOODU  Ionized Calcium: No results found for: CAION  Coagulation: No results found for: PT, INR, APTT  Troponin: No results found for: TROPONINI  ABG: No results found for: PHART, DHY4ZDI, PO2ART, CVR4YOM, D8RFXIJY, BEART, SOURCE    Results from last 7 days   Lab Units 07/17/22  0442 07/16/22  0743   POTASSIUM mmol/L 4 5 5 1   CHLORIDE mmol/L 97 94*   CO2 mmol/L 28 25   BUN mg/dL 57* 55*   CREATININE mg/dL 3 38* 3 19*   CALCIUM mg/dL 8 8 8 4       Radiology review:  Procedure: X-ray chest 1 view portable    Result Date: 7/16/2022  Narrative: CHEST INDICATION:   sob  Leg swelling   COMPARISON:  Chest radiograph from 6/17/2022 and 8/23/2021, chest CT from 1/11/2021  EXAM PERFORMED/VIEWS:  XR CHEST PORTABLE FINDINGS: Cardiomediastinal silhouette appears unremarkable  Probable pulmonary edema with focal consolidation in the right upper lung  No effusion or pneumothorax  Osseous structures appear within normal limits for patient age  Impression: Probable pulmonary edema with focal consolidation in the right upper lung which could be due to asymmetric localized edema or superimposed pneumonia  Workstation performed: [id number]         EKG, Pathology, and Other Studies:  I personally reviewed the chest x-ray which shows cardiomegaly and patchy pulmonary edema  I reviewed current and prior labs and notes      Counseling / Coordination of Care  Total ADDITIONAL floor / unit time spent today 40 minutes  Greater than 50% of total time was spent with the patient and / or family counseling and / or coordination of care  A description of the counseling / coordination of care:  Case will be discussed with primary team    Iliana Harley MD      This consultation note was produced in part using a dictation device which may document imprecise wording from author's original intent

## 2022-07-17 NOTE — RESPIRATORY THERAPY NOTE
RT Protocol Note  Ronak Andino 38 y o  male MRN: [id number]  Unit/Bed#: -01 Encounter: [id number]    Assessment    Principal Problem:    Acute on chronic systolic heart failure (HCC)  Active Problems:    Type 2 diabetes mellitus without complication, with long-term current use of insulin (HCC)    Schizoaffective disorder, depressive type ([facility] )    Morbid obesity with BMI of 50 0-59 9, adult ([facility])    Stage 4 chronic kidney disease (HCC)    Primary hypertension      Home Pulmonary Medications:    Home Devices/Therapy:  (no home therapy)    Past Medical History:   Diagnosis Date    Acute bronchitis due to other specified organisms 07/05/2019    Chronic headaches     Diabetes mellitus ([facility])     Disease of thyroid gland     Esophagitis     Gastritis     Gastroparesis     GERD (gastroesophageal reflux disease)     Hypertension     Migraine     Migraines 03/11/2021    Obesity     Obsessive compulsive disorder     Psychiatric disorder     Renal disorder     Schizoaffective disorder ([facility] )      Social History     Socioeconomic History    Marital status: Single     Spouse name: None    Number of children: None    Years of education: None    Highest education level: None   Occupational History    None   Tobacco Use    Smoking status: Never Smoker    Smokeless tobacco: Never Used   Vaping Use    Vaping Use: Never used   Substance and Sexual Activity    Alcohol use: Not Currently    Drug use: Not Currently    Sexual activity: Not Currently   Other Topics Concern    None   Social History Narrative    None     Social Determinants of Health     Financial Resource Strain: Not on file   Food Insecurity: No Food Insecurity    Worried About Running Out of Food in the Last Year: Never true    Ran Out of Food in the Last Year: Never true   Transportation Needs: No Transportation Needs    Lack of Transportation (Medical): No    Lack of Transportation (Non-Medical):  No   Physical Activity: Not on file   Stress: Not on file   Social Connections: Not on file   Intimate Partner Violence: Not on file   Housing Stability: Low Risk     Unable to Pay for Housing in the Last Year: No    Number of Places Lived in the Last Year: 1    Unstable Housing in the Last Year: No       Subjective         Objective    Physical Exam:   Assessment Type: Assess only  General Appearance: Sleeping  Respiratory Pattern: Normal  Chest Assessment: Chest expansion symmetrical  Bilateral Breath Sounds: Diminished, Clear  O2 Device: (P) nc    Vitals:  Blood pressure (!) 181/105, pulse 90, temperature 98 3 °F (36 8 °C), resp  rate 20, height 5' 2" (1 575 m), weight (!) 166 kg (365 lb 1 3 oz), SpO2 96 %  Imaging and other studies: I have personally reviewed pertinent reports        O2 Device: (P) nc     Plan    Respiratory Plan: Discontinue Protocol        Resp Comments: (P) no pulm hx, BS dim CTa, no home therapy no resp services  needed no distress will discont

## 2022-07-17 NOTE — ASSESSMENT & PLAN NOTE
Wt Readings from Last 3 Encounters:   07/17/22 (!) 163 kg (360 lb 0 2 oz)   06/23/22 (!) 146 kg (321 lb 14 oz)   06/19/22 (!) 154 kg (339 lb 8 1 oz)     Mild exacerbation with mildly elevated BNP (121) and worsening b/l LE edema and dyspnea  Not on home diuretic  Echocardiogram on 06/17/2022 shows LVEF of 55%    · Repeat ECHO with LVEF 55%, mild concentric LV hypertrophy   · Suspect diet noncompliance with increased sodium intake   · Continue with Coreg  · Received lasix 40 IV x 1 in ED  Will continue with IV lasix  · Will likely need home diuretic at discharge     · Daily weight, ideally standing scale   · Continue with low salt diet   · I/O's

## 2022-07-17 NOTE — NURSING NOTE
Pt came back from restroom and had complaints of SOB  O2 saturation at 92% on RA and dipped down to 88% at times while pt resting in bed  Lungs sounds had increased amount of expiratory wheezes than initial assessment  No other adventitious sounds heard  Pt placed on 2L via nasal cannula  Hospitalist notified  Pt has call bell and belongings in reach

## 2022-07-17 NOTE — ASSESSMENT & PLAN NOTE
Lab Results   Component Value Date    HGBA1C 9 1 (H) 07/16/2022       Recent Labs     07/16/22  1129 07/16/22  1616 07/16/22 2017 07/17/22  0649   POCGLU 216* 249* 304* 164*       Blood Sugar Average: Last 72 hrs:  (P) 233 25   ·

## 2022-07-17 NOTE — UTILIZATION REVIEW
Initial Clinical Review    Admission: Date/Time/Statement:   Admission Orders (From admission, onward)     Ordered        07/16/22 0830  Place in Observation  Once                      Orders Placed This Encounter   Procedures    Place in Observation     Standing Status:   Standing     Number of Occurrences:   1     Order Specific Question:   Level of Care     Answer:   Med Surg [16]     ED Arrival Information     Expected   -    Arrival   7/16/2022 07:29    Acuity   Urgent            Means of arrival   Ambulance    Escorted by   [facility]    Service   Hospitalist    Admission type   Urgent            Arrival complaint   sob           Chief Complaint   Patient presents with    Shortness of Breath    Leg Swelling       Initial Presentation: 38 y o  male W/PMHX: HTN, Stage 4 CKD, Morbid obesity, T2DM, Schizoaffective disorder, depressive type, to ED from [facility], admitted as Observation, due to Acute on Chronic CHF  Presented with worsening sob and lower leg edema  Recent inpt admission for CKD 6/17-6/23  Since d/c from hospital pt has been unable to get out of house with worsening lower leg edema,  Unable to ambulate much, VERDIN, Exam: obese, ill appearing,  Volume overload, Hypertension,  Decreased breath sounds in bases, B/L lower leg edema  ED work up reveals  Leukocytosis, h/h low, hyponatremia 127, elevated BUN 55, CR 3 19  CXR: probable pulmonary edema with focal consolidation in the rt upper lung which could be d/t asymmetric localized edema or superimposed pna  Slight elevated BNP  Plan : c/w Coreg, low NA diet, IV lasix 40 mg started in ED will continue  IV lasix 40 mg BID, may require  High dose with elevated renal function, daily wt,I/O trend b/p closely, nephrology consult, trend serial labs with repletion as needed, avoid nephrotoxins, avoid hypotension, dietary consult, accu ck with ssi coverage, DVT PPX          Date: 7/17/22The patient will continue to require additional inpatient hospital stay due to IV diuresis, will need to d/c with home diuretic  C/W daily standing wts, I/O low NA diet, new baseline CR 2 9-3 although recent ATN dx, Nephrology following pt  Trend closely serial renal indices while on IV diuresis, Hypertensive urgency POA: currently 147/87 c/w trending b/p, home hydralazine increased to 50 mg tid, c/w with home carvedilol, and doxazosin, c/w  IV diuresis bid, trend home b/p  Breath sounds with faint expiratory wheezing R lung field,  B/L pitting lower leg edema  Hyponatremia, and CR/BUN remain elevated with diuresis  C/W trending serial labs with repletion as needed, DVT PPX  Last data filed at 7/17/2022 1101      Gross per 24 hour   Intake 945 ml   Output 2950 ml   Net -2005 ml     Nephrology Consult: A/P: STEFANIA POA, superimposed on CKD 4, CR 4 16 on 6/22 and was seen op office for kidney education and close monitoring  But failed to show for appointment  POA to this admission CR 3 19 which up trended to 3 38 with diuresis,  kidney ultrasound in June was unremarkable, urine protein studies suggested nephrotic syndrome with a serum albumin < 3 g,  recheck urine studies and check PVR,  may have to accept a higher creatinine to diurese effectively, he has gained 13 kg of water weight since 6/22/22 - change to Bumex 4mg IV bid and follow function  Nephrology will follow       ED Triage Vitals   Temperature Pulse Respirations Blood Pressure SpO2   07/16/22 0731 07/16/22 0734 07/16/22 0734 07/16/22 0734 07/16/22 0734   98 1 °F (36 7 °C) 90 20 (!) 180/89 93 %      Temp Source Heart Rate Source Patient Position - Orthostatic VS BP Location FiO2 (%)   07/16/22 0731 07/16/22 0734 07/16/22 0734 07/16/22 0734 --   Oral Monitor Sitting Left arm       Pain Score       07/16/22 0732       No Pain          Wt Readings from Last 1 Encounters:   07/17/22 (!) 163 kg (360 lb 0 2 oz)     07/16/22 13:56:38 166 kg (365 lb 1 3 oz) Abnormal  Standing scale, Height  5'2" Additional Vital Signs:   Date/Time Temp Pulse Resp BP MAP (mmHg) SpO2 O2 Device Patient Position - Orthostatic VS   07/17/22 07:54:09 98 9 °F (37 2 °C) 86 18 175/97 Abnormal  123 93 % -- --   07/17/22 03:03:06 -- 90 20 181/105 Abnormal  130 96 % -- --   07/17/22 0100 -- 102 16 -- -- 92 % -- --   07/16/22 22:05:04 98 3 °F (36 8 °C) 102 18 189/104 Abnormal  132 92 % -- --   07/16/22 21:09:51 -- 99 -- 197/98 Abnormal  131 93 % -- --   07/16/22 18:57:31 98 4 °F (36 9 °C) 93 18 173/101 Abnormal  125 92 % -- --   07/16/22 1500 98 7 °F (37 1 °C) 95 20 172/95 Abnormal  121 94 % None (Room air) Lying   07/16/22 14:10:23 -- 96 23 Abnormal  177/95 Abnormal  122 94 % -- --   07/16/22 13:56:38 97 9 °F (36 6 °C) 96 22 202/110 Abnormal  141 94 % -- --   07/16/22 1130 -- 85 -- 191/97 Abnormal  135 93 % None (Room air) Sitting   07/16/22 1051 -- -- -- 212/97 Abnormal  -- -- -- --   07/16/22 0826 -- -- -- 185/92 Abnormal  -- -- -- --   07/16/22 0734 -- 90 20 180/89 Abnormal  -- 93 % None (Room air) Sitting       Pertinent Labs/Diagnostic Test Results:   X-ray chest 1 view portable   Final Result by Aries Vincent MD (07/16 0586)      Probable pulmonary edema with focal consolidation in the right upper lung which could be due to asymmetric localized edema or superimposed pneumonia                    Workstation performed: [id number]           Results from last 7 days   Lab Units 07/16/22  0832   SARS-COV-2  Negative     Results from last 7 days   Lab Units 07/17/22  0442 07/16/22  0743   WBC Thousand/uL 12 37* 11 38*   HEMOGLOBIN g/dL 8 3* 8 5*   HEMATOCRIT % 25 5* 25 7*   PLATELETS Thousands/uL 322 287   NEUTROS ABS Thousands/µL  --  9 28*         Results from last 7 days   Lab Units 07/17/22  0442 07/16/22  0743   SODIUM mmol/L 132* 127*   POTASSIUM mmol/L 4 5 5 1   CHLORIDE mmol/L 97 94*   CO2 mmol/L 28 25   ANION GAP mmol/L 7 8   BUN mg/dL 57* 55*   CREATININE mg/dL 3 38* 3 19*   EGFR ml/min/1 73sq m 20 22   CALCIUM mg/dL 8 8 8 4         Results from last 7 days   Lab Units 07/17/22  0649 07/16/22  2017 07/16/22  1616 07/16/22  1129   POC GLUCOSE mg/dl 164* 304* 249* 216*     Results from last 7 days   Lab Units 07/17/22  0442 07/16/22  0743   GLUCOSE RANDOM mg/dL 229* 191*         Results from last 7 days   Lab Units 07/16/22  0743   HEMOGLOBIN A1C % 9 1*   EAG mg/dl 214     BETA-HYDROXYBUTYRATE   Date Value Ref Range Status   10/26/2020 1 4 (H) <0 6 mmol/L Final            Results from last 7 days   Lab Units 07/16/22  1057 07/16/22  0743   HS TNI 0HR ng/L  --  8   HS TNI 2HR ng/L 8  --    HSTNI D2 ng/L 0  --          Results from last 7 days   Lab Units 07/16/22  0743   PROTIME seconds 13 6   INR  1 04           Results from last 7 days   Lab Units 07/16/22  0743   BNP pg/mL 121*       Results from last 7 days   Lab Units 07/16/22  0832   INFLUENZA A PCR  Negative   INFLUENZA B PCR  Negative   RSV PCR  Negative     ED Treatment:   Medication Administration from 07/16/2022 0728 to 07/16/2022 1324       Date/Time Order Dose Route Action     07/16/2022 0817 albuterol inhalation solution 2 5 mg 2 5 mg Nebulization Given     07/16/2022 0817 methylPREDNISolone sodium succinate (Solu-MEDROL) injection 125 mg 125 mg Intravenous Given     07/16/2022 0817 furosemide (LASIX) injection 40 mg 40 mg Intravenous Given     07/16/2022 0826 hydrALAZINE (APRESOLINE) tablet 25 mg 25 mg Oral Given     07/16/2022 1051 doxazosin (CARDURA) tablet 2 mg 2 mg Oral Given     07/16/2022 1055 famotidine (PEPCID) tablet 40 mg 40 mg Oral Given     07/16/2022 1055 FLUoxetine (PROzac) capsule 20 mg 20 mg Oral Given     07/16/2022 1052 hydrALAZINE (APRESOLINE) tablet 25 mg 25 mg Oral Given     07/16/2022 1300 insulin lispro (HumaLOG) 100 units/mL subcutaneous injection 5 Units 5 Units Subcutaneous Given     07/16/2022 1055 levothyroxine tablet 125 mcg 125 mcg Oral Given     07/16/2022 1259 insulin lispro (HumaLOG) 100 units/mL subcutaneous injection 1-5 Units 2 Units Subcutaneous Given        Past Medical History:   Diagnosis Date    Acute bronchitis due to other specified organisms 07/05/2019    Chronic headaches     Diabetes mellitus ([place] [address] )     Disease of thyroid gland     Esophagitis     Gastritis     Gastroparesis     GERD (gastroesophageal reflux disease)     Hypertension     Migraine     Migraines 03/11/2021    Obesity     Obsessive compulsive disorder     Psychiatric disorder     Renal disorder     Schizoaffective disorder ([place] [address] )      Present on Admission:   Schizoaffective disorder, depressive type ([place] [address] )   Stage 4 chronic kidney disease ([place] [address] )   Acute on chronic systolic heart failure (HCC)      Admitting Diagnosis: Pulmonary edema [J81 1]  Dyspnea [R06 00]  SOB (shortness of breath) [R06 02]  Hypoxia [R09 02]  Acute on chronic renal failure (HCC) [N17 9, N18 9]  Bilateral lower extremity edema [R60 0]  Acute renal failure with acute tubular necrosis superimposed on stage 4 chronic kidney disease (HCC) [N17 0, N18 4]  Age/Sex: 38 y o  male  Admission Orders:Nasal canula, wound care consult, 48h telm, daily wts, up with assistance,   Scheduled Medications:  cariprazine, 6 mg, Oral, Daily  carvedilol, 25 mg, Oral, BID With Meals  doxazosin, 2 mg, Oral, Daily  famotidine, 40 mg, Oral, Daily  FLUoxetine, 20 mg, Oral, QAM  furosemide, 40 mg, Intravenous, BID (diuretic)  heparin (porcine), 5,000 Units, Subcutaneous, Q8H SOFI  hydrALAZINE, 50 mg, Oral, TID  insulin glargine, 20 Units, Subcutaneous, HS  insulin lispro, 1-5 Units, Subcutaneous, TID AC  insulin lispro, 1-5 Units, Subcutaneous, HS  insulin lispro, 5 Units, Subcutaneous, TID With Meals  levothyroxine, 125 mcg, Oral, Early Morning      Continuous IV Infusions:none     PRN Meds:  acetaminophen, 650 mg, Oral, Q6H PRN  labetalol, 10 mg, Intravenous, Q6H PRN  ondansetron, 4 mg, Intravenous, Q4H PRN 7/16 x1        IP CONSULT TO NEPHROLOGY  IP CONSULT TO CASE MANAGEMENT    Network Utilization Review Department  ATTENTION: Please call with any questions or concerns to [phone] and carefully listen to the prompts so that you are directed to the right person  All voicemails are confidential   Apolinar Kiki all requests for admission clinical reviews, approved or denied determinations and any other requests to dedicated fax number below belonging to the campus where the patient is receiving treatment   List of dedicated fax numbers for the Facilities:  [address] DENIALS (Administrative/Medical Necessity) [phone]   [address] (Maternity/NICU/Pediatrics) [phone]   [address]  [address] [place] [address] [id number] [place] [address] [address] [place] [address] [phone]

## 2022-07-17 NOTE — ASSESSMENT & PLAN NOTE
Lab Results   Component Value Date    EGFR 20 07/17/2022    EGFR 22 07/16/2022    EGFR 15 06/23/2022    CREATININE 3 38 (H) 07/17/2022    CREATININE 3 19 (H) 07/16/2022    CREATININE 4 42 (H) 06/23/2022   · Poorly-controlled diabetes  · Repeat A1c 9 1, improved since prior (11 6 on 8/3/21)  · Continue with home Lantus 20U qhs  · SSI meal/bedtime coverage   · Accu Cheks     · Prior baseline Cr appears to be 1 9-2 1 mg/dL in 2021, with new baseline around 2 9-3 d/t recent ATN dx   · Supposed to follow up with Nephrology outpatient however has been unable to get out of the house  · Nephrology consult appreciated   · Continue with diuresis   · Monitor renal function closely in the setting of diuresis  · Avoid hypotension and nephrotoxins    · With hypertensive urgency POA   · Home hydralazine increased to 50 mg t i d   · Continue home carvedilol 25 mg BID, doxazosin 2mg qd  · Continue diuresis with IV furosemide 40 mg b i d   · Continue to monitor BP trend, currently 146/87

## 2022-07-17 NOTE — PROGRESS NOTES
[address]  Progress Note - Lainey Seaystevan 1980, 38 y o  male MRN: [id number]  Unit/Bed#: -01 Encounter: [id number]  Primary Care Provider: Krysta Mccann MD   Date and time admitted to hospital: 7/16/2022  7:29 AM    * Volume overload  Assessment & Plan  Wt Readings from Last 3 Encounters:   07/17/22 (!) 163 kg (360 lb 0 2 oz)   06/23/22 (!) 146 kg (321 lb 14 oz)   06/19/22 (!) 154 kg (339 lb 8 1 oz)     Mild exacerbation with mildly elevated BNP (121) and worsening b/l LE edema and dyspnea  Not on home diuretic  Echocardiogram on 06/17/2022 shows LVEF of 55%    · Repeat ECHO with LVEF 55%, mild concentric LV hypertrophy   · Suspect diet noncompliance with increased sodium intake   · Continue with Coreg  · Received lasix 40 IV x 1 in ED  Will continue with IV lasix  · Will likely need home diuretic at discharge     · Daily weight, ideally standing scale   · Continue with low salt diet   · I/O's     Type 2 diabetes mellitus with stage 4 chronic kidney disease and hypertension [facility])  Assessment & Plan  Lab Results   Component Value Date    EGFR 20 07/17/2022    EGFR 22 07/16/2022    EGFR 15 06/23/2022    CREATININE 3 38 (H) 07/17/2022    CREATININE 3 19 (H) 07/16/2022    CREATININE 4 42 (H) 06/23/2022   · Poorly-controlled diabetes  · Repeat A1c 9 1, improved since prior (11 6 on 8/3/21)  · Continue with home Lantus 20U qhs  · SSI meal/bedtime coverage   · Accu Cheks     · Prior baseline Cr appears to be 1 9-2 1 mg/dL in 2021, with new baseline around 2 9-3 d/t recent ATN dx   · Supposed to follow up with Nephrology outpatient however has been unable to get out of the house  · Nephrology consult appreciated   · Continue with diuresis   · Monitor renal function closely in the setting of diuresis  · Avoid hypotension and nephrotoxins    · With hypertensive urgency POA   · Home hydralazine increased to 50 mg t i d   · Continue home carvedilol 25 mg BID, doxazosin 2mg qd  · Continue diuresis with IV furosemide 40 mg b i d   · Continue to monitor BP trend, currently 146/87        Morbid obesity with BMI of 50 0-59 9, adult ([facility])  Assessment & Plan  · Encourage aggressive lifestyle modification with diet and exercise  · Dietitian consult     Schizoaffective disorder, depressive type ([facility] [address] )  Assessment & Plan  · Continue with home regimen     Type 2 diabetes mellitus without complication, with long-term current use of insulin [facility])  Assessment & Plan  Lab Results   Component Value Date    HGBA1C 9 1 (H) 2022       Recent Labs     22  1129 22  1616 22  0649   POCGLU 216* 249* 304* 164*       Blood Sugar Average: Last 72 hrs:  (P) 233 25   ·        VTE Pharmacologic Prophylaxis: VTE Score: 4 Moderate Risk (Score 3-4) - Pharmacological DVT Prophylaxis Ordered: heparin  Patient Centered Rounds: I performed bedside rounds with nursing staff today  Discussions with Specialists or Other Care Team Provider: case management, nursing, nephrology     Education and Discussions with Family / Patient: Updated patient at the bedside  All questions/concerns addressed to his satisfaction        Time Spent for Care: 30 minutes  More than 50% of total time spent on counseling and coordination of care as described above  Current Length of Stay: 0 day(s)  Current Patient Status: Observation   Certification Statement: The patient will continue to require additional inpatient hospital stay due to IV diuresis  Discharge Plan: Anticipate discharge in 24-48 hrs to home  Code Status: Level 1 - Full Code    Subjective:   Patient seen and examined at the bedside, pleasant, and eating lunch  Patient states he had a difficult time sleeping last night due to wheezing and is requesting a prn albuterol inhaler  States he uses one at home       Objective:     Vitals:   Temp (24hrs), Av 4 °F (36 9 °C), Min:97 9 °F (36 6 °C), Max:98 9 °F (37 2 °C)    Temp:  [97 9 °F (36 6 °C)-98 9 °F (37 2 °C)] 98 9 °F (37 2 °C)  HR:  [] 78  Resp:  [16-23] 17  BP: (146-202)/() 146/87  SpO2:  [92 %-96 %] 94 %  Body mass index is 65 85 kg/m²  Input and Output Summary (last 24 hours): Intake/Output Summary (Last 24 hours) at 7/17/2022 1153  Last data filed at 7/17/2022 1101  Gross per 24 hour   Intake 945 ml   Output 2950 ml   Net -2005 ml       Physical Exam:   Physical Exam  Vitals and nursing note reviewed  Constitutional:       General: He is not in acute distress  Appearance: He is obese  He is not toxic-appearing  HENT:      Head: Normocephalic and atraumatic  Mouth/Throat:      Mouth: Mucous membranes are moist    Eyes:      Conjunctiva/sclera: Conjunctivae normal    Cardiovascular:      Rate and Rhythm: Normal rate and regular rhythm  Pulses: Normal pulses  Heart sounds: Normal heart sounds  Pulmonary:      Effort: Pulmonary effort is normal  No respiratory distress  Breath sounds: Wheezing (faint expiratory wheezing R lung fields) present  No rhonchi or rales  Abdominal:      General: Bowel sounds are normal  There is no distension  Palpations: Abdomen is soft  Tenderness: There is no abdominal tenderness  Musculoskeletal:      Cervical back: Neck supple  Right lower leg: Pitting Edema present  Left lower leg: Pitting Edema present  Skin:     General: Skin is warm and dry  Neurological:      Mental Status: He is alert and oriented to person, place, and time  Cranial Nerves: No cranial nerve deficit  Sensory: No sensory deficit  Motor: No weakness  Coordination: Coordination normal    Psychiatric:         Mood and Affect: Mood normal          Behavior: Behavior normal          Thought Content:  Thought content normal           Additional Data:     Labs:  Results from last 7 days   Lab Units 07/17/22  0442 07/16/22  0743   WBC Thousand/uL 12 37* 11 38*   HEMOGLOBIN g/dL 8 3* 8 5*   HEMATOCRIT % 25 5* 25 7* PLATELETS Thousands/uL 322 287   NEUTROS PCT %  --  82*   LYMPHS PCT %  --  7*   MONOS PCT %  --  6   EOS PCT %  --  4     Results from last 7 days   Lab Units 07/17/22  0442   SODIUM mmol/L 132*   POTASSIUM mmol/L 4 5   CHLORIDE mmol/L 97   CO2 mmol/L 28   BUN mg/dL 57*   CREATININE mg/dL 3 38*   ANION GAP mmol/L 7   CALCIUM mg/dL 8 8   GLUCOSE RANDOM mg/dL 229*     Results from last 7 days   Lab Units 07/16/22  0743   INR  1 04     Results from last 7 days   Lab Units 07/17/22  1137 07/17/22  0649 07/16/22  2017 07/16/22  1616 07/16/22  1129   POC GLUCOSE mg/dl 142* 164* 304* 249* 216*     Results from last 7 days   Lab Units 07/16/22  0743   HEMOGLOBIN A1C % 9 1*           Lines/Drains:  Invasive Devices  Report    Peripheral Intravenous Line  Duration           Peripheral IV 07/17/22 Left Antecubital <1 day                  Telemetry:  Telemetry Orders (From admission, onward)             48 Hour Telemetry Monitoring  Continuous x 48 hours        References:    Telemetry Guidelines   Question:  Reason for 48 Hour Telemetry  Answer:  Acute Decompensated CHF (continuous diuretic infusion or total diuretic dose > 200 mg daily, associated electrolyte derangement, ionotropic drip, history of ventricular arrhythmia, or new EF <35%)                 Telemetry Reviewed: Normal Sinus Rhythm  Indication for Continued Telemetry Use: Acute CHF on >200 mg lasix/day or equivalent dose or with new reduced EF                Imaging: Reviewed radiology reports from this admission including: echo, CXR    Recent Cultures (last 7 days):         Last 24 Hours Medication List:   Current Facility-Administered Medications   Medication Dose Route Frequency Provider Last Rate    acetaminophen  650 mg Oral Q6H PRN Gaetana Ada, CRNP      cariprazine  6 mg Oral Daily Gaetana Ada, CRNP      carvedilol  25 mg Oral BID With Meals Gaetana Ada, CRNP      doxazosin  2 mg Oral Daily Gaetana Ada, CRNP      famotidine  40 mg Oral Daily Jacinta Mellow, CRNP      FLUoxetine  20 mg Oral QAM Jacinta Mellow, CRNP      furosemide  40 mg Intravenous BID (diuretic) Jacinta Mellow, CRNP      heparin (porcine)  5,000 Units Subcutaneous [facility] Jacinta Mellow, CRNP      hydrALAZINE  50 mg Oral TID Jacinta Mellow, CRNP      insulin glargine  20 Units Subcutaneous HS Jacinta Mellow, CRNP      insulin lispro  1-5 Units Subcutaneous TID AC Jacinta Mellow, CRNP      insulin lispro  1-5 Units Subcutaneous HS Jacinta Mellow, CRNP      insulin lispro  5 Units Subcutaneous TID With Meals Jacinta Mellow, CRNP      labetalol  10 mg Intravenous Q6H PRN Jacinta Mellow, CRNP      levothyroxine  125 mcg Oral Early Morning Jacinta Mellow, CRNP      ondansetron  4 mg Intravenous Q4H PRN Jacinta Mellow, CRNP          Today, Patient Was Seen By: Stephanie Garcia PA-C    **Please Note: This note may have been constructed using a voice recognition system  **

## 2022-07-17 NOTE — NURSING NOTE
Pt had complaint of chest pain  ECG completed, NSR  Hospitalist notified  No new orders  Pt has call bell and belongings in reach

## 2022-07-18 LAB
ANION GAP SERPL CALCULATED.3IONS-SCNC: 7 MMOL/L (ref 4–13)
BUN SERPL-MCNC: 66 MG/DL (ref 5–25)
CALCIUM SERPL-MCNC: 9 MG/DL (ref 8.4–10.2)
CHLORIDE SERPL-SCNC: 98 MMOL/L (ref 96–108)
CO2 SERPL-SCNC: 30 MMOL/L (ref 21–32)
CREAT SERPL-MCNC: 3.54 MG/DL (ref 0.6–1.3)
CREAT UR-MCNC: 24.2 MG/DL
ERYTHROCYTE [DISTWIDTH] IN BLOOD BY AUTOMATED COUNT: 13.2 % (ref 11.6–15.1)
GFR SERPL CREATININE-BSD FRML MDRD: 19 ML/MIN/1.73SQ M
GLUCOSE P FAST SERPL-MCNC: 81 MG/DL (ref 65–99)
GLUCOSE SERPL-MCNC: 145 MG/DL (ref 65–140)
GLUCOSE SERPL-MCNC: 148 MG/DL (ref 65–140)
GLUCOSE SERPL-MCNC: 172 MG/DL (ref 65–140)
GLUCOSE SERPL-MCNC: 79 MG/DL (ref 65–140)
GLUCOSE SERPL-MCNC: 81 MG/DL (ref 65–140)
HBV CORE AB SER QL: NORMAL
HBV CORE IGM SER QL: NORMAL
HBV SURFACE AB SER-ACNC: <3.1 MIU/ML
HBV SURFACE AG SER QL: NORMAL
HCT VFR BLD AUTO: 24.9 % (ref 36.5–49.3)
HCV AB SER QL: NORMAL
HGB BLD-MCNC: 8 G/DL (ref 12–17)
MCH RBC QN AUTO: 27.7 PG (ref 26.8–34.3)
MCHC RBC AUTO-ENTMCNC: 32.1 G/DL (ref 31.4–37.4)
MCV RBC AUTO: 86 FL (ref 82–98)
MICROALBUMIN UR-MCNC: 930 MG/L (ref 0–20)
MICROALBUMIN/CREAT 24H UR: 3843 MG/G CREATININE (ref 0–30)
PLATELET # BLD AUTO: 313 THOUSANDS/UL (ref 149–390)
PMV BLD AUTO: 9.3 FL (ref 8.9–12.7)
POTASSIUM SERPL-SCNC: 4 MMOL/L (ref 3.5–5.3)
PROT UR-MCNC: 147 MG/DL
PROT/CREAT UR: 6.07 MG/G{CREAT} (ref 0–0.1)
RBC # BLD AUTO: 2.89 MILLION/UL (ref 3.88–5.62)
SODIUM SERPL-SCNC: 135 MMOL/L (ref 135–147)
UUN 24H UR-MCNC: 212 MG/DL
WBC # BLD AUTO: 9.05 THOUSAND/UL (ref 4.31–10.16)

## 2022-07-18 PROCEDURE — 86480 TB TEST CELL IMMUN MEASURE: CPT | Performed by: INTERNAL MEDICINE

## 2022-07-18 PROCEDURE — 82948 REAGENT STRIP/BLOOD GLUCOSE: CPT

## 2022-07-18 PROCEDURE — 86706 HEP B SURFACE ANTIBODY: CPT | Performed by: INTERNAL MEDICINE

## 2022-07-18 PROCEDURE — 86705 HEP B CORE ANTIBODY IGM: CPT | Performed by: INTERNAL MEDICINE

## 2022-07-18 PROCEDURE — 86060 ANTISTREPTOLYSIN O TITER: CPT | Performed by: INTERNAL MEDICINE

## 2022-07-18 PROCEDURE — 84166 PROTEIN E-PHORESIS/URINE/CSF: CPT | Performed by: INTERNAL MEDICINE

## 2022-07-18 PROCEDURE — 86063 ANTISTREPTOLYSIN O SCREEN: CPT | Performed by: INTERNAL MEDICINE

## 2022-07-18 PROCEDURE — 87340 HEPATITIS B SURFACE AG IA: CPT | Performed by: INTERNAL MEDICINE

## 2022-07-18 PROCEDURE — 86704 HEP B CORE ANTIBODY TOTAL: CPT | Performed by: INTERNAL MEDICINE

## 2022-07-18 PROCEDURE — 99233 SBSQ HOSP IP/OBS HIGH 50: CPT

## 2022-07-18 PROCEDURE — 99233 SBSQ HOSP IP/OBS HIGH 50: CPT | Performed by: INTERNAL MEDICINE

## 2022-07-18 PROCEDURE — 86803 HEPATITIS C AB TEST: CPT | Performed by: INTERNAL MEDICINE

## 2022-07-18 PROCEDURE — 80048 BASIC METABOLIC PNL TOTAL CA: CPT

## 2022-07-18 PROCEDURE — 82384 ASSAY THREE CATECHOLAMINES: CPT | Performed by: INTERNAL MEDICINE

## 2022-07-18 PROCEDURE — 84166 PROTEIN E-PHORESIS/URINE/CSF: CPT | Performed by: PATHOLOGY

## 2022-07-18 PROCEDURE — 85027 COMPLETE CBC AUTOMATED: CPT

## 2022-07-18 PROCEDURE — 97535 SELF CARE MNGMENT TRAINING: CPT

## 2022-07-18 PROCEDURE — 97110 THERAPEUTIC EXERCISES: CPT

## 2022-07-18 RX ORDER — POLYVINYL ALCOHOL 14 MG/ML
1 SOLUTION/ DROPS OPHTHALMIC
Status: DISCONTINUED | OUTPATIENT
Start: 2022-07-18 | End: 2022-07-18

## 2022-07-18 RX ORDER — LORAZEPAM 0.5 MG/1
0.5 TABLET ORAL 2 TIMES DAILY PRN
Status: DISCONTINUED | OUTPATIENT
Start: 2022-07-18 | End: 2022-07-21 | Stop reason: HOSPADM

## 2022-07-18 RX ORDER — AMLODIPINE BESYLATE 10 MG/1
10 TABLET ORAL DAILY
Status: DISCONTINUED | OUTPATIENT
Start: 2022-07-18 | End: 2022-07-21 | Stop reason: HOSPADM

## 2022-07-18 RX ORDER — LABETALOL HYDROCHLORIDE 5 MG/ML
10 INJECTION, SOLUTION INTRAVENOUS EVERY 6 HOURS PRN
Status: DISCONTINUED | OUTPATIENT
Start: 2022-07-18 | End: 2022-07-21

## 2022-07-18 RX ORDER — LORAZEPAM 0.5 MG/1
0.5 TABLET ORAL DAILY PRN
Status: DISCONTINUED | OUTPATIENT
Start: 2022-07-18 | End: 2022-07-18

## 2022-07-18 RX ADMIN — BUMETANIDE 4 MG: 0.25 INJECTION INTRAMUSCULAR; INTRAVENOUS at 08:32

## 2022-07-18 RX ADMIN — HYDRALAZINE HYDROCHLORIDE 50 MG: 25 TABLET ORAL at 21:58

## 2022-07-18 RX ADMIN — INSULIN LISPRO 5 UNITS: 100 INJECTION, SOLUTION INTRAVENOUS; SUBCUTANEOUS at 16:44

## 2022-07-18 RX ADMIN — CARIPRAZINE 6 MG: 4.5 CAPSULE, GELATIN COATED ORAL at 08:31

## 2022-07-18 RX ADMIN — ACETAMINOPHEN 650 MG: 325 TABLET ORAL at 21:59

## 2022-07-18 RX ADMIN — INSULIN GLARGINE 20 UNITS: 100 INJECTION, SOLUTION SUBCUTANEOUS at 21:58

## 2022-07-18 RX ADMIN — ONDANSETRON 4 MG: 2 INJECTION INTRAMUSCULAR; INTRAVENOUS at 12:06

## 2022-07-18 RX ADMIN — BUMETANIDE 4 MG: 0.25 INJECTION INTRAMUSCULAR; INTRAVENOUS at 17:25

## 2022-07-18 RX ADMIN — CARVEDILOL 25 MG: 25 TABLET, FILM COATED ORAL at 16:43

## 2022-07-18 RX ADMIN — INSULIN LISPRO 1 UNITS: 100 INJECTION, SOLUTION INTRAVENOUS; SUBCUTANEOUS at 22:01

## 2022-07-18 RX ADMIN — CARVEDILOL 25 MG: 25 TABLET, FILM COATED ORAL at 07:43

## 2022-07-18 RX ADMIN — DOXAZOSIN 2 MG: 2 TABLET ORAL at 08:32

## 2022-07-18 RX ADMIN — HEPARIN SODIUM 5000 UNITS: 5000 INJECTION INTRAVENOUS; SUBCUTANEOUS at 05:23

## 2022-07-18 RX ADMIN — LEVOTHYROXINE SODIUM 125 MCG: 25 TABLET ORAL at 05:23

## 2022-07-18 RX ADMIN — LORAZEPAM 0.5 MG: 0.5 TABLET ORAL at 22:13

## 2022-07-18 RX ADMIN — LORAZEPAM 0.5 MG: 0.5 TABLET ORAL at 13:22

## 2022-07-18 RX ADMIN — INSULIN LISPRO 5 UNITS: 100 INJECTION, SOLUTION INTRAVENOUS; SUBCUTANEOUS at 07:45

## 2022-07-18 RX ADMIN — INSULIN LISPRO 5 UNITS: 100 INJECTION, SOLUTION INTRAVENOUS; SUBCUTANEOUS at 11:59

## 2022-07-18 RX ADMIN — HEPARIN SODIUM 5000 UNITS: 5000 INJECTION INTRAVENOUS; SUBCUTANEOUS at 21:58

## 2022-07-18 RX ADMIN — AMLODIPINE BESYLATE 10 MG: 10 TABLET ORAL at 08:32

## 2022-07-18 RX ADMIN — HEPARIN SODIUM 5000 UNITS: 5000 INJECTION INTRAVENOUS; SUBCUTANEOUS at 13:22

## 2022-07-18 RX ADMIN — HYDRALAZINE HYDROCHLORIDE 50 MG: 25 TABLET ORAL at 16:44

## 2022-07-18 RX ADMIN — GLYCERIN, HYPROMELLOSE, POLYETHYLENE GLYCOL 1 DROP: .2; .2; 1 LIQUID OPHTHALMIC at 09:01

## 2022-07-18 RX ADMIN — FLUOXETINE 20 MG: 20 CAPSULE ORAL at 08:32

## 2022-07-18 RX ADMIN — HYDRALAZINE HYDROCHLORIDE 50 MG: 25 TABLET ORAL at 08:31

## 2022-07-18 RX ADMIN — FAMOTIDINE 40 MG: 20 TABLET ORAL at 08:32

## 2022-07-18 NOTE — OCCUPATIONAL THERAPY NOTE
07/18/22 0913   OT Last Visit   OT Visit Date 07/18/22   Note Type   Note Type Treatment  (completed [id number])   Restrictions/Precautions   Weight Bearing Precautions Per Order No   Other Precautions Telemetry; Fall Risk   General   Response to Previous Treatment Patient with no complaints from previous session   Lifestyle   Reciprocal Relationships lives with family members   Intrinsic Gratification getting out - for appointments, to eat at restuarants, and for a little shopping ([employer])   Pain Assessment   Pain Assessment Tool 0-10   Pain Score No Pain   ADL   Equipment Provided   (provided instruction with functional demonstration in [facility] for  care - patient has interest in some of same and will be considering purchase for home use)   Therapeutic Excerise-Strength   UE Strength Yes   Right Upper Extremity- Strength   R Shoulder Flexion; Horizontal ABduction; Other (Comment)  (pro/re-traction; *Flexion X2)   R Elbow Elbow flexion;Elbow extension  (in forward and reverse;  Also: supin/pron-ation)   R Weight/Reps/Sets 1 pound weight -  15 reps shoulders and 25 reps elbows   Left Upper Extremity-Strength   L Shoulder Flexion; Horizontal ABduction; Other (Comment)  (pro/re-traction; *Flexion X2)   L Elbow Elbow flexion   L Weights/Reps/Sets 1 pound weight -  15 reps shoulders and 25 reps elbows   LUE Strength Comment *some elbow motions deferred by patient secondary to discomfort of IV site near elbow crease   Coordination   Gross Motor WFL   Dexterity WFL   Cognition   Overall Cognitive Status WFL   Arousal/Participation Alert; Cooperative   Attention Within functional limits   Orientation Level Oriented X4   Following Commands Follows all commands and directions without difficulty   Activity Tolerance   Activity Tolerance Patient tolerated treatment well   Assessment   Assessment Patient participated in Skilled OT session this date with interventions consisting of ADL re training with the use of correct body mechnaics, safety awareness and fall prevention techniques,  long handle equipment and therapeutic exercise to: increase functional use of BUEs, increase BUE muscle strength    Patient agreeable to OT treatment session, upon arrival patient was found seated at edge of bed - session completed with patient sitting at EOB (no complaint or lack of tolerance for same)  Patient requiring verbal cues for correct technique and verbal cues for pacing thru activity steps, occasional rest periods, and self-care assistance as noted in flow sheet/AM-PAC  Patient continues to be functioning below baseline level, occupational performance remains limited secondary to factors listed above and increased risk for falls and injury  From OT standpoint, recommendation at time of d/c would be home with no rehabilitation needs  Patient to benefit from continued Occupational Therapy treatment while in the hospital to address deficits as defined above and maximize level of functional independence with ADLs and functional mobility  Plan   Treatment Interventions UE strengthening/ROM; Patient/family training;Equipment evaluation/education; Activityengagement; Compensatory technique education   Goal Expiration Date 07/26/22   OT Treatment Day 1   Recommendation   OT Discharge Recommendation No rehabilitation needs   Additional Comments 2 The patient's raw score on the AM-PAC Daily Activity inpatient short form is 19, standardized score is 40 22, greater than 39 4  Patients at this level are likely to benefit from discharge to home  Please refer to the recommendation of the Occupational Therapist for safe discharge planning     AM-PAC Daily Activity Inpatient   Lower Body Dressing 2   Bathing 3   Toileting 3   Upper Body Dressing 3   Grooming 4   Eating 4   Daily Activity Raw Score 19   Daily Activity Standardized Score (Calc for Raw Score >=11) 40 22   AM-PAC Applied Cognition Inpatient   Following a Speech/Presentation 4   Understanding Ordinary Conversation 4   Taking Medications 4   Remembering Where Things Are Placed or Put Away 4   Remembering List of 4-5 Errands 3   Taking Care of Complicated Tasks 3   Applied Cognition Raw Score 22   Applied Cognition Standardized Score 47 83     Al LISBETH Norris/L

## 2022-07-18 NOTE — PLAN OF CARE
Problem: PAIN - ADULT  Goal: Verbalizes/displays adequate comfort level or baseline comfort level  Description: Interventions:  - Encourage patient to monitor pain and request assistance  - Assess pain using appropriate pain scale  - Administer analgesics based on type and severity of pain and evaluate response  - Implement non-pharmacological measures as appropriate and evaluate response  - Consider cultural and social influences on pain and pain management  - Notify physician/advanced practitioner if interventions unsuccessful or patient reports new pain  Outcome: Progressing     Problem: INFECTION - ADULT  Goal: Absence or prevention of progression during hospitalization  Description: INTERVENTIONS:  - Assess and monitor for signs and symptoms of infection  - Monitor lab/diagnostic results  - Monitor all insertion sites, i e  indwelling lines, tubes, and drains  - Monitor endotracheal if appropriate and nasal secretions for changes in amount and color  - [place] appropriate cooling/warming therapies per order  - Administer medications as ordered  - Instruct and encourage patient and family to use good hand hygiene technique  - Identify and instruct in appropriate isolation precautions for identified infection/condition  Outcome: Progressing  Goal: Absence of fever/infection during neutropenic period  Description: INTERVENTIONS:  - Monitor WBC    Outcome: Progressing     Problem: SAFETY ADULT  Goal: Patient will remain free of falls  Description: INTERVENTIONS:  - Educate patient/family on patient safety including physical limitations  - Instruct patient to call for assistance with activity   - Consult OT/PT to assist with strengthening/mobility   - Keep Call bell within reach  - Keep bed low and locked with side rails adjusted as appropriate  - Keep care items and personal belongings within reach  - Initiate and maintain comfort rounds  - Make Fall Risk Sign visible to staff  - Offer Toileting every 1 Hours, in advance of need  - Initiate/Maintain bed alarm  - Obtain necessary fall risk management equipment: yellow socks   - Apply yellow socks and bracelet for high fall risk patients  - Consider moving patient to room near nurses station  Outcome: Progressing  Goal: Maintain or return to baseline ADL function  Description: INTERVENTIONS:  -  Assess patient's ability to carry out ADLs; assess patient's baseline for ADL function and identify physical deficits which impact ability to perform ADLs (bathing, care of mouth/teeth, toileting, grooming, dressing, etc )  - Assess/evaluate cause of self-care deficits   - Assess range of motion  - Assess patient's mobility; develop plan if impaired  - Assess patient's need for assistive devices and provide as appropriate  - Encourage maximum independence but intervene and supervise when necessary  - Involve family in performance of ADLs  - Assess for home care needs following discharge   - Consider OT consult to assist with ADL evaluation and planning for discharge  - Provide patient education as appropriate  Outcome: Progressing  Goal: Maintains/Returns to pre admission functional level  Description: INTERVENTIONS:  - Perform BMAT or MOVE assessment daily    - Set and communicate daily mobility goal to care team and patient/family/caregiver  - Collaborate with rehabilitation services on mobility goals if consulted  - Perform Range of Motion 3 times a day  - Reposition patient every 2 hours    - Dangle patient 3 times a day  - Stand patient 3 times a day  - Ambulate patient 3 times a day  - Out of bed to chair 3 times a day   - Out of bed for meals 3 times a day  - Out of bed for toileting  - Record patient progress and toleration of activity level   Outcome: Progressing     Problem: DISCHARGE PLANNING  Goal: Discharge to home or other facility with appropriate resources  Description: INTERVENTIONS:  - Identify barriers to discharge w/patient and caregiver  - Arrange for needed discharge resources and transportation as appropriate  - Identify discharge learning needs (meds, wound care, etc )  - Arrange for interpretive services to assist at discharge as needed  - Refer to Case Management Department for coordinating discharge planning if the patient needs post-hospital services based on physician/advanced practitioner order or complex needs related to functional status, cognitive ability, or social support system  Outcome: Progressing     Problem: Knowledge Deficit  Goal: Patient/family/caregiver demonstrates understanding of disease process, treatment plan, medications, and discharge instructions  Description: Complete learning assessment and assess knowledge base    Interventions:  - Provide teaching at level of understanding  - Provide teaching via preferred learning methods  Outcome: Progressing     Problem: Prexisting or High Potential for Compromised Skin Integrity  Goal: Skin integrity is maintained or improved  Description: INTERVENTIONS:  - Identify patients at risk for skin breakdown  - Assess and monitor skin integrity  - Assess and monitor nutrition and hydration status  - Monitor labs   - Assess for incontinence   - Turn and reposition patient  - Assist with mobility/ambulation  - Relieve pressure over bony prominences  - Avoid friction and shearing  - Provide appropriate hygiene as needed including keeping skin clean and dry  - Evaluate need for skin moisturizer/barrier cream  - Collaborate with interdisciplinary team   - Patient/family teaching  - Consider wound care consult   Outcome: Progressing

## 2022-07-18 NOTE — CASE MANAGEMENT
Case Management Assessment & Discharge Planning Note    Patient name Parrish Jordan  Location /-01 MRN [id number]  : 1980 Date 2022       Current Admission Date: 2022  Current Admission Diagnosis:Volume overload   Patient Active Problem List    Diagnosis Date Noted    Primary hypertension 2022    Open toe wound 2022    Volume overload 2022    Hyponatremia 2022    Hypertension 2021    SOB (shortness of breath) 2021    GERD (gastroesophageal reflux disease) 2021    Family history of heart disease 2021    Hypokalemia 2021    Chest pressure 2021    Elevated troponin 2021    Type 2 diabetes mellitus with stage 4 chronic kidney disease and hypertension [facility] 2021    Acute respiratory disease due to COVID-19 virus 2021    STEFANIA (acute kidney injury) ([facility] ) 2020    Schizo affective schizophrenia ([facility] ) 10/25/2020    Hypertensive emergency 2020    Encephalopathy 2020    Leukocytosis 2020    Chronic migraine without aura without status migrainosus, not intractable 2019    Difficulty urinating 2019    Urinary tract infection without hematuria 2019    Penile pain 2019    Spinal stenosis in cervical region 2019    Class 3 severe obesity due to excess calories with serious comorbidity and body mass index (BMI) of 60 0 to 69 9 in adult ([facility] ) 2019    Migraine without aura and without status migrainosus, not intractable 2019    Peripheral edema 2019    Hyperlipidemia, mixed 2019    Bipolar 1 disorder ([facility] ) 2019    Depression 2019    Type 1 diabetes mellitus without complication ([facility] )     Urinary retention 2019    Occipital neuralgia of left side 02/15/2019    Headache 02/15/2019    Nodule of parotid gland 02/15/2019    Snoring 2018    Insomnia 2018    Hypersomnia 2018  Vitamin D deficiency 12/04/2018    Episodic confusion     OCD (obsessive compulsive disorder) 10/31/2018    Schizoaffective disorder, depressive type ([facility] ) 10/31/2018    Hypothyroidism 10/31/2018    Morbid obesity with BMI of 50 0-59 9, adult ([facility] ) 10/31/2018    Anxiety 10/31/2018    Type 2 diabetes mellitus without complication, with long-term current use of insulin ([facility] ) 10/17/2018    Abdominal pain 05/12/2018    Vomiting 05/12/2018      LOS (days): 0  Geometric Mean LOS (GMLOS) (days):   Days to GMLOS:     OBJECTIVE:              Current admission status: Observation       Preferred Pharmacy:   [facility], [address]  [address] [place] [id number]  Phone: [phone] Fax: [facility], [address] [place] [address] Road  [address] [place] [address]  Phone: [phone] Fax: [phone]    Primary Care Provider: Genesis Antonio MD    Primary Insurance: Earlimart Milks  Secondary Insurance:     ASSESSMENT:  [address] Proxies    There are no active Health Care Proxies on file         Advance Directives  Does patient have a [address]?: No  Was patient offered paperwork?: Yes  Does patient currently have a Health Care decision maker?: No  Does patient have Advance Directives?: No  Was patient offered paperwork?: Yes  Primary Contact: Ricardo Garcia - father              Patient Information  Mental Status: Alert      ASSESSMENT:  [address] Proxies    There are no active Health Care Proxies on file         Readmission Root Cause  30 Day Readmission: No     Patient Information  Admitted from[de-identified] Home  Mental Status: Alert  During Assessment patient was accompanied by: Not accompanied during assessment  Assessment information provided by[de-identified] Patient  Primary Caregiver: Self  Support Systems: Parent  [place] of Residence: [address] [place] [address] do you live in?: [address] entry access options   Select all that apply : Stairs  Number of steps to enter home : 2  Do the steps have railings?: No  Type of Current Residence: 2 story home  Upon entering residence, is there a bedroom on the main floor (no further steps)?: No  A bedroom is located on the following floor levels of residence (select all that apply):: 2nd Floor  Upon entering residence, is there a bathroom on the main floor (no further steps)?: Yes  Number of steps to 2nd floor from main floor: One Flight  In the last 12 months, was there a time when you were not able to pay the mortgage or rent on time?: No  In the last 12 months, how many places have you lived?: 1  In the last 12 months, was there a time when you did not have a steady place to sleep or slept in a shelter (including now)?: No  Homeless/housing insecurity resource given?: N/A  Living Arrangements: Lives w/ Parent(s)  Is patient a ?: No     Activities of Daily Living Prior to Admission  Functional Status: Independent  Completes ADLs independently?: Yes  Ambulates independently?: Yes  Does patient use assisted devices?: No  Does patient currently own DME?: No  Does patient have a history of Outpatient Therapy (PT/OT)?: No  Does the patient have a history of Short-Term Rehab?: No  Does patient have a history of HHC?: No  Does patient currently have [facility]?: No     Patient Information Continued  Income Source: SSI/SSD  Does patient have prescription coverage?: Yes  Within the past 12 months, you worried that your food would run out before you got the money to buy more : Never true  Within the past 12 months, the food you bought just didn't last and you didn't have money to get more : Never true  Food insecurity resource given?: N/A  Does patient receive dialysis treatments?: No  Does patient have a history of substance abuse?: No  Does patient have a history of Mental Health Diagnosis?: Yes  Is patient receiving treatment for mental health?: Yes  Has patient received inpatient treatment related to mental health in the last 2 years?: Yes [facility] IP admission)     Means of Transportation  Means of Transport to Appts[de-identified] Family transport  In the past 12 months, has lack of transportation kept you from medical appointments or from getting medications?: No  In the past 12 months, has lack of transportation kept you from meetings, work, or from getting things needed for daily living?: No  Was application for public transport provided?: N/A     DISCHARGE DETAILS:     Discharge planning discussed with[de-identified] Patient  Freedom of Choice: Yes  CM contacted family/caregiver?: Yes (Left VM for father Leitha Heimlich)  Contacts  Patient Contacts: Hyacinth Bazzi (Father)   [phone] (Mobile)  Relationship to Patient[de-identified] Family  Contact Method: Phone  Phone Number: Hyacinth Bazzi (Father)   [phone] (Mobile)  Reason/Outcome: Emergency [address]         Is the patient interested in [place] [address] at discharge?: No     DME Referral Provided  Referral made for DME?: No     Other Referral/Resources/Interventions Provided:  Interventions: None Indicated  Discharge Destination Plan[de-identified] Home

## 2022-07-18 NOTE — PLAN OF CARE
Problem: OCCUPATIONAL THERAPY ADULT  Goal: Performs self-care activities at highest level of function for planned discharge setting  See evaluation for individualized goals  Description: Treatment Interventions: ADL retraining, Equipment evaluation/education, Compensatory technique education, Energy conservation, Activityengagement    See flowsheet documentation for full assessment, interventions and recommendations  Outcome: Progressing  Note: Limitation: Decreased ADL status, Decreased Safe judgement during ADL, Decreased high-level ADLs  Prognosis: Good  Assessment: Patient participated in Skilled OT session this date with interventions consisting of ADL re training with the use of correct body mechnaics, safety awareness and fall prevention techniques,  long handle equipment and therapeutic exercise to: increase functional use of BUEs, increase BUE muscle strength    Patient agreeable to OT treatment session, upon arrival patient was found seated at edge of bed - session completed with patient sitting at EOB (no complaint or lack of tolerance for same)  Patient requiring verbal cues for correct technique and verbal cues for pacing thru activity steps, occasional rest periods, and self-care assistance as noted in flow sheet/AM-PAC  Patient continues to be functioning below baseline level, occupational performance remains limited secondary to factors listed above and increased risk for falls and injury  From OT standpoint, recommendation at time of d/c would be home with no rehabilitation needs  Patient to benefit from continued Occupational Therapy treatment while in the hospital to address deficits as defined above and maximize level of functional independence with ADLs and functional mobility       OT Discharge Recommendation: No rehabilitation needs     KELLIE MarquezA/L

## 2022-07-18 NOTE — ASSESSMENT & PLAN NOTE
Lab Results   Component Value Date    EGFR 19 07/18/2022    EGFR 20 07/17/2022    EGFR 22 07/16/2022    CREATININE 3 54 (H) 07/18/2022    CREATININE 3 38 (H) 07/17/2022    CREATININE 3 19 (H) 07/16/2022   · Poorly-controlled diabetes  · Repeat A1c 9 1, improved since prior (11 6 on 8/3/21)  · Continue with home Lantus 20U qhs  · SSI meal/bedtime coverage   · Accu Cheks     · Prior baseline Cr appears to be 1 9-2 1 mg/dL in 2021, with new baseline around 2 9-3 d/t recent ATN dx   · Supposed to follow up with Nephrology outpatient however has been unable to get out of the house  · Nephrology consult appreciated   · Patient has significant proteinuria, approximately 6 g per day, etiology most likely diabetic nephropathy plus/minus hyper filtration syndrome with or without FSGS; will need full workup with possible kidney biopsy due to proteinuria and significant loss in kidney function over the last year  · Continue with diuresis   · Monitor renal function closely in the setting of diuresis  · Avoid hypotension and nephrotoxins    · With hypertensive urgency POA   · Home hydralazine increased to 50 mg t i d   · Continue home carvedilol 25 mg BID, doxazosin 2mg qd  · Continue diuresis with IV furosemide 40 mg b i d   · BP not adequately controlled  Will start amlodipine 10 mg daily to patient's current regimen  · Continue to monitor blood pressure trend

## 2022-07-18 NOTE — PROGRESS NOTES
Progress Note - Nephrology   Parrish Jordan 38 y o  male MRN: [id number]  Unit/Bed#: -01 Encounter: [id number]    A/P:  1  Acute kidney injury on top chronic kidney disease, present on admission   Patient's creatinine essentially unchanged but slightly increased up to 3 54 mg/dL  Continue avoid potential nephrotoxins worked optimize the patient's overall hemodynamics  Unclear if the patient has increased protein excretion associated with reduced kidney function  Please refer below  2  Chronic kidney disease stage 4 with probable baseline creatinine around 3 mg/dL  3  Nephrotic range proteinuria   Patient has around 6 g of proteinuria day  Given substantial potential loss of kidney function over last 12 months, will proceed with a full workup and potential kidney biopsy in the inpatient versus outpatient setting depending on the patient progresses from a clinical standpoint  Most likely etiology of patient's nephrotic range proteinuria is diabetic nephropathy plus or minus hyper filtration syndrome with or without FSGS pathologic findings  That being said, patient should be ruled out for other potential etiologies particularly given current kidney function and the patient's young age  4  Hypertensive urgency   Blood pressure from this late morning is improved at 160 mmHg, previously he was at 190 mmHg  Is currently on amlodipine 10 mg daily, carvedilol 25 mg twice a day, doxazosin 2 mg daily, hydralazine 50 mg 3 times a day and low-sodium diet with Bumex 4 mg IV twice a day  We can certainly consider going up on the doxazosin as the next most appropriate medication to help better manage and control his blood pressures  Patient currently not a candidate for ACE-inhibitor or angiotensin receptor blocker due to acute kidney injury  5  Volume overload   Patient in approximately 40 lb in the last 3 weeks  He is currently undergoing aggressive diuresis with Bumex 4 mg twice a day    Continue monitor electrolytes and kidney function closely  6  Morbid obesity with a BMI between 50-59 9   Patient could potentially have proteinuria due to hyper filtration related kidney injury  Continue to encourage reduced caloric intake      Follow up reason for today's visit:  Acute kidney injury/chronic kidney disease/nephrotic range proteinuria/hypertensive urgency    Volume overload    Patient Active Problem List   Diagnosis    Type 2 diabetes mellitus without complication, with long-term current use of insulin ([facility])    Abdominal pain    OCD (obsessive compulsive disorder)    Schizoaffective disorder, depressive type ([place] [address] )    Hypothyroidism    Morbid obesity with BMI of 50 0-59 9, adult ([place] [address] )    Anxiety    Episodic confusion    Snoring    Insomnia    Hypersomnia    Vitamin D deficiency    Vomiting    Occipital neuralgia of left side    Headache    Nodule of parotid gland    Bipolar 1 disorder ([place] [address] )    Depression    Type 1 diabetes mellitus without complication ([place] [address] )    Urinary retention    Peripheral edema    Hyperlipidemia, mixed    Migraine without aura and without status migrainosus, not intractable    Class 3 severe obesity due to excess calories with serious comorbidity and body mass index (BMI) of 60 0 to 69 9 in adult [facility])    Spinal stenosis in cervical region    Difficulty urinating    Urinary tract infection without hematuria    Penile pain    Chronic migraine without aura without status migrainosus, not intractable    Hypertensive emergency    Encephalopathy    Leukocytosis    Schizo affective schizophrenia ([facility] )    STEFANIA (acute kidney injury) ([place] [address] )    Acute respiratory disease due to COVID-19 virus    Elevated troponin    Type 2 diabetes mellitus with stage 4 chronic kidney disease and hypertension ([facility] )    Family history of heart disease    Hypokalemia    Chest pressure    GERD (gastroesophageal reflux disease)    Hypertension    SOB (shortness of breath)    Volume overload    Hyponatremia    Open toe wound    Primary hypertension         Subjective:   No acute events overnight  Objective:     Vitals: Blood pressure 160/89, pulse 75, temperature 98 6 °F (37 °C), resp  rate 16, height 5' 2" (1 575 m), weight (!) 159 kg (351 lb 3 1 oz), SpO2 94 %  ,Body mass index is 64 23 kg/m²  Weight (last 2 days)     Date/Time Weight    07/18/22 0600 159 (351 19)    07/17/22 0600 163 (360 01)    07/16/22 13:56:38 166 (365 08)    07/16/22 1102 167 (368 39)            Intake/Output Summary (Last 24 hours) at 7/18/2022 1312  Last data filed at 7/18/2022 1202  Gross per 24 hour   Intake 1005 ml   Output 4800 ml   Net -3795 ml     I/O last 3 completed shifts:   In: 1425 [P O :1425]  Out: 6750 [Urine:6750]         Physical Exam: /89   Pulse 75   Temp 98 6 °F (37 °C)   Resp 16   Ht 5' 2" (1 575 m)   Wt (!) 159 kg (351 lb 3 1 oz)   SpO2 94%   BMI 64 23 kg/m²     General Appearance:    Alert, cooperative, no distress, appears stated age   Head:    Normocephalic, without obvious abnormality, atraumatic   Eyes:    Conjunctiva/corneas clear   Ears:    Normal external ears   Nose:   Nares normal, septum midline, mucosa normal, no drainage    or sinus tenderness   Throat:   Lips, mucosa, and tongue normal; teeth and gums normal   Neck:   Supple   Back:     Symmetric, no curvature, ROM normal, no CVA tenderness   Lungs:     Reduced anteriorly but otherwise clear   Chest wall:    No tenderness or deformity   Heart:    Regular rate and rhythm, S1 and S2 normal, no murmur, rub   or gallop   Abdomen:     Soft, non-tender, bowel sounds active   Extremities:   Extremities normal, atraumatic, no cyanosis, moderate bilateral lower extremity edema moderate bilateral lower extremity edema   Skin:   Skin color, texture, turgor normal, no rashes or lesions   Lymph nodes:   Cervical normal   Neurologic:   CNII-XII intact            Lab, Imaging and other studies: I have personally reviewed pertinent labs   CBC:   Lab Results   Component Value Date    WBC 9 05 07/18/2022    HGB 8 0 (L) 07/18/2022    HCT 24 9 (L) 07/18/2022    MCV 86 07/18/2022     07/18/2022    MCH 27 7 07/18/2022    MCHC 32 1 07/18/2022    RDW 13 2 07/18/2022    MPV 9 3 07/18/2022     CMP:   Lab Results   Component Value Date    K 4 0 07/18/2022    CL 98 07/18/2022    CO2 30 07/18/2022    BUN 66 (H) 07/18/2022    CREATININE 3 54 (H) 07/18/2022    CALCIUM 9 0 07/18/2022    EGFR 19 07/18/2022         Results from last 7 days   Lab Units 07/18/22  0444 07/17/22  0442 07/16/22  0743   POTASSIUM mmol/L 4 0 4 5 5 1   CHLORIDE mmol/L 98 97 94*   CO2 mmol/L 30 28 25   BUN mg/dL 66* 57* 55*   CREATININE mg/dL 3 54* 3 38* 3 19*   CALCIUM mg/dL 9 0 8 8 8 4         Phosphorus: No results found for: PHOS  Magnesium: No results found for: MG  Urinalysis: No results found for: COLORU, CLARITYU, SPECGRAV, PHUR, LEUKOCYTESUR, NITRITE, PROTEINUA, GLUCOSEU, KETONESU, BILIRUBINUR, BLOODU  Ionized Calcium: No results found for: CAION  Coagulation: No results found for: PT, INR, APTT  Troponin: No results found for: TROPONINI  ABG: No results found for: PHART, FPW8RFC, PO2ART, NRY8RUV, E6CTWMTR, BEART, SOURCE  Radiology review:     IMAGING  Procedure: X-ray chest 1 view portable    Result Date: 7/16/2022  Narrative: CHEST INDICATION:   sob  Leg swelling  COMPARISON:  Chest radiograph from 6/17/2022 and 8/23/2021, chest CT from 1/11/2021  EXAM PERFORMED/VIEWS:  XR CHEST PORTABLE FINDINGS: Cardiomediastinal silhouette appears unremarkable  Probable pulmonary edema with focal consolidation in the right upper lung  No effusion or pneumothorax  Osseous structures appear within normal limits for patient age  Impression: Probable pulmonary edema with focal consolidation in the right upper lung which could be due to asymmetric localized edema or superimposed pneumonia   Workstation performed: [id number]       Current Facility-Administered Medications Medication Dose Route Frequency    acetaminophen (TYLENOL) tablet 650 mg  650 mg Oral Q6H PRN    albuterol (PROVENTIL HFA,VENTOLIN HFA) inhaler 2 puff  2 puff Inhalation Q4H PRN    amLODIPine (NORVASC) tablet 10 mg  10 mg Oral Daily    bumetanide (BUMEX) injection 4 mg  4 mg Intravenous BID    cariprazine (VRAYLAR) capsule 6 mg  6 mg Oral Daily    carvedilol (COREG) tablet 25 mg  25 mg Oral BID With Meals    doxazosin (CARDURA) tablet 2 mg  2 mg Oral Daily    famotidine (PEPCID) tablet 40 mg  40 mg Oral Daily    FLUoxetine (PROzac) capsule 20 mg  20 mg Oral QAM    glycerin-hypromellose- (ARTIFICIAL TEARS) ophthalmic solution 1 drop  1 drop Both Eyes Q3H PRN    heparin (porcine) subcutaneous injection 5,000 Units  5,000 Units Subcutaneous Q8H [address]    hydrALAZINE (APRESOLINE) tablet 50 mg  50 mg Oral TID    insulin glargine (LANTUS) subcutaneous injection 20 Units 0 2 mL  20 Units Subcutaneous HS    insulin lispro (HumaLOG) 100 units/mL subcutaneous injection 1-5 Units  1-5 Units Subcutaneous TID AC    insulin lispro (HumaLOG) 100 units/mL subcutaneous injection 1-5 Units  1-5 Units Subcutaneous HS    insulin lispro (HumaLOG) 100 units/mL subcutaneous injection 5 Units  5 Units Subcutaneous TID With Meals    labetalol (NORMODYNE) injection 10 mg  10 mg Intravenous Q6H PRN    levothyroxine tablet 125 mcg  125 mcg Oral Early Morning    ondansetron (ZOFRAN) injection 4 mg  4 mg Intravenous Q4H PRN     Medications Discontinued During This Encounter   Medication Reason    hydrALAZINE (APRESOLINE) tablet 25 mg     albuterol (PROVENTIL HFA,VENTOLIN HFA) inhaler 2 puff     albuterol (PROVENTIL HFA,VENTOLIN HFA) 90 mcg/act inhaler Discontinued by another clinician    furosemide (LASIX) injection 40 mg     polyvinyl alcohol (LIQUIFILM TEARS) 1 4 % ophthalmic solution 1 drop     labetalol (NORMODYNE) injection 10 mg        Bernadine Fiore DO      This progress note was produced in part using a dictation device which may document imprecise wording from author's original intent

## 2022-07-18 NOTE — UTILIZATION REVIEW
Continued Stay Review    OBSERVATION WRITTEN 7/16/22 @ 0830 CONVERTED TO INPATIENT ADMISSION 7/18/22 @ 1550 DUE TO FURTHER DIAGNOSTIC WORKUP REQUIRED FOR VOLUME OVERLOAD AND NEED FOR IV DIURESIS, REQUIRING AT LEAST A 2 MIDNIGHT STAY  Orders Placed This Encounter   Procedures    Inpatient Admission     Standing Status:   Standing     Number of Occurrences:   1     Order Specific Question:   Level of Care     Answer:   Med Surg [16]     Order Specific Question:   Estimated length of stay     Answer:   More than 2 Midnights     Order Specific Question:   Certification     Answer:   I certify that inpatient services are medically necessary for this patient for a duration of greater than two midnights  See H&P and MD Progress Notes for additional information about the patient's course of treatment  Date: 7/18/22                          Current Patient Class: Inpatient  Current Level of Care: med surg    HPI:42 y o  male initially admitted on 7/16/22 under Observation status then changed to Inpatient dt continue need for IV diuresis and monitoring of volume overload  Echocardiogram on 06/17/2022 shows LVEF of 55%  Assessment/Plan:  BL LE edema noted  Suspect noncompliance w/ diet and increased Na intake  Continue IV Bumex, continue home meds, monitor daily wts and IO, continue low Na diet, accuchecks w/ssi  Nephrology consulted dt proteinuria and significant loss in kidney function for over last year  Monitor renal function closely  Increase home hydralazine to 50 fermín tid, BP not adequately controlled, continue to monitor BP and trend  Podiatry consulted dt right and left diabetic toe ulcers  Wound care consulted dt abdominal fold erythema  7/18 Nephrology Consult:  CKD stage 4  Patient's creatinine essentially unchanged but slightly increased up to 3 54 mg/dL  Continue avoid potential nephrotoxins worked optimize the patient's overall hemodynamics    Unclear if the patient has increased protein excretion associated with reduced kidney function  Patient has around 6 g of proteinuria day  Given substantial potential loss of kidney function over last 12 months, will proceed with a full workup and potential kidney biopsy in the inpatient versus outpatient setting depending on the patient progresses from a clinical standpoint  Most likely etiology of patient's nephrotic range proteinuria is diabetic nephropathy plus or minus hyper filtration syndrome with or without FSGS pathologic findings  Date:  7/19  Day 2:    No acute events overnight  Transitioned from Lasix to IV Bumex, will need diuretic at time of discharge  Weight trending down, net outpt 3 8 L in 24 hrs, continue low Na diet, monitor IO  Continue home lantus and accuchecks w/ssi  Nephrology following, monitor renal function closely, continue diuresis  Continue home meds  7/19 Podiatry Consult:  Right and left diabetic toe ulcers:  Bilateral hallux wounds were debrided as below and are stable however given the chronicity of the the right toe wound, obtain xray to r/o underlying OM  Right foot xray pending  Recommend to continue [address] to the bilateral toes  Continue Surgical shoe to the right foot       Vital Signs:   Date/Time Temp Pulse Resp BP MAP (mmHg) SpO2 O2 Device Patient Position - Orthostatic VS   07/18/22 14:48:55 98 5 °F (36 9 °C) 85 20 162/94 117 92 % -- --   07/18/22 10:51:30 98 6 °F (37 °C) 75 16 160/89 113 94 % -- --   07/18/22 10:11:52 -- 72 19 176/99 Abnormal  125 96 % -- --   07/18/22 07:04:41 98 5 °F (36 9 °C) 74 20 190/97 Abnormal  128 93 % -- --   07/18/22 0336 -- -- -- 178/96 Abnormal  -- -- -- --   07/18/22 03:34:41 97 6 °F (36 4 °C) 77 18 188/115 Abnormal  139 95 % -- --   07/18/22 0044 -- 76 -- 165/91 116 90 % -- --   07/17/22 23:42:35 -- 77 -- 199/109 Abnormal  139 90 % -- --   07/17/22 2225 98 9 °F (37 2 °C) 81 20 177/101 Abnormal  126 94 % None (Room air) Lying   07/17/22 21:36:17 -- 79 -- 169/94 119 89 % Abnormal  -- --   07/17/22 20:40:35 -- 74 -- 187/113 Abnormal  138 93 % -- --   07/17/22 1932 -- -- -- -- -- -- None (Room air) --   07/17/22 1900 98 9 °F (37 2 °C) 79 20 170/102 Abnormal  125 93 % None (Room air) Lying   07/17/22 16:20:21 -- 80 -- 162/88 113 91 % -- --   07/17/22 15:14:57 98 7 °F (37 1 °C) -- 18 174/102 Abnormal  126 -- -- --   07/17/22 11:40:16 -- 78 17 146/87 107 94 % -- --   07/17/22 07:54:09 98 9 °F (37 2 °C) 86 18 175/97 Abnormal  123 93 % -- --   07/17/22 03:03:06 -- 90 20 181/105 Abnormal  130 96 % -- --   07/17/22 0100 -- 102 16 -- -- 92 % -- --   07/16/22 22:05:04 98 3 °F (36 8 °C) 102 18 189/104 Abnormal  132 92 % -- --   07/16/22 21:09:51 -- 99 -- 197/98 Abnormal  131 93 % -- --   07/16/22 18:57:31 98 4 °F (36 9 °C) 93 18 173/101 Abnormal  125 92 % -- --   07/16/22 1500 98 7 °F (37 1 °C) 95 20 172/95 Abnormal  121 94 % None (Room air) Lying   07/16/22 14:10:23 -- 96 23 Abnormal  177/95 Abnormal  122 94 % -- --   07/16/22 13:56:38 97 9 °F (36 6 °C) 96 22 202/110 Abnormal  141 94 % -- --   07/16/22 1130 -- 85 -- 191/97 Abnormal  135 93 % None (Room air) Sitting   07/16/22 1051 -- -- -- 212/97 Abnormal  -- -- -- --   07/16/22 0826 -- -- -- 185/92 Abnormal  -- -- -- --   07/16/22 0734 -- 90 20 180/89 Abnormal  -- 93 % None (Room air) Sitting   07/16/22 0731 98 1 °F (36 7 °C) -- -- -- -- -- -- --       Pertinent Labs/Diagnostic Results:   Results from last 7 days   Lab Units 07/16/22  0832   SARS-COV-2  Negative     Results from last 7 days   Lab Units 07/18/22 0444 07/17/22 0442 07/16/22  0743   WBC Thousand/uL 9 05 12 37* 11 38*   HEMOGLOBIN g/dL 8 0* 8 3* 8 5*   HEMATOCRIT % 24 9* 25 5* 25 7*   PLATELETS Thousands/uL 313 322 287   NEUTROS ABS Thousands/µL  --   --  9 28*         Results from last 7 days   Lab Units 07/18/22 0444 07/17/22 0442 07/16/22  0743   SODIUM mmol/L 135 132* 127*   POTASSIUM mmol/L 4 0 4 5 5 1   CHLORIDE mmol/L 98 97 94*   CO2 mmol/L 30 28 25   ANION GAP mmol/L 7 7 8   BUN mg/dL 66* 57* 55*   CREATININE mg/dL 3 54* 3 38* 3 19*   EGFR ml/min/1 73sq m 19 20 22   CALCIUM mg/dL 9 0 8 8 8 4     Results from last 7 days   Lab Units 07/18/22  1107 07/18/22  0621 07/17/22  2223 07/17/22  2038 07/17/22  1526 07/17/22  1137 07/17/22  0649 07/16/22  2017 07/16/22  1616 07/16/22  1129   POC GLUCOSE mg/dl 148* 79 140 70 95 142* 164* 304* 249* 216*     Results from last 7 days   Lab Units 07/18/22  0444 07/17/22  0442 07/16/22  0743   GLUCOSE RANDOM mg/dL 81 229* 191*     Results from last 7 days   Lab Units 07/16/22  0743   HEMOGLOBIN A1C % 9 1*   EAG mg/dl 214     BETA-HYDROXYBUTYRATE   Date Value Ref Range Status   10/26/2020 1 4 (H) <0 6 mmol/L Final      Results from last 7 days   Lab Units 07/16/22  1057 07/16/22  0743   HS TNI 0HR ng/L  --  8   HS TNI 2HR ng/L 8  --    HSTNI D2 ng/L 0  --      Results from last 7 days   Lab Units 07/16/22  0743   PROTIME seconds 13 6   INR  1 04     Results from last 7 days   Lab Units 07/17/22  0442   PROCALCITONIN ng/ml 0 10     Results from last 7 days   Lab Units 07/16/22  0743   BNP pg/mL 121*     Results from last 7 days   Lab Units 07/17/22  1418   CREATININE UR mg/dL 24 2  24 2  24 2   PROTEIN UR mg/dL 147   PROT/CREAT RATIO UR  6 07*     Results from last 7 days   Lab Units 07/16/22  0832   INFLUENZA A PCR  Negative   INFLUENZA B PCR  Negative   RSV PCR  Negative     Medications:   Scheduled Medications:  amLODIPine, 10 mg, Oral, Daily  bumetanide, 4 mg, Intravenous, BID  cariprazine, 6 mg, Oral, Daily  carvedilol, 25 mg, Oral, BID With Meals  doxazosin, 2 mg, Oral, Daily  famotidine, 40 mg, Oral, Daily  FLUoxetine, 20 mg, Oral, QAM  heparin (porcine), 5,000 Units, Subcutaneous, Q8H [facility]  hydrALAZINE, 50 mg, Oral, TID  insulin glargine, 20 Units, Subcutaneous, HS  insulin lispro, 1-5 Units, Subcutaneous, TID AC  insulin lispro, 1-5 Units, Subcutaneous, HS  insulin lispro, 5 Units, Subcutaneous, TID With Meals  levothyroxine, 125 mcg, Oral, Early Morning      Continuous IV Infusions: none     PRN Meds:  acetaminophen, 650 mg, Oral, Q6H PRN  albuterol, 2 puff, Inhalation, Q4H PRN  glycerin-hypromellose-, 1 drop, Both Eyes, Q3H PRN  labetalol, 10 mg, Intravenous, Q6H PRN  LORazepam, 0 5 mg, Oral, Daily PRN  ondansetron, 4 mg, Intravenous, Q4H PRN        Discharge Plan: d      Network Utilization Review Department  ATTENTION: Please call with any questions or concerns to [phone] and carefully listen to the prompts so that you are directed to the right person  All voicemails are confidential   Sudie Crigler all requests for admission clinical reviews, approved or denied determinations and any other requests to dedicated fax number below belonging to the campus where the patient is receiving treatment   List of dedicated fax numbers for the Facilities:  [address] DENIALS (Administrative/Medical Necessity) [phone]   [address] (Maternity/NICU/Pediatrics) [phone]   [address] [place] [address]  [address] [facility] [place] [address] [id number] [place] [address] [address] [facility] [place] [address] [phone]

## 2022-07-18 NOTE — PLAN OF CARE
Problem: PAIN - ADULT  Goal: Verbalizes/displays adequate comfort level or baseline comfort level  Description: Interventions:  - Encourage patient to monitor pain and request assistance  - Assess pain using appropriate pain scale  - Administer analgesics based on type and severity of pain and evaluate response  - Implement non-pharmacological measures as appropriate and evaluate response  - Consider cultural and social influences on pain and pain management  - Notify physician/advanced practitioner if interventions unsuccessful or patient reports new pain  Outcome: Progressing     Problem: INFECTION - ADULT  Goal: Absence or prevention of progression during hospitalization  Description: INTERVENTIONS:  - Assess and monitor for signs and symptoms of infection  - Monitor lab/diagnostic results  - Monitor all insertion sites, i e  indwelling lines, tubes, and drains  - Monitor endotracheal if appropriate and nasal secretions for changes in amount and color  - [place] appropriate cooling/warming therapies per order  - Administer medications as ordered  - Instruct and encourage patient and family to use good hand hygiene technique  - Identify and instruct in appropriate isolation precautions for identified infection/condition  Outcome: Progressing  Goal: Absence of fever/infection during neutropenic period  Description: INTERVENTIONS:  - Monitor WBC    Outcome: Progressing     Problem: SAFETY ADULT  Goal: Patient will remain free of falls  Description: INTERVENTIONS:  - Educate patient/family on patient safety including physical limitations  - Instruct patient to call for assistance with activity   - Consult OT/PT to assist with strengthening/mobility   - Keep Call bell within reach  - Keep bed low and locked with side rails adjusted as appropriate  - Keep care items and personal belongings within reach  - Initiate and maintain comfort rounds  - Make Fall Risk Sign visible to staff  - Offer Toileting every 2 Hours, in advance of need  - Initiate/Maintain bed alarm  - Obtain necessary fall risk management equipment: non slip socks  - Apply yellow socks and bracelet for high fall risk patients  - Consider moving patient to room near nurses station  Outcome: Progressing  Goal: Maintain or return to baseline ADL function  Description: INTERVENTIONS:  -  Assess patient's ability to carry out ADLs; assess patient's baseline for ADL function and identify physical deficits which impact ability to perform ADLs (bathing, care of mouth/teeth, toileting, grooming, dressing, etc )  - Assess/evaluate cause of self-care deficits   - Assess range of motion  - Assess patient's mobility; develop plan if impaired  - Assess patient's need for assistive devices and provide as appropriate  - Encourage maximum independence but intervene and supervise when necessary  - Involve family in performance of ADLs  - Assess for home care needs following discharge   - Consider OT consult to assist with ADL evaluation and planning for discharge  - Provide patient education as appropriate  Outcome: Progressing  Goal: Maintains/Returns to pre admission functional level  Description: INTERVENTIONS:  - Perform BMAT or MOVE assessment daily    - Set and communicate daily mobility goal to care team and patient/family/caregiver  - Collaborate with rehabilitation services on mobility goals if consulted  - Perform Range of Motion 2 times a day  - Reposition patient every 2 hours    - Dangle patient 3 times a day  - Stand patient 3 times a day  - Ambulate patient 3 times a day  - Out of bed to chair 3 times a day   - Out of bed for meals 3 times a day  - Out of bed for toileting  - Record patient progress and toleration of activity level   Outcome: Progressing     Problem: DISCHARGE PLANNING  Goal: Discharge to home or other facility with appropriate resources  Description: INTERVENTIONS:  - Identify barriers to discharge w/patient and caregiver  - Arrange for needed discharge resources and transportation as appropriate  - Identify discharge learning needs (meds, wound care, etc )  - Arrange for interpretive services to assist at discharge as needed  - Refer to Case Management Department for coordinating discharge planning if the patient needs post-hospital services based on physician/advanced practitioner order or complex needs related to functional status, cognitive ability, or social support system  Outcome: Progressing     Problem: Knowledge Deficit  Goal: Patient/family/caregiver demonstrates understanding of disease process, treatment plan, medications, and discharge instructions  Description: Complete learning assessment and assess knowledge base    Interventions:  - Provide teaching at level of understanding  - Provide teaching via preferred learning methods  Outcome: Progressing     Problem: Prexisting or High Potential for Compromised Skin Integrity  Goal: Skin integrity is maintained or improved  Description: INTERVENTIONS:  - Identify patients at risk for skin breakdown  - Assess and monitor skin integrity  - Assess and monitor nutrition and hydration status  - Monitor labs   - Assess for incontinence   - Turn and reposition patient  - Assist with mobility/ambulation  - Relieve pressure over bony prominences  - Avoid friction and shearing  - Provide appropriate hygiene as needed including keeping skin clean and dry  - Evaluate need for skin moisturizer/barrier cream  - Collaborate with interdisciplinary team   - Patient/family teaching  - Consider wound care consult   Outcome: Progressing     Problem: Potential for Falls  Goal: Patient will remain free of falls  Description: INTERVENTIONS:  - Educate patient/family on patient safety including physical limitations  - Instruct patient to call for assistance with activity   - Consult OT/PT to assist with strengthening/mobility   - Keep Call bell within reach  - Keep bed low and locked with side rails adjusted as appropriate  - Keep care items and personal belongings within reach  - Initiate and maintain comfort rounds  - Make Fall Risk Sign visible to staff  - Offer Toileting every 2 Hours, in advance of need  - Initiate/Maintain bed alarm  - Obtain necessary fall risk management equipment: non slip socks   - Apply yellow socks and bracelet for high fall risk patients  - Consider moving patient to room near nurses station  Outcome: Progressing

## 2022-07-18 NOTE — PROGRESS NOTES
[facility]  Progress Note - Ernesto Lion 1980, 38 y o  male MRN: [id number]  Unit/Bed#: -01 Encounter: [id number]  Primary Care Provider: Eliot Lucero MD   Date and time admitted to hospital: 7/16/2022  7:29 AM    * Volume overload  Assessment & Plan  Wt Readings from Last 3 Encounters:   07/18/22 (!) 159 kg (351 lb 3 1 oz)   06/23/22 (!) 146 kg (321 lb 14 oz)   06/19/22 (!) 154 kg (339 lb 8 1 oz)     Mild exacerbation with mildly elevated BNP (121) and worsening b/l LE edema and dyspnea  Not on home diuretic  Echocardiogram on 06/17/2022 shows LVEF of 55%    · Repeat ECHO with LVEF 55%, mild concentric LV hypertrophy   · Suspect diet noncompliance with increased sodium intake   · Continue with Coreg  · Received lasix 40 IV x 1 in ED  Lasix transitioned to IV Bumex 4 mg b i d  Alan Merle · Will likely need home diuretic at discharge  · Weight now trending down  Net output -3 8 L in 24 hours    · Daily weight, ideally standing scale   · Continue with low salt diet   · I/O's     Type 2 diabetes mellitus with stage 4 chronic kidney disease and hypertension [facility])  Assessment & Plan  Lab Results   Component Value Date    EGFR 19 07/18/2022    EGFR 20 07/17/2022    EGFR 22 07/16/2022    CREATININE 3 54 (H) 07/18/2022    CREATININE 3 38 (H) 07/17/2022    CREATININE 3 19 (H) 07/16/2022   · Poorly-controlled diabetes  · Repeat A1c 9 1, improved since prior (11 6 on 8/3/21)  · Continue with home Lantus 20U qhs  · SSI meal/bedtime coverage   · Accu Cheks     · Prior baseline Cr appears to be 1 9-2 1 mg/dL in 2021, with new baseline around 2 9-3 d/t recent ATN dx   · Supposed to follow up with Nephrology outpatient however has been unable to get out of the house  · Nephrology consult appreciated   · Patient has significant proteinuria, approximately 6 g per day, etiology most likely diabetic nephropathy plus/minus hyper filtration syndrome with or without FSGS; will need full workup with possible kidney biopsy due to proteinuria and significant loss in kidney function over the last year  · Continue with diuresis   · Monitor renal function closely in the setting of diuresis  · Avoid hypotension and nephrotoxins    · With hypertensive urgency POA   · Home hydralazine increased to 50 mg t i d   · Continue home carvedilol 25 mg BID, doxazosin 2mg qd  · Continue diuresis with IV furosemide 40 mg b i d   · BP not adequately controlled  Will start amlodipine 10 mg daily to patient's current regimen  · Continue to monitor blood pressure trend  Morbid obesity with BMI of 50 0-59 9, adult ([facility] )  Assessment & Plan  · Encourage aggressive lifestyle modification with diet and exercise  · Dietitian consult     Schizoaffective disorder, depressive type ([facility] )  Assessment & Plan  · Continue with home regimen       VTE Pharmacologic Prophylaxis: VTE Score: 4 Moderate Risk (Score 3-4) - Pharmacological DVT Prophylaxis Ordered: heparin  Patient Centered Rounds: I performed bedside rounds with nursing staff today  Discussions with Specialists or Other Care Team Provider:  Case Management, nursing, Nephrology    Education and Discussions with Family / Patient: Updated patient at the bedside  All questions/concerns addressed to his satisfaction        Time Spent for Care: 30 minutes  More than 50% of total time spent on counseling and coordination of care as described above  Current Length of Stay: 0 day(s)  Current Patient Status:  Patient will be transition to inpatient status given worsening kidney function and requirement of further IV diuresis for volume overload   Certification Statement: The patient will continue to require additional inpatient hospital stay due to IV diuresis and Nephrology workup for proteinuria  Discharge Plan: Pending clinical course    Code Status: Level 1 - Full Code    Subjective:   Patient seen and examined resting comfortably at the bedside, in no apparent distress    No acute events overnight  Objective:     Vitals:   Temp (24hrs), Av 5 °F (36 9 °C), Min:97 6 °F (36 4 °C), Max:98 9 °F (37 2 °C)    Temp:  [97 6 °F (36 4 °C)-98 9 °F (37 2 °C)] 98 6 °F (37 °C)  HR:  [72-81] 75  Resp:  [16-20] 16  BP: (160-199)/() 160/89  SpO2:  [89 %-96 %] 94 %  Body mass index is 64 23 kg/m²  Input and Output Summary (last 24 hours): Intake/Output Summary (Last 24 hours) at 2022 1440  Last data filed at 2022 1202  Gross per 24 hour   Intake 1005 ml   Output 3800 ml   Net -2795 ml       Physical Exam:   Physical Exam  Vitals and nursing note reviewed  Constitutional:       General: He is not in acute distress  Appearance: He is obese  He is not toxic-appearing  HENT:      Head: Normocephalic and atraumatic  Mouth/Throat:      Mouth: Mucous membranes are moist    Eyes:      Conjunctiva/sclera: Conjunctivae normal    Cardiovascular:      Rate and Rhythm: Normal rate and regular rhythm  Pulses: Normal pulses  Heart sounds: Normal heart sounds  Pulmonary:      Effort: Pulmonary effort is normal  No respiratory distress  Breath sounds: Normal breath sounds  No wheezing, rhonchi or rales  Abdominal:      General: Bowel sounds are normal  There is no distension  Palpations: Abdomen is soft  Tenderness: There is no abdominal tenderness  Musculoskeletal:      Cervical back: Neck supple  Right lower leg: Edema present  Left lower leg: Edema present  Skin:     General: Skin is warm and dry  Neurological:      Mental Status: He is alert and oriented to person, place, and time  Cranial Nerves: No cranial nerve deficit  Sensory: No sensory deficit  Motor: No weakness  Coordination: Coordination normal    Psychiatric:         Mood and Affect: Mood normal          Behavior: Behavior normal          Thought Content:  Thought content normal           Additional Data:     Labs:  Results from last 7 days   Lab Units 07/18/22  0444 07/17/22  0442 07/16/22  0743   WBC Thousand/uL 9 05   < > 11 38*   HEMOGLOBIN g/dL 8 0*   < > 8 5*   HEMATOCRIT % 24 9*   < > 25 7*   PLATELETS Thousands/uL 313   < > 287   NEUTROS PCT %  --   --  82*   LYMPHS PCT %  --   --  7*   MONOS PCT %  --   --  6   EOS PCT %  --   --  4    < > = values in this interval not displayed  Results from last 7 days   Lab Units 07/18/22  0444   SODIUM mmol/L 135   POTASSIUM mmol/L 4 0   CHLORIDE mmol/L 98   CO2 mmol/L 30   BUN mg/dL 66*   CREATININE mg/dL 3 54*   ANION GAP mmol/L 7   CALCIUM mg/dL 9 0   GLUCOSE RANDOM mg/dL 81     Results from last 7 days   Lab Units 07/16/22  0743   INR  1 04     Results from last 7 days   Lab Units 07/18/22  1107 07/18/22  0621 07/17/22  2223 07/17/22  2038 07/17/22  1526 07/17/22  1137 07/17/22  0649 07/16/22  2017 07/16/22  1616 07/16/22  1129   POC GLUCOSE mg/dl 148* 79 140 70 95 142* 164* 304* 249* 216*     Results from last 7 days   Lab Units 07/16/22  0743   HEMOGLOBIN A1C % 9 1*     Results from last 7 days   Lab Units 07/17/22  0442   PROCALCITONIN ng/ml 0 10       Lines/Drains:  Invasive Devices  Report    Peripheral Intravenous Line  Duration           Peripheral IV 07/18/22 Left;Ventral (anterior) Wrist <1 day                      Imaging: No pertinent imaging reviewed      Recent Cultures (last 7 days):         Last 24 Hours Medication List:   Current Facility-Administered Medications   Medication Dose Route Frequency Provider Last Rate    acetaminophen  650 mg Oral Q6H PRN DOREEN Moy      albuterol  2 puff Inhalation Q4H PRN Thalia Peralta PA-C      amLODIPine  10 mg Oral Daily Thalia Peralta PA-C      bumetanide  4 mg Intravenous BID Michelle Bhatia MD      cariprazine  6 mg Oral Daily Keith Citlaly, CRMARIBELL      carvedilol  25 mg Oral BID With Meals DOREEN Moy      doxazosin  2 mg Oral Daily Keith DOREEN Boucher      famotidine  40 mg Oral Daily DOREEN Moy      FLUoxetine  20 mg Oral QAM Sharif Meo, CRNP      glycerin-hypromellose-  1 drop Both Eyes Q3H PRN River Mooney PA-C      heparin (porcine)  5,000 Units Subcutaneous [facility] Sharif Meo, CRNP      hydrALAZINE  50 mg Oral TID Sharif Meo, CRNP      insulin glargine  20 Units Subcutaneous HS Sharif Meo, CRNP      insulin lispro  1-5 Units Subcutaneous TID AC Sharif Meo, CRNP      insulin lispro  1-5 Units Subcutaneous HS Sharif Meo, CRNP      insulin lispro  5 Units Subcutaneous TID With Meals Sharif Meo, CRMARIBELL      labetalol  10 mg Intravenous Q6H PRN Ronnie Nevarez PA-C      levothyroxine  125 mcg Oral Early Morning Sharif DOREEN Berg      LORazepam  0 5 mg Oral Daily PRN Ronnie Nevarez PA-C      ondansetron  4 mg Intravenous Q4H PRN DOREEN Thomas          Today, Patient Was Seen By: Ronnie Nevarez PA-C    **Please Note: This note may have been constructed using a voice recognition system  **

## 2022-07-18 NOTE — ASSESSMENT & PLAN NOTE
Wt Readings from Last 3 Encounters:   07/18/22 (!) 159 kg (351 lb 3 1 oz)   06/23/22 (!) 146 kg (321 lb 14 oz)   06/19/22 (!) 154 kg (339 lb 8 1 oz)     Mild exacerbation with mildly elevated BNP (121) and worsening b/l LE edema and dyspnea  Not on home diuretic  Echocardiogram on 06/17/2022 shows LVEF of 55%    · Repeat ECHO with LVEF 55%, mild concentric LV hypertrophy   · Suspect diet noncompliance with increased sodium intake   · Continue with Coreg  · Received lasix 40 IV x 1 in ED  Lasix transitioned to IV Bumex 4 mg b i d  [place] · Will likely need home diuretic at discharge  · Weight now trending down  Net output -3 8 L in 24 hours    · Daily weight, ideally standing scale   · Continue with low salt diet   · I/O's

## 2022-07-18 NOTE — WOUND OSTOMY CARE
Consult Note - Wound   Ernesto Lion 38 y o  male MRN: [id number]  Unit/Bed#: -01 Encounter: [id number]    History and Present Illness:  38year old male patient admitted with volume overload  Wound care consulted for abdominal fold erythema and right great toe diabetic ulcer  Patient history significant for DDM2 with neuroapthy, bipolar 1 disorder, OCD, morbid obesity with BMI of 64 23, anxiety, HTN, Covid-19, open toe wound and chronic migraines  Assessment Findings:   Patient in bed for assessment, he is awake, alert and oriented, cooperative with assessment and imaging of wounds  Patient is independent for care, is not incontinent  Patient states he does not wash daily  Discussed skin care with patient, wash daily, instructed on use of Interdry, he states his mother used it in the past and is familiar with the product  Discussed skin care to the buttocks and sacrum  1  Bilateral heels scaly, blanchable  2  Right foot great toe plantar surface diabetic ulcer, patient c/o pain with palpation of wound  Small beefy red wound bed, area of undermining at 9:00 approx 0 1-0 2 cm  Scant serosanguineous drainage on sock  Heavy callus to periwound, measures 3 X 4   Cleansed wound with NSS, applied maxorb Ag and covered with adhesive type tape  Requested podiatry to see patient, was instructed to follow up with the wound center on discharge on prior admission  Patient states he was not able to make an appointment due to episodes of volume overload  Patient also has a callus to the plantar surface of the left great toe, stable, no drainage, no open area, measures 2 X 1 5  3  POA-MASD to the abdominal fold  Beefy red, two small partial thickness linear areas beneath the left abdominal fold  Washed fold and placed Interdry    Skin and Wound Care Plan:   1  Bariatric ehob cushion to chair when OOB  2  Elevate heels off the bed with pillows   3  Hydraguard to bilateral heels and lower buttocks BID and PRN  4  Remove Interdry from abdominal fold, DO NOT THROW AWAY  Wash abdominal fold with soap and water daily, pat dry, place Interdry back into abdominal fold, may use up to 5 days, discard if soiled by urine or stool  May rinse Interdry if lightly soiled with perspiration and let dry prior to placing back in folds  5  Skin nourishing cream daily  6  Cleanse sacral/buttocks with soap and water then apply Calazime TID and PRN to the sacral slit  7  Cleanse right great toe with NSS, place Maxorb Ag on open wound bed, cover with adhesive covering, change every other day and PRN  Patient should be wearing surgical shoe when OOB ambulating    Wounds:  Wound 06/17/22 Diabetic Ulcer Toe (Comment  which one) Anterior;Right (Active)   Wound Image   07/18/22 1415   Wound Description Beefy red 07/18/22 1415   [place]-wound Assessment Dry;Callus;Brown 07/18/22 1415   Wound Length (cm) 0 2 cm 07/18/22 1415   Wound Width (cm) 0 3 cm 07/18/22 1415   Wound Depth (cm) 0 2 cm 07/18/22 1415   Wound Surface Area (cm^2) 0 06 cm^2 07/18/22 1415   Wound Volume (cm^3) 0 012 cm^3 07/18/22 1415   Calculated Wound Volume (cm^3) 0 01 cm^3 07/18/22 1415   Tunneling 0 cm 07/18/22 1415   Undermining 0 2 07/18/22 1415   Undermining is depth extending from 9:00 07/18/22 1415   Drainage Amount Scant 07/18/22 1415   Drainage Description Serosanguineous 07/18/22 1415   Non-staged Wound Description Full thickness 07/18/22 1415   Treatments Cleansed;Irrigation with NSS 07/18/22 1415   Dressing Calcium Alginate with Silver;Special tape 07/18/22 1415   Wound packed?  No 07/18/22 1415   Dressing Changed New 07/18/22 1415   Patient Tolerance Tolerated well 07/18/22 1415   Dressing Status Other (Comment) 07/18/22 1056     Reviewed plan of care with primary RN Paul Bennett  Recommendations written as orders  Wound care team to follow weekly while admitted  Questions or concerns [facility] Nurse    Kadeem MCCLUREN, RN, [employer]

## 2022-07-19 ENCOUNTER — APPOINTMENT (INPATIENT)
Dept: RADIOLOGY | Facility: HOSPITAL | Age: 42
DRG: 425 | End: 2022-07-19
Payer: COMMERCIAL

## 2022-07-19 LAB
ALBUMIN UR ELPH-MCNC: 59.2 %
ALPHA1 GLOB MFR UR ELPH: 7.6 %
ALPHA2 GLOB MFR UR ELPH: 7.7 %
ANION GAP SERPL CALCULATED.3IONS-SCNC: 8 MMOL/L (ref 4–13)
ASO AB SERPL-ACNC: ABNORMAL IU/ML
ASO AB TITR SER LA: NORMAL {TITER}
B-GLOBULIN MFR UR ELPH: 8.7 %
BUN SERPL-MCNC: 64 MG/DL (ref 5–25)
C3 SERPL-MCNC: 116 MG/DL (ref 90–180)
C4 SERPL-MCNC: 35 MG/DL (ref 10–40)
CALCIUM SERPL-MCNC: 8.8 MG/DL (ref 8.4–10.2)
CARDIOLIPIN IGA SER IA-ACNC: 2.3
CARDIOLIPIN IGG SER IA-ACNC: 0.7
CARDIOLIPIN IGM SER IA-ACNC: <0.8
CHLORIDE SERPL-SCNC: 98 MMOL/L (ref 96–108)
CO2 SERPL-SCNC: 30 MMOL/L (ref 21–32)
CREAT SERPL-MCNC: 3.87 MG/DL (ref 0.6–1.3)
ERYTHROCYTE [DISTWIDTH] IN BLOOD BY AUTOMATED COUNT: 13.1 % (ref 11.6–15.1)
GAMMA GLOB MFR UR ELPH: 16.8 %
GFR SERPL CREATININE-BSD FRML MDRD: 17 ML/MIN/1.73SQ M
GLUCOSE SERPL-MCNC: 100 MG/DL (ref 65–140)
GLUCOSE SERPL-MCNC: 105 MG/DL (ref 65–140)
GLUCOSE SERPL-MCNC: 119 MG/DL (ref 65–140)
GLUCOSE SERPL-MCNC: 124 MG/DL (ref 65–140)
GLUCOSE SERPL-MCNC: 172 MG/DL (ref 65–140)
GLUCOSE SERPL-MCNC: 180 MG/DL (ref 65–140)
HCT VFR BLD AUTO: 25.9 % (ref 36.5–49.3)
HGB BLD-MCNC: 8.2 G/DL (ref 12–17)
HIV 1+2 AB+HIV1 P24 AG SERPL QL IA: NORMAL
HIV1 P24 AG SER QL: NORMAL
MCH RBC QN AUTO: 27.3 PG (ref 26.8–34.3)
MCHC RBC AUTO-ENTMCNC: 31.7 G/DL (ref 31.4–37.4)
MCV RBC AUTO: 86 FL (ref 82–98)
PLATELET # BLD AUTO: 350 THOUSANDS/UL (ref 149–390)
PMV BLD AUTO: 9.1 FL (ref 8.9–12.7)
POTASSIUM SERPL-SCNC: 3.9 MMOL/L (ref 3.5–5.3)
PROT PATTERN UR ELPH-IMP: ABNORMAL
PROT UR-MCNC: 113 MG/DL
RBC # BLD AUTO: 3 MILLION/UL (ref 3.88–5.62)
SODIUM SERPL-SCNC: 136 MMOL/L (ref 135–147)
WBC # BLD AUTO: 8.09 THOUSAND/UL (ref 4.31–10.16)

## 2022-07-19 PROCEDURE — 99232 SBSQ HOSP IP/OBS MODERATE 35: CPT | Performed by: INTERNAL MEDICINE

## 2022-07-19 PROCEDURE — 87806 HIV AG W/HIV1&2 ANTB W/OPTIC: CPT | Performed by: INTERNAL MEDICINE

## 2022-07-19 PROCEDURE — 86334 IMMUNOFIX E-PHORESIS SERUM: CPT | Performed by: PATHOLOGY

## 2022-07-19 PROCEDURE — 86225 DNA ANTIBODY NATIVE: CPT | Performed by: INTERNAL MEDICINE

## 2022-07-19 PROCEDURE — 99254 IP/OBS CNSLTJ NEW/EST MOD 60: CPT | Performed by: STUDENT IN AN ORGANIZED HEALTH CARE EDUCATION/TRAINING PROGRAM

## 2022-07-19 PROCEDURE — 99233 SBSQ HOSP IP/OBS HIGH 50: CPT | Performed by: NURSE PRACTITIONER

## 2022-07-19 PROCEDURE — 83520 IMMUNOASSAY QUANT NOS NONAB: CPT | Performed by: INTERNAL MEDICINE

## 2022-07-19 PROCEDURE — 0HBMXZZ EXCISION OF RIGHT FOOT SKIN, EXTERNAL APPROACH: ICD-10-PCS | Performed by: STUDENT IN AN ORGANIZED HEALTH CARE EDUCATION/TRAINING PROGRAM

## 2022-07-19 PROCEDURE — 84165 PROTEIN E-PHORESIS SERUM: CPT | Performed by: PATHOLOGY

## 2022-07-19 PROCEDURE — 86160 COMPLEMENT ANTIGEN: CPT | Performed by: INTERNAL MEDICINE

## 2022-07-19 PROCEDURE — 86038 ANTINUCLEAR ANTIBODIES: CPT | Performed by: INTERNAL MEDICINE

## 2022-07-19 PROCEDURE — 86334 IMMUNOFIX E-PHORESIS SERUM: CPT | Performed by: INTERNAL MEDICINE

## 2022-07-19 PROCEDURE — 86037 ANCA TITER EACH ANTIBODY: CPT | Performed by: INTERNAL MEDICINE

## 2022-07-19 PROCEDURE — 0HBNXZZ EXCISION OF LEFT FOOT SKIN, EXTERNAL APPROACH: ICD-10-PCS | Performed by: STUDENT IN AN ORGANIZED HEALTH CARE EDUCATION/TRAINING PROGRAM

## 2022-07-19 PROCEDURE — 83835 ASSAY OF METANEPHRINES: CPT | Performed by: INTERNAL MEDICINE

## 2022-07-19 PROCEDURE — 85027 COMPLETE CBC AUTOMATED: CPT

## 2022-07-19 PROCEDURE — 86147 CARDIOLIPIN ANTIBODY EA IG: CPT | Performed by: INTERNAL MEDICINE

## 2022-07-19 PROCEDURE — 73630 X-RAY EXAM OF FOOT: CPT

## 2022-07-19 PROCEDURE — 82595 ASSAY OF CRYOGLOBULIN: CPT | Performed by: INTERNAL MEDICINE

## 2022-07-19 PROCEDURE — 11042 DBRDMT SUBQ TIS 1ST 20SQCM/<: CPT | Performed by: STUDENT IN AN ORGANIZED HEALTH CARE EDUCATION/TRAINING PROGRAM

## 2022-07-19 PROCEDURE — 80048 BASIC METABOLIC PNL TOTAL CA: CPT

## 2022-07-19 PROCEDURE — 84165 PROTEIN E-PHORESIS SERUM: CPT | Performed by: INTERNAL MEDICINE

## 2022-07-19 PROCEDURE — 82948 REAGENT STRIP/BLOOD GLUCOSE: CPT

## 2022-07-19 RX ADMIN — DOXAZOSIN 2 MG: 2 TABLET ORAL at 08:19

## 2022-07-19 RX ADMIN — BUMETANIDE 4 MG: 0.25 INJECTION INTRAMUSCULAR; INTRAVENOUS at 18:00

## 2022-07-19 RX ADMIN — FAMOTIDINE 40 MG: 20 TABLET ORAL at 08:17

## 2022-07-19 RX ADMIN — ACETAMINOPHEN 650 MG: 325 TABLET ORAL at 12:03

## 2022-07-19 RX ADMIN — BUMETANIDE 4 MG: 0.25 INJECTION INTRAMUSCULAR; INTRAVENOUS at 08:24

## 2022-07-19 RX ADMIN — CARVEDILOL 25 MG: 25 TABLET, FILM COATED ORAL at 07:35

## 2022-07-19 RX ADMIN — LORAZEPAM 0.5 MG: 0.5 TABLET ORAL at 22:00

## 2022-07-19 RX ADMIN — HYDRALAZINE HYDROCHLORIDE 50 MG: 25 TABLET ORAL at 15:00

## 2022-07-19 RX ADMIN — FLUOXETINE 20 MG: 20 CAPSULE ORAL at 08:18

## 2022-07-19 RX ADMIN — INSULIN LISPRO 5 UNITS: 100 INJECTION, SOLUTION INTRAVENOUS; SUBCUTANEOUS at 12:06

## 2022-07-19 RX ADMIN — HEPARIN SODIUM 5000 UNITS: 5000 INJECTION INTRAVENOUS; SUBCUTANEOUS at 05:12

## 2022-07-19 RX ADMIN — HYDRALAZINE HYDROCHLORIDE 50 MG: 25 TABLET ORAL at 08:19

## 2022-07-19 RX ADMIN — HYDRALAZINE HYDROCHLORIDE 50 MG: 25 TABLET ORAL at 20:39

## 2022-07-19 RX ADMIN — CARVEDILOL 25 MG: 25 TABLET, FILM COATED ORAL at 15:34

## 2022-07-19 RX ADMIN — INSULIN LISPRO 5 UNITS: 100 INJECTION, SOLUTION INTRAVENOUS; SUBCUTANEOUS at 16:46

## 2022-07-19 RX ADMIN — INSULIN LISPRO 1 UNITS: 100 INJECTION, SOLUTION INTRAVENOUS; SUBCUTANEOUS at 16:43

## 2022-07-19 RX ADMIN — AMLODIPINE BESYLATE 10 MG: 10 TABLET ORAL at 08:19

## 2022-07-19 RX ADMIN — ACETAMINOPHEN 650 MG: 325 TABLET ORAL at 06:34

## 2022-07-19 RX ADMIN — HEPARIN SODIUM 5000 UNITS: 5000 INJECTION INTRAVENOUS; SUBCUTANEOUS at 21:47

## 2022-07-19 RX ADMIN — ACETAMINOPHEN 650 MG: 325 TABLET ORAL at 18:04

## 2022-07-19 RX ADMIN — LEVOTHYROXINE SODIUM 125 MCG: 25 TABLET ORAL at 05:12

## 2022-07-19 RX ADMIN — INSULIN GLARGINE 20 UNITS: 100 INJECTION, SOLUTION SUBCUTANEOUS at 21:48

## 2022-07-19 RX ADMIN — INSULIN LISPRO 5 UNITS: 100 INJECTION, SOLUTION INTRAVENOUS; SUBCUTANEOUS at 07:36

## 2022-07-19 RX ADMIN — HEPARIN SODIUM 5000 UNITS: 5000 INJECTION INTRAVENOUS; SUBCUTANEOUS at 13:45

## 2022-07-19 RX ADMIN — CARIPRAZINE 6 MG: 4.5 CAPSULE, GELATIN COATED ORAL at 08:16

## 2022-07-19 RX ADMIN — LABETALOL HYDROCHLORIDE 10 MG: 5 INJECTION, SOLUTION INTRAVENOUS at 03:11

## 2022-07-19 RX ADMIN — INSULIN LISPRO 1 UNITS: 100 INJECTION, SOLUTION INTRAVENOUS; SUBCUTANEOUS at 21:47

## 2022-07-19 NOTE — PROGRESS NOTES
J[facility] 43  Progress Note - Ponce Villegask 1980, 38 y o  male MRN: [id number]  Unit/Bed#: -[facility] Encounter: [id number]  Primary Care Provider: Jude Mello MD   Date and time admitted to hospital: 7/16/2022  7:29 AM    * Volume overload  Assessment & Plan  Wt Readings from Last 3 Encounters:   07/19/22 (!) 155 kg (342 lb 9 5 oz)   06/23/22 (!) 146 kg (321 lb 14 oz)   06/19/22 (!) 154 kg (339 lb 8 1 oz)     · Mild exacerbation with  and worsening bilateral lower extremity edema and dyspnea  Not on home diuretic Echocardiogram on 06/17/2022 shows LVEF of 55%  · Repeat ECHO with LVEF 55%, mild concentric LV hypertrophy   · Suspect dietary noncompliance with increased sodium intake   · Continue with Coreg  · Received lasix 40 IV x 1 in ED  Lasix transitioned to IV Bumex 4 mg twice daily  · Will likely need home diuretic at discharge  · Weight now trending down      · Low-salt diet  · Daily weights and I&Os    Type 2 diabetes mellitus with stage 4 chronic kidney disease and hypertension [facility])  Assessment & Plan  Lab Results   Component Value Date    EGFR 17 07/19/2022    EGFR 19 07/18/2022    EGFR 20 07/17/2022    CREATININE 3 87 (H) 07/19/2022    CREATININE 3 54 (H) 07/18/2022    CREATININE 3 38 (H) 07/17/2022     · Poorly-controlled diabetes  · Repeat A1c 9 1, improved since prior (11 6 on 8/3/21)  · Continue with home Lantus 20U at bedside  · Fingerstick blood sugar with sliding scale coverage  · Accu Cheks     · Prior baseline Cr appears to be 1 9-2 1 mg/dL in 2021, with new baseline around 2 9-3 d/t recent ATN dx   · Supposed to follow up with Nephrology outpatient however has been unable to get out of the house  · Nephrology consult appreciated   · Patient has significant proteinuria, approximately 6 g per day, etiology most likely diabetic nephropathy plus/minus hyper filtration syndrome with or without FSGS; will need full workup with possible kidney biopsy due to proteinuria and significant loss in kidney function over the last year  · Plan is for IR biopsy on Thursday  · Continue with diuresis   · Monitor renal function closely in the setting of diuresis  · Avoid hypotension and nephrotoxins    · With hypertensive urgency POA   · Home hydralazine increased to 50 mg 3 times daily  · Continue home carvedilol 25 mg BID, doxazosin 2mg qd  · Continue diuresis with IV furosemide 40 mg twice daily  · BP not adequately controlled  Will start amlodipine 10 mg daily to patient's current regimen  · Continue to monitor blood pressure trend        Morbid obesity with BMI of 50 0-59 9, adult ([facility])  Assessment & Plan  · Encourage intensive lifestyle modification with diet and exercise  · Dietitian consult     Schizoaffective disorder, depressive type ([facility] [address] )  Assessment & Plan  · Continue with home regimen   · Supportive care    VTE Pharmacologic Prophylaxis:   Pharmacologic: Heparin    Patient Centered Rounds: I have performed bedside rounds with nursing staff today  Discussions with Specialists or Other Care Team Provider:  Nephrology    Education and Discussions with Family / Patient:  Patient    Time Spent for Care: 30 minutes  More than 50% of total time spent on counseling and coordination of care as described above  Current Length of Stay: 2 day(s)    Current Patient Status: Inpatient   Certification Statement: The patient will continue to require additional inpatient hospital stay due to per plan above  Discharge Plan: To be determined  Code Status: Level 1 - Full Code      Subjective:   Patient seen and examined  He says he feels generally weak  No other complaints offered  Objective:     Vitals:   Temp (24hrs), Av 2 °F (36 8 °C), Min:97 °F (36 1 °C), Max:99 °F (37 2 °C)    Temp:  [97 °F (36 1 °C)-99 °F (37 2 °C)] 98 6 °F (37 °C)  HR:  [74-82] 74  Resp:  [16-20] 16  BP: (134-191)/() 162/90  SpO2:  [91 %-96 %] 96 %  Body mass index is 62 66 kg/m²  Input and Output Summary (last 24 hours): Intake/Output Summary (Last 24 hours) at 7/19/2022 1717  Last data filed at 7/19/2022 0510  Gross per 24 hour   Intake 240 ml   Output 1600 ml   Net -1360 ml       Physical Exam:     Physical Exam  Vitals and nursing note reviewed  Constitutional:       General: He is sleeping  Appearance: He is obese  HENT:      Head: Normocephalic and atraumatic  Mouth/Throat:      Pharynx: Oropharynx is clear  Eyes:      Pupils: Pupils are equal, round, and reactive to light  Cardiovascular:      Rate and Rhythm: Normal rate and regular rhythm  Pulmonary:      Effort: Pulmonary effort is normal  No respiratory distress  Breath sounds: Normal breath sounds  Abdominal:      General: Bowel sounds are normal       Palpations: Abdomen is soft  Tenderness: There is no abdominal tenderness  Musculoskeletal:      Cervical back: Neck supple  Right lower leg: Edema present  Left lower leg: Edema present  Skin:     General: Skin is warm and dry  Capillary Refill: Capillary refill takes less than 2 seconds  Neurological:      General: No focal deficit present  Mental Status: He is oriented to person, place, and time and easily aroused  Additional Data:     Labs:    Results from last 7 days   Lab Units 07/19/22 0448 07/17/22 0442 07/16/22  0743   WBC Thousand/uL 8 09   < > 11 38*   HEMOGLOBIN g/dL 8 2*   < > 8 5*   HEMATOCRIT % 25 9*   < > 25 7*   PLATELETS Thousands/uL 350   < > 287   NEUTROS PCT %  --   --  82*   LYMPHS PCT %  --   --  7*   MONOS PCT %  --   --  6   EOS PCT %  --   --  4    < > = values in this interval not displayed       Results from last 7 days   Lab Units 07/19/22 0448   SODIUM mmol/L 136   POTASSIUM mmol/L 3 9   CHLORIDE mmol/L 98   CO2 mmol/L 30   BUN mg/dL 64*   CREATININE mg/dL 3 87*   ANION GAP mmol/L 8   CALCIUM mg/dL 8 8   GLUCOSE RANDOM mg/dL 100     Results from last 7 days   Lab Units 07/16/22  0743   INR  1 04     Results from last 7 days   Lab Units 07/19/22  1441 07/19/22  1105 07/19/22  0646 07/19/22  0352 07/18/22  2047 07/18/22  1557 07/18/22  1107 07/18/22  0621 07/17/22  2223 07/17/22  2038 07/17/22  1526 07/17/22  1137   POC GLUCOSE mg/dl 172* 124 119 105 172* 145* 148* 79 140 70 95 142*     Results from last 7 days   Lab Units 07/16/22  0743   HEMOGLOBIN A1C % 9 1*     Results from last 7 days   Lab Units 07/17/22  0442   PROCALCITONIN ng/ml 0 10           * I Have Reviewed All Lab Data Listed Above  * Additional Pertinent Lab Tests Reviewed:  [facility] 64 Admission Reviewed          Last 24 Hours Medication List:   Current Facility-Administered Medications   Medication Dose Route Frequency Provider Last Rate    acetaminophen  650 mg Oral Q6H PRN DOREEN Cerda      albuterol  2 puff Inhalation Q4H PRN Mat Palencia PA-C      amLODIPine  10 mg Oral Daily Mat Palencia PA-C      bumetanide  4 mg Intravenous BID Emil Ash MD      cariprazine  6 mg Oral Daily Julia Layne, DOREEN      carvedilol  25 mg Oral BID With Meals Julia Layne, DOREEN      doxazosin  2 mg Oral Daily Julia Layne, DOREEN      famotidine  40 mg Oral Daily Juliasoraya Layne, CRNP      FLUoxetine  20 mg Oral QAM Julia Layne, DOREEN      glycerin-hypromellose-  1 drop Both Eyes Q3H PRN Janee Acevedo PA-C      heparin (porcine)  5,000 Units Subcutaneous [facility] Julia Layne, DOREEN      hydrALAZINE  50 mg Oral TID uJlia Layne, DOREEN      insulin glargine  20 Units Subcutaneous HS Julia Layne, DOREEN      insulin lispro  1-5 Units Subcutaneous TID AC Julia Layne, CRNP      insulin lispro  1-5 Units Subcutaneous HS Juliajung Layne, DOREEN      insulin lispro  5 Units Subcutaneous TID With Meals DOREEN Cerda      labetalol  10 mg Intravenous Q6H PRN Mat Palencia PA-C      levothyroxine  125 mcg Oral Early Morning Julia Commander, CRNP      LORazepam  0 5 mg Oral BID PRN Lee Serna PA-C      ondansetron  4 mg Intravenous Q4H PRN Yael Wagner, DOREEN          Today, Patient Was Seen By: DOREEN Ceballos    ** Please Note: Dictation voice to text software may have been used in the creation of this document   **

## 2022-07-19 NOTE — ASSESSMENT & PLAN NOTE
Wt Readings from Last 3 Encounters:   07/19/22 (!) 155 kg (342 lb 9 5 oz)   06/23/22 (!) 146 kg (321 lb 14 oz)   06/19/22 (!) 154 kg (339 lb 8 1 oz)     · Mild exacerbation with  and worsening bilateral lower extremity edema and dyspnea  Not on home diuretic Echocardiogram on 06/17/2022 shows LVEF of 55%  · Repeat ECHO with LVEF 55%, mild concentric LV hypertrophy   · Suspect dietary noncompliance with increased sodium intake   · Continue with Coreg  · Received lasix 40 IV x 1 in ED  Lasix transitioned to IV Bumex 4 mg twice daily  · Will likely need home diuretic at discharge  · Weight now trending down      · Low-salt diet  · Daily weights and I&Os

## 2022-07-19 NOTE — OCCUPATIONAL THERAPY NOTE
07/19/22 1103   OT Last Visit   OT Visit Date 07/19/22   Note Type   Note Type Cancelled Session   Cancel Reasons Other  (patient declined treatment at this time, reporting "not feeling well" - he stated that his nurse is aware/denied any needs    Continue with OT POC as able )     LISBETH Perez/DEMETRA

## 2022-07-19 NOTE — PLAN OF CARE
Problem: PAIN - ADULT  Goal: Verbalizes/displays adequate comfort level or baseline comfort level  Description: Interventions:  - Encourage patient to monitor pain and request assistance  - Assess pain using appropriate pain scale  - Administer analgesics based on type and severity of pain and evaluate response  - Implement non-pharmacological measures as appropriate and evaluate response  - Consider cultural and social influences on pain and pain management  - Notify physician/advanced practitioner if interventions unsuccessful or patient reports new pain  Outcome: Progressing     Problem: INFECTION - ADULT  Goal: Absence or prevention of progression during hospitalization  Description: INTERVENTIONS:  - Assess and monitor for signs and symptoms of infection  - Monitor lab/diagnostic results  - Monitor all insertion sites, i e  indwelling lines, tubes, and drains  - Monitor endotracheal if appropriate and nasal secretions for changes in amount and color  - [place] appropriate cooling/warming therapies per order  - Administer medications as ordered  - Instruct and encourage patient and family to use good hand hygiene technique  - Identify and instruct in appropriate isolation precautions for identified infection/condition  Outcome: Progressing  Goal: Absence of fever/infection during neutropenic period  Description: INTERVENTIONS:  - Monitor WBC    Outcome: Progressing     Problem: SAFETY ADULT  Goal: Patient will remain free of falls  Description: INTERVENTIONS:  - Educate patient/family on patient safety including physical limitations  - Instruct patient to call for assistance with activity   - Consult OT/PT to assist with strengthening/mobility   - Keep Call bell within reach  - Keep bed low and locked with side rails adjusted as appropriate  - Keep care items and personal belongings within reach  - Initiate and maintain comfort rounds  - Make Fall Risk Sign visible to staff  - Offer Toileting every 1 Hours, in advance of need  - Initiate/Maintain bed alarm  - Obtain necessary fall risk management equipment: yellow socks   - Apply yellow socks and bracelet for high fall risk patients  - Consider moving patient to room near nurses station  Outcome: Progressing  Goal: Maintain or return to baseline ADL function  Description: INTERVENTIONS:  -  Assess patient's ability to carry out ADLs; assess patient's baseline for ADL function and identify physical deficits which impact ability to perform ADLs (bathing, care of mouth/teeth, toileting, grooming, dressing, etc )  - Assess/evaluate cause of self-care deficits   - Assess range of motion  - Assess patient's mobility; develop plan if impaired  - Assess patient's need for assistive devices and provide as appropriate  - Encourage maximum independence but intervene and supervise when necessary  - Involve family in performance of ADLs  - Assess for home care needs following discharge   - Consider OT consult to assist with ADL evaluation and planning for discharge  - Provide patient education as appropriate  Outcome: Progressing  Goal: Maintains/Returns to pre admission functional level  Description: INTERVENTIONS:  - Perform BMAT or MOVE assessment daily    - Set and communicate daily mobility goal to care team and patient/family/caregiver  - Collaborate with rehabilitation services on mobility goals if consulted  - Perform Range of Motion 3 times a day  - Reposition patient every 2 hours    - Dangle patient 3 times a day  - Stand patient 3 times a day  - Ambulate patient 3 times a day  - Out of bed to chair 3 times a day   - Out of bed for meals 3 times a day  - Out of bed for toileting  - Record patient progress and toleration of activity level   Outcome: Progressing     Problem: DISCHARGE PLANNING  Goal: Discharge to home or other facility with appropriate resources  Description: INTERVENTIONS:  - Identify barriers to discharge w/patient and caregiver  - Arrange for needed discharge resources and transportation as appropriate  - Identify discharge learning needs (meds, wound care, etc )  - Arrange for interpretive services to assist at discharge as needed  - Refer to Case Management Department for coordinating discharge planning if the patient needs post-hospital services based on physician/advanced practitioner order or complex needs related to functional status, cognitive ability, or social support system  Outcome: Progressing     Problem: Knowledge Deficit  Goal: Patient/family/caregiver demonstrates understanding of disease process, treatment plan, medications, and discharge instructions  Description: Complete learning assessment and assess knowledge base    Interventions:  - Provide teaching at level of understanding  - Provide teaching via preferred learning methods  Outcome: Progressing     Problem: Prexisting or High Potential for Compromised Skin Integrity  Goal: Skin integrity is maintained or improved  Description: INTERVENTIONS:  - Identify patients at risk for skin breakdown  - Assess and monitor skin integrity  - Assess and monitor nutrition and hydration status  - Monitor labs   - Assess for incontinence   - Turn and reposition patient  - Assist with mobility/ambulation  - Relieve pressure over bony prominences  - Avoid friction and shearing  - Provide appropriate hygiene as needed including keeping skin clean and dry  - Evaluate need for skin moisturizer/barrier cream  - Collaborate with interdisciplinary team   - Patient/family teaching  - Consider wound care consult   Outcome: Progressing

## 2022-07-19 NOTE — PLAN OF CARE
Problem: PAIN - ADULT  Goal: Verbalizes/displays adequate comfort level or baseline comfort level  Description: Interventions:  - Encourage patient to monitor pain and request assistance  - Assess pain using appropriate pain scale  - Administer analgesics based on type and severity of pain and evaluate response  - Implement non-pharmacological measures as appropriate and evaluate response  - Consider cultural and social influences on pain and pain management  - Notify physician/advanced practitioner if interventions unsuccessful or patient reports new pain  Outcome: Progressing     Problem: INFECTION - ADULT  Goal: Absence or prevention of progression during hospitalization  Description: INTERVENTIONS:  - Assess and monitor for signs and symptoms of infection  - Monitor lab/diagnostic results  - Monitor all insertion sites, i e  indwelling lines, tubes, and drains  - Monitor endotracheal if appropriate and nasal secretions for changes in amount and color  - [address] appropriate cooling/warming therapies per order  - Administer medications as ordered  - Instruct and encourage patient and family to use good hand hygiene technique  - Identify and instruct in appropriate isolation precautions for identified infection/condition  Outcome: Progressing  Goal: Absence of fever/infection during neutropenic period  Description: INTERVENTIONS:  - Monitor WBC    Outcome: Progressing     Problem: SAFETY ADULT  Goal: Patient will remain free of falls  Description: INTERVENTIONS:  - Educate patient/family on patient safety including physical limitations  - Instruct patient to call for assistance with activity   - Consult OT/PT to assist with strengthening/mobility   - Keep Call bell within reach  - Keep bed low and locked with side rails adjusted as appropriate  - Keep care items and personal belongings within reach  - Initiate and maintain comfort rounds  - Make Fall Risk Sign visible to staff  - Offer Toileting every 2 Hours, in advance of need  - Initiate/Maintain bed alarm  - Obtain necessary fall risk management equipment: non slip socks  - Apply yellow socks and bracelet for high fall risk patients  - Consider moving patient to room near nurses station  Outcome: Progressing  Goal: Maintain or return to baseline ADL function  Description: INTERVENTIONS:  -  Assess patient's ability to carry out ADLs; assess patient's baseline for ADL function and identify physical deficits which impact ability to perform ADLs (bathing, care of mouth/teeth, toileting, grooming, dressing, etc )  - Assess/evaluate cause of self-care deficits   - Assess range of motion  - Assess patient's mobility; develop plan if impaired  - Assess patient's need for assistive devices and provide as appropriate  - Encourage maximum independence but intervene and supervise when necessary  - Involve family in performance of ADLs  - Assess for home care needs following discharge   - Consider OT consult to assist with ADL evaluation and planning for discharge  - Provide patient education as appropriate  Outcome: Progressing  Goal: Maintains/Returns to pre admission functional level  Description: INTERVENTIONS:  - Perform BMAT or MOVE assessment daily    - Set and communicate daily mobility goal to care team and patient/family/caregiver  - Collaborate with rehabilitation services on mobility goals if consulted  - Perform Range of Motion 2 times a day  - Reposition patient every 2 hours    - Dangle patient 3 times a day  - Stand patient 3 times a day  - Ambulate patient 3 times a day  - Out of bed to chair 3 times a day   - Out of bed for meals 3 times a day  - Out of bed for toileting  - Record patient progress and toleration of activity level   Outcome: Progressing     Problem: DISCHARGE PLANNING  Goal: Discharge to home or other facility with appropriate resources  Description: INTERVENTIONS:  - Identify barriers to discharge w/patient and caregiver  - Arrange for needed discharge resources and transportation as appropriate  - Identify discharge learning needs (meds, wound care, etc )  - Arrange for interpretive services to assist at discharge as needed  - Refer to Case Management Department for coordinating discharge planning if the patient needs post-hospital services based on physician/advanced practitioner order or complex needs related to functional status, cognitive ability, or social support system  Outcome: Progressing     Problem: Knowledge Deficit  Goal: Patient/family/caregiver demonstrates understanding of disease process, treatment plan, medications, and discharge instructions  Description: Complete learning assessment and assess knowledge base    Interventions:  - Provide teaching at level of understanding  - Provide teaching via preferred learning methods  Outcome: Progressing     Problem: Prexisting or High Potential for Compromised Skin Integrity  Goal: Skin integrity is maintained or improved  Description: INTERVENTIONS:  - Identify patients at risk for skin breakdown  - Assess and monitor skin integrity  - Assess and monitor nutrition and hydration status  - Monitor labs   - Assess for incontinence   - Turn and reposition patient  - Assist with mobility/ambulation  - Relieve pressure over bony prominences  - Avoid friction and shearing  - Provide appropriate hygiene as needed including keeping skin clean and dry  - Evaluate need for skin moisturizer/barrier cream  - Collaborate with interdisciplinary team   - Patient/family teaching  - Consider wound care consult   Outcome: Progressing     Problem: Potential for Falls  Goal: Patient will remain free of falls  Description: INTERVENTIONS:  - Educate patient/family on patient safety including physical limitations  - Instruct patient to call for assistance with activity   - Consult OT/PT to assist with strengthening/mobility   - Keep Call bell within reach  - Keep bed low and locked with side rails adjusted as appropriate  - Keep care items and personal belongings within reach  - Initiate and maintain comfort rounds  - Make Fall Risk Sign visible to staff  - Offer Toileting every 2 Hours, in advance of need  - Initiate/Maintain bed alarm  - Obtain necessary fall risk management equipment: non slip socks  - Apply yellow socks and bracelet for high fall risk patients  - Consider moving patient to room near nurses station  Outcome: Progressing

## 2022-07-19 NOTE — CONSULTS
Consult - Podiatry   Bola Yuan 38 y o  male MRN: [id number]  Unit/Bed#: -01 Encounter: [id number]    Assessment/Plan     Assessment:  1  Right diabetic toe ulcer   2  Left diabetic toe ulcer   3  Diabetic neuropathy - Last A1c of 9 1% 7/2022    Plan:  - Bilateral hallux wounds were debrided as below and are stable however given the chronicity of the the right toe wound I will obtain xray to r/o underlying OM  - Right foot xray pending   - Recommend to continue [address] to the bilateral toes, nursing instructions in    - Continue Surgical shoe to the right foot  - Rest of care per primary service     1  Wound debridement note Right hallux wound: After verbal consent was obtained, wound located at right plantar hallux was excisionally/surgicallly debrided with 15 blade removed callus, fibrin/fibrous, macerated soft tissue to the level of subcutaneous tissue  Post debridement wound measurements 1 0x1 2x0 2cm, with appearance of wound fresh bleeding tissue, viable, granular, brisk pinpoint bleeding  2   Wound debridement note Left medial hallux wound: After verbal consent was obtained, wound located at left medial plantar hallux was selectively debrided with 15 blade removed callus, fibrin/fibrous soft tissue to the level of subcutaneous tissue  Post debridement wound measurements 1 0x1 0x0 2cm, with appearance of wound fresh bleeding tissue, viable, granular  Lab Results   Component Value Date    HGBA1C 9 1 (H) 07/16/2022       History of Present Illness     HPI:  Bola Yuan is a 38 y o  male who presents with bilateral toe ulcers and overlying callus  PMH includes diabetes with neuropathy and chronic wound on the right foot  Patient last evaluated with Dr Kaylin Crane about a month ago, MRI negative for OM  Patient did not follow up outpatient for the wound care  Reports his step-mom is doing dressings changes with gauze and a tape  NO other complaints       Inpatient consult to Podiatry  Consult performed by: Jeannette Edmonds DPM  Consult ordered by: [id number] [facility], DO        Review of Systems   Constitutional: Negative  HENT: Negative  Eyes: Negative  Respiratory: Negative  Cardiovascular: Negative  Gastrointestinal: Negative  Musculoskeletal:  neg  Skin: feet wounds    Neurological: Negative  Psych: negative  Historical Information   Past Medical History:   Diagnosis Date    Acute bronchitis due to other specified organisms 07/05/2019    Chronic headaches     Diabetes mellitus ([address] )     Disease of thyroid gland     Esophagitis     Gastritis     Gastroparesis     GERD (gastroesophageal reflux disease)     Hypertension     Migraine     Migraines 03/11/2021    Obesity     Obsessive compulsive disorder     Psychiatric disorder     Renal disorder     Schizoaffective disorder [facility])      Past Surgical History:   Procedure Laterality Date    ESOPHAGOGASTRODUODENOSCOPY N/A 1/15/2019    Procedure: ESOPHAGOGASTRODUODENOSCOPY (EGD); Surgeon: Francisco Javier Lao MD;  Location: [facility];   Service: Gastroenterology     Social History   Social History     Substance and Sexual Activity   Alcohol Use Not Currently     Social History     Substance and Sexual Activity   Drug Use Not Currently     Social History     Tobacco Use   Smoking Status Never Smoker   Smokeless Tobacco Never Used     Family History:   Family History   Problem Relation Age of Onset    COPD Mother     Hypertension Mother     Heart failure Mother     Rheum arthritis Family     Heart disease Family     Hypertension Father     Diabetes Father     Hyperlipidemia Father        Meds/Allergies   Medications Prior to Admission   Medication    ACCU-CHEK FASTCLIX LANCETS MISC    aluminum-magnesium hydroxide-simethicone (MYLANTA) 200-200-20 mg/5 mL suspension    Blood Glucose Monitoring Suppl (ACCU-CHEK GUIDE) w/Device KIT    carvedilol (COREG) 25 mg tablet    doxazosin (CARDURA) 2 mg tablet    famotidine (PEPCID) 40 MG tablet    FLUoxetine (PROzac) 20 mg capsule    hydrALAZINE (APRESOLINE) 25 mg tablet    insulin glargine (LANTUS) 100 units/mL subcutaneous injection    insulin lispro (HumaLOG) 100 units/mL injection    Insulin Pen Needle (Pen Needles 3/16") 31G X 5 MM MISC    levothyroxine 125 mcg tablet    LORazepam (ATIVAN) 0 5 mg tablet    pantoprazole (PROTONIX) 40 mg tablet    Vraylar 6 MG capsule     Allergies   Allergen Reactions    Amoxicillin Diarrhea    Augmentin [Amoxicillin-Pot Clavulanate] Diarrhea    Clavulanic Acid Diarrhea    Erythromycin Diarrhea       Objective   First Vitals:   Blood Pressure: (!) 180/89 (07/16/22 0734)  Pulse: 90 (07/16/22 0734)  Temperature: 98 1 °F (36 7 °C) (07/16/22 0731)  Temp Source: Oral (07/16/22 0731)  Respirations: 20 (07/16/22 0734)  Height: 5' 2" (157 5 cm) (07/16/22 1356)  Weight - Scale: (!) 167 kg (368 lb 6 2 oz) (07/16/22 1102)  SpO2: 93 % (07/16/22 0734)    Current Vitals:   Blood Pressure: (!) 186/100 (07/19/22 0725)  Pulse: 82 (07/19/22 0725)  Temperature: (!) 97 °F (36 1 °C) (07/19/22 0738)  Temp Source: Oral (07/19/22 0738)  Respirations: 20 (07/19/22 0725)  Height: 5' 2" (157 5 cm) (07/16/22 1356)  Weight - Scale: (!) 155 kg (342 lb 9 5 oz) (07/19/22 0600)  SpO2: 93 % (07/19/22 0738)        BP (!) 186/100   Pulse 82   Temp (!) 97 °F (36 1 °C) (Oral)   Resp 20   Ht 5' 2" (1 575 m)   Wt (!) 155 kg (342 lb 9 5 oz)   SpO2 93%   BMI 62 66 kg/m²      General Appearance:    Alert, cooperative, no distress   Head:    Normocephalic, without obvious abnormality, atraumatic   Eyes:    PERRL, conjunctiva/corneas clear, EOM's intact        Nose:   Moist mucous membranes   Neck:   Supple, symmetrical, trachea midline   Back:     Symmetric   Lungs:     Respirations unlabored   Heart:    Regular rate and rhythm, S1 and S2 normal, no murmur, rub   or gallop   Abdomen:     Soft, non-tender   Extremities:   MMT is 5/5 in all muscle compartments bilaterally  Passive ROM at the 1st MPJ and ankle joint are Decreased bilaterally with the leg extended  Active ROM at the lesser digits is intact  No Pain on palpation of any pedal structure   Pulses:   DP/PT pedal pulses on the left are present  DP/PT pedal pulses on the right are present  CRT brisk at the digits  Pedal hair is absent  1+ edema noted at bilateral lower extremities  Skin temperature is within normal limits bilaterally  Skin:   Right plantar hallux ulcer with hyperketatotic periwound with granular and fibrotic wound base, probes to level of subcutaneous tissue  Left medial hallux wound appears stable with superficial wound probes to subQ  No clinical signs of infection present  Neurologic:   Gross sensation is intact  Protective sensation is diminished             Lab Results:   Admission on 07/16/2022   Component Date Value    WBC 07/16/2022 11 38 (A)    RBC 07/16/2022 3 01 (A)    Hemoglobin 07/16/2022 8 5 (A)    Hematocrit 07/16/2022 25 7 (A)    MCV 07/16/2022 85     MCH 07/16/2022 28 2     MCHC 07/16/2022 33 1     RDW 07/16/2022 13 0     MPV 07/16/2022 8 8 (A)    Platelets 88/35/7571 287     nRBC 07/16/2022 0     Neutrophils Relative 07/16/2022 82 (A)    Immat GRANS % 07/16/2022 1     Lymphocytes Relative 07/16/2022 7 (A)    Monocytes Relative 07/16/2022 6     Eosinophils Relative 07/16/2022 4     Basophils Relative 07/16/2022 0     Neutrophils Absolute 07/16/2022 9 28 (A)    Immature Grans Absolute 07/16/2022 0 06     Lymphocytes Absolute 07/16/2022 0 82     Monocytes Absolute 07/16/2022 0 72     Eosinophils Absolute 07/16/2022 0 46     Basophils Absolute 07/16/2022 0 04     Sodium 07/16/2022 127 (A)    Potassium 07/16/2022 5 1     Chloride 07/16/2022 94 (A)    CO2 07/16/2022 25     ANION GAP 07/16/2022 8     BUN 07/16/2022 55 (A)    Creatinine 07/16/2022 3 19 (A)    Glucose 07/16/2022 191 (A)    Calcium 07/16/2022 8 4     eGFR 07/16/2022 22     Protime 07/16/2022 13 6  INR 07/16/2022 1 04     BNP 07/16/2022 121 (A)    hs TnI 0hr 07/16/2022 8     hs TnI 2hr 07/16/2022 8     Delta 2hr hsTnI 07/16/2022 0     SARS-CoV-2 07/16/2022 Negative     INFLUENZA A PCR 07/16/2022 Negative     INFLUENZA B PCR 07/16/2022 Negative     RSV PCR 07/16/2022 Negative     Hemoglobin A1C 07/16/2022 9 1 (A)    EAG 07/16/2022 214     POC Glucose 07/16/2022 216 (A)    POC Glucose 07/16/2022 249 (A)    POC Glucose 07/16/2022 304 (A)    Sodium 07/17/2022 132 (A)    Potassium 07/17/2022 4 5     Chloride 07/17/2022 97     CO2 07/17/2022 28     ANION GAP 07/17/2022 7     BUN 07/17/2022 57 (A)    Creatinine 07/17/2022 3 38 (A)    Glucose 07/17/2022 229 (A)    Calcium 07/17/2022 8 8     eGFR 07/17/2022 20     WBC 07/17/2022 12 37 (A)    RBC 07/17/2022 2 98 (A)    Hemoglobin 07/17/2022 8 3 (A)    Hematocrit 07/17/2022 25 5 (A)    MCV 07/17/2022 86     MCH 07/17/2022 27 9     MCHC 07/17/2022 32 5     RDW 07/17/2022 13 1     Platelets 56/10/0630 322     MPV 07/17/2022 9 5     POC Glucose 07/17/2022 164 (A)    Ventricular Rate 07/16/2022 94     Atrial Rate 07/16/2022 94     ID Interval 07/16/2022 192     QRSD Interval 07/16/2022 94     QT Interval 07/16/2022 366     QTC Interval 07/16/2022 457     P Axis 07/16/2022 60     QRS Axis 07/16/2022 -25     T Wave Axis 07/16/2022 80     Ventricular Rate 07/16/2022 84     Atrial Rate 07/16/2022 84     ID Interval 07/16/2022 192     QRSD Interval 07/16/2022 96     QT Interval 07/16/2022 382     QTC Interval 07/16/2022 451     P Axis 07/16/2022 53     QRS Axis 07/16/2022 -2     T Wave Axis 07/16/2022 80     Ventricular Rate 07/16/2022 88     Atrial Rate 07/16/2022 88     ID Interval 07/16/2022 186     QRSD Interval 07/16/2022 92     QT Interval 07/16/2022 368     QTC Interval 07/16/2022 445     P Axis 07/16/2022 47     QRS Axis 07/16/2022 -5     T Wave Axis 07/16/2022 69     POC Glucose 07/17/2022 142 (A)    Procalcitonin 07/17/2022 0 10     Creatinine, Ur 07/17/2022 24 2     Microalbum  ,U,Random 07/17/2022 930 0 (A)    Microalb Creat Ratio 07/17/2022 3,843 (A)    Creatinine, Ur 07/17/2022 24 2     Protein Urine Random 07/17/2022 147     Prot/Creat Ratio, Ur 07/17/2022 6 07 (A)    Urea Nitrogen, Ur 07/17/2022 212     Creatinine, Ur 07/17/2022 24 2     POC Glucose 07/17/2022 95     POC Glucose 07/17/2022 70     POC Glucose 07/17/2022 140     Sodium 07/18/2022 135     Potassium 07/18/2022 4 0     Chloride 07/18/2022 98     CO2 07/18/2022 30     ANION GAP 07/18/2022 7     BUN 07/18/2022 66 (A)    Creatinine 07/18/2022 3 54 (A)    Glucose 07/18/2022 81     Glucose, Fasting 07/18/2022 81     Calcium 07/18/2022 9 0     eGFR 07/18/2022 19     WBC 07/18/2022 9 05     RBC 07/18/2022 2 89 (A)    Hemoglobin 07/18/2022 8 0 (A)    Hematocrit 07/18/2022 24 9 (A)    MCV 07/18/2022 86     MCH 07/18/2022 27 7     MCHC 07/18/2022 32 1     RDW 07/18/2022 13 2     Platelets 73/95/0938 313     MPV 07/18/2022 9 3     POC Glucose 07/18/2022 79     POC Glucose 07/18/2022 148 (A)    Antistreptolysin O Screen 07/18/2022 Positive (>200 IU/ml)     Hepatitis B Surface Ag 07/18/2022 Non-reactive     Hep B S Ab 07/18/2022 <3 10     Hepatitis C Ab 07/18/2022 Non-reactive     Hep B C IgM 07/18/2022 Non-reactive     Hep B Core Total Ab 07/18/2022 Non-reactive     POC Glucose 07/18/2022 145 (A)    POC Glucose 07/18/2022 172 (A)    POC Glucose 07/19/2022 105     ANTICARDIOLIPIN IGG ANTI* 07/19/2022 0 7     ANTICARDIOLIPIN IGA ANTI* 07/19/2022 2 3     ANTICARDIOLIPIN IGM ANTI* 07/19/2022 <0 8     Rapid HIV 1 AND 2 07/19/2022 Non-Reactive     HIV-1 P24 Ag Screen 07/19/2022 Non-Reactive     WBC 07/19/2022 8 09     RBC 07/19/2022 3 00 (A)    Hemoglobin 07/19/2022 8 2 (A)    Hematocrit 07/19/2022 25 9 (A)    MCV 07/19/2022 86     MCH 07/19/2022 27 3     MCHC 07/19/2022 31 7     RDW 07/19/2022 13 1     Platelets 92/[birth year]1950 350     MPV 07/19/2022 9 1     Sodium 07/19/2022 136     Potassium 07/19/2022 3 9     Chloride 07/19/2022 98     CO2 07/19/2022 30     ANION GAP 07/19/2022 8     BUN 07/19/2022 64 (A)    Creatinine 07/19/2022 3 87 (A)    Glucose 07/19/2022 100     Calcium 07/19/2022 8 8     eGFR 07/19/2022 17     POC Glucose 07/19/2022 119     ASO TITER 07/18/2022 400 IU/ml (A)    POC Glucose 07/19/2022 124                    Invalid input(s): LABAEARO            Imaging: I have personally reviewed pertinent films in PACS  EKG, Pathology, and Other Studies: I have personally reviewed pertinent reports        Code Status: Level 1 - Full Code  Advance Directive and Living Will:      Power of :    POLST:

## 2022-07-19 NOTE — ASSESSMENT & PLAN NOTE
Lab Results   Component Value Date    EGFR 17 07/19/2022    EGFR 19 07/18/2022    EGFR 20 07/17/2022    CREATININE 3 87 (H) 07/19/2022    CREATININE 3 54 (H) 07/18/2022    CREATININE 3 38 (H) 07/17/2022     · Poorly-controlled diabetes  · Repeat A1c 9 1, improved since prior (11 6 on 8/3/21)  · Continue with home Lantus 20U at bedside  · Fingerstick blood sugar with sliding scale coverage  · Accu Cheks     · Prior baseline Cr appears to be 1 9-2 1 mg/dL in 2021, with new baseline around 2 9-3 d/t recent ATN dx   · Supposed to follow up with Nephrology outpatient however has been unable to get out of the house  · Nephrology consult appreciated   · Patient has significant proteinuria, approximately 6 g per day, etiology most likely diabetic nephropathy plus/minus hyper filtration syndrome with or without FSGS; will need full workup with possible kidney biopsy due to proteinuria and significant loss in kidney function over the last year  · Plan is for IR biopsy on Thursday  · Continue with diuresis   · Monitor renal function closely in the setting of diuresis  · Avoid hypotension and nephrotoxins    · With hypertensive urgency POA   · Home hydralazine increased to 50 mg 3 times daily  · Continue home carvedilol 25 mg BID, doxazosin 2mg qd  · Continue diuresis with IV furosemide 40 mg twice daily  · BP not adequately controlled    Will start amlodipine 10 mg daily to patient's current regimen  · Continue to monitor blood pressure trend

## 2022-07-19 NOTE — PROGRESS NOTES
Progress Note - Nephrology   Ban Meehan 38 y o  male MRN: [id number]  Unit/Bed#: -01 Encounter: [id number]    A/P:  1  Acute kidney injury on top chronic kidney disease, present on admission               creatinine slightly higher than last 24 hours, no up to 3 87 mg/dL  There are no significant electrolyte abnormalities  Continue avoid potential nephrotoxins  Continue to provide Bumex to assist with volume management  Continue with low-sodium diet  2  Chronic kidney disease stage 4 with probable baseline creatinine around 3 mg/dL  3  Nephrotic range proteinuria               serologies obtained earlier today, some have return such as HIV which is negative  Others will take several more days as they are send out labs  Patient will benefit from an inpatient renal biopsy to help distinguish the nephrotic range proteinuria and establish a diagnosis  Depending on the diagnosis, there will be significant management adjustments  Etiology is most likely due to diabetic nephropathy versus FSGS secondary to hyperfiltration  If the patient has a primary cause, this will also need to be addressed in a sensitive matter given the patient's other diagnoses including diabetes as well as schizophrenia  4  Hypertensive urgency              Blood pressure has been as low as 093 mm Hg systolic, currently elevated at 186 mm of mercury  Continue monitor blood pressures, they are improving  Patient currently not a candidate for ACE-inhibitor or angiotensin receptor blocker due to acute kidney injury  5  Volume overload               may continue to provide aggressive diuresis in form of Bumex 4 mg IV b i d     Will look to deescalate care in the near future  6  Morbid obesity with a BMI between 50-59 9  7   Schizoaffective variant of schizophrenia    Follow up reason for today's visit:  Acute kidney injury/chronic kidney disease/nephrotic range proteinuria/hypertensive urgency    Volume overload    Patient Active Problem List   Diagnosis    Type 2 diabetes mellitus without complication, with long-term current use of insulin ([facility])    Abdominal pain    OCD (obsessive compulsive disorder)    Schizoaffective disorder, depressive type ([place] [id number] )    Hypothyroidism    Morbid obesity with BMI of 50 0-59 9, adult ([place] [address] )    Anxiety    Episodic confusion    Snoring    Insomnia    Hypersomnia    Vitamin D deficiency    Vomiting    Occipital neuralgia of left side    Headache    Nodule of parotid gland    Bipolar 1 disorder ([place] [address] )    Depression    Type 1 diabetes mellitus without complication ([place] [address] )    Urinary retention    Peripheral edema    Hyperlipidemia, mixed    Migraine without aura and without status migrainosus, not intractable    Class 3 severe obesity due to excess calories with serious comorbidity and body mass index (BMI) of 60 0 to 69 9 in adult [facility])    Spinal stenosis in cervical region    Difficulty urinating    Urinary tract infection without hematuria    Penile pain    Chronic migraine without aura without status migrainosus, not intractable    Hypertensive emergency    Encephalopathy    Leukocytosis    Schizo affective schizophrenia ([place] [address] )    STEFANIA (acute kidney injury) ([place] [address] )    Acute respiratory disease due to COVID-19 virus    Elevated troponin    Type 2 diabetes mellitus with stage 4 chronic kidney disease and hypertension ([place] [address] )    Family history of heart disease    Hypokalemia    Chest pressure    GERD (gastroesophageal reflux disease)    Hypertension    SOB (shortness of breath)    Volume overload    Hyponatremia    Open toe wound    Primary hypertension         Subjective:   No acute events overnight, patient without acute complaints at this time  Objective:     Vitals: Blood pressure (!) 186/100, pulse 82, temperature (!) 97 °F (36 1 °C), temperature source Oral, resp   rate 20, height 5' 2" (1 575 m), weight (!) 155 kg (342 lb 9 5 oz), SpO2 93 % ,Body mass index is 62 66 kg/m²  Weight (last 2 days)     Date/Time Weight    07/19/22 0600 155 (342 6)    07/18/22 0600 159 (351 19)    07/17/22 0600 163 (360 01)            Intake/Output Summary (Last 24 hours) at 7/19/2022 1408  Last data filed at 7/19/2022 0510  Gross per 24 hour   Intake 240 ml   Output 1600 ml   Net -1360 ml     I/O last 3 completed shifts: In: 56 [P O :1020]  Out: 5400 [Urine:5400]         Physical Exam: BP (!) 186/100   Pulse 82   Temp (!) 97 °F (36 1 °C) (Oral)   Resp 20   Ht 5' 2" (1 575 m)   Wt (!) 155 kg (342 lb 9 5 oz)   SpO2 93%   BMI 62 66 kg/m²     General Appearance:    Alert, cooperative, no distress, appears stated age   Head:    Normocephalic, without obvious abnormality, atraumatic   Eyes:    Conjunctiva/corneas clear   Ears:    Normal external ears   Nose:   Nares normal, septum midline, mucosa normal, no drainage    or sinus tenderness   Throat:   Lips, mucosa, and tongue normal; teeth and gums normal   Neck:   Supple   Back:     Symmetric, no curvature, ROM normal, no CVA tenderness   Lungs:     Clear to auscultation bilaterally, respirations unlabored   Chest wall:    No tenderness or deformity   Heart:    Regular rate and rhythm, S1 and S2 normal, no murmur, rub   or gallop   Abdomen:     Soft, non-tender, bowel sounds active   Extremities:   Extremities normal, atraumatic, no cyanosis, +2 bilateral lower extremity edema   Skin:   Skin color, texture, turgor normal, no rashes or lesions   Lymph nodes:   Cervical normal   Neurologic:   CNII-XII intact            Lab, Imaging and other studies: I have personally reviewed pertinent labs    CBC:   Lab Results   Component Value Date    WBC 8 09 07/19/2022    HGB 8 2 (L) 07/19/2022    HCT 25 9 (L) 07/19/2022    MCV 86 07/19/2022     07/19/2022    MCH 27 3 07/19/2022    MCHC 31 7 07/19/2022    RDW 13 1 07/19/2022    MPV 9 1 07/19/2022     CMP:   Lab Results   Component Value Date    K 3 9 07/19/2022    CL 98 07/19/2022    CO2 30 07/19/2022    BUN 64 (H) 07/19/2022    CREATININE 3 87 (H) 07/19/2022    CALCIUM 8 8 07/19/2022    EGFR 17 07/19/2022         Results from last 7 days   Lab Units 07/19/22  0448 07/18/22  0444 07/17/22  0442   POTASSIUM mmol/L 3 9 4 0 4 5   CHLORIDE mmol/L 98 98 97   CO2 mmol/L 30 30 28   BUN mg/dL 64* 66* 57*   CREATININE mg/dL 3 87* 3 54* 3 38*   CALCIUM mg/dL 8 8 9 0 8 8         Phosphorus: No results found for: PHOS  Magnesium: No results found for: MG  Urinalysis: No results found for: COLORU, CLARITYU, SPECGRAV, PHUR, LEUKOCYTESUR, NITRITE, PROTEINUA, GLUCOSEU, KETONESU, BILIRUBINUR, BLOODU  Ionized Calcium: No results found for: CAION  Coagulation: No results found for: PT, INR, APTT  Troponin: No results found for: TROPONINI  ABG: No results found for: PHART, EOX8VVF, PO2ART, MDC8HDQ, X4GNQFUV, BEART, SOURCE  Radiology review:     IMAGING  No results found      Current Facility-Administered Medications   Medication Dose Route Frequency    acetaminophen (TYLENOL) tablet 650 mg  650 mg Oral Q6H PRN    albuterol (PROVENTIL HFA,VENTOLIN HFA) inhaler 2 puff  2 puff Inhalation Q4H PRN    amLODIPine (NORVASC) tablet 10 mg  10 mg Oral Daily    bumetanide (BUMEX) injection 4 mg  4 mg Intravenous BID    cariprazine (VRAYLAR) capsule 6 mg  6 mg Oral Daily    carvedilol (COREG) tablet 25 mg  25 mg Oral BID With Meals    doxazosin (CARDURA) tablet 2 mg  2 mg Oral Daily    famotidine (PEPCID) tablet 40 mg  40 mg Oral Daily    FLUoxetine (PROzac) capsule 20 mg  20 mg Oral QAM    glycerin-hypromellose- (ARTIFICIAL TEARS) ophthalmic solution 1 drop  1 drop Both Eyes Q3H PRN    heparin (porcine) subcutaneous injection 5,000 Units  5,000 Units Subcutaneous Q8H [facility]    hydrALAZINE (APRESOLINE) tablet 50 mg  50 mg Oral TID    insulin glargine (LANTUS) subcutaneous injection 20 Units 0 2 mL  20 Units Subcutaneous HS    insulin lispro (HumaLOG) 100 units/mL subcutaneous injection 1-5 Units 1-5 Units Subcutaneous TID AC    insulin lispro (HumaLOG) 100 units/mL subcutaneous injection 1-5 Units  1-5 Units Subcutaneous HS    insulin lispro (HumaLOG) 100 units/mL subcutaneous injection 5 Units  5 Units Subcutaneous TID With Meals    labetalol (NORMODYNE) injection 10 mg  10 mg Intravenous Q6H PRN    levothyroxine tablet 125 mcg  125 mcg Oral Early Morning    LORazepam (ATIVAN) tablet 0 5 mg  0 5 mg Oral BID PRN    ondansetron (ZOFRAN) injection 4 mg  4 mg Intravenous Q4H PRN     Medications Discontinued During This Encounter   Medication Reason    hydrALAZINE (APRESOLINE) tablet 25 mg     albuterol (PROVENTIL HFA,VENTOLIN HFA) inhaler 2 puff     albuterol (PROVENTIL HFA,VENTOLIN HFA) 90 mcg/act inhaler Discontinued by another clinician    furosemide (LASIX) injection 40 mg     polyvinyl alcohol (LIQUIFILM TEARS) 1 4 % ophthalmic solution 1 drop     labetalol (NORMODYNE) injection 10 mg     LORazepam (ATIVAN) tablet 0 5 mg        Walter Whaley,       This progress note was produced in part using a dictation device which may document imprecise wording from author's original intent

## 2022-07-20 PROBLEM — E11.621 DIABETIC ULCER OF BOTH FEET (HCC): Status: ACTIVE | Noted: 2022-07-20

## 2022-07-20 PROBLEM — L97.519 DIABETIC ULCER OF BOTH FEET (HCC): Status: ACTIVE | Noted: 2022-07-20

## 2022-07-20 PROBLEM — L97.529 DIABETIC ULCER OF BOTH FEET (HCC): Status: ACTIVE | Noted: 2022-07-20

## 2022-07-20 LAB
ALBUMIN SERPL ELPH-MCNC: 2.97 G/DL (ref 3.5–5)
ALBUMIN SERPL ELPH-MCNC: 45.7 % (ref 52–65)
ALPHA1 GLOB SERPL ELPH-MCNC: 0.46 G/DL (ref 0.1–0.4)
ALPHA1 GLOB SERPL ELPH-MCNC: 7 % (ref 2.5–5)
ALPHA2 GLOB SERPL ELPH-MCNC: 0.93 G/DL (ref 0.4–1.2)
ALPHA2 GLOB SERPL ELPH-MCNC: 14.3 % (ref 7–13)
ANION GAP SERPL CALCULATED.3IONS-SCNC: 7 MMOL/L (ref 4–13)
BASOPHILS # BLD AUTO: 0.04 THOUSANDS/ΜL (ref 0–0.1)
BASOPHILS NFR BLD AUTO: 1 % (ref 0–1)
BETA GLOB ABNORMAL SERPL ELPH-MCNC: 0.41 G/DL (ref 0.4–0.8)
BETA1 GLOB SERPL ELPH-MCNC: 6.3 % (ref 5–13)
BETA2 GLOB SERPL ELPH-MCNC: 6.7 % (ref 2–8)
BETA2+GAMMA GLOB SERPL ELPH-MCNC: 0.44 G/DL (ref 0.2–0.5)
BUN SERPL-MCNC: 61 MG/DL (ref 5–25)
CALCIUM SERPL-MCNC: 8.8 MG/DL (ref 8.4–10.2)
CHLORIDE SERPL-SCNC: 97 MMOL/L (ref 96–108)
CO2 SERPL-SCNC: 31 MMOL/L (ref 21–32)
CREAT SERPL-MCNC: 3.84 MG/DL (ref 0.6–1.3)
DSDNA AB SER-ACNC: <1 IU/ML (ref 0–9)
EOSINOPHIL # BLD AUTO: 0.41 THOUSAND/ΜL (ref 0–0.61)
EOSINOPHIL NFR BLD AUTO: 6 % (ref 0–6)
ERYTHROCYTE [DISTWIDTH] IN BLOOD BY AUTOMATED COUNT: 13 % (ref 11.6–15.1)
GAMMA GLOB ABNORMAL SERPL ELPH-MCNC: 1.3 G/DL (ref 0.5–1.6)
GAMMA GLOB SERPL ELPH-MCNC: 20 % (ref 12–22)
GAMMA INTERFERON BACKGROUND BLD IA-ACNC: 0.03 IU/ML
GFR SERPL CREATININE-BSD FRML MDRD: 17 ML/MIN/1.73SQ M
GLUCOSE SERPL-MCNC: 163 MG/DL (ref 65–140)
GLUCOSE SERPL-MCNC: 172 MG/DL (ref 65–140)
GLUCOSE SERPL-MCNC: 176 MG/DL (ref 65–140)
GLUCOSE SERPL-MCNC: 193 MG/DL (ref 65–140)
GLUCOSE SERPL-MCNC: 196 MG/DL (ref 65–140)
HCT VFR BLD AUTO: 26 % (ref 36.5–49.3)
HGB BLD-MCNC: 8.2 G/DL (ref 12–17)
IGG/ALB SER: 0.84 {RATIO} (ref 1.1–1.8)
IMM GRANULOCYTES # BLD AUTO: 0.05 THOUSAND/UL (ref 0–0.2)
IMM GRANULOCYTES NFR BLD AUTO: 1 % (ref 0–2)
INTERPRETATION UR IFE-IMP: NORMAL
LYMPHOCYTES # BLD AUTO: 1.36 THOUSANDS/ΜL (ref 0.6–4.47)
LYMPHOCYTES NFR BLD AUTO: 18 % (ref 14–44)
M TB IFN-G BLD-IMP: NEGATIVE
M TB IFN-G CD4+ BCKGRND COR BLD-ACNC: 0 IU/ML
M TB IFN-G CD4+ BCKGRND COR BLD-ACNC: 0 IU/ML
MCH RBC QN AUTO: 27.2 PG (ref 26.8–34.3)
MCHC RBC AUTO-ENTMCNC: 31.5 G/DL (ref 31.4–37.4)
MCV RBC AUTO: 86 FL (ref 82–98)
MITOGEN IGNF BCKGRD COR BLD-ACNC: 0.82 IU/ML
MONOCYTES # BLD AUTO: 0.69 THOUSAND/ΜL (ref 0.17–1.22)
MONOCYTES NFR BLD AUTO: 9 % (ref 4–12)
NEUTROPHILS # BLD AUTO: 4.83 THOUSANDS/ΜL (ref 1.85–7.62)
NEUTS SEG NFR BLD AUTO: 65 % (ref 43–75)
NRBC BLD AUTO-RTO: 0 /100 WBCS
PLATELET # BLD AUTO: 340 THOUSANDS/UL (ref 149–390)
PMV BLD AUTO: 9.2 FL (ref 8.9–12.7)
POTASSIUM SERPL-SCNC: 3.7 MMOL/L (ref 3.5–5.3)
PROT PATTERN SERPL ELPH-IMP: ABNORMAL
PROT SERPL-MCNC: 6.5 G/DL (ref 6.4–8.2)
RBC # BLD AUTO: 3.01 MILLION/UL (ref 3.88–5.62)
RYE IGE QN: NEGATIVE
SODIUM SERPL-SCNC: 135 MMOL/L (ref 135–147)
WBC # BLD AUTO: 7.38 THOUSAND/UL (ref 4.31–10.16)

## 2022-07-20 PROCEDURE — 97535 SELF CARE MNGMENT TRAINING: CPT

## 2022-07-20 PROCEDURE — 85025 COMPLETE CBC W/AUTO DIFF WBC: CPT | Performed by: NURSE PRACTITIONER

## 2022-07-20 PROCEDURE — 82948 REAGENT STRIP/BLOOD GLUCOSE: CPT

## 2022-07-20 PROCEDURE — 99232 SBSQ HOSP IP/OBS MODERATE 35: CPT | Performed by: INTERNAL MEDICINE

## 2022-07-20 PROCEDURE — 97110 THERAPEUTIC EXERCISES: CPT

## 2022-07-20 PROCEDURE — NC001 PR NO CHARGE: Performed by: RADIOLOGY

## 2022-07-20 PROCEDURE — 99233 SBSQ HOSP IP/OBS HIGH 50: CPT | Performed by: NURSE PRACTITIONER

## 2022-07-20 PROCEDURE — 80048 BASIC METABOLIC PNL TOTAL CA: CPT | Performed by: NURSE PRACTITIONER

## 2022-07-20 RX ADMIN — LEVOTHYROXINE SODIUM 125 MCG: 25 TABLET ORAL at 06:31

## 2022-07-20 RX ADMIN — BUMETANIDE 4 MG: 0.25 INJECTION INTRAMUSCULAR; INTRAVENOUS at 08:14

## 2022-07-20 RX ADMIN — DOXAZOSIN 2 MG: 2 TABLET ORAL at 08:15

## 2022-07-20 RX ADMIN — CARVEDILOL 25 MG: 25 TABLET, FILM COATED ORAL at 17:06

## 2022-07-20 RX ADMIN — HEPARIN SODIUM 5000 UNITS: 5000 INJECTION INTRAVENOUS; SUBCUTANEOUS at 06:31

## 2022-07-20 RX ADMIN — LORAZEPAM 0.5 MG: 0.5 TABLET ORAL at 22:20

## 2022-07-20 RX ADMIN — INSULIN LISPRO 1 UNITS: 100 INJECTION, SOLUTION INTRAVENOUS; SUBCUTANEOUS at 11:50

## 2022-07-20 RX ADMIN — FLUOXETINE 20 MG: 20 CAPSULE ORAL at 08:15

## 2022-07-20 RX ADMIN — ACETAMINOPHEN 650 MG: 325 TABLET ORAL at 02:30

## 2022-07-20 RX ADMIN — INSULIN LISPRO 1 UNITS: 100 INJECTION, SOLUTION INTRAVENOUS; SUBCUTANEOUS at 17:06

## 2022-07-20 RX ADMIN — ACETAMINOPHEN 650 MG: 325 TABLET ORAL at 21:16

## 2022-07-20 RX ADMIN — HYDRALAZINE HYDROCHLORIDE 50 MG: 25 TABLET ORAL at 17:06

## 2022-07-20 RX ADMIN — ONDANSETRON 4 MG: 2 INJECTION INTRAMUSCULAR; INTRAVENOUS at 08:13

## 2022-07-20 RX ADMIN — INSULIN GLARGINE 20 UNITS: 100 INJECTION, SOLUTION SUBCUTANEOUS at 21:25

## 2022-07-20 RX ADMIN — LABETALOL HYDROCHLORIDE 10 MG: 5 INJECTION, SOLUTION INTRAVENOUS at 02:47

## 2022-07-20 RX ADMIN — HEPARIN SODIUM 5000 UNITS: 5000 INJECTION INTRAVENOUS; SUBCUTANEOUS at 21:16

## 2022-07-20 RX ADMIN — AMLODIPINE BESYLATE 10 MG: 10 TABLET ORAL at 08:15

## 2022-07-20 RX ADMIN — INSULIN LISPRO 5 UNITS: 100 INJECTION, SOLUTION INTRAVENOUS; SUBCUTANEOUS at 11:51

## 2022-07-20 RX ADMIN — INSULIN LISPRO 5 UNITS: 100 INJECTION, SOLUTION INTRAVENOUS; SUBCUTANEOUS at 17:06

## 2022-07-20 RX ADMIN — HEPARIN SODIUM 5000 UNITS: 5000 INJECTION INTRAVENOUS; SUBCUTANEOUS at 14:18

## 2022-07-20 RX ADMIN — CARVEDILOL 25 MG: 25 TABLET, FILM COATED ORAL at 08:15

## 2022-07-20 RX ADMIN — ACETAMINOPHEN 650 MG: 325 TABLET ORAL at 08:55

## 2022-07-20 RX ADMIN — HYDRALAZINE HYDROCHLORIDE 50 MG: 25 TABLET ORAL at 08:15

## 2022-07-20 RX ADMIN — INSULIN LISPRO 1 UNITS: 100 INJECTION, SOLUTION INTRAVENOUS; SUBCUTANEOUS at 21:25

## 2022-07-20 RX ADMIN — LORAZEPAM 0.5 MG: 0.5 TABLET ORAL at 11:07

## 2022-07-20 RX ADMIN — CARIPRAZINE 6 MG: 4.5 CAPSULE, GELATIN COATED ORAL at 08:14

## 2022-07-20 RX ADMIN — INSULIN LISPRO 5 UNITS: 100 INJECTION, SOLUTION INTRAVENOUS; SUBCUTANEOUS at 08:14

## 2022-07-20 RX ADMIN — BUMETANIDE 4 MG: 0.25 INJECTION INTRAMUSCULAR; INTRAVENOUS at 17:06

## 2022-07-20 RX ADMIN — HYDRALAZINE HYDROCHLORIDE 50 MG: 25 TABLET ORAL at 21:15

## 2022-07-20 RX ADMIN — FAMOTIDINE 40 MG: 20 TABLET ORAL at 08:15

## 2022-07-20 NOTE — CONSULTS
Interventional Radiology  Consultation 7/20/2022     Consults  Benji Castano   1980   [id number]      Assessment/Plan:  Assessment is nephrostogram to protein urea  Creatinine 3 8-4 4  Plan is CT-guided renal biopsy  On CT scan from February of last year, kidneys were of good size  They are fairly deep  Medical Problems             Problem List     * (Principal) Volume overload    Type 2 diabetes mellitus without complication, with long-term current use of insulin (HCC)    Overview Signed 10/17/2018  4:14 PM by George Treviño MD     Increasing the basalglar to 35 units, with sliding scale         Lab Results   Component Value Date    HGBA1C 9 1 (H) 07/16/2022       Recent Labs     07/19/22  2039 07/20/22  0605 07/20/22  1113 07/20/22  1601   POCGLU 180* 193* 163* 172*       Blood Sugar Average: Last 72 hrs:  (P) 140 [id number]        Abdominal pain    OCD (obsessive compulsive disorder)    Schizoaffective disorder, depressive type ([facility] )    Hypothyroidism    Overview Addendum 8/23/2021  3:21 PM by Aimee Dubon MD     Last Assessment & Plan:   Formatting of this note might be different from the original   TSH 7 01 Reminded re: proper dosing instructions  Increase Synthroid to 112 mcg daily and repeat labs in 6 weeks  Morbid obesity with BMI of 50 0-59 9, adult (HCC)    Anxiety (Chronic)    Episodic confusion    Snoring    Insomnia    Hypersomnia    Vitamin D deficiency    Overview Signed 8/23/2021  3:21 PM by Aimee Dubon MD     Last Assessment & Plan:   Formatting of this note might be different from the original   Recommended that patient start vitamin d3 2000 iu daily           Vomiting    Overview Addendum 5/30/2019  5:37 PM by DOREEN Ellis     Added automatically from request for surgery [id number]         Occipital neuralgia of left side    Headache    Nodule of parotid gland    Bipolar 1 disorder ([facility] )    Depression    Type 1 diabetes mellitus without complication ([facility] ) Overview Addendum 8/23/2021  3:21 PM by Valencia Conrad MD     Last Assessment & Plan:   Reviewed and discussed recent lab results noting suboptimal glycemic control  I make the following recommendations: Continue basaglar and admelog  Patient advised to only inject 5 units of admelog with meals that do not include a starch  Reviewed with patient his goal A1c as well as goal pre and post meal blood sugars  Patient given blank blood sugar logs  Patient advised to continue to check blood sugars 4x daily, record readings, and send in blood sugar log for review  Will make further changes to insulin doses based on reported blood sugars  Patient advised to start exercising/walking daily  Patient also counseled on the importance of maintaining a regular sleep/eating schedule  Patient advised to choose low carbohydrate snacks  Recommended referral to [facility] for diabetes, nutrition education, and education on carb counting  Patient declined due to not wanting to leaving the house and he has memory issues and does not think he would be able to carbohydrate count  Patient verbalized understanding and agreement of the current treatment plan  Reviewed hypoglycemia prevention, signs/symptoms, and treatment options  Reinforced ADA recommended goals for prevention of complications with the patient  Last Assessment & Plan:   Formatting of this note might be different from the original   T1DM A1c 11 6%  Script today for glucagon and explained use  Increase Novolog to 6 units with meals given daytime hyperglycemia  I suggested [facility] and learning flexible insulin -he declines today given his baseline psychiatric functioning  Prefers set dosing  Encouraged to send me BG readings in 2 weeks' time and advised to contact me for sugars <70 and >300           Lab Results   Component Value Date    HGBA1C 9 1 (H) 07/16/2022       Recent Labs     07/19/22  2039 07/20/22  0605 07/20/22  1113 07/20/22  1601   POCGLU 180* 193* 163* 172*       Blood Sugar Average: Last 72 hrs:  (P) 616 [id number]        Urinary retention    Peripheral edema    Hyperlipidemia, mixed    Overview Signed 8/23/2021  3:21 PM by Shelley Griffin MD     Last Assessment & Plan:   Formatting of this note might be different from the original    not on statin  Start lipitor 10 mg daily  Advised to stop the medicine/notify me for muscle cramps/aches/adverse effects  Migraine without aura and without status migrainosus, not intractable    Class 3 severe obesity due to excess calories with serious comorbidity and body mass index (BMI) of 60 0 to 69 9 in adult [facility])    Overview Signed 6/26/2019 12:28 PM by Shelley Griffin MD     refer to Bariatric surgery  It was discussed about low carb diet and regular exercise  Spinal stenosis in cervical region    Difficulty urinating    Urinary tract infection without hematuria    Penile pain    Chronic migraine without aura without status migrainosus, not intractable    Hypertensive emergency    Encephalopathy    Leukocytosis    Schizo affective schizophrenia [facility])    Overview Signed 10/25/2020  9:36 AM by Shelley Griffin MD     Noncompliant with meds   Ordered refill for Geodon and Luvox         STEFANIA (acute kidney injury) ([facility] )    Acute respiratory disease due to COVID-19 virus    Elevated troponin    Type 2 diabetes mellitus with stage 4 chronic kidney disease and hypertension (HCC)    Lab Results   Component Value Date    HGBA1C 9 1 (H) 07/16/2022       Recent Labs     07/19/22  2039 07/20/22  0605 07/20/22  1113 07/20/22  1601   POCGLU 180* 193* 163* 172*       Blood Sugar Average: Last 72 hrs:  (P) 140 [id number]        Family history of heart disease    Hypokalemia    Chest pressure    GERD (gastroesophageal reflux disease)    Hypertension    Overview Signed 8/23/2021  3:21 PM by Shelley Griffin MD     Last Assessment & Plan:   Formatting of this note might be different from the original   146/89  Discussed dietary salt restriction  Continue medications  SOB (shortness of breath)    Hyponatremia    Open toe wound    Primary hypertension    Diabetic ulcer of both feet [facility])    Lab Results   Component Value Date    HGBA1C 9 1 (H) 07/16/2022       Recent Labs     07/19/22  2039 07/20/22  0605 07/20/22  1113 07/20/22  1601   POCGLU 180* 193* 163* 172*       Blood Sugar Average: Last 72 hrs:  (P) 140 [id number]                      Subjective:     Patient ID: Mahesh Anguiano is a 38 y o  male  History of Present Illness  Diabetes, renal failure, protein urea  Review of Systems      Past Medical History:   Diagnosis Date    Acute bronchitis due to other specified organisms 07/05/2019    Chronic headaches     Diabetes mellitus ([address] )     Disease of thyroid gland     Esophagitis     Gastritis     Gastroparesis     GERD (gastroesophageal reflux disease)     Hypertension     Migraine     Migraines 03/11/2021    Obesity     Obsessive compulsive disorder     Psychiatric disorder     Renal disorder     Schizoaffective disorder [facility])         Past Surgical History:   Procedure Laterality Date    ESOPHAGOGASTRODUODENOSCOPY N/A 1/15/2019    Procedure: ESOPHAGOGASTRODUODENOSCOPY (EGD); Surgeon: Francisco Javier Lao MD;  Location: [facility];   Service: Gastroenterology        Social History     Tobacco Use   Smoking Status Never Smoker   Smokeless Tobacco Never Used        Social History     Substance and Sexual Activity   Alcohol Use Not Currently        Social History     Substance and Sexual Activity   Drug Use Not Currently        Allergies   Allergen Reactions    Amoxicillin Diarrhea    Augmentin [Amoxicillin-Pot Clavulanate] Diarrhea    Clavulanic Acid Diarrhea    Erythromycin Diarrhea       Current Facility-Administered Medications   Medication Dose Route Frequency Provider Last Rate Last Admin    acetaminophen (TYLENOL) tablet 650 mg  650 mg Oral Q6H PRN Dianne Jarquin DOREEN Fernandez   650 mg at 07/20/22 0855    albuterol (PROVENTIL HFA,VENTOLIN HFA) inhaler 2 puff  2 puff Inhalation Q4H PRN Vannessa Pain, PA-C        amLODIPine (NORVASC) tablet 10 mg  10 mg Oral Daily Vannessa Pain, PA-C   10 mg at 07/20/22 0815    bumetanide (BUMEX) injection 4 mg  4 mg Intravenous BID Nicolas Sampson MD   4 mg at 07/20/22 8893    cariprazine (VRAYLAR) capsule 6 mg  6 mg Oral Daily DOREEN Wade   6 mg at 07/20/22 1282    carvedilol (COREG) tablet 25 mg  25 mg Oral BID With Meals DOREEN Wade   25 mg at 07/20/22 0815    doxazosin (CARDURA) tablet 2 mg  2 mg Oral Daily DOREEN Wade   2 mg at 07/20/22 0815    famotidine (PEPCID) tablet 40 mg  40 mg Oral Daily DOREEN Wade   40 mg at 07/20/22 0815    FLUoxetine (PROzac) capsule 20 mg  20 mg Oral QAM DOREEN Wade   20 mg at 07/20/22 0815    glycerin-hypromellose- (ARTIFICIAL TEARS) ophthalmic solution 1 drop  1 drop Both Eyes Q3H PRN Alphonsa Part, PA-C   1 drop at 07/18/22 0901    heparin (porcine) subcutaneous injection 5,000 Units  5,000 Units Subcutaneous [facility] DOREEN Wade   5,000 Units at 07/20/22 1418    hydrALAZINE (APRESOLINE) tablet 50 mg  50 mg Oral TID RAFAEL WadeNP   50 mg at 07/20/22 0815    insulin glargine (LANTUS) subcutaneous injection 20 Units 0 2 mL  20 Units Subcutaneous HS RAFAEL WadeNP   20 Units at 07/19/22 2148    insulin lispro (HumaLOG) 100 units/mL subcutaneous injection 1-5 Units  1-5 Units Subcutaneous TID AC RAFAEL WadeNP   1 Units at 07/20/22 1150    insulin lispro (HumaLOG) 100 units/mL subcutaneous injection 1-5 Units  1-5 Units Subcutaneous HS DOREEN Wade   1 Units at 07/19/22 2147    insulin lispro (HumaLOG) 100 units/mL subcutaneous injection 5 Units  5 Units Subcutaneous TID With Meals DOREEN Wade   5 Units at 07/20/22 1151    labetalol (NORMODYNE) injection 10 mg  10 mg Intravenous Q6H PRN Adrianna Villatoro PA-C   10 mg at 07/20/22 0247    levothyroxine tablet 125 mcg  125 mcg Oral Early Morning Guy Dev, CRNP   125 mcg at 07/20/22 0631    LORazepam (ATIVAN) tablet 0 5 mg  0 5 mg Oral BID PRN Kendrick Whitley PA-C   0 5 mg at 07/20/22 1107    ondansetron (ZOFRAN) injection 4 mg  4 mg Intravenous Q4H PRN Guypedro Méndez CRNP   4 mg at 07/20/22 0813          Objective:    Vitals:    07/20/22 0600 07/20/22 0728 07/20/22 1240 07/20/22 1454   BP:  165/88 143/74 151/91   BP Location:       Pulse:  81 82 81   Resp:  18 20 18   Temp:  98 7 °F (37 1 °C)  98 4 °F (36 9 °C)   TempSrc:       SpO2:  94% 94% 94%   Weight: (!) 152 kg (335 lb 5 1 oz)      Height:            Physical Exam      Lab Results   Component Value Date     (H) 07/16/2022      Lab Results   Component Value Date    WBC 7 38 07/20/2022    HGB 8 2 (L) 07/20/2022    HCT 26 0 (L) 07/20/2022    MCV 86 07/20/2022     07/20/2022     Lab Results   Component Value Date    INR 1 04 07/16/2022    INR 1 06 01/11/2021    INR 1 14 11/25/2020    PROTIME 13 6 07/16/2022    PROTIME 13 7 01/11/2021    PROTIME 14 6 (H) 11/25/2020     Lab Results   Component Value Date    PTT 23 01/11/2021         I have personally reviewed pertinent imaging and laboratory results  Code Status: Level 1 - Full Code  Advance Directive and Living Will:      Power of :    POLST:      IR has been consulted to evaluate the patient, determine the appropriate procedure, and whether or not a procedure can and should be performed  Thank you for allowing me to participate in the care of Ponce Johns  Please don't hesitate to call, text, email, or TigerText with any questions  This text is generated with voice recognition software  There may be translation, syntax,  or grammatical errors  If you have any questions, please contact the dictating provider

## 2022-07-20 NOTE — ASSESSMENT & PLAN NOTE
Lab Results   Component Value Date    EGFR 17 07/20/2022    EGFR 17 07/19/2022    EGFR 19 07/18/2022    CREATININE 3 84 (H) 07/20/2022    CREATININE 3 87 (H) 07/19/2022    CREATININE 3 54 (H) 07/18/2022     · Poorly-controlled diabetes  · Repeat A1c 9 1, improved since prior (11 6 on 8/3/21)  · Continue with home Lantus 20U at bedside  · Fingerstick blood sugar with sliding scale coverage  · Accu Cheks     · Prior baseline Cr appears to be 1 9-2 1 mg/dL in 2021, with new baseline around 2 9-3 d/t recent ATN dx   · Supposed to follow up with Nephrology outpatient however has been unable to get out of the house  · Nephrology consult appreciated   · Patient has significant proteinuria, approximately 6 g per day, etiology most likely diabetic nephropathy plus/minus hyper filtration syndrome with or without FSGS; will need full workup with kidney biopsy due to proteinuria and significant loss in kidney function over the last year  · Plan is for IR biopsy on Thursday  · Continue with diuresis   · Monitor renal function closely in the setting of diuresis  · Avoid hypotension and nephrotoxins    · With hypertensive urgency POA   · Home hydralazine increased to 50 mg 3 times daily  · Continue home carvedilol 25 mg BID, doxazosin 2mg qd  · Received three doses of IV Lasix  · BP not adequately controlled    Will start amlodipine 10 mg daily to patient's current regimen  · Continue to monitor blood pressure trend

## 2022-07-20 NOTE — PLAN OF CARE
Problem: PAIN - ADULT  Goal: Verbalizes/displays adequate comfort level or baseline comfort level  Description: Interventions:  - Encourage patient to monitor pain and request assistance  - Assess pain using appropriate pain scale  - Administer analgesics based on type and severity of pain and evaluate response  - Implement non-pharmacological measures as appropriate and evaluate response  - Consider cultural and social influences on pain and pain management  - Notify physician/advanced practitioner if interventions unsuccessful or patient reports new pain  Outcome: Progressing     Problem: INFECTION - ADULT  Goal: Absence or prevention of progression during hospitalization  Description: INTERVENTIONS:  - Assess and monitor for signs and symptoms of infection  - Monitor lab/diagnostic results  - Monitor all insertion sites, i e  indwelling lines, tubes, and drains  - Monitor endotracheal if appropriate and nasal secretions for changes in amount and color  - [place] appropriate cooling/warming therapies per order  - Administer medications as ordered  - Instruct and encourage patient and family to use good hand hygiene technique  - Identify and instruct in appropriate isolation precautions for identified infection/condition  Outcome: Progressing  Goal: Absence of fever/infection during neutropenic period  Description: INTERVENTIONS:  - Monitor WBC    Outcome: Progressing     Problem: SAFETY ADULT  Goal: Patient will remain free of falls  Description: INTERVENTIONS:  - Educate patient/family on patient safety including physical limitations  - Instruct patient to call for assistance with activity   - Consult OT/PT to assist with strengthening/mobility   - Keep Call bell within reach  - Keep bed low and locked with side rails adjusted as appropriate  - Keep care items and personal belongings within reach  - Initiate and maintain comfort rounds  - Make Fall Risk Sign visible to staff  - Offer Toileting every 1 Hours, in advance of need  - Initiate/Maintain bed alarm  - Obtain necessary fall risk management equipment: yellow socks   - Apply yellow socks and bracelet for high fall risk patients  - Consider moving patient to room near nurses station  Outcome: Progressing  Goal: Maintain or return to baseline ADL function  Description: INTERVENTIONS:  -  Assess patient's ability to carry out ADLs; assess patient's baseline for ADL function and identify physical deficits which impact ability to perform ADLs (bathing, care of mouth/teeth, toileting, grooming, dressing, etc )  - Assess/evaluate cause of self-care deficits   - Assess range of motion  - Assess patient's mobility; develop plan if impaired  - Assess patient's need for assistive devices and provide as appropriate  - Encourage maximum independence but intervene and supervise when necessary  - Involve family in performance of ADLs  - Assess for home care needs following discharge   - Consider OT consult to assist with ADL evaluation and planning for discharge  - Provide patient education as appropriate  Outcome: Progressing  Goal: Maintains/Returns to pre admission functional level  Description: INTERVENTIONS:  - Perform BMAT or MOVE assessment daily    - Set and communicate daily mobility goal to care team and patient/family/caregiver  - Collaborate with rehabilitation services on mobility goals if consulted  - Perform Range of Motion 3 times a day  - Reposition patient every 2 hours    - Dangle patient 3 times a day  - Stand patient 3 times a day  - Ambulate patient 3 times a day  - Out of bed to chair 3 times a day   - Out of bed for meals 3 times a day  - Out of bed for toileting  - Record patient progress and toleration of activity level   Outcome: Progressing     Problem: DISCHARGE PLANNING  Goal: Discharge to home or other facility with appropriate resources  Description: INTERVENTIONS:  - Identify barriers to discharge w/patient and caregiver  - Arrange for needed discharge resources and transportation as appropriate  - Identify discharge learning needs (meds, wound care, etc )  - Arrange for interpretive services to assist at discharge as needed  - Refer to Case Management Department for coordinating discharge planning if the patient needs post-hospital services based on physician/advanced practitioner order or complex needs related to functional status, cognitive ability, or social support system  Outcome: Progressing     Problem: Knowledge Deficit  Goal: Patient/family/caregiver demonstrates understanding of disease process, treatment plan, medications, and discharge instructions  Description: Complete learning assessment and assess knowledge base    Interventions:  - Provide teaching at level of understanding  - Provide teaching via preferred learning methods  Outcome: Progressing     Problem: Prexisting or High Potential for Compromised Skin Integrity  Goal: Skin integrity is maintained or improved  Description: INTERVENTIONS:  - Identify patients at risk for skin breakdown  - Assess and monitor skin integrity  - Assess and monitor nutrition and hydration status  - Monitor labs   - Assess for incontinence   - Turn and reposition patient  - Assist with mobility/ambulation  - Relieve pressure over bony prominences  - Avoid friction and shearing  - Provide appropriate hygiene as needed including keeping skin clean and dry  - Evaluate need for skin moisturizer/barrier cream  - Collaborate with interdisciplinary team   - Patient/family teaching  - Consider wound care consult   Outcome: Progressing

## 2022-07-20 NOTE — ASSESSMENT & PLAN NOTE
Wt Readings from Last 3 Encounters:   07/20/22 (!) 152 kg (335 lb 5 1 oz)   06/23/22 (!) 146 kg (321 lb 14 oz)   06/19/22 (!) 154 kg (339 lb 8 1 oz)     · Mild exacerbation with  and worsening bilateral lower extremity edema and dyspnea  Not on home diuretic Echocardiogram on 06/17/2022 shows LVEF of 55%  · Repeat ECHO with LVEF 55%, mild concentric LV hypertrophy   · Suspect dietary noncompliance with increased sodium intake   · Continue with Coreg  · Received lasix 40 IV x 1 in ED  Lasix transitioned to IV Bumex 4 mg twice daily  · Will likely need home diuretic at discharge  · Weight now trending down      · Low-salt diet  · Daily weights and I&Os

## 2022-07-20 NOTE — CASE MANAGEMENT
Case Management Discharge Planning Note    Patient name Ernesto Lion  Location /-01 MRN [id number]  : 1980 Date 2022       Current Admission Date: 2022  Current Admission Diagnosis:Volume overload   Patient Active Problem List    Diagnosis Date Noted    Primary hypertension 2022    Open toe wound 2022    Volume overload 2022    Hyponatremia 2022    Hypertension 2021    SOB (shortness of breath) 2021    GERD (gastroesophageal reflux disease) 2021    Family history of heart disease 2021    Hypokalemia 2021    Chest pressure 2021    Elevated troponin 2021    Type 2 diabetes mellitus with stage 4 chronic kidney disease and hypertension ([facility] ) 2021    Acute respiratory disease due to COVID-19 virus 2021    [facility] 2020    Schizo affective schizophrenia ([place] [address] ) 10/25/2020    Hypertensive emergency 2020    Encephalopathy 2020    Leukocytosis 2020    Chronic migraine without aura without status migrainosus, not intractable 2019    Difficulty urinating 2019    Urinary tract infection without hematuria 2019    Penile pain 2019    Spinal stenosis in cervical region 2019    Class 3 severe obesity due to excess calories with serious comorbidity and body mass index (BMI) of 60 0 to 69 9 in adult ([facility] ) 2019    Migraine without aura and without status migrainosus, not intractable 2019    Peripheral edema 2019    Hyperlipidemia, mixed 2019    Bipolar 1 disorder ([facility] ) 2019    Depression 2019    Type 1 diabetes mellitus without complication ([facility] ) 212    Urinary retention 2019    Occipital neuralgia of left side 02/15/2019    Headache 02/15/2019    Nodule of parotid gland 02/15/2019    Snoring 2018    Insomnia 2018    Hypersomnia 2018    Vitamin D deficiency 12/04/2018    Episodic confusion     OCD (obsessive compulsive disorder) 10/31/2018    Schizoaffective disorder, depressive type ([facility] ) 10/31/2018    Hypothyroidism 10/31/2018    Morbid obesity with BMI of 50 0-59 9, adult ([facility] ) 10/31/2018    Anxiety 10/31/2018    Type 2 diabetes mellitus without complication, with long-term current use of insulin ([facility] ) 10/17/2018    Abdominal pain 05/12/2018    Vomiting 05/12/2018      LOS (days): 3  Geometric Mean LOS (GMLOS) (days):   Days to GMLOS:     OBJECTIVE:  Risk of Unplanned Readmission Score: 22 18         Current admission status: Inpatient   Preferred Pharmacy:   [address] [place] [address]  [address] [place] [id number]  Phone: [phone] Fax: [address] [place] [address]  Phone: [phone] Fax: [phone]    Primary Care Provider: Eduar Porter MD    Primary Insurance: [email or web address]  Secondary Insurance:     DISCHARGE DETAILS:       Freedom of Choice: Yes        Additional Comments: CM continuing to follow for discharge planning  Chart reviewed  Pt will follow up with wound care center and podiatry on d/c

## 2022-07-20 NOTE — PROGRESS NOTES
Progress Note - Nephrology   Coreen Apodaca 38 y o  male MRN: [id number]  Unit/Bed#: -01 Encounter: [id number]    A/P:  1  Acute kidney injury on top chronic kidney disease, present on admission                creatinine stable over last 24 hours  May continue aggressive diuresis at this time nor optimize overall medical management  Continue to avoid potential nephrotoxins  2  Chronic kidney disease stage 4 with probable baseline creatinine around 3 mg/dL  3  Nephrotic range proteinuria                serologies thus far are either negative/normal or pending  Patient will benefit from a kidney biopsy which may happen as soon as tomorrow, July 21st   If the patient's biopsy is to be delayed, we should consider making arrangements in the outpatient setting and sending the patient home as he is approaching the end of his need for hospitalization  Patient would potentially benefit from various medications to help with his proteinuria, kidney biopsy will hopefully help to identify etiology in order to offer the most effective medical intervention  4  Hypertensive urgency              Blood pressures continue to improve, continue to monitor with current medications  5  Volume overload                consider deescalation and transition from IV to oral Bumex 4 mg once or twice daily  Patient should be on a loop diuretic in the outpatient setting  6  Morbid obesity with a BMI between 50-59 9  7   Schizoaffective variant of schizophrenia   Patient appears to be controlled at this time, continue medications according hospitalist     Follow up reason for today's visit:  Acute kidney injury/chronic kidney disease/nephrotic range proteinuria    Volume overload    Patient Active Problem List   Diagnosis    Type 2 diabetes mellitus without complication, with long-term current use of insulin (HCC)    Abdominal pain    OCD (obsessive compulsive disorder)    [facility], [facility]    Hypothyroidism    Morbid obesity with BMI of 50 0-59 9, adult (HCC)    Anxiety    Episodic confusion    Snoring    Insomnia    Hypersomnia    Vitamin D deficiency    Vomiting    Occipital neuralgia of left side    Headache    Nodule of parotid gland    Bipolar 1 disorder (HCC)    Depression    Type 1 diabetes mellitus without complication ([facility])    Urinary retention    Peripheral edema    Hyperlipidemia, mixed    Migraine without aura and without status migrainosus, not intractable    Class 3 severe obesity due to excess calories with serious comorbidity and body mass index (BMI) of 60 0 to 69 9 in adult [facility])    Spinal stenosis in cervical region    Difficulty urinating    Urinary tract infection without hematuria    Penile pain    Chronic migraine without aura without status migrainosus, not intractable    Hypertensive emergency    Encephalopathy    Leukocytosis    [facility] affective [facility]    [facility]IA [facility]acute [facility]) [facility]    Acute respiratory disease due to COVID-19 virus    Elevated troponin    Type 2 diabetes mellitus with stage 4 chronic kidney disease and hypertension ([facility])    Family history of heart disease    Hypokalemia    Chest pressure    GERD (gastroesophageal reflux disease)    Hypertension    SOB (shortness of breath)    Volume overload    Hyponatremia    Open toe wound    Primary hypertension    Diabetic ulcer of both feet ([facility])         Subjective:   No acute events, patient is eating and drinking well with no nausea vomiting at this time  Objective:     Vitals: Blood pressure 143/74, pulse 82, temperature 98 7 °F (37 1 °C), resp  rate 20, height 5' 2" (1 575 m), weight (!) 152 kg (335 lb 5 1 oz), SpO2 94 %  ,Body mass index is 61 33 kg/m²      Weight (last 2 days)     Date/Time Weight    07/20/22 0600 152 (335 32)     Weight: pt reweighed twice at 07/20/22 0600    07/19/22 0600 155 (342 6)    07/18/22 0600 159 (351 19)            Intake/Output Summary (Last 24 hours) at 7/20/2022 1418  Last data filed at 7/20/2022 1230  Gross per 24 hour   Intake 1020 ml   Output 600 ml   Net 420 ml     I/O last 3 completed shifts: In: 18 [P O :480]  Out: 2200 [Urine:2200]         Physical Exam: /74   Pulse 82   Temp 98 7 °F (37 1 °C)   Resp 20   Ht 5' 2" (1 575 m)   Wt (!) 152 kg (335 lb 5 1 oz) Comment: pt reweighed twice  SpO2 94%   BMI 61 33 kg/m²     General Appearance:    Alert, cooperative, no distress, appears stated age   Head:    Normocephalic, without obvious abnormality, atraumatic   Eyes:    Conjunctiva/corneas clear   Ears:    Normal external ears   Nose:   Nares normal, septum midline, mucosa normal, no drainage    or sinus tenderness   Throat:   Lips, mucosa, and tongue normal; teeth and gums normal   Neck:   Supple   Back:     Symmetric, no curvature, ROM normal, no CVA tenderness   Lungs:     Clear to auscultation bilaterally, respirations unlabored   Chest wall:    No tenderness or deformity   Heart:    Regular rate and rhythm, S1 and S2 normal, no murmur, rub   or gallop   Abdomen:     Soft, non-tender, bowel sounds active   Extremities:   Extremities normal, atraumatic, no cyanosis, moderate bilateral lower extremity edema   Skin:   Skin color, texture, turgor normal, no rashes or lesions   Lymph nodes:   Cervical normal   Neurologic:   CNII-XII intact            Lab, Imaging and other studies: I have personally reviewed pertinent labs  CBC:   Lab Results   Component Value Date    WBC 7 38 07/20/2022    HGB 8 2 (L) 07/20/2022    HCT 26 0 (L) 07/20/2022    MCV 86 07/20/2022     07/20/2022    MCH 27 2 07/20/2022    MCHC 31 5 07/20/2022    RDW 13 0 07/20/2022    MPV 9 2 07/20/2022    NRBC 0 07/20/2022     CMP:   Lab Results   Component Value Date    K 3 7 07/20/2022    CL 97 07/20/2022    CO2 31 07/20/2022    BUN 61 (H) 07/20/2022    CREATININE 3 84 (H) 07/20/2022    CALCIUM 8 8 07/20/2022    EGFR 17 07/20/2022           Results from last 7 days   Lab Units 07/20/22  0428 07/19/22  0448 07/18/22  0444   POTASSIUM mmol/L 3 7 3 9 4 0   CHLORIDE mmol/L 97 98 98   CO2 mmol/L 31 30 30   BUN mg/dL 61* 64* 66*   CREATININE mg/dL 3 84* 3 87* 3 54*   CALCIUM mg/dL 8 8 8 8 9 0         Phosphorus: No results found for: PHOS  Magnesium: No results found for: MG  Urinalysis: No results found for: COLORU, CLARITYU, SPECGRAV, PHUR, LEUKOCYTESUR, NITRITE, PROTEINUA, GLUCOSEU, KETONESU, BILIRUBINUR, BLOODU  Ionized Calcium: No results found for: CAION  Coagulation: No results found for: PT, INR, APTT  Troponin: No results found for: TROPONINI  ABG: No results found for: PHART, HDW7RRC, PO2ART, TIV2GTW, U0ETCOHS, BEART, SOURCE  Radiology review:     IMAGING  Procedure: XR foot 3+ vw right    Result Date: 7/20/2022  Narrative: RIGHT FOOT INDICATION:   chronic plantar hallux wound, r/o OM  COMPARISON:  06/19/2022 VIEWS:  XR FOOT 3+ VW RIGHT FINDINGS: There is no acute fracture or dislocation  No areas of osseous destruction to suggest osteomyelitis  No significant degenerative changes  No lytic or blastic osseous lesion  Soft tissue swelling about the great toe  Impression: No acute osseous abnormality  Soft tissue swelling about the great toe   Workstation performed: [id number]       Current Facility-Administered Medications   Medication Dose Route Frequency    acetaminophen (TYLENOL) tablet 650 mg  650 mg Oral Q6H PRN    albuterol (PROVENTIL HFA,VENTOLIN HFA) inhaler 2 puff  2 puff Inhalation Q4H PRN    amLODIPine (NORVASC) tablet 10 mg  10 mg Oral Daily    bumetanide (BUMEX) injection 4 mg  4 mg Intravenous BID    cariprazine (VRAYLAR) capsule 6 mg  6 mg Oral Daily    carvedilol (COREG) tablet 25 mg  25 mg Oral BID With Meals    doxazosin (CARDURA) tablet 2 mg  2 mg Oral Daily    famotidine (PEPCID) tablet 40 mg  40 mg Oral Daily    FLUoxetine (PROzac) capsule 20 mg  20 mg Oral QAM    glycerin-hypromellose- (ARTIFICIAL TEARS) ophthalmic solution 1 drop  1 drop Both Eyes Q3H PRN    heparin (porcine) subcutaneous injection 5,000 Units  5,000 Units Subcutaneous Q8H [address]    hydrALAZINE (APRESOLINE) tablet 50 mg  50 mg Oral TID    insulin glargine (LANTUS) subcutaneous injection 20 Units 0 2 mL  20 Units Subcutaneous HS    insulin lispro (HumaLOG) 100 units/mL subcutaneous injection 1-5 Units  1-5 Units Subcutaneous TID AC    insulin lispro (HumaLOG) 100 units/mL subcutaneous injection 1-5 Units  1-5 Units Subcutaneous HS    insulin lispro (HumaLOG) 100 units/mL subcutaneous injection 5 Units  5 Units Subcutaneous TID With Meals    labetalol (NORMODYNE) injection 10 mg  10 mg Intravenous Q6H PRN    levothyroxine tablet 125 mcg  125 mcg Oral Early Morning    LORazepam (ATIVAN) tablet 0 5 mg  0 5 mg Oral BID PRN    ondansetron (ZOFRAN) injection 4 mg  4 mg Intravenous Q4H PRN     Medications Discontinued During This Encounter   Medication Reason    hydrALAZINE (APRESOLINE) tablet 25 mg     albuterol (PROVENTIL HFA,VENTOLIN HFA) inhaler 2 puff     albuterol (PROVENTIL HFA,VENTOLIN HFA) 90 mcg/act inhaler Discontinued by another clinician    furosemide (LASIX) injection 40 mg     polyvinyl alcohol (LIQUIFILM TEARS) 1 4 % ophthalmic solution 1 drop     labetalol (NORMODYNE) injection 10 mg     LORazepam (ATIVAN) tablet 0 5 mg        Zabrina Suarez DO      This progress note was produced in part using a dictation device which may document imprecise wording from author's original intent

## 2022-07-20 NOTE — UTILIZATION REVIEW
Initial Clinical Review    Admission: Date/Time/Statement:   Admission Orders (From admission, onward)     Ordered        07/17/22 1153  Place in Observation  Once            07/17/22 1153  Inpatient Admission  Once            07/16/22 0830  Place in Observation  Once                      Orders Placed This Encounter   Procedures    Place in Observation     Standing Status:   Standing     Number of Occurrences:   1     Order Specific Question:   Level of Care     Answer:   Med Surg [16]    Inpatient Admission     Standing Status:   Standing     Number of Occurrences:   1     Order Specific Question:   Level of Care     Answer:   Med Surg [16]     Order Specific Question:   Estimated length of stay     Answer:   More than 2 Midnights     Order Specific Question:   Certification     Answer:   I certify that inpatient services are medically necessary for this patient for a duration of greater than two midnights  See H&P and MD Progress Notes for additional information about the patient's course of treatment   Place in Observation     Standing Status:   Standing     Number of Occurrences:   1     Order Specific Question:   Level of Care     Answer:   Med Surg [16]     ED Arrival Information     Expected   -    Arrival   7/16/2022 07:29    Acuity   Urgent            Means of arrival   Ambulance    Escorted by   [facility]    Service   Hospitalist    Admission type   Urgent            Arrival complaint   sob           Chief Complaint   Patient presents with    Shortness of Breath    Leg Swelling       Initial Presentation: 38 y o  male W/PMHX: HTN, Stage 4 CKD, Morbid obesity, T2DM, Schizoaffective disorder, depressive type, to ED from [facility], admitted as Observation, due to Acute on Chronic CHF  Presented with worsening sob and lower leg edema  Recent inpt admission for CKD 6/17-6/23   Since d/c from hospital pt has been unable to get out of house with worsening lower leg edema,  Unable to ambulate much, VERDIN, Exam: obese, ill appearing,  Volume overload, Hypertension,  Decreased breath sounds in bases, B/L lower leg edema  ED work up reveals  Leukocytosis, h/h low, hyponatremia 127, elevated BUN 55, CR 3 19  CXR: probable pulmonary edema with focal consolidation in the rt upper lung which could be d/t asymmetric localized edema or superimposed pna  Slight elevated BNP  Plan : c/w Coreg, low NA diet, IV lasix 40 mg started in ED will continue  IV lasix 40 mg BID, may require  High dose with elevated renal function, daily wt,I/O trend b/p closely, nephrology consult, trend serial labs with repletion as needed, avoid nephrotoxins, avoid hypotension, dietary consult, accu ck with ssi coverage, DVT PPX  Date: 7/17/22The patient will continue to require additional inpatient hospital stay due to IV diuresis, will need to d/c with home diuretic  C/W daily standing wts, I/O low NA diet, new baseline CR 2 9-3 although recent ATN dx, Nephrology following pt  Trend closely serial renal indices while on IV diuresis, Hypertensive urgency POA: currently 147/87 c/w trending b/p, home hydralazine increased to 50 mg tid, c/w with home carvedilol, and doxazosin, c/w  IV diuresis bid, trend home b/p  Breath sounds with faint expiratory wheezing R lung field,  B/L pitting lower leg edema  Hyponatremia, and CR/BUN remain elevated with diuresis  C/W trending serial labs with repletion as needed, DVT PPX  Last data filed at 7/17/2022 1101      Gross per 24 hour   Intake 945 ml   Output 2950 ml   Net -2005 ml     Nephrology Consult: A/P: STEFANIA POA, superimposed on CKD 4, CR 4 16 on 6/22 and was seen op office for kidney education and close monitoring  But failed to show for appointment    POA to this admission CR 3 19 which up trended to 3 38 with diuresis,  kidney ultrasound in June was unremarkable, urine protein studies suggested nephrotic syndrome with a serum albumin < 3 g,  recheck urine studies and check PVR,  may have to accept a higher creatinine to diurese effectively, he has gained 13 kg of water weight since 6/22/22 - change to Bumex 4mg IV bid and follow function  Nephrology will follow       ED Triage Vitals   Temperature Pulse Respirations Blood Pressure SpO2   07/16/22 0731 07/16/22 0734 07/16/22 0734 07/16/22 0734 07/16/22 0734   98 1 °F (36 7 °C) 90 20 (!) 180/89 93 %      Temp Source Heart Rate Source Patient Position - Orthostatic VS BP Location FiO2 (%)   07/16/22 0731 07/16/22 0734 07/16/22 0734 07/16/22 0734 --   Oral Monitor Sitting Left arm       Pain Score       07/16/22 0732       No Pain          Wt Readings from Last 1 Encounters:   07/20/22 (!) 152 kg (335 lb 5 1 oz)     07/16/22 13:56:38 166 kg (365 lb 1 3 oz) Abnormal  Standing scale, Height  5'2"        Additional Vital Signs:   Date/Time Temp Pulse Resp BP MAP (mmHg) SpO2 O2 Device Patient Position - Orthostatic VS   07/17/22 07:54:09 98 9 °F (37 2 °C) 86 18 175/97 Abnormal  123 93 % -- --   07/17/22 03:03:06 -- 90 20 181/105 Abnormal  130 96 % -- --   07/17/22 0100 -- 102 16 -- -- 92 % -- --   07/16/22 22:05:04 98 3 °F (36 8 °C) 102 18 189/104 Abnormal  132 92 % -- --   07/16/22 21:09:51 -- 99 -- 197/98 Abnormal  131 93 % -- --   07/16/22 18:57:31 98 4 °F (36 9 °C) 93 18 173/101 Abnormal  125 92 % -- --   07/16/22 1500 98 7 °F (37 1 °C) 95 20 172/95 Abnormal  121 94 % None (Room air) Lying   07/16/22 14:10:23 -- 96 23 Abnormal  177/95 Abnormal  122 94 % -- --   07/16/22 13:56:38 97 9 °F (36 6 °C) 96 22 202/110 Abnormal  141 94 % -- --   07/16/22 1130 -- 85 -- 191/97 Abnormal  135 93 % None (Room air) Sitting   07/16/22 1051 -- -- -- 212/97 Abnormal  -- -- -- --   07/16/22 0826 -- -- -- 185/92 Abnormal  -- -- -- --   07/16/22 0734 -- 90 20 180/89 Abnormal  -- 93 % None (Room air) Sitting       Pertinent Labs/Diagnostic Test Results:   X-ray chest 1 view portable   Final Result by Timo Mcmillan MD (07/16 2160)      Probable pulmonary edema with focal consolidation in the right upper lung which could be due to asymmetric localized edema or superimposed pneumonia                    Workstation performed: [id number]           Results from last 7 days   Lab Units 07/16/22  0832   SARS-COV-2  Negative     Results from last 7 days   Lab Units 07/20/22  0428 07/19/22  0448 07/18/22  0444 07/17/22  0442 07/16/22  0743   WBC Thousand/uL 7 38 8 09 9 05 12 37* 11 38*   HEMOGLOBIN g/dL 8 2* 8 2* 8 0* 8 3* 8 5*   HEMATOCRIT % 26 0* 25 9* 24 9* 25 5* 25 7*   PLATELETS Thousands/uL 340 350 313 322 287   NEUTROS ABS Thousands/µL 4 83  --   --   --  9 28*         Results from last 7 days   Lab Units 07/20/22  0428 07/19/22 0448 07/18/22  0444 07/17/22  0442 07/16/22  0743   SODIUM mmol/L 135 136 135 132* 127*   POTASSIUM mmol/L 3 7 3 9 4 0 4 5 5 1   CHLORIDE mmol/L 97 98 98 97 94*   CO2 mmol/L 31 30 30 28 25   ANION GAP mmol/L 7 8 7 7 8   BUN mg/dL 61* 64* 66* 57* 55*   CREATININE mg/dL 3 84* 3 87* 3 54* 3 38* 3 19*   EGFR ml/min/1 73sq m 17 17 19 20 22   CALCIUM mg/dL 8 8 8 8 9 0 8 8 8 4         Results from last 7 days   Lab Units 07/20/22  0605 07/19/22  2039 07/19/22  1441 07/19/22  1105 07/19/22  0646 07/19/22  0352 07/18/22  2047 07/18/22  1557 07/18/22  1107 07/18/22  0621 07/17/22  2223 07/17/22  2038   POC GLUCOSE mg/dl 193* 180* 172* 124 119 105 172* 145* 148* 79 140 70     Results from last 7 days   Lab Units 07/20/22  0428 07/19/22  0448 07/18/22  0444 07/17/22  0442 07/16/22  0743   GLUCOSE RANDOM mg/dL 196* 100 81 229* 191*         Results from last 7 days   Lab Units 07/16/22  0743   HEMOGLOBIN A1C % 9 1*   EAG mg/dl 214     BETA-HYDROXYBUTYRATE   Date Value Ref Range Status   10/26/2020 1 4 (H) <0 6 mmol/L Final            Results from last 7 days   Lab Units 07/16/22  1057 07/16/22  0743   HS TNI 0HR ng/L  --  8   HS TNI 2HR ng/L 8  --    HSTNI D2 ng/L 0  --          Results from last 7 days   Lab Units 07/16/22  0743   PROTIME seconds 13 6 INR  1 04           Results from last 7 days   Lab Units 07/16/22  0743   BNP pg/mL 121*     Results from last 7 days   Lab Units 07/17/22  1418   CREATININE UR mg/dL 24 2  24 2  24 2   PROTEIN UR mg/dL 147   PROT/CREAT RATIO UR  6 07*     Results from last 7 days   Lab Units 07/16/22  0832   INFLUENZA A PCR  Negative   INFLUENZA B PCR  Negative   RSV PCR  Negative     ED Treatment:   Medication Administration from 07/16/2022 0728 to 07/16/2022 1324       Date/Time Order Dose Route Action     07/16/2022 0817 albuterol inhalation solution 2 5 mg 2 5 mg Nebulization Given     07/16/2022 0817 methylPREDNISolone sodium succinate (Solu-MEDROL) injection 125 mg 125 mg Intravenous Given     07/16/2022 0817 furosemide (LASIX) injection 40 mg 40 mg Intravenous Given     07/16/2022 0826 hydrALAZINE (APRESOLINE) tablet 25 mg 25 mg Oral Given     07/16/2022 1051 doxazosin (CARDURA) tablet 2 mg 2 mg Oral Given     07/16/2022 1055 famotidine (PEPCID) tablet 40 mg 40 mg Oral Given     07/16/2022 1055 FLUoxetine (PROzac) capsule 20 mg 20 mg Oral Given     07/16/2022 1052 hydrALAZINE (APRESOLINE) tablet 25 mg 25 mg Oral Given     07/16/2022 1300 insulin lispro (HumaLOG) 100 units/mL subcutaneous injection 5 Units 5 Units Subcutaneous Given     07/16/2022 1055 levothyroxine tablet 125 mcg 125 mcg Oral Given     07/16/2022 1259 insulin lispro (HumaLOG) 100 units/mL subcutaneous injection 1-5 Units 2 Units Subcutaneous Given        Past Medical History:   Diagnosis Date    Acute bronchitis due to other specified organisms 07/05/2019    Chronic headaches     Diabetes mellitus ([facility]     Disease of thyroid gland     Esophagitis     Gastritis     Gastroparesis     GERD (gastroesophageal reflux disease)     Hypertension     Migraine     Migraines 03/11/2021    Obesity     Obsessive compulsive disorder     Psychiatric disorder     Renal disorder     [facility]      Present on Admission:   Schizoaffective disorder, depressive type (HCC)   Type 2 diabetes mellitus with stage 4 chronic kidney disease and hypertension (HCC)   Volume overload      Admitting Diagnosis: Pulmonary edema [J81 1]  Dyspnea [R06 00]  SOB (shortness of breath) [R06 02]  Hypoxia [R09 02]  Acute on chronic renal failure (HCC) [N17 9, N18 9]  Bilateral lower extremity edema [R60 0]  Acute renal failure with acute tubular necrosis superimposed on stage 4 chronic kidney disease (HCC) [N17 0, N18 4]  Age/Sex: 38 y o  male  Admission Orders:Nasal canula, wound care consult, 48h telm, daily wts, up with assistance,   Scheduled Medications:  cariprazine, 6 mg, Oral, Daily  carvedilol, 25 mg, Oral, BID With Meals  doxazosin, 2 mg, Oral, Daily  famotidine, 40 mg, Oral, Daily  FLUoxetine, 20 mg, Oral, QAM  furosemide, 40 mg, Intravenous, BID (diuretic)  heparin (porcine), 5,000 Units, Subcutaneous, Q8H SOFI  hydrALAZINE, 50 mg, Oral, TID  insulin glargine, 20 Units, Subcutaneous, HS  insulin lispro, 1-5 Units, Subcutaneous, TID AC  insulin lispro, 1-5 Units, Subcutaneous, HS  insulin lispro, 5 Units, Subcutaneous, TID With Meals  levothyroxine, 125 mcg, Oral, Early Morning      Continuous IV Infusions:none     PRN Meds:  acetaminophen, 650 mg, Oral, Q6H PRN  labetalol, 10 mg, Intravenous, Q6H PRN  ondansetron, 4 mg, Intravenous, Q4H PRN 7/16 x1        IP CONSULT TO NEPHROLOGY  IP CONSULT TO CASE MANAGEMENT  IP CONSULT TO PODIATRY  INPATIENT CONSULT TO IR    Network Utilization Review Department  ATTENTION: Please call with any questions or concerns to [phone] and carefully listen to the prompts so that you are directed to the right person  All voicemails are confidential   Lawanda Rose all requests for admission clinical reviews, approved or denied determinations and any other requests to dedicated fax number below belonging to the campus where the patient is receiving treatment   List of dedicated fax numbers for the Facilities:  FACILITY NAME UR FAX NUMBER   ADMISSION DENIALS (Administrative/Medical Necessity) [phone]   PARENT CHILD HEALTH (Maternity/NICU/Pediatrics) [address] [facility]  [phone]   [address] [id number] [address] [facility] [address] [facility] [address] [phone]

## 2022-07-20 NOTE — OCCUPATIONAL THERAPY NOTE
07/20/22 1128   OT Last Visit   OT Visit Date 07/20/22   Note Type   Note Type Treatment   Restrictions/Precautions   Weight Bearing Precautions Per Order No   Other Precautions Fall Risk;Pain   General   Response to Previous Treatment Patient with no complaints from previous session  (Appreciative of today's treatment)   Pain Assessment   Pain Assessment Tool 0-10   Pain Score No Pain   ADL   Equipment Provided Reacher;Sock aid;Long-handled shoe horn;Long-handled sponge;Dressing stick; Other (Comment)  (and elastic shoelaces (*Contents of hip kit))   LB Dressing Comments provided instruction in all AE, Plus practice with dressing stick and sock-aid to doff/don sock (L)  (patient had fair success with same - understands the need for additional practice/trials to become proficient with the AE)   Therapeutic Excerise-Strength   UE Strength Yes   Right Upper Extremity- Strength   R Shoulder Flexion; Horizontal ABduction; Other (Comment)  (pro/re-traction)   R Elbow Elbow flexion;Elbow extension  (in forward and reverse;  Also: supin/pron-ation)   R Weight/Reps/Sets 1 pound weight - 20 reps shoulders, 30 reps elbows   RUE Strength Comment patient took rests during sets of exercise as needed  (upper arms muscles fatiguing more readily today)   Left Upper Extremity-Strength   L Weights/Reps/Sets Exercise to LUE deferred By patient today > he did not want to jostle the IV to his L wrist (had discomfort to same)   Coordination   Gross Motor [facility]   Dexterity WFL   Cognition   Overall Cognitive Status [facility]   Arousal/Participation Alert; Cooperative   Attention Within functional limits   Orientation Level Oriented X4   Following Commands Follows all commands and directions without difficulty   Comments pt  reported that he's having a biopsy tomorrow - admits to being discouraged at times   Activity Tolerance   Activity Tolerance Patient limited by fatigue   Assessment   Assessment Patient participated in Skilled OT session this date with interventions consisting of ADL re training with the use of correct body mechnaics,  long handle equipment, therapeutic exercise to: increase functional use of BUEs, increase BUE muscle strength  and  therapeutic activities to: increase activity tolerance for sitting at EOB for functional activities (particularly use of LHAE)   Patient agreeable to OT treatment session, upon arrival patient was found seated at edge of bed - session completed with patient in this position  Details of exercise and provision of (with practice of some) Jami Nazario is in flow sheet  Patient requiring verbal cues for correct technique and frequent rest periods, and self-care assistance as noted in flow sheet  Patient continues to be functioning below baseline level, occupational performance remains limited secondary to factors listed above and increased risk for falls and injury  From OT standpoint, recommendation at time of d/c would be no rehabilitation needs  Patient to benefit from continued Occupational Therapy treatment while in the hospital to address deficits as defined above and maximize level of functional independence with ADLs and functional mobility  Plan   Treatment Interventions UE strengthening/ROM; Patient/family training;Equipment evaluation/education; Compensatory technique education; Activityengagement; Energy conservation   Goal Expiration Date 07/26/22   OT Treatment Day 2   Recommendation   OT Discharge Recommendation No rehabilitation needs   Additional Comments 2 The patient's raw score on the -PAC Daily Activity inpatient short form is 19, standardized score is 40 22, greater than 39 4  Patients at this level are likely to benefit from discharge to home  Please refer to the recommendation of the Occupational Therapist for safe discharge planning     AM-PAC Daily Activity Inpatient   Lower Body Dressing 2  (*pt  voiced eagerness to try 2026 Jignesh Olsen when he changes his pants later today)   Bathing 3 Toileting 3   Upper Body Dressing 3   Grooming 4   Eating 4   Daily Activity Raw Score 19   Daily Activity Standardized Score (Calc for Raw Score >=11) 40 22   AM-PAC Applied Cognition Inpatient   Following a Speech/Presentation 4   Understanding Ordinary Conversation 4   Taking Medications 4   Remembering Where Things Are Placed or Put Away 4   Remembering List of 4-5 Errands 3   Taking Care of Complicated Tasks 3   Applied Cognition Raw Score 22   Applied Cognition Standardized Score 47 83     LISBETH Osman/L

## 2022-07-20 NOTE — PLAN OF CARE
Problem: OCCUPATIONAL THERAPY ADULT  Goal: Performs self-care activities at highest level of function for planned discharge setting  See evaluation for individualized goals  Description: Treatment Interventions: ADL retraining, Equipment evaluation/education, Compensatory technique education, Energy conservation, Activityengagement    See flowsheet documentation for full assessment, interventions and recommendations  Outcome: Progressing  Note: Limitation: Decreased ADL status, Decreased Safe judgement during ADL, Decreased high-level ADLs  Prognosis: Good  Assessment: Patient participated in Skilled OT session this date with interventions consisting of ADL re training with the use of correct body mechnaics,  long handle equipment, therapeutic exercise to: increase functional use of BUEs, increase BUE muscle strength  and  therapeutic activities to: increase activity tolerance for sitting at EOB for functional activities (particularly use of LHAE)   Patient agreeable to OT treatment session, upon arrival patient was found seated at edge of bed - session completed with patient in this position  Details of exercise and provision of (with practice of some) [facility] is in flow sheet  Patient requiring verbal cues for correct technique and frequent rest periods, and self-care assistance as noted in flow sheet  Patient continues to be functioning below baseline level, occupational performance remains limited secondary to factors listed above and increased risk for falls and injury  From OT standpoint, recommendation at time of d/c would be no rehabilitation needs  Patient to benefit from continued Occupational Therapy treatment while in the hospital to address deficits as defined above and maximize level of functional independence with ADLs and functional mobility  OT Discharge Recommendation: No rehabilitation needs   LISBETH Harris/L  *patient is motivated for Improvement and [place]

## 2022-07-20 NOTE — PROGRESS NOTES
Rima U  64   Progress Note - Alcus Coil 1980, 38 y o  male MRN: [id number]  Unit/Bed#: -01 Encounter: [id number]  Primary Care Provider: Immanuel Lowery MD   Date and time admitted to hospital: 7/16/2022  7:29 AM    * Volume overload  Assessment & Plan  Wt Readings from Last 3 Encounters:   07/20/22 (!) 152 kg (335 lb 5 1 oz)   06/23/22 (!) 146 kg (321 lb 14 oz)   06/19/22 (!) 154 kg (339 lb 8 1 oz)     · Mild exacerbation with  and worsening bilateral lower extremity edema and dyspnea  Not on home diuretic Echocardiogram on 06/17/2022 shows LVEF of 55%  · Repeat ECHO with LVEF 55%, mild concentric LV hypertrophy   · Suspect dietary noncompliance with increased sodium intake   · Continue with Coreg  · Received lasix 40 IV x 1 in ED  Lasix transitioned to IV Bumex 4 mg twice daily  · Will likely need home diuretic at discharge  · Weight now trending down      · Low-salt diet  · Daily weights and I&Os    Diabetic ulcer of both feet ([address] )  Assessment & Plan      · Podiatry consultation appreciated  · S/p debridement to bilateral hallux wounds  · Right foot x-ray pending  · Wound care      Type 2 diabetes mellitus with stage 4 chronic kidney disease and hypertension [facility])  Assessment & Plan  Lab Results   Component Value Date    EGFR 17 07/20/2022    EGFR 17 07/19/2022    EGFR 19 07/18/2022    CREATININE 3 84 (H) 07/20/2022    CREATININE 3 87 (H) 07/19/2022    CREATININE 3 54 (H) 07/18/2022     · Poorly-controlled diabetes  · Repeat A1c 9 1, improved since prior (11 6 on 8/3/21)  · Continue with home Lantus 20U at bedside  · Fingerstick blood sugar with sliding scale coverage  · Accu Cheks     · Prior baseline Cr appears to be 1 9-2 1 mg/dL in 2021, with new baseline around 2 9-3 d/t recent ATN dx   · Supposed to follow up with Nephrology outpatient however has been unable to get out of the house  · Nephrology consult appreciated   · Patient has significant proteinuria, approximately 6 g per day, etiology most likely diabetic nephropathy plus/minus hyper filtration syndrome with or without FSGS; will need full workup with kidney biopsy due to proteinuria and significant loss in kidney function over the last year  · Plan is for IR biopsy on Thursday  · Continue with diuresis   · Monitor renal function closely in the setting of diuresis  · Avoid hypotension and nephrotoxins    · With hypertensive urgency POA   · Home hydralazine increased to 50 mg 3 times daily  · Continue home carvedilol 25 mg BID, doxazosin 2mg qd  · Received three doses of IV Lasix  · BP not adequately controlled  Will start amlodipine 10 mg daily to patient's current regimen  · Continue to monitor blood pressure trend        Morbid obesity with BMI of 50 0-59 9, adult ([facility])  Assessment & Plan  · Encourage intensive lifestyle modification with diet and exercise  · Dietitian consult     Schizoaffective disorder, depressive type ([facility] [address] )  Assessment & Plan  · Continue with home regimen   · Supportive care    VTE Pharmacologic Prophylaxis:   Pharmacologic: Heparin    Patient Centered Rounds: I have performed bedside rounds with nursing staff today  Education and Discussions with Family / Patient:  Patient    Time Spent for Care: 30 minutes  More than 50% of total time spent on counseling and coordination of care as described above  Current Length of Stay: 3 day(s)    Current Patient Status: Inpatient   Certification Statement: The patient will continue to require additional inpatient hospital stay due to per plan above  Discharge Plan: To be determined  Code Status: Level 1 - Full Code      Subjective:   No complaints offered other than generalized weakness       Objective:     Vitals:   Temp (24hrs), Av 4 °F (36 9 °C), Min:97 9 °F (36 6 °C), Max:98 7 °F (37 1 °C)    Temp:  [97 9 °F (36 6 °C)-98 7 °F (37 1 °C)] 98 7 °F (37 1 °C)  HR:  [74-82] 82  Resp:  [16-20] 20  BP: (143-191)/() 143/74  SpO2:  [91 %-96 %] 94 %  Body mass index is 61 33 kg/m²  Input and Output Summary (last 24 hours): Intake/Output Summary (Last 24 hours) at 7/20/2022 1254  Last data filed at 7/20/2022 1230  Gross per 24 hour   Intake 1020 ml   Output 600 ml   Net 420 ml       Physical Exam:     Physical Exam  Vitals and nursing note reviewed  Constitutional:       Appearance: He is obese  HENT:      Head: Normocephalic and atraumatic  Mouth/Throat:      Mouth: Mucous membranes are moist       Pharynx: Oropharynx is clear  Eyes:      Pupils: Pupils are equal, round, and reactive to light  Cardiovascular:      Rate and Rhythm: Normal rate  Pulmonary:      Effort: Pulmonary effort is normal  No respiratory distress  Breath sounds: Normal breath sounds  Abdominal:      General: Bowel sounds are normal       Palpations: Abdomen is soft  Tenderness: There is no abdominal tenderness  Musculoskeletal:      Cervical back: Neck supple  Right lower leg: Edema present  Left lower leg: Edema present  Skin:     General: Skin is warm and dry  Capillary Refill: Capillary refill takes less than 2 seconds  Neurological:      General: No focal deficit present  Mental Status: He is alert           Additional Data:     Labs:    Results from last 7 days   Lab Units 07/20/22  0428   WBC Thousand/uL 7 38   HEMOGLOBIN g/dL 8 2*   HEMATOCRIT % 26 0*   PLATELETS Thousands/uL 340   NEUTROS PCT % 65   LYMPHS PCT % 18   MONOS PCT % 9   EOS PCT % 6     Results from last 7 days   Lab Units 07/20/22  0428   SODIUM mmol/L 135   POTASSIUM mmol/L 3 7   CHLORIDE mmol/L 97   CO2 mmol/L 31   BUN mg/dL 61*   CREATININE mg/dL 3 84*   ANION GAP mmol/L 7   CALCIUM mg/dL 8 8   GLUCOSE RANDOM mg/dL 196*     Results from last 7 days   Lab Units 07/16/22  0743   INR  1 04     Results from last 7 days   Lab Units 07/20/22  1113 07/20/22  0605 07/19/22  2039 07/19/22  1441 07/19/22  1105 07/19/22  8261 07/19/22  0352 07/18/22  2047 07/18/22  1557 07/18/22  1107 07/18/22  0621 07/17/22  2223   POC GLUCOSE mg/dl 163* 193* 180* 172* 124 119 105 172* 145* 148* 79 140     Results from last 7 days   Lab Units 07/16/22  0743   HEMOGLOBIN A1C % 9 1*     Results from last 7 days   Lab Units 07/17/22  0442   PROCALCITONIN ng/ml 0 10           * I Have Reviewed All Lab Data Listed Above  * Additional Pertinent Lab Tests Reviewed:  [address] Admission Reviewed          Last 24 Hours Medication List:   Current Facility-Administered Medications   Medication Dose Route Frequency Provider Last Rate    acetaminophen  650 mg Oral Q6H PRN Yung Fulling, CRNP      albuterol  2 puff Inhalation Q4H PRN Nino Husain PA-C      amLODIPine  10 mg Oral Daily Nino Husain PA-C      bumetanide  4 mg Intravenous BID Lupis Patton MD      cariprazine  6 mg Oral Daily Yung Fulling, CRNP      carvedilol  25 mg Oral BID With Meals Yung Fulling, CRNP      doxazosin  2 mg Oral Daily Yung Fulling, CRNP      famotidine  40 mg Oral Daily Yung Fulling, CRNP      FLUoxetine  20 mg Oral QAM Yung Fulling, CRNP      glycerin-hypromellose-  1 drop Both Eyes Q3H PRN Suzanne Black PA-C      heparin (porcine)  5,000 Units Subcutaneous [facility] Yung Fulling, CRNP      hydrALAZINE  50 mg Oral TID Yung Fulling, CRNP      insulin glargine  20 Units Subcutaneous HS Yung Fulling, CRNP      insulin lispro  1-5 Units Subcutaneous TID AC Yung Fulling, CRNP      insulin lispro  1-5 Units Subcutaneous HS Yung Fulling, CRNP      insulin lispro  5 Units Subcutaneous TID With Meals Yung Fulling, CRNP      labetalol  10 mg Intravenous Q6H PRN Nino Husain PA-C      levothyroxine  125 mcg Oral Early Morning Yung Fulling, CRNP      LORazepam  0 5 mg Oral BID PRN Martinez Huynh PA-C      ondansetron  4 mg Intravenous Q4H PRN Yung Fulling, DOREEN          Today, Patient Was Seen By: DOREEN Tanner    ** Please Note: Dictation voice to text software may have been used in the creation of this document   **

## 2022-07-20 NOTE — PLAN OF CARE
Problem: PAIN - ADULT  Goal: Verbalizes/displays adequate comfort level or baseline comfort level  Description: Interventions:  - Encourage patient to monitor pain and request assistance  - Assess pain using appropriate pain scale  - Administer analgesics based on type and severity of pain and evaluate response  - Implement non-pharmacological measures as appropriate and evaluate response  - Consider cultural and social influences on pain and pain management  - Notify physician/advanced practitioner if interventions unsuccessful or patient reports new pain  Outcome: Progressing     Problem: INFECTION - ADULT  Goal: Absence or prevention of progression during hospitalization  Description: INTERVENTIONS:  - Assess and monitor for signs and symptoms of infection  - Monitor lab/diagnostic results  - Monitor all insertion sites, i e  indwelling lines, tubes, and drains  - Monitor endotracheal if appropriate and nasal secretions for changes in amount and color  - [place] appropriate cooling/warming therapies per order  - Administer medications as ordered  - Instruct and encourage patient and family to use good hand hygiene technique  - Identify and instruct in appropriate isolation precautions for identified infection/condition  Outcome: Progressing  Goal: Absence of fever/infection during neutropenic period  Description: INTERVENTIONS:  - Monitor WBC    Outcome: Progressing     Problem: SAFETY ADULT  Goal: Patient will remain free of falls  Description: INTERVENTIONS:  - Educate patient/family on patient safety including physical limitations  - Instruct patient to call for assistance with activity   - Consult OT/PT to assist with strengthening/mobility   - Keep Call bell within reach  - Keep bed low and locked with side rails adjusted as appropriate  - Keep care items and personal belongings within reach  - Initiate and maintain comfort rounds  - Make Fall Risk Sign visible to staff  - Offer Toileting every 1 Hours, in advance of need  - Initiate/Maintain bed alarm  - Obtain necessary fall risk management equipment: bed   - Apply yellow socks and bracelet for high fall risk patients  - Consider moving patient to room near nurses station  Outcome: Progressing  Goal: Maintain or return to baseline ADL function  Description: INTERVENTIONS:  -  Assess patient's ability to carry out ADLs; assess patient's baseline for ADL function and identify physical deficits which impact ability to perform ADLs (bathing, care of mouth/teeth, toileting, grooming, dressing, etc )  - Assess/evaluate cause of self-care deficits   - Assess range of motion  - Assess patient's mobility; develop plan if impaired  - Assess patient's need for assistive devices and provide as appropriate  - Encourage maximum independence but intervene and supervise when necessary  - Involve family in performance of ADLs  - Assess for home care needs following discharge   - Consider OT consult to assist with ADL evaluation and planning for discharge  - Provide patient education as appropriate  Outcome: Progressing  Goal: Maintains/Returns to pre admission functional level  Description: INTERVENTIONS:  - Perform BMAT or MOVE assessment daily    - Set and communicate daily mobility goal to care team and patient/family/caregiver  - Collaborate with rehabilitation services on mobility goals if consulted  - Perform Range of Motion 3 times a day  - Reposition patient every 2 hours    - Dangle patient 3 times a day  - Stand patient 3 times a day  - Ambulate patient 3 times a day  - Out of bed to chair 3 times a day   - Out of bed for meals 3 times a day  - Out of bed for toileting  - Record patient progress and toleration of activity level   Outcome: Progressing     Problem: DISCHARGE PLANNING  Goal: Discharge to home or other facility with appropriate resources  Description: INTERVENTIONS:  - Identify barriers to discharge w/patient and caregiver  - Arrange for needed discharge resources and transportation as appropriate  - Identify discharge learning needs (meds, wound care, etc )  - Arrange for interpretive services to assist at discharge as needed  - Refer to Case Management Department for coordinating discharge planning if the patient needs post-hospital services based on physician/advanced practitioner order or complex needs related to functional status, cognitive ability, or social support system  Outcome: Progressing     Problem: Knowledge Deficit  Goal: Patient/family/caregiver demonstrates understanding of disease process, treatment plan, medications, and discharge instructions  Description: Complete learning assessment and assess knowledge base    Interventions:  - Provide teaching at level of understanding  - Provide teaching via preferred learning methods  Outcome: Progressing     Problem: Prexisting or High Potential for Compromised Skin Integrity  Goal: Skin integrity is maintained or improved  Description: INTERVENTIONS:  - Identify patients at risk for skin breakdown  - Assess and monitor skin integrity  - Assess and monitor nutrition and hydration status  - Monitor labs   - Assess for incontinence   - Turn and reposition patient  - Assist with mobility/ambulation  - Relieve pressure over bony prominences  - Avoid friction and shearing  - Provide appropriate hygiene as needed including keeping skin clean and dry  - Evaluate need for skin moisturizer/barrier cream  - Collaborate with interdisciplinary team   - Patient/family teaching  - Consider wound care consult   Outcome: Progressing     Problem: Potential for Falls  Goal: Patient will remain free of falls  Description: INTERVENTIONS:  - Educate patient/family on patient safety including physical limitations  - Instruct patient to call for assistance with activity   - Consult OT/PT to assist with strengthening/mobility   - Keep Call bell within reach  - Keep bed low and locked with side rails adjusted as appropriate  - Keep care items and personal belongings within reach  - Initiate and maintain comfort rounds  - Make Fall Risk Sign visible to staff  - Offer Toileting every 1 Hours, in advance of need  - Initiate/Maintain bed alarm  - Obtain necessary fall risk management equipment: bed  - Apply yellow socks and bracelet for high fall risk patients  - Consider moving patient to room near nurses station  Outcome: Progressing

## 2022-07-20 NOTE — NURSING NOTE
Pt sitting at the side of the bed in no aucte distress eating dinner, callbell in reach of the pt, pt waiting for parents to bring in clothing to do abd fold care

## 2022-07-20 NOTE — PLAN OF CARE
Problem: PAIN - ADULT  Goal: Verbalizes/displays adequate comfort level or baseline comfort level  Description: Interventions:  - Encourage patient to monitor pain and request assistance  - Assess pain using appropriate pain scale  - Administer analgesics based on type and severity of pain and evaluate response  - Implement non-pharmacological measures as appropriate and evaluate response  - Consider cultural and social influences on pain and pain management  - Notify physician/advanced practitioner if interventions unsuccessful or patient reports new pain  Outcome: Progressing     Problem: INFECTION - ADULT  Goal: Absence or prevention of progression during hospitalization  Description: INTERVENTIONS:  - Assess and monitor for signs and symptoms of infection  - Monitor lab/diagnostic results  - Monitor all insertion sites, i e  indwelling lines, tubes, and drains  - Monitor endotracheal if appropriate and nasal secretions for changes in amount and color  - [place] appropriate cooling/warming therapies per order  - Administer medications as ordered  - Instruct and encourage patient and family to use good hand hygiene technique  - Identify and instruct in appropriate isolation precautions for identified infection/condition  Outcome: Progressing  Goal: Absence of fever/infection during neutropenic period  Description: INTERVENTIONS:  - Monitor WBC    Outcome: Progressing     Problem: SAFETY ADULT  Goal: Patient will remain free of falls  Description: INTERVENTIONS:  - Educate patient/family on patient safety including physical limitations  - Instruct patient to call for assistance with activity   - Consult OT/PT to assist with strengthening/mobility   - Keep Call bell within reach  - Keep bed low and locked with side rails adjusted as appropriate  - Keep care items and personal belongings within reach  - Initiate and maintain comfort rounds  - Make Fall Risk Sign visible to staff  - Offer Toileting every 1 Hours, in advance of need  - Initiate/Maintain bed alarm  - Obtain necessary fall risk management equipment: yellow socks   - Apply yellow socks and bracelet for high fall risk patients  - Consider moving patient to room near nurses station  Outcome: Progressing  Goal: Maintain or return to baseline ADL function  Description: INTERVENTIONS:  -  Assess patient's ability to carry out ADLs; assess patient's baseline for ADL function and identify physical deficits which impact ability to perform ADLs (bathing, care of mouth/teeth, toileting, grooming, dressing, etc )  - Assess/evaluate cause of self-care deficits   - Assess range of motion  - Assess patient's mobility; develop plan if impaired  - Assess patient's need for assistive devices and provide as appropriate  - Encourage maximum independence but intervene and supervise when necessary  - Involve family in performance of ADLs  - Assess for home care needs following discharge   - Consider OT consult to assist with ADL evaluation and planning for discharge  - Provide patient education as appropriate  Outcome: Progressing  Goal: Maintains/Returns to pre admission functional level  Description: INTERVENTIONS:  - Perform BMAT or MOVE assessment daily    - Set and communicate daily mobility goal to care team and patient/family/caregiver  - Collaborate with rehabilitation services on mobility goals if consulted  - Perform Range of Motion 3 times a day  - Reposition patient every 2 hours    - Dangle patient 3 times a day  - Stand patient 3 times a day  - Ambulate patient 3 times a day  - Out of bed to chair 3 times a day   - Out of bed for meals 3 times a day  - Out of bed for toileting  - Record patient progress and toleration of activity level   Outcome: Progressing     Problem: DISCHARGE PLANNING  Goal: Discharge to home or other facility with appropriate resources  Description: INTERVENTIONS:  - Identify barriers to discharge w/patient and caregiver  - Arrange for needed discharge resources and transportation as appropriate  - Identify discharge learning needs (meds, wound care, etc )  - Arrange for interpretive services to assist at discharge as needed  - Refer to Case Management Department for coordinating discharge planning if the patient needs post-hospital services based on physician/advanced practitioner order or complex needs related to functional status, cognitive ability, or social support system  Outcome: Progressing     Problem: Knowledge Deficit  Goal: Patient/family/caregiver demonstrates understanding of disease process, treatment plan, medications, and discharge instructions  Description: Complete learning assessment and assess knowledge base    Interventions:  - Provide teaching at level of understanding  - Provide teaching via preferred learning methods  Outcome: Progressing     Problem: Prexisting or High Potential for Compromised Skin Integrity  Goal: Skin integrity is maintained or improved  Description: INTERVENTIONS:  - Identify patients at risk for skin breakdown  - Assess and monitor skin integrity  - Assess and monitor nutrition and hydration status  - Monitor labs   - Assess for incontinence   - Turn and reposition patient  - Assist with mobility/ambulation  - Relieve pressure over bony prominences  - Avoid friction and shearing  - Provide appropriate hygiene as needed including keeping skin clean and dry  - Evaluate need for skin moisturizer/barrier cream  - Collaborate with interdisciplinary team   - Patient/family teaching  - Consider wound care consult   Outcome: Progressing     Problem: Potential for Falls  Goal: Patient will remain free of falls  Description: INTERVENTIONS:  - Educate patient/family on patient safety including physical limitations  - Instruct patient to call for assistance with activity   - Consult OT/PT to assist with strengthening/mobility   - Keep Call bell within reach  - Keep bed low and locked with side rails adjusted as appropriate  - Keep care items and personal belongings within reach  - Initiate and maintain comfort rounds  - Make Fall Risk Sign visible to staff  - Offer Toileting every 1 Hours, in advance of need  - Initiate/Maintain bed alarm  - Obtain necessary fall risk management equipment: yellow socks   - Apply yellow socks and bracelet for high fall risk patients  - Consider moving patient to room near nurses station  Outcome: Progressing

## 2022-07-20 NOTE — ASSESSMENT & PLAN NOTE
· Podiatry consultation appreciated  · S/p debridement to bilateral hallux wounds  · Right foot x-ray pending  · Wound care

## 2022-07-21 ENCOUNTER — APPOINTMENT (INPATIENT)
Dept: CT IMAGING | Facility: HOSPITAL | Age: 42
DRG: 425 | End: 2022-07-21
Attending: RADIOLOGY
Payer: COMMERCIAL

## 2022-07-21 VITALS
RESPIRATION RATE: 16 BRPM | WEIGHT: 315 LBS | TEMPERATURE: 98.6 F | DIASTOLIC BLOOD PRESSURE: 86 MMHG | BODY MASS INDEX: 57.97 KG/M2 | OXYGEN SATURATION: 95 % | HEIGHT: 62 IN | HEART RATE: 82 BPM | SYSTOLIC BLOOD PRESSURE: 144 MMHG

## 2022-07-21 LAB
ANION GAP SERPL CALCULATED.3IONS-SCNC: 8 MMOL/L (ref 4–13)
BASOPHILS # BLD AUTO: 0.03 THOUSANDS/ΜL (ref 0–0.1)
BASOPHILS NFR BLD AUTO: 0 % (ref 0–1)
BUN SERPL-MCNC: 58 MG/DL (ref 5–25)
C-ANCA TITR SER IF: NORMAL TITER
CALCIUM SERPL-MCNC: 9 MG/DL (ref 8.4–10.2)
CHLORIDE SERPL-SCNC: 96 MMOL/L (ref 96–108)
CO2 SERPL-SCNC: 31 MMOL/L (ref 21–32)
CREAT SERPL-MCNC: 4.01 MG/DL (ref 0.6–1.3)
DOPAMINE 24H UR-MRATE: <30 PG/ML (ref 0–48)
EOSINOPHIL # BLD AUTO: 0.45 THOUSAND/ΜL (ref 0–0.61)
EOSINOPHIL NFR BLD AUTO: 5 % (ref 0–6)
EPINEPH PLAS-MCNC: 29 PG/ML (ref 0–62)
ERYTHROCYTE [DISTWIDTH] IN BLOOD BY AUTOMATED COUNT: 12.9 % (ref 11.6–15.1)
GBM AB SER IA-ACNC: <0.2 UNITS (ref 0–0.9)
GFR SERPL CREATININE-BSD FRML MDRD: 17 ML/MIN/1.73SQ M
GLUCOSE SERPL-MCNC: 183 MG/DL (ref 65–140)
GLUCOSE SERPL-MCNC: 184 MG/DL (ref 65–140)
GLUCOSE SERPL-MCNC: 197 MG/DL (ref 65–140)
GLUCOSE SERPL-MCNC: 201 MG/DL (ref 65–140)
HCT VFR BLD AUTO: 27.7 % (ref 36.5–49.3)
HGB BLD-MCNC: 8.8 G/DL (ref 12–17)
IMM GRANULOCYTES # BLD AUTO: 0.05 THOUSAND/UL (ref 0–0.2)
IMM GRANULOCYTES NFR BLD AUTO: 1 % (ref 0–2)
LYMPHOCYTES # BLD AUTO: 1.34 THOUSANDS/ΜL (ref 0.6–4.47)
LYMPHOCYTES NFR BLD AUTO: 16 % (ref 14–44)
MCH RBC QN AUTO: 27.4 PG (ref 26.8–34.3)
MCHC RBC AUTO-ENTMCNC: 31.8 G/DL (ref 31.4–37.4)
MCV RBC AUTO: 86 FL (ref 82–98)
METANEPH 24H UR-MRATE: 126 UG/24 HR (ref 58–276)
METANEPHS 24H UR-MCNC: 27 UG/L
MONOCYTES # BLD AUTO: 0.63 THOUSAND/ΜL (ref 0.17–1.22)
MONOCYTES NFR BLD AUTO: 7 % (ref 4–12)
MYELOPEROXIDASE AB SER IA-ACNC: <0.2 UNITS (ref 0–0.9)
MYELOPEROXIDASE AB SER IA-ACNC: <0.2 UNITS (ref 0–0.9)
NEUTROPHILS # BLD AUTO: 6.02 THOUSANDS/ΜL (ref 1.85–7.62)
NEUTS SEG NFR BLD AUTO: 71 % (ref 43–75)
NOREPINEPH PLAS-MCNC: 363 PG/ML (ref 0–874)
NORMETANEPHRINE 24H UR-MCNC: 128 UG/L
NORMETANEPHRINE 24H UR-MRATE: 595 UG/24 HR (ref 156–729)
NRBC BLD AUTO-RTO: 0 /100 WBCS
P-ANCA ATYPICAL TITR SER IF: NORMAL TITER
P-ANCA TITR SER IF: NORMAL TITER
PLATELET # BLD AUTO: 355 THOUSANDS/UL (ref 149–390)
PMV BLD AUTO: 8.7 FL (ref 8.9–12.7)
POTASSIUM SERPL-SCNC: 3.8 MMOL/L (ref 3.5–5.3)
PROTEINASE3 AB SER IA-ACNC: <0.2 UNITS (ref 0–0.9)
RBC # BLD AUTO: 3.21 MILLION/UL (ref 3.88–5.62)
SODIUM SERPL-SCNC: 135 MMOL/L (ref 135–147)
WBC # BLD AUTO: 8.52 THOUSAND/UL (ref 4.31–10.16)

## 2022-07-21 PROCEDURE — 80048 BASIC METABOLIC PNL TOTAL CA: CPT | Performed by: NURSE PRACTITIONER

## 2022-07-21 PROCEDURE — 99233 SBSQ HOSP IP/OBS HIGH 50: CPT | Performed by: PHYSICIAN ASSISTANT

## 2022-07-21 PROCEDURE — 82948 REAGENT STRIP/BLOOD GLUCOSE: CPT

## 2022-07-21 PROCEDURE — 99238 HOSP IP/OBS DSCHRG MGMT 30/<: CPT | Performed by: NURSE PRACTITIONER

## 2022-07-21 PROCEDURE — 85025 COMPLETE CBC W/AUTO DIFF WBC: CPT | Performed by: NURSE PRACTITIONER

## 2022-07-21 PROCEDURE — NC001 PR NO CHARGE: Performed by: RADIOLOGY

## 2022-07-21 RX ORDER — LORAZEPAM 0.5 MG/1
0.5 TABLET ORAL 2 TIMES DAILY PRN
Qty: 30 TABLET | Refills: 0 | Status: SHIPPED | OUTPATIENT
Start: 2022-07-21 | End: 2022-09-23

## 2022-07-21 RX ORDER — ALBUTEROL SULFATE 90 UG/1
2 AEROSOL, METERED RESPIRATORY (INHALATION) EVERY 4 HOURS PRN
Qty: 6.7 G | Refills: 0 | Status: SHIPPED | OUTPATIENT
Start: 2022-07-21

## 2022-07-21 RX ORDER — LABETALOL HYDROCHLORIDE 5 MG/ML
10 INJECTION, SOLUTION INTRAVENOUS EVERY 6 HOURS PRN
Status: DISCONTINUED | OUTPATIENT
Start: 2022-07-21 | End: 2022-07-21 | Stop reason: HOSPADM

## 2022-07-21 RX ORDER — BUMETANIDE 2 MG/1
4 TABLET ORAL DAILY
Qty: 60 TABLET | Refills: 0 | Status: SHIPPED | OUTPATIENT
Start: 2022-07-22 | End: 2022-09-23

## 2022-07-21 RX ORDER — BUMETANIDE 1 MG/1
4 TABLET ORAL DAILY
Status: DISCONTINUED | OUTPATIENT
Start: 2022-07-22 | End: 2022-07-21 | Stop reason: HOSPADM

## 2022-07-21 RX ORDER — BUMETANIDE 0.25 MG/ML
4 INJECTION, SOLUTION INTRAMUSCULAR; INTRAVENOUS 2 TIMES DAILY
Status: COMPLETED | OUTPATIENT
Start: 2022-07-21 | End: 2022-07-21

## 2022-07-21 RX ORDER — AMLODIPINE BESYLATE 10 MG/1
10 TABLET ORAL DAILY
Qty: 30 TABLET | Refills: 0 | Status: SHIPPED | OUTPATIENT
Start: 2022-07-22 | End: 2022-09-23

## 2022-07-21 RX ORDER — HYDRALAZINE HYDROCHLORIDE 50 MG/1
50 TABLET, FILM COATED ORAL 3 TIMES DAILY
Qty: 90 TABLET | Refills: 0 | Status: SHIPPED | OUTPATIENT
Start: 2022-07-21 | End: 2022-09-23

## 2022-07-21 RX ADMIN — CARVEDILOL 25 MG: 25 TABLET, FILM COATED ORAL at 16:29

## 2022-07-21 RX ADMIN — ACETAMINOPHEN 650 MG: 325 TABLET ORAL at 05:04

## 2022-07-21 RX ADMIN — DOXAZOSIN 2 MG: 2 TABLET ORAL at 08:06

## 2022-07-21 RX ADMIN — HYDRALAZINE HYDROCHLORIDE 50 MG: 25 TABLET ORAL at 16:29

## 2022-07-21 RX ADMIN — FLUOXETINE 20 MG: 20 CAPSULE ORAL at 08:05

## 2022-07-21 RX ADMIN — CARIPRAZINE 6 MG: 4.5 CAPSULE, GELATIN COATED ORAL at 08:06

## 2022-07-21 RX ADMIN — AMLODIPINE BESYLATE 10 MG: 10 TABLET ORAL at 08:05

## 2022-07-21 RX ADMIN — BUMETANIDE 4 MG: 0.25 INJECTION INTRAMUSCULAR; INTRAVENOUS at 08:06

## 2022-07-21 RX ADMIN — BUMETANIDE 4 MG: 0.25 INJECTION INTRAMUSCULAR; INTRAVENOUS at 17:42

## 2022-07-21 RX ADMIN — LEVOTHYROXINE SODIUM 125 MCG: 25 TABLET ORAL at 05:04

## 2022-07-21 RX ADMIN — LABETALOL HYDROCHLORIDE 10 MG: 5 INJECTION, SOLUTION INTRAVENOUS at 17:03

## 2022-07-21 RX ADMIN — HYDRALAZINE HYDROCHLORIDE 50 MG: 25 TABLET ORAL at 08:05

## 2022-07-21 RX ADMIN — FAMOTIDINE 40 MG: 20 TABLET ORAL at 08:05

## 2022-07-21 RX ADMIN — CARVEDILOL 25 MG: 25 TABLET, FILM COATED ORAL at 08:06

## 2022-07-21 NOTE — ASSESSMENT & PLAN NOTE
Lab Results   Component Value Date    EGFR 17 07/21/2022    EGFR 17 07/20/2022    EGFR 17 07/19/2022    CREATININE 4 01 (H) 07/21/2022    CREATININE 3 84 (H) 07/20/2022    CREATININE 3 87 (H) 07/19/2022     · Poorly-controlled diabetes  · Repeat A1c 9 1, improved since prior (11 6 on 8/3/21)  · Continue with home Lantus 20U at bedtime  · Prior baseline Cr appears to be 1 9-2 1 mg/dL in 2021, with new baseline around 2 9-3 d/t recent ATN dx   · Supposed to follow up with Nephrology outpatient however has been unable to get out of the house  · Nephrology consult appreciated   · Patient has significant proteinuria, approximately 6 g per day, etiology most likely diabetic nephropathy plus/minus hyper filtration syndrome with or without FSGS; will need full workup with kidney biopsy due to proteinuria and significant loss in kidney function over the last year  · Plan was for renal biopsy today, however this was unable to be completed due to blood pressure  Per IR, they are not able to do renal biopsies on Friday  Discussed with Nephrology, okay to have this done as an outpatient  · Continue with diuresis-per nephrology Bumex de-escalated and transitioned to oral with last IV dose of 4 mg at 6:00 p m  with plan to start 4 mg oral daily tomorrow  · Monitor renal function closely in the setting of diuresis  · Avoid hypotension and nephrotoxins    · With hypertensive urgency POA   · Home hydralazine increased to 50 mg 3 times daily  · Continue home carvedilol 25 mg BID, doxazosin 2mg qd  · Received three doses of IV Lasix  · BP not adequately controlled  Will start amlodipine 10 mg daily to patient's current regimen  · Continue to monitor blood pressure trend  Before procedure, BP was 148/92, not meeting parameter for PRN anti- hypertensive  In IR, blood pressure was over 008 systolic  Upon return to floor 174/90  Suspect anxiety component    Monitor as an outpatient

## 2022-07-21 NOTE — DISCHARGE SUMMARY
Janyarden 43  Discharge- Stalin [place] 1980, 38 y o  male MRN: [id number]  Unit/Bed#: -01 Encounter: [id number]  Primary Care Provider: Lonnie Graham MD   Date and time admitted to hospital: 7/16/2022  7:29 AM    * Volume overload  Assessment & Plan  Wt Readings from Last 3 Encounters:   07/21/22 (!) 151 kg (332 lb 14 3 oz)   06/23/22 (!) 146 kg (321 lb 14 oz)   06/19/22 (!) 154 kg (339 lb 8 1 oz)     · Mild exacerbation with  and worsening bilateral lower extremity edema and dyspnea  Not on home diuretic Echocardiogram on 06/17/2022 shows LVEF of 55%  · Repeat ECHO with LVEF 55%, mild concentric LV hypertrophy   · Suspect dietary noncompliance with increased sodium intake   · Continue with Coreg  · Received lasix 40 IV x 1 in ED    Lasix transitioned to IV Bumex 4 mg twice daily, which will transition to Bumex 4 mg oral daily  · Weight now trending down  · Low-salt diet  · Daily weights and monitor intake and output as an outpatient    Type 2 diabetes mellitus with stage 4 chronic kidney disease and hypertension [facility])  Assessment & Plan  Lab Results   Component Value Date    EGFR 17 07/21/2022    EGFR 17 07/20/2022    EGFR 17 07/19/2022    CREATININE 4 01 (H) 07/21/2022    CREATININE 3 84 (H) 07/20/2022    CREATININE 3 87 (H) 07/19/2022     · Poorly-controlled diabetes  · Repeat A1c 9 1, improved since prior (11 6 on 8/3/21)  · Continue with home Lantus 20U at bedtime  · Prior baseline Cr appears to be 1 9-2 1 mg/dL in 2021, with new baseline around 2 9-3 d/t recent ATN dx   · Supposed to follow up with Nephrology outpatient however has been unable to get out of the house  · Nephrology consult appreciated   · Patient has significant proteinuria, approximately 6 g per day, etiology most likely diabetic nephropathy plus/minus hyper filtration syndrome with or without FSGS; will need full workup with kidney biopsy due to proteinuria and significant loss in kidney function over the last year  · Plan was for renal biopsy today, however this was unable to be completed due to blood pressure  Per IR, they are not able to do renal biopsies on Friday  Discussed with Nephrology, okay to have this done as an outpatient  · Continue with diuresis-per nephrology Bumex de-escalated and transitioned to oral with last IV dose of 4 mg at 6:00 p m  with plan to start 4 mg oral daily tomorrow  · Monitor renal function closely in the setting of diuresis  · Avoid hypotension and nephrotoxins    · With hypertensive urgency POA   · Home hydralazine increased to 50 mg 3 times daily  · Continue home carvedilol 25 mg BID, doxazosin 2mg qd  · Received three doses of IV Lasix  · BP not adequately controlled  Will start amlodipine 10 mg daily to patient's current regimen  · Continue to monitor blood pressure trend  Before procedure, BP was 148/92, not meeting parameter for PRN anti- hypertensive  In IR, blood pressure was over 926 systolic  Upon return to floor 174/90  Suspect anxiety component  Monitor as an outpatient        Diabetic ulcer of both feet ([facility] )  Assessment & Plan      · Podiatry consultation appreciated  · S/p debridement to bilateral hallux wounds  · Right foot x-ray: No acute osseous abnormality    Soft tissue swelling about the great toe  · Wound care-continue to follow-up as an outpatient      Morbid obesity with BMI of 50 0-59 9, adult ([facility] )  Assessment & Plan  · Encourage intensive lifestyle modification with diet and exercise  · Dietitian consult     Schizoaffective disorder, depressive type [facility])  Assessment & Plan  · Continue with home regimen   · Supportive care    Discharging Physician / Practitioner: DOREEN Kaur  PCP: Eldridge Olszewski, MD  Admission Date:   Admission Orders (From admission, onward)     Ordered        07/17/22 1153  Place in Observation  Once            07/17/22 1153  Inpatient Admission  Once            07/16/22 0830  Place in Observation  Once Discharge Date: 07/21/22    Medical Problems             Resolved Problems  Date Reviewed: 7/20/2022   None                 Consultations During Hospital Stay:  · PT, OT, Nephrology, CM, Podiatry, IR    Procedures Performed:   · He had a wound debridement of his right and left hallux wounds by Podiatry  He was scheduled to have a renal biopsy, but this was aborted due to hypertension  Patient is to follow up as an outpatient  Significant Findings / Test Results:   · None    Incidental Findings:   · None     Test Results Pending at Discharge (will require follow up): · None     Outpatient Tests Requested:  · Renal biopsy    Complications:  None apparent    Reason for Admission:  Shortness of breath and leg swelling-volume overload    Hospital Course:     Omari Holland is a 38 y o  male with history of insulin-dependent type 2 diabetes, hypothyroidism, BMI 60 89, essential hypertension, CKD, schizoaffective disorder, and obsessive-compulsive disorder patient who originally presented to the hospital on 7/16/2022 due to shortness of breath and leg swelling  He has chronic leg swelling but presented saying that it had increased over the past week  He also has bilateral hallux wounds  He was supposed to have follow-up with Nephrology, but he was unable to get out of his house due to difficulty ambulating with the edema  He was treated with IV Lasix  He was also found to have hypertensive urgency, which is treated with medication adjustment and IV Lasix  He also had STEFANIA superimposed on CKD and was seen by Nephrology  Diuretics were changed to Bumex  Nephrology recommended a renal biopsy, and this was arranged, however had to be aborted due to his elevated blood pressure  Suspect an anxiety component with this as it was not significantly elevated when he was sent to IR, and also not significantly elevated on his return to the floor    Due to inability to send biopsy samples over the weekend, patient was discharged, with plan to perform this renal biopsy as an outpatient  Discussed with Nephrology  Please see above list of diagnoses and related plan for additional information  Condition at Discharge: stable     Discharge Day Visit / Exam:     * Please refer to separate progress note for these details *    Discussion with Family:  Patient and his mother  Discharge instructions/Information to patient and family:   See after visit summary for information provided to patient and family  Provisions for Follow-Up Care:  See after visit summary for information related to follow-up care and any pertinent home health orders  Disposition:     Home    Planned Readmission:  No     Discharge Statement:  I spent 30 minutes discharging the patient  This time was spent on the day of discharge  I had direct contact with the patient on the day of discharge  Greater than 50% of the total time was spent examining patient, answering all patient questions, arranging and discussing plan of care with patient as well as directly providing post-discharge instructions  Additional time then spent on discharge activities  Discharge Medications:  See after visit summary for reconciled discharge medications provided to patient and family        ** Please Note: This note has been constructed using a voice recognition system **

## 2022-07-21 NOTE — DISCHARGE INSTR - AVS FIRST PAGE
Please follow-up with Interventional Radiology as soon as possible  You have been given an outpatient referral   Please also monitor your blood pressure and take medications as prescribed  Please follow-up with nephrology, the kidney doctor  Follow-up with the wound care for your toe  Come back to the hospital immediately if you have any new concerns or worsening of condition, including but not limited to visual changes, nose bleed, headache, dizziness, unsteady walking, chest pain, shortness of breath, or weight gain greater than 2 lb in 1 day  Weigh yourself daily  Please do not drink more than a quart and half of fluid a day  Please avoid salt and salt it in your food  Follow-up with your primary care

## 2022-07-21 NOTE — PLAN OF CARE
Problem: PAIN - ADULT  Goal: Verbalizes/displays adequate comfort level or baseline comfort level  Description: Interventions:  - Encourage patient to monitor pain and request assistance  - Assess pain using appropriate pain scale  - Administer analgesics based on type and severity of pain and evaluate response  - Implement non-pharmacological measures as appropriate and evaluate response  - Consider cultural and social influences on pain and pain management  - Notify physician/advanced practitioner if interventions unsuccessful or patient reports new pain  7/21/2022 1816 by Merlin Hernandez RN  Outcome: Adequate for Discharge  7/21/2022 1815 by Merlin Hernandez RN  Outcome: Adequate for Discharge  7/21/2022 0752 by Merlin Hernandez RN  Outcome: Progressing     Problem: INFECTION - ADULT  Goal: Absence or prevention of progression during hospitalization  Description: INTERVENTIONS:  - Assess and monitor for signs and symptoms of infection  - Monitor lab/diagnostic results  - Monitor all insertion sites, i e  indwelling lines, tubes, and drains  - Monitor endotracheal if appropriate and nasal secretions for changes in amount and color  - [place] appropriate cooling/warming therapies per order  - Administer medications as ordered  - Instruct and encourage patient and family to use good hand hygiene technique  - Identify and instruct in appropriate isolation precautions for identified infection/condition  7/21/2022 1816 by Merlin Hernandez RN  Outcome: Adequate for Discharge  7/21/2022 1815 by Merlin Hernandez RN  Outcome: Adequate for Discharge  Goal: Absence of fever/infection during neutropenic period  Description: INTERVENTIONS:  - Monitor WBC    7/21/2022 1816 by Merlin Hernandez RN  Outcome: Adequate for Discharge  7/21/2022 1815 by Merlin Hernandez RN  Outcome: Adequate for Discharge     Problem: SAFETY ADULT  Goal: Patient will remain free of falls  Description: INTERVENTIONS:  - Educate patient/family on patient safety including physical limitations  - Instruct patient to call for assistance with activity   - Consult OT/PT to assist with strengthening/mobility   - Keep Call bell within reach  - Keep bed low and locked with side rails adjusted as appropriate  - Keep care items and personal belongings within reach  - Initiate and maintain comfort rounds  - Make Fall Risk Sign visible to staff  - Obtain necessary fall risk management equipment: yellow socks, bed in lowest and locked position  - Apply yellow socks and bracelet for high fall risk patients  - Consider moving patient to room near nurses station  7/21/2022 1816 by Virgen Rsosi RN  Outcome: Adequate for Discharge  7/21/2022 1815 by Virgen Rossi RN  Outcome: Adequate for Discharge  7/21/2022 0752 by Virgen Rossi RN  Outcome: Progressing  Goal: Maintain or return to baseline ADL function  Description: INTERVENTIONS:  -  Assess patient's ability to carry out ADLs; assess patient's baseline for ADL function and identify physical deficits which impact ability to perform ADLs (bathing, care of mouth/teeth, toileting, grooming, dressing, etc )  - Assess/evaluate cause of self-care deficits   - Assess range of motion  - Assess patient's mobility; develop plan if impaired  - Assess patient's need for assistive devices and provide as appropriate  - Encourage maximum independence but intervene and supervise when necessary  - Involve family in performance of ADLs  - Assess for home care needs following discharge   - Consider OT consult to assist with ADL evaluation and planning for discharge  - Provide patient education as appropriate  7/21/2022 1816 by Virgen Rossi RN  Outcome: Adequate for Discharge  7/21/2022 1815 by Virgen Rossi RN  Outcome: Adequate for Discharge  7/21/2022 0752 by Virgen Rossi RN  Outcome: Progressing  Goal: Maintains/Returns to pre admission functional level  Description: INTERVENTIONS:  - Perform BMAT or MOVE assessment daily    - Set and communicate daily mobility goal to care team and patient/family/caregiver  - Collaborate with rehabilitation services on mobility goals if consulted  - Out of bed for toileting  - Record patient progress and toleration of activity level   7/21/2022 1816 by Thalia Aquino RN  Outcome: Adequate for Discharge  7/21/2022 1815 by Thalia Aquino RN  Outcome: Adequate for Discharge  7/21/2022 0752 by Thalia Aquino RN  Outcome: Progressing     Problem: DISCHARGE PLANNING  Goal: Discharge to home or other facility with appropriate resources  Description: INTERVENTIONS:  - Identify barriers to discharge w/patient and caregiver  - Arrange for needed discharge resources and transportation as appropriate  - Identify discharge learning needs (meds, wound care, etc )  - Arrange for interpretive services to assist at discharge as needed  - Refer to Case Management Department for coordinating discharge planning if the patient needs post-hospital services based on physician/advanced practitioner order or complex needs related to functional status, cognitive ability, or social support system  7/21/2022 1816 by Thalia Aquino RN  Outcome: Adequate for Discharge  7/21/2022 1815 by Thalia Aquino RN  Outcome: Adequate for Discharge     Problem: Knowledge Deficit  Goal: Patient/family/caregiver demonstrates understanding of disease process, treatment plan, medications, and discharge instructions  Description: Complete learning assessment and assess knowledge base    Interventions:  - Provide teaching at level of understanding  - Provide teaching via preferred learning methods  7/21/2022 1816 by Thalia Aquino RN  Outcome: Adequate for Discharge  7/21/2022 1815 by Thalia Aquino RN  Outcome: Adequate for Discharge     Problem: Prexisting or High Potential for Compromised Skin Integrity  Goal: Skin integrity is maintained or improved  Description: INTERVENTIONS:  - Identify patients at risk for skin breakdown  - Assess and monitor skin integrity  - Assess and monitor nutrition and hydration status  - Monitor labs   - Assess for incontinence   - Turn and reposition patient  - Assist with mobility/ambulation  - Relieve pressure over bony prominences  - Avoid friction and shearing  - Provide appropriate hygiene as needed including keeping skin clean and dry  - Evaluate need for skin moisturizer/barrier cream  - Collaborate with interdisciplinary team   - Patient/family teaching  - Consider wound care consult   7/21/2022 1816 by Alejandra Hutchinson RN  Outcome: Adequate for Discharge  7/21/2022 1815 by Alejandra Hutchinson RN  Outcome: Adequate for Discharge  7/21/2022 0752 by Alejandra Hutchinson RN  Outcome: Progressing     Problem: Potential for Falls  Goal: Patient will remain free of falls  Description: INTERVENTIONS:  - Educate patient/family on patient safety including physical limitations  - Instruct patient to call for assistance with activity   - Consult OT/PT to assist with strengthening/mobility   - Keep Call bell within reach  - Keep bed low and locked with side rails adjusted as appropriate  - Keep care items and personal belongings within reach  - Initiate and maintain comfort rounds  - Make Fall Risk Sign visible to staff  - Obtain necessary fall risk management equipment: yellow socks, bed in lowest and locked position  - Apply yellow socks and bracelet for high fall risk patients  - Consider moving patient to room near nurses station  7/21/2022 1816 by Alejandra Hutchinson RN  Outcome: Adequate for Discharge  7/21/2022 1815 by Alejandra Hutchinson RN  Outcome: Adequate for Discharge  7/21/2022 0752 by Alejandra Hutchinson RN  Outcome: Progressing

## 2022-07-21 NOTE — ASSESSMENT & PLAN NOTE
Wt Readings from Last 3 Encounters:   07/21/22 (!) 151 kg (332 lb 14 3 oz)   06/23/22 (!) 146 kg (321 lb 14 oz)   06/19/22 (!) 154 kg (339 lb 8 1 oz)     · Mild exacerbation with  and worsening bilateral lower extremity edema and dyspnea  Not on home diuretic Echocardiogram on 06/17/2022 shows LVEF of 55%  · Repeat ECHO with LVEF 55%, mild concentric LV hypertrophy   · Suspect dietary noncompliance with increased sodium intake   · Continue with Coreg  · Received lasix 40 IV x 1 in ED    Lasix transitioned to IV Bumex 4 mg twice daily, which will transition to Bumex 4 mg oral daily  · Weight now trending down  · Low-salt diet  · Daily weights and I&Os

## 2022-07-21 NOTE — OCCUPATIONAL THERAPY NOTE
07/21/22 1150   OT Last Visit   OT Visit Date 07/21/22   Note Type   Note Type Cancelled Session   Cancel Reasons   (patient stated: "I'm cold"  "Not now" )     LISBETH Martinez/DEMETRA  Continue with practice of LB/LH AE as able

## 2022-07-21 NOTE — ASSESSMENT & PLAN NOTE
· Podiatry consultation appreciated  · S/p debridement to bilateral hallux wounds  · Right foot x-ray: No acute osseous abnormality    Soft tissue swelling about the great toe  · Wound care-continue to follow-up as an outpatient

## 2022-07-21 NOTE — ASSESSMENT & PLAN NOTE
Wt Readings from Last 3 Encounters:   07/21/22 (!) 151 kg (332 lb 14 3 oz)   06/23/22 (!) 146 kg (321 lb 14 oz)   06/19/22 (!) 154 kg (339 lb 8 1 oz)     · Mild exacerbation with  and worsening bilateral lower extremity edema and dyspnea  Not on home diuretic Echocardiogram on 06/17/2022 shows LVEF of 55%  · Repeat ECHO with LVEF 55%, mild concentric LV hypertrophy   · Suspect dietary noncompliance with increased sodium intake   · Continue with Coreg  · Received lasix 40 IV x 1 in ED    Lasix transitioned to IV Bumex 4 mg twice daily, which will transition to Bumex 4 mg oral daily  · Weight now trending down  · Low-salt diet  · Daily weights and monitor intake and output as an outpatient

## 2022-07-21 NOTE — ASSESSMENT & PLAN NOTE
Lab Results   Component Value Date    EGFR 17 07/21/2022    EGFR 17 07/20/2022    EGFR 17 07/19/2022    CREATININE 4 01 (H) 07/21/2022    CREATININE 3 84 (H) 07/20/2022    CREATININE 3 87 (H) 07/19/2022     · Poorly-controlled diabetes  · Repeat A1c 9 1, improved since prior (11 6 on 8/3/21)  · Continue with home Lantus 20U at bedtime  · Fingerstick blood sugar with sliding scale coverage  · Accu Cheks     · Prior baseline Cr appears to be 1 9-2 1 mg/dL in 2021, with new baseline around 2 9-3 d/t recent ATN dx   · Supposed to follow up with Nephrology outpatient however has been unable to get out of the house  · Nephrology consult appreciated   · Patient has significant proteinuria, approximately 6 g per day, etiology most likely diabetic nephropathy plus/minus hyper filtration syndrome with or without FSGS; will need full workup with kidney biopsy due to proteinuria and significant loss in kidney function over the last year  · Plan is for IR biopsy today-patient could probably be be discharged afterwards after a period of observation  · Continue with diuresis-per nephrology Bumex de-escalated and transitioned to oral with last IV dose of 4 mg at 6:00 p m  with plan to start 4 mg oral daily tomorrow  · Monitor renal function closely in the setting of diuresis  · Avoid hypotension and nephrotoxins    · With hypertensive urgency POA   · Home hydralazine increased to 50 mg 3 times daily  · Continue home carvedilol 25 mg BID, doxazosin 2mg qd  · Received three doses of IV Lasix  · BP not adequately controlled    Will start amlodipine 10 mg daily to patient's current regimen  · Continue to monitor blood pressure trend

## 2022-07-21 NOTE — ASSESSMENT & PLAN NOTE
· Podiatry consultation appreciated  · S/p debridement to bilateral hallux wounds  · Right foot x-ray:No acute osseous abnormality    Soft tissue swelling about the great toe  · Wound care

## 2022-07-21 NOTE — NURSING NOTE
Pre procedure chlorhexidine bath done  Abd folds washed with soap and water and new intradry sheets placed

## 2022-07-21 NOTE — NURSING NOTE
Pt IV removed, sterile dry dressing and pressure applied  AVS reviewed and all questions and concerns answered

## 2022-07-21 NOTE — NURSING NOTE
Hospitalist reached out requesting PRN IV labetolol be given at this time  BP checked manually which was 148/92  Parameters not met, PRN labetolol not given  Hospitalist made aware

## 2022-07-21 NOTE — OCCUPATIONAL THERAPY NOTE
07/21/22 1150   OT Last Visit   OT Visit Date 07/21/22   Note Type   Note Type Cancelled Session   Cancel Reasons   (patient stated: "I'm cold"  "Not now" )     LISBETH Smiley/DEMETRA

## 2022-07-21 NOTE — PROGRESS NOTES
[facility] [address]  Progress Note - [employer] 1980, 38 y o  male MRN: [id number]  Unit/Bed#: -01 Encounter: [id number]  Primary Care Provider: Guy Hernandez MD   Date and time admitted to hospital: 7/16/2022  7:29 AM    * Volume overload  Assessment & Plan  Wt Readings from Last 3 Encounters:   07/21/22 (!) 151 kg (332 lb 14 3 oz)   06/23/22 (!) 146 kg (321 lb 14 oz)   06/19/22 (!) 154 kg (339 lb 8 1 oz)     · Mild exacerbation with  and worsening bilateral lower extremity edema and dyspnea  Not on home diuretic Echocardiogram on 06/17/2022 shows LVEF of 55%  · Repeat ECHO with LVEF 55%, mild concentric LV hypertrophy   · Suspect dietary noncompliance with increased sodium intake   · Continue with Coreg  · Received lasix 40 IV x 1 in ED    Lasix transitioned to IV Bumex 4 mg twice daily, which will transition to Bumex 4 mg oral daily  · Weight now trending down  · Low-salt diet  · Daily weights and I&Os    Type 2 diabetes mellitus with stage 4 chronic kidney disease and hypertension [facility])  Assessment & Plan  Lab Results   Component Value Date    EGFR 17 07/21/2022    EGFR 17 07/20/2022    EGFR 17 07/19/2022    CREATININE 4 01 (H) 07/21/2022    CREATININE 3 84 (H) 07/20/2022    CREATININE 3 87 (H) 07/19/2022     · Poorly-controlled diabetes  · Repeat A1c 9 1, improved since prior (11 6 on 8/3/21)  · Continue with home Lantus 20U at bedtime  · Fingerstick blood sugar with sliding scale coverage  · Accu Cheks     · Prior baseline Cr appears to be 1 9-2 1 mg/dL in 2021, with new baseline around 2 9-3 d/t recent ATN dx   · Supposed to follow up with Nephrology outpatient however has been unable to get out of the house  · Nephrology consult appreciated   · Patient has significant proteinuria, approximately 6 g per day, etiology most likely diabetic nephropathy plus/minus hyper filtration syndrome with or without FSGS; will need full workup with kidney biopsy due to proteinuria and significant loss in kidney function over the last year  · Plan is for IR biopsy today-patient could probably be be discharged afterwards after a period of observation  · Continue with diuresis-per nephrology Bumex de-escalated and transitioned to oral with last IV dose of 4 mg at 6:00 p m  with plan to start 4 mg oral daily tomorrow  · Monitor renal function closely in the setting of diuresis  · Avoid hypotension and nephrotoxins    · With hypertensive urgency POA   · Home hydralazine increased to 50 mg 3 times daily  · Continue home carvedilol 25 mg BID, doxazosin 2mg qd  · Received three doses of IV Lasix  · BP not adequately controlled  Will start amlodipine 10 mg daily to patient's current regimen  · Continue to monitor blood pressure trend        Diabetic ulcer of both feet ([facility] )  Assessment & Plan      · Podiatry consultation appreciated  · S/p debridement to bilateral hallux wounds  · Right foot x-ray:No acute osseous abnormality  Soft tissue swelling about the great toe  · Wound care      Morbid obesity with BMI of 50 0-59 9, adult ([facility])  Assessment & Plan  · Encourage intensive lifestyle modification with diet and exercise  · Dietitian consult     Schizoaffective disorder, depressive [facility] ([facility] )  Assessment & Plan  · Continue with home regimen   · Supportive care    VTE Pharmacologic Prophylaxis:   Pharmacologic: Heparin    Patient Centered Rounds: I have performed bedside rounds with nursing staff today  Discussions with Specialists or Other Care Team Provider:  Nephrology    Education and Discussions with Family / Patient:  Patient    Time Spent for Care: 30 minutes  More than 50% of total time spent on counseling and coordination of care as described above  Current Length of Stay: 4 day(s)    Current Patient Status: Inpatient   Certification Statement: The patient will continue to require additional inpatient hospital stay due to renal biopsy  Discharge Plan:   To be determined, could be today, if not, tomorrow  Code Status: Level 1 - Full Code      Subjective:   Patient seen and examined  No complaints offered  Objective:     Vitals:   Temp (24hrs), Av 2 °F (36 8 °C), Min:97 6 °F (36 4 °C), Max:98 6 °F (37 °C)    Temp:  [97 6 °F (36 4 °C)-98 6 °F (37 °C)] 98 6 °F (37 °C)  HR:  [77-82] 77  Resp:  [16-20] 16  BP: (143-182)/() 164/92  SpO2:  [92 %-94 %] 92 %  Body mass index is 60 89 kg/m²  Input and Output Summary (last 24 hours): Intake/Output Summary (Last 24 hours) at 2022 1027  Last data filed at 2022 1906  Gross per 24 hour   Intake 1200 ml   Output 2200 ml   Net -1000 ml       Physical Exam:     Physical Exam  Vitals and nursing note reviewed  Constitutional:       Appearance: He is obese  HENT:      Head: Normocephalic and atraumatic  Mouth/Throat:      Pharynx: Oropharynx is clear  Eyes:      Pupils: Pupils are equal, round, and reactive to light  Cardiovascular:      Rate and Rhythm: Normal rate and regular rhythm  Heart sounds: Normal heart sounds  Pulmonary:      Effort: Pulmonary effort is normal  No respiratory distress  Breath sounds: Normal breath sounds  Comments: Lungs clear  Abdominal:      General: Bowel sounds are normal       Palpations: Abdomen is soft  Tenderness: There is no abdominal tenderness  Musculoskeletal:      Cervical back: Neck supple  Right lower leg: Edema present  Left lower leg: Edema present  Skin:     General: Skin is warm and dry  Capillary Refill: Capillary refill takes less than 2 seconds  Neurological:      General: No focal deficit present  Mental Status: He is alert           Additional Data:     Labs:    Results from last 7 days   Lab Units 22  0431   WBC Thousand/uL 8 52   HEMOGLOBIN g/dL 8 8*   HEMATOCRIT % 27 7*   PLATELETS Thousands/uL 355   NEUTROS PCT % 71   LYMPHS PCT % 16   MONOS PCT % 7   EOS PCT % 5     Results from last 7 days   Lab Units 07/21/22  0431   SODIUM mmol/L 135   POTASSIUM mmol/L 3 8   CHLORIDE mmol/L 96   CO2 mmol/L 31   BUN mg/dL 58*   CREATININE mg/dL 4 01*   ANION GAP mmol/L 8   CALCIUM mg/dL 9 0   GLUCOSE RANDOM mg/dL 197*     Results from last 7 days   Lab Units 07/16/22  0743   INR  1 04     Results from last 7 days   Lab Units 07/21/22  0631 07/20/22  2052 07/20/22  1601 07/20/22  1113 07/20/22  0605 07/19/22  2039 07/19/22  1441 07/19/22  1105 07/19/22  0646 07/19/22  0352 07/18/22  2047 07/18/22  1557   POC GLUCOSE mg/dl 201* 176* 172* 163* 193* 180* 172* 124 119 105 172* 145*     Results from last 7 days   Lab Units 07/16/22  0743   HEMOGLOBIN A1C % 9 1*     Results from last 7 days   Lab Units 07/17/22  0442   PROCALCITONIN ng/ml 0 10           * I Have Reviewed All Lab Data Listed Above  * Additional Pertinent Lab Tests Reviewed:  [facility] Admission Reviewed    Imaging:    Imaging Reports Reviewed Today Include:  Left foot x-ray    Recent Cultures (last 7 days):           Last 24 Hours Medication List:   Current Facility-Administered Medications   Medication Dose Route Frequency Provider Last Rate    acetaminophen  650 mg Oral Q6H PRN DOREEN Magana      albuterol  2 puff Inhalation Q4H PRN Idania Davis PA-C      amLODIPine  10 mg Oral Daily Idania Davis PA-C      bumetanide  4 mg Intravenous BID DOREEN Kaur      [START ON 7/22/2022] bumetanide  4 mg Oral Daily DOREEN Kaur      cariprazine  6 mg Oral Daily Jonny Rivera, DOREEN      carvedilol  25 mg Oral BID With Meals Jonny Rivera, DOREEN      doxazosin  2 mg Oral Daily Jonny Rivera, DOREEN      famotidine  40 mg Oral Daily Jonny Rivera, DOREEN      FLUoxetine  20 mg Oral QAM Jonny Rivera, DOREEN      glycerin-hypromellose-  1 drop Both Eyes Q3H PRN Sean Beltran PA-C      heparin (porcine)  5,000 Units Subcutaneous Q8H [address] Jonny Rivera, DOREEN      hydrALAZINE  50 mg Oral TID Jonny Rivera, DOREEN      insulin glargine  20 Units Subcutaneous HS DOREEN Lizarraga      insulin lispro  1-5 Units Subcutaneous TID AC DOREEN Lizarraga      insulin lispro  1-5 Units Subcutaneous HS DOREEN Lizarraga      insulin lispro  5 Units Subcutaneous TID With Meals DOREEN Lizarraga      labetalol  10 mg Intravenous Q6H PRN Dianne Valdez PA-C      levothyroxine  125 mcg Oral Early Morning DOREEN Lizarraga      LORazepam  0 5 mg Oral BID PRN HERMINIA Greco PA-C      ondansetron  4 mg Intravenous Q4H PRN DOREEN Lizarraga          Today, Patient Was Seen By: DOREEN Membreno    ** Please Note: Dictation voice to text software may have been used in the creation of this document   **

## 2022-07-21 NOTE — QUICK NOTE
Was notified by RN that although patient's blood pressure was 148/92 at 1317, and PRN labetalol was not given due to not meeting parameters, patient was being returned to the unit without renal biopsy due to blood pressure 226/97 and 215/105  Will request that PRN labetalol be given now, and will hope that biopsy can be done tomorrow  Discussed with attending

## 2022-07-21 NOTE — PROCEDURES
Patient came down for kidney biopsy  Informed consent was obtained  We put him on the CT table  Blood pressure was between 378 to 737 systolic  No procedure was performed  He was in the 140s on the floor  Will contact anesthesia to see if they can offer some intra procedural support  Aborted procedure for today

## 2022-07-21 NOTE — PROGRESS NOTES
Progress Note - Nephrology   Madhav Linares 38 y o  male MRN: [id number]  Unit/Bed#: -01 Encounter: [id number]    Assessment and Plan    1  Acute kidney injury, present admission, superimposed on chronic kidney disease  · Renal function worsened, bumped up to creatinine 4 0 mg/dL  Patient is nonoliguric with at least 2 L urine output  He continues to diurese with bumetanide 4 mg IV twice daily  Weight is down 1 lb since yesterday  Patient continues with significant pitting edema of bilateral lower extremities  Additionally, he continues to be hypertensive with systolic blood pressures ranging from 160 so the 180s  Will continue diuresis  · Patient is scheduled for renal biopsy today  Trend renal function  Continue to provide supportive care  Optimize hemodynamics  Maintain mean arterial pressure greater than 65 mmHg  Renally dose medications  Avoid nephrotoxins  Please discuss with renal any diuretics or possible nephrotoxic agents, such as NSAIDs or IV contrast  Recommend avoidance of PPIs in favor of H2 blocker, if appropriate for clinical scenario  Monitor for urinary retention- strict I&Os, record bladder scan PVRs and follow urinary retention protocol  2  Chronic kidney disease, stage IV probable baseline creatinine around 3 0 mg/dL    3  Nephrotic range proteinuria  · With likely nephrotic syndrome  Continue aggressive diuresis  · Serologic workup thus far unrevealing  Continue to follow-up with results as available  Renal biopsy pending for today  4  Hypertensive urgency  · Recommended use of p r n  antihypertensives to control blood pressure ahead of planned renal biopsy today  With improvement in systolic blood pressure from 160 down 140s  Otherwise, from a chronic perspective, will follow with the current antihypertensive regimen with hydralazine dose increase on 07/16/2022     5  Volume overload  · Continues to improved    Continue bumetanide 4 mg IV twice daily     6  Morbid obesity with BMI 60  · BMI greater than 40 is associated with greater risk of progression of renal disease secondary to glomerular hyperfiltration/ FSGS  Recommend weight loss      7  Schizoaffective variant of schizophrenia    Follow up reason for today's visit:  Acute kidney injury / chronic kidney disease stage 4 / nephrotic range proteinuria    Volume overload    Patient Active Problem List   Diagnosis    Type 2 diabetes mellitus without complication, with long-term current use of insulin ([facility])    Abdominal pain    OCD (obsessive compulsive disorder)    Schizoaffective disorder, depressive type ([place] [address] )    Hypothyroidism    Morbid obesity with BMI of 50 0-59 9, adult ([place] [address] )    Anxiety    Episodic confusion    Snoring    Insomnia    Hypersomnia    Vitamin D deficiency    Vomiting    Occipital neuralgia of left side    Headache    Nodule of parotid gland    Bipolar 1 disorder ([facility])    Depression    Type 1 diabetes mellitus without complication ([place] [address] )    Urinary retention    Peripheral edema    Hyperlipidemia, mixed    Migraine without aura and without status migrainosus, not intractable    Class 3 severe obesity due to excess calories with serious comorbidity and body mass index (BMI) of 60 0 to 69 9 in adult [facility])    Spinal stenosis in cervical region    Difficulty urinating    Urinary tract infection without hematuria    Penile pain    Chronic migraine without aura without status migrainosus, not intractable    Hypertensive emergency    Encephalopathy    Leukocytosis    Schizo affective schizophrenia ([facility] )    STEFANIA (acute kidney injury) ([place] [address] )    Acute respiratory disease due to COVID-19 virus    Elevated troponin    Type 2 diabetes mellitus with stage 4 chronic kidney disease and hypertension ([facility] )    Family history of heart disease    Hypokalemia    Chest pressure    GERD (gastroesophageal reflux disease)    Hypertension    SOB (shortness of breath)    Volume overload    Hyponatremia    Open toe wound    Primary hypertension    Diabetic ulcer of both feet ([facility])         Subjective:   Denies current physical complaints  A complete 10 point review of systems was performed and is otherwise negative  Objective:     Vitals: Blood pressure 148/92, pulse 77, temperature 98 6 °F (37 °C), temperature source Oral, resp  rate 16, height 5' 2" (1 575 m), weight (!) 151 kg (332 lb 14 3 oz), SpO2 92 %  ,Body mass index is 60 89 kg/m²  Weight (last 2 days)     Date/Time Weight    07/21/22 0600 151 (332 89)     Weight: pt reweighed twice at 07/21/22 0600    07/20/22 0600 152 (335 32)     Weight: pt reweighed twice at 07/20/22 0600    07/19/22 0600 155 (342 6)            Intake/Output Summary (Last 24 hours) at 7/21/2022 1412  Last data filed at 7/20/2022 1906  Gross per 24 hour   Intake 600 ml   Output 2200 ml   Net -1600 ml     I/O last 3 completed shifts: In: 1380 [P O :1380]  Out: 2800 [Urine:2800]         Physical Exam: /92 (BP Location: Right arm)   Pulse 77   Temp 98 6 °F (37 °C) (Oral)   Resp 16   Ht 5' 2" (1 575 m)   Wt (!) 151 kg (332 lb 14 3 oz) Comment: pt reweighed twice  SpO2 92%   BMI 60 89 kg/m²     General Appearance:    No acute distress  Cooperative  Appears stated age  Obese  Head:    Normocephalic  Atraumatic  Normal jaw occlusion  Eyes:    Lids, conjunctiva normal  No scleral icterus  Ears:    Normal external ears  Nose:   Nares normal  No drainage  Mouth:   Lips, tongue normal  Mucosa normal  Phonation normal    Neck:   Supple  Symmetrical    Back:     Symmetric  No CVA tenderness  Lungs:     Normal respiratory effort  Clear to auscultation bilaterally  Chest wall:    No tenderness or deformity  Heart:    Regular rate and rhythm  Normal S1 and S2  No murmur  No JVD  2+ edema  Abdomen:     Soft  Non-tender  Bowel sounds active  Genitourinary:   No Giang catheter present     Extremities:   Extremities normal  Atraumatic  No cyanosis  Skin:   Warm and dry  No pallor, jaundice, rash, ecchymoses  Neurologic:   Alert and oriented to person, place, time  No focal deficit  Lab, Imaging and other studies: I have personally reviewed pertinent labs  CBC:   Lab Results   Component Value Date    WBC 8 52 07/21/2022    HGB 8 8 (L) 07/21/2022    HCT 27 7 (L) 07/21/2022    MCV 86 07/21/2022     07/21/2022    MCH 27 4 07/21/2022    MCHC 31 8 07/21/2022    RDW 12 9 07/21/2022    MPV 8 7 (L) 07/21/2022    NRBC 0 07/21/2022     CMP:   Lab Results   Component Value Date    K 3 8 07/21/2022    CL 96 07/21/2022    CO2 31 07/21/2022    BUN 58 (H) 07/21/2022    CREATININE 4 01 (H) 07/21/2022    CALCIUM 9 0 07/21/2022    EGFR 17 07/21/2022         Results from last 7 days   Lab Units 07/21/22  0431 07/20/22  0428 07/19/22  0448   POTASSIUM mmol/L 3 8 3 7 3 9   CHLORIDE mmol/L 96 97 98   CO2 mmol/L 31 31 30   BUN mg/dL 58* 61* 64*   CREATININE mg/dL 4 01* 3 84* 3 87*   CALCIUM mg/dL 9 0 8 8 8 8         Phosphorus: No results found for: PHOS  Magnesium: No results found for: MG  Urinalysis: No results found for: Nickola Anderson, SPECGRAV, PHUR, LEUKOCYTESUR, NITRITE, PROTEINUA, GLUCOSEU, KETONESU, BILIRUBINUR, BLOODU  Ionized Calcium: No results found for: CAION  Coagulation: No results found for: PT, INR, APTT  Troponin: No results found for: TROPONINI  ABG: No results found for: PHART, BIN3USO, PO2ART, VVH0YSH, E3ASYQOE, BEART, SOURCE  Radiology review:     IMAGING  Procedure: XR foot 3+ vw right    Result Date: 7/20/2022  Narrative: RIGHT FOOT INDICATION:   chronic plantar hallux wound, r/o OM  COMPARISON:  06/19/2022 VIEWS:  XR FOOT 3+ VW RIGHT FINDINGS: There is no acute fracture or dislocation  No areas of osseous destruction to suggest osteomyelitis  No significant degenerative changes  No lytic or blastic osseous lesion  Soft tissue swelling about the great toe  Impression: No acute osseous abnormality    Soft tissue swelling about the great toe   Workstation performed: [id number]       Current Facility-Administered Medications   Medication Dose Route Frequency    acetaminophen (TYLENOL) tablet 650 mg  650 mg Oral Q6H PRN    albuterol (PROVENTIL HFA,VENTOLIN HFA) inhaler 2 puff  2 puff Inhalation Q4H PRN    amLODIPine (NORVASC) tablet 10 mg  10 mg Oral Daily    bumetanide (BUMEX) injection 4 mg  4 mg Intravenous BID    [START ON 7/22/2022] bumetanide (BUMEX) tablet 4 mg  4 mg Oral Daily    cariprazine (VRAYLAR) capsule 6 mg  6 mg Oral Daily    carvedilol (COREG) tablet 25 mg  25 mg Oral BID With Meals    doxazosin (CARDURA) tablet 2 mg  2 mg Oral Daily    famotidine (PEPCID) tablet 40 mg  40 mg Oral Daily    FLUoxetine (PROzac) capsule 20 mg  20 mg Oral QAM    glycerin-hypromellose- (ARTIFICIAL TEARS) ophthalmic solution 1 drop  1 drop Both Eyes Q3H PRN    heparin (porcine) subcutaneous injection 5,000 Units  5,000 Units Subcutaneous Q8H [address]    hydrALAZINE (APRESOLINE) tablet 50 mg  50 mg Oral TID    insulin glargine (LANTUS) subcutaneous injection 20 Units 0 2 mL  20 Units Subcutaneous HS    insulin lispro (HumaLOG) 100 units/mL subcutaneous injection 1-5 Units  1-5 Units Subcutaneous TID AC    insulin lispro (HumaLOG) 100 units/mL subcutaneous injection 1-5 Units  1-5 Units Subcutaneous HS    insulin lispro (HumaLOG) 100 units/mL subcutaneous injection 5 Units  5 Units Subcutaneous TID With Meals    labetalol (NORMODYNE) injection 10 mg  10 mg Intravenous Q6H PRN    levothyroxine tablet 125 mcg  125 mcg Oral Early Morning    LORazepam (ATIVAN) tablet 0 5 mg  0 5 mg Oral BID PRN    ondansetron (ZOFRAN) injection 4 mg  4 mg Intravenous Q4H PRN     Medications Discontinued During This Encounter   Medication Reason    hydrALAZINE (APRESOLINE) tablet 25 mg     albuterol (PROVENTIL HFA,VENTOLIN HFA) inhaler 2 puff     albuterol (PROVENTIL HFA,VENTOLIN HFA) 90 mcg/act inhaler Discontinued by another clinician    furosemide (LASIX) injection 40 mg     polyvinyl alcohol (LIQUIFILM TEARS) 1 4 % ophthalmic solution 1 drop     labetalol (NORMODYNE) injection 10 mg     LORazepam (ATIVAN) tablet 0 5 mg     bumetanide (BUMEX) injection 4 mg     labetalol (NORMODYNE) injection 10 mg        Orbeaua LUCINDA Damico    Portions of the record may have been created with voice recognition software  Occasional wrong word or "sound a like" substitutions may have occurred due to the inherent limitations of voice recognition software  Read the chart carefully and recognize, using context, where substitutions have occurred

## 2022-07-21 NOTE — PLAN OF CARE
Problem: PAIN - ADULT  Goal: Verbalizes/displays adequate comfort level or baseline comfort level  Description: Interventions:  - Encourage patient to monitor pain and request assistance  - Assess pain using appropriate pain scale  - Administer analgesics based on type and severity of pain and evaluate response  - Implement non-pharmacological measures as appropriate and evaluate response  - Consider cultural and social influences on pain and pain management  - Notify physician/advanced practitioner if interventions unsuccessful or patient reports new pain  Outcome: Progressing     Problem: SAFETY ADULT  Goal: Patient will remain free of falls  Description: INTERVENTIONS:  - Educate patient/family on patient safety including physical limitations  - Instruct patient to call for assistance with activity   - Consult OT/PT to assist with strengthening/mobility   - Keep Call bell within reach  - Keep bed low and locked with side rails adjusted as appropriate  - Keep care items and personal belongings within reach  - Initiate and maintain comfort rounds  - Make Fall Risk Sign visible to staff  - Obtain necessary fall risk management equipment: yellow socks applied, bed in lower and locked position  - Apply yellow socks and bracelet for high fall risk patients  - Consider moving patient to room near nurses station  Outcome: Progressing  Goal: Maintain or return to baseline ADL function  Description: INTERVENTIONS:  -  Assess patient's ability to carry out ADLs; assess patient's baseline for ADL function and identify physical deficits which impact ability to perform ADLs (bathing, care of mouth/teeth, toileting, grooming, dressing, etc )  - Assess/evaluate cause of self-care deficits   - Assess range of motion  - Assess patient's mobility; develop plan if impaired  - Assess patient's need for assistive devices and provide as appropriate  - Encourage maximum independence but intervene and supervise when necessary  - Involve family in performance of ADLs  - Assess for home care needs following discharge   - Consider OT consult to assist with ADL evaluation and planning for discharge  - Provide patient education as appropriate  Outcome: Progressing  Goal: Maintains/Returns to pre admission functional level  Description: INTERVENTIONS:  - Perform BMAT or MOVE assessment daily    - Set and communicate daily mobility goal to care team and patient/family/caregiver     - Collaborate with rehabilitation services on mobility goals if consulted  - Out of bed for toileting  - Record patient progress and toleration of activity level   Outcome: Progressing     Problem: Prexisting or High Potential for Compromised Skin Integrity  Goal: Skin integrity is maintained or improved  Description: INTERVENTIONS:  - Identify patients at risk for skin breakdown  - Assess and monitor skin integrity  - Assess and monitor nutrition and hydration status  - Monitor labs   - Assess for incontinence   - Turn and reposition patient  - Assist with mobility/ambulation  - Relieve pressure over bony prominences  - Avoid friction and shearing  - Provide appropriate hygiene as needed including keeping skin clean and dry  - Evaluate need for skin moisturizer/barrier cream  - Collaborate with interdisciplinary team   - Patient/family teaching  - Consider wound care consult   Outcome: Progressing     Problem: Potential for Falls  Goal: Patient will remain free of falls  Description: INTERVENTIONS:  - Educate patient/family on patient safety including physical limitations  - Instruct patient to call for assistance with activity   - Consult OT/PT to assist with strengthening/mobility   - Keep Call bell within reach  - Keep bed low and locked with side rails adjusted as appropriate  - Keep care items and personal belongings within reach  - Initiate and maintain comfort rounds  - Make Fall Risk Sign visible to staff  - Obtain necessary fall risk management equipment: yellow socks applied, bed in lowest and locked position  - Apply yellow socks and bracelet for high fall risk patients  - Consider moving patient to room near nurses station  Outcome: Progressing

## 2022-07-22 ENCOUNTER — PREP FOR PROCEDURE (OUTPATIENT)
Dept: INTERVENTIONAL RADIOLOGY/VASCULAR | Facility: CLINIC | Age: 42
End: 2022-07-22

## 2022-07-22 DIAGNOSIS — N17.9 AKI (ACUTE KIDNEY INJURY) (HCC): Primary | ICD-10-CM

## 2022-07-22 NOTE — UTILIZATION REVIEW
Notification of Discharge   This is a Notification of Discharge from our facility [address]  Please be advised that this patient has been discharge from our facility  Below you will find the admission and discharge date and time including the patients disposition  UTILIZATION REVIEW CONTACT:  Jess Cochran  Utilization   Network Utilization Review Department  Phone: [phone] carefully listen to the prompts  All voicemails are confidential   Email: [email or web address]     PHYSICIAN ADVISORY SERVICES:  FOR EWEZ-SI-WACI REVIEW - MEDICAL NECESSITY DENIAL  Phone: [phone]  Fax: [phone]  Email: [email or web address]     PRESENTATION DATE: 7/16/2022  7:29 AM  OBERVATION ADMISSION DATE:   INPATIENT ADMISSION DATE: 7/17/22 11:53 AM   DISCHARGE DATE: 7/21/2022  6:23 PM  DISPOSITION: Home/Self Care Home/Self Care      IMPORTANT INFORMATION:  Send all requests for admission clinical reviews, approved or denied determinations and any other requests to dedicated fax number below belonging to the campus where the patient is receiving treatment   List of dedicated fax numbers:  [address] DENIALS (Administrative/Medical Necessity) [phone]   [address] (Maternity/NICU/Pediatrics) [phone]   [facility] [phone]   [address] [facility] [phone]   [address] [place] [address] [phone]   2000 [facility] [address] [place] [address],[address] [phone]   [facility]  [phone]   [address] [facility] [address]  [address] [facility] 1000 [facility] [phone]

## 2022-07-26 LAB — CRYOGLOB SER QL 1D COLD INC: NORMAL

## 2022-08-02 ENCOUNTER — TELEPHONE (OUTPATIENT)
Dept: WOUND CARE | Facility: CLINIC | Age: 42
End: 2022-08-02

## 2022-08-05 NOTE — PRE-PROCEDURE INSTRUCTIONS
Pre-procedure Instructions for Interventional Radiology  [address] [place] [address] Dr  [place] [address] [phone]    You are scheduled for a/an Random Kidney Biospy  On Thursday 8/11/22  Your tentative arrival time is AM   Short stay will notify you the day before your procedure with the exact arrival time and the location to arrive  To prepare for your procedure:  1  Please arrange for someone to drive you home after the procedure and stay with you until the next morning if you are instructed to do so  This is typically for patients receiving some type of sedative or anesthetic for the procedure  2  DO NOT EAT OR DRINK ANYTHING after midnight on the evening before your procedure including candy & gum   3  ONLY SIPS OF WATER with your medications are allowed on the morning of your procedure  4  TAKE ALL OF YOUR REGULAR MEDICATIONS THE MORNING OF YOUR PROCEDURE with sips of water! We may call you to stop some of your blood sugar, blood pressure and blood thinning medications depending on the procedure  Please take all of these medications unless we instruct you to stop them  5  If you have an allergy to x-ray dye, please contact Interventional Radiology for an x-ray dye preparation which usually consists of an oral steroid and Benadryl  The day of your procedure:  1  Bring a list of the medications you take at home  2  Bring medications you take for breathing problems (such as inhalers), medications for chest pain, or both  3  Bring a case for your glasses or contacts  4  Bring your insurance card and a form of photo ID   5  Please leave all valuables such as credit cards and jewelry at home  6  Report to the registration desk in the main lobby at the [facility], Entrance B  Ask to be directed to [facility]  7  While your procedure is being performed, your family may wait in the Radiology Waiting Room on the 1st floor in Radiology  if they need to leave, they may provide a number to be called following the procedure  8  Be prepared to stay overnight just in case  Sometimes procedures will indicate the need for further observation or treatment  9  If you are scheduled for a follow-up visit with the Interventional Radiologist after your procedure, you will be called with a date and time  10  Covid vaccine completed      Special Instructions (Medications to stop taking before your procedure etc ):  Above reviewed with his Father and step- mother

## 2022-08-10 ENCOUNTER — ANESTHESIA EVENT (OUTPATIENT)
Dept: RADIOLOGY | Facility: HOSPITAL | Age: 42
End: 2022-08-10

## 2022-08-11 ENCOUNTER — ANESTHESIA (OUTPATIENT)
Dept: RADIOLOGY | Facility: HOSPITAL | Age: 42
End: 2022-08-11

## 2022-08-11 ENCOUNTER — HOSPITAL ENCOUNTER (OUTPATIENT)
Dept: RADIOLOGY | Facility: HOSPITAL | Age: 42
Discharge: HOME | End: 2022-08-11
Attending: STUDENT IN AN ORGANIZED HEALTH CARE EDUCATION/TRAINING PROGRAM
Payer: COMMERCIAL

## 2022-08-11 VITALS
HEART RATE: 75 BPM | TEMPERATURE: 97.4 F | RESPIRATION RATE: 19 BRPM | OXYGEN SATURATION: 96 % | HEIGHT: 62 IN | SYSTOLIC BLOOD PRESSURE: 179 MMHG | DIASTOLIC BLOOD PRESSURE: 71 MMHG | BODY MASS INDEX: 57.97 KG/M2 | WEIGHT: 315 LBS

## 2022-08-11 DIAGNOSIS — N17.9 AKI (ACUTE KIDNEY INJURY) (HCC): ICD-10-CM

## 2022-08-11 LAB — GLUCOSE SERPL-MCNC: 116 MG/DL (ref 65–140)

## 2022-08-11 PROCEDURE — 88350 IMFLUOR EA ADDL 1ANTB STN PX: CPT | Performed by: INTERNAL MEDICINE

## 2022-08-11 PROCEDURE — 50200 RENAL BIOPSY PERQ: CPT | Performed by: RADIOLOGY

## 2022-08-11 PROCEDURE — 77012 CT SCAN FOR NEEDLE BIOPSY: CPT

## 2022-08-11 PROCEDURE — 88300 SURGICAL PATH GROSS: CPT | Performed by: PATHOLOGY

## 2022-08-11 PROCEDURE — 82948 REAGENT STRIP/BLOOD GLUCOSE: CPT

## 2022-08-11 PROCEDURE — 88305 TISSUE EXAM BY PATHOLOGIST: CPT | Performed by: INTERNAL MEDICINE

## 2022-08-11 PROCEDURE — 77012 CT SCAN FOR NEEDLE BIOPSY: CPT | Performed by: RADIOLOGY

## 2022-08-11 PROCEDURE — 50200 RENAL BIOPSY PERQ: CPT

## 2022-08-11 PROCEDURE — 88346 IMFLUOR 1ST 1ANTB STAIN PX: CPT | Performed by: INTERNAL MEDICINE

## 2022-08-11 PROCEDURE — 88313 SPECIAL STAINS GROUP 2: CPT | Performed by: INTERNAL MEDICINE

## 2022-08-11 PROCEDURE — 88348 ELECTRON MICROSCOPY DX: CPT | Performed by: INTERNAL MEDICINE

## 2022-08-11 RX ORDER — SUCCINYLCHOLINE/SOD CL,ISO/PF 100 MG/5ML
SYRINGE (ML) INTRAVENOUS AS NEEDED
Status: DISCONTINUED | OUTPATIENT
Start: 2022-08-11 | End: 2022-08-11

## 2022-08-11 RX ORDER — ONDANSETRON 2 MG/ML
INJECTION INTRAMUSCULAR; INTRAVENOUS AS NEEDED
Status: DISCONTINUED | OUTPATIENT
Start: 2022-08-11 | End: 2022-08-11

## 2022-08-11 RX ORDER — ONDANSETRON 2 MG/ML
4 INJECTION INTRAMUSCULAR; INTRAVENOUS ONCE AS NEEDED
Status: DISCONTINUED | OUTPATIENT
Start: 2022-08-11 | End: 2022-08-11 | Stop reason: HOSPADM

## 2022-08-11 RX ORDER — LIDOCAINE HYDROCHLORIDE 10 MG/ML
INJECTION, SOLUTION EPIDURAL; INFILTRATION; INTRACAUDAL; PERINEURAL AS NEEDED
Status: DISCONTINUED | OUTPATIENT
Start: 2022-08-11 | End: 2022-08-11

## 2022-08-11 RX ORDER — LIDOCAINE HYDROCHLORIDE 10 MG/ML
INJECTION, SOLUTION EPIDURAL; INFILTRATION; INTRACAUDAL; PERINEURAL CODE/TRAUMA/SEDATION MEDICATION
Status: COMPLETED | OUTPATIENT
Start: 2022-08-11 | End: 2022-08-11

## 2022-08-11 RX ORDER — PROPOFOL 10 MG/ML
INJECTION, EMULSION INTRAVENOUS AS NEEDED
Status: DISCONTINUED | OUTPATIENT
Start: 2022-08-11 | End: 2022-08-11

## 2022-08-11 RX ORDER — HYDROMORPHONE HCL IN WATER/PF 6 MG/30 ML
0.2 PATIENT CONTROLLED ANALGESIA SYRINGE INTRAVENOUS
Status: DISCONTINUED | OUTPATIENT
Start: 2022-08-11 | End: 2022-08-11 | Stop reason: HOSPADM

## 2022-08-11 RX ORDER — SODIUM CHLORIDE 9 MG/ML
75 INJECTION, SOLUTION INTRAVENOUS CONTINUOUS
Status: DISCONTINUED | OUTPATIENT
Start: 2022-08-11 | End: 2022-08-12 | Stop reason: HOSPADM

## 2022-08-11 RX ORDER — SODIUM CHLORIDE 9 MG/ML
50 INJECTION, SOLUTION INTRAVENOUS CONTINUOUS
Status: DISCONTINUED | OUTPATIENT
Start: 2022-08-11 | End: 2022-08-12 | Stop reason: HOSPADM

## 2022-08-11 RX ORDER — FENTANYL CITRATE/PF 50 MCG/ML
25 SYRINGE (ML) INJECTION
Status: DISCONTINUED | OUTPATIENT
Start: 2022-08-11 | End: 2022-08-11 | Stop reason: HOSPADM

## 2022-08-11 RX ORDER — MIDAZOLAM HYDROCHLORIDE 2 MG/2ML
INJECTION, SOLUTION INTRAMUSCULAR; INTRAVENOUS AS NEEDED
Status: DISCONTINUED | OUTPATIENT
Start: 2022-08-11 | End: 2022-08-11

## 2022-08-11 RX ORDER — FENTANYL CITRATE 50 UG/ML
INJECTION, SOLUTION INTRAMUSCULAR; INTRAVENOUS AS NEEDED
Status: DISCONTINUED | OUTPATIENT
Start: 2022-08-11 | End: 2022-08-11

## 2022-08-11 RX ADMIN — SODIUM CHLORIDE 75 ML/HR: 0.9 INJECTION, SOLUTION INTRAVENOUS at 09:05

## 2022-08-11 RX ADMIN — PROPOFOL 70 MG: 10 INJECTION, EMULSION INTRAVENOUS at 10:03

## 2022-08-11 RX ADMIN — MIDAZOLAM 2 MG: 1 INJECTION INTRAMUSCULAR; INTRAVENOUS at 09:57

## 2022-08-11 RX ADMIN — ONDANSETRON 4 MG: 2 INJECTION INTRAMUSCULAR; INTRAVENOUS at 10:08

## 2022-08-11 RX ADMIN — FENTANYL CITRATE 50 MCG: 50 INJECTION INTRAMUSCULAR; INTRAVENOUS at 10:13

## 2022-08-11 RX ADMIN — FENTANYL CITRATE 50 MCG: 50 INJECTION INTRAMUSCULAR; INTRAVENOUS at 10:02

## 2022-08-11 RX ADMIN — Medication 140 MG: at 10:05

## 2022-08-11 RX ADMIN — LIDOCAINE HYDROCHLORIDE 50 MG: 10 INJECTION, SOLUTION EPIDURAL; INFILTRATION; INTRACAUDAL; PERINEURAL at 10:02

## 2022-08-11 RX ADMIN — LIDOCAINE HYDROCHLORIDE 5 ML: 10 INJECTION, SOLUTION EPIDURAL; INFILTRATION; INTRACAUDAL; PERINEURAL at 10:31

## 2022-08-11 NOTE — SEDATION DOCUMENTATION
Right renal biopsy performed by Dr Mary Muir without complications  Anesthesia present throughout case, to PACU for recovery

## 2022-08-11 NOTE — ANESTHESIA PREPROCEDURE EVALUATION
Procedure:  IR BIOPSY KIDNEY RANDOM    Relevant Problems   CARDIO   (+) Chronic migraine without aura without status migrainosus, not intractable   (+) Hyperlipidemia, mixed   (+) Hypertension   (+) Migraine without aura and without status migrainosus, not intractable      ENDO   (+) Hypothyroidism   (+) Type 2 diabetes mellitus with stage 4 chronic kidney disease and hypertension (HCC)      GI/HEPATIC   (+) GERD (gastroesophageal reflux disease)      /RENAL   (+) STEFANIA (acute kidney injury) (HCC)      NEURO/PSYCH   (+) Anxiety   (+) Chronic migraine without aura without status migrainosus, not intractable   (+) Depression   (+) Headache   (+) Migraine without aura and without status migrainosus, not intractable   (+) OCD (obsessive compulsive disorder)   (+) Schizoaffective disorder, depressive type (HCC)      PULMONARY   (+) SOB (shortness of breath)      Endocrine   (+) Diabetic ulcer of both feet ([address] )      This is a 38 y o  male who presents for IR BIOPSY KIDNEY RANDOM  -- VITALS --  There were no vitals taken for this visit    BP Readings from Last 3 Encounters:   07/21/22 144/86   06/23/22 161/91   02/03/22 141/64     -- LABS --  Results from Last 12 Months   Lab Units 07/21/22  0431 07/20/22  0428 06/23/22  0507 06/22/22  0432 06/21/22  0444 06/20/22  0510 06/18/22  0545 06/17/22  0725   SODIUM mmol/L 135 135   < > 134*   < > 137   < > 129*   POTASSIUM mmol/L 3 8 3 7   < > 4 4   < > 4 4   < > 4 2   CHLORIDE mmol/L 96 97   < > 95*   < > 99   < > 95*   CO2 mmol/L 31 31   < > 30   < > 29   < > 26   ANION GAP mmol/L 8 7   < > 9   < > 9   < > 8   BUN mg/dL 58* 61*   < > 63*   < > 58*   < > 52*   CREATININE mg/dL 4 01* 3 84*   < > 4 16*   < > 3 86*   < > 2 99*   CALCIUM mg/dL 9 0 8 8   < > 9 0   < > 8 8   < > 8 1*   GLUCOSE RANDOM mg/dL 197* 196*   < > 235*   < > 96   < > 220*   PHOSPHORUS mg/dL  --   --   --  4 3  --   --   --   --    MAGNESIUM mg/dL  --   --   --  2 1  --   --   --  1 7*   AST U/L  --   -- --  17  --   --   --   --    ALT U/L  --   --   --  15  --   --   --   --    ALK PHOS U/L  --   --   --  67  --   --   --   --    TOTAL BILIRUBIN mg/dL  --   --   --  0 34  --   --   --   --    ALBUMIN g/dL  --   --   --  3 3*  --  3 1*  --   --     < > = values in this interval not displayed  Results from Last 12 Months   Lab Units 07/21/22  0431 07/20/22  0428   WBC Thousand/uL 8 52 7 38   HEMOGLOBIN g/dL 8 8* 8 2*   HEMATOCRIT % 27 7* 26 0*   PLATELETS Thousands/uL 355 340         - Coags:      -- EKG --  Normal sinus rhythm  Minimal voltage criteria for LVH, may be normal variant  Borderline ECG  When compared with ECG of 16-JUL-2022 10:57, (unconfirmed)  No significant change was found  Confirmed by Luba Washington ([id number]) on 7/17/2022 11:07:35 AM    -- ECHO/RELEVANT IMAGING --  6/17/2022 TTE  Interpretation Summary         Left Ventricle: Left ventricular cavity size is normal  Wall thickness is mildly increased  There is mild concentric hypertrophy  The left ventricular ejection fraction is 55%  Systolic function is normal  Wall motion is normal     Right Ventricle: Systolic function is normal     Pericardium: There is no pericardial effusion        -- ANESTHESIA RISK ASSESSMENT / QUESTIONNAIRE --   (1) Personal or family history of anesthesia related complications: n   (2) Relevant airway history: n/a   (3) Patient took the following medications this morning: Carvedilol, hydralazine, pantoprazole   (4) Patient meets ASA NPO guidelines: y   (5) Cardiac assessment       (a) Does the patient have severe, modifiable cardiac conditions including Si/Sx of MI (w/i 14 dys of angioplasty, 1 mo after BMS, 2 mo after no intervention, 3-6 mo after AFSANEH), decompensated CHF, decompensated valvular Dz, unstable arrhythmias, or unstable pulmonary HTN?: n      (b) Calculate Clarke's RCRI (Surgical risk, ICM, CHF, Cr >2, CVA/TIA, IDDM): 1      (c) Quote MACE risk based on RCRI: 0= 0 4%; 1= 0 6%; 2= 7%; 3= 11%    -- ANESTHESIA SHARED DECISION MAKING --  Benefits discussed ([facility] 81 [id number], PMID [id number]): Amnesia, Analgesia, Specialized anesthesiology support reduces mortality for major surgery by about 100-fold  Mortality risks associated with anesthesia discussed (PMID [id number]): ASA-PS I 1:250,000; ASA-PS II 1:20,000; ASA-PS III 1:3,500; ASA-PS IV 1:1,800  Risks by organ systems discussed included were but not limited to (PMID [id number]):  (1) Drug reactions / Allergic reactions / Overdose adverse events  (2) Pulmonary / Airway adverse events: Dental injury, Throat pain, Hypoxia, Prolonged intubation  (3) Cardiac and Vascular adverse events: PeriOp MI or other MACE  Risk of bleeding or infection related to relevant vascular access (Peripheral, Central, Arterial, or other line placement)  Risks associated with bypass circuits if relevant  (4) Neuro adverse events: IntraOp awareness, Stroke, POCD  Risk of bleeding or infection associated with neuraxial anesthesia if relevant  (5) FEN / GI / Vomiting risks: Aspiration, PONV  Physical Exam    Airway    Mallampati score: II  TM Distance: >3 FB  Neck ROM: limited     Dental       Cardiovascular      Pulmonary      Other Findings        Anesthesia Plan  ASA Score- 4     Anesthesia Type- IV sedation with anesthesia with ASA Monitors  Additional Monitors:   Airway Plan:     Comment: GETA backup  Plan Factors-    Chart reviewed  EKG reviewed  Imaging results reviewed  Existing labs reviewed  Induction- intravenous  Postoperative Plan-     Informed Consent- Anesthetic plan and risks discussed with patient  I personally reviewed this patient with the CRNA  Discussed and agreed on the Anesthesia Plan with the JARAD Beckham

## 2022-08-11 NOTE — ANESTHESIA POSTPROCEDURE EVALUATION
Post-Op Assessment Note    CV Status:  Stable  Pain Score: 0    Pain management: adequate     Mental Status:  Alert and awake   Hydration Status:  Euvolemic   PONV Controlled:  Controlled   Airway Patency:  Patent      Post Op Vitals Reviewed: Yes      Staff: CRNA, Anesthesiologist         No complications documented      BP  141/79   Temp   97 6   Pulse  66   Resp   12   SpO2  98

## 2022-08-11 NOTE — BRIEF OP NOTE (RAD/CATH)
INTERVENTIONAL RADIOLOGY PROCEDURE NOTE    Date: 8/11/2022    Procedure: IR BIOPSY KIDNEY RANDOM    Preoperative diagnosis:   1  STEFANIA (acute kidney injury) ([facility] [address] )         Postoperative diagnosis: Same  Surgeon: Norma Wright MD     Assistant: None  No qualified resident was available  Blood loss: Minimal    Specimens: 18G x 3     Findings: Successful random kidney biopsy (right kidney)    Complications: None immediate      Anesthesia: general

## 2022-08-11 NOTE — DISCHARGE INSTRUCTIONS
Percutaneous Kidney Biopsy   WHAT YOU NEED TO KNOW:   A percutaneous kidney biopsy is a procedure to remove a small sample of kidney tissue  It may also be done to check for kidney disease or cancer  DISCHARGE INSTRUCTIONS:   Follow up with your healthcare provider as directed:  Write down your questions so you remember to ask them during your visits  Wound care: The Band-Aid may be removed in 24 hours  For more information:   National Kid[employer] and Urologic [employer] Information Clearinghouse  [place] [address] [address]  Phone: [phone]  Web Address: [email or web address]  niddk nih gov/   Care after your procedure:    1  Limit your activities for 36 hours after your biopsy  2  No driving day of biopsy  3  Return to your normal diet  Flat sips of flat soda helps with mild nausea  4  Remove band-aid or dressing 24 hours after procedure  Contact Interventional Radiology at [phone]    [place] PATIENTS: Contact Interventional Radiology at [phone]) Susie Griffith PATIENTS: Contact Interventional Radiology at [phone]) if:    1  Difficulty breathing, nausea or vomiting  2  You feel weak or dizzy  3  Chills or fever above 101 degrees F  You have persistent nausea or vomiting    4  You have severe pain in your abdomen or where You feel weak or dizzy  your           procedure was done  5  You have blood in your urine  You urinate small amounts or not at all  4  Develop any redness, swelling, heat, unusual drainage, heavy bruising or bleeding     from biopsy site

## 2022-08-25 ENCOUNTER — OFFICE VISIT (OUTPATIENT)
Dept: NEPHROLOGY | Facility: CLINIC | Age: 42
End: 2022-08-25
Payer: COMMERCIAL

## 2022-08-25 VITALS
WEIGHT: 315 LBS | SYSTOLIC BLOOD PRESSURE: 144 MMHG | OXYGEN SATURATION: 98 % | HEART RATE: 80 BPM | BODY MASS INDEX: 61.82 KG/M2 | DIASTOLIC BLOOD PRESSURE: 90 MMHG

## 2022-08-25 DIAGNOSIS — N17.9 AKI (ACUTE KIDNEY INJURY) (HCC): ICD-10-CM

## 2022-08-25 DIAGNOSIS — I12.9 ARTERIOSCLEROSIS OF KIDNEY: ICD-10-CM

## 2022-08-25 DIAGNOSIS — E11.22 TYPE 2 DIABETES MELLITUS WITH STAGE 4 CHRONIC KIDNEY DISEASE AND HYPERTENSION (HCC): ICD-10-CM

## 2022-08-25 DIAGNOSIS — N26.9: ICD-10-CM

## 2022-08-25 DIAGNOSIS — N18.4 TYPE 2 DIABETES MELLITUS WITH STAGE 4 CHRONIC KIDNEY DISEASE AND HYPERTENSION (HCC): ICD-10-CM

## 2022-08-25 DIAGNOSIS — I10 HYPERTENSION, UNSPECIFIED TYPE: ICD-10-CM

## 2022-08-25 DIAGNOSIS — N18.4 CHRONIC KIDNEY DISEASE, STAGE 4 (SEVERE) (HCC): Primary | ICD-10-CM

## 2022-08-25 DIAGNOSIS — I12.9 TYPE 2 DIABETES MELLITUS WITH STAGE 4 CHRONIC KIDNEY DISEASE AND HYPERTENSION (HCC): ICD-10-CM

## 2022-08-25 DIAGNOSIS — E11.21 NODULAR TYPE DIABETIC GLOMERULOSCLEROSIS (HCC): ICD-10-CM

## 2022-08-25 DIAGNOSIS — E66.01 MORBID OBESITY WITH BMI OF 50.0-59.9, ADULT (HCC): ICD-10-CM

## 2022-08-25 PROCEDURE — 3080F DIAST BP >= 90 MM HG: CPT | Performed by: PHYSICIAN ASSISTANT

## 2022-08-25 PROCEDURE — 3077F SYST BP >= 140 MM HG: CPT | Performed by: PHYSICIAN ASSISTANT

## 2022-08-25 PROCEDURE — 99215 OFFICE O/P EST HI 40 MIN: CPT | Performed by: PHYSICIAN ASSISTANT

## 2022-08-25 NOTE — PATIENT INSTRUCTIONS
Low sodium diet, do not exceed 2 g (or 2,000 mg) of sodium daily  Avoid processed, canned, frozen foods unless "no salt added " Opt for fresh meats, produce  Utilize compression socks during the day, remove before bed  Watch fluid intake (this includes water, tea, coffee soda, soups etc )  50 ounce fluid restriction

## 2022-08-25 NOTE — PROGRESS NOTES
Assessment & Plan:    1  Chronic kidney disease, stage 4 (severe) ([facility])  Assessment & Plan:  Lab Results   Component Value Date    EGFR 18 08/26/2022    EGFR 17 07/21/2022    EGFR 17 07/20/2022    CREATININE 3 74 (H) 08/26/2022    CREATININE 4 01 (H) 07/21/2022    CREATININE 3 84 (H) 07/20/2022   Biopsy-proven nodular diabetic glomerular sclerosis, arterial sclerosis and likely irreversible ATN  Discussed with patient that this is likely his new renal baseline  Had a long discussion regarding overall prognosis and dialysis modalities  Refer for CKD education  Refer for transplant  Discussed likely need for weight loss prior to being accepted as transplant candidate  Will follow closely  Return to clinic in 1 month with labs prior  Personally reviewed graph of renal function trends with patient  Patient is not on an ACE-I, ARB or SGLT-2 inhibitor due to advanced renal disease  Patient was advised to avoid nephrotoxins  Continue management of obesity, diabetes and hypertension  Please renally dose medication for a creatinine clearance of 15 mL/min  Patient has not yet undergone [facility]ney [facility] education  Continue to monitor renal function, anemia and bone-mineral parameters  Orders:  -     Ambulatory Referral to Renal Transplant Evaluation; Future  -     Ambulatory Referral to CKD Education Program; Future  -     Basic metabolic panel; Future    2  STEFANIA (acute kidney injury) ([facility] [address] )  -     Ambulatory Referral to Renal Transplant Evaluation; Future  -     Ambulatory Referral to CKD Education Program; Future  -     Basic metabolic panel; Future    3  Nodular type diabetic glomerulosclerosis [facility])  Assessment & Plan:  Biopsy 08/11/2022       4  Interstitial fibrosis present on renal biopsy    5  Arteriosclerosis of kidney  Assessment & Plan: Moderate to severe  Seen on renal ultrasound 08/11/2022  6  Hypertension, unspecified type  Assessment & Plan:  Blood pressure borderline    Continue current antihypertensives, continue to monitor  Discussed low-sodium diet and provided literature  Re-evaluate at follow-up  7  Morbid obesity with BMI of 50 0-59 9, adult ([facility] )  Assessment & Plan:  BMI greater than 40 is associated with greater risk of progression of renal disease secondary to glomerular hyperfiltration  Recommend weight loss  Consider referral to bariatric surgery  Would likely need weight loss to be transplant candidate  8  Type 2 diabetes mellitus with stage 4 chronic kidney disease and hypertension ([facility] )  Assessment & Plan:    Lab Results   Component Value Date    HGBA1C 9 1 (H) 07/16/2022   Needs improved glycemic control  The benefits, risks and alternatives to the treatment plan were discussed at this visit  Patient was advised of common adverse effects of any medical therapies prescribed  All questions were answered and discussed with the patient and any accompanying family members or caretakers  Subjective:      Patient ID: Geovani March is a 38 y o  male seen in the [place] office  Patient was hospitalized at [facility] from 07/16/2022 through 07/21/2022  HPI     Today, patient presents for routine follow-up without acute complaints relating to blood pressure or nephrological concerns besides feeling tired  Patient reports stable lower extremity edema  Blood pressure is 144/90 with a heart rate of 80  Patient does monitor blood pressures at home when he has a headache and reports it's up to 200/120 about a week and a half ago  Reports headaches  Denies lightheadedness, dizziness  Patient reports adherence with antihypertensive regimen and denies adverse effects:  Amlodipine 10 mg daily, carvedilol 25 mg twice daily, doxazosin 2 mg daily, hydralazine 50 mg 3 times daily  Patient reports lower extremity swelling      Reviewed and discussed the results of labs performed 07/21/2022 which reveals a creatinine 4 01 mg/dL with estimated GFR 17 mL/min  History is obtained from patient  The following portions of the patient's history were reviewed and updated as appropriate: allergies, current medications, past family history, past medical history, past social history, past surgical history, and problem list     Review of Systems   Constitutional: Positive for fatigue  Negative for activity change, chills, diaphoresis and fever  HENT: Negative for mouth sores and trouble swallowing  Respiratory: Positive for shortness of breath (VERDIN)  Negative for apnea, cough, chest tightness and wheezing  Orthopnea  Recliner or two pillows  Cardiovascular: Positive for leg swelling  Negative for chest pain and palpitations  Gastrointestinal: Positive for vomiting (1 week ago, improving)  Negative for abdominal distention, abdominal pain, blood in stool, constipation, diarrhea and nausea  Genitourinary: Negative for decreased urine volume, difficulty urinating, dysuria, enuresis, frequency, hematuria and urgency  Urinary incontinence 6 mths   Musculoskeletal: Negative for arthralgias, back pain and joint swelling  Skin: Negative for pallor, rash and wound  Neurological: Positive for headaches  Negative for dizziness, seizures, light-headedness and numbness  Hematological: Does not bruise/bleed easily  Psychiatric/Behavioral: Negative for agitation, behavioral problems and confusion  The patient is nervous/anxious  Objective:      /90   Pulse 80   Wt (!) 153 kg (338 lb)   SpO2 98%   BMI 61 82 kg/m²          Physical Exam  Vitals and nursing note reviewed  Constitutional:       General: He is awake  He is not in acute distress  Appearance: Normal appearance  He is well-developed  He is morbidly obese  He is not ill-appearing, toxic-appearing or diaphoretic  HENT:      Head: Normocephalic and atraumatic  Jaw: There is normal jaw occlusion        Nose: Nose normal       Mouth/Throat:      Mouth: Mucous membranes are moist       Pharynx: Oropharynx is clear  No oropharyngeal exudate or posterior oropharyngeal erythema  Eyes:      General: Lids are normal  Vision grossly intact  Gaze aligned appropriately  No scleral icterus  Right eye: No discharge  Left eye: No discharge  Extraocular Movements: Extraocular movements intact  Conjunctiva/sclera: Conjunctivae normal       Pupils: Pupils are equal, round, and reactive to light  Neck:      Thyroid: No thyroid mass or thyromegaly  Trachea: Trachea and phonation normal    Cardiovascular:      Rate and Rhythm: Normal rate and regular rhythm  Heart sounds: Normal heart sounds, S1 normal and S2 normal  No murmur heard  No friction rub  No gallop  Pulmonary:      Effort: Pulmonary effort is normal  No respiratory distress  Breath sounds: Normal breath sounds  No stridor  No wheezing, rhonchi or rales  Abdominal:      General: Abdomen is flat  Bowel sounds are normal  There is no distension  Palpations: Abdomen is soft  There is no mass  Tenderness: There is no abdominal tenderness  There is no guarding  Hernia: No hernia is present  Musculoskeletal:         General: Normal range of motion  Cervical back: Normal range of motion and neck supple  No rigidity or tenderness  Right lower leg: No edema  Left lower leg: No edema  Lymphadenopathy:      Cervical: No cervical adenopathy  Skin:     General: Skin is warm and dry  Coloration: Skin is not jaundiced  Findings: No bruising  Nails: There is no clubbing  Neurological:      General: No focal deficit present  Mental Status: He is alert and oriented to person, place, and time  Mental status is at baseline     Psychiatric:         Attention and Perception: Attention and perception normal          Mood and Affect: Mood and affect normal          Speech: Speech normal          Behavior: Behavior normal  Behavior is cooperative  Thought Content:  Thought content normal          Judgment: Judgment normal              Lab Results   Component Value Date     (L) 04/11/2018    SODIUM 125 (L) 08/26/2022    K 5 1 08/26/2022    CL 95 (L) 08/26/2022    CO2 21 08/26/2022    ANIONGAP 10 04/11/2018    AGAP 9 08/26/2022    BUN 77 (H) 08/26/2022    CREATININE 3 74 (H) 08/26/2022    GLUC 425 (H) 08/26/2022    GLUF 81 07/18/2022    CALCIUM 8 6 08/26/2022    AST 8 (L) 08/26/2022    ALT 8 08/26/2022    ALKPHOS 78 08/26/2022    PROT 7 0 04/11/2018    TP 7 0 08/26/2022    BILITOT 0 2 04/11/2018    TBILI 0 24 08/26/2022    EGFR 18 08/26/2022      Lab Results   Component Value Date    CREATININE 3 74 (H) 08/26/2022    CREATININE 4 01 (H) 07/21/2022    CREATININE 3 84 (H) 07/20/2022    CREATININE 3 87 (H) 07/19/2022    CREATININE 3 54 (H) 07/18/2022    CREATININE 3 38 (H) 07/17/2022    CREATININE 3 19 (H) 07/16/2022    CREATININE 4 42 (H) 06/23/2022    CREATININE 4 16 (H) 06/22/2022    CREATININE 3 90 (H) 06/21/2022    CREATININE 3 86 (H) 06/20/2022    CREATININE 3 78 (H) 06/19/2022    CREATININE 3 73 (H) 06/18/2022    CREATININE 2 99 (H) 06/17/2022    CREATININE 1 93 (H) 06/03/2021      Lab Results   Component Value Date    COLORU Yellow 02/23/2021    CLARITYU Clear 02/23/2021    SPECGRAV 1 020 02/23/2021    PHUR 6 5 02/23/2021    LEUKOCYTESUR Negative 02/23/2021    NITRITE Negative 02/23/2021    PROTEIN UA 3+ (A) 02/23/2021    GLUCOSEU Trace (A) 02/23/2021    KETONESU Negative 02/23/2021    UROBILINOGEN 0 2 02/23/2021    BILIRUBINUR Negative 02/23/2021    BLOODU Negative 02/23/2021    RBCUA None Seen 02/23/2021    WBCUA 0-1 02/23/2021    EPIS Occasional 02/23/2021    BACTERIA None Seen 02/23/2021      No results found for: LABPROT  No results found for: Aurelio Shaikh  Lab Results   Component Value Date    WBC 9 30 08/26/2022    HGB 8 5 (L) 08/26/2022    HCT 25 5 (L) 08/26/2022    MCV 84 08/26/2022     08/26/2022      Lab Results   Component Value Date    HGB 8 5 (L) 08/26/2022    HGB 8 8 (L) 07/21/2022    HGB 8 2 (L) 07/20/2022    HGB 8 2 (L) 07/19/2022    HGB 8 0 (L) 07/18/2022      Lab Results   Component Value Date    FERRITIN 138 11/21/2020      No results found for: PTHCALCIUM, [id number], PHOSPHORUS   Lab Results   Component Value Date    CHOLESTEROL 203 (H) 05/29/2021    HDL 48 05/29/2021    LDLCALC 127 (H) 05/29/2021    TRIG 139 05/29/2021      No results found for: URICACID   Lab Results   Component Value Date    HGBA1C 9 1 (H) 07/16/2022      Lab Results   Component Value Date    P1FKTFB 0 40 (L) 11/21/2020    FREET4 0 71 (L) 06/17/2022      Lab Results   Component Value Date    ROSALINE Negative 07/19/2022      Lab Results   Component Value Date    PROT 7 0 04/11/2018    UPEP See Comment 07/18/2022      45 minutes in face-to-face time with patient and Mother and Father discussing prognosis and treatment options  Portions of the record may have been created with voice recognition software  Occasional wrong word or "sound a like" substitutions may have occurred due to the inherent limitations of voice recognition software  Read the chart carefully and recognize, using context, where substitutions have occurred  If you have any questions, please contact the dictating provider

## 2022-08-26 ENCOUNTER — TELEPHONE (OUTPATIENT)
Dept: NEPHROLOGY | Facility: CLINIC | Age: 42
End: 2022-08-26

## 2022-08-26 ENCOUNTER — HOSPITAL ENCOUNTER (EMERGENCY)
Facility: HOSPITAL | Age: 42
Discharge: HOME | End: 2022-08-26
Attending: EMERGENCY MEDICINE
Payer: COMMERCIAL

## 2022-08-26 VITALS
OXYGEN SATURATION: 95 % | SYSTOLIC BLOOD PRESSURE: 139 MMHG | RESPIRATION RATE: 19 BRPM | BODY MASS INDEX: 57.97 KG/M2 | HEART RATE: 90 BPM | WEIGHT: 315 LBS | TEMPERATURE: 98.4 F | DIASTOLIC BLOOD PRESSURE: 61 MMHG | HEIGHT: 62 IN

## 2022-08-26 DIAGNOSIS — E11.65 HYPERGLYCEMIA DUE TO DIABETES MELLITUS (HCC): ICD-10-CM

## 2022-08-26 DIAGNOSIS — I10 HYPERTENSION, UNSPECIFIED TYPE: Primary | ICD-10-CM

## 2022-08-26 DIAGNOSIS — N18.9 CKD (CHRONIC KIDNEY DISEASE): ICD-10-CM

## 2022-08-26 PROBLEM — N18.4 CHRONIC KIDNEY DISEASE, STAGE 4 (SEVERE) (HCC): Status: ACTIVE | Noted: 2022-08-26

## 2022-08-26 LAB
2HR DELTA HS TROPONIN: 0 NG/L
ALBUMIN SERPL BCP-MCNC: 3.3 G/DL (ref 3.5–5)
ALP SERPL-CCNC: 78 U/L (ref 34–104)
ALT SERPL W P-5'-P-CCNC: 8 U/L (ref 7–52)
ANION GAP SERPL CALCULATED.3IONS-SCNC: 9 MMOL/L (ref 4–13)
AST SERPL W P-5'-P-CCNC: 8 U/L (ref 13–39)
ATRIAL RATE: 87 BPM
BASE EX.OXY STD BLDV CALC-SCNC: 87.8 % (ref 60–80)
BASE EXCESS BLDV CALC-SCNC: -5.4 MMOL/L
BASOPHILS # BLD AUTO: 0.03 THOUSANDS/ΜL (ref 0–0.1)
BASOPHILS NFR BLD AUTO: 0 % (ref 0–1)
BETA-HYDROXYBUTYRATE: 0 MMOL/L
BILIRUB SERPL-MCNC: 0.24 MG/DL (ref 0.2–1)
BUN SERPL-MCNC: 77 MG/DL (ref 5–25)
CALCIUM ALBUM COR SERPL-MCNC: 9.2 MG/DL (ref 8.3–10.1)
CALCIUM SERPL-MCNC: 8.6 MG/DL (ref 8.4–10.2)
CARDIAC TROPONIN I PNL SERPL HS: 10 NG/L
CARDIAC TROPONIN I PNL SERPL HS: 10 NG/L
CHLORIDE SERPL-SCNC: 95 MMOL/L (ref 96–108)
CO2 SERPL-SCNC: 21 MMOL/L (ref 21–32)
CREAT SERPL-MCNC: 3.74 MG/DL (ref 0.6–1.3)
EOSINOPHIL # BLD AUTO: 0.41 THOUSAND/ΜL (ref 0–0.61)
EOSINOPHIL NFR BLD AUTO: 4 % (ref 0–6)
ERYTHROCYTE [DISTWIDTH] IN BLOOD BY AUTOMATED COUNT: 13.9 % (ref 11.6–15.1)
GFR SERPL CREATININE-BSD FRML MDRD: 18 ML/MIN/1.73SQ M
GLUCOSE SERPL-MCNC: 234 MG/DL (ref 65–140)
GLUCOSE SERPL-MCNC: 425 MG/DL (ref 65–140)
GLUCOSE SERPL-MCNC: 479 MG/DL (ref 65–140)
HCO3 BLDV-SCNC: 20.4 MMOL/L (ref 24–30)
HCT VFR BLD AUTO: 25.5 % (ref 36.5–49.3)
HGB BLD-MCNC: 8.5 G/DL (ref 12–17)
IMM GRANULOCYTES # BLD AUTO: 0.05 THOUSAND/UL (ref 0–0.2)
IMM GRANULOCYTES NFR BLD AUTO: 1 % (ref 0–2)
LYMPHOCYTES # BLD AUTO: 1.19 THOUSANDS/ΜL (ref 0.6–4.47)
LYMPHOCYTES NFR BLD AUTO: 13 % (ref 14–44)
MCH RBC QN AUTO: 28.1 PG (ref 26.8–34.3)
MCHC RBC AUTO-ENTMCNC: 33.3 G/DL (ref 31.4–37.4)
MCV RBC AUTO: 84 FL (ref 82–98)
MONOCYTES # BLD AUTO: 0.61 THOUSAND/ΜL (ref 0.17–1.22)
MONOCYTES NFR BLD AUTO: 7 % (ref 4–12)
NEUTROPHILS # BLD AUTO: 7.01 THOUSANDS/ΜL (ref 1.85–7.62)
NEUTS SEG NFR BLD AUTO: 75 % (ref 43–75)
NRBC BLD AUTO-RTO: 0 /100 WBCS
O2 CT BLDV-SCNC: 11.8 ML/DL
P AXIS: 62 DEGREES
PCO2 BLDV: 41 MM HG (ref 42–50)
PH BLDV: 7.31 [PH] (ref 7.3–7.4)
PLATELET # BLD AUTO: 275 THOUSANDS/UL (ref 149–390)
PMV BLD AUTO: 9.6 FL (ref 8.9–12.7)
PO2 BLDV: 60.3 MM HG (ref 35–45)
POTASSIUM SERPL-SCNC: 5.1 MMOL/L (ref 3.5–5.3)
PR INTERVAL: 174 MS
PROT SERPL-MCNC: 7 G/DL (ref 6.4–8.4)
QRS AXIS: -9 DEGREES
QRSD INTERVAL: 94 MS
QT INTERVAL: 360 MS
QTC INTERVAL: 433 MS
RBC # BLD AUTO: 3.03 MILLION/UL (ref 3.88–5.62)
SODIUM SERPL-SCNC: 125 MMOL/L (ref 135–147)
T WAVE AXIS: 78 DEGREES
VENTRICULAR RATE: 87 BPM
WBC # BLD AUTO: 9.3 THOUSAND/UL (ref 4.31–10.16)

## 2022-08-26 PROCEDURE — 80053 COMPREHEN METABOLIC PANEL: CPT | Performed by: EMERGENCY MEDICINE

## 2022-08-26 PROCEDURE — 96361 HYDRATE IV INFUSION ADD-ON: CPT

## 2022-08-26 PROCEDURE — 85025 COMPLETE CBC W/AUTO DIFF WBC: CPT | Performed by: EMERGENCY MEDICINE

## 2022-08-26 PROCEDURE — 82010 KETONE BODYS QUAN: CPT | Performed by: EMERGENCY MEDICINE

## 2022-08-26 PROCEDURE — 82805 BLOOD GASES W/O2 SATURATION: CPT | Performed by: EMERGENCY MEDICINE

## 2022-08-26 PROCEDURE — 96374 THER/PROPH/DIAG INJ IV PUSH: CPT

## 2022-08-26 PROCEDURE — 99284 EMERGENCY DEPT VISIT MOD MDM: CPT

## 2022-08-26 PROCEDURE — 36415 COLL VENOUS BLD VENIPUNCTURE: CPT | Performed by: EMERGENCY MEDICINE

## 2022-08-26 PROCEDURE — 84484 ASSAY OF TROPONIN QUANT: CPT | Performed by: EMERGENCY MEDICINE

## 2022-08-26 PROCEDURE — 99284 EMERGENCY DEPT VISIT MOD MDM: CPT | Performed by: EMERGENCY MEDICINE

## 2022-08-26 PROCEDURE — 93010 ELECTROCARDIOGRAM REPORT: CPT | Performed by: INTERNAL MEDICINE

## 2022-08-26 PROCEDURE — 93005 ELECTROCARDIOGRAM TRACING: CPT

## 2022-08-26 PROCEDURE — 82948 REAGENT STRIP/BLOOD GLUCOSE: CPT

## 2022-08-26 RX ADMIN — INSULIN HUMAN 10 UNITS: 100 INJECTION, SOLUTION PARENTERAL at 07:26

## 2022-08-26 RX ADMIN — SODIUM CHLORIDE 1000 ML: 0.9 INJECTION, SOLUTION INTRAVENOUS at 07:24

## 2022-08-26 NOTE — ASSESSMENT & PLAN NOTE
Blood pressure borderline  Continue current antihypertensives, continue to monitor  Discussed low-sodium diet and provided literature  Re-evaluate at follow-up

## 2022-08-26 NOTE — ASSESSMENT & PLAN NOTE
BMI greater than 40 is associated with greater risk of progression of renal disease secondary to glomerular hyperfiltration  Recommend weight loss  Consider referral to bariatric surgery  Would likely need weight loss to be transplant candidate

## 2022-08-26 NOTE — ED PROVIDER NOTES
History  Chief Complaint   Patient presents with    High Blood Pressure     Pt reports BP of 220/120 at home, took am BP meds and rechecked an hour later with no change; blood sugar 408 after taking 30 units lantus @ 2300; mild SOB     22-year-old male with insulin-dependent diabetes, CKD, hypertension presents to the emergency department for evaluation of elevated blood pressure and hyperglycemia  Patient states he was in his normal state of health until this morning when he woke up with a posterior headache  He states he checked his blood pressure and noted it to be elevated in the 200s  He also states he took his blood sugar and noted that it was elevated in the 400s  He states he did take his morning antihypertensives without improvement in his blood pressure which prompted his evaluation  He also reports he took his 30 units of Lantus yesterday evening and also took an additional 3 units this morning of short-acting to try to correct  Patient denies any blurry vision or double vision  Does endorse some shortness of breath, denies any chest pain or chest tightness  No abdominal pain, nausea or vomiting  He denies any recent illnesses or known sick contacts  Prior to Admission Medications   Prescriptions Last Dose Informant Patient Reported? Taking? ACCU-CHEK FASTCLIX LANCETS MISC  Self Yes No   Si (four) times a day Not checking 4 times a day   Patient stated maybe 2 times a day   Blood Glucose Monitoring Suppl (ACCU-CHEK GUIDE) w/Device KIT  Self Yes No   Sig: Checking 2 times a day   FLUoxetine (PROzac) 20 mg capsule  Self Yes No   Sig: Take 20 mg by mouth every morning   Insulin Pen Needle (Pen Needles 3") 31G X 5 MM MISC  Self No No   Sig: Use 4 (four) times a day   LORazepam (ATIVAN) 0 5 mg tablet   No No   Sig: Take 1 tablet (0 5 mg total) by mouth 2 (two) times a day as needed for anxiety for up to 10 days   Vraylar 6 MG capsule  Self Yes No   Sig: Take 6 mg by mouth daily albuterol (PROVENTIL HFA,VENTOLIN HFA) 90 mcg/act inhaler   No No   Sig: Inhale 2 puffs every 4 (four) hours as needed for wheezing   amLODIPine (NORVASC) 10 mg tablet   No No   Sig: Take 1 tablet (10 mg total) by mouth daily   bumetanide (BUMEX) 2 mg tablet   No No   Sig: Take 2 tablets (4 mg total) by mouth daily   carvedilol (COREG) 25 mg tablet   No No   Sig: Take 1 tablet (25 mg total) by mouth 2 (two) times a day with meals   doxazosin (CARDURA) 2 mg tablet   No No   Sig: Take 1 tablet (2 mg total) by mouth daily   famotidine (PEPCID) 40 MG tablet  Self No No   Sig: Take 1 tablet (40 mg total) by mouth daily   hydrALAZINE (APRESOLINE) 50 mg tablet   No No   Sig: Take 1 tablet (50 mg total) by mouth 3 (three) times a day   insulin glargine (LANTUS) 100 units/mL subcutaneous injection   No No   Sig: Inject 20 Units under the skin daily at bedtime   insulin lispro (HumaLOG) 100 units/mL injection   No No   Sig: Inject 5 Units under the skin 3 (three) times a day with meals   levothyroxine 125 mcg tablet   No No   Sig: Take 1 tablet (125 mcg total) by mouth daily in the early morning   pantoprazole (PROTONIX) 40 mg tablet   No No   Sig: Take 1 tablet (40 mg total) by mouth 2 (two) times a day before meals      Facility-Administered Medications: None       Past Medical History:   Diagnosis Date    Acute bronchitis due to other specified organisms 07/05/2019    Chronic headaches     Diabetes mellitus (HCC)     Disease of thyroid gland     Esophagitis     Gastritis     Gastroparesis     GERD (gastroesophageal reflux disease)     Hypertension     Migraine     Migraines 03/11/2021    Obesity     Obsessive compulsive disorder     Psychiatric disorder     Renal disorder     Schizoaffective disorder [facility])        Past Surgical History:   Procedure Laterality Date    ESOPHAGOGASTRODUODENOSCOPY N/A 1/15/2019    Procedure: ESOPHAGOGASTRODUODENOSCOPY (EGD);   Surgeon: Arnav Wills MD;  [facility]: [facility]; Service: Gastroenterology    IR BIOPSY KIDNEY RANDOM  8/11/2022       Family History   Problem Relation Age of Onset    COPD Mother     Hypertension Mother     Heart failure Mother     Rheum arthritis Family     Heart disease Family     Hypertension Father     Diabetes Father     Hyperlipidemia Father      I have reviewed and agree with the history as documented  E-Cigarette/Vaping    E-Cigarette Use Never User      E-Cigarette/Vaping Substances    Nicotine No     THC No     CBD No     Flavoring No      Social History     Tobacco Use    Smoking status: Never Smoker    Smokeless tobacco: Never Used   Vaping Use    Vaping Use: Never used   Substance Use Topics    Alcohol use: Not Currently    Drug use: Not Currently       Review of Systems   Constitutional: Negative for chills and fever  HENT: Negative for ear pain and sore throat  Eyes: Negative for pain and visual disturbance  Respiratory: Positive for shortness of breath  Negative for cough  Cardiovascular: Negative for chest pain and palpitations  Gastrointestinal: Negative for abdominal pain, nausea and vomiting  Genitourinary: Negative for dysuria and hematuria  Musculoskeletal: Negative for arthralgias and back pain  Skin: Negative for color change and rash  Neurological: Positive for headaches  Negative for seizures and syncope  All other systems reviewed and are negative  Physical Exam  Physical Exam  Vitals and nursing note reviewed  Constitutional:       General: He is not in acute distress  Appearance: He is obese  HENT:      Head: Normocephalic and atraumatic  Right Ear: External ear normal       Left Ear: External ear normal       Nose: Nose normal       Mouth/Throat:      Mouth: Mucous membranes are moist    Eyes:      Extraocular Movements: Extraocular movements intact  Conjunctiva/sclera: Conjunctivae normal       Pupils: Pupils are equal, round, and reactive to light  Cardiovascular:      Rate and Rhythm: Normal rate and regular rhythm  Pulses: Normal pulses  Heart sounds: Normal heart sounds  No murmur heard  Pulmonary:      Effort: Pulmonary effort is normal  No respiratory distress  Breath sounds: Normal breath sounds  No stridor  Abdominal:      General: Abdomen is flat  Bowel sounds are normal       Tenderness: There is no abdominal tenderness  There is no guarding or rebound  Musculoskeletal:         General: No deformity  Normal range of motion  Cervical back: Normal range of motion and neck supple  Right lower leg: No edema  Left lower leg: No edema  Skin:     General: Skin is warm and dry  Capillary Refill: Capillary refill takes less than 2 seconds  Neurological:      General: No focal deficit present  Mental Status: He is alert and oriented to person, place, and time     Psychiatric:         Mood and Affect: Mood normal          Behavior: Behavior normal          Vital Signs  ED Triage Vitals [08/26/22 0608]   Temperature Pulse Respirations Blood Pressure SpO2   98 4 °F (36 9 °C) 95 20 (!) 191/99 96 %      Temp Source Heart Rate Source Patient Position - Orthostatic VS BP Location FiO2 (%)   Oral Monitor Lying Right arm --      Pain Score       No Pain           Vitals:    08/26/22 0608 08/26/22 0730 08/26/22 0849   BP: (!) 191/99 (!) 186/91 139/61   Pulse: 95 86 90   Patient Position - Orthostatic VS: Lying Lying Lying         Visual Acuity      ED Medications  Medications   sodium chloride 0 9 % bolus 1,000 mL (0 mL Intravenous Stopped 8/26/22 0932)   insulin regular (HumuLIN R,NovoLIN R) injection 10 Units (10 Units Intravenous Given 8/26/22 0726)       Diagnostic Studies  Results Reviewed     Procedure Component Value Units Date/Time    HS Troponin I 2hr [[id number]]  (Normal) Collected: 08/26/22 0848    Lab Status: Final result Specimen: Blood from Hand, Left Updated: 08/26/22 0937     hs TnI 2hr 10 ng/L      Delta 2hr hsTnI 0 ng/L     Fingerstick Glucose (POCT) [[id number]]  (Abnormal) Collected: 08/26/22 0934    Lab Status: Final result Updated: 08/26/22 0935     POC Glucose 234 mg/dl     HS Troponin 0hr (reflex protocol) [[id number]]  (Normal) Collected: 08/26/22 0617    Lab Status: Final result Specimen: Blood from Hand, Left Updated: 08/26/22 0656     hs TnI 0hr 10 ng/L     Comprehensive metabolic panel [[id number]]  (Abnormal) Collected: 08/26/22 0617    Lab Status: Final result Specimen: Blood from Arm, Left Updated: 08/26/22 0646     Sodium 125 mmol/L      Potassium 5 1 mmol/L      Chloride 95 mmol/L      CO2 21 mmol/L      ANION GAP 9 mmol/L      BUN 77 mg/dL      Creatinine 3 74 mg/dL      Glucose 425 mg/dL      Calcium 8 6 mg/dL      Corrected Calcium 9 2 mg/dL      AST 8 U/L      ALT 8 U/L      Alkaline Phosphatase 78 U/L      Total Protein 7 0 g/dL      Albumin 3 3 g/dL      Total Bilirubin 0 24 mg/dL      eGFR 18 ml/min/1 73sq m     Narrative:      [place] guidelines for Chronic Kidney Disease (CKD):     Stage 1 with normal or high GFR (GFR > 90 mL/min/1 73 square meters)    Stage 2 Mild CKD (GFR = 60-89 mL/min/1 73 square meters)    Stage 3A Moderate CKD (GFR = 45-59 mL/min/1 73 square meters)    Stage 3B Moderate CKD (GFR = 30-44 mL/min/1 73 square meters)    Stage 4 Severe CKD (GFR = 15-29 mL/min/1 73 square meters)    Stage 5 End Stage CKD (GFR <15 mL/min/1 73 square meters)  Note: GFR calculation is accurate only with a steady state creatinine    Beta Hydroxybutyrate [[id number]]  (Normal) Collected: 08/26/22 0617    Lab Status: Final result Specimen: Blood from Arm, Left Updated: 08/26/22 0633     BETA-HYDROXYBUTYRATE 0 0 mmol/L     CBC and differential [[id number]]  (Abnormal) Collected: 08/26/22 0617    Lab Status: Final result Specimen: Blood from Arm, Left Updated: 08/26/22 0625     WBC 9 30 Thousand/uL      RBC 3 03 Million/uL      Hemoglobin 8 5 g/dL      Hematocrit 25 5 % MCV 84 fL      MCH 28 1 pg      MCHC 33 3 g/dL      RDW 13 9 %      MPV 9 6 fL      Platelets 159 Thousands/uL      nRBC 0 /100 WBCs      Neutrophils Relative 75 %      Immat GRANS % 1 %      Lymphocytes Relative 13 %      Monocytes Relative 7 %      Eosinophils Relative 4 %      Basophils Relative 0 %      Neutrophils Absolute 7 01 Thousands/µL      Immature Grans Absolute 0 05 Thousand/uL      Lymphocytes Absolute 1 19 Thousands/µL      Monocytes Absolute 0 61 Thousand/µL      Eosinophils Absolute 0 41 Thousand/µL      Basophils Absolute 0 03 Thousands/µL     Blood gas, venous [[id number]]  (Abnormal) Collected: 08/26/22 0617    Lab Status: Final result Specimen: Blood from Arm, Left Updated: 08/26/22 0624     pH, Krzysztof 7 314     pCO2, Krzysztof 41 0 mm Hg      pO2, Krzysztof 60 3 mm Hg      HCO3, Krzysztof 20 4 mmol/L      Base Excess, Krzysztof -5 4 mmol/L      O2 Content, Krzysztof 11 8 ml/dL      O2 HGB, VENOUS 87 8 %     Fingerstick Glucose (POCT) [[id number]]  (Abnormal) Collected: 08/26/22 0610    Lab Status: Final result Updated: 08/26/22 0615     POC Glucose 479 mg/dl                  No orders to display              Procedures  Procedures         ED Course  ED Course as of 08/26/22 2101   Fri Aug 26, 2022   0629 Hemoglobin(!): 8 5  Stable   0640 EKG interpreted by myself demonstrates normal sinus rhythm with a rate of 87, normal axis, normal intervals, normal ST segment and T-waves   0648 Creatinine(!): 3 74  Baseline   0648 Glucose, Random(!): 425   0648 Sodium(!): 125  130 corrected                               SBIRT 22yo+    Flowsheet Row Most Recent Value   SBIRT (23 yo +)    In order to provide better care to our patients, we are screening all of our patients for alcohol and drug use  Would it be okay to ask you these screening questions? Yes Filed at: 08/26/2022 1267   Initial Alcohol Screen: US AUDIT-C     1  How often do you have a drink containing alcohol? 0 Filed at: 08/26/2022 0612   2   How many drinks containing alcohol do you have on a typical day you are drinking? 0 Filed at: 08/26/2022 0612   3a  Male UNDER 65: How often do you have five or more drinks on one occasion? 0 Filed at: 08/26/2022 0612   3b  FEMALE Any Age, or MALE 65+: How often do you have 4 or more drinks on one occassion? 0 Filed at: 08/26/2022 6745   Audit-C Score 0 Filed at: 08/26/2022 5130   [place]: How many times in the past year have you    Used an illegal drug or used a prescription medication for non-medical reasons? Never Filed at: 08/26/2022 5555                    MDM  Number of Diagnoses or Management Options  CKD (chronic kidney disease)  Hyperglycemia due to diabetes mellitus ([place] [address] )  Hypertension, unspecified type  Diagnosis management comments: 55-year-old male with insulin-dependent diabetes and hypertension presents the ED for evaluation of elevated blood pressure and hyperglycemia  On exam he is well-appearing in no acute distress  Will check cardiac workup to evaluate for end-organ damage given significant hypertension, will also check additional labs to evaluate for DKA  Labs demonstrate stable anemia, stable CKD, hyperglycemia, and initial troponin of 10  Will give 10 units of regular insulin for the hyperglycemia and check delta troponin  No emergent need to treat blood pressure at this time  Patient signed out pending repeat glucose check and delta troponin          Disposition  Final diagnoses:   Hypertension, unspecified type   Hyperglycemia due to diabetes mellitus ([facility] )   CKD (chronic kidney disease)     Time reflects when diagnosis was documented in both MDM as applicable and the Disposition within this note     Time User Action Codes Description Comment    8/26/2022  6:50 AM Check, Nilda Bain Add [I10] Hypertension, unspecified type     8/26/2022  6:50 AM Check, Nilda Bain Add [E11 65] Hyperglycemia due to diabetes mellitus ([facility] )     8/26/2022  6:50 AM Check, Nilda Bain Add [N18 9] CKD (chronic kidney disease)       ED Disposition     ED Disposition   Discharge    Condition   Stable    Date/Time   Fri Aug 26, 2022  9:54 AM    Comment   Umang Benedict discharge to home/self care  Follow-up Information     Follow up With Specialties Details Why Contact Info Additional [address] Emergency Department Emergency Medicine  If symptoms worsen [address] [id number] [phone] [facility] Emergency Department, [address] [place] [address] [place], [address]    Henrene Libman, MD Family Medicine Schedule an appointment as soon as possible for a visit   [address]  [place] [address]  [phone]             Discharge Medication List as of 8/26/2022  9:55 AM      CONTINUE these medications which have NOT CHANGED    Details   ACCU-CHEK FASTCLIX LANCETS MISC 4 (four) times a day Not checking 4 times a day   Patient stated maybe 2 times a day, Starting Wed 5/8/2019, Historical Med      albuterol (PROVENTIL HFA,VENTOLIN HFA) 90 mcg/act inhaler Inhale 2 puffs every 4 (four) hours as needed for wheezing, Starting Thu 7/21/2022, Normal      amLODIPine (NORVASC) 10 mg tablet Take 1 tablet (10 mg total) by mouth daily, Starting Fri 7/22/2022, Normal      Blood Glucose Monitoring Suppl (ACCU-CHEK GUIDE) w/Device KIT Checking 2 times a day, Starting Thu 5/9/2019, Historical Med      bumetanide (BUMEX) 2 mg tablet Take 2 tablets (4 mg total) by mouth daily, Starting Fri 7/22/2022, Normal      carvedilol (COREG) 25 mg tablet Take 1 tablet (25 mg total) by mouth 2 (two) times a day with meals, Starting Thu 6/23/2022, Until Thu 8/11/2022, Normal      doxazosin (CARDURA) 2 mg tablet Take 1 tablet (2 mg total) by mouth daily, Starting Thu 6/23/2022, Until Thu 8/11/2022, Normal      famotidine (PEPCID) 40 MG tablet Take 1 tablet (40 mg total) by mouth daily, Starting Fri 7/16/2021, Normal      FLUoxetine (PROzac) 20 mg capsule Take 20 mg by mouth every morning, Starting Fri 10/15/2021, Historical Med      hydrALAZINE (APRESOLINE) 50 mg tablet Take 1 tablet (50 mg total) by mouth 3 (three) times a day, Starting Thu 7/21/2022, Normal      insulin glargine (LANTUS) 100 units/mL subcutaneous injection Inject 20 Units under the skin daily at bedtime, Starting Thu 6/23/2022, Normal      insulin lispro (HumaLOG) 100 units/mL injection Inject 5 Units under the skin 3 (three) times a day with meals, Starting Thu 6/23/2022, Normal      Insulin Pen Needle (Pen Needles 3/16") 31G X 5 MM MISC Use 4 (four) times a day, Starting Tue 1/12/2021, Normal      levothyroxine 125 mcg tablet Take 1 tablet (125 mcg total) by mouth daily in the early morning, Starting Fri 6/24/2022, Until Thu 8/11/2022, Normal      LORazepam (ATIVAN) 0 5 mg tablet Take 1 tablet (0 5 mg total) by mouth 2 (two) times a day as needed for anxiety for up to 10 days, Starting Thu 7/21/2022, Until Thu 8/11/2022 at 2359, Normal      pantoprazole (PROTONIX) 40 mg tablet Take 1 tablet (40 mg total) by mouth 2 (two) times a day before meals, Starting Thu 6/23/2022, Until Thu 8/11/2022, Normal      Vraylar 6 MG capsule Take 6 mg by mouth daily, Starting Fri 8/13/2021, Historical Med             No discharge procedures on file      PDMP Review       Value Time User    PDMP Reviewed  Yes 7/16/2021  3:11 PM Venus Wright MD          ED Provider  Electronically Signed by           Filippo Torres MD  08/26/22 2101

## 2022-08-26 NOTE — ED NOTES
Report given to MARJ Enriquez  No additional questions at this time        Kimberley Jones RN  08/26/22 3223

## 2022-08-26 NOTE — ASSESSMENT & PLAN NOTE
Lab Results   Component Value Date    EGFR 18 08/26/2022    EGFR 17 07/21/2022    EGFR 17 07/20/2022    CREATININE 3 74 (H) 08/26/2022    CREATININE 4 01 (H) 07/21/2022    CREATININE 3 84 (H) 07/20/2022   Biopsy-proven nodular diabetic glomerular sclerosis, arterial sclerosis and likely irreversible ATN  Discussed with patient that this is likely his new renal baseline  Had a long discussion regarding overall prognosis and dialysis modalities  Refer for CKD education  Refer for transplant  Discussed likely need for weight loss prior to being accepted as transplant candidate  Will follow closely  Return to clinic in 1 month with labs prior  Personally reviewed graph of renal function trends with patient  Patient is not on an ACE-I, ARB or SGLT-2 inhibitor due to advanced renal disease  Patient was advised to avoid nephrotoxins  Continue management of obesity, diabetes and hypertension  Please renally dose medication for a creatinine clearance of 15 mL/min  Patient has not yet undergone [facility] education  Continue to monitor renal function, anemia and bone-mineral parameters

## 2022-09-05 ENCOUNTER — HOSPITAL ENCOUNTER (EMERGENCY)
Facility: HOSPITAL | Age: 42
Discharge: HOME | End: 2022-09-05
Attending: FAMILY MEDICINE
Payer: COMMERCIAL

## 2022-09-05 ENCOUNTER — APPOINTMENT (OUTPATIENT)
Dept: RADIOLOGY | Facility: HOSPITAL | Age: 42
End: 2022-09-05
Payer: COMMERCIAL

## 2022-09-05 VITALS
WEIGHT: 315 LBS | HEART RATE: 95 BPM | OXYGEN SATURATION: 93 % | RESPIRATION RATE: 18 BRPM | SYSTOLIC BLOOD PRESSURE: 183 MMHG | TEMPERATURE: 98.2 F | BODY MASS INDEX: 58.53 KG/M2 | DIASTOLIC BLOOD PRESSURE: 89 MMHG

## 2022-09-05 DIAGNOSIS — R06.00 DYSPNEA: Primary | ICD-10-CM

## 2022-09-05 DIAGNOSIS — R06.2 WHEEZING: ICD-10-CM

## 2022-09-05 LAB
2HR DELTA HS TROPONIN: -1 NG/L
ANION GAP SERPL CALCULATED.3IONS-SCNC: 10 MMOL/L (ref 4–13)
ATRIAL RATE: 88 BPM
ATRIAL RATE: 93 BPM
BASOPHILS # BLD AUTO: 0.05 THOUSANDS/ΜL (ref 0–0.1)
BASOPHILS NFR BLD AUTO: 0 % (ref 0–1)
BNP SERPL-MCNC: 186 PG/ML (ref 0–100)
BUN SERPL-MCNC: 83 MG/DL (ref 5–25)
CALCIUM SERPL-MCNC: 8.7 MG/DL (ref 8.4–10.2)
CARDIAC TROPONIN I PNL SERPL HS: 11 NG/L
CARDIAC TROPONIN I PNL SERPL HS: 12 NG/L
CHLORIDE SERPL-SCNC: 99 MMOL/L (ref 96–108)
CO2 SERPL-SCNC: 24 MMOL/L (ref 21–32)
CREAT SERPL-MCNC: 4.27 MG/DL (ref 0.6–1.3)
EOSINOPHIL # BLD AUTO: 0.45 THOUSAND/ΜL (ref 0–0.61)
EOSINOPHIL NFR BLD AUTO: 4 % (ref 0–6)
ERYTHROCYTE [DISTWIDTH] IN BLOOD BY AUTOMATED COUNT: 13.7 % (ref 11.6–15.1)
FLUAV RNA RESP QL NAA+PROBE: NEGATIVE
FLUBV RNA RESP QL NAA+PROBE: NEGATIVE
GFR SERPL CREATININE-BSD FRML MDRD: 15 ML/MIN/1.73SQ M
GLUCOSE SERPL-MCNC: 223 MG/DL (ref 65–140)
HCT VFR BLD AUTO: 25.4 % (ref 36.5–49.3)
HGB BLD-MCNC: 8.2 G/DL (ref 12–17)
IMM GRANULOCYTES # BLD AUTO: 0.1 THOUSAND/UL (ref 0–0.2)
IMM GRANULOCYTES NFR BLD AUTO: 1 % (ref 0–2)
INR PPP: 1.07 (ref 0.84–1.19)
LYMPHOCYTES # BLD AUTO: 1.36 THOUSANDS/ΜL (ref 0.6–4.47)
LYMPHOCYTES NFR BLD AUTO: 12 % (ref 14–44)
MCH RBC QN AUTO: 27.6 PG (ref 26.8–34.3)
MCHC RBC AUTO-ENTMCNC: 32.3 G/DL (ref 31.4–37.4)
MCV RBC AUTO: 86 FL (ref 82–98)
MONOCYTES # BLD AUTO: 0.88 THOUSAND/ΜL (ref 0.17–1.22)
MONOCYTES NFR BLD AUTO: 8 % (ref 4–12)
NEUTROPHILS # BLD AUTO: 8.66 THOUSANDS/ΜL (ref 1.85–7.62)
NEUTS SEG NFR BLD AUTO: 75 % (ref 43–75)
NRBC BLD AUTO-RTO: 0 /100 WBCS
P AXIS: 55 DEGREES
P AXIS: 62 DEGREES
PLATELET # BLD AUTO: 320 THOUSANDS/UL (ref 149–390)
PMV BLD AUTO: 9.4 FL (ref 8.9–12.7)
POTASSIUM SERPL-SCNC: 5 MMOL/L (ref 3.5–5.3)
PR INTERVAL: 176 MS
PR INTERVAL: 178 MS
PROTHROMBIN TIME: 13.9 SECONDS (ref 11.6–14.5)
QRS AXIS: 35 DEGREES
QRS AXIS: 39 DEGREES
QRSD INTERVAL: 90 MS
QRSD INTERVAL: 96 MS
QT INTERVAL: 360 MS
QT INTERVAL: 372 MS
QTC INTERVAL: 447 MS
QTC INTERVAL: 450 MS
RBC # BLD AUTO: 2.97 MILLION/UL (ref 3.88–5.62)
RSV RNA RESP QL NAA+PROBE: NEGATIVE
SARS-COV-2 RNA RESP QL NAA+PROBE: NEGATIVE
SODIUM SERPL-SCNC: 133 MMOL/L (ref 135–147)
T WAVE AXIS: 80 DEGREES
T WAVE AXIS: 86 DEGREES
VENTRICULAR RATE: 88 BPM
VENTRICULAR RATE: 93 BPM
WBC # BLD AUTO: 11.5 THOUSAND/UL (ref 4.31–10.16)

## 2022-09-05 PROCEDURE — 96374 THER/PROPH/DIAG INJ IV PUSH: CPT

## 2022-09-05 PROCEDURE — 71045 X-RAY EXAM CHEST 1 VIEW: CPT

## 2022-09-05 PROCEDURE — 0241U HB NFCT DS VIR RESP RNA 4 TRGT: CPT | Performed by: FAMILY MEDICINE

## 2022-09-05 PROCEDURE — 85025 COMPLETE CBC W/AUTO DIFF WBC: CPT | Performed by: FAMILY MEDICINE

## 2022-09-05 PROCEDURE — 93005 ELECTROCARDIOGRAM TRACING: CPT

## 2022-09-05 PROCEDURE — 36415 COLL VENOUS BLD VENIPUNCTURE: CPT | Performed by: FAMILY MEDICINE

## 2022-09-05 PROCEDURE — 83880 ASSAY OF NATRIURETIC PEPTIDE: CPT | Performed by: FAMILY MEDICINE

## 2022-09-05 PROCEDURE — 85610 PROTHROMBIN TIME: CPT | Performed by: FAMILY MEDICINE

## 2022-09-05 PROCEDURE — 94640 AIRWAY INHALATION TREATMENT: CPT

## 2022-09-05 PROCEDURE — 80048 BASIC METABOLIC PNL TOTAL CA: CPT | Performed by: FAMILY MEDICINE

## 2022-09-05 PROCEDURE — 84484 ASSAY OF TROPONIN QUANT: CPT | Performed by: FAMILY MEDICINE

## 2022-09-05 PROCEDURE — 99285 EMERGENCY DEPT VISIT HI MDM: CPT

## 2022-09-05 PROCEDURE — 93010 ELECTROCARDIOGRAM REPORT: CPT | Performed by: INTERNAL MEDICINE

## 2022-09-05 PROCEDURE — 99284 EMERGENCY DEPT VISIT MOD MDM: CPT | Performed by: FAMILY MEDICINE

## 2022-09-05 RX ORDER — DEXAMETHASONE SODIUM PHOSPHATE 4 MG/ML
10 INJECTION, SOLUTION INTRA-ARTICULAR; INTRALESIONAL; INTRAMUSCULAR; INTRAVENOUS; SOFT TISSUE ONCE
Status: COMPLETED | OUTPATIENT
Start: 2022-09-05 | End: 2022-09-05

## 2022-09-05 RX ORDER — HYDRALAZINE HYDROCHLORIDE 25 MG/1
50 TABLET, FILM COATED ORAL ONCE
Status: COMPLETED | OUTPATIENT
Start: 2022-09-05 | End: 2022-09-05

## 2022-09-05 RX ORDER — METHYLPREDNISOLONE 4 MG/1
TABLET ORAL
Qty: 21 TABLET | Refills: 0 | Status: SHIPPED | OUTPATIENT
Start: 2022-09-05 | End: 2022-09-23

## 2022-09-05 RX ORDER — AMLODIPINE BESYLATE 5 MG/1
10 TABLET ORAL ONCE
Status: COMPLETED | OUTPATIENT
Start: 2022-09-05 | End: 2022-09-05

## 2022-09-05 RX ORDER — BUMETANIDE 1 MG/1
2 TABLET ORAL DAILY
Status: DISCONTINUED | OUTPATIENT
Start: 2022-09-05 | End: 2022-09-05 | Stop reason: HOSPADM

## 2022-09-05 RX ORDER — ALBUTEROL SULFATE 2.5 MG/3ML
2.5 SOLUTION RESPIRATORY (INHALATION) ONCE
Status: COMPLETED | OUTPATIENT
Start: 2022-09-05 | End: 2022-09-05

## 2022-09-05 RX ADMIN — DEXAMETHASONE SODIUM PHOSPHATE 10 MG: 4 INJECTION, SOLUTION INTRAMUSCULAR; INTRAVENOUS at 10:15

## 2022-09-05 RX ADMIN — AMLODIPINE BESYLATE 10 MG: 5 TABLET ORAL at 08:48

## 2022-09-05 RX ADMIN — BUMETANIDE 2 MG: 1 TABLET ORAL at 08:48

## 2022-09-05 RX ADMIN — ALBUTEROL SULFATE 2.5 MG: 2.5 SOLUTION RESPIRATORY (INHALATION) at 08:21

## 2022-09-05 RX ADMIN — HYDRALAZINE HYDROCHLORIDE 50 MG: 25 TABLET, FILM COATED ORAL at 08:48

## 2022-09-05 NOTE — ED PROVIDER NOTES
History  Chief Complaint   Patient presents with    Shortness of Breath     With exertion, onset 4 days ago    Chest Pain     Onset 4 days ago, described as a tightness     HPI  This is a 25-year-old male insulin-dependent diabetic CKD hypertension presented with complain of shortness of breath and chest tightness x4 days  Patient states that he was here for similar complain before was given inhaler which did not help  States that shortness oz worse for past 4 days any also felt that he was wheezing  He denies any chest pain at this time states he feels better when he sitting or lying down  Denies any recent exposure to COVID-19  Denies any history of COPD but does have a history of asthma  States his blood sugar has been slightly elevated to 200  Denies any abdominal pain nausea vomiting or diarrhea  He is otherwise awake alert oriented x3 GCS 15  Patient does have a history of CHF and take Bumex at home  He states that he has been compliant with his medication  Prior to Admission Medications   Prescriptions Last Dose Informant Patient Reported? Taking? ACCU-CHEK FASTCLIX LANCETS MISC  Self Yes No   Si (four) times a day Not checking 4 times a day   Patient stated maybe 2 times a day   Blood Glucose Monitoring Suppl (ACCU-CHEK GUIDE) w/Device KIT  Self Yes No   Sig: Checking 2 times a day   FLUoxetine (PROzac) 20 mg capsule  Self Yes No   Sig: Take 20 mg by mouth every morning   Insulin Pen Needle (Pen Needles 3/16") 31G X 5 MM MISC  Self No No   Sig: Use 4 (four) times a day   LORazepam (ATIVAN) 0 5 mg tablet   No No   Sig: Take 1 tablet (0 5 mg total) by mouth 2 (two) times a day as needed for anxiety for up to 10 days   Vraylar 6 MG capsule  Self Yes No   Sig: Take 6 mg by mouth daily   albuterol (PROVENTIL HFA,VENTOLIN HFA) 90 mcg/act inhaler   No No   Sig: Inhale 2 puffs every 4 (four) hours as needed for wheezing   amLODIPine (NORVASC) 10 mg tablet   No No   Sig: Take 1 tablet (10 mg total) by mouth daily   bumetanide (BUMEX) 2 mg tablet   No No   Sig: Take 2 tablets (4 mg total) by mouth daily   carvedilol (COREG) 25 mg tablet   No No   Sig: Take 1 tablet (25 mg total) by mouth 2 (two) times a day with meals   doxazosin (CARDURA) 2 mg tablet   No No   Sig: Take 1 tablet (2 mg total) by mouth daily   famotidine (PEPCID) 40 MG tablet  Self No No   Sig: Take 1 tablet (40 mg total) by mouth daily   hydrALAZINE (APRESOLINE) 50 mg tablet   No No   Sig: Take 1 tablet (50 mg total) by mouth 3 (three) times a day   insulin glargine (LANTUS) 100 units/mL subcutaneous injection   No No   Sig: Inject 20 Units under the skin daily at bedtime   insulin lispro (HumaLOG) 100 units/mL injection   No No   Sig: Inject 5 Units under the skin 3 (three) times a day with meals   levothyroxine 125 mcg tablet   No No   Sig: Take 1 tablet (125 mcg total) by mouth daily in the early morning   pantoprazole (PROTONIX) 40 mg tablet   No No   Sig: Take 1 tablet (40 mg total) by mouth 2 (two) times a day before meals      Facility-Administered Medications: None       Past Medical History:   Diagnosis Date    Acute bronchitis due to other specified organisms 07/05/2019    Chronic headaches     Diabetes mellitus (HCC)     Disease of thyroid gland     Esophagitis     Gastritis     Gastroparesis     GERD (gastroesophageal reflux disease)     Hypertension     Migraine     Migraines 03/11/2021    Obesity     Obsessive compulsive disorder     Psychiatric disorder     Renal disorder     Schizoaffective disorder [facility])        Past Surgical History:   Procedure Laterality Date    ESOPHAGOGASTRODUODENOSCOPY N/A 1/15/2019    Procedure: ESOPHAGOGASTRODUODENOSCOPY (EGD); Surgeon: Ann Mendoza MD;  [facility];   Service: Gastroenterology    IR BIOPSY KIDNEY RANDOM  8/11/2022       Family History   Problem Relation Age of Onset    COPD Mother     Hypertension Mother     Heart failure Mother     Rheum arthritis Family     Heart disease Family     Hypertension Father     Diabetes Father     Hyperlipidemia Father      I have reviewed and agree with the history as documented  E-Cigarette/Vaping    E-Cigarette Use Never User      E-Cigarette/Vaping Substances    Nicotine No     THC No     CBD No     Flavoring No      Social History     Tobacco Use    Smoking status: Never Smoker    Smokeless tobacco: Never Used   Vaping Use    Vaping Use: Never used   Substance Use Topics    Alcohol use: Not Currently    Drug use: Not Currently       Review of Systems   Constitutional: Negative  HENT: Negative  Eyes: Negative  Respiratory: Positive for shortness of breath  Negative for chest tightness  Cardiovascular: Positive for chest pain  Gastrointestinal: Negative  Endocrine: Negative  Genitourinary: Negative  Musculoskeletal: Negative  Skin: Negative  Allergic/Immunologic: Negative  Neurological: Negative  Hematological: Negative  Psychiatric/Behavioral: Negative  Physical Exam  Physical Exam  Vitals and nursing note reviewed  Constitutional:       Appearance: He is well-developed  He is obese  He is not ill-appearing  HENT:      Head: Normocephalic and atraumatic  Eyes:      Extraocular Movements: Extraocular movements intact  Pupils: Pupils are equal, round, and reactive to light  Cardiovascular:      Rate and Rhythm: Normal rate and regular rhythm  Pulmonary:      Effort: Pulmonary effort is normal       Breath sounds: Wheezing present  Abdominal:      General: Bowel sounds are normal       Palpations: Abdomen is soft  Musculoskeletal:         General: Normal range of motion  Cervical back: Normal range of motion and neck supple  Comments: Chronic trace lower extremity edema   Skin:     General: Skin is warm  Neurological:      General: No focal deficit present  Mental Status: He is alert and oriented to person, place, and time     Psychiatric: Mood and Affect: Mood is anxious  Vital Signs  ED Triage Vitals   Temperature Pulse Respirations Blood Pressure SpO2   09/05/22 0818 09/05/22 0752 09/05/22 0752 09/05/22 0752 09/05/22 0758   98 2 °F (36 8 °C) 93 20 (!) 181/90 94 %      Temp Source Heart Rate Source Patient Position - Orthostatic VS BP Location FiO2 (%)   09/05/22 0818 09/05/22 0752 09/05/22 0752 09/05/22 0752 --   Tympanic Monitor Sitting Right arm       Pain Score       09/05/22 0752       No Pain           Vitals:    09/05/22 0752 09/05/22 0847 09/05/22 1053   BP: (!) 181/90 (!) 182/85 (!) 183/89   Pulse: 93 88 95   Patient Position - Orthostatic VS: Sitting Sitting Standing         Visual Acuity      ED Medications  Medications   bumetanide (BUMEX) tablet 2 mg (2 mg Oral Given 9/5/22 0848)   albuterol inhalation solution 2 5 mg (2 5 mg Nebulization Given 9/5/22 0821)   amLODIPine (NORVASC) tablet 10 mg (10 mg Oral Given 9/5/22 0848)   hydrALAZINE (APRESOLINE) tablet 50 mg (50 mg Oral Given 9/5/22 0848)   dexamethasone (DECADRON) injection 10 mg (10 mg Intravenous Given 9/5/22 1015)       Diagnostic Studies  Results Reviewed     Procedure Component Value Units Date/Time    HS Troponin I 2hr [[id number]]  (Normal) Collected: 09/05/22 1016    Lab Status: Final result Specimen: Blood from Arm, Left Updated: 09/05/22 1056     hs TnI 2hr 11 ng/L      Delta 2hr hsTnI -1 ng/L     HS Troponin I 4hr [[id number]]     Lab Status: No result Specimen: Blood     FLU/RSV/COVID - if FLU/RSV clinically relevant [[id number]]  (Normal) Collected: 09/05/22 0811    Lab Status: Final result Specimen: Nares from Nose Updated: 09/05/22 0858     SARS-CoV-2 Negative     INFLUENZA A PCR Negative     INFLUENZA B PCR Negative     RSV PCR Negative    Narrative:      FOR PEDIATRIC PATIENTS - copy/paste COVID Guidelines URL to browser: [email or web address]    SARS-CoV-2 assay is a Nucleic Acid Amplification assay intended for the  qualitative detection of nucleic acid from SARS-CoV-2 in nasopharyngeal  swabs  Results are for the presumptive identification of SARS-CoV-2 RNA  Positive results are indicative of infection with SARS-CoV-2, the virus  causing COVID-19, but do not rule out bacterial infection or co-infection  with other viruses  Laboratories within the [place] and its  territories are required to report all positive results to the appropriate  public health authorities  Negative results do not preclude SARS-CoV-2  infection and should not be used as the sole basis for treatment or other  patient management decisions  Negative results must be combined with  clinical observations, patient history, and epidemiological information  This test has not been FDA cleared or approved  This test has been authorized by FDA under an Emergency Use Authorization  (EUA)  This test is only authorized for the duration of time the  declaration that circumstances exist justifying the authorization of the  emergency use of an in vitro diagnostic tests for detection of SARS-CoV-2  virus and/or diagnosis of COVID-19 infection under section 564(b)(1) of  the Act, 21 U  S C  185HMH-7(F)(5), unless the authorization is terminated  or revoked sooner  The test has been validated but independent review by FDA  and CLIA is pending  Test performed using [employer] Gene[employer]pert: This RT-PCR assay targets N2,  a region unique to SARS-CoV-2  A conserved region in the E-gene was chosen  for pan-Sarbecovirus detection which includes SARS-CoV-2      HS Troponin 0hr (reflex protocol) [[id number]]  (Normal) Collected: 09/05/22 0811    Lab Status: Final result Specimen: Blood from Arm, Left Updated: 09/05/22 0855     hs TnI 0hr 12 ng/L     B-Type Natriuretic Peptide(BNP) [facility], [place], [facility] Only [[id number]]  (Abnormal) Collected: 09/05/22 0811    Lab Status: Final result Specimen: Blood from Arm, Left Updated: 09/05/22 0854      pg/mL Protime-INR [[id number]]  (Normal) Collected: 09/05/22 0811    Lab Status: Final result Specimen: Blood from Arm, Left Updated: 09/05/22 0845     Protime 13 9 seconds      INR 7 19    Basic metabolic panel [[id number]]  (Abnormal) Collected: 09/05/22 0811    Lab Status: Final result Specimen: Blood from Arm, Left Updated: 09/05/22 0837     Sodium 133 mmol/L      Potassium 5 0 mmol/L      Chloride 99 mmol/L      CO2 24 mmol/L      ANION GAP 10 mmol/L      BUN 83 mg/dL      Creatinine 4 27 mg/dL      Glucose 223 mg/dL      Calcium 8 7 mg/dL      eGFR 15 ml/min/1 73sq m     Narrative:      National Kidney Disease Foundation guidelines for Chronic Kidney Disease (CKD):     Stage 1 with normal or high GFR (GFR > 90 mL/min/1 73 square meters)    Stage 2 Mild CKD (GFR = 60-89 mL/min/1 73 square meters)    Stage 3A Moderate CKD (GFR = 45-59 mL/min/1 73 square meters)    Stage 3B Moderate CKD (GFR = 30-44 mL/min/1 73 square meters)    Stage 4 Severe CKD (GFR = 15-29 mL/min/1 73 square meters)    Stage 5 End Stage CKD (GFR <15 mL/min/1 73 square meters)  Note: GFR calculation is accurate only with a steady state creatinine    CBC and differential [[id number]]  (Abnormal) Collected: 09/05/22 0811    Lab Status: Final result Specimen: Blood from Arm, Left Updated: 09/05/22 0819     WBC 11 50 Thousand/uL      RBC 2 97 Million/uL      Hemoglobin 8 2 g/dL      Hematocrit 25 4 %      MCV 86 fL      MCH 27 6 pg      MCHC 32 3 g/dL      RDW 13 7 %      MPV 9 4 fL      Platelets 546 Thousands/uL      nRBC 0 /100 WBCs      Neutrophils Relative 75 %      Immat GRANS % 1 %      Lymphocytes Relative 12 %      Monocytes Relative 8 %      Eosinophils Relative 4 %      Basophils Relative 0 %      Neutrophils Absolute 8 66 Thousands/µL      Immature Grans Absolute 0 10 Thousand/uL      Lymphocytes Absolute 1 36 Thousands/µL      Monocytes Absolute 0 88 Thousand/µL      Eosinophils Absolute 0 45 Thousand/µL      Basophils Absolute 0 05 Thousands/µL                  XR chest 1 view portable    (Results Pending)              Procedures  Procedures         ED Course  ED Course as of 09/05/22 1656 Hailey Gage Sep 05, 2022   1007 States feeling better after the medication  Patient oxygen saturation has been 96-97% on room air  Recommended continue using the inhaler may need to increase the inhaler frequency I also recommend patient should follow up with the his kidney and pulmonary doctor  Precaution provided regarding return  Patient verbalized understand the plan DC home  1027 Feeling better    1046 Feeling better after steroid  Patient is requesting for steroid to go home 20 precaution since patient is diabetic  Precaution provided regarding return  patient is requesting for steroid I recommend that patient should continue to monitor his blood glucose since that he can increased with the steroid use  However patient states that he is feeling great after using steroid in the ED recommending follow-up with the pulmonology again precaution provided regarding return  Patient blood work and chest x-ray within normal limits on not much change from the previous 1  HEART Risk Score    Flowsheet Row Most Recent Value   Heart Score Risk Calculator    History 0 Filed at: 09/05/2022 1052   ECG 0 Filed at: 09/05/2022 1052   Age 0 Filed at: 09/05/2022 1052   Risk Factors 1 Filed at: 09/05/2022 1052   Troponin 0 Filed at: 09/05/2022 1052   HEART Score 1 Filed at: 09/05/2022 1052                        SBIRT 22yo+    Flowsheet Row Most Recent Value   SBIRT (23 yo +)    In order to provide better care to our patients, we are screening all of our patients for alcohol and drug use  Would it be okay to ask you these screening questions? Yes Filed at: 09/05/2022 0757   Initial Alcohol Screen: US AUDIT-C     1  How often do you have a drink containing alcohol? 0 Filed at: 09/05/2022 0757   2   How many drinks containing alcohol do you have on a typical day you are drinking? 0 Filed at: 09/05/2022 0757   3a  Male UNDER 65: How often do you have five or more drinks on one occasion? 0 Filed at: 09/05/2022 0757   3b  FEMALE Any Age, or MALE 65+: How often do you have 4 or more drinks on one occassion? 0 Filed at: 09/05/2022 0757   Audit-C Score 0 Filed at: 09/05/2022 6342   CLIFTON: How many times in the past year have you    Used an illegal drug or used a prescription medication for non-medical reasons? Never Filed at: 09/05/2022 0757                    MDM    Disposition  Final diagnoses:   Dyspnea   Wheezing     Time reflects when diagnosis was documented in both MDM as applicable and the Disposition within this note     Time User Action Codes Description Comment    9/5/2022 10:52 AM Ryann Suhas Add [R06 00] Dyspnea     9/5/2022 10:56 AM Ryann Suhas Add [R06 2] Wheezing       ED Disposition     ED Disposition   Discharge    Condition   Stable    Date/Time   Mon Sep 5, 2022 10:52 AM    Comment   Gordon Serra discharge to home/self care                 Follow-up Information     Follow up With Specialties Details Why Άγιος Γεώργιος MD [facility] Schedule an appointment as soon as possible for a visit in 2 days If symptoms worsen [address] [facility] [address] [place] [id number]  [phone]            Patient's Medications   Discharge Prescriptions    METHYLPREDNISOLONE 4 MG TABLET THERAPY PACK    Use as directed on package       Start Date: 9/5/2022  End Date: --       Order Dose: --       Quantity: 21 tablet    Refills: 0           PDMP Review       Value Time User    PDMP Reviewed  Yes 7/16/2021  3:11 PM Bebeto Hilton MD          ED Provider  Electronically Signed by           Gage Pisano MD  09/05/22 [phone]

## 2022-09-05 NOTE — ED NOTES
Patient reports still feeling difficulty breathing, chest tightness has resolved       Alfredo Rondon RN  09/05/22 8464

## 2022-09-08 DIAGNOSIS — N18.4 CHRONIC KIDNEY DISEASE, STAGE 4 (SEVERE) (HCC): Primary | ICD-10-CM

## 2022-09-09 ENCOUNTER — APPOINTMENT (OUTPATIENT)
Dept: RADIOLOGY | Facility: HOSPITAL | Age: 42
DRG: 470 | End: 2022-09-09
Payer: COMMERCIAL

## 2022-09-09 ENCOUNTER — APPOINTMENT (OUTPATIENT)
Dept: NON INVASIVE DIAGNOSTICS | Facility: HOSPITAL | Age: 42
DRG: 470 | End: 2022-09-09
Payer: COMMERCIAL

## 2022-09-09 ENCOUNTER — HOSPITAL ENCOUNTER (INPATIENT)
Facility: HOSPITAL | Age: 42
LOS: 13 days | Discharge: HOME | DRG: 470 | End: 2022-09-23
Attending: EMERGENCY MEDICINE | Admitting: STUDENT IN AN ORGANIZED HEALTH CARE EDUCATION/TRAINING PROGRAM
Payer: COMMERCIAL

## 2022-09-09 DIAGNOSIS — R09.02 HYPOXIA: ICD-10-CM

## 2022-09-09 DIAGNOSIS — I10 PRIMARY HYPERTENSION: ICD-10-CM

## 2022-09-09 DIAGNOSIS — I50.9 CHF (CONGESTIVE HEART FAILURE) (HCC): Primary | ICD-10-CM

## 2022-09-09 DIAGNOSIS — N18.4 CHRONIC KIDNEY DISEASE, STAGE 4 (SEVERE) (HCC): ICD-10-CM

## 2022-09-09 DIAGNOSIS — J18.9 PNEUMONIA: ICD-10-CM

## 2022-09-09 DIAGNOSIS — E10.9 TYPE 1 DIABETES MELLITUS WITHOUT COMPLICATION (HCC): ICD-10-CM

## 2022-09-09 LAB
2HR DELTA HS TROPONIN: 5 NG/L
4HR DELTA HS TROPONIN: 6 NG/L
ALBUMIN SERPL BCP-MCNC: 3.1 G/DL (ref 3.5–5)
ALP SERPL-CCNC: 74 U/L (ref 34–104)
ALT SERPL W P-5'-P-CCNC: 14 U/L (ref 7–52)
ANION GAP SERPL CALCULATED.3IONS-SCNC: 11 MMOL/L (ref 4–13)
APICAL FOUR CHAMBER EJECTION FRACTION: 54 %
APTT PPP: 20 SECONDS (ref 23–37)
AST SERPL W P-5'-P-CCNC: 17 U/L (ref 13–39)
BASOPHILS # BLD AUTO: 0.09 THOUSANDS/ΜL (ref 0–0.1)
BASOPHILS NFR BLD AUTO: 1 % (ref 0–1)
BILIRUB SERPL-MCNC: 0.3 MG/DL (ref 0.2–1)
BNP SERPL-MCNC: 330 PG/ML (ref 0–100)
BUN SERPL-MCNC: 102 MG/DL (ref 5–25)
CALCIUM ALBUM COR SERPL-MCNC: 9 MG/DL (ref 8.3–10.1)
CALCIUM SERPL-MCNC: 8.3 MG/DL (ref 8.4–10.2)
CARDIAC TROPONIN I PNL SERPL HS: 51 NG/L
CARDIAC TROPONIN I PNL SERPL HS: 56 NG/L
CARDIAC TROPONIN I PNL SERPL HS: 57 NG/L
CHLORIDE SERPL-SCNC: 99 MMOL/L (ref 96–108)
CO2 SERPL-SCNC: 21 MMOL/L (ref 21–32)
CREAT SERPL-MCNC: 4.38 MG/DL (ref 0.6–1.3)
EOSINOPHIL # BLD AUTO: 0.29 THOUSAND/ΜL (ref 0–0.61)
EOSINOPHIL NFR BLD AUTO: 2 % (ref 0–6)
ERYTHROCYTE [DISTWIDTH] IN BLOOD BY AUTOMATED COUNT: 14.4 % (ref 11.6–15.1)
GFR SERPL CREATININE-BSD FRML MDRD: 15 ML/MIN/1.73SQ M
GLUCOSE SERPL-MCNC: 206 MG/DL (ref 65–140)
GLUCOSE SERPL-MCNC: 380 MG/DL (ref 65–140)
GLUCOSE SERPL-MCNC: 388 MG/DL (ref 65–140)
HCT VFR BLD AUTO: 25.8 % (ref 36.5–49.3)
HGB BLD-MCNC: 8.5 G/DL (ref 12–17)
IMM GRANULOCYTES # BLD AUTO: 0.35 THOUSAND/UL (ref 0–0.2)
IMM GRANULOCYTES NFR BLD AUTO: 2 % (ref 0–2)
INR PPP: 1.05 (ref 0.84–1.19)
LACTATE SERPL-SCNC: 0.5 MMOL/L (ref 0.5–2)
LYMPHOCYTES # BLD AUTO: 0.91 THOUSANDS/ΜL (ref 0.6–4.47)
LYMPHOCYTES NFR BLD AUTO: 5 % (ref 14–44)
MCH RBC QN AUTO: 27.9 PG (ref 26.8–34.3)
MCHC RBC AUTO-ENTMCNC: 32.9 G/DL (ref 31.4–37.4)
MCV RBC AUTO: 85 FL (ref 82–98)
MONOCYTES # BLD AUTO: 0.95 THOUSAND/ΜL (ref 0.17–1.22)
MONOCYTES NFR BLD AUTO: 5 % (ref 4–12)
NEUTROPHILS # BLD AUTO: 15.98 THOUSANDS/ΜL (ref 1.85–7.62)
NEUTS SEG NFR BLD AUTO: 85 % (ref 43–75)
NRBC BLD AUTO-RTO: 0 /100 WBCS
PLATELET # BLD AUTO: 371 THOUSANDS/UL (ref 149–390)
PLATELET # BLD AUTO: 374 THOUSANDS/UL (ref 149–390)
PMV BLD AUTO: 9.4 FL (ref 8.9–12.7)
PMV BLD AUTO: 9.6 FL (ref 8.9–12.7)
POTASSIUM SERPL-SCNC: 5 MMOL/L (ref 3.5–5.3)
PROCALCITONIN SERPL-MCNC: 0.08 NG/ML
PROT SERPL-MCNC: 6.7 G/DL (ref 6.4–8.4)
PROTHROMBIN TIME: 13.7 SECONDS (ref 11.6–14.5)
RBC # BLD AUTO: 3.05 MILLION/UL (ref 3.88–5.62)
SARS-COV-2 RNA RESP QL NAA+PROBE: NEGATIVE
SL CV LV EF: 50
SODIUM SERPL-SCNC: 131 MMOL/L (ref 135–147)
WBC # BLD AUTO: 18.57 THOUSAND/UL (ref 4.31–10.16)

## 2022-09-09 PROCEDURE — 99291 CRITICAL CARE FIRST HOUR: CPT | Performed by: EMERGENCY MEDICINE

## 2022-09-09 PROCEDURE — 99219 PR INITIAL OBSERVATION CARE/DAY 50 MINUTES: CPT | Performed by: STUDENT IN AN ORGANIZED HEALTH CARE EDUCATION/TRAINING PROGRAM

## 2022-09-09 PROCEDURE — 93325 DOPPLER ECHO COLOR FLOW MAPG: CPT

## 2022-09-09 PROCEDURE — 93308 TTE F-UP OR LMTD: CPT | Performed by: INTERNAL MEDICINE

## 2022-09-09 PROCEDURE — 93308 TTE F-UP OR LMTD: CPT

## 2022-09-09 PROCEDURE — 80053 COMPREHEN METABOLIC PANEL: CPT | Performed by: EMERGENCY MEDICINE

## 2022-09-09 PROCEDURE — 93321 DOPPLER ECHO F-UP/LMTD STD: CPT

## 2022-09-09 PROCEDURE — 36415 COLL VENOUS BLD VENIPUNCTURE: CPT | Performed by: EMERGENCY MEDICINE

## 2022-09-09 PROCEDURE — 93321 DOPPLER ECHO F-UP/LMTD STD: CPT | Performed by: INTERNAL MEDICINE

## 2022-09-09 PROCEDURE — 85730 THROMBOPLASTIN TIME PARTIAL: CPT | Performed by: EMERGENCY MEDICINE

## 2022-09-09 PROCEDURE — 99285 EMERGENCY DEPT VISIT HI MDM: CPT

## 2022-09-09 PROCEDURE — 87635 SARS-COV-2 COVID-19 AMP PRB: CPT | Performed by: EMERGENCY MEDICINE

## 2022-09-09 PROCEDURE — 84484 ASSAY OF TROPONIN QUANT: CPT | Performed by: EMERGENCY MEDICINE

## 2022-09-09 PROCEDURE — 85025 COMPLETE CBC W/AUTO DIFF WBC: CPT | Performed by: EMERGENCY MEDICINE

## 2022-09-09 PROCEDURE — 93005 ELECTROCARDIOGRAM TRACING: CPT

## 2022-09-09 PROCEDURE — 85610 PROTHROMBIN TIME: CPT | Performed by: EMERGENCY MEDICINE

## 2022-09-09 PROCEDURE — 83605 ASSAY OF LACTIC ACID: CPT | Performed by: EMERGENCY MEDICINE

## 2022-09-09 PROCEDURE — 82948 REAGENT STRIP/BLOOD GLUCOSE: CPT

## 2022-09-09 PROCEDURE — 85049 AUTOMATED PLATELET COUNT: CPT | Performed by: STUDENT IN AN ORGANIZED HEALTH CARE EDUCATION/TRAINING PROGRAM

## 2022-09-09 PROCEDURE — 96365 THER/PROPH/DIAG IV INF INIT: CPT

## 2022-09-09 PROCEDURE — 71045 X-RAY EXAM CHEST 1 VIEW: CPT

## 2022-09-09 PROCEDURE — 87040 BLOOD CULTURE FOR BACTERIA: CPT | Performed by: EMERGENCY MEDICINE

## 2022-09-09 PROCEDURE — 84145 PROCALCITONIN (PCT): CPT | Performed by: EMERGENCY MEDICINE

## 2022-09-09 PROCEDURE — 83880 ASSAY OF NATRIURETIC PEPTIDE: CPT | Performed by: EMERGENCY MEDICINE

## 2022-09-09 PROCEDURE — 93325 DOPPLER ECHO COLOR FLOW MAPG: CPT | Performed by: INTERNAL MEDICINE

## 2022-09-09 RX ORDER — HYDRALAZINE HYDROCHLORIDE 25 MG/1
50 TABLET, FILM COATED ORAL 3 TIMES DAILY
Status: DISCONTINUED | OUTPATIENT
Start: 2022-09-09 | End: 2022-09-14

## 2022-09-09 RX ORDER — PANTOPRAZOLE SODIUM 40 MG/1
40 TABLET, DELAYED RELEASE ORAL
Status: DISCONTINUED | OUTPATIENT
Start: 2022-09-09 | End: 2022-09-23 | Stop reason: HOSPADM

## 2022-09-09 RX ORDER — AMLODIPINE BESYLATE 10 MG/1
10 TABLET ORAL DAILY
Status: DISCONTINUED | OUTPATIENT
Start: 2022-09-09 | End: 2022-09-14

## 2022-09-09 RX ORDER — IPRATROPIUM BROMIDE AND ALBUTEROL SULFATE .5; 3 MG/3ML; MG/3ML
1 SOLUTION RESPIRATORY (INHALATION) ONCE
Status: COMPLETED | OUTPATIENT
Start: 2022-09-09 | End: 2022-09-09

## 2022-09-09 RX ORDER — INSULIN LISPRO 100 [IU]/ML
2-12 INJECTION, SOLUTION INTRAVENOUS; SUBCUTANEOUS
Status: DISCONTINUED | OUTPATIENT
Start: 2022-09-09 | End: 2022-09-23 | Stop reason: HOSPADM

## 2022-09-09 RX ORDER — INSULIN GLARGINE 100 [IU]/ML
20 INJECTION, SOLUTION SUBCUTANEOUS
Status: DISCONTINUED | OUTPATIENT
Start: 2022-09-09 | End: 2022-09-11

## 2022-09-09 RX ORDER — FUROSEMIDE 10 MG/ML
40 INJECTION INTRAMUSCULAR; INTRAVENOUS ONCE
Status: COMPLETED | OUTPATIENT
Start: 2022-09-09 | End: 2022-09-09

## 2022-09-09 RX ORDER — HEPARIN SODIUM 5000 [USP'U]/ML
7500 INJECTION, SOLUTION INTRAVENOUS; SUBCUTANEOUS EVERY 8 HOURS SCHEDULED
Status: DISCONTINUED | OUTPATIENT
Start: 2022-09-09 | End: 2022-09-23 | Stop reason: HOSPADM

## 2022-09-09 RX ORDER — ACETAMINOPHEN 325 MG/1
650 TABLET ORAL EVERY 6 HOURS PRN
Status: DISCONTINUED | OUTPATIENT
Start: 2022-09-09 | End: 2022-09-23 | Stop reason: HOSPADM

## 2022-09-09 RX ORDER — DOXAZOSIN 2 MG/1
2 TABLET ORAL DAILY
Status: DISCONTINUED | OUTPATIENT
Start: 2022-09-09 | End: 2022-09-22

## 2022-09-09 RX ORDER — MAGNESIUM HYDROXIDE/ALUMINUM HYDROXICE/SIMETHICONE 120; 1200; 1200 MG/30ML; MG/30ML; MG/30ML
30 SUSPENSION ORAL EVERY 4 HOURS PRN
Status: DISCONTINUED | OUTPATIENT
Start: 2022-09-09 | End: 2022-09-11

## 2022-09-09 RX ORDER — FLUOXETINE HYDROCHLORIDE 20 MG/1
20 CAPSULE ORAL EVERY MORNING
Status: DISCONTINUED | OUTPATIENT
Start: 2022-09-10 | End: 2022-09-23 | Stop reason: HOSPADM

## 2022-09-09 RX ORDER — INSULIN LISPRO 100 [IU]/ML
5 INJECTION, SOLUTION INTRAVENOUS; SUBCUTANEOUS
Refills: 0 | Status: DISCONTINUED | OUTPATIENT
Start: 2022-09-09 | End: 2022-09-23 | Stop reason: HOSPADM

## 2022-09-09 RX ORDER — CEFTRIAXONE 1 G/50ML
1000 INJECTION, SOLUTION INTRAVENOUS EVERY 24 HOURS
Status: DISCONTINUED | OUTPATIENT
Start: 2022-09-10 | End: 2022-09-11

## 2022-09-09 RX ORDER — CEFTRIAXONE 1 G/50ML
1000 INJECTION, SOLUTION INTRAVENOUS ONCE
Status: COMPLETED | OUTPATIENT
Start: 2022-09-09 | End: 2022-09-09

## 2022-09-09 RX ORDER — HYDROXYZINE HYDROCHLORIDE 25 MG/1
25 TABLET, FILM COATED ORAL EVERY 6 HOURS PRN
Status: DISCONTINUED | OUTPATIENT
Start: 2022-09-09 | End: 2022-09-23 | Stop reason: HOSPADM

## 2022-09-09 RX ORDER — ALBUTEROL SULFATE 2.5 MG/3ML
1 SOLUTION RESPIRATORY (INHALATION) ONCE
Status: COMPLETED | OUTPATIENT
Start: 2022-09-09 | End: 2022-09-09

## 2022-09-09 RX ORDER — FUROSEMIDE 10 MG/ML
40 INJECTION INTRAMUSCULAR; INTRAVENOUS ONCE
Status: DISCONTINUED | OUTPATIENT
Start: 2022-09-09 | End: 2022-09-09

## 2022-09-09 RX ORDER — ALBUTEROL SULFATE 90 UG/1
2 AEROSOL, METERED RESPIRATORY (INHALATION) EVERY 4 HOURS PRN
Status: DISCONTINUED | OUTPATIENT
Start: 2022-09-09 | End: 2022-09-23 | Stop reason: HOSPADM

## 2022-09-09 RX ORDER — FUROSEMIDE 10 MG/ML
80 INJECTION INTRAMUSCULAR; INTRAVENOUS
Status: DISCONTINUED | OUTPATIENT
Start: 2022-09-09 | End: 2022-09-10

## 2022-09-09 RX ORDER — CARVEDILOL 25 MG/1
25 TABLET ORAL 2 TIMES DAILY WITH MEALS
Status: DISCONTINUED | OUTPATIENT
Start: 2022-09-09 | End: 2022-09-23 | Stop reason: HOSPADM

## 2022-09-09 RX ADMIN — HYDROXYZINE HYDROCHLORIDE 25 MG: 25 TABLET ORAL at 22:00

## 2022-09-09 RX ADMIN — HEPARIN SODIUM 7500 UNITS: 5000 INJECTION INTRAVENOUS; SUBCUTANEOUS at 16:38

## 2022-09-09 RX ADMIN — CARVEDILOL 25 MG: 25 TABLET, FILM COATED ORAL at 16:39

## 2022-09-09 RX ADMIN — ACETAMINOPHEN 325MG 650 MG: 325 TABLET ORAL at 22:00

## 2022-09-09 RX ADMIN — HYDRALAZINE HYDROCHLORIDE 50 MG: 25 TABLET, FILM COATED ORAL at 21:50

## 2022-09-09 RX ADMIN — PANTOPRAZOLE SODIUM 40 MG: 40 TABLET, DELAYED RELEASE ORAL at 16:39

## 2022-09-09 RX ADMIN — HYDRALAZINE HYDROCHLORIDE 50 MG: 25 TABLET, FILM COATED ORAL at 16:39

## 2022-09-09 RX ADMIN — INSULIN LISPRO 10 UNITS: 100 INJECTION, SOLUTION INTRAVENOUS; SUBCUTANEOUS at 16:51

## 2022-09-09 RX ADMIN — CEFTRIAXONE 1000 MG: 1 INJECTION, SOLUTION INTRAVENOUS at 13:09

## 2022-09-09 RX ADMIN — AMLODIPINE BESYLATE 10 MG: 10 TABLET ORAL at 16:39

## 2022-09-09 RX ADMIN — INSULIN GLARGINE 20 UNITS: 100 INJECTION, SOLUTION SUBCUTANEOUS at 21:50

## 2022-09-09 RX ADMIN — DOXAZOSIN 2 MG: 2 TABLET ORAL at 16:39

## 2022-09-09 RX ADMIN — FUROSEMIDE 80 MG: 10 INJECTION, SOLUTION INTRAMUSCULAR; INTRAVENOUS at 16:39

## 2022-09-09 RX ADMIN — INSULIN LISPRO 4 UNITS: 100 INJECTION, SOLUTION INTRAVENOUS; SUBCUTANEOUS at 21:50

## 2022-09-09 RX ADMIN — HEPARIN SODIUM 7500 UNITS: 5000 INJECTION INTRAVENOUS; SUBCUTANEOUS at 21:50

## 2022-09-09 RX ADMIN — FUROSEMIDE 40 MG: 10 INJECTION, SOLUTION INTRAMUSCULAR; INTRAVENOUS at 14:19

## 2022-09-09 RX ADMIN — INSULIN LISPRO 5 UNITS: 100 INJECTION, SOLUTION INTRAVENOUS; SUBCUTANEOUS at 16:51

## 2022-09-09 NOTE — ASSESSMENT & PLAN NOTE
Patient states he was out of his antihypertensives    -continue home amlodipine, carvedilol, hydralazine

## 2022-09-09 NOTE — H&P
2018 [facility] 1980, 38 y o  male MRN: [id number]  Unit/Bed#: -01 Encounter: [id number]  Primary Care Provider: Immanuel Lowery MD   Date and time admitted to hospital: 9/9/2022 11:40 AM    * Acute respiratory failure [facility])  Assessment & Plan  Patient requiring 5 L nasal cannula in ED  X-ray revealed findings suggestive of fluid overload and possible pneumonia  Patient received IV Lasix and Rocephin in ED  Patient on Bumex 2 mg p o  B i d  on outpatient basis  BNP elevated at 330  Leukocytosis noted on admission lab work however patient has been on steroid taper, denies fevers  Blood cultures pending    -check echocardiogram  -Lasix 80 mg IV b i d    -will consult Nephrology  -monitor I/Os  -trend creatinine  -questionable pneumonia, trend procalcitonin if negative tomorrow will discontinue antibiotics        Chronic kidney disease, stage 4 (severe) ([facility])  Assessment & Plan  Lab Results   Component Value Date    EGFR 15 09/09/2022    EGFR 15 09/05/2022    EGFR 18 08/26/2022    CREATININE 4 38 (H) 09/09/2022    CREATININE 4 27 (H) 09/05/2022    CREATININE 3 74 (H) 08/26/2022       -creatinine at baseline  -will consult Nephrology given diuretic use  -trend BMP    Hypertension  Assessment & Plan  Patient states he was out of his antihypertensives    -continue home amlodipine, carvedilol, hydralazine    GERD (gastroesophageal reflux disease)  Assessment & Plan  -continue home Protonix    Type 2 diabetes mellitus with stage 4 chronic kidney disease and hypertension ([facility] [address] )  Assessment & Plan  Lab Results   Component Value Date    HGBA1C 9 1 (H) 07/16/2022       No results for input(s): POCGLU in the last 72 hours      Blood Sugar Average: Last 72 hrs:       -continue home Lantus 20 units q h s   -continue lispro 5 units with meals  -sliding scale insulin and Accu-Cheks with meals and at bedtime  -carb controlled diet level 2    Hypothyroidism  Assessment & Plan  -continue home levothyroxine      VTE Pharmacologic Prophylaxis: VTE Score: 5 heparin  Code Status: Level 1 - Full Code   Discussion with family: Patient declined call to   Anticipated Length of Stay: Patient will be admitted on an observation basis with an anticipated length of stay of less than 2 midnights secondary to Acute hypoxic respiratory failure  Total Time for Visit, including Counseling / Coordination of Care: 30 minutes Greater than 50% of this total time spent on direct patient counseling and coordination of care  Chief Complaint:  Shortness of breath    History of Present Illness:  Madhav Linares is a 38 y o  male with a PMH of CKD stage 4, type 2 diabetes insulin-dependent, morbid obesity, hypothyroidism, GERD who presents with shortness of breath  Patient states he has had shortness of breath and cough patient got progressively worse over the last week  Denies any fevers or chills  Denies sick contacts  Was recently seen ED for similar complaints several days ago  Was discharged home with steroid taper  Review of Systems:  Review of Systems   Constitutional: Negative  HENT: Negative  Respiratory: Positive for cough, chest tightness and shortness of breath  Cardiovascular: Negative  Gastrointestinal: Negative  Genitourinary: Negative  Musculoskeletal: Negative  Skin: Negative  Neurological: Negative  Psychiatric/Behavioral: Negative          Past Medical and Surgical History:   Past Medical History:   Diagnosis Date    Acute bronchitis due to other specified organisms 07/05/2019    Chronic headaches     Diabetes mellitus ([facility] [address] )     Disease of thyroid gland     Esophagitis     Gastritis     Gastroparesis     GERD (gastroesophageal reflux disease)     Hypertension     Migraine     Migraines 03/11/2021    Obesity     Obsessive compulsive disorder     Psychiatric disorder     Renal disorder     Schizoaffective disorder ([facility] [address] ) Past Surgical History:   Procedure Laterality Date    ESOPHAGOGASTRODUODENOSCOPY N/A 1/15/2019    Procedure: ESOPHAGOGASTRODUODENOSCOPY (EGD); Surgeon: Jeffrey Wallace MD;  Location: [facility]; Service: Gastroenterology    IR BIOPSY KIDNEY RANDOM  8/11/2022       Meds/Allergies:  Prior to Admission medications    Medication Sig Start Date End Date Taking?  Authorizing Provider   albuterol (PROVENTIL HFA,VENTOLIN HFA) 90 mcg/act inhaler Inhale 2 puffs every 4 (four) hours as needed for wheezing 7/21/22  Yes DOREEN Guerra   amLODIPine (NORVASC) 10 mg tablet Take 1 tablet (10 mg total) by mouth daily 7/22/22  Yes DOREEN Guerra   bumetanide (BUMEX) 2 mg tablet Take 2 tablets (4 mg total) by mouth daily 7/22/22  Yes DOREEN Guerra   carvedilol (COREG) 25 mg tablet Take 1 tablet (25 mg total) by mouth 2 (two) times a day with meals 6/23/22 9/9/22 Yes Helga Recinos DO   doxazosin (CARDURA) 2 mg tablet Take 1 tablet (2 mg total) by mouth daily 6/23/22 9/9/22 Yes Helga Recinos DO   famotidine (PEPCID) 40 MG tablet Take 1 tablet (40 mg total) by mouth daily 7/16/21  Yes Krysta Mccann MD   FLUoxetine (PROzac) 20 mg capsule Take 20 mg by mouth every morning 10/15/21  Yes Historical Provider, MD   hydrALAZINE (APRESOLINE) 50 mg tablet Take 1 tablet (50 mg total) by mouth 3 (three) times a day 7/21/22  Yes DOREEN Guerra   insulin glargine (LANTUS) 100 units/mL subcutaneous injection Inject 20 Units under the skin daily at bedtime 6/23/22  Yes Helga Recinos DO   insulin lispro (HumaLOG) 100 units/mL injection Inject 5 Units under the skin 3 (three) times a day with meals 6/23/22  Yes Helga Recinos DO   Insulin Pen Needle (Pen Needles 3/16") 31G X 5 MM MISC Use 4 (four) times a day 1/12/21  Yes DOREEN Yates   levothyroxine 125 mcg tablet Take 1 tablet (125 mcg total) by mouth daily in the early morning 6/24/22 9/9/22 Yes Helga Recinos DO   pantoprazole (PROTONIX) 40 mg tablet Take 1 tablet (40 mg total) by mouth 2 (two) times a day before meals 6/23/22 9/9/22 Yes Sagar Cisneros DO   Vraylar 6 MG capsule Take 6 mg by mouth daily 8/13/21  Yes Historical Provider, MD   ACCU-CHEK FASTCLIX LANCETS MISC 4 (four) times a day Not checking 4 times a day  Patient stated maybe 2 times a day 5/8/19   Historical Provider, MD   Blood Glucose Monitoring Suppl (ACCU-CHEK GUIDE) w/Device KIT Checking 2 times a day 5/9/19   Historical Provider, MD   LORazepam (ATIVAN) 0 5 mg tablet Take 1 tablet (0 5 mg total) by mouth 2 (two) times a day as needed for anxiety for up to 10 days 7/21/22 8/11/22  DOREEN Barber   methylPREDNISolone 4 MG tablet therapy pack Use as directed on package 9/5/22   Sher Martin MD     I have reviewed home medications with patient personally  Allergies:    Allergies   Allergen Reactions    Amoxicillin Diarrhea    Augmentin [Amoxicillin-Pot Clavulanate] Diarrhea    Clavulanic Acid Diarrhea    Erythromycin Diarrhea       Social History:  Marital Status: Single   Occupation: none  Patient Pre-hospital Living Situation: Home  Patient Pre-hospital Level of Mobility: walks  Patient Pre-hospital Diet Restrictions: none  Substance Use History:   Social History     Substance and Sexual Activity   Alcohol Use Not Currently     Social History     Tobacco Use   Smoking Status Never Smoker   Smokeless Tobacco Never Used     Social History     Substance and Sexual Activity   Drug Use Not Currently       Family History:  Family History   Problem Relation Age of Onset    COPD Mother     Hypertension Mother     Heart failure Mother     Rheum arthritis Family     Heart disease Family     Hypertension Father     Diabetes Father     Hyperlipidemia Father        Physical Exam:     Vitals:   Blood Pressure: (!) 181/98 (09/09/22 1416)  Pulse: 95 (09/09/22 1416)  Temperature: 98 2 °F (36 8 °C) (09/09/22 1416)  Temp Source: Axillary (09/09/22 1416)  Respirations: 22 (09/09/22 1416)  Height: 5' 2" (157 5 cm) (09/09/22 1416)  Weight - Scale: (!) 162 kg (356 lb 0 7 oz) (09/09/22 1449)  SpO2: 92 % (09/09/22 1416)    Physical Exam  Constitutional:       General: He is not in acute distress  Appearance: He is obese  He is not ill-appearing  HENT:      Head: Normocephalic  Cardiovascular:      Rate and Rhythm: Normal rate and regular rhythm  Pulmonary:      Breath sounds: Rhonchi present  Abdominal:      General: Abdomen is flat  Bowel sounds are normal       Palpations: Abdomen is soft  Musculoskeletal:      Cervical back: Normal range of motion  Right lower leg: Edema present  Left lower leg: Edema present  Skin:     General: Skin is warm and dry  Neurological:      General: No focal deficit present  Mental Status: He is alert and oriented to person, place, and time  Mental status is at baseline  Cranial Nerves: No cranial nerve deficit  Psychiatric:         Mood and Affect: Mood normal          Thought Content: Thought content normal           Additional Data:     Lab Results:  Results from last 7 days   Lab Units 09/09/22  1236   WBC Thousand/uL 18 57*   HEMOGLOBIN g/dL 8 5*   HEMATOCRIT % 25 8*   PLATELETS Thousands/uL 371   NEUTROS PCT % 85*   LYMPHS PCT % 5*   MONOS PCT % 5   EOS PCT % 2     Results from last 7 days   Lab Units 09/09/22  1236   SODIUM mmol/L 131*   POTASSIUM mmol/L 5 0   CHLORIDE mmol/L 99   CO2 mmol/L 21   BUN mg/dL 102*   CREATININE mg/dL 4 38*   ANION GAP mmol/L 11   CALCIUM mg/dL 8 3*   ALBUMIN g/dL 3 1*   TOTAL BILIRUBIN mg/dL 0 30   ALK PHOS U/L 74   ALT U/L 14   AST U/L 17   GLUCOSE RANDOM mg/dL 388*     Results from last 7 days   Lab Units 09/09/22  1236   INR  1 05             Results from last 7 days   Lab Units 09/09/22  1236   LACTIC ACID mmol/L 0 5   PROCALCITONIN ng/ml 0 08       Imaging: No pertinent imaging reviewed    XR chest 1 view portable   Final Result by Haven Small MD (09/09 0391)      Progressive diffuse bilateral opacities, right greater than left, likely pulmonary edema vs  multifocal pneumonia                  Workstation performed: [id number]             EKG and Other Studies Reviewed on Admission:   · EKG:  Normal sinus rhythm    ** Please Note: This note has been constructed using a voice recognition system   **

## 2022-09-09 NOTE — ASSESSMENT & PLAN NOTE
Patient requiring 5 L nasal cannula in ED  X-ray revealed findings suggestive of fluid overload and possible pneumonia  Patient received IV Lasix and Rocephin in ED  Patient on Bumex 2 mg p o  B i d  on outpatient basis  BNP elevated at 330  Leukocytosis noted on admission lab work however patient has been on steroid taper, denies fevers  Blood cultures pending    -check echocardiogram  -Lasix 80 mg IV b i d    -will consult Nephrology  -monitor I/Os  -trend creatinine  -questionable pneumonia, trend procalcitonin if negative tomorrow will discontinue antibiotics

## 2022-09-09 NOTE — ASSESSMENT & PLAN NOTE
Lab Results   Component Value Date    EGFR 15 09/09/2022    EGFR 15 09/05/2022    EGFR 18 08/26/2022    CREATININE 4 38 (H) 09/09/2022    CREATININE 4 27 (H) 09/05/2022    CREATININE 3 74 (H) 08/26/2022       -creatinine at baseline  -will consult Nephrology given diuretic use  -trend BMP Health Care Proxy (HCP)

## 2022-09-09 NOTE — PLAN OF CARE
Problem: PAIN - ADULT  Goal: Verbalizes/displays adequate comfort level or baseline comfort level  Description: Interventions:  - Encourage patient to monitor pain and request assistance  - Assess pain using appropriate pain scale  - Administer analgesics based on type and severity of pain and evaluate response  - Implement non-pharmacological measures as appropriate and evaluate response  - Consider cultural and social influences on pain and pain management  - Notify physician/advanced practitioner if interventions unsuccessful or patient reports new pain  Outcome: Progressing     Problem: INFECTION - ADULT  Goal: Absence or prevention of progression during hospitalization  Description: INTERVENTIONS:  - Assess and monitor for signs and symptoms of infection  - Monitor lab/diagnostic results  - Monitor all insertion sites, i e  indwelling lines, tubes, and drains  - Monitor endotracheal if appropriate and nasal secretions for changes in amount and color  - [place] appropriate cooling/warming therapies per order  - Administer medications as ordered  - Instruct and encourage patient and family to use good hand hygiene technique  - Identify and instruct in appropriate isolation precautions for identified infection/condition  Outcome: Progressing  Goal: Absence of fever/infection during neutropenic period  Description: INTERVENTIONS:  - Monitor WBC    Outcome: Progressing     Problem: SAFETY ADULT  Goal: Patient will remain free of falls  Description: INTERVENTIONS:  - Educate patient/family on patient safety including physical limitations  - Instruct patient to call for assistance with activity   - Consult OT/PT to assist with strengthening/mobility   - Keep Call bell within reach  - Keep bed low and locked with side rails adjusted as appropriate  - Keep care items and personal belongings within reach  - Initiate and maintain comfort rounds  - Make Fall Risk Sign visible to staff  - Offer Toileting every 2 Hours, in advance of need  - Initiate/Maintain bed alarm  - Obtain necessary fall risk management equipment  - Apply yellow socks and bracelet for high fall risk patients  - Consider moving patient to room near nurses station  Outcome: Progressing  Goal: Maintain or return to baseline ADL function  Description: INTERVENTIONS:  -  Assess patient's ability to carry out ADLs; assess patient's baseline for ADL function and identify physical deficits which impact ability to perform ADLs (bathing, care of mouth/teeth, toileting, grooming, dressing, etc )  - Assess/evaluate cause of self-care deficits   - Assess range of motion  - Assess patient's mobility; develop plan if impaired  - Assess patient's need for assistive devices and provide as appropriate  - Encourage maximum independence but intervene and supervise when necessary  - Involve family in performance of ADLs  - Assess for home care needs following discharge   - Consider OT consult to assist with ADL evaluation and planning for discharge  - Provide patient education as appropriate  Outcome: Progressing  Goal: Maintains/Returns to pre admission functional level  Description: INTERVENTIONS:  - Perform BMAT or MOVE assessment daily    - Set and communicate daily mobility goal to care team and patient/family/caregiver  - Collaborate with rehabilitation services on mobility goals if consulted  - Perform Range of Motion 2 times a day  - Reposition patient every 2 hours    - Dangle patient 2 times a day  - Stand patient 2 times a day  - Ambulate patient 2 times a day  - Out of bed to chair 3 times a day   - Out of bed for meals 3 times a day  - Out of bed for toileting  - Record patient progress and toleration of activity level   Outcome: Progressing     Problem: DISCHARGE PLANNING  Goal: Discharge to home or other facility with appropriate resources  Description: INTERVENTIONS:  - Identify barriers to discharge w/patient and caregiver  - Arrange for needed discharge resources and transportation as appropriate  - Identify discharge learning needs (meds, wound care, etc )  - Refer to Case Management Department for coordinating discharge planning if the patient needs post-hospital services based on physician/advanced practitioner order or complex needs related to functional status, cognitive ability, or social support system  Outcome: Progressing     Problem: Knowledge Deficit  Goal: Patient/family/caregiver demonstrates understanding of disease process, treatment plan, medications, and discharge instructions  Description: Complete learning assessment and assess knowledge base    Interventions:  - Provide teaching at level of understanding  - Provide teaching via preferred learning methods  Outcome: Progressing     Problem: RESPIRATORY - ADULT  Goal: Achieves optimal ventilation and oxygenation  Description: INTERVENTIONS:  - Assess for changes in respiratory status  - Assess for changes in mentation and behavior  - Position to facilitate oxygenation and minimize respiratory effort  - Oxygen administered by appropriate delivery if ordered  - Initiate smoking cessation education as indicated  - Encourage broncho-pulmonary hygiene including cough, deep breathe, Incentive Spirometry  - Assess the need for suctioning and aspirate as needed  - Assess and instruct to report SOB or any respiratory difficulty  - Respiratory Therapy support as indicated  Outcome: Progressing

## 2022-09-09 NOTE — ED PROVIDER NOTES
History  Chief Complaint   Patient presents with    Shortness of Breath     Pt reporting SOB lasting over a week  Patient is a 69-year-old male who presents for evaluation of shortness of breath  Symptoms have been progressing over the past week  Was seen here 9-5 and discharged home  Says symptoms have been progressing  Admits to increased wheezing, cough  Admits to mild chest discomfort with coughing  Says that his oxygen was 88% at home  He has a history of CKD and is on Bumex at home  On exam, the patient has audible wheezing  He has no increased work of breathing          Prior to Admission Medications   Prescriptions Last Dose Informant Patient Reported? Taking? ACCU-CHEK FASTCLIX LANCETS [facility] [place] Unknown at Unknown time Self Yes No   Si (four) times a day Not checking 4 times a day   Patient stated maybe 2 times a day   Blood Glucose Monitoring Suppl (ACCU-CHEK GUIDE) w/Device KIT Unknown at Unknown time Self Yes No   Sig: Checking 2 times a day   FLUoxetine (PROzac) 20 mg capsule Past Month at Unknown time Self Yes Yes   Sig: Take 20 mg by mouth every morning   Insulin Pen Needle (Pen Needles 3/16") 31G X 5 MM MISC 2022 at Unknown time Self No Yes   Sig: Use 4 (four) times a day   LORazepam (ATIVAN) 0 5 mg tablet Unknown at Unknown time  No No   Sig: Take 1 tablet (0 5 mg total) by mouth 2 (two) times a day as needed for anxiety for up to 10 days   Vraylar 6 MG capsule 2022 at 1000 Self Yes Yes   Sig: Take 6 mg by mouth daily   albuterol (PROVENTIL HFA,VENTOLIN HFA) 90 mcg/act inhaler 2022 at Unknown time  No Yes   Sig: Inhale 2 puffs every 4 (four) hours as needed for wheezing   amLODIPine (NORVASC) 10 mg tablet 2022 at 1000  No Yes   Sig: Take 1 tablet (10 mg total) by mouth daily   bumetanide (BUMEX) 2 mg tablet 2022 at 1000  No Yes   Sig: Take 2 tablets (4 mg total) by mouth daily   carvedilol (COREG) 25 mg tablet 2022 at 1000  No Yes   Sig: Take 1 tablet (25 mg total) by mouth 2 (two) times a day with meals   doxazosin (CARDURA) 2 mg tablet 9/8/2022 at 1000  No Yes   Sig: Take 1 tablet (2 mg total) by mouth daily   famotidine (PEPCID) 40 MG tablet 9/8/2022 at 1000 Self No Yes   Sig: Take 1 tablet (40 mg total) by mouth daily   hydrALAZINE (APRESOLINE) 50 mg tablet 9/8/2022 at 1000  No Yes   Sig: Take 1 tablet (50 mg total) by mouth 3 (three) times a day   insulin glargine (LANTUS) 100 units/mL subcutaneous injection 9/8/2022 at 2100  No Yes   Sig: Inject 20 Units under the skin daily at bedtime   insulin lispro (HumaLOG) 100 units/mL injection 9/8/2022 at Unknown time  No Yes   Sig: Inject 5 Units under the skin 3 (three) times a day with meals   levothyroxine 125 mcg tablet Past Week at Unknown time  No Yes   Sig: Take 1 tablet (125 mcg total) by mouth daily in the early morning   methylPREDNISolone 4 MG tablet therapy pack Unknown at Unknown time  No No   Sig: Use as directed on package   pantoprazole (PROTONIX) 40 mg tablet 9/8/2022 at 1000  No Yes   Sig: Take 1 tablet (40 mg total) by mouth 2 (two) times a day before meals      Facility-Administered Medications: None       Past Medical History:   Diagnosis Date    Acute bronchitis due to other specified organisms 07/05/2019    Chronic headaches     Diabetes mellitus ([facility] [address] )     Disease of thyroid gland     Esophagitis     Gastritis     Gastroparesis     GERD (gastroesophageal reflux disease)     Hypertension     Migraine     Migraines 03/11/2021    Obesity     Obsessive compulsive disorder     Psychiatric disorder     Renal disorder     Schizoaffective disorder [facility])        Past Surgical History:   Procedure Laterality Date    ESOPHAGOGASTRODUODENOSCOPY N/A 1/15/2019    Procedure: ESOPHAGOGASTRODUODENOSCOPY (EGD); Surgeon: David Moraes MD;  [facility]: [facility];   Service: Gastroenterology    IR BIOPSY KIDNEY RANDOM  8/11/2022       Family History   Problem Relation Age of Onset    COPD Mother    Loren Espinal Hypertension Mother     Heart failure Mother     Rheum arthritis Family     Heart disease Family     Hypertension Father     Diabetes Father     Hyperlipidemia Father      I have reviewed and agree with the history as documented  E-Cigarette/Vaping    E-Cigarette Use Never User      E-Cigarette/Vaping Substances    Nicotine No     THC No     CBD No     Flavoring No     Other No     Unknown No      Social History     Tobacco Use    Smoking status: Never Smoker    Smokeless tobacco: Never Used   Vaping Use    Vaping Use: Never used   Substance Use Topics    Alcohol use: Not Currently    Drug use: Not Currently       Review of Systems   Constitutional: Negative for chills, fever and unexpected weight change  HENT: Negative for congestion, sore throat and trouble swallowing  Eyes: Negative for pain, discharge and itching  Respiratory: Positive for cough and shortness of breath  Negative for chest tightness and wheezing  Cardiovascular: Negative for chest pain, palpitations and leg swelling  Gastrointestinal: Negative for abdominal pain, blood in stool, diarrhea, nausea and vomiting  Endocrine: Negative for polyuria  Genitourinary: Negative for difficulty urinating, dysuria, frequency and hematuria  Musculoskeletal: Negative for arthralgias and back pain  Neurological: Negative for dizziness, syncope, weakness, light-headedness and headaches  Physical Exam  Physical Exam  Vitals and nursing note reviewed  Constitutional:       General: He is not in acute distress  Appearance: He is well-developed  HENT:      Head: Normocephalic and atraumatic  Right Ear: External ear normal       Left Ear: External ear normal    Eyes:      Conjunctiva/sclera: Conjunctivae normal       Pupils: Pupils are equal, round, and reactive to light  Cardiovascular:      Rate and Rhythm: Normal rate and regular rhythm  Heart sounds: Normal heart sounds  No murmur heard      No friction rub  No gallop  Pulmonary:      Effort: Pulmonary effort is normal  No respiratory distress  Breath sounds: Wheezing present  No rales  Abdominal:      General: Bowel sounds are normal  There is no distension  Palpations: Abdomen is soft  Tenderness: There is no abdominal tenderness  There is no guarding  Musculoskeletal:         General: No tenderness or deformity  Normal range of motion  Cervical back: Normal range of motion  Lymphadenopathy:      Cervical: No cervical adenopathy  Skin:     General: Skin is warm and dry  Neurological:      General: No focal deficit present  Mental Status: He is alert and oriented to person, place, and time  Mental status is at baseline  Cranial Nerves: No cranial nerve deficit  Sensory: No sensory deficit  Motor: No weakness or abnormal muscle tone     Psychiatric:         Behavior: Behavior normal          Vital Signs  ED Triage Vitals   Temperature Pulse Respirations Blood Pressure SpO2   09/09/22 1143 09/09/22 1143 09/09/22 1143 09/09/22 1143 09/09/22 1143   98 9 °F (37 2 °C) 96 18 (!) 204/106 95 %      Temp Source Heart Rate Source Patient Position - Orthostatic VS BP Location FiO2 (%)   09/09/22 1143 09/09/22 1143 09/09/22 1143 09/09/22 1143 --   Oral Monitor Sitting Left arm       Pain Score       09/09/22 1416       No Pain           Vitals:    09/09/22 1143 09/09/22 1416   BP: (!) 204/106 (!) 181/98   Pulse: 96 95   Patient Position - Orthostatic VS: Sitting Sitting         Visual Acuity      ED Medications  Medications   amLODIPine (NORVASC) tablet 10 mg (has no administration in time range)   carvedilol (COREG) tablet 25 mg (has no administration in time range)   doxazosin (CARDURA) tablet 2 mg (has no administration in time range)   FLUoxetine (PROzac) capsule 20 mg (has no administration in time range)   levothyroxine tablet 125 mcg (has no administration in time range)   insulin lispro (HumaLOG) 100 units/mL subcutaneous injection 5 Units (has no administration in time range)   hydrALAZINE (APRESOLINE) tablet 50 mg (has no administration in time range)   insulin glargine (LANTUS) subcutaneous injection 20 Units 0 2 mL (has no administration in time range)   pantoprazole (PROTONIX) EC tablet 40 mg (has no administration in time range)   albuterol (PROVENTIL HFA,VENTOLIN HFA) inhaler 2 puff (has no administration in time range)   heparin (porcine) subcutaneous injection 7,500 Units (has no administration in time range)   furosemide (LASIX) injection 80 mg (has no administration in time range)   cefTRIAXone (ROCEPHIN) IVPB (premix in dextrose) 1,000 mg 50 mL (has no administration in time range)   insulin lispro (HumaLOG) 100 units/mL subcutaneous injection 2-12 Units (has no administration in time range)   insulin lispro (HumaLOG) 100 units/mL subcutaneous injection 2-12 Units (has no administration in time range)   albuterol (FOR EMS ONLY) (2 5 mg/3 mL) 0 083 % inhalation solution 2 5 mg (0 mg Does not apply Given to EMS 9/9/22 1148)   ipratropium-albuterol (FOR EMS ONLY) (DUO-NEB) 0 5-2 5 mg/3 mL inhalation solution 3 mL (0 mL Does not apply Given to EMS 9/9/22 1148)   cefTRIAXone (ROCEPHIN) IVPB (premix in dextrose) 1,000 mg 50 mL (0 mg Intravenous Stopped 9/9/22 1357)   furosemide (LASIX) injection 40 mg (40 mg Intravenous Given 9/9/22 1419)       Diagnostic Studies  Results Reviewed     Procedure Component Value Units Date/Time    HS Troponin I 2hr [[id number]]  (Abnormal) Collected: 09/09/22 1444    Lab Status: Final result Specimen: Blood from Hand, Left Updated: 09/09/22 1517     hs TnI 2hr 56 ng/L      Delta 2hr hsTnI 5 ng/L     HS Troponin I 4hr [[id number]]     Lab Status: No result Specimen: Blood     COVID only [[id number]]  (Normal) Collected: 09/09/22 1236    Lab Status: Final result Specimen: Nares from Nose Updated: 09/09/22 1335     SARS-CoV-2 Negative    Narrative:      FOR PEDIATRIC PATIENTS - copy/paste COVID Guidelines URL to browser: [email or web address]    SARS-CoV-2 assay is a Nucleic Acid Amplification assay intended for the  qualitative detection of nucleic acid from SARS-CoV-2 in nasopharyngeal  swabs  Results are for the presumptive identification of SARS-CoV-2 RNA  Positive results are indicative of infection with SARS-CoV-2, the virus  causing COVID-19, but do not rule out bacterial infection or co-infection  with other viruses  Laboratories within the [place] and its  territories are required to report all positive results to the appropriate  public health authorities  Negative results do not preclude SARS-CoV-2  infection and should not be used as the sole basis for treatment or other  patient management decisions  Negative results must be combined with  clinical observations, patient history, and epidemiological information  This test has not been FDA cleared or approved  This test has been authorized by FDA under an Emergency Use Authorization  (EUA)  This test is only authorized for the duration of time the  declaration that circumstances exist justifying the authorization of the  emergency use of an in vitro diagnostic tests for detection of SARS-CoV-2  virus and/or diagnosis of COVID-19 infection under section 564(b)(1) of  the Act, 21 U  S C  294KFS-9(G)(2), unless the authorization is terminated  or revoked sooner  The test has been validated but independent review by FDA  and CLIA is pending  Test performed using [employer]: This RT-PCR assay targets N2,  a region unique to SARS-CoV-2  A conserved region in the E-gene was chosen  for pan-Sarbecovirus detection which includes SARS-CoV-2      Procalcitonin [[id number]]  (Normal) Collected: 09/09/22 1236    Lab Status: Final result Specimen: Blood from Arm, Left Updated: 09/09/22 1314     Procalcitonin 0 08 ng/ml     HS Troponin 0hr (reflex protocol) [[id number]]  (Abnormal) Collected: 09/09/22 1236    Lab Status: Final result Specimen: Blood from Arm, Left Updated: 09/09/22 1312     hs TnI 0hr 51 ng/L     B-Type Natriuretic Peptide(BNP) [facility], [place], [facility] Only [[id number]]  (Abnormal) Collected: 09/09/22 1236    Lab Status: Final result Specimen: Blood from Arm, Left Updated: 09/09/22 1311      pg/mL     Comprehensive metabolic panel [[id number]]  (Abnormal) Collected: 09/09/22 1236    Lab Status: Final result Specimen: Blood from Arm, Left Updated: 09/09/22 1309     Sodium 131 mmol/L      Potassium 5 0 mmol/L      Chloride 99 mmol/L      CO2 21 mmol/L      ANION GAP 11 mmol/L       mg/dL      Creatinine 4 38 mg/dL      Glucose 388 mg/dL      Calcium 8 3 mg/dL      Corrected Calcium 9 0 mg/dL      AST 17 U/L      ALT 14 U/L      Alkaline Phosphatase 74 U/L      Total Protein 6 7 g/dL      Albumin 3 1 g/dL      Total Bilirubin 0 30 mg/dL      eGFR 15 ml/min/1 73sq m     Narrative:      [facility] guidelines for Chronic Kidney Disease (CKD):     Stage 1 with normal or high GFR (GFR > 90 mL/min/1 73 square meters)    Stage 2 Mild CKD (GFR = 60-89 mL/min/1 73 square meters)    Stage 3A Moderate CKD (GFR = 45-59 mL/min/1 73 square meters)    Stage 3B Moderate CKD (GFR = 30-44 mL/min/1 73 square meters)    Stage 4 Severe CKD (GFR = 15-29 mL/min/1 73 square meters)    Stage 5 End Stage CKD (GFR <15 mL/min/1 73 square meters)  Note: GFR calculation is accurate only with a steady state creatinine    Lactic acid, plasma [[id number]]  (Normal) Collected: 09/09/22 1236    Lab Status: Final result Specimen: Blood from Arm, Left Updated: 09/09/22 1304     LACTIC ACID 0 5 mmol/L     Narrative:      Result may be elevated if tourniquet was used during collection      Protime-INR [[id number]]  (Normal) Collected: 09/09/22 1236    Lab Status: Final result Specimen: Blood from Arm, Left Updated: 09/09/22 1302     Protime 13 7 seconds      INR 1 05    APTT [[id number]]  (Abnormal) Collected: 09/09/22 1236    Lab Status: Final result Specimen: Blood from Arm, Left Updated: 09/09/22 1302     PTT 20 seconds     Blood culture #1 [[id number]] Collected: 09/09/22 1251    Lab Status: In process Specimen: Blood from Arm, Right Updated: 09/09/22 1256    CBC and differential [[id number]]  (Abnormal) Collected: 09/09/22 1236    Lab Status: Final result Specimen: Blood from Arm, Left Updated: 09/09/22 1246     WBC 18 57 Thousand/uL      RBC 3 05 Million/uL      Hemoglobin 8 5 g/dL      Hematocrit 25 8 %      MCV 85 fL      MCH 27 9 pg      MCHC 32 9 g/dL      RDW 14 4 %      MPV 9 6 fL      Platelets 331 Thousands/uL      nRBC 0 /100 WBCs      Neutrophils Relative 85 %      Immat GRANS % 2 %      Lymphocytes Relative 5 %      Monocytes Relative 5 %      Eosinophils Relative 2 %      Basophils Relative 1 %      Neutrophils Absolute 15 98 Thousands/µL      Immature Grans Absolute 0 35 Thousand/uL      Lymphocytes Absolute 0 91 Thousands/µL      Monocytes Absolute 0 95 Thousand/µL      Eosinophils Absolute 0 29 Thousand/µL      Basophils Absolute 0 09 Thousands/µL     Blood culture #2 [[id number]] Collected: 09/09/22 1236    Lab Status:  In process Specimen: Blood from Arm, Left Updated: 09/09/22 1243                 XR chest 1 view portable   Final Result by Funmilayo Clinton MD (09/09 1249)      Progressive diffuse bilateral opacities, right greater than left, likely pulmonary edema vs  multifocal pneumonia                  Workstation performed: [id number]                    Procedures  CriticalCare Time  Performed by: Ham Baeza DO  Authorized by: Ham Baeza DO     Critical care provider statement:     Critical care time (minutes):  35    Critical care time was exclusive of:  Separately billable procedures and treating other patients and teaching time    Critical care was necessary to treat or prevent imminent or life-threatening deterioration of the following conditions:  Respiratory failure and cardiac failure    Critical care was time spent personally by me on the following activities:  Blood draw for specimens, obtaining history from patient or surrogate, development of treatment plan with patient or surrogate, discussions with consultants, evaluation of patient's response to treatment, examination of patient, review of old charts, re-evaluation of patient's condition, ordering and review of radiographic studies, ordering and performing treatments and interventions, interpretation of cardiac output measurements and ordering and review of laboratory studies    I assumed direction of critical care for this patient from another provider in my specialty: no      ECG 12 Lead Documentation Only    Date/Time: 9/9/2022 3:22 PM  Performed by: Ham Baeza DO  Authorized by: Ham Baeza DO     Indications / Diagnosis:  Shortness of breath  ECG reviewed by me, the ED Provider: no    Patient location:  ED  Previous ECG:     Previous ECG:  Compared to current    Similarity:  No change  Interpretation:     Interpretation: abnormal    Rate:     ECG rate:  95    ECG rate assessment: normal    Rhythm:     Rhythm: sinus rhythm    Ectopy:     Ectopy: none    QRS:     QRS axis:  Normal  Conduction:     Conduction: normal    ST segments:     ST segments:  Normal  T waves:     T waves: normal               ED Course  ED Course as of 09/09/22 1523   Fri Sep 09, 2022   1315 The 30ml/kg fluid bolus was not given to the patient despite hypotension and/or significantly elevated lactate of = 4 and/or presence of septic shock due to: Heart Failure  The patient will be administered 250 ml of crystalloid fluid instead  Orders for this have been placed in Epic  The patient may receive additional colloid or crystalloid fluids thereafter based on clinical condition       Ham Baeza DO                                              MDM  Number of Diagnoses or Management Options  CHF (congestive heart failure) (HCC)  Hypoxia  Pneumonia  Diagnosis management comments: 59-year-old male presenting for progressing shortness of breath, wheezing  Symptoms for the past week  Progressing despite medications  Was seen here early in the week and discharged  Is requiring supplemental oxygen at this time  He said 80% on room air  Has diffuse wheezing  Will obtain septic workup, chest x-ray, cardiac workup  Chest x-ray showed pulmonary edema versus multifocal pneumonia  Does have elevated white count  Does have elevated BNP  Patient started on Lasix 40 mg IV, given ceftriaxone IV  Patient admitted to Medicine  Disposition  Final diagnoses:   CHF (congestive heart failure) ([facility] )   Pneumonia   Hypoxia     Time reflects when diagnosis was documented in both MDM as applicable and the Disposition within this note     Time User Action Codes Description Comment    9/9/2022  1:36 PM Belinda Rinne Add [I50 9] CHF (congestive heart failure) ([facility] )     9/9/2022  1:36 PM Belinda Rinne Add [J18 9] Pneumonia     9/9/2022  1:36 PM Belinda Rinne Add [R09 02] Hypoxia     9/9/2022  3:14 PM Leonora Garceskle Add [N18 4] Chronic kidney disease, stage 4 (severe) [facility])       ED Disposition     ED Disposition   Admit    Condition   Stable    Date/Time   Fri Sep 9, 2022  1:36 PM    Comment   Case was discussed with FITO and the patient's admission status was agreed to be Admission Status: observation status to the service of Dr Alyssia Shipman   Follow-up Information    None         Current Discharge Medication List      CONTINUE these medications which have NOT CHANGED    Details   albuterol (PROVENTIL HFA,VENTOLIN HFA) 90 mcg/act inhaler Inhale 2 puffs every 4 (four) hours as needed for wheezing  Qty: 6 7 g, Refills: 0    Comments: Substitution to a formulary equivalent within the same pharmaceutical class is authorized    Associated Diagnoses: SOB (shortness of breath)      amLODIPine (NORVASC) 10 mg tablet Take 1 tablet (10 mg total) by mouth daily  Qty: 30 tablet, Refills: 0    Associated Diagnoses: Hypertensive emergency      bumetanide (BUMEX) 2 mg tablet Take 2 tablets (4 mg total) by mouth daily  Qty: 60 tablet, Refills: 0    Associated Diagnoses: STEFANIA (acute kidney injury) ([facility] [address] ); Primary hypertension      carvedilol (COREG) 25 mg tablet Take 1 tablet (25 mg total) by mouth 2 (two) times a day with meals  Qty: 60 tablet, Refills: 0    Associated Diagnoses: Hypertension, unspecified type      doxazosin (CARDURA) 2 mg tablet Take 1 tablet (2 mg total) by mouth daily  Qty: 30 tablet, Refills: 0    Associated Diagnoses: Hypertensive urgency      famotidine (PEPCID) 40 MG tablet Take 1 tablet (40 mg total) by mouth daily  Qty: 30 tablet, Refills: 5    Associated Diagnoses: Epigastric pain      FLUoxetine (PROzac) 20 mg capsule Take 20 mg by mouth every morning      hydrALAZINE (APRESOLINE) 50 mg tablet Take 1 tablet (50 mg total) by mouth 3 (three) times a day  Qty: 90 tablet, Refills: 0    Associated Diagnoses: Primary hypertension;  Anxiety      insulin glargine (LANTUS) 100 units/mL subcutaneous injection Inject 20 Units under the skin daily at bedtime  Qty: 10 mL, Refills: 0    Associated Diagnoses: Type 1 diabetes mellitus without complication (HCC)      insulin lispro (HumaLOG) 100 units/mL injection Inject 5 Units under the skin 3 (three) times a day with meals  Qty: 10 mL, Refills: 0    Associated Diagnoses: Type 1 diabetes mellitus without complication (HCC)      Insulin Pen Needle (Pen Needles 3/16") 31G X 5 MM MISC Use 4 (four) times a day  Qty: 200 each, Refills: 3    Associated Diagnoses: Type 2 diabetes mellitus without complication, unspecified whether long term insulin use (HCC)      levothyroxine 125 mcg tablet Take 1 tablet (125 mcg total) by mouth daily in the early morning  Qty: 30 tablet, Refills: 0    Associated Diagnoses: Other specified hypothyroidism      pantoprazole (PROTONIX) 40 mg tablet Take 1 tablet (40 mg total) by mouth 2 (two) times a day before meals  Qty: 60 tablet, Refills: 0    Associated Diagnoses: Epigastric pain      Vraylar 6 MG capsule Take 6 mg by mouth daily      ACCU-CHEK FASTCLIX LANCETS MISC 4 (four) times a day Not checking 4 times a day  Patient stated maybe 2 times a day  Refills: 1      Blood Glucose Monitoring Suppl (ACCU-CHEK GUIDE) w/Device KIT Checking 2 times a day  Refills: 0      LORazepam (ATIVAN) 0 5 mg tablet Take 1 tablet (0 5 mg total) by mouth 2 (two) times a day as needed for anxiety for up to 10 days  Qty: 30 tablet, Refills: 0    Associated Diagnoses: Schizo affective schizophrenia (HCC)      methylPREDNISolone 4 MG tablet therapy pack Use as directed on package  Qty: 21 tablet, Refills: 0    Associated Diagnoses: Dyspnea             No discharge procedures on file      PDMP Review       Value Time User    PDMP Reviewed  Yes 7/16/2021  3:11 PM Jda Alvarez MD          ED Provider  Electronically Signed by           Gage Landaverde DO  09/09/22 4554

## 2022-09-10 PROBLEM — J96.01 ACUTE RESPIRATORY FAILURE WITH HYPOXIA (HCC): Status: ACTIVE | Noted: 2020-11-22

## 2022-09-10 LAB
ALBUMIN SERPL BCP-MCNC: 2.8 G/DL (ref 3.5–5)
ALP SERPL-CCNC: 58 U/L (ref 34–104)
ALT SERPL W P-5'-P-CCNC: 11 U/L (ref 7–52)
ANION GAP SERPL CALCULATED.3IONS-SCNC: 10 MMOL/L (ref 4–13)
AST SERPL W P-5'-P-CCNC: 11 U/L (ref 13–39)
ATRIAL RATE: 78 BPM
ATRIAL RATE: 95 BPM
BILIRUB SERPL-MCNC: 0.3 MG/DL (ref 0.2–1)
BUN SERPL-MCNC: 98 MG/DL (ref 5–25)
CALCIUM ALBUM COR SERPL-MCNC: 9.7 MG/DL (ref 8.3–10.1)
CALCIUM SERPL-MCNC: 8.7 MG/DL (ref 8.4–10.2)
CHLORIDE SERPL-SCNC: 102 MMOL/L (ref 96–108)
CO2 SERPL-SCNC: 25 MMOL/L (ref 21–32)
CREAT SERPL-MCNC: 4.62 MG/DL (ref 0.6–1.3)
ERYTHROCYTE [DISTWIDTH] IN BLOOD BY AUTOMATED COUNT: 14.3 % (ref 11.6–15.1)
GFR SERPL CREATININE-BSD FRML MDRD: 14 ML/MIN/1.73SQ M
GLUCOSE SERPL-MCNC: 110 MG/DL (ref 65–140)
GLUCOSE SERPL-MCNC: 133 MG/DL (ref 65–140)
GLUCOSE SERPL-MCNC: 155 MG/DL (ref 65–140)
GLUCOSE SERPL-MCNC: 183 MG/DL (ref 65–140)
GLUCOSE SERPL-MCNC: 238 MG/DL (ref 65–140)
GLUCOSE SERPL-MCNC: 283 MG/DL (ref 65–140)
HCT VFR BLD AUTO: 23.6 % (ref 36.5–49.3)
HEMOCCULT STL QL: NEGATIVE
HGB BLD-MCNC: 7.8 G/DL (ref 12–17)
MCH RBC QN AUTO: 27.4 PG (ref 26.8–34.3)
MCHC RBC AUTO-ENTMCNC: 33.1 G/DL (ref 31.4–37.4)
MCV RBC AUTO: 83 FL (ref 82–98)
P AXIS: 40 DEGREES
P AXIS: 59 DEGREES
PLATELET # BLD AUTO: 350 THOUSANDS/UL (ref 149–390)
PMV BLD AUTO: 9.5 FL (ref 8.9–12.7)
POTASSIUM SERPL-SCNC: 4 MMOL/L (ref 3.5–5.3)
PR INTERVAL: 170 MS
PR INTERVAL: 176 MS
PROCALCITONIN SERPL-MCNC: 0.18 NG/ML
PROT SERPL-MCNC: 6.2 G/DL (ref 6.4–8.4)
QRS AXIS: -3 DEGREES
QRS AXIS: -6 DEGREES
QRSD INTERVAL: 92 MS
QRSD INTERVAL: 94 MS
QT INTERVAL: 356 MS
QT INTERVAL: 392 MS
QTC INTERVAL: 446 MS
QTC INTERVAL: 447 MS
RBC # BLD AUTO: 2.85 MILLION/UL (ref 3.88–5.62)
SODIUM SERPL-SCNC: 137 MMOL/L (ref 135–147)
T WAVE AXIS: 79 DEGREES
T WAVE AXIS: 98 DEGREES
VENTRICULAR RATE: 78 BPM
VENTRICULAR RATE: 95 BPM
WBC # BLD AUTO: 13.25 THOUSAND/UL (ref 4.31–10.16)

## 2022-09-10 PROCEDURE — 85027 COMPLETE CBC AUTOMATED: CPT | Performed by: STUDENT IN AN ORGANIZED HEALTH CARE EDUCATION/TRAINING PROGRAM

## 2022-09-10 PROCEDURE — 93010 ELECTROCARDIOGRAM REPORT: CPT | Performed by: INTERNAL MEDICINE

## 2022-09-10 PROCEDURE — 80053 COMPREHEN METABOLIC PANEL: CPT | Performed by: STUDENT IN AN ORGANIZED HEALTH CARE EDUCATION/TRAINING PROGRAM

## 2022-09-10 PROCEDURE — 84145 PROCALCITONIN (PCT): CPT | Performed by: STUDENT IN AN ORGANIZED HEALTH CARE EDUCATION/TRAINING PROGRAM

## 2022-09-10 PROCEDURE — 82948 REAGENT STRIP/BLOOD GLUCOSE: CPT

## 2022-09-10 PROCEDURE — 99232 SBSQ HOSP IP/OBS MODERATE 35: CPT | Performed by: NURSE PRACTITIONER

## 2022-09-10 PROCEDURE — 99255 IP/OBS CONSLTJ NEW/EST HI 80: CPT | Performed by: PHYSICIAN ASSISTANT

## 2022-09-10 PROCEDURE — 82272 OCCULT BLD FECES 1-3 TESTS: CPT | Performed by: PHYSICIAN ASSISTANT

## 2022-09-10 RX ORDER — BUMETANIDE 0.25 MG/ML
3 INJECTION, SOLUTION INTRAMUSCULAR; INTRAVENOUS 2 TIMES DAILY
Status: DISCONTINUED | OUTPATIENT
Start: 2022-09-10 | End: 2022-09-11

## 2022-09-10 RX ADMIN — PANTOPRAZOLE SODIUM 40 MG: 40 TABLET, DELAYED RELEASE ORAL at 06:11

## 2022-09-10 RX ADMIN — INSULIN LISPRO 5 UNITS: 100 INJECTION, SOLUTION INTRAVENOUS; SUBCUTANEOUS at 08:02

## 2022-09-10 RX ADMIN — CARVEDILOL 25 MG: 25 TABLET, FILM COATED ORAL at 07:55

## 2022-09-10 RX ADMIN — INSULIN GLARGINE 20 UNITS: 100 INJECTION, SOLUTION SUBCUTANEOUS at 21:45

## 2022-09-10 RX ADMIN — CARVEDILOL 25 MG: 25 TABLET, FILM COATED ORAL at 17:04

## 2022-09-10 RX ADMIN — FLUOXETINE 20 MG: 20 CAPSULE ORAL at 08:00

## 2022-09-10 RX ADMIN — FUROSEMIDE 80 MG: 10 INJECTION, SOLUTION INTRAMUSCULAR; INTRAVENOUS at 07:56

## 2022-09-10 RX ADMIN — ACETAMINOPHEN 325MG 650 MG: 325 TABLET ORAL at 23:28

## 2022-09-10 RX ADMIN — HEPARIN SODIUM 7500 UNITS: 5000 INJECTION INTRAVENOUS; SUBCUTANEOUS at 06:11

## 2022-09-10 RX ADMIN — ALBUTEROL SULFATE 2 PUFF: 108 INHALANT RESPIRATORY (INHALATION) at 20:22

## 2022-09-10 RX ADMIN — HYDRALAZINE HYDROCHLORIDE 50 MG: 25 TABLET, FILM COATED ORAL at 17:03

## 2022-09-10 RX ADMIN — HEPARIN SODIUM 7500 UNITS: 5000 INJECTION INTRAVENOUS; SUBCUTANEOUS at 21:45

## 2022-09-10 RX ADMIN — INSULIN LISPRO 5 UNITS: 100 INJECTION, SOLUTION INTRAVENOUS; SUBCUTANEOUS at 11:44

## 2022-09-10 RX ADMIN — HYDROXYZINE HYDROCHLORIDE 25 MG: 25 TABLET ORAL at 23:28

## 2022-09-10 RX ADMIN — HYDRALAZINE HYDROCHLORIDE 50 MG: 25 TABLET, FILM COATED ORAL at 21:44

## 2022-09-10 RX ADMIN — HYDROXYZINE HYDROCHLORIDE 25 MG: 25 TABLET ORAL at 11:01

## 2022-09-10 RX ADMIN — INSULIN LISPRO 5 UNITS: 100 INJECTION, SOLUTION INTRAVENOUS; SUBCUTANEOUS at 17:12

## 2022-09-10 RX ADMIN — BUMETANIDE 3 MG: 0.25 INJECTION, SOLUTION INTRAMUSCULAR; INTRAVENOUS at 11:44

## 2022-09-10 RX ADMIN — CEFTRIAXONE 1000 MG: 1 INJECTION, SOLUTION INTRAVENOUS at 11:44

## 2022-09-10 RX ADMIN — ACETAMINOPHEN 325MG 650 MG: 325 TABLET ORAL at 12:26

## 2022-09-10 RX ADMIN — HEPARIN SODIUM 7500 UNITS: 5000 INJECTION INTRAVENOUS; SUBCUTANEOUS at 13:39

## 2022-09-10 RX ADMIN — DOXAZOSIN 2 MG: 2 TABLET ORAL at 08:00

## 2022-09-10 RX ADMIN — PANTOPRAZOLE SODIUM 40 MG: 40 TABLET, DELAYED RELEASE ORAL at 17:04

## 2022-09-10 RX ADMIN — INSULIN LISPRO 6 UNITS: 100 INJECTION, SOLUTION INTRAVENOUS; SUBCUTANEOUS at 17:08

## 2022-09-10 RX ADMIN — LEVOTHYROXINE SODIUM 125 MCG: 25 TABLET ORAL at 06:11

## 2022-09-10 RX ADMIN — INSULIN LISPRO 2 UNITS: 100 INJECTION, SOLUTION INTRAVENOUS; SUBCUTANEOUS at 11:44

## 2022-09-10 RX ADMIN — AMLODIPINE BESYLATE 10 MG: 10 TABLET ORAL at 08:00

## 2022-09-10 RX ADMIN — HYDROXYZINE HYDROCHLORIDE 25 MG: 25 TABLET ORAL at 17:19

## 2022-09-10 RX ADMIN — BUMETANIDE 3 MG: 0.25 INJECTION, SOLUTION INTRAMUSCULAR; INTRAVENOUS at 17:14

## 2022-09-10 RX ADMIN — HYDRALAZINE HYDROCHLORIDE 50 MG: 25 TABLET, FILM COATED ORAL at 08:00

## 2022-09-10 RX ADMIN — INSULIN LISPRO 4 UNITS: 100 INJECTION, SOLUTION INTRAVENOUS; SUBCUTANEOUS at 21:46

## 2022-09-10 NOTE — PLAN OF CARE
Problem: PAIN - ADULT  Goal: Verbalizes/displays adequate comfort level or baseline comfort level  Description: Interventions:  - Encourage patient to monitor pain and request assistance  - Assess pain using appropriate pain scale  - Administer analgesics based on type and severity of pain and evaluate response  - Implement non-pharmacological measures as appropriate and evaluate response  - Consider cultural and social influences on pain and pain management  - Notify physician/advanced practitioner if interventions unsuccessful or patient reports new pain  Outcome: Progressing     Problem: INFECTION - ADULT  Goal: Absence or prevention of progression during hospitalization  Description: INTERVENTIONS:  - Assess and monitor for signs and symptoms of infection  - Monitor lab/diagnostic results  - Monitor all insertion sites, i e  indwelling lines, tubes, and drains  - Monitor endotracheal if appropriate and nasal secretions for changes in amount and color  - Morrison appropriate cooling/warming therapies per order  - Administer medications as ordered  - Instruct and encourage patient and family to use good hand hygiene technique  - Identify and instruct in appropriate isolation precautions for identified infection/condition  Outcome: Progressing  Goal: Absence of fever/infection during neutropenic period  Description: INTERVENTIONS:  - Monitor WBC    Outcome: Progressing     Problem: SAFETY ADULT  Goal: Patient will remain free of falls  Description: INTERVENTIONS:  - Educate patient/family on patient safety including physical limitations  - Instruct patient to call for assistance with activity   - Consult OT/PT to assist with strengthening/mobility   - Keep Call bell within reach  - Keep bed low and locked with side rails adjusted as appropriate  - Keep care items and personal belongings within reach  - Initiate and maintain comfort rounds  - Make Fall Risk Sign visible to staff  - Offer Toileting every 2 Hours, in advance of need  - Initiate/Maintain bed alarm  - Obtain necessary fall risk management equipment  - Apply yellow socks and bracelet for high fall risk patients  - Consider moving patient to room near nurses station  Outcome: Progressing  Goal: Maintain or return to baseline ADL function  Description: INTERVENTIONS:  -  Assess patient's ability to carry out ADLs; assess patient's baseline for ADL function and identify physical deficits which impact ability to perform ADLs (bathing, care of mouth/teeth, toileting, grooming, dressing, etc )  - Assess/evaluate cause of self-care deficits   - Assess range of motion  - Assess patient's mobility; develop plan if impaired  - Assess patient's need for assistive devices and provide as appropriate  - Encourage maximum independence but intervene and supervise when necessary  - Involve family in performance of ADLs  - Assess for home care needs following discharge   - Consider OT consult to assist with ADL evaluation and planning for discharge  - Provide patient education as appropriate  Outcome: Progressing  Goal: Maintains/Returns to pre admission functional level  Description: INTERVENTIONS:  - Perform BMAT or MOVE assessment daily    - Set and communicate daily mobility goal to care team and patient/family/caregiver  - Collaborate with rehabilitation services on mobility goals if consulted  - Perform Range of Motion 2 times a day  - Reposition patient every 2 hours    - Dangle patient 2 times a day  - Stand patient 2 times a day  - Ambulate patient 2 times a day  - Out of bed to chair 3 times a day   - Out of bed for meals 3 times a day  - Out of bed for toileting  - Record patient progress and toleration of activity level   Outcome: Progressing     Problem: DISCHARGE PLANNING  Goal: Discharge to home or other facility with appropriate resources  Description: INTERVENTIONS:  - Identify barriers to discharge w/patient and caregiver  - Arrange for needed discharge resources and transportation as appropriate  - Identify discharge learning needs (meds, wound care, etc )  - Refer to Case Management Department for coordinating discharge planning if the patient needs post-hospital services based on physician/advanced practitioner order or complex needs related to functional status, cognitive ability, or social support system  Outcome: Progressing     Problem: Knowledge Deficit  Goal: Patient/family/caregiver demonstrates understanding of disease process, treatment plan, medications, and discharge instructions  Description: Complete learning assessment and assess knowledge base    Interventions:  - Provide teaching at level of understanding  - Provide teaching via preferred learning methods  Outcome: Progressing     Problem: RESPIRATORY - ADULT  Goal: Achieves optimal ventilation and oxygenation  Description: INTERVENTIONS:  - Assess for changes in respiratory status  - Assess for changes in mentation and behavior  - Position to facilitate oxygenation and minimize respiratory effort  - Oxygen administered by appropriate delivery if ordered  - Initiate smoking cessation education as indicated  - Encourage broncho-pulmonary hygiene including cough, deep breathe, Incentive Spirometry  - Assess the need for suctioning and aspirate as needed  - Assess and instruct to report SOB or any respiratory difficulty  - Respiratory Therapy support as indicated  Outcome: Progressing     Problem: Prexisting or High Potential for Compromised Skin Integrity  Goal: Skin integrity is maintained or improved  Description: INTERVENTIONS:  - Identify patients at risk for skin breakdown  - Assess and monitor skin integrity  - Assess and monitor nutrition and hydration status  - Monitor labs   - Assess for incontinence   - Turn and reposition patient  - Assist with mobility/ambulation  - Relieve pressure over bony prominences  - Avoid friction and shearing  - Provide appropriate hygiene as needed including keeping skin clean and dry  - Evaluate need for skin moisturizer/barrier cream  - Collaborate with interdisciplinary team   - Patient/family teaching  - Consider wound care consult   Outcome: Progressing     Problem: Potential for Falls  Goal: Patient will remain free of falls  Description: INTERVENTIONS:  - Educate patient/family on patient safety including physical limitations  - Instruct patient to call for assistance with activity   - Consult OT/PT to assist with strengthening/mobility   - Keep Call bell within reach  - Keep bed low and locked with side rails adjusted as appropriate  - Keep care items and personal belongings within reach  - Initiate and maintain comfort rounds  - Make Fall Risk Sign visible to staff  - Offer Toileting every 2 Hours, in advance of need  - Initiate/Maintain bed alarm  - Obtain necessary fall risk management equipment  - Apply yellow socks and bracelet for high fall risk patients  - Consider moving patient to room near nurses station  Outcome: Progressing

## 2022-09-10 NOTE — CONSULTS
Consultation - Nephrology   Umang Benedict 38 y o  male MRN: [id number]  Unit/Bed#: -01 Encounter: [id number]      Assessment and Plan    1  Acute kidney injury due to acute tubular necrosis, present admission, superimposed on chronic kidney disease  Renal function continues to worsen up to creatinine 4 6 mg/dL  Given biopsy results, patient is likely progressing towards the need for initiation of dialysis  At this time, will prioritize overall optimization of patient's condition with diuresis  Continue trend renal function, avoid nephrotoxins, renally dose medications, monitor for urinary retention  No urgent need for initiation of dialysis, but if renal function continues to worsen, will need to proceed with access placement  2  Chronic kidney disease, stage 4 with baseline creatinine around 4 0 mg/dL  Patient is followed by our group  At our last visit on 08/25/2022, we discussed biopsy results and the likelihood of progression to dialysis  We discussed the need for weight loss for transplant candidacy  3  Biopsy-proven nodular diabetic glomerulosclerosis, 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  4  Volume overload in the setting of nephrotic syndrome  Uncertain adherence in outpatient setting  Request 2 g sodium restriction, 1 5 L fluid restriction  Daily weights, check urine output  Given hypoalbuminemia, will discontinue furosemide in favor of bumetanide 3 mg IV twice daily  Low threshold for transition to bumetanide drip if patient does not respond well to intermittent IV diuretics  5  Morbid obesity with BMI 63  BMI greater than 40 is associated with greater risk of progression of renal disease secondary to glomerular hyperfiltration  Recommend weight loss  Will need weight loss to appropriate BMI before transplant candidacy  Strongly recommend bariatric  6  Hypertensive urgency with CKD  Improving with re-initiation of antihypertensives    Will follow with diuresis  7  Diabetes mellitus, type 2 with CKD  Management per hospitalist     8  Schizoaffective disorder / obsessive-compulsive disorder    Thank you for allowing us to participate in the care of your patient  Please feel free to contact us regarding the care of this patient, or any other questions/concerns that may be applicable      Patient Active Problem List   Diagnosis    Abdominal pain    OCD (obsessive compulsive disorder)    Schizoaffective disorder, depressive type ([place] [address] )    Hypothyroidism    Morbid obesity with BMI of 50 0-59 9, adult ([place] [address] )    Anxiety    Episodic confusion    Snoring    Insomnia    Hypersomnia    Vitamin D deficiency    Vomiting    Occipital neuralgia of left side    Headache    Nodule of parotid gland    Bipolar 1 disorder ([place] [address] )    Depression    Type 1 diabetes mellitus without complication ([place] [address] )    Urinary retention    Peripheral edema    Hyperlipidemia, mixed    Migraine without aura and without status migrainosus, not intractable    Class 3 severe obesity due to excess calories with serious comorbidity and body mass index (BMI) of 60 0 to 69 9 in adult [facility])    Spinal stenosis in cervical region    Difficulty urinating    Urinary tract infection without hematuria    Penile pain    Chronic migraine without aura without status migrainosus, not intractable    Hypertensive emergency    Encephalopathy    Leukocytosis    Schizo affective schizophrenia ([facility] )    Acute respiratory failure ([place] [address] )    STEFANIA (acute kidney injury) ([place] [address] )    Acute respiratory disease due to COVID-19 virus    Elevated troponin    Type 2 diabetes mellitus with stage 4 chronic kidney disease and hypertension ([facility] )    Family history of heart disease    Hypokalemia    Chest pressure    GERD (gastroesophageal reflux disease)    Hypertension    SOB (shortness of breath)    Volume overload    Hyponatremia    Open toe wound    Primary hypertension    Diabetic ulcer of both feet ([address] )    Nodular type diabetic glomerulosclerosis ([facility] )    Interstitial fibrosis present on renal biopsy    Arteriosclerosis of kidney    Chronic kidney disease, stage 4 (severe) ([facility])       History of Present Illness     Physician Requesting Consult: Brandon Damico DO    Reason for Consult / Principal Problem:  Acute kidney injury    Hx and PE limited by:  Psychiatric status    HPI: Coreen Apodaca is a 38y o  year old male with PMH of obesity BMI 63, HTN, DMT2, psychiatric disorders who [facility] to [facility] with shortness of breath  Nephrology is consulted for acute kidney injury superimposed on chronic kidney disease and volume overload  Patient is followed by our group and was seen during prior hospitalization  He underwent renal biopsy which revealed nodular diabetic glomerulosclerosis, 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  Upon speaking the patient, he reports shortness of breath and swelling  He does not recall if he has been taking his diuretic at home  History obtained from chart review and the patient    Review of Systems    Reports shortness of breath and swelling  A complete 10 point review of systems was performed and is otherwise negative  Historical Information   Past Medical History:   Diagnosis Date    Acute bronchitis due to other specified organisms 07/05/2019    Chronic headaches     Diabetes mellitus ([facility] )     Disease of thyroid gland     Esophagitis     Gastritis     Gastroparesis     GERD (gastroesophageal reflux disease)     Hypertension     Migraine     Migraines 03/11/2021    Obesity     Obsessive compulsive disorder     Psychiatric disorder     Renal disorder     Schizoaffective disorder [facility])      Past Surgical History:   Procedure Laterality Date    ESOPHAGOGASTRODUODENOSCOPY N/A 1/15/2019    Procedure: ESOPHAGOGASTRODUODENOSCOPY (EGD);   Surgeon: Ami Evans MD;  Location: [facility]; Service: Gastroenterology    IR BIOPSY KIDNEY RANDOM  8/11/2022     Social History   Social History     Substance and Sexual Activity   Alcohol Use Not Currently     Social History     Substance and Sexual Activity   Drug Use Not Currently     Social History     Tobacco Use   Smoking Status Never Smoker   Smokeless Tobacco Never Used     Family History   Problem Relation Age of Onset    COPD Mother     Hypertension Mother     Heart failure Mother     Rheum arthritis Family     Heart disease Family     Hypertension Father     Diabetes Father     Hyperlipidemia Father        Meds/Allergies   all current active meds have been reviewed, current meds:   Current Facility-Administered Medications   Medication Dose Route Frequency    acetaminophen (TYLENOL) tablet 650 mg  650 mg Oral Q6H PRN    albuterol (PROVENTIL HFA,VENTOLIN HFA) inhaler 2 puff  2 puff Inhalation Q4H PRN    aluminum-magnesium hydroxide-simethicone (MYLANTA) oral suspension 30 mL  30 mL Oral Q4H PRN    amLODIPine (NORVASC) tablet 10 mg  10 mg Oral Daily    carvedilol (COREG) tablet 25 mg  25 mg Oral BID With Meals    cefTRIAXone (ROCEPHIN) IVPB (premix in dextrose) 1,000 mg 50 mL  1,000 mg Intravenous Q24H    doxazosin (CARDURA) tablet 2 mg  2 mg Oral Daily    FLUoxetine (PROzac) capsule 20 mg  20 mg Oral QAM    furosemide (LASIX) injection 80 mg  80 mg Intravenous BID (diuretic)    heparin (porcine) subcutaneous injection 7,500 Units  7,500 Units Subcutaneous Q8H [facility]    hydrALAZINE (APRESOLINE) tablet 50 mg  50 mg Oral TID    hydrOXYzine HCL (ATARAX) tablet 25 mg  25 mg Oral Q6H PRN    insulin glargine (LANTUS) subcutaneous injection 20 Units 0 2 mL  20 Units Subcutaneous HS    insulin lispro (HumaLOG) 100 units/mL subcutaneous injection 2-12 Units  2-12 Units Subcutaneous TID AC    insulin lispro (HumaLOG) 100 units/mL subcutaneous injection 2-12 Units  2-12 Units Subcutaneous HS    insulin lispro (HumaLOG) 100 units/mL subcutaneous injection 5 Units  5 Units Subcutaneous TID With Meals    levothyroxine tablet 125 mcg  125 mcg Oral Early Morning    pantoprazole (PROTONIX) EC tablet 40 mg  40 mg Oral BID AC    and PTA meds:    Medications Prior to Admission   Medication    albuterol (PROVENTIL HFA,VENTOLIN HFA) 90 mcg/act inhaler    amLODIPine (NORVASC) 10 mg tablet    bumetanide (BUMEX) 2 mg tablet    carvedilol (COREG) 25 mg tablet    doxazosin (CARDURA) 2 mg tablet    famotidine (PEPCID) 40 MG tablet    FLUoxetine (PROzac) 20 mg capsule    hydrALAZINE (APRESOLINE) 50 mg tablet    insulin glargine (LANTUS) 100 units/mL subcutaneous injection    insulin lispro (HumaLOG) 100 units/mL injection    Insulin Pen Needle (Pen Needles 3/16") 31G X 5 MM MISC    levothyroxine 125 mcg tablet    pantoprazole (PROTONIX) 40 mg tablet    Vraylar 6 MG capsule    ACCU-CHEK FASTCLIX LANCETS MISC    Blood Glucose Monitoring Suppl (ACCU-CHEK GUIDE) w/Device KIT    LORazepam (ATIVAN) 0 5 mg tablet    methylPREDNISolone 4 MG tablet therapy pack         Allergies   Allergen Reactions    Amoxicillin Diarrhea    Augmentin [Amoxicillin-Pot Clavulanate] Diarrhea    Clavulanic Acid Diarrhea    Erythromycin Diarrhea       Objective     Intake/Output Summary (Last 24 hours) at 9/10/2022 1117  Last data filed at 9/10/2022 0827  Gross per 24 hour   Intake 1250 ml   Output --   Net 1250 ml       Invasive Devices:        Physical Exam  Vitals and nursing note reviewed  Constitutional:       General: He is awake  He is not in acute distress  Appearance: Normal appearance  He is well-developed  He is morbidly obese  He is not ill-appearing, toxic-appearing or diaphoretic  HENT:      Head: Normocephalic and atraumatic  Jaw: There is normal jaw occlusion  Nose: Nose normal       Mouth/Throat:      Mouth: Mucous membranes are moist       Pharynx: Oropharynx is clear  No oropharyngeal exudate or posterior oropharyngeal erythema  Eyes:      General: Lids are normal  Vision grossly intact  Gaze aligned appropriately  No scleral icterus  Right eye: No discharge  Left eye: No discharge  Extraocular Movements: Extraocular movements intact  Conjunctiva/sclera: Conjunctivae normal       Pupils: Pupils are equal, round, and reactive to light  Neck:      Thyroid: No thyroid mass or thyromegaly  Trachea: Trachea and phonation normal    Cardiovascular:      Rate and Rhythm: Normal rate and regular rhythm  Heart sounds: Normal heart sounds, S1 normal and S2 normal  No murmur heard  No friction rub  No gallop  Pulmonary:      Effort: Pulmonary effort is normal  No respiratory distress  Breath sounds: No stridor  Decreased breath sounds present  No wheezing, rhonchi or rales  Abdominal:      General: Abdomen is flat  Bowel sounds are normal  There is no distension  Palpations: Abdomen is soft  There is no mass  Tenderness: There is no abdominal tenderness  There is no guarding  Hernia: No hernia is present  Musculoskeletal:         General: Normal range of motion  Cervical back: Normal range of motion and neck supple  No rigidity or tenderness  Right lower leg: 3+ Edema present  Left lower leg: 3+ Edema present  Lymphadenopathy:      Cervical: No cervical adenopathy  Skin:     General: Skin is warm and dry  Coloration: Skin is not jaundiced  Findings: No bruising  Nails: There is no clubbing  Neurological:      General: No focal deficit present  Mental Status: He is alert and oriented to person, place, and time  Mental status is at baseline  Psychiatric:         Attention and Perception: Attention and perception normal          Mood and Affect: Mood and affect normal          Speech: Speech normal          Behavior: Behavior normal  Behavior is cooperative  Thought Content:  Thought content normal          Judgment: Judgment normal  I/O last 3 completed shifts: In: 1010 [P O :960; IV Piggyback:50]  Out: -     Vitals:    09/10/22 0716   BP: 158/86   Pulse: 77   Resp: 19   Temp:    SpO2: 94%         Current Weight: Weight - Scale: (!) 158 kg (347 lb 10 7 oz)  First Weight: Weight - Scale: (!) 145 kg (320 lb)    Lab Results:  I have personally reviewed pertinent labs  CBC:   Lab Results   Component Value Date    WBC 13 25 (H) 09/10/2022    HGB 7 8 (L) 09/10/2022    HCT 23 6 (L) 09/10/2022    MCV 83 09/10/2022     09/10/2022    MCH 27 4 09/10/2022    MCHC 33 1 09/10/2022    RDW 14 3 09/10/2022    MPV 9 5 09/10/2022    NRBC 0 09/09/2022     CMP:   Lab Results   Component Value Date    K 4 0 09/10/2022     09/10/2022    CO2 25 09/10/2022    BUN 98 (H) 09/10/2022    CREATININE 4 62 (H) 09/10/2022    CALCIUM 8 7 09/10/2022    AST 11 (L) 09/10/2022    ALT 11 09/10/2022    ALKPHOS 58 09/10/2022    EGFR 14 09/10/2022     Phosphorus: No results found for: PHOS  Magnesium: No results found for: MG  Urinalysis: No results found for: COLORU, CLARITYU, SPECGRAV, PHUR, LEUKOCYTESUR, NITRITE, PROTEINUA, GLUCOSEU, KETONESU, BILIRUBINUR, BLOODU  Ionized Calcium: No results found for: CAION  Coagulation:   Lab Results   Component Value Date    INR 1 05 09/09/2022     Troponin: No results found for: TROPONINI  ABG: No results found for: PHART, YWA5SJS, PO2ART, AEU3APH, H3GOKDBH, BEART, SOURCE    Results from last 7 days   Lab Units 09/10/22  0436 09/09/22  1236 09/05/22  0811   POTASSIUM mmol/L 4 0 5 0 5 0   CHLORIDE mmol/L 102 99 99   CO2 mmol/L 25 21 24   BUN mg/dL 98* 102* 83*   CREATININE mg/dL 4 62* 4 38* 4 27*   CALCIUM mg/dL 8 7 8 3* 8 7   ALK PHOS U/L 58 74  --    ALT U/L 11 14  --    AST U/L 11* 17  --        Radiology review:  Procedure: XR chest 1 view portable    Result Date: 9/9/2022  Narrative: CHEST INDICATION:   sob   COMPARISON:  9/5/2022 EXAM PERFORMED/VIEWS:  XR CHEST PORTABLE Single view FINDINGS: Progressive diffuse bilateral opacities, right greater than left, likely pulmonary edema vs  multifocal pneumonia Cardiomediastinal silhouette appears unremarkable  No pneumothorax or pleural effusion  Osseous structures appear within normal limits for patient age  Impression: Progressive diffuse bilateral opacities, right greater than left, likely pulmonary edema vs  multifocal pneumonia Workstation performed: [id number]     Procedure: Echo follow up/limited w/ contrast if indicated    Result Date: 9/9/2022  Narrative: Werner Santos  Left Ventricle: Left ventricular cavity size is normal  The left ventricular ejection fraction is 50-55%  Systolic function is normal  Although no diagnostic regional wall motion abnormality was identified, this possibility cannot be completely excluded on the basis of this study  Contrast enhanced study could be helpful    Mitral Valve: There is mild regurgitation    Pericardium: There is a trivial-small pericardial effusion  Sylvie Marino PA-C      Portions of the record may have been created with voice recognition software  Occasional wrong word or "sound a like" substitutions may have occurred due to the inherent limitations of voice recognition software  Read the chart carefully and recognize, using context, where substitutions have occurred

## 2022-09-10 NOTE — NURSING NOTE
Pt had complaints of acute chest pain  ECG performed hospitalist notified  No new orders  Pt has call bell and belongings in reach

## 2022-09-10 NOTE — ASSESSMENT & PLAN NOTE
Lab Results   Component Value Date    HGBA1C 9 1 (H) 07/16/2022       Recent Labs     09/09/22  1615 09/09/22  2125 09/10/22  0713 09/10/22  1115   POCGLU 380* 206* 133 183*       Blood Sugar Average: Last 72 hrs:  (P) 225 5     -continue home Lantus 20 units q h s   -continue lispro 5 units with meals  -sliding scale insulin and Accu-Cheks with meals and at bedtime  -carb controlled diet level 2

## 2022-09-10 NOTE — PLAN OF CARE
Problem: PAIN - ADULT  Goal: Verbalizes/displays adequate comfort level or baseline comfort level  Description: Interventions:  - Encourage patient to monitor pain and request assistance  - Assess pain using appropriate pain scale  - Administer analgesics based on type and severity of pain and evaluate response  - Implement non-pharmacological measures as appropriate and evaluate response  - Consider cultural and social influences on pain and pain management  - Notify physician/advanced practitioner if interventions unsuccessful or patient reports new pain  Outcome: Progressing     Problem: INFECTION - ADULT  Goal: Absence or prevention of progression during hospitalization  Description: INTERVENTIONS:  - Assess and monitor for signs and symptoms of infection  - Monitor lab/diagnostic results  - Monitor all insertion sites, i e  indwelling lines, tubes, and drains  - Monitor endotracheal if appropriate and nasal secretions for changes in amount and color  - [place] appropriate cooling/warming therapies per order  - Administer medications as ordered  - Instruct and encourage patient and family to use good hand hygiene technique  - Identify and instruct in appropriate isolation precautions for identified infection/condition  Outcome: Progressing  Goal: Absence of fever/infection during neutropenic period  Description: INTERVENTIONS:  - Monitor WBC    Outcome: Progressing     Problem: SAFETY ADULT  Goal: Patient will remain free of falls  Description: INTERVENTIONS:  - Educate patient/family on patient safety including physical limitations  - Instruct patient to call for assistance with activity   - Consult OT/PT to assist with strengthening/mobility   - Keep Call bell within reach  - Keep bed low and locked with side rails adjusted as appropriate  - Keep care items and personal belongings within reach  - Initiate and maintain comfort rounds  - Make Fall Risk Sign visible to staff  - Offer Toileting every 2 Hours, in advance of need  - Initiate/Maintain bed alarm  - Obtain necessary fall risk management equipment  - Apply yellow socks and bracelet for high fall risk patients  - Consider moving patient to room near nurses station  Outcome: Progressing  Goal: Maintain or return to baseline ADL function  Description: INTERVENTIONS:  -  Assess patient's ability to carry out ADLs; assess patient's baseline for ADL function and identify physical deficits which impact ability to perform ADLs (bathing, care of mouth/teeth, toileting, grooming, dressing, etc )  - Assess/evaluate cause of self-care deficits   - Assess range of motion  - Assess patient's mobility; develop plan if impaired  - Assess patient's need for assistive devices and provide as appropriate  - Encourage maximum independence but intervene and supervise when necessary  - Involve family in performance of ADLs  - Assess for home care needs following discharge   - Consider OT consult to assist with ADL evaluation and planning for discharge  - Provide patient education as appropriate  Outcome: Progressing  Goal: Maintains/Returns to pre admission functional level  Description: INTERVENTIONS:  - Perform BMAT or MOVE assessment daily    - Set and communicate daily mobility goal to care team and patient/family/caregiver  - Collaborate with rehabilitation services on mobility goals if consulted  - Perform Range of Motion 2 times a day  - Reposition patient every 2 hours    - Dangle patient 2 times a day  - Stand patient 2 times a day  - Ambulate patient 2 times a day  - Out of bed to chair 3 times a day   - Out of bed for meals 3 times a day  - Out of bed for toileting  - Record patient progress and toleration of activity level   Outcome: Progressing     Problem: DISCHARGE PLANNING  Goal: Discharge to home or other facility with appropriate resources  Description: INTERVENTIONS:  - Identify barriers to discharge w/patient and caregiver  - Arrange for needed discharge resources and transportation as appropriate  - Identify discharge learning needs (meds, wound care, etc )  - Refer to Case Management Department for coordinating discharge planning if the patient needs post-hospital services based on physician/advanced practitioner order or complex needs related to functional status, cognitive ability, or social support system  Outcome: Progressing     Problem: Knowledge Deficit  Goal: Patient/family/caregiver demonstrates understanding of disease process, treatment plan, medications, and discharge instructions  Description: Complete learning assessment and assess knowledge base    Interventions:  - Provide teaching at level of understanding  - Provide teaching via preferred learning methods  Outcome: Progressing     Problem: RESPIRATORY - ADULT  Goal: Achieves optimal ventilation and oxygenation  Description: INTERVENTIONS:  - Assess for changes in respiratory status  - Assess for changes in mentation and behavior  - Position to facilitate oxygenation and minimize respiratory effort  - Oxygen administered by appropriate delivery if ordered  - Initiate smoking cessation education as indicated  - Encourage broncho-pulmonary hygiene including cough, deep breathe, Incentive Spirometry  - Assess the need for suctioning and aspirate as needed  - Assess and instruct to report SOB or any respiratory difficulty  - Respiratory Therapy support as indicated  Outcome: Progressing     Problem: Prexisting or High Potential for Compromised Skin Integrity  Goal: Skin integrity is maintained or improved  Description: INTERVENTIONS:  - Identify patients at risk for skin breakdown  - Assess and monitor skin integrity  - Assess and monitor nutrition and hydration status  - Monitor labs   - Assess for incontinence   - Turn and reposition patient  - Assist with mobility/ambulation  - Relieve pressure over bony prominences  - Avoid friction and shearing  - Provide appropriate hygiene as needed including keeping skin clean and dry  - Evaluate need for skin moisturizer/barrier cream  - Collaborate with interdisciplinary team   - Patient/family teaching  - Consider wound care consult   Outcome: Progressing     Problem: Potential for Falls  Goal: Patient will remain free of falls  Description: INTERVENTIONS:  - Educate patient/family on patient safety including physical limitations  - Instruct patient to call for assistance with activity   - Consult OT/PT to assist with strengthening/mobility   - Keep Call bell within reach  - Keep bed low and locked with side rails adjusted as appropriate  - Keep care items and personal belongings within reach  - Initiate and maintain comfort rounds  - Make Fall Risk Sign visible to staff  - Offer Toileting every 2 Hours, in advance of need  - Initiate/Maintain bed alarm  - Obtain necessary fall risk management equipment  - Apply yellow socks and bracelet for high fall risk patients  - Consider moving patient to room near nurses station  Outcome: Progressing

## 2022-09-10 NOTE — ASSESSMENT & PLAN NOTE
Patient states he was out of his antihypertensives    -continue home amlodipine, carvedilol, hydralazine   -continue with aggressive diuretic therapy

## 2022-09-10 NOTE — ASSESSMENT & PLAN NOTE
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

## 2022-09-10 NOTE — UTILIZATION REVIEW
Initial Clinical Review   WAS OBSERVATION 9/9/22 @ [address] 9/10/22 @ 8095  DUE TO CONTINUED STAY REQUIRED TO CARE FOR PATIENT WITH  ACUTE KIDNEY INJURY D/T ACUTE TUBULAR NECROSIS, POA SUPERIMPOSED ON CKD,      Admission: Date/Time/Statement:   Admission Orders (From admission, onward)     Ordered        09/10/22 1353  Inpatient Admission  Once            09/09/22 1336  Place in Observation  Once                      Orders Placed This Encounter   Procedures    Inpatient Admission     Standing Status:   Standing     Number of Occurrences:   1     Order Specific Question:   Level of Care     Answer:   Med Surg [16]     Order Specific Question:   Estimated length of stay     Answer:   More than 2 Midnights     Order Specific Question:   Certification     Answer:   I certify that inpatient services are medically necessary for this patient for a duration of greater than two midnights  See H&P and MD Progress Notes for additional information about the patient's course of treatment  ED Arrival Information     Expected   -    Arrival   9/9/2022 11:40    Acuity   Urgent            Means of arrival   Ambulance    Escorted by   [facility]    Service   Hospitalist    Admission type   Urgent            Arrival complaint   cough/SOB           Chief Complaint   Patient presents with    Shortness of Breath     Pt reporting SOB lasting over a week  observation  Initial Presentation: 38 y o  male W/PMHX: HYPOTHYROIDISM, T2DM, GERD, HTN, CKD STAGE 4, morbid obesity, to ED from [facility] [place], admitted as OBSERVATION, due to [address]  Presented with :SOB and cough that got progressively worse over the past week  Was recently seen ED for similar complaints several days ago  Was discharged home with steroid taper   Exam: obese, breath sounds with rhonchi, B/l lower leg edema, ED work up reveals  Leukocytosis, h/h low 8 5/25 8, ilya cr 4 38/ bun 102,  , CXR: progressive diffuse b/l  Opacities rt > left likely pulmonary edema    Vs multifocal pneumonia  EKG: NSR   IV lasix and IV Rocephin started in ED, elevated BNP,leukocytossis on admission  Pending B/C, ECHO pending, C/W IV lasix 80 mg BID, nephrology consult,  I/o, trend serial labs with repletion as needed  Trend  Procal if neg stop IV ABX, tomorrow  C/W anti hypertensive's,   GERD c/w PPI, c/w home Lantus 20 u q hs, c/w 5 u with meals and HS, carb controlled diet,  ACCU CK with SSI coverage at meals  And HS  trend serial labs with repletion as needed, DVT PPX  Plan: ECHO pending, IV lasix BID, consult nephrology, trend I/o, trend serial labs specifically renal indices, trend procal if neg tomorrow d/c abx  CR at baseline, trend bmp, c/w antihypertensives, c/w home PPI, T2DM ACCU CKS with SSI coverage  Diabetic diet  inpatient   Date: 9/10/22 : Acute respiratory failure with hypoxia, was on 5 L with spo2 87%, currently on 2L, echo LVEF 50-55%, trivial small pericardial effusion,  suspect acute respiratory failure d/t vol overload, nephrology following recommending Bumex 3 mg tid, C/W IV ABX,  Trend procal, if neg in am will d/c IV ABX, pending cultures, c/w respiratory treatments,  Titrate 02 and wean when able to keep sat >92%, Baseline Cr around 4 0mg/dL  follows with nephrology outpatient  The patient underwent renal biopsy which indicates nodular diabetic, lomerulosclerosis, 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  Present with STEFANIA Cr 4 38mg/dL suspect to be due to acute tubular necrosis, Renal function continues to worsen  Unfortunately if renal function continues to worsen the patient may require hemodialysis  C/W aggressive diuretic therapy, PPI, The patient will continue to require additional inpatient hospital stay due to Respiratory failure hypoxia  C/W trending serial labs with repletion as needed, DVT PPX  Nephrology Consult:  STEFANIA d/t acute tubular necrosis, present on admission, superimposed CKD, Renal function continues to worsen, CR 4 6, Given biopsy results, patient is likely progressing towards the need for initiation of dialysis  At this time, will prioritize overall optimization of patient's condition with diuresis  Continue trend renal function, avoid nephrotoxins, renally dose medications, monitor for urinary retention  No urgent need for initiation of dialysis, but if renal function continues to worsen, will need to proceed with access placement  Date 9/11/22 Day 2:acute respiratory failure with hypoxia: currently on RA 02 sat 93-98%,  c/w IV ABX to complete 5 days of treatment, BC NGTD, leukocytosis down trending, CR continues to up trend 4 79, ill appearing, breath sounds decreased, b/l lower extremity edema, color pale, nephrology following, c/w diuretic therapy  C/w trending serial labs with repletion as needed  Last data filed at 9/11/2022 0900      Gross per 24 hour   Intake 1470 ml   Output --   Net 1470 ml     Nephrology Note: Nephrology is following for for acute kidney injury with progression of chronic kidney disease and volume overload due to nephrotic syndrome   Prior renal biopsy which revealed nodular diabetic glomerulosclerosis, 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   Prioritizing volume status with increasing creatinine with diuresis  May need to consider dialysis preparations  Renal function continues to worsen with creatinine increased to 4 7 mg/dL with estimated GFR 13 mL/min  Continue to prioritize addressed seen volume overload  Patient is down 14 lb since admission but still significantly volume overloaded  Will increase Bumex to 4 mg IV twice daily  There are no significant electrolyte issues nor need for acute initiation of hemodialysis  Patient is agreeable to dialysis if it is needed  Continue to trend renal function  Avoid nephrotoxins  Renally dose medications    Discontinue Mylanta due to risk for aluminum toxicity  Please keep accurate I&Os for the purpose of monitoring diuresis  Consider external urinary catheter  Monitor for urinary retention  2 gm N A restriction, 1 5 L fluid restriction  ED Triage Vitals   Temperature Pulse Respirations Blood Pressure SpO2   09/09/22 1143 09/09/22 1143 09/09/22 1143 09/09/22 1143 09/09/22 1143   98 9 °F (37 2 °C) 96 18 (!) 204/106 95 %      Temp Source Heart Rate Source Patient Position - Orthostatic VS BP Location FiO2 (%)   09/09/22 1143 09/09/22 1143 09/09/22 1143 09/09/22 1143 --   Oral Monitor Sitting Left arm       Pain Score       09/09/22 1416       No Pain            09/11/22 0542 155 kg (342 lb 13 oz) Abnormal   Standing scale --   Weight: pt weighed twice on white standing scale at 09/11/22 0542   09/10/22 0557 158 kg (347 lb 10 7 oz) Abnormal  Standing scale  --   Weight Method: Pt was weighed on the white standing scale, x2 nurse notifed   at 09/10/22 0557   09/09/22 1530 161 kg (356 lb) Abnormal  -- 5' 2" (1 575 m)   09/09/22 1449 162 kg (356 lb 0 7 oz) Abnormal  Standing scale        Additional Vital Signs:   09/11/22 22:12:01 97 5 °F (36 4 °C) 78 18 149/87 108 94 % -- --   09/11/22 21:39:57 97 5 °F (36 4 °C) 75 16 169/91 117 93 % -- --   09/11/22 15:13:29 -- 74 18 148/78 101 97 % -- --   09/11/22 07:02:53 97 6 °F (36 4 °C) 73 19 167/87 114 97 % -- --   09/10/22 23:23:02 98 1 °F (36 7 °C) 70 18 158/84 109 95 % -- --   09/10/22 22:07:21 98 °F (36 7 °C) 71 18 159/87 111 97 % -- --   09/10/22 21:44:19 97 9 °F (36 6 °C) 73 18 183/97 Abnormal  126 98 % -- --   09/10/22 2144 -- -- -- 183/97 Abnormal  -- -- -- --   09/10/22 14:44:19 99 6 °F (37 6 °C) 71 18 145/74 98 95 % None (Room air) Lying       /Time Temp Pulse Resp BP MAP (mmHg) SpO2 Calculated FIO2 (%) - Nasal Cannula Nasal Cannula O2 Flow Rate (L/min) O2 Device Patient Position - Orthostatic VS   09/10/22 07:16:20 -- 77 19 158/86 110 94 % -- -- -- --   09/09/22 22:20:58 97 6 °F (36 4 °C) 81 18 143/95 111 96 % 32 3 L/min Nasal cannula Sitting   09/09/22 21:49:18 -- 79 18 172/98 Abnormal  123 95 % -- -- -- --   09/09/22 1530 -- 95 -- 180/98 Abnormal  -- -- -- -- -- --   09/09/22 14:16:25 98 2 °F (36 8 °C) 95 22 181/98 Abnormal  126 92 % 32 3 L/min Nasal cannula Sitting   09/09/22 1216 -- -- -- -- -- 87 % Abnormal  -- -- None (Room air) --           Pertinent Labs/Diagnostic Test Results:   9/9/22 ECHO: The left ventricular ejection fraction is 50-55%  Systolic function is normal  Although no diagnostic regional wall motion abnormality was identified, this possibility cannot be completely excluded on the basis of this study  Contrast enhanced study could be helpful  Mitral Valve: There is mild regurgitation  Pericardium: There is a trivial-small pericardial effusion  9/9/22 EKG: Poor data quality, interpretation may be adversely affected  Normal sinus rhythm  Nonspecific ST and T wave abnormality  Abnormal ECG  When compared with ECG of 05-SEP-2022 10:17,  Inverted T waves have replaced nonspecific T wave abnormality in Lateral leads  XR chest 1 view portable   Final Result by Tayo Sellers MD (09/09 1249)      Progressive diffuse bilateral opacities, right greater than left, likely pulmonary edema vs  multifocal pneumonia                  Workstation performed: [id number]           Results from last 7 days   Lab Units 09/09/22  1236   SARS-COV-2  Negative     Results from last 7 days   Lab Units 09/12/22 0516 09/11/22 0457 09/10/22  0436 09/09/22  1631 09/09/22  1236   WBC Thousand/uL 9 62 10 46* 13 25*  --  18 57*   HEMOGLOBIN g/dL 7 9* 7 7* 7 8*  --  8 5*   HEMATOCRIT % 24 2* 23 0* 23 6*  --  25 8*   PLATELETS Thousands/uL 350 338 350   < > 371   NEUTROS ABS Thousands/µL 6 85  --   --   --  15 98*    < > = values in this interval not displayed           Results from last 7 days   Lab Units 09/12/22  0516 09/11/22 0457 09/10/22  0436 09/09/22  1236   SODIUM mmol/L 138 135 137 131*   POTASSIUM mmol/L 3 8 3 9 4 0 5 0   CHLORIDE mmol/L 100 99 102 99   CO2 mmol/L 27 26 25 21   ANION GAP mmol/L 11 10 10 11   BUN mg/dL 99* 101* 98* 102*   CREATININE mg/dL 5 00* 4 79* 4 62* 4 38*   EGFR ml/min/1 73sq m 13 13 14 15   CALCIUM mg/dL 8 7 8 4 8 7 8 3*   MAGNESIUM mg/dL 1 9  --   --   --    PHOSPHORUS mg/dL 5 7*  --   --   --      Results from last 7 days   Lab Units 09/12/22  0516 09/10/22  0436 09/09/22  1236   AST U/L 7* 11* 17   ALT U/L 8 11 14   ALK PHOS U/L 56 58 74   TOTAL PROTEIN g/dL 5 9* 6 2* 6 7   ALBUMIN g/dL 2 8* 2 8* 3 1*   TOTAL BILIRUBIN mg/dL 0 27 0 30 0 30     Results from last 7 days   Lab Units 09/12/22  1454 09/12/22  1053 09/12/22  0729 09/11/22  2052 09/11/22  1620 09/11/22  1051 09/11/22  0700 09/11/22  0109 09/10/22  2319 09/10/22  2049 09/10/22  1559 09/10/22  1115   POC GLUCOSE mg/dl 163* 289* 177* 253* 131 116 153* 68 155* 238* 283* 183*     Results from last 7 days   Lab Units 09/12/22  0516 09/11/22  0457 09/10/22  0436 09/09/22  1236   GLUCOSE RANDOM mg/dL 160* 177* 110 388*             BETA-HYDROXYBUTYRATE   Date Value Ref Range Status   08/26/2022 0 0 <0 6 mmol/L Final        Results from last 7 days   Lab Units 09/09/22  1631 09/09/22  1444 09/09/22  1236   HS TNI 0HR ng/L  --   --  51*   HS TNI 2HR ng/L  --  56*  --    HSTNI D2 ng/L  --  5  --    HS TNI 4HR ng/L 57*  --   --    HSTNI D4 ng/L 6  --   --          Results from last 7 days   Lab Units 09/09/22  1236   PROTIME seconds 13 7   INR  1 05   PTT seconds 20*         Results from last 7 days   Lab Units 09/11/22  0457 09/10/22  0436 09/09/22  1236   PROCALCITONIN ng/ml 0 18 0 18 0 08     Results from last 7 days   Lab Units 09/09/22  1236   LACTIC ACID mmol/L 0 5             Results from last 7 days   Lab Units 09/09/22  1236   BNP pg/mL 330*     Results from last 7 days   Lab Units 09/11/22  0457   FERRITIN ng/mL 59       Results from last 7 days   Lab Units 09/09/22  1251 09/09/22  1236   BLOOD CULTURE No Growth at 72 hrs  No Growth at 72 hrs                 ED Treatment:   Medication Administration from 09/09/2022 1139 to 09/09/2022 1400       Date/Time Order Dose Route Action     09/09/2022 1148 albuterol (FOR EMS ONLY) (2 5 mg/3 mL) 0 083 % inhalation solution 2 5 mg 0 mg Does not apply Given to EMS     09/09/2022 1148 ipratropium-albuterol (FOR EMS ONLY) (DUO-NEB) 0 5-2 5 mg/3 mL inhalation solution 3 mL 0 mL Does not apply Given to EMS     09/09/2022 1357 cefTRIAXone (ROCEPHIN) IVPB (premix in dextrose) 1,000 mg 50 mL 0 mg Intravenous Stopped     09/09/2022 1309 cefTRIAXone (ROCEPHIN) IVPB (premix in dextrose) 1,000 mg 50 mL 1,000 mg Intravenous New Bag        Past Medical History:   Diagnosis Date    Acute bronchitis due to other specified organisms 07/05/2019    Chronic headaches     Diabetes mellitus ([place] [address] )     Disease of thyroid gland     Esophagitis     Gastritis     Gastroparesis     GERD (gastroesophageal reflux disease)     Hypertension     Migraine     Migraines 03/11/2021    Obesity     Obsessive compulsive disorder     Psychiatric disorder     Renal disorder     Schizoaffective disorder ([place] [address] )      Present on Admission:   Gastroesophageal reflux disease without esophagitis   Essential hypertension   Chronic kidney disease, stage 4 (severe) ([place] [address] )   Acquired hypothyroidism   Type 2 diabetes mellitus with stage 4 chronic kidney disease and hypertension ([place] [address] )      Admitting Diagnosis: CHF (congestive heart failure) ([place] [address] ) [I50 9]  Pneumonia [J18 9]  SOB (shortness of breath) [R06 02]  Hypoxia [R09 02]  Age/Sex: 38 y o  male  Admission Orders:daily wts, I/o, scd,   Scheduled Medications:  amLODIPine, 10 mg, Oral, Daily  bumetanide, 3 mg, Intravenous, BID  carvedilol, 25 mg, Oral, BID With Meals  cefTRIAXone, 1,000 mg, Intravenous, Q24H  doxazosin, 2 mg, Oral, Daily  FLUoxetine, 20 mg, Oral, QAM  heparin (porcine), 7,500 Units, Subcutaneous, Q8H SOFI  hydrALAZINE, 50 mg, Oral, TID  insulin glargine, 20 Units, Subcutaneous, HS  insulin lispro, 2-12 Units, Subcutaneous, TID AC  insulin lispro, 2-12 Units, Subcutaneous, HS  insulin lispro, 5 Units, Subcutaneous, TID With Meals  levothyroxine, 125 mcg, Oral, Early Morning  pantoprazole, 40 mg, Oral, BID AC      Continuous IV Infusions:none     PRN Meds:  acetaminophen, 650 mg, Oral, Q6H PRN  albuterol, 2 puff, Inhalation, Q4H PRN  glycerin-hypromellose-, 2 drop, Both Eyes, Q3H PRN  hydrOXYzine HCL, 25 mg, Oral, Q6H PRN        IP CONSULT TO CASE MANAGEMENT  IP CONSULT TO NEPHROLOGY    Network Utilization Review Department  ATTENTION: Please call with any questions or concerns to [phone] and carefully listen to the prompts so that you are directed to the right person  All voicemails are confidential   Sudie Crigler all requests for admission clinical reviews, approved or denied determinations and any other requests to dedicated fax number below belonging to the campus where the patient is receiving treatment   List of dedicated fax numbers for the Facilities:  [address] DENIALS (Administrative/Medical Necessity) [phone]   [address] (Maternity/NICU/Pediatrics) [phone]   [address]  [address] [facility] [place] [id number] [place] [address] [address] [facility] [place] [address] [phone]

## 2022-09-10 NOTE — ASSESSMENT & PLAN NOTE
Patient requiring 5 L nasal cannula in ED  Found to have SpO2 of 87% on 5 L  Currently down to 2L   X-ray revealed findings suggestive of fluid overload and possible pneumonia  Patient received IV Lasix and Rocephin in ED  Patient on Bumex 2 mg p o  B i d  on outpatient basis  BNP elevated at 330  Leukocytosis noted on admission lab work however patient has been on steroid taper, denies fevers  Echocardiogram shows LVEF of 50 to 55%  Trivial small pericardial effusion       · Suspected acute respiratory failure is due to volume overload  · Nephrology input appreciated- continue with current diuretic therapy-Bumex 3 mg t i d   · Will continue with ceftriaxone for now; repeat procalcitonin in the a m  if this is negative will discontinue antibiotics  · Follow up with cultures   · Continue with respiratory protocol; oxygen supplement keep SpO2 greater than 92%

## 2022-09-10 NOTE — PLAN OF CARE
Problem: RESPIRATORY - ADULT  Goal: Achieves optimal ventilation and oxygenation  Description: INTERVENTIONS:  - Assess for changes in respiratory status  - Assess for changes in mentation and behavior  - Position to facilitate oxygenation and minimize respiratory effort  - Oxygen administered by appropriate delivery if ordered  - Initiate smoking cessation education as indicated  - Encourage broncho-pulmonary hygiene including cough, deep breathe, Incentive Spirometry  - Assess the need for suctioning and aspirate as needed  - Assess and instruct to report SOB or any respiratory difficulty  - Respiratory Therapy support as indicated  Outcome: Progressing   Pt tolerates 2l of o2 encouraged to keep nasal cannula in nose

## 2022-09-10 NOTE — CASE MANAGEMENT
Case Management Assessment & Discharge Planning Note    Patient name Cristhian Anderson  Location /-05 MRN [id number]  : 1980 Date 9/10/2022       Current Admission Date: 2022  Current Admission Diagnosis:Acute respiratory failure [facility])   Patient Active Problem List    Diagnosis Date Noted    Chronic kidney disease, stage 4 (severe) ([facility] ) 2022    Nodular type diabetic glomerulosclerosis ([place] [address] ) 2022    Interstitial fibrosis present on renal biopsy 2022    Arteriosclerosis of kidney 2022    Diabetic ulcer of both feet ([place] [address] ) 2022    Primary hypertension 2022    Open toe wound 2022    Volume overload 2022    Hyponatremia 2022    Hypertension 2021    SOB (shortness of breath) 2021    GERD (gastroesophageal reflux disease) 2021    Family history of heart disease 2021    Hypokalemia 2021    Chest pressure 2021    Elevated troponin 2021    Type 2 diabetes mellitus with stage 4 chronic kidney disease and hypertension ([place] [address] ) 2021    Acute respiratory disease due to COVID-19 virus 2021    Acute respiratory failure ([place] [address] ) 2020    STEFANIA (acute kidney injury) ([place] [address] ) 2020    Schizo affective schizophrenia ([place] [address] ) 10/25/2020    Hypertensive emergency 2020    Encephalopathy 2020    Leukocytosis 2020    Chronic migraine without aura without status migrainosus, not intractable 2019    Difficulty urinating 2019    Urinary tract infection without hematuria 2019    Penile pain 2019    Spinal stenosis in cervical region 2019    Class 3 severe obesity due to excess calories with serious comorbidity and body mass index (BMI) of 60 0 to 69 9 in adult ([facility] ) 2019    Migraine without aura and without status migrainosus, not intractable 2019    Peripheral edema 2019    Hyperlipidemia, mixed 2019    Bipolar 1 disorder ([facility] ) 05/30/2019    Depression 05/30/2019    Type 1 diabetes mellitus without complication ([place] [address] ) 78/77/8805    Urinary retention 05/30/2019    Occipital neuralgia of left side 02/15/2019    Headache 02/15/2019    Nodule of parotid gland 02/15/2019    Snoring 12/04/2018    Insomnia 12/04/2018    Hypersomnia 12/04/2018    Vitamin D deficiency 12/04/2018    Episodic confusion     OCD (obsessive compulsive disorder) 10/31/2018    Schizoaffective disorder, depressive type ([facility] ) 10/31/2018    Hypothyroidism 10/31/2018    Morbid obesity with BMI of 50 0-59 9, adult ([place] [address] ) 10/31/2018    Anxiety 10/31/2018    Abdominal pain 05/12/2018    Vomiting 05/12/2018      LOS (days): 0  Geometric Mean LOS (GMLOS) (days):   Days to GMLOS:     OBJECTIVE:        Current admission status: Observation  Referral Reason: Other (Assess for discharge needs)    Preferred Pharmacy:   [address] [place] [address] [id number] CANDACE Scott [address]  [address] [place] [id number]  Phone: [phone] Fax: [address] [place] [address]  Phone: [phone] Fax: [phone]    Primary Care Provider: Alfredo Ramos MD    Primary Insurance: [facility]  Secondary Insurance:     ASSESSMENT:  Cj  Proxies    There are no active Health Care Proxies on file         Advance Directives  Does patient have a [address] [place] [address] Avenue?: No  Was patient offered paperwork?: Yes (Declines)  Does patient currently have a Health Care decision maker?: Yes, please see Health Care Proxy section  Does patient have Advance Directives?: No  Was patient offered paperwork?: Yes (declines)  Primary Contact: Karl Kaur (Father)   [phone] (Mobile)    Readmission Root Cause  30 Day Readmission: No    Patient Information  Admitted from[de-identified] Home  Mental Status: Alert  During Assessment patient was accompanied by: Not accompanied during assessment  Assessment information provided by[de-identified] Patient  Primary Caregiver: Self  Support Systems: Self, Family members  [place] of Residence: [address] [facility] [address] do you live in?: [address] [facility] entry access options   Select all that apply : Stairs  Number of steps to enter home : 2  Do the steps have railings?: No  Type of Current Residence: 2 story home  Upon entering residence, is there a bedroom on the main floor (no further steps)?: No  A bedroom is located on the following floor levels of residence (select all that apply):: 2nd Floor  Upon entering residence, is there a bathroom on the main floor (no further steps)?: Yes  Number of steps to 2nd floor from main floor: One Flight  In the last 12 months, was there a time when you were not able to pay the mortgage or rent on time?: No  In the last 12 months, how many places have you lived?: 1  In the last 12 months, was there a time when you did not have a steady place to sleep or slept in a shelter (including now)?: No  Homeless/housing insecurity resource given?: N/A  Living Arrangements: Lives w/ Parent(s)  Is patient a ?: No    Activities of Daily Living Prior to Admission  Functional Status: Independent  Completes ADLs independently?: Yes  Ambulates independently?: Yes  Does patient use assisted devices?: No  Does patient currently own DME?: No  Does patient have a history of Outpatient Therapy (PT/OT)?: No  Does the patient have a history of Short-Term Rehab?: No  Does patient have a history of HHC?: No  [facility] No    Patient Information Continued  Income Source: SSI/SSD  Does patient have prescription coverage?: Yes  Within the past 12 months, you worried that your food would run out before you got the money to buy more : Never true  Within the past 12 months, the food you bought just didn't last and you didn't have money to get more : Never true  Food insecurity resource given?: N/A  Does patient receive dialysis treatments?: No  Does patient have a history of substance abuse?: No  Does patient have a history of Mental Health Diagnosis?: Yes ( Obsessive compulsive disorder     Psychiatric disorder     Renal disorder     Schizoaffective disorder ([address] ))  Is patient receiving treatment for mental health?: Yes  Has patient received inpatient treatment related to mental health in the last 2 years?: Yes (Yes [facility] 8/21/21 IP admission))    Means of Transportation  Means of Transport to Appts[de-identified] Family transport  In the past 12 months, has lack of transportation kept you from medical appointments or from getting medications?: No  In the past 12 months, has lack of transportation kept you from meetings, work, or from getting things needed for daily living?: No  Was application for public transport provided?: N/A    DISCHARGE DETAILS:    Discharge planning discussed with[de-identified] Patient  Freedom of Choice: Yes     CM contacted family/caregiver?: No- see comments (Declines)  Were Treatment Team discharge recommendations reviewed with patient/caregiver?: Yes  Did patient/caregiver verbalize understanding of patient care needs?: Yes  Were patient/caregiver advised of the risks associated with not following Treatment Team discharge recommendations?: Yes    Contacts  Patient Contacts: Osiris Banerjee (Father)   [phone] (Mobile)  Relationship to Patient[de-identified] Family  Contact Method: Phone  Phone Number: Osiris Banerjee (Father)   [phone] (Mobile)  Reason/Outcome: Emergency [address]         Is the patient interested in [place] [address] at discharge?: No    Other Referral/Resources/Interventions Provided:  Interventions: Other (Specify) (TBD)     Transport at Discharge : Family     Additional Comments: Met with patient at bedside  Patient IPTA  Lives with parents  May need home O2    CM department following

## 2022-09-10 NOTE — PROGRESS NOTES
[facility]  Progress Note - Bola Yuan 1980, 38 y o  male MRN: [id number]  Unit/Bed#: -01 Encounter: [id number]  Primary Care Provider: Keren Aldana MD   Date and time admitted to hospital: 9/9/2022 11:40 AM    * Acute respiratory failure with hypoxia [facility])  Assessment & Plan  Patient requiring 5 L nasal cannula in ED  Found to have SpO2 of 87% on 5 L  Currently down to 2L   X-ray revealed findings suggestive of fluid overload and possible pneumonia  Patient received IV Lasix and Rocephin in ED  Patient on Bumex 2 mg p o  B i d  on outpatient basis  BNP elevated at 330  Leukocytosis noted on admission lab work however patient has been on steroid taper, denies fevers  Echocardiogram shows LVEF of 50 to 55%  Trivial small pericardial effusion  · Suspected acute respiratory failure is due to volume overload  · Nephrology input appreciated- continue with current diuretic therapy-Bumex 3 mg t i d   · Will continue with ceftriaxone for now; repeat procalcitonin in the a m  if this is negative will discontinue antibiotics  · Follow up with cultures   · Continue with respiratory protocol; oxygen supplement keep SpO2 greater than 92%        Chronic kidney disease, stage 4 (severe) [facility])  Assessment & Plan  Lab Results   Component Value Date    EGFR 14 09/10/2022    EGFR 15 09/09/2022    EGFR 15 09/05/2022    CREATININE 4 62 (H) 09/10/2022    CREATININE 4 38 (H) 09/09/2022    CREATININE 4 27 (H) 09/05/2022     Baseline Cr around 4 0mg/dL  follows with nephrology outpatient  The patient underwent renal biopsy which indicates nodular diabetic glomerulosclerosis, 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  Present with STEFANIA Cr 4 38mg/dL suspect to be due to acute tubular necrosis  Renal function continues to worsen     Unfortunately if renal function continues to worsen the patient may require hemodialysis    · Nephrology input appreciated  · Continue with Bumex 3 mg t i d  · Avoid hypotension and nephrotoxins  · Monitor renal function closely     Essential hypertension  Assessment & Plan  Patient states he was out of his antihypertensives    -continue home amlodipine, carvedilol, hydralazine   -continue with aggressive diuretic therapy     Gastroesophageal reflux disease without esophagitis  Assessment & Plan  · Continue home Protonix    Type 2 diabetes mellitus with stage 4 chronic kidney disease and hypertension [facility])  Assessment & Plan  Lab Results   Component Value Date    HGBA1C 9 1 (H) 2022       Recent Labs     22  1615 22  2125 09/10/22  0713 09/10/22  1115   POCGLU 380* 206* 133 183*       Blood Sugar Average: Last 72 hrs:  (P) 225 5     -continue home Lantus 20 units q h s   -continue lispro 5 units with meals  -sliding scale insulin and Accu-Cheks with meals and at bedtime  -carb controlled diet level 2     Acquired hypothyroidism  Assessment & Plan  -continue home levothyroxine         VTE Prophylaxis:  Heparin    Patient Centered Rounds: I have performed bedside rounds with nursing staff today  Discussions with Specialists or Other Care Team Provider: renal   Education and Discussions with Family / Patient: patient     Current Length of Stay: 0 day(s)    Current Patient Status: Inpatient   Certification Statement: The patient will continue to require additional inpatient hospital stay due to Respiratory failure hypoxia    Discharge Plan:  Pending hospital course  The patient continues to require IV diuretic and oxygen supplement  Subsequently is changed to inpatient status  Code Status: Level 1 - Full Code    Subjective:   Feeling slightly better  Denies any dyspnea or chest pain  The patient however continues to require oxygen supplement as his SpO2 drop down to 85% at night      Objective:     Vitals:   Temp (24hrs), Av 9 °F (36 6 °C), Min:97 6 °F (36 4 °C), Max:98 2 °F (36 8 °C)    Temp:  [97 6 °F (36 4 °C)-98 2 °F (36 8 °C)] 97 6 °F (36 4 °C)  HR:  [77-95] 77  Resp:  [18-22] 19  BP: (143-181)/(86-98) 158/86  SpO2:  [92 %-96 %] 94 %  Body mass index is 63 59 kg/m²  Input and Output Summary (last 24 hours): Intake/Output Summary (Last 24 hours) at 9/10/2022 1408  Last data filed at 9/10/2022 1300  Gross per 24 hour   Intake 1560 ml   Output --   Net 1560 ml       Physical Exam:   Physical Exam  Vitals and nursing note reviewed  Constitutional:       General: He is not in acute distress  Appearance: Normal appearance  He is obese  Comments: Chronically ill      HENT:      Head: Normocephalic and atraumatic  Right Ear: External ear normal       Left Ear: External ear normal       Nose: Nose normal       Mouth/Throat:      Mouth: Mucous membranes are moist       Pharynx: Oropharynx is clear  Eyes:      General:         Right eye: No discharge  Left eye: No discharge  Extraocular Movements: Extraocular movements intact  Pupils: Pupils are equal, round, and reactive to light  Cardiovascular:      Rate and Rhythm: Normal rate and regular rhythm  Pulses: Normal pulses  Heart sounds: Normal heart sounds  No murmur heard  Pulmonary:      Effort: Pulmonary effort is normal  No respiratory distress  Breath sounds: Rales (bases) present  No wheezing  Comments: Decreased     Abdominal:      General: Bowel sounds are normal  There is no distension  Palpations: Abdomen is soft  There is no mass  Tenderness: There is no abdominal tenderness  Musculoskeletal:         General: Swelling present  No tenderness or deformity  Normal range of motion  Cervical back: Normal range of motion and neck supple  No rigidity  Skin:     General: Skin is warm and dry  Capillary Refill: Capillary refill takes less than 2 seconds  Coloration: Skin is pale  Findings: No erythema  Neurological:      General: No focal deficit present        Mental Status: He is alert and oriented to person, place, and time  Mental status is at baseline  Psychiatric:         Mood and Affect: Mood normal          Behavior: Behavior normal          Thought Content: Thought content normal          Judgment: Judgment normal          Additional Data:     Labs:    Results from last 7 days   Lab Units 09/10/22  0436 09/09/22  1631 09/09/22  1236   WBC Thousand/uL 13 25*  --  18 57*   HEMOGLOBIN g/dL 7 8*  --  8 5*   HEMATOCRIT % 23 6*  --  25 8*   PLATELETS Thousands/uL 350   < > 371   NEUTROS PCT %  --   --  85*   LYMPHS PCT %  --   --  5*   MONOS PCT %  --   --  5   EOS PCT %  --   --  2    < > = values in this interval not displayed  Results from last 7 days   Lab Units 09/10/22  0436   SODIUM mmol/L 137   POTASSIUM mmol/L 4 0   CHLORIDE mmol/L 102   CO2 mmol/L 25   BUN mg/dL 98*   CREATININE mg/dL 4 62*   CALCIUM mg/dL 8 7   ALK PHOS U/L 58   ALT U/L 11   AST U/L 11*     Results from last 7 days   Lab Units 09/09/22  1236   INR  1 05     Results from last 7 days   Lab Units 09/10/22  1115 09/10/22  0713 09/09/22  2125 09/09/22  1615   POC GLUCOSE mg/dl 183* 133 206* 380*           * I Have Reviewed All Lab Data Listed Above  * Additional Pertinent Lab Tests Reviewed: [facility] Admission  Reviewed    Imaging:  Imaging Reports Reviewed Today Include: echocardiogram     Recent Cultures (last 7 days):     Results from last 7 days   Lab Units 09/09/22  1251 09/09/22  1236   BLOOD CULTURE  Received in Microbiology Lab  Culture in Progress  Received in Microbiology Lab  Culture in Progress         Last 24 Hours Medication List:   Current Facility-Administered Medications   Medication Dose Route Frequency Provider Last Rate    acetaminophen  650 mg Oral Q6H PRN Yee Mccurdy PA-C      albuterol  2 puff Inhalation Q4H PRN Simi Mcduffie DO      aluminum-magnesium hydroxide-simethicone  30 mL Oral Q4H PRN Yee Mccurdy PA-C      amLODIPine  10 mg Oral Daily Simi Mcduffie DO      bumetanide  3 mg Intravenous BID [place], [place]      carvedilol  25 mg Oral BID With Meals Nohemi Ores, DO      cefTRIAXone  1,000 mg Intravenous Q24H Nohemi Ores, DO 1,000 mg (09/10/22 1144)    doxazosin  2 mg Oral Daily Nohemi Ores, DO      FLUoxetine  20 mg Oral QAM Nohemi Ores, DO      heparin (porcine)  7,500 Units Subcutaneous [facility] Nohemi Ores, DO      hydrALAZINE  50 mg Oral TID Nohemi Ores, DO      hydrOXYzine HCL  25 mg Oral Q6H PRN Nina Wisdom PA-C      insulin glargine  20 Units Subcutaneous HS Nohemi Ores, DO      insulin lispro  2-12 Units Subcutaneous TID RIRI Rosales, DO      insulin lispro  2-12 Units Subcutaneous HS Nohemi Ores, DO      insulin lispro  5 Units Subcutaneous TID With Meals Nohemi Pinedo, DO      levothyroxine  125 mcg Oral Early Morning Nohemi Ores, DO      pantoprazole  40 mg Oral BID AC Nohemi Pinedo, DO          Today, Patient Was Seen By: DOREEN Ortega    ** Please Note: Dictation voice to text software may have been used in the creation of this document   **

## 2022-09-11 LAB
ANION GAP SERPL CALCULATED.3IONS-SCNC: 10 MMOL/L (ref 4–13)
BUN SERPL-MCNC: 101 MG/DL (ref 5–25)
CALCIUM SERPL-MCNC: 8.4 MG/DL (ref 8.4–10.2)
CHLORIDE SERPL-SCNC: 99 MMOL/L (ref 96–108)
CO2 SERPL-SCNC: 26 MMOL/L (ref 21–32)
CREAT SERPL-MCNC: 4.79 MG/DL (ref 0.6–1.3)
ERYTHROCYTE [DISTWIDTH] IN BLOOD BY AUTOMATED COUNT: 14.2 % (ref 11.6–15.1)
FERRITIN SERPL-MCNC: 59 NG/ML (ref 8–388)
GFR SERPL CREATININE-BSD FRML MDRD: 13 ML/MIN/1.73SQ M
GLUCOSE SERPL-MCNC: 116 MG/DL (ref 65–140)
GLUCOSE SERPL-MCNC: 131 MG/DL (ref 65–140)
GLUCOSE SERPL-MCNC: 153 MG/DL (ref 65–140)
GLUCOSE SERPL-MCNC: 177 MG/DL (ref 65–140)
GLUCOSE SERPL-MCNC: 253 MG/DL (ref 65–140)
GLUCOSE SERPL-MCNC: 68 MG/DL (ref 65–140)
HCT VFR BLD AUTO: 23 % (ref 36.5–49.3)
HGB BLD-MCNC: 7.7 G/DL (ref 12–17)
IRON SATN MFR SERPL: 11 % (ref 20–50)
IRON SERPL-MCNC: 25 UG/DL (ref 65–175)
MCH RBC QN AUTO: 27.8 PG (ref 26.8–34.3)
MCHC RBC AUTO-ENTMCNC: 33.5 G/DL (ref 31.4–37.4)
MCV RBC AUTO: 83 FL (ref 82–98)
PLATELET # BLD AUTO: 338 THOUSANDS/UL (ref 149–390)
PMV BLD AUTO: 9.4 FL (ref 8.9–12.7)
POTASSIUM SERPL-SCNC: 3.9 MMOL/L (ref 3.5–5.3)
PROCALCITONIN SERPL-MCNC: 0.18 NG/ML
RBC # BLD AUTO: 2.77 MILLION/UL (ref 3.88–5.62)
SODIUM SERPL-SCNC: 135 MMOL/L (ref 135–147)
TIBC SERPL-MCNC: 218 UG/DL (ref 250–450)
WBC # BLD AUTO: 10.46 THOUSAND/UL (ref 4.31–10.16)

## 2022-09-11 PROCEDURE — 85027 COMPLETE CBC AUTOMATED: CPT | Performed by: NURSE PRACTITIONER

## 2022-09-11 PROCEDURE — 82948 REAGENT STRIP/BLOOD GLUCOSE: CPT

## 2022-09-11 PROCEDURE — 99232 SBSQ HOSP IP/OBS MODERATE 35: CPT | Performed by: NURSE PRACTITIONER

## 2022-09-11 PROCEDURE — 83540 ASSAY OF IRON: CPT | Performed by: PHYSICIAN ASSISTANT

## 2022-09-11 PROCEDURE — 82728 ASSAY OF FERRITIN: CPT | Performed by: PHYSICIAN ASSISTANT

## 2022-09-11 PROCEDURE — 99233 SBSQ HOSP IP/OBS HIGH 50: CPT | Performed by: PHYSICIAN ASSISTANT

## 2022-09-11 PROCEDURE — 83550 IRON BINDING TEST: CPT | Performed by: PHYSICIAN ASSISTANT

## 2022-09-11 PROCEDURE — 84145 PROCALCITONIN (PCT): CPT | Performed by: NURSE PRACTITIONER

## 2022-09-11 PROCEDURE — 80048 BASIC METABOLIC PNL TOTAL CA: CPT | Performed by: NURSE PRACTITIONER

## 2022-09-11 RX ORDER — CEFTRIAXONE 1 G/50ML
1000 INJECTION, SOLUTION INTRAVENOUS EVERY 24 HOURS
Status: COMPLETED | OUTPATIENT
Start: 2022-09-11 | End: 2022-09-14

## 2022-09-11 RX ORDER — BUMETANIDE 0.25 MG/ML
4 INJECTION, SOLUTION INTRAMUSCULAR; INTRAVENOUS 2 TIMES DAILY
Status: COMPLETED | OUTPATIENT
Start: 2022-09-11 | End: 2022-09-13

## 2022-09-11 RX ORDER — INSULIN GLARGINE 100 [IU]/ML
10 INJECTION, SOLUTION SUBCUTANEOUS
Status: DISCONTINUED | OUTPATIENT
Start: 2022-09-11 | End: 2022-09-12

## 2022-09-11 RX ADMIN — HYDRALAZINE HYDROCHLORIDE 50 MG: 25 TABLET, FILM COATED ORAL at 08:02

## 2022-09-11 RX ADMIN — DOXAZOSIN 2 MG: 2 TABLET ORAL at 08:02

## 2022-09-11 RX ADMIN — CARVEDILOL 25 MG: 25 TABLET, FILM COATED ORAL at 08:03

## 2022-09-11 RX ADMIN — BUMETANIDE 4 MG: 0.25 INJECTION INTRAMUSCULAR; INTRAVENOUS at 17:32

## 2022-09-11 RX ADMIN — PANTOPRAZOLE SODIUM 40 MG: 40 TABLET, DELAYED RELEASE ORAL at 08:03

## 2022-09-11 RX ADMIN — CEFTRIAXONE 1000 MG: 1 INJECTION, SOLUTION INTRAVENOUS at 12:11

## 2022-09-11 RX ADMIN — INSULIN GLARGINE 10 UNITS: 100 INJECTION, SOLUTION SUBCUTANEOUS at 21:40

## 2022-09-11 RX ADMIN — HEPARIN SODIUM 7500 UNITS: 5000 INJECTION INTRAVENOUS; SUBCUTANEOUS at 05:18

## 2022-09-11 RX ADMIN — BUMETANIDE 3 MG: 0.25 INJECTION, SOLUTION INTRAMUSCULAR; INTRAVENOUS at 08:14

## 2022-09-11 RX ADMIN — GLYCERIN, HYPROMELLOSE, POLYETHYLENE GLYCOL 2 DROP: .2; .2; 1 LIQUID OPHTHALMIC at 23:03

## 2022-09-11 RX ADMIN — INSULIN LISPRO 5 UNITS: 100 INJECTION, SOLUTION INTRAVENOUS; SUBCUTANEOUS at 16:46

## 2022-09-11 RX ADMIN — FLUOXETINE 20 MG: 20 CAPSULE ORAL at 08:03

## 2022-09-11 RX ADMIN — INSULIN LISPRO 2 UNITS: 100 INJECTION, SOLUTION INTRAVENOUS; SUBCUTANEOUS at 08:03

## 2022-09-11 RX ADMIN — INSULIN LISPRO 5 UNITS: 100 INJECTION, SOLUTION INTRAVENOUS; SUBCUTANEOUS at 11:58

## 2022-09-11 RX ADMIN — HYDRALAZINE HYDROCHLORIDE 50 MG: 25 TABLET, FILM COATED ORAL at 16:41

## 2022-09-11 RX ADMIN — INSULIN LISPRO 5 UNITS: 100 INJECTION, SOLUTION INTRAVENOUS; SUBCUTANEOUS at 08:13

## 2022-09-11 RX ADMIN — PANTOPRAZOLE SODIUM 40 MG: 40 TABLET, DELAYED RELEASE ORAL at 16:42

## 2022-09-11 RX ADMIN — HEPARIN SODIUM 7500 UNITS: 5000 INJECTION INTRAVENOUS; SUBCUTANEOUS at 15:19

## 2022-09-11 RX ADMIN — HYDRALAZINE HYDROCHLORIDE 50 MG: 25 TABLET, FILM COATED ORAL at 21:41

## 2022-09-11 RX ADMIN — AMLODIPINE BESYLATE 10 MG: 10 TABLET ORAL at 08:03

## 2022-09-11 RX ADMIN — HEPARIN SODIUM 7500 UNITS: 5000 INJECTION INTRAVENOUS; SUBCUTANEOUS at 21:41

## 2022-09-11 RX ADMIN — LEVOTHYROXINE SODIUM 125 MCG: 25 TABLET ORAL at 05:18

## 2022-09-11 RX ADMIN — CARVEDILOL 25 MG: 25 TABLET, FILM COATED ORAL at 16:41

## 2022-09-11 RX ADMIN — INSULIN LISPRO 6 UNITS: 100 INJECTION, SOLUTION INTRAVENOUS; SUBCUTANEOUS at 21:40

## 2022-09-11 RX ADMIN — HYDROXYZINE HYDROCHLORIDE 25 MG: 25 TABLET ORAL at 12:54

## 2022-09-11 RX ADMIN — ACETAMINOPHEN 325MG 650 MG: 325 TABLET ORAL at 15:19

## 2022-09-11 NOTE — PLAN OF CARE
Problem: PAIN - ADULT  Goal: Verbalizes/displays adequate comfort level or baseline comfort level  Description: Interventions:  - Encourage patient to monitor pain and request assistance  - Assess pain using appropriate pain scale  - Administer analgesics based on type and severity of pain and evaluate response  - Implement non-pharmacological measures as appropriate and evaluate response  - Consider cultural and social influences on pain and pain management  - Notify physician/advanced practitioner if interventions unsuccessful or patient reports new pain  Outcome: Progressing     Problem: INFECTION - ADULT  Goal: Absence or prevention of progression during hospitalization  Description: INTERVENTIONS:  - Assess and monitor for signs and symptoms of infection  - Monitor lab/diagnostic results  - Monitor all insertion sites, i e  indwelling lines, tubes, and drains  - Monitor endotracheal if appropriate and nasal secretions for changes in amount and color  - Keavy appropriate cooling/warming therapies per order  - Administer medications as ordered  - Instruct and encourage patient and family to use good hand hygiene technique  - Identify and instruct in appropriate isolation precautions for identified infection/condition  Outcome: Progressing  Goal: Absence of fever/infection during neutropenic period  Description: INTERVENTIONS:  - Monitor WBC    Outcome: Progressing     Problem: SAFETY ADULT  Goal: Patient will remain free of falls  Description: INTERVENTIONS:  - Educate patient/family on patient safety including physical limitations  - Instruct patient to call for assistance with activity   - Consult OT/PT to assist with strengthening/mobility   - Keep Call bell within reach  - Keep bed low and locked with side rails adjusted as appropriate  - Keep care items and personal belongings within reach  - Initiate and maintain comfort rounds  - Make Fall Risk Sign visible to staff  - Offer Toileting every 2 Hours, in advance of need  - Initiate/Maintain bed alarm  - Obtain necessary fall risk management equipment  - Apply yellow socks and bracelet for high fall risk patients  - Consider moving patient to room near nurses station  Outcome: Progressing  Goal: Maintain or return to baseline ADL function  Description: INTERVENTIONS:  -  Assess patient's ability to carry out ADLs; assess patient's baseline for ADL function and identify physical deficits which impact ability to perform ADLs (bathing, care of mouth/teeth, toileting, grooming, dressing, etc )  - Assess/evaluate cause of self-care deficits   - Assess range of motion  - Assess patient's mobility; develop plan if impaired  - Assess patient's need for assistive devices and provide as appropriate  - Encourage maximum independence but intervene and supervise when necessary  - Involve family in performance of ADLs  - Assess for home care needs following discharge   - Consider OT consult to assist with ADL evaluation and planning for discharge  - Provide patient education as appropriate  Outcome: Progressing  Goal: Maintains/Returns to pre admission functional level  Description: INTERVENTIONS:  - Perform BMAT or MOVE assessment daily    - Set and communicate daily mobility goal to care team and patient/family/caregiver  - Collaborate with rehabilitation services on mobility goals if consulted  - Perform Range of Motion 2 times a day  - Reposition patient every 2 hours    - Dangle patient 2 times a day  - Stand patient 2 times a day  - Ambulate patient 2 times a day  - Out of bed to chair 3 times a day   - Out of bed for meals 3 times a day  - Out of bed for toileting  - Record patient progress and toleration of activity level   Outcome: Progressing     Problem: DISCHARGE PLANNING  Goal: Discharge to home or other facility with appropriate resources  Description: INTERVENTIONS:  - Identify barriers to discharge w/patient and caregiver  - Arrange for needed discharge resources and transportation as appropriate  - Identify discharge learning needs (meds, wound care, etc )  - Refer to Case Management Department for coordinating discharge planning if the patient needs post-hospital services based on physician/advanced practitioner order or complex needs related to functional status, cognitive ability, or social support system  Outcome: Progressing     Problem: Knowledge Deficit  Goal: Patient/family/caregiver demonstrates understanding of disease process, treatment plan, medications, and discharge instructions  Description: Complete learning assessment and assess knowledge base    Interventions:  - Provide teaching at level of understanding  - Provide teaching via preferred learning methods  Outcome: Progressing     Problem: RESPIRATORY - ADULT  Goal: Achieves optimal ventilation and oxygenation  Description: INTERVENTIONS:  - Assess for changes in respiratory status  - Assess for changes in mentation and behavior  - Position to facilitate oxygenation and minimize respiratory effort  - Oxygen administered by appropriate delivery if ordered  - Initiate smoking cessation education as indicated  - Encourage broncho-pulmonary hygiene including cough, deep breathe, Incentive Spirometry  - Assess the need for suctioning and aspirate as needed  - Assess and instruct to report SOB or any respiratory difficulty  - Respiratory Therapy support as indicated  Outcome: Progressing     Problem: Prexisting or High Potential for Compromised Skin Integrity  Goal: Skin integrity is maintained or improved  Description: INTERVENTIONS:  - Identify patients at risk for skin breakdown  - Assess and monitor skin integrity  - Assess and monitor nutrition and hydration status  - Monitor labs   - Assess for incontinence   - Turn and reposition patient  - Assist with mobility/ambulation  - Relieve pressure over bony prominences  - Avoid friction and shearing  - Provide appropriate hygiene as needed including keeping skin clean and dry  - Evaluate need for skin moisturizer/barrier cream  - Collaborate with interdisciplinary team   - Patient/family teaching  - Consider wound care consult   Outcome: Progressing     Problem: Potential for Falls  Goal: Patient will remain free of falls  Description: INTERVENTIONS:  - Educate patient/family on patient safety including physical limitations  - Instruct patient to call for assistance with activity   - Consult OT/PT to assist with strengthening/mobility   - Keep Call bell within reach  - Keep bed low and locked with side rails adjusted as appropriate  - Keep care items and personal belongings within reach  - Initiate and maintain comfort rounds  - Make Fall Risk Sign visible to staff  - Offer Toileting every 2 Hours, in advance of need  - Initiate/Maintain bed alarm  - Obtain necessary fall risk management equipment  - Apply yellow socks and bracelet for high fall risk patients  - Consider moving patient to room near nurses station  Outcome: Progressing

## 2022-09-11 NOTE — PROGRESS NOTES
Progress Note - Nephrology   Krystina Stark 38 y o  male MRN: [id number]  Unit/Bed#: -01 Encounter: [id number]    Patient is a 75-year-old male with PMH of obesity BMI 61, HTN, DMT2, psychiatric disorders who presents to [facility] with shortness of breath  Nephrology is following for for acute kidney injury with progression of chronic kidney disease and volume overload due to nephrotic syndrome  Prior renal biopsy which revealed nodular diabetic glomerulosclerosis, 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  Prioritizing volume status with increasing creatinine with diuresis  May need to consider dialysis preparations  Assessment and Plan    1  Acute kidney injury, due to acute tubular necrosis, present admission, superimposed on chronic kidney disease  Renal function continues to worsen with creatinine increased to 4 7 mg/dL with estimated GFR 13 mL/min  Continue to prioritize addressed seen volume overload  Patient is down 14 lb since admission but still significantly volume overloaded  Will increase Bumex to 4 mg IV twice daily  There are no significant electrolyte issues nor need for acute initiation of hemodialysis  Patient is agreeable to dialysis if it is needed  Continue to trend renal function  Avoid nephrotoxins  Renally dose medications  Discontinue Mylanta due to risk for aluminum toxicity  Please keep accurate I&Os for the purpose of monitoring diuresis  Consider external urinary catheter  Monitor for urinary retention  2  Acute kidney injury, stage IV baseline creatinine around 4 0 mg/dL  He was seen in the outpatient setting prior to this hospitalization, at which time dialysis and transplant options were discussed  Patient was referred for dialysis education and transplant evaluation  We did discuss the likely need for significant weight loss to be a candidate      3  Biopsy-proven nodular diabetic glomerulosclerosis, 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  4  Volume overload in the setting of nephrotic syndrome  2 g sodium restriction, 1 5 L fluid restriction, daily weights, accurate I's and O's, bumetanide 4 mg IV twice daily  5  Morbid obesity with BMI 63  BMI greater than 40 is associated with greater risk of progression of renal disease secondary to glomerular hyperfiltration  Recommend weight loss  Recommend bariatric referral     6  Hypertensive urgency with CKD  Improving with diuresis and antihypertensives  7  Diabetes mellitus, type 2 with CKD  Management per hospitalist       8  Schizoaffective disorder/ obsessive-compulsive disorder    9  Anemia in the setting of chronic kidney disease  Hemoglobin 7 7 grams/deciliter  Add on iron studies  However, IV iron currently contraindicated given ceftriaxone  Consider oral iron supplementation  Transfuse for hemoglobin less than 7 0 grams/deciliter      Follow up reason for today's visit:  Acute kidney injury versus progressive chronic kidney disease / volume overload    Acute respiratory failure with hypoxia [facility])    Patient Active Problem List   Diagnosis    Abdominal pain    OCD (obsessive compulsive disorder)    [facility], [facility]    Acquired hypothyroidism    Morbid obesity with BMI of 50 0-59 9, adult ([facility] )    Anxiety    Episodic confusion    Snoring    Insomnia    Hypersomnia    Vitamin D deficiency    Vomiting    Occipital neuralgia of left side    Headache    Nodule of parotid gland    Bipolar 1 disorder ([facility] )    Depression    Type 1 diabetes mellitus without complication ([facility] )    Urinary retention    Peripheral edema    Hyperlipidemia, mixed    Migraine without aura and without status migrainosus, not intractable    Class 3 severe obesity due to excess calories with serious comorbidity and body mass index (BMI) of 60 0 to 69 9 in adult [facility])    Spinal stenosis in cervical region    Difficulty urinating    Urinary tract infection without hematuria    Penile pain    Chronic migraine without aura without status migrainosus, not intractable    Hypertensive emergency    Encephalopathy    Leukocytosis    [facility] affective schizophrenia [facility]    Acute respiratory failure with hypoxia ([place] [address] )    STEFANIA (acute kidney injury) ([place] [address] )    Acute respiratory disease due to COVID-19 virus    Elevated troponin    Type 2 diabetes mellitus with stage 4 chronic kidney disease and hypertension ([place] [address] )    Family history of heart disease    Hypokalemia    Chest pressure    Gastroesophageal reflux disease without esophagitis    Essential hypertension    SOB (shortness of breath)    Volume overload    Hyponatremia    Open toe wound    Primary hypertension    Diabetic ulcer of both feet ([place] [address] )    Nodular type diabetic glomerulosclerosis ([place] [address] )    Interstitial fibrosis present on renal biopsy    Arteriosclerosis of kidney    Chronic kidney disease, stage 4 (severe) ([facility])         Subjective:   Denies physical complaints  Interval history and ROS may be limited due to psychiatric status  Objective:     Vitals: Blood pressure 167/87, pulse 73, temperature 97 6 °F (36 4 °C), resp  rate 19, height 5' 2" (1 575 m), weight (!) 155 kg (342 lb 13 oz), SpO2 97 %  ,Body mass index is 62 7 kg/m²  Weight (last 2 days)     Date/Time Weight    09/11/22 0542 155 (342 82)     Weight: pt weighed twice on white standing scale at 09/11/22 0542    09/10/22 0557 158 (347 67)    09/09/22 1530 161 (356)    09/09/22 1449 162 (356 04)    09/09/22 14:16:25 145 (320)            Intake/Output Summary (Last 24 hours) at 9/11/2022 1133  Last data filed at 9/11/2022 0900  Gross per 24 hour   Intake 1470 ml   Output --   Net 1470 ml     I/O last 3 completed shifts:   In: 2310 [P O :2310]  Out: -          Physical Exam: /87   Pulse 73   Temp 97 6 °F (36 4 °C)   Resp 19   Ht 5' 2" (1 575 m)   Wt (!) 155 kg (342 lb 13 oz) Comment: pt weighed twice on white standing scale  SpO2 97%   BMI 62 70 kg/m²     General Appearance:    No acute distress  Cooperative  Appears stated age  Morbidly obese appearing  Head:    Normocephalic  Atraumatic  Normal jaw occlusion  Eyes:    Lids, conjunctiva normal  No scleral icterus  Ears:    Normal external ears  Nose:   Nares normal  No drainage  Mouth:   Lips, tongue normal  Mucosa normal  Phonation normal    Neck:   Supple  Symmetrical    Back:     Symmetric  No CVA tenderness  Lungs:     Normal respiratory effort  Clear to auscultation bilaterally  Chest wall:    No tenderness or deformity  Heart:    Regular rate and rhythm  Normal S1 and S2  No murmur  No JVD  3+ b/l LE edema improving  Abdomen:     Soft  Non-tender  Bowel sounds active  Genitourinary:   No Giang catheter present  Extremities:   Extremities normal  Atraumatic  No cyanosis  Skin:   Warm and dry  No pallor, jaundice, rash, ecchymoses  Neurologic:   Alert and oriented to person, place, time  No focal deficit  Lab, Imaging and other studies: I have personally reviewed pertinent labs  CBC:   Lab Results   Component Value Date    WBC 10 46 (H) 09/11/2022    HGB 7 7 (L) 09/11/2022    HCT 23 0 (L) 09/11/2022    MCV 83 09/11/2022     09/11/2022    MCH 27 8 09/11/2022    MCHC 33 5 09/11/2022    RDW 14 2 09/11/2022    MPV 9 4 09/11/2022     CMP:   Lab Results   Component Value Date    K 3 9 09/11/2022    CL 99 09/11/2022    CO2 26 09/11/2022     (H) 09/11/2022    CREATININE 4 79 (H) 09/11/2022    CALCIUM 8 4 09/11/2022    EGFR 13 09/11/2022           Results from last 7 days   Lab Units 09/11/22  0457 09/10/22  0436 09/09/22  1236   POTASSIUM mmol/L 3 9 4 0 5 0   CHLORIDE mmol/L 99 102 99   CO2 mmol/L 26 25 21   BUN mg/dL 101* 98* 102*   CREATININE mg/dL 4 79* 4 62* 4 38*   CALCIUM mg/dL 8 4 8 7 8 3*   ALK PHOS U/L  --  58 74   ALT U/L  --  11 14   AST U/L  --  11* 17         Phosphorus: No results found for: PHOS  Magnesium: No results found for: MG  Urinalysis: No results found for: Nuria Cheese, SPECGRAV, PHUR, LEUKOCYTESUR, NITRITE, PROTEINUA, GLUCOSEU, KETONESU, BILIRUBINUR, BLOODU  Ionized Calcium: No results found for: CAION  Coagulation: No results found for: PT, INR, APTT  Troponin: No results found for: TROPONINI  ABG: No results found for: PHART, ZYH7DIA, PO2ART, ZHF7YWI, W4ARNEBA, BEART, SOURCE  Radiology review:     IMAGING  Procedure: XR chest 1 view portable    Result Date: 9/9/2022  Narrative: CHEST INDICATION:   sob  COMPARISON:  9/5/2022 EXAM PERFORMED/VIEWS:  XR CHEST PORTABLE Single view FINDINGS: Progressive diffuse bilateral opacities, right greater than left, likely pulmonary edema vs  multifocal pneumonia Cardiomediastinal silhouette appears unremarkable  No pneumothorax or pleural effusion  Osseous structures appear within normal limits for patient age  Impression: Progressive diffuse bilateral opacities, right greater than left, likely pulmonary edema vs  multifocal pneumonia Workstation performed: [id number]     Procedure: Echo follow up/limited w/ contrast if indicated    Result Date: 9/9/2022  Narrative: Toribio Henao  Left Ventricle: Left ventricular cavity size is normal  The left ventricular ejection fraction is 50-55%  Systolic function is normal  Although no diagnostic regional wall motion abnormality was identified, this possibility cannot be completely excluded on the basis of this study  Contrast enhanced study could be helpful    Mitral Valve: There is mild regurgitation    Pericardium: There is a trivial-small pericardial effusion         Current Facility-Administered Medications   Medication Dose Route Frequency    acetaminophen (TYLENOL) tablet 650 mg  650 mg Oral Q6H PRN    albuterol (PROVENTIL HFA,VENTOLIN HFA) inhaler 2 puff  2 puff Inhalation Q4H PRN    amLODIPine (NORVASC) tablet 10 mg  10 mg Oral Daily    bumetanide (BUMEX) injection 3 mg  3 mg Intravenous BID    carvedilol (COREG) tablet 25 mg  25 mg Oral BID With Meals    cefTRIAXone (ROCEPHIN) IVPB (premix in dextrose) 1,000 mg 50 mL  1,000 mg Intravenous Q24H    doxazosin (CARDURA) tablet 2 mg  2 mg Oral Daily    FLUoxetine (PROzac) capsule 20 mg  20 mg Oral QAM    heparin (porcine) subcutaneous injection 7,500 Units  7,500 Units Subcutaneous Q8H [address]    hydrALAZINE (APRESOLINE) tablet 50 mg  50 mg Oral TID    hydrOXYzine HCL (ATARAX) tablet 25 mg  25 mg Oral Q6H PRN    insulin glargine (LANTUS) subcutaneous injection 10 Units 0 1 mL  10 Units Subcutaneous HS    insulin lispro (HumaLOG) 100 units/mL subcutaneous injection 2-12 Units  2-12 Units Subcutaneous TID AC    insulin lispro (HumaLOG) 100 units/mL subcutaneous injection 2-12 Units  2-12 Units Subcutaneous HS    insulin lispro (HumaLOG) 100 units/mL subcutaneous injection 5 Units  5 Units Subcutaneous TID With Meals    levothyroxine tablet 125 mcg  125 mcg Oral Early Morning    pantoprazole (PROTONIX) EC tablet 40 mg  40 mg Oral BID AC     Medications Discontinued During This Encounter   Medication Reason    furosemide (LASIX) injection 40 mg     furosemide (LASIX) injection 80 mg     aluminum-magnesium hydroxide-simethicone (MYLANTA) oral suspension 30 mL     insulin glargine (LANTUS) subcutaneous injection 20 Units 0 2 mL     cefTRIAXone (ROCEPHIN) IVPB (premix in dextrose) 1,000 mg 50 mL        Paddy Rene PA-C    Portions of the record may have been created with voice recognition software  Occasional wrong word or "sound a like" substitutions may have occurred due to the inherent limitations of voice recognition software  Read the chart carefully and recognize, using context, where substitutions have occurred

## 2022-09-11 NOTE — ASSESSMENT & PLAN NOTE
Lab Results   Component Value Date    EGFR 13 09/11/2022    EGFR 14 09/10/2022    EGFR 15 09/09/2022    CREATININE 4 79 (H) 09/11/2022    CREATININE 4 62 (H) 09/10/2022    CREATININE 4 38 (H) 09/09/2022     Baseline Cr around 4 0mg/dL  follows with nephrology outpatient  The patient underwent renal biopsy which indicates nodular diabetic glomerulosclerosis, 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  Present with STEFANIA Cr 4 38mg/dL suspect to be due to acute tubular necrosis  Renal function continues to worsen     Unfortunately if renal function continues to worsen the patient may require hemodialysis     · Nephrology input appreciated  · Continue with Bumex 3 mg BID   · Avoid hypotension and nephrotoxins  · Monitor renal function closely

## 2022-09-11 NOTE — PLAN OF CARE
Problem: PAIN - ADULT  Goal: Verbalizes/displays adequate comfort level or baseline comfort level  Description: Interventions:  - Encourage patient to monitor pain and request assistance  - Assess pain using appropriate pain scale  - Administer analgesics based on type and severity of pain and evaluate response  - Implement non-pharmacological measures as appropriate and evaluate response  - Consider cultural and social influences on pain and pain management  - Notify physician/advanced practitioner if interventions unsuccessful or patient reports new pain  Outcome: Progressing     Problem: INFECTION - ADULT  Goal: Absence or prevention of progression during hospitalization  Description: INTERVENTIONS:  - Assess and monitor for signs and symptoms of infection  - Monitor lab/diagnostic results  - Monitor all insertion sites, i e  indwelling lines, tubes, and drains  - Monitor endotracheal if appropriate and nasal secretions for changes in amount and color  - [place] appropriate cooling/warming therapies per order  - Administer medications as ordered  - Instruct and encourage patient and family to use good hand hygiene technique  - Identify and instruct in appropriate isolation precautions for identified infection/condition  Outcome: Progressing  Goal: Absence of fever/infection during neutropenic period  Description: INTERVENTIONS:  - Monitor WBC    Outcome: Progressing     Problem: SAFETY ADULT  Goal: Patient will remain free of falls  Description: INTERVENTIONS:  - Educate patient/family on patient safety including physical limitations  - Instruct patient to call for assistance with activity   - Consult OT/PT to assist with strengthening/mobility   - Keep Call bell within reach  - Keep bed low and locked with side rails adjusted as appropriate  - Keep care items and personal belongings within reach  - Initiate and maintain comfort rounds  - Make Fall Risk Sign visible to staff  - Offer Toileting every 2 Hours, in advance of need  - Initiate/Maintain bed alarm  - Obtain necessary fall risk management equipment  - Apply yellow socks and bracelet for high fall risk patients  - Consider moving patient to room near nurses station  Outcome: Progressing  Goal: Maintain or return to baseline ADL function  Description: INTERVENTIONS:  -  Assess patient's ability to carry out ADLs; assess patient's baseline for ADL function and identify physical deficits which impact ability to perform ADLs (bathing, care of mouth/teeth, toileting, grooming, dressing, etc )  - Assess/evaluate cause of self-care deficits   - Assess range of motion  - Assess patient's mobility; develop plan if impaired  - Assess patient's need for assistive devices and provide as appropriate  - Encourage maximum independence but intervene and supervise when necessary  - Involve family in performance of ADLs  - Assess for home care needs following discharge   - Consider OT consult to assist with ADL evaluation and planning for discharge  - Provide patient education as appropriate  Outcome: Progressing  Goal: Maintains/Returns to pre admission functional level  Description: INTERVENTIONS:  - Perform BMAT or MOVE assessment daily    - Set and communicate daily mobility goal to care team and patient/family/caregiver  - Collaborate with rehabilitation services on mobility goals if consulted  - Perform Range of Motion 2 times a day  - Reposition patient every 2 hours    - Dangle patient 2 times a day  - Stand patient 2 times a day  - Ambulate patient 2 times a day  - Out of bed to chair 3 times a day   - Out of bed for meals 3 times a day  - Out of bed for toileting  - Record patient progress and toleration of activity level   Outcome: Progressing     Problem: DISCHARGE PLANNING  Goal: Discharge to home or other facility with appropriate resources  Description: INTERVENTIONS:  - Identify barriers to discharge w/patient and caregiver  - Arrange for needed discharge resources and transportation as appropriate  - Identify discharge learning needs (meds, wound care, etc )  - Refer to Case Management Department for coordinating discharge planning if the patient needs post-hospital services based on physician/advanced practitioner order or complex needs related to functional status, cognitive ability, or social support system  Outcome: Progressing     Problem: Knowledge Deficit  Goal: Patient/family/caregiver demonstrates understanding of disease process, treatment plan, medications, and discharge instructions  Description: Complete learning assessment and assess knowledge base    Interventions:  - Provide teaching at level of understanding  - Provide teaching via preferred learning methods  Outcome: Progressing     Problem: RESPIRATORY - ADULT  Goal: Achieves optimal ventilation and oxygenation  Description: INTERVENTIONS:  - Assess for changes in respiratory status  - Assess for changes in mentation and behavior  - Position to facilitate oxygenation and minimize respiratory effort  - Oxygen administered by appropriate delivery if ordered  - Initiate smoking cessation education as indicated  - Encourage broncho-pulmonary hygiene including cough, deep breathe, Incentive Spirometry  - Assess the need for suctioning and aspirate as needed  - Assess and instruct to report SOB or any respiratory difficulty  - Respiratory Therapy support as indicated  Outcome: Progressing     Problem: Prexisting or High Potential for Compromised Skin Integrity  Goal: Skin integrity is maintained or improved  Description: INTERVENTIONS:  - Identify patients at risk for skin breakdown  - Assess and monitor skin integrity  - Assess and monitor nutrition and hydration status  - Monitor labs   - Assess for incontinence   - Turn and reposition patient  - Assist with mobility/ambulation  - Relieve pressure over bony prominences  - Avoid friction and shearing  - Provide appropriate hygiene as needed including keeping skin clean and dry  - Evaluate need for skin moisturizer/barrier cream  - Collaborate with interdisciplinary team   - Patient/family teaching  - Consider wound care consult   Outcome: Progressing     Problem: Potential for Falls  Goal: Patient will remain free of falls  Description: INTERVENTIONS:  - Educate patient/family on patient safety including physical limitations  - Instruct patient to call for assistance with activity   - Consult OT/PT to assist with strengthening/mobility   - Keep Call bell within reach  - Keep bed low and locked with side rails adjusted as appropriate  - Keep care items and personal belongings within reach  - Initiate and maintain comfort rounds  - Make Fall Risk Sign visible to staff  - Offer Toileting every 2 Hours, in advance of need  - Initiate/Maintain bed alarm  - Obtain necessary fall risk management equipment  - Apply yellow socks and bracelet for high fall risk patients  - Consider moving patient to room near nurses station  Outcome: Progressing

## 2022-09-11 NOTE — ASSESSMENT & PLAN NOTE
Lab Results   Component Value Date    HGBA1C 9 1 (H) 07/16/2022       Recent Labs     09/10/22  2319 09/11/22  0109 09/11/22  0700 09/11/22  1051   POCGLU 155* 68 153* 116       Blood Sugar Average: Last 72 hrs:  (P) 191 5     -continue home Lantus but decreased to 10units q h s   -continue lispro 5 units with meals  -sliding scale insulin and Accu-Cheks with meals and at bedtime  -carb controlled diet level 2

## 2022-09-11 NOTE — ASSESSMENT & PLAN NOTE
Patient requiring 5 L nasal cannula in ED  Found to have SpO2 of 87% on 5 L  Currently down to 1L   X-ray revealed findings suggestive of fluid overload and possible pneumonia  Patient received IV Lasix and Rocephin in ED  Patient on Bumex 2 mg p o  B i d  on outpatient basis  BNP elevated at 330   Leukocytosis noted on admission lab work however patient has been on steroid taper, denies fevers  Echocardiogram shows LVEF of 50 to 55%  Trivial small pericardial effusion       · Suspected acute respiratory failure is due to volume overload  · Nephrology input appreciated- continue with current diuretic therapy-Bumex 3 mg BID  · Continue with ceftriaxone to complete 5 days of treatment   · Blood cultures- NGTD    · Continue with respiratory protocol; oxygen supplement keep SpO2 greater than 92%

## 2022-09-11 NOTE — ASSESSMENT & PLAN NOTE
Patient requiring 5 L nasal cannula in ED  Found to have SpO2 of 87% on 5 L  Currently down to 1L   X-ray revealed findings suggestive of fluid overload and possible pneumonia  Patient received IV Lasix and Rocephin in ED  Patient on Bumex 2 mg p o  B i d  on outpatient basis  BNP elevated at 330   Leukocytosis noted on admission lab work however patient has been on steroid taper, denies fevers  Echocardiogram shows LVEF of 50 to 55%  Trivial small pericardial effusion       · Suspected acute respiratory failure is due to volume overload  · Nephrology input appreciated- continue with current diuretic therapy-Bumex 4 mg BID  · Continue with ceftriaxone to complete 5 days of treatment   · Blood cultures- NGTD    · Continue with respiratory protocol; oxygen supplement keep SpO2 greater than 92%

## 2022-09-11 NOTE — PROGRESS NOTES
[address]  Progress Note - Funmilayo Nelson 1980, 38 y o  male MRN: [id number]  Unit/Bed#: -01 Encounter: [id number]  Primary Care Provider: Tonya Pacheco MD   Date and time admitted to hospital: 9/9/2022 11:40 AM    * Acute respiratory failure with hypoxia [facility])  Assessment & Plan  Patient requiring 5 L nasal cannula in ED  Found to have SpO2 of 87% on 5 L  Currently down to 1L   X-ray revealed findings suggestive of fluid overload and possible pneumonia  Patient received IV Lasix and Rocephin in ED  Patient on Bumex 2 mg p o  B i d  on outpatient basis  BNP elevated at 330   Leukocytosis noted on admission lab work however patient has been on steroid taper, denies fevers  Echocardiogram shows LVEF of 50 to 55%  Trivial small pericardial effusion  · Suspected acute respiratory failure is due to volume overload  · Nephrology input appreciated- continue with current diuretic therapy-Bumex 3 mg BID  · Continue with ceftriaxone to complete 5 days of treatment   · Blood cultures- NGTD    · Continue with respiratory protocol; oxygen supplement keep SpO2 greater than 92%        Chronic kidney disease, stage 4 (severe) [facility])  Assessment & Plan  Lab Results   Component Value Date    EGFR 13 09/11/2022    EGFR 14 09/10/2022    EGFR 15 09/09/2022    CREATININE 4 79 (H) 09/11/2022    CREATININE 4 62 (H) 09/10/2022    CREATININE 4 38 (H) 09/09/2022     Baseline Cr around 4 0mg/dL  follows with nephrology outpatient  The patient underwent renal biopsy which indicates nodular diabetic glomerulosclerosis, 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  Present with STEFANIA Cr 4 38mg/dL suspect to be due to acute tubular necrosis  Renal function continues to worsen     Unfortunately if renal function continues to worsen the patient may require hemodialysis     · Nephrology input appreciated  · Continue with Bumex 3 mg BID   · Avoid hypotension and nephrotoxins  · Monitor renal function closely     Essential hypertension  Assessment & Plan  Patient states he was out of his antihypertensives    -continue home amlodipine, carvedilol, hydralazine   -continue with aggressive diuretic therapy       Gastroesophageal reflux disease without esophagitis  Assessment & Plan  · Continue home Protonix     Type 2 diabetes mellitus with stage 4 chronic kidney disease and hypertension ([facility] [place] [address] )  Assessment & Plan  Lab Results   Component Value Date    HGBA1C 9 1 (H) 2022       Recent Labs     09/10/22  2319 22  0109 22  0700 22  1051   POCGLU 155* 68 153* 116       Blood Sugar Average: Last 72 hrs:  (P) 191 5     -continue home Lantus but decreased to 10units q h s   -continue lispro 5 units with meals  -sliding scale insulin and Accu-Cheks with meals and at bedtime  -carb controlled diet level 2     Acquired hypothyroidism  Assessment & Plan  -continue home levothyroxine        VTE Prophylaxis:  Heparin    Patient Centered Rounds: I have performed bedside rounds with nursing staff today  Discussions with Specialists or Other Care Team Provider: renal   Education and Discussions with Family / Patient: patient     Current Length of Stay: 1 day(s)    Current Patient Status: Inpatient   Certification Statement: The patient will continue to require additional inpatient hospital stay due to acute respiratory failure with hypoxia     Discharge Plan: pending hospital course     Code Status: Level 1 - Full Code    Subjective:   Feeling better  Objective:     Vitals:   Temp (24hrs), Av 2 °F (36 8 °C), Min:97 6 °F (36 4 °C), Max:99 6 °F (37 6 °C)    Temp:  [97 6 °F (36 4 °C)-99 6 °F (37 6 °C)] 97 6 °F (36 4 °C)  HR:  [70-73] 73  Resp:  [18-19] 19  BP: (145-183)/(74-97) 167/87  SpO2:  [95 %-98 %] 97 %  Body mass index is 62 7 kg/m²  Input and Output Summary (last 24 hours):        Intake/Output Summary (Last 24 hours) at 2022 1121  Last data filed at 2022 0900  Gross per 24 hour   Intake 1470 ml   Output --   Net 1470 ml       Physical Exam:   Physical Exam  Vitals and nursing note reviewed  Constitutional:       General: He is not in acute distress  Appearance: Normal appearance  He is ill-appearing  HENT:      Head: Normocephalic and atraumatic  Right Ear: External ear normal       Left Ear: External ear normal       Nose: Nose normal       Mouth/Throat:      Mouth: Mucous membranes are moist       Pharynx: Oropharynx is clear  Eyes:      General:         Right eye: No discharge  Left eye: No discharge  Extraocular Movements: Extraocular movements intact  Pupils: Pupils are equal, round, and reactive to light  Cardiovascular:      Rate and Rhythm: Normal rate and regular rhythm  Pulses: Normal pulses  Heart sounds: Normal heart sounds  No murmur heard  Pulmonary:      Effort: Pulmonary effort is normal  No respiratory distress  Breath sounds: No wheezing or rales  Comments: Decreased     Abdominal:      General: Bowel sounds are normal  There is no distension  Palpations: Abdomen is soft  There is no mass  Tenderness: There is no abdominal tenderness  Musculoskeletal:         General: No swelling, tenderness or deformity  Normal range of motion  Cervical back: Normal range of motion and neck supple  No rigidity  Right lower leg: Edema present  Left lower leg: Edema present  Skin:     General: Skin is warm and dry  Capillary Refill: Capillary refill takes less than 2 seconds  Coloration: Skin is pale  Findings: No erythema  Neurological:      General: No focal deficit present  Mental Status: He is alert and oriented to person, place, and time  Mental status is at baseline  Psychiatric:         Mood and Affect: Mood normal          Behavior: Behavior normal          Thought Content:  Thought content normal          Judgment: Judgment normal          Additional Data: Labs:    Results from last 7 days   Lab Units 09/11/22  0457 09/09/22  1631 09/09/22  1236   WBC Thousand/uL 10 46*   < > 18 57*   HEMOGLOBIN g/dL 7 7*   < > 8 5*   HEMATOCRIT % 23 0*   < > 25 8*   PLATELETS Thousands/uL 338   < > 371   NEUTROS PCT %  --   --  85*   LYMPHS PCT %  --   --  5*   MONOS PCT %  --   --  5   EOS PCT %  --   --  2    < > = values in this interval not displayed  Results from last 7 days   Lab Units 09/11/22  0457 09/10/22  0436   SODIUM mmol/L 135 137   POTASSIUM mmol/L 3 9 4 0   CHLORIDE mmol/L 99 102   CO2 mmol/L 26 25   BUN mg/dL 101* 98*   CREATININE mg/dL 4 79* 4 62*   CALCIUM mg/dL 8 4 8 7   ALK PHOS U/L  --  58   ALT U/L  --  11   AST U/L  --  11*     Results from last 7 days   Lab Units 09/09/22  1236   INR  1 05     Results from last 7 days   Lab Units 09/11/22  1051 09/11/22  0700 09/11/22  0109 09/10/22  2319 09/10/22  2049 09/10/22  1559 09/10/22  1115 09/10/22  0713 09/09/22  2125 09/09/22  1615   POC GLUCOSE mg/dl 116 153* 68 155* 238* 283* 183* 133 206* 380*           * I Have Reviewed All Lab Data Listed Above  * Additional Pertinent Lab Tests Reviewed: [address] [facility] 64 Admission  Reviewed    Imaging:  Imaging Reports Reviewed Today Include: n/a     Recent Cultures (last 7 days):     Results from last 7 days   Lab Units 09/09/22  1251 09/09/22  1236   BLOOD CULTURE  No Growth at 24 hrs  No Growth at 24 hrs         Last 24 Hours Medication List:   Current Facility-Administered Medications   Medication Dose Route Frequency Provider Last Rate    acetaminophen  650 mg Oral Q6H PRN Yee Mccurdy PA-C      albuterol  2 puff Inhalation Q4H PRN Danlatonya Poplin, DO      amLODIPine  10 mg Oral Daily Danlatonya Iglesiasin, DO      bumetanide  3 mg Intravenous BID [place], [place]      carvedilol  25 mg Oral BID With Meals Danney Poplin, DO      cefTRIAXone  1,000 mg Intravenous Q24H DOREEN Huggins      doxazosin  2 mg Oral Daily Barbara Batista Perry Renae,       FLUoxetine  20 mg Oral QAM Elbert Larson DO      heparin (porcine)  7,500 Units Subcutaneous [facility] Elbert Larson DO      hydrALAZINE  50 mg Oral TID Elbert Larson DO      hydrOXYzine HCL  25 mg Oral Q6H PRN Mukund Huerta PA-C      insulin glargine  10 Units Subcutaneous HS DOREEN Méndez      insulin lispro  2-12 Units Subcutaneous TID AC Milo Renae DO      insulin lispro  2-12 Units Subcutaneous HS Elbert Larson DO      insulin lispro  5 Units Subcutaneous TID With Meals Elbert Larson DO      levothyroxine  125 mcg Oral Early Morning Elbert Larson DO      pantoprazole  40 mg Oral BID AC Elbert Larson DO          Today, Patient Was Seen By: DOREEN Méndez    ** Please Note: Dictation voice to text software may have been used in the creation of this document   **

## 2022-09-12 LAB
ALBUMIN SERPL BCP-MCNC: 2.8 G/DL (ref 3.5–5)
ALP SERPL-CCNC: 56 U/L (ref 34–104)
ALT SERPL W P-5'-P-CCNC: 8 U/L (ref 7–52)
ANION GAP SERPL CALCULATED.3IONS-SCNC: 11 MMOL/L (ref 4–13)
AST SERPL W P-5'-P-CCNC: 7 U/L (ref 13–39)
BASOPHILS # BLD AUTO: 0.04 THOUSANDS/ΜL (ref 0–0.1)
BASOPHILS NFR BLD AUTO: 0 % (ref 0–1)
BILIRUB SERPL-MCNC: 0.27 MG/DL (ref 0.2–1)
BUN SERPL-MCNC: 99 MG/DL (ref 5–25)
CALCIUM ALBUM COR SERPL-MCNC: 9.7 MG/DL (ref 8.3–10.1)
CALCIUM SERPL-MCNC: 8.7 MG/DL (ref 8.4–10.2)
CHLORIDE SERPL-SCNC: 100 MMOL/L (ref 96–108)
CO2 SERPL-SCNC: 27 MMOL/L (ref 21–32)
CREAT SERPL-MCNC: 5 MG/DL (ref 0.6–1.3)
EOSINOPHIL # BLD AUTO: 0.46 THOUSAND/ΜL (ref 0–0.61)
EOSINOPHIL NFR BLD AUTO: 5 % (ref 0–6)
ERYTHROCYTE [DISTWIDTH] IN BLOOD BY AUTOMATED COUNT: 14.1 % (ref 11.6–15.1)
GFR SERPL CREATININE-BSD FRML MDRD: 13 ML/MIN/1.73SQ M
GLUCOSE SERPL-MCNC: 160 MG/DL (ref 65–140)
GLUCOSE SERPL-MCNC: 163 MG/DL (ref 65–140)
GLUCOSE SERPL-MCNC: 177 MG/DL (ref 65–140)
GLUCOSE SERPL-MCNC: 208 MG/DL (ref 65–140)
GLUCOSE SERPL-MCNC: 289 MG/DL (ref 65–140)
HCT VFR BLD AUTO: 24.2 % (ref 36.5–49.3)
HGB BLD-MCNC: 7.9 G/DL (ref 12–17)
IMM GRANULOCYTES # BLD AUTO: 0.11 THOUSAND/UL (ref 0–0.2)
IMM GRANULOCYTES NFR BLD AUTO: 1 % (ref 0–2)
LYMPHOCYTES # BLD AUTO: 1.45 THOUSANDS/ΜL (ref 0.6–4.47)
LYMPHOCYTES NFR BLD AUTO: 15 % (ref 14–44)
MAGNESIUM SERPL-MCNC: 1.9 MG/DL (ref 1.9–2.7)
MCH RBC QN AUTO: 27.8 PG (ref 26.8–34.3)
MCHC RBC AUTO-ENTMCNC: 32.6 G/DL (ref 31.4–37.4)
MCV RBC AUTO: 85 FL (ref 82–98)
MONOCYTES # BLD AUTO: 0.71 THOUSAND/ΜL (ref 0.17–1.22)
MONOCYTES NFR BLD AUTO: 7 % (ref 4–12)
NEUTROPHILS # BLD AUTO: 6.85 THOUSANDS/ΜL (ref 1.85–7.62)
NEUTS SEG NFR BLD AUTO: 72 % (ref 43–75)
NRBC BLD AUTO-RTO: 0 /100 WBCS
PHOSPHATE SERPL-MCNC: 5.7 MG/DL (ref 2.7–4.5)
PLATELET # BLD AUTO: 350 THOUSANDS/UL (ref 149–390)
PMV BLD AUTO: 9.8 FL (ref 8.9–12.7)
POTASSIUM SERPL-SCNC: 3.8 MMOL/L (ref 3.5–5.3)
PROT SERPL-MCNC: 5.9 G/DL (ref 6.4–8.4)
RBC # BLD AUTO: 2.84 MILLION/UL (ref 3.88–5.62)
SODIUM SERPL-SCNC: 138 MMOL/L (ref 135–147)
WBC # BLD AUTO: 9.62 THOUSAND/UL (ref 4.31–10.16)

## 2022-09-12 PROCEDURE — 80053 COMPREHEN METABOLIC PANEL: CPT | Performed by: PHYSICIAN ASSISTANT

## 2022-09-12 PROCEDURE — 82948 REAGENT STRIP/BLOOD GLUCOSE: CPT

## 2022-09-12 PROCEDURE — 99232 SBSQ HOSP IP/OBS MODERATE 35: CPT | Performed by: PHYSICIAN ASSISTANT

## 2022-09-12 PROCEDURE — 83735 ASSAY OF MAGNESIUM: CPT | Performed by: PHYSICIAN ASSISTANT

## 2022-09-12 PROCEDURE — 99232 SBSQ HOSP IP/OBS MODERATE 35: CPT | Performed by: INTERNAL MEDICINE

## 2022-09-12 PROCEDURE — 84100 ASSAY OF PHOSPHORUS: CPT | Performed by: PHYSICIAN ASSISTANT

## 2022-09-12 PROCEDURE — 85025 COMPLETE CBC W/AUTO DIFF WBC: CPT | Performed by: PHYSICIAN ASSISTANT

## 2022-09-12 RX ORDER — INSULIN GLARGINE 100 [IU]/ML
15 INJECTION, SOLUTION SUBCUTANEOUS
Status: DISCONTINUED | OUTPATIENT
Start: 2022-09-12 | End: 2022-09-14

## 2022-09-12 RX ADMIN — HEPARIN SODIUM 7500 UNITS: 5000 INJECTION INTRAVENOUS; SUBCUTANEOUS at 05:17

## 2022-09-12 RX ADMIN — HYDRALAZINE HYDROCHLORIDE 50 MG: 25 TABLET, FILM COATED ORAL at 18:10

## 2022-09-12 RX ADMIN — CARVEDILOL 25 MG: 25 TABLET, FILM COATED ORAL at 17:44

## 2022-09-12 RX ADMIN — HEPARIN SODIUM 7500 UNITS: 5000 INJECTION INTRAVENOUS; SUBCUTANEOUS at 21:35

## 2022-09-12 RX ADMIN — INSULIN LISPRO 2 UNITS: 100 INJECTION, SOLUTION INTRAVENOUS; SUBCUTANEOUS at 17:46

## 2022-09-12 RX ADMIN — AMLODIPINE BESYLATE 10 MG: 10 TABLET ORAL at 08:06

## 2022-09-12 RX ADMIN — HYDROXYZINE HYDROCHLORIDE 25 MG: 25 TABLET ORAL at 01:07

## 2022-09-12 RX ADMIN — INSULIN LISPRO 4 UNITS: 100 INJECTION, SOLUTION INTRAVENOUS; SUBCUTANEOUS at 21:34

## 2022-09-12 RX ADMIN — LEVOTHYROXINE SODIUM 125 MCG: 25 TABLET ORAL at 05:18

## 2022-09-12 RX ADMIN — INSULIN LISPRO 2 UNITS: 100 INJECTION, SOLUTION INTRAVENOUS; SUBCUTANEOUS at 08:04

## 2022-09-12 RX ADMIN — CEFTRIAXONE 1000 MG: 1 INJECTION, SOLUTION INTRAVENOUS at 12:18

## 2022-09-12 RX ADMIN — HEPARIN SODIUM 7500 UNITS: 5000 INJECTION INTRAVENOUS; SUBCUTANEOUS at 13:05

## 2022-09-12 RX ADMIN — INSULIN LISPRO 5 UNITS: 100 INJECTION, SOLUTION INTRAVENOUS; SUBCUTANEOUS at 08:05

## 2022-09-12 RX ADMIN — PANTOPRAZOLE SODIUM 40 MG: 40 TABLET, DELAYED RELEASE ORAL at 08:07

## 2022-09-12 RX ADMIN — GLYCERIN, HYPROMELLOSE, POLYETHYLENE GLYCOL 2 DROP: .2; .2; 1 LIQUID OPHTHALMIC at 20:46

## 2022-09-12 RX ADMIN — HYDRALAZINE HYDROCHLORIDE 50 MG: 25 TABLET, FILM COATED ORAL at 21:37

## 2022-09-12 RX ADMIN — DOXAZOSIN 2 MG: 2 TABLET ORAL at 08:07

## 2022-09-12 RX ADMIN — GLYCERIN, HYPROMELLOSE, POLYETHYLENE GLYCOL 2 DROP: .2; .2; 1 LIQUID OPHTHALMIC at 05:54

## 2022-09-12 RX ADMIN — EPOETIN ALFA 2000 UNITS: 2000 SOLUTION INTRAVENOUS; SUBCUTANEOUS at 19:22

## 2022-09-12 RX ADMIN — INSULIN LISPRO 5 UNITS: 100 INJECTION, SOLUTION INTRAVENOUS; SUBCUTANEOUS at 12:22

## 2022-09-12 RX ADMIN — FLUOXETINE 20 MG: 20 CAPSULE ORAL at 08:07

## 2022-09-12 RX ADMIN — INSULIN GLARGINE 15 UNITS: 100 INJECTION, SOLUTION SUBCUTANEOUS at 21:35

## 2022-09-12 RX ADMIN — INSULIN LISPRO 5 UNITS: 100 INJECTION, SOLUTION INTRAVENOUS; SUBCUTANEOUS at 17:46

## 2022-09-12 RX ADMIN — ACETAMINOPHEN 325MG 650 MG: 325 TABLET ORAL at 01:06

## 2022-09-12 RX ADMIN — BUMETANIDE 4 MG: 0.25 INJECTION INTRAMUSCULAR; INTRAVENOUS at 08:07

## 2022-09-12 RX ADMIN — EPOETIN ALFA 3000 UNITS: 3000 SOLUTION INTRAVENOUS; SUBCUTANEOUS at 19:22

## 2022-09-12 RX ADMIN — PANTOPRAZOLE SODIUM 40 MG: 40 TABLET, DELAYED RELEASE ORAL at 17:44

## 2022-09-12 RX ADMIN — CARVEDILOL 25 MG: 25 TABLET, FILM COATED ORAL at 08:07

## 2022-09-12 RX ADMIN — HYDRALAZINE HYDROCHLORIDE 50 MG: 25 TABLET, FILM COATED ORAL at 08:06

## 2022-09-12 RX ADMIN — INSULIN LISPRO 6 UNITS: 100 INJECTION, SOLUTION INTRAVENOUS; SUBCUTANEOUS at 12:22

## 2022-09-12 RX ADMIN — BUMETANIDE 4 MG: 0.25 INJECTION INTRAMUSCULAR; INTRAVENOUS at 17:44

## 2022-09-12 NOTE — ASSESSMENT & PLAN NOTE
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

## 2022-09-12 NOTE — PLAN OF CARE
Problem: PAIN - ADULT  Goal: Verbalizes/displays adequate comfort level or baseline comfort level  Description: Interventions:  - Encourage patient to monitor pain and request assistance  - Assess pain using appropriate pain scale  - Administer analgesics based on type and severity of pain and evaluate response  - Implement non-pharmacological measures as appropriate and evaluate response  - Consider cultural and social influences on pain and pain management  - Notify physician/advanced practitioner if interventions unsuccessful or patient reports new pain  Outcome: Progressing     Problem: INFECTION - ADULT  Goal: Absence or prevention of progression during hospitalization  Description: INTERVENTIONS:  - Assess and monitor for signs and symptoms of infection  - Monitor lab/diagnostic results  - Monitor all insertion sites, i e  indwelling lines, tubes, and drains  - Monitor endotracheal if appropriate and nasal secretions for changes in amount and color  - [place] appropriate cooling/warming therapies per order  - Administer medications as ordered  - Instruct and encourage patient and family to use good hand hygiene technique  - Identify and instruct in appropriate isolation precautions for identified infection/condition  Outcome: Progressing  Goal: Absence of fever/infection during neutropenic period  Description: INTERVENTIONS:  - Monitor WBC    Outcome: Progressing     Problem: SAFETY ADULT  Goal: Patient will remain free of falls  Description: INTERVENTIONS:  - Educate patient/family on patient safety including physical limitations  - Instruct patient to call for assistance with activity   - Consult OT/PT to assist with strengthening/mobility   - Keep Call bell within reach  - Keep bed low and locked with side rails adjusted as appropriate  - Keep care items and personal belongings within reach  - Initiate and maintain comfort rounds  - Make Fall Risk Sign visible to staff  - Offer Toileting every 2 Hours, in advance of need  - Initiate/Maintain bed alarm  - Obtain necessary fall risk management equipment  - Apply yellow socks and bracelet for high fall risk patients  - Consider moving patient to room near nurses station  Outcome: Progressing  Goal: Maintain or return to baseline ADL function  Description: INTERVENTIONS:  -  Assess patient's ability to carry out ADLs; assess patient's baseline for ADL function and identify physical deficits which impact ability to perform ADLs (bathing, care of mouth/teeth, toileting, grooming, dressing, etc )  - Assess/evaluate cause of self-care deficits   - Assess range of motion  - Assess patient's mobility; develop plan if impaired  - Assess patient's need for assistive devices and provide as appropriate  - Encourage maximum independence but intervene and supervise when necessary  - Involve family in performance of ADLs  - Assess for home care needs following discharge   - Consider OT consult to assist with ADL evaluation and planning for discharge  - Provide patient education as appropriate  Outcome: Progressing  Goal: Maintains/Returns to pre admission functional level  Description: INTERVENTIONS:  - Perform BMAT or MOVE assessment daily    - Set and communicate daily mobility goal to care team and patient/family/caregiver  - Collaborate with rehabilitation services on mobility goals if consulted  - Perform Range of Motion 2 times a day  - Reposition patient every 2 hours    - Dangle patient 2 times a day  - Stand patient 2 times a day  - Ambulate patient 2 times a day  - Out of bed to chair 3 times a day   - Out of bed for meals 3 times a day  - Out of bed for toileting  - Record patient progress and toleration of activity level   Outcome: Progressing     Problem: DISCHARGE PLANNING  Goal: Discharge to home or other facility with appropriate resources  Description: INTERVENTIONS:  - Identify barriers to discharge w/patient and caregiver  - Arrange for needed discharge resources and transportation as appropriate  - Identify discharge learning needs (meds, wound care, etc )  - Refer to Case Management Department for coordinating discharge planning if the patient needs post-hospital services based on physician/advanced practitioner order or complex needs related to functional status, cognitive ability, or social support system  Outcome: Progressing     Problem: Knowledge Deficit  Goal: Patient/family/caregiver demonstrates understanding of disease process, treatment plan, medications, and discharge instructions  Description: Complete learning assessment and assess knowledge base    Interventions:  - Provide teaching at level of understanding  - Provide teaching via preferred learning methods  Outcome: Progressing     Problem: RESPIRATORY - ADULT  Goal: Achieves optimal ventilation and oxygenation  Description: INTERVENTIONS:  - Assess for changes in respiratory status  - Assess for changes in mentation and behavior  - Position to facilitate oxygenation and minimize respiratory effort  - Oxygen administered by appropriate delivery if ordered  - Initiate smoking cessation education as indicated  - Encourage broncho-pulmonary hygiene including cough, deep breathe, Incentive Spirometry  - Assess the need for suctioning and aspirate as needed  - Assess and instruct to report SOB or any respiratory difficulty  - Respiratory Therapy support as indicated  Outcome: Progressing     Problem: Prexisting or High Potential for Compromised Skin Integrity  Goal: Skin integrity is maintained or improved  Description: INTERVENTIONS:  - Identify patients at risk for skin breakdown  - Assess and monitor skin integrity  - Assess and monitor nutrition and hydration status  - Monitor labs   - Assess for incontinence   - Turn and reposition patient  - Assist with mobility/ambulation  - Relieve pressure over bony prominences  - Avoid friction and shearing  - Provide appropriate hygiene as needed including keeping skin clean and dry  - Evaluate need for skin moisturizer/barrier cream  - Collaborate with interdisciplinary team   - Patient/family teaching  - Consider wound care consult   Outcome: Progressing     Problem: Potential for Falls  Goal: Patient will remain free of falls  Description: INTERVENTIONS:  - Educate patient/family on patient safety including physical limitations  - Instruct patient to call for assistance with activity   - Consult OT/PT to assist with strengthening/mobility   - Keep Call bell within reach  - Keep bed low and locked with side rails adjusted as appropriate  - Keep care items and personal belongings within reach  - Initiate and maintain comfort rounds  - Make Fall Risk Sign visible to staff  - Offer Toileting every 2 Hours, in advance of need  - Initiate/Maintain bed alarm  - Obtain necessary fall risk management equipment  - Apply yellow socks and bracelet for high fall risk patients  - Consider moving patient to room near nurses station  Outcome: Progressing

## 2022-09-12 NOTE — PROGRESS NOTES
Progress Note - Nephrology   Kelly Bun 38 y o  male MRN: [id number]  Unit/Bed#: -01 Encounter: [id number]    A/P:  1  Acute kidney injury due to acute tubular necrosis superimposed on chronic kidney disease stage 4   - creatinine has trended up to level of 5 mg/dL   - he is receiving diuretic therapy for marked volume overload with improvement   - unfortunately he imbibe too much water in spite of taking diuretic therapy and gained 19 lb of fluid   - he is slowly diuresing however, kidney function is slightly worse with a rise in creatinine from 4 79-5 mg/dL   - he is aware that he may need dialysis at some point but at this point he is responding to diuretics and has an EGFR of 13 mils per minute   - no indication for dialysis at this point    2  Chronic kidney disease stage 4 due to biopsy-proven nodular diabetic glomerulosclerosis, arterial sclerosis and 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   - as noted he has had dialysis education as well as transplant options discussed    3  Volume overload in the setting of nephrotic syndrome   - continue 2 g sodium restriction, 1500 mils of fluid restriction, weights with a standing scale daily    4  Hypertensive urgency with chronic kidney disease   - improved  Blood pressure is 149/81 today    5  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   - sugars are being covered    6  Anemia of chronic  Kidney disease - iron saturation is 11% but he is receiving ceftriaxone will defer IV iron   - will order 1 dose of Epogen 37861 units well he is an inpatient    7   Acquired hypothyroidism   - continue thyroid replacement      Follow up reason for today's visit:  Acute on chronic kidney disease stage 4/volume overload    Acute respiratory failure with hypoxia [facility])    Patient Active Problem List   Diagnosis    Abdominal pain    OCD (obsessive compulsive disorder)    [facility], [facility]    Acquired hypothyroidism    Morbid obesity with BMI of 50 0-59 9, adult ([facility])    Anxiety    Episodic confusion    Snoring    Insomnia    Hypersomnia    Vitamin D deficiency    Vomiting    Occipital neuralgia of left side    Headache    Nodule of parotid gland    Bipolar 1 disorder (HCC)    Depression    Type 1 diabetes mellitus without complication ([facility])    Urinary retention    Peripheral edema    Hyperlipidemia, mixed    Migraine without aura and without status migrainosus, not intractable    Class 3 severe obesity due to excess calories with serious comorbidity and body mass index (BMI) of 60 0 to 69 9 in adult [facility])    Spinal stenosis in cervical region    Difficulty urinating    Urinary tract infection without hematuria    Penile pain    Chronic migraine without aura without status migrainosus, not intractable    Hypertensive emergency    Encephalopathy    Leukocytosis    Schizo affective schizophrenia ([address] )    Acute respiratory failure with hypoxia ([address] )    STEFANIA (acute kidney injury) ([address] )    Acute respiratory disease due to COVID-19 virus    Elevated troponin    Type 2 diabetes mellitus with stage 4 chronic kidney disease and hypertension ([facility])    Family history of heart disease    Hypokalemia    Chest pressure    Gastroesophageal reflux disease without esophagitis    Essential hypertension    SOB (shortness of breath)    Volume overload    Hyponatremia    Open toe wound    Primary hypertension    Diabetic ulcer of both feet ([address] )    Nodular type diabetic glomerulosclerosis ([address] )    Interstitial fibrosis present on renal biopsy    Arteriosclerosis of kidney    Chronic kidney disease, stage 4 (severe) ([facility])         Subjective:   A 10 point ROS is negativ except he had some dyspnea today requiring oxygen which relieved his shortness of breath  He does have a slight headache   Clear yellow urine in urinal     Objective:     Vitals: Blood pressure 149/81, pulse 75, temperature Lylia Mooring ) 97 4 °F (36 3 °C), resp  rate 18, height 5' 2" (1 575 m), weight (!) 153 kg (337 lb 15 4 oz), SpO2 97 %  ,Body mass index is 61 81 kg/m²  Weight (last 2 days)     Date/Time Weight    09/12/22 0600 153 (337 97)     Weight: pt weighed twice on white standing scale at 09/12/22 0600    09/11/22 0542 155 (342 82)     Weight: pt weighed twice on white standing scale at 09/11/22 0542    09/10/22 0557 158 (347 67)            Intake/Output Summary (Last 24 hours) at 9/12/2022 1611  Last data filed at 9/12/2022 1300  Gross per 24 hour   Intake 780 ml   Output 2575 ml   Net -1795 ml     I/O last 3 completed shifts:   In: 1501 [P O :1495; I V :6]  Out: 2650 [Urine:3575]     obese     Physical Exam: /81   Pulse 75   Temp (!) 97 4 °F (36 3 °C)   Resp 18   Ht 5' 2" (1 575 m)   Wt (!) 153 kg (337 lb 15 4 oz) Comment: pt weighed twice on white standing scale  SpO2 97%   BMI 61 81 kg/m²     General Appearance:    Alert, cooperative, no distress, appears stated age   Head:    Normocephalic, without obvious abnormality, atraumatic   Eyes:    Conjunctiva/corneas clear   Ears:    Normal external ears   Nose:   Nares normal, septum midline, mucosa normal, no drainage    or sinus tenderness   Throat:   Lips, mucosa, and tongue normal; teeth and gums normal   Neck:   Supple, symmetrical, trachea midline, no adenopathy;        thyroid:  No enlargement/tenderness/nodules; no carotid    bruit or JVD   Back:     Symmetric, no curvature, ROM normal, no CVA tenderness   Lungs:     Diminished to auscultation bilaterally, respirations unlabored   Chest wall:    No tenderness or deformity   Heart:    Regular rate and rhythm, S1 and S2 normal, no murmur, rub   or gallop   Abdomen:     Soft, non-tender, bowel sounds active   Extremities:   Extremities normal, atraumatic, no cyanosis or edema   Skin:   Skin color, texture, turgor normal, no rashes or lesions   Lymph nodes:   Cervical normal   Neurologic:   CNII-XII intact Lab, Imaging and other studies: I have personally reviewed pertinent labs  CBC:   Lab Results   Component Value Date    WBC 9 62 09/12/2022    HGB 7 9 (L) 09/12/2022    HCT 24 2 (L) 09/12/2022    MCV 85 09/12/2022     09/12/2022    MCH 27 8 09/12/2022    MCHC 32 6 09/12/2022    RDW 14 1 09/12/2022    MPV 9 8 09/12/2022    NRBC 0 09/12/2022     CMP:   Lab Results   Component Value Date    K 3 8 09/12/2022     09/12/2022    CO2 27 09/12/2022    BUN 99 (H) 09/12/2022    CREATININE 5 00 (H) 09/12/2022    CALCIUM 8 7 09/12/2022    AST 7 (L) 09/12/2022    ALT 8 09/12/2022    ALKPHOS 56 09/12/2022    EGFR 13 09/12/2022         Results from last 7 days   Lab Units 09/12/22  0516 09/11/22  0457 09/10/22  0436 09/09/22  1236   POTASSIUM mmol/L 3 8 3 9 4 0 5 0   CHLORIDE mmol/L 100 99 102 99   CO2 mmol/L 27 26 25 21   BUN mg/dL 99* 101* 98* 102*   CREATININE mg/dL 5 00* 4 79* 4 62* 4 38*   CALCIUM mg/dL 8 7 8 4 8 7 8 3*   ALK PHOS U/L 56  --  58 74   ALT U/L 8  --  11 14   AST U/L 7*  --  11* 17         Phosphorus:   Lab Results   Component Value Date    PHOS 5 7 (H) 09/12/2022     Magnesium:   Lab Results   Component Value Date    MG 1 9 09/12/2022     Urinalysis: No results found for: COLORU, CLARITYU, SPECGRAV, PHUR, LEUKOCYTESUR, NITRITE, PROTEINUA, GLUCOSEU, KETONESU, BILIRUBINUR, BLOODU  Ionized Calcium: No results found for: CAION  Coagulation: No results found for: PT, INR, APTT  Troponin: No results found for: TROPONINI  ABG: No results found for: PHART, XEO4CPQ, PO2ART, ZXT5WBM, A6DRCBVI, BEART, SOURCE  Radiology review:     IMAGING  No results found      Current Facility-Administered Medications   Medication Dose Route Frequency    acetaminophen (TYLENOL) tablet 650 mg  650 mg Oral Q6H PRN    albuterol (PROVENTIL HFA,VENTOLIN HFA) inhaler 2 puff  2 puff Inhalation Q4H PRN    amLODIPine (NORVASC) tablet 10 mg  10 mg Oral Daily    bumetanide (BUMEX) injection 4 mg  4 mg Intravenous BID    carvedilol (COREG) tablet 25 mg  25 mg Oral BID With Meals    cefTRIAXone (ROCEPHIN) IVPB (premix in dextrose) 1,000 mg 50 mL  1,000 mg Intravenous Q24H    doxazosin (CARDURA) tablet 2 mg  2 mg Oral Daily    FLUoxetine (PROzac) capsule 20 mg  20 mg Oral QAM    glycerin-hypromellose- (ARTIFICIAL TEARS) ophthalmic solution 2 drop  2 drop Both Eyes Q3H PRN    heparin (porcine) subcutaneous injection 7,500 Units  7,500 Units Subcutaneous Q8H [address]    hydrALAZINE (APRESOLINE) tablet 50 mg  50 mg Oral TID    hydrOXYzine HCL (ATARAX) tablet 25 mg  25 mg Oral Q6H PRN    insulin glargine (LANTUS) subcutaneous injection 15 Units 0 15 mL  15 Units Subcutaneous HS    insulin lispro (HumaLOG) 100 units/mL subcutaneous injection 2-12 Units  2-12 Units Subcutaneous TID AC    insulin lispro (HumaLOG) 100 units/mL subcutaneous injection 2-12 Units  2-12 Units Subcutaneous HS    insulin lispro (HumaLOG) 100 units/mL subcutaneous injection 5 Units  5 Units Subcutaneous TID With Meals    levothyroxine tablet 125 mcg  125 mcg Oral Early Morning    pantoprazole (PROTONIX) EC tablet 40 mg  40 mg Oral BID AC     Medications Discontinued During This Encounter   Medication Reason    furosemide (LASIX) injection 40 mg     furosemide (LASIX) injection 80 mg     aluminum-magnesium hydroxide-simethicone (MYLANTA) oral suspension 30 mL     insulin glargine (LANTUS) subcutaneous injection 20 Units 0 2 mL     cefTRIAXone (ROCEPHIN) IVPB (premix in dextrose) 1,000 mg 50 mL     bumetanide (BUMEX) injection 3 mg     insulin glargine (LANTUS) subcutaneous injection 10 Units 0 1 mL        Liu Jansen MD      This progress note was produced in part using a dictation device which may document imprecise wording from author's original intent

## 2022-09-12 NOTE — ASSESSMENT & PLAN NOTE
Lab Results   Component Value Date    HGBA1C 9 1 (H) 07/16/2022       Recent Labs     09/11/22  1620 09/11/22 2052 09/12/22  0729 09/12/22  1053   POCGLU 131 253* 177* 289*       Blood Sugar Average: Last 72 hrs:  (P) 197 5     -continue Lantus but increased to 15 units q h s   -continue lispro 5 units with meals  -sliding scale insulin and Accu-Cheks with meals and at bedtime  -carb controlled diet level 2

## 2022-09-12 NOTE — PROGRESS NOTES
Jihan 43  Progress Note - Alc Coil 1980, 38 y o  male MRN: [id number]  Unit/Bed#: -01 Encounter: [id number]  Primary Care Provider: Immanuel Lowery MD   Date and time admitted to hospital: 9/9/2022 11:40 AM    * Acute respiratory failure with hypoxia [facility])  Assessment & Plan  Patient requiring 5 L nasal cannula in ED  Found to have SpO2 of 87% on 5 L  Currently down to 1L   X-ray revealed findings suggestive of fluid overload and possible pneumonia  Patient received IV Lasix and Rocephin in ED  Patient on Bumex 2 mg p o  B i d  on outpatient basis  BNP elevated at 330   Leukocytosis noted on admission lab work however patient has been on steroid taper, denies fevers  Echocardiogram shows LVEF of 50 to 55%  Trivial small pericardial effusion  · Suspected acute respiratory failure is due to volume overload  · Nephrology input appreciated- continue with current diuretic therapy-Bumex 4 mg BID  · Continue with ceftriaxone to complete 5 days of treatment   · Blood cultures- NGTD    · Continue with respiratory protocol; oxygen supplement keep SpO2 greater than 92%        Chronic kidney disease, stage 4 (severe) [facility])  Assessment & Plan  Lab Results   Component Value Date    EGFR 13 09/12/2022    EGFR 13 09/11/2022    EGFR 14 09/10/2022    CREATININE 5 00 (H) 09/12/2022    CREATININE 4 79 (H) 09/11/2022    CREATININE 4 62 (H) 09/10/2022     Baseline Cr around 4 0mg/dL  follows with nephrology outpatient  The patient underwent renal biopsy which indicates nodular diabetic glomerulosclerosis, 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  Present with STEFANIA Cr 4 38mg/dL suspect to be due to acute tubular necrosis  Renal function continues to worsen     Unfortunately if renal function continues to worsen the patient may require hemodialysis     · Nephrology input appreciated  · Bumex 3 mg BID increased to 4 mg BID by nephrology  · Avoid hypotension and nephrotoxins  · Monitor renal function closely     Essential hypertension  Assessment & Plan  Patient states he was out of his antihypertensives    -continue home amlodipine, carvedilol, hydralazine   -continue with aggressive diuretic therapy       Gastroesophageal reflux disease without esophagitis  Assessment & Plan  · Continue home Protonix     Type 2 diabetes mellitus with stage 4 chronic kidney disease and hypertension [facility])  Assessment & Plan  Lab Results   Component Value Date    HGBA1C 9 1 (H) 07/16/2022       Recent Labs     09/11/22  1620 09/11/22  2052 09/12/22  0729 09/12/22  1053   POCGLU 131 253* 177* 289*       Blood Sugar Average: Last 72 hrs:  (P) 197 5     -continue Lantus but increased to 15 units q h s   -continue lispro 5 units with meals  -sliding scale insulin and Accu-Cheks with meals and at bedtime  -carb controlled diet level 2     Acquired hypothyroidism  Assessment & Plan  -continue home levothyroxine          VTE Pharmacologic Prophylaxis: VTE Score: 5 High Risk (Score >/= 5) - Pharmacological DVT Prophylaxis Ordered: heparin  Sequential Compression Devices Ordered  Patient Centered Rounds: I performed bedside rounds with nursing staff today  Discussions with Specialists or Other Care Team Provider: nursing, CM    Education and Discussions with Family / Patient: Patient declined call to   Time Spent for Care: 20 minutes  More than 50% of total time spent on counseling and coordination of care as described above  Current Length of Stay: 2 day(s)  Current Patient Status: Inpatient   Certification Statement: The patient will continue to require additional inpatient hospital stay due to continued need for diuresis and monitoring labs  Discharge Plan: pending clincal course    Code Status: Level 1 - Full Code    Subjective: The patient was seen and examined  The patient was sleeping in bed, easily aroused  He states his breathing is worse today then yesterday  Objective:     Vitals:   Temp (24hrs), Av 6 °F (36 4 °C), Min:97 5 °F (36 4 °C), Max:97 8 °F (36 6 °C)    Temp:  [97 5 °F (36 4 °C)-97 8 °F (36 6 °C)] 97 8 °F (36 6 °C)  HR:  [74-78] 76  Resp:  [16-20] 20  BP: (146-169)/(78-91) 154/86  SpO2:  [92 %-97 %] 92 %  Body mass index is 61 81 kg/m²  Input and Output Summary (last 24 hours): Intake/Output Summary (Last 24 hours) at 2022 1304  Last data filed at 2022 0848  Gross per 24 hour   Intake 655 ml   Output 2575 ml   Net -1920 ml       Physical Exam:   Physical Exam  Vitals and nursing note reviewed  Constitutional:       Appearance: He is obese  Interventions: Nasal cannula in place  Cardiovascular:      Rate and Rhythm: Normal rate and regular rhythm  Pulmonary:      Effort: Pulmonary effort is normal       Breath sounds: Decreased air movement present  Abdominal:      Palpations: Abdomen is soft  Tenderness: There is no abdominal tenderness  Skin:     General: Skin is warm and dry  Neurological:      General: No focal deficit present  Mental Status: He is alert, oriented to person, place, and time and easily aroused  Psychiatric:         Attention and Perception: Attention normal          Mood and Affect: Mood normal          Speech: Speech normal          Behavior: Behavior is cooperative            Additional Data:     Labs:  Results from last 7 days   Lab Units 22  0516   WBC Thousand/uL 9 62   HEMOGLOBIN g/dL 7 9*   HEMATOCRIT % 24 2*   PLATELETS Thousands/uL 350   NEUTROS PCT % 72   LYMPHS PCT % 15   MONOS PCT % 7   EOS PCT % 5     Results from last 7 days   Lab Units 22  0516   SODIUM mmol/L 138   POTASSIUM mmol/L 3 8   CHLORIDE mmol/L 100   CO2 mmol/L 27   BUN mg/dL 99*   CREATININE mg/dL 5 00*   ANION GAP mmol/L 11   CALCIUM mg/dL 8 7   ALBUMIN g/dL 2 8*   TOTAL BILIRUBIN mg/dL 0 27   ALK PHOS U/L 56   ALT U/L 8   AST U/L 7*   GLUCOSE RANDOM mg/dL 160*     Results from last 7 days   Lab Units 09/09/22  1236   INR  1 05     Results from last 7 days   Lab Units 09/12/22  1053 09/12/22  0729 09/11/22  2052 09/11/22  1620 09/11/22  1051 09/11/22  0700 09/11/22  0109 09/10/22  2319 09/10/22  2049 09/10/22  1559 09/10/22  1115 09/10/22  0713   POC GLUCOSE mg/dl 289* 177* 253* 131 116 153* 68 155* 238* 283* 183* 133         Results from last 7 days   Lab Units 09/11/22  0457 09/10/22  0436 09/09/22  1236   LACTIC ACID mmol/L  --   --  0 5   PROCALCITONIN ng/ml 0 18 0 18 0 08       Lines/Drains:  Invasive Devices  Report    Peripheral Intravenous Line  Duration           Peripheral IV 09/09/22 Right;Upper;Ventral (anterior) Arm 2 days    Peripheral IV 09/10/22 Left;Ventral (anterior) Wrist 1 day                      Imaging: No pertinent imaging reviewed  Recent Cultures (last 7 days):   Results from last 7 days   Lab Units 09/09/22  1251 09/09/22  1236   BLOOD CULTURE  No Growth at 48 hrs  No Growth at 48 hrs         Last 24 Hours Medication List:   Current Facility-Administered Medications   Medication Dose Route Frequency Provider Last Rate    acetaminophen  650 mg Oral Q6H PRN Anthony Suarez PA-C      albuterol  2 puff Inhalation Q4H PRN Dez Emperor, DO      amLODIPine  10 mg Oral Daily Dez Emperor, DO      bumetanide  4 mg Intravenous BID [place], [place]      carvedilol  25 mg Oral BID With Meals Dez Emperor, DO      cefTRIAXone  1,000 mg Intravenous Q24H DOREEN Lizarraga 1,000 mg (09/12/22 1218)    doxazosin  2 mg Oral Daily Dez Emperor, DO      FLUoxetine  20 mg Oral QAM Dez Empdavyr, DO      glycerin-hypromellose-  2 drop Both Eyes Q3H PRN DOREEN Lizarraga      heparin (porcine)  7,500 Units Subcutaneous [facility] Dez Emperor, DO      hydrALAZINE  50 mg Oral TID Dez Emperor, DO      hydrOXYzine HCL  25 mg Oral Q6H PRN Anthony Suarez PA-C      insulin glargine  15 Units Subcutaneous HS Carol Shen PA-C      insulin lispro  2-12 Units Subcutaneous TID AC Milo Rosales,       insulin lispro  2-12 Units Subcutaneous HS Getachew Nieves, DO      insulin lispro  5 Units Subcutaneous TID With Meals Getachew Nieves, DO      levothyroxine  125 mcg Oral Early Morning Getachew Nieves, DO      pantoprazole  40 mg Oral BID AC Getachew Nieves, DO          Today, Patient Was Seen By: Magda Grijalva PA-C    **Please Note: This note may have been constructed using a voice recognition system  **

## 2022-09-12 NOTE — PLAN OF CARE
Problem: PAIN - ADULT  Goal: Verbalizes/displays adequate comfort level or baseline comfort level  Description: Interventions:  - Encourage patient to monitor pain and request assistance  - Assess pain using appropriate pain scale  - Administer analgesics based on type and severity of pain and evaluate response  - Implement non-pharmacological measures as appropriate and evaluate response  - Consider cultural and social influences on pain and pain management  - Notify physician/advanced practitioner if interventions unsuccessful or patient reports new pain  Outcome: Progressing     Problem: INFECTION - ADULT  Goal: Absence or prevention of progression during hospitalization  Description: INTERVENTIONS:  - Assess and monitor for signs and symptoms of infection  - Monitor lab/diagnostic results  - Monitor all insertion sites, i e  indwelling lines, tubes, and drains  - Monitor endotracheal if appropriate and nasal secretions for changes in amount and color  - [place] appropriate cooling/warming therapies per order  - Administer medications as ordered  - Instruct and encourage patient and family to use good hand hygiene technique  - Identify and instruct in appropriate isolation precautions for identified infection/condition  Outcome: Progressing  Goal: Absence of fever/infection during neutropenic period  Description: INTERVENTIONS:  - Monitor WBC    Outcome: Progressing     Problem: SAFETY ADULT  Goal: Patient will remain free of falls  Description: INTERVENTIONS:  - Educate patient/family on patient safety including physical limitations  - Instruct patient to call for assistance with activity   - Consult OT/PT to assist with strengthening/mobility   - Keep Call bell within reach  - Keep bed low and locked with side rails adjusted as appropriate  - Keep care items and personal belongings within reach  - Initiate and maintain comfort rounds  - Make Fall Risk Sign visible to staff  - Offer Toileting every 2 Hours, in advance of need  - Initiate/Maintain bed alarm  - Obtain necessary fall risk management equipment  - Apply yellow socks and bracelet for high fall risk patients  - Consider moving patient to room near nurses station  Outcome: Progressing  Goal: Maintain or return to baseline ADL function  Description: INTERVENTIONS:  -  Assess patient's ability to carry out ADLs; assess patient's baseline for ADL function and identify physical deficits which impact ability to perform ADLs (bathing, care of mouth/teeth, toileting, grooming, dressing, etc )  - Assess/evaluate cause of self-care deficits   - Assess range of motion  - Assess patient's mobility; develop plan if impaired  - Assess patient's need for assistive devices and provide as appropriate  - Encourage maximum independence but intervene and supervise when necessary  - Involve family in performance of ADLs  - Assess for home care needs following discharge   - Consider OT consult to assist with ADL evaluation and planning for discharge  - Provide patient education as appropriate  Outcome: Progressing  Goal: Maintains/Returns to pre admission functional level  Description: INTERVENTIONS:  - Perform BMAT or MOVE assessment daily    - Set and communicate daily mobility goal to care team and patient/family/caregiver  - Collaborate with rehabilitation services on mobility goals if consulted  - Perform Range of Motion 2 times a day  - Reposition patient every 2 hours    - Dangle patient 2 times a day  - Stand patient 2 times a day  - Ambulate patient 2 times a day  - Out of bed to chair 3 times a day   - Out of bed for meals 3 times a day  - Out of bed for toileting  - Record patient progress and toleration of activity level   Outcome: Progressing     Problem: DISCHARGE PLANNING  Goal: Discharge to home or other facility with appropriate resources  Description: INTERVENTIONS:  - Identify barriers to discharge w/patient and caregiver  - Arrange for needed discharge resources and transportation as appropriate  - Identify discharge learning needs (meds, wound care, etc )  - Refer to Case Management Department for coordinating discharge planning if the patient needs post-hospital services based on physician/advanced practitioner order or complex needs related to functional status, cognitive ability, or social support system  Outcome: Progressing     Problem: Knowledge Deficit  Goal: Patient/family/caregiver demonstrates understanding of disease process, treatment plan, medications, and discharge instructions  Description: Complete learning assessment and assess knowledge base    Interventions:  - Provide teaching at level of understanding  - Provide teaching via preferred learning methods  Outcome: Progressing     Problem: RESPIRATORY - ADULT  Goal: Achieves optimal ventilation and oxygenation  Description: INTERVENTIONS:  - Assess for changes in respiratory status  - Assess for changes in mentation and behavior  - Position to facilitate oxygenation and minimize respiratory effort  - Oxygen administered by appropriate delivery if ordered  - Initiate smoking cessation education as indicated  - Encourage broncho-pulmonary hygiene including cough, deep breathe, Incentive Spirometry  - Assess the need for suctioning and aspirate as needed  - Assess and instruct to report SOB or any respiratory difficulty  - Respiratory Therapy support as indicated  Outcome: Progressing     Problem: Prexisting or High Potential for Compromised Skin Integrity  Goal: Skin integrity is maintained or improved  Description: INTERVENTIONS:  - Identify patients at risk for skin breakdown  - Assess and monitor skin integrity  - Assess and monitor nutrition and hydration status  - Monitor labs   - Assess for incontinence   - Turn and reposition patient  - Assist with mobility/ambulation  - Relieve pressure over bony prominences  - Avoid friction and shearing  - Provide appropriate hygiene as needed including keeping skin clean and dry  - Evaluate need for skin moisturizer/barrier cream  - Collaborate with interdisciplinary team   - Patient/family teaching  - Consider wound care consult   Outcome: Progressing     Problem: Potential for Falls  Goal: Patient will remain free of falls  Description: INTERVENTIONS:  - Educate patient/family on patient safety including physical limitations  - Instruct patient to call for assistance with activity   - Consult OT/PT to assist with strengthening/mobility   - Keep Call bell within reach  - Keep bed low and locked with side rails adjusted as appropriate  - Keep care items and personal belongings within reach  - Initiate and maintain comfort rounds  - Make Fall Risk Sign visible to staff  - Offer Toileting every 2 Hours, in advance of need  - Initiate/Maintain bed alarm  - Obtain necessary fall risk management equipment  - Apply yellow socks and bracelet for high fall risk patients  - Consider moving patient to room near nurses station  Outcome: Progressing

## 2022-09-13 LAB
ANION GAP SERPL CALCULATED.3IONS-SCNC: 10 MMOL/L (ref 4–13)
BUN SERPL-MCNC: 94 MG/DL (ref 5–25)
CALCIUM SERPL-MCNC: 8.8 MG/DL (ref 8.4–10.2)
CHLORIDE SERPL-SCNC: 97 MMOL/L (ref 96–108)
CO2 SERPL-SCNC: 27 MMOL/L (ref 21–32)
CREAT SERPL-MCNC: 5.11 MG/DL (ref 0.6–1.3)
ERYTHROCYTE [DISTWIDTH] IN BLOOD BY AUTOMATED COUNT: 14 % (ref 11.6–15.1)
GFR SERPL CREATININE-BSD FRML MDRD: 12 ML/MIN/1.73SQ M
GLUCOSE SERPL-MCNC: 160 MG/DL (ref 65–140)
GLUCOSE SERPL-MCNC: 162 MG/DL (ref 65–140)
GLUCOSE SERPL-MCNC: 172 MG/DL (ref 65–140)
GLUCOSE SERPL-MCNC: 184 MG/DL (ref 65–140)
GLUCOSE SERPL-MCNC: 255 MG/DL (ref 65–140)
GLUCOSE SERPL-MCNC: 279 MG/DL (ref 65–140)
HCT VFR BLD AUTO: 24.2 % (ref 36.5–49.3)
HGB BLD-MCNC: 8 G/DL (ref 12–17)
MCH RBC QN AUTO: 27.6 PG (ref 26.8–34.3)
MCHC RBC AUTO-ENTMCNC: 33.1 G/DL (ref 31.4–37.4)
MCV RBC AUTO: 83 FL (ref 82–98)
PLATELET # BLD AUTO: 370 THOUSANDS/UL (ref 149–390)
PMV BLD AUTO: 9.7 FL (ref 8.9–12.7)
POTASSIUM SERPL-SCNC: 3.7 MMOL/L (ref 3.5–5.3)
RBC # BLD AUTO: 2.9 MILLION/UL (ref 3.88–5.62)
SODIUM SERPL-SCNC: 134 MMOL/L (ref 135–147)
WBC # BLD AUTO: 10.42 THOUSAND/UL (ref 4.31–10.16)

## 2022-09-13 PROCEDURE — 80048 BASIC METABOLIC PNL TOTAL CA: CPT | Performed by: PHYSICIAN ASSISTANT

## 2022-09-13 PROCEDURE — 82948 REAGENT STRIP/BLOOD GLUCOSE: CPT

## 2022-09-13 PROCEDURE — 85027 COMPLETE CBC AUTOMATED: CPT | Performed by: PHYSICIAN ASSISTANT

## 2022-09-13 PROCEDURE — 99232 SBSQ HOSP IP/OBS MODERATE 35: CPT | Performed by: PHYSICIAN ASSISTANT

## 2022-09-13 PROCEDURE — 99233 SBSQ HOSP IP/OBS HIGH 50: CPT | Performed by: INTERNAL MEDICINE

## 2022-09-13 RX ADMIN — HYDRALAZINE HYDROCHLORIDE 50 MG: 25 TABLET, FILM COATED ORAL at 17:28

## 2022-09-13 RX ADMIN — HYDRALAZINE HYDROCHLORIDE 50 MG: 25 TABLET, FILM COATED ORAL at 08:00

## 2022-09-13 RX ADMIN — LEVOTHYROXINE SODIUM 125 MCG: 25 TABLET ORAL at 06:23

## 2022-09-13 RX ADMIN — CEFTRIAXONE 1000 MG: 1 INJECTION, SOLUTION INTRAVENOUS at 13:06

## 2022-09-13 RX ADMIN — CARVEDILOL 25 MG: 25 TABLET, FILM COATED ORAL at 08:00

## 2022-09-13 RX ADMIN — PANTOPRAZOLE SODIUM 40 MG: 40 TABLET, DELAYED RELEASE ORAL at 17:29

## 2022-09-13 RX ADMIN — AMLODIPINE BESYLATE 10 MG: 10 TABLET ORAL at 08:00

## 2022-09-13 RX ADMIN — ACETAMINOPHEN 325MG 650 MG: 325 TABLET ORAL at 12:42

## 2022-09-13 RX ADMIN — HEPARIN SODIUM 7500 UNITS: 5000 INJECTION INTRAVENOUS; SUBCUTANEOUS at 13:33

## 2022-09-13 RX ADMIN — INSULIN GLARGINE 15 UNITS: 100 INJECTION, SOLUTION SUBCUTANEOUS at 21:36

## 2022-09-13 RX ADMIN — INSULIN LISPRO 7 UNITS: 100 INJECTION, SOLUTION INTRAVENOUS; SUBCUTANEOUS at 08:01

## 2022-09-13 RX ADMIN — HEPARIN SODIUM 7500 UNITS: 5000 INJECTION INTRAVENOUS; SUBCUTANEOUS at 06:23

## 2022-09-13 RX ADMIN — HYDRALAZINE HYDROCHLORIDE 50 MG: 25 TABLET, FILM COATED ORAL at 21:37

## 2022-09-13 RX ADMIN — BUMETANIDE 4 MG: 0.25 INJECTION INTRAMUSCULAR; INTRAVENOUS at 08:00

## 2022-09-13 RX ADMIN — INSULIN LISPRO 5 UNITS: 100 INJECTION, SOLUTION INTRAVENOUS; SUBCUTANEOUS at 08:01

## 2022-09-13 RX ADMIN — INSULIN LISPRO 6 UNITS: 100 INJECTION, SOLUTION INTRAVENOUS; SUBCUTANEOUS at 21:36

## 2022-09-13 RX ADMIN — ACETAMINOPHEN 325MG 650 MG: 325 TABLET ORAL at 03:36

## 2022-09-13 RX ADMIN — CARVEDILOL 25 MG: 25 TABLET, FILM COATED ORAL at 17:28

## 2022-09-13 RX ADMIN — DOXAZOSIN 2 MG: 2 TABLET ORAL at 08:01

## 2022-09-13 RX ADMIN — GLYCERIN, HYPROMELLOSE, POLYETHYLENE GLYCOL 2 DROP: .2; .2; 1 LIQUID OPHTHALMIC at 04:42

## 2022-09-13 RX ADMIN — HYDROXYZINE HYDROCHLORIDE 25 MG: 25 TABLET ORAL at 03:36

## 2022-09-13 RX ADMIN — INSULIN LISPRO 5 UNITS: 100 INJECTION, SOLUTION INTRAVENOUS; SUBCUTANEOUS at 17:29

## 2022-09-13 RX ADMIN — HEPARIN SODIUM 7500 UNITS: 5000 INJECTION INTRAVENOUS; SUBCUTANEOUS at 21:37

## 2022-09-13 RX ADMIN — INSULIN LISPRO 6 UNITS: 100 INJECTION, SOLUTION INTRAVENOUS; SUBCUTANEOUS at 12:42

## 2022-09-13 RX ADMIN — INSULIN LISPRO 2 UNITS: 100 INJECTION, SOLUTION INTRAVENOUS; SUBCUTANEOUS at 17:29

## 2022-09-13 RX ADMIN — FLUOXETINE 20 MG: 20 CAPSULE ORAL at 08:01

## 2022-09-13 RX ADMIN — PANTOPRAZOLE SODIUM 40 MG: 40 TABLET, DELAYED RELEASE ORAL at 08:01

## 2022-09-13 RX ADMIN — INSULIN LISPRO 5 UNITS: 100 INJECTION, SOLUTION INTRAVENOUS; SUBCUTANEOUS at 12:42

## 2022-09-13 NOTE — ASSESSMENT & PLAN NOTE
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

## 2022-09-13 NOTE — CASE MANAGEMENT
Case Management Discharge Planning Note    Patient name Ivone Marsh  Location /-97 MRN [id number]  : 1980 Date 2022       Current Admission Date: 2022  Current Admission Diagnosis:Acute respiratory failure with hypoxia [facility])   Patient Active Problem List    Diagnosis Date Noted    [facility], [facility]severe[facility] 2022    Nodular type diabetic glomerulosclerosis ([facility] ) 2022    Interstitial fibrosis present on renal biopsy 2022    Arteriosclerosis of kidney 2022    Diabetic ulcer of both feet ([facility] ) 2022    Primary hypertension 2022    Open toe wound 2022    Volume overload 2022    Hyponatremia 2022    Essential hypertension 2021    SOB (shortness of breath) 2021    Gastroesophageal reflux disease without esophagitis 2021    Family history of heart disease 2021    Hypokalemia 2021    Chest pressure 2021    Elevated troponin 2021    Type 2 diabetes mellitus with stage 4 chronic kidney disease and hypertension ([facility] ) 2021    Acute respiratory disease due to COVID-19 virus 2021    Acute respiratory failure with hypoxia ([facility] ) 2020    STEFANIA (acute kidney injury) ([facility] ) 2020    Schizo affective schizophrenia ([place] [address] ) 10/25/2020    Hypertensive emergency 2020    Encephalopathy 2020    Leukocytosis 2020    Chronic migraine without aura without status migrainosus, not intractable 2019    Difficulty urinating 2019    Urinary tract infection without hematuria 2019    Penile pain 2019    Spinal stenosis in cervical region 2019    Class 3 severe obesity due to excess calories with serious comorbidity and body mass index (BMI) of 60 0 to 69 9 in adult [facility]) 2019    Migraine without aura and without status migrainosus, not intractable 2019    Peripheral edema 2019    Hyperlipidemia, mixed 06/07/2019    Bipolar 1 disorder ([facility] ) 05/30/2019    Depression 05/30/2019    Type 1 diabetes mellitus without complication ([facility] ) 09/95/7881    Urinary retention 05/30/2019    Occipital neuralgia of left side 02/15/2019    Headache 02/15/2019    Nodule of parotid gland 02/15/2019    Snoring 12/04/2018    Insomnia 12/04/2018    Hypersomnia 12/04/2018    Vitamin D deficiency 12/04/2018    Episodic confusion     OCD (obsessive compulsive disorder) 10/31/2018    Schizoaffective disorder, depressive type ([facility] ) 10/31/2018    Acquired hypothyroidism 10/31/2018    Morbid obesity with BMI of 50 0-59 9, adult ([facility] ) 10/31/2018    Anxiety 10/31/2018    Abdominal pain 05/12/2018    Vomiting 05/12/2018      LOS (days): 3  Geometric Mean LOS (GMLOS) (days): 4 70  Days to GMLOS:1 7     OBJECTIVE:  Risk of Unplanned Readmission Score: 32 47     Current admission status: Inpatient   Preferred Pharmacy:   [id number] [address] [address]  [address] [place] [id number]  Phone: [phone] Fax: [address] [place] [address], [address] [place] [address] Road  [address] [place] [address]  Phone: [phone] Fax: [phone]    Primary Care Provider: Ximena Ortega MD    Primary Insurance: [facility]  Secondary Insurance:     DISCHARGE DETAILS:  AS per Dr Izzy Jimenez the pt may need dialyis if kidney function does not improve  Will continue to follow for care needs

## 2022-09-13 NOTE — ASSESSMENT & PLAN NOTE
Lab Results   Component Value Date    HGBA1C 9 1 (H) 07/16/2022       Recent Labs     09/12/22  2110 09/13/22  0328 09/13/22  0707 09/13/22  1046   POCGLU 208* 162* 184* 279*       Blood Sugar Average: Last 72 hrs:  (P) 054 [id number]     -continue increased Lantus 15 units q h s   -continue lispro 5 units with meals  -sliding scale insulin and Accu-Cheks with meals and at bedtime  -carb controlled diet level 2

## 2022-09-13 NOTE — ASSESSMENT & PLAN NOTE
Patient requiring 5 L nasal cannula in ED  Found to have SpO2 of 87% on 5 L  Currently weaned off oxygen today 9/13/22  X-ray revealed findings suggestive of fluid overload and possible pneumonia  Patient received IV Lasix and Rocephin in ED  Patient on Bumex 2 mg p o  B i d  on outpatient basis  BNP elevated at 330   Leukocytosis noted on admission lab work however patient has been on steroid taper, denies fevers  Echocardiogram shows LVEF of 50 to 55%  Trivial small pericardial effusion       · Suspected acute respiratory failure is due to volume overload  · Nephrology input appreciated- continue with current diuretic therapy-Bumex 4 mg BID  · 5 day course of ceftriaxone completed 9/13/22   · Blood cultures- NGTD    · Continue with respiratory protocol; oxygen supplement keep SpO2 greater than 92%

## 2022-09-13 NOTE — PROGRESS NOTES
Rima U  64   Progress Note - Moreno Veloz 1980, 38 y o  male MRN: [id number]  Unit/Bed#: -01 Encounter: [id number]  Primary Care Provider: Andreina Garnett MD   Date and time admitted to hospital: 9/9/2022 11:40 AM    * Acute respiratory failure with hypoxia [facility])  Assessment & Plan  Patient requiring 5 L nasal cannula in ED  Found to have SpO2 of 87% on 5 L  Currently weaned off oxygen today 9/13/22  X-ray revealed findings suggestive of fluid overload and possible pneumonia  Patient received IV Lasix and Rocephin in ED  Patient on Bumex 2 mg p o  B i d  on outpatient basis  BNP elevated at 330   Leukocytosis noted on admission lab work however patient has been on steroid taper, denies fevers  Echocardiogram shows LVEF of 50 to 55%  Trivial small pericardial effusion  · Suspected acute respiratory failure is due to volume overload  · Nephrology input appreciated- continue with current diuretic therapy-Bumex 4 mg BID  · 5 day course of ceftriaxone completed 9/13/22   · Blood cultures- NGTD    · Continue with respiratory protocol; oxygen supplement keep SpO2 greater than 92%        Chronic kidney disease, stage 4 (severe) [facility])  Assessment & Plan  Lab Results   Component Value Date    EGFR 12 09/13/2022    EGFR 13 09/12/2022    EGFR 13 09/11/2022    CREATININE 5 11 (H) 09/13/2022    CREATININE 5 00 (H) 09/12/2022    CREATININE 4 79 (H) 09/11/2022     Baseline Cr around 4 0mg/dL  follows with nephrology outpatient  The patient underwent renal biopsy which indicates nodular diabetic glomerulosclerosis, 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  Present with STEFANIA Cr 4 38mg/dL suspect to be due to acute tubular necrosis  Renal function continues to worsen       · Nephrology input appreciated- will be discussing possible initiation of dialysis   · Bumex 3 mg BID increased to 4 mg BID by nephrology  · Avoid hypotension and nephrotoxins  · Monitor renal function closely     Essential hypertension  Assessment & Plan  Patient states he was out of his antihypertensives    -continue home amlodipine, carvedilol, hydralazine   -continue with aggressive diuretic therapy per nephrology    Gastroesophageal reflux disease without esophagitis  Assessment & Plan  · Continue home Protonix     Type 2 diabetes mellitus with stage 4 chronic kidney disease and hypertension ([facility] [address] )  Assessment & Plan  Lab Results   Component Value Date    HGBA1C 9 1 (H) 07/16/2022       Recent Labs     09/12/22  2110 09/13/22  0328 09/13/22  0707 09/13/22  1046   POCGLU 208* 162* 184* 279*       Blood Sugar Average: Last 72 hrs:  (P) 298 [id number]     -continue increased Lantus 15 units q h s   -continue lispro 5 units with meals  -sliding scale insulin and Accu-Cheks with meals and at bedtime  -carb controlled diet level 2     Acquired hypothyroidism  Assessment & Plan  -continue home levothyroxine          VTE Pharmacologic Prophylaxis: VTE Score: 5 High Risk (Score >/= 5) - Pharmacological DVT Prophylaxis Ordered: heparin  Sequential Compression Devices Ordered  Patient Centered Rounds: I performed bedside rounds with nursing staff today  Discussions with Specialists or Other Care Team Provider: nursing, CM, Nephrology    Education and Discussions with Family / Patient: Patient declined call to   Time Spent for Care: 20 minutes  More than 50% of total time spent on counseling and coordination of care as described above  Current Length of Stay: 3 day(s)  Current Patient Status: Inpatient   Certification Statement: The patient will continue to require additional inpatient hospital stay due to monitoring renal function, possible need for HD  Discharge Plan: pending clinical course    Code Status: Level 1 - Full Code    Subjective: The patient was seen and examined  The patient states his breathing is improved today  He is currently off oxygen       Objective: Vitals:   Temp (24hrs), Av 6 °F (36 4 °C), Min:97 3 °F (36 3 °C), Max:98 °F (36 7 °C)    Temp:  [97 3 °F (36 3 °C)-98 °F (36 7 °C)] 98 °F (36 7 °C)  HR:  [75-81] 77  Resp:  [18] 18  BP: (149-164)/(78-91) 164/88  SpO2:  [92 %-97 %] 92 %  Body mass index is 60 93 kg/m²  Input and Output Summary (last 24 hours): Intake/Output Summary (Last 24 hours) at 2022 1415  Last data filed at 2022 0706  Gross per 24 hour   Intake 600 ml   Output 1700 ml   Net -1100 ml       Physical Exam:   Physical Exam  Vitals and nursing note reviewed  Constitutional:       General: He is awake  Appearance: He is morbidly obese  Cardiovascular:      Rate and Rhythm: Normal rate and regular rhythm  Pulmonary:      Effort: Pulmonary effort is normal       Breath sounds: Normal breath sounds  Abdominal:      Palpations: Abdomen is soft  Tenderness: There is no abdominal tenderness  Skin:     General: Skin is warm and dry  Neurological:      General: No focal deficit present  Mental Status: He is alert and oriented to person, place, and time  Psychiatric:         Attention and Perception: Attention normal          Mood and Affect: Mood normal          Speech: Speech normal          Behavior: Behavior is cooperative            Additional Data:     Labs:  Results from last 7 days   Lab Units 22  0423 22  0516   WBC Thousand/uL 10 42* 9 62   HEMOGLOBIN g/dL 8 0* 7 9*   HEMATOCRIT % 24 2* 24 2*   PLATELETS Thousands/uL 370 350   NEUTROS PCT %  --  72   LYMPHS PCT %  --  15   MONOS PCT %  --  7   EOS PCT %  --  5     Results from last 7 days   Lab Units 22  0423 22  0516   SODIUM mmol/L 134* 138   POTASSIUM mmol/L 3 7 3 8   CHLORIDE mmol/L 97 100   CO2 mmol/L 27 27   BUN mg/dL 94* 99*   CREATININE mg/dL 5 11* 5 00*   ANION GAP mmol/L 10 11   CALCIUM mg/dL 8 8 8 7   ALBUMIN g/dL  --  2 8*   TOTAL BILIRUBIN mg/dL  --  0 27   ALK PHOS U/L  --  56   ALT U/L  --  8   AST U/L  -- 7*   GLUCOSE RANDOM mg/dL 160* 160*     Results from last 7 days   Lab Units 09/09/22  1236   INR  1 05     Results from last 7 days   Lab Units 09/13/22  1046 09/13/22  0707 09/13/22  0328 09/12/22  2110 09/12/22  1454 09/12/22  1053 09/12/22  0729 09/11/22  2052 09/11/22  1620 09/11/22  1051 09/11/22  0700 09/11/22  0109   POC GLUCOSE mg/dl 279* 184* 162* 208* 163* 289* 177* 253* 131 116 153* 68         Results from last 7 days   Lab Units 09/11/22  0457 09/10/22  0436 09/09/22  1236   LACTIC ACID mmol/L  --   --  0 5   PROCALCITONIN ng/ml 0 18 0 18 0 08       Lines/Drains:  Invasive Devices  Report    Peripheral Intravenous Line  Duration           Peripheral IV 09/13/22 Dorsal (posterior); Right Hand <1 day                      Imaging: No pertinent imaging reviewed  Recent Cultures (last 7 days):   Results from last 7 days   Lab Units 09/09/22  1251 09/09/22  1236   BLOOD CULTURE  No Growth at 72 hrs  No Growth at 72 hrs         Last 24 Hours Medication List:   Current Facility-Administered Medications   Medication Dose Route Frequency Provider Last Rate    acetaminophen  650 mg Oral Q6H PRN Caden Donate, PA-C      albuterol  2 puff Inhalation Q4H PRN Mohini Ramos, DO      amLODIPine  10 mg Oral Daily Mohini Ramos, DO      carvedilol  25 mg Oral BID With Meals Mohini Ramos, DO      doxazosin  2 mg Oral Daily Mohini Ramos, DO      FLUoxetine  20 mg Oral QAM Mohiniceleste Ramos, DO      glycerin-hypromellose-  2 drop Both Eyes Q3H PRN DOREEN Snell      heparin (porcine)  7,500 Units Subcutaneous [facility] Mohiniceleste Ramos, DO      hydrALAZINE  50 mg Oral TID Mohini Rachel, DO      hydrOXYzine HCL  25 mg Oral Q6H PRN Caden Donate, PA-C      insulin glargine  15 Units Subcutaneous HS Geroge Willard, PA-C      insulin lispro  2-12 Units Subcutaneous TID AC Milo [place], DO      insulin lispro  2-12 Units Subcutaneous HS Mohini Furlough, DO      insulin lispro  5 Units Subcutaneous TID With Meals Tedra Spare, DO      levothyroxine  125 mcg Oral Early Morning Tedra Spare, DO      pantoprazole  40 mg Oral BID AC Tedra Spare, DO          Today, Patient Was Seen By: Rafi Noyola PA-C    **Please Note: This note may have been constructed using a voice recognition system  **

## 2022-09-13 NOTE — PLAN OF CARE
Problem: PAIN - ADULT  Goal: Verbalizes/displays adequate comfort level or baseline comfort level  Description: Interventions:  - Encourage patient to monitor pain and request assistance  - Assess pain using appropriate pain scale  - Administer analgesics based on type and severity of pain and evaluate response  - Implement non-pharmacological measures as appropriate and evaluate response  - Consider cultural and social influences on pain and pain management  - Notify physician/advanced practitioner if interventions unsuccessful or patient reports new pain  Outcome: Progressing     Problem: SAFETY ADULT  Goal: Patient will remain free of falls  Description: INTERVENTIONS:  - Educate patient/family on patient safety including physical limitations  - Instruct patient to call for assistance with activity   - Consult OT/PT to assist with strengthening/mobility   - Keep Call bell within reach  - Keep bed low and locked with side rails adjusted as appropriate  - Keep care items and personal belongings within reach  - Initiate and maintain comfort rounds  - Make Fall Risk Sign visible to staff  - Offer Toileting every 2 Hours, in advance of need  - Initiate/Maintain bed alarm  - Obtain necessary fall risk management equipment  - Apply yellow socks and bracelet for high fall risk patients  - Consider moving patient to room near nurses station  Outcome: Progressing  Goal: Maintain or return to baseline ADL function  Description: INTERVENTIONS:  -  Assess patient's ability to carry out ADLs; assess patient's baseline for ADL function and identify physical deficits which impact ability to perform ADLs (bathing, care of mouth/teeth, toileting, grooming, dressing, etc )  - Assess/evaluate cause of self-care deficits   - Assess range of motion  - Assess patient's mobility; develop plan if impaired  - Assess patient's need for assistive devices and provide as appropriate  - Encourage maximum independence but intervene and supervise when necessary  - Involve family in performance of ADLs  - Assess for home care needs following discharge   - Consider OT consult to assist with ADL evaluation and planning for discharge  - Provide patient education as appropriate  Outcome: Progressing  Goal: Maintains/Returns to pre admission functional level  Description: INTERVENTIONS:  - Perform BMAT or MOVE assessment daily    - Set and communicate daily mobility goal to care team and patient/family/caregiver  - Collaborate with rehabilitation services on mobility goals if consulted  - Perform Range of Motion 2 times a day  - Reposition patient every 2 hours  - Dangle patient 2 times a day  - Stand patient 2 times a day  - Ambulate patient 2 times a day  - Out of bed to chair 3 times a day   - Out of bed for meals 3 times a day  - Out of bed for toileting  - Record patient progress and toleration of activity level   Outcome: Progressing     Problem: DISCHARGE PLANNING  Goal: Discharge to home or other facility with appropriate resources  Description: INTERVENTIONS:  - Identify barriers to discharge w/patient and caregiver  - Arrange for needed discharge resources and transportation as appropriate  - Identify discharge learning needs (meds, wound care, etc )  - Refer to Case Management Department for coordinating discharge planning if the patient needs post-hospital services based on physician/advanced practitioner order or complex needs related to functional status, cognitive ability, or social support system  Outcome: Progressing     Problem: Knowledge Deficit  Goal: Patient/family/caregiver demonstrates understanding of disease process, treatment plan, medications, and discharge instructions  Description: Complete learning assessment and assess knowledge base    Interventions:  - Provide teaching at level of understanding  - Provide teaching via preferred learning methods  Outcome: Progressing     Problem: RESPIRATORY - ADULT  Goal: Achieves optimal ventilation and oxygenation  Description: INTERVENTIONS:  - Assess for changes in respiratory status  - Assess for changes in mentation and behavior  - Position to facilitate oxygenation and minimize respiratory effort  - Oxygen administered by appropriate delivery if ordered  - Initiate smoking cessation education as indicated  - Encourage broncho-pulmonary hygiene including cough, deep breathe, Incentive Spirometry  - Assess the need for suctioning and aspirate as needed  - Assess and instruct to report SOB or any respiratory difficulty  - Respiratory Therapy support as indicated  Outcome: Progressing     Problem: Potential for Falls  Goal: Patient will remain free of falls  Description: INTERVENTIONS:  - Educate patient/family on patient safety including physical limitations  - Instruct patient to call for assistance with activity   - Consult OT/PT to assist with strengthening/mobility   - Keep Call bell within reach  - Keep bed low and locked with side rails adjusted as appropriate  - Keep care items and personal belongings within reach  - Initiate and maintain comfort rounds  - Make Fall Risk Sign visible to staff  - Offer Toileting every 2 Hours, in advance of need  - Initiate/Maintain bed alarm  - Obtain necessary fall risk management equipment  - Apply yellow socks and bracelet for high fall risk patients  - Consider moving patient to room near nurses station  Outcome: Progressing

## 2022-09-13 NOTE — ASSESSMENT & PLAN NOTE
Patient states he was out of his antihypertensives    -continue home amlodipine, carvedilol, hydralazine   -continue with aggressive diuretic therapy per nephrology

## 2022-09-13 NOTE — PROGRESS NOTES
Progress Note - Nephrology   Vonzell Landau 38 y o  male MRN: [id number]  Unit/Bed#: -01 Encounter: [id number]    A/P:  1  Acute kidney injury on top chronic kidney disease   Etiology potentially due to acute tubular necrosis, creatinine continues to increase now to 5 11 mg/dL, no significant electrolyte abnormalities at this time  Continue avoid potential nephrotoxins worked optimize the patient's overall care    I discussed the potential for initiation of hemodialysis depending on how the patient continues to proceed during this inpatient stay  This may translate into continuation of hemodialysis in the outpatient setting  Will need to continue to closely monitor the patient's clinical progress  2  Chronic kidney disease stage 4 with biopsy-proven diabetic nephropathy and hypertensive nephrosclerosis, baseline creatinine presumed to be around 3 8-4 mg/dL  3  Diabetic nephropathy  4  Hypertensive nephrosclerosis  5  Nephrotic syndrome secondary to diabetic nephropathy  6  Volume overload   Continue diurese, patient is doing well but remains significantly volume overloaded at this time  7  Iron deficiency anemia   Will consider initiation of Venofer infusions in the inpatient setting, will defer for now given that the patient is on antibiotics for empiric coverage  8  Anemia due to advanced chronic kidney disease   Patient was given a dose of Epogen, continue to monitor for now  Preferably this is given in the setting of appropriate iron stores, this may be difficult to achieve a short term  Continue to monitor for now        Follow up reason for today's visit:  Acute kidney injury/chronic kidney disease/diabetic nephropathy/hypertensive nephrosclerosis/nephrotic syndrome    Acute respiratory failure with hypoxia [facility])    Patient Active Problem List   Diagnosis    Abdominal pain    OCD (obsessive compulsive disorder)    Schizoaffective disorder, depressive type ([facility] [address] )    Acquired hypothyroidism    Morbid obesity with BMI of 50 0-59 9, adult (HCC)    Anxiety    Episodic confusion    Snoring    Insomnia    Hypersomnia    Vitamin D deficiency    Vomiting    Occipital neuralgia of left side    Headache    Nodule of parotid gland    Bipolar 1 disorder (HCC)    Depression    Type 1 diabetes mellitus without complication (HCC)    Urinary retention    Peripheral edema    Hyperlipidemia, mixed    Migraine without aura and without status migrainosus, not intractable    Class 3 severe obesity due to excess calories with serious comorbidity and body mass index (BMI) of 60 0 to 69 9 in adult [facility])    Spinal stenosis in cervical region    Difficulty urinating    Urinary tract infection without hematuria    Penile pain    Chronic migraine without aura without status migrainosus, not intractable    Hypertensive emergency    Encephalopathy    Leukocytosis    Schizo affective schizophrenia ([address] )    Acute respiratory failure with hypoxia ([facility] [address] )    STEFANIA (acute kidney injury) ([facility] [address] )    Acute respiratory disease due to COVID-19 virus    Elevated troponin    Type 2 diabetes mellitus with stage 4 chronic kidney disease and hypertension (HCC)    Family history of heart disease    Hypokalemia    Chest pressure    Gastroesophageal reflux disease without esophagitis    Essential hypertension    SOB (shortness of breath)    Volume overload    Hyponatremia    Open toe wound    Primary hypertension    Diabetic ulcer of both feet ([address] )    Nodular type diabetic glomerulosclerosis ([address] )    Interstitial fibrosis present on renal biopsy    Arteriosclerosis of kidney    Chronic kidney disease, stage 4 (severe) ([facility])         Subjective:   No complaints at this time, patient eating and drinking with no specific issues noted    Objective:     Vitals: Blood pressure 164/88, pulse 77, temperature 98 °F (36 7 °C), temperature source Tympanic, resp   rate 18, height 5' 2" (1 575 m), weight (!) 151 kg (333 lb 1 8 oz), SpO2 92 %  ,Body mass index is 60 93 kg/m²  Weight (last 2 days)     Date/Time Weight    09/13/22 0600 151 (333 12)     Weight: pt weighed twice on white standing scale at 09/13/22 0600    09/12/22 0600 153 (337 97)     Weight: pt weighed twice on white standing scale at 09/12/22 0600    09/11/22 0542 155 (342 82)     Weight: pt weighed twice on white standing scale at 09/11/22 0542            Intake/Output Summary (Last 24 hours) at 9/13/2022 1408  Last data filed at 9/13/2022 0706  Gross per 24 hour   Intake 600 ml   Output 1700 ml   Net -1100 ml     I/O last 3 completed shifts: In: 965 [P O :965]  Out: 8583 [Urine:3675]         Physical Exam: /88 (BP Location: Right arm)   Pulse 77   Temp 98 °F (36 7 °C) (Tympanic)   Resp 18   Ht 5' 2" (1 575 m)   Wt (!) 151 kg (333 lb 1 8 oz) Comment: pt weighed twice on white standing scale  SpO2 92%   BMI 60 93 kg/m²     General Appearance:    Alert, cooperative, no distress, appears stated age   Head:    Normocephalic, without obvious abnormality, atraumatic   Eyes:    Conjunctiva/corneas clear   Ears:    Normal external ears   Nose:   Nares normal, septum midline, mucosa normal, no drainage    or sinus tenderness   Throat:   Lips, mucosa, and tongue normal; teeth and gums normal   Neck:   Supple   Back:     Symmetric, no curvature, ROM normal, no CVA tenderness   Lungs:     Reduced bilaterally   Chest wall:    No tenderness or deformity   Heart:    Regular rate and rhythm, S1 and S2 normal, no murmur, rub   or gallop   Abdomen:     Soft, non-tender, bowel sounds active   Extremities:   Extremities normal, atraumatic, no cyanosis, +2 bilateral lower extremity edema   Skin:   Skin color, texture, turgor normal, no rashes or lesions   Lymph nodes:   Cervical normal   Neurologic:   CNII-XII intact            Lab, Imaging and other studies: I have personally reviewed pertinent labs    CBC:   Lab Results   Component Value Date    WBC 10 42 (H) 09/13/2022 HGB 8 0 (L) 09/13/2022    HCT 24 2 (L) 09/13/2022    MCV 83 09/13/2022     09/13/2022    MCH 27 6 09/13/2022    MCHC 33 1 09/13/2022    RDW 14 0 09/13/2022    MPV 9 7 09/13/2022     CMP:   Lab Results   Component Value Date    K 3 7 09/13/2022    CL 97 09/13/2022    CO2 27 09/13/2022    BUN 94 (H) 09/13/2022    CREATININE 5 11 (H) 09/13/2022    CALCIUM 8 8 09/13/2022    EGFR 12 09/13/2022         Results from last 7 days   Lab Units 09/13/22  0423 09/12/22  0516 09/11/22  0457 09/10/22  0436 09/09/22  1236   POTASSIUM mmol/L 3 7 3 8 3 9 4 0 5 0   CHLORIDE mmol/L 97 100 99 102 99   CO2 mmol/L 27 27 26 25 21   BUN mg/dL 94* 99* 101* 98* 102*   CREATININE mg/dL 5 11* 5 00* 4 79* 4 62* 4 38*   CALCIUM mg/dL 8 8 8 7 8 4 8 7 8 3*   ALK PHOS U/L  --  56  --  58 74   ALT U/L  --  8  --  11 14   AST U/L  --  7*  --  11* 17         Phosphorus: No results found for: PHOS  Magnesium: No results found for: MG  Urinalysis: No results found for: COLORU, CLARITYU, SPECGRAV, PHUR, LEUKOCYTESUR, NITRITE, PROTEINUA, GLUCOSEU, KETONESU, BILIRUBINUR, BLOODU  Ionized Calcium: No results found for: CAION  Coagulation: No results found for: PT, INR, APTT  Troponin: No results found for: TROPONINI  ABG: No results found for: PHART, JQL4QIE, PO2ART, SPO8ISR, O7SKIXRW, BEART, SOURCE  Radiology review:     IMAGING  No results found      Current Facility-Administered Medications   Medication Dose Route Frequency    acetaminophen (TYLENOL) tablet 650 mg  650 mg Oral Q6H PRN    albuterol (PROVENTIL HFA,VENTOLIN HFA) inhaler 2 puff  2 puff Inhalation Q4H PRN    amLODIPine (NORVASC) tablet 10 mg  10 mg Oral Daily    carvedilol (COREG) tablet 25 mg  25 mg Oral BID With Meals    doxazosin (CARDURA) tablet 2 mg  2 mg Oral Daily    FLUoxetine (PROzac) capsule 20 mg  20 mg Oral QAM    glycerin-hypromellose- (ARTIFICIAL TEARS) ophthalmic solution 2 drop  2 drop Both Eyes Q3H PRN    heparin (porcine) subcutaneous injection 7,500 Units  7,500 Units Subcutaneous Q8H [address]    hydrALAZINE (APRESOLINE) tablet 50 mg  50 mg Oral TID    hydrOXYzine HCL (ATARAX) tablet 25 mg  25 mg Oral Q6H PRN    insulin glargine (LANTUS) subcutaneous injection 15 Units 0 15 mL  15 Units Subcutaneous HS    insulin lispro (HumaLOG) 100 units/mL subcutaneous injection 2-12 Units  2-12 Units Subcutaneous TID AC    insulin lispro (HumaLOG) 100 units/mL subcutaneous injection 2-12 Units  2-12 Units Subcutaneous HS    insulin lispro (HumaLOG) 100 units/mL subcutaneous injection 5 Units  5 Units Subcutaneous TID With Meals    levothyroxine tablet 125 mcg  125 mcg Oral Early Morning    pantoprazole (PROTONIX) EC tablet 40 mg  40 mg Oral BID AC     Medications Discontinued During This Encounter   Medication Reason    furosemide (LASIX) injection 40 mg     furosemide (LASIX) injection 80 mg     aluminum-magnesium hydroxide-simethicone (MYLANTA) oral suspension 30 mL     insulin glargine (LANTUS) subcutaneous injection 20 Units 0 2 mL     cefTRIAXone (ROCEPHIN) IVPB (premix in dextrose) 1,000 mg 50 mL     bumetanide (BUMEX) injection 3 mg     insulin glargine (LANTUS) subcutaneous injection 10 Units 0 1 mL     epoetin tammy (EPOGEN,PROCRIT) injection 5,000 Units        Bernardo Worley, DO      This progress note was produced in part using a dictation device which may document imprecise wording from author's original intent

## 2022-09-13 NOTE — PLAN OF CARE
Problem: PAIN - ADULT  Goal: Verbalizes/displays adequate comfort level or baseline comfort level  Description: Interventions:  - Encourage patient to monitor pain and request assistance  - Assess pain using appropriate pain scale  - Administer analgesics based on type and severity of pain and evaluate response  - Implement non-pharmacological measures as appropriate and evaluate response  - Consider cultural and social influences on pain and pain management  - Notify physician/advanced practitioner if interventions unsuccessful or patient reports new pain  Outcome: Progressing     Problem: INFECTION - ADULT  Goal: Absence or prevention of progression during hospitalization  Description: INTERVENTIONS:  - Assess and monitor for signs and symptoms of infection  - Monitor lab/diagnostic results  - Monitor all insertion sites, i e  indwelling lines, tubes, and drains  - Monitor endotracheal if appropriate and nasal secretions for changes in amount and color  - [place] appropriate cooling/warming therapies per order  - Administer medications as ordered  - Instruct and encourage patient and family to use good hand hygiene technique  - Identify and instruct in appropriate isolation precautions for identified infection/condition  Outcome: Progressing  Goal: Absence of fever/infection during neutropenic period  Description: INTERVENTIONS:  - Monitor WBC    Outcome: Progressing     Problem: SAFETY ADULT  Goal: Patient will remain free of falls  Description: INTERVENTIONS:  - Educate patient/family on patient safety including physical limitations  - Instruct patient to call for assistance with activity   - Consult OT/PT to assist with strengthening/mobility   - Keep Call bell within reach  - Keep bed low and locked with side rails adjusted as appropriate  - Keep care items and personal belongings within reach  - Initiate and maintain comfort rounds  - Make Fall Risk Sign visible to staff  - Offer Toileting every 2 Hours, in advance of need  - Initiate/Maintain bed alarm  - Obtain necessary fall risk management equipment  - Apply yellow socks and bracelet for high fall risk patients  - Consider moving patient to room near nurses station  Outcome: Progressing  Goal: Maintain or return to baseline ADL function  Description: INTERVENTIONS:  -  Assess patient's ability to carry out ADLs; assess patient's baseline for ADL function and identify physical deficits which impact ability to perform ADLs (bathing, care of mouth/teeth, toileting, grooming, dressing, etc )  - Assess/evaluate cause of self-care deficits   - Assess range of motion  - Assess patient's mobility; develop plan if impaired  - Assess patient's need for assistive devices and provide as appropriate  - Encourage maximum independence but intervene and supervise when necessary  - Involve family in performance of ADLs  - Assess for home care needs following discharge   - Consider OT consult to assist with ADL evaluation and planning for discharge  - Provide patient education as appropriate  Outcome: Progressing  Goal: Maintains/Returns to pre admission functional level  Description: INTERVENTIONS:  - Perform BMAT or MOVE assessment daily    - Set and communicate daily mobility goal to care team and patient/family/caregiver  - Collaborate with rehabilitation services on mobility goals if consulted  - Perform Range of Motion 2 times a day  - Reposition patient every 2 hours    - Dangle patient 2 times a day  - Stand patient 2 times a day  - Ambulate patient 2 times a day  - Out of bed to chair 3 times a day   - Out of bed for meals 3 times a day  - Out of bed for toileting  - Record patient progress and toleration of activity level   Outcome: Progressing     Problem: DISCHARGE PLANNING  Goal: Discharge to home or other facility with appropriate resources  Description: INTERVENTIONS:  - Identify barriers to discharge w/patient and caregiver  - Arrange for needed discharge resources and transportation as appropriate  - Identify discharge learning needs (meds, wound care, etc )  - Refer to Case Management Department for coordinating discharge planning if the patient needs post-hospital services based on physician/advanced practitioner order or complex needs related to functional status, cognitive ability, or social support system  Outcome: Progressing     Problem: Knowledge Deficit  Goal: Patient/family/caregiver demonstrates understanding of disease process, treatment plan, medications, and discharge instructions  Description: Complete learning assessment and assess knowledge base    Interventions:  - Provide teaching at level of understanding  - Provide teaching via preferred learning methods  Outcome: Progressing     Problem: RESPIRATORY - ADULT  Goal: Achieves optimal ventilation and oxygenation  Description: INTERVENTIONS:  - Assess for changes in respiratory status  - Assess for changes in mentation and behavior  - Position to facilitate oxygenation and minimize respiratory effort  - Oxygen administered by appropriate delivery if ordered  - Initiate smoking cessation education as indicated  - Encourage broncho-pulmonary hygiene including cough, deep breathe, Incentive Spirometry  - Assess the need for suctioning and aspirate as needed  - Assess and instruct to report SOB or any respiratory difficulty  - Respiratory Therapy support as indicated  Outcome: Progressing     Problem: Prexisting or High Potential for Compromised Skin Integrity  Goal: Skin integrity is maintained or improved  Description: INTERVENTIONS:  - Identify patients at risk for skin breakdown  - Assess and monitor skin integrity  - Assess and monitor nutrition and hydration status  - Monitor labs   - Assess for incontinence   - Turn and reposition patient  - Assist with mobility/ambulation  - Relieve pressure over bony prominences  - Avoid friction and shearing  - Provide appropriate hygiene as needed including keeping skin clean and dry  - Evaluate need for skin moisturizer/barrier cream  - Collaborate with interdisciplinary team   - Patient/family teaching  - Consider wound care consult   Outcome: Progressing     Problem: Potential for Falls  Goal: Patient will remain free of falls  Description: INTERVENTIONS:  - Educate patient/family on patient safety including physical limitations  - Instruct patient to call for assistance with activity   - Consult OT/PT to assist with strengthening/mobility   - Keep Call bell within reach  - Keep bed low and locked with side rails adjusted as appropriate  - Keep care items and personal belongings within reach  - Initiate and maintain comfort rounds  - Make Fall Risk Sign visible to staff  - Offer Toileting every 2 Hours, in advance of need  - Initiate/Maintain bed alarm  - Obtain necessary fall risk management equipment  - Apply yellow socks and bracelet for high fall risk patients  - Consider moving patient to room near nurses station  Outcome: Progressing

## 2022-09-14 PROBLEM — R07.9 CHEST PAIN: Status: ACTIVE | Noted: 2022-09-14

## 2022-09-14 LAB
ANION GAP SERPL CALCULATED.3IONS-SCNC: 10 MMOL/L (ref 4–13)
BACTERIA BLD CULT: NORMAL
BACTERIA BLD CULT: NORMAL
BUN SERPL-MCNC: 87 MG/DL (ref 5–25)
CALCIUM SERPL-MCNC: 9 MG/DL (ref 8.4–10.2)
CARDIAC TROPONIN I PNL SERPL HS: 17 NG/L
CHLORIDE SERPL-SCNC: 100 MMOL/L (ref 96–108)
CO2 SERPL-SCNC: 28 MMOL/L (ref 21–32)
CREAT SERPL-MCNC: 4.93 MG/DL (ref 0.6–1.3)
ERYTHROCYTE [DISTWIDTH] IN BLOOD BY AUTOMATED COUNT: 13.9 % (ref 11.6–15.1)
GFR SERPL CREATININE-BSD FRML MDRD: 13 ML/MIN/1.73SQ M
GLUCOSE SERPL-MCNC: 164 MG/DL (ref 65–140)
GLUCOSE SERPL-MCNC: 184 MG/DL (ref 65–140)
GLUCOSE SERPL-MCNC: 185 MG/DL (ref 65–140)
GLUCOSE SERPL-MCNC: 228 MG/DL (ref 65–140)
GLUCOSE SERPL-MCNC: 234 MG/DL (ref 65–140)
HCT VFR BLD AUTO: 25.8 % (ref 36.5–49.3)
HGB BLD-MCNC: 8.5 G/DL (ref 12–17)
MCH RBC QN AUTO: 27.9 PG (ref 26.8–34.3)
MCHC RBC AUTO-ENTMCNC: 32.9 G/DL (ref 31.4–37.4)
MCV RBC AUTO: 85 FL (ref 82–98)
PHOSPHATE SERPL-MCNC: 4.9 MG/DL (ref 2.7–4.5)
PLATELET # BLD AUTO: 377 THOUSANDS/UL (ref 149–390)
PMV BLD AUTO: 9.6 FL (ref 8.9–12.7)
POTASSIUM SERPL-SCNC: 3.8 MMOL/L (ref 3.5–5.3)
RBC # BLD AUTO: 3.05 MILLION/UL (ref 3.88–5.62)
SODIUM SERPL-SCNC: 138 MMOL/L (ref 135–147)
WBC # BLD AUTO: 8.85 THOUSAND/UL (ref 4.31–10.16)

## 2022-09-14 PROCEDURE — 82948 REAGENT STRIP/BLOOD GLUCOSE: CPT

## 2022-09-14 PROCEDURE — 99232 SBSQ HOSP IP/OBS MODERATE 35: CPT | Performed by: PHYSICIAN ASSISTANT

## 2022-09-14 PROCEDURE — 94664 DEMO&/EVAL PT USE INHALER: CPT

## 2022-09-14 PROCEDURE — 84100 ASSAY OF PHOSPHORUS: CPT | Performed by: PHYSICIAN ASSISTANT

## 2022-09-14 PROCEDURE — 85027 COMPLETE CBC AUTOMATED: CPT | Performed by: PHYSICIAN ASSISTANT

## 2022-09-14 PROCEDURE — 93005 ELECTROCARDIOGRAM TRACING: CPT

## 2022-09-14 PROCEDURE — 84484 ASSAY OF TROPONIN QUANT: CPT | Performed by: PHYSICIAN ASSISTANT

## 2022-09-14 PROCEDURE — 94760 N-INVAS EAR/PLS OXIMETRY 1: CPT

## 2022-09-14 PROCEDURE — 80048 BASIC METABOLIC PNL TOTAL CA: CPT | Performed by: PHYSICIAN ASSISTANT

## 2022-09-14 PROCEDURE — 99233 SBSQ HOSP IP/OBS HIGH 50: CPT | Performed by: NURSE PRACTITIONER

## 2022-09-14 RX ORDER — AMLODIPINE BESYLATE 10 MG/1
10 TABLET ORAL DAILY
Status: DISCONTINUED | OUTPATIENT
Start: 2022-09-15 | End: 2022-09-22

## 2022-09-14 RX ORDER — HYDRALAZINE HYDROCHLORIDE 25 MG/1
50 TABLET, FILM COATED ORAL 3 TIMES DAILY
Status: DISCONTINUED | OUTPATIENT
Start: 2022-09-14 | End: 2022-09-20

## 2022-09-14 RX ORDER — INSULIN GLARGINE 100 [IU]/ML
20 INJECTION, SOLUTION SUBCUTANEOUS
Status: DISCONTINUED | OUTPATIENT
Start: 2022-09-14 | End: 2022-09-16

## 2022-09-14 RX ORDER — BUMETANIDE 0.25 MG/ML
4 INJECTION, SOLUTION INTRAMUSCULAR; INTRAVENOUS 2 TIMES DAILY
Status: DISCONTINUED | OUTPATIENT
Start: 2022-09-14 | End: 2022-09-17

## 2022-09-14 RX ADMIN — BUMETANIDE 4 MG: 0.25 INJECTION INTRAMUSCULAR; INTRAVENOUS at 14:03

## 2022-09-14 RX ADMIN — AMLODIPINE BESYLATE 10 MG: 10 TABLET ORAL at 08:01

## 2022-09-14 RX ADMIN — INSULIN LISPRO 5 UNITS: 100 INJECTION, SOLUTION INTRAVENOUS; SUBCUTANEOUS at 08:00

## 2022-09-14 RX ADMIN — HYDROXYZINE HYDROCHLORIDE 25 MG: 25 TABLET ORAL at 10:30

## 2022-09-14 RX ADMIN — BUMETANIDE 4 MG: 0.25 INJECTION INTRAMUSCULAR; INTRAVENOUS at 20:10

## 2022-09-14 RX ADMIN — PANTOPRAZOLE SODIUM 40 MG: 40 TABLET, DELAYED RELEASE ORAL at 08:01

## 2022-09-14 RX ADMIN — CARVEDILOL 25 MG: 25 TABLET, FILM COATED ORAL at 16:42

## 2022-09-14 RX ADMIN — FLUOXETINE 20 MG: 20 CAPSULE ORAL at 08:01

## 2022-09-14 RX ADMIN — INSULIN LISPRO 4 UNITS: 100 INJECTION, SOLUTION INTRAVENOUS; SUBCUTANEOUS at 22:01

## 2022-09-14 RX ADMIN — LEVOTHYROXINE SODIUM 125 MCG: 25 TABLET ORAL at 05:58

## 2022-09-14 RX ADMIN — INSULIN LISPRO 4 UNITS: 100 INJECTION, SOLUTION INTRAVENOUS; SUBCUTANEOUS at 11:52

## 2022-09-14 RX ADMIN — PANTOPRAZOLE SODIUM 40 MG: 40 TABLET, DELAYED RELEASE ORAL at 16:42

## 2022-09-14 RX ADMIN — INSULIN LISPRO 5 UNITS: 100 INJECTION, SOLUTION INTRAVENOUS; SUBCUTANEOUS at 12:47

## 2022-09-14 RX ADMIN — INSULIN LISPRO 2 UNITS: 100 INJECTION, SOLUTION INTRAVENOUS; SUBCUTANEOUS at 08:00

## 2022-09-14 RX ADMIN — HEPARIN SODIUM 7500 UNITS: 5000 INJECTION INTRAVENOUS; SUBCUTANEOUS at 05:58

## 2022-09-14 RX ADMIN — CARVEDILOL 25 MG: 25 TABLET, FILM COATED ORAL at 08:00

## 2022-09-14 RX ADMIN — HYDRALAZINE HYDROCHLORIDE 50 MG: 25 TABLET, FILM COATED ORAL at 16:42

## 2022-09-14 RX ADMIN — HYDROXYZINE HYDROCHLORIDE 25 MG: 25 TABLET ORAL at 03:18

## 2022-09-14 RX ADMIN — HYDRALAZINE HYDROCHLORIDE 50 MG: 25 TABLET, FILM COATED ORAL at 22:01

## 2022-09-14 RX ADMIN — ALBUTEROL SULFATE 2 PUFF: 108 INHALANT RESPIRATORY (INHALATION) at 09:57

## 2022-09-14 RX ADMIN — HEPARIN SODIUM 7500 UNITS: 5000 INJECTION INTRAVENOUS; SUBCUTANEOUS at 14:01

## 2022-09-14 RX ADMIN — INSULIN LISPRO 2 UNITS: 100 INJECTION, SOLUTION INTRAVENOUS; SUBCUTANEOUS at 16:44

## 2022-09-14 RX ADMIN — INSULIN LISPRO 5 UNITS: 100 INJECTION, SOLUTION INTRAVENOUS; SUBCUTANEOUS at 16:44

## 2022-09-14 RX ADMIN — HYDRALAZINE HYDROCHLORIDE 50 MG: 25 TABLET, FILM COATED ORAL at 08:00

## 2022-09-14 RX ADMIN — INSULIN GLARGINE 20 UNITS: 100 INJECTION, SOLUTION SUBCUTANEOUS at 22:01

## 2022-09-14 RX ADMIN — DOXAZOSIN 2 MG: 2 TABLET ORAL at 08:01

## 2022-09-14 RX ADMIN — HEPARIN SODIUM 7500 UNITS: 5000 INJECTION INTRAVENOUS; SUBCUTANEOUS at 22:01

## 2022-09-14 NOTE — NURSING NOTE
Pt refusing 2 hour and 4 hour troponin's to be collected  Pt states the aid who tried made his hand blow up  Hospitalist aware

## 2022-09-14 NOTE — UTILIZATION REVIEW
Continued Stay Review    Date: 9/14                          Current Patient Class: Inpatient  Current Level of Care: Med/surg    HPI:42 y o  male initially admitted on 9/10     Assessment/Plan: New onset chest pain  EKG shows NSR  Refusing Troponin to be drawn after failed attempt to obtain it  BLood cultures neg to date  WEaned off oxygen  Continue to monitor creat  Wheezing heard in lungs  Nephrology consult:  CDK 4   HInt of acute tubular injury on biopsy done 8/12/22  Will like need initiation of hemodialysis on this admission  Refused blood work   GCS 15    Vital Signs:   Date/Time Temp Pulse Resp BP MAP (mmHg) SpO2 O2 Device Patient Position - Orthostatic VS   09/14/22 09:31:22 -- 74 -- 96/63 74 96 % -- --   09/14/22 07:25:40 97 7 °F (36 5 °C) 80 19 118/75 89 94 % None (Room air) Lying   09/13/22 21:49:44 97 5 °F (36 4 °C) 74 18 152/91 111 96 % None (Room air) Lying   09/13/22 2137 -- -- -- 172/95 Abnormal  -- -- -- --   09/13/22 1930 -- -- -- -- -- 92 % None (Room air) --   09/13/22 1728 -- -- -- 156/98 -- -- -- --   09/13/22 15:02:10 97 8 °F (36 6 °C) 75 18 138/82 101 92 % None (Room air) Lying   09/13/22 07:06:49 98 °F (36 7 °C) 77 18 164/88 113 92 % -- Lying   09/13/22 03:29:59 -- 81 -- 152/79 103 94 % -- --   09/12/22 22:06:09 97 3 °F (36 3 °C) Abnormal  75 18 161/89 113 94 % -- --   09/12/22 21:36:20 -- 79 -- 162/91 115 94 % -- --   09/12/22 1743 -- -- -- 149/78 -- -- -- Sitting   09/12/22 14:58:41 97 4 °F (36 3 °C) Abnormal  75 18 149/81 104 97 %           Pertinent Labs/Diagnostic Results:   Results from last 7 days   Lab Units 09/09/22  1236   SARS-COV-2  Negative     Results from last 7 days   Lab Units 09/14/22  0435 09/13/22  0423 09/12/22  0516 09/11/22  0457 09/10/22  0436 09/09/22  1631 09/09/22  1236   WBC Thousand/uL 8 85 10 42* 9 62 10 46* 13 25*  --  18 57*   HEMOGLOBIN g/dL 8 5* 8 0* 7 9* 7 7* 7 8*  --  8 5*   HEMATOCRIT % 25 8* 24 2* 24 2* 23 0* 23 6*  --  25 8*   PLATELETS Thousands/uL 377 370 350 338 350   < > 371   NEUTROS ABS Thousands/µL  --   --  6 85  --   --   --  15 98*    < > = values in this interval not displayed           Results from last 7 days   Lab Units 09/14/22  0435 09/13/22  0423 09/12/22  0516 09/11/22  0457 09/10/22  0436   SODIUM mmol/L 138 134* 138 135 137   POTASSIUM mmol/L 3 8 3 7 3 8 3 9 4 0   CHLORIDE mmol/L 100 97 100 99 102   CO2 mmol/L 28 27 27 26 25   ANION GAP mmol/L 10 10 11 10 10   BUN mg/dL 87* 94* 99* 101* 98*   CREATININE mg/dL 4 93* 5 11* 5 00* 4 79* 4 62*   EGFR ml/min/1 73sq m 13 12 13 13 14   CALCIUM mg/dL 9 0 8 8 8 7 8 4 8 7   MAGNESIUM mg/dL  --   --  1 9  --   --    PHOSPHORUS mg/dL 4 9*  --  5 7*  --   --      Results from last 7 days   Lab Units 09/12/22  0516 09/10/22  0436 09/09/22  1236   AST U/L 7* 11* 17   ALT U/L 8 11 14   ALK PHOS U/L 56 58 74   TOTAL PROTEIN g/dL 5 9* 6 2* 6 7   ALBUMIN g/dL 2 8* 2 8* 3 1*   TOTAL BILIRUBIN mg/dL 0 27 0 30 0 30     Results from last 7 days   Lab Units 09/14/22  0742 09/13/22 2056 09/13/22  1616 09/13/22  1046 09/13/22  0707 09/13/22  0328 09/12/22  2110 09/12/22  1454 09/12/22  1053 09/12/22  0729 09/11/22 2052 09/11/22  1620   POC GLUCOSE mg/dl 184* 255* 172* 279* 184* 162* 208* 163* 289* 177* 253* 131     Results from last 7 days   Lab Units 09/14/22  0435 09/13/22  0423 09/12/22  0516 09/11/22  0457 09/10/22  0436 09/09/22  1236   GLUCOSE RANDOM mg/dL 185* 160* 160* 177* 110 388*             BETA-HYDROXYBUTYRATE   Date Value Ref Range Status   08/26/2022 0 0 <0 6 mmol/L Final     Results from last 7 days   Lab Units 09/14/22  0930 09/09/22  1631 09/09/22  1444 09/09/22  1236   HS TNI 0HR ng/L 17  --   --  51*   HS TNI 2HR ng/L  --   --  56*  --    HSTNI D2 ng/L  --   --  5  --    HS TNI 4HR ng/L  --  57*  --   --    HSTNI D4 ng/L  --  6  --   --          Results from last 7 days   Lab Units 09/09/22  1236   PROTIME seconds 13 7   INR  1 05   PTT seconds 20*         Results from last 7 days Lab Units 09/11/22  0457 09/10/22  0436 09/09/22  1236   PROCALCITONIN ng/ml 0 18 0 18 0 08     Results from last 7 days   Lab Units 09/09/22  1236   LACTIC ACID mmol/L 0 5         Results from last 7 days   Lab Units 09/09/22  1236   BNP pg/mL 330*     Results from last 7 days   Lab Units 09/11/22  0457   FERRITIN ng/mL 59       Results from last 7 days   Lab Units 09/09/22  1251 09/09/22  1236   BLOOD CULTURE  No Growth After 4 Days  No Growth After 4 Days  Medications:   Scheduled Medications:  amLODIPine, 10 mg, Oral, Daily  carvedilol, 25 mg, Oral, BID With Meals  doxazosin, 2 mg, Oral, Daily  FLUoxetine, 20 mg, Oral, QAM  heparin (porcine), 7,500 Units, Subcutaneous, Q8H SOFI  hydrALAZINE, 50 mg, Oral, TID  insulin glargine, 15 Units, Subcutaneous, HS  insulin lispro, 2-12 Units, Subcutaneous, TID AC  insulin lispro, 2-12 Units, Subcutaneous, HS  insulin lispro, 5 Units, Subcutaneous, TID With Meals  levothyroxine, 125 mcg, Oral, Early Morning  pantoprazole, 40 mg, Oral, BID AC      Continuous IV Infusions:     PRN Meds:  acetaminophen, 650 mg, Oral, Q6H PRN  albuterol, 2 puff, Inhalation, Q4H PRN  glycerin-hypromellose-, 2 drop, Both Eyes, Q3H PRN  hydrOXYzine HCL, 25 mg, Oral, Q6H PRN        Discharge Plan: [facility]    Network Utilization Review Department  ATTENTION: Please call with any questions or concerns to [phone] and carefully listen to the prompts so that you are directed to the right person  All voicemails are confidential   Karen Serrano all requests for admission clinical reviews, approved or denied determinations and any other requests to dedicated fax number below belonging to the campus where the patient is receiving treatment   List of dedicated fax numbers for the Facilities:  [address] DENIALS (Administrative/Medical Necessity) [phone]   [address] (Maternity/NICU/Pediatrics) [phone]   [address] [place] [address] [phone] [address]  [phone]   Cici Moe [address] [id number] [place] [address] [address] [facility] [place] [address] [phone]

## 2022-09-14 NOTE — RESPIRATORY THERAPY NOTE
RT Protocol Note  Allen Miller 38 y o  male MRN: [id number]  Unit/Bed#: -01 Encounter: [id number]    Assessment    Principal Problem:    Acute respiratory failure with hypoxia ([place] [address] )  Active Problems:    Acquired hypothyroidism    Type 2 diabetes mellitus with stage 4 chronic kidney disease and hypertension (HCC)    Gastroesophageal reflux disease without esophagitis    Essential hypertension    Chronic kidney disease, stage 4 (severe) ([facility])    Chest pain      Home Pulmonary Medications:  Proventil MDI PRN       Past Medical History:   Diagnosis Date    Acute bronchitis due to other specified organisms 07/05/2019    Chronic headaches     Diabetes mellitus ([place] [address] )     Disease of thyroid gland     Esophagitis     Gastritis     Gastroparesis     GERD (gastroesophageal reflux disease)     Hypertension     Migraine     Migraines 03/11/2021    Obesity     Obsessive compulsive disorder     Psychiatric disorder     Renal disorder     Schizoaffective disorder ([place] [address] )      Social History     Socioeconomic History    Marital status: Single     Spouse name: None    Number of children: None    Years of education: None    Highest education level: None   Occupational History    None   Tobacco Use    Smoking status: Never Smoker    Smokeless tobacco: Never Used   Vaping Use    Vaping Use: Never used   Substance and Sexual Activity    Alcohol use: Not Currently    Drug use: Not Currently    Sexual activity: Not Currently   Other Topics Concern    None   Social History Narrative    None     Social Determinants of Health     Financial Resource Strain: Not on file   Food Insecurity: No Food Insecurity    Worried About Running Out of Food in the Last Year: Never true    [place] of Food in the Last Year: Never true   Transportation Needs: No Transportation Needs    Lack of Transportation (Medical): No    Lack of Transportation (Non-Medical):  No   Physical Activity: Not on file   Stress: Not on file Social Connections: Not on file   Intimate Partner Violence: Not on file   Housing Stability: [address] Unable to Pay for Housing in the Last Year: No    Number of Places Lived in the Last Year: 1    Unstable Housing in the Last Year: No       Subjective         Objective    Physical Exam:   Assessment Type: Assess only  General Appearance: Alert, Awake  Respiratory Pattern: Normal  Chest Assessment: Chest expansion symmetrical  Bilateral Breath Sounds: Clear, Diminished  Cough: Non-productive  O2 Device: Room Air    Vitals:  Blood pressure 147/88, pulse 72, temperature 98 6 °F (37 °C), resp  rate 16, height 5' 2" (1 575 m), weight (!) 151 kg (332 lb 10 8 oz), SpO2 95 %  Imaging and other studies: I have personally reviewed pertinent reports        O2 Device: Room Air     Plan    Respiratory Plan: Home Bronchodilator Patient pathway      Continue PRN MDI

## 2022-09-14 NOTE — ASSESSMENT & PLAN NOTE
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

## 2022-09-14 NOTE — ASSESSMENT & PLAN NOTE
Patient requiring 5 L nasal cannula in ED  Found to have SpO2 of 87% on 5 L  Currently weaned off oxygen   X-ray revealed findings suggestive of fluid overload and possible pneumonia  Patient received IV Lasix and Rocephin in ED  Patient on Bumex 2 mg p o  B i d  on outpatient basis  BNP elevated at 330   Leukocytosis noted on admission lab work however patient has been on steroid taper, denies fevers  Echocardiogram shows LVEF of 50 to 55%  Trivial small pericardial effusion       · Suspected acute respiratory failure is due to volume overload  · Nephrology input appreciated- continue with current diuretic therapy-Bumex 4 mg BID  · 5 day course of ceftriaxone completed 9/13/22   · Blood cultures- NGTD    · Continue with respiratory protocol; oxygen supplement keep SpO2 greater than 92%

## 2022-09-14 NOTE — PLAN OF CARE
Problem: PAIN - ADULT  Goal: Verbalizes/displays adequate comfort level or baseline comfort level  Description: Interventions:  - Encourage patient to monitor pain and request assistance  - Assess pain using appropriate pain scale  - Administer analgesics based on type and severity of pain and evaluate response  - Implement non-pharmacological measures as appropriate and evaluate response  - Consider cultural and social influences on pain and pain management  - Notify physician/advanced practitioner if interventions unsuccessful or patient reports new pain  Outcome: Progressing     Problem: INFECTION - ADULT  Goal: Absence or prevention of progression during hospitalization  Description: INTERVENTIONS:  - Assess and monitor for signs and symptoms of infection  - Monitor lab/diagnostic results  - Monitor all insertion sites, i e  indwelling lines, tubes, and drains  - Monitor endotracheal if appropriate and nasal secretions for changes in amount and color  - [place] appropriate cooling/warming therapies per order  - Administer medications as ordered  - Instruct and encourage patient and family to use good hand hygiene technique  - Identify and instruct in appropriate isolation precautions for identified infection/condition  Outcome: Progressing  Goal: Absence of fever/infection during neutropenic period  Description: INTERVENTIONS:  - Monitor WBC    Outcome: Progressing     Problem: SAFETY ADULT  Goal: Patient will remain free of falls  Description: INTERVENTIONS:  - Educate patient/family on patient safety including physical limitations  - Instruct patient to call for assistance with activity   - Consult OT/PT to assist with strengthening/mobility   - Keep Call bell within reach  - Keep bed low and locked with side rails adjusted as appropriate  - Keep care items and personal belongings within reach  - Initiate and maintain comfort rounds  - Make Fall Risk Sign visible to staff  - Offer Toileting every 2 Hours, in advance of need  - Initiate/Maintain bed alarm  - Obtain necessary fall risk management equipment  - Apply yellow socks and bracelet for high fall risk patients  - Consider moving patient to room near nurses station  Outcome: Progressing  Goal: Maintain or return to baseline ADL function  Description: INTERVENTIONS:  -  Assess patient's ability to carry out ADLs; assess patient's baseline for ADL function and identify physical deficits which impact ability to perform ADLs (bathing, care of mouth/teeth, toileting, grooming, dressing, etc )  - Assess/evaluate cause of self-care deficits   - Assess range of motion  - Assess patient's mobility; develop plan if impaired  - Assess patient's need for assistive devices and provide as appropriate  - Encourage maximum independence but intervene and supervise when necessary  - Involve family in performance of ADLs  - Assess for home care needs following discharge   - Consider OT consult to assist with ADL evaluation and planning for discharge  - Provide patient education as appropriate  Outcome: Progressing  Goal: Maintains/Returns to pre admission functional level  Description: INTERVENTIONS:  - Perform BMAT or MOVE assessment daily    - Set and communicate daily mobility goal to care team and patient/family/caregiver  - Collaborate with rehabilitation services on mobility goals if consulted  - Perform Range of Motion 2 times a day  - Reposition patient every 2 hours    - Dangle patient 2 times a day  - Stand patient 2 times a day  - Ambulate patient 2 times a day  - Out of bed to chair 3 times a day   - Out of bed for meals 3 times a day  - Out of bed for toileting  - Record patient progress and toleration of activity level   Outcome: Progressing     Problem: DISCHARGE PLANNING  Goal: Discharge to home or other facility with appropriate resources  Description: INTERVENTIONS:  - Identify barriers to discharge w/patient and caregiver  - Arrange for needed discharge resources and transportation as appropriate  - Identify discharge learning needs (meds, wound care, etc )  - Refer to Case Management Department for coordinating discharge planning if the patient needs post-hospital services based on physician/advanced practitioner order or complex needs related to functional status, cognitive ability, or social support system  Outcome: Progressing     Problem: Knowledge Deficit  Goal: Patient/family/caregiver demonstrates understanding of disease process, treatment plan, medications, and discharge instructions  Description: Complete learning assessment and assess knowledge base    Interventions:  - Provide teaching at level of understanding  - Provide teaching via preferred learning methods  Outcome: Progressing     Problem: RESPIRATORY - ADULT  Goal: Achieves optimal ventilation and oxygenation  Description: INTERVENTIONS:  - Assess for changes in respiratory status  - Assess for changes in mentation and behavior  - Position to facilitate oxygenation and minimize respiratory effort  - Oxygen administered by appropriate delivery if ordered  - Initiate smoking cessation education as indicated  - Encourage broncho-pulmonary hygiene including cough, deep breathe, Incentive Spirometry  - Assess the need for suctioning and aspirate as needed  - Assess and instruct to report SOB or any respiratory difficulty  - Respiratory Therapy support as indicated  Outcome: Progressing     Problem: Prexisting or High Potential for Compromised Skin Integrity  Goal: Skin integrity is maintained or improved  Description: INTERVENTIONS:  - Identify patients at risk for skin breakdown  - Assess and monitor skin integrity  - Assess and monitor nutrition and hydration status  - Monitor labs   - Assess for incontinence   - Turn and reposition patient  - Assist with mobility/ambulation  - Relieve pressure over bony prominences  - Avoid friction and shearing  - Provide appropriate hygiene as needed including keeping skin clean and dry  - Evaluate need for skin moisturizer/barrier cream  - Collaborate with interdisciplinary team   - Patient/family teaching  - Consider wound care consult   Outcome: Progressing     Problem: Potential for Falls  Goal: Patient will remain free of falls  Description: INTERVENTIONS:  - Educate patient/family on patient safety including physical limitations  - Instruct patient to call for assistance with activity   - Consult OT/PT to assist with strengthening/mobility   - Keep Call bell within reach  - Keep bed low and locked with side rails adjusted as appropriate  - Keep care items and personal belongings within reach  - Initiate and maintain comfort rounds  - Make Fall Risk Sign visible to staff  - Offer Toileting every 2 Hours, in advance of need  - Initiate/Maintain bed alarm  - Obtain necessary fall risk management equipment  - Apply yellow socks and bracelet for high fall risk patients  - Consider moving patient to room near nurses station  Outcome: Progressing

## 2022-09-14 NOTE — ASSESSMENT & PLAN NOTE
Lab Results   Component Value Date    HGBA1C 9 1 (H) 07/16/2022       Recent Labs     09/13/22  1616 09/13/22 2056 09/14/22  0742 09/14/22  1133   POCGLU 172* 255* 184* 228*       Blood Sugar Average: Last 72 hrs:  (P) 188 875     -increase Lantus to 20 units q h s   -continue lispro 5 units with meals  -sliding scale insulin and Accu-Cheks with meals and at bedtime  -carb controlled diet level 2

## 2022-09-14 NOTE — PROGRESS NOTES
NEPHROLOGY PROGRESS NOTE   Vonzell Landau 38 y o  male MRN: [id number]  Unit/Bed#: -01 Encounter: [id number]    Assessment/Plan:    68-year-old morbidly obese man with schizoaffective disorder, CKD 4 due to biopsy-proven diabetic nephropathy, hypertensive nephrosclerosis, tubulointerstitial disease, admitted 09/09 for shortness of breath being treated for acute hypoxic respiratory failure (resolved), volume overload receiving aggressive IV diuresis  1  Acute kidney injury (POA) atop chronic kidney disease  · Hint of acute tubular injury on biopsy done 8/12/22 for which clinical suspicion is high that he remains however this is complicated by volume overloaded state and he is receiving aggressive diuresis  · Did not receive afternoon dose or morning dose today Bumex  Have reordered 4 mg Bumex IV b i d  and will monitor response  · Suspect patient will ultimately need initiation of hemodialysis this admission and if this is the case, will plan on PermCath placement on Friday  · Continue to follow serial lab work  2  Stage IV chronic kidney disease baseline creatinine around 3 8-4 0 mg/dL  · Due to biopsy-proven (08/12/2022) hypertensive nephrosclerosis, diabetic nephropathy and chronic tubulointerstitial disease  3  Diabetic nephropathy  · Not a candidate for Ace/Arb or SGL T2 inhibitor  4  Hypertensive nephrosclerosis  · Applying hold parameters for SBP less than 130 mmHg on noncardiac related antihypertensives to facilitate diuresis  Once dialysis dependent, switch to more favorable angiotensin receptor blocker and deescalate other antihypertensives as able  5  Volume overload  · Restart Bumex 4 mg IV b i d  and repeat blood work tomorrow  6  Iron deficiency anemia  · Avoid IV iron infusion today as to not add to volume overload    ROS  Seen and examined lying in bed  Refused his blood work  No other physical complaints    A complete 10 point review of systems have been performed and are otherwise negative  Historical Information   Past Medical History:   Diagnosis Date    Acute bronchitis due to other specified organisms 07/05/2019    Chronic headaches     Diabetes mellitus ([address] )     Disease of thyroid gland     Esophagitis     Gastritis     Gastroparesis     GERD (gastroesophageal reflux disease)     Hypertension     Migraine     Migraines 03/11/2021    Obesity     Obsessive compulsive disorder     Psychiatric disorder     Renal disorder     Schizoaffective disorder [facility])      Past Surgical History:   Procedure Laterality Date    ESOPHAGOGASTRODUODENOSCOPY N/A 1/15/2019    Procedure: ESOPHAGOGASTRODUODENOSCOPY (EGD); Surgeon: Kerrie Moreno MD;  Location: [facility];   Service: Gastroenterology    IR BIOPSY KIDNEY RANDOM  8/11/2022     Social History   Social History     Substance and Sexual Activity   Alcohol Use Not Currently     Social History     Substance and Sexual Activity   Drug Use Not Currently     Social History     Tobacco Use   Smoking Status Never Smoker   Smokeless Tobacco Never Used       Family History:   Family History   Problem Relation Age of Onset    COPD Mother     Hypertension Mother     Heart failure Mother     Rheum arthritis Family     Heart disease Family     Hypertension Father     Diabetes Father     Hyperlipidemia Father        Medications:  Pertinent medications were reviewed  Current Facility-Administered Medications   Medication Dose Route Frequency Provider Last Rate    acetaminophen  650 mg Oral Q6H PRN Alva David PA-C      albuterol  2 puff Inhalation Q4H PRN Alesia Leash, DO      amLODIPine  10 mg Oral Daily Alesia Leash, DO      bumetanide  4 mg Intravenous BID DOREEN Beatty      carvedilol  25 mg Oral BID With Meals Alesia Leash, DO      doxazosin  2 mg Oral Daily Alesia Leash, DO      FLUoxetine  20 mg Oral QAM Alesia Leash, DO      glycerin-hypromellose-  2 drop Both Eyes Q3H PRN DOREEN Gonzalez  heparin (porcine)  7,500 Units Subcutaneous [facility] Sargertrude Castañeda, DO      hydrALAZINE  50 mg Oral TID Aakash Castañeda, DO      hydrOXYzine HCL  25 mg Oral Q6H PRN [place] Clamp, PA-C      insulin glargine  15 Units Subcutaneous HS Lindsay Class, PA-C      insulin lispro  2-12 Units Subcutaneous TID AC Milo Roth, DO      insulin lispro  2-12 Units Subcutaneous HS Aakash Castañeda, DO      insulin lispro  5 Units Subcutaneous TID With Meals Aakash Maddy, DO      levothyroxine  125 mcg Oral Early Morning Aakash Maddy, DO      pantoprazole  40 mg Oral BID AC Sarsonae Maddy, DO           Allergies   Allergen Reactions    Amoxicillin Diarrhea    Augmentin [Amoxicillin-Pot Clavulanate] Diarrhea    Clavulanic Acid Diarrhea    Erythromycin Diarrhea         Vitals:   BP 96/63   Pulse 74   Temp 97 7 °F (36 5 °C) (Oral)   Resp 19   Ht 5' 2" (1 575 m)   Wt (!) 151 kg (332 lb 10 8 oz)   SpO2 96%   BMI 60 85 kg/m²   Body mass index is 60 85 kg/m²  SpO2: 96 %,   SpO2 Activity: At Rest,   O2 Device: None (Room air)      Intake/Output Summary (Last 24 hours) at 9/14/2022 1350  Last data filed at 9/14/2022 1221  Gross per 24 hour   Intake 1240 ml   Output 3650 ml   Net -2410 ml     Invasive Devices  Report    Peripheral Intravenous Line  Duration           Peripheral IV 09/13/22 Dorsal (posterior); Right Hand 1 day                Physical Exam  General: conscious, cooperative, in no acute distress  Eyes: conjunctivae pink, anicteric sclerae  ENT: lips and mucous membranes moist  Neck: supple, no JVD, no masses  Chest: clear breath sounds bilaterally, diffuse expiratory wheezing posteriorly  CVS: S1 & S2, normal rate, regular rhythm  Abdomen: soft, non-tender, non-distended, normoactive bowel sounds morbidly obese  Extremities:  Moderate edema of both legs  Skin: no rash  Neuro: awake, alert, oriented      Diagnostic Data:  Lab: I have personally reviewed pertinent lab results  ,   CBC:  Results from last 7 days   Lab Units 09/14/22  0435   WBC Thousand/uL 8 85   HEMOGLOBIN g/dL 8 5*   HEMATOCRIT % 25 8*   PLATELETS Thousands/uL 377      CMP:   Lab Results   Component Value Date    SODIUM 138 09/14/2022    K 3 8 09/14/2022     09/14/2022    CO2 28 09/14/2022    BUN 87 (H) 09/14/2022    CREATININE 4 93 (H) 09/14/2022    CALCIUM 9 0 09/14/2022    EGFR 13 09/14/2022   ,   PT/INR: No results found for: PT, INR,   Magnesium: No components found for: MAG,  Phosphorous:   Lab Results   Component Value Date    PHOS 4 9 (H) 09/14/2022       Microbiology:  @[facility],(urinecx:7)@        DOREEN Trivedi    Portions of the record may have been created with voice recognition software  Occasional wrong word or "sound a like" substitutions may have occurred due to the inherent limitations of voice recognition software  Read the chart carefully and recognize, using context, where substitutions have occurred

## 2022-09-14 NOTE — ASSESSMENT & PLAN NOTE
· Patient reported right sided chest pressure today 9/14  · HS Tnl 0hr 17, patient refused additional troponin's  · Patient now saying he believes pain was due to anxiety  · Will repeat troponin with am labs

## 2022-09-14 NOTE — PLAN OF CARE
Problem: PAIN - ADULT  Goal: Verbalizes/displays adequate comfort level or baseline comfort level  Description: Interventions:  - Encourage patient to monitor pain and request assistance  - Assess pain using appropriate pain scale  - Administer analgesics based on type and severity of pain and evaluate response  - Implement non-pharmacological measures as appropriate and evaluate response  - Consider cultural and social influences on pain and pain management  - Notify physician/advanced practitioner if interventions unsuccessful or patient reports new pain  Outcome: Progressing     Problem: INFECTION - ADULT  Goal: Absence or prevention of progression during hospitalization  Description: INTERVENTIONS:  - Assess and monitor for signs and symptoms of infection  - Monitor lab/diagnostic results  - Monitor all insertion sites, i e  indwelling lines, tubes, and drains  - Monitor endotracheal if appropriate and nasal secretions for changes in amount and color  - [place] appropriate cooling/warming therapies per order  - Administer medications as ordered  - Instruct and encourage patient and family to use good hand hygiene technique  - Identify and instruct in appropriate isolation precautions for identified infection/condition  Outcome: Progressing  Goal: Absence of fever/infection during neutropenic period  Description: INTERVENTIONS:  - Monitor WBC    Outcome: Progressing     Problem: SAFETY ADULT  Goal: Patient will remain free of falls  Description: INTERVENTIONS:  - Educate patient/family on patient safety including physical limitations  - Instruct patient to call for assistance with activity   - Consult OT/PT to assist with strengthening/mobility   - Keep Call bell within reach  - Keep bed low and locked with side rails adjusted as appropriate  - Keep care items and personal belongings within reach  - Initiate and maintain comfort rounds  - Make Fall Risk Sign visible to staff  - Offer Toileting every 2 Hours, in advance of need  - Initiate/Maintain bed alarm  - Obtain necessary fall risk management equipment  - Apply yellow socks and bracelet for high fall risk patients  - Consider moving patient to room near nurses station  Outcome: Progressing  Goal: Maintain or return to baseline ADL function  Description: INTERVENTIONS:  -  Assess patient's ability to carry out ADLs; assess patient's baseline for ADL function and identify physical deficits which impact ability to perform ADLs (bathing, care of mouth/teeth, toileting, grooming, dressing, etc )  - Assess/evaluate cause of self-care deficits   - Assess range of motion  - Assess patient's mobility; develop plan if impaired  - Assess patient's need for assistive devices and provide as appropriate  - Encourage maximum independence but intervene and supervise when necessary  - Involve family in performance of ADLs  - Assess for home care needs following discharge   - Consider OT consult to assist with ADL evaluation and planning for discharge  - Provide patient education as appropriate  Outcome: Progressing  Goal: Maintains/Returns to pre admission functional level  Description: INTERVENTIONS:  - Perform BMAT or MOVE assessment daily    - Set and communicate daily mobility goal to care team and patient/family/caregiver  - Collaborate with rehabilitation services on mobility goals if consulted  - Perform Range of Motion 2 times a day  - Reposition patient every 2 hours    - Dangle patient 2 times a day  - Stand patient 2 times a day  - Ambulate patient 2 times a day  - Out of bed to chair 3 times a day   - Out of bed for meals 3 times a day  - Out of bed for toileting  - Record patient progress and toleration of activity level   Outcome: Progressing     Problem: DISCHARGE PLANNING  Goal: Discharge to home or other facility with appropriate resources  Description: INTERVENTIONS:  - Identify barriers to discharge w/patient and caregiver  - Arrange for needed discharge resources and transportation as appropriate  - Identify discharge learning needs (meds, wound care, etc )  - Refer to Case Management Department for coordinating discharge planning if the patient needs post-hospital services based on physician/advanced practitioner order or complex needs related to functional status, cognitive ability, or social support system  Outcome: Progressing     Problem: Knowledge Deficit  Goal: Patient/family/caregiver demonstrates understanding of disease process, treatment plan, medications, and discharge instructions  Description: Complete learning assessment and assess knowledge base    Interventions:  - Provide teaching at level of understanding  - Provide teaching via preferred learning methods  Outcome: Progressing     Problem: RESPIRATORY - ADULT  Goal: Achieves optimal ventilation and oxygenation  Description: INTERVENTIONS:  - Assess for changes in respiratory status  - Assess for changes in mentation and behavior  - Position to facilitate oxygenation and minimize respiratory effort  - Oxygen administered by appropriate delivery if ordered  - Initiate smoking cessation education as indicated  - Encourage broncho-pulmonary hygiene including cough, deep breathe, Incentive Spirometry  - Assess the need for suctioning and aspirate as needed  - Assess and instruct to report SOB or any respiratory difficulty  - Respiratory Therapy support as indicated  Outcome: Progressing     Problem: Prexisting or High Potential for Compromised Skin Integrity  Goal: Skin integrity is maintained or improved  Description: INTERVENTIONS:  - Identify patients at risk for skin breakdown  - Assess and monitor skin integrity  - Assess and monitor nutrition and hydration status  - Monitor labs   - Assess for incontinence   - Turn and reposition patient  - Assist with mobility/ambulation  - Relieve pressure over bony prominences  - Avoid friction and shearing  - Provide appropriate hygiene as needed including keeping skin clean and dry  - Evaluate need for skin moisturizer/barrier cream  - Collaborate with interdisciplinary team   - Patient/family teaching  - Consider wound care consult   Outcome: Progressing     Problem: Potential for Falls  Goal: Patient will remain free of falls  Description: INTERVENTIONS:  - Educate patient/family on patient safety including physical limitations  - Instruct patient to call for assistance with activity   - Consult OT/PT to assist with strengthening/mobility   - Keep Call bell within reach  - Keep bed low and locked with side rails adjusted as appropriate  - Keep care items and personal belongings within reach  - Initiate and maintain comfort rounds  - Make Fall Risk Sign visible to staff  - Offer Toileting every 2 Hours, in advance of need  - Initiate/Maintain bed alarm  - Obtain necessary fall risk management equipment  - Apply yellow socks and bracelet for high fall risk patients  - Consider moving patient to room near nurses station  Outcome: Progressing

## 2022-09-14 NOTE — NURSING NOTE
Was told by hospitalist pt newly c/o chest pain  EKG and serial trops ordered  EKG obtained and labs sent down  Pt c/o 4/10 chest pain he describes a pressure  BP 96/63 HR 74  Pt denies any lightheadedness or dizziness  Pt states he believes chest pain is related to anxiety  Grace Perez with [facility] aware

## 2022-09-14 NOTE — PROGRESS NOTES
Rima U  64   Progress Note - Stalin Rowell 1980, 38 y o  male MRN: [id number]  Unit/Bed#: -01 Encounter: [id number]  Primary Care Provider: Lonnie Graham MD   Date and time admitted to hospital: 9/9/2022 11:40 AM    * Acute respiratory failure with hypoxia [facility])  Assessment & Plan  Patient requiring 5 L nasal cannula in ED  Found to have SpO2 of 87% on 5 L  Currently weaned off oxygen   X-ray revealed findings suggestive of fluid overload and possible pneumonia  Patient received IV Lasix and Rocephin in ED  Patient on Bumex 2 mg p o  B i d  on outpatient basis  BNP elevated at 330   Leukocytosis noted on admission lab work however patient has been on steroid taper, denies fevers  Echocardiogram shows LVEF of 50 to 55%  Trivial small pericardial effusion  · Suspected acute respiratory failure is due to volume overload  · Nephrology input appreciated- continue with current diuretic therapy-Bumex 4 mg BID  · 5 day course of ceftriaxone completed 9/13/22   · Blood cultures- NGTD    · Continue with respiratory protocol; oxygen supplement keep SpO2 greater than 92%        Chronic kidney disease, stage 4 (severe) [facility])  Assessment & Plan  Lab Results   Component Value Date    EGFR 13 09/14/2022    EGFR 12 09/13/2022    EGFR 13 09/12/2022    CREATININE 4 93 (H) 09/14/2022    CREATININE 5 11 (H) 09/13/2022    CREATININE 5 00 (H) 09/12/2022     Baseline Cr around 4 0mg/dL  follows with nephrology outpatient  The patient underwent renal biopsy which indicates nodular diabetic glomerulosclerosis, 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  Present with STEFANIA Cr 4 38mg/dL suspect to be due to acute tubular necrosis  Renal function continues to worsen       · Nephrology input appreciated- will be discussing possible initiation of dialysis   · Bumex 3 mg BID increased to 4 mg BID by nephrology  · Avoid hypotension and nephrotoxins  · Monitor renal function closely Essential hypertension  Assessment & Plan  Patient states he was out of his antihypertensives    -continue home amlodipine, carvedilol, hydralazine   -continue with aggressive diuretic therapy per nephrology    Gastroesophageal reflux disease without esophagitis  Assessment & Plan  · Continue home Protonix     Chest pain  Assessment & Plan  · Patient reported right sided chest pressure today 9/14  · HS Tnl 0hr 16, patient refused additional troponin's  · Patient now saying he believes pain was due to anxiety  · Will repeat troponin with am labs    Type 2 diabetes mellitus with stage 4 chronic kidney disease and hypertension [facility])  Assessment & Plan  Lab Results   Component Value Date    HGBA1C 9 1 (H) 07/16/2022       Recent Labs     09/13/22  1616 09/13/22  2056 09/14/22  0742 09/14/22  1133   POCGLU 172* 255* 184* 228*       Blood Sugar Average: Last 72 hrs:  (P) 188 875     -increase Lantus to 20 units q h s   -continue lispro 5 units with meals  -sliding scale insulin and Accu-Cheks with meals and at bedtime  -carb controlled diet level 2     Acquired hypothyroidism  Assessment & Plan  -continue home levothyroxine          VTE Pharmacologic Prophylaxis: VTE Score: 5 High Risk (Score >/= 5) - Pharmacological DVT Prophylaxis Ordered: heparin  Sequential Compression Devices Ordered  Patient Centered Rounds: I performed bedside rounds with nursing staff today  Discussions with Specialists or Other Care Team Provider: nursing, CM, nephrology    Education and Discussions with Family / Patient: Patient declined call to   Time Spent for Care: 20 minutes  More than 50% of total time spent on counseling and coordination of care as described above      Current Length of Stay: 4 day(s)  Current Patient Status: Inpatient   Certification Statement: The patient will continue to require additional inpatient hospital stay due to continued need for iv diruesis, monitoring labs  Discharge Plan: pending clinical course    Code Status: Level 1 - Full Code    Subjective: The patient was seen and examined  The patient is lying in bed in no acute distress  He reported right sided chest pressure to me  He later told nursing he thought it was due to anxiety  Objective:     Vitals:   Temp (24hrs), Av 9 °F (36 6 °C), Min:97 5 °F (36 4 °C), Max:98 6 °F (37 °C)    Temp:  [97 5 °F (36 4 °C)-98 6 °F (37 °C)] 98 6 °F (37 °C)  HR:  [71-80] 71  Resp:  [18-19] 18  BP: ()/(62-98) 147/88  SpO2:  [92 %-96 %] 93 %  Body mass index is 60 85 kg/m²  Input and Output Summary (last 24 hours): Intake/Output Summary (Last 24 hours) at 2022 1519  Last data filed at 2022 1221  Gross per 24 hour   Intake 1240 ml   Output 3650 ml   Net -2410 ml       Physical Exam:   Physical Exam  Vitals and nursing note reviewed  Constitutional:       General: He is awake  Appearance: He is morbidly obese  Cardiovascular:      Rate and Rhythm: Normal rate and regular rhythm  Pulmonary:      Effort: Pulmonary effort is normal       Breath sounds: Wheezing present  Abdominal:      Palpations: Abdomen is soft  Tenderness: There is no abdominal tenderness  Skin:     General: Skin is warm and dry  Neurological:      General: No focal deficit present  Mental Status: He is alert and oriented to person, place, and time  Psychiatric:         Attention and Perception: Attention normal          Mood and Affect: Mood normal          Speech: Speech normal          Behavior: Behavior is cooperative  Additional Data:     Labs:  Results from last 7 days   Lab Units 22  0435 22  0423 22  0516   WBC Thousand/uL 8 85   < > 9 62   HEMOGLOBIN g/dL 8 5*   < > 7 9*   HEMATOCRIT % 25 8*   < > 24 2*   PLATELETS Thousands/uL 377   < > 350   NEUTROS PCT %  --   --  72   LYMPHS PCT %  --   --  15   MONOS PCT %  --   --  7   EOS PCT %  --   --  5    < > = values in this interval not displayed  Results from last 7 days   Lab Units 09/14/22  0435 09/13/22  0423 09/12/22  0516   SODIUM mmol/L 138   < > 138   POTASSIUM mmol/L 3 8   < > 3 8   CHLORIDE mmol/L 100   < > 100   CO2 mmol/L 28   < > 27   BUN mg/dL 87*   < > 99*   CREATININE mg/dL 4 93*   < > 5 00*   ANION GAP mmol/L 10   < > 11   CALCIUM mg/dL 9 0   < > 8 7   ALBUMIN g/dL  --   --  2 8*   TOTAL BILIRUBIN mg/dL  --   --  0 27   ALK PHOS U/L  --   --  56   ALT U/L  --   --  8   AST U/L  --   --  7*   GLUCOSE RANDOM mg/dL 185*   < > 160*    < > = values in this interval not displayed  Results from last 7 days   Lab Units 09/09/22  1236   INR  1 05     Results from last 7 days   Lab Units 09/14/22  1133 09/14/22  0742 09/13/22  2056 09/13/22  1616 09/13/22  1046 09/13/22  0707 09/13/22  0328 09/12/22  2110 09/12/22  1454 09/12/22  1053 09/12/22  0729 09/11/22  2052   POC GLUCOSE mg/dl 228* 184* 255* 172* 279* 184* 162* 208* 163* 289* 177* 253*         Results from last 7 days   Lab Units 09/11/22  0457 09/10/22  0436 09/09/22  1236   LACTIC ACID mmol/L  --   --  0 5   PROCALCITONIN ng/ml 0 18 0 18 0 08       Lines/Drains:  Invasive Devices  Report    Peripheral Intravenous Line  Duration           Peripheral IV 09/13/22 Dorsal (posterior); Right Hand 1 day                      Imaging: No pertinent imaging reviewed  Recent Cultures (last 7 days):   Results from last 7 days   Lab Units 09/09/22  1251 09/09/22  1236   BLOOD CULTURE  No Growth After 4 Days  No Growth After 4 Days         Last 24 Hours Medication List:   Current Facility-Administered Medications   Medication Dose Route Frequency Provider Last Rate    acetaminophen  650 mg Oral Q6H PRN Garrett Charles PA-C      albuterol  2 puff Inhalation Q4H PRN Breannajulio Edwards DO      [START ON 9/15/2022] amLODIPine  10 mg Oral Daily DOREEN Porras      bumetanide  4 mg Intravenous BID DOREEN Porras      carvedilol  25 mg Oral BID With Meals Breanna Edwards DO      doxazosin  2 mg Oral Daily Carissa Denny, DO      FLUoxetine  20 mg Oral QAM Carissa Denny DO      glycerin-hypromellose-  2 drop Both Eyes Q3H PRN DOREEN Taylor      heparin (porcine)  7,500 Units Subcutaneous [facility] Carissa Denny DO      hydrALAZINE  50 mg Oral TID DOREEN Goldberg      hydrOXYzine HCL  25 mg Oral Q6H PRN Suzanne Black PA-C      insulin glargine  20 Units Subcutaneous HS Gideon Goode PA-C      insulin lispro  2-12 Units Subcutaneous TID AC Russell Merlinda Mail, DO      insulin lispro  2-12 Units Subcutaneous HS Carissa Denny DO      insulin lispro  5 Units Subcutaneous TID With Meals Carissa Denny DO      levothyroxine  125 mcg Oral Early Morning Carissa Denny DO      pantoprazole  40 mg Oral BID AC Carissa Denny DO          Today, Patient Was Seen By: Gideon Goode PA-C    **Please Note: This note may have been constructed using a voice recognition system  **

## 2022-09-15 LAB
ANION GAP SERPL CALCULATED.3IONS-SCNC: 11 MMOL/L (ref 4–13)
ATRIAL RATE: 73 BPM
BUN SERPL-MCNC: 80 MG/DL (ref 5–25)
CALCIUM SERPL-MCNC: 9 MG/DL (ref 8.4–10.2)
CARDIAC TROPONIN I PNL SERPL HS: 16 NG/L (ref 8–18)
CHLORIDE SERPL-SCNC: 98 MMOL/L (ref 96–108)
CO2 SERPL-SCNC: 29 MMOL/L (ref 21–32)
CREAT SERPL-MCNC: 4.98 MG/DL (ref 0.6–1.3)
ERYTHROCYTE [DISTWIDTH] IN BLOOD BY AUTOMATED COUNT: 13.8 % (ref 11.6–15.1)
GFR SERPL CREATININE-BSD FRML MDRD: 13 ML/MIN/1.73SQ M
GLUCOSE SERPL-MCNC: 166 MG/DL (ref 65–140)
GLUCOSE SERPL-MCNC: 171 MG/DL (ref 65–140)
GLUCOSE SERPL-MCNC: 179 MG/DL (ref 65–140)
GLUCOSE SERPL-MCNC: 196 MG/DL (ref 65–140)
GLUCOSE SERPL-MCNC: 226 MG/DL (ref 65–140)
GLUCOSE SERPL-MCNC: 250 MG/DL (ref 65–140)
HCT VFR BLD AUTO: 26 % (ref 36.5–49.3)
HGB BLD-MCNC: 8.4 G/DL (ref 12–17)
MCH RBC QN AUTO: 27.5 PG (ref 26.8–34.3)
MCHC RBC AUTO-ENTMCNC: 32.3 G/DL (ref 31.4–37.4)
MCV RBC AUTO: 85 FL (ref 82–98)
P AXIS: 45 DEGREES
PLATELET # BLD AUTO: 368 THOUSANDS/UL (ref 149–390)
PMV BLD AUTO: 9.5 FL (ref 8.9–12.7)
POTASSIUM SERPL-SCNC: 3.6 MMOL/L (ref 3.5–5.3)
PR INTERVAL: 192 MS
QRS AXIS: -8 DEGREES
QRSD INTERVAL: 106 MS
QT INTERVAL: 416 MS
QTC INTERVAL: 458 MS
RBC # BLD AUTO: 3.05 MILLION/UL (ref 3.88–5.62)
SODIUM SERPL-SCNC: 138 MMOL/L (ref 135–147)
T WAVE AXIS: 99 DEGREES
VENTRICULAR RATE: 73 BPM
WBC # BLD AUTO: 9.27 THOUSAND/UL (ref 4.31–10.16)

## 2022-09-15 PROCEDURE — 84484 ASSAY OF TROPONIN QUANT: CPT | Performed by: PHYSICIAN ASSISTANT

## 2022-09-15 PROCEDURE — 82948 REAGENT STRIP/BLOOD GLUCOSE: CPT

## 2022-09-15 PROCEDURE — 80048 BASIC METABOLIC PNL TOTAL CA: CPT | Performed by: NURSE PRACTITIONER

## 2022-09-15 PROCEDURE — 93010 ELECTROCARDIOGRAM REPORT: CPT | Performed by: INTERNAL MEDICINE

## 2022-09-15 PROCEDURE — 85027 COMPLETE CBC AUTOMATED: CPT | Performed by: PHYSICIAN ASSISTANT

## 2022-09-15 PROCEDURE — 99232 SBSQ HOSP IP/OBS MODERATE 35: CPT | Performed by: PHYSICIAN ASSISTANT

## 2022-09-15 PROCEDURE — 99233 SBSQ HOSP IP/OBS HIGH 50: CPT | Performed by: INTERNAL MEDICINE

## 2022-09-15 RX ADMIN — HEPARIN SODIUM 7500 UNITS: 5000 INJECTION INTRAVENOUS; SUBCUTANEOUS at 21:53

## 2022-09-15 RX ADMIN — FLUOXETINE 20 MG: 20 CAPSULE ORAL at 08:04

## 2022-09-15 RX ADMIN — CARVEDILOL 25 MG: 25 TABLET, FILM COATED ORAL at 08:05

## 2022-09-15 RX ADMIN — CARVEDILOL 25 MG: 25 TABLET, FILM COATED ORAL at 15:30

## 2022-09-15 RX ADMIN — HYDRALAZINE HYDROCHLORIDE 50 MG: 25 TABLET, FILM COATED ORAL at 15:29

## 2022-09-15 RX ADMIN — INSULIN LISPRO 6 UNITS: 100 INJECTION, SOLUTION INTRAVENOUS; SUBCUTANEOUS at 21:53

## 2022-09-15 RX ADMIN — ALBUTEROL SULFATE 2 PUFF: 108 INHALANT RESPIRATORY (INHALATION) at 08:17

## 2022-09-15 RX ADMIN — INSULIN LISPRO 2 UNITS: 100 INJECTION, SOLUTION INTRAVENOUS; SUBCUTANEOUS at 17:02

## 2022-09-15 RX ADMIN — HYDROXYZINE HYDROCHLORIDE 25 MG: 25 TABLET ORAL at 09:30

## 2022-09-15 RX ADMIN — INSULIN GLARGINE 20 UNITS: 100 INJECTION, SOLUTION SUBCUTANEOUS at 21:53

## 2022-09-15 RX ADMIN — INSULIN LISPRO 5 UNITS: 100 INJECTION, SOLUTION INTRAVENOUS; SUBCUTANEOUS at 17:02

## 2022-09-15 RX ADMIN — PANTOPRAZOLE SODIUM 40 MG: 40 TABLET, DELAYED RELEASE ORAL at 08:05

## 2022-09-15 RX ADMIN — LEVOTHYROXINE SODIUM 125 MCG: 25 TABLET ORAL at 05:46

## 2022-09-15 RX ADMIN — HYDROXYZINE HYDROCHLORIDE 25 MG: 25 TABLET ORAL at 02:59

## 2022-09-15 RX ADMIN — INSULIN LISPRO 5 UNITS: 100 INJECTION, SOLUTION INTRAVENOUS; SUBCUTANEOUS at 08:03

## 2022-09-15 RX ADMIN — INSULIN LISPRO 5 UNITS: 100 INJECTION, SOLUTION INTRAVENOUS; SUBCUTANEOUS at 11:51

## 2022-09-15 RX ADMIN — DOXAZOSIN 2 MG: 2 TABLET ORAL at 08:05

## 2022-09-15 RX ADMIN — INSULIN LISPRO 4 UNITS: 100 INJECTION, SOLUTION INTRAVENOUS; SUBCUTANEOUS at 11:50

## 2022-09-15 RX ADMIN — ACETAMINOPHEN 325MG 650 MG: 325 TABLET ORAL at 15:28

## 2022-09-15 RX ADMIN — ACETAMINOPHEN 325MG 650 MG: 325 TABLET ORAL at 02:59

## 2022-09-15 RX ADMIN — HEPARIN SODIUM 7500 UNITS: 5000 INJECTION INTRAVENOUS; SUBCUTANEOUS at 13:25

## 2022-09-15 RX ADMIN — BUMETANIDE 4 MG: 0.25 INJECTION INTRAMUSCULAR; INTRAVENOUS at 17:03

## 2022-09-15 RX ADMIN — PANTOPRAZOLE SODIUM 40 MG: 40 TABLET, DELAYED RELEASE ORAL at 15:29

## 2022-09-15 RX ADMIN — HYDRALAZINE HYDROCHLORIDE 50 MG: 25 TABLET, FILM COATED ORAL at 21:53

## 2022-09-15 RX ADMIN — BUMETANIDE 4 MG: 0.25 INJECTION INTRAMUSCULAR; INTRAVENOUS at 08:09

## 2022-09-15 RX ADMIN — HEPARIN SODIUM 7500 UNITS: 5000 INJECTION INTRAVENOUS; SUBCUTANEOUS at 05:46

## 2022-09-15 RX ADMIN — INSULIN LISPRO 2 UNITS: 100 INJECTION, SOLUTION INTRAVENOUS; SUBCUTANEOUS at 08:02

## 2022-09-15 NOTE — ASSESSMENT & PLAN NOTE
Lab Results   Component Value Date    HGBA1C 9 1 (H) 07/16/2022       Recent Labs     09/14/22  1557 09/14/22  2126 09/15/22  0337 09/15/22  0801   POCGLU 164* 234* 166* 179*       Blood Sugar Average: Last 72 hrs:  (P) 863 [id number]     -increased Lantus to 20 units q h s   -continue lispro 5 units with meals  -sliding scale insulin and Accu-Cheks with meals and at bedtime  -carb controlled diet level 2

## 2022-09-15 NOTE — PROGRESS NOTES
[address]  Progress Note - Mahesh Anguiano 1980, 38 y o  male MRN: [id number]  Unit/Bed#: -01 Encounter: [id number]  Primary Care Provider: Leana Mock MD   Date and time admitted to hospital: 9/9/2022 11:40 AM    * Acute respiratory failure with hypoxia [facility])  Assessment & Plan  Patient requiring 5 L nasal cannula in ED  Found to have SpO2 of 87% on 5 L  Currently weaned off oxygen   X-ray revealed findings suggestive of fluid overload and possible pneumonia  Patient received IV Lasix and Rocephin in ED  Patient on Bumex 2 mg p o  B i d  on outpatient basis  BNP elevated at 330   Leukocytosis noted on admission lab work however patient has been on steroid taper, denies fevers  Echocardiogram shows LVEF of 50 to 55%  Trivial small pericardial effusion  · Suspected acute respiratory failure is due to volume overload  · Nephrology input appreciated- continue with current diuretic therapy-Bumex 4 mg BID  · 5 day course of ceftriaxone completed 9/13/22   · Blood cultures- NGTD    · Continue with respiratory protocol; oxygen supplement keep SpO2 greater than 92%        Chronic kidney disease, stage 4 (severe) [facility])  Assessment & Plan  Lab Results   Component Value Date    EGFR 13 09/15/2022    EGFR 13 09/14/2022    EGFR 12 09/13/2022    CREATININE 4 98 (H) 09/15/2022    CREATININE 4 93 (H) 09/14/2022    CREATININE 5 11 (H) 09/13/2022     Baseline Cr around 4 0mg/dL  follows with nephrology outpatient  The patient underwent renal biopsy which indicates nodular diabetic glomerulosclerosis, 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  Present with STEFANIA Cr 4 38mg/dL suspect to be due to acute tubular necrosis  Renal function continues to worsen       · Nephrology input appreciated- discussed possible initiation of dialysis   · Continue Bumex 4 mg BID per nephrology  · Avoid hypotension and nephrotoxins  · Monitor renal function closely     Essential hypertension  Assessment & Plan  Patient states he was out of his antihypertensives    -continue home amlodipine, carvedilol, hydralazine   -continue with aggressive diuretic therapy per nephrology    Gastroesophageal reflux disease without esophagitis  Assessment & Plan  · Continue home Protonix     Chest pain  Assessment & Plan  · Patient reported right sided chest pressure 9/14  · HS Tnl 0hr 16, patient refused additional troponin's  · Repeat HS Tnl this morning is 16  · No additional chest pain today- patient stated he thought it may be anxiety     Type 2 diabetes mellitus with stage 4 chronic kidney disease and hypertension [facility])  Assessment & Plan  Lab Results   Component Value Date    HGBA1C 9 1 (H) 07/16/2022       Recent Labs     09/14/22  1557 09/14/22  2126 09/15/22  0337 09/15/22  0801   POCGLU 164* 234* 166* 179*       Blood Sugar Average: Last 72 hrs:  (P) 315 [id number]     -increased Lantus to 20 units q h s   -continue lispro 5 units with meals  -sliding scale insulin and Accu-Cheks with meals and at bedtime  -carb controlled diet level 2     Acquired hypothyroidism  Assessment & Plan  -continue home levothyroxine          VTE Pharmacologic Prophylaxis: VTE Score: 5 High Risk (Score >/= 5) - Pharmacological DVT Prophylaxis Ordered: heparin  Sequential Compression Devices Ordered  Patient Centered Rounds: I performed bedside rounds with nursing staff today  Discussions with Specialists or Other Care Team Provider: nursing, CM, nephrology    Education and Discussions with Family / Patient: patient updated at bedside  Time Spent for Care: 20 minutes  More than 50% of total time spent on counseling and coordination of care as described above      Current Length of Stay: 5 day(s)  Current Patient Status: Inpatient   Certification Statement: The patient will continue to require additional inpatient hospital stay due to monitoring labs, IV diuresis  Discharge Plan: pending clinical course    Code Status: Level 1 - Full Code    Subjective: The patient was seen and examined  The patient is sitting up on the couch  He is asking if he will be started on dialysis  Objective:     Vitals:   Temp (24hrs), Av 3 °F (36 8 °C), Min:97 7 °F (36 5 °C), Max:98 6 °F (37 °C)    Temp:  [97 7 °F (36 5 °C)-98 6 °F (37 °C)] 98 5 °F (36 9 °C)  HR:  [71-79] 73  Resp:  [16-20] 18  BP: (113-154)/(57-88) 113/70  SpO2:  [92 %-96 %] 92 %  Body mass index is 59 11 kg/m²  Input and Output Summary (last 24 hours): Intake/Output Summary (Last 24 hours) at 9/15/2022 1007  Last data filed at 9/15/2022 0935  Gross per 24 hour   Intake 660 ml   Output 1800 ml   Net -1140 ml       Physical Exam:   Physical Exam  Vitals and nursing note reviewed  Constitutional:       General: He is awake  Appearance: He is morbidly obese  Cardiovascular:      Rate and Rhythm: Normal rate and regular rhythm  Pulmonary:      Effort: Pulmonary effort is normal       Breath sounds: Normal breath sounds  Abdominal:      Palpations: Abdomen is soft  Tenderness: There is no abdominal tenderness  Skin:     General: Skin is warm and dry  Neurological:      General: No focal deficit present  Mental Status: He is alert and oriented to person, place, and time  Psychiatric:         Attention and Perception: Attention normal          Mood and Affect: Mood normal          Speech: Speech normal          Behavior: Behavior is cooperative  Additional Data:     Labs:  Results from last 7 days   Lab Units 09/15/22  0437 09/13/22  0423 09/12/22  0516   WBC Thousand/uL 9 27   < > 9 62   HEMOGLOBIN g/dL 8 4*   < > 7 9*   HEMATOCRIT % 26 0*   < > 24 2*   PLATELETS Thousands/uL 368   < > 350   NEUTROS PCT %  --   --  72   LYMPHS PCT %  --   --  15   MONOS PCT %  --   --  7   EOS PCT %  --   --  5    < > = values in this interval not displayed       Results from last 7 days   Lab Units 09/15/22  0437 09/13/22  0423 09/12/22  0516   SODIUM mmol/L 138   < > 138   POTASSIUM mmol/L 3 6   < > 3 8   CHLORIDE mmol/L 98   < > 100   CO2 mmol/L 29   < > 27   BUN mg/dL 80*   < > 99*   CREATININE mg/dL 4 98*   < > 5 00*   ANION GAP mmol/L 11   < > 11   CALCIUM mg/dL 9 0   < > 8 7   ALBUMIN g/dL  --   --  2 8*   TOTAL BILIRUBIN mg/dL  --   --  0 27   ALK PHOS U/L  --   --  56   ALT U/L  --   --  8   AST U/L  --   --  7*   GLUCOSE RANDOM mg/dL 171*   < > 160*    < > = values in this interval not displayed  Results from last 7 days   Lab Units 09/09/22  1236   INR  1 05     Results from last 7 days   Lab Units 09/15/22  0801 09/15/22  0337 09/14/22  2126 09/14/22  1557 09/14/22  1133 09/14/22  0742 09/13/22  2056 09/13/22  1616 09/13/22  1046 09/13/22  0707 09/13/22  0328 09/12/22  2110   POC GLUCOSE mg/dl 179* 166* 234* 164* 228* 184* 255* 172* 279* 184* 162* 208*         Results from last 7 days   Lab Units 09/11/22  0457 09/10/22  0436 09/09/22  1236   LACTIC ACID mmol/L  --   --  0 5   PROCALCITONIN ng/ml 0 18 0 18 0 08       Lines/Drains:  Invasive Devices  Report    Peripheral Intravenous Line  Duration           Peripheral IV 09/14/22 Dorsal (posterior); Left Hand <1 day                      Imaging: No pertinent imaging reviewed  Recent Cultures (last 7 days):   Results from last 7 days   Lab Units 09/09/22  1251 09/09/22  1236   BLOOD CULTURE  No Growth After 5 Days  No Growth After 5 Days         Last 24 Hours Medication List:   Current Facility-Administered Medications   Medication Dose Route Frequency Provider Last Rate    acetaminophen  650 mg Oral Q6H PRN Misa Mendez PA-C      albuterol  2 puff Inhalation Q4H PRN Jose M Vazquez DO      amLODIPine  10 mg Oral Daily DOREEN Stubbs      bumetanide  4 mg Intravenous BID DOREEN Stubbs      carvedilol  25 mg Oral BID With Meals Jose M Vazquez,       doxazosin  2 mg Oral Daily Jose M Vazquez,       FLUoxetine  20 mg Oral QA Jose M Vazquez,       glycerin-hypromellose-  2 drop Both Eyes Q3H PRN DOREEN Hutchinson      heparin (porcine)  7,500 Units Subcutaneous [facility] Leah Saavedra DO      hydrALAZINE  50 mg Oral TID DOREEN Zhang      hydrOXYzine HCL  25 mg Oral Q6H PRN Guido De La Cruz PA-C      insulin glargine  20 Units Subcutaneous HS Mihaela Khan PA-C      insulin lispro  2-12 Units Subcutaneous TID AC Milo Rosales,       insulin lispro  2-12 Units Subcutaneous HS Leah Saavedra DO      insulin lispro  5 Units Subcutaneous TID With Meals Leah Saavedra DO      levothyroxine  125 mcg Oral Early Morning Leah Saavedra DO      pantoprazole  40 mg Oral BID AC Leah Saavedra DO          Today, Patient Was Seen By: Mihaela Khan PA-C    **Please Note: This note may have been constructed using a voice recognition system  **

## 2022-09-15 NOTE — ASSESSMENT & PLAN NOTE
· Patient reported right sided chest pressure 9/14  · HS Tnl 0hr 17, patient refused additional troponin's  · Repeat HS Tnl this morning is 16  · No additional chest pain today- patient stated he thought it may be anxiety

## 2022-09-15 NOTE — ASSESSMENT & PLAN NOTE
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

## 2022-09-15 NOTE — PLAN OF CARE
Problem: PAIN - ADULT  Goal: Verbalizes/displays adequate comfort level or baseline comfort level  Description: Interventions:  - Encourage patient to monitor pain and request assistance  - Assess pain using appropriate pain scale  - Administer analgesics based on type and severity of pain and evaluate response  - Implement non-pharmacological measures as appropriate and evaluate response  - Consider cultural and social influences on pain and pain management  - Notify physician/advanced practitioner if interventions unsuccessful or patient reports new pain  Outcome: Progressing     Problem: INFECTION - ADULT  Goal: Absence or prevention of progression during hospitalization  Description: INTERVENTIONS:  - Assess and monitor for signs and symptoms of infection  - Monitor lab/diagnostic results  - Monitor all insertion sites, i e  indwelling lines, tubes, and drains  - Monitor endotracheal if appropriate and nasal secretions for changes in amount and color  - [place] appropriate cooling/warming therapies per order  - Administer medications as ordered  - Instruct and encourage patient and family to use good hand hygiene technique  - Identify and instruct in appropriate isolation precautions for identified infection/condition  Outcome: Progressing  Goal: Absence of fever/infection during neutropenic period  Description: INTERVENTIONS:  - Monitor WBC    Outcome: Progressing     Problem: SAFETY ADULT  Goal: Patient will remain free of falls  Description: INTERVENTIONS:  - Educate patient/family on patient safety including physical limitations  - Instruct patient to call for assistance with activity   - Consult OT/PT to assist with strengthening/mobility   - Keep Call bell within reach  - Keep bed low and locked with side rails adjusted as appropriate  - Keep care items and personal belongings within reach  - Initiate and maintain comfort rounds  - Make Fall Risk Sign visible to staff  - Offer Toileting every 2 Hours, in advance of need  - Initiate/Maintain bed alarm  - Obtain necessary fall risk management equipment  - Apply yellow socks and bracelet for high fall risk patients  - Consider moving patient to room near nurses station  Outcome: Progressing  Goal: Maintain or return to baseline ADL function  Description: INTERVENTIONS:  -  Assess patient's ability to carry out ADLs; assess patient's baseline for ADL function and identify physical deficits which impact ability to perform ADLs (bathing, care of mouth/teeth, toileting, grooming, dressing, etc )  - Assess/evaluate cause of self-care deficits   - Assess range of motion  - Assess patient's mobility; develop plan if impaired  - Assess patient's need for assistive devices and provide as appropriate  - Encourage maximum independence but intervene and supervise when necessary  - Involve family in performance of ADLs  - Assess for home care needs following discharge   - Consider OT consult to assist with ADL evaluation and planning for discharge  - Provide patient education as appropriate  Outcome: Progressing  Goal: Maintains/Returns to pre admission functional level  Description: INTERVENTIONS:  - Perform BMAT or MOVE assessment daily    - Set and communicate daily mobility goal to care team and patient/family/caregiver  - Collaborate with rehabilitation services on mobility goals if consulted  - Perform Range of Motion 2 times a day  - Reposition patient every 2 hours    - Dangle patient 2 times a day  - Stand patient 2 times a day  - Ambulate patient 2 times a day  - Out of bed to chair 3 times a day   - Out of bed for meals 3 times a day  - Out of bed for toileting  - Record patient progress and toleration of activity level   Outcome: Progressing     Problem: DISCHARGE PLANNING  Goal: Discharge to home or other facility with appropriate resources  Description: INTERVENTIONS:  - Identify barriers to discharge w/patient and caregiver  - Arrange for needed discharge resources and transportation as appropriate  - Identify discharge learning needs (meds, wound care, etc )  - Refer to Case Management Department for coordinating discharge planning if the patient needs post-hospital services based on physician/advanced practitioner order or complex needs related to functional status, cognitive ability, or social support system  Outcome: Progressing     Problem: Knowledge Deficit  Goal: Patient/family/caregiver demonstrates understanding of disease process, treatment plan, medications, and discharge instructions  Description: Complete learning assessment and assess knowledge base    Interventions:  - Provide teaching at level of understanding  - Provide teaching via preferred learning methods  Outcome: Progressing     Problem: RESPIRATORY - ADULT  Goal: Achieves optimal ventilation and oxygenation  Description: INTERVENTIONS:  - Assess for changes in respiratory status  - Assess for changes in mentation and behavior  - Position to facilitate oxygenation and minimize respiratory effort  - Oxygen administered by appropriate delivery if ordered  - Initiate smoking cessation education as indicated  - Encourage broncho-pulmonary hygiene including cough, deep breathe, Incentive Spirometry  - Assess the need for suctioning and aspirate as needed  - Assess and instruct to report SOB or any respiratory difficulty  - Respiratory Therapy support as indicated  Outcome: Progressing     Problem: Prexisting or High Potential for Compromised Skin Integrity  Goal: Skin integrity is maintained or improved  Description: INTERVENTIONS:  - Identify patients at risk for skin breakdown  - Assess and monitor skin integrity  - Assess and monitor nutrition and hydration status  - Monitor labs   - Assess for incontinence   - Turn and reposition patient  - Assist with mobility/ambulation  - Relieve pressure over bony prominences  - Avoid friction and shearing  - Provide appropriate hygiene as needed including keeping skin clean and dry  - Evaluate need for skin moisturizer/barrier cream  - Collaborate with interdisciplinary team   - Patient/family teaching  - Consider wound care consult   Outcome: Progressing     Problem: Potential for Falls  Goal: Patient will remain free of falls  Description: INTERVENTIONS:  - Educate patient/family on patient safety including physical limitations  - Instruct patient to call for assistance with activity   - Consult OT/PT to assist with strengthening/mobility   - Keep Call bell within reach  - Keep bed low and locked with side rails adjusted as appropriate  - Keep care items and personal belongings within reach  - Initiate and maintain comfort rounds  - Make Fall Risk Sign visible to staff  - Offer Toileting every 2 Hours, in advance of need  - Initiate/Maintain bed alarm  - Obtain necessary fall risk management equipment  - Apply yellow socks and bracelet for high fall risk patients  - Consider moving patient to room near nurses station  Outcome: Progressing

## 2022-09-15 NOTE — PLAN OF CARE
Problem: PAIN - ADULT  Goal: Verbalizes/displays adequate comfort level or baseline comfort level  Description: Interventions:  - Encourage patient to monitor pain and request assistance  - Assess pain using appropriate pain scale  - Administer analgesics based on type and severity of pain and evaluate response  - Implement non-pharmacological measures as appropriate and evaluate response  - Consider cultural and social influences on pain and pain management  - Notify physician/advanced practitioner if interventions unsuccessful or patient reports new pain  Outcome: Progressing     Problem: INFECTION - ADULT  Goal: Absence or prevention of progression during hospitalization  Description: INTERVENTIONS:  - Assess and monitor for signs and symptoms of infection  - Monitor lab/diagnostic results  - Monitor all insertion sites, i e  indwelling lines, tubes, and drains  - Monitor endotracheal if appropriate and nasal secretions for changes in amount and color  - [place] appropriate cooling/warming therapies per order  - Administer medications as ordered  - Instruct and encourage patient and family to use good hand hygiene technique  - Identify and instruct in appropriate isolation precautions for identified infection/condition  Outcome: Progressing  Goal: Absence of fever/infection during neutropenic period  Description: INTERVENTIONS:  - Monitor WBC    Outcome: Progressing     Problem: SAFETY ADULT  Goal: Patient will remain free of falls  Description: INTERVENTIONS:  - Educate patient/family on patient safety including physical limitations  - Instruct patient to call for assistance with activity   - Consult OT/PT to assist with strengthening/mobility   - Keep Call bell within reach  - Keep bed low and locked with side rails adjusted as appropriate  - Keep care items and personal belongings within reach  - Initiate and maintain comfort rounds  - Make Fall Risk Sign visible to staff  - Offer Toileting every 2 Hours, in advance of need  - Initiate/Maintain bed alarm  - Obtain necessary fall risk management equipment  - Apply yellow socks and bracelet for high fall risk patients  - Consider moving patient to room near nurses station  Outcome: Progressing  Goal: Maintain or return to baseline ADL function  Description: INTERVENTIONS:  -  Assess patient's ability to carry out ADLs; assess patient's baseline for ADL function and identify physical deficits which impact ability to perform ADLs (bathing, care of mouth/teeth, toileting, grooming, dressing, etc )  - Assess/evaluate cause of self-care deficits   - Assess range of motion  - Assess patient's mobility; develop plan if impaired  - Assess patient's need for assistive devices and provide as appropriate  - Encourage maximum independence but intervene and supervise when necessary  - Involve family in performance of ADLs  - Assess for home care needs following discharge   - Consider OT consult to assist with ADL evaluation and planning for discharge  - Provide patient education as appropriate  Outcome: Progressing  Goal: Maintains/Returns to pre admission functional level  Description: INTERVENTIONS:  - Perform BMAT or MOVE assessment daily    - Set and communicate daily mobility goal to care team and patient/family/caregiver  - Collaborate with rehabilitation services on mobility goals if consulted  - Perform Range of Motion 2 times a day  - Reposition patient every 2 hours    - Dangle patient 2 times a day  - Stand patient 2 times a day  - Ambulate patient 2 times a day  - Out of bed to chair 3 times a day   - Out of bed for meals 3 times a day  - Out of bed for toileting  - Record patient progress and toleration of activity level   Outcome: Progressing     Problem: DISCHARGE PLANNING  Goal: Discharge to home or other facility with appropriate resources  Description: INTERVENTIONS:  - Identify barriers to discharge w/patient and caregiver  - Arrange for needed discharge resources and transportation as appropriate  - Identify discharge learning needs (meds, wound care, etc )  - Refer to Case Management Department for coordinating discharge planning if the patient needs post-hospital services based on physician/advanced practitioner order or complex needs related to functional status, cognitive ability, or social support system  Outcome: Progressing     Problem: Knowledge Deficit  Goal: Patient/family/caregiver demonstrates understanding of disease process, treatment plan, medications, and discharge instructions  Description: Complete learning assessment and assess knowledge base    Interventions:  - Provide teaching at level of understanding  - Provide teaching via preferred learning methods  Outcome: Progressing     Problem: RESPIRATORY - ADULT  Goal: Achieves optimal ventilation and oxygenation  Description: INTERVENTIONS:  - Assess for changes in respiratory status  - Assess for changes in mentation and behavior  - Position to facilitate oxygenation and minimize respiratory effort  - Oxygen administered by appropriate delivery if ordered  - Initiate smoking cessation education as indicated  - Encourage broncho-pulmonary hygiene including cough, deep breathe, Incentive Spirometry  - Assess the need for suctioning and aspirate as needed  - Assess and instruct to report SOB or any respiratory difficulty  - Respiratory Therapy support as indicated  Outcome: Progressing     Problem: Prexisting or High Potential for Compromised Skin Integrity  Goal: Skin integrity is maintained or improved  Description: INTERVENTIONS:  - Identify patients at risk for skin breakdown  - Assess and monitor skin integrity  - Assess and monitor nutrition and hydration status  - Monitor labs   - Assess for incontinence   - Turn and reposition patient  - Assist with mobility/ambulation  - Relieve pressure over bony prominences  - Avoid friction and shearing  - Provide appropriate hygiene as needed including keeping skin clean and dry  - Evaluate need for skin moisturizer/barrier cream  - Collaborate with interdisciplinary team   - Patient/family teaching  - Consider wound care consult   Outcome: Progressing     Problem: Potential for Falls  Goal: Patient will remain free of falls  Description: INTERVENTIONS:  - Educate patient/family on patient safety including physical limitations  - Instruct patient to call for assistance with activity   - Consult OT/PT to assist with strengthening/mobility   - Keep Call bell within reach  - Keep bed low and locked with side rails adjusted as appropriate  - Keep care items and personal belongings within reach  - Initiate and maintain comfort rounds  - Make Fall Risk Sign visible to staff  - Offer Toileting every 2 Hours, in advance of need  - Initiate/Maintain bed alarm  - Obtain necessary fall risk management equipment  - Apply yellow socks and bracelet for high fall risk patients  - Consider moving patient to room near nurses station  Outcome: Progressing

## 2022-09-15 NOTE — PROGRESS NOTES
Progress Note - Nephrology   Ivone Marsh 38 y o  male MRN: [id number]  Unit/Bed#: -01 Encounter: [id number]    A/P:  1  Acute kidney injury on top chronic kidney disease ----> transitioning to end-stage renal disease               patient's creatinine slightly improved over last 24-48 hours, now down to 4 98 mg/dL, continues to remain below typical function  Patient continues to also have volume management issues, please refer below  Overall, I believe this patient will benefit from initiation of hemodialysis due to several issues including difficulty maintaining appropriate diet especially outpatient setting, volume management problems which has caused the patient to present to the emergency room with subsequent admission 5 times in the last 3 months  Patient agreeable to having a PermCath placed and initiate hemodialysis as soon as tomorrow afternoon  Will want to provide the patient with has many treatments as possible prior to discharge home  2  Chronic kidney disease stage 4 with biopsy-proven diabetic nephropathy and hypertensive nephrosclerosis, baseline creatinine presumed to be around 3 8-4 mg/dL  3  Diabetic nephropathy  4  Hypertensive nephrosclerosis  5  Nephrotic syndrome secondary to diabetic nephropathy  6  Volume overload               patient is currently on diuretics which is appropriate, will initiate hemodialysis and ultrafilter as tolerated  7  Iron deficiency anemia               will continue to monitor the patient, when clinically appropriate will initiate IV iron supplementation  8  Anemia due to advanced chronic kidney disease   Will hold GLORIA until the patient has adequate iron supplementation      Follow up reason for today's visit:  Advanced chronic kidney disease/diabetic nephropathy/hypertensive nephrosclerosis/nephrotic syndrome/volume over    Acute respiratory failure with hypoxia [facility])    Patient Active Problem List   Diagnosis    Abdominal pain    OCD (obsessive compulsive disorder)    Schizoaffective disorder, depressive type ([facility] )    Acquired hypothyroidism    Morbid obesity with BMI of 50 0-59 9, adult ([facility])    Anxiety    Episodic confusion    Snoring    Insomnia    Hypersomnia    Vitamin D deficiency    Vomiting    Occipital neuralgia of left side    Headache    Nodule of parotid gland    Bipolar 1 disorder (HCC)    Depression    Type 1 diabetes mellitus without complication ([facility])    Urinary retention    Peripheral edema    Hyperlipidemia, mixed    Migraine without aura and without status migrainosus, not intractable    Class 3 severe obesity due to excess calories with serious comorbidity and body mass index (BMI) of 60 0 to 69 9 in adult [facility])    Spinal stenosis in cervical region    Difficulty urinating    Urinary tract infection without hematuria    Penile pain    Chronic migraine without aura without status migrainosus, not intractable    Hypertensive emergency    Encephalopathy    Leukocytosis    Schizo affective schizophrenia ([facility] )    Acute respiratory failure with hypoxia ([facility] )    STEFANIA (acute kidney injury) ([facility] )    Acute respiratory disease due to COVID-19 virus    Elevated troponin    Type 2 diabetes mellitus with stage 4 chronic kidney disease and hypertension ([facility])    Family history of heart disease    Hypokalemia    Chest pressure    Gastroesophageal reflux disease without esophagitis    Essential hypertension    SOB (shortness of breath)    Volume overload    Hyponatremia    Open toe wound    Primary hypertension    Diabetic ulcer of both feet ([facility] )    Nodular type diabetic glomerulosclerosis ([facility] )    Interstitial fibrosis present on renal biopsy    Arteriosclerosis of kidney    Chronic kidney disease, stage 4 (severe) ([facility])    Chest pain         Subjective:   No acute events at this time, patient is eating and drinking normally      Objective:     Vitals: Blood pressure 154/98, pulse 73, temperature 97 7 °F (36 5 °C), resp  rate 18, height 5' 2" (1 575 m), weight (!) 147 kg (323 lb 3 1 oz), SpO2 95 %  ,Body mass index is 59 11 kg/m²  Weight (last 2 days)     Date/Time Weight    09/15/22 0600 147 (323 19)     Weight: patient was weight on a standing scale at 09/15/22 0600    09/14/22 0600 151 (332 67)    09/13/22 0600 151 (333 12)     Weight: pt weighed twice on white standing scale at 09/13/22 0600            Intake/Output Summary (Last 24 hours) at 9/15/2022 1529  Last data filed at 9/15/2022 0935  Gross per 24 hour   Intake 420 ml   Output 1500 ml   Net -1080 ml     I/O last 3 completed shifts: In: 80 [P O :780]  Out: 3750 [Urine:3750]         Physical Exam: /98   Pulse 73   Temp 97 7 °F (36 5 °C)   Resp 18   Ht 5' 2" (1 575 m)   Wt (!) 147 kg (323 lb 3 1 oz) Comment: patient was weight on a standing scale  SpO2 95%   BMI 59 11 kg/m²     General Appearance:    Alert, cooperative, no distress, appears stated age   Head:    Normocephalic, without obvious abnormality, atraumatic   Eyes:    Conjunctiva/corneas clear   Ears:    Normal external ears   Nose:   Nares normal, septum midline, mucosa normal, no drainage    or sinus tenderness   Throat:   Lips, mucosa, and tongue normal; teeth and gums normal   Neck:   Supple   Back:     Symmetric, no curvature, ROM normal, no CVA tenderness   Lungs:     Clear to auscultation bilaterally, respirations unlabored   Chest wall:    No tenderness or deformity   Heart:    Regular rate and rhythm, S1 and S2 normal, no murmur, rub   or gallop   Abdomen:     Soft, non-tender, bowel sounds active   Extremities:   Extremities normal, atraumatic, no cyanosis, +2 bilateral lower extremity edema   Skin:   Skin color, texture, turgor normal, no rashes or lesions   Lymph nodes:   Cervical normal   Neurologic:   CNII-XII intact            Lab, Imaging and other studies: I have personally reviewed pertinent labs    CBC:   Lab Results   Component Value Date    WBC 9 27 09/15/2022 HGB 8 4 (L) 09/15/2022    HCT 26 0 (L) 09/15/2022    MCV 85 09/15/2022     09/15/2022    MCH 27 5 09/15/2022    MCHC 32 3 09/15/2022    RDW 13 8 09/15/2022    MPV 9 5 09/15/2022     CMP:   Lab Results   Component Value Date    K 3 6 09/15/2022    CL 98 09/15/2022    CO2 29 09/15/2022    BUN 80 (H) 09/15/2022    CREATININE 4 98 (H) 09/15/2022    CALCIUM 9 0 09/15/2022    EGFR 13 09/15/2022         Results from last 7 days   Lab Units 09/15/22  0437 09/14/22  0435 09/13/22  0423 09/12/22  0516 09/11/22  0457 09/10/22  0436 09/09/22  1236   POTASSIUM mmol/L 3 6 3 8 3 7 3 8   < > 4 0 5 0   CHLORIDE mmol/L 98 100 97 100   < > 102 99   CO2 mmol/L 29 28 27 27   < > 25 21   BUN mg/dL 80* 87* 94* 99*   < > 98* 102*   CREATININE mg/dL 4 98* 4 93* 5 11* 5 00*   < > 4 62* 4 38*   CALCIUM mg/dL 9 0 9 0 8 8 8 7   < > 8 7 8 3*   ALK PHOS U/L  --   --   --  56  --  58 74   ALT U/L  --   --   --  8  --  11 14   AST U/L  --   --   --  7*  --  11* 17    < > = values in this interval not displayed  Phosphorus: No results found for: PHOS  Magnesium: No results found for: MG  Urinalysis: No results found for: [place], SPECGRAV, PHUR, LEUKOCYTESUR, NITRITE, PROTEINUA, GLUCOSEU, KETONESU, BILIRUBINUR, BLOODU  Ionized Calcium: No results found for: CAION  Coagulation: No results found for: PT, INR, APTT  Troponin: No results found for: TROPONINI  ABG: No results found for: PHART, EIV5OPR, PO2ART, PKD9RIA, L5VEOFKS, BEART, SOURCE  Radiology review:     IMAGING  No results found      Current Facility-Administered Medications   Medication Dose Route Frequency    acetaminophen (TYLENOL) tablet 650 mg  650 mg Oral Q6H PRN    albuterol (PROVENTIL HFA,VENTOLIN HFA) inhaler 2 puff  2 puff Inhalation Q4H PRN    amLODIPine (NORVASC) tablet 10 mg  10 mg Oral Daily    bumetanide (BUMEX) injection 4 mg  4 mg Intravenous BID    carvedilol (COREG) tablet 25 mg  25 mg Oral BID With Meals    doxazosin (CARDURA) tablet 2 mg 2 mg Oral Daily    FLUoxetine (PROzac) capsule 20 mg  20 mg Oral QAM    glycerin-hypromellose- (ARTIFICIAL TEARS) ophthalmic solution 2 drop  2 drop Both Eyes Q3H PRN    heparin (porcine) subcutaneous injection 7,500 Units  7,500 Units Subcutaneous Q8H [facility]    hydrALAZINE (APRESOLINE) tablet 50 mg  50 mg Oral TID    hydrOXYzine HCL (ATARAX) tablet 25 mg  25 mg Oral Q6H PRN    insulin glargine (LANTUS) subcutaneous injection 20 Units 0 2 mL  20 Units Subcutaneous HS    insulin lispro (HumaLOG) 100 units/mL subcutaneous injection 2-12 Units  2-12 Units Subcutaneous TID AC    insulin lispro (HumaLOG) 100 units/mL subcutaneous injection 2-12 Units  2-12 Units Subcutaneous HS    insulin lispro (HumaLOG) 100 units/mL subcutaneous injection 5 Units  5 Units Subcutaneous TID With Meals    levothyroxine tablet 125 mcg  125 mcg Oral Early Morning    pantoprazole (PROTONIX) EC tablet 40 mg  40 mg Oral BID AC     Medications Discontinued During This Encounter   Medication Reason    furosemide (LASIX) injection 40 mg     furosemide (LASIX) injection 80 mg     aluminum-magnesium hydroxide-simethicone (MYLANTA) oral suspension 30 mL     insulin glargine (LANTUS) subcutaneous injection 20 Units 0 2 mL     cefTRIAXone (ROCEPHIN) IVPB (premix in dextrose) 1,000 mg 50 mL     bumetanide (BUMEX) injection 3 mg     insulin glargine (LANTUS) subcutaneous injection 10 Units 0 1 mL     epoetin tammy (EPOGEN,PROCRIT) injection 5,000 Units     amLODIPine (NORVASC) tablet 10 mg     hydrALAZINE (APRESOLINE) tablet 50 mg     insulin glargine (LANTUS) subcutaneous injection 15 Units 0 15 mL        Esha Ribera,       This progress note was produced in part using a dictation device which may document imprecise wording from author's original intent

## 2022-09-16 ENCOUNTER — APPOINTMENT (INPATIENT)
Dept: INTERVENTIONAL RADIOLOGY/VASCULAR | Facility: HOSPITAL | Age: 42
DRG: 470 | End: 2022-09-16
Attending: RADIOLOGY
Payer: COMMERCIAL

## 2022-09-16 LAB
ANION GAP SERPL CALCULATED.3IONS-SCNC: 10 MMOL/L (ref 4–13)
BUN SERPL-MCNC: 81 MG/DL (ref 5–25)
CALCIUM SERPL-MCNC: 9 MG/DL (ref 8.4–10.2)
CHLORIDE SERPL-SCNC: 97 MMOL/L (ref 96–108)
CO2 SERPL-SCNC: 29 MMOL/L (ref 21–32)
CREAT SERPL-MCNC: 5.46 MG/DL (ref 0.6–1.3)
GFR SERPL CREATININE-BSD FRML MDRD: 11 ML/MIN/1.73SQ M
GLUCOSE SERPL-MCNC: 177 MG/DL (ref 65–140)
GLUCOSE SERPL-MCNC: 195 MG/DL (ref 65–140)
GLUCOSE SERPL-MCNC: 246 MG/DL (ref 65–140)
GLUCOSE SERPL-MCNC: 247 MG/DL (ref 65–140)
GLUCOSE SERPL-MCNC: 264 MG/DL (ref 65–140)
POTASSIUM SERPL-SCNC: 3.8 MMOL/L (ref 3.5–5.3)
SODIUM SERPL-SCNC: 136 MMOL/L (ref 135–147)

## 2022-09-16 PROCEDURE — 77001 FLUOROGUIDE FOR VEIN DEVICE: CPT | Performed by: RADIOLOGY

## 2022-09-16 PROCEDURE — 5A1D70Z PERFORMANCE OF URINARY FILTRATION, INTERMITTENT, LESS THAN 6 HOURS PER DAY: ICD-10-PCS | Performed by: INTERNAL MEDICINE

## 2022-09-16 PROCEDURE — 94760 N-INVAS EAR/PLS OXIMETRY 1: CPT

## 2022-09-16 PROCEDURE — 76937 US GUIDE VASCULAR ACCESS: CPT | Performed by: RADIOLOGY

## 2022-09-16 PROCEDURE — C1894 INTRO/SHEATH, NON-LASER: HCPCS

## 2022-09-16 PROCEDURE — 99153 MOD SED SAME PHYS/QHP EA: CPT

## 2022-09-16 PROCEDURE — 82948 REAGENT STRIP/BLOOD GLUCOSE: CPT

## 2022-09-16 PROCEDURE — 0JH63XZ INSERTION OF TUNNELED VASCULAR ACCESS DEVICE INTO CHEST SUBCUTANEOUS TISSUE AND FASCIA, PERCUTANEOUS APPROACH: ICD-10-PCS | Performed by: RADIOLOGY

## 2022-09-16 PROCEDURE — 76937 US GUIDE VASCULAR ACCESS: CPT

## 2022-09-16 PROCEDURE — 99152 MOD SED SAME PHYS/QHP 5/>YRS: CPT | Performed by: RADIOLOGY

## 2022-09-16 PROCEDURE — 06H033Z INSERTION OF INFUSION DEVICE INTO INFERIOR VENA CAVA, PERCUTANEOUS APPROACH: ICD-10-PCS | Performed by: RADIOLOGY

## 2022-09-16 PROCEDURE — 99232 SBSQ HOSP IP/OBS MODERATE 35: CPT | Performed by: PHYSICIAN ASSISTANT

## 2022-09-16 PROCEDURE — 99152 MOD SED SAME PHYS/QHP 5/>YRS: CPT

## 2022-09-16 PROCEDURE — 36558 INSERT TUNNELED CV CATH: CPT

## 2022-09-16 PROCEDURE — NC001 PR NO CHARGE: Performed by: RADIOLOGY

## 2022-09-16 PROCEDURE — 36558 INSERT TUNNELED CV CATH: CPT | Performed by: RADIOLOGY

## 2022-09-16 PROCEDURE — 99232 SBSQ HOSP IP/OBS MODERATE 35: CPT | Performed by: INTERNAL MEDICINE

## 2022-09-16 PROCEDURE — C1750 CATH, HEMODIALYSIS,LONG-TERM: HCPCS

## 2022-09-16 PROCEDURE — 80048 BASIC METABOLIC PNL TOTAL CA: CPT | Performed by: PHYSICIAN ASSISTANT

## 2022-09-16 PROCEDURE — 77001 FLUOROGUIDE FOR VEIN DEVICE: CPT

## 2022-09-16 RX ORDER — CEFAZOLIN SODIUM 1 G/50ML
SOLUTION INTRAVENOUS
Status: COMPLETED
Start: 2022-09-16 | End: 2022-09-16

## 2022-09-16 RX ORDER — CEFAZOLIN SODIUM 2 G/50ML
SOLUTION INTRAVENOUS
Status: COMPLETED
Start: 2022-09-16 | End: 2022-09-16

## 2022-09-16 RX ORDER — MIDAZOLAM HYDROCHLORIDE 2 MG/2ML
INJECTION, SOLUTION INTRAMUSCULAR; INTRAVENOUS CODE/TRAUMA/SEDATION MEDICATION
Status: COMPLETED | OUTPATIENT
Start: 2022-09-16 | End: 2022-09-16

## 2022-09-16 RX ORDER — ONDANSETRON 2 MG/ML
4 INJECTION INTRAMUSCULAR; INTRAVENOUS EVERY 4 HOURS PRN
Status: DISCONTINUED | OUTPATIENT
Start: 2022-09-16 | End: 2022-09-23 | Stop reason: HOSPADM

## 2022-09-16 RX ORDER — LIDOCAINE HYDROCHLORIDE AND EPINEPHRINE 10; 10 MG/ML; UG/ML
INJECTION, SOLUTION INFILTRATION; PERINEURAL CODE/TRAUMA/SEDATION MEDICATION
Status: COMPLETED | OUTPATIENT
Start: 2022-09-16 | End: 2022-09-16

## 2022-09-16 RX ORDER — FENTANYL CITRATE 50 UG/ML
INJECTION, SOLUTION INTRAMUSCULAR; INTRAVENOUS CODE/TRAUMA/SEDATION MEDICATION
Status: COMPLETED | OUTPATIENT
Start: 2022-09-16 | End: 2022-09-16

## 2022-09-16 RX ORDER — INSULIN GLARGINE 100 [IU]/ML
25 INJECTION, SOLUTION SUBCUTANEOUS
Status: DISCONTINUED | OUTPATIENT
Start: 2022-09-16 | End: 2022-09-23 | Stop reason: HOSPADM

## 2022-09-16 RX ADMIN — MIDAZOLAM HYDROCHLORIDE 0.5 MG: 1 INJECTION, SOLUTION INTRAMUSCULAR; INTRAVENOUS at 14:17

## 2022-09-16 RX ADMIN — ACETAMINOPHEN 325MG 650 MG: 325 TABLET ORAL at 22:05

## 2022-09-16 RX ADMIN — PANTOPRAZOLE SODIUM 40 MG: 40 TABLET, DELAYED RELEASE ORAL at 08:13

## 2022-09-16 RX ADMIN — INSULIN LISPRO 4 UNITS: 100 INJECTION, SOLUTION INTRAVENOUS; SUBCUTANEOUS at 12:13

## 2022-09-16 RX ADMIN — DOXAZOSIN 2 MG: 2 TABLET ORAL at 08:10

## 2022-09-16 RX ADMIN — ONDANSETRON 4 MG: 2 INJECTION INTRAMUSCULAR; INTRAVENOUS at 10:15

## 2022-09-16 RX ADMIN — INSULIN LISPRO 5 UNITS: 100 INJECTION, SOLUTION INTRAVENOUS; SUBCUTANEOUS at 17:03

## 2022-09-16 RX ADMIN — CARVEDILOL 25 MG: 25 TABLET, FILM COATED ORAL at 17:01

## 2022-09-16 RX ADMIN — HEPARIN SODIUM 7500 UNITS: 5000 INJECTION INTRAVENOUS; SUBCUTANEOUS at 21:33

## 2022-09-16 RX ADMIN — INSULIN LISPRO 6 UNITS: 100 INJECTION, SOLUTION INTRAVENOUS; SUBCUTANEOUS at 21:34

## 2022-09-16 RX ADMIN — CEFAZOLIN SODIUM: 1 SOLUTION INTRAVENOUS at 14:13

## 2022-09-16 RX ADMIN — BUMETANIDE 4 MG: 0.25 INJECTION INTRAMUSCULAR; INTRAVENOUS at 17:04

## 2022-09-16 RX ADMIN — HEPARIN SODIUM 7500 UNITS: 5000 INJECTION INTRAVENOUS; SUBCUTANEOUS at 05:39

## 2022-09-16 RX ADMIN — INSULIN LISPRO 5 UNITS: 100 INJECTION, SOLUTION INTRAVENOUS; SUBCUTANEOUS at 08:09

## 2022-09-16 RX ADMIN — AMLODIPINE BESYLATE 10 MG: 10 TABLET ORAL at 08:10

## 2022-09-16 RX ADMIN — INSULIN GLARGINE 25 UNITS: 100 INJECTION, SOLUTION SUBCUTANEOUS at 21:36

## 2022-09-16 RX ADMIN — INSULIN LISPRO 2 UNITS: 100 INJECTION, SOLUTION INTRAVENOUS; SUBCUTANEOUS at 08:08

## 2022-09-16 RX ADMIN — INSULIN LISPRO 2 UNITS: 100 INJECTION, SOLUTION INTRAVENOUS; SUBCUTANEOUS at 17:02

## 2022-09-16 RX ADMIN — LIDOCAINE HYDROCHLORIDE AND EPINEPHRINE 20 ML: 10; 10 INJECTION, SOLUTION INFILTRATION; PERINEURAL at 14:24

## 2022-09-16 RX ADMIN — PANTOPRAZOLE SODIUM 40 MG: 40 TABLET, DELAYED RELEASE ORAL at 17:01

## 2022-09-16 RX ADMIN — BUMETANIDE 4 MG: 0.25 INJECTION INTRAMUSCULAR; INTRAVENOUS at 08:10

## 2022-09-16 RX ADMIN — HYDRALAZINE HYDROCHLORIDE 50 MG: 25 TABLET, FILM COATED ORAL at 21:33

## 2022-09-16 RX ADMIN — HYDROXYZINE HYDROCHLORIDE 25 MG: 25 TABLET ORAL at 09:32

## 2022-09-16 RX ADMIN — CARVEDILOL 25 MG: 25 TABLET, FILM COATED ORAL at 08:10

## 2022-09-16 RX ADMIN — FENTANYL CITRATE 25 MCG: 50 INJECTION INTRAMUSCULAR; INTRAVENOUS at 14:12

## 2022-09-16 RX ADMIN — LEVOTHYROXINE SODIUM 125 MCG: 25 TABLET ORAL at 05:39

## 2022-09-16 RX ADMIN — HYDROXYZINE HYDROCHLORIDE 25 MG: 25 TABLET ORAL at 00:46

## 2022-09-16 RX ADMIN — ACETAMINOPHEN 325MG 650 MG: 325 TABLET ORAL at 00:46

## 2022-09-16 RX ADMIN — FENTANYL CITRATE 25 MCG: 50 INJECTION INTRAMUSCULAR; INTRAVENOUS at 14:18

## 2022-09-16 RX ADMIN — MIDAZOLAM HYDROCHLORIDE 0.5 MG: 1 INJECTION, SOLUTION INTRAMUSCULAR; INTRAVENOUS at 14:12

## 2022-09-16 RX ADMIN — HYDRALAZINE HYDROCHLORIDE 50 MG: 25 TABLET, FILM COATED ORAL at 08:10

## 2022-09-16 RX ADMIN — FLUOXETINE 20 MG: 20 CAPSULE ORAL at 08:10

## 2022-09-16 RX ADMIN — CEFAZOLIN SODIUM: 2 SOLUTION INTRAVENOUS at 14:12

## 2022-09-16 NOTE — RESPIRATORY THERAPY NOTE
09/16/22 0721   Respiratory Assessment   Assessment Type Assess only   General Appearance Alert; Awake   Respiratory Pattern Dyspnea with exertion   Chest Assessment Chest expansion symmetrical   Bilateral Breath Sounds Diminished   Resp Comments No distress noted   O2 Device Room Air   Oxygen Therapy/Pulse Ox   O2 Device None (Room air)   SpO2 94 %   SpO2 Activity At Rest   $ Pulse Oximetry Spot Check Charge Completed

## 2022-09-16 NOTE — PROGRESS NOTES
Patient arrived to IR for tunneled dialysis catheter placement    The procedure and risks were discussed with the patient  All questions were answered  Informed consent was obtained

## 2022-09-16 NOTE — ASSESSMENT & PLAN NOTE
Patient requiring 5 L nasal cannula in ED  Found to have SpO2 of 87% on 5 L  Currently weaned off oxygen   X-ray revealed findings suggestive of fluid overload and possible pneumonia  Patient received IV Lasix and Rocephin in ED  Patient on Bumex 2 mg p o  B i d  on outpatient basis  BNP elevated at 330   Echocardiogram shows LVEF of 50 to 55%  Trivial small pericardial effusion       · Suspected acute respiratory failure is due to volume overload  · Nephrology input appreciated- continue with current diuretic therapy-Bumex 4 mg BID  · 5 day course of ceftriaxone completed 9/13/22   · Blood cultures- NGTD    · Continue with respiratory protocol; oxygen supplement keep SpO2 greater than 92%  · Patient will be initiated on hemodialysis- PermCath to be placed by IR today

## 2022-09-16 NOTE — CONSULTS
[facility] Electronic Health Record Consult  IR has been consulted to evaluate the patient, determine the appropriate procedure, and whether or not a procedure can and should be performed regarding the care of Moreno Veloz  We were consulted by nephrology concerning Javed Stanton, and to possibly perform a tunneled dialysis catheter if medically appropriate for the patient  The patient is aware that a specialty consultation is taking place, and agrees to the IR Consult on their behalf  Assessment/Plan:   39 yo male with worsening CKD anticipant hemodialysis  Tunneled dialysis catheter is reasonable and will be arranged by the IR team   Coagulation labs appropriate for procedure  I spent 15 minutes in medical consultative time evaluating the medical record and imaging of Moreno Veloz  Thank you for allowing [facility] to participate in the care of Moreno Veloz  Please don't hesitate to call or TigerText us with any questions       Za Kowalski, DO

## 2022-09-16 NOTE — CASE MANAGEMENT
Case Management Discharge Planning Note    Patient name Bella Dimas  Location /-92 MRN [id number]  : 1980 Date 2022       Current Admission Date: 2022  Current Admission Diagnosis:Acute respiratory failure with hypoxia [facility])   Patient Active Problem List    Diagnosis Date Noted    Chest pain 2022    Chronic kidney disease, stage 4 (severe) ([facility] ) 2022    Nodular type diabetic glomerulosclerosis ([facility] ) 2022    Interstitial fibrosis present on renal biopsy 2022    Arteriosclerosis of kidney 2022    Diabetic ulcer of both feet ([facility] ) 2022    Primary hypertension 2022    Open toe wound 2022    Volume overload 2022    Hyponatremia 2022    Essential hypertension 2021    SOB (shortness of breath) 2021    Gastroesophageal reflux disease without esophagitis 2021    Family history of heart disease 2021    Hypokalemia 2021    Chest pressure 2021    Elevated troponin 2021    Type 2 diabetes mellitus with stage 4 chronic kidney disease and hypertension ([facility] ) 2021    Acute respiratory disease due to COVID-19 virus 2021    Acute respiratory failure with hypoxia ([facility] ) 2020    STEFANIA (acute kidney injury) ([facility] ) 2020    Schizo affective schizophrenia ([place] [address] ) 10/25/2020    Hypertensive emergency 2020    Encephalopathy 2020    Leukocytosis 2020    Chronic migraine without aura without status migrainosus, not intractable 2019    Difficulty urinating 2019    Urinary tract infection without hematuria 2019    Penile pain 2019    Spinal stenosis in cervical region 2019    Class 3 severe obesity due to excess calories with serious comorbidity and body mass index (BMI) of 60 0 to 69 9 in adult [facility]) 2019    Migraine without aura and without status migrainosus, not intractable 2019    Peripheral edema 06/07/2019    Hyperlipidemia, mixed 06/07/2019    Bipolar 1 disorder ([facility] ) 05/30/2019    Depression 05/30/2019    Type 1 diabetes mellitus without complication ([facility] ) 44/99/3156    Urinary retention 05/30/2019    Occipital neuralgia of left side 02/15/2019    Headache 02/15/2019    Nodule of parotid gland 02/15/2019    Snoring 12/04/2018    Insomnia 12/04/2018    Hypersomnia 12/04/2018    Vitamin D deficiency 12/04/2018    Episodic confusion     OCD (obsessive compulsive disorder) 10/31/2018    Schizoaffective disorder, depressive type ([facility] ) 10/31/2018    Acquired hypothyroidism 10/31/2018    Morbid obesity with BMI of 50 0-59 9, adult ([facility] ) 10/31/2018    Anxiety 10/31/2018    Abdominal pain 05/12/2018    Vomiting 05/12/2018      LOS (days): 6  Geometric Mean LOS (GMLOS) (days): 4 70  Days to GMLOS:-1 3     OBJECTIVE:  Risk of Unplanned Readmission Score: 33 92     Current admission status: Inpatient   Preferred Pharmacy:   [address] [place] [address] #[id number] Love [address]  [address] [place] [id number]  Phone: [phone] Fax: [address] [place] [address] Street [address] [place] [address]  Phone: [phone] Fax: [phone]    Primary Care Provider: Venus Wright MD    Primary Insurance: [facility]  Secondary Insurance:     DISCHARGE DETAILS:  TC to [facility] at [phone], spoke to [place] who states she does not have the referral yet, however it must go through the team before she gets it

## 2022-09-16 NOTE — QUICK NOTE
This note is to document a telephone conversation had with the patient's parents  I reviewed the patient's clinical course from the renal standpoint including the 5 admissions in last 3 months for volume overload  This is in the setting of advanced chronic kidney disease  Within that time frame, we had a kidney biopsy which showed moderate to advanced diabetic nephropathy as well as hypertensive nephrosclerosis  We discussed prognosis in general including the potential to be able to continue to conservatively manage the patient without the need for hemodialysis and potentially being able to discharge him without this procedure  Unfortunately however, given the patient's recent history over last 3 months, I would anticipate readmission to the hospital in the next 7-14 days for volume overload  The patient's parents morning agreement that unfortunately at this point it appears that hemodialysis is necessary in order to maintain the patient's ability to exist outside of the hospital setting  Will be proceeding with PermCath placement and initiation of hemodialysis during this hospitalization  Total time spent on the phone was 17 minutes in direct conversation with the patient's parents      Bernadine Fiore DO

## 2022-09-16 NOTE — PLAN OF CARE
Problem: PAIN - ADULT  Goal: Verbalizes/displays adequate comfort level or baseline comfort level  Description: Interventions:  - Encourage patient to monitor pain and request assistance  - Assess pain using appropriate pain scale  - Administer analgesics based on type and severity of pain and evaluate response  - Implement non-pharmacological measures as appropriate and evaluate response  - Consider cultural and social influences on pain and pain management  - Notify physician/advanced practitioner if interventions unsuccessful or patient reports new pain  Outcome: Progressing     Problem: INFECTION - ADULT  Goal: Absence or prevention of progression during hospitalization  Description: INTERVENTIONS:  - Assess and monitor for signs and symptoms of infection  - Monitor lab/diagnostic results  - Monitor all insertion sites, i e  indwelling lines, tubes, and drains  - Monitor endotracheal if appropriate and nasal secretions for changes in amount and color  - [place] appropriate cooling/warming therapies per order  - Administer medications as ordered  - Instruct and encourage patient and family to use good hand hygiene technique  - Identify and instruct in appropriate isolation precautions for identified infection/condition  Outcome: Progressing  Goal: Absence of fever/infection during neutropenic period  Description: INTERVENTIONS:  - Monitor WBC    Outcome: Progressing     Problem: SAFETY ADULT  Goal: Patient will remain free of falls  Description: INTERVENTIONS:  - Educate patient/family on patient safety including physical limitations  - Instruct patient to call for assistance with activity   - Consult OT/PT to assist with strengthening/mobility   - Keep Call bell within reach  - Keep bed low and locked with side rails adjusted as appropriate  - Keep care items and personal belongings within reach  - Initiate and maintain comfort rounds  - Make Fall Risk Sign visible to staff  - Offer Toileting every 2 Hours, in advance of need  - Initiate/Maintain bed alarm  - Obtain necessary fall risk management equipment  - Apply yellow socks and bracelet for high fall risk patients  - Consider moving patient to room near nurses station  Outcome: Progressing  Goal: Maintain or return to baseline ADL function  Description: INTERVENTIONS:  -  Assess patient's ability to carry out ADLs; assess patient's baseline for ADL function and identify physical deficits which impact ability to perform ADLs (bathing, care of mouth/teeth, toileting, grooming, dressing, etc )  - Assess/evaluate cause of self-care deficits   - Assess range of motion  - Assess patient's mobility; develop plan if impaired  - Assess patient's need for assistive devices and provide as appropriate  - Encourage maximum independence but intervene and supervise when necessary  - Involve family in performance of ADLs  - Assess for home care needs following discharge   - Consider OT consult to assist with ADL evaluation and planning for discharge  - Provide patient education as appropriate  Outcome: Progressing  Goal: Maintains/Returns to pre admission functional level  Description: INTERVENTIONS:  - Perform BMAT or MOVE assessment daily    - Set and communicate daily mobility goal to care team and patient/family/caregiver  - Collaborate with rehabilitation services on mobility goals if consulted  - Perform Range of Motion 2 times a day  - Reposition patient every 2 hours    - Dangle patient 2 times a day  - Stand patient 2 times a day  - Ambulate patient 2 times a day  - Out of bed to chair 3 times a day   - Out of bed for meals 3 times a day  - Out of bed for toileting  - Record patient progress and toleration of activity level   Outcome: Progressing     Problem: DISCHARGE PLANNING  Goal: Discharge to home or other facility with appropriate resources  Description: INTERVENTIONS:  - Identify barriers to discharge w/patient and caregiver  - Arrange for needed discharge resources and transportation as appropriate  - Identify discharge learning needs (meds, wound care, etc )  - Refer to Case Management Department for coordinating discharge planning if the patient needs post-hospital services based on physician/advanced practitioner order or complex needs related to functional status, cognitive ability, or social support system  Outcome: Progressing     Problem: Knowledge Deficit  Goal: Patient/family/caregiver demonstrates understanding of disease process, treatment plan, medications, and discharge instructions  Description: Complete learning assessment and assess knowledge base    Interventions:  - Provide teaching at level of understanding  - Provide teaching via preferred learning methods  Outcome: Progressing     Problem: RESPIRATORY - ADULT  Goal: Achieves optimal ventilation and oxygenation  Description: INTERVENTIONS:  - Assess for changes in respiratory status  - Assess for changes in mentation and behavior  - Position to facilitate oxygenation and minimize respiratory effort  - Oxygen administered by appropriate delivery if ordered  - Initiate smoking cessation education as indicated  - Encourage broncho-pulmonary hygiene including cough, deep breathe, Incentive Spirometry  - Assess the need for suctioning and aspirate as needed  - Assess and instruct to report SOB or any respiratory difficulty  - Respiratory Therapy support as indicated  Outcome: Progressing     Problem: Prexisting or High Potential for Compromised Skin Integrity  Goal: Skin integrity is maintained or improved  Description: INTERVENTIONS:  - Identify patients at risk for skin breakdown  - Assess and monitor skin integrity  - Assess and monitor nutrition and hydration status  - Monitor labs   - Assess for incontinence   - Turn and reposition patient  - Assist with mobility/ambulation  - Relieve pressure over bony prominences  - Avoid friction and shearing  - Provide appropriate hygiene as needed including keeping skin clean and dry  - Evaluate need for skin moisturizer/barrier cream  - Collaborate with interdisciplinary team   - Patient/family teaching  - Consider wound care consult   Outcome: Progressing     Problem: Potential for Falls  Goal: Patient will remain free of falls  Description: INTERVENTIONS:  - Educate patient/family on patient safety including physical limitations  - Instruct patient to call for assistance with activity   - Consult OT/PT to assist with strengthening/mobility   - Keep Call bell within reach  - Keep bed low and locked with side rails adjusted as appropriate  - Keep care items and personal belongings within reach  - Initiate and maintain comfort rounds  - Make Fall Risk Sign visible to staff  - Offer Toileting every 2 Hours, in advance of need  - Initiate/Maintain bed alarm  - Obtain necessary fall risk management equipment  - Apply yellow socks and bracelet for high fall risk patients  - Consider moving patient to room near nurses station  Outcome: Progressing

## 2022-09-16 NOTE — ASSESSMENT & PLAN NOTE
Lab Results   Component Value Date    EGFR 11 09/16/2022    EGFR 13 09/15/2022    EGFR 13 09/14/2022    CREATININE 5 46 (H) 09/16/2022    CREATININE 4 98 (H) 09/15/2022    CREATININE 4 93 (H) 09/14/2022     Baseline Cr around 4 0mg/dL  follows with nephrology outpatient  The patient underwent renal biopsy which indicates nodular diabetic glomerulosclerosis, 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  Present with STEFANIA Cr 4 38mg/dL suspect to be due to acute tubular necrosis  Renal function continues to worsen       · Nephrology input appreciated- discussed initiation of dialysis   · Continue Bumex 4 mg BID per nephrology  · IR consulted for PermCath placement today 9/16

## 2022-09-16 NOTE — ASSESSMENT & PLAN NOTE
· Patient reported right sided chest pressure 9/14  · Tropon 17- 16  · No additional chest pain- patient stated he thought it may be anxiety

## 2022-09-16 NOTE — PROGRESS NOTES
Progress Note - Nephrology   Rupal Barraza 38 y o  male MRN: [id number]  Unit/Bed#: -01 Encounter: [id number]    A/P:  1  Acute kidney injury on top chronic kidney disease ----> transitioning to end-stage renal disease                patient have PermCath placed later today, will arrange for the patient have his 1st hemodialysis session tomorrow, Saturday September 17th for 2 hours  Will plan for a 3 hour treatment on Monday, September 19th, and then a 4 hour treatment on Tuesday September 20th  In the meantime will look to arrange the patient to have outpatient facility for his hemodialysis needs  2  Chronic kidney disease stage 4 with biopsy-proven diabetic nephropathy and hypertensive nephrosclerosis, baseline creatinine presumed to be around 3 8-4 mg/dL  3  Diabetic nephropathy  4  Hypertensive nephrosclerosis  5  Nephrotic syndrome secondary to diabetic nephropathy  6  Volume overload                continue diuretics daily for now, will ventrally transition him to take the diuretics only on non dialysis days going forward  7  Iron deficiency anemia                patient will benefit from being placed on IV iron supplementation, will defer at this time, will likely initiate in next few days  8  Anemia due to advanced chronic kidney disease   Continue to hold GLORIA at this time, would prefer for the patient to have adequate iron supplementation prior to his initiation  That being said, it is unclear where the patient will be going as an outpatient, depending on facility they use different types of GLORIA      Follow up reason for today's visit:  Acute kidney injury/chronic kidney disease/diabetic nephropathy/hypertensive nephrosclerosis    Acute respiratory failure with hypoxia [facility])    Patient Active Problem List   Diagnosis    Abdominal pain    OCD (obsessive compulsive disorder)    [facility], [facility]    Acquired hypothyroidism    Morbid obesity with BMI of 50 0-59 9, adult ([facility] )    Anxiety    Episodic confusion    Snoring    Insomnia    Hypersomnia    Vitamin D deficiency    Vomiting    Occipital neuralgia of left side    Headache    Nodule of parotid gland    Bipolar 1 disorder (HCC)    Depression    Type 1 diabetes mellitus without complication (HCC)    Urinary retention    Peripheral edema    Hyperlipidemia, mixed    Migraine without aura and without status migrainosus, not intractable    Class 3 severe obesity due to excess calories with serious comorbidity and body mass index (BMI) of 60 0 to 69 9 in adult [facility])    Spinal stenosis in cervical region    Difficulty urinating    Urinary tract infection without hematuria    Penile pain    Chronic migraine without aura without status migrainosus, not intractable    Hypertensive emergency    Encephalopathy    Leukocytosis    Schizo affective schizophrenia ([facility] )    Acute respiratory failure with hypoxia ([place] [address] )    STEFANIA (acute kidney injury) ([place] [address] )    Acute respiratory disease due to COVID-19 virus    Elevated troponin    Type 2 diabetes mellitus with stage 4 chronic kidney disease and hypertension ([facility])    Family history of heart disease    Hypokalemia    Chest pressure    Gastroesophageal reflux disease without esophagitis    Essential hypertension    SOB (shortness of breath)    Volume overload    Hyponatremia    Open toe wound    Primary hypertension    Diabetic ulcer of both feet ([place] [address] )    Nodular type diabetic glomerulosclerosis ([place] [address] )    Interstitial fibrosis present on renal biopsy    Arteriosclerosis of kidney    Chronic kidney disease, stage 4 (severe) ([facility])    Chest pain         Subjective:   No acute events overnight  Patient has been eating and drinking normally nausea vomiting at this time  Objective:     Vitals: Blood pressure 139/77, pulse 78, temperature 98 3 °F (36 8 °C), resp  rate 18, height 5' 2" (1 575 m), weight (!) 147 kg (324 lb 1 2 oz), SpO2 94 %  ,Body mass index is 59 27 kg/m²  Weight (last 2 days)     Date/Time Weight    09/16/22 0600 147 (324 08)    09/15/22 1100 147 (323 42)    09/15/22 0600 147 (323 19)     Weight: patient was weight on a standing scale at 09/15/22 0600    09/14/22 0600 151 (332 67)            Intake/Output Summary (Last 24 hours) at 9/16/2022 0908  Last data filed at 9/15/2022 1712  Gross per 24 hour   Intake --   Output 1200 ml   Net -1200 ml     I/O last 3 completed shifts: In: 5 [P O :420]  Out: 2200 [Urine:2200]         Physical Exam: /77   Pulse 78   Temp 98 3 °F (36 8 °C)   Resp 18   Ht 5' 2" (1 575 m)   Wt (!) 147 kg (324 lb 1 2 oz)   SpO2 94%   BMI 59 27 kg/m²     General Appearance:    Alert, cooperative, no distress, appears stated age   Head:    Normocephalic, without obvious abnormality, atraumatic   Eyes:    Conjunctiva/corneas clear   Ears:    Normal external ears   Nose:   Nares normal, septum midline, mucosa normal, no drainage    or sinus tenderness   Throat:   Lips, mucosa, and tongue normal; teeth and gums normal   Neck:   Supple   Back:     Symmetric, no curvature, ROM normal, no CVA tenderness   Lungs:     Clear to auscultation bilaterally, respirations unlabored   Chest wall:    No tenderness or deformity   Heart:    Regular rate and rhythm, S1 and S2 normal, no murmur, rub   or gallop   Abdomen:     Soft, non-tender, bowel sounds active   Extremities:   Extremities normal, atraumatic, no cyanosis, +2 to +3 bilateral lower extremity edema   Skin:   Skin color, texture, turgor normal, no rashes or lesions   Lymph nodes:   Cervical normal   Neurologic:   CNII-XII intact            Lab, Imaging and other studies: I have personally reviewed pertinent labs    CBC: No results found for: WBC, HGB, HCT, MCV, PLT, ADJUSTEDWBC, MCH, MCHC, RDW, MPV, NRBC  CMP:   Lab Results   Component Value Date    K 3 8 09/16/2022    CL 97 09/16/2022    CO2 29 09/16/2022    BUN 81 (H) 09/16/2022    CREATININE 5 46 (H) 09/16/2022 CALCIUM 9 0 09/16/2022    EGFR 11 09/16/2022         Results from last 7 days   Lab Units 09/16/22  0443 09/15/22  0437 09/14/22  0435 09/13/22  0423 09/12/22  0516 09/11/22  0457 09/10/22  0436 09/09/22  1236   POTASSIUM mmol/L 3 8 3 6 3 8   < > 3 8   < > 4 0 5 0   CHLORIDE mmol/L 97 98 100   < > 100   < > 102 99   CO2 mmol/L 29 29 28   < > 27   < > 25 21   BUN mg/dL 81* 80* 87*   < > 99*   < > 98* 102*   CREATININE mg/dL 5 46* 4 98* 4 93*   < > 5 00*   < > 4 62* 4 38*   CALCIUM mg/dL 9 0 9 0 9 0   < > 8 7   < > 8 7 8 3*   ALK PHOS U/L  --   --   --   --  56  --  58 74   ALT U/L  --   --   --   --  8  --  11 14   AST U/L  --   --   --   --  7*  --  11* 17    < > = values in this interval not displayed  Phosphorus: No results found for: PHOS  Magnesium: No results found for: MG  Urinalysis: No results found for: Yessenia Stain, SPECGRAV, PHUR, LEUKOCYTESUR, NITRITE, PROTEINUA, GLUCOSEU, KETONESU, BILIRUBINUR, BLOODU  Ionized Calcium: No results found for: CAION  Coagulation: No results found for: PT, INR, APTT  Troponin: No results found for: TROPONINI  ABG: No results found for: PHART, EFM9DWJ, PO2ART, ARM1VWC, R9IWWNGP, BEART, SOURCE  Radiology review:     IMAGING  No results found      Current Facility-Administered Medications   Medication Dose Route Frequency    acetaminophen (TYLENOL) tablet 650 mg  650 mg Oral Q6H PRN    albuterol (PROVENTIL HFA,VENTOLIN HFA) inhaler 2 puff  2 puff Inhalation Q4H PRN    amLODIPine (NORVASC) tablet 10 mg  10 mg Oral Daily    bumetanide (BUMEX) injection 4 mg  4 mg Intravenous BID    carvedilol (COREG) tablet 25 mg  25 mg Oral BID With Meals    doxazosin (CARDURA) tablet 2 mg  2 mg Oral Daily    FLUoxetine (PROzac) capsule 20 mg  20 mg Oral QAM    glycerin-hypromellose- (ARTIFICIAL TEARS) ophthalmic solution 2 drop  2 drop Both Eyes Q3H PRN    heparin (porcine) subcutaneous injection 7,500 Units  7,500 Units Subcutaneous Q8H [address]    hydrALAZINE (APRESOLINE) tablet 50 mg  50 mg Oral TID    hydrOXYzine HCL (ATARAX) tablet 25 mg  25 mg Oral Q6H PRN    insulin glargine (LANTUS) subcutaneous injection 20 Units 0 2 mL  20 Units Subcutaneous HS    insulin lispro (HumaLOG) 100 units/mL subcutaneous injection 2-12 Units  2-12 Units Subcutaneous TID AC    insulin lispro (HumaLOG) 100 units/mL subcutaneous injection 2-12 Units  2-12 Units Subcutaneous HS    insulin lispro (HumaLOG) 100 units/mL subcutaneous injection 5 Units  5 Units Subcutaneous TID With Meals    levothyroxine tablet 125 mcg  125 mcg Oral Early Morning    pantoprazole (PROTONIX) EC tablet 40 mg  40 mg Oral BID AC     Medications Discontinued During This Encounter   Medication Reason    furosemide (LASIX) injection 40 mg     furosemide (LASIX) injection 80 mg     aluminum-magnesium hydroxide-simethicone (MYLANTA) oral suspension 30 mL     insulin glargine (LANTUS) subcutaneous injection 20 Units 0 2 mL     cefTRIAXone (ROCEPHIN) IVPB (premix in dextrose) 1,000 mg 50 mL     bumetanide (BUMEX) injection 3 mg     insulin glargine (LANTUS) subcutaneous injection 10 Units 0 1 mL     epoetin tammy (EPOGEN,PROCRIT) injection 5,000 Units     amLODIPine (NORVASC) tablet 10 mg     hydrALAZINE (APRESOLINE) tablet 50 mg     insulin glargine (LANTUS) subcutaneous injection 15 Units 0 15 mL        Cordell Rucker,       This progress note was produced in part using a dictation device which may document imprecise wording from author's original intent

## 2022-09-16 NOTE — PROGRESS NOTES
[facility]  Progress Note - Gordon Ponce 1980, 38 y o  male MRN: [id number]  Unit/Bed#: -01 Encounter: [id number]  Primary Care Provider: Bebeto Hilton MD   Date and time admitted to hospital: 9/9/2022 11:40 AM    * Acute respiratory failure with hypoxia [facility])  Assessment & Plan  Patient requiring 5 L nasal cannula in ED  Found to have SpO2 of 87% on 5 L  Currently weaned off oxygen   X-ray revealed findings suggestive of fluid overload and possible pneumonia  Patient received IV Lasix and Rocephin in ED  Patient on Bumex 2 mg p o  B i d  on outpatient basis  BNP elevated at 330   Echocardiogram shows LVEF of 50 to 55%  Trivial small pericardial effusion  · Suspected acute respiratory failure is due to volume overload  · Nephrology input appreciated- continue with current diuretic therapy-Bumex 4 mg BID  · 5 day course of ceftriaxone completed 9/13/22   · Blood cultures- NGTD    · Continue with respiratory protocol; oxygen supplement keep SpO2 greater than 92%  · Patient will be initiated on hemodialysis- PermCath to be placed by IR today        Chronic kidney disease, stage 4 (severe) [facility])  Assessment & Plan  Lab Results   Component Value Date    EGFR 11 09/16/2022    EGFR 13 09/15/2022    EGFR 13 09/14/2022    CREATININE 5 46 (H) 09/16/2022    CREATININE 4 98 (H) 09/15/2022    CREATININE 4 93 (H) 09/14/2022     Baseline Cr around 4 0mg/dL  follows with nephrology outpatient  The patient underwent renal biopsy which indicates nodular diabetic glomerulosclerosis, 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  Present with STEFANIA Cr 4 38mg/dL suspect to be due to acute tubular necrosis  Renal function continues to worsen       · Nephrology input appreciated- discussed initiation of dialysis   · Continue Bumex 4 mg BID per nephrology  · IR consulted for PermCath placement today 9/16      Essential hypertension  Assessment & Plan  Patient states he was out of his antihypertensives    -continue home amlodipine, carvedilol, hydralazine   -continue with aggressive diuretic therapy per nephrology    Gastroesophageal reflux disease without esophagitis  Assessment & Plan  · Continue home Protonix     Chest pain  Assessment & Plan  · Patient reported right sided chest pressure 9/14  · Tropon 17- 16  · No additional chest pain- patient stated he thought it may be anxiety     Type 2 diabetes mellitus with stage 4 chronic kidney disease and hypertension [facility])  Assessment & Plan  Lab Results   Component Value Date    HGBA1C 9 1 (H) 07/16/2022       Recent Labs     09/15/22  1119 09/15/22  1600 09/15/22  2035 09/16/22  0755   POCGLU 226* 196* 250* 195*       Blood Sugar Average: Last 72 hrs:  (P) 205 [id number]     -increased Lantus to 25 units q h s   -continue lispro 5 units with meals  -sliding scale insulin and Accu-Cheks with meals and at bedtime  -carb controlled diet level 2     Acquired hypothyroidism  Assessment & Plan  -continue home levothyroxine          VTE Pharmacologic Prophylaxis: VTE Score: 5 High Risk (Score >/= 5) - Pharmacological DVT Prophylaxis Ordered: heparin  Sequential Compression Devices Ordered  Patient Centered Rounds: I performed bedside rounds with nursing staff today  Discussions with Specialists or Other Care Team Provider: nursing, CM    Education and Discussions with Family / Patient: patient updated at bedside  Time Spent for Care: 20 minutes  More than 50% of total time spent on counseling and coordination of care as described above  Current Length of Stay: 6 day(s)  Current Patient Status: Inpatient   Certification Statement: The patient will continue to require additional inpatient hospital stay due to planned PermCath placement today- initiation of HD  Discharge Plan: pending clinical course    Code Status: Level 1 - Full Code    Subjective: The patient was seen and examined   The patient states he's nervous about having the PermCath placed, otherwise he feels well  He denies any complaints  Objective:     Vitals:   Temp (24hrs), Av 1 °F (36 7 °C), Min:97 7 °F (36 5 °C), Max:98 4 °F (36 9 °C)    Temp:  [97 7 °F (36 5 °C)-98 4 °F (36 9 °C)] 98 3 °F (36 8 °C)  HR:  [73-78] 78  Resp:  [18] 18  BP: (105-154)/(61-98) 139/77  SpO2:  [90 %-95 %] 94 %  Body mass index is 59 27 kg/m²  Input and Output Summary (last 24 hours): Intake/Output Summary (Last 24 hours) at 2022 1030  Last data filed at 2022 0830  Gross per 24 hour   Intake 240 ml   Output 700 ml   Net -460 ml       Physical Exam:   Physical Exam  Vitals and nursing note reviewed  Constitutional:       General: He is awake  Appearance: Normal appearance  He is morbidly obese  Cardiovascular:      Rate and Rhythm: Normal rate and regular rhythm  Pulmonary:      Effort: Pulmonary effort is normal       Breath sounds: Normal breath sounds  Abdominal:      Palpations: Abdomen is soft  Tenderness: There is no abdominal tenderness  Skin:     General: Skin is warm and dry  Neurological:      General: No focal deficit present  Mental Status: He is alert and oriented to person, place, and time  Psychiatric:         Attention and Perception: Attention normal          Mood and Affect: Mood normal          Speech: Speech normal          Behavior: Behavior is cooperative  Additional Data:     Labs:  Results from last 7 days   Lab Units 09/15/22  0437 22  0516   WBC Thousand/uL 9 27   < > 9 62   HEMOGLOBIN g/dL 8 4*   < > 7 9*   HEMATOCRIT % 26 0*   < > 24 2*   PLATELETS Thousands/uL 368   < > 350   NEUTROS PCT %  --   --  72   LYMPHS PCT %  --   --  15   MONOS PCT %  --   --  7   EOS PCT %  --   --  5    < > = values in this interval not displayed       Results from last 7 days   Lab Units 22  0443 223 22  0516   SODIUM mmol/L 136   < > 138   POTASSIUM mmol/L 3 8   < > 3 8   CHLORIDE mmol/L 97   < > 100   CO2 mmol/L 29   < > 27   BUN mg/dL 81*   < > 99*   CREATININE mg/dL 5 46*   < > 5 00*   ANION GAP mmol/L 10   < > 11   CALCIUM mg/dL 9 0   < > 8 7   ALBUMIN g/dL  --   --  2 8*   TOTAL BILIRUBIN mg/dL  --   --  0 27   ALK PHOS U/L  --   --  56   ALT U/L  --   --  8   AST U/L  --   --  7*   GLUCOSE RANDOM mg/dL 247*   < > 160*    < > = values in this interval not displayed  Results from last 7 days   Lab Units 09/09/22  1236   INR  1 05     Results from last 7 days   Lab Units 09/16/22  0755 09/15/22  2035 09/15/22  1600 09/15/22  1119 09/15/22  0801 09/15/22  0337 09/14/22  2126 09/14/22  1557 09/14/22  1133 09/14/22  0742 09/13/22  2056 09/13/22  1616   POC GLUCOSE mg/dl 195* 250* 196* 226* 179* 166* 234* 164* 228* 184* 255* 172*         Results from last 7 days   Lab Units 09/11/22  0457 09/10/22  0436 09/09/22  1236   LACTIC ACID mmol/L  --   --  0 5   PROCALCITONIN ng/ml 0 18 0 18 0 08       Lines/Drains:  Invasive Devices  Report    Peripheral Intravenous Line  Duration           Peripheral IV 09/14/22 Dorsal (posterior); Left Hand 1 day                      Imaging: No pertinent imaging reviewed  Recent Cultures (last 7 days):   Results from last 7 days   Lab Units 09/09/22  1251 09/09/22  1236   BLOOD CULTURE  No Growth After 5 Days  No Growth After 5 Days         Last 24 Hours Medication List:   Current Facility-Administered Medications   Medication Dose Route Frequency Provider Last Rate    acetaminophen  650 mg Oral Q6H PRN Sean Beltran PA-C      albuterol  2 puff Inhalation Q4H PRN Michael Ruvalcaba, DO      amLODIPine  10 mg Oral Daily DOREEN Chan      bumetanide  4 mg Intravenous BID DOREEN Chan      carvedilol  25 mg Oral BID With Meals Michael Ruvalcaba, DO      doxazosin  2 mg Oral Daily Michael Ruvalcaba, DO      FLUoxetine  20 mg Oral QAM Orene Pean, DO      glycerin-hypromellose-  2 drop Both Eyes Q3H PRN DOREEN Magana      heparin (porcine)  7,500 Units Subcutaneous [facility] Nguyen Das DO      hydrALAZINE  50 mg Oral TID DOREEN Acuña      hydrOXYzine HCL  25 mg Oral Q6H PRN Hawa Lopez PA-C      insulin glargine  25 Units Subcutaneous HS Geeta Hart PA-C      insulin lispro  2-12 Units Subcutaneous TID RIRI Rosales,       insulin lispro  2-12 Units Subcutaneous HS Nguyen Das DO      insulin lispro  5 Units Subcutaneous TID With Meals Nguyen Das DO      levothyroxine  125 mcg Oral Early Morning Nguyen Das DO      ondansetron  4 mg Intravenous Q4H PRN Geeta Hart PA-C      pantoprazole  40 mg Oral BID AC Nguyen Das DO          Today, Patient Was Seen By: Geeta Hart PA-C    **Please Note: This note may have been constructed using a voice recognition system  **

## 2022-09-16 NOTE — PLAN OF CARE
Problem: PAIN - ADULT  Goal: Verbalizes/displays adequate comfort level or baseline comfort level  Description: Interventions:  - Encourage patient to monitor pain and request assistance  - Assess pain using appropriate pain scale  - Administer analgesics based on type and severity of pain and evaluate response  - Implement non-pharmacological measures as appropriate and evaluate response  - Consider cultural and social influences on pain and pain management  - Notify physician/advanced practitioner if interventions unsuccessful or patient reports new pain  Outcome: Progressing     Problem: INFECTION - ADULT  Goal: Absence or prevention of progression during hospitalization  Description: INTERVENTIONS:  - Assess and monitor for signs and symptoms of infection  - Monitor lab/diagnostic results  - Monitor all insertion sites, i e  indwelling lines, tubes, and drains  - Monitor endotracheal if appropriate and nasal secretions for changes in amount and color  - [place] appropriate cooling/warming therapies per order  - Administer medications as ordered  - Instruct and encourage patient and family to use good hand hygiene technique  - Identify and instruct in appropriate isolation precautions for identified infection/condition  Outcome: Progressing  Goal: Absence of fever/infection during neutropenic period  Description: INTERVENTIONS:  - Monitor WBC    Outcome: Progressing     Problem: SAFETY ADULT  Goal: Patient will remain free of falls  Description: INTERVENTIONS:  - Educate patient/family on patient safety including physical limitations  - Instruct patient to call for assistance with activity   - Consult OT/PT to assist with strengthening/mobility   - Keep Call bell within reach  - Keep bed low and locked with side rails adjusted as appropriate  - Keep care items and personal belongings within reach  - Initiate and maintain comfort rounds  - Make Fall Risk Sign visible to staff  - Offer Toileting every 2 Hours, in advance of need  - Initiate/Maintain bed alarm  - Obtain necessary fall risk management equipment  - Apply yellow socks and bracelet for high fall risk patients  - Consider moving patient to room near nurses station  Outcome: Progressing  Goal: Maintain or return to baseline ADL function  Description: INTERVENTIONS:  -  Assess patient's ability to carry out ADLs; assess patient's baseline for ADL function and identify physical deficits which impact ability to perform ADLs (bathing, care of mouth/teeth, toileting, grooming, dressing, etc )  - Assess/evaluate cause of self-care deficits   - Assess range of motion  - Assess patient's mobility; develop plan if impaired  - Assess patient's need for assistive devices and provide as appropriate  - Encourage maximum independence but intervene and supervise when necessary  - Involve family in performance of ADLs  - Assess for home care needs following discharge   - Consider OT consult to assist with ADL evaluation and planning for discharge  - Provide patient education as appropriate  Outcome: Progressing  Goal: Maintains/Returns to pre admission functional level  Description: INTERVENTIONS:  - Perform BMAT or MOVE assessment daily    - Set and communicate daily mobility goal to care team and patient/family/caregiver  - Collaborate with rehabilitation services on mobility goals if consulted  - Perform Range of Motion 2 times a day  - Reposition patient every 2 hours    - Dangle patient 2 times a day  - Stand patient 2 times a day  - Ambulate patient 2 times a day  - Out of bed to chair 3 times a day   - Out of bed for meals 3 times a day  - Out of bed for toileting  - Record patient progress and toleration of activity level   Outcome: Progressing     Problem: DISCHARGE PLANNING  Goal: Discharge to home or other facility with appropriate resources  Description: INTERVENTIONS:  - Identify barriers to discharge w/patient and caregiver  - Arrange for needed discharge resources and transportation as appropriate  - Identify discharge learning needs (meds, wound care, etc )  - Refer to Case Management Department for coordinating discharge planning if the patient needs post-hospital services based on physician/advanced practitioner order or complex needs related to functional status, cognitive ability, or social support system  Outcome: Progressing     Problem: Knowledge Deficit  Goal: Patient/family/caregiver demonstrates understanding of disease process, treatment plan, medications, and discharge instructions  Description: Complete learning assessment and assess knowledge base    Interventions:  - Provide teaching at level of understanding  - Provide teaching via preferred learning methods  Outcome: Progressing     Problem: RESPIRATORY - ADULT  Goal: Achieves optimal ventilation and oxygenation  Description: INTERVENTIONS:  - Assess for changes in respiratory status  - Assess for changes in mentation and behavior  - Position to facilitate oxygenation and minimize respiratory effort  - Oxygen administered by appropriate delivery if ordered  - Initiate smoking cessation education as indicated  - Encourage broncho-pulmonary hygiene including cough, deep breathe, Incentive Spirometry  - Assess the need for suctioning and aspirate as needed  - Assess and instruct to report SOB or any respiratory difficulty  - Respiratory Therapy support as indicated  Outcome: Progressing     Problem: Prexisting or High Potential for Compromised Skin Integrity  Goal: Skin integrity is maintained or improved  Description: INTERVENTIONS:  - Identify patients at risk for skin breakdown  - Assess and monitor skin integrity  - Assess and monitor nutrition and hydration status  - Monitor labs   - Assess for incontinence   - Turn and reposition patient  - Assist with mobility/ambulation  - Relieve pressure over bony prominences  - Avoid friction and shearing  - Provide appropriate hygiene as needed including keeping skin clean and dry  - Evaluate need for skin moisturizer/barrier cream  - Collaborate with interdisciplinary team   - Patient/family teaching  - Consider wound care consult   Outcome: Progressing     Problem: Potential for Falls  Goal: Patient will remain free of falls  Description: INTERVENTIONS:  - Educate patient/family on patient safety including physical limitations  - Instruct patient to call for assistance with activity   - Consult OT/PT to assist with strengthening/mobility   - Keep Call bell within reach  - Keep bed low and locked with side rails adjusted as appropriate  - Keep care items and personal belongings within reach  - Initiate and maintain comfort rounds  - Make Fall Risk Sign visible to staff  - Offer Toileting every 2 Hours, in advance of need  - Initiate/Maintain bed alarm  - Obtain necessary fall risk management equipment  - Apply yellow socks and bracelet for high fall risk patients  - Consider moving patient to room near nurses station  Outcome: Progressing

## 2022-09-16 NOTE — ASSESSMENT & PLAN NOTE
Lab Results   Component Value Date    HGBA1C 9 1 (H) 07/16/2022       Recent Labs     09/15/22  1119 09/15/22  1600 09/15/22  2035 09/16/22  0755   POCGLU 226* 196* 250* 195*       Blood Sugar Average: Last 72 hrs:  (P) 748 [id number]     -increased Lantus to 25 units q h s   -continue lispro 5 units with meals  -sliding scale insulin and Accu-Cheks with meals and at bedtime  -carb controlled diet level 2

## 2022-09-16 NOTE — CASE MANAGEMENT
Case Management Discharge Planning Note    Patient name Stalin Rowell  Location /-51 MRN [id number]  : 1980 Date 2022       Current Admission Date: 2022  Current Admission Diagnosis:Acute respiratory failure with hypoxia [facility])   Patient Active Problem List    Diagnosis Date Noted    Chest pain 2022    Chronic kidney disease, stage 4 (severe) ([facility] ) 2022    Nodular type diabetic glomerulosclerosis ([facility] ) 2022    Interstitial fibrosis present on renal biopsy 2022    Arteriosclerosis of kidney 2022    Diabetic ulcer of both feet [facility] ) 2022    Primary hypertension 2022    Open toe wound 2022    Volume overload 2022    Hyponatremia 2022    Essential hypertension 2021    SOB (shortness of breath) 2021    Gastroesophageal reflux disease without esophagitis 2021    Family history of heart disease 2021    Hypokalemia 2021    Chest pressure 2021    Elevated troponin 2021    Type 2 diabetes mellitus with stage 4 chronic kidney disease and hypertension ([facility] ) 2021    Acute respiratory disease due to COVID-19 virus 2021    Acute respiratory failure with hypoxia ([facility] ) 2020    STEFANIA (acute kidney injury) ([facility] ) 2020    Schizo affective schizophrenia ([place] [address] ) 10/25/2020    Hypertensive emergency 2020    Encephalopathy 2020    Leukocytosis 2020    Chronic migraine without aura without status migrainosus, not intractable 2019    Difficulty urinating 2019    Urinary tract infection without hematuria 2019    Penile pain 2019    Spinal stenosis in cervical region 2019    Class 3 severe obesity due to excess calories with serious comorbidity and body mass index (BMI) of 60 0 to 69 9 in adult [facility]) 2019    Migraine without aura and without status migrainosus, not intractable 2019    Peripheral edema 06/07/2019    Hyperlipidemia, mixed 06/07/2019    Bipolar 1 disorder ([facility] ) 05/30/2019    Depression 05/30/2019    Type 1 diabetes mellitus without complication ([facility] ) 21/32/2439    Urinary retention 05/30/2019    Occipital neuralgia of left side 02/15/2019    Headache 02/15/2019    Nodule of parotid gland 02/15/2019    Snoring 12/04/2018    Insomnia 12/04/2018    Hypersomnia 12/04/2018    Vitamin D deficiency 12/04/2018    Episodic confusion     OCD (obsessive compulsive disorder) 10/31/2018    Schizoaffective disorder, depressive type ([facility] ) 10/31/2018    Acquired hypothyroidism 10/31/2018    Morbid obesity with BMI of 50 0-59 9, adult ([facility] ) 10/31/2018    Anxiety 10/31/2018    Abdominal pain 05/12/2018    Vomiting 05/12/2018      LOS (days): 6  Geometric Mean LOS (GMLOS) (days): 4 70  Days to GMLOS:-1 2     OBJECTIVE:  Risk of Unplanned Readmission Score: 33 92     Current admission status: Inpatient   Preferred Pharmacy:   [address] [place] [address] #[id number] Musa Hardin, [address]  [address] [place] [id number]  Phone: [phone] Fax: [address] [place] [address]  Phone: [phone] Fax: [phone]    Primary Care Provider: Cecil Barber MD    Primary Insurance: [address] [place] [address]  Secondary Insurance:     DISCHARGE DETAILS:  As per SLIM the pt will be getting a permacath for dialysis today and will start dialysis tomorrow  Will need a chair set up outpt  Spoke to the pt at the bedside  Pt is aware there are no [facility] chairs in [facility] however there are clinics in [facility] and [facility]  Pt does not want to go that far and would prefer the referral be made to [facility] in [place]  Referral made [place] [facility]

## 2022-09-16 NOTE — NURSING NOTE
Pt returned from IR  VSS  No complaints of pain  HD cath intact  Pt understands bed rest and HOB 30 degrees until 1545  Pt resting comfortably with call bell in reach

## 2022-09-16 NOTE — DISCHARGE INSTRUCTIONS
[address]  Interventional Radiology  ([phone] Placement   WHAT YOU NEED TO KNOW:   A perma-cath is a catheter placed through a vein into or near your right atrium  Your right atrium is the right upper chamber of your heart  A perma-cath is used for dialysis in an emergency or until a long-term device is ready to use  After your procedure, you will have some pain and swelling on your chest and neck  You may have some bruises on your chest and neck  You may also have 2 dressings, one on your chest and one on your neck  DISCHARGE INSTRUCTIONS:   Call 911 for any of the following: You feel lightheaded, short of breath, and have chest pain  Your catheter comes out   Contact Interventional Radiology at [phone] [place] PATIENTS: Contact Interventional Radiology at [phone]) Javid Cisneros PATIENTS: Contact Interventional Radiology at [phone]) if:  Blood soaks through your bandage  You have new swelling in your arm, neck, face, or chest on your right side  Your catheter gets wet  Your bruises or pain get worse  You have a fever or chills  Persistent nausea or vomiting  Your incision is red, swollen, or draining pus  You have questions or concerns about your condition or care  Self-care:   Resume your normal diet  Keep your dressings dry  Do not take a shower or swim  You may take a tub bath, but do not get your dressings wet  Water in your wound can cause bacteria to grow and cause an infection  If your dressing gets wet, dry it off and cover it with dry sterile gauze  Call your healthcare provider  Do not use soaps or ointments  Do not change your dressings  Your healthcare provider or dialysis nurse will change your dressings  Your dressings should stay in place until your healthcare provider removes them  The dressing on your chest will stay as long as you have the catheter in place  The dressing prevents infection      Do not remove the red and blue caps from the end of your catheter  The caps prevent air from getting into your catheter  Follow up with your healthcare provider as directed: Write down your questions so you remember to ask them during your visits

## 2022-09-17 ENCOUNTER — APPOINTMENT (INPATIENT)
Dept: DIALYSIS | Facility: HOSPITAL | Age: 42
DRG: 470 | End: 2022-09-17
Payer: COMMERCIAL

## 2022-09-17 LAB
ANION GAP SERPL CALCULATED.3IONS-SCNC: 10 MMOL/L (ref 4–13)
BUN SERPL-MCNC: 79 MG/DL (ref 5–25)
CALCIUM SERPL-MCNC: 9.1 MG/DL (ref 8.4–10.2)
CHLORIDE SERPL-SCNC: 97 MMOL/L (ref 96–108)
CO2 SERPL-SCNC: 31 MMOL/L (ref 21–32)
CREAT SERPL-MCNC: 5.56 MG/DL (ref 0.6–1.3)
ERYTHROCYTE [DISTWIDTH] IN BLOOD BY AUTOMATED COUNT: 14 % (ref 11.6–15.1)
GFR SERPL CREATININE-BSD FRML MDRD: 11 ML/MIN/1.73SQ M
GLUCOSE SERPL-MCNC: 115 MG/DL (ref 65–140)
GLUCOSE SERPL-MCNC: 118 MG/DL (ref 65–140)
GLUCOSE SERPL-MCNC: 125 MG/DL (ref 65–140)
GLUCOSE SERPL-MCNC: 212 MG/DL (ref 65–140)
GLUCOSE SERPL-MCNC: 243 MG/DL (ref 65–140)
HAV IGM SER QL: NORMAL
HBV CORE AB SER QL: NORMAL
HBV CORE IGM SER QL: NORMAL
HBV CORE IGM SER QL: NORMAL
HBV SURFACE AB SER-ACNC: <3.1 MIU/ML
HBV SURFACE AG SER QL: NORMAL
HBV SURFACE AG SER QL: NORMAL
HCT VFR BLD AUTO: 27.1 % (ref 36.5–49.3)
HCV AB SER QL: NORMAL
HCV AB SER QL: NORMAL
HGB BLD-MCNC: 8.7 G/DL (ref 12–17)
MCH RBC QN AUTO: 27.4 PG (ref 26.8–34.3)
MCHC RBC AUTO-ENTMCNC: 32.1 G/DL (ref 31.4–37.4)
MCV RBC AUTO: 86 FL (ref 82–98)
PLATELET # BLD AUTO: 381 THOUSANDS/UL (ref 149–390)
PMV BLD AUTO: 9.8 FL (ref 8.9–12.7)
POTASSIUM SERPL-SCNC: 3.6 MMOL/L (ref 3.5–5.3)
RBC # BLD AUTO: 3.17 MILLION/UL (ref 3.88–5.62)
SODIUM SERPL-SCNC: 138 MMOL/L (ref 135–147)
WBC # BLD AUTO: 9.4 THOUSAND/UL (ref 4.31–10.16)

## 2022-09-17 PROCEDURE — 99233 SBSQ HOSP IP/OBS HIGH 50: CPT | Performed by: NURSE PRACTITIONER

## 2022-09-17 PROCEDURE — 86705 HEP B CORE ANTIBODY IGM: CPT | Performed by: INTERNAL MEDICINE

## 2022-09-17 PROCEDURE — 85027 COMPLETE CBC AUTOMATED: CPT | Performed by: PHYSICIAN ASSISTANT

## 2022-09-17 PROCEDURE — 87340 HEPATITIS B SURFACE AG IA: CPT | Performed by: INTERNAL MEDICINE

## 2022-09-17 PROCEDURE — 82948 REAGENT STRIP/BLOOD GLUCOSE: CPT

## 2022-09-17 PROCEDURE — 86704 HEP B CORE ANTIBODY TOTAL: CPT | Performed by: INTERNAL MEDICINE

## 2022-09-17 PROCEDURE — 86803 HEPATITIS C AB TEST: CPT | Performed by: INTERNAL MEDICINE

## 2022-09-17 PROCEDURE — 80074 ACUTE HEPATITIS PANEL: CPT | Performed by: PHYSICIAN ASSISTANT

## 2022-09-17 PROCEDURE — 94760 N-INVAS EAR/PLS OXIMETRY 1: CPT

## 2022-09-17 PROCEDURE — 99233 SBSQ HOSP IP/OBS HIGH 50: CPT | Performed by: INTERNAL MEDICINE

## 2022-09-17 PROCEDURE — 94664 DEMO&/EVAL PT USE INHALER: CPT

## 2022-09-17 PROCEDURE — 86706 HEP B SURFACE ANTIBODY: CPT | Performed by: INTERNAL MEDICINE

## 2022-09-17 PROCEDURE — 80048 BASIC METABOLIC PNL TOTAL CA: CPT | Performed by: PHYSICIAN ASSISTANT

## 2022-09-17 RX ORDER — HEPARIN SODIUM 1000 [USP'U]/ML
3000 INJECTION, SOLUTION INTRAVENOUS; SUBCUTANEOUS ONCE
Status: COMPLETED | OUTPATIENT
Start: 2022-09-19 | End: 2022-09-20

## 2022-09-17 RX ORDER — LORAZEPAM 2 MG/ML
0.5 INJECTION INTRAMUSCULAR ONCE
Status: COMPLETED | OUTPATIENT
Start: 2022-09-17 | End: 2022-09-17

## 2022-09-17 RX ORDER — TORSEMIDE 100 MG/1
100 TABLET ORAL
Status: DISCONTINUED | OUTPATIENT
Start: 2022-09-18 | End: 2022-09-19

## 2022-09-17 RX ADMIN — HEPARIN SODIUM 7500 UNITS: 5000 INJECTION INTRAVENOUS; SUBCUTANEOUS at 22:12

## 2022-09-17 RX ADMIN — LORAZEPAM 0.5 MG: 2 INJECTION INTRAMUSCULAR; INTRAVENOUS at 03:15

## 2022-09-17 RX ADMIN — ACETAMINOPHEN 325MG 650 MG: 325 TABLET ORAL at 19:40

## 2022-09-17 RX ADMIN — INSULIN LISPRO 4 UNITS: 100 INJECTION, SOLUTION INTRAVENOUS; SUBCUTANEOUS at 16:10

## 2022-09-17 RX ADMIN — HYDRALAZINE HYDROCHLORIDE 50 MG: 25 TABLET, FILM COATED ORAL at 11:45

## 2022-09-17 RX ADMIN — INSULIN LISPRO 5 UNITS: 100 INJECTION, SOLUTION INTRAVENOUS; SUBCUTANEOUS at 16:10

## 2022-09-17 RX ADMIN — PANTOPRAZOLE SODIUM 40 MG: 40 TABLET, DELAYED RELEASE ORAL at 16:11

## 2022-09-17 RX ADMIN — HYDROXYZINE HYDROCHLORIDE 25 MG: 25 TABLET ORAL at 20:01

## 2022-09-17 RX ADMIN — INSULIN LISPRO 5 UNITS: 100 INJECTION, SOLUTION INTRAVENOUS; SUBCUTANEOUS at 08:27

## 2022-09-17 RX ADMIN — ONDANSETRON 4 MG: 2 INJECTION INTRAMUSCULAR; INTRAVENOUS at 17:01

## 2022-09-17 RX ADMIN — ACETAMINOPHEN 325MG 650 MG: 325 TABLET ORAL at 11:45

## 2022-09-17 RX ADMIN — INSULIN GLARGINE 25 UNITS: 100 INJECTION, SOLUTION SUBCUTANEOUS at 22:12

## 2022-09-17 RX ADMIN — HYDRALAZINE HYDROCHLORIDE 50 MG: 25 TABLET, FILM COATED ORAL at 16:12

## 2022-09-17 RX ADMIN — FLUOXETINE 20 MG: 20 CAPSULE ORAL at 11:46

## 2022-09-17 RX ADMIN — HEPARIN SODIUM 7500 UNITS: 5000 INJECTION INTRAVENOUS; SUBCUTANEOUS at 05:48

## 2022-09-17 RX ADMIN — AMLODIPINE BESYLATE 10 MG: 10 TABLET ORAL at 11:46

## 2022-09-17 RX ADMIN — HYDROXYZINE HYDROCHLORIDE 25 MG: 25 TABLET ORAL at 12:29

## 2022-09-17 RX ADMIN — CARVEDILOL 25 MG: 25 TABLET, FILM COATED ORAL at 16:18

## 2022-09-17 RX ADMIN — HEPARIN SODIUM 7500 UNITS: 5000 INJECTION INTRAVENOUS; SUBCUTANEOUS at 16:12

## 2022-09-17 RX ADMIN — PANTOPRAZOLE SODIUM 40 MG: 40 TABLET, DELAYED RELEASE ORAL at 06:16

## 2022-09-17 RX ADMIN — CARVEDILOL 25 MG: 25 TABLET, FILM COATED ORAL at 11:45

## 2022-09-17 RX ADMIN — DOXAZOSIN 2 MG: 2 TABLET ORAL at 11:46

## 2022-09-17 RX ADMIN — INSULIN LISPRO 5 UNITS: 100 INJECTION, SOLUTION INTRAVENOUS; SUBCUTANEOUS at 11:19

## 2022-09-17 RX ADMIN — HYDROXYZINE HYDROCHLORIDE 25 MG: 25 TABLET ORAL at 01:27

## 2022-09-17 RX ADMIN — LEVOTHYROXINE SODIUM 125 MCG: 25 TABLET ORAL at 05:48

## 2022-09-17 RX ADMIN — INSULIN LISPRO 4 UNITS: 100 INJECTION, SOLUTION INTRAVENOUS; SUBCUTANEOUS at 22:00

## 2022-09-17 NOTE — ASSESSMENT & PLAN NOTE
· Patient reported right sided chest pressure 9/14  · Troponin 17- 16  · No additional chest pain- patient stated he thought it may be anxiety   · Monitor for chest pain

## 2022-09-17 NOTE — ASSESSMENT & PLAN NOTE
· Patient states he was out of his antihypertensives  · Continue home amlodipine, carvedilol, hydralazine   · Continue with aggressive diuretic therapy per nephrology

## 2022-09-17 NOTE — PROGRESS NOTES
Progress Note - Nephrology   Allen Miller 38 y o  male MRN: [id number]  Unit/Bed#: -01 Encounter: [id number]  HEMODIALYSIS PROCEDURE NOTE  The patient was seen and examined on hemodialysis  Time: 2 hours  Sodium: 134 Blood flow: 200   Dialyzer: F160 Potassium: 4 Dialysate flow: 500   Access: R Permcath Bicarbonate: 30 Ultrafiltration goal: 1L   Medications on HD: none       A/P:  1  End-stage renal disease               Patient currently on hemodialysis, 1st treatment for 2 hours, consent signed and placed in the patient's paper chart  Next treatment will be arranged for Monday, September 19th for 3 hours and then the following day on the 20th for a 4 hour treatment  In the meantime will help to arrange outpatient hemodialysis clinic  2  Diabetic nephropathy  3  Hypertensive nephrosclerosis  4  Nephrotic syndrome secondary to diabetic nephropathy  5  Volume overload                 continue diuretics on nondialysis days, will ultrafilter 1 L today with hemodialysis and look to continue to optimize ultrafiltration with treatments as he progresses  6  Iron deficiency anemia                 will initiate the patient on IV iron infusions starting tomorrow  7  Anemia due to advanced chronic kidney disease   Will hold Epogen at this time, will look to replete the patient's iron stores  In addition to this, the patient most likely have an outpatient hemodialysis unit which uses a long acting GLORIA, it may interfere with its initiation if patient is started on Epogen      Follow up reason for today's visit:  End-stage renal disease/diabetic nephropathy/hypertensive nephrosclerosis/nephrotic syndrome    Acute respiratory failure with hypoxia [facility])    Patient Active Problem List   Diagnosis    Abdominal pain    OCD (obsessive compulsive disorder)    Schizoaffective disorder, depressive type ([facility] [address] )    Acquired hypothyroidism    Morbid obesity with BMI of 50 0-59 9, adult (HCC)    Anxiety    Episodic confusion  Snoring    Insomnia    Hypersomnia    Vitamin D deficiency    Vomiting    Occipital neuralgia of left side    Headache    Nodule of parotid gland    Bipolar 1 disorder (HCC)    Depression    Type 1 diabetes mellitus without complication (HCC)    Urinary retention    Peripheral edema    Hyperlipidemia, mixed    Migraine without aura and without status migrainosus, not intractable    Class 3 severe obesity due to excess calories with serious comorbidity and body mass index (BMI) of 60 0 to 69 9 in adult [facility])    Spinal stenosis in cervical region    Difficulty urinating    Urinary tract infection without hematuria    Penile pain    Chronic migraine without aura without status migrainosus, not intractable    Hypertensive emergency    Encephalopathy    Leukocytosis    Schizo affective schizophrenia ([address] )    Acute respiratory failure with hypoxia ([facility] [address] )    STEFANIA (acute kidney injury) ([facility] [address] )    Acute respiratory disease due to COVID-19 virus    Elevated troponin    Type 2 diabetes mellitus with stage 4 chronic kidney disease and hypertension ([facility])    Family history of heart disease    Hypokalemia    Chest pressure    Gastroesophageal reflux disease without esophagitis    Essential hypertension    SOB (shortness of breath)    Volume overload    Hyponatremia    Open toe wound    Primary hypertension    Diabetic ulcer of both feet ([facility] [address] )    Nodular type diabetic glomerulosclerosis ([facility] [address] )    Interstitial fibrosis present on renal biopsy    Arteriosclerosis of kidney    Chronic kidney disease, stage 4 (severe) ([facility])    Chest pain         Subjective:   No acute events overnight    Objective:     Vitals: Blood pressure 133/85, pulse 75, temperature 98 3 °F (36 8 °C), resp  rate 18, height 5' 2" (1 575 m), weight (!) 146 kg (322 lb 5 oz), SpO2 91 %  ,Body mass index is 58 95 kg/m²      Weight (last 2 days)     Date/Time Weight    09/17/22 0547 146 (322 31)    09/17/22 0500 146 (322 31) 09/16/22 0600 147 (324 08)    09/15/22 1100 147 (323 42)    09/15/22 0600 147 (323 19)     Weight: patient was weight on a standing scale at 09/15/22 0600            Intake/Output Summary (Last 24 hours) at 9/17/2022 0902  Last data filed at 9/17/2022 0855  Gross per 24 hour   Intake 300 ml   Output 850 ml   Net -550 ml     I/O last 3 completed shifts: In: 340 [P O :240; IV Piggyback:100]  Out: 850 [Urine:850]    HD Permanent Double Catheter (Active)   Goal for Removal N/A- chronic HD catheter 09/16/22 1433   Site Assessment WDL 09/16/22 1433   Proximal Lumen Status Blood return noted; Flushed 09/16/22 1433   Distal Lumen Status Blood return noted; Flushed 09/16/22 1433   Dressing Type Chlorhexidine dressing 09/16/22 1433   Dressing Status Clean;Dry; Intact 09/16/22 1433   Dressing Change Due 09/23/22 09/16/22 1433       Physical Exam: /85   Pulse 75   Temp 98 3 °F (36 8 °C)   Resp 18   Ht 5' 2" (1 575 m)   Wt (!) 146 kg (322 lb 5 oz)   SpO2 91%   BMI 58 95 kg/m²     General Appearance:    Alert, cooperative, no distress, appears stated age   Head:    Normocephalic, without obvious abnormality, atraumatic   Eyes:    Conjunctiva/corneas clear   Ears:    Normal external ears   Nose:   Nares normal, septum midline, mucosa normal, no drainage    or sinus tenderness   Throat:   Lips, mucosa, and tongue normal; teeth and gums normal   Neck:   Supple   Back:     Symmetric, no curvature, ROM normal, no CVA tenderness   Lungs:     Clear to auscultation bilaterally, respirations unlabored   Chest wall:    No tenderness or deformity   Heart:    Regular rate and rhythm, S1 and S2 normal, no murmur, rub   or gallop   Abdomen:     Soft, non-tender, bowel sounds active   Extremities:   Extremities normal, atraumatic, no cyanosis, +2 bilateral lower extremity edema   Skin:   Skin color, texture, turgor normal, no rashes or lesions   Lymph nodes:   Cervical normal   Neurologic:   CNII-XII intact            Lab, Imaging and other studies: I have personally reviewed pertinent labs  CBC:   Lab Results   Component Value Date    WBC 9 40 09/17/2022    HGB 8 7 (L) 09/17/2022    HCT 27 1 (L) 09/17/2022    MCV 86 09/17/2022     09/17/2022    MCH 27 4 09/17/2022    MCHC 32 1 09/17/2022    RDW 14 0 09/17/2022    MPV 9 8 09/17/2022     CMP:   Lab Results   Component Value Date    K 3 6 09/17/2022    CL 97 09/17/2022    CO2 31 09/17/2022    BUN 79 (H) 09/17/2022    CREATININE 5 56 (H) 09/17/2022    CALCIUM 9 1 09/17/2022    EGFR 11 09/17/2022         Results from last 7 days   Lab Units 09/17/22  0549 09/16/22  0443 09/15/22  0437 09/13/22  0423 09/12/22  0516   POTASSIUM mmol/L 3 6 3 8 3 6   < > 3 8   CHLORIDE mmol/L 97 97 98   < > 100   CO2 mmol/L 31 29 29   < > 27   BUN mg/dL 79* 81* 80*   < > 99*   CREATININE mg/dL 5 56* 5 46* 4 98*   < > 5 00*   CALCIUM mg/dL 9 1 9 0 9 0   < > 8 7   ALK PHOS U/L  --   --   --   --  56   ALT U/L  --   --   --   --  8   AST U/L  --   --   --   --  7*    < > = values in this interval not displayed  Phosphorus: No results found for: PHOS  Magnesium: No results found for: MG  Urinalysis: No results found for: Jada Boyers, SPECGRAV, PHUR, LEUKOCYTESUR, NITRITE, PROTEINUA, GLUCOSEU, KETONESU, BILIRUBINUR, BLOODU  Ionized Calcium: No results found for: CAION  Coagulation: No results found for: PT, INR, APTT  Troponin: No results found for: TROPONINI  ABG: No results found for: PHART, RBR9BCS, PO2ART, YAQ0FXP, V6RQKGET, BEART, SOURCE  Radiology review:     IMAGING  Procedure: IR tunneled dialysis catheter placement    Result Date: 9/16/2022  Narrative: Tunneled Dialysis Catheter Placement 9/16/2022 History: Worsening renal failure requiring hemodialysis Contrast: None Fluoroscopy time: 0 4 minutes Number of images: 6 Radiation dose: 8 mGy Conscious sedation time: 25 minutes, 1 mg versed, 50 mcg fentanyl Procedure: The patient was identified verbally and by wristband  Timeout was performed  Informed consent was obtained  All elements of maximal sterile barrier technique were followed (cap, mask, sterile gown, sterile gloves, large sterile sheet, hand hygiene and 2% chlorhexidine for cutaneous antisepsis)  Following obtaining informed consent, the patient was prepped and draped in the usual sterile fashion  Using ultrasound guidance, access was gained to the patient's right internal jugular vein using a micropuncture system  The micropuncture wire and inner dilator were removed and a 0 035 wire was advanced to the level of the inferior vena cava using fluoroscopic guidance  The right anterior chest wall was anesthetized with 1% lidocaine  An 11 blade scalpel was to used to make a small dermatotomy  The catheter was tunneled from the dermatotomy to the venotomy  The micropuncture dilator was exchanged for peel-away sheath over the 0 035 wire  The 15-Greek by 32 cm tunneled dialysis catheter was placed through the peel-away sheath  The patient tolerated the procedure well without apparent immediate complication  The patient left the IR department in unchanged condition  Dr Jazmine Fisher performed and directly supervised the entire procedure  Findings: Using ultrasound guidance, the right internal jugular vein was cannulated using Seldinger technique  The right internal jugular vein was evaluated as a potential access site  The right internal jugular vein was patent, and free of thrombus  Static images of the vessel was obtained  Visualization of real time needle entry into the vessel was obtained  Fluoroscopic spot image demonstrates a newly placed tunneled dialysis catheter via the right internal jugular vein with its central aspect within the right atrium  The catheter tubing is smooth in contour  The catheter flushed with ease and was subsequently secured in place  Impression: Impression: Successful placement of a 32 cm tunneled dialysis catheter via the right internal jugular vein   Workstation performed: [id number]       Current Facility-Administered Medications   Medication Dose Route Frequency    acetaminophen (TYLENOL) tablet 650 mg  650 mg Oral Q6H PRN    albuterol (PROVENTIL HFA,VENTOLIN HFA) inhaler 2 puff  2 puff Inhalation Q4H PRN    amLODIPine (NORVASC) tablet 10 mg  10 mg Oral Daily    bumetanide (BUMEX) injection 4 mg  4 mg Intravenous BID    carvedilol (COREG) tablet 25 mg  25 mg Oral BID With Meals    doxazosin (CARDURA) tablet 2 mg  2 mg Oral Daily    FLUoxetine (PROzac) capsule 20 mg  20 mg Oral QAM    glycerin-hypromellose- (ARTIFICIAL TEARS) ophthalmic solution 2 drop  2 drop Both Eyes Q3H PRN    heparin (porcine) subcutaneous injection 7,500 Units  7,500 Units Subcutaneous Q8H [address]    hydrALAZINE (APRESOLINE) tablet 50 mg  50 mg Oral TID    hydrOXYzine HCL (ATARAX) tablet 25 mg  25 mg Oral Q6H PRN    insulin glargine (LANTUS) subcutaneous injection 25 Units 0 25 mL  25 Units Subcutaneous HS    insulin lispro (HumaLOG) 100 units/mL subcutaneous injection 2-12 Units  2-12 Units Subcutaneous TID AC    insulin lispro (HumaLOG) 100 units/mL subcutaneous injection 2-12 Units  2-12 Units Subcutaneous HS    insulin lispro (HumaLOG) 100 units/mL subcutaneous injection 5 Units  5 Units Subcutaneous TID With Meals    levothyroxine tablet 125 mcg  125 mcg Oral Early Morning    ondansetron (ZOFRAN) injection 4 mg  4 mg Intravenous Q4H PRN    pantoprazole (PROTONIX) EC tablet 40 mg  40 mg Oral BID AC     Medications Discontinued During This Encounter   Medication Reason    furosemide (LASIX) injection 40 mg     furosemide (LASIX) injection 80 mg     aluminum-magnesium hydroxide-simethicone (MYLANTA) oral suspension 30 mL     insulin glargine (LANTUS) subcutaneous injection 20 Units 0 2 mL     cefTRIAXone (ROCEPHIN) IVPB (premix in dextrose) 1,000 mg 50 mL     bumetanide (BUMEX) injection 3 mg     insulin glargine (LANTUS) subcutaneous injection 10 Units 0 1 mL     epoetin tammy (EPOGEN,PROCRIT) injection 5,000 Units     amLODIPine (NORVASC) tablet 10 mg     hydrALAZINE (APRESOLINE) tablet 50 mg     insulin glargine (LANTUS) subcutaneous injection 15 Units 0 15 mL     insulin glargine (LANTUS) subcutaneous injection 20 Units 0 2 mL        Tayo Luciano DO      This progress note was produced in part using a dictation device which may document imprecise wording from author's original intent

## 2022-09-17 NOTE — PLAN OF CARE
Post-Dialysis RN Treatment Note    Blood Pressure:  Pre 129/76 mm/Hg  Post 135/85 mmHg   EDW  tbd     Weight:  Pre 145 3 kg   Post 144 5 kg   Mode of weight measurement: Bed Scale   Volume Removed  570 ml    Treatment duration 90 minutes    NS given  No    Treatment shortened? Yes, describe: increasingly high venous pressures, venous limb flushed  Ended after 90 minutes, Dr Norman Setting notified  Medications given during Rx None Reported   Estimated Kt/V  None Reported   Access type: Permacath/TDC   Access Issues: Yes, describe: increasingly high venous pressures, physician notified  Report called to primary nurse   Yes Gabriel Valdez RN    HD tx plan: First tx, Dr Emerson Hartley present for start of hd  2 hrs removing 1L as boni  Using R permacath for hd  4K bath for K 3 6 9/17      Problem: METABOLIC, FLUID AND ELECTROLYTES - ADULT  Goal: Electrolytes maintained within normal limits  Description: INTERVENTIONS:  - Monitor labs and assess patient for signs and symptoms of electrolyte imbalances  - Administer electrolyte replacement as ordered  - Monitor response to electrolyte replacements, including repeat lab results as appropriate  - Instruct patient on fluid and nutrition as appropriate  Outcome: Progressing     Problem: METABOLIC, FLUID AND ELECTROLYTES - ADULT  Goal: Fluid balance maintained  Description: INTERVENTIONS:  - Monitor labs   - Monitor I/O and WT  - Instruct patient on fluid and nutrition as appropriate  - Assess for signs & symptoms of volume excess or deficit  Outcome: Progressing

## 2022-09-17 NOTE — ASSESSMENT & PLAN NOTE
· Found to have oxygen saturation of 87% in ER, was utilizing 5 L nasal cannula, currently on room air  · Takes Bumex 2 mg twice daily as an outpatient  · X-ray revealed findings suggestive of fluid overload and possible pneumonia  · Patient received IV Lasix and Rocephin in ED  ·    · Echocardiogram shows LVEF of 50 to 55%   Trivial small pericardial effusion  · Acute respiratory failure is due to suspected volume overload  · Nephrology input appreciated- continue with current diuretic therapy-Bumex 4 mg BID  · 5 day course of ceftriaxone completed 9/13/22   · Blood cultures- NGTD    · Continue with respiratory protocol; oxygen supplement keep SpO2 greater than 92%  · Patient will be initiated on hemodialysis- tunneled dialysis catheter placed 9/16/2022

## 2022-09-17 NOTE — PLAN OF CARE
Problem: PAIN - ADULT  Goal: Verbalizes/displays adequate comfort level or baseline comfort level  Description: Interventions:  - Encourage patient to monitor pain and request assistance  - Assess pain using appropriate pain scale  - Administer analgesics based on type and severity of pain and evaluate response  - Implement non-pharmacological measures as appropriate and evaluate response  - Consider cultural and social influences on pain and pain management  - Notify physician/advanced practitioner if interventions unsuccessful or patient reports new pain  Outcome: Progressing     Problem: INFECTION - ADULT  Goal: Absence or prevention of progression during hospitalization  Description: INTERVENTIONS:  - Assess and monitor for signs and symptoms of infection  - Monitor lab/diagnostic results  - Monitor all insertion sites, i e  indwelling lines, tubes, and drains  - Monitor endotracheal if appropriate and nasal secretions for changes in amount and color  - [place] appropriate cooling/warming therapies per order  - Administer medications as ordered  - Instruct and encourage patient and family to use good hand hygiene technique  - Identify and instruct in appropriate isolation precautions for identified infection/condition  Outcome: Progressing  Goal: Absence of fever/infection during neutropenic period  Description: INTERVENTIONS:  - Monitor WBC    Outcome: Progressing     Problem: SAFETY ADULT  Goal: Patient will remain free of falls  Description: INTERVENTIONS:  - Educate patient/family on patient safety including physical limitations  - Instruct patient to call for assistance with activity   - Consult OT/PT to assist with strengthening/mobility   - Keep Call bell within reach  - Keep bed low and locked with side rails adjusted as appropriate  - Keep care items and personal belongings within reach  - Initiate and maintain comfort rounds  - Make Fall Risk Sign visible to staff  - Offer Toileting every 2 Hours, in advance of need  - Initiate/Maintain bed alarm  - Obtain necessary fall risk management equipment  - Apply yellow socks and bracelet for high fall risk patients  - Consider moving patient to room near nurses station  Outcome: Progressing  Goal: Maintain or return to baseline ADL function  Description: INTERVENTIONS:  -  Assess patient's ability to carry out ADLs; assess patient's baseline for ADL function and identify physical deficits which impact ability to perform ADLs (bathing, care of mouth/teeth, toileting, grooming, dressing, etc )  - Assess/evaluate cause of self-care deficits   - Assess range of motion  - Assess patient's mobility; develop plan if impaired  - Assess patient's need for assistive devices and provide as appropriate  - Encourage maximum independence but intervene and supervise when necessary  - Involve family in performance of ADLs  - Assess for home care needs following discharge   - Consider OT consult to assist with ADL evaluation and planning for discharge  - Provide patient education as appropriate  Outcome: Progressing  Goal: Maintains/Returns to pre admission functional level  Description: INTERVENTIONS:  - Perform BMAT or MOVE assessment daily    - Set and communicate daily mobility goal to care team and patient/family/caregiver  - Collaborate with rehabilitation services on mobility goals if consulted  - Perform Range of Motion 2 times a day  - Reposition patient every 2 hours    - Dangle patient 2 times a day  - Stand patient 2 times a day  - Ambulate patient 2 times a day  - Out of bed to chair 3 times a day   - Out of bed for meals 3 times a day  - Out of bed for toileting  - Record patient progress and toleration of activity level   Outcome: Progressing     Problem: DISCHARGE PLANNING  Goal: Discharge to home or other facility with appropriate resources  Description: INTERVENTIONS:  - Identify barriers to discharge w/patient and caregiver  - Arrange for needed discharge resources and transportation as appropriate  - Identify discharge learning needs (meds, wound care, etc )  - Refer to Case Management Department for coordinating discharge planning if the patient needs post-hospital services based on physician/advanced practitioner order or complex needs related to functional status, cognitive ability, or social support system  Outcome: Progressing     Problem: Knowledge Deficit  Goal: Patient/family/caregiver demonstrates understanding of disease process, treatment plan, medications, and discharge instructions  Description: Complete learning assessment and assess knowledge base    Interventions:  - Provide teaching at level of understanding  - Provide teaching via preferred learning methods  Outcome: Progressing     Problem: RESPIRATORY - ADULT  Goal: Achieves optimal ventilation and oxygenation  Description: INTERVENTIONS:  - Assess for changes in respiratory status  - Assess for changes in mentation and behavior  - Position to facilitate oxygenation and minimize respiratory effort  - Oxygen administered by appropriate delivery if ordered  - Initiate smoking cessation education as indicated  - Encourage broncho-pulmonary hygiene including cough, deep breathe, Incentive Spirometry  - Assess the need for suctioning and aspirate as needed  - Assess and instruct to report SOB or any respiratory difficulty  - Respiratory Therapy support as indicated  Outcome: Progressing     Problem: Prexisting or High Potential for Compromised Skin Integrity  Goal: Skin integrity is maintained or improved  Description: INTERVENTIONS:  - Identify patients at risk for skin breakdown  - Assess and monitor skin integrity  - Assess and monitor nutrition and hydration status  - Monitor labs   - Assess for incontinence   - Turn and reposition patient  - Assist with mobility/ambulation  - Relieve pressure over bony prominences  - Avoid friction and shearing  - Provide appropriate hygiene as needed including keeping skin clean and dry  - Evaluate need for skin moisturizer/barrier cream  - Collaborate with interdisciplinary team   - Patient/family teaching  - Consider wound care consult   Outcome: Progressing     Problem: Potential for Falls  Goal: Patient will remain free of falls  Description: INTERVENTIONS:  - Educate patient/family on patient safety including physical limitations  - Instruct patient to call for assistance with activity   - Consult OT/PT to assist with strengthening/mobility   - Keep Call bell within reach  - Keep bed low and locked with side rails adjusted as appropriate  - Keep care items and personal belongings within reach  - Initiate and maintain comfort rounds  - Make Fall Risk Sign visible to staff  - Offer Toileting every 2 Hours, in advance of need  - Initiate/Maintain bed alarm  - Obtain necessary fall risk management equipment  - Apply yellow socks and bracelet for high fall risk patients  - Consider moving patient to room near nurses station  Outcome: Progressing

## 2022-09-17 NOTE — NURSING NOTE
Pt given breakfast, bs = 125  Transported to dialysis room for first dialysis  Dr RENEE present in dialysis  1100 pt dialysis tx completed  0 6L fluid removed per dialysis RN   aware of same  Pt returned to his room and vs taken  Pt has no c/o at this time  1145 Pt c/o cramping 6/10 to right abdomen  Eating at this time  Pt given tylenol as ordered for pain and had pt get in bed, with hob elevated and rest  Pt to call RN if pain persists

## 2022-09-17 NOTE — ASSESSMENT & PLAN NOTE
Lab Results   Component Value Date    EGFR 11 09/17/2022    EGFR 11 09/16/2022    EGFR 13 09/15/2022    CREATININE 5 56 (H) 09/17/2022    CREATININE 5 46 (H) 09/16/2022    CREATININE 4 98 (H) 09/15/2022     · Baseline Cr around 4 0mg/dL  follows with nephrology outpatient  The patient underwent renal biopsy which indicates nodular diabetic glomerulosclerosis, 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     · Presented with STEFANIA Cr 4 38mg/dL suspect to be due to acute tubular necrosis  · Renal function continues to worsen  · Nephrology input appreciated- discussed initiation of dialysis   · Bumex discontinued per Nephrology  · Tunneled dialysis catheter placed 9/16  · Dialysis started 9/17/2022 with plan to dialyze again on Monday

## 2022-09-18 LAB
GLUCOSE SERPL-MCNC: 142 MG/DL (ref 65–140)
GLUCOSE SERPL-MCNC: 190 MG/DL (ref 65–140)
GLUCOSE SERPL-MCNC: 227 MG/DL (ref 65–140)
GLUCOSE SERPL-MCNC: 236 MG/DL (ref 65–140)

## 2022-09-18 PROCEDURE — 99232 SBSQ HOSP IP/OBS MODERATE 35: CPT | Performed by: PHYSICIAN ASSISTANT

## 2022-09-18 PROCEDURE — 82948 REAGENT STRIP/BLOOD GLUCOSE: CPT

## 2022-09-18 PROCEDURE — 99232 SBSQ HOSP IP/OBS MODERATE 35: CPT | Performed by: INTERNAL MEDICINE

## 2022-09-18 RX ADMIN — HYDROXYZINE HYDROCHLORIDE 25 MG: 25 TABLET ORAL at 02:58

## 2022-09-18 RX ADMIN — ALBUTEROL SULFATE 2 PUFF: 108 INHALANT RESPIRATORY (INHALATION) at 03:48

## 2022-09-18 RX ADMIN — INSULIN GLARGINE 25 UNITS: 100 INJECTION, SOLUTION SUBCUTANEOUS at 21:09

## 2022-09-18 RX ADMIN — INSULIN LISPRO 5 UNITS: 100 INJECTION, SOLUTION INTRAVENOUS; SUBCUTANEOUS at 08:00

## 2022-09-18 RX ADMIN — CARVEDILOL 25 MG: 25 TABLET, FILM COATED ORAL at 16:26

## 2022-09-18 RX ADMIN — INSULIN LISPRO 5 UNITS: 100 INJECTION, SOLUTION INTRAVENOUS; SUBCUTANEOUS at 16:25

## 2022-09-18 RX ADMIN — INSULIN LISPRO 5 UNITS: 100 INJECTION, SOLUTION INTRAVENOUS; SUBCUTANEOUS at 11:49

## 2022-09-18 RX ADMIN — DOXAZOSIN 2 MG: 2 TABLET ORAL at 08:02

## 2022-09-18 RX ADMIN — HEPARIN SODIUM 7500 UNITS: 5000 INJECTION INTRAVENOUS; SUBCUTANEOUS at 05:56

## 2022-09-18 RX ADMIN — PANTOPRAZOLE SODIUM 40 MG: 40 TABLET, DELAYED RELEASE ORAL at 05:57

## 2022-09-18 RX ADMIN — PANTOPRAZOLE SODIUM 40 MG: 40 TABLET, DELAYED RELEASE ORAL at 16:27

## 2022-09-18 RX ADMIN — INSULIN LISPRO 2 UNITS: 100 INJECTION, SOLUTION INTRAVENOUS; SUBCUTANEOUS at 08:00

## 2022-09-18 RX ADMIN — LEVOTHYROXINE SODIUM 125 MCG: 25 TABLET ORAL at 05:57

## 2022-09-18 RX ADMIN — SODIUM CHLORIDE 200 MG: 9 INJECTION, SOLUTION INTRAVENOUS at 11:50

## 2022-09-18 RX ADMIN — HEPARIN SODIUM 7500 UNITS: 5000 INJECTION INTRAVENOUS; SUBCUTANEOUS at 21:17

## 2022-09-18 RX ADMIN — HEPARIN SODIUM 7500 UNITS: 5000 INJECTION INTRAVENOUS; SUBCUTANEOUS at 14:00

## 2022-09-18 RX ADMIN — ACETAMINOPHEN 325MG 650 MG: 325 TABLET ORAL at 03:51

## 2022-09-18 RX ADMIN — INSULIN LISPRO 4 UNITS: 100 INJECTION, SOLUTION INTRAVENOUS; SUBCUTANEOUS at 11:48

## 2022-09-18 RX ADMIN — CARVEDILOL 25 MG: 25 TABLET, FILM COATED ORAL at 08:02

## 2022-09-18 RX ADMIN — TORSEMIDE 100 MG: 100 TABLET ORAL at 08:08

## 2022-09-18 RX ADMIN — HYDROXYZINE HYDROCHLORIDE 25 MG: 25 TABLET ORAL at 21:09

## 2022-09-18 RX ADMIN — FLUOXETINE 20 MG: 20 CAPSULE ORAL at 08:02

## 2022-09-18 RX ADMIN — INSULIN LISPRO 4 UNITS: 100 INJECTION, SOLUTION INTRAVENOUS; SUBCUTANEOUS at 21:14

## 2022-09-18 RX ADMIN — ACETAMINOPHEN 325MG 650 MG: 325 TABLET ORAL at 23:31

## 2022-09-18 NOTE — PROGRESS NOTES
Rima [address]  64   Progress Note - Kelly Bun 1980, 38 y o  male MRN: [id number]  Unit/Bed#: -01 Encounter: [id number]  Primary Care Provider: Víctor Topete MD   Date and time admitted to hospital: 9/9/2022 11:40 AM    * Acute respiratory failure with hypoxia ([address] )  Assessment & Plan  · Found to have oxygen saturation of 87% in ER, was utilizing 5 L nasal cannula  · X-ray revealed findings suggestive of fluid overload and possible pneumonia  ·    · Echocardiogram shows LVEF of 50 to 55%  Trivial small pericardial effusion  · Acute respiratory failure is due to suspected volume overload  · Now resolved- patient currently on RA  · Nephrology input appreciated  · Patient initiated on hemodialysis- tunneled dialysis catheter placed 9/16/2022    Chronic kidney disease, stage 4 (severe) [facility])  Assessment & Plan  Lab Results   Component Value Date    EGFR 11 09/17/2022    EGFR 11 09/16/2022    EGFR 13 09/15/2022    CREATININE 5 56 (H) 09/17/2022    CREATININE 5 46 (H) 09/16/2022    CREATININE 4 98 (H) 09/15/2022     · Baseline Cr around 4 0mg/dL  follows with nephrology outpatient  The patient underwent renal biopsy which indicates nodular diabetic glomerulosclerosis, 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     · Presented with STEFANIA Cr 4 38mg/dL suspect to be due to acute tubular necrosis  · Renal function continues to worsen  · Torsemide 100 mg po on non HD days  · Tunneled dialysis catheter placed 9/16  · Dialysis started 9/17/2022 with plan to dialyze again on Monday      Essential hypertension  Assessment & Plan  · Patient states he was out of his antihypertensives  · Continue home amlodipine, carvedilol, hydralazine   · Torsemide 100 mg daily on non dialysis days      Gastroesophageal reflux disease without esophagitis  Assessment & Plan  · Continue home Protonix     Chest pain  Assessment & Plan  · Patient reported right sided chest pressure   · Troponin 17- 16  · No additional chest pain- patient stated he thought it may be anxiety   · Monitor for chest pain    Type 2 diabetes mellitus with stage 4 chronic kidney disease and hypertension [facility])  Assessment & Plan  Lab Results   Component Value Date    HGBA1C 9 1 (H) 2022       Recent Labs     22  1118 22  1556 22  2109 22  0658   POCGLU 115 212* 243* 190*       Blood Sugar Average: Last 72 hrs:  (P) 198 [id number]     · Lantus increased to 25 units at bedtime  · Continue lispro 5 units with meals  · Fingerstick blood sugar with sliding scale coverage 4 times daily with meals and at bedtime  · Carb controlled diet    Acquired hypothyroidism  Assessment & Plan  · Continue home levothyroxine        VTE Pharmacologic Prophylaxis: VTE Score: 5 High Risk (Score >/= 5) - Pharmacological DVT Prophylaxis Ordered: heparin  Sequential Compression Devices Ordered  Patient Centered Rounds: I performed bedside rounds with nursing staff today  Discussions with Specialists or Other Care Team Provider: nursing    Education and Discussions with Family / Patient: patient updated at bedside  Time Spent for Care: 20 minutes  More than 50% of total time spent on counseling and coordination of care as described above  Current Length of Stay: 8 day(s)  Current Patient Status: Inpatient   Certification Statement: The patient will continue to require additional inpatient hospital stay due to need for HD, discharge planing for outpatient HD chair time  Discharge Plan: patient medically stable for discharge once outpatient HD chair time established     Code Status: Level 1 - Full Code    Subjective: The patient was seen and examined  The patient is lying in bed in no acute distress  He denies any complaint       Objective:     Vitals:   Temp (24hrs), Av 5 °F (36 9 °C), Min:98 1 °F (36 7 °C), Max:98 9 °F (37 2 °C)    Temp:  [98 1 °F (36 7 °C)-98 9 °F (37 2 °C)] 98 6 °F (37 °C)  HR:  [67-77] 70  Resp:  [16-20] 20  BP: (101-146)/(58-89) 144/80  SpO2:  [90 %-94 %] 90 %  Body mass index is 58 67 kg/m²  Input and Output Summary (last 24 hours): Intake/Output Summary (Last 24 hours) at 9/18/2022 1106  Last data filed at 9/18/2022 0801  Gross per 24 hour   Intake 360 ml   Output 1800 ml   Net -1440 ml       Physical Exam:   Physical Exam  Vitals and nursing note reviewed  Constitutional:       General: He is awake  Appearance: He is morbidly obese  Cardiovascular:      Rate and Rhythm: Normal rate and regular rhythm  Pulmonary:      Effort: Pulmonary effort is normal       Breath sounds: Normal breath sounds  Abdominal:      Palpations: Abdomen is soft  Tenderness: There is no abdominal tenderness  Skin:     General: Skin is warm and dry  Neurological:      General: No focal deficit present  Mental Status: He is alert and oriented to person, place, and time  Psychiatric:         Attention and Perception: Attention normal          Mood and Affect: Mood normal          Speech: Speech normal          Behavior: Behavior is cooperative  Additional Data:     Labs:  Results from last 7 days   Lab Units 09/17/22 0549 09/13/22 0423 09/12/22  0516   WBC Thousand/uL 9 40   < > 9 62   HEMOGLOBIN g/dL 8 7*   < > 7 9*   HEMATOCRIT % 27 1*   < > 24 2*   PLATELETS Thousands/uL 381   < > 350   NEUTROS PCT %  --   --  72   LYMPHS PCT %  --   --  15   MONOS PCT %  --   --  7   EOS PCT %  --   --  5    < > = values in this interval not displayed       Results from last 7 days   Lab Units 09/17/22 0549 09/13/22 0423 09/12/22  0516   SODIUM mmol/L 138   < > 138   POTASSIUM mmol/L 3 6   < > 3 8   CHLORIDE mmol/L 97   < > 100   CO2 mmol/L 31   < > 27   BUN mg/dL 79*   < > 99*   CREATININE mg/dL 5 56*   < > 5 00*   ANION GAP mmol/L 10   < > 11   CALCIUM mg/dL 9 1   < > 8 7   ALBUMIN g/dL  --   --  2 8*   TOTAL BILIRUBIN mg/dL  --   --  0 27   ALK PHOS U/L  --   --  56 ALT U/L  --   --  8   AST U/L  --   --  7*   GLUCOSE RANDOM mg/dL 118   < > 160*    < > = values in this interval not displayed  Results from last 7 days   Lab Units 09/18/22  0658 09/17/22  2109 09/17/22  1556 09/17/22  1118 09/17/22  0649 09/16/22  2050 09/16/22  1608 09/16/22  1135 09/16/22  0755 09/15/22  2035 09/15/22  1600 09/15/22  1119   POC GLUCOSE mg/dl 190* 243* 212* 115 125 264* 177* 246* 195* 250* 196* 226*               Lines/Drains:  Invasive Devices  Report    Peripheral Intravenous Line  Duration           Peripheral IV 09/16/22 Left Antecubital 1 day          Hemodialysis Catheter  Duration           HD Permanent Double Catheter 1 day                      Imaging: No pertinent imaging reviewed      Recent Cultures (last 7 days):         Last 24 Hours Medication List:   Current Facility-Administered Medications   Medication Dose Route Frequency Provider Last Rate    acetaminophen  650 mg Oral Q6H PRN Lola Chowdhury PA-C      albuterol  2 puff Inhalation Q4H PRN Triston Gonzalez DO      amLODIPine  10 mg Oral Daily DOREEN Tubbs      carvedilol  25 mg Oral BID With Meals Triston Gonzalez DO      doxazosin  2 mg Oral Daily Triston Gonzalez DO      FLUoxetine  20 mg Oral QAM Triston Gonzalez DO      glycerin-hypromellose-  2 drop Both Eyes Q3H PRN DOREEN Carmen      [START ON 9/19/2022] heparin (porcine)  3,000 Units Intravenous Once Bernadine Fiore DO      heparin (porcine)  7,500 Units Subcutaneous [facility] Triston Gonzalez DO      hydrALAZINE  50 mg Oral TID DOREEN Tubbs      hydrOXYzine HCL  25 mg Oral Q6H PRN Lola Chowdhury PA-C      insulin glargine  25 Units Subcutaneous HS Shyam Landin PA-C      insulin lispro  2-12 Units Subcutaneous TID RIRI Rosales, DO      insulin lispro  2-12 Units Subcutaneous HS Triston Gonzalez DO      insulin lispro  5 Units Subcutaneous TID With Meals Triston Gonzalez DO      iron sucrose  200 mg Intravenous Once Christina Ruelas, DO      levothyroxine  125 mcg Oral Early Morning Lulu Felix, DO      ondansetron  4 mg Intravenous Q4H PRN Joshua Maldonado PA-C      pantoprazole  40 mg Oral BID AC Lulu Felix, DO      torsemide  100 mg Oral Once per day on Sun Tue Thu Sat Christina Ruelas DO          Today, Patient Was Seen By: Joshua Maldonado PA-C    **Please Note: This note may have been constructed using a voice recognition system  **

## 2022-09-18 NOTE — NURSING NOTE
Refusing to get oob for lunch and dinner today  He states that he is too tired  I encouraged and educated him on the need to be active

## 2022-09-18 NOTE — ASSESSMENT & PLAN NOTE
Lab Results   Component Value Date    EGFR 11 09/17/2022    EGFR 11 09/16/2022    EGFR 13 09/15/2022    CREATININE 5 56 (H) 09/17/2022    CREATININE 5 46 (H) 09/16/2022    CREATININE 4 98 (H) 09/15/2022     · Baseline Cr around 4 0mg/dL  follows with nephrology outpatient  The patient underwent renal biopsy which indicates nodular diabetic glomerulosclerosis, 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     · Presented with STEFANIA Cr 4 38mg/dL suspect to be due to acute tubular necrosis  · Renal function continues to worsen  · Torsemide 100 mg po on non HD days  · Tunneled dialysis catheter placed 9/16  · Dialysis started 9/17/2022 with plan to dialyze again on Monday

## 2022-09-18 NOTE — PLAN OF CARE
Problem: INFECTION - ADULT  Goal: Absence or prevention of progression during hospitalization  Description: INTERVENTIONS:  - Assess and monitor for signs and symptoms of infection  - Monitor lab/diagnostic results  - Monitor all insertion sites, i e  indwelling lines, tubes, and drains  - Monitor endotracheal if appropriate and nasal secretions for changes in amount and color  - [place] appropriate cooling/warming therapies per order  - Administer medications as ordered  - Instruct and encourage patient and family to use good hand hygiene technique  - Identify and instruct in appropriate isolation precautions for identified infection/condition  Outcome: Progressing

## 2022-09-18 NOTE — ASSESSMENT & PLAN NOTE
· Patient states he was out of his antihypertensives  · Continue home amlodipine, carvedilol, hydralazine   · Torsemide 100 mg daily on non dialysis days

## 2022-09-18 NOTE — ASSESSMENT & PLAN NOTE
Lab Results   Component Value Date    HGBA1C 9 1 (H) 07/16/2022       Recent Labs     09/17/22  1118 09/17/22  1556 09/17/22  2109 09/18/22  0658   POCGLU 115 212* 243* 190*       Blood Sugar Average: Last 72 hrs:  (P) 198 [id number]     · Lantus increased to 25 units at bedtime  · Continue lispro 5 units with meals  · Fingerstick blood sugar with sliding scale coverage 4 times daily with meals and at bedtime  · Carb controlled diet

## 2022-09-18 NOTE — PROGRESS NOTES
Progress Note - Nephrology   Gordon Serra 38 y o  male MRN: [id number]  Unit/Bed#: -01 Encounter: [id number]    A/P:  1  End-stage renal disease               Patient is scheduled to have a care treatment tomorrow, September 19th, will arrange for the patient to have 2 L removed, initially my goal is to have the patient for 2 5 or 3 L however given abdominal cramping experienced with hemodialysis yesterday, will be less aggressive  2  Diabetic nephropathy  3  Hypertensive nephrosclerosis  4  Nephrotic syndrome secondary to diabetic nephropathy  5  Volume overload                  continue diuretics on nondialysis days, as mentioned above will continue to ultrafilter as tolerated hemodialysis sessions  6  Iron deficiency anemia                  will start the patient 200 mg of Venofer, he will need at least 5 doses for now  7  Anemia due to end-stage renal disease   Will avoid GLORIA, the patient most likely initiate outpatient hemodialysis at the [facility] which uses a long-acting GLORIA, currently not available as an inpatient      Follow up reason for today's visit:  End-stage renal disease/diabetic nephropathy/hypertensive nephrosclerosis/volume management/iron deficiency/anemia due to end-stage renal disease    Acute respiratory failure with hypoxia (HCC)    Patient Active Problem List   Diagnosis    Abdominal pain    OCD (obsessive compulsive disorder)    Schizoaffective disorder, depressive type ([address] )    Acquired hypothyroidism    Morbid obesity with BMI of 50 0-59 9, adult ([address] )    Anxiety    Episodic confusion    Snoring    Insomnia    Hypersomnia    Vitamin D deficiency    Vomiting    Occipital neuralgia of left side    Headache    Nodule of parotid gland    Bipolar 1 disorder ([address] )    Depression    Type 1 diabetes mellitus without complication ([address] )    Urinary retention    Peripheral edema    Hyperlipidemia, mixed    Migraine without aura and without status migrainosus, not intractable    Class 3 severe obesity due to excess calories with serious comorbidity and body mass index (BMI) of 60 0 to 69 9 in adult [facility])    Spinal stenosis in cervical region    Difficulty urinating    Urinary tract infection without hematuria    Penile pain    Chronic migraine without aura without status migrainosus, not intractable    Hypertensive emergency    Encephalopathy    Leukocytosis    Schizo affective schizophrenia ([facility] )    Acute respiratory failure with hypoxia ([facility] )    STEFANIA (acute kidney injury) ([place] [address] )    Acute respiratory disease due to COVID-19 virus    Elevated troponin    Type 2 diabetes mellitus with stage 4 chronic kidney disease and hypertension ([place] [address] )    Family history of heart disease    Hypokalemia    Chest pressure    Gastroesophageal reflux disease without esophagitis    Essential hypertension    SOB (shortness of breath)    Volume overload    Hyponatremia    Open toe wound    Primary hypertension    Diabetic ulcer of both feet ([place] [address] )    Nodular type diabetic glomerulosclerosis ([place] [address] )    Interstitial fibrosis present on renal biopsy    Arteriosclerosis of kidney    Chronic kidney disease, stage 4 (severe) (HCC)    Chest pain         Subjective:   Patient had hemodialysis session yesterday, this was his 1st treatment 2 hours  He tolerated the session well, however claims afterward he experience abdominal cramping 2 hours after his treatment  Objective:     Vitals: Blood pressure 129/68, pulse 67, temperature 98 6 °F (37 °C), resp  rate 20, height 5' 2" (1 575 m), weight (!) 146 kg (320 lb 12 3 oz), SpO2 94 %  ,Body mass index is 58 67 kg/m²      Weight (last 2 days)     Date/Time Weight    09/18/22 0554 146 (320 77)    09/17/22 0547 146 (322 31)    09/17/22 0500 146 (322 31)    09/16/22 0600 147 (324 08)            Intake/Output Summary (Last 24 hours) at 9/18/2022 0835  Last data filed at 9/18/2022 0801  Gross per 24 hour   Intake 860 ml   Output 2870 ml Net -2010 ml     I/O last 3 completed shifts: In: 990 [P O :480; I V :510]  Out: 6936 [Urine:1800; Other:1070]    HD Permanent Double Catheter (Active)   Reasons to continue HD Cath Treatment Therapy 09/17/22 0851   Goal for Removal N/A- chronic HD catheter 09/17/22 0851   Line Necessity Reviewed Yes, reviewed with provider 09/17/22 0851   Site Assessment WDL 09/17/22 1030   Proximal Lumen Status Flushed;Capped 09/17/22 1030   Distal Lumen Status Flushed;Capped 09/17/22 1030   Dressing Type Chlorhexidine dressing 09/17/22 1030   Dressing Status Clean;Dry; Intact 09/17/22 1030   Dressing Change Due 09/23/22 09/17/22 1030       Physical Exam: /68   Pulse 67   Temp 98 6 °F (37 °C)   Resp 20   Ht 5' 2" (1 575 m)   Wt (!) 146 kg (320 lb 12 3 oz)   SpO2 94%   BMI 58 67 kg/m²     General Appearance:    Alert, cooperative, no distress, appears stated age   Head:    Normocephalic, without obvious abnormality, atraumatic   Eyes:    Conjunctiva/corneas clear   Ears:    Normal external ears   Nose:   Nares normal, septum midline, mucosa normal, no drainage    or sinus tenderness   Throat:   Lips, mucosa, and tongue normal; teeth and gums normal   Neck:   Supple   Back:     Symmetric, no curvature, ROM normal, no CVA tenderness   Lungs:     Reduced but otherwise clear   Chest wall:    No tenderness or deformity   Heart:    Regular rate and rhythm, S1 and S2 normal, no murmur, rub   or gallop   Abdomen:     Soft, non-tender, bowel sounds active   Extremities:   Extremities normal, atraumatic, no cyanosis, +1 bilateral lower extremity edema   Skin:   Skin color, texture, turgor normal, no rashes or lesions   Lymph nodes:   Cervical normal   Neurologic:   CNII-XII intact            Lab, Imaging and other studies: I have personally reviewed pertinent labs    CBC: No results found for: WBC, HGB, HCT, MCV, PLT, ADJUSTEDWBC, MCH, MCHC, RDW, MPV, NRBC  CMP: No results found for: NA, K, CL, CO2, ANIONGAP, BUN, CREATININE, GLUCOSE, CALCIUM, AST, ALT, ALKPHOS, PROT, BILITOT, EGFR      Results from last 7 days   Lab Units 09/17/22  0549 09/16/22  0443 09/15/22  0437 09/13/22  0423 09/12/22  0516   POTASSIUM mmol/L 3 6 3 8 3 6   < > 3 8   CHLORIDE mmol/L 97 97 98   < > 100   CO2 mmol/L 31 29 29   < > 27   BUN mg/dL 79* 81* 80*   < > 99*   CREATININE mg/dL 5 56* 5 46* 4 98*   < > 5 00*   CALCIUM mg/dL 9 1 9 0 9 0   < > 8 7   ALK PHOS U/L  --   --   --   --  56   ALT U/L  --   --   --   --  8   AST U/L  --   --   --   --  7*    < > = values in this interval not displayed  Phosphorus: No results found for: PHOS  Magnesium: No results found for: MG  Urinalysis: No results found for: [facility], SPECGRAV, PHUR, LEUKOCYTESUR, NITRITE, PROTEINUA, GLUCOSEU, KETONESU, BILIRUBINUR, BLOODU  Ionized Calcium: No results found for: CAION  Coagulation: No results found for: PT, INR, APTT  Troponin: No results found for: TROPONINI  ABG: No results found for: PHART, MWO2QPG, PO2ART, FHX9UNX, B3UJBBUB, BEART, SOURCE  Radiology review:     IMAGING  Procedure: IR tunneled dialysis catheter placement    Result Date: 9/16/2022  Narrative: Tunneled Dialysis Catheter Placement 9/16/2022 History: Worsening renal failure requiring hemodialysis Contrast: None Fluoroscopy time: 0 4 minutes Number of images: 6 Radiation dose: 8 mGy Conscious sedation time: 25 minutes, 1 mg versed, 50 mcg fentanyl Procedure: The patient was identified verbally and by wristband  Timeout was performed  Informed consent was obtained  All elements of maximal sterile barrier technique were followed (cap, mask, sterile gown, sterile gloves, large sterile sheet, hand hygiene and 2% chlorhexidine for cutaneous antisepsis)  Following obtaining informed consent, the patient was prepped and draped in the usual sterile fashion  Using ultrasound guidance, access was gained to the patient's right internal jugular vein using a micropuncture system   The micropuncture wire and inner dilator were removed and a 0 035 wire was advanced to the level of the inferior vena cava using fluoroscopic guidance  The right anterior chest wall was anesthetized with 1% lidocaine  An 11 blade scalpel was to used to make a small dermatotomy  The catheter was tunneled from the dermatotomy to the venotomy  The micropuncture dilator was exchanged for peel-away sheath over the 0 035 wire  The 15-Hebrew by 32 cm tunneled dialysis catheter was placed through the peel-away sheath  The patient tolerated the procedure well without apparent immediate complication  The patient left the IR department in unchanged condition  Dr Rashaun Ledesma performed and directly supervised the entire procedure  Findings: Using ultrasound guidance, the right internal jugular vein was cannulated using Seldinger technique  The right internal jugular vein was evaluated as a potential access site  The right internal jugular vein was patent, and free of thrombus  Static images of the vessel was obtained  Visualization of real time needle entry into the vessel was obtained  Fluoroscopic spot image demonstrates a newly placed tunneled dialysis catheter via the right internal jugular vein with its central aspect within the right atrium  The catheter tubing is smooth in contour  The catheter flushed with ease and was subsequently secured in place  Impression: Impression: Successful placement of a 32 cm tunneled dialysis catheter via the right internal jugular vein   Workstation performed: [id number]       Current Facility-Administered Medications   Medication Dose Route Frequency    acetaminophen (TYLENOL) tablet 650 mg  650 mg Oral Q6H PRN    albuterol (PROVENTIL HFA,VENTOLIN HFA) inhaler 2 puff  2 puff Inhalation Q4H PRN    amLODIPine (NORVASC) tablet 10 mg  10 mg Oral Daily    carvedilol (COREG) tablet 25 mg  25 mg Oral BID With Meals    doxazosin (CARDURA) tablet 2 mg  2 mg Oral Daily    FLUoxetine (PROzac) capsule 20 mg  20 mg Oral QAM  glycerin-hypromellose- (ARTIFICIAL TEARS) ophthalmic solution 2 drop  2 drop Both Eyes Q3H PRN    [START ON 9/19/2022] heparin (porcine) injection 3,000 Units  3,000 Units Intravenous Once    heparin (porcine) subcutaneous injection 7,500 Units  7,500 Units Subcutaneous Q8H [address]    hydrALAZINE (APRESOLINE) tablet 50 mg  50 mg Oral TID    hydrOXYzine HCL (ATARAX) tablet 25 mg  25 mg Oral Q6H PRN    insulin glargine (LANTUS) subcutaneous injection 25 Units 0 25 mL  25 Units Subcutaneous HS    insulin lispro (HumaLOG) 100 units/mL subcutaneous injection 2-12 Units  2-12 Units Subcutaneous TID AC    insulin lispro (HumaLOG) 100 units/mL subcutaneous injection 2-12 Units  2-12 Units Subcutaneous HS    insulin lispro (HumaLOG) 100 units/mL subcutaneous injection 5 Units  5 Units Subcutaneous TID With Meals    levothyroxine tablet 125 mcg  125 mcg Oral Early Morning    ondansetron (ZOFRAN) injection 4 mg  4 mg Intravenous Q4H PRN    pantoprazole (PROTONIX) EC tablet 40 mg  40 mg Oral BID AC    torsemide (DEMADEX) tablet 100 mg  100 mg Oral Once per day on Sun Tue Thu Sat     Medications Discontinued During This Encounter   Medication Reason    furosemide (LASIX) injection 40 mg     furosemide (LASIX) injection 80 mg     aluminum-magnesium hydroxide-simethicone (MYLANTA) oral suspension 30 mL     insulin glargine (LANTUS) subcutaneous injection 20 Units 0 2 mL     cefTRIAXone (ROCEPHIN) IVPB (premix in dextrose) 1,000 mg 50 mL     bumetanide (BUMEX) injection 3 mg     insulin glargine (LANTUS) subcutaneous injection 10 Units 0 1 mL     epoetin tammy (EPOGEN,PROCRIT) injection 5,000 Units     amLODIPine (NORVASC) tablet 10 mg     hydrALAZINE (APRESOLINE) tablet 50 mg     insulin glargine (LANTUS) subcutaneous injection 15 Units 0 15 mL     insulin glargine (LANTUS) subcutaneous injection 20 Units 0 2 mL     bumetanide (BUMEX) injection 4 mg        Walter Whaley, DO      This progress note was produced in part using a dictation device which may document imprecise wording from author's original intent

## 2022-09-18 NOTE — NURSING NOTE
Pt refuses to get oob to chair  He states he is too tired  I encouraged him to get oob to recliner or at least sit on side of bed  Pt still refused  Pt educated on reasons for getting oob to chair and exercising

## 2022-09-18 NOTE — ASSESSMENT & PLAN NOTE
· Found to have oxygen saturation of 87% in ER, was utilizing 5 L nasal cannula  · X-ray revealed findings suggestive of fluid overload and possible pneumonia  ·    · Echocardiogram shows LVEF of 50 to 55%   Trivial small pericardial effusion  · Acute respiratory failure is due to suspected volume overload  · Now resolved- patient currently on RA  · Nephrology input appreciated  · Patient initiated on hemodialysis- tunneled dialysis catheter placed 9/16/2022

## 2022-09-19 ENCOUNTER — APPOINTMENT (INPATIENT)
Dept: DIALYSIS | Facility: HOSPITAL | Age: 42
DRG: 470 | End: 2022-09-19
Attending: INTERNAL MEDICINE
Payer: COMMERCIAL

## 2022-09-19 LAB
ALBUMIN SERPL BCP-MCNC: 3.2 G/DL (ref 3.5–5)
ALP SERPL-CCNC: 69 U/L (ref 34–104)
ALT SERPL W P-5'-P-CCNC: 7 U/L (ref 7–52)
ANION GAP SERPL CALCULATED.3IONS-SCNC: 8 MMOL/L (ref 4–13)
AST SERPL W P-5'-P-CCNC: 22 U/L (ref 13–39)
BASOPHILS # BLD AUTO: 0.05 THOUSANDS/ΜL (ref 0–0.1)
BASOPHILS NFR BLD AUTO: 1 % (ref 0–1)
BILIRUB SERPL-MCNC: 0.28 MG/DL (ref 0.2–1)
BUN SERPL-MCNC: 74 MG/DL (ref 5–25)
CALCIUM ALBUM COR SERPL-MCNC: 9.7 MG/DL (ref 8.3–10.1)
CALCIUM SERPL-MCNC: 9.1 MG/DL (ref 8.4–10.2)
CHLORIDE SERPL-SCNC: 97 MMOL/L (ref 96–108)
CO2 SERPL-SCNC: 27 MMOL/L (ref 21–32)
CREAT SERPL-MCNC: 5.58 MG/DL (ref 0.6–1.3)
EOSINOPHIL # BLD AUTO: 0.43 THOUSAND/ΜL (ref 0–0.61)
EOSINOPHIL NFR BLD AUTO: 4 % (ref 0–6)
ERYTHROCYTE [DISTWIDTH] IN BLOOD BY AUTOMATED COUNT: 13.9 % (ref 11.6–15.1)
GFR SERPL CREATININE-BSD FRML MDRD: 11 ML/MIN/1.73SQ M
GLUCOSE SERPL-MCNC: 151 MG/DL (ref 65–140)
GLUCOSE SERPL-MCNC: 158 MG/DL (ref 65–140)
GLUCOSE SERPL-MCNC: 158 MG/DL (ref 65–140)
GLUCOSE SERPL-MCNC: 212 MG/DL (ref 65–140)
GLUCOSE SERPL-MCNC: 218 MG/DL (ref 65–140)
HCT VFR BLD AUTO: 29.8 % (ref 36.5–49.3)
HGB BLD-MCNC: 9.5 G/DL (ref 12–17)
IMM GRANULOCYTES # BLD AUTO: 0.16 THOUSAND/UL (ref 0–0.2)
IMM GRANULOCYTES NFR BLD AUTO: 2 % (ref 0–2)
LYMPHOCYTES # BLD AUTO: 1.65 THOUSANDS/ΜL (ref 0.6–4.47)
LYMPHOCYTES NFR BLD AUTO: 17 % (ref 14–44)
MCH RBC QN AUTO: 27.1 PG (ref 26.8–34.3)
MCHC RBC AUTO-ENTMCNC: 31.9 G/DL (ref 31.4–37.4)
MCV RBC AUTO: 85 FL (ref 82–98)
MONOCYTES # BLD AUTO: 0.77 THOUSAND/ΜL (ref 0.17–1.22)
MONOCYTES NFR BLD AUTO: 8 % (ref 4–12)
NEUTROPHILS # BLD AUTO: 6.63 THOUSANDS/ΜL (ref 1.85–7.62)
NEUTS SEG NFR BLD AUTO: 68 % (ref 43–75)
NRBC BLD AUTO-RTO: 0 /100 WBCS
PHOSPHATE SERPL-MCNC: 5.1 MG/DL (ref 2.7–4.5)
PLATELET # BLD AUTO: 343 THOUSANDS/UL (ref 149–390)
PMV BLD AUTO: 9.7 FL (ref 8.9–12.7)
POTASSIUM SERPL-SCNC: 3.9 MMOL/L (ref 3.5–5.3)
PROT SERPL-MCNC: 6.8 G/DL (ref 6.4–8.4)
RBC # BLD AUTO: 3.5 MILLION/UL (ref 3.88–5.62)
SODIUM SERPL-SCNC: 132 MMOL/L (ref 135–147)
WBC # BLD AUTO: 9.69 THOUSAND/UL (ref 4.31–10.16)

## 2022-09-19 PROCEDURE — 84100 ASSAY OF PHOSPHORUS: CPT | Performed by: INTERNAL MEDICINE

## 2022-09-19 PROCEDURE — 82948 REAGENT STRIP/BLOOD GLUCOSE: CPT

## 2022-09-19 PROCEDURE — 80053 COMPREHEN METABOLIC PANEL: CPT | Performed by: INTERNAL MEDICINE

## 2022-09-19 PROCEDURE — 99233 SBSQ HOSP IP/OBS HIGH 50: CPT

## 2022-09-19 PROCEDURE — 85025 COMPLETE CBC W/AUTO DIFF WBC: CPT | Performed by: INTERNAL MEDICINE

## 2022-09-19 PROCEDURE — 99232 SBSQ HOSP IP/OBS MODERATE 35: CPT | Performed by: INTERNAL MEDICINE

## 2022-09-19 RX ORDER — CHOLECALCIFEROL (VITAMIN D3) 10 MCG
1 TABLET ORAL
Status: DISCONTINUED | OUTPATIENT
Start: 2022-09-19 | End: 2022-09-23 | Stop reason: HOSPADM

## 2022-09-19 RX ORDER — TORSEMIDE 100 MG/1
100 TABLET ORAL
Status: DISCONTINUED | OUTPATIENT
Start: 2022-09-21 | End: 2022-09-23 | Stop reason: HOSPADM

## 2022-09-19 RX ADMIN — HYDRALAZINE HYDROCHLORIDE 50 MG: 25 TABLET, FILM COATED ORAL at 11:51

## 2022-09-19 RX ADMIN — FLUOXETINE 20 MG: 20 CAPSULE ORAL at 11:52

## 2022-09-19 RX ADMIN — DOXAZOSIN 2 MG: 2 TABLET ORAL at 11:52

## 2022-09-19 RX ADMIN — AMLODIPINE BESYLATE 10 MG: 10 TABLET ORAL at 11:51

## 2022-09-19 RX ADMIN — HEPARIN SODIUM 7500 UNITS: 5000 INJECTION INTRAVENOUS; SUBCUTANEOUS at 05:49

## 2022-09-19 RX ADMIN — PANTOPRAZOLE SODIUM 40 MG: 40 TABLET, DELAYED RELEASE ORAL at 15:49

## 2022-09-19 RX ADMIN — INSULIN LISPRO 2 UNITS: 100 INJECTION, SOLUTION INTRAVENOUS; SUBCUTANEOUS at 11:52

## 2022-09-19 RX ADMIN — INSULIN LISPRO 5 UNITS: 100 INJECTION, SOLUTION INTRAVENOUS; SUBCUTANEOUS at 11:52

## 2022-09-19 RX ADMIN — LEVOTHYROXINE SODIUM 125 MCG: 25 TABLET ORAL at 05:49

## 2022-09-19 RX ADMIN — HYDROXYZINE HYDROCHLORIDE 25 MG: 25 TABLET ORAL at 03:12

## 2022-09-19 RX ADMIN — INSULIN LISPRO 5 UNITS: 100 INJECTION, SOLUTION INTRAVENOUS; SUBCUTANEOUS at 16:52

## 2022-09-19 RX ADMIN — HYDRALAZINE HYDROCHLORIDE 50 MG: 25 TABLET, FILM COATED ORAL at 15:49

## 2022-09-19 RX ADMIN — HYDRALAZINE HYDROCHLORIDE 50 MG: 25 TABLET, FILM COATED ORAL at 21:33

## 2022-09-19 RX ADMIN — HEPARIN SODIUM 7500 UNITS: 5000 INJECTION INTRAVENOUS; SUBCUTANEOUS at 14:18

## 2022-09-19 RX ADMIN — INSULIN GLARGINE 25 UNITS: 100 INJECTION, SOLUTION SUBCUTANEOUS at 21:33

## 2022-09-19 RX ADMIN — Medication 1 CAPSULE: at 15:49

## 2022-09-19 RX ADMIN — INSULIN LISPRO 2 UNITS: 100 INJECTION, SOLUTION INTRAVENOUS; SUBCUTANEOUS at 21:34

## 2022-09-19 RX ADMIN — CARVEDILOL 25 MG: 25 TABLET, FILM COATED ORAL at 15:49

## 2022-09-19 RX ADMIN — INSULIN LISPRO 4 UNITS: 100 INJECTION, SOLUTION INTRAVENOUS; SUBCUTANEOUS at 16:52

## 2022-09-19 RX ADMIN — INSULIN LISPRO 2 UNITS: 100 INJECTION, SOLUTION INTRAVENOUS; SUBCUTANEOUS at 07:44

## 2022-09-19 RX ADMIN — INSULIN LISPRO 5 UNITS: 100 INJECTION, SOLUTION INTRAVENOUS; SUBCUTANEOUS at 07:43

## 2022-09-19 RX ADMIN — HEPARIN SODIUM 7500 UNITS: 5000 INJECTION INTRAVENOUS; SUBCUTANEOUS at 21:33

## 2022-09-19 RX ADMIN — ACETAMINOPHEN 325MG 650 MG: 325 TABLET ORAL at 22:38

## 2022-09-19 NOTE — PROGRESS NOTES
[address] 43  Progress Note - Benji Castano 1980, 38 y o  male MRN: [id number]  Unit/Bed#: -01 Encounter: [id number]  Primary Care Provider: Aimee Dubon MD   Date and time admitted to hospital: 9/9/2022 11:40 AM    * Acute respiratory failure with hypoxia ([address] )  Assessment & Plan  · Found to have oxygen saturation of 87% in ER, was utilizing 5 L nasal cannula  · X-ray revealed findings suggestive of fluid overload and possible pneumonia  ·    · Echocardiogram shows LVEF of 50 to 55%; trivial small pericardial effusion  · Acute respiratory failure is due to suspected volume overload  · Now resolved- patient currently on RA  · Nephrology input appreciated  · Patient initiated on hemodialysis- tunneled dialysis catheter placed 9/16/2022  · Medically stable for discharge pending HD chair set up per     Chronic kidney disease, stage 4 (severe) [facility])  Assessment & Plan  Lab Results   Component Value Date    EGFR 11 09/19/2022    EGFR 11 09/17/2022    EGFR 11 09/16/2022    CREATININE 5 58 (H) 09/19/2022    CREATININE 5 56 (H) 09/17/2022    CREATININE 5 46 (H) 09/16/2022     · Baseline Cr around 4 0mg/dL  follows with nephrology outpatient  The patient underwent renal biopsy which indicates nodular diabetic glomerulosclerosis, 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  · Presented with STEFANIA Cr 4 38mg/dL suspect to be due to acute tubular necrosis  · Renal function continues to worsen  Initiated on HD on 9/17 with tunneled dialysis catheter placed on 9/16    · Continue Torsemide 100 mg po on non HD days  · Continue HD per nephrology       Type 2 diabetes mellitus with stage 4 chronic kidney disease and hypertension [facility])  Assessment & Plan  Lab Results   Component Value Date    HGBA1C 9 1 (H) 07/16/2022       Recent Labs     09/18/22  1612 09/18/22  2116 09/19/22  0711 09/19/22  1140   POCGLU 142* 236* 158* 151*       Blood Sugar Average: Last 72 hrs:  (P) 191 5     · Lantus increased to 25 units at bedtime  · Continue lispro 5 units with meals  · Fingerstick blood sugar with sliding scale coverage 4 times daily with meals and at bedtime  · Carb controlled diet    Chest pain  Assessment & Plan  · Patient reported right sided chest pressure   · Troponin 17- 16  · No additional chest pain- patient stated he thought it may be anxiety   · Monitor for chest pain    Essential hypertension  Assessment & Plan  · Patient states he was out of his antihypertensives  · Continue home amlodipine, carvedilol, hydralazine   · Torsemide 100 mg daily on non dialysis days      Gastroesophageal reflux disease without esophagitis  Assessment & Plan  · Continue home Protonix     Acquired hypothyroidism  Assessment & Plan  · Continue home levothyroxine          VTE Pharmacologic Prophylaxis: VTE Score: 5 High Risk (Score >/= 5) - Pharmacological DVT Prophylaxis Ordered: heparin  Sequential Compression Devices Ordered  Patient Centered Rounds: I performed bedside rounds with nursing staff today  Discussions with Specialists or Other Care Team Provider:  Case Management, nursing    Education and Discussions with Family / Patient: Patient declined call to   Time Spent for Care: 30 minutes  More than 50% of total time spent on counseling and coordination of care as described above  Current Length of Stay: 9 day(s)  Current Patient Status: Inpatient   Certification Statement: The patient will continue to require additional inpatient hospital stay due to Cm for HD chair set up  Discharge Plan: Pending HD chair set up    Code Status: Level 1 - Full Code    Subjective:   Patient seen and examined resting comfortably in bed, in no apparent distress  No acute events overnight  Offers no complaints      Objective:     Vitals:   Temp (24hrs), Av 8 °F (36 6 °C), Min:97 5 °F (36 4 °C), Max:98 °F (36 7 °C)    Temp:  [97 5 °F (36 4 °C)-98 °F (36 7 °C)] 97 5 °F (36 4 °C)  HR:  [69-77] 70  Resp:  [18-20] 18  BP: (125-160)/(67-99) 131/85  SpO2:  [90 %-95 %] 94 %  Body mass index is 58 93 kg/m²  Input and Output Summary (last 24 hours): Intake/Output Summary (Last 24 hours) at 9/19/2022 1310  Last data filed at 9/19/2022 1120  Gross per 24 hour   Intake 740 ml   Output 2450 ml   Net -1710 ml       Physical Exam:   Physical Exam  Vitals and nursing note reviewed  Constitutional:       General: He is not in acute distress  Appearance: He is obese  He is not toxic-appearing  HENT:      Head: Normocephalic and atraumatic  Mouth/Throat:      Mouth: Mucous membranes are moist    Eyes:      Conjunctiva/sclera: Conjunctivae normal    Cardiovascular:      Rate and Rhythm: Normal rate and regular rhythm  Pulses: Normal pulses  Heart sounds: Normal heart sounds  Pulmonary:      Effort: Pulmonary effort is normal  No respiratory distress  Breath sounds: No wheezing, rhonchi or rales  Comments: Decreased breath sounds secondary to body habitus  Abdominal:      General: Bowel sounds are normal  There is no distension  Palpations: Abdomen is soft  Tenderness: There is no abdominal tenderness  Musculoskeletal:      Cervical back: Neck supple  Right lower leg: No edema  Left lower leg: No edema  Skin:     General: Skin is warm and dry  Neurological:      Mental Status: He is alert and oriented to person, place, and time  Cranial Nerves: No cranial nerve deficit  Sensory: No sensory deficit  Motor: No weakness  Coordination: Coordination normal    Psychiatric:         Mood and Affect: Mood normal          Behavior: Behavior normal          Thought Content:  Thought content normal           Additional Data:     Labs:  Results from last 7 days   Lab Units 09/19/22  0442   WBC Thousand/uL 9 69   HEMOGLOBIN g/dL 9 5*   HEMATOCRIT % 29 8*   PLATELETS Thousands/uL 343   NEUTROS PCT % 68   LYMPHS PCT % 17   MONOS PCT % 8   EOS PCT % 4     Results from last 7 days   Lab Units 09/19/22  0442   SODIUM mmol/L 132*   POTASSIUM mmol/L 3 9   CHLORIDE mmol/L 97   CO2 mmol/L 27   BUN mg/dL 74*   CREATININE mg/dL 5 58*   ANION GAP mmol/L 8   CALCIUM mg/dL 9 1   ALBUMIN g/dL 3 2*   TOTAL BILIRUBIN mg/dL 0 28   ALK PHOS U/L 69   ALT U/L 7   AST U/L 22   GLUCOSE RANDOM mg/dL 212*         Results from last 7 days   Lab Units 09/19/22  1140 09/19/22  0711 09/18/22  2116 09/18/22  1612 09/18/22  1103 09/18/22  0658 09/17/22  2109 09/17/22  1556 09/17/22  1118 09/17/22  0649 09/16/22  2050 09/16/22  1608   POC GLUCOSE mg/dl 151* 158* 236* 142* 227* 190* 243* 212* 115 125 264* 177*               Lines/Drains:  Invasive Devices  Report    Peripheral Intravenous Line  Duration           Peripheral IV 09/16/22 Left Antecubital 2 days          Hemodialysis Catheter  Duration           HD Permanent Double Catheter 2 days                      Imaging: Reviewed radiology reports from this admission including: chest xray    Recent Cultures (last 7 days):         Last 24 Hours Medication List:   Current Facility-Administered Medications   Medication Dose Route Frequency Provider Last Rate    acetaminophen  650 mg Oral Q6H PRN Nina Wisdom PA-C      albuterol  2 puff Inhalation Q4H PRN Nohemi Pinedo, DO      amLODIPine  10 mg Oral Daily Farley Clear, CRNP      carvedilol  25 mg Oral BID With Meals Nohemi Pinedo, DO      doxazosin  2 mg Oral Daily Nohemi Pinedo, DO      FLUoxetine  20 mg Oral QAM Nohemi Pinedo, DO      glycerin-hypromellose-  2 drop Both Eyes Q3H PRN DOREEN Ortega      heparin (porcine)  3,000 Units Intravenous Once [employer], DO      heparin (porcine)  7,500 Units Subcutaneous [facility] Nohemi Pinedo, DO      hydrALAZINE  50 mg Oral TID Farley Clear, CRNP      hydrOXYzine HCL  25 mg Oral Q6H PRN Nina Wisdom PA-C      insulin glargine  25 Units Subcutaneous HS Cory Boas, PA-C      insulin lispro  2-12 Units Subcutaneous TID AC Milo Rosales DO      insulin lispro  2-12 Units Subcutaneous HS Elbert Larson DO      insulin lispro  5 Units Subcutaneous TID With Meals Elbert Larson DO      levothyroxine  125 mcg Oral Early Morning Elbert Larson DO      ondansetron  4 mg Intravenous Q4H PRN Antonina Lopez PA-C      pantoprazole  40 mg Oral BID AC Elbert Larson DO      [START ON 9/21/2022] torsemide  100 mg Oral Once per day on Sun Mon Wed Fri Romulo Anderson DO          Today, Patient Was Seen By: Mook Gerber PA-C    **Please Note: This note may have been constructed using a voice recognition system  **

## 2022-09-19 NOTE — ASSESSMENT & PLAN NOTE
· Found to have oxygen saturation of 87% in ER, was utilizing 5 L nasal cannula  · X-ray revealed findings suggestive of fluid overload and possible pneumonia  ·    · Echocardiogram shows LVEF of 50 to 55%; trivial small pericardial effusion  · Acute respiratory failure is due to suspected volume overload  · Now resolved- patient currently on RA  · Nephrology input appreciated  · Patient initiated on hemodialysis- tunneled dialysis catheter placed 9/16/2022  · Medically stable for discharge pending HD chair set up per cm

## 2022-09-19 NOTE — PROGRESS NOTES
Progress Note - Nephrology   Omari Holland 38 y o  male MRN: [id number]  Unit/Bed#: -01 Encounter: [id number]    A/P:  1  End-stage renal disease               Patient just finished hemodialysis session, just under 2 L ultrafiltration during today's treatment  Will continue with finishing his daily dialysis known to achieve 4 hour treatment goal which will be tomorrow, September 20th  After that will likely keep the patient on has a Tuesday Thursday Saturday treatment schedule until outpatient arrangements are able to be made  2  Diabetic nephropathy  3  Hypertensive nephrosclerosis  4  Nephrotic syndrome secondary to diabetic nephropathy  5  Volume overload                   continue with diuretics on nondialysis days, will continue ultrafilter as tolerated  6  Iron deficiency anemia                   continue Venofer 200 mg daily  7  Anemia due to end-stage renal disease   Continue to hold short-acting GLORIA at this time, patient most likely be accepted at the [facility] which uses a long-acting GLORIA      Follow up reason for today's visit:  End-stage renal disease/diabetic nephropathy/volume overload    Acute respiratory failure with hypoxia [facility])    Patient Active Problem List   Diagnosis    Abdominal pain    OCD (obsessive compulsive disorder)    Schizoaffective disorder, depressive type ([address] )    Acquired hypothyroidism    Morbid obesity with BMI of 50 0-59 9, adult (HCC)    Anxiety    Episodic confusion    Snoring    Insomnia    Hypersomnia    Vitamin D deficiency    Vomiting    Occipital neuralgia of left side    Headache    Nodule of parotid gland    Bipolar 1 disorder [facility]    Depression    Type 1 diabetes mellitus without complication ([address] )    Urinary retention    Peripheral edema    Hyperlipidemia, mixed    Migraine without aura and without status migrainosus, not intractable    Class 3 severe obesity due to excess calories with serious comorbidity and body mass index (BMI) of 60 0 to 69 9 in adult [facility])    Spinal stenosis in cervical region    Difficulty urinating    Urinary tract infection without hematuria    Penile pain    Chronic migraine without aura without status migrainosus, not intractable    Hypertensive emergency    Encephalopathy    Leukocytosis    Schizo affective schizophrenia ([place] [address] )    Acute respiratory failure with hypoxia ([place] [address] )    STEFANIA (acute kidney injury) ([place] [address] )    Acute respiratory disease due to COVID-19 virus    Elevated troponin    Type 2 diabetes mellitus with stage 4 chronic kidney disease and hypertension ([place] [address] )    Family history of heart disease    Hypokalemia    Chest pressure    Gastroesophageal reflux disease without esophagitis    Essential hypertension    SOB (shortness of breath)    Volume overload    Hyponatremia    Open toe wound    Primary hypertension    Diabetic ulcer of both feet ([place] [address] )    Nodular type diabetic glomerulosclerosis ([place] [address] )    Interstitial fibrosis present on renal biopsy    Arteriosclerosis of kidney    Chronic kidney disease, stage 4 (severe) (HCC)    Chest pain         Subjective:   No Acute events overnight, eating drinking well no nausea vomiting  Objective:     Vitals: Blood pressure 141/73, pulse 71, temperature 97 8 °F (36 6 °C), temperature source Tympanic, resp  rate 18, height 5' 2" (1 575 m), weight (!) 146 kg (322 lb 3 2 oz), SpO2 94 %  ,Body mass index is 58 93 kg/m²  Weight (last 2 days)     Date/Time Weight    09/19/22 0600 146 (322 2)    09/18/22 0554 146 (320 77)    09/17/22 0547 146 (322 31)    09/17/22 0500 146 (322 31)            Intake/Output Summary (Last 24 hours) at 9/19/2022 1132  Last data filed at 9/19/2022 0830  Gross per 24 hour   Intake 440 ml   Output --   Net 440 ml     I/O last 3 completed shifts:   In: 56 [P O :480; I V :10]  Out: 800 [Urine:800]    HD Permanent Double Catheter (Active)   Reasons to continue HD Cath Treatment Therapy 09/19/22 0830   Goal for Removal N/A- chronic HD catheter 09/19/22 0830   Line Necessity Reviewed Yes, reviewed with provider 09/19/22 0830   Site Assessment WDL 09/19/22 0830   Proximal Lumen Status Blood return noted;Cap changed; Flushed 09/19/22 0830   Distal Lumen Status Blood return noted;Capped;Flushed 09/19/22 0830   Dressing Type Chlorhexidine dressing 09/19/22 0830   Dressing Status Clean;Dry; Intact 09/19/22 0830   Dressing Change Due 09/23/22 09/19/22 0830       Physical Exam: /73   Pulse 71   Temp 97 8 °F (36 6 °C) (Tympanic)   Resp 18   Ht 5' 2" (1 575 m)   Wt (!) 146 kg (322 lb 3 2 oz)   SpO2 94%   BMI 58 93 kg/m²     General Appearance:    Alert, cooperative, no distress, appears stated age   Head:    Normocephalic, without obvious abnormality, atraumatic   Eyes:    Conjunctiva/corneas clear   Ears:    Normal external ears   Nose:   Nares normal, septum midline, mucosa normal, no drainage    or sinus tenderness   Throat:   Lips, mucosa, and tongue normal; teeth and gums normal   Neck:   Supple, symmetrical, trachea midline, no adenopathy;        thyroid:  No enlargement/tenderness/nodules; no carotid    bruit or JVD   Back:     Symmetric, no curvature, ROM normal, no CVA tenderness   Lungs:     Clear to auscultation bilaterally, respirations unlabored   Chest wall:    No tenderness or deformity   Heart:    Regular rate and rhythm, S1 and S2 normal, no murmur, rub   or gallop   Abdomen:     Soft, non-tender, bowel sounds active   Extremities:   Extremities normal, atraumatic, no cyanosis, mild bilateral lower extremity   Skin:   Skin color, texture, turgor normal, no rashes or lesions   Lymph nodes:   Cervical normal   Neurologic:   CNII-XII intact            Lab, Imaging and other studies: I have personally reviewed pertinent labs    CBC:   Lab Results   Component Value Date    WBC 9 69 09/19/2022    HGB 9 5 (L) 09/19/2022    HCT 29 8 (L) 09/19/2022    MCV 85 09/19/2022     09/19/2022    MCH 27 1 09/19/2022    MCHC 31 9 09/19/2022    RDW 13 9 09/19/2022    MPV 9 7 09/19/2022    NRBC 0 09/19/2022     CMP:   Lab Results   Component Value Date    K 3 9 09/19/2022    CL 97 09/19/2022    CO2 27 09/19/2022    BUN 74 (H) 09/19/2022    CREATININE 5 58 (H) 09/19/2022    CALCIUM 9 1 09/19/2022    AST 22 09/19/2022    ALT 7 09/19/2022    ALKPHOS 69 09/19/2022    EGFR 11 09/19/2022         Results from last 7 days   Lab Units 09/19/22  0442 09/17/22  0549 09/16/22  0443   POTASSIUM mmol/L 3 9 3 6 3 8   CHLORIDE mmol/L 97 97 97   CO2 mmol/L 27 31 29   BUN mg/dL 74* 79* 81*   CREATININE mg/dL 5 58* 5 56* 5 46*   CALCIUM mg/dL 9 1 9 1 9 0   ALK PHOS U/L 69  --   --    ALT U/L 7  --   --    AST U/L 22  --   --          Phosphorus:   Lab Results   Component Value Date    PHOS 5 1 (H) 09/19/2022     Magnesium: No results found for: MG  Urinalysis: No results found for: COLORU, CLARITYU, SPECGRAV, PHUR, LEUKOCYTESUR, NITRITE, PROTEINUA, GLUCOSEU, KETONESU, BILIRUBINUR, BLOODU  Ionized Calcium: No results found for: CAION  Coagulation: No results found for: PT, INR, APTT  Troponin: No results found for: TROPONINI  ABG: No results found for: PHART, FFT8BWW, PO2ART, VIG2MTS, K3SVTPCO, BEART, SOURCE  Radiology review:     IMAGING  Procedure: IR tunneled dialysis catheter placement    Result Date: 9/16/2022  Narrative: Tunneled Dialysis Catheter Placement 9/16/2022 History: Worsening renal failure requiring hemodialysis Contrast: None Fluoroscopy time: 0 4 minutes Number of images: 6 Radiation dose: 8 mGy Conscious sedation time: 25 minutes, 1 mg versed, 50 mcg fentanyl Procedure: The patient was identified verbally and by wristband  Timeout was performed  Informed consent was obtained  All elements of maximal sterile barrier technique were followed (cap, mask, sterile gown, sterile gloves, large sterile sheet, hand hygiene and 2% chlorhexidine for cutaneous antisepsis)   Following obtaining informed consent, the patient was prepped and draped in the usual sterile fashion  Using ultrasound guidance, access was gained to the patient's right internal jugular vein using a micropuncture system  The micropuncture wire and inner dilator were removed and a 0 035 wire was advanced to the level of the inferior vena cava using fluoroscopic guidance  The right anterior chest wall was anesthetized with 1% lidocaine  An 11 blade scalpel was to used to make a small dermatotomy  The catheter was tunneled from the dermatotomy to the venotomy  The micropuncture dilator was exchanged for peel-away sheath over the 0 035 wire  The 15-Mohawk by 32 cm tunneled dialysis catheter was placed through the peel-away sheath  The patient tolerated the procedure well without apparent immediate complication  The patient left the IR department in unchanged condition  Dr Elysia Wesley performed and directly supervised the entire procedure  Findings: Using ultrasound guidance, the right internal jugular vein was cannulated using Seldinger technique  The right internal jugular vein was evaluated as a potential access site  The right internal jugular vein was patent, and free of thrombus  Static images of the vessel was obtained  Visualization of real time needle entry into the vessel was obtained  Fluoroscopic spot image demonstrates a newly placed tunneled dialysis catheter via the right internal jugular vein with its central aspect within the right atrium  The catheter tubing is smooth in contour  The catheter flushed with ease and was subsequently secured in place  Impression: Impression: Successful placement of a 32 cm tunneled dialysis catheter via the right internal jugular vein   Workstation performed: [id number]       Current Facility-Administered Medications   Medication Dose Route Frequency    acetaminophen (TYLENOL) tablet 650 mg  650 mg Oral Q6H PRN    albuterol (PROVENTIL HFA,VENTOLIN HFA) inhaler 2 puff  2 puff Inhalation Q4H PRN    amLODIPine (NORVASC) tablet 10 mg  10 mg Oral Daily    carvedilol (COREG) tablet 25 mg  25 mg Oral BID With Meals    doxazosin (CARDURA) tablet 2 mg  2 mg Oral Daily    FLUoxetine (PROzac) capsule 20 mg  20 mg Oral QAM    glycerin-hypromellose- (ARTIFICIAL TEARS) ophthalmic solution 2 drop  2 drop Both Eyes Q3H PRN    heparin (porcine) injection 3,000 Units  3,000 Units Intravenous Once    heparin (porcine) subcutaneous injection 7,500 Units  7,500 Units Subcutaneous Q8H [address]    hydrALAZINE (APRESOLINE) tablet 50 mg  50 mg Oral TID    hydrOXYzine HCL (ATARAX) tablet 25 mg  25 mg Oral Q6H PRN    insulin glargine (LANTUS) subcutaneous injection 25 Units 0 25 mL  25 Units Subcutaneous HS    insulin lispro (HumaLOG) 100 units/mL subcutaneous injection 2-12 Units  2-12 Units Subcutaneous TID AC    insulin lispro (HumaLOG) 100 units/mL subcutaneous injection 2-12 Units  2-12 Units Subcutaneous HS    insulin lispro (HumaLOG) 100 units/mL subcutaneous injection 5 Units  5 Units Subcutaneous TID With Meals    levothyroxine tablet 125 mcg  125 mcg Oral Early Morning    ondansetron (ZOFRAN) injection 4 mg  4 mg Intravenous Q4H PRN    pantoprazole (PROTONIX) EC tablet 40 mg  40 mg Oral BID AC    torsemide (DEMADEX) tablet 100 mg  100 mg Oral Once per day on Sun Tue Thu Sat     Medications Discontinued During This Encounter   Medication Reason    furosemide (LASIX) injection 40 mg     furosemide (LASIX) injection 80 mg     aluminum-magnesium hydroxide-simethicone (MYLANTA) oral suspension 30 mL     insulin glargine (LANTUS) subcutaneous injection 20 Units 0 2 mL     cefTRIAXone (ROCEPHIN) IVPB (premix in dextrose) 1,000 mg 50 mL     bumetanide (BUMEX) injection 3 mg     insulin glargine (LANTUS) subcutaneous injection 10 Units 0 1 mL     epoetin tammy (EPOGEN,PROCRIT) injection 5,000 Units     amLODIPine (NORVASC) tablet 10 mg     hydrALAZINE (APRESOLINE) tablet 50 mg     insulin glargine (LANTUS) subcutaneous injection 15 Units 0 15 mL     For information on Fall & Injury Prevention, visit [email or web address] insulin glargine (LANTUS) subcutaneous injection 20 Units 0 2 mL     bumetanide (BUMEX) injection 4 mg        Latrell Arriola, DO      This progress note was produced in part using a dictation device which may document imprecise wording from author's original intent

## 2022-09-19 NOTE — ASSESSMENT & PLAN NOTE
Lab Results   Component Value Date    HGBA1C 9 1 (H) 07/16/2022       Recent Labs     09/18/22  1612 09/18/22  2116 09/19/22  0711 09/19/22  1140   POCGLU 142* 236* 158* 151*       Blood Sugar Average: Last 72 hrs:  (P) 191 5     · Lantus increased to 25 units at bedtime  · Continue lispro 5 units with meals  · Fingerstick blood sugar with sliding scale coverage 4 times daily with meals and at bedtime  · Carb controlled diet

## 2022-09-19 NOTE — CASE MANAGEMENT
Case Management Discharge Planning Note    Patient name Umang Dominique /-76 MRN [id number]  : 1980 Date 2022       Current Admission Date: 2022  Current Admission Diagnosis:Acute respiratory failure with hypoxia [facility])   Patient Active Problem List    Diagnosis Date Noted    Chest pain 2022    Chronic kidney disease, stage 4 (severe) ([facility] ) 2022    Nodular type diabetic glomerulosclerosis ([place] [address] ) 2022    Interstitial fibrosis present on renal biopsy 2022    Arteriosclerosis of kidney 2022    Diabetic ulcer of both feet ([facility] ) 2022    Primary hypertension 2022    Open toe wound 2022    Volume overload 2022    Hyponatremia 2022    Essential hypertension 2021    SOB (shortness of breath) 2021    Gastroesophageal reflux disease without esophagitis 2021    Family history of heart disease 2021    Hypokalemia 2021    Chest pressure 2021    Elevated troponin 2021    Type 2 diabetes mellitus with stage 4 chronic kidney disease and hypertension ([facility] ) 2021    Acute respiratory disease due to COVID-19 virus 2021    Acute respiratory failure with hypoxia ([facility] ) 2020    STEFANIA (acute kidney injury) ([facility] ) 2020    Schizo affective schizophrenia ([place] [address] ) 10/25/2020    Hypertensive emergency 2020    Encephalopathy 2020    Leukocytosis 2020    Chronic migraine without aura without status migrainosus, not intractable 2019    Difficulty urinating 2019    Urinary tract infection without hematuria 2019    Penile pain 2019    Spinal stenosis in cervical region 2019    Class 3 severe obesity due to excess calories with serious comorbidity and body mass index (BMI) of 60 0 to 69 9 in adult [facility]) 2019    Migraine without aura and without status migrainosus, not intractable 2019    Peripheral edema 06/07/2019    Hyperlipidemia, mixed 06/07/2019    Bipolar 1 disorder ([facility] ) 05/30/2019    Depression 05/30/2019    Type 1 diabetes mellitus without complication ([facility] ) 14/28/4922    Urinary retention 05/30/2019    Occipital neuralgia of left side 02/15/2019    Headache 02/15/2019    Nodule of parotid gland 02/15/2019    Snoring 12/04/2018    Insomnia 12/04/2018    Hypersomnia 12/04/2018    Vitamin D deficiency 12/04/2018    Episodic confusion     OCD (obsessive compulsive disorder) 10/31/2018    Schizoaffective disorder, depressive type ([facility] ) 10/31/2018    Acquired hypothyroidism 10/31/2018    Morbid obesity with BMI of 50 0-59 9, adult ([facility] ) 10/31/2018    Anxiety 10/31/2018    Abdominal pain 05/12/2018    Vomiting 05/12/2018      LOS (days): 9  Geometric Mean LOS (GMLOS) (days): 4 70  Days to GMLOS:-4 2     OBJECTIVE:  Risk of Unplanned Readmission Score: 35 32     Current admission status: Inpatient   Preferred Pharmacy:   [address] [place] [address] [id number] [address]  [address] [place] [address]  Phone: [phone] Fax: [address] [place] [address] [place] [address]  Phone: [phone] Fax: [phone]    Primary Care Provider: Víctor Topete MD    Primary Insurance: [address] [place] [address]  Secondary Insurance:     DISCHARGE DETAILS:  Faxed IR progress and dialysis notes to [facility] at [phone]  TC to [facility] at [phone], left a VM with Lilliana at [phone] the pt CM asking for a return call

## 2022-09-19 NOTE — PLAN OF CARE
Problem: PAIN - ADULT  Goal: Verbalizes/displays adequate comfort level or baseline comfort level  Description: Interventions:  - Encourage patient to monitor pain and request assistance  - Assess pain using appropriate pain scale  - Administer analgesics based on type and severity of pain and evaluate response  - Implement non-pharmacological measures as appropriate and evaluate response  - Consider cultural and social influences on pain and pain management  - Notify physician/advanced practitioner if interventions unsuccessful or patient reports new pain  9/19/2022 0749 by Terrell Alexandra RN  Outcome: Progressing  9/19/2022 0749 by Terrell Alexandra RN  Outcome: Progressing     Problem: INFECTION - ADULT  Goal: Absence or prevention of progression during hospitalization  Description: INTERVENTIONS:  - Assess and monitor for signs and symptoms of infection  - Monitor lab/diagnostic results  - Monitor all insertion sites, i e  indwelling lines, tubes, and drains  - Monitor endotracheal if appropriate and nasal secretions for changes in amount and color  - Holmes appropriate cooling/warming therapies per order  - Administer medications as ordered  - Instruct and encourage patient and family to use good hand hygiene technique  - Identify and instruct in appropriate isolation precautions for identified infection/condition  9/19/2022 0749 by Terrell Alexandra RN  Outcome: Progressing  9/19/2022 0749 by Terrell Alexandra RN  Outcome: Progressing  Goal: Absence of fever/infection during neutropenic period  Description: INTERVENTIONS:  - Monitor WBC    9/19/2022 0749 by Terrell Alexandra RN  Outcome: Progressing  9/19/2022 0749 by Terrell Alexandra RN  Outcome: Progressing     Problem: SAFETY ADULT  Goal: Patient will remain free of falls  Description: INTERVENTIONS:  - Educate patient/family on patient safety including physical limitations  - Instruct patient to call for assistance with activity   - Consult OT/PT to assist with strengthening/mobility   - Keep Call bell within reach  - Keep bed low and locked with side rails adjusted as appropriate  - Keep care items and personal belongings within reach  - Initiate and maintain comfort rounds  - Make Fall Risk Sign visible to staff  - Offer Toileting every 2 Hours, in advance of need  - Initiate/Maintain bed alarm  - Obtain necessary fall risk management equipment  - Apply yellow socks and bracelet for high fall risk patients  - Consider moving patient to room near nurses station  9/19/2022 0749 by Joe Castillo RN  Outcome: Progressing  9/19/2022 0749 by Joe Castillo RN  Outcome: Progressing  Goal: Maintain or return to baseline ADL function  Description: INTERVENTIONS:  -  Assess patient's ability to carry out ADLs; assess patient's baseline for ADL function and identify physical deficits which impact ability to perform ADLs (bathing, care of mouth/teeth, toileting, grooming, dressing, etc )  - Assess/evaluate cause of self-care deficits   - Assess range of motion  - Assess patient's mobility; develop plan if impaired  - Assess patient's need for assistive devices and provide as appropriate  - Encourage maximum independence but intervene and supervise when necessary  - Involve family in performance of ADLs  - Assess for home care needs following discharge   - Consider OT consult to assist with ADL evaluation and planning for discharge  - Provide patient education as appropriate  9/19/2022 0749 by Joe Castillo RN  Outcome: Progressing  9/19/2022 0749 by Joe Castillo RN  Outcome: Progressing  Goal: Maintains/Returns to pre admission functional level  Description: INTERVENTIONS:  - Perform BMAT or MOVE assessment daily    - Set and communicate daily mobility goal to care team and patient/family/caregiver  - Collaborate with rehabilitation services on mobility goals if consulted  - Perform Range of Motion 2 times a day  - Reposition patient every 2 hours    - Dangle patient 2 times a day  - Stand patient 2 times a day  - Ambulate patient 2 times a day  - Out of bed to chair 3 times a day   - Out of bed for meals 3 times a day  - Out of bed for toileting  - Record patient progress and toleration of activity level   9/19/2022 0749 by Bandar Castillo RN  Outcome: Progressing  9/19/2022 0749 by Bandar Castillo RN  Outcome: Progressing     Problem: DISCHARGE PLANNING  Goal: Discharge to home or other facility with appropriate resources  Description: INTERVENTIONS:  - Identify barriers to discharge w/patient and caregiver  - Arrange for needed discharge resources and transportation as appropriate  - Identify discharge learning needs (meds, wound care, etc )  - Refer to Case Management Department for coordinating discharge planning if the patient needs post-hospital services based on physician/advanced practitioner order or complex needs related to functional status, cognitive ability, or social support system  9/19/2022 0749 by Bandar Castillo RN  Outcome: Progressing  9/19/2022 0749 by Bandar Castillo RN  Outcome: Progressing     Problem: Knowledge Deficit  Goal: Patient/family/caregiver demonstrates understanding of disease process, treatment plan, medications, and discharge instructions  Description: Complete learning assessment and assess knowledge base    Interventions:  - Provide teaching at level of understanding  - Provide teaching via preferred learning methods  9/19/2022 0749 by Bandar Castillo RN  Outcome: Progressing  9/19/2022 0749 by Bandar Castillo RN  Outcome: Progressing     Problem: RESPIRATORY - ADULT  Goal: Achieves optimal ventilation and oxygenation  Description: INTERVENTIONS:  - Assess for changes in respiratory status  - Assess for changes in mentation and behavior  - Position to facilitate oxygenation and minimize respiratory effort  - Oxygen administered by appropriate delivery if ordered  - Initiate smoking cessation education as indicated  - Encourage broncho-pulmonary hygiene including cough, deep breathe, Incentive Spirometry  - Assess the need for suctioning and aspirate as needed  - Assess and instruct to report SOB or any respiratory difficulty  - Respiratory Therapy support as indicated  9/19/2022 0749 by Trista Palm RN  Outcome: Progressing  9/19/2022 0749 by Trista Palm RN  Outcome: Progressing     Problem: Prexisting or High Potential for Compromised Skin Integrity  Goal: Skin integrity is maintained or improved  Description: INTERVENTIONS:  - Identify patients at risk for skin breakdown  - Assess and monitor skin integrity  - Assess and monitor nutrition and hydration status  - Monitor labs   - Assess for incontinence   - Turn and reposition patient  - Assist with mobility/ambulation  - Relieve pressure over bony prominences  - Avoid friction and shearing  - Provide appropriate hygiene as needed including keeping skin clean and dry  - Evaluate need for skin moisturizer/barrier cream  - Collaborate with interdisciplinary team   - Patient/family teaching  - Consider wound care consult   9/19/2022 0749 by Trista Palm RN  Outcome: Progressing  9/19/2022 0749 by Trista Palm RN  Outcome: Progressing     Problem: Potential for Falls  Goal: Patient will remain free of falls  Description: INTERVENTIONS:  - Educate patient/family on patient safety including physical limitations  - Instruct patient to call for assistance with activity   - Consult OT/PT to assist with strengthening/mobility   - Keep Call bell within reach  - Keep bed low and locked with side rails adjusted as appropriate  - Keep care items and personal belongings within reach  - Initiate and maintain comfort rounds  - Make Fall Risk Sign visible to staff  - Offer Toileting every 2 Hours, in advance of need  - Initiate/Maintain bed alarm  - Obtain necessary fall risk management equipment  - Apply yellow socks and bracelet for high fall risk patients  - Consider moving patient to room near nurses station  9/19/2022 9968 by Candice Manning RN  Outcome: Progressing  9/19/2022 0749 by Candice Manning RN  Outcome: Progressing     Problem: METABOLIC, FLUID AND ELECTROLYTES - ADULT  Goal: Electrolytes maintained within normal limits  Description: INTERVENTIONS:  - Monitor labs and assess patient for signs and symptoms of electrolyte imbalances  - Administer electrolyte replacement as ordered  - Monitor response to electrolyte replacements, including repeat lab results as appropriate  - Instruct patient on fluid and nutrition as appropriate  9/19/2022 0749 by Candice Manning RN  Outcome: Progressing  9/19/2022 0749 by Candice Manning RN  Outcome: Progressing  Goal: Fluid balance maintained  Description: INTERVENTIONS:  - Monitor labs   - Monitor I/O and WT  - Instruct patient on fluid and nutrition as appropriate  - Assess for signs & symptoms of volume excess or deficit  9/19/2022 0749 by Candice Manning RN  Outcome: Progressing  9/19/2022 0749 by Candice Manning RN  Outcome: Progressing

## 2022-09-19 NOTE — UTILIZATION REVIEW
Continued Stay Review    Date: 9/19                          Current Patient Class: Inpatient   Current Level of Care: MEd/surg    HPI:42 y o  male initially admitted on 9/10     Assessment/Plan:   Hypoxia resolved  NOw on room air  Initiated on hemodialysis  Decreased breath sounds  HD dialysis catheter placed 9/16  First treatment 9/17  Case management working on safe dc plan for OP HD  Nephrology consult: Continue with hemodialysis, venofer  Vital Signs:   Date/Time Temp Pulse Resp BP MAP (mmHg) SpO2 O2 Device Patient Position - Orthostatic VS   09/19/22 14:44:41 97 8 °F (36 6 °C) 70 18 137/74 95 93 % None (Room air) Lying   09/19/22 1120 97 5 °F (36 4 °C) 70 18 131/85 100 -- -- --   09/19/22 1100 -- 71 18 141/73 95 -- -- --   09/19/22 1030 -- 71 18 145/96 111 -- -- --   09/19/22 1000 -- 71 18 137/91 106 -- -- --   09/19/22 0930 -- 72 18 147/97 112 -- -- --   09/19/22 0900 -- 72 18 152/99 115 -- -- --   09/19/22 0830 -- 72 18 160/97 117 -- -- --   09/19/22 0815 97 8 °F (36 6 °C) 77 18 148/82 103 -- -- Lying   09/19/22 07:13:47 98 °F (36 7 °C) 70 20 134/79 97 94 % -- --   09/18/22 21:41:24 97 9 °F (36 6 °C) 75 20 125/73 90 90 % None (Room air) Lying         Pertinent Labs/Diagnostic Results:   9/16 IR tunneled HD catheter:  Impression:     Impression: Successful placement of a 32 cm tunneled dialysis catheter via the right internal jugular vein              Results from last 7 days   Lab Units 09/19/22  0442 09/17/22  0549 09/15/22  0437 09/14/22  0435 09/13/22  0423   WBC Thousand/uL 9 69 9 40 9 27 8 85 10 42*   HEMOGLOBIN g/dL 9 5* 8 7* 8 4* 8 5* 8 0*   HEMATOCRIT % 29 8* 27 1* 26 0* 25 8* 24 2*   PLATELETS Thousands/uL 343 381 368 377 370   NEUTROS ABS Thousands/µL 6 63  --   --   --   --          Results from last 7 days   Lab Units 09/19/22  0442 09/17/22  0549 09/16/22  0443 09/15/22  0437 09/14/22  0435   SODIUM mmol/L 132* 138 136 138 138   POTASSIUM mmol/L 3 9 3 6 3 8 3 6 3 8   CHLORIDE mmol/L 97 97 97 98 100   CO2 mmol/L 27 31 29 29 28   ANION GAP mmol/L 8 10 10 11 10   BUN mg/dL 74* 79* 81* 80* 87*   CREATININE mg/dL 5 58* 5 56* 5 46* 4 98* 4 93*   EGFR ml/min/1 73sq m 11 11 11 13 13   CALCIUM mg/dL 9 1 9 1 9 0 9 0 9 0   PHOSPHORUS mg/dL 5 1*  --   --   --  4 9*     Results from last 7 days   Lab Units 09/19/22  0442   AST U/L 22   ALT U/L 7   ALK PHOS U/L 69   TOTAL PROTEIN g/dL 6 8   ALBUMIN g/dL 3 2*   TOTAL BILIRUBIN mg/dL 0 28     Results from last 7 days   Lab Units 09/19/22  1140 09/19/22  0711 09/18/22  2116 09/18/22  1612 09/18/22  1103 09/18/22  0658 09/17/22  2109 09/17/22  1556 09/17/22  1118 09/17/22  0649 09/16/22  2050 09/16/22  1608   POC GLUCOSE mg/dl 151* 158* 236* 142* 227* 190* 243* 212* 115 125 264* 177*     Results from last 7 days   Lab Units 09/19/22  0442 09/17/22  0549 09/16/22  0443 09/15/22  0437 09/14/22  0435 09/13/22  0423   GLUCOSE RANDOM mg/dL 212* 118 247* 171* 185* 160*       BETA-HYDROXYBUTYRATE   Date Value Ref Range Status   08/26/2022 0 0 <0 6 mmol/L Final        Results from last 7 days   Lab Units 09/14/22  0930   HS TNI 0HR ng/L 17       Results from last 7 days   Lab Units 09/17/22  0853 09/17/22  0549   HEP B S AG  Non-reactive Non-reactive   HEP C AB  Non-reactive Non-reactive   HEP B C IGM  Non-reactive Non-reactive   HEP B C TOTAL AB  Non-reactive  --              Medications:   Scheduled Medications:  amLODIPine, 10 mg, Oral, Daily  b complex-vitamin C-folic acid, 1 capsule, Oral, Daily With Dinner  carvedilol, 25 mg, Oral, BID With Meals  doxazosin, 2 mg, Oral, Daily  FLUoxetine, 20 mg, Oral, QAM  heparin (porcine), 3,000 Units, Intravenous, Once  heparin (porcine), 7,500 Units, Subcutaneous, Q8H SOFI  hydrALAZINE, 50 mg, Oral, TID  insulin glargine, 25 Units, Subcutaneous, HS  insulin lispro, 2-12 Units, Subcutaneous, TID AC  insulin lispro, 2-12 Units, Subcutaneous, HS  insulin lispro, 5 Units, Subcutaneous, TID With Meals  levothyroxine, 125 mcg, Oral, Early Morning  pantoprazole, 40 mg, Oral, BID AC  [START ON 9/21/2022] torsemide, 100 mg, Oral, Once per day on Sun Mon Wed Fri      Continuous IV Infusions:     PRN Meds:  acetaminophen, 650 mg, Oral, Q6H PRN  albuterol, 2 puff, Inhalation, Q4H PRN  glycerin-hypromellose-, 2 drop, Both Eyes, Q3H PRN  hydrOXYzine HCL, 25 mg, Oral, Q6H PRN  ondansetron, 4 mg, Intravenous, Q4H PRN        Discharge Plan: [facility]    Network Utilization Review Department  ATTENTION: Please call with any questions or concerns to [phone] and carefully listen to the prompts so that you are directed to the right person  All voicemails are confidential   Justine Fernando all requests for admission clinical reviews, approved or denied determinations and any other requests to dedicated fax number below belonging to the campus where the patient is receiving treatment   List of dedicated fax numbers for the Facilities:  [address] DENIALS (Administrative/Medical Necessity) [phone]   [address] (Maternity/NICU/Pediatrics) [phone]   [address] [place] [address]  [address] [place] [address] [address] [place] [address] [address] [facility] [phone] no chest pain and no edema.

## 2022-09-19 NOTE — PLAN OF CARE
Pt on HD treatment for 3 hours with a UF goal of 2 L as tolerated   Pt on a 4 k 2 5 alisia bath for a potasssium of 3 9 on 09/19/22  Problem: METABOLIC, FLUID AND ELECTROLYTES - ADULT  Goal: Electrolytes maintained within normal limits  Description: INTERVENTIONS:  - Monitor labs and assess patient for signs and symptoms of electrolyte imbalances  - Administer electrolyte replacement as ordered  - Monitor response to electrolyte replacements, including repeat lab results as appropriate  - Instruct patient on fluid and nutrition as appropriate  Outcome: Progressing  Goal: Fluid balance maintained  Description: INTERVENTIONS:  - Monitor labs   - Monitor I/O and WT  - Instruct patient on fluid and nutrition as appropriate  - Assess for signs & symptoms of volume excess or deficit  Outcome: Progressing

## 2022-09-19 NOTE — HEMODIALYSIS
Post-Dialysis RN Treatment Note    Blood Pressure:  Pre 148/82 mm/Hg  Post 131/85 mmHg   EDW  TBD kg    Weight:  Pre 146 5 kg   Post 144 7 kg   Mode of weight measurement: Bed Scale   Volume Removed  1950 ml    Treatment duration 200 minutes    NS given  No    Treatment shortened?  Yes, describe: 10 minutes early due to possible system clotting   Medications given during Rx None Reported   Estimated Kt/V  0 79   Access type: Permacath/TDC   Access Issues: No    Report called to primary nurse   Yes

## 2022-09-20 ENCOUNTER — APPOINTMENT (INPATIENT)
Dept: DIALYSIS | Facility: HOSPITAL | Age: 42
DRG: 470 | End: 2022-09-20
Attending: INTERNAL MEDICINE
Payer: COMMERCIAL

## 2022-09-20 LAB
ANION GAP SERPL CALCULATED.3IONS-SCNC: 9 MMOL/L (ref 4–13)
BUN SERPL-MCNC: 53 MG/DL (ref 5–25)
CALCIUM SERPL-MCNC: 8.7 MG/DL (ref 8.4–10.2)
CHLORIDE SERPL-SCNC: 99 MMOL/L (ref 96–108)
CO2 SERPL-SCNC: 23 MMOL/L (ref 21–32)
CREAT SERPL-MCNC: 5.11 MG/DL (ref 0.6–1.3)
ERYTHROCYTE [DISTWIDTH] IN BLOOD BY AUTOMATED COUNT: 14.1 % (ref 11.6–15.1)
GFR SERPL CREATININE-BSD FRML MDRD: 12 ML/MIN/1.73SQ M
GLUCOSE SERPL-MCNC: 134 MG/DL (ref 65–140)
GLUCOSE SERPL-MCNC: 137 MG/DL (ref 65–140)
GLUCOSE SERPL-MCNC: 145 MG/DL (ref 65–140)
GLUCOSE SERPL-MCNC: 173 MG/DL (ref 65–140)
GLUCOSE SERPL-MCNC: 188 MG/DL (ref 65–140)
HCT VFR BLD AUTO: 27.7 % (ref 36.5–49.3)
HGB BLD-MCNC: 9.1 G/DL (ref 12–17)
MCH RBC QN AUTO: 27.6 PG (ref 26.8–34.3)
MCHC RBC AUTO-ENTMCNC: 32.9 G/DL (ref 31.4–37.4)
MCV RBC AUTO: 84 FL (ref 82–98)
PLATELET # BLD AUTO: 284 THOUSANDS/UL (ref 149–390)
PMV BLD AUTO: 9.5 FL (ref 8.9–12.7)
POTASSIUM SERPL-SCNC: 4.2 MMOL/L (ref 3.5–5.3)
RBC # BLD AUTO: 3.3 MILLION/UL (ref 3.88–5.62)
SODIUM SERPL-SCNC: 131 MMOL/L (ref 135–147)
WBC # BLD AUTO: 9.69 THOUSAND/UL (ref 4.31–10.16)

## 2022-09-20 PROCEDURE — 85027 COMPLETE CBC AUTOMATED: CPT

## 2022-09-20 PROCEDURE — 99233 SBSQ HOSP IP/OBS HIGH 50: CPT

## 2022-09-20 PROCEDURE — 99232 SBSQ HOSP IP/OBS MODERATE 35: CPT | Performed by: INTERNAL MEDICINE

## 2022-09-20 PROCEDURE — 82948 REAGENT STRIP/BLOOD GLUCOSE: CPT

## 2022-09-20 PROCEDURE — 87081 CULTURE SCREEN ONLY: CPT

## 2022-09-20 PROCEDURE — 80048 BASIC METABOLIC PNL TOTAL CA: CPT

## 2022-09-20 RX ORDER — LOSARTAN POTASSIUM 50 MG/1
50 TABLET ORAL DAILY
Status: DISCONTINUED | OUTPATIENT
Start: 2022-09-21 | End: 2022-09-23 | Stop reason: HOSPADM

## 2022-09-20 RX ORDER — HEPARIN SODIUM 1000 [USP'U]/ML
6000 INJECTION, SOLUTION INTRAVENOUS; SUBCUTANEOUS 3 TIMES WEEKLY
Status: DISCONTINUED | OUTPATIENT
Start: 2022-09-21 | End: 2022-09-23

## 2022-09-20 RX ORDER — HEPARIN SODIUM 1000 [USP'U]/ML
4000 INJECTION, SOLUTION INTRAVENOUS; SUBCUTANEOUS ONCE
Status: DISCONTINUED | OUTPATIENT
Start: 2022-09-20 | End: 2022-09-20

## 2022-09-20 RX ADMIN — HEPARIN SODIUM 3000 UNITS: 1000 INJECTION INTRAVENOUS; SUBCUTANEOUS at 09:10

## 2022-09-20 RX ADMIN — INSULIN GLARGINE 25 UNITS: 100 INJECTION, SOLUTION SUBCUTANEOUS at 22:55

## 2022-09-20 RX ADMIN — SODIUM CHLORIDE 200 MG: 9 INJECTION, SOLUTION INTRAVENOUS at 11:00

## 2022-09-20 RX ADMIN — HYDROXYZINE HYDROCHLORIDE 25 MG: 25 TABLET ORAL at 22:55

## 2022-09-20 RX ADMIN — INSULIN LISPRO 2 UNITS: 100 INJECTION, SOLUTION INTRAVENOUS; SUBCUTANEOUS at 22:56

## 2022-09-20 RX ADMIN — INSULIN LISPRO 5 UNITS: 100 INJECTION, SOLUTION INTRAVENOUS; SUBCUTANEOUS at 07:46

## 2022-09-20 RX ADMIN — HYDROXYZINE HYDROCHLORIDE 25 MG: 25 TABLET ORAL at 00:32

## 2022-09-20 RX ADMIN — INSULIN LISPRO 5 UNITS: 100 INJECTION, SOLUTION INTRAVENOUS; SUBCUTANEOUS at 18:30

## 2022-09-20 RX ADMIN — AMLODIPINE BESYLATE 10 MG: 10 TABLET ORAL at 13:31

## 2022-09-20 RX ADMIN — FLUOXETINE 20 MG: 20 CAPSULE ORAL at 13:31

## 2022-09-20 RX ADMIN — PANTOPRAZOLE SODIUM 40 MG: 40 TABLET, DELAYED RELEASE ORAL at 15:29

## 2022-09-20 RX ADMIN — PANTOPRAZOLE SODIUM 40 MG: 40 TABLET, DELAYED RELEASE ORAL at 07:46

## 2022-09-20 RX ADMIN — HEPARIN SODIUM 7500 UNITS: 5000 INJECTION INTRAVENOUS; SUBCUTANEOUS at 22:56

## 2022-09-20 RX ADMIN — Medication 1 CAPSULE: at 15:30

## 2022-09-20 RX ADMIN — DOXAZOSIN 2 MG: 2 TABLET ORAL at 13:31

## 2022-09-20 RX ADMIN — INSULIN LISPRO 5 UNITS: 100 INJECTION, SOLUTION INTRAVENOUS; SUBCUTANEOUS at 13:32

## 2022-09-20 RX ADMIN — LEVOTHYROXINE SODIUM 125 MCG: 25 TABLET ORAL at 05:44

## 2022-09-20 RX ADMIN — ACETAMINOPHEN 325MG 650 MG: 325 TABLET ORAL at 23:11

## 2022-09-20 RX ADMIN — HEPARIN SODIUM 7500 UNITS: 5000 INJECTION INTRAVENOUS; SUBCUTANEOUS at 05:44

## 2022-09-20 RX ADMIN — HEPARIN SODIUM 7500 UNITS: 5000 INJECTION INTRAVENOUS; SUBCUTANEOUS at 15:26

## 2022-09-20 RX ADMIN — CARVEDILOL 25 MG: 25 TABLET, FILM COATED ORAL at 15:30

## 2022-09-20 RX ADMIN — INSULIN LISPRO 2 UNITS: 100 INJECTION, SOLUTION INTRAVENOUS; SUBCUTANEOUS at 18:31

## 2022-09-20 NOTE — PLAN OF CARE
Problem: PAIN - ADULT  Goal: Verbalizes/displays adequate comfort level or baseline comfort level  Description: Interventions:  - Encourage patient to monitor pain and request assistance  - Assess pain using appropriate pain scale  - Administer analgesics based on type and severity of pain and evaluate response  - Implement non-pharmacological measures as appropriate and evaluate response  - Consider cultural and social influences on pain and pain management  - Notify physician/advanced practitioner if interventions unsuccessful or patient reports new pain  Outcome: Progressing     Problem: INFECTION - ADULT  Goal: Absence or prevention of progression during hospitalization  Description: INTERVENTIONS:  - Assess and monitor for signs and symptoms of infection  - Monitor lab/diagnostic results  - Monitor all insertion sites, i e  indwelling lines, tubes, and drains  - Monitor endotracheal if appropriate and nasal secretions for changes in amount and color  - [place] appropriate cooling/warming therapies per order  - Administer medications as ordered  - Instruct and encourage patient and family to use good hand hygiene technique  - Identify and instruct in appropriate isolation precautions for identified infection/condition  Outcome: Progressing  Goal: Absence of fever/infection during neutropenic period  Description: INTERVENTIONS:  - Monitor WBC    Outcome: Progressing     Problem: SAFETY ADULT  Goal: Patient will remain free of falls  Description: INTERVENTIONS:  - Educate patient/family on patient safety including physical limitations  - Instruct patient to call for assistance with activity   - Consult OT/PT to assist with strengthening/mobility   - Keep Call bell within reach  - Keep bed low and locked with side rails adjusted as appropriate  - Keep care items and personal belongings within reach  - Initiate and maintain comfort rounds  - Make Fall Risk Sign visible to staff  - Offer Toileting every 2 Hours, in advance of need  - Initiate/Maintain bed alarm  - Obtain necessary fall risk management equipment  - Apply yellow socks and bracelet for high fall risk patients  - Consider moving patient to room near nurses station  Outcome: Progressing  Goal: Maintain or return to baseline ADL function  Description: INTERVENTIONS:  -  Assess patient's ability to carry out ADLs; assess patient's baseline for ADL function and identify physical deficits which impact ability to perform ADLs (bathing, care of mouth/teeth, toileting, grooming, dressing, etc )  - Assess/evaluate cause of self-care deficits   - Assess range of motion  - Assess patient's mobility; develop plan if impaired  - Assess patient's need for assistive devices and provide as appropriate  - Encourage maximum independence but intervene and supervise when necessary  - Involve family in performance of ADLs  - Assess for home care needs following discharge   - Consider OT consult to assist with ADL evaluation and planning for discharge  - Provide patient education as appropriate  Outcome: Progressing  Goal: Maintains/Returns to pre admission functional level  Description: INTERVENTIONS:  - Perform BMAT or MOVE assessment daily    - Set and communicate daily mobility goal to care team and patient/family/caregiver  - Collaborate with rehabilitation services on mobility goals if consulted  - Perform Range of Motion 2 times a day  - Reposition patient every 2 hours    - Dangle patient 2 times a day  - Stand patient 2 times a day  - Ambulate patient 2 times a day  - Out of bed to chair 3 times a day   - Out of bed for meals 3 times a day  - Out of bed for toileting  - Record patient progress and toleration of activity level   Outcome: Progressing     Problem: DISCHARGE PLANNING  Goal: Discharge to home or other facility with appropriate resources  Description: INTERVENTIONS:  - Identify barriers to discharge w/patient and caregiver  - Arrange for needed discharge resources and transportation as appropriate  - Identify discharge learning needs (meds, wound care, etc )  - Refer to Case Management Department for coordinating discharge planning if the patient needs post-hospital services based on physician/advanced practitioner order or complex needs related to functional status, cognitive ability, or social support system  Outcome: Progressing     Problem: Knowledge Deficit  Goal: Patient/family/caregiver demonstrates understanding of disease process, treatment plan, medications, and discharge instructions  Description: Complete learning assessment and assess knowledge base    Interventions:  - Provide teaching at level of understanding  - Provide teaching via preferred learning methods  Outcome: Progressing     Problem: RESPIRATORY - ADULT  Goal: Achieves optimal ventilation and oxygenation  Description: INTERVENTIONS:  - Assess for changes in respiratory status  - Assess for changes in mentation and behavior  - Position to facilitate oxygenation and minimize respiratory effort  - Oxygen administered by appropriate delivery if ordered  - Initiate smoking cessation education as indicated  - Encourage broncho-pulmonary hygiene including cough, deep breathe, Incentive Spirometry  - Assess the need for suctioning and aspirate as needed  - Assess and instruct to report SOB or any respiratory difficulty  - Respiratory Therapy support as indicated  Outcome: Progressing     Problem: Prexisting or High Potential for Compromised Skin Integrity  Goal: Skin integrity is maintained or improved  Description: INTERVENTIONS:  - Identify patients at risk for skin breakdown  - Assess and monitor skin integrity  - Assess and monitor nutrition and hydration status  - Monitor labs   - Assess for incontinence   - Turn and reposition patient  - Assist with mobility/ambulation  - Relieve pressure over bony prominences  - Avoid friction and shearing  - Provide appropriate hygiene as needed including keeping skin clean and dry  - Evaluate need for skin moisturizer/barrier cream  - Collaborate with interdisciplinary team   - Patient/family teaching  - Consider wound care consult   Outcome: Progressing     Problem: Potential for Falls  Goal: Patient will remain free of falls  Description: INTERVENTIONS:  - Educate patient/family on patient safety including physical limitations  - Instruct patient to call for assistance with activity   - Consult OT/PT to assist with strengthening/mobility   - Keep Call bell within reach  - Keep bed low and locked with side rails adjusted as appropriate  - Keep care items and personal belongings within reach  - Initiate and maintain comfort rounds  - Make Fall Risk Sign visible to staff  - Offer Toileting every 2 Hours, in advance of need  - Initiate/Maintain bed alarm  - Obtain necessary fall risk management equipment  - Apply yellow socks and bracelet for high fall risk patients  - Consider moving patient to room near nurses station  Outcome: Progressing     Problem: METABOLIC, FLUID AND ELECTROLYTES - ADULT  Goal: Electrolytes maintained within normal limits  Description: INTERVENTIONS:  - Monitor labs and assess patient for signs and symptoms of electrolyte imbalances  - Administer electrolyte replacement as ordered  - Monitor response to electrolyte replacements, including repeat lab results as appropriate  - Instruct patient on fluid and nutrition as appropriate  Outcome: Progressing  Goal: Fluid balance maintained  Description: INTERVENTIONS:  - Monitor labs   - Monitor I/O and WT  - Instruct patient on fluid and nutrition as appropriate  - Assess for signs & symptoms of volume excess or deficit  Outcome: Progressing     Problem: PAIN - ADULT  Goal: Verbalizes/displays adequate comfort level or baseline comfort level  Description: Interventions:  - Encourage patient to monitor pain and request assistance  - Assess pain using appropriate pain scale  - Administer analgesics based on type and severity of pain and evaluate response  - Implement non-pharmacological measures as appropriate and evaluate response  - Consider cultural and social influences on pain and pain management  - Notify physician/advanced practitioner if interventions unsuccessful or patient reports new pain  Outcome: Progressing     Problem: INFECTION - ADULT  Goal: Absence or prevention of progression during hospitalization  Description: INTERVENTIONS:  - Assess and monitor for signs and symptoms of infection  - Monitor lab/diagnostic results  - Monitor all insertion sites, i e  indwelling lines, tubes, and drains  - Monitor endotracheal if appropriate and nasal secretions for changes in amount and color  - [place] appropriate cooling/warming therapies per order  - Administer medications as ordered  - Instruct and encourage patient and family to use good hand hygiene technique  - Identify and instruct in appropriate isolation precautions for identified infection/condition  Outcome: Progressing  Goal: Absence of fever/infection during neutropenic period  Description: INTERVENTIONS:  - Monitor WBC    Outcome: Progressing     Problem: SAFETY ADULT  Goal: Patient will remain free of falls  Description: INTERVENTIONS:  - Educate patient/family on patient safety including physical limitations  - Instruct patient to call for assistance with activity   - Consult OT/PT to assist with strengthening/mobility   - Keep Call bell within reach  - Keep bed low and locked with side rails adjusted as appropriate  - Keep care items and personal belongings within reach  - Initiate and maintain comfort rounds  - Make Fall Risk Sign visible to staff  - Offer Toileting every 2 Hours, in advance of need  - Initiate/Maintain bed alarm  - Obtain necessary fall risk management equipment  - Apply yellow socks and bracelet for high fall risk patients  - Consider moving patient to room near nurses station  Outcome: Progressing  Goal: Maintain or return to baseline ADL function  Description: INTERVENTIONS:  -  Assess patient's ability to carry out ADLs; assess patient's baseline for ADL function and identify physical deficits which impact ability to perform ADLs (bathing, care of mouth/teeth, toileting, grooming, dressing, etc )  - Assess/evaluate cause of self-care deficits   - Assess range of motion  - Assess patient's mobility; develop plan if impaired  - Assess patient's need for assistive devices and provide as appropriate  - Encourage maximum independence but intervene and supervise when necessary  - Involve family in performance of ADLs  - Assess for home care needs following discharge   - Consider OT consult to assist with ADL evaluation and planning for discharge  - Provide patient education as appropriate  Outcome: Progressing  Goal: Maintains/Returns to pre admission functional level  Description: INTERVENTIONS:  - Perform BMAT or MOVE assessment daily    - Set and communicate daily mobility goal to care team and patient/family/caregiver  - Collaborate with rehabilitation services on mobility goals if consulted  - Perform Range of Motion 2 times a day  - Reposition patient every 2 hours    - Dangle patient 2 times a day  - Stand patient 2 times a day  - Ambulate patient 2 times a day  - Out of bed to chair 3 times a day   - Out of bed for meals 3 times a day  - Out of bed for toileting  - Record patient progress and toleration of activity level   Outcome: Progressing     Problem: DISCHARGE PLANNING  Goal: Discharge to home or other facility with appropriate resources  Description: INTERVENTIONS:  - Identify barriers to discharge w/patient and caregiver  - Arrange for needed discharge resources and transportation as appropriate  - Identify discharge learning needs (meds, wound care, etc )  - Refer to Case Management Department for coordinating discharge planning if the patient needs post-hospital services based on physician/advanced practitioner order or complex needs related to functional status, cognitive ability, or social support system  Outcome: Progressing     Problem: Knowledge Deficit  Goal: Patient/family/caregiver demonstrates understanding of disease process, treatment plan, medications, and discharge instructions  Description: Complete learning assessment and assess knowledge base    Interventions:  - Provide teaching at level of understanding  - Provide teaching via preferred learning methods  Outcome: Progressing     Problem: RESPIRATORY - ADULT  Goal: Achieves optimal ventilation and oxygenation  Description: INTERVENTIONS:  - Assess for changes in respiratory status  - Assess for changes in mentation and behavior  - Position to facilitate oxygenation and minimize respiratory effort  - Oxygen administered by appropriate delivery if ordered  - Initiate smoking cessation education as indicated  - Encourage broncho-pulmonary hygiene including cough, deep breathe, Incentive Spirometry  - Assess the need for suctioning and aspirate as needed  - Assess and instruct to report SOB or any respiratory difficulty  - Respiratory Therapy support as indicated  Outcome: Progressing     Problem: Prexisting or High Potential for Compromised Skin Integrity  Goal: Skin integrity is maintained or improved  Description: INTERVENTIONS:  - Identify patients at risk for skin breakdown  - Assess and monitor skin integrity  - Assess and monitor nutrition and hydration status  - Monitor labs   - Assess for incontinence   - Turn and reposition patient  - Assist with mobility/ambulation  - Relieve pressure over bony prominences  - Avoid friction and shearing  - Provide appropriate hygiene as needed including keeping skin clean and dry  - Evaluate need for skin moisturizer/barrier cream  - Collaborate with interdisciplinary team   - Patient/family teaching  - Consider wound care consult   Outcome: Progressing     Problem: Potential for Falls  Goal: Patient will remain free of falls  Description: INTERVENTIONS:  - Educate patient/family on patient safety including physical limitations  - Instruct patient to call for assistance with activity   - Consult OT/PT to assist with strengthening/mobility   - Keep Call bell within reach  - Keep bed low and locked with side rails adjusted as appropriate  - Keep care items and personal belongings within reach  - Initiate and maintain comfort rounds  - Make Fall Risk Sign visible to staff  - Offer Toileting every 2 Hours, in advance of need  - Initiate/Maintain bed alarm  - Obtain necessary fall risk management equipment  - Apply yellow socks and bracelet for high fall risk patients  - Consider moving patient to room near nurses station  Outcome: Progressing     Problem: METABOLIC, FLUID AND ELECTROLYTES - ADULT  Goal: Electrolytes maintained within normal limits  Description: INTERVENTIONS:  - Monitor labs and assess patient for signs and symptoms of electrolyte imbalances  - Administer electrolyte replacement as ordered  - Monitor response to electrolyte replacements, including repeat lab results as appropriate  - Instruct patient on fluid and nutrition as appropriate  Outcome: Progressing  Goal: Fluid balance maintained  Description: INTERVENTIONS:  - Monitor labs   - Monitor I/O and WT  - Instruct patient on fluid and nutrition as appropriate  - Assess for signs & symptoms of volume excess or deficit  Outcome: Progressing

## 2022-09-20 NOTE — CASE MANAGEMENT
Case Management Discharge Planning Note    Patient name Allen Miller  Location /-78 MRN [id number]  : 1980 Date 2022       Current Admission Date: 2022  Current Admission Diagnosis:Acute respiratory failure with hypoxia [facility])   Patient Active Problem List    Diagnosis Date Noted    Chest pain 2022    Chronic kidney disease, stage 4 (severe) ([facility] ) 2022    Nodular type diabetic glomerulosclerosis ([facility] ) 2022    Interstitial fibrosis present on renal biopsy 2022    Arteriosclerosis of kidney 2022    Diabetic ulcer of both feet ([facility] ) 2022    Primary hypertension 2022    Open toe wound 2022    Volume overload 2022    Hyponatremia 2022    Essential hypertension 2021    SOB (shortness of breath) 2021    Gastroesophageal reflux disease without esophagitis 2021    Family history of heart disease 2021    Hypokalemia 2021    Chest pressure 2021    Elevated troponin 2021    Type 2 diabetes mellitus with stage 4 chronic kidney disease and hypertension ([facility] ) 2021    Acute respiratory disease due to COVID-19 virus 2021    Acute respiratory failure with hypoxia ([facility] ) 2020    STEFANIA (acute kidney injury) ([facility] ) 2020    Schizo affective schizophrenia ([facility] ) 10/25/2020    Hypertensive emergency 2020    Encephalopathy 2020    Leukocytosis 2020    Chronic migraine without aura without status migrainosus, not intractable 2019    Difficulty urinating 2019    Urinary tract infection without hematuria 2019    Penile pain 2019    Spinal stenosis in cervical region 2019    Class 3 severe obesity due to excess calories with serious comorbidity and body mass index (BMI) of 60 0 to 69 9 in adult [facility]) 2019    Migraine without aura and without status migrainosus, not intractable 2019    Peripheral edema 06/07/2019    Hyperlipidemia, mixed 06/07/2019    Bipolar 1 disorder ([facility] ) 05/30/2019    Depression 05/30/2019    Type 1 diabetes mellitus without complication ([facility] ) 08/70/9422    Urinary retention 05/30/2019    Occipital neuralgia of left side 02/15/2019    Headache 02/15/2019    Nodule of parotid gland 02/15/2019    Snoring 12/04/2018    Insomnia 12/04/2018    Hypersomnia 12/04/2018    Vitamin D deficiency 12/04/2018    Episodic confusion     OCD (obsessive compulsive disorder) 10/31/2018    Schizoaffective disorder, depressive type ([facility] ) 10/31/2018    Acquired hypothyroidism 10/31/2018    Morbid obesity with BMI of 50 0-59 9, adult ([facility] ) 10/31/2018    Anxiety 10/31/2018    Abdominal pain 05/12/2018    Vomiting 05/12/2018      LOS (days): 10  Geometric Mean LOS (GMLOS) (days): 4 70  Days to GMLOS:-5 3     OBJECTIVE:  Risk of Unplanned Readmission Score: 36 37     Current admission status: Inpatient   Preferred Pharmacy:   [address] [place] [address] #[id number] [facility]  [address] [place] [id number]  Phone: [phone] Fax: [address] [place] [address]  Phone: [phone] Fax: [phone]    Primary Care Provider: Curry Sommers MD    Primary Insurance: [address] [place] [address]  Secondary Insurance:     DISCHARGE DETAILS:  TC to [facility], spoke to Johana at EXT [id number]  Pt CM is Kaylen Jessy at [facility]  There is still no chair time for the pt

## 2022-09-20 NOTE — PLAN OF CARE
Target UF goal 3 5L as tolerated  Patient dialyzing for 4 hours on 3K bath for serum K of 4 2 per protocol  Post-Dialysis RN Treatment Note    Blood Pressure:  Pre 165/109 mm/Hg  Post 164/104 mmHg   EDW  TBD kg    Weight:  Pre 145 9 kg   Post 145 3 kg   Mode of weight measurement: Standing Scale   Volume Removed  1366 ml    Treatment duration 3 hours and 20 minutes    NS given  Yes, 600 ml of NSS to for patient cramping and to flush machine to prevent clotting  Treatment shortened? Yes, describe: Dialyzer clotted, able to return blood  Largle clots noticed in venous chamber/ arterial pressure pod       Medications given during Rx Heparin 3000 units, Venofer 100mg    Estimated Kt/V  1 34   Access type: Permacath/TDC   Access Issues: No    Report called to primary nurse   Yes, Abiel Mak RN        Problem: METABOLIC, FLUID AND ELECTROLYTES - ADULT  Goal: Electrolytes maintained within normal limits  Description: INTERVENTIONS:  - Monitor labs and assess patient for signs and symptoms of electrolyte imbalances  - Administer electrolyte replacement as ordered  - Monitor response to electrolyte replacements, including repeat lab results as appropriate  - Instruct patient on fluid and nutrition as appropriate  Outcome: Progressing  Goal: Fluid balance maintained  Description: INTERVENTIONS:  - Monitor labs   - Monitor I/O and WT  - Instruct patient on fluid and nutrition as appropriate  - Assess for signs & symptoms of volume excess or deficit  Outcome: Progressing

## 2022-09-20 NOTE — PROGRESS NOTES
[address]  Progress Note - Darcie Parks 1980, 38 y o  male MRN: [id number]  Unit/Bed#: -01 Encounter: [id number]  Primary Care Provider: Cecil Barber MD   Date and time admitted to hospital: 9/9/2022 11:40 AM    * Acute respiratory failure with hypoxia ([address] )  Assessment & Plan  · Found to have oxygen saturation of 87% in ER, was utilizing 5 L nasal cannula  · X-ray revealed findings suggestive of fluid overload and possible pneumonia  ·    · Echocardiogram shows LVEF of 50 to 55%; trivial small pericardial effusion  · Acute respiratory failure is due to suspected volume overload  · Now resolved- patient currently on RA  · Nephrology input appreciated  · Patient initiated on hemodialysis- tunneled dialysis catheter placed 9/16/2022  · Medically stable for discharge pending HD chair set up per     Chronic kidney disease, stage 4 (severe) [facility])  Assessment & Plan  Lab Results   Component Value Date    EGFR 12 09/20/2022    EGFR 11 09/19/2022    EGFR 11 09/17/2022    CREATININE 5 11 (H) 09/20/2022    CREATININE 5 58 (H) 09/19/2022    CREATININE 5 56 (H) 09/17/2022     · Baseline Cr around 4 0mg/dL  follows with nephrology outpatient  The patient underwent renal biopsy which indicates nodular diabetic glomerulosclerosis, 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  · Presented with STEFANIA Cr 4 38mg/dL suspect to be due to acute tubular necrosis  · Renal function continues to worsen  Initiated on HD on 9/17 with tunneled dialysis catheter placed on 9/16    · Continue Torsemide 100 mg po on non HD days  · Continue HD per nephrology       Type 2 diabetes mellitus with stage 4 chronic kidney disease and hypertension [facility])  Assessment & Plan  Lab Results   Component Value Date    HGBA1C 9 1 (H) 07/16/2022       Recent Labs     09/19/22  2102 09/20/22  0738 09/20/22  1309 09/20/22  1613   POCGLU 158* 134 137 188*       Blood Sugar Average: Last 72 hrs:  (P) 175 6     · Lantus increased to 25 units at bedtime  · Continue lispro 5 units with meals  · Fingerstick blood sugar with sliding scale coverage 4 times daily with meals and at bedtime  · Carb controlled diet    Chest pain  Assessment & Plan  · Patient reported right sided chest pressure   · Troponin 17- 16  · No additional chest pain- patient stated he thought it may be anxiety   · Monitor for chest pain    Essential hypertension  Assessment & Plan  · Patient states he was out of his antihypertensives  · Continue home amlodipine, carvedilol, hydralazine   · Torsemide 100 mg daily on non dialysis days      Gastroesophageal reflux disease without esophagitis  Assessment & Plan  · Continue home Protonix     Acquired hypothyroidism  Assessment & Plan  · Continue home levothyroxine          VTE Pharmacologic Prophylaxis: VTE Score: 5 High Risk (Score >/= 5) - Pharmacological DVT Prophylaxis Ordered: heparin  Sequential Compression Devices Ordered  Patient Centered Rounds: I performed bedside rounds with nursing staff today  Discussions with Specialists or Other Care Team Provider:  Case Management, nursing    Education and Discussions with Family / Patient: No medical updates to provide        Time Spent for Care: 20 minutes  More than 50% of total time spent on counseling and coordination of care as described above  Current Length of Stay: 10 day(s)  Current Patient Status: Inpatient   Certification Statement: The patient will continue to require additional inpatient hospital stay due to Cm for outpatient HD chair set up  Discharge Plan: Pending outpatient HD chair set up    Code Status: Level 1 - Full Code    Subjective:   Patient seen and examined at the bedside, pleasant and doing well  No acute events overnight      Objective:     Vitals:   Temp (24hrs), Av 8 °F (36 6 °C), Min:97 1 °F (36 2 °C), Max:98 4 °F (36 9 °C)    Temp:  [97 1 °F (36 2 °C)-98 4 °F (36 9 °C)] 98 3 °F (36 8 °C)  HR: [69-81] 78  Resp:  [17-19] 18  BP: (115-188)/() 120/76  SpO2:  [92 %-93 %] 92 %  Body mass index is 58 55 kg/m²  Input and Output Summary (last 24 hours): Intake/Output Summary (Last 24 hours) at 9/20/2022 1626  Last data filed at 9/20/2022 1403  Gross per 24 hour   Intake 2040 ml   Output 1866 ml   Net 174 ml       Physical Exam:   Physical Exam  Vitals and nursing note reviewed  Constitutional:       General: He is not in acute distress  Appearance: He is obese  He is not toxic-appearing  HENT:      Head: Normocephalic and atraumatic  Mouth/Throat:      Mouth: Mucous membranes are moist    Eyes:      Conjunctiva/sclera: Conjunctivae normal    Cardiovascular:      Rate and Rhythm: Normal rate and regular rhythm  Pulses: Normal pulses  Heart sounds: Normal heart sounds  Pulmonary:      Effort: Pulmonary effort is normal  No respiratory distress  Breath sounds: Normal breath sounds  No wheezing, rhonchi or rales  Abdominal:      General: Bowel sounds are normal  There is no distension  Palpations: Abdomen is soft  Tenderness: There is no abdominal tenderness  Musculoskeletal:      Cervical back: Neck supple  Right lower leg: No edema  Left lower leg: No edema  Skin:     General: Skin is warm and dry  Neurological:      Mental Status: He is alert and oriented to person, place, and time  Cranial Nerves: No cranial nerve deficit  Sensory: No sensory deficit  Motor: No weakness  Coordination: Coordination normal    Psychiatric:         Mood and Affect: Mood normal          Behavior: Behavior normal          Thought Content:  Thought content normal           Additional Data:     Labs:  Results from last 7 days   Lab Units 09/20/22  0603 09/19/22  0442   WBC Thousand/uL 9 69 9 69   HEMOGLOBIN g/dL 9 1* 9 5*   HEMATOCRIT % 27 7* 29 8*   PLATELETS Thousands/uL 284 343   NEUTROS PCT %  --  68   LYMPHS PCT %  --  17   MONOS PCT %  --  8 EOS PCT %  --  4     Results from last 7 days   Lab Units 09/20/22  0603 09/19/22  0442   SODIUM mmol/L 131* 132*   POTASSIUM mmol/L 4 2 3 9   CHLORIDE mmol/L 99 97   CO2 mmol/L 23 27   BUN mg/dL 53* 74*   CREATININE mg/dL 5 11* 5 58*   ANION GAP mmol/L 9 8   CALCIUM mg/dL 8 7 9 1   ALBUMIN g/dL  --  3 2*   TOTAL BILIRUBIN mg/dL  --  0 28   ALK PHOS U/L  --  69   ALT U/L  --  7   AST U/L  --  22   GLUCOSE RANDOM mg/dL 145* 212*         Results from last 7 days   Lab Units 09/20/22  1613 09/20/22  1309 09/20/22  0738 09/19/22  2102 09/19/22  1613 09/19/22  1140 09/19/22  0711 09/18/22  2116 09/18/22  1612 09/18/22  1103 09/18/22  0658 09/17/22  2109   POC GLUCOSE mg/dl 188* 137 134 158* 218* 151* 158* 236* 142* 227* 190* 243*               Lines/Drains:  Invasive Devices  Report    Peripheral Intravenous Line  Duration           Peripheral IV 09/16/22 Left Antecubital 4 days          Hemodialysis Catheter  Duration           HD Permanent Double Catheter 4 days                      Imaging: No pertinent imaging reviewed      Recent Cultures (last 7 days):         Last 24 Hours Medication List:   Current Facility-Administered Medications   Medication Dose Route Frequency Provider Last Rate    acetaminophen  650 mg Oral Q6H PRN Marija LUCINDA Dumas      albuterol  2 puff Inhalation Q4H PRN Amite Tianna, DO      amLODIPine  10 mg Oral Daily [place]DOREEN Yin      b complex-vitamin C-folic acid  1 capsule Oral Daily With [employer] Varvarelis, DO      carvedilol  25 mg Oral BID With Meals Amite Tianna, DO      doxazosin  2 mg Oral Daily Amite Tianna, DO      FLUoxetine  20 mg Oral QAM Amite Tianna, DO      glycerin-hypromellose-  2 drop Both Eyes Q3H PRN DOREEN Corley      [START ON 9/21/2022] heparin (porcine)  6,000 Units Intravenous Once per day on Tue Thu Sat Yumiko Jimenez, DO      heparin (porcine)  7,500 Units Subcutaneous [facility] Jose M Wynn DO      hydrOXYzine HCL 25 mg Oral Q6H PRN Garrett Charles PA-C      insulin glargine  25 Units Subcutaneous HS Neena Wood PA-C      insulin lispro  2-12 Units Subcutaneous TID AC Milo Rosales,       insulin lispro  2-12 Units Subcutaneous HS Breanna Huddle,       insulin lispro  5 Units Subcutaneous TID With Meals Breanna Huddle, DO      iron sucrose  200 mg Intravenous Daily Beatrice Amos,  mg (09/20/22 1100)    levothyroxine  125 mcg Oral Early Morning Breanna Huddle, DO      [START ON 9/21/2022] losartan  50 mg Oral Daily Beatrice Amos DO      ondansetron  4 mg Intravenous Q4H PRN Neena Wood PA-C      pantoprazole  40 mg Oral BID AC Breanna Edwards DO      [START ON 9/21/2022] torsemide  100 mg Oral Once per day on Sun Mon Wed Fri Beatrice Amos DO          Today, Patient Was Seen By: Jaron Avila PA-C    **Please Note: This note may have been constructed using a voice recognition system  **

## 2022-09-20 NOTE — ASSESSMENT & PLAN NOTE
Lab Results   Component Value Date    EGFR 12 09/20/2022    EGFR 11 09/19/2022    EGFR 11 09/17/2022    CREATININE 5 11 (H) 09/20/2022    CREATININE 5 58 (H) 09/19/2022    CREATININE 5 56 (H) 09/17/2022     · Baseline Cr around 4 0mg/dL  follows with nephrology outpatient  The patient underwent renal biopsy which indicates nodular diabetic glomerulosclerosis, 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  · Presented with STEFANIA Cr 4 38mg/dL suspect to be due to acute tubular necrosis  · Renal function continues to worsen  Initiated on HD on 9/17 with tunneled dialysis catheter placed on 9/16    · Continue Torsemide 100 mg po on non HD days  · Continue HD per nephrology

## 2022-09-20 NOTE — ASSESSMENT & PLAN NOTE
Lab Results   Component Value Date    HGBA1C 9 1 (H) 07/16/2022       Recent Labs     09/19/22  2102 09/20/22  0738 09/20/22  1309 09/20/22  1613   POCGLU 158* 134 137 188*       Blood Sugar Average: Last 72 hrs:  (P) 175 6     · Lantus increased to 25 units at bedtime  · Continue lispro 5 units with meals  · Fingerstick blood sugar with sliding scale coverage 4 times daily with meals and at bedtime  · Carb controlled diet

## 2022-09-20 NOTE — PROGRESS NOTES
Progress Note - Nephrology   Lainey Knight 38 y o  male MRN: [id number]  Unit/Bed#: -01 Encounter: [id number]  HEMODIALYSIS PROCEDURE NOTE  The patient was seen and examined on hemodialysis  Time: 4 hours  Sodium: 135 Blood flow: 400   Dialyzer: F160 Potassium: 3 Dialysate flow: 500   Access: R Permcath Bicarbonate: 30 Ultrafiltration goal: 3 5L   Medications on HD: heparin       A/P:  1  End-stage renal disease               Will maintain the patient on Tuesday Thursday Saturday hemodialysis sessions  Continue 4 hours hemodialysis session for now, next treatment will be on Thursday, September 22nd  2  Diabetic nephropathy  3  Hypertensive nephrosclerosis  4  Nephrotic syndrome secondary to diabetic nephropathy  5  Volume overload                    patient's volume status overall has significant with  Will try to ultrafilter up to 3 5 L today, and continue with diuretics on nondialysis days  6  Iron deficiency anemia                   continue Venofer 200 mg daily  7  Anemia due to end-stage renal disease   Patient will [facility] be [email or web address] [place], would avoid short-acting GLORIA to the outpatient long-acting GLORIA      Follow up reason for today's visit:  End-stage renal disease/diabetic nephropathy/hypertensive nephrosclerosis/nephrectomy    Acute respiratory failure with hypoxia [facility])    Patient Active Problem List   Diagnosis    Abdominal pain    OCD (obsessive compulsive disorder)    Schizoaffective disorder, depressive type ([address] )    Acquired hypothyroidism    Morbid obesity with BMI of 50 0-59 9, adult ([address] )    Anxiety    Episodic confusion    Snoring    Insomnia    Hypersomnia    Vitamin D deficiency    Vomiting    Occipital neuralgia of left side    Headache    Nodule of parotid gland    Bipolar 1 disorder ([address] )    Depression    Type 1 diabetes mellitus without complication [facility] )    Urinary retention    Peripheral edema    Hyperlipidemia, mixed    Migraine without aura and without status migrainosus, not intractable    Class 3 severe obesity due to excess calories with serious comorbidity and body mass index (BMI) of 60 0 to 69 9 in adult [facility])    Spinal stenosis in cervical region    Difficulty urinating    Urinary tract infection without hematuria    Penile pain    Chronic migraine without aura without status migrainosus, not intractable    Hypertensive emergency    Encephalopathy    Leukocytosis    Schizo affective schizophrenia [facility] )    Acute respiratory failure with hypoxia ([facility] )    STEFANIA (acute kidney injury) ([place] [address] )    Acute respiratory disease due to COVID-19 virus    Elevated troponin    Type 2 diabetes mellitus with stage 4 chronic kidney disease and hypertension ([facility] )    Family history of heart disease    Hypokalemia    Chest pressure    Gastroesophageal reflux disease without esophagitis    Essential hypertension    SOB (shortness of breath)    Volume overload    Hyponatremia    Open toe wound    Primary hypertension    Di[facility] of both feet [facility] )    Nodular type diabetic glomerulosclerosis ([place] [address] )    Interstitial fibrosis present on renal biopsy    Arteriosclerosis of kidney    Chronic kidney disease, stage 4 (severe) (HCC)    Chest pain         Subjective:   No acute events overnight, patient is eating and drinking with no nausea vomiting at this time  Objective:     Vitals: Blood pressure 145/97, pulse 80, temperature (!) 97 1 °F (36 2 °C), temperature source Tympanic, resp  rate 19, height 5' 2" (1 575 m), weight (!) 145 kg (320 lb 1 7 oz), SpO2 92 %  ,Body mass index is 58 55 kg/m²      Weight (last 2 days)     Date/Time Weight    09/20/22 0600 145 (320 11)     Weight: used the white standing scale at 09/20/22 0600    09/19/22 0600 146 (322 2)    09/18/22 0554 146 (320 77)            Intake/Output Summary (Last 24 hours) at 9/20/2022 1006  Last data filed at 9/20/2022 0900  Gross per 24 hour   Intake 1320 ml   Output 2450 ml   Net -1130 ml     I/O last 3 completed shifts: In: 1080 [P O :580; I V :500]  Out: 2450 [Other:2450]    HD Permanent Double Catheter (Active)   Reasons to continue HD Cath Treatment Therapy 09/20/22 0900   Goal for Removal N/A- chronic HD catheter 09/20/22 0900   Line Necessity Reviewed Yes, reviewed with provider 09/20/22 0900   Site Assessment WDL 09/20/22 0900   Proximal Lumen Status Blood return noted;Capped;Flushed 09/20/22 0900   Distal Lumen Status Blood return noted;Capped;Flushed 09/20/22 0900   Dressing Type Chlorhexidine dressing 09/20/22 0900   Dressing Status Clean; Intact;Dry 09/20/22 0900   Dressing Change Due 09/23/22 09/20/22 0900       Physical Exam: /97   Pulse 80   Temp (!) 97 1 °F (36 2 °C) (Tympanic)   Resp 19   Ht 5' 2" (1 575 m)   Wt (!) 145 kg (320 lb 1 7 oz) Comment: used the white standing scale  SpO2 92%   BMI 58 55 kg/m²     General Appearance:    Alert, cooperative, no distress, appears stated age, obese   Head:    Normocephalic, without obvious abnormality, atraumatic   Eyes:    Conjunctiva/corneas clear   Ears:    Normal external ears   Nose:   Nares normal, septum midline, mucosa normal, no drainage    or sinus tenderness   Throat:   Lips, mucosa, and tongue normal; teeth and gums normal   Neck:   Supple, symmetrical, trachea midline, no adenopathy;        thyroid:  No enlargement/tenderness/nodules; no carotid    bruit or JVD   Back:     Symmetric, no curvature, ROM normal, no CVA tenderness   Lungs:     Clear to auscultation bilaterally, respirations unlabored   Chest wall:    No tenderness or deformity   Heart:    Regular rate and rhythm, S1 and S2 normal, no murmur, rub   or gallop   Abdomen:     Soft, non-tender, bowel sounds active   Extremities:   Extremities normal, atraumatic, no cyanosis, mild bilateral lower extremity edema   Skin:   Skin color, texture, turgor normal, no rashes or lesions   Lymph nodes:   Cervical normal   Neurologic:   CNII-XII intact            Lab, Imaging and other studies: I have personally reviewed pertinent labs  CBC:   Lab Results   Component Value Date    WBC 9 69 09/20/2022    HGB 9 1 (L) 09/20/2022    HCT 27 7 (L) 09/20/2022    MCV 84 09/20/2022     09/20/2022    MCH 27 6 09/20/2022    MCHC 32 9 09/20/2022    RDW 14 1 09/20/2022    MPV 9 5 09/20/2022     CMP:   Lab Results   Component Value Date    K 4 2 09/20/2022    CL 99 09/20/2022    CO2 23 09/20/2022    BUN 53 (H) 09/20/2022    CREATININE 5 11 (H) 09/20/2022    CALCIUM 8 7 09/20/2022    EGFR 12 09/20/2022         Results from last 7 days   Lab Units 09/20/22  0603 09/19/22  0442 09/17/22  0549   POTASSIUM mmol/L 4 2 3 9 3 6   CHLORIDE mmol/L 99 97 97   CO2 mmol/L 23 27 31   BUN mg/dL 53* 74* 79*   CREATININE mg/dL 5 11* 5 58* 5 56*   CALCIUM mg/dL 8 7 9 1 9 1   ALK PHOS U/L  --  69  --    ALT U/L  --  7  --    AST U/L  --  22  --          Phosphorus: No results found for: PHOS  Magnesium: No results found for: MG  Urinalysis: No results found for: COLORU, CLARITYU, SPECGRAV, PHUR, LEUKOCYTESUR, NITRITE, PROTEINUA, GLUCOSEU, KETONESU, BILIRUBINUR, BLOODU  Ionized Calcium: No results found for: CAION  Coagulation: No results found for: PT, INR, APTT  Troponin: No results found for: TROPONINI  ABG: No results found for: PHART, SNQ4QFF, PO2ART, VVN8VGW, A9RGOKCC, BEART, SOURCE  Radiology review:     IMAGING  No results found      Current Facility-Administered Medications   Medication Dose Route Frequency    acetaminophen (TYLENOL) tablet 650 mg  650 mg Oral Q6H PRN    albuterol (PROVENTIL HFA,VENTOLIN HFA) inhaler 2 puff  2 puff Inhalation Q4H PRN    amLODIPine (NORVASC) tablet 10 mg  10 mg Oral Daily    b complex-vitamin C-folic acid (NEPHROCAPS) capsule 1 capsule  1 capsule Oral Daily With Dinner    carvedilol (COREG) tablet 25 mg  25 mg Oral BID With Meals    doxazosin (CARDURA) tablet 2 mg  2 mg Oral Daily    FLUoxetine (PROzac) capsule 20 mg  20 mg Oral QAM    glycerin-hypromellose- (ARTIFICIAL TEARS) ophthalmic solution 2 drop  2 drop Both Eyes Q3H PRN    heparin (porcine) subcutaneous injection 7,500 Units  7,500 Units Subcutaneous Q8H [facility]    hydrALAZINE (APRESOLINE) tablet 50 mg  50 mg Oral TID    hydrOXYzine HCL (ATARAX) tablet 25 mg  25 mg Oral Q6H PRN    insulin glargine (LANTUS) subcutaneous injection 25 Units 0 25 mL  25 Units Subcutaneous HS    insulin lispro (HumaLOG) 100 units/mL subcutaneous injection 2-12 Units  2-12 Units Subcutaneous TID AC    insulin lispro (HumaLOG) 100 units/mL subcutaneous injection 2-12 Units  2-12 Units Subcutaneous HS    insulin lispro (HumaLOG) 100 units/mL subcutaneous injection 5 Units  5 Units Subcutaneous TID With Meals    levothyroxine tablet 125 mcg  125 mcg Oral Early Morning    ondansetron (ZOFRAN) injection 4 mg  4 mg Intravenous Q4H PRN    pantoprazole (PROTONIX) EC tablet 40 mg  40 mg Oral BID AC    [START ON 9/21/2022] torsemide (DEMADEX) tablet 100 mg  100 mg Oral Once per day on Sun Mon Wed Fri     Medications Discontinued During This Encounter   Medication Reason    furosemide (LASIX) injection 40 mg     furosemide (LASIX) injection 80 mg     aluminum-magnesium hydroxide-simethicone (MYLANTA) oral suspension 30 mL     insulin glargine (LANTUS) subcutaneous injection 20 Units 0 2 mL     cefTRIAXone (ROCEPHIN) IVPB (premix in dextrose) 1,000 mg 50 mL     bumetanide (BUMEX) injection 3 mg     insulin glargine (LANTUS) subcutaneous injection 10 Units 0 1 mL     epoetin tammy (EPOGEN,PROCRIT) injection 5,000 Units     amLODIPine (NORVASC) tablet 10 mg     hydrALAZINE (APRESOLINE) tablet 50 mg     insulin glargine (LANTUS) subcutaneous injection 15 Units 0 15 mL     insulin glargine (LANTUS) subcutaneous injection 20 Units 0 2 mL     bumetanide (BUMEX) injection 4 mg     torsemide (DEMADEX) tablet 100 mg        Mick Gordon, DO      This progress note was produced in part using a dictation device which may document imprecise wording from author's original intent

## 2022-09-21 LAB
ANION GAP SERPL CALCULATED.3IONS-SCNC: 8 MMOL/L (ref 4–13)
BUN SERPL-MCNC: 35 MG/DL (ref 5–25)
CALCIUM SERPL-MCNC: 9.1 MG/DL (ref 8.4–10.2)
CHLORIDE SERPL-SCNC: 96 MMOL/L (ref 96–108)
CO2 SERPL-SCNC: 25 MMOL/L (ref 21–32)
CREAT SERPL-MCNC: 4.06 MG/DL (ref 0.6–1.3)
ERYTHROCYTE [DISTWIDTH] IN BLOOD BY AUTOMATED COUNT: 13.9 % (ref 11.6–15.1)
GFR SERPL CREATININE-BSD FRML MDRD: 16 ML/MIN/1.73SQ M
GLUCOSE SERPL-MCNC: 114 MG/DL (ref 65–140)
GLUCOSE SERPL-MCNC: 116 MG/DL (ref 65–140)
GLUCOSE SERPL-MCNC: 123 MG/DL (ref 65–140)
GLUCOSE SERPL-MCNC: 148 MG/DL (ref 65–140)
GLUCOSE SERPL-MCNC: 221 MG/DL (ref 65–140)
HCT VFR BLD AUTO: 27.4 % (ref 36.5–49.3)
HGB BLD-MCNC: 9.5 G/DL (ref 12–17)
MCH RBC QN AUTO: 28.8 PG (ref 26.8–34.3)
MCHC RBC AUTO-ENTMCNC: 34.7 G/DL (ref 31.4–37.4)
MCV RBC AUTO: 83 FL (ref 82–98)
PLATELET # BLD AUTO: 247 THOUSANDS/UL (ref 149–390)
PMV BLD AUTO: 9.5 FL (ref 8.9–12.7)
POTASSIUM SERPL-SCNC: 4.4 MMOL/L (ref 3.5–5.3)
RBC # BLD AUTO: 3.3 MILLION/UL (ref 3.88–5.62)
SODIUM SERPL-SCNC: 129 MMOL/L (ref 135–147)
WBC # BLD AUTO: 8.53 THOUSAND/UL (ref 4.31–10.16)

## 2022-09-21 PROCEDURE — 99232 SBSQ HOSP IP/OBS MODERATE 35: CPT | Performed by: PHYSICIAN ASSISTANT

## 2022-09-21 PROCEDURE — 85027 COMPLETE CBC AUTOMATED: CPT

## 2022-09-21 PROCEDURE — 82948 REAGENT STRIP/BLOOD GLUCOSE: CPT

## 2022-09-21 PROCEDURE — 99233 SBSQ HOSP IP/OBS HIGH 50: CPT

## 2022-09-21 PROCEDURE — 80048 BASIC METABOLIC PNL TOTAL CA: CPT

## 2022-09-21 RX ADMIN — DOXAZOSIN 2 MG: 2 TABLET ORAL at 09:27

## 2022-09-21 RX ADMIN — CARVEDILOL 25 MG: 25 TABLET, FILM COATED ORAL at 07:53

## 2022-09-21 RX ADMIN — INSULIN LISPRO 5 UNITS: 100 INJECTION, SOLUTION INTRAVENOUS; SUBCUTANEOUS at 18:34

## 2022-09-21 RX ADMIN — INSULIN LISPRO 4 UNITS: 100 INJECTION, SOLUTION INTRAVENOUS; SUBCUTANEOUS at 11:45

## 2022-09-21 RX ADMIN — INSULIN LISPRO 5 UNITS: 100 INJECTION, SOLUTION INTRAVENOUS; SUBCUTANEOUS at 11:45

## 2022-09-21 RX ADMIN — LEVOTHYROXINE SODIUM 125 MCG: 25 TABLET ORAL at 05:28

## 2022-09-21 RX ADMIN — HEPARIN SODIUM 7500 UNITS: 5000 INJECTION INTRAVENOUS; SUBCUTANEOUS at 15:00

## 2022-09-21 RX ADMIN — SODIUM CHLORIDE 200 MG: 9 INJECTION, SOLUTION INTRAVENOUS at 09:27

## 2022-09-21 RX ADMIN — HYDROXYZINE HYDROCHLORIDE 25 MG: 25 TABLET ORAL at 21:56

## 2022-09-21 RX ADMIN — INSULIN LISPRO 5 UNITS: 100 INJECTION, SOLUTION INTRAVENOUS; SUBCUTANEOUS at 07:52

## 2022-09-21 RX ADMIN — CARVEDILOL 25 MG: 25 TABLET, FILM COATED ORAL at 17:04

## 2022-09-21 RX ADMIN — HEPARIN SODIUM 7500 UNITS: 5000 INJECTION INTRAVENOUS; SUBCUTANEOUS at 05:32

## 2022-09-21 RX ADMIN — PANTOPRAZOLE SODIUM 40 MG: 40 TABLET, DELAYED RELEASE ORAL at 07:53

## 2022-09-21 RX ADMIN — FLUOXETINE 20 MG: 20 CAPSULE ORAL at 09:27

## 2022-09-21 RX ADMIN — Medication 1 CAPSULE: at 17:04

## 2022-09-21 RX ADMIN — HEPARIN SODIUM 7500 UNITS: 5000 INJECTION INTRAVENOUS; SUBCUTANEOUS at 21:57

## 2022-09-21 RX ADMIN — ACETAMINOPHEN 325MG 650 MG: 325 TABLET ORAL at 21:56

## 2022-09-21 RX ADMIN — INSULIN GLARGINE 25 UNITS: 100 INJECTION, SOLUTION SUBCUTANEOUS at 21:57

## 2022-09-21 RX ADMIN — LOSARTAN POTASSIUM 50 MG: 50 TABLET, FILM COATED ORAL at 09:27

## 2022-09-21 RX ADMIN — TORSEMIDE 100 MG: 100 TABLET ORAL at 09:27

## 2022-09-21 RX ADMIN — AMLODIPINE BESYLATE 10 MG: 10 TABLET ORAL at 09:27

## 2022-09-21 RX ADMIN — PANTOPRAZOLE SODIUM 40 MG: 40 TABLET, DELAYED RELEASE ORAL at 17:04

## 2022-09-21 NOTE — CASE MANAGEMENT
Case Management Discharge Planning Note    Patient name Kelly Dominique /-52 MRN [id number]  : 1980 Date 2022       Current Admission Date: 2022  Current Admission Diagnosis:Acute respiratory failure with hypoxia [facility])   Patient Active Problem List    Diagnosis Date Noted    Chest pain 2022    Chronic kidney disease, stage 4 (severe) ([address] ) 2022    Nodular type diabetic glomerulosclerosis ([address] ) 2022    Interstitial fibrosis present on renal biopsy 2022    Arteriosclerosis of kidney 2022    Diabetic ulcer of both feet ([facility] ) 2022    Primary hypertension 2022    Open toe wound 2022    Volume overload 2022    Hyponatremia 2022    Essential hypertension 2021    SOB (shortness of breath) 2021    Gastroesophageal reflux disease without esophagitis 2021    Family history of heart disease 2021    Hypokalemia 2021    Chest pressure 2021    Elevated troponin 2021    Type 2 diabetes mellitus with stage 4 chronic kidney disease and hypertension ([facility] ) 2021    Acute respiratory disease due to COVID-19 virus 2021    Acute respiratory failure with hypoxia ([address] ) 2020    STEFANIA (acute kidney injury) ([address] ) 2020    Schizo affective schizophrenia ([place] [address] ) 10/25/2020    Hypertensive emergency 2020    Encephalopathy 2020    Leukocytosis 2020    Chronic migraine without aura without status migrainosus, not intractable 2019    Difficulty urinating 2019    Urinary tract infection without hematuria 2019    Penile pain 2019    Spinal stenosis in cervical region 2019    Class 3 severe obesity due to excess calories with serious comorbidity and body mass index (BMI) of 60 0 to 69 9 in adult [facility]) 2019    Migraine without aura and without status migrainosus, not intractable 2019    Peripheral edema 06/07/2019    Hyperlipidemia, mixed 06/07/2019    Bipolar 1 disorder ([facility] ) 05/30/2019    Depression 05/30/2019    Type 1 diabetes mellitus without complication ([facility] ) 77/95/4598    Urinary retention 05/30/2019    Occipital neuralgia of left side 02/15/2019    Headache 02/15/2019    Nodule of parotid gland 02/15/2019    Snoring 12/04/2018    Insomnia 12/04/2018    Hypersomnia 12/04/2018    Vitamin D deficiency 12/04/2018    Episodic confusion     OCD (obsessive compulsive disorder) 10/31/2018    Schizoaffective disorder, depressive type ([facility] ) 10/31/2018    Acquired hypothyroidism 10/31/2018    Morbid obesity with BMI of 50 0-59 9, adult ([facility] ) 10/31/2018    Anxiety 10/31/2018    Abdominal pain 05/12/2018    Vomiting 05/12/2018      LOS (days): 11  Geometric Mean LOS (GMLOS) (days): 4 70  Days to GMLOS:-6 2     OBJECTIVE:  Risk of Unplanned Readmission Score: 36 71     Current admission status: Inpatient   Preferred Pharmacy:   [address] [place] [address] [id number] [facility] [address]  [address] [place] [id number]  Phone: [phone] Fax: [address] [place] [address]  Phone: [phone] Fax: [phone]    Primary Care Provider: Leana Mock MD    Primary Insurance: Felecia Naranjo  Secondary Insurance:     DISCHARGE DETAILS:  Received a call from Jignesh Phillip at  [facility] dialysis  As per Carolyne Bosworth the pt was denied for dialysis at [facility] d/t not accepting the pt insurance  AS per Jignesh Phillip the [employer] "is a prohibitive insurance for them in [place] "  States they are going to close the referral   Spoke to the pt at the bedside about same  Pt is agreeable to a referral to [facility] and is aware he most likely will have to go the [facility]  Pt is agreeable  Made a referral to [facility] admissions

## 2022-09-21 NOTE — ASSESSMENT & PLAN NOTE
Lab Results   Component Value Date    HGBA1C 9 1 (H) 07/16/2022       Recent Labs     09/20/22  1309 09/20/22  1613 09/20/22  2112 09/21/22  0656   POCGLU 137 188* 173* 116       Blood Sugar Average: Last 72 hrs:  (P) 709 [id number]     · Lantus increased to 25 units at bedtime  · Continue lispro 5 units with meals  · Fingerstick blood sugar with sliding scale coverage 4 times daily with meals and at bedtime  · Carb controlled diet

## 2022-09-21 NOTE — PLAN OF CARE
Problem: PAIN - ADULT  Goal: Verbalizes/displays adequate comfort level or baseline comfort level  Description: Interventions:  - Encourage patient to monitor pain and request assistance  - Assess pain using appropriate pain scale  - Administer analgesics based on type and severity of pain and evaluate response  - Implement non-pharmacological measures as appropriate and evaluate response  - Consider cultural and social influences on pain and pain management  - Notify physician/advanced practitioner if interventions unsuccessful or patient reports new pain  Outcome: Progressing     Problem: INFECTION - ADULT  Goal: Absence or prevention of progression during hospitalization  Description: INTERVENTIONS:  - Assess and monitor for signs and symptoms of infection  - Monitor lab/diagnostic results  - Monitor all insertion sites, i e  indwelling lines, tubes, and drains  - Monitor endotracheal if appropriate and nasal secretions for changes in amount and color  - [place] appropriate cooling/warming therapies per order  - Administer medications as ordered  - Instruct and encourage patient and family to use good hand hygiene technique  - Identify and instruct in appropriate isolation precautions for identified infection/condition  Outcome: Progressing  Goal: Absence of fever/infection during neutropenic period  Description: INTERVENTIONS:  - Monitor WBC    Outcome: Progressing     Problem: SAFETY ADULT  Goal: Patient will remain free of falls  Description: INTERVENTIONS:  - Educate patient/family on patient safety including physical limitations  - Instruct patient to call for assistance with activity   - Consult OT/PT to assist with strengthening/mobility   - Keep Call bell within reach  - Keep bed low and locked with side rails adjusted as appropriate  - Keep care items and personal belongings within reach  - Initiate and maintain comfort rounds  - Make Fall Risk Sign visible to staff  - Offer Toileting every 2 Hours, in advance of need  - Initiate/Maintain bed alarm  - Obtain necessary fall risk management equipment  - Apply yellow socks and bracelet for high fall risk patients  - Consider moving patient to room near nurses station  Outcome: Progressing  Goal: Maintain or return to baseline ADL function  Description: INTERVENTIONS:  -  Assess patient's ability to carry out ADLs; assess patient's baseline for ADL function and identify physical deficits which impact ability to perform ADLs (bathing, care of mouth/teeth, toileting, grooming, dressing, etc )  - Assess/evaluate cause of self-care deficits   - Assess range of motion  - Assess patient's mobility; develop plan if impaired  - Assess patient's need for assistive devices and provide as appropriate  - Encourage maximum independence but intervene and supervise when necessary  - Involve family in performance of ADLs  - Assess for home care needs following discharge   - Consider OT consult to assist with ADL evaluation and planning for discharge  - Provide patient education as appropriate  Outcome: Progressing  Goal: Maintains/Returns to pre admission functional level  Description: INTERVENTIONS:  - Perform BMAT or MOVE assessment daily    - Set and communicate daily mobility goal to care team and patient/family/caregiver  - Collaborate with rehabilitation services on mobility goals if consulted  - Perform Range of Motion 2 times a day  - Reposition patient every 2 hours    - Dangle patient 2 times a day  - Stand patient 2 times a day  - Ambulate patient 2 times a day  - Out of bed to chair 3 times a day   - Out of bed for meals 3 times a day  - Out of bed for toileting  - Record patient progress and toleration of activity level   Outcome: Progressing     Problem: DISCHARGE PLANNING  Goal: Discharge to home or other facility with appropriate resources  Description: INTERVENTIONS:  - Identify barriers to discharge w/patient and caregiver  - Arrange for needed discharge resources and transportation as appropriate  - Identify discharge learning needs (meds, wound care, etc )  - Refer to Case Management Department for coordinating discharge planning if the patient needs post-hospital services based on physician/advanced practitioner order or complex needs related to functional status, cognitive ability, or social support system  Outcome: Progressing     Problem: Knowledge Deficit  Goal: Patient/family/caregiver demonstrates understanding of disease process, treatment plan, medications, and discharge instructions  Description: Complete learning assessment and assess knowledge base    Interventions:  - Provide teaching at level of understanding  - Provide teaching via preferred learning methods  Outcome: Progressing     Problem: RESPIRATORY - ADULT  Goal: Achieves optimal ventilation and oxygenation  Description: INTERVENTIONS:  - Assess for changes in respiratory status  - Assess for changes in mentation and behavior  - Position to facilitate oxygenation and minimize respiratory effort  - Oxygen administered by appropriate delivery if ordered  - Initiate smoking cessation education as indicated  - Encourage broncho-pulmonary hygiene including cough, deep breathe, Incentive Spirometry  - Assess the need for suctioning and aspirate as needed  - Assess and instruct to report SOB or any respiratory difficulty  - Respiratory Therapy support as indicated  Outcome: Progressing     Problem: Prexisting or High Potential for Compromised Skin Integrity  Goal: Skin integrity is maintained or improved  Description: INTERVENTIONS:  - Identify patients at risk for skin breakdown  - Assess and monitor skin integrity  - Assess and monitor nutrition and hydration status  - Monitor labs   - Assess for incontinence   - Turn and reposition patient  - Assist with mobility/ambulation  - Relieve pressure over bony prominences  - Avoid friction and shearing  - Provide appropriate hygiene as needed including keeping skin clean and dry  - Evaluate need for skin moisturizer/barrier cream  - Collaborate with interdisciplinary team   - Patient/family teaching  - Consider wound care consult   Outcome: Progressing     Problem: Potential for Falls  Goal: Patient will remain free of falls  Description: INTERVENTIONS:  - Educate patient/family on patient safety including physical limitations  - Instruct patient to call for assistance with activity   - Consult OT/PT to assist with strengthening/mobility   - Keep Call bell within reach  - Keep bed low and locked with side rails adjusted as appropriate  - Keep care items and personal belongings within reach  - Initiate and maintain comfort rounds  - Make Fall Risk Sign visible to staff  - Offer Toileting every 2 Hours, in advance of need  - Initiate/Maintain bed alarm  - Obtain necessary fall risk management equipment  - Apply yellow socks and bracelet for high fall risk patients  - Consider moving patient to room near nurses station  Outcome: Progressing     Problem: METABOLIC, FLUID AND ELECTROLYTES - ADULT  Goal: Electrolytes maintained within normal limits  Description: INTERVENTIONS:  - Monitor labs and assess patient for signs and symptoms of electrolyte imbalances  - Administer electrolyte replacement as ordered  - Monitor response to electrolyte replacements, including repeat lab results as appropriate  - Instruct patient on fluid and nutrition as appropriate  Outcome: Progressing  Goal: Fluid balance maintained  Description: INTERVENTIONS:  - Monitor labs   - Monitor I/O and WT  - Instruct patient on fluid and nutrition as appropriate  - Assess for signs & symptoms of volume excess or deficit  Outcome: Progressing

## 2022-09-21 NOTE — ASSESSMENT & PLAN NOTE
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

## 2022-09-21 NOTE — CASE MANAGEMENT
Case Management Discharge Planning Note    Patient name Vonzell Landau  Location /-56 MRN [id number]  : 1980 Date 2022       Current Admission Date: 2022  Current Admission Diagnosis:Acute respiratory failure with hypoxia [facility])   Patient Active Problem List    Diagnosis Date Noted    Chest pain 2022    Chronic kidney disease, stage 4 (severe) ([facility] ) 2022    Nodular type diabetic glomerulosclerosis ([facility] ) 2022    Interstitial fibrosis present on renal biopsy 2022    Arteriosclerosis of kidney 2022    Diabetic ulcer of both feet ([facility] ) 2022    Primary hypertension 2022    Open toe wound 2022    Volume overload 2022    Hyponatremia 2022    Essential hypertension 2021    SOB (shortness of breath) 2021    Gastroesophageal reflux disease without esophagitis 2021    Family history of heart disease 2021    Hypokalemia 2021    Chest pressure 2021    Elevated troponin 2021    Type 2 diabetes mellitus with stage 4 chronic kidney disease and hypertension ([facility] ) 2021    Acute respiratory disease due to COVID-19 virus 2021    Acute respiratory failure with hypoxia ([facility] ) 2020    STEFANIA (acute kidney injury) ([facility] ) 2020    Schizo affective schizophrenia ([place] [address] ) 10/25/2020    Hypertensive emergency 2020    Encephalopathy 2020    Leukocytosis 2020    Chronic migraine without aura without status migrainosus, not intractable 2019    Difficulty urinating 2019    Urinary tract infection without hematuria 2019    Penile pain 2019    Spinal stenosis in cervical region 2019    Class 3 severe obesity due to excess calories with serious comorbidity and body mass index (BMI) of 60 0 to 69 9 in adult [facility]) 2019    Migraine without aura and without status migrainosus, not intractable 2019    Peripheral edema 06/07/2019    Hyperlipidemia, mixed 06/07/2019    Bipolar 1 disorder ([facility] ) 05/30/2019    Depression 05/30/2019    Type 1 diabetes mellitus without complication ([facility] ) 59/02/1695    Urinary retention 05/30/2019    Occipital neuralgia of left side 02/15/2019    Headache 02/15/2019    Nodule of parotid gland 02/15/2019    Snoring 12/04/2018    Insomnia 12/04/2018    Hypersomnia 12/04/2018    Vitamin D deficiency 12/04/2018    Episodic confusion     OCD (obsessive compulsive disorder) 10/31/2018    Schizoaffective disorder, depressive type ([facility] ) 10/31/2018    Acquired hypothyroidism 10/31/2018    Morbid obesity with BMI of 50 0-59 9, adult ([facility] ) 10/31/2018    Anxiety 10/31/2018    Abdominal pain 05/12/2018    Vomiting 05/12/2018      LOS (days): 11  Geometric Mean LOS (GMLOS) (days): 4 70  Days to GMLOS:-6 3     OBJECTIVE:  Risk of Unplanned Readmission Score: 36 77     Current admission status: Inpatient   Preferred Pharmacy:   [id number]  [address]  [address] [place] [id number]  Phone: [phone] Fax: [address] [place] [address]  Phone: [phone] Fax: [phone]    Primary Care Provider: Sana Aguilar MD    Primary Insurance: Chrissy Peres  Secondary Insurance:     DISCHARGE DETAILS:  Spoke to Luz Elena from [place]  Faxed the Hepatitis panel to [facility]  Will continue to follow

## 2022-09-21 NOTE — PROGRESS NOTES
[address]  Progress Note - Mahesh Anguiano 1980, 38 y o  male MRN: [id number]  Unit/Bed#: -01 Encounter: [id number]  Primary Care Provider: Leana Mock MD   Date and time admitted to hospital: 9/9/2022 11:40 AM    * Acute respiratory failure with hypoxia ([address] )  Assessment & Plan  · Found to have oxygen saturation of 87% in ER, was utilizing 5 L nasal cannula  · X-ray revealed findings suggestive of fluid overload and possible pneumonia  ·    · Echocardiogram shows LVEF of 50 to 55%; trivial small pericardial effusion  · Acute respiratory failure is due to suspected volume overload  · Now resolved- patient currently on RA  · Nephrology input appreciated  · Patient initiated on hemodialysis- tunneled dialysis catheter placed 9/16/2022  · Medically stable for discharge pending HD chair set up per     Chronic kidney disease, stage 4 (severe) [facility])  Assessment & Plan  Lab Results   Component Value Date    EGFR 16 09/21/2022    EGFR 12 09/20/2022    EGFR 11 09/19/2022    CREATININE 4 06 (H) 09/21/2022    CREATININE 5 11 (H) 09/20/2022    CREATININE 5 58 (H) 09/19/2022     · Baseline Cr around 4 0mg/dL  follows with nephrology outpatient  The patient underwent renal biopsy which indicates nodular diabetic glomerulosclerosis, 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  · Presented with STEFANIA Cr 4 38mg/dL suspect to be due to acute tubular necrosis  · Renal function continues to worsen  Initiated on HD on 9/17 with tunneled dialysis catheter placed on 9/16    · Continue Torsemide 100 mg po on non HD days  · Continue HD per nephrology       Type 2 diabetes mellitus with stage 4 chronic kidney disease and hypertension [facility])  Assessment & Plan  Lab Results   Component Value Date    HGBA1C 9 1 (H) 07/16/2022       Recent Labs     09/20/22  1309 09/20/22  1613 09/20/22  2112 09/21/22  0656   POCGLU 137 188* 173* 116       Blood Sugar Average: Last 72 hrs:  (P) 540 [id number]     · Lantus increased to 25 units at bedtime  · Continue lispro 5 units with meals  · Fingerstick blood sugar with sliding scale coverage 4 times daily with meals and at bedtime  · Carb controlled diet    Chest pain  Assessment & Plan  · Patient reported right sided chest pressure   · Troponin 17- 16  · No additional chest pain- patient stated he thought it may be anxiety   · Monitor for chest pain    Essential hypertension  Assessment & Plan  · Patient states he was out of his antihypertensives  · Continue home amlodipine, carvedilol, hydralazine   · Torsemide 100 mg daily on non dialysis days      Gastroesophageal reflux disease without esophagitis  Assessment & Plan  · Continue home Protonix     Acquired hypothyroidism  Assessment & Plan  · Continue home levothyroxine        VTE Pharmacologic Prophylaxis: VTE Score: 5 High Risk (Score >/= 5) - Pharmacological DVT Prophylaxis Ordered: heparin  Sequential Compression Devices Ordered  Patient Centered Rounds: I performed bedside rounds with nursing staff today  Discussions with Specialists or Other Care Team Provider:  Case Management, nursing    Education and Discussions with Family / Patient: No medical updates to provide  Discussed upcoming discharge plan for the patient at the bedside        Time Spent for Care: 20 minutes  More than 50% of total time spent on counseling and coordination of care as described above  Current Length of Stay: 11 day(s)  Current Patient Status: Inpatient   Certification Statement: The patient will continue to require additional inpatient hospital stay due to Cm for HD chair set  Discharge Plan: Pending HD chair set up a cm    Code Status: Level 1 - Full Code    Subjective:   Patient seen and examined at the bedside  Doing well  No acute events overnight      Objective:     Vitals:   Temp (24hrs), Av 4 °F (36 9 °C), Min:97 5 °F (36 4 °C), Max:98 9 °F (37 2 °C)    Temp:  [97 5 °F (36 4 °C)-98 9 °F (37 2 °C)] 98 9 °F (37 2 °C)  HR:  [68-81] 70  Resp:  [17-19] 18  BP: (120-188)/() 145/81  SpO2:  [92 %-96 %] 96 %  Body mass index is 58 65 kg/m²  Input and Output Summary (last 24 hours): Intake/Output Summary (Last 24 hours) at 9/21/2022 0928  Last data filed at 9/21/2022 0839  Gross per 24 hour   Intake 1380 ml   Output 1866 ml   Net -486 ml       Physical Exam:   Physical Exam  Vitals and nursing note reviewed  Constitutional:       General: He is not in acute distress  Appearance: He is obese  He is not toxic-appearing  HENT:      Head: Normocephalic and atraumatic  Mouth/Throat:      Mouth: Mucous membranes are moist    Eyes:      Conjunctiva/sclera: Conjunctivae normal    Cardiovascular:      Rate and Rhythm: Normal rate and regular rhythm  Pulses: Normal pulses  Heart sounds: Normal heart sounds  Pulmonary:      Effort: Pulmonary effort is normal  No respiratory distress  Breath sounds: Normal breath sounds  No wheezing, rhonchi or rales  Abdominal:      General: Bowel sounds are normal  There is no distension  Palpations: Abdomen is soft  Tenderness: There is no abdominal tenderness  Musculoskeletal:      Cervical back: Neck supple  Right lower leg: No edema  Left lower leg: No edema  Skin:     General: Skin is warm and dry  Neurological:      Mental Status: He is alert and oriented to person, place, and time  Cranial Nerves: No cranial nerve deficit  Sensory: No sensory deficit  Motor: No weakness  Coordination: Coordination normal    Psychiatric:         Mood and Affect: Mood normal          Behavior: Behavior normal          Thought Content:  Thought content normal           Additional Data:     Labs:  Results from last 7 days   Lab Units 09/21/22  0522 09/20/22  0603 09/19/22  0442   WBC Thousand/uL 8 53   < > 9 69   HEMOGLOBIN g/dL 9 5*   < > 9 5*   HEMATOCRIT % 27 4*   < > 29 8*   PLATELETS Thousands/uL 247   < > 343   NEUTROS PCT %  --   --  68   LYMPHS PCT %  --   --  17   MONOS PCT %  --   --  8   EOS PCT %  --   --  4    < > = values in this interval not displayed  Results from last 7 days   Lab Units 09/21/22  0522 09/20/22  0603 09/19/22  0442   SODIUM mmol/L 129*   < > 132*   POTASSIUM mmol/L 4 4   < > 3 9   CHLORIDE mmol/L 96   < > 97   CO2 mmol/L 25   < > 27   BUN mg/dL 35*   < > 74*   CREATININE mg/dL 4 06*   < > 5 58*   ANION GAP mmol/L 8   < > 8   CALCIUM mg/dL 9 1   < > 9 1   ALBUMIN g/dL  --   --  3 2*   TOTAL BILIRUBIN mg/dL  --   --  0 28   ALK PHOS U/L  --   --  69   ALT U/L  --   --  7   AST U/L  --   --  22   GLUCOSE RANDOM mg/dL 123   < > 212*    < > = values in this interval not displayed  Results from last 7 days   Lab Units 09/21/22  0656 09/20/22  2112 09/20/22  1613 09/20/22  1309 09/20/22  0738 09/19/22  2102 09/19/22  1613 09/19/22  1140 09/19/22  0711 09/18/22  2116 09/18/22  1612 09/18/22  1103   POC GLUCOSE mg/dl 116 173* 188* 137 134 158* 218* 151* 158* 236* 142* 227*               Lines/Drains:  Invasive Devices  Report    Peripheral Intravenous Line  Duration           Peripheral IV 09/16/22 Left Antecubital 4 days          Hemodialysis Catheter  Duration           HD Permanent Double Catheter 4 days                      Imaging: No pertinent imaging reviewed      Recent Cultures (last 7 days):         Last 24 Hours Medication List:   Current Facility-Administered Medications   Medication Dose Route Frequency Provider Last Rate    acetaminophen  650 mg Oral Q6H PRN David Jimenez PA-C      albuterol  2 puff Inhalation Q4H PRN \[facility]\"", DO      amLODIPine  10 mg Oral Daily DOREEN Martínez complex-vitamin C-folic acid  1 capsule Oral Daily With [employer] Varvarelis, DO      carvedilol  25 mg Oral BID With Meals \[facility]\"", DO      doxazosin  2 mg Oral Daily Marioscar Mariscal, DO      FLUoxetine  20 mg Oral QAM \[facility]\"",   glycerin-hypromellose-  2 drop Both Eyes Q3H PRN DOREEN Taylor      heparin (porcine)  6,000 Units Intravenous Once per day on Tue Thu Sat Hernan Cummins DO      heparin (porcine)  7,500 Units Subcutaneous [facility] Carissa Denny, DO      hydrOXYzine HCL  25 mg Oral Q6H PRN Suzanne Black PA-C      insulin glargine  25 Units Subcutaneous HS Jolie Flores PA-C      insulin lispro  2-12 Units Subcutaneous TID [facility],       insulin lispro  2-12 Units Subcutaneous HS Carissa Denny,       insulin lispro  5 Units Subcutaneous TID With Meals Carissa Denny, DO      iron sucrose  200 mg Intravenous Daily Hernan Cummins  mg (09/20/22 1100)    levothyroxine  125 mcg Oral Early Morning Carissa Denny DO      losartan  50 mg Oral Daily Hernan Cummins DO      ondansetron  4 mg Intravenous Q4H PRN Jolie Flores PA-C      pantoprazole  40 mg Oral BID AC Carissa Denny DO      torsemide  100 mg Oral Once per day on Sun Mon Wed Fri Hernan Cummins DO          Today, Patient Was Seen By: Nino Husain PA-C    **Please Note: This note may have been constructed using a voice recognition system  **

## 2022-09-21 NOTE — PLAN OF CARE
Problem: PAIN - ADULT  Goal: Verbalizes/displays adequate comfort level or baseline comfort level  Description: Interventions:  - Encourage patient to monitor pain and request assistance  - Assess pain using appropriate pain scale  - Administer analgesics based on type and severity of pain and evaluate response  - Implement non-pharmacological measures as appropriate and evaluate response  - Consider cultural and social influences on pain and pain management  - Notify physician/advanced practitioner if interventions unsuccessful or patient reports new pain  Outcome: Progressing     Problem: INFECTION - ADULT  Goal: Absence or prevention of progression during hospitalization  Description: INTERVENTIONS:  - Assess and monitor for signs and symptoms of infection  - Monitor lab/diagnostic results  - Monitor all insertion sites, i e  indwelling lines, tubes, and drains  - Monitor endotracheal if appropriate and nasal secretions for changes in amount and color  - [place] appropriate cooling/warming therapies per order  - Administer medications as ordered  - Instruct and encourage patient and family to use good hand hygiene technique  - Identify and instruct in appropriate isolation precautions for identified infection/condition  Outcome: Progressing  Goal: Absence of fever/infection during neutropenic period  Description: INTERVENTIONS:  - Monitor WBC    Outcome: Progressing     Problem: SAFETY ADULT  Goal: Patient will remain free of falls  Description: INTERVENTIONS:  - Educate patient/family on patient safety including physical limitations  - Instruct patient to call for assistance with activity   - Consult OT/PT to assist with strengthening/mobility   - Keep Call bell within reach  - Keep bed low and locked with side rails adjusted as appropriate  - Keep care items and personal belongings within reach  - Initiate and maintain comfort rounds  - Make Fall Risk Sign visible to staff  - Offer Toileting every 2 Hours, in advance of need  - Initiate/Maintain bed alarm  - Obtain necessary fall risk management equipment  - Apply yellow socks and bracelet for high fall risk patients  - Consider moving patient to room near nurses station  Outcome: Progressing  Goal: Maintain or return to baseline ADL function  Description: INTERVENTIONS:  -  Assess patient's ability to carry out ADLs; assess patient's baseline for ADL function and identify physical deficits which impact ability to perform ADLs (bathing, care of mouth/teeth, toileting, grooming, dressing, etc )  - Assess/evaluate cause of self-care deficits   - Assess range of motion  - Assess patient's mobility; develop plan if impaired  - Assess patient's need for assistive devices and provide as appropriate  - Encourage maximum independence but intervene and supervise when necessary  - Involve family in performance of ADLs  - Assess for home care needs following discharge   - Consider OT consult to assist with ADL evaluation and planning for discharge  - Provide patient education as appropriate  Outcome: Progressing  Goal: Maintains/Returns to pre admission functional level  Description: INTERVENTIONS:  - Perform BMAT or MOVE assessment daily    - Set and communicate daily mobility goal to care team and patient/family/caregiver  - Collaborate with rehabilitation services on mobility goals if consulted  - Perform Range of Motion 2 times a day  - Reposition patient every 2 hours    - Dangle patient 2 times a day  - Stand patient 2 times a day  - Ambulate patient 2 times a day  - Out of bed to chair 3 times a day   - Out of bed for meals 3 times a day  - Out of bed for toileting  - Record patient progress and toleration of activity level   Outcome: Progressing     Problem: DISCHARGE PLANNING  Goal: Discharge to home or other facility with appropriate resources  Description: INTERVENTIONS:  - Identify barriers to discharge w/patient and caregiver  - Arrange for needed discharge resources and transportation as appropriate  - Identify discharge learning needs (meds, wound care, etc )  - Refer to Case Management Department for coordinating discharge planning if the patient needs post-hospital services based on physician/advanced practitioner order or complex needs related to functional status, cognitive ability, or social support system  Outcome: Progressing     Problem: Knowledge Deficit  Goal: Patient/family/caregiver demonstrates understanding of disease process, treatment plan, medications, and discharge instructions  Description: Complete learning assessment and assess knowledge base    Interventions:  - Provide teaching at level of understanding  - Provide teaching via preferred learning methods  Outcome: Progressing     Problem: RESPIRATORY - ADULT  Goal: Achieves optimal ventilation and oxygenation  Description: INTERVENTIONS:  - Assess for changes in respiratory status  - Assess for changes in mentation and behavior  - Position to facilitate oxygenation and minimize respiratory effort  - Oxygen administered by appropriate delivery if ordered  - Initiate smoking cessation education as indicated  - Encourage broncho-pulmonary hygiene including cough, deep breathe, Incentive Spirometry  - Assess the need for suctioning and aspirate as needed  - Assess and instruct to report SOB or any respiratory difficulty  - Respiratory Therapy support as indicated  Outcome: Progressing     Problem: Prexisting or High Potential for Compromised Skin Integrity  Goal: Skin integrity is maintained or improved  Description: INTERVENTIONS:  - Identify patients at risk for skin breakdown  - Assess and monitor skin integrity  - Assess and monitor nutrition and hydration status  - Monitor labs   - Assess for incontinence   - Turn and reposition patient  - Assist with mobility/ambulation  - Relieve pressure over bony prominences  - Avoid friction and shearing  - Provide appropriate hygiene as needed including keeping skin clean and dry  - Evaluate need for skin moisturizer/barrier cream  - Collaborate with interdisciplinary team   - Patient/family teaching  - Consider wound care consult   Outcome: Progressing     Problem: Potential for Falls  Goal: Patient will remain free of falls  Description: INTERVENTIONS:  - Educate patient/family on patient safety including physical limitations  - Instruct patient to call for assistance with activity   - Consult OT/PT to assist with strengthening/mobility   - Keep Call bell within reach  - Keep bed low and locked with side rails adjusted as appropriate  - Keep care items and personal belongings within reach  - Initiate and maintain comfort rounds  - Make Fall Risk Sign visible to staff  - Offer Toileting every 2 Hours, in advance of need  - Initiate/Maintain bed alarm  - Obtain necessary fall risk management equipment  - Apply yellow socks and bracelet for high fall risk patients  - Consider moving patient to room near nurses station  Outcome: Progressing     Problem: METABOLIC, FLUID AND ELECTROLYTES - ADULT  Goal: Electrolytes maintained within normal limits  Description: INTERVENTIONS:  - Monitor labs and assess patient for signs and symptoms of electrolyte imbalances  - Administer electrolyte replacement as ordered  - Monitor response to electrolyte replacements, including repeat lab results as appropriate  - Instruct patient on fluid and nutrition as appropriate  Outcome: Progressing  Goal: Fluid balance maintained  Description: INTERVENTIONS:  - Monitor labs   - Monitor I/O and WT  - Instruct patient on fluid and nutrition as appropriate  - Assess for signs & symptoms of volume excess or deficit  Outcome: Progressing

## 2022-09-22 ENCOUNTER — APPOINTMENT (INPATIENT)
Dept: DIALYSIS | Facility: HOSPITAL | Age: 42
DRG: 470 | End: 2022-09-22
Payer: COMMERCIAL

## 2022-09-22 PROBLEM — R07.9 CHEST PAIN: Status: RESOLVED | Noted: 2022-09-14 | Resolved: 2022-09-22

## 2022-09-22 LAB
ANION GAP SERPL CALCULATED.3IONS-SCNC: 10 MMOL/L (ref 4–13)
ATRIAL RATE: 60 BPM
BASOPHILS # BLD AUTO: 0.05 THOUSANDS/ΜL (ref 0–0.1)
BASOPHILS NFR BLD AUTO: 1 % (ref 0–1)
BUN SERPL-MCNC: 48 MG/DL (ref 5–25)
CALCIUM SERPL-MCNC: 9.2 MG/DL (ref 8.4–10.2)
CARDIAC TROPONIN I PNL SERPL HS: 9 NG/L (ref 8–18)
CHLORIDE SERPL-SCNC: 97 MMOL/L (ref 96–108)
CO2 SERPL-SCNC: 24 MMOL/L (ref 21–32)
CREAT SERPL-MCNC: 4.94 MG/DL (ref 0.6–1.3)
EOSINOPHIL # BLD AUTO: 0.37 THOUSAND/ΜL (ref 0–0.61)
EOSINOPHIL NFR BLD AUTO: 5 % (ref 0–6)
ERYTHROCYTE [DISTWIDTH] IN BLOOD BY AUTOMATED COUNT: 14.2 % (ref 11.6–15.1)
GFR SERPL CREATININE-BSD FRML MDRD: 13 ML/MIN/1.73SQ M
GLUCOSE SERPL-MCNC: 131 MG/DL (ref 65–140)
GLUCOSE SERPL-MCNC: 166 MG/DL (ref 65–140)
GLUCOSE SERPL-MCNC: 194 MG/DL (ref 65–140)
GLUCOSE SERPL-MCNC: 257 MG/DL (ref 65–140)
GLUCOSE SERPL-MCNC: 91 MG/DL (ref 65–140)
HCT VFR BLD AUTO: 29.2 % (ref 36.5–49.3)
HGB BLD-MCNC: 9.8 G/DL (ref 12–17)
IMM GRANULOCYTES # BLD AUTO: 0.09 THOUSAND/UL (ref 0–0.2)
IMM GRANULOCYTES NFR BLD AUTO: 1 % (ref 0–2)
LYMPHOCYTES # BLD AUTO: 1.6 THOUSANDS/ΜL (ref 0.6–4.47)
LYMPHOCYTES NFR BLD AUTO: 20 % (ref 14–44)
MCH RBC QN AUTO: 28.2 PG (ref 26.8–34.3)
MCHC RBC AUTO-ENTMCNC: 33.6 G/DL (ref 31.4–37.4)
MCV RBC AUTO: 84 FL (ref 82–98)
MONOCYTES # BLD AUTO: 0.76 THOUSAND/ΜL (ref 0.17–1.22)
MONOCYTES NFR BLD AUTO: 9 % (ref 4–12)
MRSA NOSE QL CULT: NORMAL
NEUTROPHILS # BLD AUTO: 5.32 THOUSANDS/ΜL (ref 1.85–7.62)
NEUTS SEG NFR BLD AUTO: 64 % (ref 43–75)
NRBC BLD AUTO-RTO: 0 /100 WBCS
P AXIS: 33 DEGREES
PHOSPHATE SERPL-MCNC: 4.7 MG/DL (ref 2.7–4.5)
PLATELET # BLD AUTO: 272 THOUSANDS/UL (ref 149–390)
PMV BLD AUTO: 9.5 FL (ref 8.9–12.7)
POTASSIUM SERPL-SCNC: 4.2 MMOL/L (ref 3.5–5.3)
PR INTERVAL: 218 MS
QRS AXIS: -4 DEGREES
QRSD INTERVAL: 110 MS
QT INTERVAL: 430 MS
QTC INTERVAL: 430 MS
RBC # BLD AUTO: 3.48 MILLION/UL (ref 3.88–5.62)
SODIUM SERPL-SCNC: 131 MMOL/L (ref 135–147)
T WAVE AXIS: 17 DEGREES
VENTRICULAR RATE: 60 BPM
WBC # BLD AUTO: 8.19 THOUSAND/UL (ref 4.31–10.16)

## 2022-09-22 PROCEDURE — 84100 ASSAY OF PHOSPHORUS: CPT | Performed by: PHYSICIAN ASSISTANT

## 2022-09-22 PROCEDURE — 84484 ASSAY OF TROPONIN QUANT: CPT | Performed by: INTERNAL MEDICINE

## 2022-09-22 PROCEDURE — 80048 BASIC METABOLIC PNL TOTAL CA: CPT | Performed by: PHYSICIAN ASSISTANT

## 2022-09-22 PROCEDURE — 99233 SBSQ HOSP IP/OBS HIGH 50: CPT | Performed by: INTERNAL MEDICINE

## 2022-09-22 PROCEDURE — 99232 SBSQ HOSP IP/OBS MODERATE 35: CPT | Performed by: HOSPITALIST

## 2022-09-22 PROCEDURE — 93010 ELECTROCARDIOGRAM REPORT: CPT | Performed by: INTERNAL MEDICINE

## 2022-09-22 PROCEDURE — 94760 N-INVAS EAR/PLS OXIMETRY 1: CPT

## 2022-09-22 PROCEDURE — 85025 COMPLETE CBC W/AUTO DIFF WBC: CPT | Performed by: PHYSICIAN ASSISTANT

## 2022-09-22 PROCEDURE — 82948 REAGENT STRIP/BLOOD GLUCOSE: CPT

## 2022-09-22 PROCEDURE — 93005 ELECTROCARDIOGRAM TRACING: CPT

## 2022-09-22 RX ORDER — ACETAMINOPHEN 325 MG/1
975 TABLET ORAL ONCE
Status: COMPLETED | OUTPATIENT
Start: 2022-09-22 | End: 2022-09-22

## 2022-09-22 RX ORDER — AMLODIPINE BESYLATE 5 MG/1
5 TABLET ORAL DAILY
Status: DISCONTINUED | OUTPATIENT
Start: 2022-09-23 | End: 2022-09-23 | Stop reason: HOSPADM

## 2022-09-22 RX ADMIN — INSULIN LISPRO 5 UNITS: 100 INJECTION, SOLUTION INTRAVENOUS; SUBCUTANEOUS at 11:56

## 2022-09-22 RX ADMIN — HEPARIN SODIUM 6000 UNITS: 1000 INJECTION INTRAVENOUS; SUBCUTANEOUS at 10:04

## 2022-09-22 RX ADMIN — INSULIN LISPRO 5 UNITS: 100 INJECTION, SOLUTION INTRAVENOUS; SUBCUTANEOUS at 17:05

## 2022-09-22 RX ADMIN — HYDROXYZINE HYDROCHLORIDE 25 MG: 25 TABLET ORAL at 07:11

## 2022-09-22 RX ADMIN — HEPARIN SODIUM 7500 UNITS: 5000 INJECTION INTRAVENOUS; SUBCUTANEOUS at 15:31

## 2022-09-22 RX ADMIN — FLUOXETINE 20 MG: 20 CAPSULE ORAL at 11:15

## 2022-09-22 RX ADMIN — PANTOPRAZOLE SODIUM 40 MG: 40 TABLET, DELAYED RELEASE ORAL at 07:11

## 2022-09-22 RX ADMIN — HEPARIN SODIUM 7500 UNITS: 5000 INJECTION INTRAVENOUS; SUBCUTANEOUS at 22:38

## 2022-09-22 RX ADMIN — HYDROXYZINE HYDROCHLORIDE 25 MG: 25 TABLET ORAL at 19:24

## 2022-09-22 RX ADMIN — INSULIN GLARGINE 25 UNITS: 100 INJECTION, SOLUTION SUBCUTANEOUS at 22:38

## 2022-09-22 RX ADMIN — ACETAMINOPHEN 975 MG: 325 TABLET ORAL at 11:16

## 2022-09-22 RX ADMIN — HEPARIN SODIUM 7500 UNITS: 5000 INJECTION INTRAVENOUS; SUBCUTANEOUS at 05:22

## 2022-09-22 RX ADMIN — LEVOTHYROXINE SODIUM 125 MCG: 25 TABLET ORAL at 05:22

## 2022-09-22 RX ADMIN — INSULIN LISPRO 6 UNITS: 100 INJECTION, SOLUTION INTRAVENOUS; SUBCUTANEOUS at 22:39

## 2022-09-22 RX ADMIN — INSULIN LISPRO 5 UNITS: 100 INJECTION, SOLUTION INTRAVENOUS; SUBCUTANEOUS at 07:52

## 2022-09-22 RX ADMIN — ACETAMINOPHEN 325MG 650 MG: 325 TABLET ORAL at 07:38

## 2022-09-22 RX ADMIN — INSULIN LISPRO 2 UNITS: 100 INJECTION, SOLUTION INTRAVENOUS; SUBCUTANEOUS at 17:05

## 2022-09-22 RX ADMIN — CARVEDILOL 25 MG: 25 TABLET, FILM COATED ORAL at 17:04

## 2022-09-22 RX ADMIN — PANTOPRAZOLE SODIUM 40 MG: 40 TABLET, DELAYED RELEASE ORAL at 17:04

## 2022-09-22 RX ADMIN — Medication 1 CAPSULE: at 17:04

## 2022-09-22 RX ADMIN — INSULIN LISPRO 2 UNITS: 100 INJECTION, SOLUTION INTRAVENOUS; SUBCUTANEOUS at 11:56

## 2022-09-22 RX ADMIN — SODIUM CHLORIDE 200 MG: 9 INJECTION, SOLUTION INTRAVENOUS at 10:11

## 2022-09-22 NOTE — DISCHARGE SUMMARY
[address]  Discharge- Dontae Whitney 1980, 38 y o  male MRN: [id number]  Unit/Bed#: -01 Encounter: [id number]  Primary Care Provider: Vic Pickard MD   Date and time admitted to hospital: 9/9/2022 11:40 AM    * Acute respiratory failure with hypoxia ([address] )  Assessment & Plan  · Found to have oxygen saturation of 87% in ER, was utilizing 5 L nasal cannula  · X-ray revealed findings suggestive of fluid overload and possible pneumonia  ·    · Echocardiogram shows LVEF of 50 to 55%; trivial small pericardial effusion  · Acute respiratory failure is due to suspected volume overload  · Now resolved- patient currently on RA   · Nephrology input appreciated  · Patient initiated on hemodialysis; outpatient chair set up by        Chest pain  Assessment & Plan  · Patient reported right sided chest pressure 9/14  · Troponin 17- 16  · 9/22- developed an acute episode of left sided chest pain  Suspect to be related musculoskeletal versus hypotensive episodes during dialysis session  · Given tylenol x 1   · EKG without ischemic changes  · Troponin- negative   · Resolved       Chronic kidney disease, stage 4 (severe) [facility])  Assessment & Plan  Lab Results   Component Value Date    EGFR 13 09/22/2022    EGFR 16 09/21/2022    EGFR 12 09/20/2022    CREATININE 4 94 (H) 09/22/2022    CREATININE 4 06 (H) 09/21/2022    CREATININE 5 11 (H) 09/20/2022     · Baseline Cr around 4 0mg/dL  follows with nephrology outpatient  The patient underwent renal biopsy which indicates nodular diabetic glomerulosclerosis, 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  · Presented with STEFANIA Cr 4 38mg/dL suspect to be due to acute tubular necrosis  · Renal function continues to worsen  Initiated on HD on 9/17 with tunneled dialysis catheter placed on 9/16, catheter exchange on 9/23     · Continue Torsemide 100 mg po on non HD days  · Continue HD per nephrology - new schedule outpatient will be Monday, Wednesday, and Friday      Essential hypertension  Assessment & Plan  · Patient states he was out of his antihypertensives prior to admission   · 9/22 Due to hypotension during HD session, Nephrology adjusted his regimen- Cardura was discontinued, amlodipine decreased to 5 mg daily  Will defer further adjustment to renal team    · Torsemide 100 mg daily on non dialysis days       Gastroesophageal reflux disease without esophagitis  Assessment & Plan  · Continue home Protonix       Type 2 diabetes mellitus with stage 4 chronic kidney disease and hypertension [facility])  Assessment & Plan  Lab Results   Component Value Date    HGBA1C 9 1 (H) 07/16/2022       Recent Labs     09/22/22  1557 09/22/22  2114 09/23/22  0731 09/23/22  1238   POCGLU 194* 257* 202* 158*       Blood Sugar Average: Last 72 hrs:  (P) 164 [id number]     · Lantus increased to 25 units at bedtime  · Continue lispro 5 units with meals   · Continue with these regimen upon discharge    Acquired hypothyroidism  Assessment & Plan  · Continue home levothyroxine        Discharging Physician / Practitioner: DOREEN Lizarraga  PCP: Viri Choudhary MD  Admission Date:   Admission Orders (From admission, onward)     Ordered        09/10/22 1353  Inpatient Admission  Once            09/09/22 1336  Place in Observation  Once                      Discharge Date: 09/23/22    Medical Problems             Resolved Problems  Date Reviewed: 9/23/2022   None                 Consultations During Hospital Stay:  IP CONSULT TO CASE MANAGEMENT  IP CONSULT TO NEPHROLOGY  INPATIENT CONSULT TO IR      Procedures Performed:   XR chest 1 view portable    Result Date: 9/9/2022  CHEST INDICATION:   sob  COMPARISON:  9/5/2022 EXAM PERFORMED/VIEWS:  XR CHEST PORTABLE Single view FINDINGS: Progressive diffuse bilateral opacities, right greater than left, likely pulmonary edema vs  multifocal pneumonia Cardiomediastinal silhouette appears unremarkable   No pneumothorax or pleural effusion  Osseous structures appear within normal limits for patient age  Progressive diffuse bilateral opacities, right greater than left, likely pulmonary edema vs  multifocal pneumonia Workstation performed: [id number]     Echo follow up/limited w/ contrast if indicated    Result Date: 9/9/2022    Left Ventricle: Left ventricular cavity size is normal  The left ventricular ejection fraction is 50-55%  Systolic function is normal  Although no diagnostic regional wall motion abnormality was identified, this possibility cannot be completely excluded on the basis of this study  Contrast enhanced study could be helpful    Mitral Valve: There is mild regurgitation    Pericardium: There is a trivial-small pericardial effusion  Significant Findings / Test Results:   · Refer to above     Incidental Findings:   · None      Test Results Pending at Discharge (will require follow up): · None      Outpatient Tests Requested:  · Follow up with PCP and nephrology     Complications:  None     Reason for Admission: Shortness of Breath (Pt reporting SOB lasting over a week )        Hospital Course:     Gordon Serra is a 38 y o  male patient who originally presented to the hospital on 9/9/2022 due to worsening shortness of breath  The patient with history of chronic kidney disease stage 4 not on dialysis on admission  He subsequently was admitted for acute respiratory failure hypoxia requiring oxygen supplement  Chest x-ray on admission shows possible pulmonary edema  The patient also presented with leukocytosis and was treated for suspected pneumonia as well with IV ceftriaxone  He was started on IV furosemide for volume overload  Nephrology evaluation done  The patient did not respond well to intermittent IV diuretic therapy and subsequently hemodialysis was initiated on 09/17/2022  He eventually was able to wean off of oxygen with volume management per hemodialysis   As the patient procalcitonin levels are negative, IV antibiotic was discontinued  PermCath exchanged to placed on 09/23/2022 due to high venous pressure during HD treatment  Case management has arranged outpatient hemodialysis chair- Monday, Wednesday, and Friday schedule  He is medically cleared to be discharged today  Please see above list of diagnoses and related plan for additional information  Condition at Discharge: good     Discharge Day Visit / Exam:     Subjective:  Feeling much better  Denies any chest pain, dyspnea, n/v  Would like to go home  Vitals: Blood Pressure: 164/99 (09/23/22 1200)  Pulse: 72 (09/23/22 1200)  Temperature: 97 6 °F (36 4 °C) (09/23/22 1200)  Temp Source: Tympanic (09/23/22 1200)  Respirations: 20 (09/23/22 1200)  Height: 5' 2" (157 5 cm) (09/09/22 1530)  Weight - Scale: (!) 146 kg (322 lb 6 8 oz) (09/23/22 0600)  SpO2: 96 % (09/23/22 0709)  Exam:   Physical Exam  Vitals and nursing note reviewed  Constitutional:       General: He is not in acute distress  Appearance: Normal appearance  HENT:      Head: Normocephalic and atraumatic  Right Ear: External ear normal       Left Ear: External ear normal       Nose: Nose normal       Mouth/Throat:      Mouth: Mucous membranes are moist       Pharynx: Oropharynx is clear  Eyes:      General:         Right eye: No discharge  Left eye: No discharge  Extraocular Movements: Extraocular movements intact  Pupils: Pupils are equal, round, and reactive to light  Cardiovascular:      Rate and Rhythm: Normal rate and regular rhythm  Pulses: Normal pulses  Heart sounds: Normal heart sounds  No murmur heard  Pulmonary:      Effort: Pulmonary effort is normal  No respiratory distress  Breath sounds: Normal breath sounds  No wheezing or rales  Comments: Right chest permacath in place   Abdominal:      General: Bowel sounds are normal  There is no distension  Palpations: Abdomen is soft   There is no mass  Tenderness: There is no abdominal tenderness  Musculoskeletal:         General: No swelling, tenderness or deformity  Normal range of motion  Cervical back: Normal range of motion and neck supple  No rigidity  Skin:     General: Skin is warm and dry  Capillary Refill: Capillary refill takes less than 2 seconds  Coloration: Skin is not pale  Findings: No erythema  Neurological:      General: No focal deficit present  Mental Status: He is alert and oriented to person, place, and time  Mental status is at baseline  Psychiatric:         Mood and Affect: Mood normal          Behavior: Behavior normal          Thought Content: Thought content normal          Judgment: Judgment normal        Discussion with Family: none present during exam     Discharge instructions/Information to patient and family:   See after visit summary for information provided to patient and family  Provisions for Follow-Up Care:  See after visit summary for information related to follow-up care and any pertinent home health orders  Disposition:     Home    Planned Readmission: no      Discharge Statement:  I spent 38 minutes discharging the patient  This time was spent on the day of discharge  I had direct contact with the patient on the day of discharge  Greater than 50% of the total time was spent examining patient, answering all patient questions, arranging and discussing plan of care with patient as well as directly providing post-discharge instructions  Additional time then spent on discharge activities  Discharge Medications:  See after visit summary for reconciled discharge medications provided to patient and family        ** Please Note: This note has been constructed using a voice recognition system **

## 2022-09-22 NOTE — PLAN OF CARE
Problem: PAIN - ADULT  Goal: Verbalizes/displays adequate comfort level or baseline comfort level  Description: Interventions:  - Encourage patient to monitor pain and request assistance  - Assess pain using appropriate pain scale  - Administer analgesics based on type and severity of pain and evaluate response  - Implement non-pharmacological measures as appropriate and evaluate response  - Consider cultural and social influences on pain and pain management  - Notify physician/advanced practitioner if interventions unsuccessful or patient reports new pain  Outcome: Progressing     Problem: INFECTION - ADULT  Goal: Absence or prevention of progression during hospitalization  Description: INTERVENTIONS:  - Assess and monitor for signs and symptoms of infection  - Monitor lab/diagnostic results  - Monitor all insertion sites, i e  indwelling lines, tubes, and drains  - Monitor endotracheal if appropriate and nasal secretions for changes in amount and color  - [place] appropriate cooling/warming therapies per order  - Administer medications as ordered  - Instruct and encourage patient and family to use good hand hygiene technique  - Identify and instruct in appropriate isolation precautions for identified infection/condition  Outcome: Progressing  Goal: Absence of fever/infection during neutropenic period  Description: INTERVENTIONS:  - Monitor WBC    Outcome: Progressing     Problem: SAFETY ADULT  Goal: Patient will remain free of falls  Description: INTERVENTIONS:  - Educate patient/family on patient safety including physical limitations  - Instruct patient to call for assistance with activity   - Consult OT/PT to assist with strengthening/mobility   - Keep Call bell within reach  - Keep bed low and locked with side rails adjusted as appropriate  - Keep care items and personal belongings within reach  - Initiate and maintain comfort rounds  - Make Fall Risk Sign visible to staff  - Offer Toileting every 2 Hours, in advance of need  - Initiate/Maintain bed alarm  - Obtain necessary fall risk management equipment  - Apply yellow socks and bracelet for high fall risk patients  - Consider moving patient to room near nurses station  Outcome: Progressing  Goal: Maintain or return to baseline ADL function  Description: INTERVENTIONS:  -  Assess patient's ability to carry out ADLs; assess patient's baseline for ADL function and identify physical deficits which impact ability to perform ADLs (bathing, care of mouth/teeth, toileting, grooming, dressing, etc )  - Assess/evaluate cause of self-care deficits   - Assess range of motion  - Assess patient's mobility; develop plan if impaired  - Assess patient's need for assistive devices and provide as appropriate  - Encourage maximum independence but intervene and supervise when necessary  - Involve family in performance of ADLs  - Assess for home care needs following discharge   - Consider OT consult to assist with ADL evaluation and planning for discharge  - Provide patient education as appropriate  Outcome: Progressing  Goal: Maintains/Returns to pre admission functional level  Description: INTERVENTIONS:  - Perform BMAT or MOVE assessment daily    - Set and communicate daily mobility goal to care team and patient/family/caregiver  - Collaborate with rehabilitation services on mobility goals if consulted  - Perform Range of Motion 2 times a day  - Reposition patient every 2 hours    - Dangle patient 2 times a day  - Stand patient 2 times a day  - Ambulate patient 2 times a day  - Out of bed to chair 3 times a day   - Out of bed for meals 3 times a day  - Out of bed for toileting  - Record patient progress and toleration of activity level   Outcome: Progressing     Problem: DISCHARGE PLANNING  Goal: Discharge to home or other facility with appropriate resources  Description: INTERVENTIONS:  - Identify barriers to discharge w/patient and caregiver  - Arrange for needed discharge resources and transportation as appropriate  - Identify discharge learning needs (meds, wound care, etc )  - Refer to Case Management Department for coordinating discharge planning if the patient needs post-hospital services based on physician/advanced practitioner order or complex needs related to functional status, cognitive ability, or social support system  Outcome: Progressing     Problem: Knowledge Deficit  Goal: Patient/family/caregiver demonstrates understanding of disease process, treatment plan, medications, and discharge instructions  Description: Complete learning assessment and assess knowledge base    Interventions:  - Provide teaching at level of understanding  - Provide teaching via preferred learning methods  Outcome: Progressing     Problem: RESPIRATORY - ADULT  Goal: Achieves optimal ventilation and oxygenation  Description: INTERVENTIONS:  - Assess for changes in respiratory status  - Assess for changes in mentation and behavior  - Position to facilitate oxygenation and minimize respiratory effort  - Oxygen administered by appropriate delivery if ordered  - Initiate smoking cessation education as indicated  - Encourage broncho-pulmonary hygiene including cough, deep breathe, Incentive Spirometry  - Assess the need for suctioning and aspirate as needed  - Assess and instruct to report SOB or any respiratory difficulty  - Respiratory Therapy support as indicated  Outcome: Progressing     Problem: Prexisting or High Potential for Compromised Skin Integrity  Goal: Skin integrity is maintained or improved  Description: INTERVENTIONS:  - Identify patients at risk for skin breakdown  - Assess and monitor skin integrity  - Assess and monitor nutrition and hydration status  - Monitor labs   - Assess for incontinence   - Turn and reposition patient  - Assist with mobility/ambulation  - Relieve pressure over bony prominences  - Avoid friction and shearing  - Provide appropriate hygiene as needed including keeping skin clean and dry  - Evaluate need for skin moisturizer/barrier cream  - Collaborate with interdisciplinary team   - Patient/family teaching  - Consider wound care consult   Outcome: Progressing     Problem: Potential for Falls  Goal: Patient will remain free of falls  Description: INTERVENTIONS:  - Educate patient/family on patient safety including physical limitations  - Instruct patient to call for assistance with activity   - Consult OT/PT to assist with strengthening/mobility   - Keep Call bell within reach  - Keep bed low and locked with side rails adjusted as appropriate  - Keep care items and personal belongings within reach  - Initiate and maintain comfort rounds  - Make Fall Risk Sign visible to staff  - Offer Toileting every 2 Hours, in advance of need  - Initiate/Maintain bed alarm  - Obtain necessary fall risk management equipment  - Apply yellow socks and bracelet for high fall risk patients  - Consider moving patient to room near nurses station  Outcome: Progressing     Problem: METABOLIC, FLUID AND ELECTROLYTES - ADULT  Goal: Electrolytes maintained within normal limits  Description: INTERVENTIONS:  - Monitor labs and assess patient for signs and symptoms of electrolyte imbalances  - Administer electrolyte replacement as ordered  - Monitor response to electrolyte replacements, including repeat lab results as appropriate  - Instruct patient on fluid and nutrition as appropriate  Outcome: Progressing  Goal: Fluid balance maintained  Description: INTERVENTIONS:  - Monitor labs   - Monitor I/O and WT  - Instruct patient on fluid and nutrition as appropriate  - Assess for signs & symptoms of volume excess or deficit  Outcome: Progressing

## 2022-09-22 NOTE — CASE MANAGEMENT
Case Management Discharge Planning Note    Patient name Coreen Apodaca  Location /-60 MRN [id number]  : 1980 Date 2022       Current Admission Date: 2022  Current Admission Diagnosis:Acute respiratory failure with hypoxia [facility])   Patient Active Problem List    Diagnosis Date Noted    Chest pain 2022    Chronic kidney [facility], stage 4 (severe) [facility] 2022    Nodular type diabetic glomerulosclerosis ([facility] ) 2022    Interstitial fibrosis present on renal biopsy 2022    Arteriosclerosis of kidney 2022    Diabetic ulcer of both feet ([facility] ) 2022    Primary hypertension 2022    Open toe wound 2022    Volume overload 2022    Hyponatremia 2022    Essential hypertension 2021    SOB (shortness of breath) 2021    Gastroesophageal reflux disease without esophagitis 2021    Family history of heart disease 2021    Hypokalemia 2021    Chest pressure 2021    Elevated troponin 2021    Type 2 diabetes mellitus with stage 4 chronic kidney disease and hypertension ([facility] ) 2021    Acute respiratory disease due to COVID-19 virus 2021    Acute respiratory failure with hypoxia ([facility] ) 2020    STEFANIA (acute kidney injury) ([facility] ) 2020    Schizo affective schizophrenia ([place] [address] ) 10/25/2020    Hypertensive emergency 2020    Encephalopathy 2020    Leukocytosis 2020    Chronic migraine without aura without status migrainosus, not intractable 2019    Difficulty urinating 2019    Urinary tract infection without hematuria 2019    Penile pain 2019    Spinal stenosis in cervical region 2019    Class 3 severe obesity due to excess calories with serious comorbidity and body mass index (BMI) of 60 0 to 69 9 in adult [facility]) 2019    Migraine without aura and without status migrainosus, not intractable 2019    Peripheral edema 06/07/2019    Hyperlipidemia, mixed 06/07/2019    Bipolar 1 disorder ([facility] ) 05/30/2019    Depression 05/30/2019    Type 1 diabetes mellitus without complication ([facility] ) 87/20/0385    Urinary retention 05/30/2019    Occipital neuralgia of left side 02/15/2019    Headache 02/15/2019    Nodule of parotid gland 02/15/2019    Snoring 12/04/2018    Insomnia 12/04/2018    Hypersomnia 12/04/2018    Vitamin D deficiency 12/04/2018    Episodic confusion     OCD (obsessive compulsive disorder) 10/31/2018    Schizoaffective disorder, depressive type ([facility] ) 10/31/2018    Acquired hypothyroidism 10/31/2018    Morbid obesity with BMI of 50 0-59 9, adult ([facility] ) 10/31/2018    Anxiety 10/31/2018    Abdominal pain 05/12/2018    Vomiting 05/12/2018      LOS (days): 12  Geometric Mean LOS (GMLOS) (days): 4 70  Days to GMLOS:-7 1     OBJECTIVE:  Risk of Unplanned Readmission Score: 37 11      Current admission status: Inpatient   Preferred Pharmacy:   [id number], [address]  [id number] [place] [id number]  Phone: [phone] Fax: [address] [place] [address]  Phone: [phone] Fax: [phone]    Primary Care Provider: Vic Pickard MD    Primary Insurance: [address] [place] [address]  Secondary Insurance:     DISCHARGE DETAILS:  Received a TC from Karl Nicole at [facility] indicating the pt will have a chair at [facility] in [facility]  Address is [place] [address] Confirmed with the pt his parent will transport him to dialysis  Resent the hepatitis panel to [facility]  Awaiting the confirmed chair time  Pt has dialysis today

## 2022-09-22 NOTE — PROGRESS NOTES
Progress Note - Nephrology   London Chow 38 y o  male MRN: [id number]  Unit/Bed#: -01 Encounter: [id number]      Assessment/Plan:  HEMODIALYSIS PROCEDURE NOTE  The patient was seen and examined on hemodialysis  Time: 3 hours  Sodium: 135 Blood flow: 350   Dialyzer: F160 Potassium: 3 Dialysate flow: 600   Access: RIJ PC  Bicarbonate: 30 Ultrafiltration goal: 2 5 L   Medications on HD: heparin with HD     1  ESRD --Discussed with HD RN, issues with initial treatment requiring change to another HD machine  Also access pressures elevated requiring decreased QB  Pt will have heparin to reduce clotting issues  Will abbreviate time to 3 hours  SW making referrals for op placement  Continue TTS schedule  2  Dialysis access right IJ PermCath having some elevated venous flows  Will administer heparin  3  Anemia ESRD/iron deficiency anemia  Avoiding GLORIA until patient is discharged  Long-acting Mircera will be used at that point  Hemoglobin 9 8  Continue Venofer for 1 g load    4  Hyperparathyroidism anion ESRD  On liberal diet at this point  Will determine need for phosphorus binders as we go  5  Hypertension in ESRD  Blood pressure is improving continue amlodipine 10 mg per day, Coreg 25 mg per day, doxazosin 2 mg per day, losartan 50mg/day  Torsemide on non HD days  Once more euvolemic look to peel off antihypertensive  Try to come of CCB and cardura  Pre HD BP goal <140/90, post goal <130/80  Subjective:   Patienet seen and examined on HD  Reports soreness to his PermCath site  No active bleeding noted  Denies chest pain, shortness breath, nausea, vomiting,  diarrhea, fevers, chills  Pt on room air  BP stable  A complete 10 point review of systems was performed and is otherwise negative  Objective:     Vitals: Blood pressure 116/68, pulse 67, temperature 97 6 °F (36 4 °C), resp  rate 18, height 5' 2" (1 575 m), weight (!) 146 kg (321 lb 15 7 oz), SpO2 95 %  ,Body mass index is 58 89 kg/m²  Weight (last 2 days)     Date/Time Weight    09/22/22 0555 146 (321 98)     Weight: pt weighed on grey standing scale at 09/22/22 0555    09/21/22 0600 145 (320 66)    09/20/22 0600 145 (320 11)     Weight: used the white standing scale at 09/20/22 0600            Intake/Output Summary (Last 24 hours) at 9/22/2022 5270  Last data filed at 9/22/2022 8263  Gross per 24 hour   Intake 240 ml   Output --   Net 240 ml       HD Permanent Double Catheter (Active)   Reasons to continue HD Cath Treatment Therapy 09/21/22 2156   Goal for Removal N/A- chronic HD catheter 09/21/22 2156   Line Necessity Reviewed Yes, reviewed with provider 09/21/22 2156   Site Assessment WDL 09/21/22 2156   Proximal Lumen Status Normal saline locked 09/21/22 2156   Distal Lumen Status Normal saline locked 09/21/22 2156   Dressing Type Chlorhexidine dressing 09/21/22 2156   Dressing Status Clean;Dry; Intact 09/21/22 2156   Dressing Change Due 09/23/22 09/21/22 2156       Physical Exam: Physical Exam  Vitals and nursing note reviewed  Constitutional:       General: He is not in acute distress  Appearance: He is obese  He is not ill-appearing  HENT:      Head: Atraumatic  Nose: Nose normal       Mouth/Throat:      Mouth: Mucous membranes are moist       Pharynx: Oropharynx is clear  Eyes:      Extraocular Movements: Extraocular movements intact  Conjunctiva/sclera: Conjunctivae normal    Cardiovascular:      Rate and Rhythm: Normal rate and regular rhythm  Heart sounds: No friction rub  Pulmonary:      Breath sounds: No wheezing, rhonchi or rales  Abdominal:      General: Abdomen is flat  Bowel sounds are normal  There is no distension  Palpations: Abdomen is soft  Tenderness: There is no abdominal tenderness  There is no guarding  Musculoskeletal:      Right lower leg: Edema present  Left lower leg: Edema present  Skin:     General: Skin is warm and dry        Coloration: Skin is pale    Neurological:      General: No focal deficit present  Mental Status: He is alert and oriented to person, place, and time  Mental status is at baseline     Psychiatric:         Mood and Affect: Mood normal          Behavior: Behavior normal          Lab, Imaging and other studies:   09/22/2022 sodium 131 potassium 4 2 chloride 97 TCO2 24 creatinine 4 9 BUN 48 phosphorus 4 7 hemoglobin 9 8

## 2022-09-22 NOTE — PLAN OF CARE
Problem: PAIN - ADULT  Goal: Verbalizes/displays adequate comfort level or baseline comfort level  Description: Interventions:  - Encourage patient to monitor pain and request assistance  - Assess pain using appropriate pain scale  - Administer analgesics based on type and severity of pain and evaluate response  - Implement non-pharmacological measures as appropriate and evaluate response  - Consider cultural and social influences on pain and pain management  - Notify physician/advanced practitioner if interventions unsuccessful or patient reports new pain  Outcome: Progressing     Problem: INFECTION - ADULT  Goal: Absence or prevention of progression during hospitalization  Description: INTERVENTIONS:  - Assess and monitor for signs and symptoms of infection  - Monitor lab/diagnostic results  - Monitor all insertion sites, i e  indwelling lines, tubes, and drains  - Monitor endotracheal if appropriate and nasal secretions for changes in amount and color  - [place] appropriate cooling/warming therapies per order  - Administer medications as ordered  - Instruct and encourage patient and family to use good hand hygiene technique  - Identify and instruct in appropriate isolation precautions for identified infection/condition  Outcome: Progressing  Goal: Absence of fever/infection during neutropenic period  Description: INTERVENTIONS:  - Monitor WBC    Outcome: Progressing     Problem: SAFETY ADULT  Goal: Patient will remain free of falls  Description: INTERVENTIONS:  - Educate patient/family on patient safety including physical limitations  - Instruct patient to call for assistance with activity   - Consult OT/PT to assist with strengthening/mobility   - Keep Call bell within reach  - Keep bed low and locked with side rails adjusted as appropriate  - Keep care items and personal belongings within reach  - Initiate and maintain comfort rounds  - Make Fall Risk Sign visible to staff  - Offer Toileting every 2 Hours, in advance of need  - Initiate/Maintain bed alarm  - Obtain necessary fall risk management equipment  - Apply yellow socks and bracelet for high fall risk patients  - Consider moving patient to room near nurses station  Outcome: Progressing  Goal: Maintain or return to baseline ADL function  Description: INTERVENTIONS:  -  Assess patient's ability to carry out ADLs; assess patient's baseline for ADL function and identify physical deficits which impact ability to perform ADLs (bathing, care of mouth/teeth, toileting, grooming, dressing, etc )  - Assess/evaluate cause of self-care deficits   - Assess range of motion  - Assess patient's mobility; develop plan if impaired  - Assess patient's need for assistive devices and provide as appropriate  - Encourage maximum independence but intervene and supervise when necessary  - Involve family in performance of ADLs  - Assess for home care needs following discharge   - Consider OT consult to assist with ADL evaluation and planning for discharge  - Provide patient education as appropriate  Outcome: Progressing  Goal: Maintains/Returns to pre admission functional level  Description: INTERVENTIONS:  - Perform BMAT or MOVE assessment daily    - Set and communicate daily mobility goal to care team and patient/family/caregiver  - Collaborate with rehabilitation services on mobility goals if consulted  - Perform Range of Motion 2 times a day  - Reposition patient every 2 hours    - Dangle patient 2 times a day  - Stand patient 2 times a day  - Ambulate patient 2 times a day  - Out of bed to chair 3 times a day   - Out of bed for meals 3 times a day  - Out of bed for toileting  - Record patient progress and toleration of activity level   Outcome: Progressing     Problem: DISCHARGE PLANNING  Goal: Discharge to home or other facility with appropriate resources  Description: INTERVENTIONS:  - Identify barriers to discharge w/patient and caregiver  - Arrange for needed discharge resources and transportation as appropriate  - Identify discharge learning needs (meds, wound care, etc )  - Refer to Case Management Department for coordinating discharge planning if the patient needs post-hospital services based on physician/advanced practitioner order or complex needs related to functional status, cognitive ability, or social support system  Outcome: Progressing     Problem: Knowledge Deficit  Goal: Patient/family/caregiver demonstrates understanding of disease process, treatment plan, medications, and discharge instructions  Description: Complete learning assessment and assess knowledge base    Interventions:  - Provide teaching at level of understanding  - Provide teaching via preferred learning methods  Outcome: Progressing     Problem: RESPIRATORY - ADULT  Goal: Achieves optimal ventilation and oxygenation  Description: INTERVENTIONS:  - Assess for changes in respiratory status  - Assess for changes in mentation and behavior  - Position to facilitate oxygenation and minimize respiratory effort  - Oxygen administered by appropriate delivery if ordered  - Initiate smoking cessation education as indicated  - Encourage broncho-pulmonary hygiene including cough, deep breathe, Incentive Spirometry  - Assess the need for suctioning and aspirate as needed  - Assess and instruct to report SOB or any respiratory difficulty  - Respiratory Therapy support as indicated  Outcome: Progressing     Problem: Prexisting or High Potential for Compromised Skin Integrity  Goal: Skin integrity is maintained or improved  Description: INTERVENTIONS:  - Identify patients at risk for skin breakdown  - Assess and monitor skin integrity  - Assess and monitor nutrition and hydration status  - Monitor labs   - Assess for incontinence   - Turn and reposition patient  - Assist with mobility/ambulation  - Relieve pressure over bony prominences  - Avoid friction and shearing  - Provide appropriate hygiene as needed including keeping skin clean and dry  - Evaluate need for skin moisturizer/barrier cream  - Collaborate with interdisciplinary team   - Patient/family teaching  - Consider wound care consult   Outcome: Progressing     Problem: Potential for Falls  Goal: Patient will remain free of falls  Description: INTERVENTIONS:  - Educate patient/family on patient safety including physical limitations  - Instruct patient to call for assistance with activity   - Consult OT/PT to assist with strengthening/mobility   - Keep Call bell within reach  - Keep bed low and locked with side rails adjusted as appropriate  - Keep care items and personal belongings within reach  - Initiate and maintain comfort rounds  - Make Fall Risk Sign visible to staff  - Offer Toileting every 2 Hours, in advance of need  - Initiate/Maintain bed alarm  - Obtain necessary fall risk management equipment  - Apply yellow socks and bracelet for high fall risk patients  - Consider moving patient to room near nurses station  Outcome: Progressing     Problem: METABOLIC, FLUID AND ELECTROLYTES - ADULT  Goal: Electrolytes maintained within normal limits  Description: INTERVENTIONS:  - Monitor labs and assess patient for signs and symptoms of electrolyte imbalances  - Administer electrolyte replacement as ordered  - Monitor response to electrolyte replacements, including repeat lab results as appropriate  - Instruct patient on fluid and nutrition as appropriate  Outcome: Progressing  Goal: Fluid balance maintained  Description: INTERVENTIONS:  - Monitor labs   - Monitor I/O and WT  - Instruct patient on fluid and nutrition as appropriate  - Assess for signs & symptoms of volume excess or deficit  Outcome: Progressing     Problem: Nutrition/Hydration-ADULT  Goal: Nutrient/Hydration intake appropriate for improving, restoring or maintaining nutritional needs  Description: Monitor and assess patient's nutrition/hydration status for malnutrition   Collaborate with interdisciplinary team and initiate plan and interventions as ordered  Monitor patient's weight and dietary intake as ordered or per policy  Utilize nutrition screening tool and intervene as necessary  Determine patient's food preferences and provide high-protein, high-caloric foods as appropriate       INTERVENTIONS:  - Monitor oral intake, urinary output, labs, and treatment plans  - Assess nutrition and hydration status and recommend course of action  - Evaluate amount of meals eaten  - Assist patient with eating if necessary   - Allow adequate time for meals  - Recommend/ encourage appropriate diets, oral nutritional supplements, and vitamin/mineral supplements  - Order, calculate, and assess calorie counts as needed  - Recommend, monitor, and adjust tube feedings and TPN/PPN based on assessed needs  - Assess need for intravenous fluids  - Provide specific nutrition/hydration education as appropriate  - Include patient/family/caregiver in decisions related to nutrition  Outcome: Progressing

## 2022-09-22 NOTE — PROGRESS NOTES
[address]  Progress Note - Stalin Boynton 1980, 38 y o  male MRN: [id number]  Unit/Bed#: -01 Encounter: [id number]  Primary Care Provider: Lonnie Graham MD   Date and time admitted to hospital: 9/9/2022 11:40 AM    * Acute respiratory failure with hypoxia ([address] )  Assessment & Plan  · Found to have oxygen saturation of 87% in ER, was utilizing 5 L nasal cannula  · X-ray revealed findings suggestive of fluid overload and possible pneumonia  ·    · Echocardiogram shows LVEF of 50 to 55%; trivial small pericardial effusion  · Acute respiratory failure is due to suspected volume overload  · Now resolved- patient currently on RA   · Nephrology input appreciated  · Patient initiated on hemodialysis- tunneled dialysis catheter placed 9/16/2022  · Medically stable for discharge pending HD chair set up per cm    Chest pain  Assessment & Plan  · Patient reported right sided chest pressure 9/14  · Troponin 17- 16  · 9/22- developed an acute episode of left sided chest pain  Suspect to be related musculoskeletal versus hypotensive episodes during dialysis session  · Given tylenol x 1   · EKG without ischemic changes  · Troponin- pending       Chronic kidney disease, stage 4 (severe) [facility])  Assessment & Plan  Lab Results   Component Value Date    EGFR 13 09/22/2022    EGFR 16 09/21/2022    EGFR 12 09/20/2022    CREATININE 4 94 (H) 09/22/2022    CREATININE 4 06 (H) 09/21/2022    CREATININE 5 11 (H) 09/20/2022     · Baseline Cr around 4 0mg/dL  follows with nephrology outpatient  The patient underwent renal biopsy which indicates nodular diabetic glomerulosclerosis, 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  · Presented with STEFANIA Cr 4 38mg/dL suspect to be due to acute tubular necrosis  · Renal function continues to worsen  Initiated on HD on 9/17 with tunneled dialysis catheter placed on 9/16     · Continue Torsemide 100 mg po on non HD days  · Continue HD per nephrology       Essential hypertension  Assessment & Plan  · Patient states he was out of his antihypertensives prior to admission   · 9/22 Due to hypotension during HD session, Nephrology adjusted his regimen- Cardura was discontinued, amlodipine decreased to 5 mg daily  · Torsemide 100 mg daily on non dialysis days       Gastroesophageal reflux disease without esophagitis  Assessment & Plan  · Continue home Protonix      Type 2 diabetes mellitus with stage 4 chronic kidney disease and hypertension [facility])  Assessment & Plan  Lab Results   Component Value Date    HGBA1C 9 1 (H) 07/16/2022       Recent Labs     09/21/22  1050 09/21/22  1628 09/21/22  2103 09/22/22  0719   POCGLU 221* 114 148* 91       Blood Sugar Average: Last 72 hrs:  (P) 450 [id number]     · Lantus increased to 25 units at bedtime  · Continue lispro 5 units with meals   · Fingerstick blood sugar with sliding scale coverage 4 times daily with meals and at bedtime  · Carb controlled diet    Acquired hypothyroidism  Assessment & Plan  · Continue home levothyroxine       VTE Prophylaxis:  Heparin    Patient Centered Rounds: I have performed bedside rounds with nursing staff today  Discussions with Specialists or Other Care Team Provider: nephrology   Education and Discussions with Family / Patient: patient     Current Length of Stay: 12 day(s)    Current Patient Status: Inpatient   Certification Statement: The patient will continue to require additional inpatient hospital stay due to acute respiratory failure     Discharge Plan: pending hospital course  Nephrology would like to do another session of HD tomorrow  Code Status: Level 1 - Full Code    Subjective:   Patient was feeling fine when I saw him earlier in the day at HD  He later developed an acute onset of chest pain located on left side of his chest, non-radiating, no associating symptoms  Rating 6-7/10 at its worst now 5/10  Pain is better with palpation       Objective:     Vitals: Temp (24hrs), Av 8 °F (36 6 °C), Min:97 6 °F (36 4 °C), Max:98 °F (36 7 °C)    Temp:  [97 6 °F (36 4 °C)-98 °F (36 7 °C)] 97 6 °F (36 4 °C)  HR:  [67-69] 67  Resp:  [16-18] 18  BP: (116-127)/(64-70) 116/68  SpO2:  [89 %-97 %] 97 %  Body mass index is 58 89 kg/m²  Input and Output Summary (last 24 hours): Intake/Output Summary (Last 24 hours) at 2022 1055  Last data filed at 2022 0711  Gross per 24 hour   Intake 240 ml   Output --   Net 240 ml       Physical Exam:   Physical Exam  Vitals and nursing note reviewed  Constitutional:       General: He is not in acute distress  Appearance: Normal appearance  He is obese  HENT:      Head: Normocephalic and atraumatic  Right Ear: External ear normal       Left Ear: External ear normal       Nose: Nose normal       Mouth/Throat:      Mouth: Mucous membranes are moist       Pharynx: Oropharynx is clear  Eyes:      General:         Right eye: No discharge  Left eye: No discharge  Extraocular Movements: Extraocular movements intact  Pupils: Pupils are equal, round, and reactive to light  Cardiovascular:      Rate and Rhythm: Normal rate and regular rhythm  Pulses: Normal pulses  Heart sounds: Normal heart sounds  No murmur heard  Pulmonary:      Effort: Pulmonary effort is normal  No respiratory distress  Breath sounds: Normal breath sounds  No wheezing or rales  Comments: Right chest tunneled catheter in place   Abdominal:      General: Bowel sounds are normal  There is no distension  Palpations: Abdomen is soft  There is no mass  Tenderness: There is no abdominal tenderness  Musculoskeletal:         General: No swelling, tenderness or deformity  Normal range of motion  Cervical back: Normal range of motion and neck supple  No rigidity  Skin:     General: Skin is warm and dry  Capillary Refill: Capillary refill takes less than 2 seconds  Coloration: Skin is pale  Findings: No erythema  Neurological:      General: No focal deficit present  Mental Status: He is alert and oriented to person, place, and time  Mental status is at baseline  Psychiatric:         Mood and Affect: Mood normal          Behavior: Behavior normal          Thought Content: Thought content normal          Judgment: Judgment normal          Additional Data:     Labs:    Results from last 7 days   Lab Units 09/22/22  0512   WBC Thousand/uL 8 19   HEMOGLOBIN g/dL 9 8*   HEMATOCRIT % 29 2*   PLATELETS Thousands/uL 272   NEUTROS PCT % 64   LYMPHS PCT % 20   MONOS PCT % 9   EOS PCT % 5     Results from last 7 days   Lab Units 09/22/22  0512 09/20/22  0603 09/19/22  0442   SODIUM mmol/L 131*   < > 132*   POTASSIUM mmol/L 4 2   < > 3 9   CHLORIDE mmol/L 97   < > 97   CO2 mmol/L 24   < > 27   BUN mg/dL 48*   < > 74*   CREATININE mg/dL 4 94*   < > 5 58*   CALCIUM mg/dL 9 2   < > 9 1   ALK PHOS U/L  --   --  69   ALT U/L  --   --  7   AST U/L  --   --  22    < > = values in this interval not displayed  Results from last 7 days   Lab Units 09/22/22  0719 09/21/22  2103 09/21/22  1628 09/21/22  1050 09/21/22  0656 09/20/22  2112 09/20/22  1613 09/20/22  1309 09/20/22  0738 09/19/22  2102 09/19/22  1613 09/19/22  1140   POC GLUCOSE mg/dl 91 148* 114 221* 116 173* 188* 137 134 158* 218* 151*           * I Have Reviewed All Lab Data Listed Above  * Additional Pertinent Lab Tests Reviewed:  [facility]  Reviewed    Imaging:  Imaging Reports Reviewed Today Include: n/a     Recent Cultures (last 7 days):           Last 24 Hours Medication List:   Current Facility-Administered Medications   Medication Dose Route Frequency Provider Last Rate    acetaminophen  650 mg Oral Q6H PRN Alisha Gotti PA-C      acetaminophen  975 mg Oral Once BrunoDOREEN Sanchez      albuterol  2 puff Inhalation Q4H PRN Donnie Monahan DO      [START ON 9/23/2022] amLODIPine  5 mg Oral Daily [facility] Cuco, DO      b complex-vitamin C-folic acid  1 capsule Oral Daily With [employer] Varvarelis, DO      carvedilol  25 mg Oral BID With Meals Maria Elena Perez, DO      FLUoxetine  20 mg Oral QAM Maria Elena Perez, DO      glycerin-hypromellose-  2 drop Both Eyes Q3H PRN DOREEN Shannon      heparin (porcine)  6,000 Units Intravenous Once per day on Tue Thu Sat Adán Ahmadi, DO      heparin (porcine)  7,500 Units Subcutaneous [facility] Maria Elena Perez, DO      hydrOXYzine HCL  25 mg Oral Q6H PRN Andrei Martinez PA-C      insulin glargine  25 Units Subcutaneous HS Joseph Jolly PA-C      insulin lispro  2-12 Units Subcutaneous TID AC [facility], DO      insulin lispro  2-12 Units Subcutaneous HS Maria Elena Perez, DO      insulin lispro  5 Units Subcutaneous TID With Meals Maria Elena Perez, DO      iron sucrose  200 mg Intravenous Daily Adán Ahmadi,  mg (09/20/22 1100)    levothyroxine  125 mcg Oral Early Morning Maria Elena Perez, DO      losartan  50 mg Oral Daily Adán Ahmadi, DO      ondansetron  4 mg Intravenous Q4H PRN Joseph Jolly PA-C      pantoprazole  40 mg Oral BID AC Maria Elena Perez, DO      torsemide  100 mg Oral Once per day on Sun Mon Wed Fri Adán Ahmadi, DO          Today, Patient Was Seen By: DOREEN Shannon    ** Please Note: Dictation voice to text software may have been used in the creation of this document   **

## 2022-09-22 NOTE — ASSESSMENT & PLAN NOTE
Lab Results   Component Value Date    HGBA1C 9 1 (H) 07/16/2022       Recent Labs     09/21/22  1050 09/21/22  1628 09/21/22  2103 09/22/22  0719   POCGLU 221* 114 148* 91       Blood Sugar Average: Last 72 hrs:  (P) 242 [id number]     · Lantus increased to 25 units at bedtime  · Continue lispro 5 units with meals   · Fingerstick blood sugar with sliding scale coverage 4 times daily with meals and at bedtime  · Carb controlled diet

## 2022-09-22 NOTE — HEMODIALYSIS
Patient on hemodialysis for 50 minutes  Blood pressure dropped to 96/66 and he c/o spots in vision and chest pressure  Minimal UF and nss 100 ml x 2 per Dr Ritu Horvath  Blood pressure improved but chest pressure increased  Rapid response called  Troponin drawn and EKG performed  Patient returned to his room after verbal report to Ilana Seay  Elevated VR during treatment  Plan next hemodialysis treatment 9/23/2022 after cathflow instillation

## 2022-09-22 NOTE — NURSING NOTE
AWAKE AND ALERT AND ORIENTED X4  R/A STATUS  02 SAT 97%  HR 70/MIN AND REG    DENIES PAIN  NO SOB AT REST  ABDOMEN IS SOFT AND NON TENDER  LBM WAS 9/21/22  VOIDS FREELY SMALLER AMTS  PLUS 2 NON PITTING EDEMA BLE  RT CHEST WALL HD  CATH MAINTAINED AND DRY AND INTACT  PLUS 2 PEDAL/RADIAL PULSES  NO DISTRESS AND CALL BELL NEAR

## 2022-09-22 NOTE — QUICK NOTE
HsTroponin-9  Chest pain is unlikely related to ACS and possibly due to hypotensive episode and/or musculoskeletal pain  09/09/2020 to TTE shows LVEF of 50-55%  Mild mitral valve regurgitation

## 2022-09-22 NOTE — ASSESSMENT & PLAN NOTE
· Patient reported right sided chest pressure 9/14  · Troponin 17- 16  · 9/22- developed an acute episode of left sided chest pain   Suspect to be related musculoskeletal versus hypotensive episodes during dialysis session  · Given tylenol x 1   · EKG without ischemic changes  · Troponin- pending

## 2022-09-22 NOTE — ASSESSMENT & PLAN NOTE
· Patient states he was out of his antihypertensives prior to admission   · 9/22 Due to hypotension during HD session, Nephrology adjusted his regimen- Cardura was discontinued, amlodipine decreased to 5 mg daily  · Torsemide 100 mg daily on non dialysis days

## 2022-09-22 NOTE — QUICK NOTE
Patient developed chest pressure during treatment, did not improve with fluid administration  BP dropped to 90s during treatment  Treatment stopped  Denies associated symptoms  QB was dropping to 250 despite heparin  RRT called and EKG,troponin ordered  Due to drop in BP likely needs less antihypertensive to allow fluid removal safely  Will stop cardura and decrease norvasc to 5mg

## 2022-09-22 NOTE — ASSESSMENT & PLAN NOTE
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

## 2022-09-23 ENCOUNTER — APPOINTMENT (INPATIENT)
Dept: INTERVENTIONAL RADIOLOGY/VASCULAR | Facility: HOSPITAL | Age: 42
DRG: 470 | End: 2022-09-23
Attending: RADIOLOGY
Payer: COMMERCIAL

## 2022-09-23 ENCOUNTER — APPOINTMENT (INPATIENT)
Dept: DIALYSIS | Facility: HOSPITAL | Age: 42
DRG: 470 | End: 2022-09-23
Payer: COMMERCIAL

## 2022-09-23 VITALS
HEIGHT: 62 IN | BODY MASS INDEX: 57.97 KG/M2 | OXYGEN SATURATION: 96 % | SYSTOLIC BLOOD PRESSURE: 164 MMHG | TEMPERATURE: 97.6 F | HEART RATE: 72 BPM | DIASTOLIC BLOOD PRESSURE: 99 MMHG | RESPIRATION RATE: 20 BRPM | WEIGHT: 315 LBS

## 2022-09-23 LAB
GLUCOSE SERPL-MCNC: 158 MG/DL (ref 65–140)
GLUCOSE SERPL-MCNC: 202 MG/DL (ref 65–140)

## 2022-09-23 PROCEDURE — 82948 REAGENT STRIP/BLOOD GLUCOSE: CPT

## 2022-09-23 PROCEDURE — 77001 FLUOROGUIDE FOR VEIN DEVICE: CPT | Performed by: RADIOLOGY

## 2022-09-23 PROCEDURE — C1750 CATH, HEMODIALYSIS,LONG-TERM: HCPCS

## 2022-09-23 PROCEDURE — 99239 HOSP IP/OBS DSCHRG MGMT >30: CPT | Performed by: NURSE PRACTITIONER

## 2022-09-23 PROCEDURE — 36581 REPLACE TUNNELED CV CATH: CPT

## 2022-09-23 PROCEDURE — C1769 GUIDE WIRE: HCPCS

## 2022-09-23 PROCEDURE — 77001 FLUOROGUIDE FOR VEIN DEVICE: CPT

## 2022-09-23 PROCEDURE — 99232 SBSQ HOSP IP/OBS MODERATE 35: CPT | Performed by: INTERNAL MEDICINE

## 2022-09-23 PROCEDURE — 36581 REPLACE TUNNELED CV CATH: CPT | Performed by: RADIOLOGY

## 2022-09-23 PROCEDURE — 0J2TXYZ CHANGE OTHER DEVICE IN TRUNK SUBCUTANEOUS TISSUE AND FASCIA, EXTERNAL APPROACH: ICD-10-PCS | Performed by: RADIOLOGY

## 2022-09-23 RX ORDER — LOSARTAN POTASSIUM 50 MG/1
50 TABLET ORAL DAILY
Qty: 30 TABLET | Refills: 0 | Status: SHIPPED | OUTPATIENT
Start: 2022-09-23 | End: 2022-10-04 | Stop reason: SDUPTHER

## 2022-09-23 RX ORDER — TORSEMIDE 100 MG/1
100 TABLET ORAL
Qty: 16 TABLET | Refills: 0 | Status: SHIPPED | OUTPATIENT
Start: 2022-09-24 | End: 2022-10-04 | Stop reason: SDUPTHER

## 2022-09-23 RX ORDER — LIDOCAINE HYDROCHLORIDE 10 MG/ML
INJECTION, SOLUTION EPIDURAL; INFILTRATION; INTRACAUDAL; PERINEURAL CODE/TRAUMA/SEDATION MEDICATION
Status: COMPLETED | OUTPATIENT
Start: 2022-09-23 | End: 2022-09-23

## 2022-09-23 RX ORDER — INSULIN GLARGINE 100 [IU]/ML
25 INJECTION, SOLUTION SUBCUTANEOUS
Qty: 10 ML | Refills: 0
Start: 2022-09-23 | End: 2022-10-04 | Stop reason: SDUPTHER

## 2022-09-23 RX ORDER — CHOLECALCIFEROL (VITAMIN D3) 10 MCG
1 TABLET ORAL
Qty: 30 CAPSULE | Refills: 0 | Status: SHIPPED | OUTPATIENT
Start: 2022-09-23 | End: 2022-10-23

## 2022-09-23 RX ORDER — HEPARIN SODIUM 1000 [USP'U]/ML
2000 INJECTION, SOLUTION INTRAVENOUS; SUBCUTANEOUS ONCE
Status: COMPLETED | OUTPATIENT
Start: 2022-09-23 | End: 2022-09-23

## 2022-09-23 RX ORDER — AMLODIPINE BESYLATE 5 MG/1
5 TABLET ORAL DAILY
Qty: 30 TABLET | Refills: 0 | Status: SHIPPED | OUTPATIENT
Start: 2022-09-24 | End: 2022-10-04 | Stop reason: SDUPTHER

## 2022-09-23 RX ADMIN — INSULIN LISPRO 4 UNITS: 100 INJECTION, SOLUTION INTRAVENOUS; SUBCUTANEOUS at 08:14

## 2022-09-23 RX ADMIN — HEPARIN SODIUM 7500 UNITS: 5000 INJECTION INTRAVENOUS; SUBCUTANEOUS at 15:04

## 2022-09-23 RX ADMIN — LOSARTAN POTASSIUM 50 MG: 50 TABLET, FILM COATED ORAL at 12:39

## 2022-09-23 RX ADMIN — LEVOTHYROXINE SODIUM 125 MCG: 25 TABLET ORAL at 05:21

## 2022-09-23 RX ADMIN — PANTOPRAZOLE SODIUM 40 MG: 40 TABLET, DELAYED RELEASE ORAL at 15:04

## 2022-09-23 RX ADMIN — ACETAMINOPHEN 325MG 650 MG: 325 TABLET ORAL at 15:03

## 2022-09-23 RX ADMIN — ALTEPLASE 2 MG: 2.2 INJECTION, POWDER, LYOPHILIZED, FOR SOLUTION INTRAVENOUS at 08:15

## 2022-09-23 RX ADMIN — INSULIN LISPRO 5 UNITS: 100 INJECTION, SOLUTION INTRAVENOUS; SUBCUTANEOUS at 12:39

## 2022-09-23 RX ADMIN — HEPARIN SODIUM 2000 UNITS: 1000 INJECTION INTRAVENOUS; SUBCUTANEOUS at 10:29

## 2022-09-23 RX ADMIN — SODIUM CHLORIDE 200 MG: 9 INJECTION, SOLUTION INTRAVENOUS at 09:58

## 2022-09-23 RX ADMIN — TORSEMIDE 100 MG: 100 TABLET ORAL at 12:48

## 2022-09-23 RX ADMIN — INSULIN LISPRO 2 UNITS: 100 INJECTION, SOLUTION INTRAVENOUS; SUBCUTANEOUS at 12:39

## 2022-09-23 RX ADMIN — CARVEDILOL 25 MG: 25 TABLET, FILM COATED ORAL at 12:39

## 2022-09-23 RX ADMIN — PANTOPRAZOLE SODIUM 40 MG: 40 TABLET, DELAYED RELEASE ORAL at 06:19

## 2022-09-23 RX ADMIN — FLUOXETINE 20 MG: 20 CAPSULE ORAL at 12:39

## 2022-09-23 RX ADMIN — AMLODIPINE BESYLATE 5 MG: 5 TABLET ORAL at 12:38

## 2022-09-23 RX ADMIN — HEPARIN SODIUM 7500 UNITS: 5000 INJECTION INTRAVENOUS; SUBCUTANEOUS at 05:20

## 2022-09-23 RX ADMIN — INSULIN LISPRO 5 UNITS: 100 INJECTION, SOLUTION INTRAVENOUS; SUBCUTANEOUS at 08:15

## 2022-09-23 RX ADMIN — LIDOCAINE HYDROCHLORIDE 10 ML: 10 INJECTION, SOLUTION EPIDURAL; INFILTRATION; INTRACAUDAL; PERINEURAL at 14:02

## 2022-09-23 RX ADMIN — HYDROXYZINE HYDROCHLORIDE 25 MG: 25 TABLET ORAL at 15:04

## 2022-09-23 RX ADMIN — CEFAZOLIN 3000 MG: 1 INJECTION, POWDER, FOR SOLUTION INTRAMUSCULAR; INTRAVENOUS at 13:30

## 2022-09-23 NOTE — ASSESSMENT & PLAN NOTE
· Patient reported right sided chest pressure 9/14  · Troponin 17- 16  · 9/22- developed an acute episode of left sided chest pain   Suspect to be related musculoskeletal versus hypotensive episodes during dialysis session  · Given tylenol x 1   · EKG without ischemic changes  · Troponin- negative   · Resolved

## 2022-09-23 NOTE — ASSESSMENT & PLAN NOTE
· Found to have oxygen saturation of 87% in ER, was utilizing 5 L nasal cannula  · X-ray revealed findings suggestive of fluid overload and possible pneumonia  ·    · Echocardiogram shows LVEF of 50 to 55%; trivial small pericardial effusion  · Acute respiratory failure is due to suspected volume overload  · Now resolved- patient currently on RA   · Nephrology input appreciated  · Patient initiated on hemodialysis; outpatient chair set up by [facility]

## 2022-09-23 NOTE — QUICK NOTE
Patient with renal failure    Tunneled dialysis catheter placed September 16th    Per primary team it is not functioning appropriately and patient cannot be discharged with nonfunctional catheter    We will interrogate today

## 2022-09-23 NOTE — ASSESSMENT & PLAN NOTE
Lab Results   Component Value Date    HGBA1C 9 1 (H) 07/16/2022       Recent Labs     09/22/22  1557 09/22/22  2114 09/23/22  0731 09/23/22  1238   POCGLU 194* 257* 202* 158*       Blood Sugar Average: Last 72 hrs:  (P) 164 [id number]     · Lantus increased to 25 units at bedtime  · Continue lispro 5 units with meals   · Continue with these regimen upon discharge

## 2022-09-23 NOTE — NURSING NOTE
Anesthesia Pre Eval Note    Anesthesia ROS/Med Hx        Anesthetic Complication History:  Patient does not have a history of anesthetic complications      Pulmonary Review:  Patient does not have a pulmonary history      Neuro/Psych Review:  Patient does not have a neuro/psych history       Cardiovascular Review:  Patient does not have a cardiovascular history   Exercise tolerance: good (>4 METS)    GI/HEPATIC/RENAL Review:  Patient does not have a GI/hepatic/renalhistory       End/Other Review:  Patient does not have an endo/other history    Additional Results:     ALLERGIES:  No Known Allergies     Past Medical History:  No date: ARCHANA (generalized anxiety disorder)  No date: TMJ dysfunction      Comment:  doing stressful times    Past Surgical History:  No date: Colposcopy,loop electrd cervix excis  04/2021: Iud mirena       Prior to Admission medications :  Medication metroNIDAZOLE (MetroCream) 0.75 % cream, Sig Apply to affected areas of the face twice daily (morning and evening), Start Date 6/1/22, End Date , Taking? , Authorizing Provider David Cantu MD    Medication Sodium Sulfate-Mag Sulfate-KCl 7632-731-429 MG Tab, Sig Take 12 tablets by mouth as directed. See Colonoscopy Instructions., Start Date 5/18/22, End Date , Taking? , Authorizing Provider Sandy Sanchez MD         Patient Vitals in the past 24 hrs:  08/09/22 1456, BP:(!) 145/91, Temp:36.6 °C (97.8 °F), Temp src:Temporal, Pulse:(!) 111, Resp:16, SpO2:100 %, Height:5' 11\" (1.803 m), Weight:71.2 kg (157 lb)      Relevant Problems   No relevant active problems       Physical Exam     Airway   Mallampati: II  TM Distance: >3 FB  Neck ROM: Full  Neck: Non-tender and Able to place in sniff position  TMJ Mobility: Good    Cardiovascular  Cardiovascular exam normal  Cardio Rhythm: Regular  Cardio Rate: Normal    Head Assessment  Head assessment: Normocephalic and Atraumatic    General Assessment  General Assessment: Alert and oriented and No acute  Discharge instructions discussed with patient at bedside  Patient states understanding  distress    Dental Exam  Dental exam normal    Pulmonary Exam  Pulmonary exam normal  Breath sounds clear to auscultation:  Yes    Abdominal Exam  Abdominal exam normal      Anesthesia Plan:    ASA Status: 2  Anesthesia Type: MAC    Induction: Intravenous  Maintenance: TIVA    Post-op Pain Management: Per Surgeon      Checklist  Reviewed: NPO Status, Allergies, Medications, Problem list, Past Med History and Patient Summary  Consent/Risks Discussed Statement:  The proposed anesthetic plan, including its risks and benefits, have been discussed with the Patient along with the risks and benefits of alternatives. Questions were encouraged and answered and the patient and/or representative understands and agrees to proceed.        I discussed with the patient (and/or patient's legal representative) the risks and benefits of the proposed anesthesia plan, MAC, which may include services performed by other anesthesia providers.    Alternative anesthesia plans, if available, were reviewed with the patient (and/or patient's legal representative). Discussion has been held with the patient (and/or patient's legal representative) regarding risks of anesthesia, which include Intra-operative Awareness and emergent situations that may require change in anesthesia plan.    The patient (and/or patient's legal representative) has indicated understanding, his/her questions have been answered, and he/she wishes to proceed with the planned anesthetic.    Blood Products: Not Anticipated

## 2022-09-23 NOTE — PLAN OF CARE
Post-Dialysis RN Treatment Note    Blood Pressure:  Pre 165/99 mm/Hg  Post 164/99 mmHg   EDW  tbd     Weight:  Pre 147 6 kg   Post 145 9 kg   Mode of weight measurement: Bed Scale   Volume Removed  2100 ml    Treatment duration 180 minutes    NS given  Yes, system flush 100nss, bolus 100nss for abdominal cramping    Treatment shortened? No   Medications given during Rx Venofer 200 mg   Estimated Kt/V  0 94   Access type: Permacath/TDC   Access Issues: Yes, describe: venous pressures trending up during dialysis, bfr lowered to 300 then 250 to keep venous pressure in target range <300, pt going to IR post hd per physician  Report called to primary nurse   Yes Colette Ennis RN    HD tx plan: 3 hrs removing 2 5L as boni  3K bath for K 4 2 9/22  Using RIJ permacath for hd, cathflo instilled and dwelled 40 minutes pre hd      Problem: METABOLIC, FLUID AND ELECTROLYTES - ADULT  Goal: Electrolytes maintained within normal limits  Description: INTERVENTIONS:  - Monitor labs and assess patient for signs and symptoms of electrolyte imbalances  - Administer electrolyte replacement as ordered  - Monitor response to electrolyte replacements, including repeat lab results as appropriate  - Instruct patient on fluid and nutrition as appropriate  Outcome: Progressing     Problem: METABOLIC, FLUID AND ELECTROLYTES - ADULT  Goal: Fluid balance maintained  Description: INTERVENTIONS:  - Monitor labs   - Monitor I/O and WT  - Instruct patient on fluid and nutrition as appropriate  - Assess for signs & symptoms of volume excess or deficit  Outcome: Progressing

## 2022-09-23 NOTE — CASE MANAGEMENT
Case Management Discharge Planning Note    Patient name Parrish Jordan  Location /-59 MRN [id number]  : 1980 Date 2022       Current Admission Date: 2022  Current Admission Diagnosis:Acute respiratory failure with hypoxia [facility])   Patient Active Problem List    Diagnosis Date Noted    Chest pain 2022    Chronic kidney disease, stage 4 (severe) ([facility] ) 2022    Nodular type diabetic glomerulosclerosis ([facility] ) 2022    Interstitial fibrosis present on renal biopsy 2022    Arteriosclerosis of kidney 2022    Diabetic ulcer of both feet ([facility] ) 2022    Primary hypertension 2022    Open toe wound 2022    Volume overload 2022    Hyponatremia 2022    Essential hypertension 2021    SOB (shortness of breath) 2021    Gastroesophageal reflux disease without esophagitis 2021    Family history of heart disease 2021    Hypokalemia 2021    Chest pressure 2021    Elevated troponin 2021    Type 2 diabetes mellitus with stage 4 chronic kidney disease and hypertension ([facility] ) 2021    Acute respiratory disease due to COVID-19 virus 2021    Acute respiratory failure with hypoxia ([facility] ) 2020    STEFANIA (acute kidney injury) ([facility] ) 2020    Schizo affective schizophrenia ([facility] ) 10/25/2020    Hypertensive emergency 2020    Encephalopathy 2020    Leukocytosis 2020    Chronic migraine without aura without status migrainosus, not intractable 2019    Difficulty urinating 2019    Urinary tract infection without hematuria 2019    Penile pain 2019    Spinal stenosis in cervical region 2019    Class 3 severe obesity due to excess calories with serious comorbidity and body mass index (BMI) of 60 0 to 69 9 in adult [facility]) 2019    Migraine without aura and without status migrainosus, not intractable 2019    Peripheral edema 06/07/2019    Hyperlipidemia, mixed 06/07/2019    Bipolar 1 disorder ([facility] ) 05/30/2019    Depression 05/30/2019    Type 1 diabetes mellitus without complication ([facility] ) 96/56/4304    Urinary retention 05/30/2019    Occipital neuralgia of left side 02/15/2019    Headache 02/15/2019    Nodule of parotid gland 02/15/2019    Snoring 12/04/2018    Insomnia 12/04/2018    Hypersomnia 12/04/2018    Vitamin D deficiency 12/04/2018    Episodic confusion     OCD (obsessive compulsive disorder) 10/31/2018    Schizoaffective disorder, depressive type ([facility] ) 10/31/2018    Acquired hypothyroidism 10/31/2018    Morbid obesity with BMI of 50 0-59 9, adult ([facility] ) 10/31/2018    Anxiety 10/31/2018    Abdominal pain 05/12/2018    Vomiting 05/12/2018      LOS (days): 13  Geometric Mean LOS (GMLOS) (days): 4 70  Days to GMLOS:-8 1     OBJECTIVE:  Risk of Unplanned Readmission Score: 37 82     Current admission status: Inpatient   Preferred Pharmacy:   [address] [place] [address] #[id number] CANDACE Salcido [address]  [address] [place] [id number]  Phone: [phone] Fax: [address] [place] [address] Street [address] [place] [address]  Phone: [phone] Fax: [phone]    Primary Care Provider: Ilda Kennedy MD    Primary Insurance: [address] [place] [address]  Secondary Insurance:     DISCHARGE DETAILS:  Received a call from [facility] [place] with a confirmed chair time  Pt will be going to dialysis with [facility] on Mon-We-Fri at 3:45  Pt first outpt treatment on Mon 9/26/22 to arrive at 3:30  Notified Dr Jonelle Jamison of same as pt may need a treatment  MD indicated Dr Corinna Moffett is covering today  Sent a TT of same to Dr Leeroy Lares

## 2022-09-23 NOTE — CASE MANAGEMENT
Case Management Discharge Planning Note    Patient name Candice Umanzor  Location /-65 MRN [id number]  : 1980 Date 2022       Current Admission Date: 2022  Current Admission Diagnosis:Acute respiratory failure with hypoxia [facility])   Patient Active Problem List    Diagnosis Date Noted    Chest pain 2022    [facility], stage 4 (severe) [facility] ) 2022    Nodular type diabetic glomerulosclerosis ([facility] ) 2022    Interstitial fibrosis present on renal biopsy 2022    Arteriosclerosis of kidney 2022    Diabetic ulcer of both feet ([facility] ) 2022    Primary hypertension 2022    Open toe wound 2022    Volume overload 2022    Hyponatremia 2022    Essential hypertension 2021    SOB (shortness of breath) 2021    Gastroesophageal reflux disease without esophagitis 2021    Family history of heart disease 2021    Hypokalemia 2021    Chest pressure 2021    Elevated troponin 2021    Type 2 diabetes mellitus with stage 4 chronic kidney disease and hypertension ([facility] ) 2021    Acute respiratory disease due to COVID-19 virus 2021    Acute respiratory failure with hypoxia ([facility] ) 2020    STEFANIA (acute kidney injury) ([facility] ) 2020    Schizo affective schizophrenia ([place] [address] ) 10/25/2020    Hypertensive emergency 2020    Encephalopathy 2020    Leukocytosis 2020    Chronic migraine without aura without status migrainosus, not intractable 2019    Difficulty urinating 2019    Urinary tract infection without hematuria 2019    Penile pain 2019    Spinal stenosis in cervical region 2019    Class 3 severe obesity due to excess calories with serious comorbidity and body mass index (BMI) of 60 0 to 69 9 in adult [facility]) 2019    Migraine without aura and without status migrainosus, not intractable 2019    Peripheral edema 06/07/2019    Hyperlipidemia, mixed 06/07/2019    Bipolar 1 disorder ([facility] ) 05/30/2019    Depression 05/30/2019    Type 1 diabetes mellitus without complication ([place] [address] ) 46/96/2868    Urinary retention 05/30/2019    Occipital neuralgia of left side 02/15/2019    Headache 02/15/2019    Nodule of parotid gland 02/15/2019    Snoring 12/04/2018    Insomnia 12/04/2018    Hypersomnia 12/04/2018    Vitamin D deficiency 12/04/2018    Episodic confusion     OCD (obsessive compulsive disorder) 10/31/2018    Schizoaffective disorder, depressive type ([facility] ) 10/31/2018    Acquired hypothyroidism 10/31/2018    Morbid obesity with BMI of 50 0-59 9, adult ([facility] ) 10/31/2018    Anxiety 10/31/2018    Abdominal pain 05/12/2018    Vomiting 05/12/2018      LOS (days): 13  Geometric Mean LOS (GMLOS) (days): 4 70  Days to GMLOS:-8 1     OBJECTIVE:  Risk of Unplanned Readmission Score: 37 82         Current admission status: Inpatient   Preferred Pharmacy:   [facility] #[id number] CANDACE Lewis [address]  [address] [place] [id number]  Phone: [phone] Fax: [address] [place] [address]  Phone: [phone] Fax: [phone]    Primary Care Provider: Arlette Harris MD    Primary Insurance: [facility]  Secondary Insurance:     DISCHARGE DETAILS:  Received notification from Dr Chloé Uriarte the pt will have a dialysis treatment today and will be able to be DC  Reviewed the dialysis schedule with the pt who verbalized understanding of same

## 2022-09-23 NOTE — PLAN OF CARE
Problem: PAIN - ADULT  Goal: Verbalizes/displays adequate comfort level or baseline comfort level  Description: Interventions:  - Encourage patient to monitor pain and request assistance  - Assess pain using appropriate pain scale  - Administer analgesics based on type and severity of pain and evaluate response  - Implement non-pharmacological measures as appropriate and evaluate response  - Consider cultural and social influences on pain and pain management  - Notify physician/advanced practitioner if interventions unsuccessful or patient reports new pain  Outcome: Progressing     Problem: INFECTION - ADULT  Goal: Absence or prevention of progression during hospitalization  Description: INTERVENTIONS:  - Assess and monitor for signs and symptoms of infection  - Monitor lab/diagnostic results  - Monitor all insertion sites, i e  indwelling lines, tubes, and drains  - Monitor endotracheal if appropriate and nasal secretions for changes in amount and color  - [place] appropriate cooling/warming therapies per order  - Administer medications as ordered  - Instruct and encourage patient and family to use good hand hygiene technique  - Identify and instruct in appropriate isolation precautions for identified infection/condition  Outcome: Progressing  Goal: Absence of fever/infection during neutropenic period  Description: INTERVENTIONS:  - Monitor WBC    Outcome: Progressing     Problem: SAFETY ADULT  Goal: Patient will remain free of falls  Description: INTERVENTIONS:  - Educate patient/family on patient safety including physical limitations  - Instruct patient to call for assistance with activity   - Consult OT/PT to assist with strengthening/mobility   - Keep Call bell within reach  - Keep bed low and locked with side rails adjusted as appropriate  - Keep care items and personal belongings within reach  - Initiate and maintain comfort rounds  - Make Fall Risk Sign visible to staff  - Offer Toileting every 2 Hours, in advance of need  - Initiate/Maintain bed alarm  - Obtain necessary fall risk management equipment  - Apply yellow socks and bracelet for high fall risk patients  - Consider moving patient to room near nurses station  Outcome: Progressing  Goal: Maintain or return to baseline ADL function  Description: INTERVENTIONS:  -  Assess patient's ability to carry out ADLs; assess patient's baseline for ADL function and identify physical deficits which impact ability to perform ADLs (bathing, care of mouth/teeth, toileting, grooming, dressing, etc )  - Assess/evaluate cause of self-care deficits   - Assess range of motion  - Assess patient's mobility; develop plan if impaired  - Assess patient's need for assistive devices and provide as appropriate  - Encourage maximum independence but intervene and supervise when necessary  - Involve family in performance of ADLs  - Assess for home care needs following discharge   - Consider OT consult to assist with ADL evaluation and planning for discharge  - Provide patient education as appropriate  Outcome: Progressing  Goal: Maintains/Returns to pre admission functional level  Description: INTERVENTIONS:  - Perform BMAT or MOVE assessment daily    - Set and communicate daily mobility goal to care team and patient/family/caregiver  - Collaborate with rehabilitation services on mobility goals if consulted  - Perform Range of Motion 2 times a day  - Reposition patient every 2 hours    - Dangle patient 2 times a day  - Stand patient 2 times a day  - Ambulate patient 2 times a day  - Out of bed to chair 3 times a day   - Out of bed for meals 3 times a day  - Out of bed for toileting  - Record patient progress and toleration of activity level   Outcome: Progressing     Problem: DISCHARGE PLANNING  Goal: Discharge to home or other facility with appropriate resources  Description: INTERVENTIONS:  - Identify barriers to discharge w/patient and caregiver  - Arrange for needed discharge resources and transportation as appropriate  - Identify discharge learning needs (meds, wound care, etc )  - Refer to Case Management Department for coordinating discharge planning if the patient needs post-hospital services based on physician/advanced practitioner order or complex needs related to functional status, cognitive ability, or social support system  Outcome: Progressing     Problem: Knowledge Deficit  Goal: Patient/family/caregiver demonstrates understanding of disease process, treatment plan, medications, and discharge instructions  Description: Complete learning assessment and assess knowledge base    Interventions:  - Provide teaching at level of understanding  - Provide teaching via preferred learning methods  Outcome: Progressing     Problem: RESPIRATORY - ADULT  Goal: Achieves optimal ventilation and oxygenation  Description: INTERVENTIONS:  - Assess for changes in respiratory status  - Assess for changes in mentation and behavior  - Position to facilitate oxygenation and minimize respiratory effort  - Oxygen administered by appropriate delivery if ordered  - Initiate smoking cessation education as indicated  - Encourage broncho-pulmonary hygiene including cough, deep breathe, Incentive Spirometry  - Assess the need for suctioning and aspirate as needed  - Assess and instruct to report SOB or any respiratory difficulty  - Respiratory Therapy support as indicated  Outcome: Progressing     Problem: Prexisting or High Potential for Compromised Skin Integrity  Goal: Skin integrity is maintained or improved  Description: INTERVENTIONS:  - Identify patients at risk for skin breakdown  - Assess and monitor skin integrity  - Assess and monitor nutrition and hydration status  - Monitor labs   - Assess for incontinence   - Turn and reposition patient  - Assist with mobility/ambulation  - Relieve pressure over bony prominences  - Avoid friction and shearing  - Provide appropriate hygiene as needed including keeping skin clean and dry  - Evaluate need for skin moisturizer/barrier cream  - Collaborate with interdisciplinary team   - Patient/family teaching  - Consider wound care consult   Outcome: Progressing     Problem: Potential for Falls  Goal: Patient will remain free of falls  Description: INTERVENTIONS:  - Educate patient/family on patient safety including physical limitations  - Instruct patient to call for assistance with activity   - Consult OT/PT to assist with strengthening/mobility   - Keep Call bell within reach  - Keep bed low and locked with side rails adjusted as appropriate  - Keep care items and personal belongings within reach  - Initiate and maintain comfort rounds  - Make Fall Risk Sign visible to staff  - Offer Toileting every 2 Hours, in advance of need  - Initiate/Maintain bed alarm  - Obtain necessary fall risk management equipment  - Apply yellow socks and bracelet for high fall risk patients  - Consider moving patient to room near nurses station  Outcome: Progressing     Problem: METABOLIC, FLUID AND ELECTROLYTES - ADULT  Goal: Electrolytes maintained within normal limits  Description: INTERVENTIONS:  - Monitor labs and assess patient for signs and symptoms of electrolyte imbalances  - Administer electrolyte replacement as ordered  - Monitor response to electrolyte replacements, including repeat lab results as appropriate  - Instruct patient on fluid and nutrition as appropriate  Outcome: Progressing  Goal: Fluid balance maintained  Description: INTERVENTIONS:  - Monitor labs   - Monitor I/O and WT  - Instruct patient on fluid and nutrition as appropriate  - Assess for signs & symptoms of volume excess or deficit  Outcome: Progressing     Problem: Nutrition/Hydration-ADULT  Goal: Nutrient/Hydration intake appropriate for improving, restoring or maintaining nutritional needs  Description: Monitor and assess patient's nutrition/hydration status for malnutrition   Collaborate with interdisciplinary team and initiate plan and interventions as ordered  Monitor patient's weight and dietary intake as ordered or per policy  Utilize nutrition screening tool and intervene as necessary  Determine patient's food preferences and provide high-protein, high-caloric foods as appropriate       INTERVENTIONS:  - Monitor oral intake, urinary output, labs, and treatment plans  - Assess nutrition and hydration status and recommend course of action  - Evaluate amount of meals eaten  - Assist patient with eating if necessary   - Allow adequate time for meals  - Recommend/ encourage appropriate diets, oral nutritional supplements, and vitamin/mineral supplements  - Order, calculate, and assess calorie counts as needed  - Recommend, monitor, and adjust tube feedings and TPN/PPN based on assessed needs  - Assess need for intravenous fluids  - Provide specific nutrition/hydration education as appropriate  - Include patient/family/caregiver in decisions related to nutrition  Outcome: Progressing     Problem: MOBILITY - ADULT  Goal: Maintain or return to baseline ADL function  Description: INTERVENTIONS:  -  Assess patient's ability to carry out ADLs; assess patient's baseline for ADL function and identify physical deficits which impact ability to perform ADLs (bathing, care of mouth/teeth, toileting, grooming, dressing, etc )  - Assess/evaluate cause of self-care deficits   - Assess range of motion  - Assess patient's mobility; develop plan if impaired  - Assess patient's need for assistive devices and provide as appropriate  - Encourage maximum independence but intervene and supervise when necessary  - Involve family in performance of ADLs  - Assess for home care needs following discharge   - Consider OT consult to assist with ADL evaluation and planning for discharge  - Provide patient education as appropriate  Outcome: Progressing  Goal: Maintains/Returns to pre admission functional level  Description: INTERVENTIONS:  - Perform BMAT or MOVE assessment daily    - Set and communicate daily mobility goal to care team and patient/family/caregiver  - Collaborate with rehabilitation services on mobility goals if consulted  - Perform Range of Motion 2 times a day  - Reposition patient every 2 hours    - Dangle patient 2 times a day  - Stand patient 2 times a day  - Ambulate patient 2 times a day  - Out of bed to chair 3 times a day   - Out of bed for meals 3 times a day  - Out of bed for toileting  - Record patient progress and toleration of activity level   Outcome: Progressing

## 2022-09-23 NOTE — ASSESSMENT & PLAN NOTE
· Patient states he was out of his antihypertensives prior to admission   · 9/22 Due to hypotension during HD session, Nephrology adjusted his regimen- Cardura was discontinued, amlodipine decreased to 5 mg daily   Will defer further adjustment to renal team    · Torsemide 100 mg daily on non dialysis days

## 2022-09-23 NOTE — BRIEF OP NOTE (RAD/CATH)
INTERVENTIONAL RADIOLOGY PROCEDURE NOTE    Date: 9/23/2022    Procedure: IR TUNNELED CATHETER EXCHANGE    Preoperative diagnosis:   1  CHF (congestive heart failure) ([facility] [address] )    2  Pneumonia    3  Hypoxia    4  Chronic kidney disease, stage 4 (severe) (HCC)    5  Type 1 diabetes mellitus without complication (HCC)         Postoperative diagnosis: Same  Surgeon: Kari Farooq MD     Assistant: None  No qualified resident was available  Blood loss: 0    Specimens: 0     Findings:     Exchange for new 16 fr split tip catheter    Good flow    OK TO USE    Ancef given    Complications: None immediate      Anesthesia: local

## 2022-09-23 NOTE — ASSESSMENT & PLAN NOTE
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

## 2022-09-23 NOTE — PROGRESS NOTES
Progress Note - Nephrology   Bella Dimas 38 y o  male MRN: [id number]  Unit/Bed#: -01 Encounter: [id number]    A/P:  1  ESRD new start will be Monday Wednesday Friday schedule  Patient had a treatment today but continued to have high venous pressures and required lowering of Qb to 250 mL per minute  Given these low Q BC will not have optimal clearance and should have PermCath evaluated  He indeed received tPA 40 minutes prior to HD start and heparin during treatment  Despite  heparin during treatment he has been having flow issues  Spoke with IR in primary to have PermCath evaluated and may need exchanged  Patient will be to be down 10 Monday Wednesday Friday at 3:45 a m     First outpatient treatment on Mondays 9/26  Called op HD unit to relay orders  2  Dialysis access right IJ PermCath  See above regarding issues with flow  3  Anemia ASHD/iron deficiency anemia  Avoid GLORIA until patient is discharged  Will be on Mircera and Venofer per protocol  Patient has been receiving Venofer while here  4  Hyperparathyroidism secondary to renal disease  On liberalized diet  Follow phosphorus as we go  5  Hypertension in ESRD, blood pressure medicines titrated down given concerns of hypotension yesterday  Unice Maiers stopped and Norvasc dose reduced  Anticipate that he may need additional antihypertensives peeled off  Would look to optimize losartan dose to 100 and trial off calcium channel blocker, may help with swelling  Follow up reason for today's visit:     ESRD     Subjective:   Patient seen examined today  Have been discussing with the HD RN that venous pressures have been high in blood flow had to be lowered to 250  He received tPA in his catheter and still having issues  He received heparin 2 treatment and still having issues with PermCath  Post weight 145 9 kg  He did require 100 mL normal saline for abdominal cramping    A complete 10 point review of systems was performed and is otherwise negative  Objective:     Vitals: Blood pressure 164/99, pulse 72, temperature 97 6 °F (36 4 °C), temperature source Tympanic, resp  rate 20, height 5' 2" (1 575 m), weight (!) 146 kg (322 lb 6 8 oz), SpO2 96 %  ,Body mass index is 58 97 kg/m²  Weight (last 2 days)     Date/Time Weight    09/23/22 0600 146 (322 42)    09/22/22 0555 146 (321 98)     Weight: pt weighed on grey standing scale at 09/22/22 0555    09/21/22 0600 145 (320 66)            Intake/Output Summary (Last 24 hours) at 9/23/2022 1307  Last data filed at 9/23/2022 1200  Gross per 24 hour   Intake 760 ml   Output 2600 ml   Net -1840 ml     I/O last 3 completed shifts: In: 940 [P O :240; I V :500; Other:200]  Out: 654 [Other:654]    HD Permanent Double Catheter (Active)   Reasons to continue HD Cath Treatment Therapy 09/23/22 0855   Goal for Removal N/A- chronic HD catheter 09/23/22 0855   Line Necessity Reviewed Yes, reviewed with provider 09/23/22 0855   Site Assessment WDL 09/23/22 1200   Proximal Lumen Status Flushed;Capped 09/23/22 1200   Distal Lumen Status Flushed;Capped 09/23/22 1200   Dressing Type Chlorhexidine dressing 09/23/22 1200   Dressing Status Clean;Dry; Intact 09/23/22 1200   Dressing Intervention 7 day central line dressing changed 09/23/22 0855   Dressing Change Due 09/30/22 09/23/22 0855       Physical Exam: /99 (BP Location: Left arm)   Pulse 72   Temp 97 6 °F (36 4 °C) (Tympanic)   Resp 20   Ht 5' 2" (1 575 m)   Wt (!) 146 kg (322 lb 6 8 oz)   SpO2 96%   BMI 58 97 kg/m²     General Appearance:    Alert, cooperative, no distress, appears stated age OBESE   Head:    Normocephalic, without obvious abnormality, atraumatic   Eyes:    Conjunctiva/corneas clear   Ears:    Normal external ears   Nose:   Nares normal, septum midline, mucosa normal, no drainage    or sinus tenderness   Throat:   Lips, mucosa, and tongue normal; teeth and gums normal   Neck:   Supple, symmetrical, trachea midline, no adenopathy;        thyroid:  No enlargement/tenderness/nodules; no carotid    bruit or JVD   Back:     Symmetric, no curvature, ROM normal, no CVA tenderness   Lungs:     Clear to auscultation bilaterally, respirations unlabored   Chest wall:    No tenderness or deformity   Heart:    Regular rate and rhythm, S1 and S2 normal, no murmur, rub   or gallop, RIJ PC dressing c/d/i    Abdomen:     Soft, non-tender, bowel sounds active   Extremities:   +1 Edema BL LE    Skin:   Skin color, texture, turgor normal, no rashes or lesions   Lymph nodes:   Cervical normal   Neurologic:   CNII-XII intact            Lab, Imaging and other studies: I have personally reviewed pertinent labs  CBC: No results found for: WBC, HGB, HCT, MCV, PLT, ADJUSTEDWBC, MCH, MCHC, RDW, MPV, NRBC  CMP: No results found for: NA, K, CL, CO2, ANIONGAP, BUN, CREATININE, GLUCOSE, CALCIUM, AST, ALT, ALKPHOS, PROT, BILITOT, EGFR      Results from last 7 days   Lab Units 09/22/22  0512 09/21/22  0522 09/20/22  0603 09/19/22  0442   POTASSIUM mmol/L 4 2 4 4 4 2 3 9   CHLORIDE mmol/L 97 96 99 97   CO2 mmol/L 24 25 23 27   BUN mg/dL 48* 35* 53* 74*   CREATININE mg/dL 4 94* 4 06* 5 11* 5 58*   CALCIUM mg/dL 9 2 9 1 8 7 9 1   ALK PHOS U/L  --   --   --  69   ALT U/L  --   --   --  7   AST U/L  --   --   --  22         Phosphorus: No results found for: PHOS  Magnesium: No results found for: MG  Urinalysis: No results found for: COLORU, CLARITYU, SPECGRAV, PHUR, LEUKOCYTESUR, NITRITE, PROTEINUA, GLUCOSEU, KETONESU, BILIRUBINUR, BLOODU  Ionized Calcium: No results found for: CAION  Coagulation: No results found for: PT, INR, APTT  Troponin: No results found for: TROPONINI  ABG: No results found for: PHART, GRE7FIG, PO2ART, BOE3RLU, B9HKPLZZ, BEART, SOURCE  Radiology review:     IMAGING  No results found      Current Facility-Administered Medications   Medication Dose Route Frequency    acetaminophen (TYLENOL) tablet 650 mg  650 mg Oral Q6H PRN    albuterol (PROVENTIL HFA,VENTOLIN HFA) inhaler 2 puff  2 puff Inhalation Q4H PRN    amLODIPine (NORVASC) tablet 5 mg  5 mg Oral Daily    b complex-vitamin C-folic acid (NEPHROCAPS) capsule 1 capsule  1 capsule Oral Daily With Dinner    carvedilol (COREG) tablet 25 mg  25 mg Oral BID With Meals    ceFAZolin (ANCEF) 3,000 mg in dextrose 5 % 100 mL IVPB  3,000 mg Intravenous Q8H    FLUoxetine (PROzac) capsule 20 mg  20 mg Oral QAM    glycerin-hypromellose- (ARTIFICIAL TEARS) ophthalmic solution 2 drop  2 drop Both Eyes Q3H PRN    heparin (porcine) subcutaneous injection 7,500 Units  7,500 Units Subcutaneous Q8H [address]    hydrOXYzine HCL (ATARAX) tablet 25 mg  25 mg Oral Q6H PRN    insulin glargine (LANTUS) subcutaneous injection 25 Units 0 25 mL  25 Units Subcutaneous HS    insulin lispro (HumaLOG) 100 units/mL subcutaneous injection 2-12 Units  2-12 Units Subcutaneous TID AC    insulin lispro (HumaLOG) 100 units/mL subcutaneous injection 2-12 Units  2-12 Units Subcutaneous HS    insulin lispro (HumaLOG) 100 units/mL subcutaneous injection 5 Units  5 Units Subcutaneous TID With Meals    levothyroxine tablet 125 mcg  125 mcg Oral Early Morning    losartan (COZAAR) tablet 50 mg  50 mg Oral Daily    ondansetron (ZOFRAN) injection 4 mg  4 mg Intravenous Q4H PRN    pantoprazole (PROTONIX) EC tablet 40 mg  40 mg Oral BID AC    torsemide (DEMADEX) tablet 100 mg  100 mg Oral Once per day on Sun Mon Wed Fri     Medications Discontinued During This Encounter   Medication Reason    furosemide (LASIX) injection 40 mg     furosemide (LASIX) injection 80 mg     aluminum-magnesium hydroxide-simethicone (MYLANTA) oral suspension 30 mL     insulin glargine (LANTUS) subcutaneous injection 20 Units 0 2 mL     cefTRIAXone (ROCEPHIN) IVPB (premix in dextrose) 1,000 mg 50 mL     bumetanide (BUMEX) injection 3 mg     insulin glargine (LANTUS) subcutaneous injection 10 Units 0 1 mL     epoetin tammy (EPOGEN,PROCRIT) injection 5,000 Units     amLODIPine (NORVASC) tablet 10 mg     hydrALAZINE (APRESOLINE) tablet 50 mg     insulin glargine (LANTUS) subcutaneous injection 15 Units 0 15 mL     insulin glargine (LANTUS) subcutaneous injection 20 Units 0 2 mL     bumetanide (BUMEX) injection 4 mg     torsemide (DEMADEX) tablet 100 mg     heparin (porcine) injection 4,000 Units     hydrALAZINE (APRESOLINE) tablet 50 mg     doxazosin (CARDURA) tablet 2 mg     amLODIPine (NORVASC) tablet 10 mg     heparin (porcine) injection 6,000 Units     heparin (porcine) 2,500 Units in sodium chloride 0 9 % 10 mL syringe        [place], DO      This progress note was produced in part using a dictation device which may document imprecise wording from author's original intent

## 2022-09-26 ENCOUNTER — TRANSITIONAL CARE MANAGEMENT (OUTPATIENT)
Dept: FAMILY MEDICINE CLINIC | Facility: CLINIC | Age: 42
End: 2022-09-26

## 2022-09-26 NOTE — UTILIZATION REVIEW
Notification of Discharge   This is a Notification of Discharge from our facility [address]  Please be advised that this patient has been discharge from our facility  Below you will find the admission and discharge date and time including the patients disposition  UTILIZATION REVIEW CONTACT:  Luis Alberto Redmond  Utilization   Network Utilization Review Department  Phone: [phone] carefully listen to the prompts  All voicemails are confidential   Email: [email or web address]     PHYSICIAN ADVISORY SERVICES:  FOR RJER-BZ-UMYD REVIEW - MEDICAL NECESSITY DENIAL  Phone: [phone]  Fax: [phone]  Email: [email or web address]     PRESENTATION DATE: 9/9/2022 11:40 AM  OBERVATION ADMISSION DATE:   INPATIENT ADMISSION DATE: 9/10/22  1:53 PM   DISCHARGE DATE: 9/23/2022  4:30 PM  DISPOSITION: Home/Self Care Home/Self Care      IMPORTANT INFORMATION:  Send all requests for admission clinical reviews, approved or denied determinations and any other requests to dedicated fax number below belonging to the campus where the patient is receiving treatment   List of dedicated fax numbers:  [address] DENIALS (Administrative/Medical Necessity) [phone]   [address] (Maternity/NICU/Pediatrics) [phone]   Abimael Hartman [phone]   [address] [facility] [address] [phone]   [address] [place] [address] [phone]   2000 [facility] [address] [place] [address],[address] [place] [address] [phone]   [facility]  [phone]   [address] [facility]  [address] [facility] [address] [facility] [phone]

## 2022-09-29 ENCOUNTER — TELEPHONE (OUTPATIENT)
Dept: NEPHROLOGY | Facility: CLINIC | Age: 42
End: 2022-09-29

## 2022-09-29 NOTE — TELEPHONE ENCOUNTER
Spoke with patient, he had a couple of concerns  Can he shower, should he be taking a multi vitamin, can he take GOLO, and if he is able to get into [facility], that would help them since it's closer

## 2022-09-29 NOTE — TELEPHONE ENCOUNTER
1  No showering with a PermCath due to risk of infection  Sponge bath only around it  Don't get it wet  2  Please do not take multivitamin  Kidneys cannot process typical vitamins fast enough  The Nephrologist there can Rx special dialysis safe vitamin  3  Do not take GOLO or any supplements  4  He can ask current clinic to get him on the wait list if he is not already  There are three ahead of him at this time  The process goes through [address] [place] [address] admissions  Any further questions can be handled through the [address] [place] [address] clinic and [facility]) Nephrology group that sees him down there until he transfers up here  If he has any questions, the dialysis staff can ask the [facility] Nephrology group

## 2022-10-04 ENCOUNTER — OFFICE VISIT (OUTPATIENT)
Dept: FAMILY MEDICINE CLINIC | Facility: CLINIC | Age: 42
End: 2022-10-04
Payer: COMMERCIAL

## 2022-10-04 VITALS
SYSTOLIC BLOOD PRESSURE: 138 MMHG | RESPIRATION RATE: 16 BRPM | BODY MASS INDEX: 57.97 KG/M2 | HEIGHT: 62 IN | OXYGEN SATURATION: 99 % | WEIGHT: 315 LBS | DIASTOLIC BLOOD PRESSURE: 80 MMHG | TEMPERATURE: 97.8 F | HEART RATE: 76 BPM

## 2022-10-04 DIAGNOSIS — Z00.00 HEALTHCARE MAINTENANCE: ICD-10-CM

## 2022-10-04 DIAGNOSIS — N18.6 TYPE 1 DIABETES MELLITUS WITH CHRONIC KIDNEY DISEASE ON CHRONIC DIALYSIS (HCC): ICD-10-CM

## 2022-10-04 DIAGNOSIS — E66.01 MORBID OBESITY WITH BMI OF 60.0-69.9, ADULT (HCC): ICD-10-CM

## 2022-10-04 DIAGNOSIS — I10 PRIMARY HYPERTENSION: ICD-10-CM

## 2022-10-04 DIAGNOSIS — Z99.2 TYPE 1 DIABETES MELLITUS WITH CHRONIC KIDNEY DISEASE ON CHRONIC DIALYSIS (HCC): ICD-10-CM

## 2022-10-04 DIAGNOSIS — F25.1 SCHIZOAFFECTIVE DISORDER, DEPRESSIVE TYPE (HCC): ICD-10-CM

## 2022-10-04 DIAGNOSIS — E10.9 TYPE 1 DIABETES MELLITUS WITHOUT COMPLICATION (HCC): ICD-10-CM

## 2022-10-04 DIAGNOSIS — N18.6 STAGE 5 CHRONIC KIDNEY DISEASE ON CHRONIC DIALYSIS (HCC): Primary | ICD-10-CM

## 2022-10-04 DIAGNOSIS — Z99.2 STAGE 5 CHRONIC KIDNEY DISEASE ON CHRONIC DIALYSIS (HCC): Primary | ICD-10-CM

## 2022-10-04 DIAGNOSIS — E03.9 ACQUIRED HYPOTHYROIDISM: ICD-10-CM

## 2022-10-04 DIAGNOSIS — F41.9 ANXIETY: ICD-10-CM

## 2022-10-04 DIAGNOSIS — R10.13 EPIGASTRIC PAIN: ICD-10-CM

## 2022-10-04 DIAGNOSIS — F31.9 BIPOLAR 1 DISORDER (HCC): ICD-10-CM

## 2022-10-04 DIAGNOSIS — J96.01 ACUTE RESPIRATORY FAILURE WITH HYPOXIA (HCC): ICD-10-CM

## 2022-10-04 DIAGNOSIS — I10 HYPERTENSION, UNSPECIFIED TYPE: ICD-10-CM

## 2022-10-04 DIAGNOSIS — E10.22 TYPE 1 DIABETES MELLITUS WITH CHRONIC KIDNEY DISEASE ON CHRONIC DIALYSIS (HCC): ICD-10-CM

## 2022-10-04 LAB — SL AMB POCT HEMOGLOBIN AIC: 8.5 (ref ?–6.5)

## 2022-10-04 PROCEDURE — 83036 HEMOGLOBIN GLYCOSYLATED A1C: CPT | Performed by: FAMILY MEDICINE

## 2022-10-04 PROCEDURE — 99396 PREV VISIT EST AGE 40-64: CPT | Performed by: FAMILY MEDICINE

## 2022-10-04 PROCEDURE — 99495 TRANSJ CARE MGMT MOD F2F 14D: CPT | Performed by: FAMILY MEDICINE

## 2022-10-04 RX ORDER — TORSEMIDE 100 MG/1
100 TABLET ORAL
Qty: 16 TABLET | Refills: 2 | Status: SHIPPED | OUTPATIENT
Start: 2022-10-04 | End: 2022-11-03

## 2022-10-04 RX ORDER — AMLODIPINE BESYLATE 5 MG/1
5 TABLET ORAL DAILY
Qty: 90 TABLET | Refills: 1 | Status: SHIPPED | OUTPATIENT
Start: 2022-10-04 | End: 2022-11-03

## 2022-10-04 RX ORDER — INSULIN GLARGINE 100 [IU]/ML
30 INJECTION, SOLUTION SUBCUTANEOUS
Qty: 10 ML | Refills: 0 | Status: SHIPPED | OUTPATIENT
Start: 2022-10-04

## 2022-10-04 RX ORDER — CARVEDILOL 25 MG/1
25 TABLET ORAL 2 TIMES DAILY WITH MEALS
Qty: 180 TABLET | Refills: 1 | Status: SHIPPED | OUTPATIENT
Start: 2022-10-04 | End: 2022-11-03

## 2022-10-04 RX ORDER — FLUOXETINE HYDROCHLORIDE 20 MG/1
20 CAPSULE ORAL EVERY MORNING
Qty: 90 CAPSULE | Refills: 1 | Status: SHIPPED | OUTPATIENT
Start: 2022-10-04

## 2022-10-04 RX ORDER — CARIPRAZINE 6 MG/1
6 CAPSULE, GELATIN COATED ORAL DAILY
Qty: 90 CAPSULE | Refills: 1 | Status: SHIPPED | OUTPATIENT
Start: 2022-10-04

## 2022-10-04 RX ORDER — INSULIN LISPRO 100 [IU]/ML
6 INJECTION, SOLUTION INTRAVENOUS; SUBCUTANEOUS
Qty: 10 ML | Refills: 3 | Status: SHIPPED | OUTPATIENT
Start: 2022-10-04

## 2022-10-04 RX ORDER — PANTOPRAZOLE SODIUM 40 MG/1
40 TABLET, DELAYED RELEASE ORAL
Qty: 60 TABLET | Refills: 0 | Status: CANCELLED | OUTPATIENT
Start: 2022-10-04 | End: 2022-11-03

## 2022-10-04 RX ORDER — CARIPRAZINE 6 MG/1
6 CAPSULE, GELATIN COATED ORAL DAILY
Qty: 30 CAPSULE | Refills: 5 | Status: CANCELLED | OUTPATIENT
Start: 2022-10-04

## 2022-10-04 RX ORDER — LOSARTAN POTASSIUM 50 MG/1
50 TABLET ORAL DAILY
Qty: 90 TABLET | Refills: 1 | Status: SHIPPED | OUTPATIENT
Start: 2022-10-04 | End: 2022-11-03

## 2022-10-04 NOTE — ASSESSMENT & PLAN NOTE
It was discussed about immunizations, diet, exercise and safety measures 
Lab Results   Component Value Date    EGFR 13 09/22/2022    EGFR 16 09/21/2022    EGFR 12 09/20/2022    CREATININE 4 94 (H) 09/22/2022    CREATININE 4 06 (H) 09/21/2022    CREATININE 5 11 (H) 09/20/2022   Continue on dialysis    Continue to follow up with nephrologist 
Lab Results   Component Value Date    HGBA1C 8 5 (A) 10/04/2022   Not well controlled but improving  Continue on Lantus 30 units in the evening and 60 units 3 times a day with meals  Continue to follow low carb diet and regular exercise 
Lab Results   Component Value Date    HGBA1C 8 5 (A) 10/04/2022   Not well controlled but improving  Continue on Lantus 30 units in the evening and 60 units 3 times a day with meals  Continue to follow low carb diet and regular exercise 
Resolved  Continue to monitor 
Stable  Continue same  Will continue to monitor 
Symptoms improving  Continue on his medications  He was given refill  [place] out [place] the [place] and he was told he needs to establish with a psychiatrist out there 
Well controlled  Continue same  Will continue to monitor 
Equal and normal pulses (carotid, femoral, dorsalis pedis)

## 2022-10-04 NOTE — PROGRESS NOTES
Assessment & Plan     1  Stage 5 chronic kidney disease on chronic dialysis [facility])  Assessment & Plan:  Lab Results   Component Value Date    EGFR 13 09/22/2022    EGFR 16 09/21/2022    EGFR 12 09/20/2022    CREATININE 4 94 (H) 09/22/2022    CREATININE 4 06 (H) 09/21/2022    CREATININE 5 11 (H) 09/20/2022   Continue on dialysis  Continue to follow up with nephrologist       2  Acute respiratory failure with hypoxia [facility])  Assessment & Plan:  Resolved  Continue to monitor  3  Primary hypertension  Assessment & Plan:  Well controlled  Continue same  Will continue to monitor  Orders:  -     amLODIPine (NORVASC) 5 mg tablet; Take 1 tablet (5 mg total) by mouth daily  -     losartan (COZAAR) 50 mg tablet; Take 1 tablet (50 mg total) by mouth daily  -     torsemide (DEMADEX) 100 mg tablet; Take 1 tablet (100 mg total) by mouth 4 (four) times a week On off HD days- Tuesday, Thursday, Saturday, and Sunday    4  Hypertension, unspecified type  -     carvedilol (COREG) 25 mg tablet; Take 1 tablet (25 mg total) by mouth 2 (two) times a day with meals    5  Type 1 diabetes mellitus without complication [facility])  Assessment & Plan:    Lab Results   Component Value Date    HGBA1C 8 5 (A) 10/04/2022   Not well controlled but improving  Continue on Lantus 30 units in the evening and 60 units 3 times a day with meals  Continue to follow low carb diet and regular exercise  Orders:  -     insulin glargine (LANTUS) 100 units/mL subcutaneous injection; Inject 30 Units under the skin daily at bedtime  -     insulin lispro (HumaLOG) 100 units/mL injection; Inject 6 Units under the skin 3 (three) times a day with meals  -     POCT hemoglobin A1c    6  Anxiety  -     FLUoxetine (PROzac) 20 mg capsule; Take 1 capsule (20 mg total) by mouth every morning    7  Epigastric pain    8  Bipolar 1 disorder (HCC)  -     Vraylar 6 MG capsule; Take 1 capsule (6 mg total) by mouth daily    9   Morbid obesity with BMI of 60 0-69 9, adult ([facility] )    10  Acquired hypothyroidism  Assessment & Plan:  Stable  Continue same  Will continue to monitor  11  Type 1 diabetes mellitus with chronic kidney disease on chronic dialysis [facility])  Assessment & Plan:    Lab Results   Component Value Date    HGBA1C 8 5 (A) 10/04/2022   Not well controlled but improving  Continue on Lantus 30 units in the evening and 60 units 3 times a day with meals  Continue to follow low carb diet and regular exercise  12  Schizoaffective disorder, depressive type ([facility] [address] )  Assessment & Plan:  Symptoms improving  Continue on his medications  He was given refill  He is moving out of the state and he was told he needs to establish with a psychiatrist out there  13  Healthcare maintenance  Assessment & Plan: It was discussed about immunizations, diet, exercise and safety measures  Subjective     Transitional Care Management Review:   Maya Welsh is a 38 y o  male here for TCM follow up  During the TCM phone call patient stated:  TCM Call     Date and time call was made  9/26/2022 12:53 PM    Hospital care reviewed  Records reviewed    Patient was hospitialized at  [address]    Date of Admission  09/09/22    Date of discharge  09/23/22    Diagnosis  Acute respiratory failure     Disposition  Home    Were the patients medications reviewed and updated  No    Current Symptoms  None      TCM Call     Post hospital issues  None    Should patient be enrolled in anticoag monitoring? No    Scheduled for follow up?   Yes    Did you obtain your prescribed medications  Yes    Do you need help managing your prescriptions or medications  No    Is transportation to your appointment needed  No    I have advised the patient to call PCP with any new or worsening symptoms  [address]  Family members    Support System  Family    Are you recieving any outpatient services  No    Are you recieving home care services  No Are you using any community resources  No    Current waiver services  No    Have you fallen in the last 12 months  No    Interperter language line needed  No        Follow-up post hospital for worsening shortness of breath  The patient with history of chronic kidney disease stage 4 not on dialysis on admission  He subsequently was admitted for acute respiratory failure hypoxia requiring oxygen supplement  Chest x-ray on admission shows possible pulmonary edema  The patient also presented with leukocytosis and was treated for suspected pneumonia as well with IV ceftriaxone  He was started on IV furosemide for volume overload  Nephrology evaluation done  The patient did not respond well to intermittent IV diuretic therapy and subsequently hemodialysis was initiated on 09/17/2022  He eventually was able to wean off of oxygen with volume management per hemodialysis  As the patient procalcitonin levels are negative, IV antibiotic was discontinued  PermCath exchanged to placed on 09/23/2022 due to high venous pressure during HD treatment  Case management has arranged outpatient hemodialysis chair- Monday, Wednesday, and Friday schedule  He was discharged in stable condition  He has been following with his dialysis  He has been taking his medications and using his insulin as prescribed  He stated he has been feeling a lot better lately since discharge from the hospital   His A1c in the office came back elevated at 8 5 but improved from before  Review of Systems   Constitutional: Negative for chills and fever  HENT: Negative for trouble swallowing  Eyes: Negative for visual disturbance  Respiratory: Negative for cough and shortness of breath  Cardiovascular: Negative for chest pain and palpitations  Gastrointestinal: Negative for abdominal pain, blood in stool and vomiting  Endocrine: Negative for cold intolerance and heat intolerance     Genitourinary: Negative for difficulty urinating and dysuria  Musculoskeletal: Negative for gait problem  Skin: Negative for rash  Neurological: Negative for dizziness, syncope and headaches  Hematological: Negative for adenopathy  Psychiatric/Behavioral: Negative for behavioral problems  Objective     /80 (BP Location: Left arm, Patient Position: Sitting, Cuff Size: Large)   Pulse 76   Temp 97 8 °F (36 6 °C) (Tympanic)   Resp 16   Ht 5' 2" (1 575 m)   Wt (!) 151 kg (333 lb 9 6 oz)   SpO2 99%   BMI 61 02 kg/m²      Physical Exam  Vitals and nursing note reviewed  Constitutional:       Appearance: He is well-developed  He is obese  HENT:      Head: Normocephalic and atraumatic  Eyes:      Conjunctiva/sclera: Conjunctivae normal    Cardiovascular:      Rate and Rhythm: Normal rate and regular rhythm  Pulses: no weak pulses          Dorsalis pedis pulses are 2+ on the right side and 2+ on the left side  Heart sounds: No murmur heard  Pulmonary:      Effort: Pulmonary effort is normal  No respiratory distress  Breath sounds: Normal breath sounds  Abdominal:      Palpations: Abdomen is soft  Tenderness: There is no abdominal tenderness  Musculoskeletal:      Cervical back: Neck supple  Feet:      Right foot:      Skin integrity: No ulcer, skin breakdown, erythema, warmth, callus or dry skin  Left foot:      Skin integrity: No ulcer, skin breakdown, erythema, warmth, callus or dry skin  Skin:     General: Skin is warm and dry  Neurological:      Mental Status: He is alert  Patient's shoes and socks removed  Right Foot/Ankle   Right Foot Inspection  Skin Exam: skin normal and skin intact  No dry skin, no warmth, no callus, no erythema, no maceration, no abnormal color, no pre-ulcer, no ulcer and no callus  Sensory   Monofilament testing: intact    Vascular  Capillary refills: < 3 seconds  The right DP pulse is 2+       Left Foot/Ankle  Left Foot Inspection  Skin Exam: skin normal and skin intact  No dry skin, no warmth, no erythema, no maceration, normal color, no pre-ulcer, no ulcer and no callus  Sensory   Monofilament testing: intact    Vascular  Capillary refills: < 3 seconds  The left DP pulse is 2+  Assign Risk Category  No deformity present  No loss of protective sensation  No weak pulses  Risk: 0     Viri Choudhary MD     BMI Counseling: Body mass index is 61 02 kg/m²  The BMI is above normal  Nutrition recommendations include reducing portion sizes, decreasing overall calorie intake, 3-5 servings of fruits/vegetables daily, reducing fast food intake and consuming healthier snacks  Exercise recommendations include moderate aerobic physical activity for 150 minutes/week

## 2022-10-04 NOTE — PROGRESS NOTES
Assessment & Plan     1  Stage 5 chronic kidney disease on chronic dialysis [facility])  Assessment & Plan:  Lab Results   Component Value Date    EGFR 13 09/22/2022    EGFR 16 09/21/2022    EGFR 12 09/20/2022    CREATININE 4 94 (H) 09/22/2022    CREATININE 4 06 (H) 09/21/2022    CREATININE 5 11 (H) 09/20/2022   Continue on dialysis  Continue to follow up with nephrologist       2  Acute respiratory failure with hypoxia [facility])  Assessment & Plan:  Resolved  Continue to monitor  3  Primary hypertension  Assessment & Plan:  Well controlled  Continue same  Will continue to monitor  Orders:  -     amLODIPine (NORVASC) 5 mg tablet; Take 1 tablet (5 mg total) by mouth daily  -     losartan (COZAAR) 50 mg tablet; Take 1 tablet (50 mg total) by mouth daily  -     torsemide (DEMADEX) 100 mg tablet; Take 1 tablet (100 mg total) by mouth 4 (four) times a week On off HD days- Tuesday, Thursday, Saturday, and Sunday    4  Hypertension, unspecified type  -     carvedilol (COREG) 25 mg tablet; Take 1 tablet (25 mg total) by mouth 2 (two) times a day with meals    5  Type 1 diabetes mellitus without complication [facility])  Assessment & Plan:    Lab Results   Component Value Date    HGBA1C 8 5 (A) 10/04/2022   Not well controlled but improving  Continue on Lantus 30 units in the evening and 60 units 3 times a day with meals  Continue to follow low carb diet and regular exercise  Orders:  -     insulin glargine (LANTUS) 100 units/mL subcutaneous injection; Inject 30 Units under the skin daily at bedtime  -     insulin lispro (HumaLOG) 100 units/mL injection; Inject 6 Units under the skin 3 (three) times a day with meals  -     POCT hemoglobin A1c    6  Anxiety  -     FLUoxetine (PROzac) 20 mg capsule; Take 1 capsule (20 mg total) by mouth every morning    7  Epigastric pain    8  Bipolar 1 disorder (HCC)  -     Vraylar 6 MG capsule; Take 1 capsule (6 mg total) by mouth daily    9   Morbid obesity with BMI of 60 0-69 9, adult ([facility] )    10  Acquired hypothyroidism  Assessment & Plan:  Stable  Continue same  Will continue to monitor  11  Type 1 diabetes mellitus with chronic kidney disease on chronic dialysis [facility])  Assessment & Plan:    Lab Results   Component Value Date    HGBA1C 8 5 (A) 10/04/2022   Not well controlled but improving  Continue on Lantus 30 units in the evening and 60 units 3 times a day with meals  Continue to follow low carb diet and regular exercise  12  Schizoaffective disorder, depressive type ([facility] )  Assessment & Plan:  Symptoms improving  Continue on his medications  He was given refill  [place] and he was told he needs to establish with a psychiatrist out there  13  Healthcare maintenance  Assessment & Plan: It was discussed about immunizations, diet, exercise and safety measures  Subjective     Transitional Care Management Review:   Benji Castano is a 38 y o  male here for TCM follow up  During the TCM phone call patient stated:  TCM Call     Date and time call was made  9/26/2022 12:53 PM    Hospital care reviewed  Records reviewed    Patient was hospitialized at  [address]    Date of Admission  09/09/22    Date of discharge  09/23/22    Diagnosis  Acute respiratory failure     Disposition  Home    Were the patients medications reviewed and updated  No    Current Symptoms  None      TCM Call     Post hospital issues  None    Should patient be enrolled in anticoag monitoring? No    Scheduled for follow up?   Yes    Did you obtain your prescribed medications  Yes    Do you need help managing your prescriptions or medications  No    Is transportation to your appointment needed  No    I have advised the patient to call PCP with any new or worsening symptoms  [address]  Family members    Support System  Family    Are you recieving any outpatient services  No    Are you recieving home care services  No Are you using any community resources  No    Current waiver services  No    Have you fallen in the last 12 months  No    Interperter language line needed  No        Follow-up post hospital for worsening shortness of breath  The patient with history of chronic kidney disease stage 4 not on dialysis on admission  He subsequently was admitted for acute respiratory failure hypoxia requiring oxygen supplement  Chest x-ray on admission shows possible pulmonary edema  The patient also presented with leukocytosis and was treated for suspected pneumonia as well with IV ceftriaxone  He was started on IV furosemide for volume overload  Nephrology evaluation done  The patient did not respond well to intermittent IV diuretic therapy and subsequently hemodialysis was initiated on 09/17/2022  He eventually was able to wean off of oxygen with volume management per hemodialysis  As the patient procalcitonin levels are negative, IV antibiotic was discontinued  PermCath exchanged to placed on 09/23/2022 due to high venous pressure during HD treatment  Case management has arranged outpatient hemodialysis chair- Monday, Wednesday, and Friday schedule  He was discharged in stable condition  He has been following with his dialysis  He has been taking his medications and using his insulin as prescribed  He stated he has been feeling a lot better lately since discharge from the hospital   His A1c in the office came back elevated at 8 5 but improved from before  Review of Systems   Constitutional: Negative for chills and fever  HENT: Negative for trouble swallowing  Eyes: Negative for visual disturbance  Respiratory: Negative for cough and shortness of breath  Cardiovascular: Negative for chest pain and palpitations  Gastrointestinal: Negative for abdominal pain, blood in stool and vomiting  Endocrine: Negative for cold intolerance and heat intolerance     Genitourinary: Negative for difficulty urinating and dysuria  Musculoskeletal: Negative for gait problem  Skin: Negative for rash  Neurological: Negative for dizziness, syncope and headaches  Hematological: Negative for adenopathy  Psychiatric/Behavioral: Negative for behavioral problems  Objective     /80 (BP Location: Left arm, Patient Position: Sitting, Cuff Size: Large)   Pulse 76   Temp 97 8 °F (36 6 °C) (Tympanic)   Resp 16   Ht 5' 2" (1 575 m)   Wt (!) 151 kg (333 lb 9 6 oz)   SpO2 99%   BMI 61 02 kg/m²      Physical Exam  Vitals and nursing note reviewed  Constitutional:       Appearance: He is well-developed  He is obese  HENT:      Head: Normocephalic and atraumatic  Eyes:      Conjunctiva/sclera: Conjunctivae normal    Cardiovascular:      Rate and Rhythm: Normal rate and regular rhythm  Heart sounds: No murmur heard  Pulmonary:      Effort: Pulmonary effort is normal  No respiratory distress  Breath sounds: Normal breath sounds  Abdominal:      Palpations: Abdomen is soft  Tenderness: There is no abdominal tenderness  Musculoskeletal:      Cervical back: Neck supple  Skin:     General: Skin is warm and dry  Neurological:      Mental Status: He is alert  Eliot Lucero MD     BMI Counseling: Body mass index is 61 02 kg/m²  The BMI is above normal  Nutrition recommendations include reducing portion sizes, decreasing overall calorie intake, 3-5 servings of fruits/vegetables daily, reducing fast food intake and consuming healthier snacks  Exercise recommendations include moderate aerobic physical activity for 150 minutes/week

## 2022-10-05 ENCOUNTER — TELEPHONE (OUTPATIENT)
Dept: FAMILY MEDICINE CLINIC | Facility: CLINIC | Age: 42
End: 2022-10-05

## 2022-10-05 NOTE — TELEPHONE ENCOUNTER
I returned call to [facility] and spoke with Dalton Key  Informed her we did not request prior auth for Abilifty,  The prior auth is for Vraylar 6 mg capsule  Per Dalton Key she reopened the auth request for [employer] #[id number], turn around time is 24 hours

## 2022-10-05 NOTE — TELEPHONE ENCOUNTER
Call from [address] regarding prior auth for Abilify    She said we submitted it for 6mg but it come as an injectaible and not 6mg     Need to restart a new auth

## 2022-10-12 PROBLEM — J06.9 ACUTE RESPIRATORY DISEASE DUE TO COVID-19 VIRUS: Status: RESOLVED | Noted: 2021-01-12 | Resolved: 2022-10-12

## 2022-10-12 PROBLEM — U07.1 ACUTE RESPIRATORY DISEASE DUE TO COVID-19 VIRUS: Status: RESOLVED | Noted: 2021-01-12 | Resolved: 2022-10-12

## 2022-10-21 ENCOUNTER — TELEPHONE (OUTPATIENT)
Dept: PULMONOLOGY | Facility: CLINIC | Age: 42
End: 2022-10-21

## 2022-10-21 NOTE — TELEPHONE ENCOUNTER
Left voicemail for patient to schedule a consultation with a Pulmonary doctor per referral from our [address]

## 2022-11-08 NOTE — ASSESSMENT & PLAN NOTE
· BP elevated with ambulating in halls, will increase Lisinopril at d/c to 20mg and continue to hold norvasc secondary to lower extremity edema  · Weight loss recommended Wound Care: Petrolatum

## 2022-12-03 PROBLEM — Z00.00 HEALTHCARE MAINTENANCE: Status: RESOLVED | Noted: 2022-10-04 | Resolved: 2022-12-03

## 2022-12-14 ENCOUNTER — TELEPHONE (OUTPATIENT)
Dept: FAMILY MEDICINE CLINIC | Facility: CLINIC | Age: 42
End: 2022-12-14

## 2022-12-14 NOTE — TELEPHONE ENCOUNTER
Faxed last 3 office visits to Itzel Pereira  [address] [place] [address] [facility] on [facility] and disability per request

## 2022-12-22 ENCOUNTER — TELEPHONE (OUTPATIENT)
Dept: FAMILY MEDICINE CLINIC | Facility: CLINIC | Age: 42
End: 2022-12-22

## 2023-02-07 ENCOUNTER — TELEPHONE (OUTPATIENT)
Dept: FAMILY MEDICINE CLINIC | Facility: CLINIC | Age: 43
End: 2023-02-07

## 2023-02-07 NOTE — TELEPHONE ENCOUNTER
Received Vraylar prior auth request through cover my meds  Per Norm , prior auth would not go through because we are not participating with pt's [facility]  Father aware he will have to contact a provider participating with his insurance  in [place] to order medication

## 2023-03-27 NOTE — ASSESSMENT & PLAN NOTE
----- Message from Eloy Azar sent at 3/27/2023  2:07 PM CDT -----  Contact: [phone]  Type:  Patient Returning Call    Who Called: Indira  Who Left Message for Patient: Nurse  Does the patient know what this is regarding?: Appt  Would the patient rather a call back or a response via [facility]? Call  Best Call Back Number: [phone]  Additional Information:          Patient requiring 5 L nasal cannula in ED  Found to have SpO2 of 87% on 5 L  Currently weaned off oxygen   X-ray revealed findings suggestive of fluid overload and possible pneumonia  Patient received IV Lasix and Rocephin in ED  Patient on Bumex 2 mg p o  B i d  on outpatient basis  BNP elevated at 330   Leukocytosis noted on admission lab work however patient has been on steroid taper, denies fevers  Echocardiogram shows LVEF of 50 to 55%  Trivial small pericardial effusion       · Suspected acute respiratory failure is due to volume overload  · Nephrology input appreciated- continue with current diuretic therapy-Bumex 4 mg BID  · 5 day course of ceftriaxone completed 9/13/22   · Blood cultures- NGTD    · Continue with respiratory protocol; oxygen supplement keep SpO2 greater than 92%

## 2023-05-08 NOTE — ASSESSMENT & PLAN NOTE
Complaints of chest pressure on admission that lasted over an hour in duration    This was alleviated with Maalox  It is unclear weather this is GI or cardiac especially in the setting of an abnormal EKG with t-wave inversions that may indicate ischemia  These changes were seen on prior EKGs     LEANNA will be arranged as an OP  General Sunscreen Counseling: I recommended a broad spectrum sunscreen with a SPF of 30 or higher.  I explained that SPF 30 sunscreens block approximately 97 percent of the sun's harmful rays.  Sunscreens should be applied at least 15 minutes prior to expected sun exposure and then every 2 hours after that as long as sun exposure continues. If swimming or exercising sunscreen should be reapplied every 45 minutes to an hour after getting wet or sweating.  One ounce, or the equivalent of a shot glass full of sunscreen, is adequate to protect the skin not covered by a bathing suit. I also recommended a lip balm with a sunscreen as well. Sun protective clothing can be used in lieu of sunscreen but must be worn the entire time you are exposed to the sun's rays. Detail Level: Generalized Products Recommended: Elta MD UV Clear and Brush on Block

## 2023-08-15 NOTE — TELEPHONE ENCOUNTER
Carroll Montero Nicely called  States she was consulted by neuro re depakote initiation while inpatient  States no psych indication for med  Pt w/ hx of bipolar dx however depressive type  She believes Neuro to trial it while inpt however no order in EPIC yet  Does not believe outpatient psych will manage med d/t above and also since pt has not been following with psychiatrist regularly  Believes physican-pt relationship strained  Dr Montero Nicely can be contacted via EPIC if you have additional concerns  Detail Level: Zone Continue Regimen: spironolactone 100 mg tablet: Taken one tablet by mouth twice daily.\\n\\nminoxidil 2.5 mg tablet: Take one tablet by mouth daily.

## 2025-03-07 NOTE — CASE MANAGEMENT
CM was informed that pt wished for his Latuda medication to be switched to [address]  Per Zac Craig with [facility], medication goes through with $0 co-pay  Zac Craig reports that they only have 9 pills and pt would have to return to fill rest of script once medication is ordered  CM informed Dr Martina Tristan of same  Per Dr Martina Tristan, pt may not be medically stable for discharge today due to a spike in blood pressure  CM department to follow for additional discharge concerns or needs  Called Pt she will call back to make the appt for at work now